# Patient Record
Sex: MALE | Race: WHITE | NOT HISPANIC OR LATINO | Employment: OTHER | URBAN - METROPOLITAN AREA
[De-identification: names, ages, dates, MRNs, and addresses within clinical notes are randomized per-mention and may not be internally consistent; named-entity substitution may affect disease eponyms.]

---

## 2018-03-20 LAB
CK SERPL-CCNC: 609 IU/L (ref 30–223)
CK-MB (HISTORICAL): 9.2 NG/ML (ref 0.6–6.3)

## 2018-03-21 LAB
CK SERPL-CCNC: 447 IU/L (ref 30–223)
CK-MB (HISTORICAL): 6 NG/ML (ref 0.6–6.3)
CLOSTRIDIUM DIFFICILE TOXIN (HISTORICAL): NEGATIVE

## 2018-03-22 LAB
CK SERPL-CCNC: 267 IU/L (ref 30–223)
CK-MB (HISTORICAL): 4.7 NG/ML (ref 0.6–6.3)

## 2018-03-23 LAB
ANION GAP SERPL CALCULATED.3IONS-SCNC: 8.9 MM/L
BASOPHILS # BLD AUTO: 0 X3/UL (ref 0–0.3)
BASOPHILS # BLD AUTO: 0.6 % (ref 0–2)
BUN SERPL-MCNC: 9 MG/DL (ref 7–25)
CALCIUM SERPL-MCNC: 9.3 MG/DL (ref 8.6–10.5)
CHLORIDE SERPL-SCNC: 105 MM/L (ref 98–107)
CK SERPL-CCNC: 130 IU/L (ref 30–223)
CO2 SERPL-SCNC: 28 MM/L (ref 21–31)
CREAT SERPL-MCNC: 0.85 MG/DL (ref 0.7–1.3)
DEPRECATED RDW RBC AUTO: 13.8 % (ref 11.5–14.5)
EGFR (HISTORICAL): > 60 GFR
EGFR AFRICAN AMERICAN (HISTORICAL): > 60 GFR
EOSINOPHIL # BLD AUTO: 0.1 X3/UL (ref 0–0.5)
EOSINOPHIL NFR BLD AUTO: 2.4 % (ref 0–5)
GLUCOSE (HISTORICAL): 98 MG/DL (ref 65–99)
HCT VFR BLD AUTO: 40.3 % (ref 42–52)
HGB BLD-MCNC: 13.9 G/DL (ref 14–18)
LITHIUM LEVEL (HISTORICAL): 0.4 MM/L (ref 1–1.2)
LYMPHOCYTES # BLD AUTO: 1.3 X3/UL (ref 1.2–4.2)
LYMPHOCYTES NFR BLD AUTO: 27.1 % (ref 20.5–51.1)
MCH RBC QN AUTO: 30.5 PG (ref 26–34)
MCHC RBC AUTO-ENTMCNC: 34.5 G/DL (ref 31–36)
MCV RBC AUTO: 88.2 FL (ref 81–99)
MONOCYTES # BLD AUTO: 0.4 X3/UL (ref 0–1)
MONOCYTES NFR BLD AUTO: 8.3 % (ref 1.7–12)
NEUTROPHILS # BLD AUTO: 3 X3/UL (ref 1.4–6.5)
NEUTS SEG NFR BLD AUTO: 61.6 % (ref 42.2–75.2)
OSMOLALITY, SERUM (HISTORICAL): 274 MOSM (ref 262–291)
PLATELET # BLD AUTO: 245 X3/UL (ref 130–400)
PMV BLD AUTO: 8.6 FL (ref 8.6–11.7)
POTASSIUM SERPL-SCNC: 3.9 MM/L (ref 3.5–5.5)
RBC # BLD AUTO: 4.57 X6/UL (ref 4.3–5.9)
SODIUM SERPL-SCNC: 138 MM/L (ref 134–143)
WBC # BLD AUTO: 4.8 X3/UL (ref 4.8–10.8)

## 2018-03-27 LAB
ANION GAP SERPL CALCULATED.3IONS-SCNC: 9.6 MM/L
BASOPHILS # BLD AUTO: 0 X3/UL (ref 0–0.3)
BASOPHILS # BLD AUTO: 0.6 % (ref 0–2)
BUN SERPL-MCNC: 9 MG/DL (ref 7–25)
CALCIUM SERPL-MCNC: 9.2 MG/DL (ref 8.6–10.5)
CHLORIDE SERPL-SCNC: 107 MM/L (ref 98–107)
CO2 SERPL-SCNC: 27 MM/L (ref 21–31)
CREAT SERPL-MCNC: 0.95 MG/DL (ref 0.7–1.3)
DEPRECATED RDW RBC AUTO: 13.9 % (ref 11.5–14.5)
EGFR (HISTORICAL): > 60 GFR
EGFR AFRICAN AMERICAN (HISTORICAL): > 60 GFR
EOSINOPHIL # BLD AUTO: 0.1 X3/UL (ref 0–0.5)
EOSINOPHIL NFR BLD AUTO: 2.3 % (ref 0–5)
GLUCOSE (HISTORICAL): 96 MG/DL (ref 65–99)
HCT VFR BLD AUTO: 39.4 % (ref 42–52)
HGB BLD-MCNC: 13.3 G/DL (ref 14–18)
LITHIUM LEVEL (HISTORICAL): 0.5 MM/L (ref 1–1.2)
LYMPHOCYTES # BLD AUTO: 1.2 X3/UL (ref 1.2–4.2)
LYMPHOCYTES NFR BLD AUTO: 22.8 % (ref 20.5–51.1)
MCH RBC QN AUTO: 29.5 PG (ref 26–34)
MCHC RBC AUTO-ENTMCNC: 33.8 G/DL (ref 31–36)
MCV RBC AUTO: 87.4 FL (ref 81–99)
MONOCYTES # BLD AUTO: 0.3 X3/UL (ref 0–1)
MONOCYTES NFR BLD AUTO: 6.6 % (ref 1.7–12)
NEUTROPHILS # BLD AUTO: 3.5 X3/UL (ref 1.4–6.5)
NEUTS SEG NFR BLD AUTO: 67.7 % (ref 42.2–75.2)
OSMOLALITY, SERUM (HISTORICAL): 276 MOSM (ref 262–291)
PLATELET # BLD AUTO: 233 X3/UL (ref 130–400)
PMV BLD AUTO: 8.7 FL (ref 8.6–11.7)
POTASSIUM SERPL-SCNC: 4.6 MM/L (ref 3.5–5.5)
RBC # BLD AUTO: 4.51 X6/UL (ref 4.3–5.9)
SODIUM SERPL-SCNC: 139 MM/L (ref 134–143)
WBC # BLD AUTO: 5.2 X3/UL (ref 4.8–10.8)

## 2018-04-01 LAB
ANION GAP SERPL CALCULATED.3IONS-SCNC: 7.8 MM/L
BASOPHILS # BLD AUTO: 0 X3/UL (ref 0–0.3)
BASOPHILS # BLD AUTO: 0.5 % (ref 0–2)
BUN SERPL-MCNC: 9 MG/DL (ref 7–25)
CALCIUM SERPL-MCNC: 9.2 MG/DL (ref 8.6–10.5)
CHLORIDE SERPL-SCNC: 105 MM/L (ref 98–107)
CO2 SERPL-SCNC: 30 MM/L (ref 21–31)
CREAT SERPL-MCNC: 0.88 MG/DL (ref 0.7–1.3)
DEPRECATED RDW RBC AUTO: 14.1 % (ref 11.5–14.5)
EGFR (HISTORICAL): > 60 GFR
EGFR AFRICAN AMERICAN (HISTORICAL): > 60 GFR
EOSINOPHIL # BLD AUTO: 0.1 X3/UL (ref 0–0.5)
EOSINOPHIL NFR BLD AUTO: 2.3 % (ref 0–5)
GLUCOSE (HISTORICAL): 89 MG/DL (ref 65–99)
HCT VFR BLD AUTO: 40.6 % (ref 42–52)
HGB BLD-MCNC: 13.8 G/DL (ref 14–18)
LITHIUM LEVEL (HISTORICAL): 0.7 MM/L (ref 1–1.2)
LYMPHOCYTES # BLD AUTO: 1.2 X3/UL (ref 1.2–4.2)
LYMPHOCYTES NFR BLD AUTO: 21.7 % (ref 20.5–51.1)
MCH RBC QN AUTO: 30.1 PG (ref 26–34)
MCHC RBC AUTO-ENTMCNC: 33.9 G/DL (ref 31–36)
MCV RBC AUTO: 88.6 FL (ref 81–99)
MONOCYTES # BLD AUTO: 0.4 X3/UL (ref 0–1)
MONOCYTES NFR BLD AUTO: 7.1 % (ref 1.7–12)
NEUTROPHILS # BLD AUTO: 3.7 X3/UL (ref 1.4–6.5)
NEUTS SEG NFR BLD AUTO: 68.4 % (ref 42.2–75.2)
OSMOLALITY, SERUM (HISTORICAL): 276 MOSM (ref 262–291)
PLATELET # BLD AUTO: 200 X3/UL (ref 130–400)
PMV BLD AUTO: 8.2 FL (ref 8.6–11.7)
POTASSIUM SERPL-SCNC: 3.8 MM/L (ref 3.5–5.5)
RBC # BLD AUTO: 4.58 X6/UL (ref 4.3–5.9)
SODIUM SERPL-SCNC: 139 MM/L (ref 134–143)
WBC # BLD AUTO: 5.4 X3/UL (ref 4.8–10.8)

## 2018-04-02 LAB
BILIRUB UR QL STRIP: NEGATIVE
CLARITY UR: NORMAL
COLOR UR: YELLOW
GLUCOSE UR STRIP-MCNC: NEGATIVE MG/DL
HGB UR QL STRIP.AUTO: NEGATIVE
KETONES UR STRIP-MCNC: NEGATIVE MG/DL
LEUKOCYTE ESTERASE UR QL STRIP: NEGATIVE
NITRITE UR QL STRIP: NEGATIVE
PH UR STRIP.AUTO: 7 [PH] (ref 4.5–8)
PROT UR STRIP-MCNC: NEGATIVE MG/DL
SP GR UR STRIP.AUTO: <= 1.005 (ref 1–1.03)
UROBILINOGEN UR QL STRIP.AUTO: 0.2 EU/DL (ref 0.2–8)

## 2018-04-06 LAB
ANION GAP SERPL CALCULATED.3IONS-SCNC: 6.9 MM/L
BASOPHILS # BLD AUTO: 0 X3/UL (ref 0–0.3)
BASOPHILS # BLD AUTO: 0.4 % (ref 0–2)
BUN SERPL-MCNC: 11 MG/DL (ref 7–25)
CALCIUM SERPL-MCNC: 9.7 MG/DL (ref 8.6–10.5)
CHLORIDE SERPL-SCNC: 104 MM/L (ref 98–107)
CO2 SERPL-SCNC: 32 MM/L (ref 21–31)
CREAT SERPL-MCNC: 0.97 MG/DL (ref 0.7–1.3)
DEPRECATED RDW RBC AUTO: 14.1 % (ref 11.5–14.5)
EGFR (HISTORICAL): > 60 GFR
EGFR AFRICAN AMERICAN (HISTORICAL): > 60 GFR
EOSINOPHIL # BLD AUTO: 0.1 X3/UL (ref 0–0.5)
EOSINOPHIL NFR BLD AUTO: 2.6 % (ref 0–5)
GLUCOSE (HISTORICAL): 94 MG/DL (ref 65–99)
HCT VFR BLD AUTO: 40.7 % (ref 42–52)
HGB BLD-MCNC: 14 G/DL (ref 14–18)
LITHIUM LEVEL (HISTORICAL): 0.8 MM/L (ref 1–1.2)
LYMPHOCYTES # BLD AUTO: 1.2 X3/UL (ref 1.2–4.2)
LYMPHOCYTES NFR BLD AUTO: 22.4 % (ref 20.5–51.1)
MCH RBC QN AUTO: 30.1 PG (ref 26–34)
MCHC RBC AUTO-ENTMCNC: 34.3 G/DL (ref 31–36)
MCV RBC AUTO: 87.8 FL (ref 81–99)
MONOCYTES # BLD AUTO: 0.4 X3/UL (ref 0–1)
MONOCYTES NFR BLD AUTO: 7.3 % (ref 1.7–12)
NEUTROPHILS # BLD AUTO: 3.7 X3/UL (ref 1.4–6.5)
NEUTS SEG NFR BLD AUTO: 67.3 % (ref 42.2–75.2)
OSMOLALITY, SERUM (HISTORICAL): 277 MOSM (ref 262–291)
PLATELET # BLD AUTO: 180 X3/UL (ref 130–400)
PMV BLD AUTO: 8 FL (ref 8.6–11.7)
POTASSIUM SERPL-SCNC: 3.9 MM/L (ref 3.5–5.5)
RBC # BLD AUTO: 4.64 X6/UL (ref 4.3–5.9)
SODIUM SERPL-SCNC: 139 MM/L (ref 134–143)
WBC # BLD AUTO: 5.6 X3/UL (ref 4.8–10.8)

## 2018-04-18 ENCOUNTER — HOSPITAL ENCOUNTER (EMERGENCY)
Facility: HOSPITAL | Age: 58
Discharge: HOME | End: 2018-04-18
Attending: EMERGENCY MEDICINE | Admitting: EMERGENCY MEDICINE
Payer: MEDICARE

## 2018-04-18 VITALS
RESPIRATION RATE: 20 BRPM | OXYGEN SATURATION: 99 % | HEIGHT: 69 IN | SYSTOLIC BLOOD PRESSURE: 128 MMHG | BODY MASS INDEX: 29.62 KG/M2 | TEMPERATURE: 97.6 F | WEIGHT: 200 LBS | DIASTOLIC BLOOD PRESSURE: 77 MMHG | HEART RATE: 100 BPM

## 2018-04-18 DIAGNOSIS — Z04.6 ENCOUNTER FOR GENERAL PSYCHIATRIC EXAMINATION, REQUESTED BY AUTHORITY: Primary | ICD-10-CM

## 2018-04-18 LAB
AMPHETAMINES SERPL QL SCN: NEGATIVE
ANION GAP SERPL CALCULATED.3IONS-SCNC: 7 MMOL/L (ref 4–13)
ATRIAL RATE: 76 BPM
BARBITURATES UR QL: NEGATIVE
BASOPHILS # BLD AUTO: 0.02 THOUSANDS/ΜL (ref 0–0.1)
BASOPHILS NFR BLD AUTO: 0 % (ref 0–1)
BENZODIAZ UR QL: NEGATIVE
BILIRUB UR QL STRIP: NEGATIVE
BUN SERPL-MCNC: 14 MG/DL (ref 5–25)
CALCIUM SERPL-MCNC: 8.6 MG/DL (ref 8.3–10.1)
CHLORIDE SERPL-SCNC: 107 MMOL/L (ref 100–108)
CLARITY UR: CLEAR
CO2 SERPL-SCNC: 29 MMOL/L (ref 21–32)
COCAINE UR QL: NEGATIVE
COLOR UR: YELLOW
COLOR, POC: YELLOW
CREAT SERPL-MCNC: 0.82 MG/DL (ref 0.6–1.3)
EOSINOPHIL # BLD AUTO: 0.14 THOUSAND/ΜL (ref 0–0.61)
EOSINOPHIL NFR BLD AUTO: 3 % (ref 0–6)
ERYTHROCYTE [DISTWIDTH] IN BLOOD BY AUTOMATED COUNT: 13.6 % (ref 11.6–15.1)
ETHANOL EXG-MCNC: 0 MG/DL
GFR SERPL CREATININE-BSD FRML MDRD: 96 ML/MIN/1.73SQ M
GLUCOSE SERPL-MCNC: 116 MG/DL (ref 65–140)
GLUCOSE UR STRIP-MCNC: NEGATIVE MG/DL
HCT VFR BLD AUTO: 37.2 % (ref 36.5–49.3)
HGB BLD-MCNC: 12.5 G/DL (ref 12–17)
HGB UR QL STRIP.AUTO: NEGATIVE
KETONES UR STRIP-MCNC: NEGATIVE MG/DL
LEUKOCYTE ESTERASE UR QL STRIP: NEGATIVE
LYMPHOCYTES # BLD AUTO: 1.02 THOUSANDS/ΜL (ref 0.6–4.47)
LYMPHOCYTES NFR BLD AUTO: 18 % (ref 14–44)
MCH RBC QN AUTO: 29.8 PG (ref 26.8–34.3)
MCHC RBC AUTO-ENTMCNC: 33.6 G/DL (ref 31.4–37.4)
MCV RBC AUTO: 89 FL (ref 82–98)
METHADONE UR QL: NEGATIVE
MONOCYTES # BLD AUTO: 0.48 THOUSAND/ΜL (ref 0.17–1.22)
MONOCYTES NFR BLD AUTO: 9 % (ref 4–12)
NEUTROPHILS # BLD AUTO: 4.01 THOUSANDS/ΜL (ref 1.85–7.62)
NEUTS SEG NFR BLD AUTO: 70 % (ref 43–75)
NITRITE UR QL STRIP: NEGATIVE
OPIATES UR QL SCN: NEGATIVE
P AXIS: 79 DEGREES
PCP UR QL: NEGATIVE
PH UR STRIP.AUTO: 7 [PH] (ref 4.5–8)
PLATELET # BLD AUTO: 206 THOUSANDS/UL (ref 149–390)
PMV BLD AUTO: 10 FL (ref 8.9–12.7)
POTASSIUM SERPL-SCNC: 3.6 MMOL/L (ref 3.5–5.3)
PR INTERVAL: 158 MS
PROT UR STRIP-MCNC: NEGATIVE MG/DL
QRS AXIS: 114 DEGREES
QRSD INTERVAL: 98 MS
QT INTERVAL: 410 MS
QTC INTERVAL: 461 MS
RBC # BLD AUTO: 4.19 MILLION/UL (ref 3.88–5.62)
SODIUM SERPL-SCNC: 143 MMOL/L (ref 136–145)
SP GR UR STRIP.AUTO: 1.02 (ref 1–1.03)
T WAVE AXIS: 51 DEGREES
THC UR QL: POSITIVE
TROPONIN I SERPL-MCNC: <0.02 NG/ML
TSH SERPL DL<=0.05 MIU/L-ACNC: 1.72 UIU/ML (ref 0.36–3.74)
UROBILINOGEN UR QL STRIP.AUTO: 0.2 E.U./DL
VENTRICULAR RATE: 76 BPM
WBC # BLD AUTO: 5.67 THOUSAND/UL (ref 4.31–10.16)

## 2018-04-18 PROCEDURE — 81003 URINALYSIS AUTO W/O SCOPE: CPT

## 2018-04-18 PROCEDURE — 84443 ASSAY THYROID STIM HORMONE: CPT | Performed by: EMERGENCY MEDICINE

## 2018-04-18 PROCEDURE — 36415 COLL VENOUS BLD VENIPUNCTURE: CPT | Performed by: EMERGENCY MEDICINE

## 2018-04-18 PROCEDURE — 85025 COMPLETE CBC W/AUTO DIFF WBC: CPT | Performed by: EMERGENCY MEDICINE

## 2018-04-18 PROCEDURE — 82075 ASSAY OF BREATH ETHANOL: CPT | Performed by: EMERGENCY MEDICINE

## 2018-04-18 PROCEDURE — 80307 DRUG TEST PRSMV CHEM ANLYZR: CPT | Performed by: EMERGENCY MEDICINE

## 2018-04-18 PROCEDURE — 99284 EMERGENCY DEPT VISIT MOD MDM: CPT

## 2018-04-18 PROCEDURE — 80048 BASIC METABOLIC PNL TOTAL CA: CPT | Performed by: EMERGENCY MEDICINE

## 2018-04-18 PROCEDURE — 84484 ASSAY OF TROPONIN QUANT: CPT | Performed by: EMERGENCY MEDICINE

## 2018-04-18 PROCEDURE — 93005 ELECTROCARDIOGRAM TRACING: CPT | Performed by: EMERGENCY MEDICINE

## 2018-04-18 PROCEDURE — 93010 ELECTROCARDIOGRAM REPORT: CPT | Performed by: INTERNAL MEDICINE

## 2018-04-18 PROCEDURE — 81002 URINALYSIS NONAUTO W/O SCOPE: CPT | Performed by: EMERGENCY MEDICINE

## 2018-04-18 RX ORDER — LITHIUM CARBONATE 300 MG/1
300 CAPSULE ORAL EVERY 12 HOURS
COMMUNITY
End: 2019-11-12 | Stop reason: HOSPADM

## 2018-04-18 NOTE — ED NOTES
Patient states was at the [employer] and got in to a dispute with the  over the price of his purchases  States she did not give him a receipt  He denies making any threats  She called the police and he left and said he would wait at the fire hydrant  Denies any SI/HI  No hallucinations  States has had previous psych admissions at [facility], [facility], and [facility]  Unsure how compliant patient is with his Lithium  States Lithium and Ativan are OK, but anything else is not on his directive    Patient did not want to come with the police so came with a man he calls his uncle     Liana Davis, RN  04/18/18 8536 [facility] Pablo Yancey RN  04/18/18 0436

## 2018-04-18 NOTE — ED NOTES
Patient moved into behavioral holding area  Visitor's cell phone placed in nursing station       Tod Fulton  04/18/18 1721

## 2018-04-18 NOTE — DISCHARGE INSTRUCTIONS
Mood Disorders   WHAT YOU NEED TO KNOW:   A mood disorder, or affective disorder, is a condition that causes your mood or emotions to be out of control  Your mood can affect your personality and how you act  It can also affect how you feel about yourself and life in general   DISCHARGE INSTRUCTIONS:   Medicines:   · Medicines  can help control your moods  · Take your medicine as directed  Contact your healthcare provider if you think your medicine is not helping or if you have side effects  Tell him or her if you are allergic to any medicine  Keep a list of the medicines, vitamins, and herbs you take  Include the amounts, and when and why you take them  Bring the list or the pill bottles to follow-up visits  Carry your medicine list with you in case of an emergency  Follow up with your healthcare provider as directed: You may need to return for regular visits or blood tests  Write down your questions so you remember to ask them during your visits  Self-care:   · Try to get 6 to 8 hours of sleep each night  Contact your healthcare provider if you have trouble sleeping  · Manage your stress  Learn new ways to relax, such as deep breathing or meditation  · Talk to someone about how you feel  Join a support group  Talk to your healthcare provider, family, or friends about your feelings  Tell them about things that upset you  · Exercise regularly  Ask about the best exercise plan for you  Most healthcare providers recommend 30 minutes each day, 5 days a week  Exercise helps to lower stress and manage your moods  Contact your healthcare provider if:   · You are depressed  · You feel anxious or worried  · You begin to drink alcohol, or you drink more than usual     · You take illegal drugs  · You take medicines that are not prescribed to you  · Your medicine causes you to feel drowsy, keeps you awake, or affects how much you eat      · You have questions or concerns about your condition or care  Seek care immediately or call 911 if:   · You have severe depression  · You want to hurt yourself or others  © 2017 [address] [employer] Information is for End User's use only and may not be sold, redistributed or otherwise used for commercial purposes  All illustrations and images included in CareNotes® are the copyrighted property of A D A M , Inc  or Shailesh Jeffery  The above information is an  only  It is not intended as medical advice for individual conditions or treatments  Talk to your doctor, nurse or pharmacist before following any medical regimen to see if it is safe and effective for you

## 2018-04-18 NOTE — ED NOTES
Patient's visitor at bedside  Patient eating sandwich brought in by visitor       Tod Fulton  04/18/18 7703

## 2018-04-18 NOTE — ED NOTES
Pt is a 59 y o  male who was brought to the ED with   Chief Complaint   Patient presents with    Psychiatric Evaluation     pt was sent by sonya from [facility] for evaluation  came with uncle  recent admission for "medication adjustment" uncle reports pt is "not stable"   Pt brought to the ED by [facility], with complaints of sofía agitated, Pt reports that he got into argument at the gas station with stationclerk about the price of gas, the station  called the PD, due to pt being agrumentative with her  Pt reports that he takes his meds as prescribed, Pt reports that he sees a Psych Dr through the [facility]  Pt reports that the argument today was a big misunderstanding, and that he apoligized after it was over, but the PD was there and they wanted him to be seen by crisis  Pt denies S/I,H/I,A/H,V/H  Intake Assessment completed, Safety risk Assessment completed, CW met with pt and discussed what happened, pt report that he will talk to his Psych Dr about this  CW discussed this case and pt plan with ED Physician who is in agreement with this plan, Pt will be discharge per ED Physician , Ruthie Mcfadden gave pt a referral for follow up care        [address] [place] [address]

## 2018-04-18 NOTE — ED PROVIDER NOTES
History  Chief Complaint   Patient presents with    Psychiatric Evaluation     pt was sent by sonya from [facility] for evaluation  came with uncle  recent admission for "medication adjustment" uncle reports pt is "not stable"     70-year-old male presents for evaluation of bizarre behavior  Patient was reportedly agitated and had an altercation with his   He denies any suicidal or homicidal threats  Patient was sent down by the console for the [place] [employer] police for evaluation  Patient has an extensive psychiatric history with a recent admission  Patient reports changing his medications at home on his own  Patient denies being suicidal or homicidal, chest pain, shortness of breath, hallucinations  History provided by:  Patient and relative  Psychiatric Evaluation   Presenting symptoms: no agitation, no hallucinations and no suicidal thoughts    Associated symptoms: no abdominal pain, no appetite change, no chest pain and no fatigue        Prior to Admission Medications   Prescriptions Last Dose Informant Patient Reported? Taking?   lithium carbonate 300 mg capsule   Yes Yes   Sig: Take 300 mg by mouth every 12 (twelve) hours With meals      Facility-Administered Medications: None       Past Medical History:   Diagnosis Date    Hypertension     Psychiatric disorder        Past Surgical History:   Procedure Laterality Date    FRACTURE SURGERY         History reviewed  No pertinent family history  I have reviewed and agree with the history as documented  Social History   Substance Use Topics    Smoking status: Current Every Day Smoker     Packs/day: 1 00    Smokeless tobacco: Never Used    Alcohol use Yes      Comment: whenever i want        Review of Systems   Constitutional: Negative for activity change, appetite change, fatigue and fever  HENT: Negative for congestion, dental problem, ear pain, rhinorrhea and sore throat  Eyes: Negative for pain and redness  Respiratory: Negative for chest tightness, shortness of breath and wheezing  Cardiovascular: Negative for chest pain and palpitations  Gastrointestinal: Negative for abdominal pain, blood in stool, constipation, diarrhea, nausea and vomiting  Endocrine: Negative for cold intolerance and heat intolerance  Genitourinary: Negative for dysuria, frequency and hematuria  Musculoskeletal: Negative for arthralgias and myalgias  Skin: Negative for color change, pallor and rash  Neurological: Negative for weakness and numbness  Hematological: Does not bruise/bleed easily  Psychiatric/Behavioral: Positive for behavioral problems  Negative for agitation, hallucinations and suicidal ideas  Physical Exam  ED Triage Vitals   Temperature Pulse Respirations Blood Pressure SpO2   04/18/18 1318 04/18/18 1314 04/18/18 1314 04/18/18 1318 04/18/18 1314   97 6 °F (36 4 °C) 100 20 128/77 99 %      Temp Source Heart Rate Source Patient Position - Orthostatic VS BP Location FiO2 (%)   04/18/18 1318 04/18/18 1314 04/18/18 1318 04/18/18 1318 --   Temporal Monitor Sitting Right arm       Pain Score       04/18/18 1318       1           Orthostatic Vital Signs  Vitals:    04/18/18 1314 04/18/18 1318   BP:  128/77   Pulse: 100    Patient Position - Orthostatic VS:  Sitting       Physical Exam   Constitutional: He is oriented to person, place, and time  He appears well-developed and well-nourished  HENT:   Mouth/Throat: No oropharyngeal exudate  TMs normal bilaterally no pharyngeal erythema no rhinorrhea nontender palpation of sinuses, normal looking turbinates   Eyes: Conjunctivae and EOM are normal    Neck: Normal range of motion  Neck supple  No meningeal signs   Cardiovascular: Normal rate, regular rhythm, normal heart sounds and intact distal pulses  Pulmonary/Chest: Effort normal and breath sounds normal  No respiratory distress  He has no wheezes  He has no rales  He exhibits no tenderness     Abdominal: Soft  Bowel sounds are normal  He exhibits no distension and no mass  There is no tenderness  No hernia  No cvat   Musculoskeletal: Normal range of motion  He exhibits no edema  Lymphadenopathy:     He has no cervical adenopathy  Neurological: He is alert and oriented to person, place, and time  No cranial nerve deficit  Skin: No rash noted  No erythema  No edema   Psychiatric: He has a normal mood and affect  His speech is normal and behavior is normal  Thought content normal  He is not actively hallucinating  Cognition and memory are normal  He is attentive  Nursing note and vitals reviewed  ED Medications  Medications - No data to display    Diagnostic Studies  Results Reviewed     Procedure Component Value Units Date/Time    Rapid drug screen, urine [[id number]]  (Abnormal) Collected:  04/18/18 1603    Lab Status:  Final result Specimen:  Urine from Urine, Clean Catch Updated:  04/18/18 1658     Amph/Meth UR Negative     Barbiturate Ur Negative     Benzodiazepine Urine Negative     Cocaine Urine Negative     Methadone Urine Negative     Opiate Urine Negative     PCP Ur Negative     THC Urine Positive (A)    Narrative:         Presumptive report  If requested, specimen will be sent to reference lab for confirmation  FOR MEDICAL PURPOSES ONLY  IF CONFIRMATION NEEDED PLEASE CONTACT THE LAB WITHIN 5 DAYS      Drug Screen Cutoff Levels:  AMPHETAMINE/METHAMPHETAMINES  1000 ng/mL  BARBITURATES     200 ng/mL  BENZODIAZEPINES     200 ng/mL  COCAINE      300 ng/mL  METHADONE      300 ng/mL  OPIATES      300 ng/mL  PHENCYCLIDINE     25 ng/mL  THC       50 ng/mL    POCT urinalysis dipstick [[id number]]  (Normal) Resulted:  04/18/18 1603    Lab Status:  Final result Specimen:  Urine Updated:  04/18/18 1603     Color, UA yellow    ED Urine Macroscopic [[id number]]  (Normal) Collected:  04/18/18 1603    Lab Status:  Final result Specimen:  Urine Updated:  04/18/18 1602     Color, UA Yellow     Clarity, UA Clear pH, UA 7 0     Leukocytes, UA Negative     Nitrite, UA Negative     Protein, UA Negative mg/dl      Glucose, UA Negative mg/dl      Ketones, UA Negative mg/dl      Urobilinogen, UA 0 2 E U /dl      Bilirubin, UA Negative     Blood, UA Negative     Specific Gravity, UA 1 020    Narrative:       CLINITEK RESULT    Basic metabolic panel [[id number]] Collected:  04/18/18 1417    Lab Status:  Final result Specimen:  Blood from Arm, Right Updated:  04/18/18 1451     Sodium 143 mmol/L      Potassium 3 6 mmol/L      Chloride 107 mmol/L      CO2 29 mmol/L      Anion Gap 7 mmol/L      BUN 14 mg/dL      Creatinine 0 82 mg/dL      Glucose 116 mg/dL      Calcium 8 6 mg/dL      eGFR 96 ml/min/1 73sq m     Narrative:         National Kidney Disease Education Program recommendations are as follows:  GFR calculation is accurate only with a steady state creatinine  Chronic Kidney disease less than 60 ml/min/1 73 sq  meters  Kidney failure less than 15 ml/min/1 73 sq  meters  TSH [[id number]]  (Normal) Collected:  04/18/18 1417    Lab Status:  Final result Specimen:  Blood from Arm, Right Updated:  04/18/18 1451     TSH 3RD GENERATON 1 722 uIU/mL     Narrative:         Patients undergoing fluorescein dye angiography may retain small amounts of fluorescein in the body for 48-72 hours post procedure  Samples containing fluorescein can produce falsely depressed TSH values  If the patient had this procedure,a specimen should be resubmitted post fluorescein clearance      POCT alcohol breath test [[id number]]  (Normal) Resulted:  04/18/18 1449    Lab Status:  Final result Updated:  04/18/18 1449     EXTBreath Alcohol 0 00    Troponin I [[id number]]  (Normal) Collected:  04/18/18 1417    Lab Status:  Final result Specimen:  Blood from Arm, Right Updated:  04/18/18 1445     Troponin I <0 02 ng/mL     Narrative:         [employer] Chemistry analyzer 99% cutoff is > 0 04 ng/mL in network labs    o cTnI 99% cutoff is useful only when applied to patients in the clinical setting of myocardial ischemia  o cTnI 99% cutoff should be interpreted in the context of clinical history, ECG findings and possibly cardiac imaging to establish correct diagnosis  o cTnI 99% cutoff may be suggestive but clearly not indicative of a coronary event without the clinical setting of myocardial ischemia  CBC and differential [[id number]]  (Normal) Collected:  04/18/18 1417    Lab Status:  Final result Specimen:  Blood from Arm, Right Updated:  04/18/18 1427     WBC 5 67 Thousand/uL      RBC 4 19 Million/uL      Hemoglobin 12 5 g/dL      Hematocrit 37 2 %      MCV 89 fL      MCH 29 8 pg      MCHC 33 6 g/dL      RDW 13 6 %      MPV 10 0 fL      Platelets 749 Thousands/uL      Neutrophils Relative 70 %      Lymphocytes Relative 18 %      Monocytes Relative 9 %      Eosinophils Relative 3 %      Basophils Relative 0 %      Neutrophils Absolute 4 01 Thousands/µL      Lymphocytes Absolute 1 02 Thousands/µL      Monocytes Absolute 0 48 Thousand/µL      Eosinophils Absolute 0 14 Thousand/µL      Basophils Absolute 0 02 Thousands/µL                  No orders to display              Procedures  ECG 12 Lead Documentation  Date/Time: 4/18/2018 2:30 PM  Performed by: Gem Dia by: Maximilian MORIN     ECG reviewed by me, the ED Provider: yes    Patient location:  ED  Rate:     ECG rate:  76    ECG rate assessment: normal    Rhythm:     Rhythm: sinus rhythm    Ectopy:     Ectopy: none    QRS:     QRS axis:  Normal    QRS intervals:  Normal  Conduction:     Conduction: normal    ST segments:     ST segments:  Normal  T waves:     T waves: normal             Phone Contacts  ED Phone Contact    ED Course  ED Course                                MDM  Number of Diagnoses or Management Options  Diagnosis management comments:  Will do Mila psych labs, consult crisis    CritCare Time    Disposition  Final diagnoses:   Encounter for general psychiatric examination, requested by authority     Time reflects when diagnosis was documented in both MDM as applicable and the Disposition within this note     Time User Action Codes Description Comment    4/18/2018  5:36 PM Damarisgraciela Murphy Add [Z04 6] Encounter for general psychiatric examination, requested by authority       ED Disposition     ED Disposition Condition Comment    Discharge  Freddie K Day discharge to home/self care  Condition at discharge: Good        MD Documentation    Franklyn Conner Most Recent Value   Sending MD DR Bentley Screen      Follow-up Information     Follow up With Specialties Details Why Contact Info    Infolink  Schedule an appointment as soon as possible for a visit in 2 days  [phone]          Patient's Medications   Discharge Prescriptions    No medications on file     No discharge procedures on file      ED Provider  Electronically Signed by           Messi Wilson MD  04/18/18 5983

## 2018-04-18 NOTE — ED NOTES
Pt sitting in chair in room, states "this place is a shit hole and in need of some janitorial expertise" pt requesting floor to be mopped at this time, housekeeping paged         Taina Cortez RN  04/18/18 6358

## 2018-04-18 NOTE — ED NOTES
Pt is a poor historian, cannot list all of his allergies or medical problems, will not answer questions directly  Both he and his uncle seem unclear as to why pt is here        Champ Lau RN  04/18/18 2979

## 2018-04-18 NOTE — ED NOTES
Ate 100% of his dinner tray  Belongings returned to pt at this time along with discharge paperwork    States he will find a ride home "i've got numbers memorized in my head to call"     Champ Lau RN  04/18/18 5578

## 2018-04-27 ENCOUNTER — HOSPITAL ENCOUNTER (EMERGENCY)
Facility: HOSPITAL | Age: 58
Discharge: HOME | End: 2018-04-27
Attending: EMERGENCY MEDICINE | Admitting: EMERGENCY MEDICINE
Payer: MEDICARE

## 2018-04-27 ENCOUNTER — APPOINTMENT (EMERGENCY)
Dept: RADIOLOGY | Facility: HOSPITAL | Age: 58
End: 2018-04-27
Payer: MEDICARE

## 2018-04-27 VITALS
SYSTOLIC BLOOD PRESSURE: 159 MMHG | DIASTOLIC BLOOD PRESSURE: 93 MMHG | HEART RATE: 70 BPM | WEIGHT: 209.8 LBS | RESPIRATION RATE: 15 BRPM | BODY MASS INDEX: 30.98 KG/M2 | OXYGEN SATURATION: 98 % | TEMPERATURE: 98 F

## 2018-04-27 DIAGNOSIS — R07.81 RIB PAIN ON LEFT SIDE: Primary | ICD-10-CM

## 2018-04-27 PROCEDURE — 71046 X-RAY EXAM CHEST 2 VIEWS: CPT

## 2018-04-27 PROCEDURE — 93005 ELECTROCARDIOGRAM TRACING: CPT

## 2018-04-27 PROCEDURE — 99285 EMERGENCY DEPT VISIT HI MDM: CPT

## 2018-04-27 NOTE — ED ATTENDING ATTESTATION
Jeannette Moore DO, saw and evaluated the patient  I have discussed the patient with the resident/non-physician practitioner and agree with the resident's/non-physician practitioner's findings, Plan of Care, and MDM as documented in the resident's/non-physician practitioner's note, except where noted  All available labs and Radiology studies were reviewed  At this point I agree with the current assessment done in the Emergency Department  I have conducted an independent evaluation of this patient a history and physical is as follows:      Critical Care Time  CritCare Time    Procedures   59-year-old male presents to the emergency department from the [facility] with complaints of sharp left-sided rib pain  This apparently has been going on for quite some time  Patient is a very poor historian and is rambling  He is also quite hostile and does not want any lab work done  He [facility]  [facility] he [facility] there today for routine blood work and complained of this left rib pain was sent here  Patient states that he needs to walk to [place] because it is almost [address]  On exam patient is awake and alert and in no distress  He appears disheveled  Heart is regular without murmur  Lungs are clear  He does not have any left rib tenderness but is agreeable to have an x-ray done  He is asking for something to eat  Abdomen is soft and nontender  Neurologically has no focal deficits  Skin is warm and dry, normal color no rash

## 2018-04-27 NOTE — ED NOTES
C/O of increased uncontrolled urination and excessive drooling states this is happening because he was taken of his elin Degroot, ADRIEN  04/27/18 6194

## 2018-04-27 NOTE — ED NOTES
Went into pt  Room to get blood work and start an IV  Pt  Was verbally abusive stating "get the f out and tell the Dr  I am not getting any blood work  I already had it done at the [address] [facility] and they can look there if they want to know anything " Pt  Explained about the importance of blood work and testing at this time  Pt  Rishi Kirby me he is tired of hearing me talk and to get out  Provider notified at this time       Logan Hernandez, RN  04/27/18 2952

## 2018-04-27 NOTE — ED NOTES
Patient reports he continues to take his lithium but refuses to take all other medications that have been prescribed to him  His med record from the [place] shows he was given prescriptions on April 7th for benztropine, benadryl and paliperidone       Karole Schirmer, RN  04/27/18 4260

## 2018-04-27 NOTE — DISCHARGE INSTRUCTIONS
Follow up with primary care doctor in 3-5 days  If your symptoms worsen, return to the ED immediately  Chest Pain   WHAT YOU NEED TO KNOW:   Chest pain can be caused by a range of conditions, from not serious to life-threatening  Chest pain can be a symptom of a digestive problem, such as acid reflux or a stomach ulcer  An anxiety attack or a strong emotion, such as anger, can also cause chest pain  Infection, inflammation, or a fracture in the bones or cartilage in your chest can cause pain or discomfort  Sometimes chest pain or pressure is caused by poor blood flow to your heart (angina)  Chest pain may also be caused by life-threatening conditions such as a heart attack or blood clot in your lungs  DISCHARGE INSTRUCTIONS:   Call 911 if:   · You have any of the following signs of a heart attack:      ¨ Squeezing, pressure, or pain in your chest that lasts longer than 5 minutes or returns    ¨ Discomfort or pain in your back, neck, jaw, stomach, or arm     ¨ Trouble breathing    ¨ Nausea or vomiting    ¨ Lightheadedness or a sudden cold sweat, especially with chest pain or trouble breathing    Return to the emergency department if:   · You have chest discomfort that gets worse, even with medicine  · You cough or vomit blood  · Your bowel movements are black or bloody  · You cannot stop vomiting, or it hurts to swallow  Contact your healthcare provider if:   · You have questions or concerns about your condition or care  Medicines:   · Medicines  may be given to treat the cause of your chest pain  Examples include pain medicine, anxiety medicine, or medicines to increase blood flow to your heart  · Do not take certain medicines without asking your healthcare provider first   These include NSAIDs, herbal or vitamin supplements, or hormones (estrogen or progestin)  · Take your medicine as directed    Contact your healthcare provider if you think your medicine is not helping or if you have side effects  Tell him or her if you are allergic to any medicine  Keep a list of the medicines, vitamins, and herbs you take  Include the amounts, and when and why you take them  Bring the list or the pill bottles to follow-up visits  Carry your medicine list with you in case of an emergency  Follow up with your healthcare provider within 72 hours, or as directed: You may need to return for more tests to find the cause of your chest pain  You may be referred to a specialist, such as a cardiologist or gastroenterologist  Write down your questions so you remember to ask them during your visits  Healthy living tips: The following are general healthy guidelines  If your chest pain is caused by a heart problem, your healthcare provider will give you specific guidelines to follow  · Do not smoke  Nicotine and other chemicals in cigarettes and cigars can cause lung and heart damage  Ask your healthcare provider for information if you currently smoke and need help to quit  E-cigarettes or smokeless tobacco still contain nicotine  Talk to your healthcare provider before you use these products  · Eat a variety of healthy, low-fat foods  Healthy foods include fruits, vegetables, whole-grain breads, low-fat dairy products, beans, lean meats, and fish  Ask for more information about a heart healthy diet  · Ask about activity  Your healthcare provider will tell you which activities to limit or avoid  Ask when you can drive, return to work, and have sex  Ask about the best exercise plan for you  · Maintain a healthy weight  Ask your healthcare provider how much you should weigh  Ask him or her to help you create a weight loss plan if you are overweight  · Get the flu and pneumonia vaccines  All adults should get the influenza (flu) vaccine  Get it every year as soon as it becomes available  The pneumococcal vaccine is given to adults aged 72 years or older   The vaccine is given every 5 years to prevent pneumococcal disease, such as pneumonia  © 2017 [employer]  Information is for End User's use only and may not be sold, redistributed or otherwise used for commercial purposes  All illustrations and images included in CareNotes® are the copyrighted property of A D A M , Inc  or Shailesh Jeffery  The above information is an  only  It is not intended as medical advice for individual conditions or treatments  Talk to your doctor, nurse or pharmacist before following any medical regimen to see if it is safe and effective for you

## 2018-04-27 NOTE — ED PROVIDER NOTES
History  Chief Complaint   Patient presents with    Chest Pain     Patient was at [place] for routine mental health care when he started complaining of left sided chest pain  He states it starts in his left ribs radiates across his chest  Complains of no pain at time of triage and paramedics report that rib pain was nonreproducible  Patient also admits to drinking alcohol today  In addition to chest pain patient c/o excessive drooling and urination  78-year-old male history of schizophrenia, bipolar disorder presents to the emergency department for evaluation of left-sided rib pain  Patient is a poor historian and does not answer any questions correctly  Patient goes on tangents and cannot relate to me what his goals of care are in the emergency department  Patient states that he was sent from the [place] directly  Patient states that he is currently only taking lithium  Otherwise, patient denies any fever denies any chest pain or shortness of breath for nausea vomiting abdominal pain dysuria constipation or diarrhea  Prior to Admission Medications   Prescriptions Last Dose Informant Patient Reported? Taking?   lithium carbonate 300 mg capsule   Yes No   Sig: Take 300 mg by mouth every 12 (twelve) hours With meals      Facility-Administered Medications: None       Past Medical History:   Diagnosis Date    Astigmatism     Bipolar 1 disorder (HCC)     History of colonic polyps     Hydrocele     Hyperlipidemia     Hypertension     Macular drusen     Presbyopia     Psychiatric disorder     Psychosis     [facility]oaffect[facility] disorder ([facility] [address] )     Umbilical hernia     Vitreous syneresis        Past Surgical History:   Procedure Laterality Date    FRACTURE SURGERY         History reviewed  No pertinent family history  I have reviewed and agree with the history as documented      Social History   Substance Use Topics    Smoking status: Current Every Day Smoker     Packs/day: 1 00    Smokeless tobacco: Never Used    Alcohol use Yes      Comment: whenever i want        Review of Systems   Constitutional: Negative for appetite change, chills, diaphoresis, fatigue and fever  HENT: Negative for congestion, ear discharge, ear pain, hearing loss, postnasal drip, rhinorrhea, sneezing and sore throat  Eyes: Negative for pain, discharge and redness  Respiratory: Negative for cough, choking, chest tightness, shortness of breath, wheezing and stridor  Cardiovascular: Negative for chest pain and palpitations  Gastrointestinal: Negative for abdominal distention, abdominal pain, blood in stool, constipation, diarrhea, nausea and vomiting  Genitourinary: Negative for decreased urine volume, difficulty urinating, dysuria, flank pain, frequency and hematuria  Musculoskeletal: Positive for arthralgias  Negative for gait problem, joint swelling and neck pain  Left sided rib pain   Skin: Negative for color change, pallor and rash  Allergic/Immunologic: Negative for environmental allergies, food allergies and immunocompromised state  Neurological: Negative for dizziness, seizures, weakness, light-headedness, numbness and headaches  Hematological: Negative for adenopathy  Does not bruise/bleed easily  Psychiatric/Behavioral: Negative for agitation and behavioral problems  Physical Exam  ED Triage Vitals [04/27/18 1315]   Temperature Pulse Respirations Blood Pressure SpO2   98 °F (36 7 °C) 81 20 159/93 100 %      Temp Source Heart Rate Source Patient Position - Orthostatic VS BP Location FiO2 (%)   Oral Monitor Lying Right arm --      Pain Score       7           Orthostatic Vital Signs  Vitals:    04/27/18 1315 04/27/18 1345   BP: 159/93    Pulse: 81 70   Patient Position - Orthostatic VS: Lying        Physical Exam   Constitutional: He is oriented to person, place, and time  He appears well-developed and well-nourished  HENT:   Head: Normocephalic and atraumatic     Nose: Nose normal  Mouth/Throat: Oropharynx is clear and moist    Eyes: Conjunctivae and EOM are normal  Pupils are equal, round, and reactive to light  Neck: Normal range of motion  Neck supple  Cardiovascular: Normal rate, regular rhythm and normal heart sounds  Exam reveals no gallop and no friction rub  No murmur heard  Pulmonary/Chest: Effort normal and breath sounds normal  No respiratory distress  He has no wheezes  He has no rales  Abdominal: Soft  Bowel sounds are normal  He exhibits no distension  There is no tenderness  There is no rebound and no guarding  Musculoskeletal: Normal range of motion  He exhibits tenderness  Left sided rib pain   Neurological: He is alert and oriented to person, place, and time  Skin: Skin is warm and dry  Psychiatric: He has a normal mood and affect  His behavior is normal    Nursing note and vitals reviewed  ED Medications  Medications - No data to display    Diagnostic Studies  Results Reviewed     Procedure Component Value Units Date/Time    Ethanol [[id number]]     Lab Status:  No result Specimen:  Blood     CBC and differential [[id number]]     Lab Status:  No result Specimen:  Blood     Basic metabolic panel [[id number]]     Lab Status:  No result Specimen:  Blood     Troponin I [[id number]]     Lab Status:  No result Specimen:  Blood                  XR chest 2 views   Final Result by Cm Edwards DO (04/27 1553)      No acute cardiopulmonary disease              Workstation performed: [id number]               Procedures  ECG 12 Lead Documentation  Date/Time: 4/27/2018 3:57 PM  Performed by: Bertha Meadows  Authorized by: Roselia Valencia     Indications / Diagnosis:  61  ECG reviewed by me, the ED Provider: yes    Patient location:  ED  Previous ECG:     Previous ECG:  Compared to current    Similarity:  No change    Comparison to cardiac monitor: Yes    Interpretation:     Interpretation: normal    Rate:     ECG rate:  59    ECG rate assessment: bradycardic Rhythm:     Rhythm: sinus rhythm    Ectopy:     Ectopy: none    QRS:     QRS axis:  Normal    QRS intervals:  Normal  Conduction:     Conduction: normal    ST segments:     ST segments:  Normal  T waves:     T waves: normal            Phone Consults  ED Phone Contact    ED Course  ED Course as of Apr 27 1821 Fri Apr 27, 2018   1434 [facility], no [facility] from [facility]'s medical provider    [phone] Patient refusing blood work, will only consent to chest xray                                MDM  Number of Diagnoses or Management Options  Rib pain on left side:   Diagnosis management comments: 55-year-old male presents to the emergency department for evaluation of left-sided rib pain  Patient refusing lab work to evaluate for cardiac disease he is only willing to have an x-ray of his chest   I told the pt the risks of not performing lab work including death and patient verbalizes understanding  CritCare Time    Disposition  Final diagnoses:   Rib pain on left side     Time reflects when diagnosis was documented in both MDM as applicable and the Disposition within this note     Time User Action Codes Description Comment    4/27/2018  3:56 PM Drenda Beverage Add [R07 81] Rib pain on left side       ED Disposition     ED Disposition Condition Comment    Discharge  Mariza Graham discharge to home/self care  Condition at discharge: Stable        Follow-up Information     Follow up With Specialties Details Why [facility]  In 3 days  [address] [place] [address]        Discharge Medication List as of 4/27/2018  3:57 PM      CONTINUE these medications which have NOT CHANGED    Details   lithium carbonate 300 mg capsule Take 300 mg by mouth every 12 (twelve) hours With meals, Historical Med           No discharge procedures on file  ED Provider  Attending physically available and evaluated Freddie Graham   I managed the patient along with the ED Attending      Electronically Signed by         Carolyne Kelly MD  04/27/18 4915

## 2018-04-29 LAB
ATRIAL RATE: 59 BPM
P AXIS: 69 DEGREES
PR INTERVAL: 158 MS
QRS AXIS: -34 DEGREES
QRSD INTERVAL: 92 MS
QT INTERVAL: 418 MS
QTC INTERVAL: 413 MS
T WAVE AXIS: 49 DEGREES
VENTRICULAR RATE: 59 BPM

## 2018-04-29 PROCEDURE — 93010 ELECTROCARDIOGRAM REPORT: CPT | Performed by: INTERNAL MEDICINE

## 2018-04-30 LAB
ALBUMIN SERPL BCP-MCNC: 4.5 G/DL (ref 3.5–5.7)
ALP SERPL-CCNC: 45 IU/L (ref 40–150)
ALT SERPL W P-5'-P-CCNC: 28 IU/L (ref 0–50)
AMPHETAMINES (HISTORICAL): NEGATIVE
ANION GAP SERPL CALCULATED.3IONS-SCNC: 11.5 MM/L
AST SERPL W P-5'-P-CCNC: 26 U/L (ref 8–27)
BARBITURATES (HISTORICAL): NEGATIVE
BASOPHILS # BLD AUTO: 0 X3/UL (ref 0–0.3)
BASOPHILS # BLD AUTO: 0.5 % (ref 0–2)
BENZODIAZEPINES (HISTORICAL): NEGATIVE
BILIRUB SERPL-MCNC: 1 MG/DL (ref 0.3–1)
BILIRUB UR QL STRIP: NEGATIVE
BUN SERPL-MCNC: 13 MG/DL (ref 7–25)
CALCIUM SERPL-MCNC: 10 MG/DL (ref 8.6–10.5)
CANNABINOIDS (HISTORICAL): NEGATIVE
CHLORIDE SERPL-SCNC: 104 MM/L (ref 98–107)
CLARITY UR: CLEAR
CO2 SERPL-SCNC: 29 MM/L (ref 21–31)
COCAINE (HISTORICAL): NEGATIVE
COLOR UR: YELLOW
CREAT SERPL-MCNC: 1.38 MG/DL (ref 0.7–1.3)
DEPRECATED RDW RBC AUTO: 13.8 %
EGFR (HISTORICAL): 53 GFR
EGFR AFRICAN AMERICAN (HISTORICAL): > 60 GFR
EOSINOPHIL # BLD AUTO: 0.1 X3/UL (ref 0–0.5)
EOSINOPHIL NFR BLD AUTO: 1.4 % (ref 0–5)
ETHANOL (HISTORICAL): < 10 MG/DL
GLUCOSE (HISTORICAL): 74 MG/DL (ref 65–99)
GLUCOSE UR STRIP-MCNC: NEGATIVE MG/DL
HCT VFR BLD AUTO: 42.2 % (ref 42–52)
HGB BLD-MCNC: 14.5 G/DL (ref 14–18)
HGB UR QL STRIP.AUTO: NEGATIVE
KETONES UR STRIP-MCNC: NEGATIVE MG/DL
LEUKOCYTE ESTERASE UR QL STRIP: NEGATIVE
LITHIUM LEVEL (HISTORICAL): 0.5 MM/L (ref 1–1.2)
LYMPHOCYTES # BLD AUTO: 1.1 X3/UL (ref 1.2–4.2)
LYMPHOCYTES NFR BLD AUTO: 18.2 % (ref 20.5–51.1)
MCH RBC QN AUTO: 30.4 PG (ref 26–34)
MCHC RBC AUTO-ENTMCNC: 34.4 G/DL (ref 31–37)
MCV RBC AUTO: 88.4 FL (ref 81–99)
MEDICAL ALCOHOL (HISTORICAL): 0 %
METHADONE (HISTORICAL): NEGATIVE
MONOCYTES # BLD AUTO: 0.4 X3/UL (ref 0–1)
MONOCYTES NFR BLD AUTO: 6.8 % (ref 1.7–12)
NEUTROPHILS # BLD AUTO: 4.3 X3/UL (ref 1.4–6.5)
NEUTS SEG NFR BLD AUTO: 73.1 % (ref 42.2–75.2)
NITRITE UR QL STRIP: NEGATIVE
OPIATES (HISTORICAL): NEGATIVE
OSMOLALITY, SERUM (HISTORICAL): 280 MOSM (ref 262–291)
OXYCODONE (HISTORICAL): NEGATIVE
PH UR STRIP.AUTO: 7 [PH] (ref 4.5–8)
PHENCYCLIDINE URINE (HISTORICAL): NEGATIVE
PLATELET # BLD AUTO: 201 X3/UL (ref 130–400)
PLEASE NOTE (HISTORICAL): YES
PMV BLD AUTO: 8.2 FL
POTASSIUM SERPL-SCNC: 3.5 MM/L (ref 3.5–5.5)
PROPOXYPHENE (HISTORICAL): NEGATIVE
PROT UR STRIP-MCNC: NEGATIVE MG/DL
RBC # BLD AUTO: 4.77 X6/UL (ref 4.3–5.9)
SODIUM SERPL-SCNC: 141 MM/L (ref 134–143)
SP GR UR STRIP.AUTO: 1.01 (ref 1–1.03)
TOTAL PROTEIN (HISTORICAL): 7.1 G/DL (ref 6.4–8.9)
TSH SERPL DL<=0.05 MIU/L-ACNC: 1.86 UIU/M (ref 0.45–5.33)
UROBILINOGEN UR QL STRIP.AUTO: 0.2 EU/DL (ref 0.2–8)
WBC # BLD AUTO: 5.9 X3/UL (ref 4.8–10.8)

## 2019-11-02 ENCOUNTER — HOSPITAL ENCOUNTER (INPATIENT)
Facility: HOSPITAL | Age: 59
LOS: 10 days | Discharge: HOME | DRG: 885 | End: 2019-11-12
Attending: PSYCHIATRY & NEUROLOGY | Admitting: PSYCHIATRY & NEUROLOGY
Payer: MEDICARE

## 2019-11-02 DIAGNOSIS — E55.9 VITAMIN D DEFICIENCY: ICD-10-CM

## 2019-11-02 DIAGNOSIS — F25.0 SCHIZOAFFECTIVE DISORDER, BIPOLAR TYPE (HCC): Primary | Chronic | ICD-10-CM

## 2019-11-02 DIAGNOSIS — F10.10 ALCOHOL ABUSE, CONTINUOUS: Chronic | ICD-10-CM

## 2019-11-02 DIAGNOSIS — F31.9 BIPOLAR DISORDER (HCC): ICD-10-CM

## 2019-11-02 PROBLEM — F12.10 CANNABIS ABUSE, CONTINUOUS: Chronic | Status: ACTIVE | Noted: 2019-11-02

## 2019-11-02 LAB
ALBUMIN SERPL BCP-MCNC: 4.6 G/DL (ref 3.5–5.7)
ALP SERPL-CCNC: 34 U/L (ref 55–165)
ALT SERPL W P-5'-P-CCNC: 51 U/L (ref 7–52)
AMPHETAMINES SERPL QL SCN: NEGATIVE
ANION GAP SERPL CALCULATED.3IONS-SCNC: 7 MMOL/L (ref 4–13)
AST SERPL W P-5'-P-CCNC: 61 U/L (ref 13–39)
BACTERIA UR QL AUTO: ABNORMAL /HPF
BARBITURATES UR QL: NEGATIVE
BASOPHILS # BLD AUTO: 0 THOUSANDS/ΜL (ref 0–0.1)
BASOPHILS NFR BLD AUTO: 0 % (ref 0–2)
BENZODIAZ UR QL: NEGATIVE
BILIRUB SERPL-MCNC: 0.8 MG/DL (ref 0.2–1)
BILIRUB UR QL STRIP: ABNORMAL
BUN SERPL-MCNC: 13 MG/DL (ref 7–25)
CALCIUM SERPL-MCNC: 9.9 MG/DL (ref 8.6–10.5)
CHLORIDE SERPL-SCNC: 106 MMOL/L (ref 98–107)
CLARITY UR: CLEAR
CO2 SERPL-SCNC: 25 MMOL/L (ref 21–31)
COCAINE UR QL: NEGATIVE
COLOR UR: YELLOW
CREAT SERPL-MCNC: 1.18 MG/DL (ref 0.7–1.3)
EOSINOPHIL # BLD AUTO: 0.1 THOUSAND/ΜL (ref 0–0.61)
EOSINOPHIL NFR BLD AUTO: 1 % (ref 0–5)
ERYTHROCYTE [DISTWIDTH] IN BLOOD BY AUTOMATED COUNT: 14.2 % (ref 11.5–14.5)
ETHANOL SERPL-MCNC: <10 MG/DL
GFR SERPL CREATININE-BSD FRML MDRD: 66 ML/MIN/1.73SQ M
GLUCOSE SERPL-MCNC: 121 MG/DL (ref 65–99)
GLUCOSE UR STRIP-MCNC: NEGATIVE MG/DL
HCT VFR BLD AUTO: 42.4 % (ref 42–47)
HGB BLD-MCNC: 14.4 G/DL (ref 14–18)
HGB UR QL STRIP.AUTO: NEGATIVE
HYALINE CASTS #/AREA URNS LPF: ABNORMAL /LPF
KETONES UR STRIP-MCNC: ABNORMAL MG/DL
LEUKOCYTE ESTERASE UR QL STRIP: ABNORMAL
LYMPHOCYTES # BLD AUTO: 1.1 THOUSANDS/ΜL (ref 0.6–4.47)
LYMPHOCYTES NFR BLD AUTO: 10 % (ref 21–51)
MCH RBC QN AUTO: 31 PG (ref 26–34)
MCHC RBC AUTO-ENTMCNC: 34 G/DL (ref 31–37)
MCV RBC AUTO: 91 FL (ref 81–99)
METHADONE UR QL: NEGATIVE
MONOCYTES # BLD AUTO: 0.7 THOUSAND/ΜL (ref 0.17–1.22)
MONOCYTES NFR BLD AUTO: 6 % (ref 2–12)
MUCOUS THREADS UR QL AUTO: ABNORMAL
NEUTROPHILS # BLD AUTO: 9.3 THOUSANDS/ΜL (ref 1.4–6.5)
NEUTS SEG NFR BLD AUTO: 83 % (ref 42–75)
NITRITE UR QL STRIP: NEGATIVE
NON-SQ EPI CELLS URNS QL MICRO: ABNORMAL /HPF
OPIATES UR QL SCN: NEGATIVE
PCP UR QL: NEGATIVE
PH UR STRIP.AUTO: 6 [PH]
PLATELET # BLD AUTO: 220 THOUSANDS/UL (ref 149–390)
PMV BLD AUTO: 8.5 FL (ref 8.6–11.7)
POTASSIUM SERPL-SCNC: 3.7 MMOL/L (ref 3.5–5.5)
PROT SERPL-MCNC: 6.8 G/DL (ref 6.4–8.9)
PROT UR STRIP-MCNC: ABNORMAL MG/DL
RBC # BLD AUTO: 4.64 MILLION/UL (ref 4.3–5.9)
RBC #/AREA URNS AUTO: ABNORMAL /HPF
SODIUM SERPL-SCNC: 138 MMOL/L (ref 134–143)
SP GR UR STRIP.AUTO: 1.02 (ref 1–1.03)
THC UR QL: POSITIVE
TSH SERPL DL<=0.05 MIU/L-ACNC: 1.71 UIU/ML (ref 0.45–5.33)
UROBILINOGEN UR QL STRIP.AUTO: 0.2 E.U./DL
WBC # BLD AUTO: 11.2 THOUSAND/UL (ref 4.8–10.8)
WBC #/AREA URNS AUTO: ABNORMAL /HPF

## 2019-11-02 PROCEDURE — 81001 URINALYSIS AUTO W/SCOPE: CPT

## 2019-11-02 PROCEDURE — 80307 DRUG TEST PRSMV CHEM ANLYZR: CPT

## 2019-11-02 PROCEDURE — 99285 EMERGENCY DEPT VISIT HI MDM: CPT

## 2019-11-02 PROCEDURE — 36415 COLL VENOUS BLD VENIPUNCTURE: CPT

## 2019-11-02 PROCEDURE — 85025 COMPLETE CBC W/AUTO DIFF WBC: CPT

## 2019-11-02 PROCEDURE — 80053 COMPREHEN METABOLIC PANEL: CPT

## 2019-11-02 PROCEDURE — 84443 ASSAY THYROID STIM HORMONE: CPT

## 2019-11-02 PROCEDURE — 99222 1ST HOSP IP/OBS MODERATE 55: CPT | Performed by: PSYCHIATRY & NEUROLOGY

## 2019-11-02 PROCEDURE — 87086 URINE CULTURE/COLONY COUNT: CPT

## 2019-11-02 PROCEDURE — 80320 DRUG SCREEN QUANTALCOHOLS: CPT

## 2019-11-02 RX ORDER — FOLIC ACID 1 MG/1
1 TABLET ORAL DAILY
Status: DISCONTINUED | OUTPATIENT
Start: 2019-11-02 | End: 2019-11-12 | Stop reason: HOSPADM

## 2019-11-02 RX ORDER — LORAZEPAM 2 MG/ML
1 INJECTION INTRAMUSCULAR EVERY 6 HOURS PRN
Status: DISCONTINUED | OUTPATIENT
Start: 2019-11-02 | End: 2019-11-11

## 2019-11-02 RX ORDER — HALOPERIDOL 5 MG
5 TABLET ORAL EVERY 8 HOURS PRN
Status: DISCONTINUED | OUTPATIENT
Start: 2019-11-02 | End: 2019-11-02

## 2019-11-02 RX ORDER — LORAZEPAM 1 MG/1
1 TABLET ORAL 3 TIMES DAILY
Status: DISCONTINUED | OUTPATIENT
Start: 2019-11-03 | End: 2019-11-07

## 2019-11-02 RX ORDER — BENZTROPINE MESYLATE 1 MG/ML
1 INJECTION INTRAMUSCULAR; INTRAVENOUS EVERY 6 HOURS PRN
Status: DISCONTINUED | OUTPATIENT
Start: 2019-11-02 | End: 2019-11-12 | Stop reason: HOSPADM

## 2019-11-02 RX ORDER — ACETAMINOPHEN 325 MG/1
650 TABLET ORAL EVERY 4 HOURS PRN
Status: DISCONTINUED | OUTPATIENT
Start: 2019-11-02 | End: 2019-11-12 | Stop reason: HOSPADM

## 2019-11-02 RX ORDER — CLONAZEPAM 1 MG/1
1 TABLET ORAL 2 TIMES DAILY
Status: DISCONTINUED | OUTPATIENT
Start: 2019-11-02 | End: 2019-11-02

## 2019-11-02 RX ORDER — HYDROXYZINE HYDROCHLORIDE 25 MG/1
25 TABLET, FILM COATED ORAL EVERY 4 HOURS PRN
Status: DISCONTINUED | OUTPATIENT
Start: 2019-11-02 | End: 2019-11-12 | Stop reason: HOSPADM

## 2019-11-02 RX ORDER — HYDROXYZINE HYDROCHLORIDE 25 MG/1
75 TABLET, FILM COATED ORAL EVERY 6 HOURS PRN
Status: DISCONTINUED | OUTPATIENT
Start: 2019-11-02 | End: 2019-11-12 | Stop reason: HOSPADM

## 2019-11-02 RX ORDER — OLANZAPINE 10 MG/1
10 TABLET ORAL
Status: DISCONTINUED | OUTPATIENT
Start: 2019-11-02 | End: 2019-11-04

## 2019-11-02 RX ORDER — LORAZEPAM 2 MG/ML
2 INJECTION INTRAMUSCULAR EVERY 4 HOURS PRN
Status: DISCONTINUED | OUTPATIENT
Start: 2019-11-02 | End: 2019-11-02

## 2019-11-02 RX ORDER — OLANZAPINE 5 MG/1
5 TABLET ORAL EVERY 6 HOURS PRN
Status: DISCONTINUED | OUTPATIENT
Start: 2019-11-02 | End: 2019-11-12 | Stop reason: HOSPADM

## 2019-11-02 RX ORDER — RISPERIDONE 1 MG/1
1 TABLET, ORALLY DISINTEGRATING ORAL EVERY 8 HOURS PRN
Status: DISCONTINUED | OUTPATIENT
Start: 2019-11-02 | End: 2019-11-12 | Stop reason: HOSPADM

## 2019-11-02 RX ORDER — LORAZEPAM 1 MG/1
1 TABLET ORAL EVERY 4 HOURS PRN
Status: DISCONTINUED | OUTPATIENT
Start: 2019-11-02 | End: 2019-11-02

## 2019-11-02 RX ORDER — OLANZAPINE 10 MG/1
10 TABLET ORAL
COMMUNITY
End: 2019-11-12 | Stop reason: HOSPADM

## 2019-11-02 RX ORDER — MAGNESIUM HYDROXIDE/ALUMINUM HYDROXICE/SIMETHICONE 120; 1200; 1200 MG/30ML; MG/30ML; MG/30ML
30 SUSPENSION ORAL EVERY 4 HOURS PRN
Status: DISCONTINUED | OUTPATIENT
Start: 2019-11-02 | End: 2019-11-12 | Stop reason: HOSPADM

## 2019-11-02 RX ORDER — OLANZAPINE 10 MG/1
10 TABLET ORAL EVERY 8 HOURS PRN
Status: DISCONTINUED | OUTPATIENT
Start: 2019-11-02 | End: 2019-11-12 | Stop reason: HOSPADM

## 2019-11-02 RX ORDER — BENZTROPINE MESYLATE 1 MG/1
1 TABLET ORAL EVERY 6 HOURS PRN
Status: DISCONTINUED | OUTPATIENT
Start: 2019-11-02 | End: 2019-11-12 | Stop reason: HOSPADM

## 2019-11-02 RX ORDER — ACETAMINOPHEN 325 MG/1
650 TABLET ORAL EVERY 6 HOURS PRN
Status: DISCONTINUED | OUTPATIENT
Start: 2019-11-02 | End: 2019-11-12 | Stop reason: HOSPADM

## 2019-11-02 RX ORDER — LITHIUM CARBONATE 300 MG/1
300 CAPSULE ORAL EVERY 12 HOURS
Status: DISCONTINUED | OUTPATIENT
Start: 2019-11-02 | End: 2019-11-04

## 2019-11-02 RX ORDER — THIAMINE MONONITRATE (VIT B1) 100 MG
100 TABLET ORAL DAILY
Status: DISCONTINUED | OUTPATIENT
Start: 2019-11-02 | End: 2019-11-12 | Stop reason: HOSPADM

## 2019-11-02 RX ORDER — HYDROXYZINE HYDROCHLORIDE 25 MG/1
50 TABLET, FILM COATED ORAL EVERY 6 HOURS PRN
Status: DISCONTINUED | OUTPATIENT
Start: 2019-11-02 | End: 2019-11-12 | Stop reason: HOSPADM

## 2019-11-02 RX ORDER — NICOTINE 21 MG/24HR
1 PATCH, TRANSDERMAL 24 HOURS TRANSDERMAL DAILY
Status: DISCONTINUED | OUTPATIENT
Start: 2019-11-03 | End: 2019-11-04

## 2019-11-02 RX ORDER — ACETAMINOPHEN 325 MG/1
975 TABLET ORAL EVERY 6 HOURS PRN
Status: DISCONTINUED | OUTPATIENT
Start: 2019-11-02 | End: 2019-11-12 | Stop reason: HOSPADM

## 2019-11-02 RX ORDER — OLANZAPINE 10 MG/1
10 INJECTION, POWDER, LYOPHILIZED, FOR SOLUTION INTRAMUSCULAR
Status: DISCONTINUED | OUTPATIENT
Start: 2019-11-02 | End: 2019-11-12 | Stop reason: HOSPADM

## 2019-11-02 RX ORDER — ZIPRASIDONE MESYLATE 20 MG/ML
20 INJECTION, POWDER, LYOPHILIZED, FOR SOLUTION INTRAMUSCULAR ONCE
Status: DISCONTINUED | OUTPATIENT
Start: 2019-11-02 | End: 2019-11-02

## 2019-11-02 RX ORDER — OLANZAPINE 10 MG/1
10 INJECTION, POWDER, LYOPHILIZED, FOR SOLUTION INTRAMUSCULAR EVERY 8 HOURS PRN
Status: DISCONTINUED | OUTPATIENT
Start: 2019-11-02 | End: 2019-11-02

## 2019-11-02 RX ORDER — LORAZEPAM 1 MG/1
2 TABLET ORAL ONCE
Status: COMPLETED | OUTPATIENT
Start: 2019-11-02 | End: 2019-11-02

## 2019-11-02 RX ADMIN — CLONAZEPAM 1 MG: 1 TABLET ORAL at 14:18

## 2019-11-02 RX ADMIN — LORAZEPAM 2 MG: 1 TABLET ORAL at 13:46

## 2019-11-02 RX ADMIN — OLANZAPINE 10 MG: 10 TABLET, FILM COATED ORAL at 14:19

## 2019-11-02 NOTE — PROGRESS NOTES
Patient admitted from [facility] on a 302 status  Patient came to the unit and he was loud and uncooperative with admission process  Patient refused to sign the admission papers and left the nurses station to go to his room  Patient was yelling out in his room and he was hitting the furniture in his room  Patient continued to be agitated and PRN zyprexa and klonopin given which patient was agreeable to take  Patient labile mood goes from laughing to crying to being agitated  Medications were effective then patient did answered questions for this writer  Patient alert and oriented x 3, unaware of situation or why he is here  Patient denies any SI/HI  Patient denies any voices  It was reported patient was brought in by the police  Patient was loud, manic and had bizarre behaviors  Patient denies any medical problems  Patient currently resting in his room  Q 7 mins checks in place for safety  Will continue to monitor for behaviors and changes

## 2019-11-02 NOTE — PLAN OF CARE
Problem: Alteration in Orientation  Goal: Allow medical examinations, as recommended  Description  Interventions:  - Provide physical/neurological exams and/or referrals, per provider   Outcome: [place]

## 2019-11-02 NOTE — ED NOTES
Crisis spoke to [facility], who had this pt transported via EMS, to notify him that the pt is not willing to sign himself in voluntarily and to inquire as to whether he would like to petition a 302  Officer Nyla Le has requested the pt not be D/C'd and stated he will petition a 302

## 2019-11-02 NOTE — ED NOTES
Pt presents via EMS/police due to a change in mental status w/ bizarre behaviors  Pt is A & O to person, place and time but unaware of his situation  Pt was able to answer some assessment questions but is very tangential in speech and thought  Pt is calm and happy one minute then angry or tearful the next  Pt is loud, mood is labile and unpredictable and pt appears to be responding to internal stimuli  Pt denies any SI/HI or thoughts of harm but is clearly not in touch w/ reality and has been reportedly making threats toward neighbors and family members  Pt does admit to some increased depression and increased anxiety and talks of his family putting a restraining order against him  Pt unwilling to discuss whether he is having AH or VH  Pt also unwilling to discuss his sleep pattern but he does appear to be in a manic state and is most likely not sleeping, as this has been the case w/ this pt in the past when he has presented in a similar state  Pt is not willing to sign voluntary admission paperwork at this time  302 petition in progress

## 2019-11-02 NOTE — ED PROVIDER NOTES
History  Chief Complaint   Patient presents with    Psychiatric Evaluation     Manic     24-year-old well known to the system for his bipolar manic depression the throes of opal right now  Tangential speaking full at, laughing and then and agitated with unpredictable transitions  Answers questions inappropriately  Initially no 302 in place however slight into police are agreeable to placing the 302  He was disheveled on arrival, remains for the most part pleasant shouting out  He denies pain, shortness of breath or etiology of agitation  Prior to Admission Medications   Prescriptions Last Dose Informant Patient Reported? Taking? OLANZapine (ZyPREXA) 10 mg tablet   Yes Yes   Sig: Take 10 mg by mouth daily at bedtime   lithium carbonate 300 mg capsule   Yes Yes   Sig: Take 300 mg by mouth every 12 (twelve) hours With meals      Facility-Administered Medications: None       Past Medical History:   Diagnosis Date    Astigmatism     [facility]     History of colonic polyps     Hydrocele     Hyperlipidemia     Hypertension     Macular drusen     Presbyopia     Psychiatric disorder     Psychosis [facility]     [facility]oaffective disorder ([facility]     Umbilical hernia     Vitreous syneresis        Past Surgical History:   Procedure Laterality Date    FRACTURE SURGERY         History reviewed  No pertinent family history  I have reviewed and agree with the history as documented  Social History     Tobacco Use    Smoking status: Current Every Day Smoker     Packs/day: 1 00    Smokeless tobacco: Never Used   Substance Use Topics    Alcohol use: Yes     Comment: whenever i want    Drug use: No        Review of Systems   Constitutional: Negative for chills and fever  HENT: Negative for rhinorrhea and sore throat  Eyes: Negative for visual disturbance  Respiratory: Negative for cough and shortness of breath  Cardiovascular: Negative for chest pain and leg swelling  Gastrointestinal: Negative for abdominal pain, diarrhea, nausea and vomiting  Genitourinary: Negative for dysuria  Musculoskeletal: Negative for back pain and myalgias  Skin: Negative for rash  Neurological: Negative for dizziness and headaches  Psychiatric/Behavioral: Positive for confusion  The patient is hyperactive  Marya   All other systems reviewed and are negative  Physical Exam  Physical Exam   Constitutional: He is oriented to person, place, and time  He appears well-developed and well-nourished  HENT:   Nose: Nose normal    Mouth/Throat: Oropharynx is clear and moist  No oropharyngeal exudate  Eyes: Pupils are equal, round, and reactive to light  Conjunctivae and EOM are normal  No scleral icterus  Neck: Normal range of motion  Neck supple  No JVD present  No tracheal deviation present  Cardiovascular: Normal rate, regular rhythm and normal heart sounds  No murmur heard  Pulmonary/Chest: Effort normal and breath sounds normal  No respiratory distress  He has no wheezes  He has no rales  Abdominal: Soft  Bowel sounds are normal  There is no tenderness  There is no guarding  Musculoskeletal: Normal range of motion  He exhibits no edema or tenderness  Neurological: He is alert and oriented to person, place, and time  No cranial nerve deficit or sensory deficit  He exhibits normal muscle tone  5/5 motor, nl sens   Skin: Skin is warm and dry  Psychiatric: His affect is inappropriate  His speech is tangential  Thought content is paranoid  He expresses inappropriate judgment  Talking incessantly, laughing predominantly but then moments of agitation, again followed by laughing  Slight paranoia  Poor judgment and insight   Nursing note and vitals reviewed        Vital Signs  ED Triage Vitals [11/02/19 0747]   Temperature Pulse Respirations Blood Pressure SpO2   98 9 °F (37 2 °C) (!) 106 20 (!) 176/110 96 %      Temp Source Heart Rate Source Patient Position - Orthostatic VS BP Location FiO2 (%)   Temporal Monitor Sitting Left arm --      Pain Score       No Pain           Vitals:    11/02/19 0747   BP: (!) 176/110   Pulse: (!) 106   Patient Position - Orthostatic VS: Sitting         Visual Acuity      ED Medications  Medications - No data to display    Diagnostic Studies  Results Reviewed     None                 No orders to display              Procedures  ECG 12 Lead Documentation Only  Date/Time: 11/2/2019 9:03 AM  Performed by: Pati Murcia DO  Authorized by: Pati Murcia DO     Indications / Diagnosis:  Psych eval  ECG reviewed by me, the ED Provider: yes    Patient location:  ED  Previous ECG:     Previous ECG:  Unavailable    Comparison to cardiac monitor: No    Interpretation:     Interpretation: abnormal    Quality:     Tracing quality:  Limited by artifact  Rate:     ECG rate:  107    ECG rate assessment: tachycardic    Rhythm:     Rhythm: sinus rhythm and sinus tachycardia    Ectopy:     Ectopy: none    QRS:     QRS axis:  Indeterminate  ST segments:     ST segments:  Non-specific  T waves:     T waves: non-specific    Other findings:     Other findings: poor R wave progression    Comments:      Probable old anterior infarct age indeterminate           ED Course  ED Course as of Nov 02 1730   Sat Nov 02, 2019   1119 Patient is stable, on a 302 but is medically cleared for transfer to inpatient behavioral health given his degree of dysfunction      1223 Patient admitted to behavior Health  Somewhat increasing agitation at this point  Will treat with p o  Ativan    Nonviolent, just shouting      [phone] Patient escalating and will be given get out      1321 Patient escalating and will be giveng Geodon                                  [facility]    Disposition  Final diagnoses:   None     ED Disposition     None      Follow-up Information    None         Patient's Medications   Discharge Prescriptions    No medications on file     No discharge procedures on file     ED Provider  Electronically Signed by           Elana Lowe, DO  11/02/19 5141 [place], DO  11/02/19 8627

## 2019-11-02 NOTE — ED NOTES
[id number] petition completed and upheld  Pt was read his [address] Rights and offered a copy along w/ a copy of his Edwin[place] Crimes of Rights, both of which he declined so they were placed on his chart

## 2019-11-02 NOTE — ED NOTES
No pre-cert necessary Medicare A & B is primary  COB: Completed w/ Jajose mariaa at [facility], [phone]  Jacey Gutierrez requested call upon arrival and fax upon D/C  [facility] called, Patient is eligible for M/A - Behavioral health services, [facility]   Rec# [id number]

## 2019-11-02 NOTE — H&P
Psychiatric Evaluation - Behavioral Health     Identification Data:Freddie Graham 62 y o  male MRN: [id number]  Unit/Bed#: [facility] 200-02 Encounter: [id number]    Chief Complaint: homicidal ideation, unstable mood, psychotic symptoms and agitation    History of Present Illness     Courtney Graham is a 62 y o  male with a history of Schizoaffective Disorder who was admitted to the inpatient psychiatric unit on a involuntary 302 commitment basis due to unstable mood, psychotic symptoms, severe agitation and homicidal threats towards neighbors and family members  Symptoms prior to admission included depression, homicidal ideation, difficulty sleeping, mood swings, increased irritability, agitation, disorganized behavior, disorganized thinking process, anxiety symptoms, difficulty attending to activities of daily living, poor self-care and noncompliance with medications  Onset of symptoms was gradual starting several weeks ago with progressively worsening course since that time  Stressors preceding admission included chronic mental illness  Madhav Allen was brought in to ED by police and EMS due to psychotic symptoms and unstable mood  On admission he was unable to provide history, was unaware why he was in the hospital  He had very labile mood with periods of agitation, anger and tearfulness at times  He was yelling, his speech was disorganized, he also appeared responding to internal stimuli as per ED assessment  He was unwilling to sign a voluntary commitment and 302 was petitioned by police  On initial evaluation after admission to the inpatient psychiatric unit Madhav Allen was agitated, loud and uncooperative with assessment after he received PRN medications  He was in bed - would wake up for few moments, but then went back to sleep and did not want to answer questions  He knew that he was in the hospital, but could not explain the reasons for his admission   History was obtained mainly from chart review and old records from previous admissions      Psychiatric Review Of Systems:    Sleep changes: yes, decreased  Appetite changes: yes, decreased  Weight changes: unknown  Energy/anergy: no  Interest/pleasure/anhedonia: yes, decreased  Somatic symptoms: no  Anxiety/panic: yes  Marya: yes, currently manic symptoms  Guilty/hopeless: no  Self injurious behavior/risky behavior: no  Suicidal ideation: no  Homicidal ideation: yes, towards family and neighbors  Auditory hallucinations: denies when asked, but appears responding to internal stimuli  Visual hallucinations: no  Other hallucinations: no  Delusional thinking: yes, persecutory delusions  Eating disorder history: no  Obsessive/compulsive symptoms: no    Historical Information     Past Psychiatric History:     Past Inpatient Psychiatric Treatment:   Multiple past inpatient psychiatric admissions at [facility], [facility], [address] [facility] [address], [address] [facility], [address] [place] [address]  Past Outpatient Psychiatric Treatment:    Was in outpatient psychiatric treatment in the past with a psychiatrist at [facility]  Noncompliant with outpatient psychiatric treatment prior to admission  Past Suicide Attempts: no  Past Violent Behavior: yes, history of property destruction  Past Psychiatric Medication Trials: Depakote, Lithium, Zyprexa, Geodon, Haldol, Thorazine, Navane and Klonopin     Substance Abuse History:    Social History     Tobacco History     Smoking Status  Current Every Day Smoker Smoking Frequency  1 pack/day    Smokeless Tobacco Use  Never Used          Alcohol History     Alcohol Use Status  Yes Comment  whenever I want          Drug Use     Drug Use Status  No          Sexual Activity     Sexually Active  Not Asked          Activities of Daily Living    Not Asked                 I am unable to assess the patient for substance use within the past 12 months as they are unable or unwilling to answer    Alcohol use: drinks daily, unknown amount  Recreational drug use:   Cocaine:  none  Heroin:  none  Marijuana:  current use, unknown amount, urine drug screen positive for THC  Other drugs: none   Longest clean time: unknown, unable to obtain  History of Inpatient/Outpatient rehabilitation program: unable to obtain  Smoking history: 1 pack per day  Use of caffeine: unknown amount and unable to obtain    Family Psychiatric History:     Psychiatric Illness:  several family members - mental illness  Substance Abuse:  nephew - drug use  Suicide Attempts:  unknown, unable to obtain    Social History:    Education: 11th grade  Learning Disabilities: none  Marital History: single  Children: none  Living Arrangement: lives in home with mother  Occupational History: on permanent disability  Functioning Relationships: mother is supportive  Legal History: unknown, unable to obtain   History: Yes - per old records    Traumatic History:     Abuse: none  Other Traumatic Events: none     Past Medical History:    History of Seizures: no  History of Head injury with loss of consciousness: yes, history of head injury    Past Medical History:   Diagnosis Date    Anxiety     Astigmatism     Head injury     History of colonic polyps     Hydrocele     Hyperlipidemia     Hypertension     Macular drusen     Presbyopia     [facility]     Sepsis ([facility] )     Umbilical hernia     Vitreous syneresis      Past Surgical History:   Procedure Laterality Date    FRACTURE SURGERY      INGUINAL HERNIA REPAIR         Medical Review Of Systems:    Review of systems not obtained due to patient factors  Allergies: Allergies   Allergen Reactions    Haldol [Haloperidol]     Navane [Thiothixene]     Other      Adhesive tape         Medications: All current active medications have been reviewed      Medications prior to admission:    Prior to Admission Medications   Prescriptions Last Dose Informant Patient Reported? Taking?    OLANZapine (ZyPREXA) 10 mg tablet Not Taking at Unknown time  Yes No   Sig: Take 10 mg by mouth daily at bedtime   lithium carbonate 300 mg capsule Not Taking at Unknown time  Yes No   Sig: Take 300 mg by mouth every 12 (twelve) hours With meals      Facility-Administered Medications: None       OBJECTIVE:    Vital signs in last 24 hours:    Temp:  [97 5 °F (36 4 °C)-98 9 °F (37 2 °C)] 97 5 °F (36 4 °C)  HR:  [] 97  Resp:  [20] 20  BP: (117-176)/() 131/78    No intake or output data in the 24 hours ending 11/02/19 1529     Mental Status Evaluation:    Appearance:  disheveled, marginal hygiene, looks older than stated age, bearded   Behavior:  uncooperative, poor eye contact, does not want to talk   Speech:  pressured, loud, disorganized   Mood:  labile, irritable   Affect:  labile   Language: naming objects and repeating phrases   Thought Process:  disorganized, illogical   Associations: loose associations   Thought Content:  persecutory delusions   Perceptual Disturbances: denies auditory or visual hallucinations when asked, but appears responding to internal stimuli   Risk Potential: Suicidal ideation - None  Homicidal ideation - Yes, towards family and neighbors  Potential for aggression - Yes, due to acute psychosis   Sensorium:  oriented to person, place and time/date, disoriented to situation   Memory:  recent and remote memory: unable to assess due to lack of cooperation   Consciousness:  alert and awake   Attention: attention span and concentration: unable to assess due to lack of cooperation   Intellect: unable to assess due to lack of cooperation   Fund of Knowledge: awareness of current events: no  past history: unable to assess  vocabulary: normal   Insight:  impaired due to psychosis   Judgment: impaired due to psychosis   Muscle Strength Muscle Tone: normal  normal   Gait/Station: normal gait/station, normal balance Motor Activity: no abnormal movements       Laboratory Results: I have personally reviewed all pertinent laboratory/tests results      Admission Labs:   Admission on 11/02/2019   Component Date Value    WBC 11/02/2019 11 20*    RBC 11/02/2019 4 64     Hemoglobin 11/02/2019 14 4     Hematocrit 11/02/2019 42 4     MCV 11/02/2019 91     MCH 11/02/2019 31 0     MCHC 11/02/2019 34 0     RDW 11/02/2019 14 2     MPV 11/02/2019 8 5*    Platelets 07/55/4434 220     Neutrophils Relative 11/02/2019 83*    Lymphocytes Relative 11/02/2019 10*    Monocytes Relative 11/02/2019 6     Eosinophils Relative 11/02/2019 1     Basophils Relative 11/02/2019 0     Neutrophils Absolute 11/02/2019 9 30*    Lymphocytes Absolute 11/02/2019 1 10     Monocytes Absolute 11/02/2019 0 70     Eosinophils Absolute 11/02/2019 0 10     Basophils Absolute 11/02/2019 0 00     Sodium 11/02/2019 138     Potassium 11/02/2019 3 7     Chloride 11/02/2019 106     CO2 11/02/2019 25     ANION GAP 11/02/2019 7     BUN 11/02/2019 13     Creatinine 11/02/2019 1 18     Glucose 11/02/2019 121*    Calcium 11/02/2019 9 9     AST 11/02/2019 61*    ALT 11/02/2019 51     Alkaline Phosphatase 11/02/2019 34*    Total Protein 11/02/2019 6 8     Albumin 11/02/2019 4 6     Total Bilirubin 11/02/2019 0 80     eGFR 11/02/2019 66     TSH 3RD GENERATON 11/02/2019 1 710     Amph/Meth UR 11/02/2019 Negative     Barbiturate Ur 11/02/2019 Negative     Benzodiazepine Urine 11/02/2019 Negative     Cocaine Urine 11/02/2019 Negative     Methadone Urine 11/02/2019 Negative     Opiate Urine 11/02/2019 Negative     PCP Ur 11/02/2019 Negative     THC Urine 11/02/2019 Positive*    Ethanol Lvl 11/02/2019 <10     Color, UA 11/02/2019 Yellow     Clarity, UA 11/02/2019 Clear     Specific Gravity, UA 11/02/2019 1 020     pH, UA 11/02/2019 6 0     Leukocytes, UA 11/02/2019 Trace*    Nitrite, UA 11/02/2019 Negative     Protein, UA 11/02/2019 1+*  Glucose, UA 11/02/2019 Negative     Ketones, UA 11/02/2019 15 (1+)*    Urobilinogen, UA 11/02/2019 0 2     Bilirubin, UA 11/02/2019 1+*    Blood, UA 11/02/2019 Negative     RBC, UA 11/02/2019 2-4*    WBC, UA 11/02/2019 10-20*    Epithelial Cells 11/02/2019 Occasional     Bacteria, UA 11/02/2019 Occasional     Hyaline Casts, UA 11/02/2019 1-2*    MUCUS THREADS 11/02/2019 Moderate*       Imaging Studies: No results found  Code Status: No Order  Advance Directive and Living Will: <no information>    Suicide/Homicide Risk Assessment:    Risk of Harm to Self:   Demographic risk factors include: , never , male  Historical Risk Factors include: chronic psychotic symptoms, alcohol use  Current Specific Risk Factors include: current unstable mood, current psychotic symptoms  Protective Factors: no current suicidal ideation  Based on today's assessment, Kylah Funk presents the following risk of harm to self: low    Risk of Harm to Others:  Demographic Risk Factors include: male  Historical Risk Factors include: history of violence  Current Specific Risk Factors include: current agitation, poor impulse control, current homicidal ideation, poor insight, noncompliance with treatment  Protective Factors: supportive mother  Based on today's assessment, Kylah Funk presents the following risk of harm to others: medium    The following interventions are recommended: behavioral checks every 7 minutes, continued hospitalization on locked unit     Assessment/Plan   Principal Problem:    Schizoaffective disorder, bipolar type ([facility] )  Active Problems:    Cannabis abuse, continuous    Alcohol abuse, continuous    Patient Strengths: negotiates basic needs, supportive mother     Patient Barriers: chronic mental illness, patient is on an involuntary commitment, poor past treatment response, poor reasoning ability, poor self-care    Treatment Plan:     Planned Treatment and Medication Changes:     All current active medications have been reviewed  Encourage group therapy, milieu therapy and occupational therapy  Behavioral Health checks every 7 minutes  303 hearing next week  Start Zyprexa 10 mg at bedtime for control of psychotic symptoms  Start Ativan 1 mg tid, Folic Acid, Thiamine and Multivitamin due to alcohol use (quantity unknown)  Start Lithium 300 mg bid for mood stabilization  Check Lithium level and BMP in 3 days  Recheck CBC/diff and CMP in the morning   Check lipid profile, Hemoglobin A1C and RPR   in the morning  Current medications:    Current Facility-Administered Medications:  acetaminophen 650 mg Oral Q6H PRN Babatunde Frames, CRNP   acetaminophen 650 mg Oral Q4H PRN Babatunde Frames, CRNP   acetaminophen 975 mg Oral Q6H PRN Babatuned Frames, CRNP   aluminum-magnesium hydroxide-simethicone 30 mL Oral Q4H PRN Babatunde Frames, CRNP   benztropine 1 mg Intramuscular Q6H PRN Gudelia Crockett MD   benztropine 1 mg Oral Q6H PRN Gudelia Crockett MD   folic acid 1 mg Oral Daily Gudelia Crockett MD   hydrOXYzine HCL 25 mg Oral Q4H PRN Babatunde Frames, CRNP   hydrOXYzine HCL 50 mg Oral Q6H PRN Gudelia Crockett MD   hydrOXYzine HCL 75 mg Oral Q6H PRN Gudelia Crockett MD   influenza vaccine 0 5 mL Intramuscular Once Babatunde Frames, CRNP   lithium carbonate 300 mg Oral Q12H Babatunde Frames, CRNP   LORazepam 1 mg Intramuscular Q6H PRN Gudelia Crockett MD   [START ON 11/3/2019] LORazepam 1 mg Oral TID Gudelia Crockett MD   magnesium hydroxide 30 mL Oral Daily PRN Babatunde Frames, CRNP   multivitamin-minerals 1 tablet Oral Daily Gudelia Crockett MD   OLANZapine 10 mg Intramuscular Q3H PRN Gudelia Crockett MD   OLANZapine 10 mg Oral HS Babatunde Frames, CRNP   OLANZapine 10 mg Oral Q8H PRN Babatunde Frames, CRNP   OLANZapine 5 mg Oral Q6H PRN Babatunde Frames, CRNP   risperiDONE 1 mg Oral Q8H PRN Babatunde Frames, CRNP   thiamine 100 mg Oral Daily Gudelia Crockett MD       Risks / Benefits of Treatment:    Risks, benefits, and possible side effects of medications explained to patient  Patient does not verbalize understanding of risks and benefits of treatment at this time and will require further explanation  Counseling / Coordination of Care:    Patient's presentation on admission and proposed treatment plan discussed with staff  At this time patient has limited understanding of diagnosis, medication changes and treatment plan and will require further explanation  Inpatient Psychiatric Certification:    Estimated length of stay: 20 midnights    Estimated length of stay: More than 2 midnights  I certify that inpatient services are medically necessary for this patient for a duration of greater that 2 midnights for the treatment of Schizoaffective disorder, bipolar type ([facility] )   See H&P and MD Progress Notes for additional information about the patient's course of treatment    The patient has been released from the Emergency Department and medically cleared as per Emergency Department documentation for psychiatric admission for Schizoaffective disorder, bipolar type ([facility] [address] )      Osmel Bee MD 11/02/19

## 2019-11-03 LAB
25(OH)D3 SERPL-MCNC: 38.7 NG/ML (ref 30–100)
ALBUMIN SERPL BCP-MCNC: 4.1 G/DL (ref 3.5–5.7)
ALP SERPL-CCNC: 30 U/L (ref 55–165)
ALT SERPL W P-5'-P-CCNC: 43 U/L (ref 7–52)
ANION GAP SERPL CALCULATED.3IONS-SCNC: 4 MMOL/L (ref 4–13)
AST SERPL W P-5'-P-CCNC: 39 U/L (ref 13–39)
BACTERIA UR CULT: NORMAL
BASOPHILS # BLD AUTO: 0 THOUSANDS/ΜL (ref 0–0.1)
BASOPHILS NFR BLD AUTO: 0 % (ref 0–2)
BILIRUB SERPL-MCNC: 0.6 MG/DL (ref 0.2–1)
BUN SERPL-MCNC: 13 MG/DL (ref 7–25)
CALCIUM SERPL-MCNC: 9.3 MG/DL (ref 8.6–10.5)
CHLORIDE SERPL-SCNC: 105 MMOL/L (ref 98–107)
CHOLEST SERPL-MCNC: 120 MG/DL (ref 0–200)
CO2 SERPL-SCNC: 29 MMOL/L (ref 21–31)
CREAT SERPL-MCNC: 1.16 MG/DL (ref 0.7–1.3)
EOSINOPHIL # BLD AUTO: 0.2 THOUSAND/ΜL (ref 0–0.61)
EOSINOPHIL NFR BLD AUTO: 3 % (ref 0–5)
ERYTHROCYTE [DISTWIDTH] IN BLOOD BY AUTOMATED COUNT: 14.5 % (ref 11.5–14.5)
EST. AVERAGE GLUCOSE BLD GHB EST-MCNC: 100 MG/DL
FOLATE SERPL-MCNC: >20 NG/ML (ref 3.1–17.5)
GFR SERPL CREATININE-BSD FRML MDRD: 68 ML/MIN/1.73SQ M
GLUCOSE P FAST SERPL-MCNC: 99 MG/DL (ref 65–99)
GLUCOSE SERPL-MCNC: 99 MG/DL (ref 65–99)
HBA1C MFR BLD: 5.1 % (ref 4.2–6.3)
HCT VFR BLD AUTO: 41.9 % (ref 42–47)
HDLC SERPL-MCNC: 44 MG/DL
HGB BLD-MCNC: 13.7 G/DL (ref 14–18)
LDLC SERPL CALC-MCNC: 58 MG/DL (ref 0–100)
LYMPHOCYTES # BLD AUTO: 1.7 THOUSANDS/ΜL (ref 0.6–4.47)
LYMPHOCYTES NFR BLD AUTO: 21 % (ref 21–51)
MCH RBC QN AUTO: 30.7 PG (ref 26–34)
MCHC RBC AUTO-ENTMCNC: 32.8 G/DL (ref 31–37)
MCV RBC AUTO: 94 FL (ref 81–99)
MONOCYTES # BLD AUTO: 0.5 THOUSAND/ΜL (ref 0.17–1.22)
MONOCYTES NFR BLD AUTO: 6 % (ref 2–12)
NEUTROPHILS # BLD AUTO: 5.5 THOUSANDS/ΜL (ref 1.4–6.5)
NEUTS SEG NFR BLD AUTO: 70 % (ref 42–75)
NONHDLC SERPL-MCNC: 76 MG/DL
PLATELET # BLD AUTO: 196 THOUSANDS/UL (ref 149–390)
PMV BLD AUTO: 8.5 FL (ref 8.6–11.7)
POTASSIUM SERPL-SCNC: 3.9 MMOL/L (ref 3.5–5.5)
PROT SERPL-MCNC: 6.1 G/DL (ref 6.4–8.9)
RBC # BLD AUTO: 4.47 MILLION/UL (ref 4.3–5.9)
RPR SER QL: NORMAL
SODIUM SERPL-SCNC: 138 MMOL/L (ref 134–143)
TRIGL SERPL-MCNC: 90 MG/DL (ref 44–166)
VIT B12 SERPL-MCNC: 818 PG/ML (ref 100–900)
WBC # BLD AUTO: 8 THOUSAND/UL (ref 4.8–10.8)

## 2019-11-03 PROCEDURE — 90682 RIV4 VACC RECOMBINANT DNA IM: CPT | Performed by: NURSE PRACTITIONER

## 2019-11-03 PROCEDURE — 83036 HEMOGLOBIN GLYCOSYLATED A1C: CPT | Performed by: PSYCHIATRY & NEUROLOGY

## 2019-11-03 PROCEDURE — 80053 COMPREHEN METABOLIC PANEL: CPT | Performed by: PSYCHIATRY & NEUROLOGY

## 2019-11-03 PROCEDURE — 86592 SYPHILIS TEST NON-TREP QUAL: CPT | Performed by: PSYCHIATRY & NEUROLOGY

## 2019-11-03 PROCEDURE — 82607 VITAMIN B-12: CPT | Performed by: FAMILY MEDICINE

## 2019-11-03 PROCEDURE — 82746 ASSAY OF FOLIC ACID SERUM: CPT | Performed by: FAMILY MEDICINE

## 2019-11-03 PROCEDURE — 82306 VITAMIN D 25 HYDROXY: CPT | Performed by: FAMILY MEDICINE

## 2019-11-03 PROCEDURE — G0008 ADMIN INFLUENZA VIRUS VAC: HCPCS | Performed by: NURSE PRACTITIONER

## 2019-11-03 PROCEDURE — 80061 LIPID PANEL: CPT | Performed by: PSYCHIATRY & NEUROLOGY

## 2019-11-03 PROCEDURE — 85025 COMPLETE CBC W/AUTO DIFF WBC: CPT | Performed by: PSYCHIATRY & NEUROLOGY

## 2019-11-03 RX ORDER — LISINOPRIL 10 MG/1
10 TABLET ORAL DAILY
Status: DISCONTINUED | OUTPATIENT
Start: 2019-11-03 | End: 2019-11-04

## 2019-11-03 RX ORDER — MELATONIN
1000 DAILY
Status: DISCONTINUED | OUTPATIENT
Start: 2019-11-03 | End: 2019-11-12 | Stop reason: HOSPADM

## 2019-11-03 RX ADMIN — Medication 100 MG: at 09:05

## 2019-11-03 RX ADMIN — LORAZEPAM 1 MG: 1 TABLET ORAL at 21:01

## 2019-11-03 RX ADMIN — HYDROXYZINE HYDROCHLORIDE 50 MG: 25 TABLET ORAL at 02:14

## 2019-11-03 RX ADMIN — LISINOPRIL 10 MG: 10 TABLET ORAL at 15:52

## 2019-11-03 RX ADMIN — LORAZEPAM 1 MG: 1 TABLET ORAL at 09:05

## 2019-11-03 RX ADMIN — INFLUENZA A VIRUS A/BRISBANE/02/2018 (H1N1) RECOMBINANT HEMAGGLUTININ ANTIGEN, INFLUENZA A VIRUS A/KANSAS/14/2017 (H3N2) RECOMBINANT HEMAGGLUTININ ANTIGEN, INFLUENZA B VIRUS B/PHUKET/3073/2013 RECOMBINANT HEMAGGLUTININ ANTIGEN, AND INFLUENZA B VIRUS B/MARYLAND/15/2016 RECOMBINANT HEMAGGLUTININ ANTIGEN 0.5 ML: 45; 45; 45; 45 INJECTION INTRAMUSCULAR at 18:13

## 2019-11-03 RX ADMIN — OLANZAPINE 10 MG: 10 TABLET, FILM COATED ORAL at 21:01

## 2019-11-03 RX ADMIN — VITAMIN D, TAB 1000IU (100/BT) 1000 UNITS: 25 TAB at 15:52

## 2019-11-03 RX ADMIN — LORAZEPAM 1 MG: 1 TABLET ORAL at 15:52

## 2019-11-03 RX ADMIN — OLANZAPINE 10 MG: 10 TABLET, FILM COATED ORAL at 01:04

## 2019-11-03 RX ADMIN — FOLIC ACID 1 MG: 1 TABLET ORAL at 09:05

## 2019-11-03 RX ADMIN — LITHIUM CARBONATE 300 MG: 300 CAPSULE, GELATIN COATED ORAL at 09:05

## 2019-11-03 RX ADMIN — LITHIUM CARBONATE 300 MG: 300 CAPSULE, GELATIN COATED ORAL at 21:01

## 2019-11-03 RX ADMIN — Medication 1 TABLET: at 09:05

## 2019-11-03 NOTE — PROGRESS NOTES
Patients mother brought in the followin pr black slippers    1 pr bule/green boxers    1 pr navy blue underwear    2 small black becerra  6 black hair ties    2 pairs of dark blue socks    1 grey shirt- refugee    1 navy blue shirt    1 grey sweater- Crazy horse    1 dark green sweat shirt    1 pr blue [employer] Bottoms (patient stated ok to remove string    1 pairs of blue jeans    All above items were signed by the patient and accounted for

## 2019-11-03 NOTE — H&P
Lucio Bhatt  SXZ:[id number] Marielle Martinezr  KFK:[id number]    INB:[id number]  Adm Date: 11/2/2019 0741  7:41 AM   ATT PHY: Mihaela Gardner, [address]         Chief Complaint:  Worsening agitation, unstable mood and homicidal threats    History of Presenting Illness: Ruperto Graham is a(n) 62y o  year old male who was admitted due to worsening agitation, aggression, unstable mood and homicidal threats  Patient has longstanding history of schizoaffective disorder  Patient examined at bedside  Patient stayed calm at the time of interview  Patient denied any active suicidal homicidal ideations  Allergies   Allergen Reactions    Haldol [Haloperidol]     Navane [Thiothixene]     Other      Adhesive tape         No current facility-administered medications on file prior to encounter        Current Outpatient Medications on File Prior to Encounter   Medication Sig Dispense Refill    lithium carbonate 300 mg capsule Take 300 mg by mouth every 12 (twelve) hours With meals      OLANZapine (ZyPREXA) 10 mg tablet Take 10 mg by mouth daily at bedtime         Active Ambulatory Problems     Diagnosis Date Noted    No Active Ambulatory Problems     Resolved Ambulatory Problems     Diagnosis Date Noted    No Resolved Ambulatory Problems     Past Medical History:   Diagnosis Date    Alcohol abuse     Anxiety     Astigmatism     DJD (degenerative joint disease)     Gait abnormality     Head injury     History of colonic polyps     Hydrocele     Hyperlipidemia     Hypertension     Macular drusen     Presbyopia     [facility]     Sepsis ([facility]     Tobacco abuse     Umbilical hernia     Vitreous syneresis        Past Surgical History:   Procedure Laterality Date    FRACTURE SURGERY      INGUINAL HERNIA REPAIR         Social History:   Social History     Socioeconomic History    Marital status: Single     Spouse name: None    Number of children: None    Years of education: None    Highest education level: None   Occupational History    None   Social Needs    Financial resource strain: None    Food insecurity:     Worry: None     Inability: None    Transportation needs:     Medical: None     Non-medical: None   Tobacco Use    Smoking status: Current Every Day Smoker     Packs/day: 1 00    Smokeless tobacco: Never Used   Substance and Sexual Activity    Alcohol use: Yes     Comment: whenever i want    Drug use: No    Sexual activity: None   Lifestyle    Physical activity:     Days per week: None     Minutes per session: None    Stress: None   Relationships    Social connections:     Talks on phone: None     Gets together: None     Attends Nondenominational service: None     Active member of club or organization: None     Attends meetings of clubs or organizations: None     Relationship status: None    Intimate partner violence:     Fear of current or ex partner: None     Emotionally abused: None     Physically abused: None     Forced sexual activity: None   Other Topics Concern    None   Social History Narrative    None       Family History: History reviewed  No pertinent family history  Review of Systems   Musculoskeletal: Positive for arthralgias and back pain  All other systems reviewed and are negative  Physical Exam   Constitutional: Awake and Alert  Well-developed and well-nourished  No distress  HENT: PERR,EOMI, conjunctiva normal  Head: Normocephalic and atraumatic  Mouth/Throat: Oropharynx is clear and moist     Eyes: Conjunctivae and EOM are normal  Pupils are equal, round, and reactive to light  Right and left eye exhibits no discharge  Neck: Neck supple  No tracheal deviation present  No thyromegaly present  Cardiovascular: Normal rate, regular rhythm and normal heart sounds  Exam reveals no friction rub  No murmur heard  Pulmonary/Chest: Effort normal and breath sounds normal  No respiratory distress   She has no wheezes  Abdominal: Soft  Bowel sounds are normal  She exhibits no distension  There is no tenderness  There is no rebound and no guarding  Neurological: Cranial Nerves grossly intact  No sensory deficit  Coordination normal    Musculoskeletal:   Nontender spine  Skin: Skin is warm and dry  No rash noted  No diaphoresis  No erythema  No edema  No cyanosis  Assessment     Adrienne Graham is a(n) 62y o  year old male with schizoaffective disorder    1  Cardiac with history of hypertension and dyslipidemia  Patient was not on any medications prior to this admission  Patient's fasting lipid panel was found to be within normal limits  Patient's blood pressure is constantly high since admission  I will put the patient on lisinopril 10 mg daily  We will closely monitor patient's blood pressures  2  Tobacco abuse  Patient has been put on nicotine transdermal patch  3  Alcohol abuse  Patient has been put on thiamine folic acid and multivitamin  4  DJD/osteoarthritis  Patient may get Tylenol on as needed basis  5  Gait abnormality  Seems to be stable at this time  6  New vitamin-D deficiency  I will put the patient on vitamin-D supplements  7  Schizoaffective disorder  Patient is being managed by psych  Prognosis: fair  Discharge Plan: in progress  Advanced Directives: I have discussed in detail the patient the advanced directives  Patient has not appointed anybody as his POA and has no living will with advanced directives  When discussing cardiac and pulmonary resuscitation efforts with the patient, the patient wishes to be a FULL CODE  I have spent more than 50 minutes gathering data, doing physical examination, and discussing the advanced directives, which was witnessed by caring staff
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
## 2019-11-03 NOTE — PROGRESS NOTES
Patient compliant with medications and meals  Patient out for meals this shift  Denies any SI/HI  Patient denies any depression or anxiety  Patient does not believe he has problem and does not want to be here  Patient has poor insight  mood is labile  No alcohol withdrawal symptoms noted  Patient pleasant at approach  Patient refused BP medication and medical doctor made aware  Patient stated he was not taking the medication "because he is not an experiment" no other concerns or problems reported this shift  Q 7 mins checks in place for safety  Will continue to monitor for behaviors and changes

## 2019-11-03 NOTE — PROGRESS NOTES
Patient remains awake, labile, crying, c/o anxiety asking for Ativan, explained only IM ativan ordered for severe anxiety  States he doesn't want a shot, prn atarax 50mg given  Will monitor effects

## 2019-11-03 NOTE — PROGRESS NOTES
Pt admitted to unit from [facility] ER with list of belongings:    Remains with patient:  White pants  Black comb  Black and white checkered pants  Dentures- upper    Locked in cabinet:  Blue and black shoes with string    Black watch    Locked in unit safe:  Safe bag number:[id number]  2 medicare cards  2 VA cards  AAA card  Social security card  2- $1 bills  Green and black wallet    **Pt refused to sign belongings list

## 2019-11-03 NOTE — TREATMENT PLAN
TREATMENT PLAN REVIEW - 24 Chana Vigil K Day 62 y o  1960 male MRN: [id number]    [address]  Room / Bed: [facility] 200/-98 Encounter: [id number]        Admit Date/Time:  11/2/2019  7:41 AM    Treatment Team: Attending Provider: John Cavazos MD; Consulting Physician: Shadia Kitchen MD; Patient Care Technician: Rigo Cary; Patient Care Assistant: Monty Jose; Patient Care Technician: Seema Perez RN; Certified Nursing Assistant: Blanca Ramírez; Registered Nurse: Babak Espitia RN    Diagnosis: Principal Problem:    Schizoaffective disorder, bipolar type ([facility] )  Active Problems:    Cannabis abuse, continuous    Alcohol abuse, continuous    Patient Strengths/Assets: negotiates basic needs, supportive mother      Patient Barriers/Limitations: chronic mental illness, patient is on an involuntary commitment, poor past treatment response, poor reasoning ability, poor self-care    Short Term Goals: decrease in psychotic symptoms, improvement in reality testing, tolerate medications, mood stabilization    Long Term Goals: stabilization of mood, free of homicidal thoughts, resolution of psychotic symptoms, improved reality testing, improved reasoning ability, improved insight    Progress Towards Goals: restarting psychiatric medications as prescribed    Recommended Treatment: medication management, patient medication education, group therapy, milieu therapy, continued Behavioral Health psychiatric evaluation/assessment process     Treatment Frequency: daily medication monitoring, group and milieu therapy daily, monitoring through interdisciplinary rounds, monitoring through weekly patient care conferences    Expected Discharge Date: 20 days - 11/22/2019    Discharge Plan: referral for outpatient medication management with a psychiatrist, referral for outpatient psychotherapy, return to previous living arrangement    Treatment Plan Created/Updated By: Ava Foley Bambi Peñaloza MD

## 2019-11-03 NOTE — PLAN OF CARE
Problem: Risk for Violence/Aggression Toward Others  Goal: Control angry outbursts  Description  Interventions:  - Monitor patient closely, per order  - Ensure early verbal de-escalation  - Monitor prn medication needs  - Set reasonable/therapeutic limits, outline behavioral expectations, and consequences   - Provide a non-threatening milieu, utilizing the least restrictive interventions   Outcome: Progressing

## 2019-11-03 NOTE — PROGRESS NOTES
Patient was in bed resting at the start of shift  Very groggy, able to ask for more blankets but fell right back to sleep when trying to assess  No agitation  Calm and controlled  Has been sleeping soundly since  Attempted to wake for scheduled hs meds but pt would not wake  Snoring, no resp distress  Will attempt to administer meds when awake

## 2019-11-03 NOTE — PROGRESS NOTES
Patient has been awake since 0115, comes out asking about the schedule, states he doesn't belong here and when can he leave  Currently in the shower  Gets agitated but walks away and goes back to his room  Will continue to monitor behavior

## 2019-11-03 NOTE — PROGRESS NOTES
Patient awake, loud, disorganized, labile  Goes from laughing to crying  Discussed hs meds that he did not take earlier due to being sedated  Agreed to take prn zyprexa 10 mg po for agitation  Also notified of bloodwork due in the am, stated "get it now" so labs drawn  Will continue to monitor behavior

## 2019-11-04 LAB — GLUCOSE SERPL-MCNC: 110 MG/DL (ref 65–140)

## 2019-11-04 PROCEDURE — 99231 SBSQ HOSP IP/OBS SF/LOW 25: CPT | Performed by: NURSE PRACTITIONER

## 2019-11-04 PROCEDURE — 82948 REAGENT STRIP/BLOOD GLUCOSE: CPT

## 2019-11-04 RX ORDER — LORAZEPAM 2 MG/ML
2 INJECTION INTRAMUSCULAR ONCE
Status: COMPLETED | OUTPATIENT
Start: 2019-11-04 | End: 2019-11-04

## 2019-11-04 RX ORDER — LITHIUM CARBONATE 300 MG/1
600 CAPSULE ORAL EVERY 12 HOURS
Status: DISCONTINUED | OUTPATIENT
Start: 2019-11-04 | End: 2019-11-06

## 2019-11-04 RX ORDER — BENZONATATE 100 MG/1
100 CAPSULE ORAL 3 TIMES DAILY PRN
Status: DISCONTINUED | OUTPATIENT
Start: 2019-11-04 | End: 2019-11-05

## 2019-11-04 RX ADMIN — LORAZEPAM 2 MG: 2 INJECTION INTRAMUSCULAR; INTRAVENOUS at 22:58

## 2019-11-04 RX ADMIN — FOLIC ACID 1 MG: 1 TABLET ORAL at 08:25

## 2019-11-04 RX ADMIN — LORAZEPAM 1 MG: 1 TABLET ORAL at 15:19

## 2019-11-04 RX ADMIN — VITAMIN D, TAB 1000IU (100/BT) 1000 UNITS: 25 TAB at 08:26

## 2019-11-04 RX ADMIN — LORAZEPAM 1 MG: 1 TABLET ORAL at 08:25

## 2019-11-04 RX ADMIN — Medication 100 MG: at 08:25

## 2019-11-04 RX ADMIN — BENZONATATE 100 MG: 100 CAPSULE ORAL at 15:19

## 2019-11-04 RX ADMIN — LITHIUM CARBONATE 300 MG: 300 CAPSULE, GELATIN COATED ORAL at 09:49

## 2019-11-04 NOTE — PROGRESS NOTES
11/04/19 1350   Team Meeting   Meeting Type Daily Rounds   Initial Conference Date 11/04/19   Next Conference Date 12/03/19   Team Members Present   Team Members Present Physician;Nurse;; Occupational Therapist   Physician Team Member Dr Rashida Means MD    Nursing Team Member Theresa Johnson, RN    Social Work Team Member Erin Vicente, [facility]    OT Team Member Becky Walsh, ELIGIO    Patient/Family Present   Patient Present Yes   Patient's Family Present No     Initial Treatment Plan Meeting:     Pt and treatment team discussed tx plan/goals;   Pt goal "to get out of here"; pt had no questions/concerns re: goals as set - pt signed plan; copy on chart

## 2019-11-04 NOTE — PROGRESS NOTES
11/04/19 7711   Team Meeting   Meeting Type Daily Rounds   Team Members Present   Team Members Present Physician;Nurse;;   Physician Team Member Dr Blake Mueller MD; Leesa [address]    Nursing Team Member Sherrie Bonner RN    Care Management Team Member Chencho Car RN    Social Work Team Member Melissa Santos, [place]    Patient/Family Present   Patient Present No   Patient's Family Present No     High acuity; hx restraints on BHU stays; [address] by police; no sleep; paranoid, delusional; better with female staff; selective with meds; irritable at times; maintains controls at this time; booby traps the door

## 2019-11-04 NOTE — PLAN OF CARE
Problem: Ineffective Coping  Goal: Participates in unit activities  Description  Interventions:  - Provide therapeutic environment   - Provide required programming   - Redirect inappropriate behaviors   11/4/2019 1245 by Jillian Castelan  Outcome: Progressing  11/4/2019 1228 by Jillian Castelan  Outcome: Progressing  Patient attended and participated in ice breaker group  Patient was able to self disclose and engage in conversation about his childhood  Attentive and cooperative  Patient did leave group about 10 minutes early

## 2019-11-04 NOTE — PROGRESS NOTES
Patient has been restless all night, behavior has been bizarre at times but controlled  No yelling or outbursts  Frequent dry hacking cough  Q 7 minute safety checks maintained

## 2019-11-04 NOTE — PROGRESS NOTES
The patient noted to be visible on unit upon initial assessment, visible in milieu for meals, but noted to be visible sitting on bed in room upon successive rounds throughout shift  The patient noted to be labile, guarded, and paranoid, intermittent irritable edge noted  The patient speech noted to be rambling and disorganized  The patient noted to be talking to self while sitting in room  Thought content disorganized with flight of ideas  The patient noted to endorse paranoid ideations regarding medications and mistrust of staff and providers  The patient refused to take am dose of Lisinopril, Multivitamin, and Nicotine patch  RN notified Tatiana Nobles pa-c of patient's refusal of medications, new orders noted  The patient denies complaints of pain or discomfort  Will continue to monitor

## 2019-11-04 NOTE — PLAN OF CARE
Problem: Anxiety  Goal: Anxiety is at manageable level  Description  Interventions:  - Assess and monitor patient's anxiety level  - Monitor for signs and symptoms (heart palpitations, chest pain, shortness of breath, headaches, nausea, feeling jumpy, restlessness, irritable, apprehensive)  - Collaborate with interdisciplinary team and initiate plan and interventions as ordered    - [place] patient to unit/surroundings  - Explain treatment plan  - Encourage participation in care  - Encourage verbalization of concerns/fears  - Identify coping mechanisms  - Assist in developing anxiety-reducing skills  - Administer/offer alternative therapies  - Limit or eliminate stimulants  Outcome: Progressing     Problem: Risk for Violence/Aggression Toward Others  Goal: Refrain from harming others  Outcome: Progressing  Goal: Refrain from destructive acts on the environment or property  Outcome: Progressing  Goal: Control angry outbursts  Description  Interventions:  - Monitor patient closely, per order  - Ensure early verbal de-escalation  - Monitor prn medication needs  - Set reasonable/therapeutic limits, outline behavioral expectations, and consequences   - Provide a non-threatening milieu, utilizing the least restrictive interventions   Outcome: Progressing  Goal: Identify appropriate positive anger management techniques  Description  Interventions:  - Offer anger management and coping skills groups   - Staff will provide positive feedback for appropriate anger control  Outcome: Progressing     Problem: Alteration in Orientation  Goal: Allow medical examinations, as recommended  Description  Interventions:  - Provide physical/neurological exams and/or referrals, per provider   Outcome: Progressing     Problem: INVOLUNTARY ADMIT  Goal: Will cooperate with staff recommendations and doctor's orders and will demonstrate appropriate behavior  Description  INTERVENTIONS:  - Treat underlying conditions and offer medication as ordered  - Educate regarding involuntary admission procedures and rules  - Utilize positive consistent limit setting strategies to support patient and staff safety  Outcome: Progressing     Problem: Ineffective Coping  Goal: Cooperates with admission process  Description  Interventions:   - Complete admission process  Outcome: Progressing  Goal: Patient/Family participate in treatment and DC plans  Description  Interventions:  - Provide therapeutic environment  Outcome: Progressing  Goal: Free from restraint events  Description  - Utilize least restrictive measures   - Provide behavioral interventions   - Redirect inappropriate behaviors   Outcome: Progressing
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
## 2019-11-04 NOTE — PROGRESS NOTES
Patient c/o "Seeing double" at the start of the shift  States this happens sometimes  Remains bizarre and disorganized  Paranoid, keeps turning all the lights on his his room and continually goes through his belongings  Observed talking to himself  Less lability compared to previous night  Behavior has been more controlled  Did have verbal altercation with his roommate over keep turning the lights on and keeping him awake  Able to de-escalate the situation quickly  Dry hacking cough persists  Med compliant

## 2019-11-05 PROCEDURE — 99232 SBSQ HOSP IP/OBS MODERATE 35: CPT | Performed by: NURSE PRACTITIONER

## 2019-11-05 RX ORDER — GUAIFENESIN/DEXTROMETHORPHAN 100-10MG/5
10 SYRUP ORAL EVERY 4 HOURS PRN
Status: DISCONTINUED | OUTPATIENT
Start: 2019-11-05 | End: 2019-11-12 | Stop reason: HOSPADM

## 2019-11-05 RX ORDER — LANOLIN ALCOHOL/MO/W.PET/CERES
6 CREAM (GRAM) TOPICAL
Status: DISCONTINUED | OUTPATIENT
Start: 2019-11-05 | End: 2019-11-08

## 2019-11-05 RX ADMIN — FOLIC ACID 1 MG: 1 TABLET ORAL at 08:49

## 2019-11-05 RX ADMIN — VITAMIN D, TAB 1000IU (100/BT) 1000 UNITS: 25 TAB at 08:49

## 2019-11-05 RX ADMIN — LORAZEPAM 1 MG: 1 TABLET ORAL at 20:55

## 2019-11-05 RX ADMIN — LITHIUM CARBONATE 600 MG: 300 CAPSULE, GELATIN COATED ORAL at 01:02

## 2019-11-05 RX ADMIN — MELATONIN 6 MG: at 20:55

## 2019-11-05 RX ADMIN — LITHIUM CARBONATE 600 MG: 300 CAPSULE, GELATIN COATED ORAL at 09:43

## 2019-11-05 RX ADMIN — LORAZEPAM 1 MG: 1 TABLET ORAL at 08:49

## 2019-11-05 RX ADMIN — BENZONATATE 100 MG: 100 CAPSULE ORAL at 09:02

## 2019-11-05 RX ADMIN — LORAZEPAM 1 MG: 1 TABLET ORAL at 17:37

## 2019-11-05 RX ADMIN — OLANZAPINE 15 MG: 5 TABLET, FILM COATED ORAL at 20:55

## 2019-11-05 RX ADMIN — Medication 100 MG: at 08:49

## 2019-11-05 RX ADMIN — BENZONATATE 100 MG: 100 CAPSULE ORAL at 01:03

## 2019-11-05 RX ADMIN — GUAIFENESIN AND DEXTROMETHORPHAN 10 ML: 100; 10 SYRUP ORAL at 14:08

## 2019-11-05 RX ADMIN — OLANZAPINE 15 MG: 5 TABLET, FILM COATED ORAL at 01:03

## 2019-11-05 RX ADMIN — LITHIUM CARBONATE 600 MG: 300 CAPSULE, GELATIN COATED ORAL at 20:55

## 2019-11-05 NOTE — PROGRESS NOTES
[id number]  Patient visible out and about at beginning of shift  He has an irritable edge and keeps to himself but his behaviors were appropriate  He reported some anxiety and was given his scheduled Ativan and PRN Tessalon Perles for his cough  Patient refused HS snack  When staff attempted to get patient's VS, he was found standing by the sink, staring blankly  Patient's VS were WNL  His pupils were equal, round, and reactive  BG was found to be 110  He appeared to be catatonic  Insight was consulted and Dr Courtney Webber ordered IM Ativan 2mg once, which was given in left deltoid at 2300  Patient is in bed with head raised due to his cough  He appears comfortable  Will continue monitoring

## 2019-11-05 NOTE — PLAN OF CARE
Pt progressing; no discharge date at this time; pt will dc home with mother and appts with established providers

## 2019-11-05 NOTE — PROGRESS NOTES
Upon reassessment of previous medications given  Patient is sleeping without difficulty  Will continue to monitor safety and behaviors every 7 minutes

## 2019-11-05 NOTE — PLAN OF CARE
Problem: Anxiety  Goal: Anxiety is at manageable level  Description  Interventions:  - Assess and monitor patient's anxiety level  - Monitor for signs and symptoms (heart palpitations, chest pain, shortness of breath, headaches, nausea, feeling jumpy, restlessness, irritable, apprehensive)  - Collaborate with interdisciplinary team and initiate plan and interventions as ordered    - [place] patient to unit/surroundings  - Explain treatment plan  - Encourage participation in care  - Encourage verbalization of concerns/fears  - Identify coping mechanisms  - Assist in developing anxiety-reducing skills  - Administer/offer alternative therapies  - Limit or eliminate stimulants  Outcome: Progressing     Problem: Risk for Violence/Aggression Toward Others  Goal: Treatment Goal: Refrain from acts of violence/aggression during length of stay, and demonstrate improved impulse control at the time of discharge  Outcome: Progressing  Goal: Verbalize thoughts and feelings  Description  Interventions:  - Assess and re-assess patient's level of risk, every waking shift  - Engage patient in 1:1 interactions, daily, for a minimum of 15 minutes   - Allow patient to express feelings and frustrations in a safe and non-threatening manner   - Establish rapport/trust with patient   Outcome: Progressing  Goal: Refrain from harming others  Outcome: Progressing  Goal: Refrain from destructive acts on the environment or property  Outcome: Progressing  Goal: Control angry outbursts  Description  Interventions:  - Monitor patient closely, per order  - Ensure early verbal de-escalation  - Monitor prn medication needs  - Set reasonable/therapeutic limits, outline behavioral expectations, and consequences   - Provide a non-threatening milieu, utilizing the least restrictive interventions   Outcome: Progressing  Goal: Attend and participate in unit activities, including therapeutic, recreational, and educational groups  Description  Interventions:  - Provide therapeutic and educational activities daily, encourage attendance and participation, and document same in the medical record   Outcome: Progressing  Goal: Identify appropriate positive anger management techniques  Description  Interventions:  - Offer anger management and coping skills groups   - Staff will provide positive feedback for appropriate anger control  Outcome: Progressing

## 2019-11-05 NOTE — PROGRESS NOTES
Violeta Squires  FUV:[id number] Lora Berg  BVK:[id number]    GTW:[id number]  Adm Date: 11/2/2019 0741  7:41 AM   ATT PHY: [address]         Subjective     The patient was seen after reviewing the chart and discussing the case with caring staff  Today during our encounter, the patient reported no acute concerns other than a mild headache, which he states usually resolves on its own  Objective     Vitals:    11/05/19 0700   BP: 142/92   Pulse: 76   Resp: 18   Temp: 97 5 °F (36 4 °C)   SpO2: 96%       General Appearance: Awake and Alert  No acute distress  HEENT: Normocephalic, atraumatic  PERRLA, EOMI, MMM  Heart: RRR, no murmurs  Normal S1 and S2   Lungs: CTA bilaterally with fair air entry  Assessment     Zoltan Graham is a(n) 62y o  year old male with schizoaffective disorder     1  Cardiac with history of hypertension and dyslipidemia  Patient is refusing lisinopril 10mg  We will closely monitor patient's blood pressures  2  Tobacco abuse  Refusing the patch  3  Alcohol abuse  Patient has been put on thiamine folic acid and multivitamin  4  DJD/osteoarthritis  Patient may get Tylenol on as needed basis  5  Gait abnormality  Seems to be stable at this time  6  Vitamin-D deficiency  Vitamin D3 1000U daily  7  Cough  Robitussin DM PRN  The patient was discussed with Dr Demi Johnson and he is in agreement with the above note

## 2019-11-05 NOTE — PROGRESS NOTES
Patient awaken out of catatonic state and was incontinent  Patient cleaned up directed to go back to bed  Patient refused to stay in his room  Allowed patient to sit in chair and read his Bible in the hallway near the desk  Patient was going into other patient's room and trying to hide redirected patient to come out  Patient came out to the hallway walked down to the doors and attempted to push the handles setting off the alarm  Redirected patient back to his room  Gave patient his scheduled Lithium, Zyprexa, and included PRN Tessalon Perles for a cough  Continue to hold scheduled Ativan  Patient took with out difficulty  Will continue to monitor safety and behaviors every 7 minutes

## 2019-11-05 NOTE — PLAN OF CARE
Problem: Ineffective Coping  Goal: Cooperates with admission process  Description  Interventions:   - Complete admission process  Outcome: Progressing  Goal: Patient/Family participate in treatment and DC plans  Description  Interventions:  - Provide therapeutic environment  Outcome: Progressing   Patient agreeable to meet with this writer and complete [facility] Admission Assessment  Patient lacks insight to his illness however, was pleasant and cooperative  Pt believes his biggest stressor that lead to admission was "someone called crisis because I was very manic cause I have lots of projects to get done " Patient stated that he was manic but not able to identify any symptoms or triggers  What he likes most about his life is his names particularly his Adriane Vila name and what he likes least is "that I can't do it all over again without the bad " Pt is unemployed and has been receiving disability since 1994  Some activities he enjoys are reading, cards, gardening, arts/crafts, music and sports  Patient feels he needs to work on his anger, relaxation, helping his family to understand and healthier lifestyle  Patient stated, "I feel like I am ready now" to be discharged  States he should not be here and is because someone called crisis and a  showed up at his door  Patient signed document and copies given
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
## 2019-11-05 NOTE — PLAN OF CARE
Problem: Ineffective Coping  Goal: Patient/Family participate in treatment and DC plans  Description  Interventions:  - Provide therapeutic environment  Outcome: Progressing   Patient agreeable to meet however, claims he was feeling groggy this morning from night medications  Patient and Pricilla Padilla will attempt to meet after breakfast  Patient pleasant and cooperative

## 2019-11-05 NOTE — PROGRESS NOTES
Patient had disruptive sleep throughout the night  Mood and affect is labile  Thoughts are disorganized and tangential  Patient needed to be reidirected throughout the shift  Patient is unsteady on his feet  Skid socks placed on feet while ambulating  Bed placed in the lowest position  Will continue to monitor safety and behaviors every 7 minutes

## 2019-11-05 NOTE — PROGRESS NOTES
As needed Robitussin administered at 1408 as per physician's orders for patient's complaints of harsh, moist cough  The patient states cough "better" at time of reassessment following medication administration  Will continue to monitor

## 2019-11-05 NOTE — PROGRESS NOTES
11/05/19 0939   Team Meeting   Meeting Type Daily Rounds   Team Members Present   Team Members Present Physician;Nurse;;   Physician Team Member Dr Neil Menchaca MD; Cora Lakhani, [facility]    Nursing Team Member Zahida Al RN    Care Management Team Member Heidi Miller RN    Social Work Team Member SAM Lin    Patient/Family Present   Patient Present No   Patient's Family Present No     High acuity - unpredictable; prn tessalon pearls, ativan; irritable edge; catatonic episode; incontinent of bladder after PRN; exit seeking; found hiding in another patient's room; med compliant; tearful at times

## 2019-11-05 NOTE — PLAN OF CARE
Problem: Ineffective Coping  Goal: Cooperates with admission process  Description  Interventions:   - Complete admission process  Outcome: Progressing  Goal: Participates in unit activities  Description  Interventions:  - Provide therapeutic environment   - Provide required programming   - Redirect inappropriate behaviors   Outcome: Progressing  Goal: Patient/Family participate in treatment and DC plans  Description  Interventions:  - Provide therapeutic environment  Outcome: Progressing   Pleasant, cooperative and does attend groups at times

## 2019-11-05 NOTE — PROGRESS NOTES
The patient noted to be sleeping quietly and without difficulty at time of initial assessment and successive rounds throughout the morning  No verbal or non-verbal complaints of pain noted  Patient awakens easily to verbal stimuli, agreeable to medication administration  The patient states he remembers "some" of the events of last night  The patient noted to be calm, quiet, intermittently tearful  Thought process and speech tangential and flight of ideas  RN noted tearfulness, patient declined to elaborate on trigger, declined to rate depression and anxiety  Patient denies si, hi and hallucinations  The patient denies complaints of pain  Harsh, moist cough productive for clear sputum noted, patient requesting as needed medication for cough, tessalon capsule administered as per physician's orders  Will continue to monitor

## 2019-11-05 NOTE — SOCIAL WORK
CAROL received completed 303 paperwork from staff psychiatrist;  CAROL faxed to Kristopher at [address] [facility] - awaiting hearing information

## 2019-11-06 LAB
ANION GAP SERPL CALCULATED.3IONS-SCNC: 2 MMOL/L (ref 4–13)
BUN SERPL-MCNC: 11 MG/DL (ref 7–25)
CALCIUM SERPL-MCNC: 9.4 MG/DL (ref 8.6–10.5)
CHLORIDE SERPL-SCNC: 109 MMOL/L (ref 98–107)
CO2 SERPL-SCNC: 30 MMOL/L (ref 21–31)
CREAT SERPL-MCNC: 0.89 MG/DL (ref 0.7–1.3)
GFR SERPL CREATININE-BSD FRML MDRD: 92 ML/MIN/1.73SQ M
GLUCOSE P FAST SERPL-MCNC: 91 MG/DL (ref 65–99)
GLUCOSE SERPL-MCNC: 91 MG/DL (ref 65–99)
LITHIUM SERPL-SCNC: 0.7 MMOL/L (ref 1–1.2)
POTASSIUM SERPL-SCNC: 4.2 MMOL/L (ref 3.5–5.5)
SODIUM SERPL-SCNC: 141 MMOL/L (ref 134–143)

## 2019-11-06 PROCEDURE — 80048 BASIC METABOLIC PNL TOTAL CA: CPT | Performed by: PSYCHIATRY & NEUROLOGY

## 2019-11-06 PROCEDURE — 80178 ASSAY OF LITHIUM: CPT | Performed by: NURSE PRACTITIONER

## 2019-11-06 PROCEDURE — 99231 SBSQ HOSP IP/OBS SF/LOW 25: CPT | Performed by: PSYCHIATRY & NEUROLOGY

## 2019-11-06 RX ORDER — LITHIUM CARBONATE 450 MG
900 TABLET, EXTENDED RELEASE ORAL
Status: DISCONTINUED | OUTPATIENT
Start: 2019-11-06 | End: 2019-11-08

## 2019-11-06 RX ORDER — LITHIUM CARBONATE 300 MG/1
600 TABLET, FILM COATED, EXTENDED RELEASE ORAL EVERY MORNING
Status: DISCONTINUED | OUTPATIENT
Start: 2019-11-07 | End: 2019-11-08

## 2019-11-06 RX ORDER — LITHIUM CARBONATE 300 MG/1
300 TABLET, FILM COATED, EXTENDED RELEASE ORAL EVERY MORNING
Status: DISCONTINUED | OUTPATIENT
Start: 2019-11-07 | End: 2019-11-06

## 2019-11-06 RX ADMIN — LITHIUM CARBONATE 600 MG: 300 CAPSULE, GELATIN COATED ORAL at 09:33

## 2019-11-06 RX ADMIN — VITAMIN D, TAB 1000IU (100/BT) 1000 UNITS: 25 TAB at 08:33

## 2019-11-06 RX ADMIN — LORAZEPAM 1 MG: 1 TABLET ORAL at 21:09

## 2019-11-06 RX ADMIN — FOLIC ACID 1 MG: 1 TABLET ORAL at 08:33

## 2019-11-06 RX ADMIN — LORAZEPAM 1 MG: 1 TABLET ORAL at 17:24

## 2019-11-06 RX ADMIN — MELATONIN 6 MG: at 21:09

## 2019-11-06 RX ADMIN — LITHIUM CARBONATE 900 MG: 450 TABLET, EXTENDED RELEASE ORAL at 21:09

## 2019-11-06 RX ADMIN — OLANZAPINE 15 MG: 5 TABLET, FILM COATED ORAL at 21:09

## 2019-11-06 RX ADMIN — GUAIFENESIN AND DEXTROMETHORPHAN 10 ML: 100; 10 SYRUP ORAL at 21:08

## 2019-11-06 RX ADMIN — Medication 100 MG: at 08:33

## 2019-11-06 RX ADMIN — LORAZEPAM 1 MG: 1 TABLET ORAL at 08:33

## 2019-11-06 NOTE — PLAN OF CARE
Problem: Ineffective Coping  Goal: Participates in unit activities  Description  Interventions:  - Provide therapeutic environment   - Provide required programming   - Redirect inappropriate behaviors   Outcome: Progressing  Patient attending and engaging in groups  Beginning to be helpful to other peers

## 2019-11-06 NOTE — PROGRESS NOTES
Progress Note - Mikael Dhillon 69 Day 62 y o  male MRN: [id number]   Unit/Bed#: [facility] 200-02 Encounter: [id number]    Behavior over the last 24 hours: slowly improving  Carlos Manuel Bateman was seen today  Pt states he is doing better but sleeping too much during the day  He reports taking his medications regularly in the unit and questioned why his Lithium level is still low  Pt remains very suspicius but he was able to read and sign the 201 form  He also agreed to work with the team until he get better for discharge  Staff report no bizarre behavior last night but poor participation in groups and on the unit  Sleep: improved  Appetite: normal  Medication side effects: denies  ROS: no complaints    Mental Status Evaluation:    Appearance:  disheveled, bearded   Behavior:  guarded   Speech:  slurred at times   Mood:  improved   Affect:  reactive   Thought Process:  circumstantial   Associations: concrete associations   Thought Content:  paranoid ideation   Perceptual Disturbances: none   Risk Potential: Suicidal ideation - None  Homicidal ideation - None   for agitation bu has been redirectable    Sensorium:  oriented to person and place   Memory:  recent and remote memory: unable to assess due to lack of cooperation   Consciousness:  alert and awake   Attention: attention span and concentration appear shorter than expected for age   Insight:  limited   Judgment: limited   Gait/Station: normal gait/station   Motor Activity: no abnormal movements     Vital signs in last 24 hours:    Temp:  [97 7 °F (36 5 °C)-98 2 °F (36 8 °C)] 97 7 °F (36 5 °C)  HR:  [77-90] 90  Resp:  [18-20] 18  BP: (110-146)/(80-90) 110/80    Laboratory results: I have personally reviewed all pertinent laboratory/tests results    Lithium level 0 7    Assessment/Plan   Principal Problem:    Schizoaffective disorder, bipolar type (HCC)  Active Problems:    Cannabis abuse, continuous    Alcohol abuse, continuous    Recommended Treatment: Planned medication and treatment changes: All current active medications have been reviewed  Encourage group therapy, milieu therapy and occupational therapy  Behavioral Health checks every 7 minutes    Current Facility-Administered Medications:  acetaminophen 650 mg Oral Q6H PRN Kylie Kiss, CRNP   acetaminophen 650 mg Oral Q4H PRN Kylie Kiss, CRNP   acetaminophen 975 mg Oral Q6H PRN Kylie Kiss, CRNP   aluminum-magnesium hydroxide-simethicone 30 mL Oral Q4H PRN Kylie Kiss, CRNP   benztropine 1 mg Intramuscular Q6H PRN Marliss OaMD hector   benztropine 1 mg Oral Q6H PRN Marliss Oar, MD   cholecalciferol 1,000 Units Oral Daily Carlos Murphy MD   dextromethorphan-guaiFENesin 10 mL Oral Q4H PRN Jere Hernandez PA-C   folic acid 1 mg Oral Daily Marliss OaMD hector   hydrOXYzine HCL 25 mg Oral Q4H PRN Kylie Kiss, CRNP   hydrOXYzine HCL 50 mg Oral Q6H PRN Marliss Oar, MD   hydrOXYzine HCL 75 mg Oral Q6H PRN Marliss Oar, MD   lithium carbonate 600 mg Oral Q12H Kylie Kiss, CRNP   LORazepam 1 mg Intramuscular Q6H PRN Marliss Oar, MD   LORazepam 1 mg Oral TID Pearl Ward MD   magnesium hydroxide 30 mL Oral Daily PRN Kylie Kiss, CRNP   melatonin 6 mg Oral HS Kylie Kiss, CRNP   OLANZapine 10 mg Intramuscular Q3H PRN Pealr Ward, MD   OLANZapine 10 mg Oral Q8H PRN Kylie Kiss, CRNP   OLANZapine 15 mg Oral HS Kylie Kiss, CRNP   OLANZapine 5 mg Oral Q6H PRN Kylie Kiss, CRNP   risperiDONE 1 mg Oral Q8H PRN Kylie Kiss, CRNP   thiamine 100 mg Oral Daily Pearl Ward MD       Risks / Benefits of Treatment:    Risks, benefits, and possible side effects of medications explained to patient and patient verbalizes understanding and agreement for treatment  Counseling / Coordination of Care:    Patient's progress discussed with staff in treatment team meeting  Medications, treatment progress and treatment plan reviewed with patient      Carol Bowen MD 11/06/19

## 2019-11-06 NOTE — PROGRESS NOTES
11/06/19 5709   Team Meeting   Meeting Type Daily Rounds   Team Members Present   Team Members Present Physician;Nurse;;   Physician Team Member [facility] Sabrina Collazo MD; Rachel , [address] [place] [address]    Nursing Team Member Gene Blanchard Rn    Care Management Team Member Bakari Arreguin [address], [facility] [place] [facility] [place]   Social Work Team Member Maria Del Carmen Galindo [place]    Patient/Family Present   Patient Present No   Patient's Family Present No     303 hearing today - will offer 201; lithium 0 7; slept; no behaviors

## 2019-11-06 NOTE — SOCIAL WORK
CAROL and staff psych met with patient to review 201 rights;   Pt agreeable and signed 201 - original on chart     CAROL placed phone call to [place] [phone] at [address] [place] [address] to cancel hearing - left message on voicemail;  CAROL also sent email to [place] [phone] requesting cancellation of hearing

## 2019-11-06 NOTE — PLAN OF CARE
Problem: Anxiety  Goal: Anxiety is at manageable level  Description  Interventions:  - Assess and monitor patient's anxiety level  - Monitor for signs and symptoms (heart palpitations, chest pain, shortness of breath, headaches, nausea, feeling jumpy, restlessness, irritable, apprehensive)  - Collaborate with interdisciplinary team and initiate plan and interventions as ordered    - [place] patient to unit/surroundings  - Explain treatment plan  - Encourage participation in care  - Encourage verbalization of concerns/fears  - Identify coping mechanisms  - Assist in developing anxiety-reducing skills  - Administer/offer alternative therapies  - Limit or eliminate stimulants  Outcome: Progressing     Problem: Risk for Violence/Aggression Toward Others  Goal: Treatment Goal: Refrain from acts of violence/aggression during length of stay, and demonstrate improved impulse control at the time of discharge  Outcome: Progressing  Goal: Verbalize thoughts and feelings  Description  Interventions:  - Assess and re-assess patient's level of risk, every waking shift  - Engage patient in 1:1 interactions, daily, for a minimum of 15 minutes   - Allow patient to express feelings and frustrations in a safe and non-threatening manner   - Establish rapport/trust with patient   Outcome: Progressing  Goal: Refrain from harming others  Outcome: Progressing  Goal: Refrain from destructive acts on the environment or property  Outcome: Progressing  Goal: Control angry outbursts  Description  Interventions:  - Monitor patient closely, per order  - Ensure early verbal de-escalation  - Monitor prn medication needs  - Set reasonable/therapeutic limits, outline behavioral expectations, and consequences   - Provide a non-threatening milieu, utilizing the least restrictive interventions   Outcome: Progressing  Goal: Attend and participate in unit activities, including therapeutic, recreational, and educational groups  Description  Interventions:  - Provide therapeutic and educational activities daily, encourage attendance and participation, and document same in the medical record   Outcome: Progressing  Goal: Identify appropriate positive anger management techniques  Description  Interventions:  - Offer anger management and coping skills groups   - Staff will provide positive feedback for appropriate anger control  Outcome: Progressing     Problem: CHONG  Goal: Will exhibit normal sleep and speech and no impulsivity  Description  INTERVENTIONS:  - Administer medication as ordered  - Set limits on impulsive behavior  - Make attempts to decrease external stimuli as possible  Outcome: Progressing

## 2019-11-06 NOTE — PROGRESS NOTES
Timothy Becker  ICW:4/49/1932 Siena Cuellar  POC:[id number]    J:[id number]  Adm Date: 11/2/2019 0741  7:41 AM   ATT PHY: [address]         Subjective     The patient was seen after reviewing the chart and discussing the case with caring staff  Today during our encounter, the patient reported no acute concerns  Objective     Vitals:    11/06/19 0752   BP: 110/80   Pulse: 90   Resp: 18   Temp: 97 7 °F (36 5 °C)   SpO2: 96%       General Appearance: Awake and Alert  No acute distress  HEENT: Normocephalic, atraumatic  PERRLA, EOMI, MMM  Heart: RRR, no murmurs  Normal S1 and S2   Lungs: CTA bilaterally with fair air entry  Assessment     Casey Graham is a(n) 62y o  year old male with schizoaffective disorder     1  Cardiac with history of hypertension and dyslipidemia  Patient is refusing lisinopril 10mg  We will closely monitor patient's blood pressures  2  Tobacco abuse  Refusing the patch  3  Alcohol abuse  Patient has been put on thiamine folic acid and multivitamin  4  DJD/osteoarthritis  Patient may get Tylenol on as needed basis  5  Gait abnormality  Seems to be stable at this time  6  Vitamin-D deficiency  Vitamin D3 1000U daily  7  Cough  Robitussin DM PRN

## 2019-11-06 NOTE — PROGRESS NOTES
The patient noted to be visible on the unit, milieu and resting quietly in patient's room upon rounds throughout shift  The patient noted to be pleasant upon approach, calm and quiet  The patient compliant with meals and medications  The patient denies complaints of pain  The patient noted to have intermittent, moist cough, but denies the need for as needed medication for cough  Lungs noted to be clear, but diminished to bilateral lower lung fields upon auscultation  Will continue to monitor

## 2019-11-06 NOTE — PROGRESS NOTES
[id number]  Patient visible out for meals and groups but keeps to himself  He has been calm and cooperative  His affect is blunted but he does smile at times in conversation  Intermittent cough noted, for which patient receives Robitussin  He endorses anxiety but does not rate it  States that his scheduled Ativan helps him  Was compliant with all medications  Will continue monitoring

## 2019-11-07 PROCEDURE — 99231 SBSQ HOSP IP/OBS SF/LOW 25: CPT | Performed by: NURSE PRACTITIONER

## 2019-11-07 RX ORDER — LORAZEPAM 1 MG/1
1 TABLET ORAL EVERY 12 HOURS SCHEDULED
Status: DISCONTINUED | OUTPATIENT
Start: 2019-11-07 | End: 2019-11-11

## 2019-11-07 RX ADMIN — LITHIUM CARBONATE 600 MG: 300 TABLET, EXTENDED RELEASE ORAL at 08:57

## 2019-11-07 RX ADMIN — LITHIUM CARBONATE 900 MG: 450 TABLET, EXTENDED RELEASE ORAL at 21:18

## 2019-11-07 RX ADMIN — LORAZEPAM 1 MG: 1 TABLET ORAL at 08:57

## 2019-11-07 RX ADMIN — MELATONIN 6 MG: at 21:19

## 2019-11-07 RX ADMIN — LORAZEPAM 1 MG: 1 TABLET ORAL at 21:19

## 2019-11-07 RX ADMIN — OLANZAPINE 15 MG: 5 TABLET, FILM COATED ORAL at 21:18

## 2019-11-07 RX ADMIN — VITAMIN D, TAB 1000IU (100/BT) 1000 UNITS: 25 TAB at 08:57

## 2019-11-07 RX ADMIN — FOLIC ACID 1 MG: 1 TABLET ORAL at 08:57

## 2019-11-07 RX ADMIN — Medication 100 MG: at 08:57

## 2019-11-07 NOTE — PROGRESS NOTES
Documentation for [id number]  The patient noted to be visible in the unit, in the milieu for morning group and breakfast meal and resting quietly in room  The patient noted to be pleasant upon approach, patient noted to be guarded and paranoid regarding medications, but cooperative and agreeable with morning medication administration after review with RN  The patient states that he had poor sleep previous night  The patient denies anxiety, depression, si, hi, and hallucinations  The patient denies complaints of pain  Strong, moist, cough noted, RN offered as needed cough medication, the patient declined at this time  No complaints of shortness of breath or respiratory distress noted  Will continue to monitor

## 2019-11-07 NOTE — PROGRESS NOTES
Barbara Wilde  HRJ:[birth year] Seamus Galvan  GBN:[id number]    FPJ:[id number]  Adm Date: 11/2/2019 0741  7:41 AM   ATT PHY: [address]         Subjective     The patient was seen after reviewing the chart and discussing the case with caring staff  Today during our encounter, the patient reported no acute concerns  Objective     Vitals:    11/07/19 0729   BP: 131/87   Pulse: 82   Resp: 20   Temp: 98 2 °F (36 8 °C)   SpO2: 96%       General Appearance: Awake and Alert  No acute distress  HEENT: Normocephalic, atraumatic  PERRLA, EOMI, MMM  Heart: RRR, no murmurs  Normal S1 and S2   Lungs: CTA bilaterally with fair air entry  Assessment     [facility]note 3[facility] Day is a(n) 62y o  year old male with schizoaffective disorder     1  Cardiac with history of hypertension and dyslipidemia  Patient is refusing lisinopril 10mg  We will closely monitor patient's blood pressures  2  Tobacco abuse  Refusing the patch  3  Alcohol abuse  Patient has been put on thiamine folic acid and multivitamin  4  DJD/osteoarthritis  Patient may get Tylenol on as needed basis  5  Gait abnormality  Seems to be stable at this time  6  Vitamin-D deficiency  Vitamin D3 1000U daily  7  Cough  Robitussin DM PRN

## 2019-11-07 NOTE — SOCIAL WORK
[place] phone call to pt mother, Jessy Solares;  Pt mom states that patient "stopped taking his meds right just before his incident because he wasn't taking as much as he should have"; pt's mother states that his new order hadn't arrived yet and pt was skimming the amounts to make the pills last until his scripts came in the mail; pt mother agreeable to pt returning home upon stabilization but warned HI "don't send him too soon because then he just ends up right back in there";  HI advised that there is no discharge date at this time and agreed to remain in contact with pt mother re: tx and dc planning;   No additional questions or concerns at this time; Call mutually ended

## 2019-11-07 NOTE — PLAN OF CARE
Problem: Anxiety  Goal: Anxiety is at manageable level  Description  Interventions:  - Assess and monitor patient's anxiety level  - Monitor for signs and symptoms (heart palpitations, chest pain, shortness of breath, headaches, nausea, feeling jumpy, restlessness, irritable, apprehensive)  - Collaborate with interdisciplinary team and initiate plan and interventions as ordered    - [place] patient to unit/surroundings  - Explain treatment plan  - Encourage participation in care  - Encourage verbalization of concerns/fears  - Identify coping mechanisms  - Assist in developing anxiety-reducing skills  - Administer/offer alternative therapies  - Limit or eliminate stimulants  Outcome: Progressing     Problem: Risk for Violence/Aggression Toward Others  Goal: Treatment Goal: Refrain from acts of violence/aggression during length of stay, and demonstrate improved impulse control at the time of discharge  Outcome: Progressing  Goal: Verbalize thoughts and feelings  Description  Interventions:  - Assess and re-assess patient's level of risk, every waking shift  - Engage patient in 1:1 interactions, daily, for a minimum of 15 minutes   - Allow patient to express feelings and frustrations in a safe and non-threatening manner   - Establish rapport/trust with patient   Outcome: Progressing  Goal: Refrain from harming others  Outcome: Progressing  Goal: Refrain from destructive acts on the environment or property  Outcome: Progressing  Goal: Control angry outbursts  Description  Interventions:  - Monitor patient closely, per order  - Ensure early verbal de-escalation  - Monitor prn medication needs  - Set reasonable/therapeutic limits, outline behavioral expectations, and consequences   - Provide a non-threatening milieu, utilizing the least restrictive interventions   Outcome: Progressing  Goal: Attend and participate in unit activities, including therapeutic, recreational, and educational groups  Description  Interventions:  - Provide therapeutic and educational activities daily, encourage attendance and participation, and document same in the medical record   Outcome: Progressing  Goal: Identify appropriate positive anger management techniques  Description  Interventions:  - Offer anger management and coping skills groups   - Staff will provide positive feedback for appropriate anger control  Outcome: Progressing     Problem: Alteration in Orientation  Goal: Allow medical examinations, as recommended  Description  Interventions:  - Provide physical/neurological exams and/or referrals, per provider   Outcome: Progressing     Problem: CHONG  Goal: Will exhibit normal sleep and speech and no impulsivity  Description  INTERVENTIONS:  - Administer medication as ordered  - Set limits on impulsive behavior  - Make attempts to decrease external stimuli as possible  Outcome: Progressing     Problem: PSYCHOSIS  Goal: Will report no hallucinations or delusions  Description  Interventions:  - Administer medication as  ordered  - Every waking shifts and PRN assess for the presence of hallucinations and or delusions  - Assist with reality testing to support increasing orientation  - Assess if patient's hallucinations or delusions are encouraging self-harm or harm to others and intervene as appropriate  Outcome: Progressing     Problem: INVOLUNTARY ADMIT  Goal: Will cooperate with staff recommendations and doctor's orders and will demonstrate appropriate behavior  Description  INTERVENTIONS:  - Treat underlying conditions and offer medication as ordered  - Educate regarding involuntary admission procedures and rules  - Utilize positive consistent limit setting strategies to support patient and staff safety  Outcome: Progressing

## 2019-11-07 NOTE — PROGRESS NOTES
Patient slept throughout the night with awakening x2  No behavioral issues  No complaint s or concerns  Will continue to monitor safety and behaviors every 7 minutes

## 2019-11-07 NOTE — PROGRESS NOTES
11/07/19 0938   Team Meeting   Meeting Type Daily Rounds   Team Members Present   Team Members Present Physician;Nurse;;; Occupational Therapist   Physician Team Member Dr Murali Bullard MD; Prashant Valdez, [address] [place] [address]   Nursing Team Member Nahun Borden RN   Care Management Team Member Madhu Cox MS, [place], [facility]   [facility]   OT Team Member Avril Sharma [place]   Patient/Family Present   Patient Present No   Patient's Family Present No     PT was awake a few times throughout the night, PT paranoid with medications, no behaviors  Will explore removing no roommate  No D/C date at this time  Medication adjustment

## 2019-11-07 NOTE — PROGRESS NOTES
Progress Note - Mikael Dhillon 67 Day 62 y o  male MRN: [id number]   Unit/Bed#: [facility] 200-02 Encounter: [id number]    Behavior over the last 24 hours: improved  Noelle Ridley continues to show some improvement  He is still noted to be talking to himself in his room as if responding to internal stimuli  He is overall calmer, less manic, less paranoid  Limited participation in milieu  Limited group attendance  Sleep: improved  Appetite: normal  Medication side effects: No   ROS: no complaints    Mental Status Evaluation:    Appearance:  bizarre   Behavior:  cooperative   Speech:  normal rate, normal volume   Mood:  labile   Affect:  labile   Thought Process:  circumstantial, tangential   Associations: concrete associations   Thought Content:  some paranoia   Perceptual Disturbances: appears responding to internal stimuli   Risk Potential: Suicidal ideation - None at present  Homicidal ideation - None  Potential for aggression - No   Sensorium:  oriented to person, place, time/date and situation   Memory:  recent and remote memory grossly intact   Consciousness:  alert and awake   Attention: attention span and concentration appear shorter than expected for age   Insight:  improved   Judgment: fair   Gait/Station: normal gait/station, normal balance   Motor Activity: no abnormal movements     Vital signs in last 24 hours:    Temp:  [98 2 °F (36 8 °C)-98 6 °F (37 °C)] 98 2 °F (36 8 °C)  HR:  [69-82] 82  Resp:  [18-20] 20  BP: (125-141)/(67-87) 131/87    Laboratory results: I have personally reviewed all pertinent laboratory/tests results      Suicide/Homicide Risk Assessment:    Risk of Harm to Self:   Current Specific Risk Factors include: current psychotic symptoms  Protective Factors: no current suicidal ideation  Based on today's assessment, Noelle Ridley presents the following risk of harm to self: none    Risk of Harm to Others:  Current Specific Risk Factors include: current psychotic symptoms  Protective Factors: no current homicidal ideation, able to communicate with staff on the unit, compliant with medications on the unit as ordered, compliant with unit milieu  Based on today's assessment, Saulo Daley presents the following risk of harm to others: low    The following interventions are recommended: behavioral checks every 7 minutes, continued hospitalization on locked unit     Progress Toward Goals: minimal improvement, still psychotic    Assessment/Plan   Principal Problem:    Schizoaffective disorder, bipolar type ([address] )  Active Problems:    Cannabis abuse, continuous    Alcohol abuse, continuous    Recommended Treatment:     Planned medication and treatment changes:     All current active medications have been reviewed  Encourage group therapy, milieu therapy and occupational therapy  Behavioral Health checks every 7 minutes  Continue current medications:    Current Facility-Administered Medications:  acetaminophen 650 mg Oral Q6H PRN Duard Apo, CRNP   acetaminophen 650 mg Oral Q4H PRN Duard Apo, CRNP   acetaminophen 975 mg Oral Q6H PRN Duard Apo, CRNP   aluminum-magnesium hydroxide-simethicone 30 mL Oral Q4H PRN Duard Apo, CRNP   benztropine 1 mg Intramuscular Q6H PRN Veleria Kussmaul, MD   benztropine 1 mg Oral Q6H PRN Veleria Kussmaul, MD   cholecalciferol 1,000 Units Oral Daily Tina Mortimer, MD   dextromethorphan-guaiFENesin 10 mL Oral Q4H PRN Dylan Hernandez PA-C   folic acid 1 mg Oral Daily Veleria Kussmaul, MD   hydrOXYzine HCL 25 mg Oral Q4H PRN Duard Apo, CRNP   hydrOXYzine HCL 50 mg Oral Q6H PRN Veleria Kussmaul, MD   hydrOXYzine HCL 75 mg Oral Q6H PRN Veleria Kussmaul, MD   lithium carbonate 600 mg Oral QAM Claudia Torres MD   lithium carbonate 900 mg Oral HS Claudia Torres MD   LORazepam 1 mg Intramuscular Q6H PRN Veleria Kussmaul, MD   LORazepam 1 mg Oral Q12H [address], CRNP   magnesium hydroxide 30 mL Oral Daily PRN Duard Apo, CRNP   melatonin 6 mg Oral HS Duard Apo, CRNP OLANZapine 10 mg Intramuscular Q3H PRN Epi Zamarripa MD   OLANZapine 10 mg Oral Q8H PRN CARLOS Tejada   OLANZapine 15 mg Oral HS CARLOS Tejada   OLANZapine 5 mg Oral Q6H PRN CARLOS Tejada   risperiDONE 1 mg Oral Q8H PRN CARLOS Tejada   thiamine 100 mg Oral Daily Epi Zamarripa MD       Risks / Benefits of Treatment:    Risks, benefits, and possible side effects of medications explained to patient and patient verbalizes understanding and agreement for treatment  Counseling / Coordination of Care:    Patient's progress discussed with staff in treatment team meeting  Medications, treatment progress and treatment plan reviewed with patient      CARLOS Tejada 11/07/19

## 2019-11-07 NOTE — PROGRESS NOTES
His mother brought in the followin Pair silver colored eyeglasses    1 black hard eyeglass case    1 blue denture brush    1 book of sheet music    1 yellow card with phone numbers    12 Polident tablets    All items listed above have been signed for by the patient

## 2019-11-07 NOTE — PROGRESS NOTES
[id number]  Patient visible out and about in the community  He is pleasant and his behaviors are appropriate  He has been making an effort to be more visible in the community and is attempting to socialize more with peers  States his depression and anxiety are minimal while on the unit  Denies suicidal thoughts  Denies any complaints of pain  Intermittent cough noted  Was given Robitussin with HS meds  Will continue monitoring

## 2019-11-08 PROCEDURE — 99231 SBSQ HOSP IP/OBS SF/LOW 25: CPT | Performed by: NURSE PRACTITIONER

## 2019-11-08 RX ORDER — LANOLIN ALCOHOL/MO/W.PET/CERES
9 CREAM (GRAM) TOPICAL
Status: DISCONTINUED | OUTPATIENT
Start: 2019-11-08 | End: 2019-11-12 | Stop reason: HOSPADM

## 2019-11-08 RX ORDER — LITHIUM CARBONATE 300 MG/1
600 CAPSULE ORAL EVERY MORNING
Status: DISCONTINUED | OUTPATIENT
Start: 2019-11-09 | End: 2019-11-12 | Stop reason: HOSPADM

## 2019-11-08 RX ORDER — LITHIUM CARBONATE 300 MG/1
900 CAPSULE ORAL
Status: DISCONTINUED | OUTPATIENT
Start: 2019-11-08 | End: 2019-11-12 | Stop reason: HOSPADM

## 2019-11-08 RX ADMIN — LORAZEPAM 1 MG: 1 TABLET ORAL at 08:46

## 2019-11-08 RX ADMIN — LORAZEPAM 1 MG: 1 TABLET ORAL at 20:49

## 2019-11-08 RX ADMIN — FOLIC ACID 1 MG: 1 TABLET ORAL at 08:46

## 2019-11-08 RX ADMIN — Medication 100 MG: at 08:46

## 2019-11-08 RX ADMIN — OLANZAPINE 15 MG: 5 TABLET, FILM COATED ORAL at 20:48

## 2019-11-08 RX ADMIN — VITAMIN D, TAB 1000IU (100/BT) 1000 UNITS: 25 TAB at 08:46

## 2019-11-08 RX ADMIN — LITHIUM CARBONATE 600 MG: 300 TABLET, EXTENDED RELEASE ORAL at 08:46

## 2019-11-08 RX ADMIN — MELATONIN 9 MG: at 20:48

## 2019-11-08 RX ADMIN — LITHIUM CARBONATE 900 MG: 300 CAPSULE, GELATIN COATED ORAL at 20:48

## 2019-11-08 NOTE — PROGRESS NOTES
Patient slept fairly well, out X2 but went right back to bed  Behavior controlled  No c/o  Q 7 minute safety checks maintained

## 2019-11-08 NOTE — PROGRESS NOTES
Progress Note - Mikael Dhillon 67 Day 62 y o  male MRN: [id number]   Unit/Bed#: [facility] 200-02 Encounter: [id number]    Behavior over the last 24 hours: unchanged  Evy Maldonado slept intermittently, still noted at times to be talking to himself  But overall he is calmer, less manic, less delusional  He states he cannot tolerate Lithobid -causes diarrhea, will change back to lithium carbonate  Limited participation in milieu  Sleep: slept off and on  Appetite: normal  Medication side effects: No   ROS: no complaints    Mental Status Evaluation:    Appearance:  marginal hygiene   Behavior:  cooperative   Speech:  normal rate, normal volume   Mood:  dysphoric   Affect:  blunted   Thought Process:  circumstantial   Associations: concrete associations   Thought Content:  no overt delusions   Perceptual Disturbances: none   Risk Potential: Suicidal ideation - None  Homicidal ideation - None  Potential for aggression - No   Sensorium:  oriented to person, place and time/date   Memory:  recent and remote memory grossly intact   Consciousness:  alert and awake   Attention: attention span and concentration appear shorter than expected for age   Insight:  poor   Judgment: poor   Gait/Station: normal gait/station, normal balance   Motor Activity: no abnormal movements     Vital signs in last 24 hours:    Temp:  [97 °F (36 1 °C)-99 2 °F (37 3 °C)] 97 °F (36 1 °C)  HR:  [70-77] 77  Resp:  [16-19] 18  BP: (125-148)/(70-92) 148/86    Laboratory results: I have personally reviewed all pertinent laboratory/tests results      Suicide/Homicide Risk Assessment:    Risk of Harm to Self:   Current Specific Risk Factors include: impaired cognition  Protective Factors: no current suicidal plan or intent, ability to communicate with staff on the unit, able to contract for safety on the unit, improved mood  Based on today's assessment, Evy Maldonado presents the following risk of harm to self: none    Risk of Harm to Others:  Current Specific Risk Factors include: none  Protective Factors: no current homicidal ideation  Based on today's assessment, Jenness Height presents the following risk of harm to others: none    The following interventions are recommended: behavioral checks every 7 minutes, continued hospitalization on locked unit     Progress Toward Goals: improving, less agitated, less disorganized, less irritable, still psychotic    Assessment/Plan   Principal Problem:    Schizoaffective disorder, bipolar type ([address] )  Active Problems:    Cannabis abuse, continuous    Alcohol abuse, continuous    Recommended Treatment:     Planned medication and treatment changes:     All current active medications have been reviewed  Encourage group therapy, milieu therapy and occupational therapy  Behavioral Health checks every 7 minutes  Change lithobid to lithium carbonate     Current Facility-Administered Medications:  acetaminophen 650 mg Oral Q6H PRN Marliss Angers, CRNP   acetaminophen 650 mg Oral Q4H PRN Marliss Angers, CRNP   acetaminophen 975 mg Oral Q6H PRN Marliss Angers, CRNP   aluminum-magnesium hydroxide-simethicone 30 mL Oral Q4H PRN Marliss Angers, CRNP   benztropine 1 mg Intramuscular Q6H PRN Derek Covington MD   benztropine 1 mg Oral Q6H PRN Derek Covington MD   cholecalciferol 1,000 Units Oral Daily Juan Antonio Connolly MD   dextromethorphan-guaiFENesin 10 mL Oral Q4H PRN Tomas Hernandez PA-C   folic acid 1 mg Oral Daily Derek Covington MD   hydrOXYzine HCL 25 mg Oral Q4H PRN Marliss Angers, CRMILI   hydrOXYzine HCL 50 mg Oral Q6H PRN Derek Covington MD   hydrOXYzine HCL 75 mg Oral Q6H PRN Derek Covington MD   [START ON 11/9/2019] lithium carbonate 600 mg Oral QAM Marliss Angers, CRNP   lithium carbonate 900 mg Oral HS Marliss Angers, CRNP   LORazepam 1 mg Intramuscular Q6H PRN Derek Covington MD   LORazepam 1 mg Oral Q12H [address], CRNP   magnesium hydroxide 30 mL Oral Daily PRN Marliss Angers, CRNP   melatonin 6 mg Oral HS Marliss Angers, CRNP OLANZapine 10 mg Intramuscular Q3H PRN Derek Covington MD   OLANZapine 10 mg Oral Q8H PRN Marliss Angers, CRNP   OLANZapine 15 mg Oral HS Marliss Angers, CRMILI   OLANZapine 5 mg Oral Q6H PRN Marliss Angers, CRNP   risperiDONE 1 mg Oral Q8H PRN Marliss Angers, CRNP   thiamine 100 mg Oral Daily Derek Covington MD       Risks / Benefits of Treatment:    Risks, benefits, and possible side effects of medications explained to patient and patient verbalizes understanding and agreement for treatment  Counseling / Coordination of Care:    Patient's progress discussed with staff in treatment team meeting  Medications, treatment progress and treatment plan reviewed with patient      CARLOS Main 11/08/19 good, to achieve stated therapy goals

## 2019-11-08 NOTE — PROGRESS NOTES
11/08/19 0939   Team Meeting   Meeting Type Daily Rounds   Team Members Present   Team Members Present Physician;Nurse;;; Occupational Therapist   Physician Team Member Dr Marvin Duggan MD; Shantel Stockton[address]    Nursing Team Member Michael Lin, [facility]    Care Management Team Member Mary Huff MS, [facility]   Social Work Team Member Marisa Boss [facility]    OT Team Member Kayleen Kent [place]    Patient/Family Present   Patient Present No   Patient's Family Present No     Irritable this morning; up x 2 overnight - otherwise slept; med compliant; no behaviors

## 2019-11-08 NOTE — PROGRESS NOTES
[id number]  Patient visible out and about in the community and social with peers  His mood and behavior has been appropriate  He denies depression, anxiety, suicidal thoughts  He states "Lithobid gives me diarrhea," and would prefer to be changed to lithium carbonate  Patient encouraged to speak to psychiatrist tomorrow  He also states he would like his lithium level drawn tomorrow instead of Saturday morning  No complaints of pain  Will continue monitoring

## 2019-11-08 NOTE — PROGRESS NOTES
Patient irritable this morning, was refusing medications at first  Stated he needed blood work and he could go home  Patient was informed he had blood work 2 days ago for his lithium and is due again tomorrow  Patient asked his level, it was given, then patient stated " I could go home then, my level is good " patient was encouraged to take his medications which patient then agreed  Patient then was smiling and pleasant to this writer  Denies any depression  Anxiety was "high" about discharge  Stated he is here because he missed 3 days of his medications but he is good now  Denies any SI/HI  No other problems reported at this time  Q 7 mins checks in place for safety  Will continue to monitor for behaviors and changes

## 2019-11-08 NOTE — PROGRESS NOTES
Ngoc Iqbal  A:[address]  RY    PGJ:[id number]  Adm Date: 2019  7:41 AM   ATT PHY: [address]         Subjective     The patient was seen after reviewing the chart and discussing the case with caring staff  Today during our encounter, the patient reported no acute concerns  Objective     Vitals:    19 0757   BP: 148/86   Pulse: 77   Resp: 18   Temp: (!) 97 °F (36 1 °C)   SpO2: 95%       General Appearance: Awake and Alert  No acute distress  HEENT: Normocephalic, atraumatic  PERRLA, EOMI, MMM  Heart: RRR, no murmurs  Normal S1 and S2   Lungs: CTA bilaterally with fair air entry  Assessment     Wilfredo Graham is a(n) 62y o  year old male with schizoaffective disorder     1  Cardiac with history of hypertension and dyslipidemia  Patient is refusing lisinopril 10mg  We will closely monitor patient's blood pressures  2  Tobacco abuse  Refusing the patch  3  Alcohol abuse  Patient has been put on thiamine folic acid and multivitamin  4  DJD/osteoarthritis  Patient may get Tylenol on as needed basis  5  Gait abnormality  Seems to be stable at this time  6  Vitamin-D deficiency  Vitamin D3 1000U daily  7  Cough  Robitussin DM PRN  The patient was discussed with Dr Desiree Tamayo and he is in agreement with the above note

## 2019-11-08 NOTE — PLAN OF CARE
Pt progressing; no discharge plan at this time; pt will return home with follow up with his providers

## 2019-11-09 LAB — LITHIUM SERPL-SCNC: 0.9 MMOL/L (ref 1–1.2)

## 2019-11-09 PROCEDURE — 99231 SBSQ HOSP IP/OBS SF/LOW 25: CPT | Performed by: PSYCHIATRY & NEUROLOGY

## 2019-11-09 PROCEDURE — 80178 ASSAY OF LITHIUM: CPT | Performed by: NURSE PRACTITIONER

## 2019-11-09 RX ORDER — TRAZODONE HYDROCHLORIDE 50 MG/1
50 TABLET ORAL
Status: DISCONTINUED | OUTPATIENT
Start: 2019-11-09 | End: 2019-11-12 | Stop reason: HOSPADM

## 2019-11-09 RX ADMIN — OLANZAPINE 15 MG: 5 TABLET, FILM COATED ORAL at 21:03

## 2019-11-09 RX ADMIN — LORAZEPAM 1 MG: 1 TABLET ORAL at 09:15

## 2019-11-09 RX ADMIN — MELATONIN 9 MG: at 21:03

## 2019-11-09 RX ADMIN — LORAZEPAM 1 MG: 1 TABLET ORAL at 21:03

## 2019-11-09 RX ADMIN — LITHIUM CARBONATE 600 MG: 300 CAPSULE, GELATIN COATED ORAL at 09:15

## 2019-11-09 RX ADMIN — Medication 100 MG: at 09:15

## 2019-11-09 RX ADMIN — VITAMIN D, TAB 1000IU (100/BT) 1000 UNITS: 25 TAB at 09:15

## 2019-11-09 RX ADMIN — FOLIC ACID 1 MG: 1 TABLET ORAL at 09:15

## 2019-11-09 RX ADMIN — LITHIUM CARBONATE 900 MG: 300 CAPSULE, GELATIN COATED ORAL at 21:03

## 2019-11-09 NOTE — PROGRESS NOTES
Patient has been out in the milieu, bright, pleasant and social with peers  Denies any depression, anxiety controlled, states he tries other things first before meds  Goes back to his room, states he tries a shower first  Denies SI/HI  Behavior controlled  Will continue to observe

## 2019-11-09 NOTE — NURSING NOTE
Pt reports poor sleep; up twice to check time throughout night  No acute behavioral outburst noted  Q 7 min checks ongoing

## 2019-11-09 NOTE — PROGRESS NOTES
Patient compliant with medications and meals  Pleasant and cooperative  Patient denies any SI/HI  Patient denies any depression  Stated he is anxious about discharge "was to get out of here" Denies any pain or problems  Social with peers  Q 7 mins checks in place for safety  Will continue to monitor for behaviors and changes

## 2019-11-09 NOTE — NURSING NOTE
Pt present in the milieu; appropriate with peers  Affect bright on approach  Mood is jovial this shift  Denies depression or SI; reports some anxiety related to upcoming discharge  No acute behavioral issues noted  Q 7 min checks ongoing

## 2019-11-09 NOTE — PLAN OF CARE
Problem: Anxiety  Goal: Anxiety is at manageable level  Description  Interventions:  - Assess and monitor patient's anxiety level  - Monitor for signs and symptoms (heart palpitations, chest pain, shortness of breath, headaches, nausea, feeling jumpy, restlessness, irritable, apprehensive)  - Collaborate with interdisciplinary team and initiate plan and interventions as ordered    - [place] patient to unit/surroundings  - Explain treatment plan  - Encourage participation in care  - Encourage verbalization of concerns/fears  - Identify coping mechanisms  - Assist in developing anxiety-reducing skills  - Administer/offer alternative therapies  - Limit or eliminate stimulants  Outcome: Progressing

## 2019-11-09 NOTE — PROGRESS NOTES
Progress Note - Mikael Dhillon 67 Day 62 y o  male MRN: [id number]   Unit/Bed#: [facility] 200-02 Encounter: [id number]    Behavior over the last 24 hours: minimal improvement  Theresa Rodney reports poor sleep overnight but has been compliant with meds given in the unit  He has been calmer but still noted to be smiling and talking to himself inappropriately at times  Staff report limited participation in groups and on the unit  Sleep: decreased  Appetite: normal  Medication side effects: denies   ROS: no complaints    Mental Status Evaluation:    Appearance:  disheveled   Behavior:  cooperative   Speech:  normal rate and volume   Mood:  dysphoric   Affect:  blunted   Thought Process:  circumstantial   Associations: concrete associations   Thought Content:  some paranoia   Perceptual Disturbances: appears responding to internal stimuli at times   Risk Potential: Suicidal ideation - None  Homicidal ideation - None   Sensorium:  oriented to person, place and time/date   Memory:  recent and remote memory grossly intact   Consciousness:  alert and awake   Attention: attention span and concentration appear shorter than expected for age   Insight:  poor   Judgment: poor   Gait/Station: normal gait/station   Motor Activity: no abnormal movements     Vital signs in last 24 hours:    Temp:  [98 1 °F (36 7 °C)-98 2 °F (36 8 °C)] 98 2 °F (36 8 °C)  HR:  [66-71] 71  Resp:  [18-19] 18  BP: (114-127)/(64-78) 127/78    Laboratory results: I have personally reviewed all pertinent laboratory/tests results  Lithium level 0 9      Assessment/Plan   Principal Problem:    Schizoaffective disorder, bipolar type (HCC)  Active Problems:    Cannabis abuse, continuous    Alcohol abuse, continuous    Recommended Treatment:     Planned medication and treatment changes:     All current active medications have been reviewed  Encourage group therapy, milieu therapy and occupational therapy  Behavioral Health checks every 7 minutes   Will begin Trazodone 50 mg HS prn for sleep     Current Facility-Administered Medications:  acetaminophen 650 mg Oral Q6H PRN Genevive [place], CRNP   acetaminophen 650 mg Oral Q4H PRN Genevive [place], CRNP   acetaminophen 975 mg Oral Q6H PRN Genevive [place], CRNP   aluminum-magnesium hydroxide-simethicone 30 mL Oral Q4H PRN Genevive [place], CRNP   benztropine 1 mg Intramuscular Q6H PRN Stephanie Pate MD   benztropine 1 mg Oral Q6H PRN Stephanie Pate MD   cholecalciferol 1,000 Units Oral Daily Aide Palacios MD   dextromethorphan-guaiFENesin 10 mL Oral Q4H PRN Lucia Hernandez PA-C   folic acid 1 mg Oral Daily Stephanie Pate MD   hydrOXYzine HCL 25 mg Oral Q4H PRN Genevive [place], CRNP   hydrOXYzine HCL 50 mg Oral Q6H PRN Stephanie Pate MD   hydrOXYzine HCL 75 mg Oral Q6H PRN Stephanie Pate MD   lithium carbonate 600 mg Oral QAM Genevive [place], CRNP   lithium carbonate 900 mg Oral HS Genevive [place], CRNP   LORazepam 1 mg Intramuscular Q6H PRN Stephanie Pate MD   LORazepam 1 mg Oral Q12H [facility] Genevive [place], CRNP   magnesium hydroxide 30 mL Oral Daily PRN Genevive [place], CRNP   melatonin 9 mg Oral HS Genevive [place], CRNP   OLANZapine 10 mg Intramuscular Q3H PRN Stephanie Pate MD   OLANZapine 10 mg Oral Q8H PRN Genevive [place], CRNP   OLANZapine 15 mg Oral HS Genevive [place], CRNP   OLANZapine 5 mg Oral Q6H PRN Genevive [place], CRNP   risperiDONE 1 mg Oral Q8H PRN Genevive [place], CARLOS   thiamine 100 mg Oral Daily Stephanie Pate MD       Risks / Benefits of Treatment:    Risks, benefits, and possible side effects of medications explained to patient and patient verbalizes understanding and agreement for treatment  Counseling / Coordination of Care:    Patient's progress discussed with staff in treatment team meeting  Medications, treatment progress and treatment plan reviewed with patient      Christofer Shay MD 11/09/19

## 2019-11-10 PROCEDURE — 99231 SBSQ HOSP IP/OBS SF/LOW 25: CPT | Performed by: PSYCHIATRY & NEUROLOGY

## 2019-11-10 RX ADMIN — Medication 100 MG: at 09:18

## 2019-11-10 RX ADMIN — LITHIUM CARBONATE 600 MG: 300 CAPSULE, GELATIN COATED ORAL at 09:18

## 2019-11-10 RX ADMIN — OLANZAPINE 15 MG: 5 TABLET, FILM COATED ORAL at 21:05

## 2019-11-10 RX ADMIN — FOLIC ACID 1 MG: 1 TABLET ORAL at 09:18

## 2019-11-10 RX ADMIN — LORAZEPAM 1 MG: 1 TABLET ORAL at 09:18

## 2019-11-10 RX ADMIN — VITAMIN D, TAB 1000IU (100/BT) 1000 UNITS: 25 TAB at 09:18

## 2019-11-10 RX ADMIN — LORAZEPAM 1 MG: 1 TABLET ORAL at 21:04

## 2019-11-10 RX ADMIN — LITHIUM CARBONATE 900 MG: 300 CAPSULE, GELATIN COATED ORAL at 21:06

## 2019-11-10 RX ADMIN — MELATONIN 9 MG: at 21:04

## 2019-11-10 NOTE — PROGRESS NOTES
Patient in his room this morning, pleasant and bright at approach  When asked about his morning patient stated he was upset because he is still here and he been here for 5 days now  Patient denies any SI/HI  Patient denies any depression  Stated he is anxious because he is still here  Patient mood is elated and laughing with staff  Patient tried to hug this writer and needed to be reminded about personal boundaries which patient understood  No other concerns or problems reports this am  Q 7 mins checks in place for safety  Will continue to monitor for behaviors and changes

## 2019-11-10 NOTE — PROGRESS NOTES
Patient remains pleasant and cooperative  Reads bible out loud in his room  Also observed talking to self  Behavior controlled  Med compliant  Will continue to monitor

## 2019-11-10 NOTE — PROGRESS NOTES
Patient has been sleeping well through the night  Keeps all there lights on  No changes or problems  Q 7 minute safety checks maintained

## 2019-11-10 NOTE — CMS CERTIFICATION NOTE
Recertification: Based upon physical, mental and social evaluations, I certify that inpatient psychiatric services continue to be medically necessary for this patient for a duration of 15 midnights for the treatment of Schizoaffective disorder, bipolar type [facility])   Available alternative community resources still do not meet the patient's mental health care needs  I further attest that an established written individualized plan of care has been updated and is outlined in the patient's medical records

## 2019-11-10 NOTE — PROGRESS NOTES
Progress Note - Timmy Padilla Day 62 y o  male MRN: [id number]    Unit/Bed#: [facility] 200-02 Encounter: [id number]      Assessment:  1  Cardiac with history of hypertension and dyslipidemia  Patient is refusing lisinopril 10mg  We will closely monitor patient's blood pressures  2  Tobacco abuse   Refusing the patch  3  Alcohol abuse   Patient has been put on thiamine folic acid and multivitamin  4  DJD/osteoarthritis   Patient may get Tylenol on as needed basis  5  Gait abnormality   Seems to be stable at this time  6  Vitamin-D deficiency   Vitamin D3 1000U daily  Plan:  As above    Subjective:   No    Objective:     Vitals: Blood pressure 141/85, pulse 77, temperature 97 9 °F (36 6 °C), temperature source Temporal, resp  rate 18, height 5' 9" (1 753 m), weight 93 1 kg (205 lb 4 oz), SpO2 98 %  ,Body mass index is 30 31 kg/m²        Intake/Output Summary (Last 24 hours) at 11/9/2019 1900  Last data filed at 11/9/2019 1716  Gross per 24 hour   Intake 600 ml   Output    Net 600 ml       Physical Exam: /85 (BP Location: Right arm)   Pulse 77   Temp 97 9 °F (36 6 °C) (Temporal)   Resp 18   Ht 5' 9" (1 753 m)   Wt 93 1 kg (205 lb 4 oz)   SpO2 98%   BMI 30 31 kg/m²     General Appearance:    Alert, cooperative, no distress, appears stated age   Head:    Normocephalic, without obvious abnormality, atraumatic   Eyes:    PERRL, conjunctiva/corneas clear, EOM's intact, fundi     benign, both eyes                    Neck:   Supple, symmetrical, trachea midline, no adenopathy;        thyroid:  No enlargement/tenderness/nodules; no carotid    bruit or JVD   Back:     Symmetric, no curvature, ROM normal, no CVA tenderness   Lungs:     Clear to auscultation bilaterally, respirations unlabored   Chest wall:    No tenderness or deformity   Heart:    Regular rate and rhythm, S1 and S2 normal, no murmur, rub   or gallop   Abdomen:     Soft, non-tender, bowel sounds active all four quadrants,     no masses, no organomegaly Extremities:   Extremities normal, atraumatic, no cyanosis or edema   Pulses:   2+ and symmetric all extremities   Skin:   Skin color, texture, turgor normal, no rashes or lesions   Lymph nodes:   Cervical, supraclavicular, and axillary nodes normal   Neurologic:   CNII-XII intact  Normal strength, sensation and reflexes       throughout        Invasive Devices     None                 Lab, Imaging and other studies: I have personally reviewed pertinent reports

## 2019-11-10 NOTE — PROGRESS NOTES
Patient pleasant and cooperative  No behaviors noted this shift  Patient had a good visit with his mom and later on with his   Q 7 mins checks in place for safety  Will continue to monitor for behaviors and changes

## 2019-11-10 NOTE — PROGRESS NOTES
Progress Note - 6 Saint Gonzales Frandy Day 62 y o  male MRN: [id number]  Unit/Bed#: [facility] 200-02 Encounter: [id number]    Assessment/Plan   Principal Problem:    Schizoaffective disorder, bipolar type ([address] )  Active Problems:    Cannabis abuse, continuous    Alcohol abuse, continuous      Behavior over the last 24 hours:  improved  Sleep: normal  Appetite: increased  Medication side effects: No  ROS: no complaints    Mental Status Evaluation:  Appearance:  age appropriate and bearded   Behavior:  cooperative   Speech:  normal volume   Mood:  euthymic   Affect:  constricted   Thought Process:  circumstantial   Thought Content:  mild  paranoia persist   Perceptual Disturbances: still noted to talking to himself periodically    Risk Potential: Suicidal Ideations none, HI none   Sensorium:  person and place   Cognition:  grossly intact   Consciousness:  alert and awake    Attention: attention span appeared shorter than expected for age   Insight:  limited   Judgment: fair   Gait/Station: normal gait/station   Motor Activity: no abnormal movements     Progress Toward Goals: mild improvement  Pt remains paranoid, talking to himself periodically  He has been compliant with meds and no agitated behavior noted  Recommended Treatment: Continue with group therapy, milieu therapy and occupational therapy  Risks, benefits and possible side effects of Medications:   Risks, benefits, and possible side effects of medications explained to patient and patient verbalizes understanding  Medications: all current active meds have been reviewed  Labs: were reviewed    Counseling / Coordination of Care  Total floor / unit time spent today 20 minutes  Greater than 50% of total time was spent with the patient and / or family counseling and / or coordination of care

## 2019-11-10 NOTE — PLAN OF CARE
Problem: Anxiety  Goal: Anxiety is at manageable level  Description  Interventions:  - Assess and monitor patient's anxiety level  - Monitor for signs and symptoms (heart palpitations, chest pain, shortness of breath, headaches, nausea, feeling jumpy, restlessness, irritable, apprehensive)  - Collaborate with interdisciplinary team and initiate plan and interventions as ordered    - [place] patient to unit/surroundings  - Explain treatment plan  - Encourage participation in care  - Encourage verbalization of concerns/fears  - Identify coping mechanisms  - Assist in developing anxiety-reducing skills  - Administer/offer alternative therapies  - Limit or eliminate stimulants  Outcome: Progressing     Problem: Risk for Violence/Aggression Toward Others  Goal: Treatment Goal: Refrain from acts of violence/aggression during length of stay, and demonstrate improved impulse control at the time of discharge  Outcome: Progressing  Goal: Verbalize thoughts and feelings  Description  Interventions:  - Assess and re-assess patient's level of risk, every waking shift  - Engage patient in 1:1 interactions, daily, for a minimum of 15 minutes   - Allow patient to express feelings and frustrations in a safe and non-threatening manner   - Establish rapport/trust with patient   Outcome: Progressing  Goal: Refrain from harming others  Outcome: Progressing  Goal: Refrain from destructive acts on the environment or property  Outcome: Progressing  Goal: Control angry outbursts  Description  Interventions:  - Monitor patient closely, per order  - Ensure early verbal de-escalation  - Monitor prn medication needs  - Set reasonable/therapeutic limits, outline behavioral expectations, and consequences   - Provide a non-threatening milieu, utilizing the least restrictive interventions   Outcome: Progressing  Goal: Attend and participate in unit activities, including therapeutic, recreational, and educational groups  Description  Interventions:  - Provide therapeutic and educational activities daily, encourage attendance and participation, and document same in the medical record   Outcome: Progressing  Goal: Identify appropriate positive anger management techniques  Description  Interventions:  - Offer anger management and coping skills groups   - Staff will provide positive feedback for appropriate anger control  Outcome: Progressing     Problem: Alteration in Orientation  Goal: Allow medical examinations, as recommended  Description  Interventions:  - Provide physical/neurological exams and/or referrals, per provider   Outcome: Progressing     Problem: CHONG  Goal: Will exhibit normal sleep and speech and no impulsivity  Description  INTERVENTIONS:  - Administer medication as ordered  - Set limits on impulsive behavior  - Make attempts to decrease external stimuli as possible  Outcome: Progressing     Problem: PSYCHOSIS  Goal: Will report no hallucinations or delusions  Description  Interventions:  - Administer medication as  ordered  - Every waking shifts and PRN assess for the presence of hallucinations and or delusions  - Assist with reality testing to support increasing orientation  - Assess if patient's hallucinations or delusions are encouraging self-harm or harm to others and intervene as appropriate  Outcome: Progressing     Problem: INVOLUNTARY ADMIT  Goal: Will cooperate with staff recommendations and doctor's orders and will demonstrate appropriate behavior  Description  INTERVENTIONS:  - Treat underlying conditions and offer medication as ordered  - Educate regarding involuntary admission procedures and rules  - Utilize positive consistent limit setting strategies to support patient and staff safety  Outcome: Progressing     Problem: Ineffective Coping  Goal: Identifies ineffective coping skills  Outcome: Progressing  Goal: Identifies healthy coping skills  Outcome: Progressing  Goal: Demonstrates healthy coping skills  Outcome: Progressing  Goal: Participates in unit activities  Description  Interventions:  - Provide therapeutic environment   - Provide required programming   - Redirect inappropriate behaviors   Outcome: Progressing  Goal: Patient/Family participate in treatment and DC plans  Description  Interventions:  - Provide therapeutic environment  Outcome: Progressing  Goal: Patient/Family verbalizes awareness of resources  Outcome: Progressing  Goal: Understands least restrictive measures  Description  Interventions:  - Utilize least restrictive behavior  Outcome: Progressing  Goal: Free from restraint events  Description  - Utilize least restrictive measures   - Provide behavioral interventions   - Redirect inappropriate behaviors   Outcome: Progressing

## 2019-11-11 PROCEDURE — 99232 SBSQ HOSP IP/OBS MODERATE 35: CPT | Performed by: NURSE PRACTITIONER

## 2019-11-11 RX ORDER — LORAZEPAM 0.5 MG/1
0.5 TABLET ORAL EVERY 12 HOURS SCHEDULED
Status: DISCONTINUED | OUTPATIENT
Start: 2019-11-11 | End: 2019-11-12 | Stop reason: HOSPADM

## 2019-11-11 RX ADMIN — FOLIC ACID 1 MG: 1 TABLET ORAL at 08:32

## 2019-11-11 RX ADMIN — Medication 100 MG: at 08:32

## 2019-11-11 RX ADMIN — LITHIUM CARBONATE 600 MG: 300 CAPSULE, GELATIN COATED ORAL at 08:32

## 2019-11-11 RX ADMIN — LORAZEPAM 1 MG: 1 TABLET ORAL at 08:32

## 2019-11-11 RX ADMIN — LITHIUM CARBONATE 900 MG: 300 CAPSULE, GELATIN COATED ORAL at 21:23

## 2019-11-11 RX ADMIN — VITAMIN D, TAB 1000IU (100/BT) 1000 UNITS: 25 TAB at 08:32

## 2019-11-11 RX ADMIN — OLANZAPINE 15 MG: 5 TABLET, FILM COATED ORAL at 21:24

## 2019-11-11 RX ADMIN — LORAZEPAM 0.5 MG: 0.5 TABLET ORAL at 21:23

## 2019-11-11 RX ADMIN — MELATONIN 9 MG: at 21:23

## 2019-11-11 NOTE — SOCIAL WORK
[facility]  Pt dozing on chair but awakened easily and smiled with contact; Pt denies SI/HI/AH/VH, dep and anxiety;  Pt and SW discussed discharge plan for Tuesday, 11/12 to home - pt agreeable; No questions or concerns at this time for SW     Hi placed phone call to [facility] - [phone] to schedule follow up appointments - closed today for the holiday     SW placed phone call to pt mother, Rommel Press, [phone]; Agreeable to discharge; will provide transport at 330p tomorrow afternoon;   No questions or concerns for SW at this time; call mutually ended

## 2019-11-11 NOTE — PROGRESS NOTES
Demetrius Aguero  AKK:2/46/1830 Nguyen Hopper  VIQ:[id number]    NSA:[id number]  Adm Date: 11/2/2019 0741  7:41 AM   ATT PHY: [address]         [facility]     The patient was seen after reviewing the chart and discussing the case with caring staff  Today during our encounter, the patient reported having a few loose stools (not entirely diarrhea) this morning along with some lethargy (but he attributes this to Ativan)  He otherwise denies fevers, chills, abdominal pains, nausea or vomiting  Objective     Vitals:    11/11/19 0710   BP: 126/89   Pulse: 81   Resp: 20   Temp: (!) 96 7 °F (35 9 °C)   SpO2: 97%       General Appearance: Awake and Alert  No acute distress  HEENT: Normocephalic, atraumatic  PERRLA, EOMI, MMM  Heart: RRR, no murmurs  Normal S1 and S2   Lungs: CTA bilaterally with fair air entry  Assessment     Luly Moreno Gladys is a(n) 62y o  year old male with schizoaffective disorder     1  Cardiac with history of hypertension and dyslipidemia  Patient is refusing lisinopril 10mg  We will closely monitor patient's blood pressures  2  Tobacco abuse  Refusing the patch  3  Alcohol abuse  Patient has been put on thiamine folic acid and multivitamin  4  DJD/osteoarthritis  Patient may get Tylenol on as needed basis  5  Gait abnormality  Seems to be stable at this time  6  Vitamin-D deficiency  Vitamin D3 1000U daily  7  Cough  Robitussin DM PRN  8  Loose Stools  Will continue to monitor for worsening S/S  Encouraged fluids  The patient was discussed with Dr Amanda Haider and he is in agreement with the above note

## 2019-11-11 NOTE — PROGRESS NOTES
Progress Note - Elizabeth Best Day 62 y o  male MRN: [id number]    Unit/Bed#: [facility] 200-02 Encounter: [id number]      Assessment:  1  Cardiac with history of hypertension and dyslipidemia  Patient is refusing lisinopril 10mg  We will closely monitor patient's blood pressures  2  Tobacco abuse   Refusing the patch  3  Alcohol abuse   Patient has been put on thiamine folic acid and multivitamin  4  DJD/osteoarthritis   Patient may get Tylenol on as needed basis  5  Gait abnormality   Seems to be stable at this time  6  Vitamin-D deficiency   Vitamin D3 1000U daily  Plan:  See above    Subjective:   No subjective complaint    Objective:     Vitals: Blood pressure 126/83, pulse 78, temperature 98 6 °F (37 °C), temperature source Temporal, resp  rate 18, height 5' 9" (1 753 m), weight 93 1 kg (205 lb 4 oz), SpO2 98 %  ,Body mass index is 30 31 kg/m²        Intake/Output Summary (Last 24 hours) at 11/10/2019 1910  Last data filed at 11/9/2019 2002  Gross per 24 hour   Intake 240 ml   Output    Net 240 ml       Physical Exam: /83 (BP Location: Right arm)   Pulse 78   Temp 98 6 °F (37 °C) (Temporal)   Resp 18   Ht 5' 9" (1 753 m)   Wt 93 1 kg (205 lb 4 oz)   SpO2 98%   BMI 30 31 kg/m²     General Appearance:    Alert, cooperative, no distress, appears stated age   Head:    Normocephalic, without obvious abnormality, atraumatic   Eyes:    PERRL, conjunctiva/corneas clear, EOM's intact, fundi     benign, both eyes                    Neck:   Supple, symmetrical, trachea midline, no adenopathy;        thyroid:  No enlargement/tenderness/nodules; no carotid    bruit or JVD   Back:     Symmetric, no curvature, ROM normal, no CVA tenderness   Lungs:     Clear to auscultation bilaterally, respirations unlabored   Chest wall:    No tenderness or deformity   Heart:    Regular rate and rhythm, S1 and S2 normal, no murmur, rub   or gallop   Abdomen:     Soft, non-tender, bowel sounds active all four quadrants,     no masses, no organomegaly           Extremities:   Extremities normal, atraumatic, no cyanosis or edema   Pulses:   2+ and symmetric all extremities   Skin:   Skin color, texture, turgor normal, no rashes or lesions   Lymph nodes:   Cervical, supraclavicular, and axillary nodes normal   Neurologic:   CNII-XII intact  Normal strength, sensation and reflexes       throughout        Invasive Devices     None                 Lab, Imaging and other studies: I have personally reviewed pertinent reports

## 2019-11-11 NOTE — DISCHARGE INSTR - OTHER ORDERS
You are being discharged to your home at [facility] [place], Phone: [phone]  Triggers you have identified during your hospitalization that led to your distressed mood due to medication non-compliance  Coping skills you have identified during your hospitalization include spending time alone, walking and watching tv  If you are unable to deal with your distressed mood alone please talk to your mother, Nithin Si  If that is not effective and you continue to have distressed mood, please contact [address] [place] [address] at [phone], dial [phone] or go to the nearest emergency center  *National Suicide Prevention Lifeline:  6-408.296.8872  *[facility] [employer]: 276.208.5516/Website: www caroline org  *Substance Abuse and Mental Health Services Administration(SAMHSA) [employer], which is a confidential, free, 24-hour-a-day, 365-day-a-year, information service for individuals and family members facing mental health and/or substance use disorders  This service provides referrals to local treatment facilities, support groups, and community-based organizations  Callers can also order free publications and other information  Call 1-914.570.7425/Website: www [facility] go[facility]  *[employer] 2-1-1: This is a toll free, confidential, 24-hour-a-day service which connects you to a community  in your area who can help you find services and resources that are available to you locally and provide critical services that can improve and save lives    Call: 211  /Website: https://eileen alng/

## 2019-11-11 NOTE — PROGRESS NOTES
Patient is visible on then unit, bright and elevated, laughing and talking with other peers and staff  Mood is bright but pt did verbalize being anxious to leave  Patient expressed that he forget to order his medications online so they did not make it to his house before he ran out, pt states " I only missed three days I don't feel like this long of a stay is necessary " Good eye contact  Loud but pt is [place] and does not have hearing aids on unit  Cooperative with medications  Patient denies SI and HI currently, no behavioral problems  Falling star precautions I place  Alert and oriented  No behavioral problems  Able to make needs known  No signs or symptoms of distress  SP 1 and Q 7 minute checks will be maintained for patient safety  Will continue to monitor

## 2019-11-11 NOTE — PROGRESS NOTES
Patient is visible on the unit, loud and bright  Smiling and laughing with peers and staff  Appears disheveled but showered  Cooperative with medications  Pleasant  Positive for all meals  Looking forward to discharge  Will continue to monitor

## 2019-11-11 NOTE — PLAN OF CARE
Problem: Anxiety  Goal: Anxiety is at manageable level  Description  Interventions:  - Assess and monitor patient's anxiety level  - Monitor for signs and symptoms (heart palpitations, chest pain, shortness of breath, headaches, nausea, feeling jumpy, restlessness, irritable, apprehensive)  - Collaborate with interdisciplinary team and initiate plan and interventions as ordered    - [place] patient to unit/surroundings  - Explain treatment plan  - Encourage participation in care  - Encourage verbalization of concerns/fears  - Identify coping mechanisms  - Assist in developing anxiety-reducing skills  - Administer/offer alternative therapies  - Limit or eliminate stimulants  Outcome: Progressing     Problem: Risk for Violence/Aggression Toward Others  Goal: Treatment Goal: Refrain from acts of violence/aggression during length of stay, and demonstrate improved impulse control at the time of discharge  Outcome: Progressing  Goal: Verbalize thoughts and feelings  Description  Interventions:  - Assess and re-assess patient's level of risk, every waking shift  - Engage patient in 1:1 interactions, daily, for a minimum of 15 minutes   - Allow patient to express feelings and frustrations in a safe and non-threatening manner   - Establish rapport/trust with patient   Outcome: Progressing  Goal: Refrain from harming others  Outcome: Progressing  Goal: Refrain from destructive acts on the environment or property  Outcome: Progressing  Goal: Control angry outbursts  Description  Interventions:  - Monitor patient closely, per order  - Ensure early verbal de-escalation  - Monitor prn medication needs  - Set reasonable/therapeutic limits, outline behavioral expectations, and consequences   - Provide a non-threatening milieu, utilizing the least restrictive interventions   Outcome: Progressing  Goal: Attend and participate in unit activities, including therapeutic, recreational, and educational groups  Description  Interventions:  - Provide therapeutic and educational activities daily, encourage attendance and participation, and document same in the medical record   Outcome: Progressing  Goal: Identify appropriate positive anger management techniques  Description  Interventions:  - Offer anger management and coping skills groups   - Staff will provide positive feedback for appropriate anger control  Outcome: Progressing     Problem: Alteration in Orientation  Goal: Allow medical examinations, as recommended  Description  Interventions:  - Provide physical/neurological exams and/or referrals, per provider   Outcome: Progressing     Problem: CHONG  Goal: Will exhibit normal sleep and speech and no impulsivity  Description  INTERVENTIONS:  - Administer medication as ordered  - Set limits on impulsive behavior  - Make attempts to decrease external stimuli as possible  Outcome: Progressing     Problem: PSYCHOSIS  Goal: Will report no hallucinations or delusions  Description  Interventions:  - Administer medication as  ordered  - Every waking shifts and PRN assess for the presence of hallucinations and or delusions  - Assist with reality testing to support increasing orientation  - Assess if patient's hallucinations or delusions are encouraging self-harm or harm to others and intervene as appropriate  Outcome: Progressing     Problem: INVOLUNTARY ADMIT  Goal: Will cooperate with staff recommendations and doctor's orders and will demonstrate appropriate behavior  Description  INTERVENTIONS:  - Treat underlying conditions and offer medication as ordered  - Educate regarding involuntary admission procedures and rules  - Utilize positive consistent limit setting strategies to support patient and staff safety  Outcome: Progressing     Problem: DISCHARGE PLANNING - CARE MANAGEMENT  Goal: Discharge to post-acute care or home with appropriate resources  Description  INTERVENTIONS:  - Conduct assessment to determine patient/family and health care team treatment goals, and need for post-acute services based on payer coverage, community resources, and patient preferences, and barriers to discharge  - Address psychosocial, clinical, and financial barriers to discharge as identified in assessment in conjunction with the patient/family and health care team  - Arrange appropriate level of post-acute services according to patients   needs and preference and payer coverage in collaboration with the physician and health care team  - Communicate with and update the patient/family, physician, and health care team regarding progress on the discharge plan  - Arrange appropriate transportation to post-acute venues  Outcome: Progressing     Problem: Ineffective Coping  Goal: Identifies ineffective coping skills  Outcome: Progressing  Goal: Identifies healthy coping skills  Outcome: Progressing  Goal: Demonstrates healthy coping skills  Outcome: Progressing  Goal: Participates in unit activities  Description  Interventions:  - Provide therapeutic environment   - Provide required programming   - Redirect inappropriate behaviors   Outcome: Progressing  Goal: Patient/Family participate in treatment and DC plans  Description  Interventions:  - Provide therapeutic environment  Outcome: Progressing  Goal: Patient/Family verbalizes awareness of resources  Outcome: Progressing  Goal: Understands least restrictive measures  Description  Interventions:  - Utilize least restrictive behavior  Outcome: Progressing  Goal: Free from restraint events  Description  - Utilize least restrictive measures   - Provide behavioral interventions   - Redirect inappropriate behaviors   Outcome: Progressing

## 2019-11-11 NOTE — PROGRESS NOTES
Patient appears to be sleeping without difficulty throughout the night  Awake once for time check and water  No behavioral issues  No complaints or concerns  Will continue to monitor safety and behaviors every 7 minutes

## 2019-11-11 NOTE — PROGRESS NOTES
11/11/19 0942   Team Meeting   Meeting Type Daily Rounds   Team Members Present   Team Members Present Physician;Nurse;;; Occupational Therapist   Physician Team Member Dr Alcides Willis MD; Elan Zepeda[address]    Nursing Team Member Trudi Carranza, ADRIEN    Care Management Team Member Bakari Keys [facility], [facility]   Social Work Team Member Shanae Shepard [place]    OT Team Member Elizabeth Bruner [place]   Patient/Family Present   Patient Present No   Patient's Family Present No     Lithium  09; pleasant, cooperative; appropriate on milieu

## 2019-11-11 NOTE — PROGRESS NOTES
Progress Note - Mikael Dhillon 67 Day 62 y o  male MRN: [id number]   Unit/Bed#: [facility] 200-02 Encounter: [id number]    Behavior over the last 24 hours: improved  Saurav Blocker continues to show improvements  He is more appropriate and controlled  He has been cooperative with medications and feels they are working  Will continue to taper off ativan prior to discharge  Presents as less overtly psychotic  Sleeping is improved  Participates more appropriately in milieu  Attends groups  Sleep: improved  Appetite: normal  Medication side effects: No   ROS: no complaints    Mental Status Evaluation:    Appearance:  marginal hygiene   Behavior:  cooperative   Speech:  normal rate, normal volume   Mood:  improved   Affect:  blunted   Thought Process:  circumstantial   Associations: concrete associations   Thought Content:  mild paranoia   Perceptual Disturbances: none   Risk Potential: Suicidal ideation - None  Homicidal ideation - None  Potential for aggression - No   Sensorium:  oriented to person, place, time/date and situation   Memory:  recent and remote memory grossly intact   Consciousness:  alert and awake   Attention: attention span and concentration are age appropriate   Insight:  limited   Judgment: limited   Gait/Station: normal gait/station, normal balance   Motor Activity: no abnormal movements     Vital signs in last 24 hours:    Temp:  [96 7 °F (35 9 °C)-98 6 °F (37 °C)] 96 7 °F (35 9 °C)  HR:  [78-84] 81  Resp:  [16-20] 20  BP: (126-131)/(83-89) 126/89    Laboratory results: I have personally reviewed all pertinent laboratory/tests results      Suicide/Homicide Risk Assessment:    Risk of Harm to Self:   Current Specific Risk Factors include: diagnosis of mood disorder  Protective Factors: no current suicidal plan or intent, ability to communicate with staff on the unit, able to contract for safety on the unit, taking medications as ordered on the unit  Based on today's assessment, Saurav Blocker presents the following risk of harm to self: none    Risk of Harm to Others:  Current Specific Risk Factors include: recent history of violent behavior  Protective Factors: no current homicidal ideation, able to communicate with staff on the unit, compliant with medications on the unit as ordered, compliant with unit milieu  Based on today's assessment, Paige Aguilar presents the following risk of harm to others: low    The following interventions are recommended: behavioral checks every 7 minutes, continued hospitalization on locked unit     Progress Toward Goals: improved    Assessment/Plan   Principal Problem:    Schizoaffective disorder, bipolar type ([address] )  Active Problems:    Cannabis abuse, continuous    Alcohol abuse, continuous    Recommended Treatment:     Planned medication and treatment changes:     All current active medications have been reviewed  Encourage group therapy, milieu therapy and occupational therapy  Behavioral Health checks every 7 minutes  Taper off ativan    Current Facility-Administered Medications:  acetaminophen 650 mg Oral Q6H PRN Bhatti Congo, CRNP   acetaminophen 650 mg Oral Q4H PRN Bhatti [place], CRNP   acetaminophen 975 mg Oral Q6H PRN Bhatti [place], CRMILI   aluminum-magnesium hydroxide-simethicone 30 mL Oral Q4H PRN Reeves [place], CARLOS   benztropine 1 mg Intramuscular Q6H PRN Ankita Mckeon MD   benztropine 1 mg Oral Q6H PRN Ankita Mckeon MD   cholecalciferol 1,000 Units Oral Daily [place], MD   dextromethorphan-guaiFENesin 10 mL Oral Q4H PRN Loraine Hernandez PA-C   folic acid 1 mg Oral Daily Ankita Mckeon MD   hydrOXYzine HCL 25 mg Oral Q4H PRN Bhatti [place], CRMILI   hydrOXYzine HCL 50 mg Oral Q6H PRN Larslonevin Mckeon, MD   hydrOXYzine HCL 75 mg Oral Q6H PRN Larslonevin Mckeon MD   lithium carbonate 600 mg Oral QAM Reeves [place], CRNP   lithium carbonate 900 mg Oral HS Reeves [place], CARLOS   LORazepam 1 mg Intramuscular Q6H PRN Ankita Mckeon MD   LORazepam 1 mg Oral Q12H [address] CARLOS Cabrera   magnesium hydroxide 30 mL Oral Daily PRN Isabel Salgado, CARLOS   melatonin 9 mg Oral HS Isabel Salgado, CARLOS   OLANZapine 10 mg Intramuscular Q3H PRN Juan Antonio Márquez MD   OLANZapine 10 mg Oral Q8H PRN Isabel Salgado, KATENP   OLANZapine 15 mg Oral HS Isabel Salgado, CARLOS   OLANZapine 5 mg Oral Q6H PRN Isabel Salgado, CARLOS   risperiDONE 1 mg Oral Q8H PRN Isabel Salgado, CARLOS   thiamine 100 mg Oral Daily Juan Antonio Márquez MD   traZODone 50 mg Oral HS PRN Pura Romero MD       Risks / Benefits of Treatment:    Risks, benefits, and possible side effects of medications explained to patient and patient verbalizes understanding and agreement for treatment  Counseling / Coordination of Care:    Patient's progress discussed with staff in treatment team meeting  Medications, treatment progress and treatment plan reviewed with patient      CARLOS Cabrera 11/11/19

## 2019-11-11 NOTE — DISCHARGE INSTR - APPOINTMENTS
The treatment team recommends ongoing medication management upon discharge with your current psychiatric provider  A follow up appointment has been made on your behalf with [facility], [address], [facility] [place], [address]; Phone: [phone]  Your appointment is scheduled for Weds, Nov 20th at [address] [place] [address] with Dr Roberta Green  Please plan to arrive 15 minutes prior to your scheduled appointment and bring your insurance card(s), photo ID  Your discharge will be faxed to this provider for continuity of care  You are seen at the [place] for your primary care needs as well    You are scheduled to follow up with Dr Silva Wing on Friday, Nov 15th at 4pm

## 2019-11-12 VITALS
BODY MASS INDEX: 30.4 KG/M2 | RESPIRATION RATE: 20 BRPM | WEIGHT: 205.25 LBS | DIASTOLIC BLOOD PRESSURE: 80 MMHG | TEMPERATURE: 98.5 F | HEART RATE: 75 BPM | SYSTOLIC BLOOD PRESSURE: 147 MMHG | OXYGEN SATURATION: 99 % | HEIGHT: 69 IN

## 2019-11-12 PROCEDURE — 99239 HOSP IP/OBS DSCHRG MGMT >30: CPT | Performed by: NURSE PRACTITIONER

## 2019-11-12 RX ORDER — FOLIC ACID 1 MG/1
1 TABLET ORAL DAILY
Qty: 30 TABLET | Refills: 0 | Status: ON HOLD | OUTPATIENT
Start: 2019-11-13 | End: 2019-11-27 | Stop reason: SDUPTHER

## 2019-11-12 RX ORDER — LITHIUM CARBONATE 300 MG/1
900 CAPSULE ORAL
Qty: 90 CAPSULE | Refills: 0 | Status: ON HOLD | OUTPATIENT
Start: 2019-11-12 | End: 2019-11-29 | Stop reason: SDUPTHER

## 2019-11-12 RX ORDER — MELATONIN
1000 DAILY
Qty: 30 TABLET | Refills: 1 | Status: ON HOLD | OUTPATIENT
Start: 2019-11-13 | End: 2019-11-27 | Stop reason: SDUPTHER

## 2019-11-12 RX ORDER — OLANZAPINE 15 MG/1
15 TABLET ORAL
Qty: 30 TABLET | Refills: 0 | Status: ON HOLD | OUTPATIENT
Start: 2019-11-12 | End: 2019-11-29 | Stop reason: SDUPTHER

## 2019-11-12 RX ORDER — LANOLIN ALCOHOL/MO/W.PET/CERES
100 CREAM (GRAM) TOPICAL DAILY
Qty: 30 TABLET | Refills: 0 | Status: ON HOLD | OUTPATIENT
Start: 2019-11-13 | End: 2019-11-27 | Stop reason: SDUPTHER

## 2019-11-12 RX ORDER — PHENOL 1.4 %
10 AEROSOL, SPRAY (ML) MUCOUS MEMBRANE
Qty: 30 TABLET | Refills: 0 | Status: ON HOLD | OUTPATIENT
Start: 2019-11-12 | End: 2019-11-27 | Stop reason: SDUPTHER

## 2019-11-12 RX ORDER — LITHIUM CARBONATE 600 MG/1
600 CAPSULE ORAL EVERY MORNING
Qty: 30 CAPSULE | Refills: 0 | Status: ON HOLD | OUTPATIENT
Start: 2019-11-13 | End: 2019-11-29 | Stop reason: SDUPTHER

## 2019-11-12 RX ADMIN — FOLIC ACID 1 MG: 1 TABLET ORAL at 08:36

## 2019-11-12 RX ADMIN — Medication 100 MG: at 08:36

## 2019-11-12 RX ADMIN — VITAMIN D, TAB 1000IU (100/BT) 1000 UNITS: 25 TAB at 08:36

## 2019-11-12 RX ADMIN — LORAZEPAM 0.5 MG: 0.5 TABLET ORAL at 08:36

## 2019-11-12 RX ADMIN — LITHIUM CARBONATE 600 MG: 300 CAPSULE, GELATIN COATED ORAL at 08:36

## 2019-11-12 NOTE — QUICK NOTE
Patient was discharged with the following belongings     White pants   Black comb   Grey stealers pj pants   Grey underwear   Off white pj pants   Long sleeve brown shirt   A pr of blue jeans   Blue and black shoes      Upper dentures  2 medicare cards   VA cards x2   AAA card   [place]  license   Social Security card   2 one dollar bills   Green and black wallet   Silver frame glasses   Black eyeglasses case   Blue hair brush   Book of sheet music   Yellow card with phone numbers   12 polident tablets   Pr of black slippers   Blue and green boxers   [employer] blue underwear   2 small black becerra  6 hair ties   2 pr of dark blue socks   Grey shirt   Navy blue shirt   Grey sweater   Dark green sweatshirt   Dark blue pj pants   2 pr of blue jeans     Patient signed belonging sheet

## 2019-11-12 NOTE — PROGRESS NOTES
11/12/19 0943   Team Meeting   Meeting Type Daily Rounds   Team Members Present   Team Members Present Physician;Nurse;;; Occupational Therapist   Physician Team Member Dr Reji Barillas MD; Marisa Bliss, [address] [place] [address]   Nursing Team Member Porter Henderson RN   Care Management Team Member Bakari Witt [address], [facility]   Social Work Team Member Terri John [place]   OT Team Member Lora Escobar [place]   Patient/Family Present   Patient Present No   Patient's Family Present No     D/C today at 1100 to home [facility]

## 2019-11-12 NOTE — NURSING NOTE
Pt present in the milieu; Affect bright mood is elevated  Denies SI or HI  No acute behavioral issues noted Q 7 min checks ongoing

## 2019-11-12 NOTE — NURSING NOTE
[id number]  Pt found to be sleeping on most of authors rounds  No acute behavioral issues noted  Q 7 min checks maintained  Fall protocol in place

## 2019-11-12 NOTE — PROGRESS NOTES
Patient present in milieu  Denies depression  States he is anxious for discharge today  Affect bright  Denies SI/HI or hallucinations  No acute behaviors  Calm and cooperative  Q 7 minute safety checks maintained

## 2019-11-12 NOTE — DISCHARGE INSTRUCTIONS
Schizoaffective Disorder   WHAT YOU NEED TO KNOW:   What is schizoaffective disorder? Schizoaffective disorder is a long-term mental illness that may change how you think, feel, and act around others  Schizoaffective disorder involves both psychosis (loss of reality), along with depression or opal  You may not know what is real and what is not real    What causes schizoaffective disorder? The cause of schizoaffective disorder in not known  It is thought there may be an imbalance in chemicals that help control movement, thought, and mood  What increases my risk of schizoaffective disorder? · You are female  · You were born during the winter months  · You had psychological stress from abuse, disaster, or major life change  · Your brain did not develop normally before you were born  · You have a family member with schizophrenia, a mood disorder, or schizoaffective disorder  What are the signs and symptoms of schizoaffective disorder? · Delusions: These are false ideas  You may believe that someone is spying on you, or that you are someone famous  · Hallucinations: You see, feel, taste, hear, or smell something that is not real     · Disordered thinking and speech:  When you talk, you move from one subject to another in a way that does not make sense  You may make up your own words or sounds  · Negative symptoms: These include lack of expression, eye contact, and words  You may not be able to start and continue a planned activity  You may have little interest in work or social activities  · Depression:  You are depressed most of the day or nearly every day  You may lose or gain weight, or have trouble sleeping or concentrating  There may be feeling of hopelessness and suicidal thoughts  · Manic behavior: These are periods of increased self-esteem and energy with little to no sleep   You may talk more than usual  You may have racing thoughts or be easily distracted  You may have more interest in social activities  How is schizoaffective disorder diagnosed? Your healthcare provider will perform a psychiatric assessment  He will ask if you have a history of psychological trauma, such as physical, sexual, or mental abuse  He will ask if you were given the care that you needed when you needed it  He will ask if you have a history of alcohol or drug abuse  Your healthcare provider will ask you if you want to hurt or kill yourself or others  He will also ask about your hobbies and goals, the people in your life who support you, and how you feel about treatment  The answers to these questions help healthcare providers plan your treatment  Which medicines are used to treat schizoaffective disorder? · Antipsychotics: These help decrease psychotic symptoms or severe agitation  You may need antiparkinson medicine to control muscle stiffness, twitches, and restlessness caused by antipsychotic medicines  · Antianxiety medicine: This medicine may be given to decrease anxiety and help you feel calm and relaxed  · Antidepressants: These help decrease or stop the symptoms of depression, anxiety, and behavior problems  · Mood stabilizers: These control mood swings  · Anticonvulsants: These control seizures, decrease violent behavior, and control mood swings  · Blood pressure medicines: These may be used to help decrease motor tics (uncontrolled movements)  They may also help you feel calmer, more focused, and less irritable  · Anticholinergics: This decreases the side effects of other medicines  Which therapies are used to treat schizoaffective disorder? · Assertive community treatment:  A team of healthcare providers and support groups in your community help you with your therapy  · Cognitive behavior therapy: This therapy helps you to change certain behaviors  It will help you handle symptoms such as hallucinations and delusions   It can help you learn how to get along with others, and help you cope with and handle your disease  · Compliance therapy: This is a therapy to help find ways to make it easier for you to take your medicines and get treatment  · Counseling: This helps you learn self-care, how to decrease your symptoms, and prevent them from coming back  · Family intervention: This program lets your family be part of your therapy  · Skills training: This training helps you learn how to get along with other people  You will also learn how to do everyday activities and skills you need to live on your own  · Supported employment: This is a form of therapy where you are placed into a job that fits your skills  It will help give you independence and self-confidence  · Electroconvulsive therapy: This is a type of shock therapy, also called ECT  This therapy passes a small amount of electricity through the brain  How can I help manage schizoaffective disorder? The following may help you feel better or prevent symptoms of schizoaffective disorder from coming back:  · Find support for yourself and your family:  Talk with others to help you cope with your illness better  This may also help to improve how you relate to others  · Keep all medical appointments: This will help manage your disease and the side-effects from medicines you may be taking  · Take your medicines as directed:  Put your medicines in a pillbox placed in an area you can easily see  Use a watch with an alarm to help you remember when it is time to take your medicine  Tell your healthcare provider if you know or think you might be pregnant because your medication could affect the baby  Do not stop taking your medicines without your healthcare provider's okay  A sudden stop can cause serious medical problems  · Watch for early signs of a relapse and seek help immediately:      ¨ How you think, feel, and see things has changed      ¨ You start to behave differently, or others tell you they notice a change  ¨ You become more nervous and upset, but do not know why  ¨ You eat less or have trouble sleeping  ¨ You have little or no interest in friends or activities  What are the risks of schizoaffective disorder? Even with treatment, your symptoms may come back or not go away  If schizoaffective disorder is left untreated, your condition may get worse  It may affect the way you think of yourself and how you get along with others  Your condition may make it hard for you to do your normal activities  You may be at increased risk for diabetes and heart and lung disease  Your risk for alcohol or drug abuse increases  You may have thoughts of hurting or killing yourself or others  Where can I find support and more information? · [employer]  [facility], [facility] [address] , Mayra Myers [address]  Phone: [phone]  Phone: [phone]  Web Address: [employer]  · [address] [place] [address], Public Information & Communication Branch  [address] [place] [address], [address] [facility], [address]  [place] [address]   Phone: [phone]  Phone: [phone]  Web Address: [facility]  When should I contact my healthcare provider? · You think you are having a relapse  · You are having side effects from your medicines, or they are not helping  · You are not sleeping well or are sleeping more than usual     · You cannot eat or are eating more than usual     · You have muscle spasms, stiffness, or trouble walking  · Your sad feelings or thoughts change the way you function during the day  · You have questions or concerns about your condition or care  When should I seek immediate care or call [facility]? · You feel like hurting or killing yourself or others  · You feel that your condition is getting worse  · You feel very upset, threaten someone, or feel violent  · You suddenly have changes in your vision  · You suddenly have chest pain, trouble breathing, or a fever  CARE AGREEMENT:   You have the right to help plan your care  Learn about your health condition and how it may be treated  Discuss treatment options with your caregivers to decide what care you want to receive  You always have the right to refuse treatment  The above information is an  only  It is not intended as medical advice for individual conditions or treatments  Talk to your doctor, nurse or pharmacist before following any medical regimen to see if it is safe and effective for you  © 2017 [address] [employer]  Information is for End User's use only and may not be sold, redistributed or otherwise used for commercial purposes  All illustrations and images included in CareNotes® are the copyrighted property of A D A M , Inc  or Shailesh Jose D  Olanzapine (By mouth)   Olanzapine ([id number])  Treats psychotic mental disorders, such as schizophrenia or bipolar disorder (manic-depressive illness)  Brand Name(s): ZyPREXA, ZyPREXA Zydis   There may be other brand names for this medicine  When This Medicine Should Not Be Used: This medicine is not right for everyone  Do not use it if you had an allergic reaction to olanzapine  How to Use This Medicine:   Tablet, Dissolving Tablet  · Take your medicine as directed  Your dose may need to be changed several times to find what works best for you  · Make sure your hands are dry before you handle the disintegrating tablet  Peel back the foil from the blister pack, then remove the tablet  Do not push the tablet through the foil  Place the tablet in your mouth  After it has melted, swallow or take a drink of water  · This medicine should come with a Medication Guide  Ask your pharmacist for a copy if you do not have one  · Missed dose: Take a dose as soon as you remember   If it is almost time for your next dose, wait until then and take a regular dose  Do not take extra medicine to make up for a missed dose  · Store the medicine in a closed container at room temperature, away from heat, moisture, and direct light  Keep the disintegrating tablet in the original package until you are ready to use it  Drugs and Foods to Avoid:   Ask your doctor or pharmacist before using any other medicine, including over-the-counter medicines, vitamins, and herbal products  · You must be careful if you are also using other medicine that might cause similar side effects as olanzapine  Make sure your doctor knows about all other medicines you are using  · Some medicines can affect how olanzapine works  Tell your doctor if you are using any of the following:  ¨ Carbamazepine, diazepam, fluoxetine, fluvoxamine, levodopa, omeprazole, or rifampin  ¨ Blood pressure medicine  c  · Tell your doctor if you use anything else that makes you sleepy  Some examples are allergy medicine, narcotic pain medicine, and alcohol  · Do not drink alcohol while you are using this medicine  · Tell your doctor if you smoke tobacco  You might need a different amount of this medicine if you smoke  Warnings While Using This Medicine:   · Tell your doctor if you are pregnant, or if you have kidney disease, liver disease, diabetes, glaucoma, prostate problems, or a history of breast cancer, neuroleptic malignant syndrome (NMS), seizures, or severe constipation  Tell your doctor if you have any kind of heart or circulation problems, including low blood pressure, heart failure, heart rhythm problems, or a history of a heart attack or stroke  Tell your doctor if you have a condition called phenylketonuria  · Do not breastfeed while you are using this medicine  · For some children, teenagers, and young adults, this medicine may increase mental or emotional problems  This may lead to thoughts of suicide and violence   Talk with your doctor right away if you have any thoughts or behavior changes that concern you  Tell your doctor if you or anyone in your family has a history of bipolar disorder or suicide attempts  · This medicine may cause the following problems:  ¨ Neuroleptic malignant syndrome (a nerve and muscle problem)  ¨ Drug reaction with eosinophilia and systemic symptoms (DRESS)  ¨ High blood sugar, cholesterol, or triglyceride levels  ¨ Tardive dyskinesia (a muscle problem that may become permanent)  · This medicine may make you dizzy or drowsy, or may cause trouble with thinking or controlling body movements, which may lead to falls, fractures or other injuries  Do not drive or do anything that could be dangerous until you know how this medicine affects you  You may also feel lightheaded when getting up suddenly from a lying or sitting position, so stand up slowly  · This medicine may make you bleed, bruise, or get infections more easily  Take precautions to prevent illness and injury  Wash your hands often  · This medicine may make it more difficult for your body to cool down  Be careful to not become overheated during exercise or hot weather, because you could have heat stroke  · Your doctor will do lab tests at regular visits to check on the effects of this medicine  Keep all appointments  · Keep all medicine out of the reach of children  Never share your medicine with anyone    Possible Side Effects While Using This Medicine:   Call your doctor right away if you notice any of these side effects:  · Allergic reaction: Itching or hives, swelling in your face or hands, swelling or tingling in your mouth or throat, chest tightness, trouble breathing  · Eye pain, trouble seeing  · Feeling very thirsty or hungry, change in how much or how often you urinate  · Fever, chills, cough, sore throat, body aches  · Jerky muscle movement you cannot control (often in your face, tongue, or jaw)  · Lightheadedness, dizziness, fainting  · Seizures or tremors  · Sweating, confusion, uneven heartbeat, muscle stiffness  · Swollen breasts, or liquid discharge from your nipples (men or women)  · Swollen, painful, or tender lymph glands in neck, armpit, or groin  · Trouble breathing or swallowing  · Unusual behavior, thoughts of hurting yourself or others  · Unusual bleeding, bruising, or weakness  If you notice these less serious side effects, talk with your doctor:   · Constipation, upset stomach  · Dry mouth  · Headache, tiredness  · Sleepiness or unusual drowsiness  · Weight gain  If you notice other side effects that you think are caused by this medicine, tell your doctor  Call your doctor for medical advice about side effects  You may report side effects to FDA at 8-528-FDA-8979  © 2017 [address] [employer] Information is for End User's use only and may not be sold, redistributed or otherwise used for commercial purposes  The above information is an  only  It is not intended as medical advice for individual conditions or treatments  Talk to your doctor, nurse or pharmacist before following any medical regimen to see if it is safe and effective for you  Lithium (By mouth)   Lithium (LITH-ee-um)  Treats and helps prevent manic episodes of bipolar disorder  Brand Name(s): Lith-Eleonora, Lithate, Lithobid   There may be other brand names for this medicine  When This Medicine Should Not Be Used: This medicine is not right for everyone  Do not use it if you had an allergic reaction to lithium, or if you are pregnant or breastfeeding  Do not give the extended-release tablets to anyone younger than 15years of age  How to Use This Medicine:   Capsule, Liquid, Tablet, Long Acting Tablet  · Take your medicine as directed  Your dose may need to be changed several times to find what works best for you  · There are several different forms of lithium  The dose for each is different and they are used at different times of the day   Do not change the type of medicine you take without talking to your doctor first   · Oral liquid: Measure the oral liquid medicine with a marked measuring spoon, oral syringe, or medicine cup  · Capsule, tablet, or extended-release tablet: Swallow the medicine whole  Do not crush, break, or chew it  · Use only the brand of medicine your doctor prescribed  Other brands may not work the same way  · Missed dose: Take a dose as soon as you remember  If it is almost time for your next dose, wait until then and take a regular dose  Do not take extra medicine to make up for a missed dose  · Store the medicine in a closed container at room temperature, away from heat, moisture, and direct light  Drugs and Foods to Avoid:   Ask your doctor or pharmacist before using any other medicine, including over-the-counter medicines, vitamins, and herbal products  · Some foods and medicines can affect how this medicine works  Tell your doctor if you are using any of the following:  ¨ Acetazolamide, carbamazepine, fluoxetine, methyldopa, metronidazole, phenytoin, potassium iodide, sodium bicarbonate, theophylline  ¨ Antacid  ¨ Blood pressure medicine  ¨ Diuretic (water pill)  ¨ Medicine for depression (including paroxetine)  ¨ Medicine to treat mental illness (including haloperidol)  ¨ NSAID pain or arthritis medicine (including celecoxib, ibuprofen, indomethacin, naproxen, piroxicam)  Warnings While Using This Medicine:   · It is not safe to take this medicine during pregnancy  It could harm an unborn baby  Tell your doctor right away if you become pregnant  · Tell your doctor if you have kidney problems, heart or blood vessel disease (including Brugada syndrome), brain or nerve problems, or thyroid problems  · This medicine may cause the following problems:  ¨ Lithium toxicity  ¨ Heart problems  ¨ Kidney problems  ¨ Encephalopathic syndrome (brain problem)  · Make sure any doctor or dentist who treats you knows that you are using this medicine    · This medicine may make you dizzy or drowsy  Do not drive or do anything else that could be dangerous until you know how this medicine affects you  · Your doctor will do lab tests at regular visits to check on the effects of this medicine  Keep all appointments  · Keep all medicine out of the reach of children  Never share your medicine with anyone  Possible Side Effects While Using This Medicine:   Call your doctor right away if you notice any of these side effects:  · Allergic reaction: Itching or hives, swelling in your face or hands, swelling or tingling in your mouth or throat, chest tightness, trouble breathing  · Change in how much or how often you urinate  · Confusion, problems with walking or balance, muscle movements you cannot control  · Diarrhea, vomiting, tremors, or drowsiness  · Fast, pounding, or uneven heartbeat  · Fever  · Lightheadedness or fainting  · Unusual tiredness or weakness  If you notice these less serious side effects, talk with your doctor:   · Mild nausea  · Mild thirst  If you notice other side effects that you think are caused by this medicine, tell your doctor  Call your doctor for medical advice about side effects  You may report side effects to FDA at 8-956-FDA-2572  © 2017 [address] [employer]  Information is for End User's use only and may not be sold, redistributed or otherwise used for commercial purposes  The above information is an  only  It is not intended as medical advice for individual conditions or treatments  Talk to your doctor, nurse or pharmacist before following any medical regimen to see if it is safe and effective for you  Lorazepam (By mouth)   Lorazepam (zdi-HT-i-ata)  Treats anxiety  Brand Name(s): Ativan, LORazepam Intensol   There may be other brand names for this medicine  When This Medicine Should Not Be Used: This medicine is not right for everyone   Do not use it if you had an allergic reaction to lorazepam or similar medicines, or you are pregnant or breastfeeding, or you have acute narrow-angle glaucoma  How to Use This Medicine:   Liquid, Tablet  · Take your medicine as directed  Your dose may need to be changed several times to find what works best for you  · Oral liquid:   ¨ Measure the oral liquid medicine with a marked measuring spoon, oral syringe, or medicine cup  ¨ Mix the medicine with water, juice, soda, applesauce, or pudding  Drink or eat the mixture right away  Do not store it for later use  · This medicine should come with a Medication Guide  Ask your pharmacist for a copy if you do not have one  · Missed dose: Take a dose as soon as you remember  If you are more than 1 hour late, skip the missed dose and wait until it is time for your next dose  Do not use extra medicine to make up for a missed dose  ·   ¨ Oral liquid: Refrigerate the oral liquid  Throw away an opened bottle after 90 days  ¨ Tablets: Store the medicine in a closed container at room temperature, away from heat, moisture, and direct light  Drugs and Foods to Avoid:   Ask your doctor or pharmacist before using any other medicine, including over-the-counter medicines, vitamins, and herbal products  · Some medicines can affect how lorazepam works  Tell your doctor if you are using any of the following:   ¨ Aminophylline, clozapine, probenecid, theophylline, valproate  ¨ Medicine to treat depression or mental health problems  ¨ Medicine to treat seizures  · Do not drink alcohol while you are using this medicine  · Tell your doctor if you use anything else that makes you sleepy  Some examples are allergy medicine, narcotic pain medicine, and alcohol  Warnings While Using This Medicine:   · It is not safe to take this medicine during pregnancy  It could harm an unborn baby  Tell your doctor right away if you become pregnant    · Tell your doctor if you have kidney disease, liver disease, lung or breathing problems (such as COPD, sleep apnea), or a history of drug or alcohol abuse, depression, or seizures  · This medicine can be habit-forming  Do not use more than your prescribed dose  Call your doctor if you think your medicine is not working  · Do not stop using this medicine suddenly  Your doctor will need to slowly decrease your dose before you stop it completely  · This medicine may make you drowsy  Do not drive or do anything else that could be dangerous until you know how this medicine affects you  · Your doctor will do lab tests at regular visits to check on the effects of this medicine  Keep all appointments  · Keep all medicine out of the reach of children  Never share your medicine with anyone  Possible Side Effects While Using This Medicine:   Call your doctor right away if you notice any of these side effects:  · Allergic reaction: Itching or hives, swelling in your face or hands, swelling or tingling in your mouth or throat, chest tightness, trouble breathing  · Confusion, unusual mood or behavior, thoughts of hurting yourself  · Seizures  · Severe drowsiness or weakness, slow heartbeat, trouble breathing  · Worsening of depression  If you notice these less serious side effects, talk with your doctor:   · Dizziness, clumsiness  If you notice other side effects that you think are caused by this medicine, tell your doctor  Call your doctor for medical advice about side effects  You may report side effects to FDA at 2-697-FDA-9014  © 2017 [address] [employer] [address] Information is for End User's use only and may not be sold, redistributed or otherwise used for commercial purposes  The above information is an  only  It is not intended as medical advice for individual conditions or treatments  Talk to your doctor, nurse or pharmacist before following any medical regimen to see if it is safe and effective for you        Cigarette Smoking and Your Health   WHAT YOU NEED TO KNOW:   What are the risks to my health if I smoke tobacco?  Nicotine and other chemicals found in tobacco damage every cell in your body  Even if you are a light smoker, you have an increased risk for cancer, heart disease, and lung disease  If you are pregnant or have diabetes, smoking increases your risk for complications  What are the benefits to my health if I stop smoking? · You decrease respiratory symptoms such as coughing, wheezing, and shortness of breath  · You reduce your risk for cancers of the lung, mouth, throat, kidney, bladder, pancreas, stomach, and cervix  If you already have cancer, you increase the benefits of chemotherapy  You also reduce your risk for cancer returning or a second cancer from developing  · You reduce your risk for heart disease, blood clots, heart attack, and stroke  · You reduce your risk for lung infections, and diseases such as pneumonia, asthma, chronic bronchitis, and emphysema  · Your circulation improves  More oxygen can be delivered to your body  If you have diabetes, you lower your risk for complications, such as kidney, artery, and eye diseases  You also lower your risk for nerve damage  Nerve damage can lead to amputations, poor vision, and blindness  · You improve your body's ability to heal and to fight infections  What are the health benefits to others if I stop smoking? Tobacco is harmful to nonsmokers who breathe in your secondhand smoke  The following are ways the health of others around you may improve when you stop smoking:  · You lower the risks for lung cancer and heart disease in nonsmoking adults  · If you are pregnant, you lower the risk for miscarriage, early delivery, low birth weight, and stillbirth  You also lower your baby's risk for SIDS, obesity, developmental delay, and neurobehavioral problems, such as ADHD  · If you have children, you lower their risk for ear infections, colds, pneumonia, bronchitis, and asthma  Where can I find more information and support to stop smoking? · Smoke[employer]  gov  Phone: 7- 227 - 205-3090  Web Address: www smokefree  [employer]  CARE AGREEMENT:   You have the right to help plan your care  Learn about your health condition and how it may be treated  Discuss treatment options with your caregivers to decide what care you want to receive  You always have the right to refuse treatment  The above information is an  only  It is not intended as medical advice for individual conditions or treatments  Talk to your doctor, nurse or pharmacist before following any medical regimen to see if it is safe and effective for you  © 2017 [facility] Information is for End User's use only and may not be sold, redistributed or otherwise used for commercial purposes  All illustrations and images included in CareNotes® are the copyrighted property of A D A M [employer] Inc  or Shailesh Jeffery  How to Stop Smoking   WHAT YOU NEED TO KNOW:   Why should I stop smoking? You will improve your health and the health of others around you if you stop smoking  Your risk for heart and lung disease, cancer, stroke, heart attack, and vision problems will also decrease  You can benefit from quitting no matter how long you have smoked  How can I prepare to stop smoking? Nicotine is a highly addictive drug found in cigarettes  Withdrawal symptoms can happen when you stop smoking and make it hard to quit  These include anxiety, depression, irritability, trouble sleeping, and increased appetite  You increase your chances of success if you prepare to quit  · Set a quit date  Jordi Paezen a date that is within the next 2 weeks  Do not pick a day that you think may be stressful or busy  Write down the day or [place] it on your calender  · Tell friends and family that you plan to quit  Explain that you may have withdrawal symptoms when you try to quit  Ask them to support you   They may be able to encourage you and help reduce your stress to make it easier for you to quit     · Make a list of your reasons for quitting  Put the list somewhere you will see it every day, such as your refrigerator  You can look at the list when you have a craving  · Remove all tobacco and nicotine products from your home, car, and workplace  Also, remove anything else that will tempt you to smoke, such as lighters, matches, or ashtrays  Clean your car, home, and places at work that smell like smoke  The smell of smoke can trigger a craving  · Identify triggers that make you want to smoke  This may include activities, feelings, or people  Also write down 1 way you can deal with each of your triggers  For example, if you want to smoke as soon as you wake up, plan another activity during this time, such as exercise  · Make a plan for how you will quit  Learn about the tools that can help you quit, such as medicine, counseling, or nicotine replacement therapy  Choose at least 2 options to help you quit  What are some tools to help me stop smoking? · Counseling  from a trained healthcare provider can provide you with support and skills to quit smoking  The provider will also teach you to manage your withdrawal symptoms and cravings  You may receive counseling from one counselor, in group therapy, or through phone therapy called a quit line  · Nicotine replacement therapy (NRT)  such as nicotine patches, gum, or lozenges may help reduce your nicotine cravings  You may get these without a doctor's order  Do not use e-cigarettes or smokeless tobacco in place of cigarettes or to help you quit  They still contain nicotine  · Prescription medicines  such as nasal sprays or nicotine inhalers may help reduce your withdrawal symptoms  Other medicines may also be used to reduce your urge to smoke  Ask your healthcare provider about these medicines  You may need to start certain medicines 2 weeks before your quit date for them to work well       · Hypnosis  is a practice that helps guide you through thoughts and feelings  Hypnosis may help decrease your cravings and make you more willing to quit  · Acupuncture therapy  uses very thin needles to balance energy channels in the body  This is thought to help decrease cravings and symptoms of nicotine withdrawal      · Support groups  let you talk to others who are trying to quit or have already quit  It may be helpful to speak with others about how they quit  How can I manage my cravings? · Avoid situations, people, and places that tempt you to smoke  Go to nonsmoking places, such as libraries or restaurants  Understand what tempts you and try to avoid these things  · Keep your hands busy  Hold things such as a stress ball or pen  · Put candy or toothpicks in your mouth  Keep lollipops, sugarless gum, or toothpicks with you at all times  · Do not have alcohol or caffeine  These drinks may tempt you to smoke  Drink healthy liquids such as water or juice instead  · Reward yourself when you resist your cravings  Rewards will motivate you and help you stay positive  · Do an activity that distracts you from your craving  Examples include going for a walk, exercising, or cleaning  What should I know about weight gain after I quit? You may gain a few pounds after you quit smoking  It is healthier for you to gain a few pounds than to continue to smoke  The following can help you prevent weight gain:  · Eat healthy foods  These include fruits, vegetables, whole-grain breads, low-fat dairy products, beans, lean meats, and fish  Eat healthy snacks, such as low-fat yogurt, if you get hungry between meals  · Drink water before, during, and between meals  This will make your stomach feel full and help prevent you from overeating  Ask your healthcare provider how much liquid to drink each day and which liquids are best for you  · Exercise  Take a walk or do some kind of exercise every day   Ask your healthcare provider what exercise is right for you  This may help reduce your cravings and reduce stress  Where can I find support and more information? · [employer]  Phone: 5- 184 - 142-5137  Web Address: www [employer]  CARE AGREEMENT:   You have the right to help plan your care  Learn about your health condition and how it may be treated  Discuss treatment options with your caregivers to decide what care you want to receive  You always have the right to refuse treatment  The above information is an  only  It is not intended as medical advice for individual conditions or treatments  Talk to your doctor, nurse or pharmacist before following any medical regimen to see if it is safe and effective for you  © 2017 [address] Information is for End User's use only and may not be sold, redistributed or otherwise used for commercial purposes  All illustrations and images included in CareNotes® are the copyrighted property of [employer]  or Shailesh Jose D  Metabolic Syndrome X   WHAT YOU NEED TO KNOW:   Metabolic syndrome is a group of medical conditions that can lead to heart disease, stroke, or type 2 diabetes  The medical conditions include high triglycerides, low HDL cholesterol, high blood pressure, high blood sugar levels, and extra abdominal fat  DISCHARGE INSTRUCTIONS:   Medicines:   · Cholesterol medicine: This type of medicine is given to help decrease (lower) the amount of cholesterol (fat) in your blood  Cholesterol medicine works best if you also exercise and eat a healthy diet that is low in certain kinds of fats  Some cholesterol medicines may cause liver problems  You may need to have blood taken for tests while using this medicine  · Blood pressure medicine: This is given to lower your blood pressure  A controlled blood pressure helps protect your organs, such as your heart, lungs, brain, and kidneys  Take your blood pressure medicine exactly as directed  · Hypoglycemic medicine: This medicine may be given to decrease the amount of sugar in your blood  Hypoglycemic medicine helps your body move the sugar to your cells, where it is needed for energy  · Take your medicine as directed  Contact your healthcare provider if you think your medicine is not helping or if you have side effects  Tell him of her if you are allergic to any medicine  Keep a list of the medicines, vitamins, and herbs you take  Include the amounts, and when and why you take them  Bring the list or the pill bottles to follow-up visits  Carry your medicine list with you in case of an emergency  Follow up with your healthcare provider as directed:  Write down your questions so you remember to ask them during your visits  Manage metabolic syndrome:   · Maintain a healthy weight:  Weight loss helps lower cholesterol, triglycerides, blood pressure, and blood glucose levels  It can also raise HDL (good cholesterol)  Ask your healthcare provider how much you should weigh  Ask him to help you create a weight loss plan if you are overweight  · Eat a variety of healthy foods:  Healthy foods include fruits, vegetables, whole-grain breads, low-fat dairy products, beans, lean meats, and fish  Eat foods that are low in fat and sodium (salt)  A dietitian can help you plan healthy meals  · Exercise:  Ask your healthcare provider to help you create an exercise plan  Exercise can help lower your blood pressure, cholesterol, and blood sugar levels  Exercise can also help raise your HDL level and help you to lose weight  Get at least 30 minutes of exercise, 5 days each week  · Check your blood pressure as directed: You may be asked to keep a record of your blood pressure and bring it with you to follow-up visits  Ask your healthcare provider what your blood pressure should be and how to check it  · Limit alcohol:  Women should limit alcohol to 1 drink a day   Men should limit alcohol to 2 drinks a day  A drink of alcohol is 12 ounces of beer, 5 ounces of wine, or 1½ ounces of liquor  · Do not smoke: If you smoke, it is never too late to quit  Smoking further increases your risk for heart disease and stroke  Ask your healthcare provider for information if you need help quitting  Contact your healthcare provider if:   · You have more thirst or hunger than usual      · You urinate more frequently  · You have blurred vision  · You have questions or concerns about your condition or care  Return to the emergency department if:   · Your blood pressure is higher than healthcare providers told you it should be  · You have chest pain or discomfort that spreads to your arms, jaw, or back  · You have a severe headache or dizziness  · You have trouble thinking, speaking, or understanding others  © 2017 [address]  Information is for End User's use only and may not be sold, redistributed or otherwise used for commercial purposes  All illustrations and images included in CareNotes® are the copyrighted property of A D A M , Inc  or Shailesh Jeffery  The above information is an  only  It is not intended as medical advice for individual conditions or treatments  Talk to your doctor, nurse or pharmacist before following any medical regimen to see if it is safe and effective for you  Melatonin (By mouth)   Melatonin (linnette-a-TOE-crystal)  Treats insomnia  Brand Name(s): [employer]'s Melatonin, [facility] Melatonin, Nature's Blend Melatonin, Optimum Melatonin, PharmAssure Melatonin, [employer] Melatonin, [employer] Melatonin   There may be other brand names for this medicine  When This Medicine Should Not Be Used: You should not use this medicine if you have had an allergic reaction to melatonin  How to Use This Medicine:   Capsule, Long Acting Capsule, Liquid, Tablet, Long Acting Tablet  · Your doctor will tell you how much medicine to use   Do not use more than directed  · Follow the instructions on the medicine label if you are using this medicine without a prescription  · Take your dose 20 minutes before your bedtime  You may take this medicine with or without food  · The liquid may be taken directly or combined with water or juice  If a dose is missed:   · If you miss a dose or forget to use your medicine, call your doctor or pharmacist for instructions  How to Store and Dispose of This Medicine:   · Store the medicine in a closed container at room temperature, away from heat, moisture, and direct light  · Keep all medicine out of the reach of children  Never share your medicine with anyone  · Ask your pharmacist, doctor, or health caregiver about the best way to dispose of any outdated medicine or medicine no longer needed  Drugs and Foods to Avoid:   Ask your doctor or pharmacist before using any other medicine, including over-the-counter medicines, vitamins, and herbal products  · Make sure your doctor knows if you are also using any tranquilizer medicines, or if you are also using any sedative medicines  Warnings While Using This Medicine:   · Make sure your doctor knows if you are pregnant or breast feeding, or if you have an autoimmune condition  Make sure your doctor knows if you are feeling sad or depressed  · This medicine may make you drowsy  Avoid driving, using machines, or doing anything else that might be dangerous if you are not alert  Possible Side Effects While Using This Medicine: If you notice these less serious side effects, talk with your doctor:  · Feeling sluggish or tired in the morning  · Headache  If you notice other side effects that you think are caused by this medicine, tell your doctor  Call your doctor for medical advice about side effects   You may report side effects to FDA at 3-467-FHJ-9784  © 2017 [facility] Information is for End User's use only and may not be sold, redistributed or otherwise used for commercial purposes  The above information is an  only  It is not intended as medical advice for individual conditions or treatments  Talk to your doctor, nurse or pharmacist before following any medical regimen to see if it is safe and effective for you

## 2019-11-12 NOTE — NURSING NOTE
Patient discharged off the unit at 11:16 am  Sister picked pt up  All belongings ,discharge instructions,and prescriptions sent with patient

## 2019-11-12 NOTE — PROGRESS NOTES
Natalie Saige  MATHEW:[birth year] Pet[address] Fay  EHV:[id number]    [facility] [place][id number]  Adm Date: 11/2/2019 0741  7:41 AM   ATT PHY: [address]         Subjective     The patient was seen after reviewing the chart and discussing the case with caring staff  Today during our encounter, the patient reported no acute concerns  Of note, patient is scheduled for discharge today  Patient is medically cleared for discharge  Medication list has been reconciled  Scripts have been filled out  Objective     Vitals:    11/12/19 0735   BP: 147/80   Pulse: 75   Resp: 20   Temp: 98 5 °F (36 9 °C)   SpO2: 99%       General Appearance: Awake and Alert  No acute distress  HEENT: Normocephalic, atraumatic  PERRLA, EOMI, MMM  Heart: RRR, no murmurs  Normal S1 and S2   Lungs: CTA bilaterally with fair air entry  Assessment     Alec Graham is a(n) 62y o  year old male with schizoaffective disorder    Medical Clearance: Patient is medically cleared for discharge  Medication list has been reconciled  Scripts have been filled out      1  Cardiac with history of hypertension and dyslipidemia  Patient is refusing lisinopril 10mg  We will closely monitor patient's blood pressures  2  Tobacco abuse  Refusing the patch  3  Alcohol abuse  Patient has been put on thiamine folic acid and multivitamin  4  DJD/osteoarthritis  Patient may get Tylenol on as needed basis  5  Gait abnormality  Seems to be stable at this time  6  Vitamin-D deficiency  Vitamin D3 1000U daily  7  Cough  Robitussin DM PRN  The patient was discussed with Dr Neil Mahoney and he is in agreement with the above note

## 2019-11-12 NOTE — DISCHARGE SUMMARY
Discharge Summary - [address] 62 y o  male MRN: [id number]  Unit/Bed#: Mikayla Montoya Encounter: [id number]     Admission Date: 11/2/2019         Discharge Date: 11/12/2019 11:16 AM    Attending Psychiatrist: Dr Yodit Mcmahan    Reason for Admission/HPI: Bipolar disorder [facility]) [F31 9]  Psychiatric complaint [F69]  Bipolar I disorder, most recent episode mixed, severe with psychotic features ([facility] ) [F31 64]    According to admission report from   [facility]:    Francisco Graham is a 62 y o  male with a history of Schizoaffective Disorder who was admitted to the inpatient psychiatric unit on a involuntary 302 commitment basis due to unstable mood, psychotic symptoms, severe agitation and homicidal threats towards neighbors and family members      Symptoms prior to admission included depression, homicidal ideation, difficulty sleeping, mood swings, increased irritability, agitation, disorganized behavior, disorganized thinking process, anxiety symptoms, difficulty attending to activities of daily living, poor self-care and noncompliance with medications  Onset of symptoms was gradual starting several weeks ago with progressively worsening course since that time  Stressors preceding admission included chronic mental illness  Daniela Hammond was brought in to ED by police and EMS due to psychotic symptoms and unstable mood  On admission he was unable to provide history, was unaware why he was in the hospital  He had very labile mood with periods of agitation, anger and tearfulness at times  He was yelling, his speech was disorganized, he also appeared responding to internal stimuli as per ED assessment  He was unwilling to sign a voluntary commitment and 302 was petitioned by police      On initial evaluation after admission to the inpatient psychiatric unit Daniela Hammond was agitated, loud and uncooperative with assessment after he received PRN medications   He was in bed - would wake up for few moments, but then went back to sleep and did not want to answer questions  He knew that he was in the hospital, but could not explain the reasons for his admission  History was obtained mainly from chart review and old records from previous admissions    Hospital Course: The patient was admitted to the inpatient psychiatric unit and started on every 7 minutes precautions  During the hospitalization the patient was attending individual therapy, group therapy, milieu therapy and occupational therapy  Psychiatric medications were titrated over the hospital stay  To address manic symptoms, impulsivity, aggresive behavior, agitation and psychotic symptoms the patient was started on mood stabilizer Lithium, antipsychotic medication Zyprexa and hypnotic medication Melatonin  Medication doses were titrated during the hospital course  Prior to beginning of treatment medications risks and benefits and possible side effects including risk of kidney impairment related to treatment with Lithium and risk of parkinsonian symptoms, Tardive Dyskinesia and metabolic syndrome related to treatment with antipsychotic medications were reviewed with the patient  The patient verbalized understanding and agreement for treatment  Patient's symptoms improved gradually over the hospital course  At the end of treatment the patient was doing well  Mood was stable at the time of discharge  The patient denied suicidal ideation, intent or plan at the time of discharge and denied homicidal ideation, intent or plan at the time of discharge  There was no overt psychosis at the time of discharge  Sleep and appetite were improved  The patient was tolerating medications and was not reporting any significant side effects at the time of discharge  Since the patient was doing well at the end of the hospitalization, treatment team felt that the patient could be safely discharged to outpatient care         The outpatient follow up with a psychiatrist at [facility] was arranged by the unit  upon discharge  Mental Status at time of Discharge:     Appearance:  bearded   Behavior:  normal   Speech:  normal pitch and normal volume   Mood:  euthymic   Affect:  labile   Thought Process:  concrete   Thought Content:  normal   Perceptual Disturbances: None   Risk Potential: Suicidal Ideations none, Homicidal Ideations none and Potential for Aggression No   Sensorium:  person, place, time/date and situation   Cognition:  grossly intact   Consciousness:  alert and awake    Attention: attention span and concentration were age appropriate   Insight:  fair   Judgment: fair   Gait/Station: normal gait/station and normal balance   Motor Activity: no abnormal movements     Admission Diagnosis:Bipolar disorder ([facility] ) [F31 9]  Psychiatric complaint [F69]  Bipolar I disorder, most recent episode mixed, severe with psychotic features ([facility] ) [F31 64]    Discharge Diagnosis:   Principal Problem:    Schizoaffective disorder, bipolar type ([facility] )  Active Problems:    Cannabis abuse, continuous    Alcohol abuse, continuous  Resolved Problems:    * No resolved hospital problems   *        Lab results:  Admission on 11/02/2019, Discharged on 11/12/2019   Component Date Value    WBC 11/02/2019 11 20*    RBC 11/02/2019 4 64     Hemoglobin 11/02/2019 14 4     Hematocrit 11/02/2019 42 4     MCV 11/02/2019 91     MCH 11/02/2019 31 0     MCHC 11/02/2019 34 0     RDW 11/02/2019 14 2     MPV 11/02/2019 8 5*    Platelets 47/76/1744 220     Neutrophils Relative 11/02/2019 83*    Lymphocytes Relative 11/02/2019 10*    Monocytes Relative 11/02/2019 6     Eosinophils Relative 11/02/2019 1     Basophils Relative 11/02/2019 0     Neutrophils Absolute 11/02/2019 9 30*    Lymphocytes Absolute 11/02/2019 1 10     Monocytes Absolute 11/02/2019 0 70     Eosinophils Absolute 11/02/2019 0 10     Basophils Absolute 11/02/2019 0 00     Sodium 11/02/2019 138     Potassium 11/02/2019 3 7     Chloride 11/02/2019 106     CO2 11/02/2019 25     ANION GAP 11/02/2019 7     BUN 11/02/2019 13     Creatinine 11/02/2019 1 18     Glucose 11/02/2019 121*    Calcium 11/02/2019 9 9     AST 11/02/2019 61*    ALT 11/02/2019 51     Alkaline Phosphatase 11/02/2019 34*    Total Protein 11/02/2019 6 8     Albumin 11/02/2019 4 6     Total Bilirubin 11/02/2019 0 80     eGFR 11/02/2019 66     TSH 3RD GENERATON 11/02/2019 1 710     Amph/Meth UR 11/02/2019 Negative     Barbiturate Ur 11/02/2019 Negative     Benzodiazepine Urine 11/02/2019 Negative     Cocaine Urine 11/02/2019 Negative     Methadone Urine 11/02/2019 Negative     Opiate Urine 11/02/2019 Negative     PCP Ur 11/02/2019 Negative     THC Urine 11/02/2019 Positive*    Ethanol Lvl 11/02/2019 <10     Color, UA 11/02/2019 Yellow     Clarity, UA 11/02/2019 Clear     Specific Gravity, UA 11/02/2019 1 020     pH, UA 11/02/2019 6 0     Leukocytes, UA 11/02/2019 Trace*    Nitrite, UA 11/02/2019 Negative     Protein, UA 11/02/2019 1+*    Glucose, UA 11/02/2019 Negative     Ketones, UA 11/02/2019 15 (1+)*    Urobilinogen, UA 11/02/2019 0 2     Bilirubin, UA 11/02/2019 1+*    Blood, UA 11/02/2019 Negative     RBC, UA 11/02/2019 2-4*    WBC, UA 11/02/2019 10-20*    Epithelial Cells 11/02/2019 Occasional     Bacteria, UA 11/02/2019 Occasional     Hyaline Casts, UA 11/02/2019 1-2*    MUCUS THREADS 11/02/2019 Moderate*    Urine Culture 11/02/2019 <10,000 cfu/ml      Vitamin B-12 11/03/2019 818     Vit D, 25-Hydroxy 11/03/2019 38 7     Folate 11/03/2019 >20 0*    Sodium 11/03/2019 138     Potassium 11/03/2019 3 9     Chloride 11/03/2019 105     CO2 11/03/2019 29     ANION GAP 11/03/2019 4     BUN 11/03/2019 13     Creatinine 11/03/2019 1 16     Glucose 11/03/2019 99     Glucose, Fasting 11/03/2019 99     Calcium 11/03/2019 9 3     AST 11/03/2019 39     ALT 11/03/2019 43     Alkaline Phosphatase 11/03/2019 30*    Total Protein 11/03/2019 6 1*    Albumin 11/03/2019 4 1     Total Bilirubin 11/03/2019 0 60     eGFR 11/03/2019 68     Hemoglobin A1C 11/03/2019 5 1     EAG 11/03/2019 100     Cholesterol 11/03/2019 120     Triglycerides 11/03/2019 90     HDL, Direct 11/03/2019 44     LDL Calculated 11/03/2019 58     Non-HDL-Chol (CHOL-HDL) 11/03/2019 76     RPR 11/03/2019 Non-Reactive     WBC 11/03/2019 8 00     RBC 11/03/2019 4 47     Hemoglobin 11/03/2019 13 7*    Hematocrit 11/03/2019 41 9*    MCV 11/03/2019 94     MCH 11/03/2019 30 7     MCHC 11/03/2019 32 8     RDW 11/03/2019 14 5     MPV 11/03/2019 8 5*    Platelets 26/24/1551 196     Neutrophils Relative 11/03/2019 70     Lymphocytes Relative 11/03/2019 21     Monocytes Relative 11/03/2019 6     Eosinophils Relative 11/03/2019 3     Basophils Relative 11/03/2019 0     Neutrophils Absolute 11/03/2019 5 50     Lymphocytes Absolute 11/03/2019 1 70     Monocytes Absolute 11/03/2019 0 50     Eosinophils Absolute 11/03/2019 0 20     Basophils Absolute 11/03/2019 0 00     POC Glucose 11/04/2019 110     Lithium Lvl 11/06/2019 0 7*    Sodium 11/06/2019 141     Potassium 11/06/2019 4 2     Chloride 11/06/2019 109*    CO2 11/06/2019 30     ANION GAP 11/06/2019 2*    BUN 11/06/2019 11     Creatinine 11/06/2019 0 89     Glucose 11/06/2019 91     Glucose, Fasting 11/06/2019 91     Calcium 11/06/2019 9 4     eGFR 11/06/2019 92     Lithium Lvl 11/09/2019 0 9*       Discharge Medications:  Discharge Medication List as of 11/12/2019 10:51 AM      START taking these medications    Details   cholecalciferol (VITAMIN D3) 1,000 units tablet Take 1 tablet (1,000 Units total) by mouth daily, Starting Wed 77/92/1974, Print      folic acid (FOLVITE) 1 mg tablet Take 1 tablet (1 mg total) by mouth daily, Starting Wed 11/13/2019, Print      melatonin 10 MG TABS Take 1 tablet (10 mg total) by mouth daily at bedtime, Starting Tue 11/12/2019, Print thiamine 100 MG tablet Take 1 tablet (100 mg total) by mouth daily, Starting Wed 11/13/2019, Print            Discharge Medication List as of 11/12/2019 10:51 AM         Discharge Medication List as of 11/12/2019 10:51 AM      CONTINUE these medications which have CHANGED    Details   !! lithium carbonate 300 mg capsule Take 3 capsules (900 mg total) by mouth daily at bedtime In addition to 600 mg in AM, Starting Tue 11/12/2019, Print      !! lithium carbonate 600 MG capsule Take 1 capsule (600 mg total) by mouth every morning In Addition to 900 mg HS, Starting Wed 11/13/2019, Print      OLANZapine (ZyPREXA) 15 mg tablet Take 1 tablet (15 mg total) by mouth daily at bedtime, Starting Tue 11/12/2019, Print       !! - Potential duplicate medications found  Please discuss with provider  Discharge Medication List as of 11/12/2019 10:51 AM           Discharge instructions/Information to patient and family:   See after visit summary for information provided to patient and family  Provisions for Follow-Up Care:  See after visit summary for information related to follow-up care and any pertinent home health orders  Discharge Statement     I spent 35 minutes discharging the patient  This time was spent on the day of discharge  I had direct contact with the patient on the day of discharge  Additional documentation is required if more than 30 minutes were spent on discharge:    I reviewed with Saurav Lincoln importance of compliance with medications and outpatient treatment after discharge  I discussed the medication regimen and possible side effects of the medications with Saurav Lincoln prior to discharge  At the time of discharge he was tolerating psychiatric medications  I discussed outpatient follow up with Saurav Lincoln  I reviewed with Saurav Lincoln crisis plan and safety plan upon discharge  I discussed with Saurav Lincoln recommendation to follow up with outpatient drug and alcohol counseling and AA meetings    Saurav Lincoln agreed to abstain from drug and alcohol use after discharge

## 2019-11-12 NOTE — BH TRANSITION RECORD
Contact Information: If you have any questions, concerns, pended studies, tests and/or procedures, or emergencies regarding your inpatient behavioral health visit  Please contact [facility] [phone] and ask to speak to a , nurse or physician  A contact is available 24 hours/ 7 days a week at this number  Summary of Procedures Performed During your Stay:  Below is a list of major procedures performed during your hospital stay and a summary of results:  - No major procedures performed  Pending Studies (From admission, onward)    None        If studies are pending at discharge, follow up with your PCP and/or referring provider

## 2019-11-12 NOTE — PLAN OF CARE
Problem: Anxiety  Goal: Anxiety is at manageable level  Description  Interventions:  - Assess and monitor patient's anxiety level  - Monitor for signs and symptoms (heart palpitations, chest pain, shortness of breath, headaches, nausea, feeling jumpy, restlessness, irritable, apprehensive)  - Collaborate with interdisciplinary team and initiate plan and interventions as ordered    - Zahl patient to unit/surroundings  - Explain treatment plan  - Encourage participation in care  - Encourage verbalization of concerns/fears  - Identify coping mechanisms  - Assist in developing anxiety-reducing skills  - Administer/offer alternative therapies  - Limit or eliminate stimulants  Outcome: Adequate for Discharge     Problem: Risk for Violence/Aggression Toward Others  Goal: Treatment Goal: Refrain from acts of violence/aggression during length of stay, and demonstrate improved impulse control at the time of discharge  Outcome: Adequate for Discharge  Goal: Verbalize thoughts and feelings  Description  Interventions:  - Assess and re-assess patient's level of risk, every waking shift  - Engage patient in 1:1 interactions, daily, for a minimum of 15 minutes   - Allow patient to express feelings and frustrations in a safe and non-threatening manner   - Establish rapport/trust with patient   Outcome: Adequate for Discharge  Goal: Refrain from harming others  Outcome: Adequate for Discharge  Goal: Refrain from destructive acts on the environment or property  Outcome: Adequate for Discharge  Goal: Control angry outbursts  Description  Interventions:  - Monitor patient closely, per order  - Ensure early verbal de-escalation  - Monitor prn medication needs  - Set reasonable/therapeutic limits, outline behavioral expectations, and consequences   - Provide a non-threatening milieu, utilizing the least restrictive interventions   Outcome: Adequate for Discharge  Goal: Attend and participate in unit activities, including therapeutic, recreational, and educational groups  Description  Interventions:  - Provide therapeutic and educational activities daily, encourage attendance and participation, and document same in the medical record   Outcome: Adequate for Discharge  Goal: Identify appropriate positive anger management techniques  Description  Interventions:  - Offer anger management and coping skills groups   - Staff will provide positive feedback for appropriate anger control  Outcome: Adequate for Discharge     Problem: Alteration in Orientation  Goal: Allow medical examinations, as recommended  Description  Interventions:  - Provide physical/neurological exams and/or referrals, per provider   Outcome: Adequate for Discharge     Problem: CHONG  Goal: Will exhibit normal sleep and speech and no impulsivity  Description  INTERVENTIONS:  - Administer medication as ordered  - Set limits on impulsive behavior  - Make attempts to decrease external stimuli as possible  Outcome: Adequate for Discharge     Problem: PSYCHOSIS  Goal: Will report no hallucinations or delusions  Description  Interventions:  - Administer medication as  ordered  - Every waking shifts and PRN assess for the presence of hallucinations and or delusions  - Assist with reality testing to support increasing orientation  - Assess if patient's hallucinations or delusions are encouraging self-harm or harm to others and intervene as appropriate  Outcome: Adequate for Discharge     Problem: INVOLUNTARY ADMIT  Goal: Will cooperate with staff recommendations and doctor's orders and will demonstrate appropriate behavior  Description  INTERVENTIONS:  - Treat underlying conditions and offer medication as ordered  - Educate regarding involuntary admission procedures and rules  - Utilize positive consistent limit setting strategies to support patient and staff safety  Outcome: Adequate for Discharge

## 2019-11-12 NOTE — SOCIAL WORK
CAROL placed phone call to [address] [place] [address] to schedule outpatient appointments - [phone]; pt prev scheduled for psych follow up on 11/20 at 10a with Dr Davin Quiñones; pcp scheduled for 11/15 @ 4pm with Dr Meagan Ledbetter

## 2019-11-18 ENCOUNTER — HOSPITAL ENCOUNTER (INPATIENT)
Facility: HOSPITAL | Age: 59
LOS: 11 days | Discharge: HOME | DRG: 885 | End: 2019-11-29
Attending: EMERGENCY MEDICINE | Admitting: PSYCHIATRY & NEUROLOGY
Payer: MEDICARE

## 2019-11-18 DIAGNOSIS — E55.9 VITAMIN D DEFICIENCY: ICD-10-CM

## 2019-11-18 DIAGNOSIS — Z72.0 TOBACCO ABUSE: ICD-10-CM

## 2019-11-18 DIAGNOSIS — F29 PSYCHOSES (HCC): ICD-10-CM

## 2019-11-18 DIAGNOSIS — F25.0 SCHIZOAFFECTIVE DISORDER, BIPOLAR TYPE (HCC): Primary | Chronic | ICD-10-CM

## 2019-11-18 DIAGNOSIS — R05.9 COUGH: ICD-10-CM

## 2019-11-18 DIAGNOSIS — N40.0 BPH (BENIGN PROSTATIC HYPERPLASIA): ICD-10-CM

## 2019-11-18 DIAGNOSIS — F10.10 ALCOHOL ABUSE, CONTINUOUS: Chronic | ICD-10-CM

## 2019-11-18 LAB
ALBUMIN SERPL BCP-MCNC: 4.2 G/DL (ref 3.5–5.7)
ALP SERPL-CCNC: 38 U/L (ref 55–165)
ALT SERPL W P-5'-P-CCNC: 32 U/L (ref 7–52)
AMPHETAMINES SERPL QL SCN: NEGATIVE
ANION GAP SERPL CALCULATED.3IONS-SCNC: 6 MMOL/L (ref 4–13)
AST SERPL W P-5'-P-CCNC: 32 U/L (ref 13–39)
ATRIAL RATE: 91 BPM
BARBITURATES UR QL: NEGATIVE
BASOPHILS # BLD AUTO: 0.1 THOUSANDS/ΜL (ref 0–0.1)
BASOPHILS NFR BLD AUTO: 1 % (ref 0–2)
BENZODIAZ UR QL: NEGATIVE
BILIRUB SERPL-MCNC: 0.5 MG/DL (ref 0.2–1)
BILIRUB UR QL STRIP: NEGATIVE
BUN SERPL-MCNC: 10 MG/DL (ref 7–25)
CALCIUM SERPL-MCNC: 9.4 MG/DL (ref 8.6–10.5)
CHLORIDE SERPL-SCNC: 107 MMOL/L (ref 98–107)
CLARITY UR: CLEAR
CO2 SERPL-SCNC: 26 MMOL/L (ref 21–31)
COCAINE UR QL: NEGATIVE
COLOR UR: YELLOW
CREAT SERPL-MCNC: 0.94 MG/DL (ref 0.7–1.3)
EOSINOPHIL # BLD AUTO: 0.1 THOUSAND/ΜL (ref 0–0.61)
EOSINOPHIL NFR BLD AUTO: 1 % (ref 0–5)
ERYTHROCYTE [DISTWIDTH] IN BLOOD BY AUTOMATED COUNT: 14.2 % (ref 11.5–14.5)
ETHANOL SERPL-MCNC: <10 MG/DL
GFR SERPL CREATININE-BSD FRML MDRD: 87 ML/MIN/1.73SQ M
GLUCOSE SERPL-MCNC: 128 MG/DL (ref 65–99)
GLUCOSE UR STRIP-MCNC: NEGATIVE MG/DL
HCT VFR BLD AUTO: 41.5 % (ref 42–47)
HGB BLD-MCNC: 14.1 G/DL (ref 14–18)
HGB UR QL STRIP.AUTO: NEGATIVE
KETONES UR STRIP-MCNC: NEGATIVE MG/DL
LEUKOCYTE ESTERASE UR QL STRIP: NEGATIVE
LITHIUM SERPL-SCNC: 0.5 MMOL/L (ref 1–1.2)
LYMPHOCYTES # BLD AUTO: 1.1 THOUSANDS/ΜL (ref 0.6–4.47)
LYMPHOCYTES NFR BLD AUTO: 12 % (ref 21–51)
MCH RBC QN AUTO: 31.3 PG (ref 26–34)
MCHC RBC AUTO-ENTMCNC: 34.1 G/DL (ref 31–37)
MCV RBC AUTO: 92 FL (ref 81–99)
METHADONE UR QL: NEGATIVE
MONOCYTES # BLD AUTO: 0.5 THOUSAND/ΜL (ref 0.17–1.22)
MONOCYTES NFR BLD AUTO: 6 % (ref 2–12)
NEUTROPHILS # BLD AUTO: 7.2 THOUSANDS/ΜL (ref 1.4–6.5)
NEUTS SEG NFR BLD AUTO: 81 % (ref 42–75)
NITRITE UR QL STRIP: NEGATIVE
OPIATES UR QL SCN: NEGATIVE
P AXIS: 80 DEGREES
PCP UR QL: NEGATIVE
PH UR STRIP.AUTO: 6.5 [PH]
PLATELET # BLD AUTO: 219 THOUSANDS/UL (ref 149–390)
PMV BLD AUTO: 8.3 FL (ref 8.6–11.7)
POTASSIUM SERPL-SCNC: 3.4 MMOL/L (ref 3.5–5.5)
PR INTERVAL: 162 MS
PROT SERPL-MCNC: 6.4 G/DL (ref 6.4–8.9)
PROT UR STRIP-MCNC: NEGATIVE MG/DL
QRS AXIS: 207 DEGREES
QRSD INTERVAL: 92 MS
QT INTERVAL: 370 MS
QTC INTERVAL: 455 MS
RBC # BLD AUTO: 4.52 MILLION/UL (ref 4.3–5.9)
SODIUM SERPL-SCNC: 139 MMOL/L (ref 134–143)
SP GR UR STRIP.AUTO: <=1.005 (ref 1–1.03)
T WAVE AXIS: 72 DEGREES
THC UR QL: NEGATIVE
TSH SERPL DL<=0.05 MIU/L-ACNC: 2.04 UIU/ML (ref 0.45–5.33)
UROBILINOGEN UR QL STRIP.AUTO: 0.2 E.U./DL
VENTRICULAR RATE: 91 BPM
WBC # BLD AUTO: 9 THOUSAND/UL (ref 4.8–10.8)

## 2019-11-18 PROCEDURE — 85025 COMPLETE CBC W/AUTO DIFF WBC: CPT | Performed by: EMERGENCY MEDICINE

## 2019-11-18 PROCEDURE — 81003 URINALYSIS AUTO W/O SCOPE: CPT | Performed by: EMERGENCY MEDICINE

## 2019-11-18 PROCEDURE — NC001 PR NO CHARGE: Performed by: EMERGENCY MEDICINE

## 2019-11-18 PROCEDURE — 99285 EMERGENCY DEPT VISIT HI MDM: CPT | Performed by: EMERGENCY MEDICINE

## 2019-11-18 PROCEDURE — 96372 THER/PROPH/DIAG INJ SC/IM: CPT

## 2019-11-18 PROCEDURE — 84443 ASSAY THYROID STIM HORMONE: CPT | Performed by: EMERGENCY MEDICINE

## 2019-11-18 PROCEDURE — 93005 ELECTROCARDIOGRAM TRACING: CPT

## 2019-11-18 PROCEDURE — 80320 DRUG SCREEN QUANTALCOHOLS: CPT | Performed by: EMERGENCY MEDICINE

## 2019-11-18 PROCEDURE — 80053 COMPREHEN METABOLIC PANEL: CPT | Performed by: EMERGENCY MEDICINE

## 2019-11-18 PROCEDURE — 80178 ASSAY OF LITHIUM: CPT | Performed by: EMERGENCY MEDICINE

## 2019-11-18 PROCEDURE — 99285 EMERGENCY DEPT VISIT HI MDM: CPT

## 2019-11-18 PROCEDURE — 36415 COLL VENOUS BLD VENIPUNCTURE: CPT | Performed by: EMERGENCY MEDICINE

## 2019-11-18 PROCEDURE — 80307 DRUG TEST PRSMV CHEM ANLYZR: CPT | Performed by: EMERGENCY MEDICINE

## 2019-11-18 PROCEDURE — 99233 SBSQ HOSP IP/OBS HIGH 50: CPT | Performed by: PSYCHIATRY & NEUROLOGY

## 2019-11-18 PROCEDURE — 93010 ELECTROCARDIOGRAM REPORT: CPT | Performed by: INTERNAL MEDICINE

## 2019-11-18 RX ORDER — ZIPRASIDONE MESYLATE 20 MG/ML
20 INJECTION, POWDER, LYOPHILIZED, FOR SOLUTION INTRAMUSCULAR ONCE
Status: COMPLETED | OUTPATIENT
Start: 2019-11-18 | End: 2019-11-18

## 2019-11-18 RX ORDER — MELATONIN
1000 DAILY
Status: DISCONTINUED | OUTPATIENT
Start: 2019-11-18 | End: 2019-11-29 | Stop reason: HOSPADM

## 2019-11-18 RX ORDER — PHENOL 1.4 %
10 AEROSOL, SPRAY (ML) MUCOUS MEMBRANE
Status: DISCONTINUED | OUTPATIENT
Start: 2019-11-18 | End: 2019-11-18

## 2019-11-18 RX ORDER — LITHIUM CARBONATE 300 MG/1
600 CAPSULE ORAL EVERY MORNING
Status: DISCONTINUED | OUTPATIENT
Start: 2019-11-18 | End: 2019-11-29 | Stop reason: HOSPADM

## 2019-11-18 RX ORDER — LORAZEPAM 1 MG/1
1 TABLET ORAL ONCE
Status: COMPLETED | OUTPATIENT
Start: 2019-11-18 | End: 2019-11-18

## 2019-11-18 RX ORDER — RISPERIDONE 1 MG/1
1 TABLET, ORALLY DISINTEGRATING ORAL EVERY 8 HOURS PRN
Status: DISCONTINUED | OUTPATIENT
Start: 2019-11-18 | End: 2019-11-29 | Stop reason: HOSPADM

## 2019-11-18 RX ORDER — THIAMINE MONONITRATE (VIT B1) 100 MG
100 TABLET ORAL DAILY
Status: DISCONTINUED | OUTPATIENT
Start: 2019-11-18 | End: 2019-11-29 | Stop reason: HOSPADM

## 2019-11-18 RX ORDER — MAGNESIUM HYDROXIDE/ALUMINUM HYDROXICE/SIMETHICONE 120; 1200; 1200 MG/30ML; MG/30ML; MG/30ML
30 SUSPENSION ORAL EVERY 4 HOURS PRN
Status: DISCONTINUED | OUTPATIENT
Start: 2019-11-18 | End: 2019-11-29 | Stop reason: HOSPADM

## 2019-11-18 RX ORDER — FOLIC ACID 1 MG/1
1 TABLET ORAL DAILY
Status: DISCONTINUED | OUTPATIENT
Start: 2019-11-18 | End: 2019-11-29 | Stop reason: HOSPADM

## 2019-11-18 RX ORDER — HALOPERIDOL 5 MG
5 TABLET ORAL EVERY 8 HOURS PRN
Status: DISCONTINUED | OUTPATIENT
Start: 2019-11-18 | End: 2019-11-18

## 2019-11-18 RX ORDER — LITHIUM CARBONATE 300 MG/1
900 CAPSULE ORAL
Status: DISCONTINUED | OUTPATIENT
Start: 2019-11-18 | End: 2019-11-29 | Stop reason: HOSPADM

## 2019-11-18 RX ORDER — BENZTROPINE MESYLATE 1 MG/ML
1 INJECTION INTRAMUSCULAR; INTRAVENOUS EVERY 8 HOURS PRN
Status: DISCONTINUED | OUTPATIENT
Start: 2019-11-18 | End: 2019-11-29 | Stop reason: HOSPADM

## 2019-11-18 RX ORDER — OLANZAPINE 10 MG/1
10 INJECTION, POWDER, LYOPHILIZED, FOR SOLUTION INTRAMUSCULAR DAILY PRN
Status: DISCONTINUED | OUTPATIENT
Start: 2019-11-18 | End: 2019-11-29 | Stop reason: HOSPADM

## 2019-11-18 RX ORDER — DIPHENHYDRAMINE HYDROCHLORIDE 50 MG/ML
50 INJECTION INTRAMUSCULAR; INTRAVENOUS ONCE
Status: COMPLETED | OUTPATIENT
Start: 2019-11-18 | End: 2019-11-18

## 2019-11-18 RX ORDER — ACETAMINOPHEN 325 MG/1
975 TABLET ORAL EVERY 6 HOURS PRN
Status: DISCONTINUED | OUTPATIENT
Start: 2019-11-18 | End: 2019-11-29 | Stop reason: HOSPADM

## 2019-11-18 RX ORDER — ACETAMINOPHEN 325 MG/1
650 TABLET ORAL EVERY 4 HOURS PRN
Status: DISCONTINUED | OUTPATIENT
Start: 2019-11-18 | End: 2019-11-29 | Stop reason: HOSPADM

## 2019-11-18 RX ORDER — HYDROXYZINE HYDROCHLORIDE 25 MG/1
50 TABLET, FILM COATED ORAL EVERY 6 HOURS PRN
Status: DISCONTINUED | OUTPATIENT
Start: 2019-11-18 | End: 2019-11-29 | Stop reason: HOSPADM

## 2019-11-18 RX ORDER — ACETAMINOPHEN 325 MG/1
650 TABLET ORAL EVERY 6 HOURS PRN
Status: DISCONTINUED | OUTPATIENT
Start: 2019-11-18 | End: 2019-11-29 | Stop reason: HOSPADM

## 2019-11-18 RX ORDER — LORAZEPAM 2 MG/ML
2 INJECTION INTRAMUSCULAR ONCE
Status: COMPLETED | OUTPATIENT
Start: 2019-11-18 | End: 2019-11-18

## 2019-11-18 RX ORDER — LORAZEPAM 2 MG/ML
2 INJECTION INTRAMUSCULAR EVERY 6 HOURS PRN
Status: DISCONTINUED | OUTPATIENT
Start: 2019-11-18 | End: 2019-11-29 | Stop reason: HOSPADM

## 2019-11-18 RX ORDER — HALOPERIDOL 5 MG/ML
10 INJECTION INTRAMUSCULAR EVERY 8 HOURS PRN
Status: DISCONTINUED | OUTPATIENT
Start: 2019-11-18 | End: 2019-11-18

## 2019-11-18 RX ORDER — LANOLIN ALCOHOL/MO/W.PET/CERES
9 CREAM (GRAM) TOPICAL
Status: DISCONTINUED | OUTPATIENT
Start: 2019-11-18 | End: 2019-11-29 | Stop reason: HOSPADM

## 2019-11-18 RX ORDER — TRAZODONE HYDROCHLORIDE 50 MG/1
50 TABLET ORAL
Status: DISCONTINUED | OUTPATIENT
Start: 2019-11-18 | End: 2019-11-29 | Stop reason: HOSPADM

## 2019-11-18 RX ORDER — LORAZEPAM 1 MG/1
1 TABLET ORAL EVERY 4 HOURS PRN
Status: DISCONTINUED | OUTPATIENT
Start: 2019-11-18 | End: 2019-11-29 | Stop reason: HOSPADM

## 2019-11-18 RX ORDER — OLANZAPINE 5 MG/1
5 TABLET ORAL EVERY 8 HOURS PRN
Status: DISCONTINUED | OUTPATIENT
Start: 2019-11-18 | End: 2019-11-29 | Stop reason: HOSPADM

## 2019-11-18 RX ADMIN — MELATONIN 9 MG: at 21:53

## 2019-11-18 RX ADMIN — ZIPRASIDONE MESYLATE 20 MG: 20 INJECTION, POWDER, LYOPHILIZED, FOR SOLUTION INTRAMUSCULAR at 09:46

## 2019-11-18 RX ADMIN — DIPHENHYDRAMINE HYDROCHLORIDE 50 MG: 50 INJECTION INTRAMUSCULAR; INTRAVENOUS at 09:45

## 2019-11-18 RX ADMIN — LITHIUM CARBONATE 900 MG: 300 CAPSULE, GELATIN COATED ORAL at 21:53

## 2019-11-18 RX ADMIN — LORAZEPAM 1 MG: 1 TABLET ORAL at 06:18

## 2019-11-18 RX ADMIN — LORAZEPAM 2 MG: 2 INJECTION INTRAMUSCULAR; INTRAVENOUS at 09:46

## 2019-11-18 RX ADMIN — OLANZAPINE 15 MG: 5 TABLET, FILM COATED ORAL at 21:53

## 2019-11-18 RX ADMIN — LITHIUM CARBONATE 600 MG: 300 CAPSULE, GELATIN COATED ORAL at 14:39

## 2019-11-18 NOTE — ED NOTES
EVS (Eligibility Verification System) called - [phone]  Automated system indicates: patient is eligible for medical assistance  Behavioral health services are managed by managed care  COB to be completed with [facility], [phone]  COB was initiated with Karen Boss at [facility] for inpatient mental health admission via a 201 to [address]  If that changes, Crisis will need to amend the COB details  A care manager will be calling back to collect information  No precert required for primary payor, which is Medicare

## 2019-11-18 NOTE — PLAN OF CARE
Problem: PAIN - ADULT  Goal: Verbalizes/displays adequate comfort level or baseline comfort level  Description  Interventions:  - Encourage patient to monitor pain and request assistance  - Assess pain using appropriate pain scale  - Administer analgesics based on type and severity of pain and evaluate response  - Implement non-pharmacological measures as appropriate and evaluate response  - Consider cultural and social influences on pain and pain management  - Notify physician/advanced practitioner if interventions unsuccessful or patient reports new pain  Outcome: Progressing     Problem: PSYCHOSIS  Goal: Will report no hallucinations or delusions  Description  Interventions:  - Administer medication as  ordered  - Every waking shifts and PRN assess for the presence of hallucinations and or delusions  - Assist with reality testing to support increasing orientation  - Assess if patient's hallucinations or delusions are encouraging self-harm or harm to others and intervene as appropriate  Outcome: Progressing     Problem: BEHAVIOR  Goal: Pt/Family maintain appropriate behavior and adhere to behavioral management agreement, if implemented  Description  INTERVENTIONS:  - Assess the family dynamic   - Encourage verbalization of thoughts and concerns in a socially appropriate manner  - Assess patient/family's coping skills and non-compliant behavior (including use of illegal substances)  - Utilize positive, consistent limit setting strategies supporting safety of patient, staff and others  - Initiate consult with Case Management, Spiritual Care or other ancillary services as appropriate  - If a patient's/visitor's behavior jeopardizes the safety of the patient, staff, or others, refer to organization procedure     - Notify Security of behavior or suspected illegal substances which indicate the need for search of the patient and/or belongings  - Encourage participation in the decision making process about a behavioral management agreement; implement if patient meets criteria  Outcome: Progressing     Problem: ANXIETY  Goal: Will report anxiety at manageable levels  Description  INTERVENTIONS:  - Administer medication as ordered  - Teach and encourage coping skills  - Provide emotional support  - Assess patient/family for anxiety and ability to cope  Outcome: Progressing  Goal: By discharge: Patient will verbalize 2 strategies to deal with anxiety  Description  Interventions:  - Identify any obvious source/trigger to anxiety  - Staff will assist patient in applying identified coping technique/skills  - Encourage attendance of scheduled groups and activities  Outcome: Progressing     Problem: Alteration in Thoughts and Perception  Goal: Treatment Goal: Gain control of psychotic behaviors/thinking, reduce/eliminate presenting symptoms and demonstrate improved reality functioning upon discharge  Outcome: Progressing  Goal: Verbalize thoughts and feelings  Description  Interventions:  - Promote a nonjudgmental and trusting relationship with the patient through active listening and therapeutic communication  - Assess patient's level of functioning, behavior and potential for risk  - Engage patient in 1 on 1 interactions  - Encourage patient to express fears, feelings, frustrations, and discuss symptoms    - [place] patient to reality, help patient recognize reality-based thinking   - Administer medications as ordered and assess for potential side effects  - Provide the patient education related to the signs and symptoms of the illness and desired effects of prescribed medications  Outcome: Progressing  Goal: Refrain from acting on delusional thinking/internal stimuli  Description  Interventions:  - Monitor patient closely, per order   - Utilize least restrictive measures   - Set reasonable limits, give positive feedback for acceptable   - Administer medications as ordered and monitor of potential side effects  Outcome: Progressing  Goal: Agree to be compliant with medication regime, as prescribed and report medication side effects  Description  Interventions:  - Offer appropriate PRN medication and supervise ingestion; conduct AIMS, as needed   Outcome: Progressing  Goal: Attend and participate in unit activities, including therapeutic, recreational, and educational groups  Description  Interventions:  -Encourage Visitation and family involvement in care  Outcome: Progressing  Goal: Recognize dysfunctional thoughts, communicate reality-based thoughts at the time of discharge  Description  Interventions:  - Provide medication and psycho-education to assist patient in compliance and developing insight into his/her illness   Outcome: Progressing  Goal: Complete daily ADLs, including personal hygiene independently, as able  Description  Interventions:  - Observe, teach, and assist patient with ADLS  - Monitor and promote a balance of rest/activity, with adequate nutrition and elimination   Outcome: Progressing     Problem: Ineffective Coping  Goal: Cooperates with admission process  Description  Interventions:   - Complete admission process  Outcome: Progressing  Goal: Identifies ineffective coping skills  Outcome: Progressing  Goal: Identifies healthy coping skills  Outcome: Progressing  Goal: Demonstrates healthy coping skills  Outcome: Progressing  Goal: Patient/Family participate in treatment and DC plans  Description  Interventions:  - Provide therapeutic environment  Outcome: Progressing  Goal: Patient/Family verbalizes awareness of resources  Outcome: Progressing  Goal: Understands least restrictive measures  Description  Interventions:  - Utilize least restrictive behavior  Outcome: Progressing  Goal: Free from restraint events  Description  - Utilize least restrictive measures   - Provide behavioral interventions   - Redirect inappropriate behaviors   Outcome: Progressing     Problem: Risk for Violence/Aggression Toward Others  Goal: Treatment Goal: Refrain from acts of violence/aggression during length of stay, and demonstrate improved impulse control at the time of discharge  Outcome: Progressing  Goal: Verbalize thoughts and feelings  Description  Interventions:  - Assess and re-assess patient's level of risk, every waking shift  - Engage patient in 1:1 interactions, daily, for a minimum of 15 minutes   - Allow patient to express feelings and frustrations in a safe and non-threatening manner   - Establish rapport/trust with patient   Outcome: Progressing  Goal: Refrain from harming others  Outcome: Progressing  Goal: Refrain from destructive acts on the environment or property  Outcome: Progressing  Goal: Control angry outbursts  Description  Interventions:  - Monitor patient closely, per order  - Ensure early verbal de-escalation  - Monitor prn medication needs  - Set reasonable/therapeutic limits, outline behavioral expectations, and consequences   - Provide a non-threatening milieu, utilizing the least restrictive interventions   Outcome: Progressing  Goal: Attend and participate in unit activities, including therapeutic, recreational, and educational groups  Description  Interventions:  - Provide therapeutic and educational activities daily, encourage attendance and participation, and document same in the medical record   Outcome: Progressing  Goal: Identify appropriate positive anger management techniques  Description  Interventions:  - Offer anger management and coping skills groups   - Staff will provide positive feedback for appropriate anger control  Outcome: Progressing

## 2019-11-18 NOTE — PROGRESS NOTES
At [id number] patient was  Irritable  Offered PRN ativan and risperdal which patient refused  Patient also refused vitamins  Spoke with patient and he rested in bed  Continue to monitor  At 16:40 patient awake and more alert asking for food  Provided sandwich and drink  Patient cooperative  Speech is difficult to understand at times  Patient requested and allowed to shower with staff presence  Patient cooperative  Q 7 minute safety checks maintained  Continue to monitor for behaviors

## 2019-11-18 NOTE — ED PROVIDER NOTES
History  Chief Complaint   Patient presents with    Psychiatric Evaluation     61 YO MALE   BROUGHT HERE BY EMS FOR PSYCHIATRIC EVALUATION  IT IS UNCLEAR WHO CALLED  PATIENT IS NOT FORTHCOMING ABOUT THIS INFORMATION     HE STATES THAT HE IS COMPLIANT WITH HIS HOME MEDICATIONS  HE DENIES SUICIDAL IDEATION OR HOMICIDAL IDEATION    HE ADMITS TO ETOH USE TONIGHT, A QT OF BEER  OTHER DRUGS: NONE    IT IS UNCLEAR WHAT PRECIPITATED PATIENT'S PSYCHIATRIC DESTABILIZATION  IT APPEARS THAT HE IS HAVING HALLUCINATIONS AND DELUSIONS    PRIOR SUICIDE ATTEMPTS:  NONE   PRIOR PSYCHIATRIC HOSPITALIZATIONS:  RECENT    TRAUMA: NONE  RECENT ILLNESS: NONE    PT HAS NO SOMATIC COMPLAINTS :  NO CHEST PAIN, SHORTNESS OF BREATH  NO HEADACHE  NO SORE THROAT  NO ABDOMINAL PAIN  NO BACK PAIN        History provided by:  Patient and EMS personnel  Psychiatric Evaluation   Presenting symptoms: agitation, bizarre behavior and hallucinations    Presenting symptoms: no homicidal ideas, no suicidal thoughts, no suicidal threats and no suicide attempt    Degree of incapacity (severity): Moderate  Onset quality:  Unable to specify  Timing:  Constant  Chronicity:  Chronic  Context: alcohol use    Relieved by:  Nothing  Worsened by:  Nothing  Ineffective treatments:  None tried  Associated symptoms: no abdominal pain, no chest pain, no euphoric mood, no fatigue and no headaches        Prior to Admission Medications   Prescriptions Last Dose Informant Patient Reported? Taking?    OLANZapine (ZyPREXA) 15 mg tablet Not Taking at Unknown time  No No   Sig: Take 1 tablet (15 mg total) by mouth daily at bedtime   Patient not taking: Reported on 11/18/2019   cholecalciferol (VITAMIN D3) 1,000 units tablet Not Taking at Unknown time  No No   Sig: Take 1 tablet (1,000 Units total) by mouth daily   Patient not taking: Reported on 35/84/9708   folic acid (FOLVITE) 1 mg tablet Not Taking at Unknown time  No No   Sig: Take 1 tablet (1 mg total) by mouth daily Patient not taking: Reported on 11/18/2019   lithium carbonate 300 mg capsule Not Taking at Unknown time  No No   Sig: Take 3 capsules (900 mg total) by mouth daily at bedtime In addition to 600 mg in AM   Patient not taking: Reported on 11/18/2019   lithium carbonate 600 MG capsule Not Taking at Unknown time  No No   Sig: Take 1 capsule (600 mg total) by mouth every morning In Addition to 900 mg HS   Patient not taking: Reported on 11/18/2019   melatonin 10 MG TABS Not Taking at Unknown time  No No   Sig: Take 1 tablet (10 mg total) by mouth daily at bedtime   Patient not taking: Reported on 11/18/2019   thiamine 100 MG tablet Not Taking at Unknown time  No No   Sig: Take 1 tablet (100 mg total) by mouth daily   Patient not taking: Reported on 11/18/2019      Facility-Administered Medications: None       Past Medical History:   Diagnosis Date    Alcohol abuse     Anxiety     Astigmatism     DJD (degenerative joint disease)     Gait abnormality     Head injury     History of colonic polyps     Hydrocele     Hyperlipidemia     Hypertension     Macular drusen     Presbyopia     [facility]oaffect[facility] disorder [facility] [address] [facility]     [facility]     Tobacco abuse     Umbilical hernia     Vitreous syneresis        Past Surgical History:   Procedure Laterality Date    FRACTURE SURGERY      INGUINAL HERNIA REPAIR         History reviewed  No pertinent family history  I have reviewed and agree with the history as documented  Social History     Tobacco Use    Smoking status: Current Every Day Smoker     Packs/day: 1 00    Smokeless tobacco: Never Used   Substance Use Topics    Alcohol use: Yes     Comment: whenever i want    Drug use: No        Review of Systems   Unable to perform ROS: Psychiatric disorder   Constitutional: Negative for fatigue  Cardiovascular: Negative for chest pain  Gastrointestinal: Negative for abdominal pain  Neurological: Negative for headaches     Psychiatric/Behavioral: Positive for agitation and hallucinations  Negative for homicidal ideas and suicidal ideas  Physical Exam  Physical Exam   Constitutional: He is oriented to person, place, and time  He appears well-developed and well-nourished  No distress  HENT:   Head: Normocephalic and atraumatic  Nose: Nose normal    Mouth/Throat: Oropharynx is clear and moist  No oropharyngeal exudate  Eyes: Pupils are equal, round, and reactive to light  Conjunctivae and EOM are normal  Right eye exhibits no discharge  Left eye exhibits no discharge  No scleral icterus  Neck: Normal range of motion  Neck supple  No JVD present  No tracheal deviation present  Cardiovascular: Normal rate, regular rhythm, normal heart sounds and intact distal pulses  Exam reveals no gallop and no friction rub  No murmur heard  Pulmonary/Chest: Effort normal and breath sounds normal  No stridor  No respiratory distress  He has no wheezes  He has no rales  He exhibits no tenderness  Abdominal: Soft  Bowel sounds are normal  He exhibits no distension and no mass  There is no tenderness  There is no rebound and no guarding  No hernia  Musculoskeletal: Normal range of motion  He exhibits no edema, tenderness or deformity  Lymphadenopathy:     He has no cervical adenopathy  Neurological: He is alert and oriented to person, place, and time  No cranial nerve deficit or sensory deficit  He exhibits normal muscle tone  Coordination normal    Skin: Skin is warm  Capillary refill takes less than 2 seconds  No rash noted  He is not diaphoretic  No erythema  No pallor  Psychiatric: Judgment normal    DELUSIONS, HALLUCINATIONS    IT IS UNCLEAR IF HIS THOUGHT PROCESS OR CONTENT HAS ANY VALIDITY OR STRUCTURE       Vital Signs  ED Triage Vitals [11/18/19 0531]   Temperature Pulse Respirations Blood Pressure SpO2   98 5 °F (36 9 °C) 93 20 (!) 172/101 97 %      Temp Source Heart Rate Source Patient Position - Orthostatic VS BP Location FiO2 (%)   Tympanic Monitor Sitting Left arm --      Pain Score       No Pain           Vitals:    11/18/19 0531   BP: (!) 172/101   Pulse: 93   Patient Position - Orthostatic VS: Sitting         Visual Acuity      ED Medications  Medications   LORazepam (ATIVAN) tablet 1 mg (1 mg Oral Given 11/18/19 0618)       Diagnostic Studies  Results Reviewed     Procedure Component Value Units Date/Time    Rapid drug screen, urine [[id number]]  (Normal) Collected:  11/18/19 0618    Lab Status:  Final result Specimen:  Urine, Clean Catch Updated:  11/18/19 0641     Amph/Meth UR Negative     Barbiturate Ur Negative     Benzodiazepine Urine Negative     Cocaine Urine Negative     Methadone Urine Negative     Opiate Urine Negative     PCP Ur Negative     THC Urine Negative    Narrative:       FOR MEDICAL PURPOSES ONLY  IF CONFIRMATION NEEDED PLEASE CONTACT THE LAB WITHIN 5 DAYS  Drug Screen Cutoff Levels:  AMPHETAMINE/METHAMPHETAMINES  1000 ng/mL  BARBITURATES     200 ng/mL  BENZODIAZEPINES     200 ng/mL  COCAINE      300 ng/mL  METHADONE      300 ng/mL  OPIATES      300 ng/mL  PHENCYCLIDINE     25 ng/mL  THC       50 ng/mL      TSH [[id number]]  (Normal) Collected:  11/18/19 0550    Lab Status:  Final result Specimen:  Blood from Arm, Right Updated:  11/18/19 0640     TSH 3RD GENERATON 2 040 uIU/mL     Narrative:       Patients undergoing fluorescein dye angiography may retain small amounts of fluorescein in the body for 48-72 hours post procedure  Samples containing fluorescein can produce falsely depressed TSH values  If the patient had this procedure,a specimen should be resubmitted post fluorescein clearance        UA w Reflex to Microscopic w Reflex to Culture [[id number]]  (Abnormal) Collected:  11/18/19 0618    Lab Status:  Final result Specimen:  Urine, Clean Catch Updated:  11/18/19 0631     Color, UA Yellow     Clarity, UA Clear     Specific Gravity, UA <=1 005     pH, UA 6 5     Leukocytes, UA Negative     Nitrite, UA Negative     Protein, UA Negative mg/dl      Glucose, UA Negative mg/dl      Ketones, UA Negative mg/dl      Urobilinogen, UA 0 2 E U /dl      Bilirubin, UA Negative     Blood, UA Negative    Lithium level [[id number]]  (Abnormal) Collected:  11/18/19 0550    Lab Status:  Final result Specimen:  Blood from Arm, Right Updated:  11/18/19 0625     Lithium Lvl 0 5 mmol/L     Ethanol [[id number]]  (Normal) Collected:  11/18/19 0550    Lab Status:  Final result Specimen:  Blood from Arm, Right Updated:  11/18/19 0625     Ethanol Lvl <10 mg/dL     Comprehensive metabolic panel [[id number]]  (Abnormal) Collected:  11/18/19 0550    Lab Status:  Final result Specimen:  Blood from Arm, Right Updated:  11/18/19 1158     Sodium 139 mmol/L      Potassium 3 4 mmol/L      Chloride 107 mmol/L      CO2 26 mmol/L      ANION GAP 6 mmol/L      BUN 10 mg/dL      Creatinine 0 94 mg/dL      Glucose 128 mg/dL      Calcium 9 4 mg/dL      AST 32 U/L      ALT 32 U/L      Alkaline Phosphatase 38 U/L      Total Protein 6 4 g/dL      Albumin 4 2 g/dL      Total Bilirubin 0 50 mg/dL      eGFR 87 ml/min/1 73sq m     Narrative:       [place] guidelines for Chronic Kidney Disease (CKD):     Stage 1 with normal or high GFR (GFR > 90 mL/min/1 73 square meters)    Stage 2 Mild CKD (GFR = 60-89 mL/min/1 73 square meters)    Stage 3A Moderate CKD (GFR = 45-59 mL/min/1 73 square meters)    Stage 3B Moderate CKD (GFR = 30-44 mL/min/1 73 square meters)    Stage 4 Severe CKD (GFR = 15-29 mL/min/1 73 square meters)    Stage 5 End Stage CKD (GFR <15 mL/min/1 73 square meters)  Note: GFR calculation is accurate only with a steady state creatinine    CBC and differential [[id number]]  (Abnormal) Collected:  11/18/19 0550    Lab Status:  Final result Specimen:  Blood from Arm, Right Updated:  11/18/19 0607     WBC 9 00 Thousand/uL      RBC 4 52 Million/uL      Hemoglobin 14 1 g/dL      Hematocrit 41 5 %      MCV 92 fL      MCH 31 3 pg      MCHC 34 1 g/dL RDW 14 2 %      MPV 8 3 fL      Platelets 870 Thousands/uL      Neutrophils Relative 81 %      Lymphocytes Relative 12 %      Monocytes Relative 6 %      Eosinophils Relative 1 %      Basophils Relative 1 %      Neutrophils Absolute 7 20 Thousands/µL      Lymphocytes Absolute 1 10 Thousands/µL      Monocytes Absolute 0 50 Thousand/µL      Eosinophils Absolute 0 10 Thousand/µL      Basophils Absolute 0 10 Thousands/µL                  No orders to display              Procedures  Procedures       ED Course  ED Course as of Nov 18 0707   Bonnie Orellana Nov 18, 2019   0612 WBC: 9 00   0612 Hemoglobin: 14 1   0612 Platelet Count: 982   0612 Neutrophils %(!): 81   0612 Lymphocytes Relative(!): 12   0615        0634 Ketones, UA: Negative   0634 Glucose, UA: Negative   0634 POCT URINE PROTEIN: Negative   0634 Nitrite, UA: Negative   0634 Leukocytes, UA: Negative   0634 MEDICAL ALCOHOL: <10   0634 LITHIUM LEVEL(!): 0 5   0634 WBC: 9 00   0634 Hemoglobin: 14 1   0634 Platelet Count: 020   0634 Neutrophils %(!): 81   0634 Lymphocytes Relative(!): 12   0634 Sodium: 139   0634 Potassium(!): 3 4   0634 Chloride: 107   0634 CO2: 26   0634 Anion Gap: 6   0634 BUN: 10   0634 Creatinine: 0 94   0634 Glucose, Random(!): 128   0634 Calcium: 9 4   0634 AST: 32   0634 ALT: 32   0634 Alkaline Phosphatase(!): 38   0707 SIGN OUT:   CASED DISCUSSED W/ DR ROGERS  WILL F/U ON PENDING STUDIES AND CRISIS CONSULT AND ASSIST WITH DISPO OF THE PT                                    MDM    Disposition  Final diagnoses:   None     ED Disposition     None      Follow-up Information    None         Patient's Medications   Discharge Prescriptions    No medications on file     No discharge procedures on file      ED Provider  Electronically Signed by           Isabel Pierce MD  11/18/19 MD Sabi  11/18/19 [address] [place] [address] Charlie Whitehead MD  11/18/19 2014

## 2019-11-18 NOTE — PROGRESS NOTES
Patient came in with list of belongings  At bed side  [employer] glasses  Green/blue boxer shorts  Black long sleeve shirt  Pair of blue jeans    [place]  Tan/blue coat  Key chainlight  Black watch  Blue hat  Blue brace  Multi coloered lighter  Silver flashlight  Black necklace with red bird  Black/grey shirt with vallejo  Brown belt 1 blue sock  Black pasquale pack  Hearing aids with white case        [employer] gift card  [employer]  $104 00 cash    75 cents change  Green wallet  2 keys green/blue keychain with zipper
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
## 2019-11-18 NOTE — PROGRESS NOTES
The patient noted to awaken at [phone] and ambulatory in the coto looking for bathroom  The patient noted to be disoriented, lethargic, irritable, and agitated with staff direction  The patient's speech noted to be nonsensical and mumbling  The patient's eyes remained partially closed while patient out of bed  The patient's gait noted to be unsteady with ambulation  The patient resistive to assistance offered by clinical staff  The patient would not put on shirt to replaced previous ripped paper shirt  The patient returned to bed after going to the bathroom  Unable to administered medication due to continued lethargy  Will continue to monitor

## 2019-11-18 NOTE — H&P
Psychiatric Evaluation - Behavioral Health     Identification Data:Freddie Graham 62 y o  male MRN: [id number]  Unit/Bed#: [facility] 204-02 Encounter: [id number]    Chief Complaint: manic symptoms, unstable mood and agitation    History of Present Illness     Adrienne Graham is a 62 y o  male with a history of Schizoaffective Disorder who was admitted to the inpatient older adult psychiatric unit on a voluntary 201 commitment basis due to unstable mood, disorganized behavior, severe agitation and alcohol abuse  Pt was discharged on 11/12/19 frorn the unit but he has been non adherence medications and  after care follow up  He apparently has been staying in his car and has been causing some disruption in the neighborhood  Police reported that patient was labile, intermittently loud, verbally aggressive and profane and hostile with gesture as if he had a gun and cutting someone's throat  Patient also was noted mumbling incoherently and verbalizing paranoid delusions about neighbors and his nephew about building a Meth labs in his mother house  On evaluation in the inpatient psychiatric unit Wendy Chang very vague in providing any information about what happened after he was discharged form the unit  He was irritable, mildly agitated, labile, paranoid and incoherent in his speech  Will resume his meds Lithium as the level was low on admission        Psychiatric Review Of Systems:    Sleep changes: decreased  Appetite changes:unknown  Weight changes: unknown  Energy: no  Interest/pleasure/: no  Anhedonia: no  Anxiety: yes  Marya: yes  Guilt:  no  Hopeless:  no  Self injurious behavior/risky behavior: no  Suicidal ideation: no  Homicidal ideation: no  Auditory hallucinations: appears responding to internal stimuli  Visual hallucinations: no  Delusional thinking: paranoid thoughts  Eating disorder history: no  Obsessive/compulsive symptoms: no    Historical Information     Past Psychiatric History:     Past Inpatient Psychiatric Treatment:   Multiple past inpatient psychiatric admissions at [facility], [facility] and [facility]  Past Outpatient Psychiatric Treatment:    Noncompliant with outpatient psychiatric treatment prior to admission  Past Suicide Attempts: no  Past Violent Behavior: yes  Past Psychiatric Medication Trials: multiple psychiatric medication trials     Substance Abuse History:    Social History     Tobacco History     Smoking Status  Current Every Day Smoker Smoking Frequency  1 pack/day    Smokeless Tobacco Use  Never Used          Alcohol History     Alcohol Use Status  Yes Comment  whenever i want          Drug Use     Drug Use Status  No          Sexual Activity     Sexually Active  Not Asked          Activities of Daily Living    Not Asked                 I am unable to assess the patient for substance use within the past 12 months as they are unable or unwilling to answer    Alcohol use: history of past use  Recreational drug use: denies recent use    Family Psychiatric History:     Several family members with mental illness    Social History:    Education: 11th grade  Marital History: single  Children: none  Living Arrangement: lives in home with mother  Occupational History: on permanent disability  Functioning Relationships: limited support system  Legal History: yes   History: yes    Traumatic History:    None as per record    Past Medical History:      Past Medical History:   Diagnosis Date    Alcohol abuse     Anxiety     Astigmatism     DJD (degenerative joint disease)     Gait abnormality     Head injury     History of colonic polyps     Hydrocele     Hyperlipidemia     Hypertension     Macular drusen     Presbyopia     Schizoaffective disorder ([address] )     Sepsis ([address] )     Tobacco abuse     Umbilical hernia     Vitreous syneresis      Past Surgical History:   Procedure Laterality Date    FRACTURE SURGERY      INGUINAL HERNIA REPAIR         Medical Review Of Systems:    Pertinent items are noted in HPI  Allergies: Allergies   Allergen Reactions    Haldol [Haloperidol]     Navane [Thiothixene]     Other      Adhesive tape         Medications: All current active medications have been reviewed  OBJECTIVE:    Vital signs in last 24 hours:    Temp:  [98 3 °F (36 8 °C)-98 5 °F (36 9 °C)] 98 3 °F (36 8 °C)  HR:  [75-93] 88  Resp:  [14-20] 20  BP: (115-172)/() 115/61    No intake or output data in the 24 hours ending 11/18/19 1538     Mental Status Evaluation:    Appearance:  disheveled, marginal hygiene   Behavior:  uncooperative, evasive   Speech:  disorganized   Mood:  irritable   Affect:  labile   Language: incoherent   Thought Process:  disorganized   Associations: concrete associations   Thought Content:  paranoid ideation   Perceptual Disturbances: appears responding to internal stimuli   Risk Potential: Suicidal ideation - None  Homicidal ideation - None  Potential for aggression - Yes, due to agitation   Sensorium:  oriented to person and place   Memory:  recent and remote memory: unable to assess due to lack of cooperation   Consciousness:  alert and awake   Attention: poor concentration and poor attention span   Intellect: unable to assess due to lack of cooperation   Fund of Knowledge: awareness of current events: limited   Insight:  impaired   Judgment: limited   Muscle Strength Muscle Tone: normal  normal   Gait/Station: slow gait   Motor Activity: no abnormal movements       Laboratory Results:   I have personally reviewed all pertinent laboratory/tests results  Imaging Studies:   No results found      Code Status: Level 1 - Full Code  Advance Directive and Living Will: <no information>    Assessment/Plan   Principal Problem:    Schizoaffective disorder, bipolar type ([facility] )  Active Problems:    Cannabis abuse, continuous    Alcohol abuse, continuous  Will resume his Lithium and Olanzapine, will monitor agitated behavior in the unit  Patient Strengths: family ties, negotiates basic needs, patient is on a voluntary commitment     Patient Barriers: chronic mental illness, limited insight, noncompliant with medication, noncompliant with treatment, poor self-care, substance abuse    Treatment Plan:     Planned Treatment and Medication Changes: All current active medications have been reviewed  Encourage group therapy, milieu therapy and occupational therapy  Behavioral Health checks every 7 minutes      Risks / Benefits of Treatment:    Risks, benefits, and possible side effects of medications explained to patient  Patient has limited understanding of risks and benefits of treatment at this time, but agrees to take medications as prescribed  Counseling / Coordination of Care:    Patient's presentation on admission and proposed treatment plan discussed with treatment team   Diagnosis, medication changes and treatment plan reviewed with patient      Inpatient Psychiatric Certification:    Estimated length of stay: 13 midnights      Elizabeth Fernandez MD 11/18/19

## 2019-11-18 NOTE — PROGRESS NOTES
Unable to administered by mouth medications as per physician's orders due to patient's sedation  Will continue to assess mental status  Patient continues to sleep quietly and without difficulty at this time  Will continue to monitor

## 2019-11-18 NOTE — TREATMENT PLAN
TREATMENT PLAN REVIEW - [address] CASTANEDA Day 62 y o  1960 male MRN: [id number]    [address]  Room / Bed: [place] [address] Encounter: [id number]          Admit Date/Time:  11/18/2019  5:27 AM    Treatment Team: Attending Provider: Nisreen Gr MD; Patient Care Assistant: Geo Martin; Physician Assistant: Marianela Coulter PA-C; Consulting Physician: Carlos Murphy MD; Recreational Therapist: Inés Nelson; Certified Nursing Assistant: Verlene Peabody; Certified Nursing Assistant: Minta Lennox; Patient Care Technician: Nadya Chapman; Patient Care Assistant: William Cabello; Patient Care Assistant: Luisito Rey; Registered Nurse: Troy Isabel RN    Diagnosis: Principal Problem:    Schizoaffective disorder, bipolar type ([facility] )  Active Problems:    Cannabis abuse, continuous    Alcohol abuse, continuous      Patient Strengths/Assets: family ties, negotiates basic needs, patient is on a voluntary commitment    Patient Barriers/Limitations: noncompliant with medication, noncompliant with treatment, poor insight, poor self-care, substance abuse    Short Term Goals: decrease in paranoid thoughts, decrease in psychotic symptoms, mood stabilization, acceptance of need for psychiatric treatment, acceptance of psychiatric medications    Long Term Goals: stabilization of mood, resolution of psychotic symptoms, improvement in reality testing, improved insight, acceptance of need for psychiatric medications, acceptance of need for psychiatric treatment, acceptance of need for psychiatric follow up after discharge    Progress Towards Goals: initial treatment plan    Recommended Treatment: medication management, patient medication education, group therapy, milieu therapy, continued Behavioral Health psychiatric evaluation/assessment process    Treatment Frequency: daily medication monitoring, group and milieu therapy daily, monitoring through interdisciplinary rounds, monitoring through weekly patient care conferences    Expected Discharge Date: 13 midnights    Discharge Plan: will be determined according to pt's progress    Treatment Plan Created/Updated By: Elizabeth Fernandez MD

## 2019-11-18 NOTE — H&P
Puja Chow  XNW:6/35/2070 Souleymane Pedersen  LFQ:[id number]    NWX:[id number]  Adm Date: 11/18/2019 0527  5:27 AM   ATT PHY: Dilcia Houstonia, [facility]       Chief Complaint: Disruptive Behavior    History of Presenting Illness: Miranda Graham is a(n) 62y o  year old male  presenting to [facility] for disruptive behavior  After being discharged from the psychiatric unit just a few days prior, he was found to be living in his car rather than his mother's house (the original discharge disposition)  Patient was noted to also be intermittently loud, aggressive, profane and hostile in the ED  We are asked to follow the patient along with reference to his medical co-morbidities  The patient was seen after reviewing the chart and discussing the case with caring staff  Today during our encounter, the patient was resting comfortable in the quiet room  No history could be gained from the patient as a result of this  Prior to his transfer to the unit, he received Ativan and Geodon in the ED  Labs reviewed and are significant for mild hypokalemia at 3 4  Allergies   Allergen Reactions    Haldol [Haloperidol]     Navane [Thiothixene]     Other      Adhesive tape         No current facility-administered medications on file prior to encounter        Current Outpatient Medications on File Prior to Encounter   Medication Sig Dispense Refill    cholecalciferol (VITAMIN D3) 1,000 units tablet Take 1 tablet (1,000 Units total) by mouth daily (Patient not taking: Reported on 11/18/2019) 30 tablet 1    folic acid (FOLVITE) 1 mg tablet Take 1 tablet (1 mg total) by mouth daily (Patient not taking: Reported on 11/18/2019) 30 tablet 0    lithium carbonate 300 mg capsule Take 3 capsules (900 mg total) by mouth daily at bedtime In addition to 600 mg in AM (Patient not taking: Reported on 11/18/2019) 90 capsule 0    lithium carbonate 600 MG capsule Take 1 capsule (600 mg total) by mouth every morning In Addition to 900 mg HS (Patient not taking: Reported on 11/18/2019) 30 capsule 0    melatonin 10 MG TABS Take 1 tablet (10 mg total) by mouth daily at bedtime (Patient not taking: Reported on 11/18/2019) 30 tablet 0    OLANZapine (ZyPREXA) 15 mg tablet Take 1 tablet (15 mg total) by mouth daily at bedtime (Patient not taking: Reported on 11/18/2019) 30 tablet 0    thiamine 100 MG tablet Take 1 tablet (100 mg total) by mouth daily (Patient not taking: Reported on 11/18/2019) 30 tablet 0       Active Ambulatory Problems     Diagnosis Date Noted    Schizoaffective disorder, bipolar type ([facility] ) 11/02/2019    Cannabis abuse, continuous 11/02/2019    Alcohol abuse, continuous 11/02/2019     Resolved Ambulatory Problems     Diagnosis Date Noted    No Resolved Ambulatory Problems     Past Medical History:   Diagnosis Date    Alcohol abuse     Anxiety     Astigmatism     DJD (degenerative joint disease)     Gait abnormality     Head injury     History of colonic polyps     Hydrocele     Hyperlipidemia     Hypertension     Macular drusen     Presbyopia     Schizoaffective disorder ([facility] )     Sepsis ([facility] )     Tobacco abuse     Umbilical hernia     Vitreous syneresis        Past Surgical History:   Procedure Laterality Date    FRACTURE SURGERY      INGUINAL HERNIA REPAIR         Social History     Socioeconomic History    Marital status: Single     Spouse name: None    Number of children: None    Years of education: None    Highest education level: None   Occupational History    None   Social Needs    Financial resource strain: None    Food insecurity:     Worry: None     Inability: None    Transportation needs:     Medical: None     Non-medical: None   Tobacco Use    Smoking status: Current Every Day Smoker     Packs/day: 1 00    Smokeless tobacco: Never Used   Substance and Sexual Activity    Alcohol use: Yes     Comment: whenever i want    Drug use: No    Sexual activity: None   Lifestyle    Physical activity:     Days per week: None     Minutes per session: None    Stress: None   Relationships    Social connections:     Talks on phone: None     Gets together: None     Attends Worship service: None     Active member of club or organization: None     Attends meetings of clubs or organizations: None     Relationship status: None    Intimate partner violence:     Fear of current or ex partner: None     Emotionally abused: None     Physically abused: None     Forced sexual activity: None   Other Topics Concern    None   Social History Narrative    None       History reviewed  No pertinent family history  Review of Systems    Vitals:    11/18/19 1125   BP: (P) 115/61   Pulse: (P) 88   Resp: (P) 20   Temp: (P) 98 3 °F (36 8 °C)   SpO2:        Physical Exam   Constitutional: Awake and Alert  Well-developed and well-nourished  No distress  HENT:   Head: Normocephalic and atraumatic  Mouth/Throat: Oropharynx is clear and moist     Eyes: Conjunctivae and EOM are normal  Pupils are equal, round, and reactive to light  Right and left eye exhibits no discharge  Ears: No discharge, no lesions, slight wax, normal tympanic membranes  Neck: Neck supple  No tracheal deviation present  No thyromegaly present  No lymphadenopathy  Cardiovascular: RRR with normal heart sounds  Exam reveals no friction rub  No murmur heard  Pulmonary/Chest: Effort normal and breath sounds normal  No respiratory distress  Patient has no wheezes, rales, or rhonchi  Abdominal: Soft  Bowel sounds are normal  No distension  There is no tenderness  There is no rebound and no guarding  Neurological: Cranial Nerves grossly intact  No sensory deficit  Coordination normal    Skin: Skin is warm and dry  No rash noted  No diaphoresis  No erythema  No edema  No cyanosis  Assessment     Marthameaghan Graham is a(n) 62y o  year old male with psychosis    1   Cardiac with history of hypertension and dyslipidemia  Patient previously refused lisinopril 10mg  We will closely monitor patient's blood pressures  2  Tobacco abuse   Patient previously refused the nicotine patch  Will add nicotine gum PRN  3  Alcohol abuse  Thiamine, folic acid and multivitamin  4  DJD/osteoarthritis   Tylenol on as needed basis  5  Gait abnormality   Stable  6  Vitamin-D deficiency   Vitamin D3 1000U daily  Prognosis: Fair  Discharge Plan: In progress  Advanced Directives: I have discussed in detail the patient the advanced directives  The patient does not have a POA or a living will  First contact is [place] Day, who can be reached at [phone]  Patient was unable to relay an updated Code status, but as per last admission, will keep as a FULL CODE       I have spent more than 30 minutes gathering data, doing physical examination, and discussing the advanced directives, which was witnessed by caring staff  The patient was discussed with Dr Diane Jones and he is in agreement with the above note

## 2019-11-18 NOTE — PROGRESS NOTES
The patient admitted to [facility] under 201 status, originally brought to ED by EMS after police called EMS because patient had been staying in his car and had been causing disruption in the patient's neighborhood  The patient lethargic and sedated upon arrival to [facility]  Noted in the ED MAR that IM Benadryl, IM Ativan, and IM Geodon administered at 0946 by ED staff  Information for admission obtained from medical record  Per crisis intake, the patient noted to be labile, verbally aggressive, and paranoid while in the ED  The patient reportedly has been living in his care and has not slept "in days " Lithium level 0 5, documentation noted possible non-complianc with medications  Patient unable to sign admission paperwork and unable to complete admission assessment due to patient's sedation and inability to follow commands or answer questions  Patient moaned with staff assistance to move patient in bed, but did not open eyes or awaken  Flacc score 0, no verbal or non-verbal complaints of pain noted  Vital signs obtained  The patient sleeping quietly and without difficulty and placed in area of decreased stimuli in quiet room for close observation by clinical staff  RN reviewed patient current assessment, noted behavior in ED, and suicide risk assessment score of low risk with Dr Murali Bullard; from discussion and current interventions in place, patient does not warrant continual observation at this time  Will continue to monitor

## 2019-11-18 NOTE — ED NOTES
Patient is accepted at [facility] [address]  Patient is accepted by Dr Rjei Barillas per Chiki Cooper,       Patient may go to the floor once RN report is received  Nurse report is to be called to ext [phone] prior to patient transfer

## 2019-11-18 NOTE — ED PROVIDER NOTES
History  Chief Complaint   Patient presents with    Psychiatric Evaluation     HPI    Prior to Admission Medications   Prescriptions Last Dose Informant Patient Reported? Taking?    OLANZapine (ZyPREXA) 15 mg tablet Not Taking at Unknown time  No No   Sig: Take 1 tablet (15 mg total) by mouth daily at bedtime   Patient not taking: Reported on 11/18/2019   cholecalciferol (VITAMIN D3) 1,000 units tablet Not Taking at Unknown time  No No   Sig: Take 1 tablet (1,000 Units total) by mouth daily   Patient not taking: Reported on 84/56/8102   folic acid (FOLVITE) 1 mg tablet Not Taking at Unknown time  No No   Sig: Take 1 tablet (1 mg total) by mouth daily   Patient not taking: Reported on 11/18/2019   lithium carbonate 300 mg capsule Not Taking at Unknown time  No No   Sig: Take 3 capsules (900 mg total) by mouth daily at bedtime In addition to 600 mg in AM   Patient not taking: Reported on 11/18/2019   lithium carbonate 600 MG capsule Not Taking at Unknown time  No No   Sig: Take 1 capsule (600 mg total) by mouth every morning In Addition to 900 mg HS   Patient not taking: Reported on 11/18/2019   melatonin 10 MG TABS Not Taking at Unknown time  No No   Sig: Take 1 tablet (10 mg total) by mouth daily at bedtime   Patient not taking: Reported on 11/18/2019   thiamine 100 MG tablet Not Taking at Unknown time  No No   Sig: Take 1 tablet (100 mg total) by mouth daily   Patient not taking: Reported on 11/18/2019      Facility-Administered Medications: None       Past Medical History:   Diagnosis Date    Alcohol abuse     Anxiety     Astigmatism     DJD (degenerative joint disease)     Gait abnormality     Head injury     History of colonic polyps     Hydrocele     Hyperlipidemia     Hypertension     Macular drusen     Presbyopia     Schizoaffective disorder ([facility] )     Sepsis ([facility]     Tobacco abuse     Umbilical hernia     Vitreous syneresis        Past Surgical History:   Procedure Laterality Date    FRACTURE SURGERY      INGUINAL HERNIA REPAIR         History reviewed  No pertinent family history  I have reviewed and agree with the history as documented  Social History     Tobacco Use    Smoking status: Current Every Day Smoker     Packs/day: 1 00    Smokeless tobacco: Never Used   Substance Use Topics    Alcohol use: Yes     Comment: whenever i want    Drug use: No        Review of Systems    Physical Exam  Physical Exam    Vital Signs  ED Triage Vitals [11/18/19 0531]   Temperature Pulse Respirations Blood Pressure SpO2   98 5 °F (36 9 °C) 93 20 (!) 172/101 97 %      Temp Source Heart Rate Source Patient Position - Orthostatic VS BP Location FiO2 (%)   Tympanic Monitor Sitting Left arm --      Pain Score       No Pain           Vitals:    11/18/19 0531 11/18/19 0816   BP: (!) 172/101 122/61   Pulse: 93 75   Patient Position - Orthostatic VS: Sitting Lying         Visual Acuity      ED Medications  Medications   LORazepam (ATIVAN) tablet 1 mg (1 mg Oral Given 11/18/19 0618)   ziprasidone (GEODON) IM injection 20 mg (20 mg Intramuscular Given 11/18/19 0946)   diphenhydrAMINE (BENADRYL) injection 50 mg (50 mg Intramuscular Given 11/18/19 0945)   LORazepam (ATIVAN) 2 mg/mL injection 2 mg (2 mg Intramuscular Given 11/18/19 0946)       Diagnostic Studies  Results Reviewed     Procedure Component Value Units Date/Time    Rapid drug screen, urine [[id number]]  (Normal) Collected:  11/18/19 0618    Lab Status:  Final result Specimen:  Urine, Clean Catch Updated:  11/18/19 0641     Amph/Meth UR Negative     Barbiturate Ur Negative     Benzodiazepine Urine Negative     Cocaine Urine Negative     Methadone Urine Negative     Opiate Urine Negative     PCP Ur Negative     THC Urine Negative    Narrative:       FOR MEDICAL PURPOSES ONLY  IF CONFIRMATION NEEDED PLEASE CONTACT THE LAB WITHIN 5 DAYS      Drug Screen Cutoff Levels:  AMPHETAMINE/METHAMPHETAMINES  1000 ng/mL  BARBITURATES     200 ng/mL  BENZODIAZEPINES     200 ng/mL  COCAINE      300 ng/mL  METHADONE      300 ng/mL  OPIATES      300 ng/mL  PHENCYCLIDINE     25 ng/mL  THC       50 ng/mL      TSH [[id number]]  (Normal) Collected:  11/18/19 0550    Lab Status:  Final result Specimen:  Blood from Arm, Right Updated:  11/18/19 0640     TSH 3RD GENERATON 2 040 uIU/mL     Narrative:       Patients undergoing fluorescein dye angiography may retain small amounts of fluorescein in the body for 48-72 hours post procedure  Samples containing fluorescein can produce falsely depressed TSH values  If the patient had this procedure,a specimen should be resubmitted post fluorescein clearance        UA w Reflex to Microscopic w Reflex to Culture [[id number]]  (Abnormal) Collected:  11/18/19 0618    Lab Status:  Final result Specimen:  Urine, Clean Catch Updated:  11/18/19 0631     Color, UA Yellow     Clarity, UA Clear     Specific Gravity, UA <=1 005     pH, UA 6 5     Leukocytes, UA Negative     Nitrite, UA Negative     Protein, UA Negative mg/dl      Glucose, UA Negative mg/dl      Ketones, UA Negative mg/dl      Urobilinogen, UA 0 2 E U /dl      Bilirubin, UA Negative     Blood, UA Negative    Lithium level [[id number]]  (Abnormal) Collected:  11/18/19 0550    Lab Status:  Final result Specimen:  Blood from Arm, Right Updated:  11/18/19 0625     Lithium Lvl 0 5 mmol/L     Ethanol [[id number]]  (Normal) Collected:  11/18/19 0550    Lab Status:  Final result Specimen:  Blood from Arm, Right Updated:  11/18/19 0625     Ethanol Lvl <10 mg/dL     Comprehensive metabolic panel [[id number]]  (Abnormal) Collected:  11/18/19 0550    Lab Status:  Final result Specimen:  Blood from Arm, Right Updated:  11/18/19 3644     Sodium 139 mmol/L      Potassium 3 4 mmol/L      Chloride 107 mmol/L      CO2 26 mmol/L      ANION GAP 6 mmol/L      BUN 10 mg/dL      Creatinine 0 94 mg/dL      Glucose 128 mg/dL      Calcium 9 4 mg/dL      AST 32 U/L      ALT 32 U/L      Alkaline Phosphatase 38 U/L      Total Protein 6 4 g/dL      Albumin 4 2 g/dL      Total Bilirubin 0 50 mg/dL      eGFR 87 ml/min/1 73sq m     Narrative:       [place] guidelines for Chronic Kidney Disease (CKD):     Stage 1 with normal or high GFR (GFR > 90 mL/min/1 73 square meters)    Stage 2 Mild CKD (GFR = 60-89 mL/min/1 73 square meters)    Stage 3A Moderate CKD (GFR = 45-59 mL/min/1 73 square meters)    Stage 3B Moderate CKD (GFR = 30-44 mL/min/1 73 square meters)    Stage 4 Severe CKD (GFR = 15-29 mL/min/1 73 square meters)    Stage 5 End Stage CKD (GFR <15 mL/min/1 73 square meters)  Note: GFR calculation is accurate only with a steady state creatinine    CBC and differential [[id number]]  (Abnormal) Collected:  11/18/19 0550    Lab Status:  Final result Specimen:  Blood from Arm, Right Updated:  11/18/19 0607     WBC 9 00 Thousand/uL      RBC 4 52 Million/uL      Hemoglobin 14 1 g/dL      Hematocrit 41 5 %      MCV 92 fL      MCH 31 3 pg      MCHC 34 1 g/dL      RDW 14 2 %      MPV 8 3 fL      Platelets 438 Thousands/uL      Neutrophils Relative 81 %      Lymphocytes Relative 12 %      Monocytes Relative 6 %      Eosinophils Relative 1 %      Basophils Relative 1 %      Neutrophils Absolute 7 20 Thousands/µL      Lymphocytes Absolute 1 10 Thousands/µL      Monocytes Absolute 0 50 Thousand/µL      Eosinophils Absolute 0 10 Thousand/µL      Basophils Absolute 0 10 Thousands/µL                  No orders to display              Procedures  Procedures       ED Course  ED Course as of Nov 18 1031   Mon Nov 18, 2019   0804 Patient is medically cleared for psychiatric admission  Patient refused EKG  MDM  Number of Diagnoses or Management Options  Psychoses [facility]):   Diagnosis management comments: EKG shows normal sinus rhythm  There is poor R-wave progression  This was present on old EKG  There are T-wave inversions V4 through V 6   There is no ST elevation  These inversions are nonspecific  No acute workup is necessary as patient is not experiencing any chest pain or shortness of breath  Patient is asymptomatic  EKG was only done because it was necessary to be admitted to Psychiatry  This can be followed up as an outpatient by his primary MD         Disposition  Final diagnoses:   Psychoses ([address] )     Time reflects when diagnosis was documented in both MDM as applicable and the Disposition within this note     Time User Action Codes Description Comment    11/18/2019 10:09 AM Jesusita Chaudhry Add [F29] Psychoses [facility])       ED Disposition     ED Disposition Condition Date/Time Comment    Transfer to [facility] Nov 18, 2019 10:09 AM Nicole Graham should be transferred out to [facility] and has been medically cleared  MD Documentation      Most Recent Value   Sending MD Dr Deejay Hammond    None         Patient's Medications   Discharge Prescriptions    No medications on file     No discharge procedures on file      ED Provider  Electronically Signed by           Sparkle Brewster MD  11/18/19 [address] [facility] Angela Blum MD  11/18/19 1451 44 Miranda PETE MD  11/18/19 1031

## 2019-11-18 NOTE — ED NOTES
62year old male presents to ED via EMS  He reports that police called EMS because he has been staying in his car and has been causing some disruption in the neighborhood  Patient reports he was drinking alcohol, but blood alcohol is negative  He is refusing an EKG, but is otherwise medically cleared  He is alert and oriented with cueing (was asleep on approach)  Did become rather excitable at intervals throughout the assessment, which was brief due to his refusal to elaborate and in an effort to pacify him to assure his cooperation  Patient was labile, intermittently loud, aggressive (with speech), profane, and hostile with gesture as if he had a gun and cutting someone's throat  He was intermittently difficult to understand  He mumbled incoherently about neighbors and his nephew, mentioning paranoid ideas about them building a meth lab in his mothers house and doing other things he appears to be annoyed with, like offering him food and money (which he refused)  He apears very angry with a propensity for verbal aggression and hostility  He was redirected several times successfully with humor, but remained agitated and irritable for the most part  He did deny suicidal ideas - current or past   When asked about homicidal ideas he denied, but admitted to making recent threats, for which he feels justified, blaming the nephew and neighbors for a variety of things  He is also preoccupied with the mayor and rattled off a list of corrupt things he believes the [place] is engaged in  He denies depression, but acknowledges anxiety and feeling "edgy"  He reports good appetite, although he refused food offered by his nephew recently, expressing paranoia that it may be poisoned  It is not clear if he has been eating well  He reports he is not sleeping and hasn't slept in days  He has been staying / living in his car although he can live in his mother's home    He appears to be paranoid and delusional about that, but is vague with rationale for same and/or difficult to follow in conversation  He has not been tending to his hygiene and grooming, and Lithium level implies noncompliance with medications  Patient is with poor insight, judgment, and impulse control  With encouragement, he did agree to sign a 201 (after scrutinizing it for some time and needing much reassurance and redirection)  Advised of and served with rights and explanation of 72 hour notice

## 2019-11-18 NOTE — PROGRESS NOTES
The patient awake and more alert at this time  Speech noted to be unintelligible, but able to note that the patient requesting milk  Milk and snack brought to patient as per patient's request  No signs or symptoms of aspiration noted  Lithium 600 mg administered at 1443 when patient awake and alert and able to safely swallow medications  Dr Marco A Champion notified of administration time of Lithium  Will continue to monitor

## 2019-11-18 NOTE — ED NOTES
Luis Givens from [place], [place] [address], called to complete COB, she is aware pt is on the unit and asked that we fax upon D/C

## 2019-11-19 PROCEDURE — 99232 SBSQ HOSP IP/OBS MODERATE 35: CPT | Performed by: PSYCHIATRY & NEUROLOGY

## 2019-11-19 RX ORDER — GUAIFENESIN/DEXTROMETHORPHAN 100-10MG/5
10 SYRUP ORAL EVERY 4 HOURS PRN
Status: DISCONTINUED | OUTPATIENT
Start: 2019-11-19 | End: 2019-11-29 | Stop reason: HOSPADM

## 2019-11-19 RX ADMIN — LITHIUM CARBONATE 600 MG: 300 CAPSULE, GELATIN COATED ORAL at 08:29

## 2019-11-19 RX ADMIN — GUAIFENESIN AND DEXTROMETHORPHAN 10 ML: 100; 10 SYRUP ORAL at 10:44

## 2019-11-19 RX ADMIN — VITAMIN D, TAB 1000IU (100/BT) 1000 UNITS: 25 TAB at 08:29

## 2019-11-19 RX ADMIN — OLANZAPINE 15 MG: 5 TABLET, FILM COATED ORAL at 22:02

## 2019-11-19 RX ADMIN — GUAIFENESIN AND DEXTROMETHORPHAN 10 ML: 100; 10 SYRUP ORAL at 15:01

## 2019-11-19 RX ADMIN — GUAIFENESIN AND DEXTROMETHORPHAN 10 ML: 100; 10 SYRUP ORAL at 20:28

## 2019-11-19 RX ADMIN — LITHIUM CARBONATE 900 MG: 300 CAPSULE, GELATIN COATED ORAL at 22:02

## 2019-11-19 RX ADMIN — Medication 100 MG: at 08:29

## 2019-11-19 RX ADMIN — FOLIC ACID 1 MG: 1 TABLET ORAL at 08:29

## 2019-11-19 RX ADMIN — MELATONIN 9 MG: at 22:03

## 2019-11-19 NOTE — PROGRESS NOTES
The patient noted to be visible on the unit, in the milieu, and in patient's room throughout the morning  The patient noted to be labile, paranoid, irritable, and disorganized  The patient came to the nurses station to speak with RN at 0800 voicing concern regarding ordered Zyprexa and requesting to speak with physician  The patient then noted to be in milieu for intermittent periods of time, but noted to become irritable and agitated, left group room and returned to room  At time of medication administration, the patient noted to be paranoid, whispering to RN, responses disorganized  Patient would not answer questions regarding anxiety, depression, si, hi, and hallucinations  The patient compliant with medication regimen, with the exception of refusing Multivitamin  The patient denies complaints of pain  The patient noted to have harsh, productive cough for white sputum  Lungs noted to be clear, but diminished to bilateral lung barclay  RN notified Carl Morrow pa-c of patient's noted cough and K of 3 4  Will continue to monitor

## 2019-11-19 NOTE — PROGRESS NOTES
The patient requesting as needed medication for cough  Robitussin administered at [phone] as per physician's orders  The patient states medication effective at time of reassessment  The patient visible in milieu with lunch meal  Will continue to monitor

## 2019-11-19 NOTE — PROGRESS NOTES
11/19/19 0954   Team Meeting   Meeting Type Daily Rounds   Team Members Present   Team Members Present Physician;Nurse;;; Occupational Therapist   Physician Team Member Dr Glendy Pacheco MD; Kathleen Gorman [facility]    Nursing Team Member Tova Valle, [facility]RI[facility]    Care Management Team Member Gely Rothman MS, [facility]    Social Work Team Member Camryn Villegas [facility]    OT Team Member Joie Coats [place]    Patient/Family Present   Patient Present No   Patient's Family Present No     New admit; garbled speech, labile mood; irritable, paranoid; unable to sit for long period of time; disorganized in thoughts

## 2019-11-19 NOTE — SOCIAL WORK
SW attempted to meet with patient in small consult room;   Pt presents as labile, irritable stating "you don't need to make my appointments for me - I can do that" - SW explained that was a part of SW job is to schedule follow up appointments - pt pointed in SW face and stated "then you need to make them with me present"; pt then stood up and said "you know what, forget this, I don't want to talk to you, you're one of them" and walked out of room    SW unable to obtain psychosocial information at this time;  SW also unable to complete treatment plan with team and patient at this time due to mood lability

## 2019-11-19 NOTE — PROGRESS NOTES
Progress Note - Mikael Dhillon 67 Day 62 y o  male MRN: [id number]   Unit/Bed#: [facility] 204-02 Encounter: [id number]    Behavior over the last 24 hours: unchanged  Wendy Chang reports he is angry because he is back in the hospital  He also ruminates about his house condition, not returning there and unwillingness to take medications because cause glaucoma  He is noted to be irritable, labile, paranoid and mumbling to himself intermittently in the unit  Staff report minimal participation in groups and on the unit  Sleep: slept off and on  Appetite: fair  Medication side effects: denies    ROS: no complaints, all other systems are negative    Mental Status Evaluation:    Appearance:  disheveled, marginal hygiene   Behavior:  uncooperative   Speech:  pressured   Mood:  labile   Affect:  labile   Thought Process:  increased rate of thoughts   Associations: concrete associations   Thought Content:  some paranoia, ruminations   Perceptual Disturbances: appears responding to internal stimuli   Risk Potential: Suicidal ideation - None  Homicidal ideation - None   Sensorium:  oriented to person and place   Memory:  recent and remote memory: unable to assess due to lack of cooperation   Consciousness:  alert and awake   Attention: attention span and concentration appear shorter than expected for age   Insight:  impaired   Judgment: impaired   Gait/Station: normal gait/station   Motor Activity: no abnormal movements     Vital signs in last 24 hours:    Temp:  [98 1 °F (36 7 °C)-98 7 °F (37 1 °C)] 98 1 °F (36 7 °C)  HR:  [76-84] 76  Resp:  [18-20] 18  BP: ()/(62-83) 130/83    Laboratory results: I have personally reviewed all pertinent laboratory/tests results  Assessment/Plan   Principal Problem:    Schizoaffective disorder, bipolar type (HCC)  Active Problems:    Cannabis abuse, continuous    Alcohol abuse, continuous    Recommended Treatment:     Planned medication and treatment changes:     All current active medications have been reviewed  Encourage group therapy, milieu therapy and occupational therapy  Behavioral Health checks every 7 minutes   Ct with Lithium and Zyprexa at current doses  Encourage meds compliance  Current Facility-Administered Medications:  acetaminophen 650 mg Oral Q6H PRN Dallas Tayo, CRNP   acetaminophen 650 mg Oral Q4H PRN Dallas Tayo, CRNP   acetaminophen 975 mg Oral Q6H PRN Yordy Tayo, CRNP   aluminum-magnesium hydroxide-simethicone 30 mL Oral Q4H PRN Yordy Tayo, CRNP   benztropine 1 mg Intramuscular Q8H PRN Dallas Tayo, CRNP   cholecalciferol 1,000 Units Oral Daily Alia Uday Rockaminta, PA-TY   dextromethorphan-guaiFENesin 10 mL Oral Q4H PRN [facility]   folic acid 1 mg Oral Daily Jose Raul RockGEORGINA benavides   hydrOXYzine HCL 50 mg Oral Q6H PRN Yordy Tayo, CRNP   lithium 600 mg Oral QAM Dallas Tayo, CRNP   lithium carbonate 900 mg Oral HS Dallas Tayo, CRNP   LORazepam 2 mg Intramuscular Q6H PRN Yordy Tayo, CRNP   LORazepam 1 mg Oral Q4H PRN Yordy Tayo, CRNP   magnesium hydroxide 30 mL Oral Daily PRN Dallas Tayo, CRNP   melatonin 9 mg Oral HS Dallas Tayo, CRNP   multivitamin-minerals 1 tablet Oral Daily Alia Uday RockGEORGINA benavides   nicotine polacrilex 2 mg Oral Q2H PRN [facility]ia [facility]   OLANZapine 10 mg Intramuscular Daily PRN Yordy Tayo, CRNP   OLANZapine 15 mg Oral HS Dallas Tayo, CRNP   OLANZapine 5 mg Oral Q8H PRN Dallas Tayo, CRNP   risperiDONE 1 mg Oral Q8H PRN Yordy Tayo, CRNP   thiamine 100 mg Oral Daily Jenna Dempsey PA-C   traZODone 50 mg Oral HS PRN Dallas Tayo, CRNP       Risks / Benefits of Treatment:    Risks, benefits, and possible side effects of medications explained to patient  Patient has limited understanding of risks and benefits of treatment at this time, but agrees to take medications as prescribed  Counseling / Coordination of Care:    Patient's progress discussed with staff in treatment team meeting    Medications, treatment progress and treatment plan reviewed with patient      Rashida Means MD 11/19/19

## 2019-11-19 NOTE — PROGRESS NOTES
Juan José Horn  CBR:[birth year] Leatha Stanley  SJP:[id number]    GQF:[id number]  Adm Date: 11/18/2019 0527  5:27 AM   ATT PHY: [facility], [address]         Subjective     The patient was seen after reviewing the chart and discussing the case with caring staff  Today during our encounter, the patient reported no acute concerns other than a cough, which was present last admission as well  Objective     Vitals:    11/19/19 0700   BP: 130/83   Pulse: 76   Resp: 18   Temp: 98 1 °F (36 7 °C)   SpO2: 98%       General Appearance: Awake and Alert  No acute distress  HEENT: Normocephalic, atraumatic  PERRLA, EOMI, MMM  Heart: RRR, no murmurs  Normal S1 and S2   Lungs: CTA bilaterally with fair air entry  Assessment     Adolph Graham is a(n) 62y o  year old male with psychosis     1  Cardiac with history of hypertension and dyslipidemia  Patient previously refused lisinopril 10mg  We will closely monitor patient's blood pressures  2  Tobacco abuse   Nicotine gum PRN  3  Alcohol abuse  Thiamine, folic acid and multivitamin  4  DJD/osteoarthritis   Tylenol on as needed basis  5  Gait abnormality   Stable  6  Vitamin-D deficiency   Vitamin D3 1000U daily  The patient was discussed with Dr Sweetie Oates and he is in agreement with the above note

## 2019-11-19 NOTE — PLAN OF CARE
Problem: PAIN - ADULT  Goal: Verbalizes/displays adequate comfort level or baseline comfort level  Description  Interventions:  - Encourage patient to monitor pain and request assistance  - Assess pain using appropriate pain scale  - Administer analgesics based on type and severity of pain and evaluate response  - Implement non-pharmacological measures as appropriate and evaluate response  - Consider cultural and social influences on pain and pain management  - Notify physician/advanced practitioner if interventions unsuccessful or patient reports new pain  Outcome: Progressing     Problem: PSYCHOSIS  Goal: Will report no hallucinations or delusions  Description  Interventions:  - Administer medication as  ordered  - Every waking shifts and PRN assess for the presence of hallucinations and or delusions  - Assist with reality testing to support increasing orientation  - Assess if patient's hallucinations or delusions are encouraging self-harm or harm to others and intervene as appropriate  Outcome: Progressing     Problem: ANXIETY  Goal: Will report anxiety at manageable levels  Description  INTERVENTIONS:  - Administer medication as ordered  - Teach and encourage coping skills  - Provide emotional support  - Assess patient/family for anxiety and ability to cope  Outcome: Progressing  Goal: By discharge: Patient will verbalize 2 strategies to deal with anxiety  Description  Interventions:  - Identify any obvious source/trigger to anxiety  - Staff will assist patient in applying identified coping technique/skills  - Encourage attendance of scheduled groups and activities  Outcome: Progressing     Problem: Alteration in Thoughts and Perception  Goal: Agree to be compliant with medication regime, as prescribed and report medication side effects  Description  Interventions:  - Offer appropriate PRN medication and supervise ingestion; conduct AIMS, as needed   Outcome: Progressing     Problem: Ineffective Coping  Goal: Cooperates with admission process  Description  Interventions:   - Complete admission process  Outcome: Progressing  Goal: Understands least restrictive measures  Description  Interventions:  - Utilize least restrictive behavior  Outcome: Progressing  Goal: Free from restraint events  Description  - Utilize least restrictive measures   - Provide behavioral interventions   - Redirect inappropriate behaviors   Outcome: Progressing     Problem: Risk for Violence/Aggression Toward Others  Goal: Treatment Goal: Refrain from acts of violence/aggression during length of stay, and demonstrate improved impulse control at the time of discharge  Outcome: Progressing  Goal: Verbalize thoughts and feelings  Description  Interventions:  - Assess and re-assess patient's level of risk, every waking shift  - Engage patient in 1:1 interactions, daily, for a minimum of 15 minutes   - Allow patient to express feelings and frustrations in a safe and non-threatening manner   - Establish rapport/trust with patient   Outcome: Progressing

## 2019-11-19 NOTE — PROGRESS NOTES
Patient angry about readmit to hospital; upset about medications given in ER  Refused to work with RT on admission assessment; will attempt again at later time

## 2019-11-19 NOTE — NURSING NOTE
Pt found to be resting in room  Pt reports he has been staying in his car due to choice  Admits to not following medication regime well  States, "I try to follow it"  Denies SI or HI  demonstrates poor concentration; loose associations  Q 7 min checks ongoing

## 2019-11-19 NOTE — QUICK NOTE
The following belongings have been moved from the patients bed side locked cabinet to the [address] [place] [address] cabinet behind the nurses station     A black pasquale pack   White earring aid case   multi-color lighter

## 2019-11-19 NOTE — NURSING NOTE
Pt found to be awake all of authors rounds  No acute behavioral outburst noted  Provided light snack overnight  Q 7 min checks ongoing

## 2019-11-19 NOTE — PLAN OF CARE
Problem: Ineffective Coping  Goal: Participates in unit activities  Description  Interventions:  - Provide therapeutic environment   - Provide required programming   - Redirect inappropriate behaviors   Outcome: Not Progressing   Patient angry and frustrated about readmit to hospital   He is sitting in on groups but not participating

## 2019-11-19 NOTE — SOCIAL WORK
CAROL placed phone call to [facility] [phone] to cancel psych appt scheduled for Weds, 11/20 @ 10a with Dr Atilio Olvera - patient cancelled the appointment on Weds, 11/13;  follow up appointment scheduled on 12/11 at [phone] with Nicolás for med management and 12/11 at [address] [place] [address] with Dr Dayan Fraser

## 2019-11-20 PROCEDURE — 99232 SBSQ HOSP IP/OBS MODERATE 35: CPT | Performed by: NURSE PRACTITIONER

## 2019-11-20 RX ADMIN — VITAMIN D, TAB 1000IU (100/BT) 1000 UNITS: 25 TAB at 08:43

## 2019-11-20 RX ADMIN — FOLIC ACID 1 MG: 1 TABLET ORAL at 08:43

## 2019-11-20 RX ADMIN — GUAIFENESIN AND DEXTROMETHORPHAN 10 ML: 100; 10 SYRUP ORAL at 19:56

## 2019-11-20 RX ADMIN — MELATONIN 9 MG: at 20:40

## 2019-11-20 RX ADMIN — GUAIFENESIN AND DEXTROMETHORPHAN 10 ML: 100; 10 SYRUP ORAL at 10:14

## 2019-11-20 RX ADMIN — GUAIFENESIN AND DEXTROMETHORPHAN 10 ML: 100; 10 SYRUP ORAL at 00:54

## 2019-11-20 RX ADMIN — LITHIUM CARBONATE 900 MG: 300 CAPSULE, GELATIN COATED ORAL at 20:39

## 2019-11-20 RX ADMIN — LITHIUM CARBONATE 600 MG: 300 CAPSULE, GELATIN COATED ORAL at 08:43

## 2019-11-20 RX ADMIN — Medication 100 MG: at 08:43

## 2019-11-20 RX ADMIN — LORAZEPAM 1 MG: 1 TABLET ORAL at 22:54

## 2019-11-20 RX ADMIN — OLANZAPINE 15 MG: 5 TABLET, FILM COATED ORAL at 20:39

## 2019-11-20 NOTE — PROGRESS NOTES
Pt present in the milieu through much of the evening  Pt complains during medication administration but was compliant  Pt given PRN Robitussin for moist heavy cough before bedtime
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
## 2019-11-20 NOTE — PROGRESS NOTES
Patient continued to have intermittent paranoid behaviors throughout the night  Patient did have longer rest periods in between behaviors  Paranoid outbursts were decreased as the night went on  Will continue to monitor safety and behaviors every 7 minutes

## 2019-11-20 NOTE — PROGRESS NOTES
Marivel Garcia  [facility]:[id number] Reji Kumar  [facility]:[id number]    KP:[id number]  Adm Date: 11/18/2019 0527  5:27 AM   ATT PHY: Nisreen Gr, [address]         Subjective     The patient was seen after reviewing the chart and discussing the case with caring staff  Today during our encounter, the patient reported no acute concerns  Objective     Vitals:    11/20/19 0715   BP: 140/90   Pulse: 70   Resp: 19   Temp: 97 8 °F (36 6 °C)   SpO2: 97%       General Appearance: Awake and Alert  No acute distress  HEENT: Normocephalic, atraumatic  PERRLA, EOMI, MMM  Heart: RRR, no murmurs  Normal S1 and S2   Lungs: CTA bilaterally with fair air entry  Assessment     Martha Graham is a(n) 62y o  year old male with psychosis     1  Cardiac with history of hypertension and dyslipidemia  Patient previously refused lisinopril 10mg  We will closely monitor patient's blood pressures  2  Tobacco abuse   Nicotine gum PRN  3  Alcohol abuse  Thiamine, folic acid and multivitamin  4  DJD/osteoarthritis   Tylenol on as needed basis  5  Gait abnormality   Stable  6  Vitamin-D deficiency   Vitamin D3 1000U daily

## 2019-11-20 NOTE — PROGRESS NOTES
The patient noted to be visible in the milieu and on the unit throughout the shift  The patient continues to be labile, paranoid, intermittent irritable edge  The patient denies anxiety, depression, si, hi, and hallucinations  The patient compliant with meals and medications  The patient in attendance of offered groups throughout the day  The patient complaints of strong, productive cough, requested as needed Robitussin for complaints of cough, administered at 1014  Patient states medication effective at time of reassessment  Will continue to monitor

## 2019-11-20 NOTE — PROGRESS NOTES
Patient awake having irritable paranoid behaviors, coming out of his room intermittently complaining about doctors  Patient's speech is mumbled and disorganized Offered patient medication for sleep however patient declined  Patient does go back to his room without redirection  Patient complained of a cough which is moist and productive  Patient requested Robitussin at [phone]  Patient stated medication is affective

## 2019-11-20 NOTE — PLAN OF CARE
Problem: PAIN - ADULT  Goal: Verbalizes/displays adequate comfort level or baseline comfort level  Description  Interventions:  - Encourage patient to monitor pain and request assistance  - Assess pain using appropriate pain scale  - Administer analgesics based on type and severity of pain and evaluate response  - Implement non-pharmacological measures as appropriate and evaluate response  - Consider cultural and social influences on pain and pain management  - Notify physician/advanced practitioner if interventions unsuccessful or patient reports new pain  Outcome: Not Progressing     Problem: DISCHARGE PLANNING  Goal: Discharge to home or other facility with appropriate resources  Description  INTERVENTIONS:  - Identify barriers to discharge w/patient and caregiver  - Arrange for needed discharge resources and transportation as appropriate  - Identify discharge learning needs (meds, wound care, etc )  - Arrange for interpretive services to assist at discharge as needed  - Refer to Case Management Department for coordinating discharge planning if the patient needs post-hospital services based on physician/advanced practitioner order or complex needs related to functional status, cognitive ability, or social support system  Outcome: Not Progressing     Problem: PSYCHOSIS  Goal: Will report no hallucinations or delusions  Description  Interventions:  - Administer medication as  ordered  - Every waking shifts and PRN assess for the presence of hallucinations and or delusions  - Assist with reality testing to support increasing orientation  - Assess if patient's hallucinations or delusions are encouraging self-harm or harm to others and intervene as appropriate  Outcome: Not Progressing     Problem: ANXIETY  Goal: Will report anxiety at manageable levels  Description  INTERVENTIONS:  - Administer medication as ordered  - Teach and encourage coping skills  - Provide emotional support  - Assess patient/family for anxiety and ability to cope  Outcome: Not Progressing  Goal: By discharge: Patient will verbalize 2 strategies to deal with anxiety  Description  Interventions:  - Identify any obvious source/trigger to anxiety  - Staff will assist patient in applying identified coping technique/skills  - Encourage attendance of scheduled groups and activities  Outcome: Not Progressing     Problem: Alteration in Thoughts and Perception  Goal: Treatment Goal: Gain control of psychotic behaviors/thinking, reduce/eliminate presenting symptoms and demonstrate improved reality functioning upon discharge  Outcome: Not Progressing  Goal: Verbalize thoughts and feelings  Description  Interventions:  - Promote a nonjudgmental and trusting relationship with the patient through active listening and therapeutic communication  - Assess patient's level of functioning, behavior and potential for risk  - Engage patient in 1 on 1 interactions  - Encourage patient to express fears, feelings, frustrations, and discuss symptoms    - [place] patient to reality, help patient recognize reality-based thinking   - Administer medications as ordered and assess for potential side effects  - Provide the patient education related to the signs and symptoms of the illness and desired effects of prescribed medications  Outcome: Not Progressing  Goal: Refrain from acting on delusional thinking/internal stimuli  Description  Interventions:  - Monitor patient closely, per order   - Utilize least restrictive measures   - Set reasonable limits, give positive feedback for acceptable   - Administer medications as ordered and monitor of potential side effects  Outcome: Not Progressing  Goal: Agree to be compliant with medication regime, as prescribed and report medication side effects  Description  Interventions:  - Offer appropriate PRN medication and supervise ingestion; conduct AIMS, as needed   Outcome: Not Progressing  Goal: Attend and participate in unit activities, including therapeutic, recreational, and educational groups  Description  Interventions:  -Encourage Visitation and family involvement in care  Outcome: Not Progressing  Goal: Recognize dysfunctional thoughts, communicate reality-based thoughts at the time of discharge  Description  Interventions:  - Provide medication and psycho-education to assist patient in compliance and developing insight into his/her illness   Outcome: Not Progressing  Goal: Complete daily ADLs, including personal hygiene independently, as able  Description  Interventions:  - Observe, teach, and assist patient with ADLS  - Monitor and promote a balance of rest/activity, with adequate nutrition and elimination   Outcome: Not Progressing     Problem: Ineffective Coping  Goal: Cooperates with admission process  Description  Interventions:   - Complete admission process  Outcome: Not Progressing  Goal: Identifies ineffective coping skills  Outcome: Not Progressing  Goal: Identifies healthy coping skills  Outcome: Not Progressing  Goal: Demonstrates healthy coping skills  Outcome: Not Progressing  Goal: Participates in unit activities  Description  Interventions:  - Provide therapeutic environment   - Provide required programming   - Redirect inappropriate behaviors   Outcome: Not Progressing  Goal: Patient/Family participate in treatment and DC plans  Description  Interventions:  - Provide therapeutic environment  Outcome: Not Progressing  Goal: Patient/Family verbalizes awareness of resources  Outcome: Not Progressing  Goal: Understands least restrictive measures  Description  Interventions:  - Utilize least restrictive behavior  Outcome: Not Progressing  Goal: Free from restraint events  Description  - Utilize least restrictive measures   - Provide behavioral interventions   - Redirect inappropriate behaviors   Outcome: Not Progressing     Problem: Risk for Violence/Aggression Toward Others  Goal: Treatment Goal: Refrain from acts of violence/aggression during length of stay, and demonstrate improved impulse control at the time of discharge  Outcome: Not Progressing  Goal: Verbalize thoughts and feelings  Description  Interventions:  - Assess and re-assess patient's level of risk, every waking shift  - Engage patient in 1:1 interactions, daily, for a minimum of 15 minutes   - Allow patient to express feelings and frustrations in a safe and non-threatening manner   - Establish rapport/trust with patient   Outcome: Not Progressing  Goal: Refrain from harming others  Outcome: Not Progressing  Goal: Refrain from destructive acts on the environment or property  Outcome: Not Progressing  Goal: Control angry outbursts  Description  Interventions:  - Monitor patient closely, per order  - Ensure early verbal de-escalation  - Monitor prn medication needs  - Set reasonable/therapeutic limits, outline behavioral expectations, and consequences   - Provide a non-threatening milieu, utilizing the least restrictive interventions   Outcome: Not Progressing  Goal: Attend and participate in unit activities, including therapeutic, recreational, and educational groups  Description  Interventions:  - Provide therapeutic and educational activities daily, encourage attendance and participation, and document same in the medical record   Outcome: Not Progressing  Goal: Identify appropriate positive anger management techniques  Description  Interventions:  - Offer anger management and coping skills groups   - Staff will provide positive feedback for appropriate anger control  Outcome: Not Progressing

## 2019-11-20 NOTE — PLAN OF CARE
Problem: PAIN - ADULT  Goal: Verbalizes/displays adequate comfort level or baseline comfort level  Description  Interventions:  - Encourage patient to monitor pain and request assistance  - Assess pain using appropriate pain scale  - Administer analgesics based on type and severity of pain and evaluate response  - Implement non-pharmacological measures as appropriate and evaluate response  - Consider cultural and social influences on pain and pain management  - Notify physician/advanced practitioner if interventions unsuccessful or patient reports new pain  Outcome: Progressing     Problem: PSYCHOSIS  Goal: Will report no hallucinations or delusions  Description  Interventions:  - Administer medication as  ordered  - Every waking shifts and PRN assess for the presence of hallucinations and or delusions  - Assist with reality testing to support increasing orientation  - Assess if patient's hallucinations or delusions are encouraging self-harm or harm to others and intervene as appropriate  Outcome: Progressing     Problem: ANXIETY  Goal: Will report anxiety at manageable levels  Description  INTERVENTIONS:  - Administer medication as ordered  - Teach and encourage coping skills  - Provide emotional support  - Assess patient/family for anxiety and ability to cope  Outcome: Progressing     Problem: Ineffective Coping  Goal: Cooperates with admission process  Description  Interventions:   - Complete admission process  Outcome: Progressing  Goal: Understands least restrictive measures  Description  Interventions:  - Utilize least restrictive behavior  Outcome: Progressing  Goal: Free from restraint events  Description  - Utilize least restrictive measures   - Provide behavioral interventions   - Redirect inappropriate behaviors   Outcome: Progressing     Problem: Risk for Violence/Aggression Toward Others  Goal: Refrain from harming others  Outcome: Progressing  Goal: Refrain from destructive acts on the environment or property  Outcome: Progressing  Goal: Control angry outbursts  Description  Interventions:  - Monitor patient closely, per order  - Ensure early verbal de-escalation  - Monitor prn medication needs  - Set reasonable/therapeutic limits, outline behavioral expectations, and consequences   - Provide a non-threatening milieu, utilizing the least restrictive interventions   Outcome: Progressing

## 2019-11-20 NOTE — PROGRESS NOTES
Progress Note - Mikael Dhillon 67 Day 62 y o  male MRN: [id number]   Unit/Bed#: [facility] 204-02 Encounter: [id number]    Behavior over the last 24 hours: limited improvement  Theresa Rodney states he is feeling "better" and states it is due to less stress  He does not think medications are what has helped them and angry about medications being restarted  He has been compliant with medications  He continues labile and easily agitated and then becomes loud and difficult to redirect  Limited participation in milieu  Follows directions more appropriately  Sleep: slept off and on  Appetite: increased  Medication side effects: No   ROS: all other systems are negative, cough    Mental Status Evaluation:    Appearance:  marginal hygiene, long hair, bearded   Behavior:  angry   Speech:  loud   Mood:  irritable   Affect:  expansive   Thought Process:  circumstantial, tangential   Associations: concrete associations   Thought Content:  paranoid ideation   Perceptual Disturbances: appears responding to internal stimuli, talks to self at times   Risk Potential: Suicidal ideation - None  Homicidal ideation - None  Potential for aggression - Yes, due to poor impulse control   Sensorium:  oriented to person, place and time/date   Memory:  recent and remote memory grossly intact   Consciousness:  alert and awake   Attention: attention span and concentration are age appropriate   Insight:  limited   Judgment: limited   Gait/Station: normal gait/station, normal balance   Motor Activity: no abnormal movements     Vital signs in last 24 hours:    Temp:  [97 6 °F (36 4 °C)-98 4 °F (36 9 °C)] 97 8 °F (36 6 °C)  HR:  [70-78] 70  Resp:  [18-19] 19  BP: (110-140)/(67-90) 140/90    Laboratory results: I have personally reviewed all pertinent laboratory/tests results      Suicide/Homicide Risk Assessment:    Risk of Harm to Self:   Current Specific Risk Factors include: current unstable mood, current psychotic symptoms  Protective Factors: no current suicidal ideation, ability to communicate with staff on the unit, able to contract for safety on the unit, taking medications as ordered on the unit  Based on today's assessment, Evy Maldonado presents the following risk of harm to self: none    Risk of Harm to Others:  Current Specific Risk Factors include: current psychotic symptoms  Protective Factors: no current homicidal ideation, able to communicate with staff on the unit, compliant with medications on the unit as ordered  Based on today's assessment, Evy Maldonado presents the following risk of harm to others: low    The following interventions are recommended: behavioral checks every 7 minutes, continued hospitalization on locked unit     Progress Toward Goals: limited improvement, still at times agitated, still psychotic    Assessment/Plan   Principal Problem:    Schizoaffective disorder, bipolar type ([facility] [address] )  Active Problems:    Cannabis abuse, continuous    Alcohol abuse, continuous    Recommended Treatment:     Planned medication and treatment changes:     All current active medications have been reviewed  Encourage group therapy, milieu therapy and occupational therapy  Behavioral Health checks every 7 minutes  Continue current medications:    Current Facility-Administered Medications:  acetaminophen 650 mg Oral Q6H PRN KATE AmbroseNP   acetaminophen 650 mg Oral Q4H PRN Ermelinda Frost, KATENP   acetaminophen 975 mg Oral Q6H PRN Ermelinda Frost, CARLOS   aluminum-magnesium hydroxide-simethicone 30 mL Oral Q4H PRN CARLOS Ambrose   benztropine 1 mg Intramuscular Q8H PRN Ermelinda Frost, CARLOS   cholecalciferol 1,000 Units Oral Daily Tre De Souza RockGEORGINA benavides   dextromethorphan-guaiFENesin 10 mL Oral Q4H PRN Tre De Souza RockGEORGINA benavides   folic acid 1 mg Oral Daily Jose Raul GEORGINA Hernandez   hydrOXYzine HCL 50 mg Oral Q6H PRN CARLOS Ambrose   lithium 600 mg Oral QAM Ermelinda Frost, CARLOS   lithium carbonate 900 mg Oral HS CARLOS Ambrose   LORazepam 2 mg Intramuscular Q6H PRN Marzeusn Kar, CRMILI   LORazepam 1 mg Oral Q4H PRN Maralyn Stall, CRNP   magnesium hydroxide 30 mL Oral Daily PRN Maralyn Stall, CRNP   melatonin 9 mg Oral HS Maralyn Stall, CRNP   multivitamin-minerals 1 tablet Oral Daily Severa Prose [place], GEORGINA   nicotine polacrilex 2 mg Oral Q2H PRN Severa Aung Hernandez, GEORGINA   OLANZapine 10 mg Intramuscular Daily PRN Maralyn Stall, CRNP   OLANZapine 15 mg Oral HS Maralyn Stall, CRNP   OLANZapine 5 mg Oral Q8H PRN Maralyn Stall, CRNP   risperiDONE 1 mg Oral Q8H PRN Maralyn Stall, CRNP   thiamine 100 mg Oral Daily Sesar Gillette, GEORGINA   traZODone 50 mg Oral HS PRN Maralyn Stall, CARLOS       Risks / Benefits of Treatment:    Risks, benefits, and possible side effects of medications explained to patient and patient verbalizes understanding and agreement for treatment  Counseling / Coordination of Care:    Patient's progress discussed with staff in treatment team meeting  Medications, treatment progress and treatment plan reviewed with patient      CARLOS Torres 11/20/19

## 2019-11-20 NOTE — PLAN OF CARE
Problem: Ineffective Coping  Goal: Participates in unit activities  Description  Interventions:  - Provide therapeutic environment   - Provide required programming   - Redirect inappropriate behaviors   Outcome: Progressing   Patient more organized today; he was able to participate in groups and admission assessment

## 2019-11-20 NOTE — PROGRESS NOTES
11/20/19 0956   Team Meeting   Meeting Type Daily Rounds   Team Members Present   Team Members Present Physician;Nurse;;; Occupational Therapist   Physician Team Member Dr Kofi Carpenter MD; Kylie Werner, [address] [place] [address]   Nursing Team Member King Aguirre, ADRIEN   Care Management Team Member Clare Matamoros MS, [facility]   Social Work Team Member Roxanna Mojica [facility] Team Member Shaun Dewitt [place]   Patient/Family Present   Patient Present No   Patient's Family Present No     Intermittently paranoid, disorganized at times, growling at staff, interrupted sleep throughout the night declined PRN for sleep, medication compliant with exception of multivitamin  No D/C date at this time  Paranoid about home environment

## 2019-11-21 PROCEDURE — 99232 SBSQ HOSP IP/OBS MODERATE 35: CPT | Performed by: NURSE PRACTITIONER

## 2019-11-21 RX ADMIN — GUAIFENESIN AND DEXTROMETHORPHAN 10 ML: 100; 10 SYRUP ORAL at 05:50

## 2019-11-21 RX ADMIN — Medication 100 MG: at 08:38

## 2019-11-21 RX ADMIN — FOLIC ACID 1 MG: 1 TABLET ORAL at 08:38

## 2019-11-21 RX ADMIN — LITHIUM CARBONATE 900 MG: 300 CAPSULE, GELATIN COATED ORAL at 20:47

## 2019-11-21 RX ADMIN — LITHIUM CARBONATE 600 MG: 300 CAPSULE, GELATIN COATED ORAL at 08:38

## 2019-11-21 RX ADMIN — MELATONIN 9 MG: at 20:47

## 2019-11-21 RX ADMIN — LORAZEPAM 1 MG: 1 TABLET ORAL at 18:27

## 2019-11-21 RX ADMIN — VITAMIN D, TAB 1000IU (100/BT) 1000 UNITS: 25 TAB at 08:38

## 2019-11-21 RX ADMIN — GUAIFENESIN AND DEXTROMETHORPHAN 10 ML: 100; 10 SYRUP ORAL at 20:46

## 2019-11-21 RX ADMIN — GUAIFENESIN AND DEXTROMETHORPHAN 10 ML: 100; 10 SYRUP ORAL at 13:18

## 2019-11-21 RX ADMIN — OLANZAPINE 15 MG: 5 TABLET, FILM COATED ORAL at 20:47

## 2019-11-21 NOTE — PROGRESS NOTES
Patient complained of a continuous cough  Gave PRN Robitussin for symptoms  Will continue to monitor

## 2019-11-21 NOTE — PROGRESS NOTES
[id number]  Patient's mood is labile  At beginning of shift, he was somewhat irritable  After meeting with the psychiatrist, he was pleasant and joking throughout the rest of the evening  Scratched his right middle knuckle and requested a band-aid  At bed time, patient reported tremors that he claims in from the melatonin  No tremors visible upon assessment  He reported feeling anxious about his medications and was encouraged to lie down and deep breathe  Patient verbalized understanding  Was compliant with medications  Will continue monitoring

## 2019-11-21 NOTE — PROGRESS NOTES
11/21/19 0947   Team Meeting   Meeting Type Daily Rounds   Patient/Family Present   Patient Present No   Patient's Family Present No        11/21/19 0947   Team Meeting   Meeting Type Daily Rounds   Patient/Family Present   Patient Present No   Patient's Family Present No     Daily Psychiatric Rounds    Team Members Present:    Liliana Bliss, CARLOS Murphy, MS,NCC,LPC  Terri John, MSW  Patrick negron, CTRS  Porter Henderson, RN  Michael Mccullough RN    Discussion:     Irritable at times  Moist productive cough-robitussin PRN effective  Slept with PRN ativan  Is cooperative and med compliant

## 2019-11-21 NOTE — PROGRESS NOTES
The patient visible on the unit and in the milieu upon initial assessment and successive rounds throughout the morning  The patient noted to be labile, intermittent periods of irritability and paranoia noted  The patient states that he feels as though he is having tremors of the upper extremities, no visible tremors noted upon assessment with activity or at rest  The patient denies anxiety, depression, si, hi, and hallucinations  The patient compliant with medication regimen with the exception of refusing morning dose of multivitamin  The patient denies complaints of pain  The patient noted to have strong, moist productive cough  Lungs noted to be clear, but diminished with auscultation  No shortness of breath or respiratory distress noted  Will continue to monitor

## 2019-11-21 NOTE — PROGRESS NOTES
Progress Note - Mikael Dhillon 67 Day 62 y o  male MRN: [id number]   Unit/Bed#: [facility] 204-02 Encounter: [id number]    Behavior over the last 24 hours: unchanged  Carlos Manuel Bateman is slightly less irritable  He still becomes upset when talking about psychiatric medications  He has still been compliant with medications and will check lithium level in AM   Staff report patient continues with intermittent periods of irritability/lability    Participates more appropriately in milieu  Sleep: improved  Appetite: normal  Medication side effects: No   ROS: all other systems are negative    Mental Status Evaluation:    Appearance:  marginal hygiene   Behavior:  agitated   Speech:  loud   Mood:  labile   Affect:  normal range and intensity   Thought Process:  circumstantial   Associations: tangential associations   Thought Content:  persecutory delusions, paranoid ideation   Perceptual Disturbances: denies when asked, but talks to self at times   Risk Potential: Suicidal ideation - None  Homicidal ideation - None  Potential for aggression - No   Sensorium:  oriented to person, place, time/date and situation   Memory:  recent and remote memory grossly intact   Consciousness:  alert and awake   Attention: attention span and concentration are age appropriate   Insight:  limited   Judgment: limited   Gait/Station: normal gait/station, normal balance   Motor Activity: no abnormal movements     Vital signs in last 24 hours:    Temp:  [98 2 °F (36 8 °C)-99 6 °F (37 6 °C)] 98 2 °F (36 8 °C)  HR:  [56-70] 56  Resp:  [16-20] 20  BP: ()/(52-79) 124/79    Laboratory results: I have personally reviewed all pertinent laboratory/tests results      Suicide/Homicide Risk Assessment:    Risk of Harm to Self:   Current Specific Risk Factors include: current psychotic symptoms    Based on today's assessment, Carlos Manuel Bateman presents the following risk of harm to self: minimal    Risk of Harm to Others:  Current Specific Risk Factors include: current psychotic symptoms  Protective Factors: no current homicidal ideation, able to communicate with staff on the unit, compliant with medications on the unit as ordered, follows staff redirection  Based on today's Vanessa Llanes presents the following risk of harm to others: low    The following interventions are recommended: behavioral checks every 7 minutes, continued hospitalization on locked unit     Progress Toward Goals: still irritable, still labile, still paranoid, insight remains poor    Assessment/Plan   Principal Problem:    Schizoaffective disorder, bipolar type ([facility] [address] )  Active Problems:    Cannabis abuse, continuous    Alcohol abuse, continuous    Recommended Treatment:     Planned medication and treatment changes:     All current active medications have been reviewed  Encourage group therapy, milieu therapy and occupational therapy  Behavioral Health checks every 7 minutes  Continue current medications:    Current Facility-Administered Medications:  acetaminophen 650 mg Oral Q6H PRN CARLOS Sarabia   acetaminophen 650 mg Oral Q4H PRN CARLOS Saarbia   acetaminophen 975 mg Oral Q6H PRN CARLOS Sarabia   aluminum-magnesium hydroxide-simethicone 30 mL Oral Q4H PRN CARLOS Sarabia   benztropine 1 mg Intramuscular Q8H PRN CARLOS Sarabia   cholecalciferol 1,000 Units Oral Daily [facility], PA-C   dextromethorphan-guaiFENesin 10 mL Oral Q4H PRN [facility], PA-C   folic acid 1 mg Oral Daily [facility], PA-C   hydrOXYzine HCL 50 mg Oral Q6H PRN CARLOS Sarabia   lithium 600 mg Oral QAM CARLOS Sarabia   lithium carbonate 900 mg Oral HS CARLOS Sarabia   LORazepam 2 mg Intramuscular Q6H PRN CARLOS Sarabia   LORazepam 1 mg Oral Q4H PRN CARLOS Sarabia   magnesium hydroxide 30 mL Oral Daily PRN CARLOS Sarabia   melatonin 9 mg Oral HS CARLOS Sarabia   multivitamin-minerals 1 tablet Oral Daily [facility], PA-C   nicotine polacrilex 2 mg Oral Q2H PRN [facility] GEORGINA Hernandez   OLANZapine 10 mg Intramuscular Daily PRN Veronica Julito, CARLOS   OLANZapine 15 mg Oral HS Veronica Julito, CARLOS   OLANZapine 5 mg Oral Q8H PRN Veronica Julito, CARLOS   risperiDONE 1 mg Oral Q8H PRN Veronica Julito, CARLOS   thiamine 100 mg Oral Daily Marcellus Gutierrez PA-C   traZODone 50 mg Oral HS PRN CARLOS Balderrama       Risks / Benefits of Treatment:    Risks, benefits, and possible side effects of medications explained to patient and patient verbalizes understanding and agreement for treatment  Counseling / Coordination of Care:    Patient's progress discussed with staff in treatment team meeting  Medications, treatment progress and treatment plan reviewed with patient      CARLOS Balderrama 11/21/19

## 2019-11-21 NOTE — PROGRESS NOTES
Marivel Garcia  EPU: Reji Kumar  VQ    VDD:[id number]  Adm Date: 2019  5:27 AM   ATT PHY: Nisreen Gr, [address]         Subjective     The patient was seen after reviewing the chart and discussing the case with caring staff  Today during our encounter, the patient reported having a headache behind the left eye that "just hurts" (would not specify a pain scale number) and "I just want to let it go away " He also reports one loose stool today, but again does not want anything for it  He denies abdominal pains, fevers, or chills  Objective     Vitals:    19 0702   BP: 124/79   Pulse: 56   Resp: 20   Temp: 98 2 °F (36 8 °C)   SpO2: 97%       General Appearance: Awake and Alert  No acute distress  HEENT: Normocephalic, atraumatic  PERRLA, EOMI, MMM  Heart: RRR, no murmurs  Normal S1 and S2   Lungs: CTA bilaterally with fair air entry  Assessment     Martha Graahm is a(n) 62y o  year old male with psychosis     1  Cardiac with history of hypertension and dyslipidemia  BP's are stable  2  Tobacco abuse   Nicotine gum PRN  3  Alcohol abuse  Thiamine, folic acid and multivitamin  4  DJD/osteoarthritis   Tylenol on as needed basis  5  Gait abnormality   Stable  6  Vitamin-D deficiency   Vitamin D3 1000U daily  The patient was discussed with Dr Vera Mccarthy and he is in agreement with the above note

## 2019-11-21 NOTE — PLAN OF CARE
Problem: PAIN - ADULT  Goal: Verbalizes/displays adequate comfort level or baseline comfort level  Description  Interventions:  - Encourage patient to monitor pain and request assistance  - Assess pain using appropriate pain scale  - Administer analgesics based on type and severity of pain and evaluate response  - Implement non-pharmacological measures as appropriate and evaluate response  - Consider cultural and social influences on pain and pain management  - Notify physician/advanced practitioner if interventions unsuccessful or patient reports new pain  Outcome: Progressing     Problem: PSYCHOSIS  Goal: Will report no hallucinations or delusions  Description  Interventions:  - Administer medication as  ordered  - Every waking shifts and PRN assess for the presence of hallucinations and or delusions  - Assist with reality testing to support increasing orientation  - Assess if patient's hallucinations or delusions are encouraging self-harm or harm to others and intervene as appropriate  Outcome: Progressing     Problem: ANXIETY  Goal: Will report anxiety at manageable levels  Description  INTERVENTIONS:  - Administer medication as ordered  - Teach and encourage coping skills  - Provide emotional support  - Assess patient/family for anxiety and ability to cope  Outcome: Progressing  Goal: By discharge: Patient will verbalize 2 strategies to deal with anxiety  Description  Interventions:  - Identify any obvious source/trigger to anxiety  - Staff will assist patient in applying identified coping technique/skills  - Encourage attendance of scheduled groups and activities  Outcome: Progressing     Problem: Alteration in Thoughts and Perception  Goal: Treatment Goal: Gain control of psychotic behaviors/thinking, reduce/eliminate presenting symptoms and demonstrate improved reality functioning upon discharge  Outcome: Progressing  Goal: Verbalize thoughts and feelings  Description  Interventions:  - Promote a nonjudgmental and trusting relationship with the patient through active listening and therapeutic communication  - Assess patient's level of functioning, behavior and potential for risk  - Engage patient in 1 on 1 interactions  - Encourage patient to express fears, feelings, frustrations, and discuss symptoms    - Lincoln patient to reality, help patient recognize reality-based thinking   - Administer medications as ordered and assess for potential side effects  - Provide the patient education related to the signs and symptoms of the illness and desired effects of prescribed medications  Outcome: Progressing  Goal: Agree to be compliant with medication regime, as prescribed and report medication side effects  Description  Interventions:  - Offer appropriate PRN medication and supervise ingestion; conduct AIMS, as needed   Outcome: Progressing  Goal: Complete daily ADLs, including personal hygiene independently, as able  Description  Interventions:  - Observe, teach, and assist patient with ADLS  - Monitor and promote a balance of rest/activity, with adequate nutrition and elimination   Outcome: Progressing     Problem: Ineffective Coping  Goal: Cooperates with admission process  Description  Interventions:   - Complete admission process  Outcome: Progressing  Goal: Identifies ineffective coping skills  Outcome: Progressing  Goal: Identifies healthy coping skills  Outcome: Progressing  Goal: Demonstrates healthy coping skills  Outcome: Progressing  Goal: Understands least restrictive measures  Description  Interventions:  - Utilize least restrictive behavior  Outcome: Progressing  Goal: Free from restraint events  Description  - Utilize least restrictive measures   - Provide behavioral interventions   - Redirect inappropriate behaviors   Outcome: Progressing     Problem: Risk for Violence/Aggression Toward Others  Goal: Treatment Goal: Refrain from acts of violence/aggression during length of stay, and demonstrate improved impulse control at the time of discharge  Outcome: Progressing  Goal: Verbalize thoughts and feelings  Description  Interventions:  - Assess and re-assess patient's level of risk, every waking shift  - Engage patient in 1:1 interactions, daily, for a minimum of 15 minutes   - Allow patient to express feelings and frustrations in a safe and non-threatening manner   - Establish rapport/trust with patient   Outcome: Progressing  Goal: Refrain from harming others  Outcome: Progressing  Goal: Refrain from destructive acts on the environment or property  Outcome: Progressing  Goal: Control angry outbursts  Description  Interventions:  - Monitor patient closely, per order  - Ensure early verbal de-escalation  - Monitor prn medication needs  - Set reasonable/therapeutic limits, outline behavioral expectations, and consequences   - Provide a non-threatening milieu, utilizing the least restrictive interventions   Outcome: Progressing  Goal: Identify appropriate positive anger management techniques  Description  Interventions:  - Offer anger management and coping skills groups   - Staff will provide positive feedback for appropriate anger control  Outcome: Progressing

## 2019-11-21 NOTE — PROGRESS NOTES
Patient slept most of the night without difficulty  Awake twice for fluids  No behavioral issues  No complaints or concerns  Will continue to monitor safety and behaviors every 7 minutes

## 2019-11-21 NOTE — PROGRESS NOTES
The patient requesting as needed Robitussin at this time for complaints of a cough  Medication administered as per physician's orders and patient's request  Will continue to monitor

## 2019-11-22 LAB — LITHIUM SERPL-SCNC: 1 MMOL/L (ref 1–1.2)

## 2019-11-22 PROCEDURE — 99232 SBSQ HOSP IP/OBS MODERATE 35: CPT | Performed by: NURSE PRACTITIONER

## 2019-11-22 PROCEDURE — 80178 ASSAY OF LITHIUM: CPT | Performed by: NURSE PRACTITIONER

## 2019-11-22 RX ORDER — TAMSULOSIN HYDROCHLORIDE 0.4 MG/1
0.4 CAPSULE ORAL
Status: DISCONTINUED | OUTPATIENT
Start: 2019-11-22 | End: 2019-11-29 | Stop reason: HOSPADM

## 2019-11-22 RX ADMIN — LORAZEPAM 1 MG: 1 TABLET ORAL at 05:38

## 2019-11-22 RX ADMIN — VITAMIN D, TAB 1000IU (100/BT) 1000 UNITS: 25 TAB at 08:43

## 2019-11-22 RX ADMIN — FOLIC ACID 1 MG: 1 TABLET ORAL at 08:43

## 2019-11-22 RX ADMIN — MELATONIN 9 MG: at 21:46

## 2019-11-22 RX ADMIN — GUAIFENESIN AND DEXTROMETHORPHAN 10 ML: 100; 10 SYRUP ORAL at 10:38

## 2019-11-22 RX ADMIN — OLANZAPINE 15 MG: 5 TABLET, FILM COATED ORAL at 21:45

## 2019-11-22 RX ADMIN — LITHIUM CARBONATE 900 MG: 300 CAPSULE, GELATIN COATED ORAL at 21:46

## 2019-11-22 RX ADMIN — Medication 100 MG: at 08:43

## 2019-11-22 RX ADMIN — LITHIUM CARBONATE 600 MG: 300 CAPSULE, GELATIN COATED ORAL at 08:43

## 2019-11-22 RX ADMIN — TAMSULOSIN HYDROCHLORIDE 0.4 MG: 0.4 CAPSULE ORAL at 17:16

## 2019-11-22 NOTE — PROGRESS NOTES
Demetrius Aguero  SKB:[birth year] Nguyen Hopper  ZRN:[id number]    WNZ:[id number]  Adm Date: 11/18/2019 0527  5:27 AM   ATT PHY: Ross Alaniz, [address]         [facility]     The patient was seen after reviewing the chart and discussing the case with caring staff  Today during our encounter, the patient reports urinary frequency/urgency  UA on 11/18/19 was unremarkable  He seems to indicate that he was on Flomax before for BPH  Objective     Vitals:    11/22/19 0713   BP: 118/76   Pulse: 63   Resp: 19   Temp: 97 9 °F (36 6 °C)   SpO2: 96%       General Appearance: Awake and Alert  No acute distress  HEENT: Normocephalic, atraumatic  PERRLA, EOMI, MMM  Heart: RRR, no murmurs  Normal S1 and S2   Lungs: CTA bilaterally with fair air entry  Assessment     Luly Graham is a(n) 62y o  year old male with psychosis     1  Cardiac with history of hypertension and dyslipidemia  BP's are stable  2  Tobacco abuse   Nicotine gum PRN  3  Alcohol abuse  Thiamine, folic acid and multivitamin  4  DJD/osteoarthritis   Tylenol on as needed basis  5  Gait abnormality   Stable  6  Vitamin-D deficiency   Vitamin D3 1000U daily  7  Urinary Frequency/Urgency  Likely representing history of BPH  Will add Flomax 0 4mg once daily  Monitor BP's for evidence of orthostatic hypotension  The patient was discussed with Dr Amanda Haider and he is in agreement with the above note

## 2019-11-22 NOTE — PROGRESS NOTES
Progress Note - Mikael Dhillon 69 Day 62 y o  male MRN: [id number]   Unit/Bed#: [facility] 204-02 Encounter: [id number]    Behavior over the last 24 hours: minimal improvement  Hillary Xavier continues with mood lability/irritability/paranoia  Lithium level today is 1 0  Staff report he is overall more controlled  He did receive PRN ativan last evening  Still having difficult sleeping, but becomes agitated when I mention medications, limited insight into need for psychiatric medications  But he has been compliant with medications  Participates more appropriately in milieu  Sleep: insomnia  Appetite: normal  Medication side effects: No   ROS: all other systems are negative    Mental Status Evaluation:    Appearance:  marginal hygiene   Behavior:  guarded   Speech:  pressured, loud   Mood:  irritable   Affect:  labile   Thought Process:  tangential   Associations: concrete associations   Thought Content:  paranoid delusions   Perceptual Disturbances: denies when asked, but talks to self at times   Risk Potential: Suicidal ideation - None  Homicidal ideation - None  Potential for aggression - No   Sensorium:  oriented to person, place, time/date and situation   Memory:  recent and remote memory grossly intact   Consciousness:  alert and awake   Attention: attention span and concentration are age appropriate   Insight:  limited   Judgment: limited   Gait/Station: normal gait/station, normal balance   Motor Activity: no abnormal movements     Vital signs in last 24 hours:    Temp:  [97 7 °F (36 5 °C)-98 3 °F (36 8 °C)] 97 9 °F (36 6 °C)  HR:  [61-66] 63  Resp:  [18-19] 19  BP: (114-121)/(57-76) 118/76    Laboratory results: I have personally reviewed all pertinent laboratory/tests results      Suicide/Homicide Risk Assessment:    Risk of Harm to Self:   Current Specific Risk Factors include: current psychotic symptoms  Protective Factors: no current suicidal ideation, ability to communicate with staff on the unit, able to contract for safety on the unit  Based on today's assessment, Zainab Nieto presents the following risk of harm to self: none    Risk of Harm to Others:  Current Specific Risk Factors include: unstable mood disorder, current psychotic symptoms  Protective Factors: no current homicidal ideation  Based on today's assessment, Zainab Nieto presents the following risk of harm to others: low    The following interventions are recommended: behavioral checks every 7 minutes, continued hospitalization on locked unit     Progress Toward Goals: some improvement today, still at times irritable, still psychotic, insight remains poor    Assessment/Plan   Principal Problem:    Schizoaffective disorder, bipolar type ([facility] [address] )  Active Problems:    Cannabis abuse, continuous    Alcohol abuse, continuous    Recommended Treatment:     Planned medication and treatment changes:     All current active medications have been reviewed  Encourage group therapy, milieu therapy and occupational therapy  Behavioral Health checks every 7 minutes  Continue current medications:    Current Facility-Administered Medications:  acetaminophen 650 mg Oral Q6H PRN Marisa Izaiah, CRNP   acetaminophen 650 mg Oral Q4H PRN Marisa Izaiah, CRNP   acetaminophen 975 mg Oral Q6H PRN Marisa Izaiah, CRNP   aluminum-magnesium hydroxide-simethicone 30 mL Oral Q4H PRN Marisa Izaiah, CRNP   benztropine 1 mg Intramuscular Q8H PRN Marisa Izaiah, CRNP   cholecalciferol 1,000 Units Oral Daily Ramya Wilian Hernandez, PA-TY   dextromethorphan-guaiFENesin 10 mL Oral Q4H PRN [facility] Wilian Hernandez, PA-TY   folic acid 1 mg Oral Daily Jose Raul Hernandez PA-C   hydrOXYzine HCL 50 mg Oral Q6H PRN Marisa Izaiah, CRNP   lithium 600 mg Oral QAM Marisa Izaiah, CRNP   lithium carbonate 900 mg Oral HS Marisa Izaiah, CRNP   LORazepam 2 mg Intramuscular Q6H PRN Marisa Izaiah, CRNP   LORazepam 1 mg Oral Q4H PRN Marisa Izaiah, CRNP   magnesium hydroxide 30 mL Oral Daily PRN Marisa Izaiah, CRNP   melatonin 9 mg Oral HS Marisa Izaiah, CRNP multivitamin-minerals 1 tablet Oral Daily Alia Hernandez PA-C   nicotine polacrilex 2 mg Oral Q2H PRN Alia Hernandez PA-C   OLANZapine 10 mg Intramuscular Daily PRN CARLOS Nuñez   OLANZapine 15 mg Oral HS CARLOS Nuñez   OLANZapine 5 mg Oral Q8H PRN CARLOS Nuñez   risperiDONE 1 mg Oral Q8H PRN CARLOS Nuñez   tamsulosin 0 4 mg Oral Daily With Marianne Layne PA-C   thiamine 100 mg Oral Daily Jenna Dempsey PA-C   traZODone 50 mg Oral HS PRN CARLOS Nuñez       Risks / Benefits of Treatment:    Risks, benefits, and possible side effects of medications explained to patient and patient verbalizes understanding and agreement for treatment  Counseling / Coordination of Care:    Patient's progress discussed with staff in treatment team meeting  Medications, treatment progress and treatment plan reviewed with patient      CARLOS Nuñez 11/22/19

## 2019-11-22 NOTE — PROGRESS NOTES
Patient has an irritable edge this shift  Compliant with 1700 medications and meals  Paranoid about his medications at times  No other concerns or problems reported  Q 7 mins checks in place for safety  Will continue to monitor for behaviors and changes

## 2019-11-22 NOTE — PLAN OF CARE
Problem: PAIN - ADULT  Goal: Verbalizes/displays adequate comfort level or baseline comfort level  Description  Interventions:  - Encourage patient to monitor pain and request assistance  - Assess pain using appropriate pain scale  - Administer analgesics based on type and severity of pain and evaluate response  - Implement non-pharmacological measures as appropriate and evaluate response  - Consider cultural and social influences on pain and pain management  - Notify physician/advanced practitioner if interventions unsuccessful or patient reports new pain  Outcome: Progressing     Problem: PSYCHOSIS  Goal: Will report no hallucinations or delusions  Description  Interventions:  - Administer medication as  ordered  - Every waking shifts and PRN assess for the presence of hallucinations and or delusions  - Assist with reality testing to support increasing orientation  - Assess if patient's hallucinations or delusions are encouraging self-harm or harm to others and intervene as appropriate  Outcome: Progressing     Problem: ANXIETY  Goal: Will report anxiety at manageable levels  Description  INTERVENTIONS:  - Administer medication as ordered  - Teach and encourage coping skills  - Provide emotional support  - Assess patient/family for anxiety and ability to cope  Outcome: Progressing  Goal: By discharge: Patient will verbalize 2 strategies to deal with anxiety  Description  Interventions:  - Identify any obvious source/trigger to anxiety  - Staff will assist patient in applying identified coping technique/skills  - Encourage attendance of scheduled groups and activities  Outcome: Progressing

## 2019-11-22 NOTE — PLAN OF CARE
Problem: Ineffective Coping  Goal: Participates in unit activities  Description  Interventions:  - Provide therapeutic environment   - Provide required programming   - Redirect inappropriate behaviors   Outcome: Progressing   Patient joining groups daily with appropriate participation

## 2019-11-22 NOTE — PROGRESS NOTES
11/22/19 0989   Team Meeting   Meeting Type Daily Rounds   Team Members Present   Team Members Present Nurse;Physician;;; Occupational Therapist   Physician Team Member Dr Sissy Velez MD; Koreen Cockayne, [address] [facility]   Nursing Team Member Germania Méndez, RN   Care Management Team Member Luis Alberto Oneill MS, [facility]   Social Work Team Member Dolly Bella, RN   Patient/Family Present   Patient Present No   Patient's Family Present No     PT did not sleep last night, labile, angry outbursts, ativan this am, complained of tremors at night time but not visible, complaining of urinary issues being in continent-medical following  No D/C date at this time

## 2019-11-22 NOTE — CASE MANAGEMENT
CM met with patient for status update  The patient was initially receptive to approach; though behaviorally controlled, the patient remains labile/expansive in mood, guarded and easily irritated  Thinking tangential with underlying paranoia that surfaces easily  Refused to sign an URBANO for contact with his mother, stating, "she lies about things I do " He did, however, agree to sign if she came in and he was present to 'represent himself', but "for that moment and that moment only " Circumstantial and hyperverbal  Mildly argumentative re: aftercare arrangements, stating that the f/u appt  With the [address] [place] [address] made following his last inpatient treatment was at a totally unacceptable time; he stated he would make his own appointment on his 'terms ' After some discussion, he reluctantly consented to sign an URBANO for contact with the [address] [place] [address] until informed that SW would initiate the appt scheduling  Although assured he would be included in the arrangement, he became angry, stated it "pissed" him off, and abruptly left the dining room, continuing to vent negative emotions  Will continue to support and encourage appropriate expression/ decision-making

## 2019-11-22 NOTE — PROGRESS NOTES
[id number]  Patient out and about for the most part  His mood remains labile but overall he has been controlled  He is appropriate in conversation with minimal irritability noted  He did report having racing thoughts and feeling anxious and requested an Ativan around 1830  He later reported effectiveness  Denied depression and suicidal thoughts  Was given Robitussin at bed time  Was compliant with all medications  Will continue monitoring

## 2019-11-22 NOTE — NURSING NOTE
N 2300 - 0700     Pt found to be awake most the night  No acute behavioral outburst noted  Requested/ receive information on medication regimen  Printed copy of mar provided to patient patient able to demonstrate ability to read [facility] correctly  Encourage patient to ask questions on indication dose and s/e  Reassured Vear Holiday; reassurance provided  Q 7 min checks ongoing

## 2019-11-22 NOTE — PROGRESS NOTES
Patient pacing in his room this am, pleasant at approach  Stated he was "shaky inside" last night and had tremors  Denies any depression, anxiety is present because he is here  Patient stated his problems is that he does not have a private room at his mom's house and because is her house he can not make it a "secure, locked room" compliant with medications and meals  Patient requested ribitussin for cough  No other problems reported this shift  Q 7 mins checks in place for safety  Will continue to monitor for behaviors and changes

## 2019-11-23 PROCEDURE — 99232 SBSQ HOSP IP/OBS MODERATE 35: CPT | Performed by: PSYCHIATRY & NEUROLOGY

## 2019-11-23 RX ADMIN — Medication 100 MG: at 09:01

## 2019-11-23 RX ADMIN — TAMSULOSIN HYDROCHLORIDE 0.4 MG: 0.4 CAPSULE ORAL at 17:40

## 2019-11-23 RX ADMIN — MELATONIN 9 MG: at 21:15

## 2019-11-23 RX ADMIN — FOLIC ACID 1 MG: 1 TABLET ORAL at 09:01

## 2019-11-23 RX ADMIN — LITHIUM CARBONATE 600 MG: 300 CAPSULE, GELATIN COATED ORAL at 09:01

## 2019-11-23 RX ADMIN — OLANZAPINE 15 MG: 5 TABLET, FILM COATED ORAL at 21:15

## 2019-11-23 RX ADMIN — LITHIUM CARBONATE 900 MG: 300 CAPSULE, GELATIN COATED ORAL at 21:15

## 2019-11-23 RX ADMIN — VITAMIN D, TAB 1000IU (100/BT) 1000 UNITS: 25 TAB at 09:01

## 2019-11-23 NOTE — NURSING NOTE
[phone]  Pt found to be sleeping on most of authors rounds  No acute behavioral issues noted  Q 7 min checks maintained  Fall protocol in place

## 2019-11-23 NOTE — PROGRESS NOTES
Patient compliant with medications and meals  Pleasant and cooperative this am  Denies any depression  Patient stated he is anxious because he is still here and wants to know when he is going  Patient denies any SI/HI  Irritable edge at times but control  No other concerns or problems reported this shift  Q 7 mins checks in place for safety  Will continue to monitor for behaviors and changes

## 2019-11-23 NOTE — NURSING NOTE
N [id number]     Patient present in the milieu; Affect bright on approach  Mood hypomanic  No acute behavioral outburst noted  Q 7 min checks ongoing

## 2019-11-23 NOTE — PROGRESS NOTES
Avel Wesley  ZAX:[birth year] Elizabeth King  ZQO:[id number]    DBJ:[id number]  Adm Date: 11/18/2019 0527  5:27 AM   ATT PHY: Gina Barnard, [address]         Subjective     The patient was seen after reviewing the chart and discussing the case with caring staff  Patient examined at bedside  Patient has no acute issues  Objective     Vitals:    11/23/19 0746   BP: 113/71   Pulse: 70   Resp: 18   Temp: 98 5 °F (36 9 °C)   SpO2: 98%       General Appearance: Awake and Alert  No acute distress  HEENT: Normocephalic, atraumatic  PERRLA, EOMI, MMM  Heart: RRR, no murmurs  Normal S1 and S2   Lungs: CTA bilaterally with fair air entry  Assessment     Sigrid Graham is a(n) 62y o  year old male with psychosis     1  Cardiac with history of hypertension and dyslipidemia  BP's are stable  2  Tobacco abuse   Nicotine gum PRN  3  Alcohol abuse  Thiamine, folic acid and multivitamin  4  DJD/osteoarthritis   Tylenol on as needed basis  5  Gait abnormality   Stable  6  Vitamin-D deficiency   Vitamin D3 1000U daily  7  Urinary Frequency/Urgency  Likely representing history of BPH  Will add Flomax 0 4mg once daily  Monitor BP's for evidence of orthostatic hypotension

## 2019-11-23 NOTE — PROGRESS NOTES
Progress Note - 6 [address] Franyd Day 62 y o  male MRN: [id number]  Unit/Bed#: Anay[facility] S[facility] 204-02 Encounter: [id number]    Assessment/Plan   Principal Problem:    Schizoaffective disorder, bipolar type ([address] )  Active Problems:    Cannabis abuse, continuous    Alcohol abuse, continuous      Behavior over the last 24 hours:  unchanged  Sleep: normal  Appetite: normal  Medication side effects: No  ROS: no complaints    Mental Status Evaluation:  Appearance:  disheveled   Behavior:  psychomotor retardation   Speech:  soft   Mood:  dysthymic   Affect:  mood-congruent   Thought Process:  disorganized and loose associations   Thought Content:  delusions  grandiose and persecutory   Perceptual Disturbances: pt deneis   Risk Potential: Suicidal Ideations without plan   Sensorium:  person and place   Cognition:  grossly intact   Consciousness:  awake    Attention: attention span and concentration were age appropriate   Insight:  limited   Judgment: limited   Gait/Station: normal gait/station   Motor Activity: no abnormal movements     Progress Toward Goals: Pt remains rather disorganized with persecutory delusions and limited insight  Ptr not keen on Zyprexa for fear of "glaucoma"Mood is labile with an irritable edge  Pt is however social with select peers and medication compliant  Recommended Treatment:   1-Cont current treatment  2-Continue with group therapy, milieu therapy and occupational therapy  Risks, benefits and possible side effects of Medications:   Risks, benefits, and possible side effects of medications explained to patient and patient verbalizes understanding        Medications:   current meds:   Current Facility-Administered Medications   Medication Dose Route Frequency    acetaminophen (TYLENOL) tablet 650 mg  650 mg Oral Q6H PRN    acetaminophen (TYLENOL) tablet 650 mg  650 mg Oral Q4H PRN    acetaminophen (TYLENOL) tablet 975 mg  975 mg Oral Q6H PRN    aluminum-magnesium hydroxide-simethicone (MYLANTA) 200-200-20 mg/5 mL oral suspension 30 mL  30 mL Oral Q4H PRN    benztropine (COGENTIN) injection 1 mg  1 mg Intramuscular Q8H PRN    cholecalciferol (VITAMIN D3) tablet 1,000 Units  1,000 Units Oral Daily    dextromethorphan-guaiFENesin (ROBITUSSIN DM)  mg/5 mL oral syrup 10 mL  10 mL Oral Z1K PRN    folic acid (FOLVITE) tablet 1 mg  1 mg Oral Daily    hydrOXYzine HCL (ATARAX) tablet 50 mg  50 mg Oral Q6H PRN    lithium carbonate capsule 600 mg  600 mg Oral QAM    lithium carbonate capsule 900 mg  900 mg Oral HS    LORazepam (ATIVAN) 2 mg/mL injection 2 mg  2 mg Intramuscular Q6H PRN    LORazepam (ATIVAN) tablet 1 mg  1 mg Oral Q4H PRN    magnesium hydroxide (MILK OF MAGNESIA) 400 mg/5 mL oral suspension 30 mL  30 mL Oral Daily PRN    melatonin tablet 9 mg  9 mg Oral HS    nicotine polacrilex (NICORETTE) gum 2 mg  2 mg Oral Q2H PRN    OLANZapine (ZyPREXA) IM injection 10 mg  10 mg Intramuscular Daily PRN    OLANZapine (ZyPREXA) tablet 15 mg  15 mg Oral HS    OLANZapine (ZyPREXA) tablet 5 mg  5 mg Oral Q8H PRN    risperiDONE (RisperDAL M-TABS) dispersible tablet 1 mg  1 mg Oral Q8H PRN    tamsulosin (FLOMAX) capsule 0 4 mg  0 4 mg Oral Daily With Dinner    thiamine (VITAMIN B1) tablet 100 mg  100 mg Oral Daily    traZODone (DESYREL) tablet 50 mg  50 mg Oral HS PRN     Labs: I have personally reviewed pt's labs    Counseling / Coordination of Care  Total floor / unit time spent today 25 minutes  Greater than 50% of total time was spent with the patient and / or family counseling and / or coordination of care   A description of the counseling / coordination of care:

## 2019-11-23 NOTE — PLAN OF CARE
Problem: PAIN - ADULT  Goal: Verbalizes/displays adequate comfort level or baseline comfort level  Description  Interventions:  - Encourage patient to monitor pain and request assistance  - Assess pain using appropriate pain scale  - Administer analgesics based on type and severity of pain and evaluate response  - Implement non-pharmacological measures as appropriate and evaluate response  - Consider cultural and social influences on pain and pain management  - Notify physician/advanced practitioner if interventions unsuccessful or patient reports new pain  Outcome: Progressing     Problem: PSYCHOSIS  Goal: Will report no hallucinations or delusions  Description  Interventions:  - Administer medication as  ordered  - Every waking shifts and PRN assess for the presence of hallucinations and or delusions  - Assist with reality testing to support increasing orientation  - Assess if patient's hallucinations or delusions are encouraging self-harm or harm to others and intervene as appropriate  Outcome: Progressing     Problem: ANXIETY  Goal: Will report anxiety at manageable levels  Description  INTERVENTIONS:  - Administer medication as ordered  - Teach and encourage coping skills  - Provide emotional support  - Assess patient/family for anxiety and ability to cope  Outcome: Progressing  Goal: By discharge: Patient will verbalize 2 strategies to deal with anxiety  Description  Interventions:  - Identify any obvious source/trigger to anxiety  - Staff will assist patient in applying identified coping technique/skills  - Encourage attendance of scheduled groups and activities  Outcome: Progressing     Problem: Alteration in Thoughts and Perception  Goal: Treatment Goal: Gain control of psychotic behaviors/thinking, reduce/eliminate presenting symptoms and demonstrate improved reality functioning upon discharge  Outcome: Progressing  Goal: Verbalize thoughts and feelings  Description  Interventions:  - Promote a nonjudgmental and trusting relationship with the patient through active listening and therapeutic communication  - Assess patient's level of functioning, behavior and potential for risk  - Engage patient in 1 on 1 interactions  - Encourage patient to express fears, feelings, frustrations, and discuss symptoms    - [place] patient to reality, help patient recognize reality-based thinking   - Administer medications as ordered and assess for potential side effects  - Provide the patient education related to the signs and symptoms of the illness and desired effects of prescribed medications  Outcome: Progressing  Goal: Refrain from acting on delusional thinking/internal stimuli  Description  Interventions:  - Monitor patient closely, per order   - Utilize least restrictive measures   - Set reasonable limits, give positive feedback for acceptable   - Administer medications as ordered and monitor of potential side effects  Outcome: Progressing  Goal: Agree to be compliant with medication regime, as prescribed and report medication side effects  Description  Interventions:  - Offer appropriate PRN medication and supervise ingestion; conduct AIMS, as needed   Outcome: Progressing  Goal: Attend and participate in unit activities, including therapeutic, recreational, and educational groups  Description  Interventions:  -Encourage Visitation and family involvement in care  Outcome: Progressing  Goal: Recognize dysfunctional thoughts, communicate reality-based thoughts at the time of discharge  Description  Interventions:  - Provide medication and psycho-education to assist patient in compliance and developing insight into his/her illness   Outcome: Progressing  Goal: Complete daily ADLs, including personal hygiene independently, as able  Description  Interventions:  - Observe, teach, and assist patient with ADLS  - Monitor and promote a balance of rest/activity, with adequate nutrition and elimination   Outcome: Progressing Problem: Ineffective Coping  Goal: Cooperates with admission process  Description  Interventions:   - Complete admission process  Outcome: Progressing  Goal: Identifies ineffective coping skills  Outcome: Progressing  Goal: Identifies healthy coping skills  Outcome: Progressing  Goal: Demonstrates healthy coping skills  Outcome: Progressing  Goal: Participates in unit activities  Description  Interventions:  - Provide therapeutic environment   - Provide required programming   - Redirect inappropriate behaviors   Outcome: Progressing  Goal: Patient/Family participate in treatment and DC plans  Description  Interventions:  - Provide therapeutic environment  Outcome: Progressing  Goal: Patient/Family verbalizes awareness of resources  Outcome: Progressing  Goal: Understands least restrictive measures  Description  Interventions:  - Utilize least restrictive behavior  Outcome: Progressing  Goal: Free from restraint events  Description  - Utilize least restrictive measures   - Provide behavioral interventions   - Redirect inappropriate behaviors   Outcome: Progressing     Problem: Risk for Violence/Aggression Toward Others  Goal: Treatment Goal: Refrain from acts of violence/aggression during length of stay, and demonstrate improved impulse control at the time of discharge  Outcome: Progressing  Goal: Verbalize thoughts and feelings  Description  Interventions:  - Assess and re-assess patient's level of risk, every waking shift  - Engage patient in 1:1 interactions, daily, for a minimum of 15 minutes   - Allow patient to express feelings and frustrations in a safe and non-threatening manner   - Establish rapport/trust with patient   Outcome: Progressing  Goal: Refrain from harming others  Outcome: Progressing  Goal: Refrain from destructive acts on the environment or property  Outcome: Progressing  Goal: Control angry outbursts  Description  Interventions:  - Monitor patient closely, per order  - Ensure early verbal de-escalation  - Monitor prn medication needs  - Set reasonable/therapeutic limits, outline behavioral expectations, and consequences   - Provide a non-threatening milieu, utilizing the least restrictive interventions   Outcome: Progressing  Goal: Attend and participate in unit activities, including therapeutic, recreational, and educational groups  Description  Interventions:  - Provide therapeutic and educational activities daily, encourage attendance and participation, and document same in the medical record   Outcome: Progressing  Goal: Identify appropriate positive anger management techniques  Description  Interventions:  - Offer anger management and coping skills groups   - Staff will provide positive feedback for appropriate anger control  Outcome: Progressing

## 2019-11-23 NOTE — PROGRESS NOTES
Family brought in the following belongings:    Chubbies Shorts long sleeve top  Black t shirt  2 green t shirts  3 pr  Of underware(black-white-tan)  3 pr   Of brown socks  steelers pants  Tan jeans  Book- study bible  Dentures(upper) and case  Hearing aide batteries     in bin:  vaseline approved  To use

## 2019-11-24 PROCEDURE — 99232 SBSQ HOSP IP/OBS MODERATE 35: CPT | Performed by: NURSE PRACTITIONER

## 2019-11-24 RX ADMIN — VITAMIN D, TAB 1000IU (100/BT) 1000 UNITS: 25 TAB at 09:10

## 2019-11-24 RX ADMIN — TAMSULOSIN HYDROCHLORIDE 0.4 MG: 0.4 CAPSULE ORAL at 16:39

## 2019-11-24 RX ADMIN — MELATONIN 9 MG: at 20:46

## 2019-11-24 RX ADMIN — Medication 100 MG: at 09:11

## 2019-11-24 RX ADMIN — LITHIUM CARBONATE 600 MG: 300 CAPSULE, GELATIN COATED ORAL at 09:10

## 2019-11-24 RX ADMIN — FOLIC ACID 1 MG: 1 TABLET ORAL at 09:10

## 2019-11-24 RX ADMIN — OLANZAPINE 15 MG: 5 TABLET, FILM COATED ORAL at 20:46

## 2019-11-24 RX ADMIN — LITHIUM CARBONATE 900 MG: 300 CAPSULE, GELATIN COATED ORAL at 20:46

## 2019-11-24 NOTE — PROGRESS NOTES
Progress Note - Teddy [address] K Day 62 y o  male MRN: [id number]  Unit/Bed#Daisy Badillo 204-02 Encounter: [id number]    The patient was seen for continuing care and reviewed with treatment team   Patient irritable today, circumstantial in discussing his wishes post-discharge  He frequently talks over this provider, stating he has felt like a prisoner and has been shuffled through the system for 40 years; he wants to be in control of his life and not have people 'shelter' him  In discussing sheltering versus love, he states he knows his family loves him but he does not want help  He is focused on freedom and his past  service  Upon discussing medications, agitation increases and he verbalizes this, stating "now I"m getting agitated"  He reports taking medications as prescribed and denies side effects      Mental Status Evaluation:  Appearance:  disheveled   Behavior:  hostile   Fund of knowledge  vocabulary Average   Speech:   Language: Loud and Normal rate  No overt abnormality   Mood:  irritable   Affect:   Associations: inappropriate and broad  Tightly connected   Thought Process:  Goal directed and coherent   Thought Content:  Delusions of persecution   Perceptual Disturbances: Denies hallucinations and does not appear to be responding to internal stimuli   Risk Potential: No suicidal or homicidal ideation   Orientation  Oriented to place and person   Memory No deficits   Attention/Concentration attention span and concentration were age appropriate   Insight:  limited   Judgment: Limited   Gait/Station: slow   Motor Activity: No abnormal movement noted     Vitals:    11/24/19 0700   BP: 131/77   Pulse: 81   Resp: 18   Temp: 98 4 °F (36 9 °C)   SpO2: 97%     Progress Toward Goals: minimal insight    Assessment/Plan    Principal Problem:    Schizoaffective disorder, bipolar type (HCC)  Active Problems:    Cannabis abuse, continuous    Alcohol abuse, continuous    Plan:   Continue q 7 minute safety checks   Encourage group and milieu therapy  Continue current medications  Discharge planning ongoing    Recommended Treatment: Continue with pharmacotherapy, group therapy, milieu therapy and occupational therapy  The patient will be maintained on the following medications:    Current Facility-Administered Medications:  acetaminophen 650 mg Oral Q6H PRN Veronica Julito, CRNP   acetaminophen 650 mg Oral Q4H PRN Veronica Julito, CRNP   acetaminophen 975 mg Oral Q6H PRN Veronica Julito, CRNP   aluminum-magnesium hydroxide-simethicone 30 mL Oral Q4H PRN Veronica Julito, CRNP   benztropine 1 mg Intramuscular Q8H PRN Veronica Julito, CRNP   cholecalciferol 1,000 Units Oral Daily [facility], [place]   dextromethorphan-guaiFENesin 10 mL Oral Q4H PRN [facility], [place]   [facility]   hydrOXYzine HCL 50 mg Oral Q6H PRN Veronica Julito, CRNP   lithium 600 mg Oral QAM Veronica [facility], CRNP   lithium carbonate 900 mg Oral HS Veronica Julito, CRNP   LORazepam 2 mg Intramuscular Q6H PRN Veronica [facility], CRNP   LORazepam 1 mg Oral Q4H PRN Veronica [facility], CRNP   magnesium hydroxide 30 mL Oral Daily PRN Veronica [facility], CRNP   melatonin 9 mg Oral HS Veronica [facility], CRNP   nicotine polacrilex 2 mg Oral Q2H PRN [facility], [place]   OLANZapine 10 mg Intramuscular Daily PRN Veronica Julito, CRNP   OLANZapine 15 mg Oral HS Veronica Julito, CRNP   OLANZapine 5 mg Oral Q8H PRN Veronica [place]o, CRNP   risperiDONE 1 mg Oral Q8H PRN Veronica [facility], CRNP   tamsulosin 0 4 mg Oral Daily With Juana Romano PA-C   thiamine 100 mg Oral Daily Marcellus Gutierrez PA-C   traZODone 50 mg Oral HS PRN Veronica [facility], CRNP       Risks, benefits and possible side effects of Medications:   Risks, benefits, and possible side effects of medications explained to patient and patient verbalizes understanding        CARLOS Cornelius  11/24/2019

## 2019-11-24 NOTE — NURSING NOTE
[id number]  Pt found to be sleeping on most of authors rounds  No acute behavioral issues noted  Q 7 min checks maintained  Fall protocol in place

## 2019-11-24 NOTE — NURSING NOTE
Pt present in the milieu; Affect bright on approach  Mood is elevated  Speech boisterous  No acute behavorial issues noted   Q 7 min checks ongoing/

## 2019-11-24 NOTE — PROGRESS NOTES
Patient compliant with 1600 medications and meals  Patient has an irritable edge this part of the shift  Walking down the coto talking to himself  Stated he does not need to be here " this is a waste of my time" no other concerns reported Q 7 mins checks in place  Will continue to monitor for behaviors and changes

## 2019-11-24 NOTE — PROGRESS NOTES
Puja Chow  DTK:[birth year] Souleymane Pedersen  SGT:[id number]    QAK:[id number]  Adm Date: 11/18/2019 0527  5:27 AM   ATT PHY: Dilcia Houstonia, [address]         Subjective     The patient was seen after reviewing the chart and discussing the case with caring staff  Patient examined at bedside  Patient has no acute issues  Objective     Vitals:    11/24/19 1500   BP: 141/95   Pulse: 82   Resp: 18   Temp: (!) 97 2 °F (36 2 °C)   SpO2: 95%       General Appearance: Awake and Alert  No acute distress  HEENT: Normocephalic, atraumatic  PERRLA, EOMI, MMM  Heart: RRR, no murmurs  Normal S1 and S2   Lungs: CTA bilaterally with fair air entry  Assessment     Miranda Graham is a(n) 62y o  year old male with psychosis     1  Cardiac with history of hypertension and dyslipidemia  BP's are stable  2  Tobacco abuse   Nicotine gum PRN  3  Alcohol abuse  Thiamine, folic acid and multivitamin  4  DJD/osteoarthritis   Tylenol on as needed basis  5  Gait abnormality   Stable  6  Vitamin-D deficiency   Vitamin D3 1000U daily  7  Urinary Frequency/Urgency  Likely representing history of BPH  Will add Flomax 0 4mg once daily  Monitor BP's for evidence of orthostatic hypotension

## 2019-11-24 NOTE — PROGRESS NOTES
Patient compliant with morning medications and meals  Pleasant and cooperative this am  Denies any depression or anxiety  No behaviors noted  No concerns or problems reported this am  Q 7 mins checks in place for safety  Will continue to monitor for behaviors and changes  Late entry: patient reported he had a fall yesterday and did not report it to anyone  No injuries noted and no complains   Patient stated he "stumble and felt on his side"

## 2019-11-25 PROCEDURE — 99232 SBSQ HOSP IP/OBS MODERATE 35: CPT | Performed by: NURSE PRACTITIONER

## 2019-11-25 RX ORDER — OLANZAPINE 5 MG/1
5 TABLET ORAL DAILY
Status: DISCONTINUED | OUTPATIENT
Start: 2019-11-25 | End: 2019-11-29 | Stop reason: HOSPADM

## 2019-11-25 RX ADMIN — LITHIUM CARBONATE 900 MG: 300 CAPSULE, GELATIN COATED ORAL at 21:23

## 2019-11-25 RX ADMIN — LITHIUM CARBONATE 600 MG: 300 CAPSULE, GELATIN COATED ORAL at 08:14

## 2019-11-25 RX ADMIN — OLANZAPINE 5 MG: 5 TABLET, FILM COATED ORAL at 12:00

## 2019-11-25 RX ADMIN — TAMSULOSIN HYDROCHLORIDE 0.4 MG: 0.4 CAPSULE ORAL at 16:58

## 2019-11-25 RX ADMIN — OLANZAPINE 15 MG: 5 TABLET, FILM COATED ORAL at 21:23

## 2019-11-25 RX ADMIN — VITAMIN D, TAB 1000IU (100/BT) 1000 UNITS: 25 TAB at 08:14

## 2019-11-25 RX ADMIN — MELATONIN 9 MG: at 21:23

## 2019-11-25 RX ADMIN — FOLIC ACID 1 MG: 1 TABLET ORAL at 08:14

## 2019-11-25 RX ADMIN — Medication 100 MG: at 08:14

## 2019-11-25 NOTE — PLAN OF CARE
Problem: PAIN - ADULT  Goal: Verbalizes/displays adequate comfort level or baseline comfort level  Description  Interventions:  - Encourage patient to monitor pain and request assistance  - Assess pain using appropriate pain scale  - Administer analgesics based on type and severity of pain and evaluate response  - Implement non-pharmacological measures as appropriate and evaluate response  - Consider cultural and social influences on pain and pain management  - Notify physician/advanced practitioner if interventions unsuccessful or patient reports new pain  Outcome: Progressing     Problem: PSYCHOSIS  Goal: Will report no hallucinations or delusions  Description  Interventions:  - Administer medication as  ordered  - Every waking shifts and PRN assess for the presence of hallucinations and or delusions  - Assist with reality testing to support increasing orientation  - Assess if patient's hallucinations or delusions are encouraging self-harm or harm to others and intervene as appropriate  Outcome: Progressing     Problem: ANXIETY  Goal: Will report anxiety at manageable levels  Description  INTERVENTIONS:  - Administer medication as ordered  - Teach and encourage coping skills  - Provide emotional support  - Assess patient/family for anxiety and ability to cope  Outcome: Progressing  Goal: By discharge: Patient will verbalize 2 strategies to deal with anxiety  Description  Interventions:  - Identify any obvious source/trigger to anxiety  - Staff will assist patient in applying identified coping technique/skills  - Encourage attendance of scheduled groups and activities  Outcome: Progressing     Problem: Alteration in Thoughts and Perception  Goal: Treatment Goal: Gain control of psychotic behaviors/thinking, reduce/eliminate presenting symptoms and demonstrate improved reality functioning upon discharge  Outcome: Progressing  Goal: Verbalize thoughts and feelings  Description  Interventions:  - Promote a nonjudgmental and trusting relationship with the patient through active listening and therapeutic communication  - Assess patient's level of functioning, behavior and potential for risk  - Engage patient in 1 on 1 interactions  - Encourage patient to express fears, feelings, frustrations, and discuss symptoms    - [place] patient to reality, help patient recognize reality-based thinking   - Administer medications as ordered and assess for potential side effects  - Provide the patient education related to the signs and symptoms of the illness and desired effects of prescribed medications  Outcome: Progressing  Goal: Refrain from acting on delusional thinking/internal stimuli  Description  Interventions:  - Monitor patient closely, per order   - Utilize least restrictive measures   - Set reasonable limits, give positive feedback for acceptable   - Administer medications as ordered and monitor of potential side effects  Outcome: Progressing  Goal: Agree to be compliant with medication regime, as prescribed and report medication side effects  Description  Interventions:  - Offer appropriate PRN medication and supervise ingestion; conduct AIMS, as needed   Outcome: Progressing  Goal: Attend and participate in unit activities, including therapeutic, recreational, and educational groups  Description  Interventions:  -Encourage Visitation and family involvement in care  Outcome: Progressing  Goal: Recognize dysfunctional thoughts, communicate reality-based thoughts at the time of discharge  Description  Interventions:  - Provide medication and psycho-education to assist patient in compliance and developing insight into his/her illness   Outcome: Progressing  Goal: Complete daily ADLs, including personal hygiene independently, as able  Description  Interventions:  - Observe, teach, and assist patient with ADLS  - Monitor and promote a balance of rest/activity, with adequate nutrition and elimination   Outcome: Progressing Problem: Ineffective Coping  Goal: Cooperates with admission process  Description  Interventions:   - Complete admission process  Outcome: Progressing  Goal: Identifies ineffective coping skills  Outcome: Progressing  Goal: Identifies healthy coping skills  Outcome: Progressing  Goal: Demonstrates healthy coping skills  Outcome: Progressing  Goal: Participates in unit activities  Description  Interventions:  - Provide therapeutic environment   - Provide required programming   - Redirect inappropriate behaviors   Outcome: Progressing  Goal: Patient/Family participate in treatment and DC plans  Description  Interventions:  - Provide therapeutic environment  Outcome: Progressing  Goal: Patient/Family verbalizes awareness of resources  Outcome: Progressing  Goal: Understands least restrictive measures  Description  Interventions:  - Utilize least restrictive behavior  Outcome: Progressing  Goal: Free from restraint events  Description  - Utilize least restrictive measures   - Provide behavioral interventions   - Redirect inappropriate behaviors   Outcome: Progressing     Problem: Risk for Violence/Aggression Toward Others  Goal: Treatment Goal: Refrain from acts of violence/aggression during length of stay, and demonstrate improved impulse control at the time of discharge  Outcome: Progressing  Goal: Verbalize thoughts and feelings  Description  Interventions:  - Assess and re-assess patient's level of risk, every waking shift  - Engage patient in 1:1 interactions, daily, for a minimum of 15 minutes   - Allow patient to express feelings and frustrations in a safe and non-threatening manner   - Establish rapport/trust with patient   Outcome: Progressing  Goal: Refrain from harming others  Outcome: Progressing  Goal: Refrain from destructive acts on the environment or property  Outcome: Progressing  Goal: Control angry outbursts  Description  Interventions:  - Monitor patient closely, per order  - Ensure early verbal de-escalation  - Monitor prn medication needs  - Set reasonable/therapeutic limits, outline behavioral expectations, and consequences   - Provide a non-threatening milieu, utilizing the least restrictive interventions   Outcome: Progressing  Goal: Attend and participate in unit activities, including therapeutic, recreational, and educational groups  Description  Interventions:  - Provide therapeutic and educational activities daily, encourage attendance and participation, and document same in the medical record   Outcome: Progressing  Goal: Identify appropriate positive anger management techniques  Description  Interventions:  - Offer anger management and coping skills groups   - Staff will provide positive feedback for appropriate anger control  Outcome: Progressing

## 2019-11-25 NOTE — PROGRESS NOTES
[id number]  Patient calm and cooperative this shift  Is somewhat loud in conversation but maintains control  Compliant with medications  Will continue monitoring

## 2019-11-25 NOTE — PROGRESS NOTES
11/25/19 0951   Team Meeting   Meeting Type Daily Rounds   Team Members Present   Team Members Present Physician;Nurse;;; Occupational Therapist   Physician Team Member Dr Derrick Mckinney MD, [facility]ame[facility] Adams, [address] [place] [address]    Nursing Team Member Mat Pat RN    Care Management Team Member Bakari Salazar , [facility]    Social Work Team Member Lynda Edwards [facility]    OT Team Member Siobhan Hernandez [place]   Patient/Family Present   Patient Present No   Patient's Family Present No     Good weekend - can be irritable at times; slept; no prns

## 2019-11-25 NOTE — NURSING NOTE
[phone]  Pt found to be sleeping on most of authors rounds  No acute behavioral issues noted  Q 7 min checks maintained  Fall protocol in place

## 2019-11-25 NOTE — PLAN OF CARE
Problem: Ineffective Coping  Goal: Participates in unit activities  Description  Interventions:  - Provide therapeutic environment   - Provide required programming   - Redirect inappropriate behaviors   Outcome: Progressing   Patient joins groups and openly shares; at times he speaks of things that get him angry or upset  When he feels this he will walk away and rejoin group when controlled again

## 2019-11-25 NOTE — PROGRESS NOTES
Progress Note - Mikael Dhillon 67 Day 62 y o  male MRN: [id number]   Unit/Bed#: [facility] 204-02 Encounter: [id number]    Behavior over the last 24 hours: unchanged  Donald Thurman is still loud at times, irritable, paranoid - insists he is fine and wants to know why he is still in hospital  When explained he continues with significant mood lability, irritability and parnoia - he becomes angry and walks away  Will increase Zyprexa 5 mg in AM and 15 mg HS     Participates more appropriately in milieu  Attends groups  Sleep: improved  Appetite: normal  Medication side effects: No   ROS: all other systems are negative    Mental Status Evaluation:    Appearance:  marginal hygiene   Behavior:  angry   Speech:  loud   Mood:  irritable   Affect:  normal range and intensity   Thought Process:  circumstantial, tangential   Associations: concrete associations   Thought Content:  persecutory delusions   Perceptual Disturbances: denies when asked, but talks to self at times   Risk Potential: Suicidal ideation - None  Homicidal ideation - None  Potential for aggression - No   Sensorium:  oriented to person, place, time/date and situation   Memory:  recent and remote memory grossly intact   Consciousness:  alert and awake   Attention: attention span and concentration appear shorter than expected for age   Insight:  limited   Judgment: limited   Gait/Station: normal gait/station, normal balance   Motor Activity: no abnormal movements     Vital signs in last 24 hours:    Temp:  [97 2 °F (36 2 °C)-98 7 °F (37 1 °C)] 98 6 °F (37 °C)  HR:  [77-83] 77  Resp:  [18-20] 18  BP: (114-141)/(79-95) 114/79    Laboratory results: I have personally reviewed all pertinent laboratory/tests results      Suicide/Homicide Risk Assessment:    Risk of Harm to Self:   Current Specific Risk Factors include: current psychotic symptoms  Protective Factors: no current suicidal ideation, ability to communicate with staff on the unit, able to contract for safety on the unit, taking medications as ordered on the unit  Based on today's assessment, [place] [facility] presents the following risk of harm to self: low    Risk of Harm to Others:  Current Specific Risk Factors include: current psychotic symptoms  Protective Factors: no current homicidal ideation, able to communicate with staff on the unit, compliant with medications on the unit as ordered, compliant with unit milieu  Based on today's assessment, [place] [facility] presents the following risk of harm to others: low    The following interventions are recommended: behavioral checks every 7 minutes, continued hospitalization on locked unit     Progress Toward Goals: still has psychotic symptoms    Assessment/Plan   Principal Problem:    Schizoaffective disorder, bipolar type ([facility] [address] )  Active Problems:    Cannabis abuse, continuous    Alcohol abuse, continuous    Recommended Treatment:     Planned medication and treatment changes:     All current active medications have been reviewed  Encourage group therapy, milieu therapy and occupational therapy  Behavioral Health checks every 7 minutes  Increase Zyprexa 5 in AM    Current Facility-Administered Medications:  acetaminophen 650 mg Oral Q6H PRN Koreen Cockayne, CRNP   acetaminophen 650 mg Oral Q4H PRN Koreen Cockayne, CRNP   acetaminophen 975 mg Oral Q6H PRN Koreen Cockayne, CRNP   aluminum-magnesium hydroxide-simethicone 30 mL Oral Q4H PRN Koreen Cockayne, CRNP   benztropine 1 mg Intramuscular Q8H PRN Koreen Cockayne, CRNP   cholecalciferol 1,000 Units Oral Daily Elyce Prader Rockovits, PA-C   dextromethorphan-guaiFENesin 10 mL Oral Q4H PRN Elyce Prader Rockovits, PA-C   folic acid 1 mg Oral Daily Jose Raul Hernandez, PA-C   hydrOXYzine HCL 50 mg Oral Q6H PRN Koreen Cockayne, CRNP   lithium 600 mg Oral QAM Koreen Cockayne, CRNP   lithium carbonate 900 mg Oral HS Koreen Cockayne, CRNP   LORazepam 2 mg Intramuscular Q6H PRN Koreen Cockayne, CRNP   LORazepam 1 mg Oral Q4H PRN Koreen Cockayne, CRNP   magnesium hydroxide 30 mL Oral Daily PRN Von Carl CARLOS Gomez   melatonin 9 mg Oral HS Kathleen Gorman, CARLOS   nicotine polacrilex 2 mg Oral Q2H PRN Nelson Hernandez PA-C   OLANZapine 10 mg Intramuscular Daily PRN CARLOS Olguin   OLANZapine 15 mg Oral HS Kathleen Gorman, CARLOS   OLANZapine 5 mg Oral Q8H PRN Kathleen Gorman, CARLOS   OLANZapine 5 mg Oral Daily CARLOS Olguin   risperiDONE 1 mg Oral Q8H PRN CARLOS Olguin   tamsulosin 0 4 mg Oral Daily With Ye Conner PA-C   thiamine 100 mg Oral Daily Pati Graham PA-C   traZODone 50 mg Oral HS PRN CARLOS Olguin       Risks / Benefits of Treatment:    Risks, benefits, and possible side effects of medications explained to patient and patient verbalizes understanding and agreement for treatment  Counseling / Coordination of Care:    Patient's progress discussed with staff in treatment team meeting  Medications, treatment progress and treatment plan reviewed with patient      CARLOS Olguin 11/25/19

## 2019-11-25 NOTE — NURSING NOTE
Pt present in the milieu; Affect bright on approach  Mood is jovial  Thought process somewhat disorganized  No acute behavioral issues noted  Q 7 min checks ongoing

## 2019-11-25 NOTE — PROGRESS NOTES
The patient noted to be visible on the unit and in the milieu upon initial assessment and successive rounds throughout shift  The patient noted to be pleasant and cooperative upon approach  The patient noted to exhibit intermittent paranoia regarding discharge plan and medications administered  The patient's speech noted to intermittently be loud  The patient denies anxiety, depression, si, hi, and hallucinations  The patient compliant with meals and medications  The patient in attendance at offered groups throughout the morning  The patient denies complaints of pain  Will continue to monitor

## 2019-11-25 NOTE — SOCIAL WORK
Sw met with patient in small consult room; Pt continues to be labile in mood with irritability present in conversation;   Pt states that he is not happy with SW for insisting to make appointments on his behalf;  SW agreed to work with patient to schedule appts while pt present;  Pt got up from table and walked out without further conversation

## 2019-11-25 NOTE — NURSING NOTE
Pt upset this morning stating that his sink will not make the water hot enough to clean his dentures  Pt provided dissolving tap and warm water  Pt stating that he needs the water between 150 and 180 degrees  Staff explained that for safety reasons that water that hot cannot be provided  Offered to clean dentures for patient declined  Pt behaviors controlled at this time  States he is "blowing off steam" Ongoing monitoring will continue

## 2019-11-26 PROCEDURE — 99232 SBSQ HOSP IP/OBS MODERATE 35: CPT | Performed by: NURSE PRACTITIONER

## 2019-11-26 RX ORDER — LORAZEPAM 1 MG/1
1 TABLET ORAL 3 TIMES DAILY
Status: DISCONTINUED | OUTPATIENT
Start: 2019-11-26 | End: 2019-11-27

## 2019-11-26 RX ADMIN — OLANZAPINE 15 MG: 5 TABLET, FILM COATED ORAL at 21:03

## 2019-11-26 RX ADMIN — LITHIUM CARBONATE 600 MG: 300 CAPSULE, GELATIN COATED ORAL at 08:28

## 2019-11-26 RX ADMIN — VITAMIN D, TAB 1000IU (100/BT) 1000 UNITS: 25 TAB at 08:28

## 2019-11-26 RX ADMIN — LORAZEPAM 1 MG: 1 TABLET ORAL at 16:12

## 2019-11-26 RX ADMIN — Medication 100 MG: at 08:28

## 2019-11-26 RX ADMIN — FOLIC ACID 1 MG: 1 TABLET ORAL at 08:28

## 2019-11-26 RX ADMIN — LORAZEPAM 1 MG: 1 TABLET ORAL at 21:02

## 2019-11-26 RX ADMIN — MELATONIN 9 MG: at 21:02

## 2019-11-26 RX ADMIN — LITHIUM CARBONATE 900 MG: 300 CAPSULE, GELATIN COATED ORAL at 21:02

## 2019-11-26 RX ADMIN — TAMSULOSIN HYDROCHLORIDE 0.4 MG: 0.4 CAPSULE ORAL at 16:12

## 2019-11-26 RX ADMIN — OLANZAPINE 5 MG: 5 TABLET, FILM COATED ORAL at 08:28

## 2019-11-26 NOTE — PROGRESS NOTES
The patient noted to be visible on the unit and in the milieu upon rounds throughout shift  The patient noted to exhibit mood lability throughout the morning  The patient intermittently noted to become irritable and agitated when discussing physician's and medications  The patient denies anxiety, depression, si, hi, and hallucinations  The patient noted be compliant with meals and medications  The patient participates in morning group  The patient denies complaints of pain  Will continue to monitor

## 2019-11-26 NOTE — PROGRESS NOTES
The patient noted to have increased periods of lability, irritability, agitation, and paranoia throughout shift  RN offered support, therapeutic communication, encouraged patient to express feelings, and offered patient as needed medication for anxiety and agitation  The the patient declined as needed medications stating "the only thing that can help me now is my freedom " Rn notified Dr Butch Pathak of the patient's increased episodes of irritability and agitation  Will continue to monitor

## 2019-11-26 NOTE — PROGRESS NOTES
The patient noted to be visible on the unit and in the milieu  The patient noted to be pleasant with intermittent periods of irritability  The patient denies anxiety, depression, si, hi, and hallucinations  The patient compliant with medication regimen  Rn provided education on ordered ativan regimen, the patient verbalized positive understanding of education provided  Will continue to monitor

## 2019-11-26 NOTE — PROGRESS NOTES
Progress Note - Mikael Dhillon 69 Day 62 y o  male MRN: [id number]   Unit/Bed#: [facility] 204-02 Encounter: [id number]    Behavior over the last 24 hours: unchanged  Devonne Led continues with mood lability/irritability  Discussed with Dr Julio Ken and Payton Brar medication options, and will add ativan 1 mg TID which helped with agitation on previous admission  He is more agreeable with medication regimen this afternoon  Participates more appropriately in milieu  Attends groups  Sleep: improved  Appetite: normal  Medication side effects: No   ROS: all other systems are negative    Mental Status Evaluation:    Appearance:  marginal hygiene   Behavior:  bizarre   Speech:  loud   Mood:  irritable   Affect:  labile   Thought Process:  circumstantial   Associations: concrete associations   Thought Content:  some paranoia   Perceptual Disturbances: denies when asked, but talks to self at times   Risk Potential: Suicidal ideation - None  Homicidal ideation - None  Potential for aggression - No   Sensorium:  oriented to person, place, time/date and situation   Memory:  recent and remote memory grossly intact   Consciousness:  alert and awake   Attention: attention span and concentration appear shorter than expected for age   Insight:  limited   Judgment: limited   Gait/Station: normal gait/station, normal balance   Motor Activity: no abnormal movements     Vital signs in last 24 hours:    Temp:  [97 9 °F (36 6 °C)-99 3 °F (37 4 °C)] 97 9 °F (36 6 °C)  HR:  [64-94] 94  Resp:  [18-20] 20  BP: (117-140)/(68-85) 133/85    Laboratory results: I have personally reviewed all pertinent laboratory/tests results      Suicide/Homicide Risk Assessment:    Risk of Harm to Self:   Current Specific Risk Factors include: current unstable mood  Protective Factors: no current suicidal ideation, ability to communicate with staff on the unit, able to contract for safety on the unit, taking medications as ordered on the unit  Based on today's assessment, Brissa Leal presents the following risk of harm to self: none    Risk of Harm to Others:  Current Specific Risk Factors include: unstable mood disorder  Protective Factors: no current homicidal ideation, able to communicate with staff on the unit, compliant with medications on the unit as ordered, compliant with unit milieu, follows staff redirection  Based on today's assessment, Brissa Leal presents the following risk of harm to others: minimal    The following interventions are recommended: behavioral checks every 7 minutes, continued hospitalization on locked unit     Progress Toward Goals: some improvement, still with irritability and mood lability    Assessment/Plan   Principal Problem:    Schizoaffective disorder, bipolar type ([facility] [address] )  Active Problems:    Cannabis abuse, continuous    Alcohol abuse, continuous    Recommended Treatment:     Planned medication and treatment changes:     All current active medications have been reviewed  Encourage group therapy, milieu therapy and occupational therapy  Behavioral Health checks every 7 minutes  Add Ativan 1 mg TID    Current Facility-Administered Medications:  acetaminophen 650 mg Oral Q6H PRN Jong Klarissa, CRNP   acetaminophen 650 mg Oral Q4H PRN Jong Klarissa, CRNP   acetaminophen 975 mg Oral Q6H PRN Jong Klarissa, CRNP   aluminum-magnesium hydroxide-simethicone 30 mL Oral Q4H PRN Jong Klarissa, CRNP   benztropine 1 mg Intramuscular Q8H PRN Jong Klarissa, CRNP   cholecalciferol 1,000 Units Oral Daily Solomon Hernandez PA-C   dextromethorphan-guaiFENesin 10 mL Oral Q4H PRN Solomon Hernandez PA-C   folic acid 1 mg Oral Daily Jose Raul Hernandez PA-C   hydrOXYzine HCL 50 mg Oral Q6H PRN Jong Klarissa, CRNP   lithium 600 mg Oral QAM Jong Klarissa, CRNP   lithium carbonate 900 mg Oral HS Jong Klarissa, CRNP   LORazepam 2 mg Intramuscular Q6H PRN Jong Klarissa, CRNP   LORazepam 1 mg Oral Q4H PRN Jong Klarissa, CRNP   LORazepam 1 mg Oral TID Jong Klarissa, CRNP   magnesium hydroxide 30 mL Oral Daily PRN Colleen Lofton, CARLOS   melatonin 9 mg Oral HS Colleen Lofton, CARLOS   nicotine polacrilex 2 mg Oral Q2H PRN Cee Hernandez PA-C   OLANZapine 10 mg Intramuscular Daily PRN Cloleen Lofton, CARLOS   OLANZapine 15 mg Oral HS Colleen Lofton, CRMILI   OLANZapine 5 mg Oral Q8H PRN Colleen Lofton, CRMILI   OLANZapine 5 mg Oral Daily Colleen Lofton, CARLOS   risperiDONE 1 mg Oral Q8H PRN Colleen Lofton, CARLOS   tamsulosin 0 4 mg Oral Daily With Dinner Sonido Benavides PA-C   thiamine 100 mg Oral Daily Sonido Benavides PA-C   traZODone 50 mg Oral HS PRN CARLOS Garcia       Risks / Benefits of Treatment:    Risks, benefits, and possible side effects of medications explained to patient and patient verbalizes understanding and agreement for treatment  Counseling / Coordination of Care:    Patient's progress discussed with staff in treatment team meeting  Medications, treatment progress and treatment plan reviewed with patient      CARLOS Garcia 11/26/19

## 2019-11-26 NOTE — SOCIAL WORK
SW approached by patient in hallway; Pt continues to be confrontational with SW and labile in mood/affect; Pt states "you can make this phone call without me present    Call [employer] and let them know of my current address which is the same address it's always been and get them to send my money to me electronically"; pt then walked away from SW with no further commentary or discussion

## 2019-11-26 NOTE — PROGRESS NOTES
Patient has been sleeping well after taking a shower earlier tonight  No behavior problems  Q 7 minute safety checks maintained

## 2019-11-26 NOTE — SOCIAL WORK
Sw met with patient in main dining room; Pt smiling with contact and states "I got good news today - I'm going home on Friday"; Pt agreeable to meeting with SW to schedule outpatient appointments as well as calling pt mother to schedule discharge;  SW will  meds on Friday to assist with medication issues / lag time with [facility] distributed meds - pt agreeable;   No additional questions or concerns at this time

## 2019-11-26 NOTE — PROGRESS NOTES
Patient awake, showered, very loud talking to himself in his room  Offered prn for anxiety/sleep but patient refused  Will continue to monitor behavior

## 2019-11-26 NOTE — PROGRESS NOTES
Patient has been out in the milieu this evening  Bright, pleasant and social  Attends unit activities and enjoys reading in his room  Did become loud on the phone but calmed right down after the call ended  Med compliant  Pleasant and cooperative with staff  Will continue to observe

## 2019-11-26 NOTE — PROGRESS NOTES
Shan Chavez  AYM:[birth year] Rex Thomas  QZW:[id number]    KZD:[id number]  Adm Date: 11/18/2019 0527  5:27 AM   ATT PHY: Bucky Rinne, [address]         Subjective     The patient was seen after reviewing the chart and discussing the case with caring staff  Patient examined at bedside  Patient has no acute issues  Objective     Vitals:    11/26/19 0733   BP: 133/85   Pulse: 94   Resp: 20   Temp: 97 9 °F (36 6 °C)   SpO2: 97%       General Appearance: Awake and Alert  No acute distress  HEENT: Normocephalic, atraumatic  PERRLA, EOMI, MMM  Heart: RRR, no murmurs  Normal S1 and S2   Lungs: CTA bilaterally with fair air entry  Assessment     Sarah Graham is a(n) 62y o  year old male with psychosis     1  Cardiac with history of hypertension and dyslipidemia  BP's are stable  2  Tobacco abuse   Nicotine gum PRN  3  Alcohol abuse  Thiamine, folic acid and multivitamin  4  DJD/osteoarthritis   Tylenol on as needed basis  5  Gait abnormality   Stable  6  Vitamin-D deficiency   Vitamin D3 1000U daily  7  Urinary Frequency/Urgency  Likely representing history of BPH  Flomax 0 4mg once daily  The patient was discussed with Dr Tamar Ordonez and he is in agreement with the above note

## 2019-11-26 NOTE — CMS CERTIFICATION NOTE
Recertification: Based upon physical, mental and social evaluations, I certify that inpatient psychiatric services continue to be medically necessary for this patient for a duration of 15 midnights for the treatment of Schizoaffective disorder, bipolar type [facility])   Available alternative community resources still do not meet the patient's mental health care needs  I further attest that an established written individualized plan of care has been updated and is outlined in the patient's medical records

## 2019-11-26 NOTE — PROGRESS NOTES
11/26/19 0951   Team Meeting   Meeting Type Daily Rounds   Patient/Family Present   Patient Present No   Patient's Family Present No     Daily Psychiatric Rounds    Team Members Present:    CARLOS Muñoz MD, MS,NCC,LPC  Shanae Shepard, MSW  Patrick negron, [facility]  Trudi Carranza, RN  Sommer Moyer, ADRIEN    Discussion:      Labile, redirectable  Loud  Daily Zyprexa 5 added to regimen  Medication compliant  Irritable at times  Discharge planning ongoing  Providers to discuss possibility of adding a mood stabilizer with patient   May consider [facility] as a possible placement option

## 2019-11-27 PROCEDURE — 99232 SBSQ HOSP IP/OBS MODERATE 35: CPT | Performed by: NURSE PRACTITIONER

## 2019-11-27 RX ORDER — PHENOL 1.4 %
10 AEROSOL, SPRAY (ML) MUCOUS MEMBRANE
Qty: 30 TABLET | Refills: 0 | Status: SHIPPED | OUTPATIENT
Start: 2019-11-27 | End: 2020-09-21 | Stop reason: HOSPADM

## 2019-11-27 RX ORDER — FOLIC ACID 1 MG/1
1 TABLET ORAL DAILY
Qty: 30 TABLET | Refills: 0 | Status: SHIPPED | OUTPATIENT
Start: 2019-11-27 | End: 2020-09-21 | Stop reason: HOSPADM

## 2019-11-27 RX ORDER — GUAIFENESIN/DEXTROMETHORPHAN 100-10MG/5
10 SYRUP ORAL EVERY 4 HOURS PRN
Qty: 118 ML | Refills: 0 | Status: SHIPPED | OUTPATIENT
Start: 2019-11-27 | End: 2020-09-21 | Stop reason: HOSPADM

## 2019-11-27 RX ORDER — LANOLIN ALCOHOL/MO/W.PET/CERES
100 CREAM (GRAM) TOPICAL DAILY
Qty: 30 TABLET | Refills: 0 | Status: SHIPPED | OUTPATIENT
Start: 2019-11-27 | End: 2020-09-21 | Stop reason: HOSPADM

## 2019-11-27 RX ORDER — MELATONIN
1000 DAILY
Qty: 30 TABLET | Refills: 0 | Status: SHIPPED | OUTPATIENT
Start: 2019-11-27 | End: 2020-09-21 | Stop reason: HOSPADM

## 2019-11-27 RX ORDER — LORAZEPAM 0.5 MG/1
0.5 TABLET ORAL 3 TIMES DAILY
Status: DISCONTINUED | OUTPATIENT
Start: 2019-11-27 | End: 2019-11-29 | Stop reason: HOSPADM

## 2019-11-27 RX ORDER — TAMSULOSIN HYDROCHLORIDE 0.4 MG/1
0.4 CAPSULE ORAL
Qty: 30 CAPSULE | Refills: 0 | Status: SHIPPED | OUTPATIENT
Start: 2019-11-27 | End: 2020-09-21 | Stop reason: HOSPADM

## 2019-11-27 RX ADMIN — MELATONIN 9 MG: at 21:04

## 2019-11-27 RX ADMIN — LORAZEPAM 0.5 MG: 0.5 TABLET ORAL at 16:19

## 2019-11-27 RX ADMIN — VITAMIN D, TAB 1000IU (100/BT) 1000 UNITS: 25 TAB at 08:30

## 2019-11-27 RX ADMIN — TAMSULOSIN HYDROCHLORIDE 0.4 MG: 0.4 CAPSULE ORAL at 16:19

## 2019-11-27 RX ADMIN — OLANZAPINE 15 MG: 5 TABLET, FILM COATED ORAL at 21:05

## 2019-11-27 RX ADMIN — FOLIC ACID 1 MG: 1 TABLET ORAL at 08:31

## 2019-11-27 RX ADMIN — LORAZEPAM 1 MG: 1 TABLET ORAL at 08:30

## 2019-11-27 RX ADMIN — LORAZEPAM 0.5 MG: 0.5 TABLET ORAL at 21:04

## 2019-11-27 RX ADMIN — LITHIUM CARBONATE 600 MG: 300 CAPSULE, GELATIN COATED ORAL at 08:30

## 2019-11-27 RX ADMIN — LITHIUM CARBONATE 900 MG: 300 CAPSULE, GELATIN COATED ORAL at 21:04

## 2019-11-27 RX ADMIN — OLANZAPINE 5 MG: 5 TABLET, FILM COATED ORAL at 08:31

## 2019-11-27 RX ADMIN — Medication 100 MG: at 08:31

## 2019-11-27 NOTE — PROGRESS NOTES
Lucio Bhatt  ZBU:[birth year] Marielle Sizer  FCQ:[id number]    [id number]:[id number]  Adm Date: 11/18/2019 0527  5:27 AM   ATT PHY: Mihaela Gardner, [address]         Subjective     The patient was seen after reviewing the chart and discussing the case with caring staff  Patient examined at bedside  Patient has no acute issues  Objective     Vitals:    11/27/19 0713   BP: 132/83   Pulse: 73   Resp: 20   Temp: 98 1 °F (36 7 °C)   SpO2: 96%       General Appearance: Awake and Alert  No acute distress  HEENT: Normocephalic, atraumatic  PERRLA, EOMI, MMM  Heart: RRR, no murmurs  Normal S1 and S2   Lungs: CTA bilaterally with fair air entry  Assessment     Willam Marking Day is a(n) 62y o  year old male with psychosis     1  Cardiac with history of hypertension and dyslipidemia  BP's are stable  2  Tobacco abuse   Nicotine gum PRN  3  Alcohol abuse  Thiamine, folic acid and multivitamin  4  DJD/osteoarthritis   Tylenol on as needed basis  5  Gait abnormality   Stable  6  Vitamin-D deficiency   Vitamin D3 1000U daily  7  Urinary Frequency/Urgency  Likely representing history of BPH  Flomax 0 4mg once daily

## 2019-11-27 NOTE — PROGRESS NOTES
Progress Note - Mikael Dhillon 67 Day 62 y o  male MRN: [id number]   Unit/Bed#: [facility] 204-02 Encounter: [id number]    Behavior over the last 24 hours: improved  Jenness Height has been calmer since addition of ativan  He does appear tired this morning and will reduce ativan to 0 5 mg  Has otherwise been compliant with medications  Still with poor insight and underlying paranoia and irritable edge  Was agitated yesterday  Sleep: normal  Appetite: normal  Medication side effects: No   ROS: all other systems are negative    Mental Status Evaluation:    Appearance:  marginal hygiene   Behavior:  bizarre   Speech:  loud   Mood:  irritable   Affect:  labile   Thought Process:  circumstantial, tangential   Associations: concrete associations   Thought Content:  some paranoia   Perceptual Disturbances: denies when asked, but talks to self at times   Risk Potential: Suicidal ideation - None  Homicidal ideation - None  Potential for aggression - No   Sensorium:  oriented to person, place, time/date and situation   Memory:  recent and remote memory grossly intact   Consciousness:  alert and awake   Attention: attention span and concentration appear shorter than expected for age   Insight:  limited   Judgment: limited   Gait/Station: normal gait/station, normal balance   Motor Activity: no abnormal movements     Vital signs in last 24 hours:    Temp:  [98 1 °F (36 7 °C)-98 4 °F (36 9 °C)] 98 1 °F (36 7 °C)  HR:  [61-75] 73  Resp:  [16-20] 20  BP: (114-135)/(63-83) 132/83    Laboratory results: I have personally reviewed all pertinent laboratory/tests results      Suicide/Homicide Risk Assessment:    Risk of Harm to Self:   Current Specific Risk Factors include: current unstable mood  Protective Factors: no current suicidal ideation, ability to communicate with staff on the unit, able to contract for safety on the unit, taking medications as ordered on the unit  Based on today's assessment, Jenness Height presents the following risk of harm to self: low    Risk of Harm to Others:  Current Specific Risk Factors include: poor impulse control  Protective Factors: no current homicidal ideation, able to communicate with staff on the unit, compliant with medications on the unit as ordered, follows staff redirection  Based on today's Sonia Liu presents the following risk of harm to others: low    The following interventions are recommended: behavioral checks every 7 minutes, continued hospitalization on locked unit     Progress Toward Goals: gradual improvement, less agitated, less paranoid    Assessment/Plan   Principal Problem:    Schizoaffective disorder, bipolar type ([facility] [address] )  Active Problems:    Cannabis abuse, continuous    Alcohol abuse, continuous    Recommended Treatment:     Planned medication and treatment changes:     All current active medications have been reviewed  Encourage group therapy, milieu therapy and occupational therapy  Behavioral Health checks every 7 minutes  Decrease Ativan 0 5 mg TID    Current Facility-Administered Medications:  acetaminophen 650 mg Oral Q6H PRN Kyler Alonzo, CRNP   acetaminophen 650 mg Oral Q4H PRN Kyler Alonzo, CRNP   acetaminophen 975 mg Oral Q6H PRN Kyler Alonzo, CRNP   aluminum-magnesium hydroxide-simethicone 30 mL Oral Q4H PRN Kyler Alonzo, CRNP   benztropine 1 mg Intramuscular Q8H PRN Kyler Alonzo, CRNP   cholecalciferol 1,000 Units Oral Daily KENDRA Guerrero-TY   dextromethorphan-guaiFENesin 10 mL Oral Q4H PRN KENDRA Guerrero-TY   folic acid 1 mg Oral Daily Jose Raul Hernandez PA-C   hydrOXYzine HCL 50 mg Oral Q6H PRN Kyler Alonzo, CRNP   lithium 600 mg Oral QAM Kyler Alonzo, CRNP   lithium carbonate 900 mg Oral HS Kyler Alonzo, CRNP   LORazepam 2 mg Intramuscular Q6H PRN Kyler Alonzo, CRNP   LORazepam 0 5 mg Oral TID Kyler Alonzo, CRNP   LORazepam 1 mg Oral Q4H PRN Kyler Alonzo, CRNP   magnesium hydroxide 30 mL Oral Daily PRN Kyler Alonzo, CRNP   melatonin 9 mg Oral HS Kyler Alonzo, CRNP nicotine polacrilex 2 mg Oral Q2H PRN Hiral Hernandez PA-C   OLANZapine 10 mg Intramuscular Daily PRN CARLOS Sarabia   OLANZapine 15 mg Oral HS CARLOS Sarabia   OLANZapine 5 mg Oral Q8H PRN CARLOS Sarabia   OLANZapine 5 mg Oral Daily CARLOS Sarabia   risperiDONE 1 mg Oral Q8H PRN CARLOS Sarabia   tamsulosin 0 4 mg Oral Daily With Dinner Hassan Burkitt, PA-C   thiamine 100 mg Oral Daily Hassan Burkitt, PA-C   traZODone 50 mg Oral HS PRN CARLOS Sarabia       Risks / Benefits of Treatment:    Risks, benefits, and possible side effects of medications explained to patient and patient verbalizes understanding and agreement for treatment  Counseling / Coordination of Care:    Patient's progress discussed with staff in treatment team meeting  Medications, treatment progress and treatment plan reviewed with patient      CARLOS Sarabia 11/27/19

## 2019-11-27 NOTE — PROGRESS NOTES
11/27/19 0942   Team Meeting   Meeting Type Daily Rounds   Team Members Present   Team Members Present Physician;Nurse;; Occupational Therapist   Physician Team Member Dr Puja Root MD; Koreen Cockayne, [facility]    Nursing Team Member Elena Enciso RN    Social Work Team Member Jaz Silva [place]    OT Team Member [place], [place]    Patient/Family Present   Patient Present No   Patient's Family Present No     Discharge Friday to home; lithium 1 0 (11/22); increased lability during daytime hours; med compliant

## 2019-11-27 NOTE — DISCHARGE INSTR - OTHER ORDERS
You are being discharged to your home at [facility] [place], Phone: [phone]  Triggers you have identified during your hospitalization that led to your distressed mood due to medication non-compliance  Coping skills you have identified during your hospitalization include spending time alone, walking and watching tv  If you are unable to deal with your distressed mood alone please talk to your mother, Sebastian  If that is not effective and you continue to have distressed mood, please contact [address] [place] [address] at [phone], dial [phone] or go to the nearest emergency center  *National Suicide Prevention Lifeline: 2-291.646.3923  *[facility]ness ([place] HELPLINE: 846.114.5759/Website: www caroline org  *Substance Abuse and Mental Health Services Administration(SAMHSA) [employer], which is a confidential, free, 24-hour-a-day, 365-day-a-year, information service for individuals and family members facing mental health and/or substance use disorders  This service provides referrals to local treatment facilities, support groups, and communitybased organizations  Callers can also order free publications and other information  Call 0-897.790.3130/Website:  www [facility] gov  *United [employer] 2-1-1: This is a toll free, confidential, 24-hour-a day service which connects you to a community  in your area who can help you find services and resources that are available to you locally and provide critical services that can improve and save lives   Call: 211 /Website: https://eileen lang/

## 2019-11-27 NOTE — DISCHARGE INSTR - APPOINTMENTS
The treatment team recommends ongoing medication management upon discharge with your current psychiatric provider  A follow up appointment has been made on your behalf with [facility], [address], [place], [address]; Phone: [phone]  Your appointment is scheduled for Monday, January 6th at 36a with Dr Jonny Medina  Please plan to arrive 15 minutes prior to your scheduled appointment and bring your insurance card(s), photo ID  Your discharge will be faxed to this provider for continuity of care  You are seen at the [facility] for your primary care needs as well  You declined a follow up appointment at this time  Please schedule as needed

## 2019-11-27 NOTE — PROGRESS NOTES
The patient noted to be visible on the unit and in the milieu upon initial assessment and successive rounds throughout shift  The patient complaining of feeling tired with am assessment  RN notified Dr Samuel Aguilar and oseas Israel, of the patient's am drowsiness, new orders noted  The patient noted to be calm and quiet throughout the shift with intermittent irritability  The patient noted to be compliant with meals and medications  No complaints noted  Will continue to monitor

## 2019-11-27 NOTE — SOCIAL WORK
SW met with patient in main dining room alone to complete pt requested phone calls    Pt & SW contacted [facility] office to confirm patient mailing address for new EBT card as patient lost his    Pt and CAROL placed phone call to Mary Chou to schedule follow up appointment with Dr Mara Peterson - scheduled for 1/6 at [phone] with psychiatrist (soonest available with holiday schedule)    Pt then asked to call his mom - call made; pt became easily agitated and yelling at his mother on the phone when she was not able to answer his questions to his satisfaction; pt hung up on mother after yelling at her and walked out of dining room

## 2019-11-27 NOTE — PLAN OF CARE
Problem: Ineffective Coping  Goal: Participates in unit activities  Description  Interventions:  - Provide therapeutic environment   - Provide required programming   - Redirect inappropriate behaviors   Outcome: Progressing   Patient more controlled with new medications, continues to participate and join groups

## 2019-11-27 NOTE — PROGRESS NOTES
Patient resting in bed the beginning of the shift  Patient out in the milieu for HS snack and group  Upon approach patient's mood and affect is labile and irritable  Patient is negative and agitated about the Zyprexa but took the medication to satisfy the doctor and promote discharge for Friday  Denies pain/SI/HI/hallucinations/anxiety/depression  Took HS medications without difficulty  No behavioral issues  Will continue to monitor safety and behaviors every 7 minutes

## 2019-11-28 PROCEDURE — 99232 SBSQ HOSP IP/OBS MODERATE 35: CPT | Performed by: NURSE PRACTITIONER

## 2019-11-28 RX ADMIN — OLANZAPINE 5 MG: 5 TABLET, FILM COATED ORAL at 08:32

## 2019-11-28 RX ADMIN — FOLIC ACID 1 MG: 1 TABLET ORAL at 08:32

## 2019-11-28 RX ADMIN — TAMSULOSIN HYDROCHLORIDE 0.4 MG: 0.4 CAPSULE ORAL at 15:41

## 2019-11-28 RX ADMIN — OLANZAPINE 15 MG: 5 TABLET, FILM COATED ORAL at 21:09

## 2019-11-28 RX ADMIN — MELATONIN 9 MG: at 21:09

## 2019-11-28 RX ADMIN — LORAZEPAM 0.5 MG: 0.5 TABLET ORAL at 21:08

## 2019-11-28 RX ADMIN — LITHIUM CARBONATE 600 MG: 300 CAPSULE, GELATIN COATED ORAL at 08:32

## 2019-11-28 RX ADMIN — LORAZEPAM 0.5 MG: 0.5 TABLET ORAL at 08:32

## 2019-11-28 RX ADMIN — LORAZEPAM 0.5 MG: 0.5 TABLET ORAL at 15:41

## 2019-11-28 RX ADMIN — LITHIUM CARBONATE 900 MG: 300 CAPSULE, GELATIN COATED ORAL at 21:09

## 2019-11-28 RX ADMIN — VITAMIN D, TAB 1000IU (100/BT) 1000 UNITS: 25 TAB at 08:32

## 2019-11-28 RX ADMIN — Medication 100 MG: at 08:32

## 2019-11-28 NOTE — PROGRESS NOTES
Progress Note - Mikael Dhillon 69 Day 62 y o  male MRN: [id number]   Unit/Bed#: [facility] 204-02 Encounter: [id number]    Behavior over the last 24 hours: unchanged  Franklin Francis is compliant with medications and appears to be tolerating better with decreased dose of side effects  He still denies need for Zyprexa  He has not been as loud or disruptive on the unit  Limited participation in milieu  Sleep: improved  Appetite: normal  Medication side effects: Yes - some tiredness   ROS: all other systems are negative    Mental Status Evaluation:    Appearance:  marginal hygiene   Behavior:  somewhat redirectable   Speech:  normal rate, normal volume   Mood:  irritable   Affect:  labile   Thought Process:  circumstantial, tangential   Associations: concrete associations   Thought Content:  persecutory delusions   Perceptual Disturbances: denies when asked, but talks to self at times   Risk Potential: Suicidal ideation - None  Homicidal ideation - None  Potential for aggression - No   Sensorium:  oriented to person, place, time/date and situation   Memory:  recent and remote memory grossly intact   Consciousness:  alert and awake   Attention: attention span and concentration are age appropriate   Insight:  limited   Judgment: limited   Gait/Station: normal gait/station, normal balance   Motor Activity: no abnormal movements     Vital signs in last 24 hours:    Temp:  [97 3 °F (36 3 °C)-98 °F (36 7 °C)] 97 5 °F (36 4 °C)  HR:  [60-75] 60  Resp:  [16-20] 20  BP: (110-117)/(68-71) 112/69    Laboratory results: I have personally reviewed all pertinent laboratory/tests results      Suicide/Homicide Risk Assessment:    Risk of Harm to Self:   Current Specific Risk Factors include: current unstable mood, current psychotic symptoms  Protective Factors: no current suicidal ideation, ability to communicate with staff on the unit, able to contract for safety on the unit, taking medications as ordered on the unit  Based on today's assessment, Carlos Manuel Bateman presents the following risk of harm to self: low    Risk of Harm to Others:  Current Specific Risk Factors include: current psychotic symptoms  Protective Factors: no current homicidal ideation, able to communicate with staff on the unit, compliant with medications on the unit as ordered, compliant with unit milieu, follows staff redirection  Based on today's assessment, Carlos Manuel Bateman presents the following risk of harm to others: none    The following interventions are recommended: behavioral checks every 7 minutes, continued hospitalization on locked unit     Progress Toward Goals: some improvement, slightly less irritable    Assessment/Plan   Principal Problem:    Schizoaffective disorder, bipolar type ([facility] [address] )  Active Problems:    Cannabis abuse, continuous    Alcohol abuse, continuous    Recommended Treatment:     Planned medication and treatment changes:     All current active medications have been reviewed  Encourage group therapy, milieu therapy and occupational therapy  Behavioral Health checks every 7 minutes  Continue current medications:    Current Facility-Administered Medications:  acetaminophen 650 mg Oral Q6H PRN Maralyn Stall, CRNP   acetaminophen 650 mg Oral Q4H PRN Maralyn Stall, CRNP   acetaminophen 975 mg Oral Q6H PRN Maralyn Stall, CRNP   aluminum-magnesium hydroxide-simethicone 30 mL Oral Q4H PRN Maralyn Stall, CRNP   benztropine 1 mg Intramuscular Q8H PRN Maralyn Stall, CRNP   cholecalciferol 1,000 Units Oral Daily Severa Prose Rockovits, PA-C   dextromethorphan-guaiFENesin 10 mL Oral Q4H PRN Severa Prose Rockovits, PA-C   folic acid 1 mg Oral Daily [facility], PA-C   hydrOXYzine HCL 50 mg Oral Q6H PRN Maralyn Stall, CRNP   lithium 600 mg Oral QAM Maralyn Stall, CRNP   lithium carbonate 900 mg Oral HS Maralyn Stall, CRNP   LORazepam 2 mg Intramuscular Q6H PRN Maralyn Stall, CRNP   LORazepam 0 5 mg Oral TID Maralyn Stall, CRNP   LORazepam 1 mg Oral Q4H PRN Maralyn Stall, CRNP   magnesium hydroxide 30 mL Oral Daily PRN CARLOS Ambrose   melatonin 9 mg Oral HS CARLOS Ambrose   nicotine polacrilex 2 mg Oral Q2H PRN Tre Hernandez PA-C   OLANZapine 10 mg Intramuscular Daily PRN CARLOS Ambrose   OLANZapine 15 mg Oral HS Ermelinda Frost, CARLOS   OLANZapine 5 mg Oral Q8H PRN Ermelinda Frost, CARLOS   OLANZapine 5 mg Oral Daily CARLOS Ambrose   risperiDONE 1 mg Oral Q8H PRN CARLOS Ambrose   tamsulosin 0 4 mg Oral Daily With Dinner Fitz Portillo PA-C   thiamine 100 mg Oral Daily Fitz Portillo PA-C   traZODone 50 mg Oral HS PRN CARLOS Ambrose       Risks / Benefits of Treatment:    Risks, benefits, and possible side effects of medications explained to patient and patient verbalizes understanding and agreement for treatment  Counseling / Coordination of Care:    Patient's progress discussed with staff in treatment team meeting  Medications, treatment progress and treatment plan reviewed with patient      CARLOS Ambrose 11/28/19

## 2019-11-28 NOTE — PROGRESS NOTES
Patient compliant with morning medications and meals  Pleasant and cooperative this shift  Patient denies any depression or anxiety  Stated he is for discharge tomorrow  Patient stated he is not taking Zyprexa when he is discharge  Patient was educated on his medications  No other changes or problems reported  Q 7 mins checks in place for safety  Will continue to monitor for behaviors and changes

## 2019-11-28 NOTE — PROGRESS NOTES
Patient out in the milieu social with peers and staff  Ate HS snack and attended group  Upon approach patient's mood and affect is labile  Can be irritable at times but redirectable  Patient is loud and has poor insight to his mental illness  Patient stated, "The reason I am here is because other people were touching my stuff and bothering me!" Patient has been controled with behaviors  Denies SI/HI/hallucinations/pain  Took medications without difficulty  Will continue to monitor safety and behaviors every 7 minutes

## 2019-11-28 NOTE — PROGRESS NOTES
Sergio Woodruff  FRN:1/89/1864 Samanta Villegas  JPE:[id number]    Z:[id number]  Adm Date: 11/18/2019 0527  5:27 AM   ATT PHY: Leela Aquino, [address]         Subjective     The patient was seen after reviewing the chart and discussing the case with caring staff  Patient examined at bedside  Patient has no acute issues  Patient is scheduled for discharge in the morning  Patient is requesting all his prescriptions  I reviewed and reconciled patient's problem list and medications  Objective     Vitals:    11/28/19 0637   BP: 112/69   Pulse: 60   Resp: 20   Temp: 97 5 °F (36 4 °C)   SpO2: 97%       General Appearance: Awake and Alert  No acute distress  HEENT: Normocephalic, atraumatic  PERRLA, EOMI, MMM  Heart: RRR, no murmurs  Normal S1 and S2   Lungs: CTA bilaterally with fair air entry  Assessment     Adrienne Graham is a(n) 62y o  year old male with psychosis    MEDICAL CLEARANCE  Patient is medically cleared for discharge  All scripts have been written for the patient      1  Cardiac with history of hypertension and dyslipidemia  BP's are stable  2  Tobacco abuse   Nicotine gum PRN  3  Alcohol abuse  Thiamine, folic acid and multivitamin  4  DJD/osteoarthritis   Tylenol on as needed basis  5  Gait abnormality   Stable  6  Vitamin-D deficiency   Vitamin D3 1000U daily  7  Urinary Frequency/Urgency  Likely representing history of BPH  Flomax 0 4mg once daily

## 2019-11-29 VITALS
DIASTOLIC BLOOD PRESSURE: 89 MMHG | HEIGHT: 69 IN | RESPIRATION RATE: 20 BRPM | HEART RATE: 90 BPM | BODY MASS INDEX: 29.36 KG/M2 | SYSTOLIC BLOOD PRESSURE: 132 MMHG | OXYGEN SATURATION: 97 % | WEIGHT: 198.19 LBS | TEMPERATURE: 98.2 F

## 2019-11-29 PROCEDURE — 99239 HOSP IP/OBS DSCHRG MGMT >30: CPT | Performed by: NURSE PRACTITIONER

## 2019-11-29 RX ORDER — OLANZAPINE 5 MG/1
5 TABLET ORAL DAILY
Qty: 30 TABLET | Refills: 0 | Status: SHIPPED | OUTPATIENT
Start: 2019-11-30 | End: 2020-01-02 | Stop reason: HOSPADM

## 2019-11-29 RX ORDER — OLANZAPINE 15 MG/1
15 TABLET ORAL
Status: ON HOLD
Start: 2019-11-29 | End: 2019-12-27 | Stop reason: SDUPTHER

## 2019-11-29 RX ORDER — LITHIUM CARBONATE 300 MG/1
900 CAPSULE ORAL
Qty: 90 CAPSULE | Refills: 0 | Status: SHIPPED | OUTPATIENT
Start: 2019-11-29 | End: 2020-01-02 | Stop reason: HOSPADM

## 2019-11-29 RX ORDER — LORAZEPAM 0.5 MG/1
0.5 TABLET ORAL 3 TIMES DAILY
Qty: 45 TABLET | Refills: 1 | Status: ON HOLD | OUTPATIENT
Start: 2019-11-29 | End: 2019-12-27 | Stop reason: SDUPTHER

## 2019-11-29 RX ORDER — LITHIUM CARBONATE 600 MG/1
600 CAPSULE ORAL EVERY MORNING
Qty: 30 CAPSULE | Refills: 0 | Status: SHIPPED | OUTPATIENT
Start: 2019-11-29 | End: 2020-01-02 | Stop reason: HOSPADM

## 2019-11-29 RX ADMIN — OLANZAPINE 5 MG: 5 TABLET, FILM COATED ORAL at 08:18

## 2019-11-29 RX ADMIN — LITHIUM CARBONATE 600 MG: 300 CAPSULE, GELATIN COATED ORAL at 08:18

## 2019-11-29 RX ADMIN — VITAMIN D, TAB 1000IU (100/BT) 1000 UNITS: 25 TAB at 08:18

## 2019-11-29 RX ADMIN — Medication 100 MG: at 08:18

## 2019-11-29 RX ADMIN — LORAZEPAM 0.5 MG: 0.5 TABLET ORAL at 08:18

## 2019-11-29 RX ADMIN — FOLIC ACID 1 MG: 1 TABLET ORAL at 08:18

## 2019-11-29 NOTE — DISCHARGE SUMMARY
Discharge Summary - [address] 62 y o  male MRN: [id number]  Unit/Bed#: [facility] 918-59 Encounter: [id number]     Admission Date: 11/18/2019         Discharge Date: 11/29/2019  1:36 PM    Attending Psychiatrist: Dr Samuel Aguilar    Reason for Admission/HPI: Schizoaffective disorder, bipolar type ([address] ) [F25 0]  Psychoses ([address] ) [F29]  Encounter for psychological evaluation [Z00 8]  Paranoid schizophrenia ([address] ) [F20 0]    According to admission report from Dr Samuel Aguilar:    Eron Encarnacion is a 62 y o  male with a history of Schizoaffective Disorder who was admitted to the inpatient older adult psychiatric unit on a voluntary 201 commitment basis due to unstable mood, disorganized behavior, severe agitation and alcohol abuse  Pt was discharged on 11/12/19 frorn the unit but he has been non adherence medications and  after care follow up  He apparently has been staying in his car and has been causing some disruption in the neighborhood  Police reported that patient was labile, intermittently loud, verbally aggressive and profane and hostile with gesture as if he had a gun and cutting someone's throat  Patient also was noted mumbling incoherently and verbalizing paranoid delusions about neighbors and his nephew about building a Meth labs in his mother house  On evaluation in the inpatient psychiatric unit Vianey Carter very vague in providing any information about what happened after he was discharged form the unit  He was irritable, mildly agitated, labile, paranoid and incoherent in his speech  Will resume his meds Lithium as the level was low on admission      Hospital Course: The patient was admitted to the inpatient psychiatric unit and started on every 7 minutes precautions  During the hospitalization the patient was attending individual therapy, group therapy, milieu therapy and occupational therapy  Psychiatric medications were titrated over the hospital stay   To address mood instability, irritability, manic symptoms and psychotic symptoms the patient was started on mood stabilizer Lithium, antipsychotic medication Zyprexa and anxiolytic medication Ativan  Medication doses were titrated during the hospital course  Prior to beginning of treatment medications risks and benefits and possible side effects including risk of kidney impairment related to treatment with Lithium and risk of parkinsonian symptoms, Tardive Dyskinesia and metabolic syndrome related to treatment with antipsychotic medications were reviewed with the patient  The patient verbalized understanding and agreement for treatment  Patient's symptoms improved gradually over the hospital course  At the end of treatment the patient was doing well  Mood was stable at the time of discharge  The patient denied suicidal ideation, intent or plan at the time of discharge and denied homicidal ideation, intent or plan at the time of discharge  There was no overt psychosis at the time of discharge  Sleep and appetite were improved  The patient was tolerating medications and was not reporting any significant side effects at the time of discharge  Since the patient was doing well at the end of the hospitalization, treatment team felt that the patient could be safely discharged to outpatient care  The outpatient follow up with a psychiatrist was arranged by the unit  upon discharge      Mental Status at time of Discharge:     Appearance:  age appropriate   Behavior:  normal   Speech:  normal pitch and normal volume   Mood:  irritable   Affect:  labile   Thought Process:  concrete   Thought Content:  normal   Perceptual Disturbances: None   Risk Potential: Suicidal Ideations none, Homicidal Ideations none and Potential for Aggression No   Sensorium:  person, place, time/date and situation   Cognition:  grossly intact   Consciousness:  alert and awake    Attention: attention span and concentration were age appropriate   Insight:  age appropriate Judgment: age appropriate   Gait/Station: normal gait/station and normal balance   Motor Activity: no abnormal movements     Admission Diagnosis:Schizoaffective disorder, bipolar type ([facility] [address] ) [F25 0]  Psychoses ([facility] [address] [facility] ) [F29]  Encounter for psychological evaluation [Z00 8]  Paranoid schizophrenia ([facility] [address] [facility] ) [F20 0]    Discharge Diagnosis:   Principal Problem:    Schizoaffective disorder, bipolar type ([facility] [address] )  Active Problems:    Cannabis abuse, continuous    Alcohol abuse, continuous  Resolved Problems:    * No resolved hospital problems   *        Lab results:  Admission on 11/18/2019, Discharged on 11/29/2019   Component Date Value    Amph/Meth UR 11/18/2019 Negative     Barbiturate Ur 11/18/2019 Negative     Benzodiazepine Urine 11/18/2019 Negative     Cocaine Urine 11/18/2019 Negative     Methadone Urine 11/18/2019 Negative     Opiate Urine 11/18/2019 Negative     PCP Ur 11/18/2019 Negative     THC Urine 11/18/2019 Negative     Ethanol Lvl 11/18/2019 <10     TSH 3RD GENERATON 11/18/2019 2 040     Sodium 11/18/2019 139     Potassium 11/18/2019 3 4*    Chloride 11/18/2019 107     CO2 11/18/2019 26     ANION GAP 11/18/2019 6     BUN 11/18/2019 10     Creatinine 11/18/2019 0 94     Glucose 11/18/2019 128*    Calcium 11/18/2019 9 4     AST 11/18/2019 32     ALT 11/18/2019 32     Alkaline Phosphatase 11/18/2019 38*    Total Protein 11/18/2019 6 4     Albumin 11/18/2019 4 2     Total Bilirubin 11/18/2019 0 50     eGFR 11/18/2019 87     WBC 11/18/2019 9 00     RBC 11/18/2019 4 52     Hemoglobin 11/18/2019 14 1     Hematocrit 11/18/2019 41 5*    MCV 11/18/2019 92     MCH 11/18/2019 31 3     MCHC 11/18/2019 34 1     RDW 11/18/2019 14 2     MPV 11/18/2019 8 3*    Platelets 83/97/3752 219     Neutrophils Relative 11/18/2019 81*    Lymphocytes Relative 11/18/2019 12*    Monocytes Relative 11/18/2019 6     Eosinophils Relative 11/18/2019 1     Basophils Relative 11/18/2019 1     Neutrophils Absolute 11/18/2019 7 20*    Lymphocytes Absolute 11/18/2019 1 10     Monocytes Absolute 11/18/2019 0 50     Eosinophils Absolute 11/18/2019 0 10     Basophils Absolute 11/18/2019 0 10     Lithium Lvl 11/18/2019 0 5*    Color, UA 11/18/2019 Yellow     Clarity, UA 11/18/2019 Clear     Specific Gravity, UA 11/18/2019 <=1 005*    pH, UA 11/18/2019 6 5     Leukocytes, UA 11/18/2019 Negative     Nitrite, UA 11/18/2019 Negative     Protein, UA 11/18/2019 Negative     Glucose, UA 11/18/2019 Negative     Ketones, UA 11/18/2019 Negative     Urobilinogen, UA 11/18/2019 0 2     Bilirubin, UA 11/18/2019 Negative     Blood, UA 11/18/2019 Negative     Ventricular Rate 11/18/2019 91     Atrial Rate 11/18/2019 91     OH Interval 11/18/2019 162     QRSD Interval 11/18/2019 92     QT Interval 11/18/2019 370     QTC Interval 11/18/2019 455     P Axis 11/18/2019 80     QRS [place] 11/18/2019 207     T Wave Axis 11/18/2019 72     Lithium Lvl 11/22/2019 1 0        Discharge Medications:  Discharge Medication List as of 11/29/2019 12:53 PM      START taking these medications    Details   dextromethorphan-guaiFENesin (ROBITUSSIN DM)  mg/5 mL syrup Take 10 mL by mouth every 4 (four) hours as needed for cough, Starting Wed 11/27/2019, Print      LORazepam (ATIVAN) 0 5 mg tablet Take 1 tablet (0 5 mg total) by mouth 3 (three) times a day, Starting Fri 11/29/2019, Normal      nicotine polacrilex (NICORETTE) 2 mg gum Chew 1 each (2 mg total) every 2 (two) hours as needed for smoking cessation, Starting Wed 11/27/2019, Until Fri 12/27/2019, Print      tamsulosin (FLOMAX) 0 4 mg Take 1 capsule (0 4 mg total) by mouth daily with dinner, Starting Wed 11/27/2019, Until Fri 12/27/2019, Print            Discharge Medication List as of 11/29/2019 12:53 PM         Discharge Medication List as of 11/29/2019 12:53 PM      CONTINUE these medications which have CHANGED    Details   cholecalciferol (VITAMIN D3) 1,000 units tablet Take 1 tablet (1,000 Units total) by mouth daily, Starting Wed 71/9571/95/3772, Print      folic acid (FOLVITE) 1 mg tablet Take 1 tablet (1 mg total) by mouth daily, Starting Wed 11/27/2019, Print      !! lithium 600 MG capsule Take 1 capsule (600 mg total) by mouth every morning In Addition to 900 mg HS, Starting Fri 11/29/2019, Normal      !! lithium carbonate 300 mg capsule Take 3 capsules (900 mg total) by mouth daily at bedtime In addition to 600 mg in AM, Starting Fri 11/29/2019, Normal      Melatonin 10 MG TABS Take 1 tablet (10 mg total) by mouth daily at bedtime, Starting Wed 11/27/2019, Until Fri 12/27/2019, Print      !! OLANZapine (ZyPREXA) 15 mg tablet Take 1 tablet (15 mg total) by mouth daily at bedtime, Starting Fri 11/29/2019, No Print      !! OLANZapine (ZyPREXA) 5 mg tablet Take 1 tablet (5 mg total) by mouth daily, Starting Sat 11/30/2019, Normal      thiamine 100 MG tablet Take 1 tablet (100 mg total) by mouth daily, Starting Wed 11/27/2019, Print       !! - Potential duplicate medications found  Please discuss with provider  Discharge Medication List as of 11/29/2019 12:53 PM           Discharge instructions/Information to patient and family:   See after visit summary for information provided to patient and family  Provisions for Follow-Up Care:  See after visit summary for information related to follow-up care and any pertinent home health orders  Discharge Statement     I spent 35 minutes discharging the patient  This time was spent on the day of discharge  I had direct contact with the patient on the day of discharge  Additional documentation is required if more than 30 minutes were spent on discharge:    I reviewed with Jensen Adams importance of compliance with medications and outpatient treatment after discharge  I discussed the medication regimen and possible side effects of the medications with Jensen Adams prior to discharge   At the time of discharge he was tolerating psychiatric medications  I discussed outpatient follow up with [facility]  I reviewed with [facility] crisis plan and safety plan upon discharge  I discussed with [facility] recommendation to follow up with outpatient drug and alcohol counseling and AA meetings  [facility] agreed to abstain from drug and alcohol use after discharge

## 2019-11-29 NOTE — BH TRANSITION RECORD
Contact Information: If you have any questions, concerns, pended studies, tests and/or procedures, or emergencies regarding your inpatient behavioral health visit  Please contact [facility], [facility] older adult behavioral health unit [phone] and ask to speak to a , nurse or physician  A contact is available 24 hours/ 7 days a week at this number  Summary of Procedures Performed During your Stay:  Below is a list of major procedures performed during your hospital stay and a summary of results:  - No major procedures performed  Pending Studies (From admission, onward)    None        If studies are pending at discharge, follow up with your PCP and/or referring provider

## 2019-11-29 NOTE — PROGRESS NOTES
Patient completed [address] Relapse Prevention plan with writer  Able to identify warning signs, coping skills, support persons and feelings/thoughts that would prompt him to call the Dr       11/29/19 9304   Activity/Group Checklist   Group Admission/Discharge  ([address] Relapse Prevention plan )   Attendance Attended   Attendance Duration (min) 16-30   Interactions Interacted appropriately   Affect/Mood Appropriate;Bright;Normal range   Goals Achieved Identified feelings; Discussed coping strategies; Identified resources and support systems; Able to listen to others; Able to engage in interactions; Able to reflect/comment on own behavior;Able to manage/cope with feelings; Discussed discharge plans

## 2019-11-29 NOTE — PROGRESS NOTES
No noted suicidal ideations or homicidal behaviors  Patient observed sleeping during most q 7 minute safety checks  No observable distress or change in medical condition  Will continue to monitor

## 2019-11-29 NOTE — DISCHARGE INSTRUCTIONS
Cigarette Smoking and Your Health   AMBULATORY CARE:   Risks to your health if you smoke:  Nicotine and other chemicals found in tobacco damage every cell in your body  Even if you are a light smoker, you have an increased risk for cancer, heart disease, and lung disease  If you are pregnant or have diabetes, smoking increases your risk for complications  Benefits to your health if you stop smoking:   · You decrease respiratory symptoms such as coughing, wheezing, and shortness of breath  · You reduce your risk for cancers of the lung, mouth, throat, kidney, bladder, pancreas, stomach, and cervix  If you already have cancer, you increase the benefits of chemotherapy  You also reduce your risk for cancer returning or a second cancer from developing  · You reduce your risk for heart disease, blood clots, heart attack, and stroke  · You reduce your risk for lung infections, and diseases such as pneumonia, asthma, chronic bronchitis, and emphysema  · Your circulation improves  More oxygen can be delivered to your body  If you have diabetes, you lower your risk for complications, such as kidney, artery, and eye diseases  You also lower your risk for nerve damage  Nerve damage can lead to amputations, poor vision, and blindness  · You improve your body's ability to heal and to fight infections  Benefits to the health of others if you stop smoking:  Tobacco is harmful to nonsmokers who breathe in your secondhand smoke  The following are ways the health of others around you may improve when you stop smoking:  · You lower the risks for lung cancer and heart disease in nonsmoking adults  · If you are pregnant, you lower the risk for miscarriage, early delivery, low birth weight, and stillbirth  You also lower your baby's risk for SIDS, obesity, developmental delay, and neurobehavioral problems, such as ADHD       · If you have children, you lower their risk for ear infections, colds, pneumonia, bronchitis, and asthma  For more information and support to stop smoking:   · Smokefree  gov  Phone: 3- 724 - 628-1497  Web Address: www "[employer]  Follow up with your healthcare provider as directed:  Write down your questions so you remember to ask them during your visits  © 2017 [address] Information is for End User's use only and may not be sold, redistributed or otherwise used for commercial purposes  All illustrations and images included in CareNotes® are the copyrighted property of [employer] D A M [employer]  or Shailesh Jeffery  The above information is an  only  It is not intended as medical advice for individual conditions or treatments  Talk to your doctor, nurse or pharmacist before following any medical regimen to see if it is safe and effective for you  How to Stop Smoking   WHAT YOU NEED TO KNOW:   You will improve your health and the health of others around you if you stop smoking  Your risk for heart and lung disease, cancer, stroke, heart attack, and vision problems will also decrease  You can benefit from quitting no matter how long you have smoked  DISCHARGE INSTRUCTIONS:   Prepare to stop smoking:  Nicotine is a highly addictive drug found in cigarettes  Withdrawal symptoms can happen when you stop smoking and make it hard to quit  These include anxiety, depression, irritability, trouble sleeping, and increased appetite  You increase your chances of success if you prepare to quit  · Set a quit date  [place] a date that is within the next 2 weeks  Do not pick a day that you think may be stressful or busy  Write down the day or [place] it on your calender  · Tell friends and family that you plan to quit  Explain that you may have withdrawal symptoms when you try to quit  Ask them to support you  They may be able to encourage you and help reduce your stress to make it easier for you to quit  · Make a list of your reasons for quitting  Put the list somewhere you will see it every day, such as your refrigerator  You can look at the list when you have a craving  · Remove all tobacco and nicotine products from your home, car, and workplace  Also, remove anything else that will tempt you to smoke, such as lighters, matches, or ashtrays  Clean your car, home, and places at work that smell like smoke  The smell of smoke can trigger a craving  · Identify triggers that make you want to smoke  This may include activities, feelings, or people  Also write down 1 way you can deal with each of your triggers  For example, if you want to smoke as soon as you wake up, plan another activity during this time, such as exercise  · Make a plan for how you will quit  Learn about the tools that can help you quit, such as medicine, counseling, or nicotine replacement therapy  Choose at least 2 options to help you quit  Tools to help you stop smoking:   · Counseling  from a trained healthcare provider can provide you with support and skills to quit smoking  The provider will also teach you to manage your withdrawal symptoms and cravings  You may receive counseling from one counselor, in group therapy, or through phone therapy called a quit line  · Nicotine replacement therapy (NRT)  such as nicotine patches, gum, or lozenges may help reduce your nicotine cravings  You may get these without a doctor's order  Do not use e-cigarettes or smokeless tobacco in place of cigarettes or to help you quit  They still contain nicotine  · Prescription medicines  such as nasal sprays or nicotine inhalers may help reduce your withdrawal symptoms  Other medicines may also be used to reduce your urge to smoke  Ask your healthcare provider about these medicines  You may need to start certain medicines 2 weeks before your quit date for them to work well  · Hypnosis  is a practice that helps guide you through thoughts and feelings   Hypnosis may help decrease your cravings and make you more willing to quit  · Acupuncture therapy  uses very thin needles to balance energy channels in the body  This is thought to help decrease cravings and symptoms of nicotine withdrawal      · Support groups  let you talk to others who are trying to quit or have already quit  It may be helpful to speak with others about how they quit  Manage your cravings:   · Avoid situations, people, and places that tempt you to smoke  Go to nonsmoking places, such as libraries or restaurants  Understand what tempts you and try to avoid these things  · Keep your hands busy  Hold things such as a stress ball or pen  · Put candy or toothpicks in your mouth  Keep lollipops, sugarless gum, or toothpicks with you at all times  · Do not have alcohol or caffeine  These drinks may tempt you to smoke  Drink healthy liquids such as water or juice instead  · Reward yourself when you resist your cravings  Rewards will motivate you and help you stay positive  · Do an activity that distracts you from your craving  Examples include going for a walk, exercising, or cleaning  Prevent weight gain after you quit:  You may gain a few pounds after you quit smoking  It is healthier for you to gain a few pounds than to continue to smoke  The following can help you prevent weight gain:  · Eat healthy foods  These include fruits, vegetables, whole-grain breads, low-fat dairy products, beans, lean meats, and fish  Eat healthy snacks, such as low-fat yogurt, if you get hungry between meals  · Drink water before, during, and between meals  This will make your stomach feel full and help prevent you from overeating  Ask your healthcare provider how much liquid to drink each day and which liquids are best for you  · Exercise  Take a walk or do some kind of exercise every day  Ask your healthcare provider what exercise is right for you  This may help reduce your cravings and reduce stress    For support and more information:   · Smokefree  gov  Phone: 0- 599 - 514-1022  Web Address: www smokefree  [place]  © 2017 [address] [employer] Information is for End User's use only and may not be sold, redistributed or otherwise used for commercial purposes  All illustrations and images included in CareNotes® are the copyrighted property of [employer]  or Shailesh Jeffery  The above information is an  only  It is not intended as medical advice for individual conditions or treatments  Talk to your doctor, nurse or pharmacist before following any medical regimen to see if it is safe and effective for you  Metabolic Syndrome X   WHAT YOU NEED TO KNOW:   Metabolic syndrome is a group of medical conditions that can lead to heart disease, stroke, or type 2 diabetes  The medical conditions include high triglycerides, low HDL cholesterol, high blood pressure, high blood sugar levels, and extra abdominal fat  DISCHARGE INSTRUCTIONS:   Medicines:   · Blood pressure medicine: This is given to lower your blood pressure  A controlled blood pressure helps protect your organs, such as your heart, lungs, brain, and kidneys  Take your blood pressure medicine exactly as directed  · Cholesterol medicine: This type of medicine is given to help decrease (lower) the amount of cholesterol (fat) in your blood  Cholesterol medicine works best if you also exercise and eat a healthy diet that is low in certain kinds of fats  Some cholesterol medicines may cause liver problems  You may need to have blood taken for tests while using this medicine  · Take your medicine as directed  Contact your healthcare provider if you think your medicine is not helping or if you have side effects  Tell him or her if you are allergic to any medicine  Keep a list of the medicines, vitamins, and herbs you take  Include the amounts, and when and why you take them  Bring the list or the pill bottles to follow-up visits  Carry your medicine list with you in case of an emergency  · Hypoglycemic medicine: This medicine may be given to decrease the amount of sugar in your blood  Hypoglycemic medicine helps your body move the sugar to your cells, where it is needed for energy  Follow up with your healthcare provider as directed:  Write down your questions so you remember to ask them during your visits  Manage metabolic syndrome:   · Maintain a healthy weight:  Weight loss helps lower cholesterol, triglycerides, blood pressure, and blood glucose levels  It can also raise HDL (good cholesterol)  Ask your healthcare provider how much you should weigh  Ask him to help you create a weight loss plan if you are overweight  · Eat a variety of healthy foods:  Healthy foods include fruits, vegetables, whole-grain breads, low-fat dairy products, beans, lean meats, and fish  Eat foods that are low in fat and sodium (salt)  A dietitian can help you plan healthy meals  · Exercise:  Ask your healthcare provider to help you create an exercise plan  Exercise can help lower your blood pressure, cholesterol, and blood sugar levels  Exercise can also help raise your HDL level and help you to lose weight  Get at least 30 minutes of exercise, 5 days each week  · Check your blood pressure as directed: You may be asked to keep a record of your blood pressure and bring it with you to follow-up visits  Ask your healthcare provider what your blood pressure should be and how to check it  · Limit alcohol:  Women should limit alcohol to 1 drink a day  Men should limit alcohol to 2 drinks a day  A drink of alcohol is 12 ounces of beer, 5 ounces of wine, or 1½ ounces of liquor  · Do not smoke: If you smoke, it is never too late to quit  Smoking further increases your risk for heart disease and stroke  Ask your healthcare provider for information if you need help quitting    Contact your healthcare provider if:   · You have more thirst or hunger than usual      · You urinate more frequently  · You have blurred vision  · You have questions or concerns about your condition or care  Seek care immediately or call 911 if:   · Your blood pressure is higher than healthcare providers told it should be  · You have chest pain or discomfort that spreads to your arms, jaw, or back  · You have a severe headache or dizziness  · You have trouble thinking, speaking, or understanding others  © 2017 [address] [employer]  Information is for End User's use only and may not be sold, redistributed or otherwise used for commercial purposes  All illustrations and images included in CareNotes® are the copyrighted property of [employer]  or Shailesh Jose D  The above information is an  only  It is not intended as medical advice for individual conditions or treatments  Talk to your doctor, nurse or pharmacist before following any medical regimen to see if it is safe and effective for you  Lithium (By mouth)   Lithium (LITH-ee-um)  Treats and helps prevent manic episodes of bipolar disorder  Brand Name(s): Lith-Eleonora, Lithate, Lithobid   There may be other brand names for this medicine  When This Medicine Should Not Be Used: This medicine is not right for everyone  Do not use it if you had an allergic reaction to lithium, or if you are pregnant or breastfeeding  Do not give the extended-release tablets to anyone younger than 15years of age  How to Use This Medicine:   Capsule, Liquid, Tablet, Long Acting Tablet  · Take your medicine as directed  Your dose may need to be changed several times to find what works best for you  · There are several different forms of lithium  The dose for each is different and they are used at different times of the day   Do not change the type of medicine you take without talking to your doctor first   · Oral liquid: Measure the oral liquid medicine with a marked measuring spoon, oral syringe, or medicine cup  · Capsule, tablet, or extended-release tablet: Swallow the medicine whole  Do not crush, break, or chew it  · Use only the brand of medicine your doctor prescribed  Other brands may not work the same way  · Missed dose: Take a dose as soon as you remember  If it is almost time for your next dose, wait until then and take a regular dose  Do not take extra medicine to make up for a missed dose  · Store the medicine in a closed container at room temperature, away from heat, moisture, and direct light  Drugs and Foods to Avoid:   Ask your doctor or pharmacist before using any other medicine, including over-the-counter medicines, vitamins, and herbal products  · Some foods and medicines can affect how this medicine works  Tell your doctor if you are using any of the following:  ¨ Acetazolamide, carbamazepine, fluoxetine, methyldopa, metronidazole, phenytoin, potassium iodide, sodium bicarbonate, theophylline  ¨ Antacid  ¨ Blood pressure medicine  ¨ Diuretic (water pill)  ¨ Medicine for depression (including paroxetine)  ¨ Medicine to treat mental illness (including haloperidol)  ¨ NSAID pain or arthritis medicine (including celecoxib, ibuprofen, indomethacin, naproxen, piroxicam)  Warnings While Using This Medicine:   · It is not safe to take this medicine during pregnancy  It could harm an unborn baby  Tell your doctor right away if you become pregnant  · Tell your doctor if you have kidney problems, heart or blood vessel disease (including Brugada syndrome), brain or nerve problems, or thyroid problems  · This medicine may cause the following problems:  ¨ Lithium toxicity  ¨ Heart problems  ¨ Kidney problems  ¨ Encephalopathic syndrome (brain problem)  · Make sure any doctor or dentist who treats you knows that you are using this medicine  · This medicine may make you dizzy or drowsy   Do not drive or do anything else that could be dangerous until you know how this medicine affects you   · Your doctor will do lab tests at regular visits to check on the effects of this medicine  Keep all appointments  · Keep all medicine out of the reach of children  Never share your medicine with anyone  Possible Side Effects While Using This Medicine:   Call your doctor right away if you notice any of these side effects:  · Allergic reaction: Itching or hives, swelling in your face or hands, swelling or tingling in your mouth or throat, chest tightness, trouble breathing  · Change in how much or how often you urinate  · Confusion, problems with walking or balance, muscle movements you cannot control  · Diarrhea, vomiting, tremors, or drowsiness  · Fast, pounding, or uneven heartbeat  · Fever  · Lightheadedness or fainting  · Unusual tiredness or weakness  If you notice these less serious side effects, talk with your doctor:   · Mild nausea  · Mild thirst  If you notice other side effects that you think are caused by this medicine, tell your doctor  Call your doctor for medical advice about side effects  You may report side effects to FDA at 7-811-FDA-0661  © 2017 [address] [employer] Information is for End User's use only and may not be sold, redistributed or otherwise used for commercial purposes  The above information is an  only  It is not intended as medical advice for individual conditions or treatments  Talk to your doctor, nurse or pharmacist before following any medical regimen to see if it is safe and effective for you

## 2019-11-29 NOTE — NURSING NOTE
Patient DC from [facility] at   He was picked up by his cousin and taken home  AVS reviewed with patient and all questions answered

## 2019-11-29 NOTE — PLAN OF CARE
Problem: Alteration in Thoughts and Perception  Goal: Verbalize thoughts and feelings  Description  Interventions:  - Promote a nonjudgmental and trusting relationship with the patient through active listening and therapeutic communication  - Assess patient's level of functioning, behavior and potential for risk  - Engage patient in 1 on 1 interactions  - Encourage patient to express fears, feelings, frustrations, and discuss symptoms    - [place] patient to reality, help patient recognize reality-based thinking   - Administer medications as ordered and assess for potential side effects  - Provide the patient education related to the signs and symptoms of the illness and desired effects of prescribed medications  Outcome: Adequate for Discharge  Goal: Attend and participate in unit activities, including therapeutic, recreational, and educational groups  Description  Interventions:  -Encourage Visitation and family involvement in care  Outcome: Adequate for Discharge     Problem: Ineffective Coping  Goal: Cooperates with admission process  Description  Interventions:   - Complete admission process  Outcome: Adequate for Discharge  Goal: Identifies ineffective coping skills  Outcome: Adequate for Discharge  Goal: Identifies healthy coping skills  Outcome: Adequate for Discharge  Goal: Demonstrates healthy coping skills  Outcome: Adequate for Discharge  Goal: Participates in unit activities  Description  Interventions:  - Provide therapeutic environment   - Provide required programming   - Redirect inappropriate behaviors   Outcome: Adequate for Discharge  Goal: Patient/Family participate in treatment and DC plans  Description  Interventions:  - Provide therapeutic environment  Outcome: Adequate for Discharge  Goal: Patient/Family verbalizes awareness of resources  Outcome: Adequate for Discharge  Goal: Understands least restrictive measures  Description  Interventions:  - Utilize least restrictive behavior  Outcome: Adequate for Discharge  Goal: Free from restraint events  Description  - Utilize least restrictive measures   - Provide behavioral interventions   - Redirect inappropriate behaviors   Outcome: Adequate for Discharge  Patient completed [address] Proactive Comfort Relapse Prevention plan with this writer  Patient was able to identify warning signs as increase in depression/anxiety and becoming more and more manic and aggressive  Patient listed reading, working indoors/outdoors, keeping busy and playing with 9year old niece as his coping skills  LD was also able to identify his supports as his Mother Fayrene Skiff and Sister Marlee Chan  Patient is aware he will need to call his Doctor if " medications aren't working and begins to become more manic "  Patient signed and was given copy  See D/C plan for follow-up appointments and provider phone numbers

## 2019-11-29 NOTE — PROGRESS NOTES
The patient noted to be visible on the unit and in the milieu upon initial assessment and successive rounds throughout the shift  The patient noted to be calm, cooperative and controlled  The patient denies anxiety, depression, si, hi, and hallucinations  The patient compliant with meals and medications  The patient denies complaints of pain  Will continue to monitor

## 2019-11-29 NOTE — SOCIAL WORK
SW met with patient in main dining room; Pt denies SI/HI/AH/VH, dep and anx; Pt states "I'm ready to get the heck out of here"; pt uncle providing transport at 130pm and will take patient to [address] to  meds;   No questions or concerns for SW at this time

## 2019-11-29 NOTE — PROGRESS NOTES
Wander Zapien  ONU:5/15/6287 [address]  QPD:[id number]    CRN:[id number]  Adm Date: 11/18/2019 0527  5:27 AM   ATT PHY: Rudi Leon, [address]         Subjective     The patient was seen after reviewing the chart and discussing the case with caring staff  Today during our encounter, the patient reported no acute concerns  Of note, patient is scheduled for discharge today  Patient is medically cleared for discharge  Medication list has been reconciled  Scripts have been addressed  Objective     Vitals:    11/29/19 0707   BP: 132/89   Pulse: 90   Resp: 20   Temp: 98 2 °F (36 8 °C)   SpO2: 97%       General Appearance: Awake and Alert  No acute distress  HEENT: Normocephalic, atraumatic  PERRLA, EOMI, MMM  Heart: RRR, no murmurs  Normal S1 and S2   Lungs: CTA bilaterally with fair air entry  Assessment     Mariela Graham is a(n) 62y o  year old male with psychosis    MEDICAL CLEARANCE  Patient is medically cleared for discharge  All scripts have been written for the patient      1  Cardiac with history of hypertension and dyslipidemia  BP's are stable  2  Tobacco abuse   Nicotine gum PRN  3  Alcohol abuse  Thiamine, folic acid and multivitamin  4  DJD/osteoarthritis   Tylenol on as needed basis  5  Gait abnormality   Stable  6  Vitamin-D deficiency   Vitamin D3 1000U daily  7  Urinary Frequency/Urgency  Likely representing history of BPH  Flomax 0 4mg once daily  The patient was discussed with Dr Dewayne Sunshine and he is in agreement with the above note

## 2019-11-29 NOTE — PROGRESS NOTES
Pt observed to be out in the community, and was interactive with staff and peers  Pt reported feeling well and that he couldn't wait for discharge  Pt denied any SI/HI  Pt checked Q7 minutes for safety

## 2019-11-29 NOTE — PROGRESS NOTES
PT  Leaving with the following belongings:    Hardik Braulio long sleeve shirt (PT wearing)  Green/Blue boxer shorts  1 pair blue jeans (PT wearing)  Tan/blue coat  Blue key chain with light  Black watch (PT wearing)  Black bracelet   Blue Hat   Blue brace  Multi-color lighter  Silver flashlight  Black necklace with [place]  Black/Gray shirt with vallejo  1 blue sock (which was one single glove)  Black pasquale pack  Hearing Aids/ with white case and batteries (On PT)  [place] (PT wearing)  Grey long sleeve top  [place]  2 green t-shirts (PT wearing 1 of them)  3 yemi undies (black, white and tan)  3 pair brown socks (PT wearing 2 of them)  Steelers pants  Tan Jeans  Green denture case  Bible Book/Study Book  Kenny Gibson gift card  [employer] card  $[phone] in cash (5 twenty dollar bills, and 4 one dollar bills  $0 75 in change (1 quarter 5 dimes) (*placed in Green/Blue Key chain with zipper per PT request)  Green wallet  2 keys green/blue key chain with zipper

## 2019-11-29 NOTE — PROGRESS NOTES
11/29/19 1001   Team Meeting   Meeting Type Daily Rounds   Team Members Present   Team Members Present Physician;Nurse;;; Occupational Therapist   Physician Team Member Dr Cheyanne Nolan MD; [address]   Nursing Team Member Lidia Prather, RN   Care Management Team Member Isauro Delgado [place], [facility]   Social Work Team Member Tk Raines, [facility]   OT Team Member Vince Vallejo, ADRIEN   Patient/Family Present   Patient Present No   Patient's Family Present No     PT to D/C home today, pleasant and cooperative, controlled, slept well, will provide medications for PT at D/C

## 2019-11-29 NOTE — PLAN OF CARE
Problem: Ineffective Coping  Goal: Participates in unit activities  Description  Interventions:  - Provide therapeutic environment   - Provide required programming   - Redirect inappropriate behaviors    11/29/2019 1125 by Mary Reeys  Outcome: Adequate for Discharge  11/29/2019 1125 by Mary Reyes  Reactivated

## 2019-11-29 NOTE — PLAN OF CARE
Problem: Ineffective Coping  Goal: Participates in unit activities  Description  Interventions:  - Provide therapeutic environment   - Provide required programming   - Redirect inappropriate behaviors    Outcome: Progressing   Patient has been less labile, irritable, oppositional  Behaviorally controlled  Able to attend group and participate appropriately  Attended AM RT group; looking forward to preeti Wei

## 2019-12-11 ENCOUNTER — HOSPITAL ENCOUNTER (INPATIENT)
Facility: HOSPITAL | Age: 59
LOS: 7 days | DRG: 885 | End: 2019-12-18
Attending: PSYCHIATRY & NEUROLOGY | Admitting: PSYCHIATRY & NEUROLOGY
Payer: MEDICARE

## 2019-12-11 DIAGNOSIS — F25.0 SCHIZOAFFECTIVE DISORDER, BIPOLAR TYPE (HCC): Chronic | ICD-10-CM

## 2019-12-11 DIAGNOSIS — F20.9 SCHIZOPHRENIA, UNSPECIFIED TYPE (HCC): Primary | ICD-10-CM

## 2019-12-11 LAB
ALBUMIN SERPL BCP-MCNC: 4.2 G/DL (ref 3.5–5.7)
ALP SERPL-CCNC: 32 U/L (ref 55–165)
ALT SERPL W P-5'-P-CCNC: 20 U/L (ref 7–52)
AMPHETAMINES SERPL QL SCN: NEGATIVE
ANION GAP SERPL CALCULATED.3IONS-SCNC: 7 MMOL/L (ref 4–13)
AST SERPL W P-5'-P-CCNC: 19 U/L (ref 13–39)
BARBITURATES UR QL: NEGATIVE
BASOPHILS # BLD AUTO: 0 THOUSANDS/ΜL (ref 0–0.1)
BASOPHILS NFR BLD AUTO: 0 % (ref 0–2)
BENZODIAZ UR QL: NEGATIVE
BILIRUB SERPL-MCNC: 0.4 MG/DL (ref 0.2–1)
BILIRUB UR QL STRIP: NEGATIVE
BUN SERPL-MCNC: 12 MG/DL (ref 7–25)
CALCIUM SERPL-MCNC: 9.1 MG/DL (ref 8.6–10.5)
CHLORIDE SERPL-SCNC: 108 MMOL/L (ref 98–107)
CLARITY UR: CLEAR
CO2 SERPL-SCNC: 28 MMOL/L (ref 21–31)
COCAINE UR QL: NEGATIVE
COLOR UR: YELLOW
CREAT SERPL-MCNC: 0.98 MG/DL (ref 0.7–1.3)
EOSINOPHIL # BLD AUTO: 0 THOUSAND/ΜL (ref 0–0.61)
EOSINOPHIL NFR BLD AUTO: 1 % (ref 0–5)
ERYTHROCYTE [DISTWIDTH] IN BLOOD BY AUTOMATED COUNT: 13.9 % (ref 11.5–14.5)
ETHANOL EXG-MCNC: 0 MG/DL
ETHANOL SERPL-MCNC: <10 MG/DL
GFR SERPL CREATININE-BSD FRML MDRD: 83 ML/MIN/1.73SQ M
GLUCOSE SERPL-MCNC: 94 MG/DL (ref 65–99)
GLUCOSE UR STRIP-MCNC: NEGATIVE MG/DL
HCT VFR BLD AUTO: 40.4 % (ref 42–47)
HGB BLD-MCNC: 13.5 G/DL (ref 14–18)
HGB UR QL STRIP.AUTO: NEGATIVE
KETONES UR STRIP-MCNC: NEGATIVE MG/DL
LEUKOCYTE ESTERASE UR QL STRIP: NEGATIVE
LITHIUM SERPL-SCNC: <0.2 MMOL/L (ref 1–1.2)
LYMPHOCYTES # BLD AUTO: 1.1 THOUSANDS/ΜL (ref 0.6–4.47)
LYMPHOCYTES NFR BLD AUTO: 18 % (ref 21–51)
MCH RBC QN AUTO: 30.4 PG (ref 26–34)
MCHC RBC AUTO-ENTMCNC: 33.5 G/DL (ref 31–37)
MCV RBC AUTO: 91 FL (ref 81–99)
METHADONE UR QL: NEGATIVE
MONOCYTES # BLD AUTO: 0.3 THOUSAND/ΜL (ref 0.17–1.22)
MONOCYTES NFR BLD AUTO: 5 % (ref 2–12)
NEUTROPHILS # BLD AUTO: 4.6 THOUSANDS/ΜL (ref 1.4–6.5)
NEUTS SEG NFR BLD AUTO: 76 % (ref 42–75)
NITRITE UR QL STRIP: NEGATIVE
OPIATES UR QL SCN: NEGATIVE
PCP UR QL: NEGATIVE
PH UR STRIP.AUTO: 6 [PH]
PLATELET # BLD AUTO: 202 THOUSANDS/UL (ref 149–390)
PMV BLD AUTO: 7.8 FL (ref 8.6–11.7)
POTASSIUM SERPL-SCNC: 3.9 MMOL/L (ref 3.5–5.5)
PROT SERPL-MCNC: 6 G/DL (ref 6.4–8.9)
PROT UR STRIP-MCNC: NEGATIVE MG/DL
RBC # BLD AUTO: 4.46 MILLION/UL (ref 4.3–5.9)
SODIUM SERPL-SCNC: 143 MMOL/L (ref 134–143)
SP GR UR STRIP.AUTO: 1.01 (ref 1–1.03)
THC UR QL: NEGATIVE
TSH SERPL DL<=0.05 MIU/L-ACNC: 1.08 UIU/ML (ref 0.45–5.33)
UROBILINOGEN UR QL STRIP.AUTO: 0.2 E.U./DL
WBC # BLD AUTO: 6.1 THOUSAND/UL (ref 4.8–10.8)

## 2019-12-11 PROCEDURE — 93005 ELECTROCARDIOGRAM TRACING: CPT

## 2019-12-11 PROCEDURE — 80320 DRUG SCREEN QUANTALCOHOLS: CPT | Performed by: EMERGENCY MEDICINE

## 2019-12-11 PROCEDURE — 85025 COMPLETE CBC W/AUTO DIFF WBC: CPT | Performed by: EMERGENCY MEDICINE

## 2019-12-11 PROCEDURE — 80178 ASSAY OF LITHIUM: CPT | Performed by: EMERGENCY MEDICINE

## 2019-12-11 PROCEDURE — 99285 EMERGENCY DEPT VISIT HI MDM: CPT

## 2019-12-11 PROCEDURE — 84443 ASSAY THYROID STIM HORMONE: CPT | Performed by: EMERGENCY MEDICINE

## 2019-12-11 PROCEDURE — 36415 COLL VENOUS BLD VENIPUNCTURE: CPT | Performed by: EMERGENCY MEDICINE

## 2019-12-11 PROCEDURE — 80307 DRUG TEST PRSMV CHEM ANLYZR: CPT | Performed by: EMERGENCY MEDICINE

## 2019-12-11 PROCEDURE — 82075 ASSAY OF BREATH ETHANOL: CPT | Performed by: EMERGENCY MEDICINE

## 2019-12-11 PROCEDURE — 81003 URINALYSIS AUTO W/O SCOPE: CPT | Performed by: EMERGENCY MEDICINE

## 2019-12-11 PROCEDURE — 99285 EMERGENCY DEPT VISIT HI MDM: CPT | Performed by: EMERGENCY MEDICINE

## 2019-12-11 PROCEDURE — 80053 COMPREHEN METABOLIC PANEL: CPT | Performed by: EMERGENCY MEDICINE

## 2019-12-11 RX ORDER — ACETAMINOPHEN 325 MG/1
650 TABLET ORAL EVERY 6 HOURS PRN
Status: DISCONTINUED | OUTPATIENT
Start: 2019-12-11 | End: 2019-12-18 | Stop reason: HOSPADM

## 2019-12-11 RX ORDER — TAMSULOSIN HYDROCHLORIDE 0.4 MG/1
0.4 CAPSULE ORAL
Status: DISCONTINUED | OUTPATIENT
Start: 2019-12-11 | End: 2019-12-18 | Stop reason: HOSPADM

## 2019-12-11 RX ORDER — LITHIUM CARBONATE 300 MG/1
600 CAPSULE ORAL EVERY MORNING
Status: DISCONTINUED | OUTPATIENT
Start: 2019-12-12 | End: 2019-12-13

## 2019-12-11 RX ORDER — HYDROXYZINE HYDROCHLORIDE 25 MG/1
25 TABLET, FILM COATED ORAL EVERY 4 HOURS PRN
Status: DISCONTINUED | OUTPATIENT
Start: 2019-12-11 | End: 2019-12-18 | Stop reason: HOSPADM

## 2019-12-11 RX ORDER — OLANZAPINE 5 MG/1
5 TABLET ORAL EVERY 12 HOURS PRN
Status: DISCONTINUED | OUTPATIENT
Start: 2019-12-11 | End: 2019-12-18 | Stop reason: HOSPADM

## 2019-12-11 RX ORDER — GUAIFENESIN/DEXTROMETHORPHAN 100-10MG/5
10 SYRUP ORAL EVERY 4 HOURS PRN
Status: DISCONTINUED | OUTPATIENT
Start: 2019-12-11 | End: 2019-12-18 | Stop reason: HOSPADM

## 2019-12-11 RX ORDER — LORAZEPAM 2 MG/ML
2 INJECTION INTRAMUSCULAR EVERY 4 HOURS PRN
Status: DISCONTINUED | OUTPATIENT
Start: 2019-12-11 | End: 2019-12-18 | Stop reason: HOSPADM

## 2019-12-11 RX ORDER — RISPERIDONE 1 MG/1
1 TABLET, ORALLY DISINTEGRATING ORAL EVERY 8 HOURS PRN
Status: DISCONTINUED | OUTPATIENT
Start: 2019-12-11 | End: 2019-12-18 | Stop reason: HOSPADM

## 2019-12-11 RX ORDER — LITHIUM CARBONATE 300 MG/1
900 CAPSULE ORAL
Status: DISCONTINUED | OUTPATIENT
Start: 2019-12-11 | End: 2019-12-13

## 2019-12-11 RX ORDER — OLANZAPINE 5 MG/1
5 TABLET ORAL DAILY
Status: DISCONTINUED | OUTPATIENT
Start: 2019-12-12 | End: 2019-12-18 | Stop reason: HOSPADM

## 2019-12-11 RX ORDER — FOLIC ACID 1 MG/1
1 TABLET ORAL DAILY
Status: DISCONTINUED | OUTPATIENT
Start: 2019-12-12 | End: 2019-12-18 | Stop reason: HOSPADM

## 2019-12-11 RX ORDER — MELATONIN
1000 DAILY
Status: DISCONTINUED | OUTPATIENT
Start: 2019-12-12 | End: 2019-12-18 | Stop reason: HOSPADM

## 2019-12-11 RX ORDER — LANOLIN ALCOHOL/MO/W.PET/CERES
9 CREAM (GRAM) TOPICAL
Status: DISCONTINUED | OUTPATIENT
Start: 2019-12-11 | End: 2019-12-18 | Stop reason: HOSPADM

## 2019-12-11 RX ORDER — LORAZEPAM 1 MG/1
1 TABLET ORAL EVERY 4 HOURS PRN
Status: DISCONTINUED | OUTPATIENT
Start: 2019-12-11 | End: 2019-12-18 | Stop reason: HOSPADM

## 2019-12-11 RX ORDER — THIAMINE MONONITRATE (VIT B1) 100 MG
100 TABLET ORAL DAILY
Status: DISCONTINUED | OUTPATIENT
Start: 2019-12-12 | End: 2019-12-18 | Stop reason: HOSPADM

## 2019-12-11 RX ORDER — ZIPRASIDONE MESYLATE 20 MG/ML
20 INJECTION, POWDER, LYOPHILIZED, FOR SOLUTION INTRAMUSCULAR EVERY 4 HOURS PRN
Status: DISCONTINUED | OUTPATIENT
Start: 2019-12-11 | End: 2019-12-18 | Stop reason: HOSPADM

## 2019-12-11 RX ORDER — ACETAMINOPHEN 325 MG/1
650 TABLET ORAL EVERY 4 HOURS PRN
Status: DISCONTINUED | OUTPATIENT
Start: 2019-12-11 | End: 2019-12-18 | Stop reason: HOSPADM

## 2019-12-11 RX ORDER — LORAZEPAM 0.5 MG/1
0.5 TABLET ORAL 3 TIMES DAILY
Status: DISCONTINUED | OUTPATIENT
Start: 2019-12-11 | End: 2019-12-18 | Stop reason: HOSPADM

## 2019-12-11 RX ORDER — ACETAMINOPHEN 325 MG/1
975 TABLET ORAL EVERY 6 HOURS PRN
Status: DISCONTINUED | OUTPATIENT
Start: 2019-12-11 | End: 2019-12-18 | Stop reason: HOSPADM

## 2019-12-11 RX ORDER — MAGNESIUM HYDROXIDE/ALUMINUM HYDROXICE/SIMETHICONE 120; 1200; 1200 MG/30ML; MG/30ML; MG/30ML
30 SUSPENSION ORAL EVERY 4 HOURS PRN
Status: DISCONTINUED | OUTPATIENT
Start: 2019-12-11 | End: 2019-12-18 | Stop reason: HOSPADM

## 2019-12-11 RX ADMIN — LORAZEPAM 0.5 MG: 0.5 TABLET ORAL at 20:29

## 2019-12-11 RX ADMIN — LORAZEPAM 0.5 MG: 0.5 TABLET ORAL at 17:17

## 2019-12-11 RX ADMIN — MELATONIN 9 MG: at 20:28

## 2019-12-11 RX ADMIN — LITHIUM CARBONATE 900 MG: 300 CAPSULE, GELATIN COATED ORAL at 20:29

## 2019-12-11 NOTE — PROGRESS NOTES
With PT:  1 blue jeans  1 long sleeve shirt (brown)  1 upper denture    In PT contraband:   1 pair of brown shoes (no shoelace on it) [employer]  1 brown jacket  1 blue sunglasses     In Rn's contraband:  1 black watch  1 ear plug/re wire  1 cigar    In safe # [id number]  2 [place]  card  1 [employer] card  1 [employer] card  1 [employer] card  1 [place] passport   1 socia security card   2 medicare card  2 business card     $13 00 dollar in cash singed by me and Osito Almendarez

## 2019-12-11 NOTE — ED NOTES
Insurance Authorization for admission:   No pre-cert necessary, Medicare A&B is primary  COB:  Phone call placed to [facility])  Phone number: [phone]  Spoke to [facility]      7 days approved  Level of care: I/P psych  Review on 12/17/2019  Authorization # [id number]  EVS (Eligibility Verification System) called - [phone]  Automated system indicates: EVS called, Patient is eligible for M/A - Behavioral health services, [facility]   Rec# [id number]      Insurance Authorization for Transportation:    No transport needed

## 2019-12-11 NOTE — ED NOTES
Patient is accepted at [facility]  Patient is accepted by Dr Akbar Anand per Alicia Molina,       Patient may go to the floor once rN report is received  Nurse report is to be called to ext [phone] prior to patient transfer

## 2019-12-11 NOTE — ED NOTES
Pt presents due to a change in mental status  Pt is medically clear, A&O X 4, and was calm and cooperative throughout the assessment  Pt is tangential in thought and not able to answer some assessment questions but does deny SI/HI or thoughts of harm  Pt is responding to internal stimuli, both auditory and visual, and states that he stopped taking his Lithium and now he doesn't feel stable  Pt's mood is erratic and he states he cannot sleep again  Pt is seeking I/P psych admission  Pt's read his voluntary paperwork, e g  201, and was explained his rights  Pt signed his 12 and asked that hius copy of his Rights and Voluntary Pt Handout be placed on his chart

## 2019-12-11 NOTE — H&P
Tish Sender  UKY:[id number] Floyd Browne  PSU:[id number]    IQN:[id number]  Adm Date: 12/11/2019 1132  11:32 AM   ATT PHY: Ila Corona, [address]       Chief Complaint: mood swings    History of Presenting Illness: Elizabeth Graham is a(n) 62y o  year old male presenting to [address] [place] [address] for mood swings  He was noted to have tangential thoughts and speech with erratic behavior  We are asked to follow the patient along with reference to his medical co-morbidities    The patient was seen after reviewing the chart and discussing the case with caring staff  Today during our encounter, the patient reported no acute concerns  He is a poor historian given psychotic behaviors  Labs reviewed and are stable  Allergies   Allergen Reactions    Haldol [Haloperidol]     Navane [Thiothixene]     Other      Adhesive tape         No current facility-administered medications on file prior to encounter        Current Outpatient Medications on File Prior to Encounter   Medication Sig Dispense Refill    cholecalciferol (VITAMIN D3) 1,000 units tablet Take 1 tablet (1,000 Units total) by mouth daily 30 tablet 0    dextromethorphan-guaiFENesin (ROBITUSSIN DM)  mg/5 mL syrup Take 10 mL by mouth every 4 (four) hours as needed for cough 178 mL 0    folic acid (FOLVITE) 1 mg tablet Take 1 tablet (1 mg total) by mouth daily 30 tablet 0    lithium 600 MG capsule Take 1 capsule (600 mg total) by mouth every morning In Addition to 900 mg HS 30 capsule 0    lithium carbonate 300 mg capsule Take 3 capsules (900 mg total) by mouth daily at bedtime In addition to 600 mg in AM 90 capsule 0    LORazepam (ATIVAN) 0 5 mg tablet Take 1 tablet (0 5 mg total) by mouth 3 (three) times a day 45 tablet 1    Melatonin 10 MG TABS Take 1 tablet (10 mg total) by mouth daily at bedtime 30 tablet 0    nicotine polacrilex (NICORETTE) 2 mg gum Chew 1 each (2 mg total) every 2 (two) hours as needed for smoking cessation 100 each 0    OLANZapine (ZyPREXA) 15 mg tablet Take 1 tablet (15 mg total) by mouth daily at bedtime      OLANZapine (ZyPREXA) 5 mg tablet Take 1 tablet (5 mg total) by mouth daily 30 tablet 0    tamsulosin (FLOMAX) 0 4 mg Take 1 capsule (0 4 mg total) by mouth daily with dinner 30 capsule 0    thiamine 100 MG tablet Take 1 tablet (100 mg total) by mouth daily 30 tablet 0       Active Ambulatory Problems     Diagnosis Date Noted    Schizoaffective disorder, bipolar type ([facility] ) 11/02/2019    Cannabis abuse, continuous 11/02/2019    Alcohol abuse, continuous 11/02/2019     Resolved Ambulatory Problems     Diagnosis Date Noted    No Resolved Ambulatory Problems     Past Medical History:   Diagnosis Date    Anxiety     Astigmatism     DJD (degenerative joint disease)     Gait abnormality     Head injury     History of colonic polyps     Hydrocele     Hyperlipidemia     Hypertension     Macular drusen     Presbyopia     [facility] disorder [facility] )     Sepsis ([facility] )     Tobacco abuse     Umbilical hernia     Vitreous syneresis        Past Surgical History:   Procedure Laterality Date    FRACTURE SURGERY      INGUINAL HERNIA REPAIR         Social History: unable to accurately update      Social History     Socioeconomic History    Marital status: Single     Spouse name: None    Number of children: None    Years of education: None    Highest education level: None   Occupational History    None   Social Needs    Financial resource strain: None    Food insecurity:     Worry: None     Inability: None    Transportation needs:     Medical: None     Non-medical: None   Tobacco Use    Smoking status: Current Every Day Smoker     Packs/day: 1 00     Types: Cigars    Smokeless tobacco: Never Used   Substance and Sexual Activity    Alcohol use: Yes     Comment: whenever i want    Drug use: No    Sexual activity: None   Lifestyle    Physical activity:     Days per week: None     Minutes per session: None    Stress: None   Relationships    Social connections:     Talks on phone: None     Gets together: None     Attends Sabianism service: None     Active member of club or organization: None     Attends meetings of clubs or organizations: None     Relationship status: None    Intimate partner violence:     Fear of current or ex partner: None     Emotionally abused: None     Physically abused: None     Forced sexual activity: None   Other Topics Concern    None   Social History Narrative    None       Family History: unable to accurately update  Review of Systems - unable to accurately obtain given tangential speech/thoughts    Vitals:    12/11/19 1600   BP: 167/86   Pulse: 63   Resp: 20   Temp: 98 °F (36 7 °C)   SpO2:        Physical Exam   Constitutional: Awake and Alert  Well-developed and well-nourished  No distress  HENT:   Head: Normocephalic and atraumatic  Mouth/Throat: Oropharynx is clear and moist     Cardiovascular: RRR with normal heart sounds  Exam reveals no friction rub  No murmur heard  Pulmonary/Chest: Effort normal and breath sounds normal  No respiratory distress  Patient has no wheezes, rales, or rhonchi  Abdominal: Soft  Bowel sounds are normal  No distension  There is no tenderness  There is no rebound and no guarding  Neurological: Cranial Nerves grossly intact  No sensory deficit  Coordination normal    Skin: Skin is warm and dry  No rash noted  No diaphoresis  No erythema  No edema  No cyanosis  Assessment     Formerly named [facility] Day is a(n) 62y o  year old male with Schizoaffective Disorder, Bipolar Type    1  Cardiac with history of hypertension and dyslipidemia  BP was high in the ED but was previously WNL without antihypertensives  Will continue to closely monitor  Patient is not on anything for Hyperlipidemia  2  Tobacco abuse   Nicotine gum PRN  3  Alcohol abuse  Thiamine, folic acid and multivitamin  4   DJD/osteoarthritis   Tylenol on as needed basis  5  Gait abnormality   Stable  6  Vitamin-D deficiency   Vitamin D3 1000U daily  7  Urinary Frequency/Urgency  Flomax 0 4mg once daily  Prognosis: Fair  Discharge Plan: In progress  Advanced Directives: I have discussed in detail the patient the advanced directives  The patient does not have a POA or a living will  First contact is [place] Gladys, his mother, who can be reached at [phone]  With regards to cardiac and pulmonary resuscitation efforts, the patient wishes to be a FULL CODE  "All matters necessary  "     I have spent more than 30 minutes gathering data, doing physical examination, and discussing the advanced directives, which was witnessed by caring staff      The patient was discussed with Dr Carine Velasco and he is in agreement with the above note

## 2019-12-11 NOTE — PLAN OF CARE
Problem: PSYCHOSIS  Goal: Will report no hallucinations or delusions  Description  Interventions:  - Administer medication as  ordered  - Every waking shifts and PRN assess for the presence of hallucinations and or delusions  - Assist with reality testing to support increasing orientation  - Assess if patient's hallucinations or delusions are encouraging self-harm or harm to others and intervene as appropriate   12/11/2019 1621 by Muna Nicholson RN  Outcome: Not Progressing  12/11/2019 1617 by Muna Nicholson RN  Reactivated     Problem: Ineffective Coping  Goal: Cooperates with admission process  Description  Interventions:   - Complete admission process   Reactivated  Goal: Identifies ineffective coping skills  Reactivated  Goal: Identifies healthy coping skills  Reactivated  Goal: Demonstrates healthy coping skills  Reactivated  Goal: Patient/Family participate in treatment and DC plans  Description  Interventions:  - Provide therapeutic environment   Reactivated  Goal: Patient/Family verbalizes awareness of resources  Reactivated  Goal: Understands least restrictive measures  Description  Interventions:  - Utilize least restrictive behavior   Reactivated  Goal: Free from restraint events  Description  - Utilize least restrictive measures   - Provide behavioral interventions   - Redirect inappropriate behaviors    Reactivated     Problem: Alteration in Thoughts and Perception  Goal: Treatment Goal: Gain control of psychotic behaviors/thinking, reduce/eliminate presenting symptoms and demonstrate improved reality functioning upon discharge  Reactivated  Goal: Verbalize thoughts and feelings  Description  Interventions:  - Promote a nonjudgmental and trusting relationship with the patient through active listening and therapeutic communication  - Assess patient's level of functioning, behavior and potential for risk  - Engage patient in 1 on 1 interactions  - Encourage patient to express fears, feelings, frustrations, and discuss symptoms    - [place] patient to reality, help patient recognize reality-based thinking   - Administer medications as ordered and assess for potential side effects  - Provide the patient education related to the signs and symptoms of the illness and desired effects of prescribed medications   Reactivated  Goal: Refrain from acting on delusional thinking/internal stimuli  Description  Interventions:  - Monitor patient closely, per order   - Utilize least restrictive measures   - Set reasonable limits, give positive feedback for acceptable   - Administer medications as ordered and monitor of potential side effects   Reactivated  Goal: Agree to be compliant with medication regime, as prescribed and report medication side effects  Description  Interventions:  - Offer appropriate PRN medication and supervise ingestion; conduct AIMS, as needed    Reactivated  Goal: Attend and participate in unit activities, including therapeutic, recreational, and educational groups  Description  Interventions:  -Encourage Visitation and family involvement in care   Reactivated  Goal: Recognize dysfunctional thoughts, communicate reality-based thoughts at the time of discharge  Description  Interventions:  - Provide medication and psycho-education to assist patient in compliance and developing insight into his/her illness    Reactivated  Goal: Complete daily ADLs, including personal hygiene independently, as able  Description  Interventions:  - Observe, teach, and assist patient with ADLS  - Monitor and promote a balance of rest/activity, with adequate nutrition and elimination    Reactivated     Problem: Risk for Violence/Aggression Toward Others  Goal: Treatment Goal: Refrain from acts of violence/aggression during length of stay, and demonstrate improved impulse control at the time of discharge  Reactivated  Goal: Verbalize thoughts and feelings  Description  Interventions:  - Assess and re-assess patient's level of risk, every waking shift  - Engage patient in 1:1 interactions, daily, for a minimum of 15 minutes   - Allow patient to express feelings and frustrations in a safe and non-threatening manner   - Establish rapport/trust with patient    Reactivated  Goal: Refrain from harming others  Reactivated  Goal: Refrain from destructive acts on the environment or property  Reactivated  Goal: Control angry outbursts  Description  Interventions:  - Monitor patient closely, per order  - Ensure early verbal de-escalation  - Monitor prn medication needs  - Set reasonable/therapeutic limits, outline behavioral expectations, and consequences   - Provide a non-threatening milieu, utilizing the least restrictive interventions    Reactivated  Goal: Attend and participate in unit activities, including therapeutic, recreational, and educational groups  Description  Interventions:  - Provide therapeutic and educational activities daily, encourage attendance and participation, and document same in the medical record    Reactivated  Goal: Identify appropriate positive anger management techniques  Description  Interventions:  - Offer anger management and coping skills groups   - Staff will provide positive feedback for appropriate anger control   Reactivated     Problem: DISCHARGE PLANNING  Goal: Discharge to home or other facility with appropriate resources  Description  INTERVENTIONS:  - Identify barriers to discharge w/patient and caregiver  - Arrange for needed discharge resources and transportation as appropriate  - Identify discharge learning needs (meds, wound care, etc )  - Arrange for interpretive services to assist at discharge as needed  - Refer to Case Management Department for coordinating discharge planning if the patient needs post-hospital services based on physician/advanced practitioner order or complex needs related to functional status, cognitive ability, or social support system   Reactivated     Problem: PAIN - ADULT  Goal: Verbalizes/displays adequate comfort level or baseline comfort level  Description  Interventions:  - Encourage patient to monitor pain and request assistance  - Assess pain using appropriate pain scale  - Administer analgesics based on type and severity of pain and evaluate response  - Implement non-pharmacological measures as appropriate and evaluate response  - Consider cultural and social influences on pain and pain management  - Notify physician/advanced practitioner if interventions unsuccessful or patient reports new pain   Reactivated     Problem: ANXIETY  Goal: Will report anxiety at manageable levels  Description  INTERVENTIONS:  - Administer medication as ordered  - Teach and encourage coping skills  - Provide emotional support  - Assess patient/family for anxiety and ability to cope   Reactivated  Goal: By discharge: Patient will verbalize 2 strategies to deal with anxiety  Description  Interventions:  - Identify any obvious source/trigger to anxiety  - Staff will assist patient in applying identified coping technique/skills  - Encourage attendance of scheduled groups and activities   Reactivated     Problem: Nutrition/Hydration-ADULT  Goal: Nutrient/Hydration intake appropriate for improving, restoring or maintaining nutritional needs  Description  Monitor and assess patient's nutrition/hydration status for malnutrition  Collaborate with interdisciplinary team and initiate plan and interventions as ordered  Monitor patient's weight and dietary intake as ordered or per policy  Utilize nutrition screening tool and intervene as necessary  Determine patient's food preferences and provide high-protein, high-caloric foods as appropriate       INTERVENTIONS:  - Monitor oral intake, urinary output, labs, and treatment plans  - Assess nutrition and hydration status and recommend course of action  - Evaluate amount of meals eaten  - Assist patient with eating if necessary   - Allow adequate time for meals  - Recommend/ encourage appropriate diets, oral nutritional supplements, and vitamin/mineral supplements  - Order, calculate, and assess calorie counts as needed  - Recommend, monitor, and adjust tube feedings and TPN/PPN based on assessed needs  - Assess need for intravenous fluids  - Provide specific nutrition/hydration education as appropriate  - Include patient/family/caregiver in decisions related to nutrition   Reactivated

## 2019-12-11 NOTE — ED PROVIDER NOTES
Emergency Department Note    Encounter Date 12/11/2019    Diagnosis  1  Schizophrenia, unspecified type [facility])        MDM  Number of Diagnoses or Management Options  Diagnosis management comments: Presents with request for behavior health evaluation having mood swings off his lithium  Afebrile  VSS  Exam reveals nontoxic appearing patient in no acute distress with flight of ideas otherwise alert calm and cooperative with no other concerning findings  Labs reveal no clinically significant abnormality  UA unremarkable  UDS negative  ETOH negative  Lithium level is extremely low which coincides with the patient reporting he has stopped taking it  ECG reveals normal sinus rhythm, LAD, no acute ischemic changes  Likely mood destabilization  Consider schizophrenia versus psychosis  Discussed with Behavioral Health in the emergency department who evaluated patient and coordinated placement  Patient is medically clear for admission to inpatient psychiatric facility and is doing so on a voluntary basis  CC  Chief Complaint   Patient presents with    Psychiatric Evaluation     Unable to elaborate  PMH significant for schizoaffective disorder complains of gradual onset intermittent episodic mood swings of fluctuating intensity since being off his lithium for at least the past week or so, stressing that he is here voluntarily and would like to be admitted to an inpatient psychiatric facility  Denies fever, rash, joint pain/swelling, headache, vision changes, hearing changes, chest pain, shortness of breath, abdominal pain and changes in bladder habits  History  No current facility-administered medications for this encounter       Current Outpatient Medications:     cholecalciferol (VITAMIN D3) 1,000 units tablet, Take 1 tablet (1,000 Units total) by mouth daily, Disp: 30 tablet, Rfl: 0    dextromethorphan-guaiFENesin (ROBITUSSIN DM)  mg/5 mL syrup, Take 10 mL by mouth every 4 (four) hours as needed for cough, Disp: 118 mL, Rfl: 0    folic acid (FOLVITE) 1 mg tablet, Take 1 tablet (1 mg total) by mouth daily, Disp: 30 tablet, Rfl: 0    lithium 600 MG capsule, Take 1 capsule (600 mg total) by mouth every morning In Addition to 900 mg HS, Disp: 30 capsule, Rfl: 0    lithium carbonate 300 mg capsule, Take 3 capsules (900 mg total) by mouth daily at bedtime In addition to 600 mg in AM, Disp: 90 capsule, Rfl: 0    LORazepam (ATIVAN) 0 5 mg tablet, Take 1 tablet (0 5 mg total) by mouth 3 (three) times a day, Disp: 45 tablet, Rfl: 1    Melatonin 10 MG TABS, Take 1 tablet (10 mg total) by mouth daily at bedtime, Disp: 30 tablet, Rfl: 0    nicotine polacrilex (NICORETTE) 2 mg gum, Chew 1 each (2 mg total) every 2 (two) hours as needed for smoking cessation, Disp: 100 each, Rfl: 0    OLANZapine (ZyPREXA) 15 mg tablet, Take 1 tablet (15 mg total) by mouth daily at bedtime, Disp: , Rfl:     OLANZapine (ZyPREXA) 5 mg tablet, Take 1 tablet (5 mg total) by mouth daily, Disp: 30 tablet, Rfl: 0    tamsulosin (FLOMAX) 0 4 mg, Take 1 capsule (0 4 mg total) by mouth daily with dinner, Disp: 30 capsule, Rfl: 0    thiamine 100 MG tablet, Take 1 tablet (100 mg total) by mouth daily, Disp: 30 tablet, Rfl: 0     Past Medical History:   Diagnosis Date    Anxiety     Astigmatism     DJD (degenerative joint disease)     Gait abnormality     Head injury     History of colonic polyps     Hydrocele     Hyperlipidemia     Hypertension     Macular drusen     Presbyopia     Schizoaffective disorder (HCC)     Sepsis ([facility] [place] [address]     Tobacco abuse     Umbilical hernia     Vitreous syneresis        Past Surgical History:   Procedure Laterality Date    FRACTURE SURGERY      INGUINAL HERNIA REPAIR         History reviewed  No pertinent family history       Social History     Socioeconomic History    Marital status: Single     Spouse name: Not on file    Number of children: Not on file    Years of education: Not on file    Highest education level: Not on file   Occupational History    Not on file   Social Needs    Financial resource strain: Not on file    Food insecurity:     Worry: Not on file     Inability: Not on file    Transportation needs:     Medical: Not on file     Non-medical: Not on file   Tobacco Use    Smoking status: Current Every Day Smoker     Packs/day: 1 00     Types: Cigars    Smokeless tobacco: Never Used   Substance and Sexual Activity    Alcohol use: Yes     Comment: whenever i want    Drug use: No    Sexual activity: Not on file   Lifestyle    Physical activity:     Days per week: Not on file     Minutes per session: Not on file    Stress: Not on file   Relationships    Social connections:     Talks on phone: Not on file     Gets together: Not on file     Attends [employer] service: Not on file     Active member of club or organization: Not on file     Attends meetings of clubs or organizations: Not on file     Relationship status: Not on file    Intimate partner violence:     Fear of current or ex partner: Not on file     Emotionally abused: Not on file     Physically abused: Not on file     Forced sexual activity: Not on file   Other Topics Concern    Not on file   Social History Narrative    Not on file       Review of Systems   All other systems reviewed and are negative  Vital Signs  Vitals:    12/11/19 1138   BP: 159/100   TempSrc: Temporal   Pulse: 90   Resp: 18   Patient Position - Orthostatic VS: Sitting   Temp: 98 9 °F (37 2 °C)       Physical Exam   Constitutional: He appears well-developed and well-nourished  HENT:   Head: Normocephalic and atraumatic  Eyes: Conjunctivae and EOM are normal    Neck: Normal range of motion  Neck supple  Cardiovascular: Normal rate and regular rhythm  Pulmonary/Chest: Effort normal and breath sounds normal    Abdominal: Soft  There is no tenderness  Musculoskeletal: Normal range of motion  He exhibits no deformity  Neurological: He is alert  No focal deficit   Skin: Skin is warm and dry  Psychiatric:   Flight of ideas otherwise calm and cooperative   Nursing note and vitals reviewed  ED Medications  Medications - No data to display    Labs  Labs Reviewed   CBC AND DIFFERENTIAL - Abnormal       Result Value Ref Range Status    WBC 6 10  4 80 - 10 80 Thousand/uL Final    RBC 4 46  4 30 - 5 90 Million/uL Final    Hemoglobin 13 5 (*) 14 0 - 18 0 g/dL Final    Hematocrit 40 4 (*) 42 0 - 47 0 % Final    MCV 91  81 - 99 fL Final    MCH 30 4  26 0 - 34 0 pg Final    MCHC 33 5  31 0 - 37 0 g/dL Final    RDW 13 9  11 5 - 14 5 % Final    MPV 7 8 (*) 8 6 - 11 7 fL Final    Platelets 525  701 - 390 Thousands/uL Final    Neutrophils Relative 76 (*) 42 - 75 % Final    Lymphocytes Relative 18 (*) 21 - 51 % Final    Monocytes Relative 5  2 - 12 % Final    Eosinophils Relative 1  0 - 5 % Final    Basophils Relative 0  0 - 2 % Final    Neutrophils Absolute 4 60  1 40 - 6 50 Thousands/µL Final    Lymphocytes Absolute 1 10  0 60 - 4 47 Thousands/µL Final    Monocytes Absolute 0 30  0 17 - 1 22 Thousand/µL Final    Eosinophils Absolute 0 00  0 00 - 0 61 Thousand/µL Final    Basophils Absolute 0 00  0 00 - 0 10 Thousands/µL Final   COMPREHENSIVE METABOLIC PANEL - Abnormal    Sodium 143  134 - 143 mmol/L Final    Potassium 3 9  3 5 - 5 5 mmol/L Final    Chloride 108 (*) 98 - 107 mmol/L Final    CO2 28  21 - 31 mmol/L Final    ANION GAP 7  4 - 13 mmol/L Final    BUN 12  7 - 25 mg/dL Final    Creatinine 0 98  0 70 - 1 30 mg/dL Final    Comment: Standardized to IDMS reference method    Glucose 94  65 - 99 mg/dL Final    Comment:   If the patient is fasting, the ADA then defines impaired fasting glucose as > 100 mg/dL and diabetes as > or equal to 123 mg/dL  Specimen collection should occur prior to Sulfasalazine administration due to the potential for falsely depressed results   Specimen collection should occur prior to Sulfapyridine administration due to the potential for falsely elevated results  Calcium 9 1  8 6 - 10 5 mg/dL Final    AST 19  13 - 39 U/L Final    Comment:   Specimen collection should occur prior to Sulfasalazine administration due to the potential for falsely depressed results  ALT 20  7 - 52 U/L Final    Comment:   Specimen collection should occur prior to Sulfasalazine administration due to the potential for falsely depressed results  Alkaline Phosphatase 32 (*) 55 - 165 U/L Final    Total Protein 6 0 (*) 6 4 - 8 9 g/dL Final    Albumin 4 2  3 5 - 5 7 g/dL Final    Total Bilirubin 0 40  0 20 - 1 00 mg/dL Final    eGFR 83  ml/min/1 73sq m Final    Narrative:     [place] guidelines for Chronic Kidney Disease (CKD):     Stage 1 with normal or high GFR (GFR > 90 mL/min/1 73 square meters)    Stage 2 Mild CKD (GFR = 60-89 mL/min/1 73 square meters)    Stage 3A Moderate CKD (GFR = 45-59 mL/min/1 73 square meters)    Stage 3B Moderate CKD (GFR = 30-44 mL/min/1 73 square meters)    Stage 4 Severe CKD (GFR = 15-29 mL/min/1 73 square meters)    Stage 5 End Stage CKD (GFR <15 mL/min/1 73 square meters)  Note: GFR calculation is accurate only with a steady state creatinine   LITHIUM LEVEL - Abnormal    Lithium Lvl <0 2 (*) 1 0 - 1 2 mmol/L Final   RAPID DRUG SCREEN, URINE - Normal    Amph/Meth UR Negative  Negative Final    Barbiturate Ur Negative  Negative Final    Benzodiazepine Urine Negative  Negative Final    Cocaine Urine Negative  Negative Final    Methadone Urine Negative  Negative Final    Opiate Urine Negative  Negative Final    PCP Ur Negative  Negative Final    THC Urine Negative  Negative Final    Narrative:     FOR MEDICAL PURPOSES ONLY  IF CONFIRMATION NEEDED PLEASE CONTACT THE LAB WITHIN 5 DAYS      Drug Screen Cutoff Levels:  AMPHETAMINE/METHAMPHETAMINES  1000 ng/mL  BARBITURATES     200 ng/mL  BENZODIAZEPINES     200 ng/mL  COCAINE      300 ng/mL  METHADONE      300 ng/mL  OPIATES      300 ng/mL  PHENCYCLIDINE     25 ng/mL  THC       50 ng/mL     UA W REFLEX TO MICROSCOPIC WITH REFLEX TO CULTURE - Normal    Color, UA Yellow  Yellow, Straw Final    Clarity, UA Clear  Hazy, Clear Final    Specific Gravity, UA 1 010  >1 005-<1 030 Final    pH, UA 6 0  5 0, 5 5, 6 0, 6 5, 7 0, 7 5 Final    Leukocytes, UA Negative  Negative Final    Nitrite, UA Negative  Negative Final    Protein, UA Negative  Negative, Interference- unable to analyze mg/dl Final    Glucose, UA Negative  Negative mg/dl Final    Ketones, UA Negative  Negative mg/dl Final    Urobilinogen, UA 0 2  0 2, 1 0 E U /dl E U /dl Final    Bilirubin, UA Negative  Negative Final    Blood, UA Negative  Negative Final   TSH, 3RD GENERATION - Normal    TSH 3RD GENERATON 1 080  0 450 - 5 330 uIU/mL Final    Comment:   Using supplements with high doses of biotin 20 to more than 300 times greater than the adequate daily intake for adults of 30 mcg/day as established by the [place] [employer], can cause falsely depress results  Narrative:     Patients undergoing fluorescein dye angiography may retain small amounts of fluorescein in the body for 48-72 hours post procedure  Samples containing fluorescein can produce falsely depressed TSH values  If the patient had this procedure,a specimen should be resubmitted post fluorescein clearance  MEDICAL ALCOHOL - Normal    Ethanol Lvl <10  <10 mg/dL Final   POCT ALCOHOL BREATH TEST - Normal    EXTBreath Alcohol 0 00   Final        Procedures   None        ED Course  ED Course as of Dec 11 1440   Wed Dec 11, 2019   1437 ECG reveals normal sinus rhythm, LAD, no acute ischemic changes           Disposition  Time reflects when diagnosis was documented in both MDM as applicable and the Disposition within this note     Time User Action Codes Description Comment    12/11/2019  2:38 PM Malaika Patricia Add [F20 9] Schizophrenia, unspecified type [facility])       ED Disposition     ED Disposition Condition Date/Time Comment    Transfer to [id number] Dec 11, 2019  2:38 PM Luly Graham should be transferred out to inpatient psychiatric facility and has been medically cleared          MD Documentation      Most Recent Value   Sending MD  Dr Pieter Dunne DO      Follow-up Information    None          ED Provider  Electronically signed by:     Bessy Carrion DO  12/11/19 1788

## 2019-12-12 PROCEDURE — 99222 1ST HOSP IP/OBS MODERATE 55: CPT | Performed by: PSYCHIATRY & NEUROLOGY

## 2019-12-12 RX ORDER — AMMONIUM LACTATE 12 G/100G
LOTION TOPICAL 2 TIMES DAILY PRN
Status: DISCONTINUED | OUTPATIENT
Start: 2019-12-12 | End: 2019-12-12

## 2019-12-12 RX ORDER — AMMONIUM LACTATE 12 G/100G
LOTION TOPICAL 2 TIMES DAILY
Status: DISCONTINUED | OUTPATIENT
Start: 2019-12-12 | End: 2019-12-18 | Stop reason: HOSPADM

## 2019-12-12 RX ADMIN — VITAMIN D, TAB 1000IU (100/BT) 1000 UNITS: 25 TAB at 08:24

## 2019-12-12 RX ADMIN — LORAZEPAM 1 MG: 1 TABLET ORAL at 05:12

## 2019-12-12 RX ADMIN — Medication 1 TABLET: at 08:24

## 2019-12-12 RX ADMIN — Medication 100 MG: at 08:24

## 2019-12-12 RX ADMIN — LITHIUM CARBONATE 900 MG: 300 CAPSULE, GELATIN COATED ORAL at 21:29

## 2019-12-12 RX ADMIN — LITHIUM CARBONATE 600 MG: 300 CAPSULE, GELATIN COATED ORAL at 12:01

## 2019-12-12 RX ADMIN — LORAZEPAM 0.5 MG: 0.5 TABLET ORAL at 08:23

## 2019-12-12 RX ADMIN — FOLIC ACID 1 MG: 1 TABLET ORAL at 08:24

## 2019-12-12 NOTE — NURSING NOTE
Pt sleep broken this shift; awoke agitated looking for his sun glasses; reassurance provided they were here in contraband; copy of belongings sheet provided; Q 7 min checks ongoing

## 2019-12-12 NOTE — PROGRESS NOTES
12/12/19 0949   Team Meeting   Meeting Type Daily Rounds   Team Members Present   Team Members Present Physician;Nurse;;   Physician Team Member Dr Reji Barillas MD; [facility] [place] [place]   Nursing Team Member Porter Henderson, [facility]   Care Management Team Member Gifty Murphy, MS, [facility]   Patient/Family Present   Patient Present No   Patient's Family Present No     Readmission  201 not taking medication at home, inability to sleep, mood swings, disorganized and angry  No roomate, throwing hygiene products around room  Refused Zyprexa last night  PT talked about hurting others no action

## 2019-12-12 NOTE — PROGRESS NOTES
Progress Note - Pushpa Graham 62 y o  male MRN: [id number]    Unit/Bed#[facility] 203-02 Encounter: [id number]        Subjective:   Patient seen and examined at bedside after reviewing the chart and discussing the case with the caring staff  Patient examined at bedside  Patient complains of dry skin on his hands  Physical Exam   Vitals: Blood pressure 147/87, pulse 75, temperature 98 8 °F (37 1 °C), temperature source Temporal, resp  rate 19, height 5' 9" (1 753 m), weight 88 9 kg (195 lb 15 8 oz), SpO2 99 %  ,Body mass index is 28 94 kg/m²  Constitutional: He appears well-developed  HEENT: PERR, EOMI, MMM  Cardiovascular: Normal rate and regular rhythm  Pulmonary/Chest: Effort normal and breath sounds normal    Abdomen: Soft, + BS, NT  Skin:  Dry cracked skin in bilateral hands    Assessment/Plan:  Pushpa Graham is a(n) 62y o  year old male with Schizoaffective Disorder, Bipolar Type     1  Cardiac with history of hypertension and dyslipidemia  BP was high in the ED but was previously WNL without antihypertensives  Will continue to closely monitor  Patient is not on anything for Hyperlipidemia  2  Tobacco abuse   Nicotine gum PRN  3  Alcohol abuse  Thiamine, folic acid and multivitamin  4  DJD/osteoarthritis   Tylenol on as needed basis  5  Gait abnormality   Stable  6  Vitamin-D deficiency   Vitamin D3 1000U daily  7  Urinary Frequency/Urgency  Flomax 0 4mg once daily  8  Dry cracked skin bilateral hands  I will put the patient on ammonium lactate lotion 2 times daily and on as-needed basis

## 2019-12-12 NOTE — NURSING NOTE
Pt became highly agitated no overt trigger noted  Pt yelling at ancillary staff in coto; difficult to understand what patient was upset about; Pt presented denture cup and a tube of lotion stating that they were unacceptable  Pt disruptive to milieu yelling/slamming objects  Able to redirect patient back to room  Pt continued to escalate and express paranoia  Content of speech violent; thoughts racing offered and accepted 1 mg of ativan po  Prn moderately effective  Room block placed due to patients unpredictable; disruptive behaviors and to provide a safe place for patient to vent and deescalate   Q 7 min ongoing

## 2019-12-12 NOTE — PROGRESS NOTES
[id number]  Patient has been isolative to his room today  At the beginning of shift, patient was tearful and reported feeling depressed  He cited his stressors as issues with his mother but would not elaborate  He was agreeable to take his lithium after it arrived late from pharmacy  Otherwise, patient refused most medications throughout this shift  He was not willing to take Ativan or Zyprexa  He refused the LacHydrin cream that was ordered for his hands  He had an irritable edge throughout most of this shift but was able to maintain control and had no outbursts  Remains disorganized and tangential in conversation  Will continue monitoring

## 2019-12-12 NOTE — PROGRESS NOTES
Admission Note  Patient admitted from [facility] ED and is a 201  Patient brought himself to ED after experiencing mood swings due to stopping his lithium  Upon arrival, patient is disorganized with loud, pressured, and rambling speech  He is difficult to assess due to his disorganization  He refused to sign paperwork  He denied SI/HI  He was willing to take scheduled Ativan with his dinner  When he is alone in his room, he can be heard talking to himself but he denies hallucinations  He showered and his skin appeared intact without bruises or open areas  No complaints of SOB but intermittent cough noted  Will continue monitoring

## 2019-12-12 NOTE — PLAN OF CARE
Problem: Ineffective Coping  Goal: Participates in unit activities  Description  Interventions:  - Provide therapeutic environment   - Provide required programming   - Redirect inappropriate behaviors    Outcome: Not Progressing

## 2019-12-12 NOTE — H&P
Psychiatric Evaluation - Behavioral Health     Identification Data:Freddie Graham 62 y o  male MRN: [id number]  Unit/Bed#: [facility] 203-02 Encounter: [id number]    Chief Complaint: manic symptoms, unstable mood, paranoid ideation and agitation    History of Present Illness     Donato Graham is a 62 y o  male with a history of Schizoaffective Disorder who was admitted to the inpatient adult psychiatric unit on a voluntary 201 commitment basis due to worsening of mixed symptoms of bipolar disorder, unstable mood, paranoid ideation and disorganized behavior  On evaluation in the inpatient psychiatric unit Floyd Vega reports he was noncompliant with his medication following recent discharge from the unit  He endorses worsening of his manic depressive symptoms with increased mood lability, poor sleep, feeling paranoid and experiencing AH/VH periodically  Pt denies any SI/HI presently  He is noted to be irritable, labile, tangential with racing thoughts but agrees to take his medications at this time       Psychiatric Review Of Systems:    Sleep changes: decreased  Appetite changes:no  Weight changes: no  Energy: decreased  Interest/pleasure/: no  Anhedonia: no  Anxiety: yes  Mraya: yes  Guilt:  no  Hopeless:  no  Self injurious behavior/risky behavior: no  Suicidal ideation: no  Homicidal ideation: no  Auditory hallucinations: appears responding to internal stimuli  Visual hallucinations: appears responding to internal stimuli  Delusional thinking: paranoid thoughts  Eating disorder history: no  Obsessive/compulsive symptoms: no    Historical Information     Past Psychiatric History:     Past Inpatient Psychiatric Treatment:   Multiple past inpatient psychiatric admissions  Past Outpatient Psychiatric Treatment:    Currently in outpatient psychiatric treatment with a psychiatrist  Past Suicide Attempts: no  Past Violent Behavior: yes  Past Psychiatric Medication Trials: multiple psychiatric medication trials     Substance Abuse History:    Social History     Tobacco History     Smoking Status  Current Every Day Smoker Smoking Frequency  1 pack/day Smoking Tobacco Type  Cigars    Smokeless Tobacco Use  Never Used          Alcohol History     Alcohol Use Status  Yes Comment  whenever i want          Drug Use     Drug Use Status  No          Sexual Activity     Sexually Active  Not Asked          Activities of Daily Living    Not Asked                 I have assessed this patient for substance use within the past 12 months    Alcohol use: none currently  Recreational drug use: denies    Family Psychiatric History: several family members with mental illness    Social History:    Education: 11th grade  Marital History: single  Children: none  Living Arrangement: lives in home with mother  Occupational History: on permanent disability  Functioning Relationships: limited support system  Legal History: yes   History: yes    Traumatic History:    None    Past Medical History:      Past Medical History:   Diagnosis Date    Anxiety     Astigmatism     DJD (degenerative joint disease)     Gait abnormality     Head injury     History of colonic polyps     Hydrocele     Hyperlipidemia     Hypertension     Macular drusen     Presbyopia     [facility]izoaffective disorder ([facility] [address]     Sepsis ([facility] [address]     Tobacco abuse     Umbilical hernia     Vitreous syneresis      Past Surgical History:   Procedure Laterality Date    FRACTURE SURGERY      INGUINAL HERNIA REPAIR         Medical Review Of Systems:    Pertinent items are noted in HPI  Allergies: Allergies   Allergen Reactions    Haldol [Haloperidol]     Navane [Thiothixene]     Other      Adhesive tape         Medications: All current active medications have been reviewed      OBJECTIVE:    Vital signs in last 24 hours:    Temp:  [98 °F (36 7 °C)-98 8 °F (37 1 °C)] 98 8 °F (37 1 °C)  HR:  [63-75] 75  Resp:  [19-20] 19  BP: (147-167)/(86-87) 147/87      Intake/Output Summary (Last 24 hours) at 12/12/2019 1224  Last data filed at 12/11/2019 2048  Gross per 24 hour   Intake 640 ml   Output    Net 640 ml        Mental Status Evaluation:    Appearance:  disheveled, marginal hygiene   Behavior:  restless, some agitation   Speech:  pressured   Mood:  irritable   Affect:  labile   Language: naming objects   Thought Process:  racing of thoughts   Associations: tangential associations   Thought Content:  paranoid ideation   Perceptual Disturbances: appears preoccupied   Risk Potential: Suicidal ideation - None  Homicidal ideation - None  Potential for aggression - Yes, due to agitation   Sensorium:  oriented to person and place   Memory:  recent and remote memory grossly intact   Consciousness:  alert and awake   Attention: attention span and concentration appear shorter than expected for age   Intellect: unable to assess due to lack of cooperation   Fund of Knowledge: awareness of current events: yes   Insight:  impaired   Judgment: limited   Muscle Strength Muscle Tone: normal  normal   Gait/Station: slow gait   Motor Activity: no abnormal movements       Laboratory Results:   I have personally reviewed all pertinent laboratory/tests results  Imaging Studies:   No results found  Code Status: Level 1 - Full Code  Advance Directive and Living Will: <no information>    Assessment/Plan   Active Problems:    Schizoaffective disorder, bipolar type ([facility] [address] )      Patient Strengths: communication skills, good physical health, patient is on a voluntary commitment     Patient Barriers: chronic mental illness, limited insight, limited support system, noncompliant with medication    Treatment Plan:     Planned Treatment and Medication Changes:     All current active medications have been reviewed  Encourage group therapy, milieu therapy and occupational therapy  Behavioral Health checks every 7 minutes  Will resume his psych meds Lithium, Zyprexa and Ativan    Risks / Benefits of Treatment:    Risks, benefits, and possible side effects of medications explained to patient and patient verbalizes understanding and agreement for treatment  Counseling / Coordination of Care:    Patient's presentation on admission and proposed treatment plan discussed with treatment team   Diagnosis, medication changes and treatment plan reviewed with patient  Events leading to admission reviewed with patient      Inpatient Psychiatric Certification:    Estimated length of stay: 13 midnights      Rita Shaw MD 12/12/19

## 2019-12-12 NOTE — TREATMENT PLAN
TREATMENT PLAN REVIEW - 26 Chana Vigil K Day 62 y o  1960 male MRN: [id number]    [address]  Room / Bed: sarah Rivera [facility]11 Encounter: [id number]          Admit Date/Time:  12/11/2019 11:32 AM    Treatment Team: Attending Provider: Kaylan Miles MD; Consulting Physician: Anabell Millard MD; Physician Assistant: Tatiana Nobles PA-C; Care Manager: Roberta Solomon RN; Patient Care Assistant: Amaya Rubio; Patient Care Assistant: Dana Gayle; Recreational Therapist: Joselito Rao;  Registered Nurse: Armond Almazan RN    Diagnosis: Active Problems:    Schizoaffective disorder, bipolar type [facility])      Patient Strengths/Assets: communication skills, good physical health, negotiates basic needs, patient is on a voluntary commitment    Patient Barriers/Limitations: limited support system, noncompliant with medication, poor insight, self-care deficit    Short Term Goals: decrease in depressive symptoms, decrease in paranoid thoughts, decrease in level of agitation, improvement in insight, sleep improvement, mood stabilization, acceptance of need for psychiatric treatment, acceptance of psychiatric medications    Long Term Goals: stabilization of mood, improvement in reality testing, improved insight, acceptance of need for psychiatric medications, acceptance of need for psychiatric treatment, acceptance of psychiatric diagnosis    Progress Towards Goals: initial treatment plan    Recommended Treatment: medication management, patient medication education, group therapy, milieu therapy, continued Behavioral Health psychiatric evaluation/assessment process    Treatment Frequency: daily medication monitoring, group and milieu therapy daily, monitoring through interdisciplinary rounds, monitoring through weekly patient care conferences    Expected Discharge Date: 13 midnights    Discharge Plan: will be determined    Treatment Plan Created/Updated By: Jaime Tidwell MD

## 2019-12-12 NOTE — PROGRESS NOTES
Pt was irritable during assessment about his medications and the doctor  Pt stated he didn't want to be back here and he wasn't trying to be mean to us, however he wasn't going to be nice to the doctor if he didn't let him go home  Pt denied anxiety, pain, depression  Pt denies SI, HI, AH, VH  Will continue to monitor

## 2019-12-12 NOTE — PLAN OF CARE
Problem: Alteration in Thoughts and Perception  Goal: Treatment Goal: Gain control of psychotic behaviors/thinking, reduce/eliminate presenting symptoms and demonstrate improved reality functioning upon discharge  Outcome: Progressing  Goal: Verbalize thoughts and feelings  Description  Interventions:  - Promote a nonjudgmental and trusting relationship with the patient through active listening and therapeutic communication  - Assess patient's level of functioning, behavior and potential for risk  - Engage patient in 1 on 1 interactions  - Encourage patient to express fears, feelings, frustrations, and discuss symptoms    - [place] patient to reality, help patient recognize reality-based thinking   - Administer medications as ordered and assess for potential side effects  - Provide the patient education related to the signs and symptoms of the illness and desired effects of prescribed medications   Outcome: Progressing  Goal: Refrain from acting on delusional thinking/internal stimuli  Description  Interventions:  - Monitor patient closely, per order   - Utilize least restrictive measures   - Set reasonable limits, give positive feedback for acceptable   - Administer medications as ordered and monitor of potential side effects   Outcome: Progressing  Goal: Agree to be compliant with medication regime, as prescribed and report medication side effects  Description  Interventions:  - Offer appropriate PRN medication and supervise ingestion; conduct AIMS, as needed    Outcome: Progressing  Goal: Complete daily ADLs, including personal hygiene independently, as able  Description  Interventions:  - Observe, teach, and assist patient with ADLS  - Monitor and promote a balance of rest/activity, with adequate nutrition and elimination    Outcome: Progressing     Problem: Risk for Violence/Aggression Toward Others  Goal: Treatment Goal: Refrain from acts of violence/aggression during length of stay, and demonstrate improved impulse control at the time of discharge  Outcome: Progressing  Goal: Verbalize thoughts and feelings  Description  Interventions:  - Assess and re-assess patient's level of risk, every waking shift  - Engage patient in 1:1 interactions, daily, for a minimum of 15 minutes   - Allow patient to express feelings and frustrations in a safe and non-threatening manner   - Establish rapport/trust with patient    Outcome: Progressing  Goal: Refrain from harming others  Outcome: Progressing  Goal: Refrain from destructive acts on the environment or property  Outcome: Progressing  Goal: Control angry outbursts  Description  Interventions:  - Monitor patient closely, per order  - Ensure early verbal de-escalation  - Monitor prn medication needs  - Set reasonable/therapeutic limits, outline behavioral expectations, and consequences   - Provide a non-threatening milieu, utilizing the least restrictive interventions    Outcome: Progressing

## 2019-12-12 NOTE — PROGRESS NOTES
[id number] Shift Note  Patient has been spending time in his room, reading the bible and talking to himself this shift  Continues to be disorganized and loud  He refused to take his dinner time Flomax and refused his HS Zyprexa but was otherwise compliant with all medications  Denied pain  Will continue monitoring

## 2019-12-13 LAB
ATRIAL RATE: 61 BPM
P AXIS: 76 DEGREES
PR INTERVAL: 166 MS
QRS AXIS: -41 DEGREES
QRSD INTERVAL: 94 MS
QT INTERVAL: 380 MS
QTC INTERVAL: 382 MS
T WAVE AXIS: 93 DEGREES
VENTRICULAR RATE: 61 BPM

## 2019-12-13 PROCEDURE — 99232 SBSQ HOSP IP/OBS MODERATE 35: CPT | Performed by: NURSE PRACTITIONER

## 2019-12-13 PROCEDURE — 93010 ELECTROCARDIOGRAM REPORT: CPT | Performed by: INTERNAL MEDICINE

## 2019-12-13 RX ORDER — LITHIUM CARBONATE 300 MG/1
900 CAPSULE ORAL EVERY 12 HOURS SCHEDULED
Status: DISCONTINUED | OUTPATIENT
Start: 2019-12-13 | End: 2019-12-18 | Stop reason: HOSPADM

## 2019-12-13 RX ADMIN — LITHIUM CARBONATE 900 MG: 300 CAPSULE, GELATIN COATED ORAL at 20:38

## 2019-12-13 RX ADMIN — Medication 100 MG: at 08:09

## 2019-12-13 RX ADMIN — VITAMIN D, TAB 1000IU (100/BT) 1000 UNITS: 25 TAB at 08:08

## 2019-12-13 RX ADMIN — FOLIC ACID 1 MG: 1 TABLET ORAL at 08:09

## 2019-12-13 RX ADMIN — LITHIUM CARBONATE 600 MG: 300 CAPSULE, GELATIN COATED ORAL at 08:08

## 2019-12-13 NOTE — PROGRESS NOTES
12/13/19 0943   Team Meeting   Meeting Type Daily Rounds   Team Members Present   Team Members Present Physician;Nurse;;   Physician Team Member Dr Kayleigh Cordero MD; Ramez Adams[address]   Nursing Team Member Rosangela Lockhart, RN   Care Management Team Member Asiya Myers MS, [facility], [facility]   Social Work Team Member Britta Berger, RN   Patient/Family Present   Patient Present No   Patient's Family Present No     Irritable, refused medications  PT denies depression and anxiety is a 1/10  No D/C date at this time
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
## 2019-12-13 NOTE — PLAN OF CARE
Problem: Ineffective Coping  Goal: Cooperates with admission process  Description  Interventions:   - Complete admission process   Outcome: Progressing  Goal: Participates in unit activities  Description  Interventions:  - Provide therapeutic environment   - Provide required programming   - Redirect inappropriate behaviors    Outcome: Progressing  Patient agreed to meet with this writer and complete [facility] Admission Self Assessment  Patient pleasant, calm and cooperative however, displayed poor insight into stressors that led to his admission  In reference to those stressors Pt replied, " they are very personal" and would not elaborate except to go on to say he was not getting his medications form the [facility]  LD believes the mentioned above interfered in his life most of the time  Patient also stated what he likes the least about his life is his "family playing with his mind " Patient at times was religiously preoccupied and continued to go off in different directions during conversation however, he was easily redirected  Patient collects SSI/SSD benefits and currently not working  Patient was able to list reading, gardening, music, sports, arts/crafts, exercise and working/staying productive as things he enjoys the most  LD stated he believes working on anger management, helping his family understand his illness, relaxation techniques and obtaining community supports/resources would help him while here  When Pt was ask when will he know he is ready for discharge he replied, "when the Team says I'm ready "   Patient signed document and was given copy

## 2019-12-13 NOTE — NURSING NOTE
Pt sleep broken awoke in the middle of the night; irritable and angry; ancillary staff able to verbally de-esculate pt  Provided refreshments and returned to sleep shortly after ongoing monitoring will continue

## 2019-12-13 NOTE — PROGRESS NOTES
Pt has irritable edge  Pt refused Ativan, Flomax, & Ammonium Lactate lotion this afternoon  Q 7 min checks maintained to monitor pt's behavior & safety

## 2019-12-13 NOTE — PROGRESS NOTES
Puja Chow  KRI:[birth year] Souleymane Pedersen  IZJ:[id number]    JUO:[id number]  Adm Date: 12/11/2019 1132  11:32 AM   ATT PHY: Dilcia Mosqueda, [facility] [facility] [address]         [facility]     The patient was seen after reviewing the chart and discussing the case with caring staff  Today during our encounter, the patient reported no acute concerns  Objective     Vitals:    12/13/19 0915   BP: 140/79   Pulse: 65   Resp:    Temp:    SpO2:        General Appearance: Awake and Alert  No acute distress  HEENT: Normocephalic, atraumatic  PERRLA, EOMI, MMM  Heart: RRR, no murmurs  Normal S1 and S2   Lungs: CTA bilaterally with fair air entry  Assessment     Miranda Graham is a(n) 62y o  year old male with Schizoaffective Disorder, Bipolar Type     1  Cardiac with history of hypertension and dyslipidemia  BP was high in the ED but was previously WNL without antihypertensives  Will continue to closely monitor  Patient is not on anything for Hyperlipidemia  2  Tobacco abuse   Nicotine gum PRN  3  Alcohol abuse  Thiamine, folic acid and multivitamin  4  DJD/osteoarthritis   Tylenol on as needed basis  5  Gait abnormality   Stable  6  Vitamin-D deficiency   Vitamin D3 1000U daily  7  Urinary Frequency/Urgency  Flomax 0 4mg once daily    8  Dry cracked skin bilateral hands  Ammonium lactate lotion 2 times daily and on as-needed basis  The patient was discussed with Dr Terry Hamlin and he is in agreement with the above note

## 2019-12-13 NOTE — PLAN OF CARE
Problem: Ineffective Coping  Goal: Cooperates with admission process  Description  Interventions:   - Complete admission process   Outcome: Progressing  Goal: Participates in unit activities  Description  Interventions:  - Provide therapeutic environment   - Provide required programming   - Redirect inappropriate behaviors    12/13/2019 1538 by Jillian Castelan  Outcome: Progressing  12/13/2019 1210 by Jillian Castelan  Outcome: Progressing   Patient pleasant, cooperative and participating in group settings  Social with staff members and select peers

## 2019-12-13 NOTE — PROGRESS NOTES
Progress Note - Mikael Dhillon 67 Day 62 y o  male MRN: [id number]   Unit/Bed#: [facility] 203-02 Encounter: [id number]    Behavior over the last 24 hours: unchanged  Hillary Xavier continues irritable, paranoid, suspicious  Noted talking loudly to himself  Has been keeping to himself in his room and less disruptive on the unit  Has been refusing ativan and Zyprexa, only complies with Lithium and requests dose increase  Limited participation in milieu  Sleep: frequent awakenings  Appetite: normal  Medication side effects: No   ROS: all other systems are negative    Mental Status Evaluation:    Appearance:  marginal hygiene   Behavior:  agitated   Speech:  loud   Mood:  irritable   Affect:  increased in intensity   Thought Process:  circumstantial, tangential   Associations: circumstantial associations   Thought Content:  paranoid ideation   Perceptual Disturbances: appears responding to internal stimuli, talks to self at times   Risk Potential: Suicidal ideation - None  Homicidal ideation - None  Potential for aggression - Not at present   Sensorium:  oriented to person, place and time/date   Memory:  recent and remote memory grossly intact   Consciousness:  alert and awake   Attention: attention span and concentration appear shorter than expected for age   Insight:  limited   Judgment: limited   Gait/Station: normal gait/station, normal balance   Motor Activity: no abnormal movements     Vital signs in last 24 hours:    Temp:  [97 5 °F (36 4 °C)-97 9 °F (36 6 °C)] 97 5 °F (36 4 °C)  HR:  [64-65] 65  Resp:  [18-22] 22  BP: (121-140)/(70-79) 140/79    Laboratory results: I have personally reviewed all pertinent laboratory/tests results      Suicide/Homicide Risk Assessment:    Risk of Harm to Self:   Current Specific Risk Factors include: current unstable mood, current psychotic symptoms  Protective Factors: no current suicidal ideation, ability to communicate with staff on the unit, able to contract for safety on the unit  Based on today's assessment, DAVID presents the following risk of harm to self: low    Risk of Harm to Others:  Current Specific Risk Factors include: current psychotic symptoms  Protective Factors: no current homicidal ideation, compliant with unit milieu, follows staff redirection  Based on today's assessment, DAVID presents the following risk of harm to others: low    The following interventions are recommended: behavioral checks every 7 minutes, continued hospitalization on locked unit     Progress Toward Goals: minimal improvement, still at times irritable, slightly more psychotic    Assessment/Plan   Principal Problem:    Schizoaffective disorder, bipolar type ([address] )    Recommended Treatment:     Planned medication and treatment changes:     All current active medications have been reviewed  Encourage group therapy, milieu therapy and occupational therapy  Behavioral Health checks every 7 minutes  Increase Lithium 900 mg BID  Check Lithium level in 3 days    Current Facility-Administered Medications:  acetaminophen 650 mg Oral Q6H PRN Jong Klarissa, CRNP   acetaminophen 650 mg Oral Q4H PRN Jong Klarissa, CRNP   acetaminophen 975 mg Oral Q6H PRN Jong Klarissa, CRNP   aluminum-magnesium hydroxide-simethicone 30 mL Oral Q4H PRN Jong Klarissa, CRNP   ammonium lactate  Topical BID Niko Sung MD   cholecalciferol 1,000 Units Oral Daily Solomon Hernandez PA-C   dextromethorphan-guaiFENesin 10 mL Oral Q4H PRN Solomon Hernandez PA-C   folic acid 1 mg Oral Daily Jose Raul Hernandez PA-C   hydrOXYzine HCL 25 mg Oral Q4H PRN Jong Klarissa, CRNP   lithium carbonate 900 mg Oral Q12H CARLOS Musa   LORazepam 2 mg Intramuscular Q4H PRN Jong Klarissa, CRNP   LORazepam 0 5 mg Oral TID Jong Klarissa, CRNP   LORazepam 1 mg Oral Q4H PRN Jong Klarissa, CRNP   magnesium hydroxide 30 mL Oral Daily PRN Jong Klarissa, CRNP   melatonin 9 mg Oral HS Jong Klarissa, CRNP   multivitamin-minerals 1 tablet Oral Daily [employer], GEORGINA   nicotine polacrilex 2 mg Oral Q2H PRN CARLOS Christine   OLANZapine 15 mg Oral HS CARLOS Christine   OLANZapine 5 mg Oral Daily CARLOS Christine   OLANZapine 5 mg Oral Q12H PRN CARLOS Christine   risperiDONE 1 mg Oral Q8H PRN CARLOS Christine   tamsulosin 0 4 mg Oral Daily With Nisha Hernandez PA-C   thiamine 100 mg Oral Daily Juanita Davis PA-C   ziprasidone 20 mg Intramuscular Q4H PRN CARLOS Christine       Risks / Benefits of Treatment:    Risks, benefits, and possible side effects of medications explained to patient and patient verbalizes understanding and agreement for treatment  Counseling / Coordination of Care:    Patient's progress discussed with staff in treatment team meeting  Medications, treatment progress and treatment plan reviewed with patient      CARLOS Christine 12/13/19

## 2019-12-13 NOTE — PROGRESS NOTES
Pt up ad davion & gait is steady  Pt refused Ativan, Zyprexa, & Multivitamin po this AM  Pt denies any depression & c/o anxiety 1/10  Q 7 min checks maintained to monitor pt's behavior & safety  Pt denies any suicidal or homicidal ideations  Pt did attend Group this AM & interacted appropriately

## 2019-12-14 PROCEDURE — 99232 SBSQ HOSP IP/OBS MODERATE 35: CPT | Performed by: PSYCHIATRY & NEUROLOGY

## 2019-12-14 RX ADMIN — LITHIUM CARBONATE 900 MG: 300 CAPSULE, GELATIN COATED ORAL at 08:36

## 2019-12-14 RX ADMIN — LITHIUM CARBONATE 900 MG: 300 CAPSULE, GELATIN COATED ORAL at 21:36

## 2019-12-14 NOTE — PROGRESS NOTES
Patient has been awake several times tonight  Slams his room door, will come out and walk the coto but easily redirectable  Pleasant and cooperative  Q 7 minute safety checks maintained

## 2019-12-14 NOTE — PROGRESS NOTES
Progress Note - 6 [place] Frandy Day 62 y o  male MRN: [id number]  Unit/Bed#: [facility] 203-02 Encounter: [id number]    Assessment/Plan   Principal Problem:    Schizoaffective disorder, bipolar type ([address] )      Behavior over the last 24 hours:  unchanged  Sleep: normal  Appetite: normal  Medication side effects: No  ROS: no complaints    Mental Status Evaluation:  Appearance:  disheveled   Behavior:  evasive and uncooperative   Speech:  tangential   Mood:  irritable   Affect:  labile   Thought Process:  disorganized   Thought Content:  delusions  paranoid   Perceptual Disturbances: appears internally preoccupied   Risk Potential: Potential for Aggression Yes due to agitation   Sensorium:  person and place   Cognition:  grossly intact   Consciousness:  alert and awake    Attention: attention span appeared shorter than expected for age   Insight:  impaired   Judgment: impaired   Gait/Station: slow   Motor Activity: no abnormal movements     Progress Toward Goals: Pt refuses any other meds but Lithium  Insight is impaired  Recommended Treatment: Continue with group therapy, milieu therapy and occupational therapy  Continue reinforcing meds compliance  Risks, benefits and possible side effects of Medications:   Risks, benefits, and possible side effects of medications explained to patient and patient verbalizes understanding  Medications: all current active meds have been reviewed  Counseling / Coordination of Care  Total floor / unit time spent today 15 minutes  Greater than 50% of total time was spent with the patient and / or family counseling and / or coordination of care

## 2019-12-14 NOTE — PROGRESS NOTES
Pt's mother visited & pt yelled @ her during visit  Pt retreated to his room & did come out to say goodbye to his mother  Dr Chan here & aware of pt's outburst  Dr Pina Guess aware that pt refuses all meds except for Lithobid  Q 7 min checks maintained to monitor pt's behavior & safety

## 2019-12-14 NOTE — PROGRESS NOTES
[id number]  Patient appears to be more visible in the community this evening  His behavior was appropriate and less irritability was noted  He denied depression and anxiety, denied the need to be on the unit  He took his lithium and refused Ativan, Zyprexa, and melatonin  No complaints of pain  Will continue monitoring

## 2019-12-14 NOTE — PROGRESS NOTES
Meagan Alexis  OIR:[birth year] Zeynep Cabello  [facility]:[id number]    HIE:[id number]  Adm Date: 12/11/2019 1132  11:32 AM   ATT PHY: Judie Moore, [address] [facility] [address]         Subjective     The patient was seen after reviewing the chart and discussing the case with caring staff  Today during our encounter, the patient reported no acute concerns  Objective     Vitals:    12/14/19 1556   BP: 122/82   Pulse: 70   Resp: 20   Temp: 99 °F (37 2 °C)   SpO2: 100%       General Appearance: Awake and Alert  No acute distress  HEENT: Normocephalic, atraumatic  PERRLA, EOMI, MMM  Heart: RRR, no murmurs  Normal S1 and S2   Lungs: CTA bilaterally with fair air entry  Assessment     Tamera Graham is a(n) 62y o  year old male with Schizoaffective Disorder, Bipolar Type     1  Cardiac with history of hypertension and dyslipidemia  BP was high in the ED but was previously WNL without antihypertensives  Will continue to closely monitor  Patient is not on anything for Hyperlipidemia  2  Tobacco abuse   Nicotine gum PRN  3  Alcohol abuse  Thiamine, folic acid and multivitamin  4  DJD/osteoarthritis   Tylenol on as needed basis  5  Gait abnormality   Stable  6  Vitamin-D deficiency   Vitamin D3 1000U daily  7  Urinary Frequency/Urgency  Flomax 0 4mg once daily    8  Dry cracked skin bilateral hands  Ammonium lactate lotion 2 times daily and on as-needed basis

## 2019-12-14 NOTE — PROGRESS NOTES
Pt alert & oriented  Pt denies any depression or anxiety  Pt refused all medications this AM except for Lithobid  Q 7 min checks maintained to monitor pt's behavior & safety  Pt denies any suicidal or homicidal ideations  Pt up ad davion & gait is steady

## 2019-12-14 NOTE — PROGRESS NOTES
12/13/19 1330   Team Meeting   Meeting Type Tx Team Meeting   Initial Conference Date 12/13/19   Next Conference Date 01/12/20   Team Members Present   Team Members Present Physician;Nurse;   Physician Team Member Dr Garo Vang Team Member Sae Zavala RN   Care Management Team Member Jagdish Monae RN   Patient/Family Present   Patient Present No   Patient's Family Present No     Team Meeting was held to review patient's Multidisciplinary Problems, Goals and Interventions to include Alteration in Thoughts/Perceptions, Psychosis, Anxiety, Risk for Aggression/Violence, Nutrition/Hysdration, Pain Ineffective Coping, Pain and Discharge Planning  The patient was unable to attend the meeting or sign the Treatment Plan due to severe psychosis

## 2019-12-15 PROCEDURE — 99232 SBSQ HOSP IP/OBS MODERATE 35: CPT | Performed by: PSYCHIATRY & NEUROLOGY

## 2019-12-15 RX ADMIN — LITHIUM CARBONATE 900 MG: 300 CAPSULE, GELATIN COATED ORAL at 21:13

## 2019-12-15 RX ADMIN — LITHIUM CARBONATE 900 MG: 300 CAPSULE, GELATIN COATED ORAL at 08:28

## 2019-12-15 NOTE — PROGRESS NOTES
Progress Note - Mikael Dhillon 67 Day 62 y o  male MRN: [id number]   Unit/Bed#: [facility] 203-02 Encounter: [id number]    Behavior over the last 24 hours: ermias Prakash continues loud and irritable periodically  He became angry with her mother during her visit in the unit  He is refusing to take any psych meds but Lithium  He remains very guarded, paranoid with impaired insight into his illness  Staff report minimal participation in groups and on the unit  Sleep: slept off and on  Appetite: normal  Medication side effects: denies  ROS: no complaints, all other systems are negative    Mental Status Evaluation:    Appearance:  disheveled, marginal hygiene   Behavior:  guarded   Speech:  pressured   Mood:  irritable   Affect:  inappropriate   Thought Process:  illogical   Associations: tangential associations   Thought Content:  paranoid ideation, ruminations   Perceptual Disturbances: appears preoccupied   Risk Potential: Suicidal ideation - None at present  Homicidal ideation - None at present   Sensorium:  oriented to person and place   Memory:  recent memory mildly impaired   Consciousness:  alert and awake   Attention: attention span and concentration appear shorter than expected for age   Insight:  impaired   Judgment: limited   Gait/Station: normal gait/station   Motor Activity: no abnormal movements     Vital signs in last 24 hours:    Temp:  [97 8 °F (36 6 °C)-98 4 °F (36 9 °C)] 98 4 °F (36 9 °C)  HR:  [66-74] 69  Resp:  [18] 18  BP: (109-135)/(65-87) 130/70    Laboratory results: I have personally reviewed all pertinent laboratory/tests results  Assessment/Plan   Principal Problem:    Schizoaffective disorder, bipolar type ([facility]    Recommended Treatment:     Planned medication and treatment changes:     All current active medications have been reviewed  Encourage group therapy, milieu therapy and occupational therapy  Behavioral Health checks every 7 minutes    Current Facility-Administered Medications:  acetaminophen 650 mg Oral Q6H PRN Girma Puff, CRNP   acetaminophen 650 mg Oral Q4H PRN Girma Puff, CRNP   acetaminophen 975 mg Oral Q6H PRN Girma Puff, CRNP   aluminum-magnesium hydroxide-simethicone 30 mL Oral Q4H PRN Girma Puff, CRNP   ammonium lactate  Topical BID Prabha Jaimes MD   cholecalciferol 1,000 Units Oral Daily Jeremyuterio Mars RockoviGEORGINA mitchell   dextromethorphan-guaiFENesin 10 mL Oral Q4H PRN Eluterio Mars RockoviGEORGINA mitchell   folic acid 1 mg Oral Daily Jose Raul RockGEORGINA benavides   hydrOXYzine HCL 25 mg Oral Q4H PRN Girma Puff, CRNP   lithium carbonate 900 mg Oral Q12H [address] Girma Puff, CRNP   LORazepam 2 mg Intramuscular Q4H PRN Girma Puff, CRNP   LORazepam 0 5 mg Oral TID Girma Puff, CRNP   LORazepam 1 mg Oral Q4H PRN Girma Puff, CRNP   magnesium hydroxide 30 mL Oral Daily PRN Girma Puff, CRNP   melatonin 9 mg Oral HS Girma Puff, CRNP   multivitamin-minerals 1 tablet Oral Daily Mary Barrios PA-C   nicotine polacrilex 2 mg Oral Q2H PRN Girma Puff, CRNP   OLANZapine 15 mg Oral HS Girma Puff, CRNP   OLANZapine 5 mg Oral Daily Girma Puff, CRNP   OLANZapine 5 mg Oral Q12H PRN Girma Puff, CRNP   risperiDONE 1 mg Oral Q8H PRN Girma Puff, CRNP   tamsulosin 0 4 mg Oral Daily With Boby Magana PA-C   thiamine 100 mg Oral Daily Mary Barrios PA-C   ziprasidone 20 mg Intramuscular Q4H PRN Girma Puff, CRNP       Risks / Benefits of Treatment:    Risks, benefits, and possible side effects of medications explained to patient and patient verbalizes understanding  At this time patient does not want to start medications  He is only adherence to Lithium    Counseling / Coordination of Care:    Patient's progress reviewed with nursing staff  Medications, treatment progress and treatment plan reviewed with patient      Judi Oliver MD 12/15/19

## 2019-12-15 NOTE — PROGRESS NOTES
Patient had an outburst during visiting time and was loud and disruptive asking his mother to leave the unit  This writer had to walk visitor out for patient to calm down  Patient refused medications at 1700  No other changes noted at this time  Q 7 mins checks in place for safety  Will continue to monitor for behaviors and changes

## 2019-12-15 NOTE — PROGRESS NOTES
OOB time one for H20  Patient observed sleeping during most Q 7 minute safety checks (second portion of shift)  No suicidal ideations, acting out or homicidal behaviors  No change in medical condition or complaints voiced  Fluids maintained at bedside to promote hydration

## 2019-12-15 NOTE — PROGRESS NOTES
Patient compliant with meals this shift  Selective with medications, only complaint with lithium  Patient out for groups  Loud at times  No other concerns or problems reported this shift  Patient currently visiting with his mother  Q 7 mins checks in place for safety  Will continue to monitor for behaviors and changes

## 2019-12-15 NOTE — PROGRESS NOTES
Patient controlled  Refused all medications besides Lithium Carbonate 900 mg  Maintained on Q 7 minute safety checks  No acting out, suicidal ideations, and/or homicidal behaviors  No changes in medical condition or complaints voiced  Fluids maintained at bedside to promote hydration

## 2019-12-15 NOTE — PROGRESS NOTES
Joie Campos  CZR:[birth year] Christine Zurita  OTB:[id number]    VCI:[id number]  Adm Date: 12/11/2019 1132  11:32 AM   ATT PHY: Kaylan Miles, [address]         Subjective     The patient was seen after reviewing the chart and discussing the case with caring staff  Today during our encounter, the patient reported no acute concerns  Patient requests that if I can discontinue ammonium lactate lotion he does not like the feel of it  Patient wants something else  Objective     Vitals:    12/15/19 0714   BP: 135/87   Pulse: 74   Resp: 18   Temp: 98 2 °F (36 8 °C)   SpO2: 97%       General Appearance: Awake and Alert  No acute distress  HEENT: Normocephalic, atraumatic  PERRLA, EOMI, MMM  Heart: RRR, no murmurs  Normal S1 and S2   Lungs: CTA bilaterally with fair air entry  Assessment     Timmy Graham is a(n) 62y o  year old male with Schizoaffective Disorder, Bipolar Type     1  Cardiac with history of hypertension and dyslipidemia  BP was high in the ED but was previously WNL without antihypertensives  Will continue to closely monitor  Patient is not on anything for Hyperlipidemia  2  Tobacco abuse   Nicotine gum PRN  3  Alcohol abuse  Thiamine, folic acid and multivitamin  4  DJD/osteoarthritis   Tylenol on as needed basis  5  Gait abnormality   Stable  6  Vitamin-D deficiency   Vitamin D3 1000U daily  7  Urinary Frequency/Urgency  Flomax 0 4mg once daily    8  Dry cracked skin bilateral hands  As per patient's request I will discontinue Ammonium lactate lotion  Patient may get some other moisturizing lotion and Band-Aid to cover some of the fingers

## 2019-12-15 NOTE — PLAN OF CARE
Problem: Ineffective Coping  Goal: Identifies healthy coping skills  Outcome: Progressing  Goal: Demonstrates healthy coping skills  Outcome: Progressing  Goal: Participates in unit activities  Description  Interventions:  - Provide therapeutic environment   - Provide required programming   - Redirect inappropriate behaviors    Outcome: Progressing  Goal: Understands least restrictive measures  Description  Interventions:  - Utilize least restrictive behavior   Outcome: Progressing  Goal: Free from restraint events  Description  - Utilize least restrictive measures   - Provide behavioral interventions   - Redirect inappropriate behaviors    Outcome: Progressing     Problem: Ineffective Coping  Goal: Cooperates with admission process  Description  Interventions:   - Complete admission process   Outcome: Completed

## 2019-12-16 LAB — LITHIUM SERPL-SCNC: 1.1 MMOL/L (ref 1–1.2)

## 2019-12-16 PROCEDURE — 80178 ASSAY OF LITHIUM: CPT | Performed by: NURSE PRACTITIONER

## 2019-12-16 PROCEDURE — 99232 SBSQ HOSP IP/OBS MODERATE 35: CPT | Performed by: NURSE PRACTITIONER

## 2019-12-16 RX ADMIN — LITHIUM CARBONATE 900 MG: 300 CAPSULE, GELATIN COATED ORAL at 08:07

## 2019-12-16 RX ADMIN — LITHIUM CARBONATE 900 MG: 300 CAPSULE, GELATIN COATED ORAL at 20:59

## 2019-12-16 NOTE — PROGRESS NOTES
Pt visible on unit, attending groups and social with select peers  Hyperverbal at times  Easily redirectable  Refused all meds besides lithium  Shows no insight into illness  States, "I don't need any of those meds  I don't need the lithium either, but it seems to make everyone else more comfortable " Behavior is controlled  Monitoring continues

## 2019-12-16 NOTE — PROGRESS NOTES
Pieter Vidal  NQQ:4/80/2064 Mabeline Cabot  WGL:[id number]    YBT:[id number]  Adm Date: 12/11/2019 1132  11:32 AM   ATT PHY: Bryn Rao, [address]         Subjective     The patient was seen after reviewing the chart and discussing the case with caring staff  Today during our encounter, the patient reported no acute concerns  Objective     Vitals:    12/16/19 0713   BP: 128/83   Pulse: 69   Resp: 18   Temp: 98 2 °F (36 8 °C)   SpO2: 97%       General Appearance: Awake and Alert  No acute distress  HEENT: Normocephalic, atraumatic  PERRLA, EOMI, MMM  Heart: RRR, no murmurs  Normal S1 and S2   Lungs: CTA bilaterally with fair air entry  Assessment     Mark Graham is a(n) 62y o  year old male with Schizoaffective Disorder, Bipolar Type     1  Cardiac with history of hypertension and dyslipidemia  Stable  Will continue to closely monitor  Patient is not on anything for Hyperlipidemia  2  Tobacco abuse   Nicotine gum PRN  3  Alcohol abuse  Thiamine, folic acid and multivitamin  4  DJD/osteoarthritis   Tylenol on as needed basis  5  Gait abnormality   Stable  6  Vitamin-D deficiency   Vitamin D3 1000U daily  7  Urinary Frequency/Urgency  Flomax 0 4mg once daily    8  Dry cracked skin bilateral hands  Patient may get some other moisturizing lotion and Band-Aid to cover some of the fingers  The patient was discussed with Dr German Tolbert and he is in agreement with the above note

## 2019-12-16 NOTE — PROGRESS NOTES
12/16/19 0956   Team Meeting   Meeting Type Daily Rounds   Team Members Present   Team Members Present Physician;Nurse;;; Occupational Therapist   Physician Team Member Dr Danial Gil MD; Eugenie Romero[address]   Nursing Team Member Michelle Taylor, [facility]   Care Management Team Member Reji Murillo MS, [facility]   Social Work Team Member Jayson Reeves, [facility]   OT Team Member Sharla Jaramillo, [place]   Patient/Family Present   Patient Present No   Patient's Family Present No     Lithium level 1 1, PT will only take lithoid, outburst on Sunday, mom and brother came to visit on Sunday, manic, slept well, outburst over weekend  Will explore medication adjustment

## 2019-12-16 NOTE — PLAN OF CARE
Problem: Ineffective Coping  Goal: Identifies ineffective coping skills  Outcome: Progressing  Goal: Identifies healthy coping skills  Outcome: Progressing  Goal: Demonstrates healthy coping skills  Outcome: Progressing  Goal: Participates in unit activities  Description  Interventions:  - Provide therapeutic environment   - Provide required programming   - Redirect inappropriate behaviors    Outcome: Progressing  Goal: Patient/Family participate in treatment and DC plans  Description  Interventions:  - Provide therapeutic environment   Outcome: Progressing  Goal: Patient/Family verbalizes awareness of resources  Outcome: Progressing  Goal: Understands least restrictive measures  Description  Interventions:  - Utilize least restrictive behavior   Outcome: Progressing  Goal: Free from restraint events  Description  - Utilize least restrictive measures   - Provide behavioral interventions   - Redirect inappropriate behaviors    Outcome: Progressing     Problem: Alteration in Thoughts and Perception  Goal: Treatment Goal: Gain control of psychotic behaviors/thinking, reduce/eliminate presenting symptoms and demonstrate improved reality functioning upon discharge  Outcome: Progressing  Goal: Verbalize thoughts and feelings  Description  Interventions:  - Promote a nonjudgmental and trusting relationship with the patient through active listening and therapeutic communication  - Assess patient's level of functioning, behavior and potential for risk  - Engage patient in 1 on 1 interactions  - Encourage patient to express fears, feelings, frustrations, and discuss symptoms    - [place] patient to reality, help patient recognize reality-based thinking   - Administer medications as ordered and assess for potential side effects  - Provide the patient education related to the signs and symptoms of the illness and desired effects of prescribed medications   Outcome: Progressing  Goal: Refrain from acting on delusional thinking/internal stimuli  Description  Interventions:  - Monitor patient closely, per order   - Utilize least restrictive measures   - Set reasonable limits, give positive feedback for acceptable   - Administer medications as ordered and monitor of potential side effects   Outcome: Progressing  Goal: Complete daily ADLs, including personal hygiene independently, as able  Description  Interventions:  - Observe, teach, and assist patient with ADLS  - Monitor and promote a balance of rest/activity, with adequate nutrition and elimination    Outcome: Progressing     Problem: Risk for Violence/Aggression Toward Others  Goal: Treatment Goal: Refrain from acts of violence/aggression during length of stay, and demonstrate improved impulse control at the time of discharge  Outcome: Progressing  Goal: Verbalize thoughts and feelings  Description  Interventions:  - Assess and re-assess patient's level of risk, every waking shift  - Engage patient in 1:1 interactions, daily, for a minimum of 15 minutes   - Allow patient to express feelings and frustrations in a safe and non-threatening manner   - Establish rapport/trust with patient    Outcome: Progressing  Goal: Refrain from harming others  Outcome: Progressing  Goal: Refrain from destructive acts on the environment or property  Outcome: Progressing  Goal: Control angry outbursts  Description  Interventions:  - Monitor patient closely, per order  - Ensure early verbal de-escalation  - Monitor prn medication needs  - Set reasonable/therapeutic limits, outline behavioral expectations, and consequences   - Provide a non-threatening milieu, utilizing the least restrictive interventions    Outcome: Progressing     Problem: DISCHARGE PLANNING  Goal: Discharge to home or other facility with appropriate resources  Description  INTERVENTIONS:  - Identify barriers to discharge w/patient and caregiver  - Arrange for needed discharge resources and transportation as appropriate  - Identify discharge learning needs (meds, wound care, etc )  - Arrange for interpretive services to assist at discharge as needed  - Refer to Case Management Department for coordinating discharge planning if the patient needs post-hospital services based on physician/advanced practitioner order or complex needs related to functional status, cognitive ability, or social support system   Outcome: Progressing     Problem: PAIN - ADULT  Goal: Verbalizes/displays adequate comfort level or baseline comfort level  Description  Interventions:  - Encourage patient to monitor pain and request assistance  - Assess pain using appropriate pain scale  - Administer analgesics based on type and severity of pain and evaluate response  - Implement non-pharmacological measures as appropriate and evaluate response  - Consider cultural and social influences on pain and pain management  - Notify physician/advanced practitioner if interventions unsuccessful or patient reports new pain   Outcome: Progressing     Problem: ANXIETY  Goal: Will report anxiety at manageable levels  Description  INTERVENTIONS:  - Administer medication as ordered  - Teach and encourage coping skills  - Provide emotional support  - Assess patient/family for anxiety and ability to cope   Outcome: Progressing  Goal: By discharge: Patient will verbalize 2 strategies to deal with anxiety  Description  Interventions:  - Identify any obvious source/trigger to anxiety  - Staff will assist patient in applying identified coping technique/skills  - Encourage attendance of scheduled groups and activities   Outcome: Progressing     Problem: PSYCHOSIS  Goal: Will report no hallucinations or delusions  Description  Interventions:  - Administer medication as  ordered  - Every waking shifts and PRN assess for the presence of hallucinations and or delusions  - Assist with reality testing to support increasing orientation  - Assess if patient's hallucinations or delusions are encouraging self-harm or harm to others and intervene as appropriate   Outcome: Progressing     Problem: Nutrition/Hydration-ADULT  Goal: Nutrient/Hydration intake appropriate for improving, restoring or maintaining nutritional needs  Description  Monitor and assess patient's nutrition/hydration status for malnutrition  Collaborate with interdisciplinary team and initiate plan and interventions as ordered  Monitor patient's weight and dietary intake as ordered or per policy  Utilize nutrition screening tool and intervene as necessary  Determine patient's food preferences and provide high-protein, high-caloric foods as appropriate       INTERVENTIONS:  - Monitor oral intake, urinary output, labs, and treatment plans  - Assess nutrition and hydration status and recommend course of action  - Evaluate amount of meals eaten  - Assist patient with eating if necessary   - Allow adequate time for meals  - Recommend/ encourage appropriate diets, oral nutritional supplements, and vitamin/mineral supplements  - Order, calculate, and assess calorie counts as needed  - Recommend, monitor, and adjust tube feedings and TPN/PPN based on assessed needs  - Assess need for intravenous fluids  - Provide specific nutrition/hydration education as appropriate  - Include patient/family/caregiver in decisions related to nutrition   Outcome: Progressing     Problem: Alteration in Thoughts and Perception  Goal: Agree to be compliant with medication regime, as prescribed and report medication side effects  Description  Interventions:  - Offer appropriate PRN medication and supervise ingestion; conduct AIMS, as needed    Outcome: Not Progressing

## 2019-12-16 NOTE — PROGRESS NOTES
Out in the community and very social tonight  Watched movie and coloring with peers  Bright and friendly with staff and peers  Refused all medications besides Lithium Carbonate 900 mg  Maintained on Q 7 minute safety checks  No acting out, suicidal ideations, and/or homicidal behaviors  No changes in medical condition or complaints voiced    Fluids maintained at bedside to promote hydration

## 2019-12-16 NOTE — PROGRESS NOTES
[id number]  Patient visible in the community this shift  He interacts somewhat with peers  Upon assessment, patient states he feels "hyper " He continues to be restless and disorganized with rambling, tangential speech  He has been controlled this shift with no irritability or agitation noted  He was only agreeable to take his lithium and continues to refuse all other medications  He believes he does not need them  No complaints of pain  Will continue monitoring

## 2019-12-16 NOTE — PROGRESS NOTES
Progress Note - Mikael Dhillon 69 Day 62 y o  male MRN: [id number]   Unit/Bed#: [facility] 203-02 Encounter: [id number]    Behavior over the last 24 hours: unchanged  Carmela Pod has been compliant with lithium, but refuses Zyprexa  Lithium level 1 1 and he appears to be tolerating without noted adverse effects  He continues at times loud and irritable and ongoing paranoia and poor insight  Noted talking to himself while in the shower  More appropriate in milieu and more accepting of redirection  Participates more appropriately in milieu  Sleep: improved  Appetite: normal  Medication side effects: No   ROS: all other systems are negative    Mental Status Evaluation:    Appearance:  marginal hygiene   Behavior:  demanding, guarded   Speech:  loud   Mood:  irritable   Affect:  labile   Thought Process:  circumstantial, tangential   Associations: concrete associations   Thought Content:  persecutory delusions   Perceptual Disturbances: appears preoccupied, talks to self at times   Risk Potential: Suicidal ideation - None  Homicidal ideation - None  Potential for aggression - No   Sensorium:  oriented to person, place, time/date and situation   Memory:  recent and remote memory grossly intact   Consciousness:  alert and awake   Attention: decreased concentration and decreased attention span   Insight:  impaired   Judgment: impaired   Gait/Station: normal gait/station, normal balance   Motor Activity: no abnormal movements     Vital signs in last 24 hours:    Temp:  [97 8 °F (36 6 °C)-98 6 °F (37 °C)] 98 2 °F (36 8 °C)  HR:  [69-79] 69  Resp:  [18] 18  BP: (115-142)/(57-83) 128/83    Laboratory results: I have personally reviewed all pertinent laboratory/tests results      Suicide/Homicide Risk Assessment:    Risk of Harm to Self:   Current Specific Risk Factors include: current psychotic symptoms  Protective Factors: no current suicidal ideation, ability to communicate with staff on the unit, able to contract for safety on the unit, responds to redirection  Based on today's assessment, [facility] presents the following risk of harm to self: low    Risk of Harm to Others:  Current Specific Risk Factors include: poor impulse control, unstable mood disorder  Protective Factors: no current homicidal ideation, compliant with unit milieu, follows staff redirection  Based on today's assessment, [facility] presents the following risk of harm to others: low    The following interventions are recommended: behavioral checks every 7 minutes, continued hospitalization on locked unit     Progress Toward Goals: minimal improvement, still agitated, still psychotic    Assessment/Plan   Principal Problem:    Schizoaffective disorder, bipolar type ([facility] [address] )    Recommended Treatment:     Planned medication and treatment changes:     All current active medications have been reviewed  Encourage group therapy, milieu therapy and occupational therapy  Behavioral Health checks every 7 minutes  Continue current medications:    Current Facility-Administered Medications:  acetaminophen 650 mg Oral Q6H PRN Koreen Cockayne, CRNP   acetaminophen 650 mg Oral Q4H PRN Koreen Cockayne, CRNP   acetaminophen 975 mg Oral Q6H PRN Koreen Cockayne, KATENP   aluminum-magnesium hydroxide-simethicone 30 mL Oral Q4H PRN Koreen Cockayne, KATENP   ammonium lactate  Topical BID Ulysses Casiano MD   cholecalciferol 1,000 Units Oral Daily Elyce Prader Rockovits, GEORGINA   dextromethorphan-guaiFENesin 10 mL Oral Q4H PRN Elyce Prader Rockovits, GEORGINA   folic acid 1 mg Oral Daily Jose Raul Hernandez PA-C   hydrOXYzine HCL 25 mg Oral Q4H PRN Koreen Cockayne, KATENP   lithium carbonate 900 mg Oral Q12H [address], CRNP   LORazepam 2 mg Intramuscular Q4H PRN Koreen Cockayne, KATENP   LORazepam 0 5 mg Oral TID Koreen Cockayne, CRNP   LORazepam 1 mg Oral Q4H PRN Koreen Cockayne, KATENP   magnesium hydroxide 30 mL Oral Daily PRN Koreen Cockayne, KATENP   melatonin 9 mg Oral HS Koreen Cockayne, CRNP   multivitamin-minerals 1 tablet Oral Daily Elyce Prader GEORGINA Hernandez   nicotine polacrilex 2 mg Oral Q2H PRN CARLOS Richardson   OLANZapine 15 mg Oral HS CARLOS Richardson   OLANZapine 5 mg Oral Daily CARLOS Richardson   OLANZapine 5 mg Oral Q12H PRN CARLOS Richardson   risperiDONE 1 mg Oral Q8H PRN CARLOS Richardson   tamsulosin 0 4 mg Oral Daily With Marylou Alejo PA-C   thiamine 100 mg Oral Daily Jude Garcia PA-C   ziprasidone 20 mg Intramuscular Q4H PRN CARLOS Richardson       Risks / Benefits of Treatment:    Risks, benefits, and possible side effects of medications explained to patient and patient verbalizes understanding and agreement for treatment  Counseling / Coordination of Care:    Patient's progress discussed with staff in treatment team meeting  Medications, treatment progress and treatment plan reviewed with patient      CARLOS Richardson 12/16/19

## 2019-12-16 NOTE — PLAN OF CARE
Problem: Ineffective Coping  Goal: Participates in unit activities  Description  Interventions:  - Provide therapeutic environment   - Provide required programming   - Redirect inappropriate behaviors    Outcome: Progressing   Patient joining groups with participation; at times is hyper verbal but redirectable

## 2019-12-17 PROCEDURE — 99232 SBSQ HOSP IP/OBS MODERATE 35: CPT | Performed by: NURSE PRACTITIONER

## 2019-12-17 RX ADMIN — LITHIUM CARBONATE 900 MG: 300 CAPSULE, GELATIN COATED ORAL at 20:56

## 2019-12-17 RX ADMIN — LITHIUM CARBONATE 900 MG: 300 CAPSULE, GELATIN COATED ORAL at 08:16

## 2019-12-17 NOTE — PLAN OF CARE
Problem: Alteration in Thoughts and Perception  Goal: Verbalize thoughts and feelings  Description  Interventions:  - Promote a nonjudgmental and trusting relationship with the patient through active listening and therapeutic communication  - Assess patient's level of functioning, behavior and potential for risk  - Engage patient in 1 on 1 interactions  - Encourage patient to express fears, feelings, frustrations, and discuss symptoms    - Clark patient to reality, help patient recognize reality-based thinking   - Administer medications as ordered and assess for potential side effects  - Provide the patient education related to the signs and symptoms of the illness and desired effects of prescribed medications   Outcome: Progressing  Goal: Complete daily ADLs, including personal hygiene independently, as able  Description  Interventions:  - Observe, teach, and assist patient with ADLS  - Monitor and promote a balance of rest/activity, with adequate nutrition and elimination    Outcome: Progressing     Problem: Alteration in Thoughts and Perception  Goal: Treatment Goal: Gain control of psychotic behaviors/thinking, reduce/eliminate presenting symptoms and demonstrate improved reality functioning upon discharge  Outcome: Not Progressing  Goal: Refrain from acting on delusional thinking/internal stimuli  Description  Interventions:  - Monitor patient closely, per order   - Utilize least restrictive measures   - Set reasonable limits, give positive feedback for acceptable   - Administer medications as ordered and monitor of potential side effects   Outcome: Not Progressing  Goal: Agree to be compliant with medication regime, as prescribed and report medication side effects  Description  Interventions:  - Offer appropriate PRN medication and supervise ingestion; conduct AIMS, as needed    Outcome: Not Progressing

## 2019-12-17 NOTE — PROGRESS NOTES
12/17/19 1002   Team Meeting   Meeting Type Daily Rounds   Team Members Present   Team Members Present ;Physician;Nurse;;Occupational Therapist   Physician Team Member Dr Sabrina Collazo MD; Rachel [address]    Nursing Team Member Gene Blanchard, ADRIEN    Care Management Team Member Lashonda Guadarrama MS, [facility]    Social Work Team Member Maria Del Carmen Galindo [facility]    OT Team Member Waylon Ann [place]    Patient/Family Present   Patient Present No   Patient's Family Present No     No sleep; loud, refusing medications; disorganized, set off easily; argumentative; team meeting with patient to discuss tx options on Weds

## 2019-12-17 NOTE — PLAN OF CARE
Problem: Ineffective Coping  Goal: Participates in unit activities  Description  Interventions:  - Provide therapeutic environment   - Provide required programming   - Redirect inappropriate behaviors    Outcome: Progressing   Patient joining groups has a hard time remaining focused and engaged in groups and continues to need redirection because he is hyper-verbal

## 2019-12-17 NOTE — PROGRESS NOTES
Progress Note - Mikael Dhillon 67 Day 62 y o  male MRN: [id number]   Unit/Bed#: [facility] 203-02 Encounter: [id number]    Behavior over the last 24 hours: ermias Vega continues irritable and has been refusing all psychiatric medications except lithium  He walks out of the room, when I begin to discuss the need for antipsychotic  Staff report he continues restless, rambling, tangential, but slightly less agitated  He is still noted talking to self and with ongoing paranoia  He will attend group and participate, needs redirection  Refuses medications  Attends groups  Needs redirection    Sleep: restless sleep  Appetite: normal  Medication side effects: No   ROS: all other systems are negative    Mental Status Evaluation:    Appearance:  marginal hygiene   Behavior:  agitated   Speech:  loud   Mood:  irritable   Affect:  labile   Thought Process:  tangential   Associations: concrete associations   Thought Content:  paranoid ideation   Perceptual Disturbances: appears preoccupied, talks to self at times   Risk Potential: Suicidal ideation - None  Homicidal ideation - None  Potential for aggression - No   Sensorium:  oriented to person, place and time/date   Memory:  recent and remote memory grossly intact   Consciousness:  alert and awake   Attention: attention span and concentration are age appropriate   Insight:  impaired   Judgment: impaired   Gait/Station: normal gait/station, normal balance   Motor Activity: no abnormal movements     Vital signs in last 24 hours:    Temp:  [97 5 °F (36 4 °C)-99 3 °F (37 4 °C)] 99 3 °F (37 4 °C)  HR:  [82-84] 82  Resp:  [19-20] 19  BP: (124-144)/(76-84) 144/76    Laboratory results: I have personally reviewed all pertinent laboratory/tests results      Suicide/Homicide Risk Assessment:    Risk of Harm to Self:   Current Specific Risk Factors include: current psychotic symptoms  Protective Factors: no current suicidal ideation, ability to communicate with staff on the unit, able to contract for safety on the unit  Based on today's assessment, Kylah Funk presents the following risk of harm to self: low    Risk of Harm to Others:  Current Specific Risk Factors include: current agitation  Protective Factors: no current homicidal ideation, able to communicate with staff on the unit, compliant with unit milieu, follows staff redirection  Based on today's assessment, Kylah Funk presents the following risk of harm to others: low    The following interventions are recommended: behavioral checks every 7 minutes, continued hospitalization on locked unit     Progress Toward Goals: minimal improvement, still psychotic, insight remains poor    Assessment/Plan   Principal Problem:    Schizoaffective disorder, bipolar type ([facility] )    Recommended Treatment:     Planned medication and treatment changes:     All current active medications have been reviewed  Encourage group therapy, milieu therapy and occupational therapy  Behavioral Health checks every 7 minutes  Continue current medications:    Current Facility-Administered Medications:  acetaminophen 650 mg Oral Q6H PRN Leesa Rast, CRNP   acetaminophen 650 mg Oral Q4H PRN Leesa Rast, CRNP   acetaminophen 975 mg Oral Q6H PRN Leesa Rast, CRNP   aluminum-magnesium hydroxide-simethicone 30 mL Oral Q4H PRN Leesa Rast, CRNP   ammonium lactate  Topical BID Karen Rivera MD   cholecalciferol 1,000 Units Oral Daily Micheline Hernandez, GEORGINA   dextromethorphan-guaiFENesin 10 mL Oral Q4H PRN Micheline Hernandez, PA-TY   folic acid 1 mg Oral Daily Jose Raul Hernandez, GEORGINA   hydrOXYzine HCL 25 mg Oral Q4H PRN Leesa Rast, CRNP   lithium carbonate 900 mg Oral Q12H [address], CRNP   LORazepam 2 mg Intramuscular Q4H PRN Leesa Rast, CRNP   LORazepam 0 5 mg Oral TID Leesa Rast, CRNP   LORazepam 1 mg Oral Q4H PRN Leesa Rast, CRNP   magnesium hydroxide 30 mL Oral Daily PRN Leesa Rast, CRNP   melatonin 9 mg Oral HS Leesa Rast, CRNP   multivitamin-minerals 1 tablet Oral Daily Dylan Hernandez PA-C   nicotine polacrilex 2 mg Oral Q2H PRN CARLOS Middleton   OLANZapine 15 mg Oral HS CARLOS Middleton   OLANZapine 5 mg Oral Daily Nikolay Apo, CRMILI   OLANZapine 5 mg Oral Q12H PRN Nikolay Apo, CARLOS   risperiDONE 1 mg Oral Q8H PRN CARLOS Middleton   tamsulosin 0 4 mg Oral Daily With Pricilla Taylor PA-C   thiamine 100 mg Oral Daily Chet Casey PA-C   ziprasidone 20 mg Intramuscular Q4H PRN CARLOS Middleton       Risks / Benefits of Treatment:    Risks, benefits, and possible side effects of medications explained to patient and patient verbalizes understanding and agreement for treatment  Counseling / Coordination of Care:    Patient's progress discussed with staff in treatment team meeting  Medications, treatment progress and treatment plan reviewed with patient      CARLOS Middleton 12/17/19

## 2019-12-17 NOTE — PROGRESS NOTES
Pt easily agitates and argumentative with another pt this morning  Pt is loud but verbally redirectable  Disorganized thoughts and responds to internal stimuli  Pt is having conversations with himself  Pt controls maintained at this time  Q 7 min checks maintained

## 2019-12-17 NOTE — NURSING NOTE
Pt sleep broken this shift  Up displaying internal restlessness speech pressured; thoughts disorganized  Having conversations with self  Stimuli maintained at a minium  Q 7 min checks ongoing

## 2019-12-17 NOTE — PROGRESS NOTES
Pt refused evening medications  Pt continues to be loud at times, disorganized, and pressured  Q 7 min checks maintained

## 2019-12-18 ENCOUNTER — HOSPITAL ENCOUNTER (INPATIENT)
Facility: HOSPITAL | Age: 59
LOS: 15 days | Discharge: HOME | DRG: 885 | End: 2020-01-02
Attending: PSYCHIATRY & NEUROLOGY | Admitting: PSYCHIATRY & NEUROLOGY
Payer: MEDICARE

## 2019-12-18 VITALS
RESPIRATION RATE: 16 BRPM | HEART RATE: 88 BPM | WEIGHT: 195.33 LBS | HEIGHT: 69 IN | TEMPERATURE: 97.6 F | BODY MASS INDEX: 28.93 KG/M2 | OXYGEN SATURATION: 98 % | SYSTOLIC BLOOD PRESSURE: 143 MMHG | DIASTOLIC BLOOD PRESSURE: 93 MMHG

## 2019-12-18 DIAGNOSIS — F25.0 SCHIZOAFFECTIVE DISORDER, BIPOLAR TYPE (HCC): Primary | Chronic | ICD-10-CM

## 2019-12-18 PROCEDURE — 99238 HOSP IP/OBS DSCHRG MGMT 30/<: CPT | Performed by: NURSE PRACTITIONER

## 2019-12-18 RX ORDER — LORAZEPAM 1 MG/1
1 TABLET ORAL EVERY 4 HOURS PRN
Status: CANCELLED | OUTPATIENT
Start: 2019-12-18

## 2019-12-18 RX ORDER — TAMSULOSIN HYDROCHLORIDE 0.4 MG/1
0.4 CAPSULE ORAL
Status: CANCELLED | OUTPATIENT
Start: 2019-12-18

## 2019-12-18 RX ORDER — THIAMINE MONONITRATE (VIT B1) 100 MG
100 TABLET ORAL DAILY
Status: CANCELLED | OUTPATIENT
Start: 2019-12-19

## 2019-12-18 RX ORDER — OLANZAPINE 5 MG/1
5 TABLET ORAL DAILY
Status: CANCELLED | OUTPATIENT
Start: 2019-12-19

## 2019-12-18 RX ORDER — LITHIUM CARBONATE 300 MG/1
900 CAPSULE ORAL EVERY 12 HOURS SCHEDULED
Status: DISCONTINUED | OUTPATIENT
Start: 2019-12-18 | End: 2019-12-24

## 2019-12-18 RX ORDER — MAGNESIUM HYDROXIDE/ALUMINUM HYDROXICE/SIMETHICONE 120; 1200; 1200 MG/30ML; MG/30ML; MG/30ML
30 SUSPENSION ORAL EVERY 4 HOURS PRN
Status: CANCELLED | OUTPATIENT
Start: 2019-12-18

## 2019-12-18 RX ORDER — LORAZEPAM 2 MG/ML
2 INJECTION INTRAMUSCULAR EVERY 4 HOURS PRN
Status: DISCONTINUED | OUTPATIENT
Start: 2019-12-18 | End: 2020-01-02 | Stop reason: HOSPADM

## 2019-12-18 RX ORDER — LANOLIN ALCOHOL/MO/W.PET/CERES
9 CREAM (GRAM) TOPICAL
Status: DISCONTINUED | OUTPATIENT
Start: 2019-12-18 | End: 2020-01-02 | Stop reason: HOSPADM

## 2019-12-18 RX ORDER — OLANZAPINE 5 MG/1
5 TABLET ORAL EVERY 12 HOURS PRN
Status: CANCELLED | OUTPATIENT
Start: 2019-12-18

## 2019-12-18 RX ORDER — LORAZEPAM 0.5 MG/1
0.5 TABLET ORAL 3 TIMES DAILY
Status: DISCONTINUED | OUTPATIENT
Start: 2019-12-18 | End: 2020-01-02 | Stop reason: HOSPADM

## 2019-12-18 RX ORDER — ACETAMINOPHEN 325 MG/1
975 TABLET ORAL EVERY 6 HOURS PRN
Status: DISCONTINUED | OUTPATIENT
Start: 2019-12-18 | End: 2020-01-02 | Stop reason: HOSPADM

## 2019-12-18 RX ORDER — AMMONIUM LACTATE 12 G/100G
LOTION TOPICAL 2 TIMES DAILY
Status: DISCONTINUED | OUTPATIENT
Start: 2019-12-18 | End: 2020-01-02 | Stop reason: HOSPADM

## 2019-12-18 RX ORDER — GUAIFENESIN/DEXTROMETHORPHAN 100-10MG/5
10 SYRUP ORAL EVERY 4 HOURS PRN
Status: CANCELLED | OUTPATIENT
Start: 2019-12-18

## 2019-12-18 RX ORDER — TAMSULOSIN HYDROCHLORIDE 0.4 MG/1
0.4 CAPSULE ORAL
Status: DISCONTINUED | OUTPATIENT
Start: 2019-12-18 | End: 2020-01-02 | Stop reason: HOSPADM

## 2019-12-18 RX ORDER — ZIPRASIDONE MESYLATE 20 MG/ML
20 INJECTION, POWDER, LYOPHILIZED, FOR SOLUTION INTRAMUSCULAR EVERY 4 HOURS PRN
Status: DISCONTINUED | OUTPATIENT
Start: 2019-12-18 | End: 2020-01-02 | Stop reason: HOSPADM

## 2019-12-18 RX ORDER — ACETAMINOPHEN 325 MG/1
650 TABLET ORAL EVERY 6 HOURS PRN
Status: CANCELLED | OUTPATIENT
Start: 2019-12-18

## 2019-12-18 RX ORDER — HYDROXYZINE HYDROCHLORIDE 25 MG/1
25 TABLET, FILM COATED ORAL EVERY 4 HOURS PRN
Status: DISCONTINUED | OUTPATIENT
Start: 2019-12-18 | End: 2020-01-02 | Stop reason: HOSPADM

## 2019-12-18 RX ORDER — RISPERIDONE 1 MG/1
1 TABLET, ORALLY DISINTEGRATING ORAL EVERY 8 HOURS PRN
Status: DISCONTINUED | OUTPATIENT
Start: 2019-12-18 | End: 2020-01-02 | Stop reason: HOSPADM

## 2019-12-18 RX ORDER — LANOLIN ALCOHOL/MO/W.PET/CERES
9 CREAM (GRAM) TOPICAL
Status: CANCELLED | OUTPATIENT
Start: 2019-12-18

## 2019-12-18 RX ORDER — OLANZAPINE 5 MG/1
5 TABLET ORAL EVERY 12 HOURS PRN
Status: DISCONTINUED | OUTPATIENT
Start: 2019-12-18 | End: 2020-01-02 | Stop reason: HOSPADM

## 2019-12-18 RX ORDER — MAGNESIUM HYDROXIDE/ALUMINUM HYDROXICE/SIMETHICONE 120; 1200; 1200 MG/30ML; MG/30ML; MG/30ML
30 SUSPENSION ORAL EVERY 4 HOURS PRN
Status: DISCONTINUED | OUTPATIENT
Start: 2019-12-18 | End: 2020-01-02 | Stop reason: HOSPADM

## 2019-12-18 RX ORDER — HYDROXYZINE HYDROCHLORIDE 25 MG/1
25 TABLET, FILM COATED ORAL EVERY 4 HOURS PRN
Status: CANCELLED | OUTPATIENT
Start: 2019-12-18

## 2019-12-18 RX ORDER — LITHIUM CARBONATE 300 MG/1
900 CAPSULE ORAL EVERY 12 HOURS SCHEDULED
Status: CANCELLED | OUTPATIENT
Start: 2019-12-18

## 2019-12-18 RX ORDER — ACETAMINOPHEN 325 MG/1
975 TABLET ORAL EVERY 6 HOURS PRN
Status: CANCELLED | OUTPATIENT
Start: 2019-12-18

## 2019-12-18 RX ORDER — FOLIC ACID 1 MG/1
1 TABLET ORAL DAILY
Status: DISCONTINUED | OUTPATIENT
Start: 2019-12-19 | End: 2020-01-02 | Stop reason: HOSPADM

## 2019-12-18 RX ORDER — RISPERIDONE 1 MG/1
1 TABLET, ORALLY DISINTEGRATING ORAL EVERY 8 HOURS PRN
Status: CANCELLED | OUTPATIENT
Start: 2019-12-18

## 2019-12-18 RX ORDER — AMMONIUM LACTATE 12 G/100G
LOTION TOPICAL 2 TIMES DAILY
Status: CANCELLED | OUTPATIENT
Start: 2019-12-18

## 2019-12-18 RX ORDER — ACETAMINOPHEN 325 MG/1
650 TABLET ORAL EVERY 6 HOURS PRN
Status: DISCONTINUED | OUTPATIENT
Start: 2019-12-18 | End: 2020-01-02 | Stop reason: HOSPADM

## 2019-12-18 RX ORDER — MELATONIN
1000 DAILY
Status: DISCONTINUED | OUTPATIENT
Start: 2019-12-19 | End: 2020-01-02 | Stop reason: HOSPADM

## 2019-12-18 RX ORDER — DIPHENHYDRAMINE HYDROCHLORIDE 50 MG/ML
50 INJECTION INTRAMUSCULAR; INTRAVENOUS ONCE
Status: DISCONTINUED | OUTPATIENT
Start: 2019-12-18 | End: 2019-12-18

## 2019-12-18 RX ORDER — GUAIFENESIN/DEXTROMETHORPHAN 100-10MG/5
10 SYRUP ORAL EVERY 4 HOURS PRN
Status: DISCONTINUED | OUTPATIENT
Start: 2019-12-18 | End: 2020-01-02 | Stop reason: HOSPADM

## 2019-12-18 RX ORDER — FOLIC ACID 1 MG/1
1 TABLET ORAL DAILY
Status: CANCELLED | OUTPATIENT
Start: 2019-12-19

## 2019-12-18 RX ORDER — ZIPRASIDONE MESYLATE 20 MG/ML
20 INJECTION, POWDER, LYOPHILIZED, FOR SOLUTION INTRAMUSCULAR EVERY 4 HOURS PRN
Status: CANCELLED | OUTPATIENT
Start: 2019-12-18

## 2019-12-18 RX ORDER — ACETAMINOPHEN 325 MG/1
650 TABLET ORAL EVERY 4 HOURS PRN
Status: CANCELLED | OUTPATIENT
Start: 2019-12-18

## 2019-12-18 RX ORDER — LORAZEPAM 2 MG/ML
2 INJECTION INTRAMUSCULAR EVERY 4 HOURS PRN
Status: CANCELLED | OUTPATIENT
Start: 2019-12-18

## 2019-12-18 RX ORDER — THIAMINE MONONITRATE (VIT B1) 100 MG
100 TABLET ORAL DAILY
Status: DISCONTINUED | OUTPATIENT
Start: 2019-12-19 | End: 2020-01-02 | Stop reason: HOSPADM

## 2019-12-18 RX ORDER — LORAZEPAM 1 MG/1
1 TABLET ORAL EVERY 4 HOURS PRN
Status: DISCONTINUED | OUTPATIENT
Start: 2019-12-18 | End: 2020-01-02 | Stop reason: HOSPADM

## 2019-12-18 RX ORDER — OLANZAPINE 5 MG/1
5 TABLET ORAL DAILY
Status: DISCONTINUED | OUTPATIENT
Start: 2019-12-19 | End: 2019-12-20

## 2019-12-18 RX ORDER — MELATONIN
1000 DAILY
Status: CANCELLED | OUTPATIENT
Start: 2019-12-19

## 2019-12-18 RX ORDER — LORAZEPAM 0.5 MG/1
0.5 TABLET ORAL 3 TIMES DAILY
Status: CANCELLED | OUTPATIENT
Start: 2019-12-18

## 2019-12-18 RX ORDER — ACETAMINOPHEN 325 MG/1
650 TABLET ORAL EVERY 4 HOURS PRN
Status: DISCONTINUED | OUTPATIENT
Start: 2019-12-18 | End: 2020-01-02 | Stop reason: HOSPADM

## 2019-12-18 RX ADMIN — LITHIUM CARBONATE 900 MG: 300 CAPSULE, GELATIN COATED ORAL at 08:17

## 2019-12-18 RX ADMIN — LITHIUM CARBONATE 900 MG: 300 CAPSULE, GELATIN COATED ORAL at 21:12

## 2019-12-18 RX ADMIN — MELATONIN 9 MG: at 01:25

## 2019-12-18 NOTE — PROGRESS NOTES
Rambling speech, refused all medications except Lithium  Energy level high  Needed mild redirection at times  Out in the community most shift  Social   Maintained on Q 7 minute safety checks  No overt acting out, suicidal ideations, and/or homicidal behaviors  No changes in medical condition or complaints voiced  Fluids maintained at bedside to promote hydration

## 2019-12-18 NOTE — PROGRESS NOTES
Patient has been visible in milieu all shift  He is loud,disorganized,requires redirection frequently  He refused all morning medications except the lithium  He is social with select peers  Denies pain,anxiety,depression,SI,HI or hallucinations  He states he is more disgusted than anything  He states he came in voluntarily for a medication adjustment  Nurse asked him if he would at least take the zyprexa this morning  He replied "The Dr can stick the zyprexa up his ass "Q 7 minute safety checks maintained  Patient to be discharged from this unit and transferred to the younger adult psychiatric unit at this [place]

## 2019-12-18 NOTE — PROGRESS NOTES
12/18/19 0952   Team Meeting   Meeting Type Daily Rounds   Team Members Present   Team Members Present Physician;Nurse;;   Physician Team Member Dr Armando Sharp MD; Nikolay Bowman, [address] [place] [address]   Nursing Team Member Carol Nova, ADRIEN   Care Management Team Member Elzbieta Barajas, MS, [facility]   Social Work Team Member Christina Olivia, [facility]   Patient/Family Present   Patient Present No   Patient's Family Present No     PT is regressing, more psychotic, more tangential, refusing some medications, poor sleep, meeting today with PT to discuss treatment options and direction, irritable  No D/C date at that time

## 2019-12-18 NOTE — DISCHARGE SUMMARY
Discharge Summary - [address] Fr[address] Day 62 y o  male MRN: [id number]  Unit/Bed#: [facility] 203-02 Encounter: [id number]     Admission Date: 12/11/2019         Discharge Date: 12/18/2019    Attending Psychiatrist: Angelita Cruz MD    Reason for Admission/HPI: Schizoaffective disorder, bipolar type ([facility] ) [F25 0]  Schizophrenia, unspecified type [facility]) [F20 9]  Psychiatric complaint [F69]    According to admission report from Dr Reji Barillas:    Georgie Cardona is a 62 y o  male with a history of Schizoaffective Disorder who was admitted to the inpatient adult psychiatric unit on a voluntary 201 commitment basis due to worsening of mixed symptoms of bipolar disorder, unstable mood, paranoid ideation and disorganized behavior  On evaluation in the inpatient psychiatric unit Zainab Nieto reports he was noncompliant with his medication following recent discharge from the unit  He endorses worsening of his manic depressive symptoms with increased mood lability, poor sleep, feeling paranoid and experiencing AH/VH periodically  Pt denies any SI/HI presently  He is noted to be irritable, labile, tangential with racing thoughts but agrees to take his medications at this time  Hospital Course: The patient was admitted to the inpatient psychiatric unit and started on every 7 minutes precautions  During the hospitalization the patient was attending individual therapy, group therapy, milieu therapy and occupational therapy  Psychiatric medications were titrated over the hospital stay  To address mood instability, mood swings, irritability and manic symptoms the patient was started on mood stabilizer Lithium, antipsychotic medication Zyprexa and anxiolytic medication Ativan  Medication doses were titrated during the hospital course   Prior to beginning of treatment medications risks and benefits and possible side effects including risk of kidney impairment related to treatment with Lithium and risk of cardiovascular events in elderly related to treatment with antipsychotic medications were reviewed with the patient  The patient verbalized understanding and agreement for treatment  Patient refused to take Zyprexa or ativan, only lithium  He continued psychotic, delusional and became increasingly agitated and deemed more appropriate to [facility] due to agitation and threatening behavior  He was 302'd and transferred to [facility] for ongoing treatment  Mental Status at time of Discharge:     Appearance:  disheveled   Behavior:  psychomotor agitation   Speech:  loud   Mood:  angry   Affect:  blunted   Thought Process:  concrete   Thought Content:  delusions  persecutory   Perceptual Disturbances: appears to be responding   Risk Potential: Suicidal Ideations none, Homicidal Ideations none and Potential for Aggression Yes due to agitation/noncompliance   Sensorium:  person, place and time/date   Cognition:  grossly intact   Consciousness:  alert and awake    Attention: attention span and concentration were age appropriate   Insight:  limited   Judgment: limited   Gait/Station: normal gait/station and normal balance   Motor Activity: no abnormal movements     Admission Diagnosis:Schizoaffective disorder, bipolar type ([facility] ) [F25 0]  Schizophrenia, unspecified type ([facility] ) [F20 9]  Psychiatric complaint [F69]    Discharge Diagnosis:   Principal Problem:    Schizoaffective disorder, bipolar type ([facility] )  Resolved Problems:    * No resolved hospital problems   *        Lab results:  Admission on 12/11/2019   Component Date Value    Amph/Meth UR 12/11/2019 Negative     Barbiturate Ur 12/11/2019 Negative     Benzodiazepine Urine 12/11/2019 Negative     Cocaine Urine 12/11/2019 Negative     Methadone Urine 12/11/2019 Negative     Opiate Urine 12/11/2019 Negative     PCP Ur 12/11/2019 Negative     THC Urine 12/11/2019 Negative     EXTBreath Alcohol 12/11/2019 0 00     Color, UA 12/11/2019 Yellow     Clarity, UA 12/11/2019 Clear     Specific [place], UA 12/11/2019 1 010     pH, UA 12/11/2019 6 0     Leukocytes, UA 12/11/2019 Negative     Nitrite, UA 12/11/2019 Negative     Protein, UA 12/11/2019 Negative     Glucose, UA 12/11/2019 Negative     Ketones, UA 12/11/2019 Negative     Urobilinogen, UA 12/11/2019 0 2     Bilirubin, UA 12/11/2019 Negative     Blood, UA 12/11/2019 Negative     WBC 12/11/2019 6 10     RBC 12/11/2019 4 46     Hemoglobin 12/11/2019 13 5*    Hematocrit 12/11/2019 40 4*    MCV 12/11/2019 91     MCH 12/11/2019 30 4     MCHC 12/11/2019 33 5     RDW 12/11/2019 13 9     MPV 12/11/2019 7 8*    Platelets 90/60/8227 202     Neutrophils Relative 12/11/2019 76*    Lymphocytes Relative 12/11/2019 18*    Monocytes Relative 12/11/2019 5     Eosinophils Relative 12/11/2019 1     Basophils Relative 12/11/2019 0     Neutrophils Absolute 12/11/2019 4 60     Lymphocytes Absolute 12/11/2019 1 10     Monocytes Absolute 12/11/2019 0 30     Eosinophils Absolute 12/11/2019 0 00     Basophils Absolute 12/11/2019 0 00     Sodium 12/11/2019 143     Potassium 12/11/2019 3 9     Chloride 12/11/2019 108*    CO2 12/11/2019 28     ANION GAP 12/11/2019 7     BUN 12/11/2019 12     Creatinine 12/11/2019 0 98     Glucose 12/11/2019 94     Calcium 12/11/2019 9 1     AST 12/11/2019 19     ALT 12/11/2019 20     Alkaline Phosphatase 12/11/2019 32*    Total Protein 12/11/2019 6 0*    Albumin 12/11/2019 4 2     Total Bilirubin 12/11/2019 0 40     eGFR 12/11/2019 83     TSH 3RD GENERATON 12/11/2019 1 080     Ethanol Lvl 12/11/2019 <10     Lithium Lvl 12/11/2019 <0 2*    Ventricular Rate 12/11/2019 61     Atrial Rate 12/11/2019 61     RI Interval 12/11/2019 166     QRSD Interval 12/11/2019 94     QT Interval 12/11/2019 380     QTC Interval 12/11/2019 382     P Axis 12/11/2019 76     QRS Axis 12/11/2019 -41     T Wave Axis 12/11/2019 93     Lithium Lvl 12/16/2019 1 1        Discharge Medications:  Current Discharge Medication List Current Discharge Medication List         Current Discharge Medication List         Current Discharge Medication List      CONTINUE these medications which have NOT CHANGED    Details   cholecalciferol (VITAMIN D3) 1,000 units tablet Take 1 tablet (1,000 Units total) by mouth daily  Qty: 30 tablet, Refills: 0    Associated Diagnoses: Vitamin D deficiency      dextromethorphan-guaiFENesin (ROBITUSSIN DM)  mg/5 mL syrup Take 10 mL by mouth every 4 (four) hours as needed for cough  Qty: 118 mL, Refills: 0    Associated Diagnoses: Cough      folic acid (FOLVITE) 1 mg tablet Take 1 tablet (1 mg total) by mouth daily  Qty: 30 tablet, Refills: 0    Associated Diagnoses: Alcohol abuse, continuous      !! lithium 600 MG capsule Take 1 capsule (600 mg total) by mouth every morning In Addition to 900 mg HS  Qty: 30 capsule, Refills: 0    Associated Diagnoses: Schizoaffective disorder, bipolar type ([facility] )      ! ! lithium carbonate 300 mg capsule Take 3 capsules (900 mg total) by mouth daily at bedtime In addition to 600 mg in AM  Qty: 90 capsule, Refills: 0    Associated Diagnoses: Schizoaffective disorder, bipolar type (HCC)      LORazepam (ATIVAN) 0 5 mg tablet Take 1 tablet (0 5 mg total) by mouth 3 (three) times a day  Qty: 45 tablet, Refills: 1    Associated Diagnoses: Schizoaffective disorder, bipolar type (HCC)      Melatonin 10 MG TABS Take 1 tablet (10 mg total) by mouth daily at bedtime  Qty: 30 tablet, Refills: 0    Associated Diagnoses: Schizoaffective disorder, bipolar type (HCC)      nicotine polacrilex (NICORETTE) 2 mg gum Chew 1 each (2 mg total) every 2 (two) hours as needed for smoking cessation  Qty: 100 each, Refills: 0    Associated Diagnoses: Tobacco abuse      !! OLANZapine (ZyPREXA) 15 mg tablet Take 1 tablet (15 mg total) by mouth daily at bedtime    Associated Diagnoses: Schizoaffective disorder, bipolar type ([facility] )      ! ! OLANZapine (ZyPREXA) 5 mg tablet Take 1 tablet (5 mg total) by mouth daily  Qty: 30 tablet, Refills: 0    Associated Diagnoses: Schizoaffective disorder, bipolar type (HCC)      tamsulosin (FLOMAX) 0 4 mg Take 1 capsule (0 4 mg total) by mouth daily with dinner  Qty: 30 capsule, Refills: 0    Associated Diagnoses: BPH (benign prostatic hyperplasia)      thiamine 100 MG tablet Take 1 tablet (100 mg total) by mouth daily  Qty: 30 tablet, Refills: 0    Associated Diagnoses: Alcohol abuse, continuous       ! ! - Potential duplicate medications found  Please discuss with provider  Discharge instructions/Information to patient and family:   See after visit summary for information provided to patient and family  Provisions for Follow-Up Care:  See after visit summary for information related to follow-up care and any pertinent home health orders  Discharge Statement     I spent 35 minutes discharging the patient  This time was spent on the day of discharge  I had direct contact with the patient on the day of discharge  Additional documentation is required if more than 30 minutes were spent on discharge:    I reviewed with Helen Kidney importance of compliance with medications and outpatient treatment after discharge  I discussed the medication regimen and possible side effects of the medications with Helen Kidney prior to discharge  At the time of discharge he was tolerating psychiatric medications

## 2019-12-18 NOTE — PLAN OF CARE
Problem: Ineffective Coping  Goal: Participates in unit activities  Description  Interventions:  - Provide therapeutic environment   - Provide required programming   - Redirect inappropriate behaviors    Outcome: Not Progressing   Patient not sleeping; is unable to control his paranoia, delusional beliefs and remains hyper verbal

## 2019-12-18 NOTE — PROGRESS NOTES
Pt transferred to [address] [place] [address] 12/18/19 with lis of belongings    Blue glasses  Upper partials  Blue jeans  Long sleeve shirt- brown  Long white underwear  1 pair of brown shoes  Brown jacket  Black watch  Ear plug  Red wire  1 cigar  1 card/money bag-beige   2 U S   cards  1 AAA card  1 Key bank card  1 skeet/sporting card  1 [place]   Passport  1 Social security card  2 medicare cards  2 business cards  13 00 cash    Pt refused to sign belongings sheet

## 2019-12-18 NOTE — PROGRESS NOTES
Senia George  PQF:[id number] Chino Riding  SIO:[id number]    UWV:[id number]  Adm Date: 12/11/2019 1132  11:32 AM   ATT PHY: Ottoniel Owen, [address]         Subjective     The patient was seen after reviewing the chart and discussing the case with caring staff  Today during our encounter, the patient reported no acute concerns  Objective     Vitals:    12/18/19 0733   BP: 143/93   Pulse: 88   Resp: 16   Temp: 97 6 °F (36 4 °C)   SpO2: 98%       General Appearance: Awake and Alert  No acute distress  HEENT: Normocephalic, atraumatic  PERRLA, EOMI, MMM  Heart: RRR, no murmurs  Normal S1 and S2   Lungs: CTA bilaterally with fair air entry  Assessment     Jian Graham is a(n) 62y o  year old male with Schizoaffective Disorder, Bipolar Type     1  Cardiac with history of hypertension and dyslipidemia  Stable  Will continue to closely monitor  Patient is not on anything for Hyperlipidemia  2  Tobacco abuse   Nicotine gum PRN  3  Alcohol abuse  Thiamine, folic acid and multivitamin  4  DJD/osteoarthritis   Tylenol on as needed basis  5  Gait abnormality   Stable  6  Vitamin-D deficiency   Vitamin D3 1000U daily  7  Urinary Frequency/Urgency  Flomax 0 4mg once daily    8  Dry cracked skin bilateral hands  Patient may get some other moisturizing lotion and Band-Aid to cover some of the fingers

## 2019-12-18 NOTE — SOCIAL WORK
SW attempted to meet with patient for check-in purposes; Pt angry, irritable, yelling at ; Pt yelled "You're not my , you are a liar   If you are my , you aren't doing anything for me" and then walked away from SW while still yelling "this is all a joke, I don't need to be here"

## 2019-12-18 NOTE — SOCIAL WORK
SW received completed 2-doc 302 signed [id number]; SW attempted to meet with patient to review rights - pt irritable, yelling at [address];  Pt would understand rights if SW allowed time to provide rights - CAROL marked 302 as such and faxed to [address]

## 2019-12-18 NOTE — NURSING NOTE
Discharged to younger adult behavioral health unit via wheelchair and accompanied by multiple staff  All belongings sent with patient  Report given to [facility]

## 2019-12-18 NOTE — PLAN OF CARE
Problem: CHONG  Goal: Will exhibit normal sleep and speech and no impulsivity  Description  INTERVENTIONS:  - Administer medication as ordered  - Set limits on impulsive behavior  - Make attempts to decrease external stimuli as possible  Outcome: Progressing     Problem: INVOLUNTARY ADMIT  Goal: Will cooperate with staff recommendations and doctor's orders and will demonstrate appropriate behavior  Description  INTERVENTIONS:  - Treat underlying conditions and offer medication as ordered  - Educate regarding involuntary admission procedures and rules  - Utilize positive consistent limit setting strategies to support patient and staff safety  Outcome: Progressing     Care plan initiated, monitoring is ongoing

## 2019-12-19 PROBLEM — F25.0 SCHIZOAFFECTIVE DISORDER, BIPOLAR TYPE (HCC): Status: ACTIVE | Noted: 2019-11-02

## 2019-12-19 PROBLEM — F25.9 SCHIZOAFFECTIVE DISORDER (HCC): Chronic | Status: ACTIVE | Noted: 2019-11-02

## 2019-12-19 PROBLEM — I10 HYPERTENSION: Status: ACTIVE | Noted: 2019-12-19

## 2019-12-19 PROCEDURE — NC001 PR NO CHARGE: Performed by: PSYCHIATRY & NEUROLOGY

## 2019-12-19 PROCEDURE — 99222 1ST HOSP IP/OBS MODERATE 55: CPT | Performed by: PSYCHIATRY & NEUROLOGY

## 2019-12-19 RX ADMIN — LITHIUM CARBONATE 900 MG: 300 CAPSULE, GELATIN COATED ORAL at 08:12

## 2019-12-19 RX ADMIN — LITHIUM CARBONATE 900 MG: 300 CAPSULE, GELATIN COATED ORAL at 20:56
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
## 2019-12-19 NOTE — PROGRESS NOTES
Patient visible in milieu socializing with staff and peers, disorganized in thought process, hyperverbal  Mood and affect labile, refused all medication except Lithium, unable to redirect  Remains on 7" checks for safety and behaviors
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
## 2019-12-19 NOTE — PROGRESS NOTES
Pt arrived on unit from [facility] pleasant and cooperative  Pt was loud and tangential  Pt stated he would not take any other medications except his lithium  Pt was unable to complete his admission paperwork and was eager to get out of the room  Pt behaviors were loud but controlled  Pt denies AH, VH, SI, HI  Pt denies Anxiety, Depression, Pain

## 2019-12-19 NOTE — PROGRESS NOTES
12/19/19 0941   Team Meeting   Meeting Type Daily Rounds   Initial Conference Date 12/19/19   Patient/Family Present   Patient Present No   Patient's Family Present No     Daily Rounds Documentation    Team Members Present:   MD Everardo Hensley, ADRIEN  [facility], [address] [place] [address], [facility]    302  Transferred from [facility] because he was scaring other patients  Manic  Hyperverbal   Only taking Lithium  Needs forced meds

## 2019-12-19 NOTE — PROGRESS NOTES
Patient slept for the majority of the night  Woke early  Makes needs known  Behaviors remain controlled while present on the unit  Will continue to monitor

## 2019-12-19 NOTE — PROGRESS NOTES
The following belongings are remaining at patient's bedside: 1  5 shirts      2  4 underwear                 3  3 socks                 4   2 pants                 5   1 pair of sandals

## 2019-12-19 NOTE — TREATMENT PLAN
Treatment Plan iw - [address] Day 62 y o  male MRN: [id number]    [facility] 963-26 Encounter: [id number]          Admit Date/Time:  12/18/2019  3:57 PM    Treatment Team: Attending Provider: Carlos Manuel Corey MD; Registered Nurse: Benigno Seymour RN; Patient Care Assistant: Jessy Rodrigez; Patient Care Technician: Wale Sandoval; Care Manager: Johanna Bowens RN; Recreational Therapist: Lc Jimenez; : Lorri Almaraz; Patient Care Assistant: Bertha Adhikari;  Patient Care Assistant: Janiya Fairfield    Diagnosis: Principal Problem:    Schizoaffective disorder, bipolar type ([address] )  Active Problems:    Hypertension        Patient Strengths: active sense of humor, capable of independent living, Orthodoxy affiliation     Patient Barriers: patient is involuntary, unwilling to work on problems    Progress Toward Goals: ongoing    Recommended Treatment: discharge planning     Treatment Frequency: 1-2 times per week     Expected Discharge Date:     Discharge Plan: referrals as indicated     Treatment Plan Created/Updated By: Carlos Manuel Corey MD

## 2019-12-19 NOTE — PLAN OF CARE
Problem: CHONG  Goal: Will exhibit normal sleep and speech and no impulsivity  Description  INTERVENTIONS:  - Administer medication as ordered  - Set limits on impulsive behavior  - Make attempts to decrease external stimuli as possible  Outcome: Progressing     Problem: INVOLUNTARY ADMIT  Goal: Will cooperate with staff recommendations and doctor's orders and will demonstrate appropriate behavior  Description  INTERVENTIONS:  - Treat underlying conditions and offer medication as ordered  - Educate regarding involuntary admission procedures and rules  - Utilize positive consistent limit setting strategies to support patient and staff safety  Outcome: Progressing     Problem: Ineffective Coping  Goal: Participates in unit activities  Description  Interventions:  - Provide therapeutic environment   - Provide required programming   - Redirect inappropriate behaviors   Outcome: Progressing
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
## 2019-12-19 NOTE — QUICK NOTE
Consult for IM clearance cancelled  Patient was followed by Dr Desiree Tamayo in older adult  He will continue to follow the patient during stay in [facility] unit  Dr Desiree Tamayo notified of patient coverage

## 2019-12-19 NOTE — PROGRESS NOTES
Tish Sender  QVV:[birth year] Floyd Browne  MPT:[id number]    SKZ:[id number]  Adm Date: 12/18/2019 1557  3:57 PM   ATT PHY: Sue Mansoor, [address]         Subjective     The patient was seen after reviewing the chart and discussing the case with caring staff  Today during our encounter, the patient reported no acute concerns  Objective     Vitals:    12/19/19 0743   BP: 142/94   Pulse: 67   Resp: 18   Temp: 97 5 °F (36 4 °C)   SpO2: 97%       General Appearance: Awake and Alert  No acute distress  HEENT: Normocephalic, atraumatic  PERRLA, EOMI, MMM  Heart: RRR, no murmurs  Normal S1 and S2   Lungs: CTA bilaterally with fair air entry  Assessment     Elizabeth Graham is a(n) 62y o  year old male with Schizoaffective Disorder, Bipolar Type     1  Cardiac with history of hypertension and dyslipidemia  Stable  Will continue to closely monitor  Patient is not on anything for Hyperlipidemia  2  Tobacco abuse   Nicotine gum PRN  3  Alcohol abuse  Thiamine, folic acid and multivitamin  4  DJD/osteoarthritis   Tylenol on as needed basis  5  Gait abnormality   Stable  6  Vitamin-D deficiency   Vitamin D3 1000U daily  7  Urinary Frequency/Urgency  Flomax 0 4mg once daily    8  Dry cracked skin bilateral hands  Patient may get some other moisturizing lotion and Band-Aid to cover some of the fingers

## 2019-12-20 PROCEDURE — 99232 SBSQ HOSP IP/OBS MODERATE 35: CPT | Performed by: PSYCHIATRY & NEUROLOGY

## 2019-12-20 RX ADMIN — LITHIUM CARBONATE 900 MG: 300 CAPSULE, GELATIN COATED ORAL at 21:37

## 2019-12-20 RX ADMIN — LITHIUM CARBONATE 900 MG: 300 CAPSULE, GELATIN COATED ORAL at 08:09

## 2019-12-20 NOTE — PROGRESS NOTES
12/20/19 0940   Team Meeting   Meeting Type Daily Rounds   Initial Conference Date 12/20/19   Patient/Family Present   Patient Present No   Patient's Family Present No   Team Members Present:   MD Mumtaz Bowles CRNP Aloma Mans, ADRIEN  Pravin Done, [address] [place] [address]  Lew Desouza, PharmD    065  Refuses all meds except Lithium  Pressured  Easy to anger / explode  Improves on meds  Needs forced meds  Paranoid

## 2019-12-20 NOTE — PLAN OF CARE
Problem: CHONG  Goal: Will exhibit normal sleep and speech and no impulsivity  Description  INTERVENTIONS:  - Administer medication as ordered  - Set limits on impulsive behavior  - Make attempts to decrease external stimuli as possible  Outcome: Not Progressing

## 2019-12-20 NOTE — PROGRESS NOTES
SECOND OPINION FOR INJECTABLE PSYCHIATRIC MEDICATIONS    Kanika Graham 62 y o  male MRN: [id number]  Unit/Bed#: [facility] 247-02 Encounter: [id number]      Reason for Admission/Current Symptoms:    Kanika Graham is a 62 y o  male with a history of Schizoaffective Disorder who was admitted to the inpatient psychiatric unit due to significant worsening of manic symptoms, unstable mood, signs of acute psychosis, delusional thoughts and severe agitation  Second opinion for injectable psychotropic medications was requested by Dr Lennox Little due to Freddie's refusal to take oral medications  On evaluation today Noelle Ridley is loud , manic, yelling and unable to follow any verbal redirection  He is clearly manic, paranoid and refuses any medications presently  Mental Status Evaluation:    Appearance:  disheveled, marginal hygiene   Behavior:  agitated   Speech:  pressured   Mood:  irritable   Affect:  labile   Thought Process:  tangential   Associations: tangential associations   Thought Content:  paranoid ideation, grandiose, ruminations, preoccupied   Perceptual Disturbances: appears responding to internal stimuli   Risk Potential: Suicidal ideation - unable to assess  Homicidal ideation - does not answer  Potential for aggression - Yes, due to agitation   Sensorium:  oriented to person and place   Memory:  recent and remote memory: unable to assess due to lack of cooperation, grossly intact   Consciousness:  alert and awake    Attention: attention span and concentration appear shorter than expected for age   Insight:  impaired   Judgment: impaired   Gait/Station: normal gait/station   Motor Activity: no abnormal movements       Vital signs in last 24 hours:    Temp:  [98 4 °F (36 9 °C)-98 8 °F (37 1 °C)] 98 8 °F (37 1 °C)  HR:  [67-90] 90  Resp:  [16-18] 18  BP: (132-151)/(81-98) 151/98    Labs: I have personally reviewed all pertinent laboratory/tests results  Medications:   All current active medications have been reviewed  Current medications:    Current Facility-Administered Medications:  acetaminophen 650 mg Oral Q6H PRN Leesa Rast, CRNP   acetaminophen 650 mg Oral Q4H PRN Leesa Rast, CRNP   acetaminophen 975 mg Oral Q6H PRN Leesa Rast, CRNP   aluminum-magnesium hydroxide-simethicone 30 mL Oral Q4H PRN Leesa Rast, CRNP   ammonium lactate  Topical BID Leesa Rast, CRNP   cholecalciferol 1,000 Units Oral Daily Leesa Rast, CRNP   dextromethorphan-guaiFENesin 10 mL Oral Q4H PRN Leesa Rast, CRNP   folic acid 1 mg Oral Daily Leesa Rast, CRNP   hydrOXYzine HCL 25 mg Oral Q4H PRN Leesa Rast, CRNP   lithium carbonate 900 mg Oral Q12H [address] Leesa Rast, CRNP   LORazepam 2 mg Intramuscular Q4H PRN Leesa Rast, CRNP   LORazepam 0 5 mg Oral TID Leesa Rast, CRNP   LORazepam 1 mg Oral Q4H PRN Leesa Rast, CRNP   magnesium hydroxide 30 mL Oral Daily PRN Leesa Rast, CRNP   melatonin 9 mg Oral HS Leesa Rast, CRNP   multivitamin-minerals 1 tablet Oral Daily Leesa Rast, CRNP   nicotine polacrilex 2 mg Oral Q2H PRN Leesa Rast, CRNP   OLANZapine 15 mg Oral HS Leesa Rast, CRNP   OLANZapine 5 mg Oral Q12H PRN Leesa Rast, CRNP   risperiDONE 1 mg Oral Q8H PRN Leesa Rast, CRNP   tamsulosin 0 4 mg Oral Daily With Vish Bell, CRNP   thiamine 100 mg Oral Daily Leesa Rast, CRNP   ziprasidone 20 mg Intramuscular Q4H PRN Leesa Rast, CRNP       Assessment/Plan     Principal Problem:    Schizoaffective disorder, bipolar type ([facility] [address] )  Active Problems:    Hypertension      Recommended Treatment:     I agree with the need for injectable antipsychotic medications if he refuses oral medications  Kylah Funk has long history of psychiatric illness and is not likely to improve without medications      Blake Mueller MD 12/20/19

## 2019-12-20 NOTE — PROGRESS NOTES
Patient observed sleeping comfortably through out the night  No signs of distress  Will continue to monitor  Q 7 minute checks continue for patients safety

## 2019-12-20 NOTE — PROGRESS NOTES
Pt participated in [address], having expressed acceptance of Dr Tami Bright psychiatric services  He noted that he feels like a prisoner now (- due to 302 involuntary commitment -) but agreed with goals  Pt signed [address] regarding which he request his immediate copy as he desires to review the plan details

## 2019-12-20 NOTE — PROGRESS NOTES
Jefferson Garnett  SBM:[birth year]69 Keira Party  MRO:[id number]    YTK:[id number]  Adm Date: 12/18/2019 1557  3:57 PM   ATT PHY: Betsy Nguyen, [facility], [facility] [address]         [facility]     The patient was seen after reviewing the chart and discussing the case with caring staff  Patient has been transitioned to Orlene Marten given manic behaviors    Today during our encounter, the patient reported no acute concerns  Objective     Vitals:    12/20/19 0721   BP: 151/98   Pulse: 90   Resp: 18   Temp: 98 8 °F (37 1 °C)   SpO2: 99%       General Appearance: Awake and Alert  No acute distress  HEENT: Normocephalic, atraumatic  PERRLA, EOMI, MMM  Heart: RRR, no murmurs  Normal S1 and S2   Lungs: CTA bilaterally with fair air entry  Assessment     Faustine Masker Day is a(n) 62y o  year old male with Schizoaffective Disorder, Bipolar Type     1  Cardiac with history of hypertension and dyslipidemia  Stable  Will continue to closely monitor  Patient is not on anything for Hyperlipidemia  2  Tobacco abuse   Nicotine gum PRN  3  Alcohol abuse  Thiamine, folic acid and multivitamin  4  DJD/osteoarthritis   Tylenol on as needed basis  5  Gait abnormality   Stable  6  Vitamin-D deficiency   Vitamin D3 1000U daily  7  Urinary Frequency/Urgency  Flomax 0 4mg once daily    8  Dry cracked skin bilateral hands    Patient may get some other moisturizing lotion and Band-Aid to cover some of the fingers  The patient was discussed with Dr Jaciel Ferris and he is in agreement with the above note

## 2019-12-20 NOTE — PROGRESS NOTES
Progress Note - [address] Day 62 y o  male MRN: [id number]  Unit/Bed#: [facility] 247-02 Encounter: [id number]    Assessment/Plan   Principal Problem:    Schizoaffective disorder, bipolar type ([facility] [address] )  Active Problems:    Hypertension    P:  The come level ordered for tomorrow  Second opinion for forced medication protocol if needed given the patient refusal to take any other medication except for the lithium    Behavior over the last 24 hours:  Unchanged  Patient continues to be manic, hyperverbal with flight of ideas and is very tangential   He is adamant not to take any other medication besides the lithium  He has been compliant with the relatively high-dose lithium and denies having any side effects  Another level will be obtained given that he had the previous level of 1 1 just 2 days after starting the high dose  Patient had few verbal outbursts but no physical aggression  Sleep: insomnia  Appetite: poor  Medication side effects: No  ROS: no complaints    Mental Status Evaluation:  Appearance:  bearded and disheveled   Behavior:  psychomotor agitation   Speech:  loud and pressured   Mood:  irritable   Affect:  increased in intensity   Thought Process:  flight of ideas and tangential   Thought Content:  normal   Perceptual Disturbances: None   Risk Potential: Potential for Aggression No   Sensorium:  person and place   Cognition:  grossly intact   Consciousness:  alert and awake    Attention: attention span appeared shorter than expected for age   Insight:  limited   Judgment: limited   Gait/Station: normal gait/station   Motor Activity: no abnormal movements     Progress Toward Goals: ongoing    Recommended Treatment: Continue with group therapy, milieu therapy and occupational therapy  Risks, benefits and possible side effects of Medications:   Risks, benefits, and possible side effects of medications explained to patient and patient verbalizes understanding        Medications: all current active meds have been reviewed  Labs: reviewed    Counseling / Coordination of Care  Total floor / unit time spent today 20 minutes  Greater than 50% of total time was spent with the patient and / or family counseling and / or coordination of care   A description of the counseling / coordination of care:

## 2019-12-20 NOTE — PROGRESS NOTES
Visible on unit  Speech loud and pressured at times  Mood labile  Impulsive  Exaggerated movements and behaviors observed  Refused all prescribed medications except lithium  Compliant with 900 mg as prescribed  Pt states, " your doctors need to stop wasting expensive money on medications for pills I don't need" Disorganized  Disheveled  Able to communicate basic needs  Safety precautions maintained  Will continue to monitor and assess

## 2019-12-20 NOTE — PROGRESS NOTES
Patient out and visible on the floor social with peers  Disorganized thoughts  Q 7 minute checks for patients safety continue  Denies anxiety, depression, A/H, V/H

## 2019-12-21 LAB
LITHIUM SERPL-SCNC: 1.2 MMOL/L (ref 1–1.2)
TSH SERPL DL<=0.05 MIU/L-ACNC: 4.11 UIU/ML (ref 0.45–5.33)

## 2019-12-21 PROCEDURE — 84443 ASSAY THYROID STIM HORMONE: CPT | Performed by: PSYCHIATRY & NEUROLOGY

## 2019-12-21 PROCEDURE — 99232 SBSQ HOSP IP/OBS MODERATE 35: CPT | Performed by: PHYSICIAN ASSISTANT

## 2019-12-21 PROCEDURE — 80178 ASSAY OF LITHIUM: CPT | Performed by: PSYCHIATRY & NEUROLOGY

## 2019-12-21 RX ADMIN — LORAZEPAM 0.5 MG: 0.5 TABLET ORAL at 09:14

## 2019-12-21 RX ADMIN — LITHIUM CARBONATE 900 MG: 300 CAPSULE, GELATIN COATED ORAL at 09:10

## 2019-12-21 RX ADMIN — LITHIUM CARBONATE 900 MG: 300 CAPSULE, GELATIN COATED ORAL at 21:14

## 2019-12-21 NOTE — PROGRESS NOTES
Patient visible on the unit, pleasant and cooperative  Social with peers  Disheveled and loud at times  Will continue to monitor, q 7 minute checks in place for patient safety

## 2019-12-21 NOTE — PROGRESS NOTES
Deyvi Winchester  [facility]:[birth year] Ernst Justin  JNV:[id number]    DEL:[id number]  Adm Date: 12/18/2019 1557  3:57 PM   ATT PHY: Lizeth Stacy, [address]         Subjective     The patient was seen after reviewing the chart and discussing the case with caring staff  Today during our encounter, the patient reported no acute concerns  Objective     Vitals:    12/21/19 0719   BP: 123/82   Pulse: 93   Resp: 18   Temp: 97 9 °F (36 6 °C)   SpO2: 97%       General Appearance: Awake and Alert  No acute distress  HEENT: Normocephalic, atraumatic  PERRLA, EOMI, MMM  Heart: RRR, no murmurs  Normal S1 and S2   Lungs: CTA bilaterally with fair air entry  Assessment     Aristeo Graham is a(n) 62y o  year old male with Schizoaffective Disorder, Bipolar Type     1  Cardiac with history of hypertension and dyslipidemia  Stable  Will continue to closely monitor  Patient is not on anything for Hyperlipidemia  2  Tobacco abuse   Nicotine gum PRN  3  Alcohol abuse  Thiamine, folic acid and multivitamin  4  DJD/osteoarthritis   Tylenol on as needed basis  5  Gait abnormality   Stable  6  Vitamin-D deficiency   Vitamin D3 1000U daily  7  Urinary Frequency/Urgency  Flomax 0 4mg once daily    8  Dry cracked skin bilateral hands    Patient may get some other moisturizing lotion and Band-Aid to cover some of the fingers  The patient was discussed with Dr Regino Villatoro and he is in agreement with the above note

## 2019-12-21 NOTE — PROGRESS NOTES
Patient observed sleeping comfortably most of the evening  No signs of distress  Will continue to monitor

## 2019-12-21 NOTE — PROGRESS NOTES
Pt visible on unit  Mood elevated at times  Erratic behaviors  Easily redirectable  Cooperative with staff redirection  Noncompliant with prescribed medications  Pt refusing all orders except lithium  Administered 900 mg lithium po  Poor insight  Pleasantly refusing all medications  Dismissive at attempts to provide education on medication

## 2019-12-21 NOTE — PROGRESS NOTES
Progress Note - Mikael Dhillon 67 Day 62 y o  male MRN: [id number]   Unit/Bed#: Tameka Osullivan 556-98 Encounter: [id number]    Behavior over the last 24 hours: unchanged  Rayshay Blocker seen today for continuation of care and case was reviewed with nursing staff  Today patient is tangential in speech and disorganized in thought upon approach  He did remaining calm  Does express that he is slightly depressed and irritable she reports I do not need to be on any medications    He reports his sleep is okay and denies SI/HI  Patient continues to be in agreement taking lithium and discussed that level is on the high end of the normal limit  Denies side effects from medication  During conversation does express bizarre delusional thinking including you have to take care of the children  that is society is duty      Sleep: normal  Appetite: normal  Medication side effects: No   ROS: no complaints, all other systems are negative    Mental Status Evaluation:    Appearance:  disheveled   Behavior:  calm, good eye contact, restless   Speech:  pressured, tangential   Mood:  depressed, irritable   Affect:  labile   Thought Process:  disorganized   Associations: loose associations   Thought Content:  bizarre delusions   Perceptual Disturbances: no auditory hallucinations, no visual hallucinations, does not appear responding to internal stimuli   Risk Potential: Suicidal ideation - None  Homicidal ideation - None  Potential for aggression - Not at present   Sensorium:  oriented to person and place   Memory:  Not formally assessed today   Consciousness:  alert and awake   Attention: attention span and concentration appear shorter than expected for age   Insight:  impaired   Judgment: impaired   Gait/Station: normal gait/station   Motor Activity: no abnormal movements     Vital signs in last 24 hours:    Temp:  [97 5 °F (36 4 °C)-97 9 °F (36 6 °C)] 97 9 °F (36 6 °C)  HR:  [69-93] 93  Resp:  [16-18] 18  BP: (123-126)/(82-85) 123/82    Laboratory results:    I have personally reviewed all pertinent laboratory/tests results  Most Recent Labs:   Lab Results   Component Value Date    WBC 6 10 12/11/2019    RBC 4 46 12/11/2019    HGB 13 5 (L) 12/11/2019    HCT 40 4 (L) 12/11/2019     12/11/2019    RDW 13 9 12/11/2019    NEUTROABS 4 60 12/11/2019    SODIUM 143 12/11/2019    K 3 9 12/11/2019     (H) 12/11/2019    CO2 28 12/11/2019    BUN 12 12/11/2019    CREATININE 0 98 12/11/2019    GLUC 94 12/11/2019    GLUF 91 11/06/2019    CALCIUM 9 1 12/11/2019    AST 19 12/11/2019    ALT 20 12/11/2019    ALKPHOS 32 (L) 12/11/2019    TP 6 0 (L) 12/11/2019    ALB 4 2 12/11/2019    TBILI 0 40 12/11/2019    CHOLESTEROL 120 11/03/2019    HDL 44 11/03/2019    TRIG 90 11/03/2019    LDLCALC 58 11/03/2019    NONHDLC 76 11/03/2019    LITHIUM 1 2 12/21/2019    [id number] 4 110 12/21/2019    RPR Non-Reactive 11/03/2019    HGBA1C 5 1 11/03/2019     11/03/2019       Progress Toward Goals: no significant progress    Assessment/Plan   Principal Problem:    Schizoaffective disorder, bipolar type ([facility] [address] )  Active Problems:    Hypertension    Recommended Treatment:     Planned medication and treatment changes:     All current active medications have been reviewed  Encourage group therapy, milieu therapy and occupational therapy  Behavioral Health checks every 7 minutes  Continue medications as prescribed, force of medication order noted  Lithium level reviewed today as 1 2, patient denies any side effects from medication  Continue with no roommate order due to safety concerns as patient still appears manic       Current Facility-Administered Medications:  acetaminophen 650 mg Oral Q6H PRN CARLOS Ambrose   acetaminophen 650 mg Oral Q4H PRN CARLOS Ambrose   acetaminophen 975 mg Oral Q6H PRN CARLOS Ambrose   aluminum-magnesium hydroxide-simethicone 30 mL Oral Q4H PRN CARLOS Ambrose   ammonium lactate  Topical BID CARLOS Ambrose cholecalciferol 1,000 Units Oral Daily Marilou Czech, CRNP   dextromethorphan-guaiFENesin 10 mL Oral Q4H PRN Marilou Czech, CRNP   folic acid 1 mg Oral Daily Marilou Czech, CRNP   hydrOXYzine HCL 25 mg Oral Q4H PRN Marilou Czech, CRNP   lithium carbonate 900 mg Oral Q12H [address] Marilou Czech, CRNP   LORazepam 2 mg Intramuscular Q4H PRN Marilou Czech, CRNP   LORazepam 0 5 mg Oral TID Marilou Czech, CRNP   LORazepam 1 mg Oral Q4H PRN Marilou Czech, CRNP   magnesium hydroxide 30 mL Oral Daily PRN Marilou Czech, CRNP   melatonin 9 mg Oral HS Marilou Czech, CRNP   multivitamin-minerals 1 tablet Oral Daily Marilou Czech, CRNP   nicotine polacrilex 2 mg Oral Q2H PRN Marilou Czech, CRNP   OLANZapine 15 mg Oral HS Marilou Czech, CRNP   OLANZapine 5 mg Oral Q12H PRN Marilou Czech, CRNP   risperiDONE 1 mg Oral Q8H PRN Marilou Czech, CRNP   tamsulosin 0 4 mg Oral Daily With Tatum Yi, CRNP   thiamine 100 mg Oral Daily Marilou Czech, CRNP   ziprasidone 20 mg Intramuscular Q4H PRN Marilou Czech, CRNP       Risks / Benefits of Treatment:    Risks, benefits, and possible side effects of medications explained to patient  Patient refuses treatment with any other medication aside from lithium and does not have understanding of risks of treatment refusal or benefits of treatment at this time  Counseling / Coordination of Care:    Patient's progress reviewed with nursing staff  Medications, treatment progress and treatment plan reviewed with patient  Importance of medication and treatment compliance reviewed with patient

## 2019-12-22 PROCEDURE — 99232 SBSQ HOSP IP/OBS MODERATE 35: CPT | Performed by: PHYSICIAN ASSISTANT

## 2019-12-22 RX ADMIN — LITHIUM CARBONATE 900 MG: 300 CAPSULE, GELATIN COATED ORAL at 21:40

## 2019-12-22 RX ADMIN — LITHIUM CARBONATE 900 MG: 300 CAPSULE, GELATIN COATED ORAL at 08:16

## 2019-12-22 NOTE — PROGRESS NOTES
Patient woke briefly once during the night  Very angry, irritable, and loud  Cursing down the hallway  Able to quickly calm down    Fell back to sleep quickly

## 2019-12-22 NOTE — PROGRESS NOTES
Spoke at length with patient about why he continues to refuse all medications except for lithium  Pt actively engaged in conversation with RN  Pt admits that he does not want ativan to bring him down  When asked why he was refusing Zyprexa after previously doing well  Pt states, " It causes narrow angle glaucoma and I am predisposed to glaucoma  Not taking something that's going to hurt" Pt reported that he dis not not need the medical medications anymore and he hasn't been on Flomax for years  Pt states, "I don't take vitamins cause I eat right Its unnecessary"    Pt was calm controlled and cooperative during assessment  RN attempted to provide education verbally but patient requested printed information  Information provided as requested lorazepam, lithium, Zyprexa, and Geodon

## 2019-12-22 NOTE — PROGRESS NOTES
Progress Note - Mikael Dhillon 69 Day 62 y o  male MRN: [id number]   Unit/Bed#: Tameka Osullivan 594-42 Encounter: [id number]    Behavior over the last 24 hours: unchanged  Raynell Blocker seen today for continuation of care and case was reviewed with nursing staff  Today patient is pleasant, calm, cooperative upon approach  Is still tangential thought process  Also appears to have disorganized thoughts  However, he reports I am feeling okay    Denies SI/HI or any psychosis  Appears to be preoccupied with his medication reports I feel like my problems could be solved with Tegretol    Encouraged patient to continue taking his lithium as he is denying side effects with a level of 1 2  Nursing also mentioned to provider that patient does not want to take Zyprexa due to having history of narrow angle glaucoma the though, patient does not express this directly to provider      Sleep: normal  Appetite: normal  Medication side effects: No   ROS: no complaints, all other systems are negative    Mental Status Evaluation:    Appearance:  disheveled, bearded, Here is disheveled and partly tied up on 1 side of his head   Behavior:  pleasant, cooperative, bizarre, good eye contact   Speech:  pressured   Mood:  normal   Affect:  overbright   Thought Process:  disorganized, tangential   Associations: tangential associations   Thought Content:  preoccupied with Medication   Perceptual Disturbances: no auditory hallucinations, no visual hallucinations, does not appear responding to internal stimuli   Risk Potential: Suicidal ideation - None  Homicidal ideation - None  Potential for aggression - Not at present   Sensorium:  oriented to person, place and time/date   Memory:  Not formally assessed today   Consciousness:  alert and awake   Attention: attention span and concentration appear shorter than expected for age   Insight:  impaired   Judgment: impaired   Gait/Station: normal gait/station   Motor Activity: no abnormal movements Vital signs in last 24 hours:    Temp:  [98 7 °F (37 1 °C)-99 3 °F (37 4 °C)] 98 7 °F (37 1 °C)  HR:  [69-72] 69  Resp:  [16] 16  BP: (116-128)/(64-79) 128/79    Laboratory results:    I have personally reviewed all pertinent laboratory/tests results  Most Recent Labs:   Lab Results   Component Value Date    WBC 6 10 12/11/2019    RBC 4 46 12/11/2019    HGB 13 5 (L) 12/11/2019    HCT 40 4 (L) 12/11/2019     12/11/2019    RDW 13 9 12/11/2019    NEUTROABS 4 60 12/11/2019    SODIUM 143 12/11/2019    K 3 9 12/11/2019     (H) 12/11/2019    CO2 28 12/11/2019    BUN 12 12/11/2019    CREATININE 0 98 12/11/2019    GLUC 94 12/11/2019    GLUF 91 11/06/2019    CALCIUM 9 1 12/11/2019    AST 19 12/11/2019    ALT 20 12/11/2019    ALKPHOS 32 (L) 12/11/2019    TP 6 0 (L) 12/11/2019    ALB 4 2 12/11/2019    TBILI 0 40 12/11/2019    CHOLESTEROL 120 11/03/2019    HDL 44 11/03/2019    TRIG 90 11/03/2019    LDLCALC 58 11/03/2019    NONHDLC 76 11/03/2019    LITHIUM 1 2 12/21/2019    [id number] 4 110 12/21/2019    RPR Non-Reactive 11/03/2019    HGBA1C 5 1 11/03/2019     11/03/2019       Progress Toward Goals: no significant progress    Assessment/Plan   Principal Problem:    Schizoaffective disorder, bipolar type ([facility] [address] )  Active Problems:    Hypertension    Recommended Treatment:     Planned medication and treatment changes:     All current active medications have been reviewed  Encourage group therapy, milieu therapy and occupational therapy  Behavioral Health checks every 7 minutes  Continue medication as prescribed  Based on noncompliance can consider discontinuing Zyprexa in future    Current Facility-Administered Medications:  acetaminophen 650 mg Oral Q6H PRN Ermelinda Santosh, KATENP   acetaminophen 650 mg Oral Q4H PRN Ermelinda KATE FrostNP   acetaminophen 975 mg Oral Q6H PRN Ermelinda Ruder, CRNP   aluminum-magnesium hydroxide-simethicone 30 mL Oral Q4H PRN CARLOS Ambrose   ammonium lactate  Topical BID CARLOS Ambrose cholecalciferol 1,000 Units Oral Daily Horacio Peppers, CRNP   dextromethorphan-guaiFENesin 10 mL Oral Q4H PRN Horacio Peppers, CRNP   folic acid 1 mg Oral Daily Horacio Peppers, CRNP   hydrOXYzine HCL 25 mg Oral Q4H PRN Horacio Peppers, CRNP   lithium carbonate 900 mg Oral Q12H [address] Horacio Peppers, CRNP   LORazepam 2 mg Intramuscular Q4H PRN Horacio Peppers, CRNP   LORazepam 0 5 mg Oral TID Horacio Peppers, CRNP   LORazepam 1 mg Oral Q4H PRN Horacio Peppers, CRNP   magnesium hydroxide 30 mL Oral Daily PRN Horacio Peppers, CRNP   melatonin 9 mg Oral HS Horacio Peppers, CRNP   multivitamin-minerals 1 tablet Oral Daily Horacio Peppers, CRNP   nicotine polacrilex 2 mg Oral Q2H PRN Horacio Peppers, CRNP   OLANZapine 15 mg Oral HS Horacio Peppers, CRNP   OLANZapine 5 mg Oral Q12H PRN Horacio Peppers, CRNP   risperiDONE 1 mg Oral Q8H PRN Horacio Peppers, CRNP   tamsulosin 0 4 mg Oral Daily With Madeline Castellano, CRNP   thiamine 100 mg Oral Daily Horacio Peppers, CRNP   ziprasidone 20 mg Intramuscular Q4H PRN Horacio Peppers, CRNP       Risks / Benefits of Treatment:    Risks, benefits, and possible side effects of medications explained to patient  Patient has limited understanding of risks and benefits of treatment at this time, but agrees to take medications as prescribed  Except for Zyprexa which he refuses    Counseling / Coordination of Care:    Patient's progress reviewed with nursing staff  Medications, treatment progress and treatment plan reviewed with patient  Importance of medication and treatment compliance reviewed with patient

## 2019-12-22 NOTE — PROGRESS NOTES
Avel Lupillo  [facility]:[birth year] Elizabeth King  DAYRON:[id number]    FHD:[id number]  Adm Date: 12/18/2019 1557  3:57 PM   ATT PHY: Nicki Little, [address]         Subjective     The patient was seen after reviewing the chart and discussing the case with caring staff  Today during our encounter, the patient reported no acute concerns  Objective     Vitals:    12/22/19 0711   BP: 128/79   Pulse: 69   Resp: 16   Temp: 98 7 °F (37 1 °C)   SpO2:        General Appearance: Awake and Alert  No acute distress  HEENT: Normocephalic, atraumatic  PERRLA, EOMI, MMM  Heart: RRR, no murmurs  Normal S1 and S2   Lungs: CTA bilaterally with fair air entry  Assessment     Sigrid Graham is a(n) 62y o  year old male with Schizoaffective Disorder, Bipolar Type     1  Cardiac with history of hypertension and dyslipidemia  Stable  Will continue to closely monitor  Patient is not on anything for Hyperlipidemia  2  Tobacco abuse   Nicotine gum PRN  3  Alcohol abuse  Thiamine, folic acid and multivitamin  4  DJD/osteoarthritis   Tylenol on as needed basis  5  Gait abnormality   Stable  6  Vitamin-D deficiency   Vitamin D3 1000U daily  7  Urinary Frequency/Urgency  Flomax 0 4mg once daily    8  Dry cracked skin bilateral hands    Patient may get some other moisturizing lotion and Band-Aid to cover some of the fingers  The patient was discussed with Dr Jefry Carballo and he is in agreement with the above note

## 2019-12-22 NOTE — PROGRESS NOTES
Patient has been visible on the unit  Attending and participating in evening unit activities  Social with select peers  Pressured and loud  Irritable at times  Became upset with this writer when attempting to assess him  Went off on a delusional tangent about shutting a radio circuit down  Later came back and apologized  Labile  Refused all meds except Lithium

## 2019-12-23 PROCEDURE — 99232 SBSQ HOSP IP/OBS MODERATE 35: CPT | Performed by: PSYCHIATRY & NEUROLOGY

## 2019-12-23 RX ADMIN — LITHIUM CARBONATE 900 MG: 300 CAPSULE, GELATIN COATED ORAL at 21:37

## 2019-12-23 RX ADMIN — VITAMIN D, TAB 1000IU (100/BT) 1000 UNITS: 25 TAB at 09:37

## 2019-12-23 RX ADMIN — Medication 100 MG: at 09:37

## 2019-12-23 RX ADMIN — LORAZEPAM 0.5 MG: 0.5 TABLET ORAL at 09:37

## 2019-12-23 RX ADMIN — LITHIUM CARBONATE 900 MG: 300 CAPSULE, GELATIN COATED ORAL at 09:37

## 2019-12-23 RX ADMIN — Medication 1 TABLET: at 09:37

## 2019-12-23 RX ADMIN — FOLIC ACID 1 MG: 1 TABLET ORAL at 09:37

## 2019-12-23 NOTE — PROGRESS NOTES
12/23/19 0939   Team Meeting   Meeting Type Daily Rounds   Initial Conference Date 12/23/19   Patient/Family Present   Patient Present No   Patient's Family Present No     Daily Rounds Documentation    Team Members Present:   MD Peter Desai CRNP Quentin Pennant, RN Lilton Barter, SAM   Ana Almonte, [place]    [place] 12/21 1 2 & TSH 4 11  Labile  Unwilling to take Zyprexa due to risk of Glaucoma  Open to taking Tegretol

## 2019-12-23 NOTE — PROGRESS NOTES
Patient started the evening loud, irritable, and labile  Agitated on the phone  Short bursts of cursing in the hallways  More controlled during the later part of the shift  Tolerant of a peer with poor boundaries  Inquired about the logo on patient's shirt that referenced a Gnosticist choir  Patient stated he is a member of his Gnosticist's choir  He then sang a song that he was supposed to sing as a solo in Gnosticist  He became tearful while singing the song which dealt with emotional pain and asking God for help  He states he has been continuing to pray while in the hospital and his gem is a major strength for him  Overall more controlled this evening than last evening

## 2019-12-23 NOTE — PROGRESS NOTES
Patient bright, alert, ate breakfast with peers  Attended group with participation  Took PO lithium but declines all other scheduled am medications  States he dosen't need them and they are harmful to his body  Denies si hi and hallucinations  Will continue to monitor

## 2019-12-23 NOTE — SOCIAL WORK
As pt stated that he want so file an appeal with reference to 303 Hearing determination of his need for up to 20 more days of involuntary commitment as determined this morning by , Alice Kaur, CAROL called [place] PD Office and left VM for for pt's PD, Atty [facility], informing him of pt's request to appeal 303 determination and of contact information for pt and SW   SW also informed Janice at [facility] of pt's request for appeal

## 2019-12-23 NOTE — PROGRESS NOTES
Pt was pleasant and cooperative upon assessment  Pt refused medications and stated the only medication he would take is his lithium  Pt was present in the milieu for meals, watching television and talking with peers  Pt denies AH, VH, SI, HI, Pain, Anxiety and Depression  Will continue to monitor

## 2019-12-23 NOTE — SOCIAL WORK
303 Hearing held with the following in attendance:  Pt; pt's PD, Laurel Rivera; , Macho Jean-Baptiste; Dr Desire Huertas; Tiffani Sham  Dr Desire Huertas testified and pt testified in his own behalf  Attey Lawrence Womack determined that pt receive up to 20 more days of involuntary commitment to which pt was agreeable

## 2019-12-23 NOTE — PLAN OF CARE
Problem: CHONG  Goal: Will exhibit normal sleep and speech and no impulsivity  Description  INTERVENTIONS:  - Administer medication as ordered  - Set limits on impulsive behavior  - Make attempts to decrease external stimuli as possible  12/23/2019 1430 by Deloris Rogel RN  Outcome: Progressing  12/23/2019 0840 by Deloris Rogel RN  Outcome: Progressing     Problem: INVOLUNTARY ADMIT  Goal: Will cooperate with staff recommendations and doctor's orders and will demonstrate appropriate behavior  Description  INTERVENTIONS:  - Treat underlying conditions and offer medication as ordered  - Educate regarding involuntary admission procedures and rules  - Utilize positive consistent limit setting strategies to support patient and staff safety  12/23/2019 1430 by Deloris Rogel RN  Outcome: Progressing  12/23/2019 0840 by Deloris Rogel RN  Outcome: Progressing     Problem: Ineffective Coping  Goal: Participates in unit activities  Description  Interventions:  - Provide therapeutic environment   - Provide required programming   - Redirect inappropriate behaviors   12/23/2019 1430 by Deloris Rogel RN  Outcome: Progressing  12/23/2019 0840 by Deloris Rogel RN  Outcome: Progressing     Problem: DISCHARGE PLANNING - CARE MANAGEMENT  Goal: Discharge to post-acute care or home with appropriate resources  Description  INTERVENTIONS:  - Conduct assessment to determine patient/family and health care team treatment goals, and need for post-acute services based on payer coverage, community resources, and patient preferences, and barriers to discharge  - Address psychosocial, clinical, and financial barriers to discharge as identified in assessment in conjunction with the patient/family and health care team  - Arrange appropriate level of post-acute services according to patient's   needs and preference and payer coverage in collaboration with the physician and health care team  - Communicate with and update the patient/family, physician, and health care team regarding progress on the discharge plan  - Arrange appropriate transportation to post-acute venues   12/23/2019 1430 by So Pitts RN  Outcome: Progressing  12/23/2019 0840 by So Pitts RN  Outcome: Progressing

## 2019-12-23 NOTE — PROGRESS NOTES
Progress Note - [address] 62 y o  male MRN: [id number]  Unit/Bed#: -02 Encounter: [id number]    Assessment/Plan   Principal Problem:    Schizoaffective disorder, bipolar type ([address] )  Active Problems:    Hypertension    P: Discussed starting Invega with the patient  Medication information material given to him  Behavior over the last 24 hours:  Unchanged  Patient reports that he is scared of trying any other medication other than Lithium  Patient reports he has Glaucoma and is afraid that medications would make it worse  Patient is agreeable to consider Jamia Back but he wants to read on it first   Patient was committed to up to 20 more days in the 303 hearing today  Sleep: normal  Appetite: normal  Medication side effects: No  ROS: no complaints    Mental Status Evaluation:  Appearance:  disheveled   Behavior:  guarded   Speech:  loud   Mood:  angry   Affect:  labile   Thought Process:  illogical   Thought Content:  delusions  persecutory   Perceptual Disturbances: None   Risk Potential: Potential for Aggression No   Sensorium:  person and place   Cognition:  grossly intact   Consciousness:  alert and awake    Attention: attention span and concentration were age appropriate   Insight:  limited   Judgment: limited   Gait/Station: normal gait/station   Motor Activity: no abnormal movements     Progress Toward Goals: ongoing    Recommended Treatment: Continue with group therapy, milieu therapy and occupational therapy  Risks, benefits and possible side effects of Medications:   Risks, benefits, and possible side effects of medications explained to patient and patient verbalizes understanding  Medications: all current active meds have been reviewed  Labs: reviewed    Counseling / Coordination of Care  Total floor / unit time spent today 20 minutes  Greater than 50% of total time was spent with the patient and / or family counseling and / or coordination of care   A description of the counseling / coordination of care:

## 2019-12-23 NOTE — PROGRESS NOTES
Meagan Alexis  EFM:[birth year] Zeynep Cabello  MMO:[id number]    YZS:[id number]  Adm Date: 12/18/2019 1557  3:57 PM   ATT PHY: Geovanni [place], [address]         Subjective     The patient was seen after reviewing the chart and discussing the case with caring staff  Today during our encounter, the patient reported no new concerns  He continues to complain of his arms itching but refuses treatments for it  Objective     Vitals:    12/23/19 0729   BP: 101/61   Pulse: 60   Resp: 16   Temp: 99 6 °F (37 6 °C)   SpO2: 96%       General Appearance: Awake and Alert  No acute distress  HEENT: Normocephalic, atraumatic  PERRLA, EOMI, MMM  Heart: RRR, no murmurs  Normal S1 and S2   Lungs: CTA bilaterally with fair air entry  Assessment     Alline Asp Day is a(n) 62y o  year old male with Schizoaffective Disorder, Bipolar Type     1  Cardiac with history of hypertension and dyslipidemia  Stable  Will continue to closely monitor  Patient is not on anything for Hyperlipidemia  2  Tobacco abuse   Nicotine gum PRN  3  Alcohol abuse  Thiamine, folic acid and multivitamin  4  DJD/osteoarthritis   Tylenol on as needed basis  5  Gait abnormality   Stable  6  Vitamin-D deficiency   Vitamin D3 1000U daily  7  Urinary Frequency/Urgency  Flomax 0 4mg once daily    8  Dry cracked skin bilateral hands    Patient may get some other moisturizing lotion and Band-Aid to cover some of the fingers  The patient was discussed with Dr Shoshana Haq and he is in agreement with the above note

## 2019-12-23 NOTE — PROGRESS NOTES
Patient bright, al;ert talking on phone in am  Remains labile  Cursing on phone, then smiling and talking to self in hallway  Ate breakfast with peers  Will maintain on safety precautions and 7 minute checks, no needs identified

## 2019-12-24 PROCEDURE — 99232 SBSQ HOSP IP/OBS MODERATE 35: CPT | Performed by: PSYCHIATRY & NEUROLOGY

## 2019-12-24 RX ORDER — DIPHENHYDRAMINE HYDROCHLORIDE, ZINC ACETATE 2; .1 G/100G; G/100G
CREAM TOPICAL 3 TIMES DAILY PRN
Status: DISCONTINUED | OUTPATIENT
Start: 2019-12-24 | End: 2020-01-02 | Stop reason: HOSPADM

## 2019-12-24 RX ADMIN — TAMSULOSIN HYDROCHLORIDE 0.4 MG: 0.4 CAPSULE ORAL at 16:59

## 2019-12-24 RX ADMIN — LITHIUM CARBONATE 900 MG: 300 CAPSULE, GELATIN COATED ORAL at 08:01

## 2019-12-24 RX ADMIN — LORAZEPAM 0.5 MG: 0.5 TABLET ORAL at 15:08

## 2019-12-24 RX ADMIN — LITHIUM CARBONATE 750 MG: 300 CAPSULE, GELATIN COATED ORAL at 21:41

## 2019-12-24 RX ADMIN — LORAZEPAM 0.5 MG: 0.5 TABLET ORAL at 21:41

## 2019-12-24 NOTE — PROGRESS NOTES
Patient tells this nurse he is now agreeable to take his Ativan as scheduled but wants it IM  Educated patient on preference of PO vs IM  Pt agreeable to take the already ordered ativan 0 5mg TID  First dose given

## 2019-12-24 NOTE — PROGRESS NOTES
Patient bright, alert, visible in hallways, attending groups with participation, social with peers  Remains slightly labile  Moments of anger and yelling at a specific peer, most other times calm and smiling  Took PO  scheduled lithium, declined all other medications  Pleasant to staff, good hygiene  Will maintain on safety precautions and 7 minute checks no needs identified

## 2019-12-24 NOTE — PROGRESS NOTES
Patient has had a few periods of wakefulness during the night    While awake he was irritable and cursing however he was able to quickly fall back to sleep

## 2019-12-24 NOTE — PLAN OF CARE
Problem: CHONG  Goal: Will exhibit normal sleep and speech and no impulsivity  Description  INTERVENTIONS:  - Administer medication as ordered  - Set limits on impulsive behavior  - Make attempts to decrease external stimuli as possible  Outcome: Progressing     Problem: INVOLUNTARY ADMIT  Goal: Will cooperate with staff recommendations and doctor's orders and will demonstrate appropriate behavior  Description  INTERVENTIONS:  - Treat underlying conditions and offer medication as ordered  - Educate regarding involuntary admission procedures and rules  - Utilize positive consistent limit setting strategies to support patient and staff safety  Outcome: Progressing     Problem: Ineffective Coping  Goal: Participates in unit activities  Description  Interventions:  - Provide therapeutic environment   - Provide required programming   - Redirect inappropriate behaviors   Outcome: Progressing     Problem: DISCHARGE PLANNING - CARE MANAGEMENT  Goal: Discharge to post-acute care or home with appropriate resources  Description  INTERVENTIONS:  - Conduct assessment to determine patient/family and health care team treatment goals, and need for post-acute services based on payer coverage, community resources, and patient preferences, and barriers to discharge  - Address psychosocial, clinical, and financial barriers to discharge as identified in assessment in conjunction with the patient/family and health care team  - Arrange appropriate level of post-acute services according to patient's   needs and preference and payer coverage in collaboration with the physician and health care team  - Communicate with and update the patient/family, physician, and health care team regarding progress on the discharge plan  - Arrange appropriate transportation to post-acute venues   Outcome: Progressing

## 2019-12-24 NOTE — PROGRESS NOTES
Progress Note - 6 Saint Gonzales Frandy Day 62 y o  male MRN: [id number]  Unit/Bed#: [facility] 247-02 Encounter: [id number]    Assessment/Plan   Principal Problem:    Schizoaffective disorder, bipolar type ([address] )  Active Problems:    Hypertension    P:  Lithium dose is lowered to 750 mg twice a day as patient is showing visible tremors in both of his hands    Behavior over the last 24 hours:  Unchanged  Patient at times becomes very irritable, loud and very angry  Patient does acknowledge that he gets very angry at times but he tries to control his anger and only express his anger verbally and not physically  Patient continues to refuse any other medication as he did not like the side effects of the [place]  He also complained of having some tremors after the increase in the lithium dose so we agreed on decreasing his lithium to 750 mg twice a day  Sleep: insomnia  Appetite: poor  Medication side effects: No  ROS: no complaints    Mental Status Evaluation:  Appearance:  bearded   Behavior:  psychomotor agitation   Speech:  loud and pressured   Mood:  constricted   Affect:  increased in intensity   Thought Process:  concrete   Thought Content:  delusions  persecutory   Perceptual Disturbances: None   Risk Potential: Potential for Aggression No   Sensorium:  person and place   Cognition:  grossly intact   Consciousness:  awake    Attention: attention span appeared shorter than expected for age   Insight:  limited   Judgment: limited   Gait/Station: normal gait/station   Motor Activity: abnormal movement noted  tremor rhythmic     Progress Toward Goals: ongoing    Recommended Treatment: Continue with group therapy, milieu therapy and occupational therapy  Risks, benefits and possible side effects of Medications:   Risks, benefits, and possible side effects of medications explained to patient and patient verbalizes understanding  Medications: all current active meds have been reviewed      Labs: reviewed    Counseling / Coordination of Care  Total floor / unit time spent today 20 minutes  Greater than 50% of total time was spent with the patient and / or family counseling and / or coordination of care   A description of the counseling / coordination of care:

## 2019-12-24 NOTE — PROGRESS NOTES
12/24/19 0936   Team Meeting   Meeting Type Daily Rounds   Initial Conference Date 12/24/19   Patient/Family Present   Patient Present No   Patient's Family Present No   Daily Rounds Documentation    Team Members Present:   MD Jeffy Ha, RN  [facility]    No roommate  Declines [place]  May want [place] admission  [address] is useless

## 2019-12-24 NOTE — PROGRESS NOTES
Patient has been visible on the unit  Attending and participating in evening unit activities  Pleasant and controlled at the start of the shift but then became irritable as the night went on  Patient states he has had "no positive changes" in the last 24 hours  Became negative and critical of all disciplines of the treatment team  Says he wants to go to the [place]   Continues to refuse all meds except Lithium

## 2019-12-24 NOTE — PROGRESS NOTES
Shan Hunger  JLQ:[birth year] Rex Thomas  WEU:[id number]    NRY:[id number]  Adm Date: 12/18/2019 1557  3:57 PM   ATT PHY: Felicia Ruiz, [address]         Subjective     The patient was seen after reviewing the chart and discussing the case with caring staff  Today during our encounter, the patient reported no new concerns  He continues to complain of his arms itching/dryness  Objective     Vitals:    12/24/19 0721   BP: 140/82   Pulse: 67   Resp: 16   Temp: 98 2 °F (36 8 °C)   SpO2: 98%       General Appearance: Awake and Alert  No acute distress  HEENT: Normocephalic, atraumatic  PERRLA, EOMI, MMM  Heart: RRR, no murmurs  Normal S1 and S2   Lungs: CTA bilaterally with fair air entry  Assessment     Sarah Graham is a(n) 62y o  year old male with Schizoaffective Disorder, Bipolar Type     1  Cardiac with history of hypertension and dyslipidemia  Stable  Will continue to closely monitor  Patient is not on anything for Hyperlipidemia  2  Tobacco abuse   Nicotine gum PRN  3  Alcohol abuse  Thiamine, folic acid and multivitamin  4  DJD/osteoarthritis   Tylenol on as needed basis  5  Gait abnormality   Stable  6  Vitamin-D deficiency   Vitamin D3 1000U daily  7  Urinary Frequency/Urgency  Flomax 0 4mg once daily    8  Dry cracked skin bilateral hands/arms   Mild at this stage  May get Benadryl cream TID PRN  The patient was discussed with Dr Tamar Ordonez and he is in agreement with the above note

## 2019-12-25 PROCEDURE — 99232 SBSQ HOSP IP/OBS MODERATE 35: CPT | Performed by: PSYCHIATRY & NEUROLOGY

## 2019-12-25 RX ADMIN — LORAZEPAM 0.5 MG: 0.5 TABLET ORAL at 21:46

## 2019-12-25 RX ADMIN — LITHIUM CARBONATE 750 MG: 300 CAPSULE, GELATIN COATED ORAL at 21:46

## 2019-12-25 RX ADMIN — LORAZEPAM 0.5 MG: 0.5 TABLET ORAL at 15:57

## 2019-12-25 RX ADMIN — LORAZEPAM 0.5 MG: 0.5 TABLET ORAL at 08:44

## 2019-12-25 RX ADMIN — LITHIUM CARBONATE 750 MG: 300 CAPSULE, GELATIN COATED ORAL at 08:44

## 2019-12-25 NOTE — PROGRESS NOTES
Deyvi Winchester  QWW:[birth year] Ernst Justin  AFP:[id number]    DBB:[id number]  Adm Date: 12/18/2019 1557  3:57 PM   ATT PHY: Lizeth Stacy, [address]         Subjective     The patient was seen after reviewing the chart and discussing the case with caring staff  Today during our encounter, the patient reported no new concerns  Objective     Vitals:    12/25/19 0725   BP: 111/75   Pulse: 67   Resp: 16   Temp: 98 8 °F (37 1 °C)   SpO2: 96%       General Appearance: Awake and Alert  No acute distress  HEENT: Normocephalic, atraumatic  PERRLA, EOMI, MMM  Heart: RRR, no murmurs  Normal S1 and S2   Lungs: CTA bilaterally with fair air entry  Assessment     Rosajaspal Antonio Gladys is a(n) 62y o  year old male with Schizoaffective Disorder, Bipolar Type     1  Cardiac with history of hypertension and dyslipidemia  Stable  Will continue to closely monitor  Patient is not on anything for Hyperlipidemia  2  Tobacco abuse   Nicotine gum PRN  3  Alcohol abuse  Thiamine, folic acid and multivitamin  4  DJD/osteoarthritis   Tylenol on as needed basis  5  Gait abnormality   Stable  6  Vitamin-D deficiency   Vitamin D3 1000U daily  7  Urinary Frequency/Urgency  Flomax 0 4mg once daily    8  Dry cracked skin bilateral hands/arms   Mild at this stage  May get Benadryl cream TID PRN

## 2019-12-25 NOTE — PLAN OF CARE
Problem: CHONG  Goal: Will exhibit normal sleep and speech and no impulsivity  Description  INTERVENTIONS:  - Administer medication as ordered  - Set limits on impulsive behavior  - Make attempts to decrease external stimuli as possible  Outcome: Progressing     Problem: INVOLUNTARY ADMIT  Goal: Will cooperate with staff recommendations and doctor's orders and will demonstrate appropriate behavior  Description  INTERVENTIONS:  - Treat underlying conditions and offer medication as ordered  - Educate regarding involuntary admission procedures and rules  - Utilize positive consistent limit setting strategies to support patient and staff safety  Outcome: Progressing     Problem: Ineffective Coping  Goal: Participates in unit activities  Description  Interventions:  - Provide therapeutic environment   - Provide required programming   - Redirect inappropriate behaviors   Outcome: Progressing     Problem: DISCHARGE PLANNING - CARE MANAGEMENT  Goal: Discharge to post-acute care or home with appropriate resources  Description  INTERVENTIONS:  - Conduct assessment to determine patient/family and health care team treatment goals, and need for post-acute services based on payer coverage, community resources, and patient preferences, and barriers to discharge  - Address psychosocial, clinical, and financial barriers to discharge as identified in assessment in conjunction with the patient/family and health care team  - Arrange appropriate level of post-acute services according to patient's   needs and preference and payer coverage in collaboration with the physician and health care team  - Communicate with and update the patient/family, physician, and health care team regarding progress on the discharge plan  - Arrange appropriate transportation to post-acute venues   Outcome: Progressing     Problem: Nutrition/Hydration-ADULT  Goal: Nutrient/Hydration intake appropriate for improving, restoring or maintaining nutritional needs  Description  Monitor and assess patient's nutrition/hydration status for malnutrition  Collaborate with interdisciplinary team and initiate plan and interventions as ordered  Monitor patient's weight and dietary intake as ordered or per policy  Utilize nutrition screening tool and intervene as necessary  Determine patient's food preferences and provide high-protein, high-caloric foods as appropriate       INTERVENTIONS:  - Monitor oral intake, urinary output, labs, and treatment plans  - Assess nutrition and hydration status and recommend course of action  - Evaluate amount of meals eaten  - Assist patient with eating if necessary   - Allow adequate time for meals  - Recommend/ encourage appropriate diets, oral nutritional supplements, and vitamin/mineral supplements  - Order, calculate, and assess calorie counts as needed  - Recommend, monitor, and adjust tube feedings and TPN/PPN based on assessed needs  - Assess need for intravenous fluids  - Provide specific nutrition/hydration education as appropriate  - Include patient/family/caregiver in decisions related to nutrition  Outcome: Progressing

## 2019-12-25 NOTE — PROGRESS NOTES
Patient was agreeable to taking scheduled Ativan 0 5mg with Lithium  Patient says "this is the other medication I decided to take " Will continue to monitor

## 2019-12-25 NOTE — PROGRESS NOTES
Patient is visible on the unit, social with peers but labile and becomes irritable easily  Good eye contact  Patient refused all vitamins but took lithium and ativan  Provided patient with educational literature on all medications and vitamins  Pt is labile , becomes loud and irritable but takes redirection well  Positive for morning meal  Attempted to discuss medication and vitamins with pt and pt stated " I have been lied to by every doctor here so no I don't want to here about any other medications or vitamins, just give me my lithium and ativan " Alert and oriented  Able to make needs known  No signs or symptoms of distress  Q 7 minute checks will be maintained for patient safety  Will continue to monitor

## 2019-12-26 PROCEDURE — 99232 SBSQ HOSP IP/OBS MODERATE 35: CPT | Performed by: PSYCHIATRY & NEUROLOGY

## 2019-12-26 RX ADMIN — LORAZEPAM 0.5 MG: 0.5 TABLET ORAL at 21:22

## 2019-12-26 RX ADMIN — LORAZEPAM 0.5 MG: 0.5 TABLET ORAL at 16:57

## 2019-12-26 RX ADMIN — LITHIUM CARBONATE 750 MG: 300 CAPSULE, GELATIN COATED ORAL at 21:20

## 2019-12-26 RX ADMIN — LORAZEPAM 0.5 MG: 0.5 TABLET ORAL at 08:38

## 2019-12-26 RX ADMIN — LITHIUM CARBONATE 750 MG: 300 CAPSULE, GELATIN COATED ORAL at 08:38

## 2019-12-26 NOTE — PROGRESS NOTES
Patient visible on the unit, labile, calm and controlled  Denies anxiety, depression, S/I, H/I, hallucinations  Feels today passed quickly, able to make needs know  Will continue to monitor

## 2019-12-26 NOTE — PROGRESS NOTES
12/26/19 0949   Team Meeting   Meeting Type Daily Rounds   Initial Conference Date 12/26/19   Patient/Family Present   Patient Present No   Patient's Family Present No     Daily Rounds Documentation    Team Members Present:   MD Prasad Kang, ADRIEN Coleman, [facility]    No roommate  No outbursts  Slept  Taking Ativan and Lithium PO  Declining all other medications  A little less manic  Irritated easily but is redirectable

## 2019-12-26 NOTE — PROGRESS NOTES
Progress Note - 6 Saint Gonzales Frandy Day 62 y o  male MRN: [id number]  Unit/Bed#: [facility] 247-02 Encounter: [id number]    Assessment/Plan   Principal Problem:    Schizoaffective disorder, bipolar type ([address] )  Active Problems:    Hypertension      Behavior over the last 24 hours:  Unchanged  Patient continues to be loud and irritable/ He can be verbally aggressive but over all redirectable  He is fixated on getting discharged back home  Sleep: normal  Appetite: normal  Medication side effects: No  ROS: no complaints    Mental Status Evaluation:  Appearance:  bearded and casually dressed   Behavior:  restless and fidgety   Speech:  loud   Mood:  irritable   Affect:  labile   Thought Process:  circumstantial   Thought Content:  delusions  persecutory   Perceptual Disturbances: None   Risk Potential: Potential for Aggression No   Sensorium:  person and place   Cognition:  grossly intact   Consciousness:  alert and awake    Attention: attention span and concentration were age appropriate   Insight:  limited   Judgment: limited   Gait/Station: normal gait/station   Motor Activity: no abnormal movements     Progress Toward Goals: ongoing    Recommended Treatment: Continue with group therapy, milieu therapy and occupational therapy  Risks, benefits and possible side effects of Medications:   Risks, benefits, and possible side effects of medications explained to patient and patient verbalizes understanding  Medications: all current active meds have been reviewed and continue current psychiatric medications  Labs: reviewed    Counseling / Coordination of Care  Total floor / unit time spent today 20 minutes  Greater than 50% of total time was spent with the patient and / or family counseling and / or coordination of care   A description of the counseling / coordination of care:

## 2019-12-26 NOTE — PROGRESS NOTES
Patient bright, alert, attending to hygiene at beginning of shift  Remains calm, pleasant, smiling  Denies si hi and hallucinations  Tells this nurse  He has "adjusted his attitude" to help with his discharge planning  Remains compliant with lithium and ativan PO  Will maintain on safety precautions and 7 minute checks, no needs identified

## 2019-12-26 NOTE — SOCIAL WORK
SW met with pt for discussion of discharge planning as he related that Dr Asha Lee has discussed (potential) discharge home for him on 12/30/19  Upon presentation to pt of [facility] for pt's mother Margaret Westbrook so as to permit SW to discuss pt's treatment / discharge planning, pt declined in anger, having stated that he is going to go "higher up" about such a process; pt complained that signing paperwork got him a 303 commitment, abruptly having exited the room  SW informed Dr Asha Lee who noted that discharge date of 12/30/19 will remain tentatively scheduled, about which he will call pt's mother

## 2019-12-26 NOTE — PROGRESS NOTES
Progress Note - 6 Saint Gonzales Frandy Day 62 y o  male MRN: [id number]  Unit/Bed#: [facility] 247-02 Encounter: [id number]    Assessment/Plan   Principal Problem:    Schizoaffective disorder, bipolar type ([address] )  Active Problems:    Hypertension      Behavior over the last 24 hours:  Unchanged  Patient continues to have explosive outbursts from time to time and today he initially asked to talk to his mother to talk about discharge but when the  asked him to sign a release he became very paranoid and angry and refused to sign any papers  Patient can be irrational and hostile at times  Sleep: normal  Appetite: poor  Medication side effects: No  ROS: no complaints    Mental Status Evaluation:  Appearance:  bearded and disheveled   Behavior:  psychomotor agitation   Speech:  loud   Mood:  constricted   Affect:  constricted   Thought Process:  circumstantial   Thought Content:  delusions  persecutory   Perceptual Disturbances: None   Risk Potential: Potential for Aggression No   Sensorium:  person and place   Cognition:  grossly intact   Consciousness:  alert and awake    Attention: attention span and concentration were age appropriate   Insight:  limited   Judgment: limited   Gait/Station: slow   Motor Activity: no abnormal movements     Progress Toward Goals: ongoing    Recommended Treatment: Continue with group therapy, milieu therapy and occupational therapy  Risks, benefits and possible side effects of Medications:   Risks, benefits, and possible side effects of medications explained to patient and patient verbalizes understanding  Medications: continue current psychiatric medications  Labs: reviewed    Counseling / Coordination of Care  Total floor / unit time spent today 20 minutes  Greater than 50% of total time was spent with the patient and / or family counseling and / or coordination of care   A description of the counseling / coordination of care:

## 2019-12-26 NOTE — CMS CERTIFICATION NOTE
Certification Statement -  Shaunna Graham  :1960  MRN: [id number]    [facility] [address] [facility] Room / Bed: [address] 037-23 Encounter: [id number]    I certify that Jensen Graham requires further inpatient hospitalization beyond 20 days due to intractable manic symptoms, irritability and explosive outbursts  Patient continues to be very paranoid at times and refuses to sign any papers and refuses to take any medications except for the lithium  Patient just started to take Ativan after many days of back and forth to try to convince him to take medications      Shelby Torres MD     Date: 2019  Time: 2:58 PM

## 2019-12-27 PROCEDURE — 99232 SBSQ HOSP IP/OBS MODERATE 35: CPT | Performed by: NURSE PRACTITIONER

## 2019-12-27 RX ORDER — OLANZAPINE 15 MG/1
15 TABLET ORAL
Qty: 10 TABLET | Refills: 0 | Status: SHIPPED | OUTPATIENT
Start: 2019-12-27 | End: 2019-12-27 | Stop reason: SDUPTHER

## 2019-12-27 RX ORDER — LITHIUM CARBONATE 150 MG/1
750 CAPSULE ORAL EVERY 12 HOURS SCHEDULED
Qty: 300 CAPSULE | Refills: 0 | Status: SHIPPED | OUTPATIENT
Start: 2019-12-27 | End: 2019-12-31 | Stop reason: HOSPADM

## 2019-12-27 RX ORDER — LITHIUM CARBONATE 150 MG/1
750 CAPSULE ORAL EVERY 12 HOURS SCHEDULED
Qty: 100 CAPSULE | Refills: 0 | Status: SHIPPED | OUTPATIENT
Start: 2019-12-27 | End: 2019-12-27

## 2019-12-27 RX ORDER — OLANZAPINE 15 MG/1
15 TABLET ORAL
Qty: 30 TABLET | Refills: 0 | Status: SHIPPED | OUTPATIENT
Start: 2019-12-27 | End: 2019-12-31 | Stop reason: SDUPTHER

## 2019-12-27 RX ORDER — LORAZEPAM 0.5 MG/1
0.5 TABLET ORAL 3 TIMES DAILY
Qty: 30 TABLET | Refills: 0 | Status: SHIPPED | OUTPATIENT
Start: 2019-12-27 | End: 2020-09-21 | Stop reason: HOSPADM

## 2019-12-27 RX ADMIN — LORAZEPAM 0.5 MG: 0.5 TABLET ORAL at 08:26

## 2019-12-27 RX ADMIN — LORAZEPAM 0.5 MG: 0.5 TABLET ORAL at 21:49

## 2019-12-27 RX ADMIN — LORAZEPAM 0.5 MG: 0.5 TABLET ORAL at 17:00

## 2019-12-27 RX ADMIN — LITHIUM CARBONATE 750 MG: 300 CAPSULE, GELATIN COATED ORAL at 21:49

## 2019-12-27 RX ADMIN — LITHIUM CARBONATE 750 MG: 300 CAPSULE, GELATIN COATED ORAL at 08:25

## 2019-12-27 NOTE — PROGRESS NOTES
Mood consistently controlled  No out bursts or agitation observed  No roommate order will be discontinued  Pt receptive to discussion with RN and verbalized understanding  Safety precautions maintained  Will continue to monitor and assess

## 2019-12-27 NOTE — SOCIAL WORK
SW met with pt for discharge discussion regarding which pt provided SW with verbal permission to talk with his mother Anitha Mclaughlin during call that SW facilitated for pt's communication with his mother who agreed to pt's return home on 12/30/19 regarding which she will pick him up for transport home  SW explained that scripts will be faxed to his [place] psych provider, Dr David Lin, who will order [facility] meds that will be sent to pt; however, he will be provided with 10-day supply of his three psych meds that are being filled at [address] [place] [address] and [employer]  Anitha Mclaughlin noted that pt has a pattern of noncompliance with med management, having noted that it is beneficial for him to discharge with appointments made with Dr Suresh Valle and his PCP, Dr Logan Nix, regarding which SW will follow-up and include appointment dates / times on his discharge summary that he will receive on 12/30/19 to take home  Anitha Mclaughlin expressed appreciation for [employer]  Pt provided verbal permission for SW to fax his summary of care to [place] regarding which his psych and PCP will have access

## 2019-12-27 NOTE — PROGRESS NOTES
12/27/19 0949   Team Meeting   Meeting Type Daily Rounds   Initial Conference Date 12/27/19   Patient/Family Present   Patient Present No   Patient's Family Present No   Daily Rounds Documentation    Team Members Present:   MD Deysi Ha CRNP Georgana Odea, ADRIEN Zuniga, [facility]   Jane Banks    Mom agrees to his return  Script needs to be faxed to [facility] on Monday

## 2019-12-27 NOTE — DISCHARGE INSTR - OTHER ORDERS
You will discharge home to [address] [facility], [place], [address]; Phone:  [phone]  Crisis Plan:    Triggers you identified during your hospital stay that led to bizarre behavior with delusions included:  Family stressors and noncompliance with medication  Coping skills you identified during your hospital stay include:  Outdoor activities that include hiking, fishing, camping, hunting, and going for drives  After discharge, if you find your coping skills are not effective and you continue to feel distressed, please talk with trusted family members and / or contact your providers at the [facility]  If that is not effective and you feel you are a danger to yourself or others, please contact Robina Soriano [address]: [phone], National Suicide Prevention Lifeline:  6-820.618.5068, Peer Support Talk Line (Seven days a week, 1:00 PM  9:00 PM Call: [phone] or Text: [phone]),  Alcohol Anonymous: 701.520.5708, [employer] [place] on [address] [place] [address] ([facility]) HELPLINE: 193.680.8382/Email: www caroline  org, or Substance Abuse and [employer] [address] [place] [address], which is a confidential, free, 24-hour-a-day, 365-day-a-year, information service for individuals and family members facing mental health and/or substance use disorders  This service provides referrals to local treatment facilities, support groups, and community-based organizations  Callers can also order free publications and other information  Call 3-971.349.2934/Email: www samhsa gov    [facility]:  [address] [facility], [address]  Phone: [phone]  Fax: [phone]    The Peer Hotline is also available M-F between the hours of 6-10pm, at 8-445.224.1051  Also, the  Alcoholics Anonymous can be reached at [phone]

## 2019-12-27 NOTE — PROGRESS NOTES
Pt slept throughout the night, no s/s distress or labored breathing  Pt woke up at 0500 requesting ear plugs because he felt that the patients that were awake would become loud and he wanted to sleep  Pt returned to his room and fell asleep  No complaints or concerns  Q7 minute visual checks continued

## 2019-12-27 NOTE — PROGRESS NOTES
Progress Note - [address] 62 y o  male MRN: [id number]   Unit/Bed#: [facility] 378-13 Encounter: [id number]    Behavior over the last 24 hours:      Saulo Daley was seen for an inpatient follow-up psychiatric visit this date  At today's visit, his mood was controlled  Per psychiatric rounds, his behavior has also been controlled on the unit with no outbursts  He is taking his medications as prescribed and denies side effects  He is looking for to being discharged relates he plans to continue taking his medications post discharge  He offers no complaints at this time  ROS: no complaints, all other systems are negative    Mental Status Evaluation:    Appearance:  dressed appropriately, adequate grooming   Behavior:  pleasant, cooperative, calm   Speech:  normal rate and volume   Mood:  improved   Affect:  normal range and intensity   Thought Process:  coherent, linear   Associations: intact associations   Thought Content:  no overt delusions   Perceptual Disturbances: no auditory hallucinations, no visual hallucinations   Risk Potential: Suicidal ideation - None  Homicidal ideation - None  Potential for aggression - No   Sensorium:  oriented to person, place and time/date   Memory:  recent and remote memory grossly intact   Consciousness:  alert and awake   Attention: poor concentration and poor attention span   Insight:  improved   Judgment: improved   Gait/Station: normal gait/station, normal balance   Motor Activity: no abnormal movements     Vital signs in last 24 hours:    Temp:  [98 8 °F (37 1 °C)-99 5 °F (37 5 °C)] 98 8 °F (37 1 °C)  HR:  [64-66] 64  Resp:  [16] 16  BP: (112-118)/(72-76) 112/76    Laboratory results:  I have personally reviewed all pertinent laboratory/tests results      Progress Toward Goals: progressing    Assessment/Plan   Principal Problem:    [facility]iz[facility]ffective [facility] ([facility] )  Active Problems:    Hypertension    Recommended Treatment:     Continue current medications as prescribed  Continue monitor  Discharge disposition and planning are ongoing  All current active medications have been reviewed  Encourage group therapy, milieu therapy and occupational therapy  Behavioral Health checks every 7 minutes      Current Facility-Administered Medications:  acetaminophen 650 mg Oral Q6H PRN Jong Klarissa, CRNP   acetaminophen 650 mg Oral Q4H PRN Jong Klarissa, CRNP   acetaminophen 975 mg Oral Q6H PRN Jong Klarissa, CRNP   aluminum-magnesium hydroxide-simethicone 30 mL Oral Q4H PRN Jong Klarissa, CRNP   ammonium lactate  Topical BID Jong Klarissa, CRNP   cholecalciferol 1,000 Units Oral Daily Jong Klarissa, CRNP   dextromethorphan-guaiFENesin 10 mL Oral Q4H PRN Jong Klarissa, CRNP   diphenhydrAMINE-zinc acetate  Topical TID PRN Solomon Hernandez PA-C   folic acid 1 mg Oral Daily Jong Klarissa, CRNP   hydrOXYzine HCL 25 mg Oral Q4H PRN Jong Klarissa, CRNP   lithium carbonate 750 mg Oral Q12H Mandy Ang MD   LORazepam 2 mg Intramuscular Q4H PRN Jong Klarissa, CRNP   LORazepam 0 5 mg Oral TID Jong Klarissa, CRNP   LORazepam 1 mg Oral Q4H PRN Jong Klarissa, CRNP   magnesium hydroxide 30 mL Oral Daily PRN Jong Klarissa, CRNP   melatonin 9 mg Oral HS Jong Klarissa, CRNP   multivitamin-minerals 1 tablet Oral Daily Jong Klarissa, CRNP   nicotine polacrilex 2 mg Oral Q2H PRN Jong Klarissa, CRNP   OLANZapine 15 mg Oral HS Jong Klarissa, CRNP   OLANZapine 5 mg Oral Q12H PRN Jong Klarissa, CRNP   risperiDONE 1 mg Oral Q8H PRN Jong Klarissa, CRNP   tamsulosin 0 4 mg Oral Daily With Manish Villagran, CRNP   thiamine 100 mg Oral Daily Jong Klarissa, CRNP   ziprasidone 20 mg Intramuscular Q4H PRN Jong Klarissa, CRNP       Risks / Benefits of Treatment:    Risks, benefits, and possible side effects of medications explained to patient and patient verbalizes understanding and agreement for treatment      Counseling / Coordination of Care:      Patient's progress discussed with staff in treatment team meeting  Medications, treatment progress and treatment plan reviewed with patient

## 2019-12-27 NOTE — PROGRESS NOTES
Pt was pleasant and cooperative and present in the milieu for all unit activities and meals  Compliant with lithium and ativan, refused all other medications  Pt stated he wanted to go home and he would comply with whatever the doctor said he had to do to get back home  Pt stated he feels like it's easier to control his behaviors because his mood is slowly improving and he is optimistic about being able to get discharged and have success without readmission  Pt called mother and had a good phone call  Pt denies pain, anxiety and depression  Pt denied SI, HI, AH, VH  Will continue to monitor

## 2019-12-28 PROCEDURE — 99232 SBSQ HOSP IP/OBS MODERATE 35: CPT | Performed by: PSYCHIATRY & NEUROLOGY

## 2019-12-28 RX ADMIN — LITHIUM CARBONATE 750 MG: 300 CAPSULE, GELATIN COATED ORAL at 08:52

## 2019-12-28 RX ADMIN — LORAZEPAM 0.5 MG: 0.5 TABLET ORAL at 16:35

## 2019-12-28 RX ADMIN — LORAZEPAM 0.5 MG: 0.5 TABLET ORAL at 08:53

## 2019-12-28 RX ADMIN — LITHIUM CARBONATE 750 MG: 300 CAPSULE, GELATIN COATED ORAL at 21:00

## 2019-12-28 RX ADMIN — LORAZEPAM 0.5 MG: 0.5 TABLET ORAL at 21:01

## 2019-12-28 NOTE — PROGRESS NOTES
Pt slept well throughout the night only awakening once at 0500 for ear plugs  No sigh of distress or labored breathing  No complaints or concerns  Q7 minute visual checks continued

## 2019-12-28 NOTE — PROGRESS NOTES
Pt was pleasant, calm, cooperative, humorous and appropriate  Behaviors controlled, made good phone call  No c/o pain, anxiety, depression  Pt denies SI, HI, AH, VH  Upbeat, brightens upon approach, was laughing hysterically watching a movie with peers in the dayroom before and after group  Pt compliant with ativan and lithium, refused other medications  Pt was polite and courteous of staff and peers  Will continue to monitor

## 2019-12-29 PROCEDURE — 99232 SBSQ HOSP IP/OBS MODERATE 35: CPT | Performed by: PSYCHIATRY & NEUROLOGY

## 2019-12-29 RX ADMIN — LITHIUM CARBONATE 750 MG: 300 CAPSULE, GELATIN COATED ORAL at 09:12

## 2019-12-29 RX ADMIN — LORAZEPAM 0.5 MG: 0.5 TABLET ORAL at 21:07

## 2019-12-29 RX ADMIN — LORAZEPAM 0.5 MG: 0.5 TABLET ORAL at 15:59

## 2019-12-29 RX ADMIN — LITHIUM CARBONATE 750 MG: 300 CAPSULE, GELATIN COATED ORAL at 21:07

## 2019-12-29 RX ADMIN — LORAZEPAM 0.5 MG: 0.5 TABLET ORAL at 09:12

## 2019-12-29 NOTE — PROGRESS NOTES
Progress Note - [address] 62 y o  male MRN: [id number]  Unit/Bed#: -01 Encounter: [id number]    Assessment/Plan   Principal Problem:    Schizoaffective disorder, bipolar type (HCC)  Active Problems:    Hypertension      Behavior over the last 24 hours:  Unchanged  Patient has been compliant with his medications and seems a bit better  He still gets loud and intense at times but overall redirectable  Sleep: normal  Appetite: poor  Medication side effects: No  ROS: no complaints    Mental Status Evaluation:  Appearance:  casually dressed   Behavior:  normal   Speech:  loud   Mood:  constricted   Affect:  constricted   Thought Process:  circumstantial   Thought Content:  obsessions   Perceptual Disturbances: None   Risk Potential: Potential for Aggression No   Sensorium:  person and place   Cognition:  grossly intact   Consciousness:  awake    Attention: attention span appeared shorter than expected for age   Insight:  limited   Judgment: limited   Gait/Station: normal gait/station   Motor Activity: no abnormal movements     Progress Toward Goals: ongoing    Recommended Treatment: Continue with group therapy, milieu therapy and occupational therapy  Risks, benefits and possible side effects of Medications:   Risks, benefits, and possible side effects of medications explained to patient and patient verbalizes understanding  Medications: all current active meds have been reviewed  Labs: reviewed    Counseling / Coordination of Care  Total floor / unit time spent today 20 minutes  Greater than 50% of total time was spent with the patient and / or family counseling and / or coordination of care   A description of the counseling / coordination of care:

## 2019-12-29 NOTE — PROGRESS NOTES
Patient has been visible on the unit  Attending and participating in evening unit activities  Social with peers  Slightly elevated  Continues with louder than average speech but not as boisterous as he had been  No irritability on this shift  Pleasant, laughing, and making positive statements  Agreed to take evening dose of ativan along with lithium "because that's what I need to do to get out of here " Refused scheduled Zyprexa

## 2019-12-29 NOTE — PROGRESS NOTES
Pt refusing ativan po  Pt  Blaming ativan for "itching skin" Irritable edge  Pt ignoring staff attempts to discuss reasoning for refusal  Observed interacting with peers appropriately  Safety precautions maintained  Will continue to monitor and assess

## 2019-12-30 LAB — LITHIUM SERPL-SCNC: 1.1 MMOL/L (ref 1–1.2)

## 2019-12-30 PROCEDURE — 80178 ASSAY OF LITHIUM: CPT | Performed by: PSYCHIATRY & NEUROLOGY

## 2019-12-30 PROCEDURE — 99232 SBSQ HOSP IP/OBS MODERATE 35: CPT | Performed by: PSYCHIATRY & NEUROLOGY

## 2019-12-30 RX ORDER — LITHIUM CARBONATE 300 MG/1
600 CAPSULE ORAL EVERY 12 HOURS SCHEDULED
Status: DISCONTINUED | OUTPATIENT
Start: 2019-12-30 | End: 2020-01-02 | Stop reason: HOSPADM

## 2019-12-30 RX ADMIN — LORAZEPAM 0.5 MG: 0.5 TABLET ORAL at 16:30

## 2019-12-30 RX ADMIN — LORAZEPAM 0.5 MG: 0.5 TABLET ORAL at 21:39

## 2019-12-30 RX ADMIN — LITHIUM CARBONATE 750 MG: 300 CAPSULE, GELATIN COATED ORAL at 08:27

## 2019-12-30 RX ADMIN — LORAZEPAM 0.5 MG: 0.5 TABLET ORAL at 08:27

## 2019-12-30 RX ADMIN — LITHIUM CARBONATE 600 MG: 300 CAPSULE, GELATIN COATED ORAL at 21:39

## 2019-12-30 NOTE — PROGRESS NOTES
Patient has been visible on the unit  Attending and participating in evening unit activities  Social with peers  Louder and more boisterous this evening  Periods of irritability but has been overall controlled  Became angry when discussing his dc  States he had planned on dc Monday but is now resigned to staying until Thursday  He blames this on the hospital stating we did not send his Rx to the [place] and this is holding up his dc  Ranting about lithium level  Not very receptive to looking at things from a different point of view

## 2019-12-30 NOTE — PROGRESS NOTES
12/30/19 0935   Team Meeting   Meeting Type Daily Rounds   Initial Conference Date 12/30/19   Patient/Family Present   Patient Present No   Patient's Family Present No     Daily Rounds Documentation    Team Members Present:   MD Bowen Talbot CRNP Anniece Ree, ADRIEN Doyle, LSW   Awilda Suero, [facility]    [place] [address] 1  Paranoid, suspicious, angry, and labile over the weekend  Discharge Thursday

## 2019-12-30 NOTE — PROGRESS NOTES
Progress Note - [address] 62 y o  male MRN: [id number]  Unit/Bed#: [facility] 247-01 Encounter: [id number]    Assessment/Plan   Principal Problem:    Schizoaffective disorder, bipolar type (HCC)  Active Problems:    Hypertension    P:  Decrease lithium to 600 mg b i d  Behavior over the last 24 hours:  Regressed  Patient became very upset that his lithium level is 1 1 and he said it should be lower than that and start reporting having side effects that include diarrhea and itching  Patient is adamant to lower the dose and is agreeable to continue on 600 twice a day instead of 750 mg twice a day  The discharge for today's canceled given the patient irritability and anxiety  Sleep: normal  Appetite: poor  Medication side effects: No  ROS: chest pain    Mental Status Evaluation:  Appearance:  casually dressed   Behavior:  psychomotor agitation   Speech:  loud and pressured   Mood:  irritable   Affect:  constricted   Thought Process:  illogical and perserverative   Thought Content:  obsessions   Perceptual Disturbances: None   Risk Potential: Potential for Aggression No   Sensorium:  person and place   Cognition:  grossly intact   Consciousness:  alert and awake    Attention: attention span appeared shorter than expected for age   Insight:  limited   Judgment: limited   Gait/Station: normal gait/station   Motor Activity: no abnormal movements     Progress Toward Goals: ongoing    Recommended Treatment: Continue with group therapy, milieu therapy and occupational therapy  Risks, benefits and possible side effects of Medications:   Risks, benefits, and possible side effects of medications explained to patient and patient verbalizes understanding  Medications: all current active meds have been reviewed and continue current psychiatric medications  Labs: reviewed    Counseling / Coordination of Care  Total floor / unit time spent today 20 minutes   Greater than 50% of total time was spent with the patient and / or family counseling and / or coordination of care   A description of the counseling / coordination of care:

## 2019-12-30 NOTE — PROGRESS NOTES
Progress Note - [address] 62 y o  male MRN: [id number]  Unit/Bed#: [facility] 247-01 Encounter: [id number]    Assessment/Plan   Principal Problem:    Schizoaffective disorder, bipolar type (HCC)  Active Problems:    Hypertension      Behavior over the last 24 hours:  Unchanged  Patient continues to be irritable at times and refused to get lithium level this morning  Lithium level will be drawn again tomorrow  Patient can be defiant and hard to redirect  Sleep: normal  Appetite: poor  Medication side effects: No  ROS: no complaints    Mental Status Evaluation:  Appearance:  bearded   Behavior:  uncooperative   Speech:  loud   Mood:  irritable   Affect:  constricted   Thought Process:  circumstantial   Thought Content:  normal   Perceptual Disturbances: None   Risk Potential: Potential for Aggression No   Sensorium:  person and place   Cognition:  grossly intact   Consciousness:  awake    Attention: attention span appeared shorter than expected for age   Insight:  limited   Judgment: limited   Gait/Station: normal gait/station   Motor Activity: no abnormal movements     Progress Toward Goals: ongoing    Recommended Treatment: Continue with group therapy, milieu therapy and occupational therapy  Risks, benefits and possible side effects of Medications:   Risks, benefits, and possible side effects of medications explained to patient and patient verbalizes understanding  Medications: all current active meds have been reviewed  Labs: reviewed    Counseling / Coordination of Care  Total floor / unit time spent today 20 minutes  Greater than 50% of total time was spent with the patient and / or family counseling and / or coordination of care   A description of the counseling / coordination of care:

## 2019-12-30 NOTE — PROGRESS NOTES
Pt visible on unit, social with select peers  Pt has irritable edge but remains controlled with no outbursts  Pt continues to refuse all meds besides lithium and ativan  C/O itching but refused lotion  Monitoring continues

## 2019-12-31 PROCEDURE — 99232 SBSQ HOSP IP/OBS MODERATE 35: CPT | Performed by: NURSE PRACTITIONER

## 2019-12-31 RX ORDER — LITHIUM CARBONATE 600 MG/1
600 CAPSULE ORAL EVERY 12 HOURS SCHEDULED
Qty: 20 CAPSULE | Refills: 0
Start: 2019-12-31 | End: 2019-12-31

## 2019-12-31 RX ORDER — OLANZAPINE 15 MG/1
15 TABLET ORAL
Qty: 30 TABLET | Refills: 0 | Status: SHIPPED | OUTPATIENT
Start: 2019-12-31 | End: 2020-09-21 | Stop reason: HOSPADM

## 2019-12-31 RX ORDER — LITHIUM CARBONATE 600 MG/1
600 CAPSULE ORAL EVERY 12 HOURS SCHEDULED
Qty: 30 CAPSULE | Refills: 0 | Status: SHIPPED | OUTPATIENT
Start: 2019-12-31 | End: 2020-09-21 | Stop reason: HOSPADM

## 2019-12-31 RX ADMIN — LORAZEPAM 0.5 MG: 0.5 TABLET ORAL at 08:23

## 2019-12-31 RX ADMIN — LORAZEPAM 0.5 MG: 0.5 TABLET ORAL at 16:48

## 2019-12-31 RX ADMIN — LITHIUM CARBONATE 600 MG: 300 CAPSULE, GELATIN COATED ORAL at 21:11

## 2019-12-31 RX ADMIN — LORAZEPAM 0.5 MG: 0.5 TABLET ORAL at 21:11

## 2019-12-31 RX ADMIN — LITHIUM CARBONATE 600 MG: 300 CAPSULE, GELATIN COATED ORAL at 08:23

## 2019-12-31 NOTE — PROGRESS NOTES
Pt visible on unit, social with peers and attending groups  Mood is controlled with no outbursts  Remains only agreeable to taking lithium and ativan  Denies SI, HI, A/T/V  No further complaints of itchiness  Compliant with meals  Monitoring continues

## 2019-12-31 NOTE — PROGRESS NOTES
Patient has been visible on unit  Pleasant and cooperative  Social and supportive with select peers  Patient reports hit Lithium was decreased to mg BID  Patient c/o itching and "some" diarrhea  Refuses PRN medications  Reports improvement since Lithium has been decreased  Makes needs known appropriately  Compliant with scheduled Lithium and Ativan  Denies SI/HI/hallucinations  Will continue to monitor

## 2019-12-31 NOTE — PROGRESS NOTES
[facility]  UNR:9/80/53[address] [facility]  GOV:[id number]    [id number]  Adm Date: 12/18/2019 1557  3:57 PM   ATT PHY: Keshia Grimaldo, [address]         Subjective     The patient was seen after reviewing the chart and discussing the case with caring staff  Today during our encounter, the patient reported no new concerns  He continues to complain of his arms itching/dryness  Patient is a tentative discharge for Thursday  He is medically clear for the same  Objective     Vitals:    12/31/19 0717   BP: 109/69   Pulse: 86   Resp: 16   Temp: 97 6 °F (36 4 °C)   SpO2: 97%       General Appearance: Awake and Alert  No acute distress  HEENT: Normocephalic, atraumatic  PERRLA, EOMI, MMM  Heart: RRR, no murmurs  Normal S1 and S2   Lungs: CTA bilaterally with fair air entry  Assessment     Cherry Graham is a(n) 62y o  year old male with Schizoaffective Disorder, Bipolar Type     1  Cardiac with history of hypertension and dyslipidemia  Stable  Will continue to closely monitor  Patient is not on anything for Hyperlipidemia  2  Tobacco abuse   Nicotine gum PRN  3  Alcohol abuse  Thiamine, folic acid and multivitamin  4  DJD/osteoarthritis   Tylenol on as needed basis  5  Gait abnormality   Stable  6  Vitamin-D deficiency   Vitamin D3 1000U daily  7  Urinary Frequency/Urgency  Flomax 0 4mg once daily    8  Dry cracked skin bilateral hands/arms   Mild at this stage  May get Benadryl cream TID PRN  The patient was discussed with Dr Ban Damian and he is in agreement with the above note

## 2019-12-31 NOTE — PROGRESS NOTES
12/31/19 1932   Team Meeting   Meeting Type Daily Rounds   Initial Conference Date 12/31/19   Patient/Family Present   Patient Present No   Patient's Family Present No   Daily Rounds Documentation    Team Members Present:   MD Paulina Talbot, ADRIEN Doyle, MERRILLW   Awilda Suero, [place]    Better yesterday  Agrees only to Lithium and Ativan

## 2019-12-31 NOTE — PROGRESS NOTES
Progress Note - [address] Dhillon [address] 62 y o  male MRN: [id number]   Unit/Bed#: [facility] 247-01 Encounter: [id number]    Behavior over the last 24 hours:      Zainab Nieto was seen for an inpatient follow-up psychiatric visit this date  He remains somewhat labile but pleasant and cooperative  His behavior is controlled on the unit  He is taking his lithium as prescribed  He is looking forward to being discharged  ROS: no complaints, all other systems are negative    Mental Status Evaluation:    Appearance:  disheveled   Behavior:  pleasant, cooperative   Speech:  normal rate and volume   Mood:  improved   Affect:  normal range and intensity   Thought Process:  organized, coherent   Associations: intact associations   Thought Content:  no overt delusions   Perceptual Disturbances: none   Risk Potential: Suicidal ideation - None  Homicidal ideation - None  Potential for aggression - No   Sensorium:  oriented to person, place and time/date   Memory:  recent and remote memory grossly intact   Consciousness:  alert and awake   Attention: poor concentration and poor attention span   Insight:  poor   Judgment: poor   Gait/Station: normal gait/station, normal balance   Motor Activity: no abnormal movements     Vital signs in last 24 hours:    Temp:  [97 6 °F (36 4 °C)-99 1 °F (37 3 °C)] 97 6 °F (36 4 °C)  HR:  [78-86] 86  Resp:  [16] 16  BP: (109-134)/(69-83) 109/69    Laboratory results:  I have personally reviewed all pertinent laboratory/tests results  Progress Toward Goals: improved    Assessment/Plan   Principal Problem:    [facility]oaffective disorder, [facility] ([facility]  Active Problems:    Hypertension    Recommended Treatment:     Continue current medications as prescribed  Continue to monitor  Discharge disposition and planning are ongoing        All current active medications have been reviewed  Encourage group therapy, milieu therapy and occupational therapy  Behavioral Health checks every 7 minutes      Current Facility-Administered Medications:  acetaminophen 650 mg Oral Q6H PRN Mary Lou Rumps, CRNP   acetaminophen 650 mg Oral Q4H PRN Mary Lou Rumps, CRNP   acetaminophen 975 mg Oral Q6H PRN Mary Lou Rumps, CRNP   aluminum-magnesium hydroxide-simethicone 30 mL Oral Q4H PRN Mary Lou Rumps, CRNP   ammonium lactate  Topical BID Mary Lou Rumps, CRNP   cholecalciferol 1,000 Units Oral Daily Mary Lou Rumps, CRNP   dextromethorphan-guaiFENesin 10 mL Oral Q4H PRN Mary Lou Rumps, CRNP   diphenhydrAMINE-zinc acetate  Topical TID PRN Constance Doris Hernandez PA-C   folic acid 1 mg Oral Daily Mary Lou Rumps, CRNP   hydrOXYzine HCL 25 mg Oral Q4H PRN Mary Lou Rumps, CRNP   lithium carbonate 600 mg Oral Q12H Nona Thomas MD   LORazepam 2 mg Intramuscular Q4H PRN Mary Lou Rumps, CRNP   LORazepam 0 5 mg Oral TID Mary Lou Rumps, CRNP   LORazepam 1 mg Oral Q4H PRN Mary Lou Rumps, CRNP   magnesium hydroxide 30 mL Oral Daily PRN Mary Lou Rumps, CRNP   melatonin 9 mg Oral HS Mary Lou Rumps, CRNP   multivitamin-minerals 1 tablet Oral Daily Mary Lou Rumps, CRNP   nicotine polacrilex 2 mg Oral Q2H PRN Mary Lou Rumps, CRNP   OLANZapine 15 mg Oral HS Mary Lou Rumps, CRNP   OLANZapine 5 mg Oral Q12H PRN Mary Lou Rumps, CRNP   risperiDONE 1 mg Oral Q8H PRN Mary Lou Rumps, CRNP   tamsulosin 0 4 mg Oral Daily With Manish Rizvi, CRNP   thiamine 100 mg Oral Daily Mary Lou Rumps, CRNP   ziprasidone 20 mg Intramuscular Q4H PRN Mary Lou Rumps, CRNP       Risks / Benefits of Treatment:    Risks, benefits, and possible side effects of medications explained to patient and patient verbalizes understanding and agreement for treatment  Counseling / Coordination of Care:      Patient's progress discussed with staff in treatment team meeting  Medications, treatment progress and treatment plan reviewed with patient

## 2020-01-01 PROCEDURE — 99232 SBSQ HOSP IP/OBS MODERATE 35: CPT | Performed by: PSYCHIATRY & NEUROLOGY

## 2020-01-01 RX ADMIN — LITHIUM CARBONATE 600 MG: 300 CAPSULE, GELATIN COATED ORAL at 09:13

## 2020-01-01 RX ADMIN — LORAZEPAM 0.5 MG: 0.5 TABLET ORAL at 09:13

## 2020-01-01 RX ADMIN — LITHIUM CARBONATE 600 MG: 300 CAPSULE, GELATIN COATED ORAL at 21:19

## 2020-01-01 NOTE — PROGRESS NOTES
C/O" I think I am doing Ok, they reduced my lithium now as it was high and I itch now probably my lithium is low  And that is making me itch "    Report from staff regarding this patient received and record reviewed  prior to seeing this patient   Behavior over the last 24 hours: States he feels his lithium is low and will like get tested as his lithium was reduced He keep talking  About itch"Feels that his mood is good, feel fine      Sleep:My mattress is not good, ok  Appetite:ok  Medication side effects none  ROS:slighly hypomanic  Mental Status Evaluation:  Appearance:  Dressed appropriately, average height white man with long beard    Behavior:  cooperative   Speech:  normal   Mood:  euthymic   Affect:  appropriate     Thought Process:  Goal directed   Thought Content:  normal   Perceptual Disturbances: Denied AV hallucination   Risk Potential: NO MIKAYLA    Sensorium:  normal   Cognition:  intact   Consciousness:  Alert, OX3   Attention: Fair   Insight:  limited   Judgment: limited   Gait/Station: With in normal range   Motor Activity: With in normal range     Progress Toward Goals: working on current treatment goals, no changes  Made in treatment plan   Recommended Treatment: Continue with group therapy, milieu therapy and occupational therapy  Risks, benefits and possible side effects of Medications:   Risks, benefits, and possible side effects of medications explained to patient and patient verbalizes understanding        Medications:   current meds:   Current Facility-Administered Medications   Medication Dose Route Frequency    acetaminophen (TYLENOL) tablet 650 mg  650 mg Oral Q6H PRN    acetaminophen (TYLENOL) tablet 650 mg  650 mg Oral Q4H PRN    acetaminophen (TYLENOL) tablet 975 mg  975 mg Oral Q6H PRN    aluminum-magnesium hydroxide-simethicone (MYLANTA) 200-200-20 mg/5 mL oral suspension 30 mL  30 mL Oral Q4H PRN    ammonium lactate (LAC-HYDRIN) 12 % lotion   Topical BID    cholecalciferol (VITAMIN D3) tablet 1,000 Units  1,000 Units Oral Daily    dextromethorphan-guaiFENesin (ROBITUSSIN DM)  mg/5 mL oral syrup 10 mL  10 mL Oral Q4H PRN    diphenhydrAMINE-zinc acetate (BENADRYL) 2-0 1 % cream   Topical TID PRN    folic acid (FOLVITE) tablet 1 mg  1 mg Oral Daily    hydrOXYzine HCL (ATARAX) tablet 25 mg  25 mg Oral Q4H PRN    lithium carbonate capsule 600 mg  600 mg Oral Q12H [address]    LORazepam (ATIVAN) 2 mg/mL injection 2 mg  2 mg Intramuscular Q4H PRN    LORazepam (ATIVAN) tablet 0 5 mg  0 5 mg Oral TID    LORazepam (ATIVAN) tablet 1 mg  1 mg Oral Q4H PRN    magnesium hydroxide (MILK OF MAGNESIA) 400 mg/5 mL oral suspension 30 mL  30 mL Oral Daily PRN    melatonin tablet 9 mg  9 mg Oral HS    multivitamin-minerals (CENTRUM) tablet 1 tablet  1 tablet Oral Daily    nicotine polacrilex (NICORETTE) gum 2 mg  2 mg Oral Q2H PRN    OLANZapine (ZyPREXA) tablet 15 mg  15 mg Oral HS    OLANZapine (ZyPREXA) tablet 5 mg  5 mg Oral Q12H PRN    risperiDONE (RisperDAL M-TABS) dispersible tablet 1 mg  1 mg Oral Q8H PRN    tamsulosin (FLOMAX) capsule 0 4 mg  0 4 mg Oral Daily With Dinner    thiamine (VITAMIN B1) tablet 100 mg  100 mg Oral Daily    ziprasidone (GEODON) IM injection 20 mg  20 mg Intramuscular Q4H PRN     Labs: NA    Assessment, Diagnosis  and Plan: continue with current meds and goals, F/U tomorrow lithium level     Counseling / Coordination of Care  Total floor / unit time spent today30 minutes  minutes  Greater than 50% of total time was spent with the patient and / or family counseling and / or coordination of care   A description of the counseling / coordination of care:, order lithium level Bessie Navarrete MD

## 2020-01-01 NOTE — PROGRESS NOTES
to visit before dinner  Pt reports visit went well  Discussed upcoming discharge for Monday  Provided reassurance that all prescriptions were electronically faxed to [place] so medications would be ready on day of discharge  Mood remains calm and controlled  No behavioral concerns  Pt offers no complaints at this time  Safety precautions maintained  Will continue to monitor and assess  Seasonal allergic reaction. There was not a maternal fever at time of delivery. Prenatal care: good. Pregnancy complications: gestational HTN   complications: none. Amniotic Fluid: Clear    Maternal antibiotics: PCN x 2    DELIVERY    Delivery Information  Information for the patient's mother:  Yareli Levin [[id number]]                    Information:                 Feeding Method Used: Bottle      OBJECTIVE    BP 62/31   Pulse 144   Temp 98.3 °F (36.8 °C) (Axillary)   Resp 46   Ht 20.5\" (52.1 cm) Comment: Filed from Delivery Summary  Wt 7 lb 13 oz (3.545 kg) Comment: Filed from Delivery Summary  HC 35.6 cm (14\") Comment: Filed from Delivery Summary  BMI 13.07 kg/m²  I Head Circumference: 35.6 cm (14\")(Filed from Delivery Summary)    WT:  Birth Weight: 7 lb 13 oz (3.545 kg)  HT: Birth Length: 20.5\" (52.1 cm)(Filed from Delivery Summary)  HC:  Birth Head Circumference: 35.6 cm (14\")    PHYSICAL EXAM    GENERAL:  active and reactive for age, non-dysmorphic  HEAD:  normocephalic, anterior fontanel is open, soft and flat  EYES:  lids open, eyes clear without drainage and red reflex is present bilaterally  EARS:  normally set, normal pinnae  NOSE:  nares patent  OROPHARYNX:  clear without cleft and moist mucus membranes  NECK:  no deformities, clavicles intact  CHEST:  clear and equal breath sounds bilaterally, no retractions  CARDIAC: regular rate and rhythm, normal S1 and S2, no murmur, femoral pulses equal, brisk capillary refill  ABDOMEN:  soft, non-tender, non-distended, no hepatosplenomegaly, no masses  UMBILICUS: cord without redness or discharge, 3 vessel cord reported by nursing prior to clamp  GENITALIA:  normal female for gestation  ANUS:  present - normally placed, patent  MUSCULOSKELETAL:  moves all extremities, no deformities, no swelling or edema, five digits per extremity  BACK:  spine intact, no shaina, lesions, or dimples  HIP:  Negative ortolani and valdez, gluteal creases equal  NEUROLOGIC:  active and responsive, normal tone, symmetric Adrienne, normal suck, reflexes are intact and symmetrical bilaterally, Babinski upgoing  SKIN:  Condition:  dry and warm, Color:  Pink    DATA  Recent Labs:   Admission on 2020   Component Date Value Ref Range Status    ABO Rh 2020 O POS   Final    Cord Blood MAURIZIO 2020 NEG   Final    POC Glucose 2020 53* 70 - 108 mg/dl Final    POC Glucose 2020 75  70 - 108 mg/dl Final    POC Glucose 2020 60* 70 - 108 mg/dl Final    POC Glucose 2020 66* 70 - 108 mg/dl Final    POC Glucose 2020 65* 70 - 108 mg/dl Final        ASSESSMENT   Patient Active Problem List   Diagnosis    Liveborn infant by vaginal delivery    LGA (large for gestational age) infant   Cristiana Butt Asymptomatic  with confirmed group B Streptococcus carriage in mother             2 days old female infant born via Delivery Method: Vaginal, Spontaneous     Gestational age:   Information for the patient's mother:  Alex Ernst [[id number]]   37w0d    PLAN    Admit to  nursery  Routine [address] [place] [address]  2020  1:09 PM

## 2020-01-01 NOTE — PROGRESS NOTES
Pt visible on the unit  Had a good visit with his mom this evening  Denies SI/HI/AH/VH  Behaviors controlled  Pt is taking Lithium and Ativan at HS, states he may stop them because he believes they are causing him to itch all over  Refused Melatonin, zyprexa  Able to make needs known  Will continue to monitor

## 2020-01-02 VITALS
SYSTOLIC BLOOD PRESSURE: 112 MMHG | HEART RATE: 62 BPM | BODY MASS INDEX: 28.63 KG/M2 | RESPIRATION RATE: 16 BRPM | DIASTOLIC BLOOD PRESSURE: 65 MMHG | TEMPERATURE: 98 F | OXYGEN SATURATION: 96 % | WEIGHT: 200 LBS | HEIGHT: 70 IN

## 2020-01-02 PROCEDURE — 99239 HOSP IP/OBS DSCHRG MGMT >30: CPT | Performed by: NURSE PRACTITIONER

## 2020-01-02 RX ADMIN — LITHIUM CARBONATE 600 MG: 300 CAPSULE, GELATIN COATED ORAL at 08:28

## 2020-01-02 NOTE — DISCHARGE INSTRUCTIONS
Chronic Hypertension   WHAT YOU NEED TO KNOW:   Hypertension is high blood pressure (BP)  Your BP is the force of your blood moving against the walls of your arteries  Normal BP is less than 120/80  Prehypertension is between 120/80 and 139/89  Hypertension is 140/90 or higher  Hypertension causes your BP to get so high that your heart has to work much harder than normal  This can damage your heart  Chronic hypertension is a long-term condition that you can control with a healthy lifestyle or medicines  A controlled blood pressure helps protect your organs, such as your heart, lungs, brain, and kidneys  DISCHARGE INSTRUCTIONS:   Call 911 for any of the following:   · You have discomfort in your chest that feels like squeezing, pressure, fullness, or pain  · You become confused or have difficulty speaking  · You suddenly feel lightheaded or have trouble breathing  · You have pain or discomfort in your back, neck, jaw, stomach, or arm  Seek care immediately if:   · You have a severe headache or vision loss  · You have weakness in an arm or leg  Contact your healthcare provider if:   · You feel faint, dizzy, confused, or drowsy  · You have been taking your BP medicine and your BP is still higher than your healthcare provider says it should be  · You have questions or concerns about your condition or care  Medicines: You may need any of the following:  · Medicine  may be used to help lower your BP  You may need more than one type of medicine  Take the medicine exactly as directed  · Diuretics  help decrease extra fluid that collects in your body  This will help lower your BP  You may urinate more often while you take this medicine  · Cholesterol medicine  helps lower your cholesterol level  A low cholesterol level helps prevent heart disease and makes it easier to control your blood pressure  · Take your medicine as directed    Contact your healthcare provider if you think your medicine is not helping or if you have side effects  Tell him or her if you are allergic to any medicine  Keep a list of the medicines, vitamins, and herbs you take  Include the amounts, and when and why you take them  Bring the list or the pill bottles to follow-up visits  Carry your medicine list with you in case of an emergency  Follow up with your healthcare provider as directed: You will need to return to have your blood pressure checked and to have other lab tests done  Write down your questions so you remember to ask them during your visits  Manage chronic hypertension:  Talk with your healthcare provider about these and other ways to manage hypertension:  · Take your BP at home  Sit and rest for 5 minutes before you take your BP  Extend your arm and support it on a flat surface  Your arm should be at the same level as your heart  Follow the directions that came with your BP monitor  If possible, take at least 2 BP readings each time  Take your BP at least twice a day at the same times each day, such as morning and evening  Keep a record of your BP readings and bring it to your follow-up visits  Ask your healthcare provider what your blood pressure should be  · Limit sodium (salt) as directed  Too much sodium can affect your fluid balance  Check labels to find low-sodium or no-salt-added foods  Some low-sodium foods use potassium salts for flavor  Too much potassium can also cause health problems  Your healthcare provider will tell you how much sodium and potassium are safe for you to have in a day  He or she may recommend that you limit sodium to 2,300 mg a day  · Follow the meal plan recommended by your healthcare provider  A dietitian or your provider can give you more information on low-sodium plans or the DASH (Dietary Approaches to Stop Hypertension) eating plan  The DASH plan is low in sodium, unhealthy fats, and total fat  It is high in potassium, calcium, and fiber  · Exercise to maintain a healthy weight  Exercise at least 30 minutes per day, on most days of the week  This will help decrease your blood pressure  Ask about the best exercise plan for you  · Decrease stress  This may help lower your BP  Learn ways to relax, such as deep breathing or listening to music  · Limit alcohol  Women should limit alcohol to 1 drink a day  Men should limit alcohol to 2 drinks a day  A drink of alcohol is 12 ounces of beer, 5 ounces of wine, or 1½ ounces of liquor  · Do not smoke  Nicotine and other chemicals in cigarettes and cigars can increase your BP and also cause lung damage  Ask your healthcare provider for information if you currently smoke and need help to quit  E-cigarettes or smokeless tobacco still contain nicotine  Talk to your healthcare provider before you use these products  © 2017 [address] [employer] Information is for End User's use only and may not be sold, redistributed or otherwise used for commercial purposes  All illustrations and images included in CareNotes® are the copyrighted property of A D A M , Inc  or Shailesh Jeffery  The above information is an  only  It is not intended as medical advice for individual conditions or treatments  Talk to your doctor, nurse or pharmacist before following any medical regimen to see if it is safe and effective for you  Psychotic Disorder   WHAT YOU NEED TO KNOW:   A psychotic disorder is a medical condition that causes hallucinations and delusions  Hallucinations are seeing, hearing, tasting, or feeling things that are not real  Delusions are beliefs that something is real, true, or right when it is not  These false beliefs do not go away even if there is proof that they are not true  You may believe someone is spying on you, after you, or controlling your mind  You may also believe there is something wrong with how your body works   Schizophrenia and schizoaffective disorder are examples of psychotic disorders  DISCHARGE INSTRUCTIONS:   Call 911 if:   · You feel like you could harm yourself or someone else  Contact your healthcare provider if:   · Your symptoms do not improve  · You cannot make it to your next appointment  · You have new symptoms  · You have questions or concerns about your condition or care  Medicines  may be given to decrease your symptoms  You may need 1 or more medicines  You may need to take your medicine for several weeks before you begin to feel better  Tell your healthcare provider about any side effects or problems you have with your medicines  The type or amount of medicine may need to be changed  Get support: It may be difficult to cope with your illness  You may feel lonely, anxious, or depressed  It may help to join a support group  A support group lets you talk with others who have a mental illness  For information and more support visit:  · [facility]  on Mental Illness  [address]  [facility]  , [address] [facility] , [address] [place] [address]  Phone: [phone]  Phone: [phone]  Web Address: http://[employer]/  [employer]  Self-care:   · Do not drink alcohol or use illegal drugs  Alcohol and illegal drugs can make your symptoms worse  Ask your healthcare provider for information if you currently drink alcohol or use illegal drugs and need help to quit  · Do not smoke  Nicotine and other chemicals in cigarettes and cigars can cause lung damage  They can also decrease how well your medicine works  Ask your healthcare provider for information if you currently smoke and need help to quit  E-cigarettes or smokeless tobacco still contain nicotine  Talk to your healthcare provider before you use these products  · Exercise regularly  Exercise can help improve your mood and decrease symptoms  Ask about the best exercise plan for you  · Manage your stress  Stress can make your condition worse   Ask your healthcare provider for more information about practicing mindfulness and deep breathing exercises to help decrease your stress  You may learn other ways to manage stress during therapy  Follow up with your healthcare provider as directed:  Write down your questions so you remember to ask them during your visits  © 2017 [address] [employer]  Information is for End User's use only and may not be sold, redistributed or otherwise used for commercial purposes  All illustrations and images included in CareNotes® are the copyrighted property of A D A M , Inc  or Shailesh Jeffery  The above information is an  only  It is not intended as medical advice for individual conditions or treatments  Talk to your doctor, nurse or pharmacist before following any medical regimen to see if it is safe and effective for you  Schizoaffective Disorder   WHAT YOU NEED TO KNOW:   Schizoaffective disorder is a long-term mental illness that may change how you think, feel, and act around others  You may not know what is real and what is not real    DISCHARGE INSTRUCTIONS:   Medicines:   · Antipsychotics: These medicines help decrease psychotic symptoms or severe agitation  You may need antiparkinson medicine to control muscle stiffness, twitches, and restlessness caused by antipsychotic medicines  · Antianxiety medicine: This medicine may be given to decrease anxiety and help you feel calm and relaxed  · Antidepressants: These medicines are given to decrease or stop the symptoms of depression, anxiety, and behavior problems  · Mood stabilizers: These medicines help control mood swings  · Anticonvulsants: This medicine is given to control seizures  It may also be used to decrease violent behavior and control your mood swings  · Blood pressure medicines: These may be used to help decrease motor tics (uncontrolled movements)  They may also help you feel calmer, more focused, and less irritable      · Anticholinergics: This medicine decreases the side effects of other medicines  · Take your medicine as directed  Contact your healthcare provider if you think your medicine is not helping or if you have side effects  Tell him or her if you are allergic to any medicine  Keep a list of the medicines, vitamins, and herbs you take  Include the amounts, and when and why you take them  Bring the list or the pill bottles to follow-up visits  Carry your medicine list with you in case of an emergency  Follow up with your healthcare provider or psychiatrist as directed: You may need to return to have your blood pressure and other symptoms checked  You may need blood tests to check the level of medicine in your blood  Write down your questions so you remember to ask them during your visits  Manage your symptoms: The following may help you feel better or prevent symptoms of schizoaffective disorder from coming back:  · Find support for yourself and your family:  Talk with others to help you cope with your illness better  This may also help to improve how you relate to others  · Keep all medical appointments: This will help manage your disease and the side-effects from medicines you may be taking  · Use your medicines as directed:  Put your medicines in a pillbox placed in an area you can easily see  Use a watch with an alarm to help you remember when it is time to take your medicine  Tell your healthcare provider if you know or think you might be pregnant  Do not stop taking your medicines without your healthcare provider's okay  A sudden stop can cause serious medical problems  · Watch for early signs of a relapse and seek help immediately:      ¨ How you think, feel, and see things has changed  ¨ You behave differently than usual     ¨ You become more nervous and upset, but do not know why  ¨ You eat less and have trouble sleeping  ¨ You have little or no interest in friends or activities    For support and more information:   · [employer]  [address], [address] , [address]  Phone: [phone]  Phone: [phone]  Web Address: [employer]   org  · [address] [facility], Public Information & Communication Branch  [facility], [address], [address]  [place] [address]   Phone: [phone]  Phone: [phone]  Web Address: Joshua batres  Contact your healthcare provider or psychiatrist if:   · You think you are having a relapse  · You are having side effects from your medicine, or they are not helping  · You are not sleeping well or are sleeping more than usual     · You cannot eat or are eating more than usual     · You have muscle spasms, stiffness, or trouble walking  · Your sad feelings or thoughts change the way you function during the day  · You have questions or concerns about your condition or care  Seek care immediately or call 911 if:   · You feel like hurting or killing yourself or others  · You feel that your condition is getting worse  · You feel very upset, threaten someone, or you feel violent  · You suddenly have changes in your vision  · You suddenly have chest pain, trouble breathing, or a fever  © 2017 [address] [employer]  Information is for End User's use only and may not be sold, redistributed or otherwise used for commercial purposes  All illustrations and images included in CareNotes® are the copyrighted property of [employer]  or Shailesh Jeffery  The above information is an  only  It is not intended as medical advice for individual conditions or treatments  Talk to your doctor, nurse or pharmacist before following any medical regimen to see if it is safe and effective for you  Lorazepam (By mouth)   Lorazepam (gks-UM-b-ata)  Treats anxiety     Brand Name(s): Ativan, LORazepam Intensol   There may be other brand names for this medicine  When This Medicine Should Not Be Used: This medicine is not right for everyone  Do not use it if you had an allergic reaction to lorazepam or similar medicines, or you are pregnant or breastfeeding, or you have acute narrow-angle glaucoma  How to Use This Medicine:   Liquid, Tablet  · Take your medicine as directed  Your dose may need to be changed several times to find what works best for you  · Oral liquid:   ¨ Measure the oral liquid medicine with a marked measuring spoon, oral syringe, or medicine cup  ¨ Mix the medicine with water, juice, soda, applesauce, or pudding  Drink or eat the mixture right away  Do not store it for later use  · This medicine should come with a Medication Guide  Ask your pharmacist for a copy if you do not have one  · Missed dose: Take a dose as soon as you remember  If you are more than 1 hour late, skip the missed dose and wait until it is time for your next dose  Do not use extra medicine to make up for a missed dose  ·   ¨ Oral liquid: Refrigerate the oral liquid  Throw away an opened bottle after 90 days  ¨ Tablets: Store the medicine in a closed container at room temperature, away from heat, moisture, and direct light  Drugs and Foods to Avoid:   Ask your doctor or pharmacist before using any other medicine, including over-the-counter medicines, vitamins, and herbal products  · Some medicines can affect how lorazepam works  Tell your doctor if you are using any of the following:   ¨ Aminophylline, clozapine, probenecid, theophylline, valproate  ¨ Medicine to treat depression or mental health problems  ¨ Medicine to treat seizures  · Do not drink alcohol while you are using this medicine  · Tell your doctor if you use anything else that makes you sleepy  Some examples are allergy medicine, narcotic pain medicine, and alcohol  Warnings While Using This Medicine:   · It is not safe to take this medicine during pregnancy   It could harm an unborn baby  Yohan Pages your doctor right away if you become pregnant  · Tell your doctor if you have kidney disease, liver disease, lung or breathing problems (such as COPD, sleep apnea), or a history of drug or alcohol abuse, depression, or seizures  · This medicine can be habit-forming  Do not use more than your prescribed dose  Call your doctor if you think your medicine is not working  · Do not stop using this medicine suddenly  Your doctor will need to slowly decrease your dose before you stop it completely  · This medicine may make you drowsy  Do not drive or do anything else that could be dangerous until you know how this medicine affects you  · Your doctor will do lab tests at regular visits to check on the effects of this medicine  Keep all appointments  · Keep all medicine out of the reach of children  Never share your medicine with anyone  Possible Side Effects While Using This Medicine:   Call your doctor right away if you notice any of these side effects:  · Allergic reaction: Itching or hives, swelling in your face or hands, swelling or tingling in your mouth or throat, chest tightness, trouble breathing  · Confusion, unusual mood or behavior, thoughts of hurting yourself  · Seizures  · Severe drowsiness or weakness, slow heartbeat, trouble breathing  · Worsening of depression  If you notice these less serious side effects, talk with your doctor:   · Dizziness, clumsiness  If you notice other side effects that you think are caused by this medicine, tell your doctor  Call your doctor for medical advice about side effects  You may report side effects to FDA at 8-985-FDA-5149  © 2017 [address] [employer] Information is for End User's use only and may not be sold, redistributed or otherwise used for commercial purposes  The above information is an  only  It is not intended as medical advice for individual conditions or treatments   Talk to your doctor, nurse or pharmacist before following any medical regimen to see if it is safe and effective for you  Valproic Acid (By mouth)   Valproic Acid (sharon-PROE-ik AS-id)  Treats seizures  Also treats bipolar disorder and helps prevent migraine headaches  Brand Name(s): Depakene, Depakote Sprinkles, Stavzor   There may be other brand names for this medicine  When This Medicine Should Not Be Used: This medicine is not right for everyone  Do not use it if you had an allergic reaction to divalproex, sodium valproate, or valproic acid  Do not use it if you are pregnant, or if you have liver disease or certain genetic disorders (such as urea cycle or mitochondrial disorder)  How to Use This Medicine:   Delayed Release Capsule, Liquid Filled Capsule, Liquid  · Take your medicine as directed  Your dose may need to be changed several times to find what works best for you  · You may take this medicine with food to decrease stomach upset  · Capsule: Swallow it whole  Do not crush, break, or chew it  · Oral liquid: Measure the oral liquid medicine with a marked measuring spoon, oral syringe, or medicine cup  · This medicine should come with a Medication Guide  Ask your pharmacist for a copy if you do not have one  · Missed dose: Take a dose as soon as you remember  If it is almost time for your next dose, wait until then and take a regular dose  Do not take extra medicine to make up for a missed dose  · Store the medicine in a closed container at room temperature, away from heat, moisture, and direct light  Drugs and Foods to Avoid:   Ask your doctor or pharmacist before using any other medicine, including over-the-counter medicines, vitamins, and herbal products  · Some medicines can affect how valproic acid works   Tell your doctor if you are using any of the following:   ¨ Aspirin, rifampin, rufinamide, tolbutamide, zidovudine  ¨ Carbapenem antibiotic (including ertapenem, imipenem, meropenem)  ¨ Other seizure medicine (including carbamazepine, felbamate, phenobarbital, phenytoin, primidone)  ¨ Blood thinner (including warfarin)  · Alcohol, narcotic pain relievers, or sleeping pills may cause you to feel more lightheaded, dizzy, or faint when used together with this medicine  Warnings While Using This Medicine:   · It is not safe to take this medicine during pregnancy  It could harm an unborn baby  Tell your doctor right away if you become pregnant  · Tell your doctor if you are breastfeeding, or if you have kidney disease, blood disease, or pancreas problems  · This medicine can increase depression and thoughts of suicide  Tell your doctor if you have a history of depression or mental health problems  · This medicine may cause the following problems:  ¨ Liver problems  ¨ Pancreatitis  ¨ Hyperammonemic encephalopathy (too much ammonia in your blood)  ¨ Thrombocytopenia (decrease in blood cells that affect clotting)  ¨ Hypothermia (low body temperature)  ¨ Drug reaction with eosinophilia and systemic symptoms (DRESS), which may damage organs such as the liver, kidney, or heart  · This medicine may make you dizzy or drowsy  Do not drive or do anything else that could be dangerous until you know how this medicine affects you  · Do not stop using this medicine suddenly  Your doctor will need to slowly decrease your dose before you stop it completely  · Tell any doctor or dentist who treats you that you are using this medicine  This medicine may affect certain medical test results  · Your doctor will check your progress and the effects of this medicine at regular visits  Keep all appointments  · Keep all medicine out of the reach of children  Never share your medicine with anyone    Possible Side Effects While Using This Medicine:   Call your doctor right away if you notice any of these side effects:  · Allergic reaction: Itching or hives, swelling in your face or hands, swelling or tingling in your mouth or throat, chest tightness, trouble breathing  · Blistering, peeling, or red skin rash  · Confusion, problems with memory, unusual drowsiness, clumsiness  · Dark urine or pale stools, loss of appetite, stomach pain, yellow skin or eyes  · Fever, rash, swollen glands in the neck, armpits, or groin  · Sudden and severe stomach pain, nausea, vomiting  · Thoughts of hurting yourself, depression, unusual changes in behavior or moods  · Unusual bleeding, bruising, or weakness  If you notice these less serious side effects, talk with your doctor:   · Diarrhea, mild stomach pain or upset  · Hair loss  · Tiredness, sleepiness  · Trouble sleeping, tremor  · Vision changes, dizziness, headache  If you notice other side effects that you think are caused by this medicine, tell your doctor  Call your doctor for medical advice about side effects  You may report side effects to FDA at 1-804-FDA-8277  © 2017 [address] [employer] [address] Information is for End User's use only and may not be sold, redistributed or otherwise used for commercial purposes  The above information is an  only  It is not intended as medical advice for individual conditions or treatments  Talk to your doctor, nurse or pharmacist before following any medical regimen to see if it is safe and effective for you  Olanzapine (By mouth)   Olanzapine (te-STA-ag-peen)  Treats psychotic mental disorders, such as schizophrenia or bipolar disorder (manic-depressive illness)  Brand Name(s): ZyPREXA, ZyPREXA Zydis   There may be other brand names for this medicine  When This Medicine Should Not Be Used: This medicine is not right for everyone  Do not use it if you had an allergic reaction to olanzapine  How to Use This Medicine:   Tablet, Dissolving Tablet  · Take your medicine as directed  Your dose may need to be changed several times to find what works best for you  · Make sure your hands are dry before you handle the disintegrating tablet   Peel back the foil from the blister pack, then remove the tablet  Do not push the tablet through the foil  Place the tablet in your mouth  After it has melted, swallow or take a drink of water  · This medicine should come with a Medication Guide  Ask your pharmacist for a copy if you do not have one  · Missed dose: Take a dose as soon as you remember  If it is almost time for your next dose, wait until then and take a regular dose  Do not take extra medicine to make up for a missed dose  · Store the medicine in a closed container at room temperature, away from heat, moisture, and direct light  Keep the disintegrating tablet in the original package until you are ready to use it  Drugs and Foods to Avoid:   Ask your doctor or pharmacist before using any other medicine, including over-the-counter medicines, vitamins, and herbal products  · You must be careful if you are also using other medicine that might cause similar side effects as olanzapine  Make sure your doctor knows about all other medicines you are using  · Some medicines can affect how olanzapine works  Tell your doctor if you are using any of the following:  ¨ Carbamazepine, diazepam, fluoxetine, fluvoxamine, levodopa, omeprazole, or rifampin  ¨ Blood pressure medicine  c  · Tell your doctor if you use anything else that makes you sleepy  Some examples are allergy medicine, narcotic pain medicine, and alcohol  · Do not drink alcohol while you are using this medicine  · Tell your doctor if you smoke tobacco  You might need a different amount of this medicine if you smoke  Warnings While Using This Medicine:   · Tell your doctor if you are pregnant, or if you have kidney disease, liver disease, diabetes, glaucoma, prostate problems, or a history of breast cancer, neuroleptic malignant syndrome (NMS), seizures, or severe constipation   Tell your doctor if you have any kind of heart or circulation problems, including low blood pressure, heart failure, heart rhythm problems, or a history of a heart attack or stroke  Tell your doctor if you have a condition called phenylketonuria  · Do not breastfeed while you are using this medicine  · For some children, teenagers, and young adults, this medicine may increase mental or emotional problems  This may lead to thoughts of suicide and violence  Talk with your doctor right away if you have any thoughts or behavior changes that concern you  Tell your doctor if you or anyone in your family has a history of bipolar disorder or suicide attempts  · This medicine may cause the following problems:  ¨ Neuroleptic malignant syndrome (a nerve and muscle problem)  ¨ Drug reaction with eosinophilia and systemic symptoms (DRESS)  ¨ High blood sugar, cholesterol, or triglyceride levels  ¨ Tardive dyskinesia (a muscle problem that may become permanent)  · This medicine may make you dizzy or drowsy, or may cause trouble with thinking or controlling body movements, which may lead to falls, fractures or other injuries  Do not drive or do anything that could be dangerous until you know how this medicine affects you  You may also feel lightheaded when getting up suddenly from a lying or sitting position, so stand up slowly  · This medicine may make you bleed, bruise, or get infections more easily  Take precautions to prevent illness and injury  Wash your hands often  · This medicine may make it more difficult for your body to cool down  Be careful to not become overheated during exercise or hot weather, because you could have heat stroke  · Your doctor will do lab tests at regular visits to check on the effects of this medicine  Keep all appointments  · Keep all medicine out of the reach of children  Never share your medicine with anyone    Possible Side Effects While Using This Medicine:   Call your doctor right away if you notice any of these side effects:  · Allergic reaction: Itching or hives, swelling in your face or hands, swelling or tingling in your

## 2020-01-02 NOTE — SOCIAL WORK
SW met with pt who expressed readiness for discharge, having related that his belief that Ativan is causing his itching regarding which he is declining to take this med  He stated that his mother will pick him up at 2 pm today  SW called pt's mother Yeny Yanez who confirmed that she will pickup pt at 2 pm, having stated that pt has progressed and that she is willing to ensure that he keeps [place] appointments  She expressed appreciation for [facility] services, having noted that she agrees with pt about his decision to not take Ativan that she also believes is the cause for his itching

## 2020-01-02 NOTE — PROGRESS NOTES
Progress Note - Courtney Graham 62 y o  male MRN: [id number]    Unit/Bed#: [facility] 247-01 Encounter: [id number]      Assessment:  The patient was examined today and is dormitory room  He is awake alert oriented x3  He is pleasant and cooperative  He has no subjective complaints and fact patient stating that his dry skin has improved no longer having intense pruritus  Nursing at [facility] staff have no acute issues to report over the last 24 hours the patient was last seen by medicine services  Medications reviewed laboratory studies were reviewed  All medications appear to be appropriate for the patient's comorbidities and a appear to be well tolerated by the patient  Vital signs are stable he remains afebrile  Comorbidities:  1  Cardiac with history of hypertension and dyslipidemia  Stable  Will continue to closely monitor  Patient is not on anything for Hyperlipidemia  2  Tobacco abuse   Nicotine gum PRN  3  Alcohol abuse  Thiamine, folic acid and multivitamin  4  DJD/osteoarthritis   Tylenol on as needed basis  5  Gait abnormality   Stable  6  Vitamin-D deficiency   Vitamin D3 1000U daily  7  Urinary Frequency/Urgency  Flomax 0 4mg once daily    8  Dry cracked skin bilateral hands/arms   Mild at this stage  May get Benadryl cream TID PRN  Plan:  See above    Subjective:   See above    Objective:     Vitals: Blood pressure 110/64, pulse 88, temperature 98 6 °F (37 °C), temperature source Temporal, resp  rate 16, height 5' 9 5" (1 765 m), weight 90 7 kg (200 lb), SpO2 95 %  ,Body mass index is 29 11 kg/m²      No intake or output data in the 24 hours ending 01/01/20 1953    Physical Exam: /64 (BP Location: Right arm)   Pulse 88   Temp 98 6 °F (37 °C) (Temporal)   Resp 16   Ht 5' 9 5" (1 765 m)   Wt 90 7 kg (200 lb)   SpO2 95%   BMI 29 11 kg/m²     General Appearance:    Alert, cooperative, no distress, appears stated age   Head:    Normocephalic, without obvious abnormality, atraumatic   Eyes: PERRL, conjunctiva/corneas clear, EOM's intact, fundi     benign, both eyes                    Neck:   Supple, symmetrical, trachea midline, no adenopathy;        thyroid:  No enlargement/tenderness/nodules; no carotid    bruit or JVD   Back:     Symmetric, no curvature, ROM normal, no CVA tenderness   Lungs:     Clear to auscultation bilaterally, respirations unlabored   Chest wall:    No tenderness or deformity   Heart:    Regular rate and rhythm, S1 and S2 normal, no murmur, rub   or gallop   Abdomen:     Soft, non-tender, bowel sounds active all four quadrants,     no masses, no organomegaly           Extremities:   Extremities normal, atraumatic, no cyanosis or edema   Pulses:   2+ and symmetric all extremities   Skin:   Skin color, texture, turgor normal, no rashes or lesions   Lymph nodes:   Cervical, supraclavicular, and axillary nodes normal   Neurologic:   CNII-XII intact  Normal strength, sensation and reflexes       throughout        Invasive Devices     None                 Lab, Imaging and other studies: I have personally reviewed pertinent reports

## 2020-01-02 NOTE — PROGRESS NOTES
01/02/20 0941   Team Meeting   Meeting Type Daily Rounds   Initial Conference Date 01/02/20   Patient/Family Present   Patient Present No   Patient's Family Present No     Daily Rounds Documentation    Team Members Present:   CARLOS Massey RN Martin Ice, LSW [facility] [place], [place]    Refusing Ativan; states it is making him easy  Loud and animated overnight  Regressed  Mom was here overnight and does not feel he is at baseline  Discharge for today may be cancelled

## 2020-01-02 NOTE — PROGRESS NOTES
MHT went through patient belongings with patient present in preparation for discharge from [facility] unit today  The following items have been accounted for and inventoried with patient        Glasses x 2 pairs    Dentures - uppers    Black watch x 1   Cigar x 1   Wallet(ID/Money Tan Bag) x 1   Coat x 1   Misc paperwork x 1   Toiletries   Dept of veterans affairs ID x 2   [place] Passport x 1   Medicare card x 2   [employer] Debit Mastercard x 1   Social Security Card x 1   AAA card x 1   Cash: $13 00   Shirt x 5   Underwear x 4   Socks x 3 pairs   Pants x 4   Sandals x 1 pair    Shoes x 1 pair    Red Ear plug x 1

## 2020-01-02 NOTE — DISCHARGE INSTR - APPOINTMENTS
The treatment recommends that you continue to receive psychiatric medication management and that you obtain individual therapy provided by your current providers at [facility]  You have been scheduled for an appointment on Monday, January 6, 2020 at 8:30 am, with your VA psychiatrist, Dr Velma Hamman, at the [facility], [address] [place] [address], [facility], [address] [place] [address]; Phone:  [phone]; Fax:  [phone]  Please arrive early and bring your photo ID and insurance cards  Your summary of care will be faxed to this provider for continuity of care  You have been scheduled for an appointment on Tuesday, January 14, 2020 at 10:30 am, with your primary care physician, Dr Benny Kaiser, at the [facility], [address] [place] [address], [facility], [address] [place] [address]; Phone:  [phone]; Fax:  [phone]  Please arrive early and bring your photo ID and insurance cards  Your summary of care will be faxed to this provider for continuity of care  You have declined the scheduling of an appointment with a therapist at the [facility]  Should you change your mind at any time, please notify your psychiatrist, Dr Velma Hamman, and / or primary care physician, Dr Benny Kaiser,  who would assist you in the timely scheduling of a therapy appointment

## 2020-01-02 NOTE — BH TRANSITION RECORD
Contact Information: If you have any questions, concerns, pended studies, tests and/or procedures, or emergencies regarding your inpatient behavioral health visit  Please contact Robina Cisneros" [facility] behavioral health unit [phone] and ask to speak to a , nurse or physician  A contact is available 24 hours/ 7 days a week at this number  Summary of Procedures Performed During your Stay:  Below is a list of major procedures performed during your hospital stay and a summary of results:  - No major procedures performed  Pending Studies (From admission, onward)     Start     Ordered    Pending  Lithium level  Morning draw      Pending              If studies are pending at discharge, follow up with your PCP and/or referring provider

## 2020-01-02 NOTE — SOCIAL WORK
CAROL faxed the following scripts to pt's psychiatrist, Dr Elaina Kapadia, at [facility]:  Olanzapine, 15 mg, and Lithium, 600 mg, provided by CARLOS Zarate confirmed the receipt of these two scripts by [address]

## 2020-01-02 NOTE — PROGRESS NOTES
Tish Sender  DMM:[birth year] Floyd Browne  EGU:[id number]    IZP:[id number]  Adm Date: 12/18/2019 1557  3:57 PM   ATT PHY: Sue Max, [address]         Subjective     The patient was seen after reviewing the chart and discussing the case with caring staff  Today during our encounter, the patient reported no new concerns  Of note, patient is scheduled for discharge today  Patient is medically cleared for discharge  Objective     Vitals:    01/02/20 0723   BP: 112/65   Pulse: 62   Resp: 16   Temp: 98 °F (36 7 °C)   SpO2: 96%       General Appearance: Awake and Alert  No acute distress  HEENT: Normocephalic, atraumatic  PERRLA, EOMI, MMM  Heart: RRR, no murmurs  Normal S1 and S2   Lungs: CTA bilaterally with fair air entry  Assessment     Roel owens(n) 62y o  year old male with Schizoaffective Disorder, Bipolar Type    Medical Clearance: Patient is medically cleared for discharge  Medication list has been reconciled  1  Cardiac with history of hypertension and dyslipidemia  Stable  Will continue to closely monitor  Patient is not on anything for Hyperlipidemia  2  Tobacco abuse   Nicotine gum PRN  3  Alcohol abuse  Thiamine, folic acid and multivitamin  4  DJD/osteoarthritis   Tylenol on as needed basis  5  Gait abnormality   Stable  6  Vitamin-D deficiency   Vitamin D3 1000U daily  7  Urinary Frequency/Urgency  Flomax 0 4mg once daily    8  Dry cracked skin bilateral hands/arms   Mild at this stage  May get Benadryl cream TID PRN  The patient was discussed with Dr Tomy Caldera and he is in agreement with the above note

## 2020-01-02 NOTE — PROGRESS NOTES
Pt is visible on the unit  Social with peers  Refused scheduled Ativan 0 5mg at HS due to "itching",  Pt states "these doctors don't listen to me"  No scratching noted by pt at all during the evening  Pt loud, animated this evening  Cooperative  Denies SI/HI/AH/VH  Will continue to monitor

## 2020-01-02 NOTE — DISCHARGE SUMMARY
Discharge Summary - 6 [address] Frandy Day 62 y o  male MRN: [id number]  Unit/Bed#: -01 Encounter: [id number]     Admission Date: 12/18/2019         Discharge Date: No discharge date for patient encounter  Attending Psychiatrist: Carlos Manuel Corey MD    Reason for Admission/HPI:       Principal Problem:    Schizoaffective disorder, bipolar type ([address] )  Active Problems:    Hypertension      Theresa Rodney is a 68-year-old male patient transferred from the [facility] unit to the younger adult behavioral health unit  He was originally admitted due to mood swings and opal after stopping his lithium  Per initial crisis evaluation completed by Crisis Worker Jillian Chavez:    "Pt presents due to a change in mental status  Pt is medically clear, A&O X 4, and was calm and cooperative throughout the assessment  Pt is tangential in thought and not able to answer some assessment questions but does deny SI/HI or thoughts of harm  Pt is responding to internal stimuli, both auditory and visual, and states that he stopped taking his Lithium and now he doesn't feel stable  Pt's mood is erratic and he states he cannot sleep again  Pt is seeking I/P psych admission  Pt's read his voluntary paperwork, e g  201, and was explained his rights  Pt signed his 12 and asked that hius copy of his Rights and Voluntary Pt Handout be placed on his chart "    Per initial psychiatric evaluation completed by Dr Sabrina Collazo:    "Carmenza June is a 62 y o  male with a history of Schizoaffective Disorder who was admitted to the inpatient adult psychiatric unit on a voluntary 201 commitment basis due to worsening of mixed symptoms of bipolar disorder, unstable mood, paranoid ideation and disorganized behavior  On evaluation in the inpatient psychiatric unit Theresa Rodney reports he was noncompliant with his medication following recent discharge from the unit   He endorses worsening of his manic depressive symptoms with increased mood lability, poor sleep, feeling paranoid and experiencing AH/VH periodically  Pt denies any SI/HI presently  He is noted to be irritable, labile, tangential with racing thoughts but agrees to take his medications at this time "    He was admitted on a voluntary 201 commitment basis which eventually changed to a 302 involuntary commitment status  He was placed on the younger adult behavioral health unit due to agitation  Per initial younger adult behavioral health unit psychiatric evaluation completed by Dr Andry Ahn:    "Patient is a 62 y o  male with long history of schizoaffective disorder, bipolar type and multiple inpatient psychiatric hospitalizations mostly on involuntary commitments  Patient was transferred from the older adult behavioral health unit to this unit due to causing other disturbance and provoking other patients who are more fragile on the older adult unit  Patient also has been defiant and refusing to taking any other medication except for the lithium  Patient is extremely tangential and is hard to redirect  Patient does have VA benefits and does follow up in the [place] on outpatient basis  Patient resides with his mother and she did commit him to inpatient hospitalization multiple times in the past   Patient had multiple problems with family members, neighbors and the police due to his erratic and aggressive behaviors "    Helen Saldana has a history of bipolar disorder and has been hospitalized on an inpatient basis several times  He is well known to this facility  He receives psychiatric care through [facility] in [facility]  Hospital Course:     Helen Saldana was transferred to the younger adult behavioral health unit after being medically cleared  Once on the unit, he continued to be hyperverbal and loud but had no physically or verbally aggressive behaviors    He wished to be released from the hospital immediately and because he was not ready at that time, and his status was changed from a voluntary to an involuntary commitment basis  At this point, he began to cooperate with the team   He agreed to take his lithium and was taking Ativan in conjunction to stabilize his mood  During his time on the unit, his behavior improved and his opal decreased  Although he eventually refused to take anything except lithium, his mood remained controlled  He was sleeping and eating appropriately  He began to participate in groups  He interacted appropriately with staff and peers on the unit  Because he was doing so much better and his behavior and mood were now stable, the Psychiatric Care Team felt it would be both safe and appropriate to discharge him to care on an outpatient basis  He intends to follow up at the [facility] in [facility]  An appointment was scheduled on his behalf by the unit case management team   Because he receives medications through the [facility] which takes up to a week to receive, a 10 day supply of all of his psychiatric medications was obtained by the psychiatric team and given to him upon discharge in order to remain compliant with medications  On the day of discharge, Floyd Vega denies any suicidal or homicidal ideation as well as any symptoms of psychosis  His mood and behavior were stable, and he was looking for to going home  Both he and his mother agreed to his discharge plan  He will be staying at his mother's house where he has resided the past several years  Mental Status at time of Discharge:     Appearance:  bearded and casually dressed   Behavior:  normal   Speech:  normal pitch and normal volume   Mood:  normal   Affect:  normal   Thought Process:  normal   Thought Content:  normal   Perceptual Disturbances: None   Risk Potential: Patient denies any suicidal or homicidal ideations     Sensorium:  person, place, time/date and situation   Cognition:  grossly intact   Consciousness:  alert and awake    Attention: attention span and concentration were age appropriate   Insight:  fair   Judgment: fair   Gait/Station: normal gait/station and normal balance   Motor Activity: no abnormal movements     Admission Diagnosis:Schizoaffective disorder, bipolar type [F25 0]    Discharge Diagnosis:   Principal Problem:    Schizoaffective disorder, bipolar type ([place] [address] )  Active Problems:    Hypertension  Resolved Problems:    * No resolved hospital problems   *        Lab results:  Admission on 12/18/2019   Component Date Value    Lithium Lvl 12/21/2019 1 2     TSH 3RD GENERATON 12/21/2019 4 110     Lithium Lvl 12/30/2019 1 1        Discharge Medications:  Current Discharge Medication List         Current Discharge Medication List         Current Discharge Medication List      CONTINUE these medications which have CHANGED    Details   lithium 600 MG capsule Take 1 capsule (600 mg total) by mouth every 12 (twelve) hours  Qty: 30 capsule, Refills: 0    Associated Diagnoses: Schizoaffective disorder, bipolar type ([facility])      LORazepam (ATIVAN) 0 5 mg tablet Take 1 tablet (0 5 mg total) by mouth 3 (three) times a day for 10 days  Qty: 30 tablet, Refills: 0    Associated Diagnoses: Schizoaffective disorder, bipolar type ([facility])      OLANZapine (ZyPREXA) 15 mg tablet Take 1 tablet (15 mg total) by mouth daily at bedtime  Qty: 30 tablet, Refills: 0    Associated Diagnoses: Schizoaffective disorder, bipolar type ([place] [address] )            Current Discharge Medication List      CONTINUE these medications which have NOT CHANGED    Details   cholecalciferol (VITAMIN D3) 1,000 units tablet Take 1 tablet (1,000 Units total) by mouth daily  Qty: 30 tablet, Refills: 0    Associated Diagnoses: Vitamin D deficiency      dextromethorphan-guaiFENesin (ROBITUSSIN DM)  mg/5 mL syrup Take 10 mL by mouth every 4 (four) hours as needed for cough  Qty: 118 mL, Refills: 0    Associated Diagnoses: Cough      folic acid (FOLVITE) 1 mg tablet Take 1 tablet (1 mg total) by mouth daily  Qty: 30 tablet, Refills: 0    Associated Diagnoses: Alcohol abuse, continuous      Melatonin 10 MG TABS Take 1 tablet (10 mg total) by mouth daily at bedtime  Qty: 30 tablet, Refills: 0    Associated Diagnoses: Schizoaffective disorder, bipolar type ([facility])      nicotine polacrilex (NICORETTE) 2 mg gum Chew 1 each (2 mg total) every 2 (two) hours as needed for smoking cessation  Qty: 100 each, Refills: 0    Associated Diagnoses: Tobacco abuse      tamsulosin (FLOMAX) 0 4 mg Take 1 capsule (0 4 mg total) by mouth daily with dinner  Qty: 30 capsule, Refills: 0    Associated Diagnoses: BPH (benign prostatic hyperplasia)      thiamine 100 MG tablet Take 1 tablet (100 mg total) by mouth daily  Qty: 30 tablet, Refills: 0    Associated Diagnoses: Alcohol abuse, continuous              Discharge instructions/Information to patient and family:   See after visit summary for information provided to patient and family  Provisions for Follow-Up Care:  See after visit summary for information related to follow-up care and any pertinent home health orders  Discharge Statement   I spent 45 minutes discharging the patient  This time was spent on the day of discharge  I had direct contact with the patient on the day of discharge  Additional documentation is required if more than 30 minutes were spent on discharge  Importance of remaining on medication in order to prevent readmit inside the hospital was discussed  Importance of following up with psychiatric outpatient appointment was also discussed  Brissa Leal verbalized understanding was in agreement

## 2020-01-02 NOTE — PROGRESS NOTES
Pt was loud and boisterous and complaining about his lost identification renewal card  Pt denies AH, VH, SI, HI, pain, anxiety, depression  Pt was controlled and calm talking about discharge  Pt states he is ready to go  Will continue to monitor

## 2020-02-28 NOTE — PLAN OF CARE
Problem: Ineffective Coping  Goal: Cooperates with admission process  Description  Interventions:   - Complete admission process  Outcome: Completed     Problem: CHONG  Goal: Will exhibit normal sleep and speech and no impulsivity  Description  INTERVENTIONS:  - Administer medication as ordered  - Set limits on impulsive behavior  - Make attempts to decrease external stimuli as possible  Outcome: Progressing     Problem: PSYCHOSIS  Goal: Will report no hallucinations or delusions  Description  Interventions:  - Administer medication as  ordered  - Every waking shifts and PRN assess for the presence of hallucinations and or delusions  - Assist with reality testing to support increasing orientation  - Assess if patient's hallucinations or delusions are encouraging self-harm or harm to others and intervene as appropriate  Outcome: Progressing Recommended observation yearly.

## 2020-04-07 NOTE — ED NOTES
Dinner tray delivered to pt at this time       Jennifer Tomlinson, ADRIEN  04/18/18 4581 Medication/Dose: phentermine 37.5 MG capsule    Patient last seen by PCP: 3-27-20  Next office visit with PCP: none  Last Lab:12-9-19    Above information requires contacting patient: No    Prescription does not require PDMP check    Sent to provider to approve or deny

## 2020-09-04 ENCOUNTER — HOSPITAL ENCOUNTER (INPATIENT)
Facility: HOSPITAL | Age: 60
LOS: 14 days | Discharge: HOME | DRG: 885 | End: 2020-09-21
Attending: EMERGENCY MEDICINE | Admitting: PSYCHIATRY & NEUROLOGY
Payer: MEDICARE

## 2020-09-04 DIAGNOSIS — F29 PSYCHOSIS, UNSPECIFIED PSYCHOSIS TYPE (HCC): Primary | ICD-10-CM

## 2020-09-04 DIAGNOSIS — F25.0 SCHIZOAFFECTIVE DISORDER, BIPOLAR TYPE (HCC): ICD-10-CM

## 2020-09-04 LAB
ALBUMIN SERPL BCP-MCNC: 4.5 G/DL (ref 3.5–5.7)
ALP SERPL-CCNC: 36 U/L (ref 55–165)
ALT SERPL W P-5'-P-CCNC: 47 U/L (ref 7–52)
ANION GAP SERPL CALCULATED.3IONS-SCNC: 10 MMOL/L (ref 4–13)
AST SERPL W P-5'-P-CCNC: 50 U/L (ref 13–39)
BASOPHILS # BLD AUTO: 0 THOUSANDS/ΜL (ref 0–0.1)
BASOPHILS NFR BLD AUTO: 0 % (ref 0–2)
BILIRUB SERPL-MCNC: 1.3 MG/DL (ref 0.2–1)
BUN SERPL-MCNC: 14 MG/DL (ref 7–25)
CALCIUM SERPL-MCNC: 9.8 MG/DL (ref 8.6–10.5)
CHLORIDE SERPL-SCNC: 108 MMOL/L (ref 98–107)
CO2 SERPL-SCNC: 21 MMOL/L (ref 21–31)
CREAT SERPL-MCNC: 0.97 MG/DL (ref 0.7–1.3)
EOSINOPHIL # BLD AUTO: 0.1 THOUSAND/ΜL (ref 0–0.61)
EOSINOPHIL NFR BLD AUTO: 1 % (ref 0–5)
ERYTHROCYTE [DISTWIDTH] IN BLOOD BY AUTOMATED COUNT: 14.2 % (ref 11.5–14.5)
ETHANOL SERPL-MCNC: <10 MG/DL
GFR SERPL CREATININE-BSD FRML MDRD: 83 ML/MIN/1.73SQ M
GLUCOSE SERPL-MCNC: 115 MG/DL (ref 65–99)
HCT VFR BLD AUTO: 43.5 % (ref 42–47)
HGB BLD-MCNC: 15 G/DL (ref 14–18)
LITHIUM SERPL-SCNC: 0.6 MMOL/L (ref 1–1.2)
LYMPHOCYTES # BLD AUTO: 1.3 THOUSANDS/ΜL (ref 0.6–4.47)
LYMPHOCYTES NFR BLD AUTO: 14 % (ref 21–51)
MCH RBC QN AUTO: 31.5 PG (ref 26–34)
MCHC RBC AUTO-ENTMCNC: 34.5 G/DL (ref 31–37)
MCV RBC AUTO: 91 FL (ref 81–99)
MONOCYTES # BLD AUTO: 0.6 THOUSAND/ΜL (ref 0.17–1.22)
MONOCYTES NFR BLD AUTO: 6 % (ref 2–12)
NEUTROPHILS # BLD AUTO: 7.4 THOUSANDS/ΜL (ref 1.4–6.5)
NEUTS SEG NFR BLD AUTO: 79 % (ref 42–75)
PLATELET # BLD AUTO: 192 THOUSANDS/UL (ref 149–390)
PMV BLD AUTO: 8.6 FL (ref 8.6–11.7)
POTASSIUM SERPL-SCNC: 3.1 MMOL/L (ref 3.5–5.5)
PROT SERPL-MCNC: 6.6 G/DL (ref 6.4–8.9)
RBC # BLD AUTO: 4.76 MILLION/UL (ref 4.3–5.9)
SARS-COV-2 RNA RESP QL NAA+PROBE: NEGATIVE
SODIUM SERPL-SCNC: 139 MMOL/L (ref 134–143)
TSH SERPL DL<=0.05 MIU/L-ACNC: 1.62 UIU/ML (ref 0.45–5.33)
WBC # BLD AUTO: 9.3 THOUSAND/UL (ref 4.8–10.8)

## 2020-09-04 PROCEDURE — 80320 DRUG SCREEN QUANTALCOHOLS: CPT | Performed by: EMERGENCY MEDICINE

## 2020-09-04 PROCEDURE — 80178 ASSAY OF LITHIUM: CPT | Performed by: EMERGENCY MEDICINE

## 2020-09-04 PROCEDURE — 87635 SARS-COV-2 COVID-19 AMP PRB: CPT | Performed by: EMERGENCY MEDICINE

## 2020-09-04 PROCEDURE — 80053 COMPREHEN METABOLIC PANEL: CPT | Performed by: EMERGENCY MEDICINE

## 2020-09-04 PROCEDURE — 99291 CRITICAL CARE FIRST HOUR: CPT

## 2020-09-04 PROCEDURE — 99291 CRITICAL CARE FIRST HOUR: CPT | Performed by: EMERGENCY MEDICINE

## 2020-09-04 PROCEDURE — 84443 ASSAY THYROID STIM HORMONE: CPT | Performed by: EMERGENCY MEDICINE

## 2020-09-04 PROCEDURE — 96374 THER/PROPH/DIAG INJ IV PUSH: CPT

## 2020-09-04 PROCEDURE — 93005 ELECTROCARDIOGRAM TRACING: CPT

## 2020-09-04 PROCEDURE — 85025 COMPLETE CBC W/AUTO DIFF WBC: CPT | Performed by: EMERGENCY MEDICINE

## 2020-09-04 PROCEDURE — 36415 COLL VENOUS BLD VENIPUNCTURE: CPT | Performed by: EMERGENCY MEDICINE

## 2020-09-04 RX ORDER — POTASSIUM CHLORIDE 750 MG/1
40 TABLET, EXTENDED RELEASE ORAL ONCE
Status: DISCONTINUED | OUTPATIENT
Start: 2020-09-04 | End: 2020-09-21 | Stop reason: HOSPADM

## 2020-09-04 RX ORDER — KETAMINE HCL IN NACL, ISO-OSM 100MG/10ML
5 SYRINGE (ML) INJECTION ONCE
Status: DISCONTINUED | OUTPATIENT
Start: 2020-09-04 | End: 2020-09-04

## 2020-09-04 RX ORDER — LORAZEPAM 2 MG/ML
2 INJECTION INTRAMUSCULAR ONCE
Status: DISCONTINUED | OUTPATIENT
Start: 2020-09-04 | End: 2020-09-21 | Stop reason: HOSPADM

## 2020-09-04 RX ORDER — KETAMINE HCL IN NACL, ISO-OSM 100MG/10ML
25 SYRINGE (ML) INJECTION ONCE
Status: DISCONTINUED | OUTPATIENT
Start: 2020-09-04 | End: 2020-09-04

## 2020-09-04 RX ORDER — KETAMINE HCL IN NACL, ISO-OSM 100MG/10ML
0.5 SYRINGE (ML) INJECTION ONCE
Status: COMPLETED | OUTPATIENT
Start: 2020-09-04 | End: 2020-09-04

## 2020-09-04 RX ORDER — LORAZEPAM 2 MG/ML
2 INJECTION INTRAMUSCULAR ONCE
Status: COMPLETED | OUTPATIENT
Start: 2020-09-04 | End: 2020-09-04

## 2020-09-04 RX ORDER — KETAMINE HYDROCHLORIDE 50 MG/ML
25 INJECTION, SOLUTION, CONCENTRATE INTRAMUSCULAR; INTRAVENOUS ONCE
Status: COMPLETED | OUTPATIENT
Start: 2020-09-04 | End: 2020-09-04

## 2020-09-04 RX ADMIN — Medication 36 MG: at 19:40

## 2020-09-04 RX ADMIN — KETAMINE HYDROCHLORIDE 25 MG: 50 INJECTION, SOLUTION INTRAMUSCULAR; INTRAVENOUS at 20:16

## 2020-09-04 RX ADMIN — LORAZEPAM 2 MG: 2 INJECTION INTRAMUSCULAR; INTRAVENOUS at 19:45

## 2020-09-05 LAB
AMPHETAMINES SERPL QL SCN: NEGATIVE
BACTERIA UR QL AUTO: ABNORMAL /HPF
BARBITURATES UR QL: NEGATIVE
BENZODIAZ UR QL: NEGATIVE
BILIRUB UR QL STRIP: NEGATIVE
CLARITY UR: CLEAR
COCAINE UR QL: NEGATIVE
COLOR UR: YELLOW
GLUCOSE UR STRIP-MCNC: NEGATIVE MG/DL
HGB UR QL STRIP.AUTO: NEGATIVE
HYALINE CASTS #/AREA URNS LPF: ABNORMAL /LPF
KETONES UR STRIP-MCNC: ABNORMAL MG/DL
LEUKOCYTE ESTERASE UR QL STRIP: NEGATIVE
METHADONE UR QL: NEGATIVE
MUCOUS THREADS UR QL AUTO: ABNORMAL
NITRITE UR QL STRIP: NEGATIVE
NON-SQ EPI CELLS URNS QL MICRO: ABNORMAL /HPF
OPIATES UR QL SCN: NEGATIVE
OXYCODONE+OXYMORPHONE UR QL SCN: NEGATIVE
PCP UR QL: NEGATIVE
PH UR STRIP.AUTO: 6 [PH]
PROT UR STRIP-MCNC: ABNORMAL MG/DL
RBC #/AREA URNS AUTO: ABNORMAL /HPF
SP GR UR STRIP.AUTO: 1.02 (ref 1–1.03)
THC UR QL: POSITIVE
UROBILINOGEN UR QL STRIP.AUTO: 0.2 E.U./DL
WBC #/AREA URNS AUTO: ABNORMAL /HPF

## 2020-09-05 PROCEDURE — 80307 DRUG TEST PRSMV CHEM ANLYZR: CPT | Performed by: EMERGENCY MEDICINE

## 2020-09-05 PROCEDURE — 81001 URINALYSIS AUTO W/SCOPE: CPT | Performed by: EMERGENCY MEDICINE

## 2020-09-05 PROCEDURE — 81003 URINALYSIS AUTO W/O SCOPE: CPT | Performed by: EMERGENCY MEDICINE

## 2020-09-05 PROCEDURE — 99283 EMERGENCY DEPT VISIT LOW MDM: CPT | Performed by: PSYCHIATRY & NEUROLOGY

## 2020-09-05 PROCEDURE — 96372 THER/PROPH/DIAG INJ SC/IM: CPT

## 2020-09-05 RX ORDER — OLANZAPINE 15 MG/1
15 TABLET ORAL ONCE
Status: DISCONTINUED | OUTPATIENT
Start: 2020-09-05 | End: 2020-09-05 | Stop reason: SDUPTHER

## 2020-09-05 RX ORDER — OLANZAPINE 15 MG/1
15 TABLET ORAL ONCE
Status: DISCONTINUED | OUTPATIENT
Start: 2020-09-05 | End: 2020-09-05

## 2020-09-05 RX ORDER — OLANZAPINE 5 MG/1
15 TABLET ORAL
Status: DISCONTINUED | OUTPATIENT
Start: 2020-09-05 | End: 2020-09-08

## 2020-09-05 RX ORDER — OLANZAPINE 10 MG/1
10 INJECTION, POWDER, LYOPHILIZED, FOR SOLUTION INTRAMUSCULAR ONCE
Status: COMPLETED | OUTPATIENT
Start: 2020-09-05 | End: 2020-09-05

## 2020-09-05 RX ORDER — LITHIUM CARBONATE 300 MG/1
600 TABLET, FILM COATED, EXTENDED RELEASE ORAL EVERY 12 HOURS SCHEDULED
Status: DISCONTINUED | OUTPATIENT
Start: 2020-09-05 | End: 2020-09-21 | Stop reason: HOSPADM

## 2020-09-05 RX ORDER — LORAZEPAM 0.5 MG/1
0.5 TABLET ORAL 3 TIMES DAILY
Status: DISCONTINUED | OUTPATIENT
Start: 2020-09-05 | End: 2020-09-08

## 2020-09-05 RX ORDER — DIPHENHYDRAMINE HYDROCHLORIDE 50 MG/ML
50 INJECTION INTRAMUSCULAR; INTRAVENOUS ONCE
Status: COMPLETED | OUTPATIENT
Start: 2020-09-05 | End: 2020-09-05

## 2020-09-05 RX ORDER — OLANZAPINE 10 MG/1
10 INJECTION, POWDER, LYOPHILIZED, FOR SOLUTION INTRAMUSCULAR 2 TIMES DAILY PRN
Status: DISCONTINUED | OUTPATIENT
Start: 2020-09-05 | End: 2020-09-07

## 2020-09-05 RX ORDER — LORAZEPAM 2 MG/ML
1 INJECTION INTRAMUSCULAR ONCE
Status: COMPLETED | OUTPATIENT
Start: 2020-09-05 | End: 2020-09-05

## 2020-09-05 RX ADMIN — DIPHENHYDRAMINE HYDROCHLORIDE 50 MG: 50 INJECTION INTRAMUSCULAR; INTRAVENOUS at 02:05

## 2020-09-05 RX ADMIN — LITHIUM CARBONATE 600 MG: 300 TABLET, EXTENDED RELEASE ORAL at 12:49

## 2020-09-05 RX ADMIN — LORAZEPAM 0.5 MG: 0.5 TABLET ORAL at 15:59

## 2020-09-05 RX ADMIN — OLANZAPINE 10 MG: 10 INJECTION, POWDER, LYOPHILIZED, FOR SOLUTION INTRAMUSCULAR at 03:53

## 2020-09-05 RX ADMIN — OLANZAPINE 10 MG: 10 INJECTION, POWDER, FOR SOLUTION INTRAMUSCULAR at 02:05

## 2020-09-05 RX ADMIN — LORAZEPAM 1 MG: 2 INJECTION INTRAMUSCULAR; INTRAVENOUS at 02:05

## 2020-09-05 NOTE — ED NOTES
Crisis explained to the pt that the  that brought him in today did petition a [id number] and because of his aggressive bxs and threats to the staff here in the ED the doctor is upholding it  Pt's response was to yell, "FUCK THAT 302 NO ONE CAN MAKE ME DO WHAT I DO NOT WANT TO DO!"  This writer responded very quietly in a monotone voice and asked the pt if he would please try to relax and let the  medications that were given to him work so he can feel better and we can help him  At that time the pt did stop yelling and laid down on the bed  Pt was read his [address] Rights, after which he was tearful and requesting that this writer call his family  A copy of the [address] Rights and the Bill of Rights were placed on pt's chart for future reference

## 2020-09-05 NOTE — ED NOTES
Received call from Roberto Bates, no appropriate bed for patient       Bed search exhausted at this time and to resume in AM

## 2020-09-05 NOTE — ED NOTES
Jack received a call back from Conrado in admissions at [facility] stating pt has been denied, they have no appropriate bed for this pt

## 2020-09-05 NOTE — ED NOTES
Insurance Authorization for admission:   Pre-cert for I/P MH will not be required Medicare A&B is primary    COB: (COB will need to be completed once a bed is obtained)  [facility] , [phone]  EVS (Eligibility Verification System) called - [phone]  Automated system indicates: EVS called, Patient is eligible for M/A - Behavioral health services, [facility]   Rec# [id number]    Insurance Authorization for Transportation: Separate auth if transport is needed is not required , Medicare A&B is primary

## 2020-09-05 NOTE — ED NOTES
Pt is extremely restless  He is sleeping one minuet, than standing at bedside the next followed by laying back down  He refuses to leave the o2 sensor on and threw it on the ground  He is yelling in his sleep off and on        Alcides Deng  09/04/20 2129

## 2020-09-05 NOTE — ED NOTES
Pt presented via police due to bizarre and aggressive bxs,  did petition a 36 for same  Pt was sedated shortly after arrival due to aggressive bxs but is now medically clear and much calmer  Pt is well known to this writer and is not at his baseline  Pt is oriented to name, place, year but is still disoriented to reality and is experiencing paranoid delusions and is difficult to speak to because he escalates quickly to a defensive angry state or a silly non-sensical state  Pt is tangential, w/ flight of ideas  Pt states he has not been taking any of his meds except his lithium which he says he has been snorting  Pt goes from angry and yelling to being tearful from minute to minute and is floridly psychotic  Unable to further assess due to same  Pt's 302 will remain in place, bed search in progress  Crisis requested a psych consult for med mgt while in the ED

## 2020-09-05 NOTE — CONSULTS
Consultation - Behavioral Health     Identification Data: Wm Graham 56 y o  male MRN: [id number]  Unit/Bed#: [facility] 01 Encounter: [id number]    09/05/20  12:35 PM    Consults  Physician Requesting Consult: Karis Le , *  Principal Problem:  Acute psychosis, schizoaffective disorder bipolar type    Reason for Consult:  Increased agitation and bizarre behavior    History of Present Illness     Wm Graham is a 56 y o  male with a history of Schizoaffective Disorder who was brought to ED on 302 basis petitioned by  due to severe manic symptoms, unstable mood, psychotic symptoms, bizarre behavior, severe agitation and aggressive behavior  Patient is known to me due to his similar presentations and inpatient admissions  On psychiatric consultation Wm Graham presents clearly psychotic, extremely labile, alternating frequent episodes of being angry, cursing and yelling to become tearful and regretful  He continues extremely paranoid with pressured and incoherent speech, flight of idea and unable to provide any meaningful information  Patient has history of noncompliant with medication, acute decompensation that usually end in psychiatric admission  Urine is positive for THC, Lithium level 0 6 (on 9/4/20)       Past Medical History:   Diagnosis Date    Anxiety     Astigmatism     DJD (degenerative joint disease)     Gait abnormality     Head injury     History of colonic polyps     Hydrocele     Hyperlipidemia     Hypertension     Macular drusen     Presbyopia     [facility]     Sepsis ([facility] )     Tobacco abuse     Umbilical hernia     Vitreous syneresis      Past Surgical History:   Procedure Laterality Date    FRACTURE SURGERY      INGUINAL HERNIA REPAIR           Medical Review Of Systems:    Pertinent items are noted in HPI  Allergies:     Allergies   Allergen Reactions    Haldol [Haloperidol]     Navane [Thiothixene]     Other      Adhesive tape         Medications:  Current Facility-Administered Medications   Medication Dose Route Frequency Provider Last Rate    lithium carbonate  600 mg Oral Q12H [address] Stephany Payan MD      LORazepam  2 mg Intravenous Once Glenys Clayton MD      LORazepam  0 5 mg Oral TID Stephany Payan MD      OLANZapine  15 mg Oral HS Stephany Payan MD      potassium chloride  40 mEq Oral Once Glenys Clayton MD       All current active medications have been reviewed  Objective     Vital signs in last 24 hours:    Temp:  [97 8 °F (36 6 °C)] 97 8 °F (36 6 °C)  HR:  [] 82  Resp:  [18-22] 18  BP: (165-204)/() 165/88  No intake or output data in the 24 hours ending 09/05/20 1235    Mental Status Evaluation:    Appearance:  disheveled   Behavior:  agitated, inappropriate   Speech:  pressured   Mood:  labile, irritable   Affect:  increased in intensity   Language: naming objects   Thought Process:  disorganized, flight of ideas   Associations: tangential associations   Thought Content:  paranoid delusions   Perceptual Disturbances: appears responding to internal stimuli   Risk Potential: Suicidal ideation - No active suicidal ideation  Homicidal ideation - angry, hostile feelings  Potential for aggression - Yes, due to agitation   Sensorium:  oriented to person and place   Memory:  patient does not answer   Consciousness:  alert and awake    Attention: poor concentration and poor attention span   Intellect: average   Fund of Knowledge: awareness of current events: limited   Insight:  impaired   Judgment: impaired   Muscle Strength Muscle Tone: normal  normal   Gait/Station: in bed   Motor Activity: no abnormal movements     Laboratory Results: I have personally reviewed all pertinent laboratory/tests results  Imaging Studies: No results found  Assessment/Plan     Schizoaffective disorder bipolar type    Treatment Plan:     Planned Medication Changes:     All current active medications have been reviewed  Behavioral Health checks every 7 minutes  Begin Lithium  mg twice a day, Zyprexa 15 mg HS  Ativan 0 5 mg tid  Risks / Benefits of Treatment:    Patient is unable to understand the risk and the benefit of the treatment due to his acute psychotic presentation  Counseling / Coordination of Care:    Patient is clearly in need of inpatient admission due to acute psychotic and manic symptoms  302 will remain in place for inpatient admission       Pieter Vila MD 09/05/20
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
## 2020-09-05 NOTE — ED NOTES
This technician assuming 1:1 with this patient       [facility]  09/05/20 1604

## 2020-09-05 NOTE — ED NOTES
Pt cont to occasionally get loud but is not currently acting violent        Meghan Griffith RN  90/88/42 0974

## 2020-09-05 NOTE — ED NOTES
Crisis spoke to charge RN on [employer] to request a male staff accompany security w/ this pt for a shower  RN will send staff as soon as they are available  ED RN informed of same

## 2020-09-05 NOTE — ED NOTES
Pt refuses vital signs  Pt is quiet at present but is intermittantly loud but has not become violent         Emperatriz Trevizo RN  81/14/87 5441

## 2020-09-05 NOTE — ED NOTES
Bed search within 2 1/2 hr radius for all facilities able to accept a Re[address] 3 as follows:    [facility] - No beds available per Roro Simmons in admissions  [facility] - No beds available per Parish Santos in admissions/transfer center  [facility] - No 302 beds available per Luda Parkinson in admissions  [facility] -  Fax clinicals for review per Wicho Guaman in admissions - return call - pt denied  [facility] - No beds available per Jc in admissions  [facility] - No beds available per [facility] in admissions  [facility] - No appropriate beds per Freida in admissions (not accepting high acuity/aggression at this time)  [facility] - Fax clinicals for review per [facility] in admissions   [facility] - No beds available per Minerva Perez in admissions  [facility] - No answer, msg left requesting return call for bed availability      Crisis to f/u with facilities that accepted faxed clinicals for review

## 2020-09-05 NOTE — ED NOTES
Crisis spoke to Marsha Peterson in admissions at [facility] to confirm receipt of clinicals  Also spoke to Shasta Adams in admissions at [facility] who stated per her fax machine the fax is transmitting now and should be received momentarily

## 2020-09-05 NOTE — ED NOTES
Pt put on phone w/ his niece, as he had been attempting to call her but was having difficulty dialing the number himself

## 2020-09-05 NOTE — ED NOTES
Patient belongings reviewed with [address] [place] [address] and placed in safe  Pair of boots, 2 bottles Lithium, watch, flashlight, key chain, nayely shirt, hat w/bandana, pair of sandals, wallet with 's licence and money- 8-$45, 5-$20, 2-$10, 2-$5  Tank top, shorts, and socks added with patient belongings       Cora Mention  09/05/20 [address]  09/05/20 1588

## 2020-09-05 NOTE — ED CARE HANDOFF
Emergency Department Sign Out Note        Sign out and transfer of care from Dr Giulia Arriaga  See Separate Emergency Department note  The patient, Surinder Shell Day, was evaluated by the previous provider for psychosis  Patient is currently, at this time after multiple doses of Zyprexa, Ativan, and Benadryl  The patient is likely awaiting placement to Geriatric psych  Workup Completed:     Labs are reviewed and show THC in the urine, minimally low potassium, subtherapeutic lithium level and a glucose of 115  Ethanol was negative  ED Course / Workup Pending (followup): Awaiting evaluation by crisis  Patient currently sleeping comfortably and in no acute distress  Heart regular rate and rhythm, and lungs clear  Blood pressure rechecked and found to be within reasonable limits  Crisis worker recommends a psychiatry consult while in the ED  Crisis consult ordered  ED Course as of Sep 06 0829   Sat Sep 05, 2020   0900 Blood Pressure: 165/88   1913 Care signed out to Dr Giulia Arriaga at 7:00 p m  Pending bed search  Procedures  MDM  Number of Diagnoses or Management Options  Psychosis, unspecified psychosis type [facility]):   Diagnosis management comments: Throughout the day, the patient has had no concerning issues  He has showered while here, and has been seen by behavioral health  The patient is currently in a bed search        Disposition  Final diagnoses:   Psychosis, unspecified psychosis type ([facility] [address] )     Time reflects when diagnosis was documented in both MDM as applicable and the Disposition within this note     Time User Action Codes Description Comment    9/4/2020 10:52 PM Jac Ridley Add [F29] Psychosis, unspecified psychosis type [facility])       ED Disposition     None      MD Documentation      Most Recent Value   Sending MD Dr Leona Restrepo, DO      Follow-up Information    None       Patient's Medications   Discharge Prescriptions    No medications on file     No discharge procedures on file         ED Provider  Electronically Signed by     Kenny Patel DO  09/06/20 5727

## 2020-09-05 NOTE — ED NOTES
Patient left unit with staff  at 1640 to get a shower  Patient returned [phone]  Patient was cooperative with staff while off unit  I will now resume 15 minute safety checks       Alem Chino\[facility]\""  09/05/20 135 [facility]  09/05/20 135 [facility]  09/05/20 1335

## 2020-09-06 PROCEDURE — 99213 OFFICE O/P EST LOW 20 MIN: CPT | Performed by: PSYCHIATRY & NEUROLOGY

## 2020-09-06 RX ADMIN — LORAZEPAM 0.5 MG: 0.5 TABLET ORAL at 22:48

## 2020-09-06 RX ADMIN — LITHIUM CARBONATE 600 MG: 300 TABLET, EXTENDED RELEASE ORAL at 19:55

## 2020-09-06 RX ADMIN — LITHIUM CARBONATE 600 MG: 300 TABLET, EXTENDED RELEASE ORAL at 07:21

## 2020-09-06 RX ADMIN — LORAZEPAM 0.5 MG: 0.5 TABLET ORAL at 07:20

## 2020-09-06 RX ADMIN — LITHIUM CARBONATE 600 MG: 300 TABLET, EXTENDED RELEASE ORAL at 22:49

## 2020-09-06 RX ADMIN — LORAZEPAM 0.5 MG: 0.5 TABLET ORAL at 16:54

## 2020-09-06 RX ADMIN — OLANZAPINE 15 MG: 5 TABLET, FILM COATED ORAL at 00:21

## 2020-09-06 RX ADMIN — OLANZAPINE 15 MG: 5 TABLET, FILM COATED ORAL at 22:50

## 2020-09-06 RX ADMIN — LITHIUM CARBONATE 600 MG: 300 TABLET, EXTENDED RELEASE ORAL at 00:21

## 2020-09-06 RX ADMIN — OLANZAPINE 15 MG: 5 TABLET, FILM COATED ORAL at 19:54

## 2020-09-06 NOTE — ED NOTES
Pt sleeping at present will attempt to medicate when he wakes       Kaveh Gonzalez RN  22/45/67 8674

## 2020-09-06 NOTE — ED NOTES
Pt was sleeping when he awoke tried to medicate, refused zyprexa but took his lithium  Pt was initially loud but then laid down in bed    refuses to have VS taken    Janiya , RN  45/15/39 130 [facility], RN  92/71/07 9039

## 2020-09-06 NOTE — ED NOTES
Patient given hot dinner tray       Lutricia Lab  09/06/20 1732     Patient at 100% of meal      Lutricia Lab  09/06/20 6350

## 2020-09-06 NOTE — ED NOTES
Sarah Whitten and Ronn Martinez taking pt to bathe on medsurg at Yao request  Pt has been calm and cooperative  Trash taken from room  Bed linens changed, bed wiped       Michael Tillman, RN  09/06/20 151 Sherine Shah, RN  09/06/20 7791

## 2020-09-06 NOTE — ED NOTES
Spoke with patient, patient was provided an update regarding bed search  Patient pleasantly stated, "cant you just [facility] me [place] the zoo for my 5 day stay?  I'll be a good boy "

## 2020-09-06 NOTE — ED NOTES
Patient become aggravated when reviewing a list of his belongings, shouting "I hate this place, I hate this place"       Josef Payton  09/06/20 3723

## 2020-09-06 NOTE — ED NOTES
Pt requesting crayon and paper to write, same was given and it was explained that pencil and pen could be used to cause harm       Earle Lynch RN  14/56/65 8036

## 2020-09-07 RX ORDER — TRAZODONE HYDROCHLORIDE 50 MG/1
50 TABLET ORAL
Status: DISCONTINUED | OUTPATIENT
Start: 2020-09-07 | End: 2020-09-21 | Stop reason: HOSPADM

## 2020-09-07 RX ORDER — LORAZEPAM 1 MG/1
2 TABLET ORAL EVERY 4 HOURS PRN
Status: DISCONTINUED | OUTPATIENT
Start: 2020-09-07 | End: 2020-09-08

## 2020-09-07 RX ORDER — RISPERIDONE 1 MG/1
2 TABLET, ORALLY DISINTEGRATING ORAL
Status: DISCONTINUED | OUTPATIENT
Start: 2020-09-07 | End: 2020-09-21 | Stop reason: HOSPADM

## 2020-09-07 RX ORDER — OLANZAPINE 10 MG/1
10 INJECTION, POWDER, LYOPHILIZED, FOR SOLUTION INTRAMUSCULAR
Status: DISCONTINUED | OUTPATIENT
Start: 2020-09-07 | End: 2020-09-21 | Stop reason: HOSPADM

## 2020-09-07 RX ORDER — ACETAMINOPHEN 325 MG/1
975 TABLET ORAL EVERY 6 HOURS PRN
Status: DISCONTINUED | OUTPATIENT
Start: 2020-09-07 | End: 2020-09-21 | Stop reason: HOSPADM

## 2020-09-07 RX ORDER — ACETAMINOPHEN 325 MG/1
650 TABLET ORAL EVERY 4 HOURS PRN
Status: DISCONTINUED | OUTPATIENT
Start: 2020-09-07 | End: 2020-09-21 | Stop reason: HOSPADM

## 2020-09-07 RX ORDER — HYDROXYZINE HYDROCHLORIDE 25 MG/1
50 TABLET, FILM COATED ORAL EVERY 4 HOURS PRN
Status: DISCONTINUED | OUTPATIENT
Start: 2020-09-07 | End: 2020-09-21 | Stop reason: HOSPADM

## 2020-09-07 RX ORDER — NICOTINE 21 MG/24HR
1 PATCH, TRANSDERMAL 24 HOURS TRANSDERMAL DAILY
Status: DISCONTINUED | OUTPATIENT
Start: 2020-09-07 | End: 2020-09-09

## 2020-09-07 RX ORDER — ACETAMINOPHEN 325 MG/1
650 TABLET ORAL EVERY 6 HOURS PRN
Status: DISCONTINUED | OUTPATIENT
Start: 2020-09-07 | End: 2020-09-21 | Stop reason: HOSPADM

## 2020-09-07 RX ORDER — LORAZEPAM 2 MG/ML
2 INJECTION INTRAMUSCULAR EVERY 4 HOURS PRN
Status: DISCONTINUED | OUTPATIENT
Start: 2020-09-07 | End: 2020-09-21 | Stop reason: HOSPADM

## 2020-09-07 RX ADMIN — LORAZEPAM 0.5 MG: 0.5 TABLET ORAL at 21:13

## 2020-09-07 RX ADMIN — LITHIUM CARBONATE 600 MG: 300 TABLET, EXTENDED RELEASE ORAL at 08:47

## 2020-09-07 RX ADMIN — OLANZAPINE 15 MG: 5 TABLET, FILM COATED ORAL at 21:13

## 2020-09-07 RX ADMIN — LITHIUM CARBONATE 600 MG: 300 TABLET, EXTENDED RELEASE ORAL at 21:13

## 2020-09-07 NOTE — PROGRESS NOTES
Patient came with the following items 1 pr of long johns,1 pr of green shorts,1 pr of yellow shorts,2 pr of white underwear,4 hairties,1 blue bible  and 1 watch in contraband drawer

## 2020-09-07 NOTE — PROGRESS NOTES
Patient refused scheduled Ativan due to not liking side effects to medication  Also refused Nicotine patch due to allergy to adhesive

## 2020-09-07 NOTE — PROGRESS NOTES
Patient admitted to ED under a 302  Prior to 302 patient was found at home by police throwing things around, screaming at himself, neighbors, and passerby  Patient was found to be with incoherent thoughts and sentences  Made threats to harm people along with hitting at chest, arms, legs  Upon admission to unit patient was alert, euphoric, laughing, animated, pleasant and cooperative  Speech is pressured and rambling, thought process disorganized at times  Short attention span noted, flight of ideas present  When asked, patient did not know why he was admitted to hospital as he stated, "I don't know ask crisis"  Patient admitted to using marijuana on a daily basis however would not elaborate how much  Patient did mention he did not have marijuana in a few days since it is harder to get due to the election being near  Appears anxious however describes self as "being ramped up", denies depression, SI/HI, hallucinations  Appetite and sleep have not been affected per patient  Independent with ADL's  Patient oriented to room and unit  Per Dr Laura Israel, to remain on 7" checks for safety and behaviors

## 2020-09-07 NOTE — ED NOTES
Confirmed with Marilee Shetty at intake information was received and will be reviewed when a bed becomes available

## 2020-09-07 NOTE — CASE MANAGEMENT
Medicare Primary   Insurance Authorization for admission:   [facility]   Phone number: [phone]  Spoke to Raul Reddy  Level of care: 302 inpatient mental janet treatment   COB completed, fax upon discharge  EVS (Eligibility Verification System) called - [phone]    Automated system indicates: active with Shona FOY

## 2020-09-07 NOTE — ED NOTES
Patient is accepted at [facility]  Patient is accepted by Dr Kiersten Jha  per Barbara Salgado at intake         Nurse report is to be called to [phone] prior to patient transfer

## 2020-09-08 LAB
ATRIAL RATE: 83 BPM
P AXIS: 81 DEGREES
PR INTERVAL: 166 MS
QRS AXIS: -75 DEGREES
QRSD INTERVAL: 98 MS
QT INTERVAL: 412 MS
QTC INTERVAL: 484 MS
T WAVE AXIS: 56 DEGREES
VENTRICULAR RATE: 83 BPM

## 2020-09-08 PROCEDURE — 93010 ELECTROCARDIOGRAM REPORT: CPT | Performed by: INTERNAL MEDICINE

## 2020-09-08 PROCEDURE — 99221 1ST HOSP IP/OBS SF/LOW 40: CPT | Performed by: PSYCHIATRY & NEUROLOGY

## 2020-09-08 RX ORDER — MELATONIN
1000 DAILY
Status: DISCONTINUED | OUTPATIENT
Start: 2020-09-09 | End: 2020-09-21 | Stop reason: HOSPADM

## 2020-09-08 RX ORDER — TAMSULOSIN HYDROCHLORIDE 0.4 MG/1
0.4 CAPSULE ORAL
Status: DISCONTINUED | OUTPATIENT
Start: 2020-09-08 | End: 2020-09-11

## 2020-09-08 RX ORDER — FOLIC ACID 1 MG/1
1 TABLET ORAL DAILY
Status: DISCONTINUED | OUTPATIENT
Start: 2020-09-09 | End: 2020-09-21 | Stop reason: HOSPADM

## 2020-09-08 RX ORDER — DIAZEPAM 5 MG/1
5 TABLET ORAL 2 TIMES DAILY PRN
Status: DISCONTINUED | OUTPATIENT
Start: 2020-09-08 | End: 2020-09-12

## 2020-09-08 RX ORDER — THIAMINE MONONITRATE (VIT B1) 100 MG
100 TABLET ORAL DAILY
Status: DISCONTINUED | OUTPATIENT
Start: 2020-09-09 | End: 2020-09-21 | Stop reason: HOSPADM

## 2020-09-08 RX ADMIN — LITHIUM CARBONATE 600 MG: 300 TABLET, EXTENDED RELEASE ORAL at 20:58

## 2020-09-08 RX ADMIN — LORAZEPAM 0.5 MG: 0.5 TABLET ORAL at 08:27

## 2020-09-08 RX ADMIN — LITHIUM CARBONATE 600 MG: 300 TABLET, EXTENDED RELEASE ORAL at 08:28

## 2020-09-08 NOTE — QUICK NOTE
Patient has been seen multiple times by Dr Thomas Ruelas in the past   I did speak to Dr Thomas Ruelas who stated that he will see the patient in consult for medical management

## 2020-09-08 NOTE — PROGRESS NOTES
Pt presents as loud, labile, and manic  Brief  period of agitation in AM, for which pt apologized for his behavior  Pt can be explosive but easily redirectable  Printed education given on meds as per pt request  Compliant with meals and meds  Monitoring continues

## 2020-09-08 NOTE — PROGRESS NOTES
09/08/20 0900   Team Meeting   Meeting Type Daily Rounds   Initial Conference Date 09/08/20   Team Members Present   Team Members Present Physician;Nurse;   Physician Team Member Dr Jorgito Medrano; CARLOS Anna   Nursing Team Member Minerva De La Torre RN   Social Work Team Member JAMIE Mitchell; Nery Palmer [place]   Patient/Family Present   Patient Present No   Patient's Family Present No     New admission  302  Readmit score 21 (yellow)  Refused labs this morning  UDS + for THC  States he was snorting his [place] in the community  Threatening to harm his neighbors  Got Ketamine in the ED  Restless and irritable  Explosive at times  Med compliant

## 2020-09-08 NOTE — PROGRESS NOTES
AT met with PT 1:1 to conduct self-assessment and relapse prevention plan form  PT was pleasant and cooperative, maintained good eye contact and had appropriate interactions  PT reported that his biggest stressor leading to his current admission is the belief that crisis intervention responded to complaints and that this affects his ability to do the things that are important in his life some of the time  What he likes least about life right now is feeling that he has to defend his actions and explain his thoughts and motives and what he likes most about life right now is his 58 plus years of good family, friends with the memories that comfort him through the difficult and sad times  PT completed high school and is currently unemployed  PT identified the things that he enjoys as lawn and property mainentance  PT reported that what he would like to work on while he is here as being anger management and healthy lifestyles and stated that he will know he is ready for discharge when the treatment team calls him before the staff members for discharge processing  PT also completed the RP from where he identified his warning signs to reach out for help as being, when it seems like th gossip goes to [facility] crisis intervention  PT identified his mother, a friend Marah Quiroz and his  Sander Jim as his support team in the community and walking away to calm the storm as his healthy coping skills of choice  PT signed both forms and had no further questions or comments for AT at this time

## 2020-09-08 NOTE — PLAN OF CARE
Problem: Alteration in Thoughts and Perception  Goal: Treatment Goal: Gain control of psychotic behaviors/thinking, reduce/eliminate presenting symptoms and demonstrate improved reality functioning upon discharge  Outcome: Progressing  Goal: Verbalize thoughts and feelings  Description: Interventions:  - Promote a nonjudgmental and trusting relationship with the patient through active listening and therapeutic communication  - Assess patient's level of functioning, behavior and potential for risk  - Engage patient in 1 on 1 interactions  - Encourage patient to express fears, feelings, frustrations, and discuss symptoms    - [place] patient to reality, help patient recognize reality-based thinking   - Administer medications as ordered and assess for potential side effects  - Provide the patient education related to the signs and symptoms of the illness and desired effects of prescribed medications  Outcome: Progressing  Goal: Refrain from acting on delusional thinking/internal stimuli  Description: Interventions:  - Monitor patient closely, per order   - Utilize least restrictive measures   - Set reasonable limits, give positive feedback for acceptable   - Administer medications as ordered and monitor of potential side effects  Outcome: Progressing  Goal: Agree to be compliant with medication regime, as prescribed and report medication side effects  Description: Interventions:  - Offer appropriate PRN medication and supervise ingestion; conduct AIMS, as needed   Outcome: Progressing  Goal: Attend and participate in unit activities, including therapeutic, recreational, and educational groups  Description: Interventions:  -Encourage Visitation and family involvement in care  Outcome: Progressing  Goal: Recognize dysfunctional thoughts, communicate reality-based thoughts at the time of discharge  Description: Interventions:  - Provide medication and psycho-education to assist patient in compliance and developing insight into his/her illness   Outcome: Progressing  Goal: Complete daily ADLs, including personal hygiene independently, as able  Description: Interventions:  - Observe, teach, and assist patient with ADLS  - Monitor and promote a balance of rest/activity, with adequate nutrition and elimination   Outcome: Progressing     Problem: Risk for Violence/Aggression Toward Others  Goal: Treatment Goal: Refrain from acts of violence/aggression during length of stay, and demonstrate improved impulse control at the time of discharge  Outcome: Progressing  Goal: Verbalize thoughts and feelings  Description: Interventions:  - Assess and re-assess patient's level of risk, every waking shift  - Engage patient in 1:1 interactions, daily, for a minimum of 15 minutes   - Allow patient to express feelings and frustrations in a safe and non-threatening manner   - Establish rapport/trust with patient   Outcome: Progressing  Goal: Refrain from harming others  Outcome: Progressing  Goal: Refrain from destructive acts on the environment or property  Outcome: Progressing  Goal: Control angry outbursts  Description: Interventions:  - Monitor patient closely, per order  - Ensure early verbal de-escalation  - Monitor prn medication needs  - Set reasonable/therapeutic limits, outline behavioral expectations, and consequences   - Provide a non-threatening milieu, utilizing the least restrictive interventions   Outcome: Progressing  Goal: Attend and participate in unit activities, including therapeutic, recreational, and educational groups  Description: Interventions:  - Provide therapeutic and educational activities daily, encourage attendance and participation, and document same in the medical record   Outcome: Progressing  Goal: Identify appropriate positive anger management techniques  Description: Interventions:  - Offer anger management and coping skills groups   - Staff will provide positive feedback for appropriate anger control  Outcome: Progressing     Problem: Alteration in Orientation  Goal: Treatment Goal: Demonstrate a reduction of confusion and improved orientation to person, place, time and/or situation upon discharge, according to optimum baseline/ability  Outcome: Progressing  Goal: Interact with staff daily  Description: Interventions:  - Assess and re-assess patient's level of orientation  - Engage patient in 1 on 1 interactions, daily, for a minimum of 15 minutes   - Establish rapport/trust with patient   Outcome: Progressing  Goal: Express concerns related to confused thinking related to:  Description: Interventions:  - Encourage patient to express feelings, fears, frustrations, hopes  - Assign consistent caregivers   - [place]/re-orient patient as needed  - Allow comfort items, as appropriate  - Provide visual cues, signs, etc    Outcome: Progressing  Goal: Allow medical examinations, as recommended  Description: Interventions:  - Provide physical/neurological exams and/or referrals, per provider   Outcome: Progressing  Goal: Cooperate with recommended testing/procedures  Description: Interventions:  - Determine need for ancillary testing  - Observe for mental status changes  - Implement falls/precaution protocol   Outcome: Progressing  Goal: Attend and participate in unit activities, including therapeutic, recreational, and educational groups  Description: Interventions:  - Provide therapeutic and educational activities daily, encourage attendance and participation, and document same in the medical record   - Provide appropriate opportunities for reminiscence   - Provide a consistent daily routine   - Encourage family contact/visitation   Outcome: Progressing  Goal: Complete daily ADLs, including personal hygiene independently, as able  Description: Interventions:  - Observe, teach, and assist patient with ADLS  Outcome: Progressing     Problem: Ineffective Coping  Goal: Participates in unit activities  Description: Interventions:  - Provide therapeutic environment   - Provide required programming   - Redirect inappropriate behaviors   Outcome: Progressing     Problem: DISCHARGE PLANNING  Goal: Discharge to home or other facility with appropriate resources  Description: INTERVENTIONS:  - Identify barriers to discharge w/patient and caregiver  - Arrange for needed discharge resources and transportation as appropriate  - Identify discharge learning needs (meds, wound care, etc )  - Arrange for interpretive services to assist at discharge as needed  - Refer to Case Management Department for coordinating discharge planning if the patient needs post-hospital services based on physician/advanced practitioner order or complex needs related to functional status, cognitive ability, or social support system  Outcome: Progressing

## 2020-09-08 NOTE — PROGRESS NOTES
09/08/20 1430   Activity/Group Checklist   Group   ([employer])   Attendance Attended   Attendance Duration (min) Greater than 60   Interactions Interacted appropriately   Affect/Mood Appropriate;Calm   Goals Achieved Identified feelings; Identified triggers; Discussed coping strategies; Able to listen to others; Able to engage in interactions

## 2020-09-08 NOTE — TREATMENT PLAN
Treatment Plan Reviw - 26 Chana CASTANEDA Day 56 y o  male MRN: [id number]     [facility] 220-01 Encounter: [id number]          Admit Date/Time:  9/4/2020  7:25 PM    Treatment Team: Attending Provider: Aristeo Meek MD; Patient Care Assistant: Dagmar Graham; Patient Care Assistant: Rosa M Manriquez; Registered Nurse: Jennifer Rolle RN; Care Manager: Nellie Conde RN; Recreational Therapist: Danae Broussard;  : Eusebio Reynolds    Diagnosis: Principal Problem:    Schizoaffective disorder, bipolar type ([facility] [address] )  Active Problems:    Cannabis abuse, continuous    Motor Activity: no abnormal movements     Patient Strengths: good physical health, special hobby/interest     Patient Barriers: resistance to treatment    Progress Toward Goals: ongoing    Recommended Treatment: discharge planning     Treatment Frequency: 1-2 times per week     Expected Discharge Date:     Discharge Plan: referrals as indicated     Treatment Plan Created/Updated By: Aristeo Meek MD

## 2020-09-08 NOTE — PROGRESS NOTES
Pt is visible on the unit, loud, pressured and restless  Good eye contact  Patient pacing up and down the halls, in and out of room and requested this Alejandra Postal come walk with him while we talk  Patient is labile and becomes irritable when discussing reason for admission  Pt denies SI and HI currently and states " the neighbor has a problems with me and once your labeled as crazy crisis makes it real easy to get here " Pt is elevated and unable to remain focused for long  Pt verbalized that he has been compliant with his medications but that he is " pissed off" because he has been canceled several times from his out pt appointments  Spoke with pt about current medications and pt refused stating " no way I don't need these " Provided pt with paper education and spoke at length with pt about medications  Pt did take medications willingly after conversation  Pt appears disheveled but showered  Poor boundaries and intrusive  Changing topics rapidly and sometimes word salad is evident as patient becomes upset  Pt retreated back to room to read the bible before falling asleep, pt has been sleeping since  About the make needs known  No sign or symptoms of distress  Continual monitoring maintained for safety

## 2020-09-08 NOTE — PLAN OF CARE
Problem: Alteration in Thoughts and Perception  Goal: Treatment Goal: Gain control of psychotic behaviors/thinking, reduce/eliminate presenting symptoms and demonstrate improved reality functioning upon discharge  Outcome: Progressing  Goal: Verbalize thoughts and feelings  Description: Interventions:  - Promote a nonjudgmental and trusting relationship with the patient through active listening and therapeutic communication  - Assess patient's level of functioning, behavior and potential for risk  - Engage patient in 1 on 1 interactions  - Encourage patient to express fears, feelings, frustrations, and discuss symptoms    - [place] patient to reality, help patient recognize reality-based thinking   - Administer medications as ordered and assess for potential side effects  - Provide the patient education related to the signs and symptoms of the illness and desired effects of prescribed medications  Outcome: Progressing  Goal: Refrain from acting on delusional thinking/internal stimuli  Description: Interventions:  - Monitor patient closely, per order   - Utilize least restrictive measures   - Set reasonable limits, give positive feedback for acceptable   - Administer medications as ordered and monitor of potential side effects  Outcome: Progressing  Goal: Agree to be compliant with medication regime, as prescribed and report medication side effects  Description: Interventions:  - Offer appropriate PRN medication and supervise ingestion; conduct AIMS, as needed   Outcome: Progressing  Goal: Attend and participate in unit activities, including therapeutic, recreational, and educational groups  Description: Interventions:  -Encourage Visitation and family involvement in care  Outcome: Progressing  Goal: Recognize dysfunctional thoughts, communicate reality-based thoughts at the time of discharge  Description: Interventions:  - Provide medication and psycho-education to assist patient in compliance and developing insight into his/her illness   Outcome: Progressing  Goal: Complete daily ADLs, including personal hygiene independently, as able  Description: Interventions:  - Observe, teach, and assist patient with ADLS  - Monitor and promote a balance of rest/activity, with adequate nutrition and elimination   Outcome: Progressing     Problem: Risk for Violence/Aggression Toward Others  Goal: Treatment Goal: Refrain from acts of violence/aggression during length of stay, and demonstrate improved impulse control at the time of discharge  Outcome: Progressing  Goal: Verbalize thoughts and feelings  Description: Interventions:  - Assess and re-assess patient's level of risk, every waking shift  - Engage patient in 1:1 interactions, daily, for a minimum of 15 minutes   - Allow patient to express feelings and frustrations in a safe and non-threatening manner   - Establish rapport/trust with patient   Outcome: Progressing  Goal: Refrain from harming others  Outcome: Progressing  Goal: Refrain from destructive acts on the environment or property  Outcome: Progressing  Goal: Control angry outbursts  Description: Interventions:  - Monitor patient closely, per order  - Ensure early verbal de-escalation  - Monitor prn medication needs  - Set reasonable/therapeutic limits, outline behavioral expectations, and consequences   - Provide a non-threatening milieu, utilizing the least restrictive interventions   Outcome: Progressing  Goal: Attend and participate in unit activities, including therapeutic, recreational, and educational groups  Description: Interventions:  - Provide therapeutic and educational activities daily, encourage attendance and participation, and document same in the medical record   Outcome: Progressing  Goal: Identify appropriate positive anger management techniques  Description: Interventions:  - Offer anger management and coping skills groups   - Staff will provide positive feedback for appropriate anger control  Outcome: Progressing     Problem: Alteration in Orientation  Goal: Treatment Goal: Demonstrate a reduction of confusion and improved orientation to person, place, time and/or situation upon discharge, according to optimum baseline/ability  Outcome: Progressing  Goal: Interact with staff daily  Description: Interventions:  - Assess and re-assess patient's level of orientation  - Engage patient in 1 on 1 interactions, daily, for a minimum of 15 minutes   - Establish rapport/trust with patient   Outcome: Progressing  Goal: Express concerns related to confused thinking related to:  Description: Interventions:  - Encourage patient to express feelings, fears, frustrations, hopes  - Assign consistent caregivers   - [place]/re-orient patient as needed  - Allow comfort items, as appropriate  - Provide visual cues, signs, etc    Outcome: Progressing  Goal: Allow medical examinations, as recommended  Description: Interventions:  - Provide physical/neurological exams and/or referrals, per provider   Outcome: Progressing  Goal: Cooperate with recommended testing/procedures  Description: Interventions:  - Determine need for ancillary testing  - Observe for mental status changes  - Implement falls/precaution protocol   Outcome: Progressing  Goal: Attend and participate in unit activities, including therapeutic, recreational, and educational groups  Description: Interventions:  - Provide therapeutic and educational activities daily, encourage attendance and participation, and document same in the medical record   - Provide appropriate opportunities for reminiscence   - Provide a consistent daily routine   - Encourage family contact/visitation   Outcome: Progressing  Goal: Complete daily ADLs, including personal hygiene independently, as able  Description: Interventions:  - Observe, teach, and assist patient with ADLS  Outcome: Progressing

## 2020-09-08 NOTE — PROGRESS NOTES
Patient was admitted to [facility] with belongings present  Patient's belongings inventoried by staff on a personal belongings checklist with patient   Belongings listed below:      Belongings to remain with in patient room:    1x pair of slides  1x shirt  1x long johns pair  2x shorts  2x underwear  4x hairties  1x bible      Belongings in contraband storage:    1x muscle top   1x shorts  1x pair of boots  1x hat      Belongings in contraband drawer:    1x leather bracelet  1x flashlight  1x lighter  1x watch  1x watch (riped band)       Belongings in hospital safe: (safe bag #: [id number])    $[id number] 00 in cash  1x key  1x [place]  1x [employer] gift card  1x [employer] gift card  1x [place] 's License  1x [facility] ID  1x Medicare card  1x [place] E[place] card   1x [employer] debit card  1x [employer] Plan  1x [facility] [employer] choices [place] [facility]

## 2020-09-08 NOTE — PROGRESS NOTES
PT identified his short term goals for the day as being; self-care, attending programming and using good coping skills

## 2020-09-08 NOTE — PROGRESS NOTES
Pt sleeping through out the night uninterrupted  No signs or symptoms of distress  Continual monitoring maintained

## 2020-09-08 NOTE — PROGRESS NOTES
09/08/20 0730   Activity/Group Checklist   Group   (Goal Planning and Communication)   Attendance Attended   Attendance Duration (min) 46-60   Interactions Interacted appropriately   Affect/Mood Appropriate;Bright;Calm   Goals Achieved Identified feelings; Discussed coping strategies; Able to listen to others; Able to engage in interactions

## 2020-09-08 NOTE — PROGRESS NOTES
09/08/20 1000   Activity/Group Checklist   Group   (Self-Reflection and Art Therapy Processing)   Attendance Attended   Attendance Duration (min) Greater than 60   Interactions Interacted appropriately   Affect/Mood Appropriate   Goals Achieved Identified feelings; Identified triggers; Discussed coping strategies; Identified resources and support systems; Able to listen to others; Able to engage in interactions

## 2020-09-08 NOTE — PROGRESS NOTES
Pt woke up irritable and demanding to speak with the provider, restless, slamming dresser drawers  Pt refused to have labs draw and stated " I'm not doing any of that until I speak with my doctor  Will continue to monitor

## 2020-09-08 NOTE — H&P
Psychiatric Evaluation - [address] Day 56 y o  male MRN: [id number]  Unit/Bed#: [facility] 220-01 Encounter: [id number]    Assessment/Plan   Principal Problem:    Schizoaffective disorder, bipolar type ([address] )  Active Problems:    Cannabis abuse, continuous    Plan:   Risks, benefits and possible side effects of Medications:   Risks, benefits, and possible side effects of medications explained to patient and patient verbalizes understanding  1  Admit to acute psychiatric level of care for safety and stability  2  Medication reconciliation  3  Individual, group, and milieu therapy  4  Safety and discharge planning a    Chief Complaint: "I don't want to go on living like this"    History of Present Illness     Patient is a 56 y o  male with extensive psychiatric history who presents on a 025 by the police after the neighbors called 911 due to disturbance and making threats  Patient became more agitated in the emergency room and more threatening  Patient reports he has been taking his lithium but refuses to take the Zyprexa  Patient also has been smoking marijuana but denies use of any other drugs  Patient has long history of bipolar illness and he gets outpatient services through the [facility]  Patient does reside with his mother and had multiple hospitalizations due to similar circumstances  Patient denies having any type of hallucination or delusion  He denies having any suicidal homicidal thoughts  Patient is extremely hyperverbal with flight of ideas and pressured speech  Psychiatric Review Of Systems:  sleep: yes  appetite changes: no  weight changes: no  energy/anergy: yes  interest/pleasure/anhedonia: no  somatic symptoms: no  anxiety/panic: no  opal: yes  guilty/hopeless: no  self injurious behavior/risky behavior: no    Historical Information     Past Psychiatric History: In Patient  Several admissions  [facility]    Past Suicide attempts: None  Past Violent behavior: YES  Past Psychiatric medication trial: Lithium    Substance Abuse History:  E-Cigarette/Vaping    E-Cigarette Use Never User       E-Cigarette/Vaping Substances    Nicotine No     THC No     CBD No     Flavoring No     Other No     Unknown No        Social History     Tobacco History     Smoking Status  Current Every Day Smoker Smoking Frequency  1 pack/day Smoking Tobacco Type  Cigars    Smokeless Tobacco Use  Never Used          Alcohol History     Alcohol Use Status  Yes Comment  whenever i want          Drug Use     Drug Use Status  Yes Types  Marijuana          Sexual Activity     Sexually Active  Not Currently Partners  Female          Activities of Daily Living    Not Asked               Additional Substance Use Detail     Questions Responses    Problems Due to Past Use of Alcohol?  No    Substance Use Assessment Denies substance use within the past 12 months    Alcohol Use Frequency 1 or 2 times/week    Cannabis frequency Daily    Comment: Daily on 9/7/2020     Cannabis method Other    Comment: Puts in food     Cocaine frequency Never used    Comment: Never used on 9/7/2020     Crack Cocaine Frequency Denies use in past 12 months    Methamphetamine Frequency Denies use in past 12 months    Narcotic Frequency Denies use in past 12 months    Benzodiazepine Frequency Denies use in past 12 months    Amphetamine frequency Denies use in past 12 months    Barbituate Frequency Denies use use in past 12 months    Inhalant frequency Never used    Comment: Never used on 9/7/2020     Hallucinogen frequency Never used    Comment: Never used on 9/7/2020     Ecstasy frequency Never used    Comment: Never used on 9/7/2020     Other drug frequency Never used    Comment: Never used on 9/7/2020     Opiate frequency Denies use in past 12 months    Last reviewed by Bradley Rob on 9/5/2020        I have assessed this patient for substance use within the past 12 months    Family Psychiatric History: Psychiatric Illness Father Illness: bipolar    Social History:  Education: high school diploma/GED  Learning Disabilities: special education    Past Medical History:   Diagnosis Date    Anxiety     Astigmatism     DJD (degenerative joint disease)     Gait abnormality     Head injury     History of colonic polyps     Hydrocele     Hyperlipidemia     Hypertension     Macular drusen     Presbyopia     [facility]oa[facility]ive disorder [facility] [address] [address]     Sepsis ([facility] [address] [facility]     Tobacco abuse     Umbilical hernia     Vitreous syneresis        Medical Review Of Systems:  Pertinent items are noted in HPI      Meds/Allergies   all current active meds have been reviewed  Allergies   Allergen Reactions    Haldol [Haloperidol]     Navane [Thiothixene]     Other      Adhesive tape         Objective   Vital signs in last 24 hours:  Temp:  [98 1 °F (36 7 °C)-98 6 °F (37 °C)] 98 1 °F (36 7 °C)  HR:  [] 97  Resp:  [18] 18  BP: (136-151)/(91-92) 151/91      Intake/Output Summary (Last 24 hours) at 9/8/2020 1225  Last data filed at 9/7/2020 1721  Gross per 24 hour   Intake 340 ml   Output    Net 340 ml       Mental Status Evaluation:  Appearance:  casually dressed   Behavior:  guarded   Speech:  pressured   Mood:  anxious   Affect:  constricted   Language: repeating phrases   Thought Process:  circumstantial   Thought Content:  obsessions   Perceptual Disturbances: None   Risk Potential: Suicidal Ideations none  Homicidal Ideations none  Potential for Aggression No   Sensorium:  person and place   Memory:  recent and remote memory grossly intact   Consciousness:  awake    Attention: attention span appeared shorter than expected for age   Intellect: not examined   Fund of Knowledge: vocabulary: limited   Insight:  limited   Judgment: limited   Muscle Strength and Tone: face and neck   Gait/Station: normal gait/station   Motor Activity: no abnormal movements     Lab Results: I have personally reviewed all pertinent laboratory/tests results  Patient Strengths/Assets: special hobby/interest, supportive family/friends    Patient Barriers/Limitations: resistance to treatment    Counseling / Coordination of Care  Total floor / unit time spent today 60 minutes  Greater than 50% of total time was spent with the patient and / or family counseling and / or coordination of care   A description of the counseling / coordination of care:

## 2020-09-09 PROCEDURE — 99232 SBSQ HOSP IP/OBS MODERATE 35: CPT | Performed by: PSYCHIATRY & NEUROLOGY

## 2020-09-09 RX ADMIN — LITHIUM CARBONATE 600 MG: 300 TABLET, EXTENDED RELEASE ORAL at 08:14

## 2020-09-09 RX ADMIN — LITHIUM CARBONATE 600 MG: 300 TABLET, EXTENDED RELEASE ORAL at 20:52

## 2020-09-09 NOTE — PLAN OF CARE
Problem: DISCHARGE PLANNING  Goal: Discharge to home or other facility with appropriate resources  Description: INTERVENTIONS:  - Identify barriers to discharge w/patient and caregiver  - Arrange for needed discharge resources and transportation as appropriate  - Identify discharge learning needs (meds, wound care, etc )  - Arrange for interpretive services to assist at discharge as needed  - Refer to Case Management Department for coordinating discharge planning if the patient needs post-hospital services based on physician/advanced practitioner order or complex needs related to functional status, cognitive ability, or social support system  Outcome: Not Progressing     Patient did agree to meet with CM to complete psychosocial assessment, however, refused to sign releases for his mother, psychiatrist and PCP  CM will continue to work with patient in arranging discharge planning needs

## 2020-09-09 NOTE — PROGRESS NOTES
Pt visible on unit, attending groups  Social with select peers  Loud and labile at times but very easily redirectable  Wearing excessive accessories  Reading Bible loudly while in room  Behavior is appropriate while on unit  Refused vitamins but was compliant with lithium  Monitoring continues

## 2020-09-09 NOTE — DISCHARGE INSTR - APPOINTMENTS
You identified Dr Laury Almazan with the [facility] as your primary care physician but declined a follow up appointment at this time  Your discharge will be faxed to this provider for continuity of care  You identified Dr Peter Cardenas with the [facility] as your psychiatrist but declined a follow up appointment at this time  Your discharge will be faxed to this provider for continuity of care  Colten Dinero RN, our [employer], will be calling you after your discharge, on the phone number that you provided  She will be available as an additional support, if needed  If you wish to speak with her, you may contact [facility] at [phone]

## 2020-09-09 NOTE — PROGRESS NOTES
09/09/20 1000   Activity/Group Checklist   Group   (Coping Skills and Art Therapy Processing)   Attendance Attended   Attendance Duration (min) Greater than 60   Interactions Interacted appropriately   Affect/Mood Appropriate;Bright;Calm   Goals Achieved Identified feelings; Identified triggers; Discussed coping strategies; Identified resources and support systems; Able to listen to others; Able to engage in interactions

## 2020-09-09 NOTE — PROGRESS NOTES
Patient has been awake since approx 0430 in his room cleaning  Calm and controlled  No irritability or outbursts  Continuous safety checks maintained

## 2020-09-09 NOTE — PROGRESS NOTES
09/09/20 0730   Activity/Group Checklist   Group   (Goal Planning and Communication)   Attendance Attended   Attendance Duration (min) 46-60   Interactions Interacted appropriately   Affect/Mood Appropriate;Bright;Calm   Goals Achieved Identified feelings; Discussed coping strategies; Able to listen to others; Able to engage in interactions

## 2020-09-09 NOTE — PROGRESS NOTES
09/09/20 1300   Team Meeting   Meeting Type Tx Team Meeting   Initial Conference Date 09/09/20   Next Conference Date 10/09/20   Team Members Present   Team Members Present Physician;Nurse;   Physician Team Member Dr Donaldo Brewster Team Member Lukasz Altman RN   Social Work Team Member JAMIE Bowen   Patient/Family Present   Patient Present Yes   Patient's Family Present No     Treatment plan reviewed with patient  Patient expressed understanding and agreed to sign the document  Copy was provided to patient  No other questions or concerns noted  Treatment team will continue to monitor patient's progress and adjust treatment plan as needed

## 2020-09-09 NOTE — CONSULTS
Consult- Zelalem Quintero Day 1960, 56 y o  male MRN: [id number]    Unit/Bed#: [facility] 220-01 Encounter: [id number]    Primary Care Provider: No primary care provider on file  Date and time admitted to hospital: 9/4/2020  7:25 PM      Inpatient consult for Medical Clearance for [facility] patient  Consult performed by: Haider Fair PA-C  Consult ordered by: Dario Jones MD          * Schizoaffective disorder, bipolar type [facility])  Assessment & Plan  · Continue management per psychiatry team      Patient was seen by Dr Alice Ramirez, he will continue to management the patient  Please contact him for any questions/concerns

## 2020-09-09 NOTE — DISCHARGE INSTR - OTHER ORDERS
You are being discharged to your mother's home located at [address], [address] (Ph# [phone])  Triggers you have identified during your hospitalization that led to your admission was increased aggression and bizarre behaviors  Coping skills you have identified during your hospitalization include listening to music and reading  If you are unable to deal with your distressed mood alone please contact your mother, Sheila Graham at [phone]  If that is not effective and you continue to have suicidal ideations and/or are in a distressed mood please contact [facility] ([facility]) AT [place] at [phone], dial [phone] or go to the nearest emergency center  Crisis Information:  [facility] Crisis Hotline: 831.787.3882  *Peer Hotline (M-F 6-10pm): 7-853.515.7123  * Alcohol Anonymous: 946.450.8954  * D&A Commission: ((53) 988-556 Suicide Prevention Lifeline:  6-912.258.2859  *[facility]ori on Mental Illness ([place]) HELPLINE: 387.337.1753/Website: www caroline org  *Substance Abuse and [address] [facility]) [employer], which is a confidential, free, 24-hour-a-day, 365-day-a-year, information service for individuals and family members facing mental health and/or substance use disorders  This service provides referrals to local treatment facilities, support groups, and community-based organizations  Callers can also order free publications and other information  Call 8-977.211.4808/Website: www [facility] gov  *[facility] 2-1-1: This is a toll free, confidential, 24-hour-a-day service which connects you to a community  in your area who can help you find services and resources that are available to you locally and provide critical services that can improve and save lives  Call: 80  /Website: https://[employer]/        What you need to know aboutcoronavirus disease 2019 (COVID-19)      What is coronavirus disease 2019 (COVID-19)?    Coronavirus disease 2019 (COVID-19) is a respiratory illness that can spread from person to person  The virus that causes COVID-19 is a novel coronavirus that was first identified during an investigation into an outbreak in [place], [place]  Can people in the [place]  get COVID-19? Yes  COVID-19 is spreading from person to person in parts of the [place]  Risk of infection with COVID-19 is higher for people who are close contacts of someone known to have COVID-19, for example healthcare workers, or household members  Other people at higher risk for infection are those who live in or have recently been in an area with ongoing spread of COVID-19  Learn more about places with ongoing spread at   [facility]  html#geographic  Have there been cases of COVID-19 in the [place] ?   Yes  The first case of COVID-19 in the [place] was reported on January 21, 2020  The current count of cases of COVID-19 in the [place] is available on [employer] at [facility]  How does COVID-19 spread? The virus that causes COVID-19 probably emerged from an animal source, but is now spreading from person to person  The virus is thought to spread mainly between people who are in close contact with one another (within about 6 feet) through respiratory droplets produced when an infected person coughs or sneezes  It also may be possible that a person can get COVID-19 by touching a surface or object that has the virus on it and then touching their own mouth, nose, or possibly their eyes, but this is not thought to be the main way the virus spreads  Learn what is known about the spread of newly emerged coronaviruses at [facility]  What are the symptoms of COVID-19?   Patients with COVID-19 have had mild to severe respiratory illness with symptoms of  1  fever  2  cough  3  shortness of breath  What are severe complications from this virus? Some patients have pneumonia in both lungs, multi-organ failure and in some cases death  How can I help protect myself? People can help protect themselves from respiratory illness with everyday preventive actions  1  Avoid close contact with people who are sick  2  Avoid touching your eyes, nose, and mouth withunwashed hands  3  Wash your hands often with soap and water for at least 20 seconds  Use an alcohol-based hand  that contains at least 60% alcohol if soap and water are not available  If you are sick, to keep from spreading respiratory illness to others, you should  1  Stay home when you are sick  2  Cover your cough or sneeze with a tissue, then throw the tissue in the trash  3  Clean and disinfect frequently touched objectsand surfaces  What should I do if I recently traveled from an area with ongoing spread of COVID-19? If you have traveled from an affected area, there may be restrictions on your movements for up to 2 weeks  If you develop symptoms during that period (fever, cough, trouble breathing), seek medical advice  Call the office of your health care provider before you go, and tell them about your travel and your symptoms  They will give you instructions on how to get care without exposing other people to your illness  While sick, avoid contact with people, don't go out and delay any travel to reduce the possibility of spreading illness to others  Is there a vaccine? There is currently no vaccine to protect against COVID-19  The best way to prevent infection is to take everyday preventive actions, like avoiding close contact with people who are sick and washing your hands often  Is there a treatment? There is no specific antiviral treatment for COVID-19  People with COVID-19 can seek medical care to helprelieve symptoms    For more information: www cdc gov/XOBRC36VI 908689-E 03/03/2020         What to do if you are sick withcoronavirus disease 2019 (COVID-19)      If you are sick with COVID-19 or suspect you are infected with the virus that causes COVID-19, follow the steps below to help prevent the disease from spreading to people in your home and community  Stay home except to get medical care   You should restrict activities outside your home, except for getting medical care  Do not go to work, school, or public areas  Avoid using public transportation, ride-sharing, or taxis  Separate yourself from other people and animals inyour home  People: As much as possible, you should stay in a specific room and away from other people in your home  Also, you should use a separate bathroom, if available  Animals: Do not handle pets or other animals while sick  See COVID-19 and Animals for more information  Call ahead before visiting your doctor   If you have a medical appointment, call the healthcare provider and tell them that you have or may have COVID-19  This will help the healthcare provider's office take steps to keep other people from getting infected or exposed  Wear a facemask  You should wear a facemask when you are around other people (e g , sharing a room or vehicle) or pets and before you enter a healthcare provider's office  If you are not able to wear a facemask (for example, because it causes trouble breathing), then people who live with you should not stay in the same room with you, or they should wear a facemask if they enteryour room  Cover your coughs and sneezes   Cover your mouth and nose with a tissue when you cough or sneeze  Throw used tissues in a lined trash can; immediately wash your hands with soap and water for at least 20 seconds or clean your hands with an alcohol-based hand  that contains at least 60 to 95% alcohol, covering all surfaces of your hands and rubbing them together until they feel dry  Soap and water should be used preferentially if hands are visibly dirty    Avoid sharing personal household items   You should not share dishes, drinking glasses, cups, eating utensils, towels, or bedding with other people or pets in your home  After using these items, they should be washed thoroughly with soap and water  Clean your hands often  Wash your hands often with soap and water for at least 20 seconds  If soap and water are not available, clean your hands with an alcohol-based hand  that contains at least 60% alcohol, covering all surfaces of your hands and rubbing them together until they feel dry  Soap and water should be used preferentially if hands are visibly dirty  Avoid touching your eyes, nose, and mouth with unwashed hands  Clean all "high-touch" surfaces every day  High touch surfaces include counters, tabletops, doorknobs, bathroom fixtures, toilets, phones, keyboards, tablets, and bedside tables  Also, clean any surfaces that may have blood, stool, or body fluids on them  Use a household cleaning spray or wipe, according to the label instructions  Labels contain instructions for safe and effective use of the cleaning product including precautions you should take when applying the product, such as wearing gloves and making sure you have good ventilation during use of the product  Monitor your symptoms  Seek prompt medical attention if your illness is worsening (e g , difficulty breathing)  Before seeking care, call your healthcare provider and tell them that you have, or are being evaluated for, COVID-19  Put on a facemask before you enter the facility  These steps will help the healthcare provider's office to keep other people in the office or waiting room from getting infectedor exposed  Ask your healthcare provider to call the local or [facility] health department  Persons who are placed under active monitoring or facilitated self-monitoring should follow instructions provided by their local health department or occupational health professionals, as appropriate     If you have a medical emergency and need to call 911, notify the dispatch personnel that you have, or are being evaluated for COVID-19  If possible, put on a facemask before emergency medical services arrive  Discontinuing home isolation  Patients with confirmed COVID-19 should remain under home isolation precautions until the risk of secondary transmission to others is thought to be low  The decision to discontinue home isolation precautions should be made on a case-by-case basis, in consultation with healthcare providers and state and local health departments  For more information: www cdc gov/RNSEP97HK 221445-V 02/24/2020         Stay home when you are sick,except to get medical care  Wash your hands often with soap and water for at least 20 seconds  Cover your cough or sneeze with a tissue, then throw the tissue in the trash  Clean and disinfect frequently touched objects and surfaces  Avoid touching your eyes, nose, and mouth  STOP THE SPREAD OF GERMS  For more information: www cdc gov/COVID19 Avoid close contact with people who are sick  Help prevent the spread of respiratory diseases like COVID-19

## 2020-09-09 NOTE — PROGRESS NOTES
Progress Note - Nithya Deluca Day 56 y o  male MRN: [id number]    Unit/Bed#: [email or web address] 220-01 Encounter: [id number]        Subjective:   Patient seen and examined at bedside after reviewing the chart and discussing the case with the caring staff  Patient examined at bedside  Patient has no acute issues but has been refusing lab work despite explanation for the labs  Physical Exam   Vitals: Blood pressure 150/96, pulse (!) 109, temperature 98 8 °F (37 1 °C), temperature source Temporal, resp  rate 18, height 5' 9" (1 753 m), weight 88 9 kg (196 lb), SpO2 97 %  ,Body mass index is 28 94 kg/m²  Constitutional: Patient appears well-developed  HEENT: PERR, EOMI, MMM  Cardiovascular: Normal rate and regular rhythm  Pulmonary/Chest: Effort normal and breath sounds normal    Abdomen: Soft, + BS, NT    Assessment/Plan:  Nithya Graham is a(n) 56 y o  male with schizoaffective disorder bipolar type     1  Cardiac with history of hypertension and dyslipidemia   Patient's diastolic blood pressure was found to be elevated although patient is not on any antihypertensive medications  Will continue to closely monitor  Patient is not on anything for Hyperlipidemia  2  Tobacco abuse   Patient has been put on nicotine transdermal patch  3  Alcohol abuse  Thiamine, folic acid and multivitamin  4  DJD/osteoarthritis   Tylenol on as needed basis  5  Gait abnormality   Stable  6  Vitamin-D deficiency   Vitamin D3 1000U daily  I will check vitamin-D levels for the patient  7  BPH  Flomax 0 4mg once daily      The patient was discussed with Dr Richi Moulton and he is in agreement with the above note

## 2020-09-09 NOTE — PROGRESS NOTES
Progress Note - Teddy [address] K Day 56 y o  male MRN: [id number]  Unit/Bed#: [facility] 220-01 Encounter: [id number]    Assessment/Plan   Principal Problem:    Schizoaffective disorder, bipolar type ([address] )  Active Problems:    Cannabis abuse, continuous      Behavior over the last 24 hours:  Unchanged  Patient continues to exhibit signs of hypomanic state  His hyperverbal with flight of ideas and labile mood but he is overall redirectable  Discussed getting a lithium level by Saturday and if it is good will plan for discharge on Monday and patient is agreeable with the plan  Sleep: normal  Appetite: normal  Medication side effects: No  ROS: no complaints    Mental Status Evaluation:  Appearance:  bearded   Behavior:  normal   Speech:  loud and tangential   Mood:  labile   Affect:  increased in intensity   Thought Process:  circumstantial and tangential   Thought Content:  delusions  grandiose   Perceptual Disturbances: None   Risk Potential: Suicidal Ideations none  Homicidal Ideations none  Potential for Aggression No   Sensorium:  person and place   Memory:  recent and remote memory grossly intact   Consciousness:  awake    Attention: attention span appeared shorter than expected for age   Insight:  limited   Judgment: limited   Gait/Station: normal balance   Motor Activity: no abnormal movements     Progress Toward Goals: ongoing    Recommended Treatment: Continue with group therapy, milieu therapy and occupational therapy  Risks, benefits and possible side effects of Medications:   Risks, benefits, and possible side effects of medications explained to patient and patient verbalizes understanding  Medications: continue current psychiatric medications  Labs: I have personally reviewed all pertinent laboratory/tests results  Counseling / Coordination of Care  Total floor / unit time spent today 25 minutes   Greater than 50% of total time was spent with the patient and / or family counseling and / or coordination of care   A description of the counseling / coordination of care:

## 2020-09-09 NOTE — PROGRESS NOTES
Patient very loud, labile,  pacing the halls singing at the start of the shift  Pleasant toward staff  Then went to his room and was reading very loudy to self  Med compliant  No SI/HI  No irritability  Will continue to observe

## 2020-09-09 NOTE — PLAN OF CARE
Problem: Alteration in Thoughts and Perception  Goal: Treatment Goal: Gain control of psychotic behaviors/thinking, reduce/eliminate presenting symptoms and demonstrate improved reality functioning upon discharge  Outcome: Progressing  Goal: Verbalize thoughts and feelings  Description: Interventions:  - Promote a nonjudgmental and trusting relationship with the patient through active listening and therapeutic communication  - Assess patient's level of functioning, behavior and potential for risk  - Engage patient in 1 on 1 interactions  - Encourage patient to express fears, feelings, frustrations, and discuss symptoms    - [place] patient to reality, help patient recognize reality-based thinking   - Administer medications as ordered and assess for potential side effects  - Provide the patient education related to the signs and symptoms of the illness and desired effects of prescribed medications  Outcome: Progressing  Goal: Refrain from acting on delusional thinking/internal stimuli  Description: Interventions:  - Monitor patient closely, per order   - Utilize least restrictive measures   - Set reasonable limits, give positive feedback for acceptable   - Administer medications as ordered and monitor of potential side effects  Outcome: Progressing  Goal: Agree to be compliant with medication regime, as prescribed and report medication side effects  Description: Interventions:  - Offer appropriate PRN medication and supervise ingestion; conduct AIMS, as needed   Outcome: Progressing  Goal: Attend and participate in unit activities, including therapeutic, recreational, and educational groups  Description: Interventions:  -Encourage Visitation and family involvement in care  Outcome: Progressing  Goal: Recognize dysfunctional thoughts, communicate reality-based thoughts at the time of discharge  Description: Interventions:  - Provide medication and psycho-education to assist patient in compliance and developing insight into his/her illness   Outcome: Progressing  Goal: Complete daily ADLs, including personal hygiene independently, as able  Description: Interventions:  - Observe, teach, and assist patient with ADLS  - Monitor and promote a balance of rest/activity, with adequate nutrition and elimination   Outcome: Progressing     Problem: Risk for Violence/Aggression Toward Others  Goal: Treatment Goal: Refrain from acts of violence/aggression during length of stay, and demonstrate improved impulse control at the time of discharge  Outcome: Progressing  Goal: Verbalize thoughts and feelings  Description: Interventions:  - Assess and re-assess patient's level of risk, every waking shift  - Engage patient in 1:1 interactions, daily, for a minimum of 15 minutes   - Allow patient to express feelings and frustrations in a safe and non-threatening manner   - Establish rapport/trust with patient   Outcome: Progressing  Goal: Refrain from harming others  Outcome: Progressing  Goal: Refrain from destructive acts on the environment or property  Outcome: Progressing  Goal: Control angry outbursts  Description: Interventions:  - Monitor patient closely, per order  - Ensure early verbal de-escalation  - Monitor prn medication needs  - Set reasonable/therapeutic limits, outline behavioral expectations, and consequences   - Provide a non-threatening milieu, utilizing the least restrictive interventions   Outcome: Progressing  Goal: Attend and participate in unit activities, including therapeutic, recreational, and educational groups  Description: Interventions:  - Provide therapeutic and educational activities daily, encourage attendance and participation, and document same in the medical record   Outcome: Progressing  Goal: Identify appropriate positive anger management techniques  Description: Interventions:  - Offer anger management and coping skills groups   - Staff will provide positive feedback for appropriate anger control  Outcome: Progressing     Problem: Alteration in Orientation  Goal: Treatment Goal: Demonstrate a reduction of confusion and improved orientation to person, place, time and/or situation upon discharge, according to optimum baseline/ability  Outcome: Progressing  Goal: Interact with staff daily  Description: Interventions:  - Assess and re-assess patient's level of orientation  - Engage patient in 1 on 1 interactions, daily, for a minimum of 15 minutes   - Establish rapport/trust with patient   Outcome: Progressing  Goal: Express concerns related to confused thinking related to:  Description: Interventions:  - Encourage patient to express feelings, fears, frustrations, hopes  - Assign consistent caregivers   - [place]/re-orient patient as needed  - Allow comfort items, as appropriate  - Provide visual cues, signs, etc    Outcome: Progressing  Goal: Allow medical examinations, as recommended  Description: Interventions:  - Provide physical/neurological exams and/or referrals, per provider   Outcome: Progressing  Goal: Cooperate with recommended testing/procedures  Description: Interventions:  - Determine need for ancillary testing  - Observe for mental status changes  - Implement falls/precaution protocol   Outcome: Progressing  Goal: Attend and participate in unit activities, including therapeutic, recreational, and educational groups  Description: Interventions:  - Provide therapeutic and educational activities daily, encourage attendance and participation, and document same in the medical record   - Provide appropriate opportunities for reminiscence   - Provide a consistent daily routine   - Encourage family contact/visitation   Outcome: Progressing  Goal: Complete daily ADLs, including personal hygiene independently, as able  Description: Interventions:  - Observe, teach, and assist patient with ADLS  Outcome: Progressing     Problem: Ineffective Coping  Goal: Participates in unit activities  Description: Interventions:  - Provide therapeutic environment   - Provide required programming   - Redirect inappropriate behaviors   Outcome: Progressing     Problem: DISCHARGE PLANNING  Goal: Discharge to home or other facility with appropriate resources  Description: INTERVENTIONS:  - Identify barriers to discharge w/patient and caregiver  - Arrange for needed discharge resources and transportation as appropriate  - Identify discharge learning needs (meds, wound care, etc )  - Arrange for interpretive services to assist at discharge as needed  - Refer to Case Management Department for coordinating discharge planning if the patient needs post-hospital services based on physician/advanced practitioner order or complex needs related to functional status, cognitive ability, or social support system  Outcome: Progressing

## 2020-09-09 NOTE — PROGRESS NOTES
09/09/20 1215   Patient Intake   Living Arrangement House;Lives with someone  (Patient reports he lives with his mother, Hossein Flores, in her home located in [place], [place]  Patient reports he is able to return home with her  Patient did not sign a release at this time for CM to communicate with her )   Can patient return home? Yes   Address to be Discharge to: [address] [facility], [address] [facility] [address]   Patient's Telephone Number [phone]  (Mother's home phone)   Access to Firearms No   Type of Work Disabled   Work History Disabled   School Grade/Year Other (Comment)  (Patient reports he graduated high school  Patient also reports he served for 2 years in the [employer] during peactime and is connected with the [place] in [address] [facility])   Admission Status   Status of Admission 201  [facility])   Debra 36 N/A   Patient History   Presenting Problems Per crisis: "Pt presented via police due to bizarre and aggressive bxs,  did petition a [id number] for same  Pt was sedated shortly after arrival due to aggressive bxs but is now medically clear and much calmer  Pt is well known to this writer and is not at his baseline  Pt is oriented to name, place, year but is still disoriented to reality and is experiencing paranoid delusions and is difficult to speak to because he escalates quickly to a defensive angry state or a silly non-sensical state  Pt is tangential, w/ flight of ideas  Pt states he has not been taking any of his meds except his lithium which he says he has been snorting  Pt goes from angry and yelling to being tearful from minute to minute and is floridly psychotic  Unable to further assess due to same  Pt's 302 will remain in place, bed search in progress  Crisis requested a psych consult for med mgt while in the ED "   Treatment History Patient has had multiple psychiatric admissions in the past, most recently 12/18/19-01/02/20 at [facility] on the Adult [facility]     Currently in Treatment Yes Current Psychiatrist/Therapist Dr Edgar Wilcox at the [facility] in [facility] ([phone])   Medical Problems See medical consult   Legal Issues Denies   Substance Abuse Yes (See [address] History section for detail)  (UDS+ THC)   Crisis Info   Release of Information Signed No  (Patient declined to sign any ROIs at this time)     CM met and spoke with patient to complete psychosocial assessment this morning  Patient presented hyperverbal, religiously preoccupied and labile in his mood  Patient reports he was brought to the hospital by police because his neighbors were complaining about him  He is unsure what he did at home to make the neighbors complain  Per notes, patient was aggressive in the community and brought it because of this  Patient reports his strengths are that he has good community supports and he is a caring person  Patient reports he is resistive to treatment as his limitation  Patient reports his coping skills are listening to music and reading  Patient reports he was compliant with his medications in the community, noting Lithium is the only medication that works for him  Patient denies SI, HI, AH, VH and delusions  Patient does admit to having mild paranoia at baseline  Patient denies depression and anxiety at this time, but notes that his anxiety fluctuates throughout the day  Patient denies access to guns in the community  Patient also denies current or past trauma/abuse, noting that his family has always been good to him  Patient denies that he has any drug or alcohol abuse issues, however, patient did have a +UDS for THC  Patient denies any other addictions  Patient denies current legal issues  Patient reports he is single, never  and has no children  Patient notes his mother, Kenny Lynch, is very supportive of him and that his father passed away in 2010  Patient notes he also has a sister and 2 brothers who are also supportive in the community   Patient reports he is living with his mom and can return at time of discharge  Patient also notes that he drives but will need assistance with transportation as he is unsure he can get a ride home from the hospital  Patient reports he collects SSD about $800/month and also gets food stamps  Patient noted that if any medications need to be sent out he would like them to be sent to [place], however, did note that he wants to approve of the medications before they are sent  Patient reports his PCP and Psychiatrist is through the [facility] in [facility] and would like to set up his own appointments as he is particular in what day(s)/time(s) he will take an appointment  Patient's PCP is Dr Eugene Johnson and psychiatrist is Dr Jordana Obando  Patient was not agreeable to signing any ROIs at this time  CM will continue to monitor patient's progress and assist with discharge planning needs

## 2020-09-09 NOTE — PROGRESS NOTES
09/09/20 0775   Team Meeting   Meeting Type Daily Rounds   Initial Conference Date 09/09/20   Patient/Family Present   Patient Present No   Patient's Family Present No     Team Members Present:   MD Gerson Mirza, RN  Ina Powell, BSW  [facility], [facility]    Manic  Cooperative  Threatened neighbors  Loud  Reads Bible aloud  Good personal hygiene  Feels he does not need labs  Irritable, at times  Stubborn  Wants only Lithium  DC on Monday to live with mom  VA benefits  Involved with his Denominational

## 2020-09-09 NOTE — PROGRESS NOTES
09/09/20 1300   Activity/Group Checklist   Group   (Creative Expressions)   Attendance Attended   Attendance Duration (min) 46-60   Interactions Interacted appropriately   Affect/Mood Appropriate;Bright   Goals Achieved Identified feelings; Able to listen to others; Able to engage in interactions

## 2020-09-10 PROCEDURE — 99232 SBSQ HOSP IP/OBS MODERATE 35: CPT | Performed by: NURSE PRACTITIONER

## 2020-09-10 RX ORDER — GINSENG 100 MG
1 CAPSULE ORAL 2 TIMES DAILY PRN
Status: DISCONTINUED | OUTPATIENT
Start: 2020-09-10 | End: 2020-09-21 | Stop reason: HOSPADM

## 2020-09-10 RX ADMIN — DIAZEPAM 5 MG: 5 TABLET ORAL at 14:55

## 2020-09-10 RX ADMIN — LITHIUM CARBONATE 600 MG: 300 TABLET, EXTENDED RELEASE ORAL at 20:44

## 2020-09-10 RX ADMIN — LITHIUM CARBONATE 600 MG: 300 TABLET, EXTENDED RELEASE ORAL at 08:58

## 2020-09-10 RX ADMIN — DIAZEPAM 5 MG: 5 TABLET ORAL at 21:18

## 2020-09-10 NOTE — PROGRESS NOTES
Progress Note - Ana María Graham 56 y o  male MRN: [id number]    Unit/Bed#: [facility] 220-01 Encounter: [id number]        Subjective:   Patient seen and examined at bedside after reviewing the chart and discussing the case with the caring staff  Patient examined at bedside  Patient reports skin tags on his trunk the bother him occasionally bleed from friction  Patient states that he would like them removed and will inquire about this next time he is at the [facility]  Physical Exam   Vitals: Blood pressure 158/95, pulse 87, temperature 97 9 °F (36 6 °C), temperature source Temporal, resp  rate 18, height 5' 9" (1 753 m), weight 88 9 kg (196 lb), SpO2 98 %  ,Body mass index is 28 94 kg/m²  Constitutional: Patient appears well-developed  HEENT: PERR, EOMI, MMM  Cardiovascular: Normal rate and regular rhythm  Pulmonary/Chest: Effort normal and breath sounds normal    Abdomen: Soft, + BS, NT  Skin:  Multiple skin tags 1-2 cm in size on patient's trunk  One skin tag on left lower back has dried blood  Assessment/Plan:  Ana María Graham is a(n) 56 y o  male with schizoaffective disorder bipolar type     1  Cardiac with history of hypertension and dyslipidemia   Patient's diastolic blood pressure was found to be elevated although patient is not on any antihypertensive medications  Will continue to closely monitor  Patient is not on anything for Hyperlipidemia  2  Tobacco abuse   Patient has been put on nicotine transdermal patch  3  Alcohol abuse  Thiamine, folic acid and multivitamin  4  DJD/osteoarthritis   Tylenol on as needed basis  5  Gait abnormality   Stable  6  Vitamin-D deficiency   Vitamin D3 1000U daily  I will check vitamin-D levels for the patient  7  BPH  Flomax 0 4mg once daily    8  Skin tags  Patient wants to inquire about removing his skin tags at the [address] [place] [address]  Bacitracin ointment as needed for open/bleeding skin tags      The patient was discussed with Dr Mortimer Pluck and he is in agreement with the above note

## 2020-09-10 NOTE — PROGRESS NOTES
09/10/20 0900   Team Meeting   Meeting Type Daily Rounds   Initial Conference Date 09/10/20   Team Members Present   Team Members Present Physician;Nurse;   Patient/Family Present   Patient Present No   Patient's Family Present No     Awake since 3 AM  Labile, pacing and irritable at times  Very hyperverbal and pressured  Resistant to other medications other than Lithium  Refused PRN Valium

## 2020-09-10 NOTE — PLAN OF CARE
Problem: Alteration in Thoughts and Perception  Goal: Treatment Goal: Gain control of psychotic behaviors/thinking, reduce/eliminate presenting symptoms and demonstrate improved reality functioning upon discharge  Outcome: Progressing  Goal: Verbalize thoughts and feelings  Description: Interventions:  - Promote a nonjudgmental and trusting relationship with the patient through active listening and therapeutic communication  - Assess patient's level of functioning, behavior and potential for risk  - Engage patient in 1 on 1 interactions  - Encourage patient to express fears, feelings, frustrations, and discuss symptoms    - [place] patient to reality, help patient recognize reality-based thinking   - Administer medications as ordered and assess for potential side effects  - Provide the patient education related to the signs and symptoms of the illness and desired effects of prescribed medications  Outcome: Progressing  Goal: Refrain from acting on delusional thinking/internal stimuli  Description: Interventions:  - Monitor patient closely, per order   - Utilize least restrictive measures   - Set reasonable limits, give positive feedback for acceptable   - Administer medications as ordered and monitor of potential side effects  Outcome: Progressing  Goal: Agree to be compliant with medication regime, as prescribed and report medication side effects  Description: Interventions:  - Offer appropriate PRN medication and supervise ingestion; conduct AIMS, as needed   Outcome: Progressing  Goal: Attend and participate in unit activities, including therapeutic, recreational, and educational groups  Description: Interventions:  -Encourage Visitation and family involvement in care  Outcome: Progressing  Goal: Recognize dysfunctional thoughts, communicate reality-based thoughts at the time of discharge  Description: Interventions:  - Provide medication and psycho-education to assist patient in compliance and developing insight into his/her illness   Outcome: Progressing  Goal: Complete daily ADLs, including personal hygiene independently, as able  Description: Interventions:  - Observe, teach, and assist patient with ADLS  - Monitor and promote a balance of rest/activity, with adequate nutrition and elimination   Outcome: Progressing     Problem: Risk for Violence/Aggression Toward Others  Goal: Treatment Goal: Refrain from acts of violence/aggression during length of stay, and demonstrate improved impulse control at the time of discharge  Outcome: Progressing  Goal: Verbalize thoughts and feelings  Description: Interventions:  - Assess and re-assess patient's level of risk, every waking shift  - Engage patient in 1:1 interactions, daily, for a minimum of 15 minutes   - Allow patient to express feelings and frustrations in a safe and non-threatening manner   - Establish rapport/trust with patient   Outcome: Progressing  Goal: Refrain from harming others  Outcome: Progressing  Goal: Refrain from destructive acts on the environment or property  Outcome: Progressing  Goal: Control angry outbursts  Description: Interventions:  - Monitor patient closely, per order  - Ensure early verbal de-escalation  - Monitor prn medication needs  - Set reasonable/therapeutic limits, outline behavioral expectations, and consequences   - Provide a non-threatening milieu, utilizing the least restrictive interventions   Outcome: Progressing  Goal: Attend and participate in unit activities, including therapeutic, recreational, and educational groups  Description: Interventions:  - Provide therapeutic and educational activities daily, encourage attendance and participation, and document same in the medical record   Outcome: Progressing  Goal: Identify appropriate positive anger management techniques  Description: Interventions:  - Offer anger management and coping skills groups   - Staff will provide positive feedback for appropriate anger control  Outcome: Progressing     Problem: Alteration in Orientation  Goal: Treatment Goal: Demonstrate a reduction of confusion and improved orientation to person, place, time and/or situation upon discharge, according to optimum baseline/ability  Outcome: Progressing  Goal: Interact with staff daily  Description: Interventions:  - Assess and re-assess patient's level of orientation  - Engage patient in 1 on 1 interactions, daily, for a minimum of 15 minutes   - Establish rapport/trust with patient   Outcome: Progressing  Goal: Express concerns related to confused thinking related to:  Description: Interventions:  - Encourage patient to express feelings, fears, frustrations, hopes  - Assign consistent caregivers   - [place]/re-orient patient as needed  - Allow comfort items, as appropriate  - Provide visual cues, signs, etc    Outcome: Progressing  Goal: Allow medical examinations, as recommended  Description: Interventions:  - Provide physical/neurological exams and/or referrals, per provider   Outcome: Progressing  Goal: Cooperate with recommended testing/procedures  Description: Interventions:  - Determine need for ancillary testing  - Observe for mental status changes  - Implement falls/precaution protocol   Outcome: Progressing  Goal: Attend and participate in unit activities, including therapeutic, recreational, and educational groups  Description: Interventions:  - Provide therapeutic and educational activities daily, encourage attendance and participation, and document same in the medical record   - Provide appropriate opportunities for reminiscence   - Provide a consistent daily routine   - Encourage family contact/visitation   Outcome: Progressing  Goal: Complete daily ADLs, including personal hygiene independently, as able  Description: Interventions:  - Observe, teach, and assist patient with ADLS  Outcome: Progressing     Problem: Ineffective Coping  Goal: Participates in unit activities  Description: Interventions:  - Provide therapeutic environment   - Provide required programming   - Redirect inappropriate behaviors   Outcome: Progressing     Problem: DISCHARGE PLANNING  Goal: Discharge to home or other facility with appropriate resources  Description: INTERVENTIONS:  - Identify barriers to discharge w/patient and caregiver  - Arrange for needed discharge resources and transportation as appropriate  - Identify discharge learning needs (meds, wound care, etc )  - Arrange for interpretive services to assist at discharge as needed  - Refer to Case Management Department for coordinating discharge planning if the patient needs post-hospital services based on physician/advanced practitioner order or complex needs related to functional status, cognitive ability, or social support system  Outcome: Progressing

## 2020-09-10 NOTE — PROGRESS NOTES
Progress Note - [address] Day 56 y o  male MRN: [id number]   Unit/Bed#: [facility] 220-01 Encounter: [id number]    Behavior over the last 24 hours:      Anton Alejo was seen for an inpatient follow-up psychiatric visit this date  He remains pressured, loud, tangential, and hyper talkative  His mood is elevated  He is not agreeable to taking any other medication besides lithium  He believes it is not necessary or helpful  He did shower yesterday  His sleep is poor  His appetite is within normal limits  He has not had any physically or verbally aggressive behavior on the unit  He is irritable at times  He lacks insight regarding his mental health and current situation  ROS: no complaints, all other systems are negative    Mental Status Evaluation:    Appearance:  disheveled   Behavior:  pleasant   Speech:  pressured, hypertalkative, loud, tangential, disorganized   Mood:  labile, still at times irritable, elevated/euphoric   Affect:  expansive, increased in intensity   Thought Process:  disorganized, tangential, flight of ideas   Associations: tangential associations   Thought Content:  no overt delusions   Perceptual Disturbances: denies auditory hallucinations when asked   Risk Potential: Suicidal ideation - None  Homicidal ideation - None  Potential for aggression - No   Sensorium:  oriented to person, place and time/date   Memory:  recent and remote memory grossly intact   Consciousness:  alert and awake   Attention: poor concentration and poor attention span   Insight:  poor   Judgment: poor   Gait/Station: normal gait/station, normal balance   Motor Activity: no abnormal movements     Vital signs in last 24 hours:    Temp:  [97 9 °F (36 6 °C)-98 2 °F (36 8 °C)] 97 9 °F (36 6 °C)  HR:  [] 87  Resp:  [16-18] 18  BP: (154-158)/(82-95) 158/95    Laboratory results:  I have personally reviewed all pertinent laboratory/tests results      Progress Toward Goals: poor insight, poor motivation    Assessment/Plan   Principal Problem:    Schizoaffective disorder, bipolar type ([facility] [address] )  Active Problems:    Cannabis abuse, continuous    Recommended Treatment:     Continue current medications as prescribed  Continue to monitor  Discharge disposition and planning are ongoing  All current active medications have been reviewed  Encourage group therapy, milieu therapy and occupational therapy  Behavioral Health checks every 7 minutes    Current Facility-Administered Medications   Medication Dose Route Frequency Provider Last Rate    acetaminophen  650 mg Oral Q6H PRN Pat Arndt MD      acetaminophen  650 mg Oral Q4H PRN Pat Arndt MD      acetaminophen  975 mg Oral Q6H PRN Pat Arndt MD      cholecalciferol  1,000 Units Oral Daily Ricardo Forrester PA-C      diazepam  5 mg Oral BID PRN Pat Arndt MD      folic acid  1 mg Oral Daily Ricardo Forrester PA-C      hydrOXYzine HCL  50 mg Oral Q4H PRN Pat Arndt MD      lithium carbonate  600 mg Oral Q12H Leyla Toledo MD      LORazepam  2 mg Intravenous Once Mat Howe MD      LORazepam  2 mg Intramuscular Q4H PRN Pat Arndt MD      multivitamin stress formula  1 tablet Oral Daily Po Pink MD      OLANZapine  10 mg Intramuscular Q3H PRN Pat Arndt MD      potassium chloride  40 mEq Oral Once Mat Howe MD      risperiDONE  2 mg Oral Q3H PRN Pat Arndt MD      tamsulosin  0 4 mg Oral Daily With XobniGEORGINA      thiamine  100 mg Oral Daily Ricardo Forrester PA-C      traZODone  50 mg Oral HS PRN Pat Arndt MD         Risks / Benefits of Treatment:    Risks, benefits, and possible side effects of medications explained to patient and patient verbalizes understanding and agreement for treatment  Counseling / Coordination of Care:      Patient's progress discussed with staff in treatment team meeting    Medications, treatment progress and treatment plan reviewed with patient

## 2020-09-10 NOTE — PROGRESS NOTES
Patient has had poor sleep tonight, only sleeping for short periods  Behavior began to escalate, getting louder, pacing the halls stating he wants out of here doesn't like being locked up  Refused prn meds  Offered shower which usually calms him down  Patient did accept option to shower which was effective  Much calmer after  Will continue to monitor

## 2020-09-10 NOTE — PROGRESS NOTES
Pt loud and labile for most AM and afternoon  Speech is pressured and rambling  Speech is so rapid that it is often difficult to understand  Refused all meds besides lithium  Pt ltaer rquested Valium  "I think I need a PRN " Pt then burst into tears  Pt medicated and advised to rest  Monitoring continues

## 2020-09-11 PROCEDURE — 99232 SBSQ HOSP IP/OBS MODERATE 35: CPT | Performed by: PSYCHIATRY & NEUROLOGY

## 2020-09-11 PROCEDURE — NC001 PR NO CHARGE: Performed by: PSYCHIATRY & NEUROLOGY

## 2020-09-11 RX ORDER — DIAZEPAM 5 MG/1
5 TABLET ORAL EVERY 12 HOURS
Status: DISCONTINUED | OUTPATIENT
Start: 2020-09-11 | End: 2020-09-11

## 2020-09-11 RX ORDER — DIAZEPAM 5 MG/1
10 TABLET ORAL
Status: DISCONTINUED | OUTPATIENT
Start: 2020-09-11 | End: 2020-09-12

## 2020-09-11 RX ORDER — DIAZEPAM 5 MG/1
5 TABLET ORAL DAILY
Status: DISCONTINUED | OUTPATIENT
Start: 2020-09-12 | End: 2020-09-12

## 2020-09-11 RX ADMIN — LITHIUM CARBONATE 600 MG: 300 TABLET, EXTENDED RELEASE ORAL at 09:11

## 2020-09-11 RX ADMIN — DIAZEPAM 5 MG: 5 TABLET ORAL at 11:34

## 2020-09-11 RX ADMIN — OLANZAPINE 10 MG: 10 INJECTION, POWDER, FOR SOLUTION INTRAMUSCULAR at 03:40

## 2020-09-11 RX ADMIN — LITHIUM CARBONATE 600 MG: 300 TABLET, EXTENDED RELEASE ORAL at 20:50

## 2020-09-11 RX ADMIN — DIAZEPAM 10 MG: 5 TABLET ORAL at 20:50

## 2020-09-11 RX ADMIN — DIAZEPAM 5 MG: 5 TABLET ORAL at 18:32

## 2020-09-11 NOTE — CONSULTS
TELEPSYCHIATRY TELEPHONE NOTE    67-year-old male admitted with opal and psychosis  Patient has been agitated, has not responded to PRN medications, remains unable to be safely mobilized from restraints  Will continue restraints x 4 hours, efforts to mobilize    Inpatient team to evaluate this AM

## 2020-09-11 NOTE — PROGRESS NOTES
Spoke with patient regarding his presentation, impulsivity, loud speech, constant hyperverbal talking in the halls  Made patient aware that these behaviors can have a negative effect on his peers  He was slightly accepting of this and replied, "I know I am trying  You should say what my dad used to tell me, just say "shut up""  Pt remained in room tending to ADLs and talking to self but in a more controlled, quiet manner   Nursing team informed

## 2020-09-11 NOTE — PROGRESS NOTES
Patient has been visible on the unit he is calm and controlled  He did refuse his vitamins  He took all other medications  He denied pain  He was talking to himself  Denies SI,HI, or hallucinations  He was tearful regarding the episode last night  Ambulating in the halls  Social with staff and peers  Q 7 minute safety checks maintained

## 2020-09-11 NOTE — PROGRESS NOTES
09/11/20 0784   Activity/Group Checklist   Group   (Goal Planning and Communication)   Attendance Attended   Attendance Duration (min) 46-60   Interactions Disorganized interaction   Affect/Mood Wide   Goals Achieved Able to listen to others; Able to engage in interactions

## 2020-09-11 NOTE — PROGRESS NOTES
Patient has been awake all night, in room yelling and banging door  Unable to de-escalate  Show of force called to the unit to which patient came out of his room and was pacing the halls yelling  Refusing prns  Supervisor on unit called [address] Pisano to the unit as patient refused to go back in his room  Yelling that he was [place] break out of here, kick the door down like he did at [facility]  Once  entered the unit patient began to cry and walked back to his room  Still refusing meds  Patient placed in 4 point locked restraints at 0340 and Zyprexa 10mg IM given in RD   1-1 observation initiated

## 2020-09-11 NOTE — PROGRESS NOTES
Patient still awake, pacing the halls ranting to himself  Getting louder and more irritable  When trying to talk with patient he went into his room telling this nurse not to come in because he did not want to hurt anyone  Ranting about being locked up for so long and he doesn't do well being locked up  Offered prn but refused  Slammed door to room and continued to yell in in room for approx 30 minutes  Will continue to monitor

## 2020-09-11 NOTE — PROGRESS NOTES
09/11/20 0923   Team Meeting   Meeting Type Daily Rounds   Initial Conference Date 09/11/20   Patient/Family Present   Patient Present No   Patient's Family Present No     eam Members Present:   Dr Elmira Chavez, MD Kelsey Stone, RN  Peggy Maldonado, BSW  Angus Le, NORRISW    Taking Lithium only  Elevated yesterday, became aggressive, slamming, kicking  Police called  4-points given and out this am   Zyprexa helped  No DC on Monday  Valium and Zyprexa will be added as options  Stubborn

## 2020-09-11 NOTE — PROGRESS NOTES
Administered PRN valium at [address] [place] [address]   Patient pacing the halls and becoming agitated  Rambling and talking to self  Offered PRN atarax or risperdal and patient refused  Continue to monitor

## 2020-09-11 NOTE — RESTRAINT FACE TO FACE
Restraint Face to Face   Noah Adjutant Day 56 y o  male MRN: [id number]  Unit/Bed#: -01 Encounter: [id number]      Physical Evaluation: awake, alert, NAD but agitated  Purpose for Restraints/ Seclusion High risk for causing significant disruption of treatment environment   Patient's reaction to the intervention: somewhat calmed resting on bed  Patient's medical condition: stable  Patient's Behavioral condition: remains agitated  Restraints to be Continued

## 2020-09-11 NOTE — PLAN OF CARE
Problem: Alteration in Thoughts and Perception  Goal: Treatment Goal: Gain control of psychotic behaviors/thinking, reduce/eliminate presenting symptoms and demonstrate improved reality functioning upon discharge  Outcome: Progressing  Goal: Verbalize thoughts and feelings  Description: Interventions:  - Promote a nonjudgmental and trusting relationship with the patient through active listening and therapeutic communication  - Assess patient's level of functioning, behavior and potential for risk  - Engage patient in 1 on 1 interactions  - Encourage patient to express fears, feelings, frustrations, and discuss symptoms    - [place] patient to reality, help patient recognize reality-based thinking   - Administer medications as ordered and assess for potential side effects  - Provide the patient education related to the signs and symptoms of the illness and desired effects of prescribed medications  Outcome: Progressing  Goal: Refrain from acting on delusional thinking/internal stimuli  Description: Interventions:  - Monitor patient closely, per order   - Utilize least restrictive measures   - Set reasonable limits, give positive feedback for acceptable   - Administer medications as ordered and monitor of potential side effects  Outcome: Progressing  Goal: Agree to be compliant with medication regime, as prescribed and report medication side effects  Description: Interventions:  - Offer appropriate PRN medication and supervise ingestion; conduct AIMS, as needed   Outcome: Progressing  Goal: Attend and participate in unit activities, including therapeutic, recreational, and educational groups  Description: Interventions:  -Encourage Visitation and family involvement in care  Outcome: Progressing  Goal: Recognize dysfunctional thoughts, communicate reality-based thoughts at the time of discharge  Description: Interventions:  - Provide medication and psycho-education to assist patient in compliance and developing insight into his/her illness   Outcome: Progressing  Goal: Complete daily ADLs, including personal hygiene independently, as able  Description: Interventions:  - Observe, teach, and assist patient with ADLS  - Monitor and promote a balance of rest/activity, with adequate nutrition and elimination   Outcome: Progressing

## 2020-09-11 NOTE — PROGRESS NOTES
09/11/20 1000   Activity/Group Checklist   Group   (Hope and Motivation/Art Therapy Processing)   Attendance Attended   Attendance Duration (min) Greater than 60   Interactions Disorganized interaction   Affect/Mood Wide   Goals Achieved Identified feelings; Able to listen to others; Able to engage in interactions

## 2020-09-11 NOTE — PLAN OF CARE
Problem: SAFETY, RESTRAINT - VIOLENT/SELF-DESTRUCTIVE  Goal: Remains free of harm/injury from restraints (Restraint for Violent/Self-Destructive Behavior)  Description: INTERVENTIONS:  - Instruct patient/family regarding restraint use   - Assess and monitor physiologic and psychological status   - Provide interventions and comfort measures to meet assessed patient needs   - Ensure continuous in person monitoring is provided   - Identify and implement measures to help patient regain control  - Assess readiness for release of restraint  Outcome: Not Progressing  Goal: Returns to optimal restraint-free functioning  Description: INTERVENTIONS:  - Assess the patient's behavior and symptoms that indicate continued need for restraint  - Identify and implement measures to help patient regain control  - Assess readiness for release of restraint   Outcome: Not Progressing

## 2020-09-11 NOTE — PROGRESS NOTES
Patient in dining room becoming loud and disturbing the unit  Nurse spoke with patient and offered him a PRN risperdal  Patient refused and stated I don't want any risperdal I didn't even want the valium I just took  Patient did calm himself down  Continue to monitor

## 2020-09-11 NOTE — PROGRESS NOTES
Patient dozing off but appears to be fighting sleep  No longer yelling but very restless in bed, pulling at restraints  1-1 observation maintained

## 2020-09-11 NOTE — PROGRESS NOTES
Patient has been out pacing the halls  Very labile, loud, pressured, disorganized and rambling to self  Making phone calls and very pressured on the phone also  Pleasant toward staff  Took hs lithium but refused any prns when offered  Started to become irritable, yelling about stuff falling out of the vents stating he'll rip the vents out of the ceiling  Pacing and ramping up in halls  Did walk with patient to try to calm  Did eventually agree to take prn valium after reviewing all available options  Will continue to monitor behavior

## 2020-09-11 NOTE — PROGRESS NOTES
Progress Note - Allison Cameron Day 56 y o  male MRN: [id number]    Unit/Bed#: [facility] 220-01 Encounter: [id number]        Subjective:   Patient seen and examined at bedside after reviewing the chart and discussing the case with the caring staff  In the early morning of 09/11/2020, patient was pacing the halls and was loud and irritable  Patient was yelling, kicking walls, and slamming his door  The police were called and the patient was placed in 4 point locked restraints with one-to-one observation  Patient calmed down and restraints and one-to-one were discontinued  Patient examined at bedside  Patient refuses to take Flomax and states I don't know who first prescribed it but I don't need it anymore    Patient denies difficulty with urination, increased urinary frequency, dysuria, dribbling, incomplete bladder emptying  Physical Exam   Vitals: Blood pressure 129/91, pulse 84, temperature 98 4 °F (36 9 °C), temperature source Temporal, resp  rate 16, height 5' 9" (1 753 m), weight 88 9 kg (196 lb), SpO2 98 %  ,Body mass index is 28 94 kg/m²  Constitutional: Patient appears well-developed  HEENT: PERR, EOMI, MMM  Cardiovascular: Normal rate and regular rhythm  Pulmonary/Chest: Effort normal and breath sounds normal    Abdomen: Soft, + BS, NT  Skin:  Multiple skin tags 1-2 cm in size on patient's trunk  One skin tag on left lower back has dried blood  Assessment/Plan:  Allison Graham is a(n) 56 y o  male with schizoaffective disorder bipolar type     1  Cardiac with history of hypertension and dyslipidemia   Patient's diastolic blood pressure was found to be elevated although patient is not on any antihypertensive medications  Will continue to closely monitor  Patient is not on anything for Hyperlipidemia  2  Tobacco abuse   Patient has been put on nicotine transdermal patch  3  Alcohol abuse  Thiamine, folic acid and multivitamin  4  DJD/osteoarthritis   Tylenol on as needed basis    5  Gait abnormality   Stable  6  Vitamin-D deficiency   Vitamin D3 1000U daily  I will check vitamin-D levels for the patient  7  BPH    I will discontinue patient's Flomax at his request as his symptoms seem stable at this time  8  Skin tags  Patient wants to inquire about removing his skin tags at the [place]  Bacitracin ointment as needed for open/bleeding skin tags  The patient was discussed with Dr Mark Marte and he is in agreement with the above note

## 2020-09-11 NOTE — PROGRESS NOTES
Patient now receptive to talking with this nurse about episode last night  Very remorseful and tearful  Understands all events that occurred and reason restraints required  Apologetic and displaying appropriate behaviors to remove restraints  All restraints removed at this time  1-1 observation discontinued  Up ambulating in the halls  Calm and controlled  Will continue to observe

## 2020-09-12 PROCEDURE — 99232 SBSQ HOSP IP/OBS MODERATE 35: CPT | Performed by: NURSE PRACTITIONER

## 2020-09-12 RX ORDER — AMMONIUM LACTATE 12 G/100G
LOTION TOPICAL 2 TIMES DAILY
Status: DISCONTINUED | OUTPATIENT
Start: 2020-09-12 | End: 2020-09-21 | Stop reason: HOSPADM

## 2020-09-12 RX ORDER — DIAZEPAM 5 MG/1
5 TABLET ORAL 4 TIMES DAILY
Status: DISCONTINUED | OUTPATIENT
Start: 2020-09-12 | End: 2020-09-14

## 2020-09-12 RX ADMIN — VITAMIN D 1000 UNITS: 25 TAB ORAL at 08:17

## 2020-09-12 RX ADMIN — DIAZEPAM 5 MG: 5 TABLET ORAL at 18:39

## 2020-09-12 RX ADMIN — Medication 1 APPLICATION: at 18:46

## 2020-09-12 RX ADMIN — LITHIUM CARBONATE 600 MG: 300 TABLET, EXTENDED RELEASE ORAL at 08:17

## 2020-09-12 RX ADMIN — DIAZEPAM 5 MG: 5 TABLET ORAL at 12:05

## 2020-09-12 RX ADMIN — DIAZEPAM 5 MG: 5 TABLET ORAL at 21:08

## 2020-09-12 RX ADMIN — DIAZEPAM 5 MG: 5 TABLET ORAL at 07:09

## 2020-09-12 RX ADMIN — HYDROXYZINE HYDROCHLORIDE 50 MG: 25 TABLET, FILM COATED ORAL at 14:41

## 2020-09-12 RX ADMIN — LITHIUM CARBONATE 600 MG: 300 TABLET, EXTENDED RELEASE ORAL at 21:08

## 2020-09-12 NOTE — PROGRESS NOTES
Patient fixated on the bathroom in his room smelling like fire  Staff does not smell this when checking room  Offered to switch rooms and patient refused  Continue to monitor

## 2020-09-12 NOTE — PROGRESS NOTES
Patient not as loud  Calmer  Pacing in hallway  Getting ready to soak his feet  Continue to monitor

## 2020-09-12 NOTE — PROGRESS NOTES
Patient started to get agitated, picking up phones and yelling for the doctor  Will attempt to get Lithium level when patient is at a more calmer state  RN made aware  Will pass onto the next shift

## 2020-09-12 NOTE — PROGRESS NOTES
Progress Note - Teddy 2 K Day 56 y o  male MRN: [id number]  Unit/Bed#: [facility] 220-01 Encounter: [id number]    Assessment/Plan   Principal Problem:    Schizoaffective disorder, bipolar type ([address] )  Active Problems:    Cannabis abuse, continuous  Patient was seen for continuing care and treatment  Case was discussed with nursing staff  Patient is loud, agitated pacing and yelling  He was given a p r n  Of Valium at noon  We are going to changes Valium dose to 5 mg q i d     For the record, this is the only medications patient is willing to take  As long as he is willing to take it, we can dose it throughout the day to help keep his agitation and aggression under control  Of note, he has a history of aggressive behavior with violence  According to staff in the record, the patient remains extremely reluctant to take any other medications  He probably could benefit from antipsychotic/mood stabilizers      Behavior over the last 24 hours:  unchanged  Sleep: insomnia  Appetite: normal  Medication side effects: No  ROS: no complaints    Mental Status Evaluation:  Appearance:  disheveled   Behavior:  psychomotor agitation   Speech:  pressured   Mood:  irritable and labile and unpredictable   Affect:  increased in intensity   Thought Process:  disorganized, flight of ideas, illogical, loose associations, perserverative and tangential   Thought Content:  delusions  grandiose and obsessive/rumination and obsessions   Perceptual Disturbances: Probable   Risk Potential: Suicidal Ideations none  Homicidal Ideations none  Potential for Aggression Yes has been in restraints this admission   Sensorium:  person   Memory:  Difficult to assess   Consciousness:  alert and awake    Attention: Poor     Insight:  Poor   Judgment: Poor   Gait/Station: normal gait/station   Motor Activity: no abnormal movements     Progress Toward Goals:  No change    Recommended Treatment: Continue with group therapy, milieu therapy and occupational therapy  Risks, benefits and possible side effects of Medications:   Risks, benefits, and possible side effects of medications explained to patient and patient verbalizes understanding  Medications: continue current psychiatric medications  Labs: I have personally reviewed all pertinent laboratory/tests results  Most Recent Labs:   Lab Results   Component Value Date    WBC 9 30 09/04/2020    RBC 4 76 09/04/2020    HGB 15 0 09/04/2020    HCT 43 5 09/04/2020     09/04/2020    RDW 14 2 09/04/2020    NEUTROABS 7 40 (H) 09/04/2020    SODIUM 139 09/04/2020    K 3 1 (L) 09/04/2020     (H) 09/04/2020    CO2 21 09/04/2020    BUN 14 09/04/2020    CREATININE 0 97 09/04/2020    GLUC 115 (H) 09/04/2020    GLUF 91 11/06/2019    CALCIUM 9 8 09/04/2020    AST 50 (H) 09/04/2020    ALT 47 09/04/2020    ALKPHOS 36 (L) 09/04/2020    TP 6 6 09/04/2020    ALB 4 5 09/04/2020    TBILI 1 30 (H) 09/04/2020    CHOLESTEROL 120 11/03/2019    HDL 44 11/03/2019    TRIG 90 11/03/2019    LDLCALC 58 11/03/2019    NONHDLC 76 11/03/2019    LITHIUM 0 6 (L) 09/04/2020    [id number] 1 620 09/04/2020    RPR Non-Reactive 11/03/2019    HGBA1C 5 1 11/03/2019     11/03/2019       Counseling / Coordination of Care  Total floor / unit time spent today 25 minutes  Greater than 50% of total time was spent with the patient and / or family counseling and / or coordination of care   A description of the counseling / coordination of care: treatment

## 2020-09-12 NOTE — PROGRESS NOTES
Pt was awake twice through the night but quickly returned to bed without issue  Pt was calm, cooperative and much more controlled  Pt less internally preoccupied and less manic  Slept well through the night  Continuous visual checks performed throughout the shift q7 minutes  Safety precautions maintained  Will continue to monitor

## 2020-09-12 NOTE — PROGRESS NOTES
Patient walking the halls, angry and yelling and cursing  Most is unintelligible and doesn't make sense  [facility] gave patient a pb&j sandwich which seemed to calm him for a time

## 2020-09-12 NOTE — PROGRESS NOTES
Patient asking this nurse for a chill pill  Patient anxious pacing and very loud  Administered PRN atarax  Will monitor for effectiveness  Continue to monitor

## 2020-09-12 NOTE — PROGRESS NOTES
Pt was excessively loud, irritable at times, admits to talking to himself and replying but doesn't believe it's not a hallucination, since everyone talks to themselves  Pt displayed a sense of humor and was joking with staff  Pt at times became irritable in the hallway as he paced but took redirection well  Pt is generally pleasant and cooperative  Continuous visual checks performed q7 minutes throughout the shift  Safety precautions maintained  Pt laid down around 2100 to rest  Will continue to monitor

## 2020-09-12 NOTE — PLAN OF CARE
Problem: Alteration in Orientation  Goal: Treatment Goal: Demonstrate a reduction of confusion and improved orientation to person, place, time and/or situation upon discharge, according to optimum baseline/ability  Outcome: Progressing  Goal: Interact with staff daily  Description: Interventions:  - Assess and re-assess patient's level of orientation  - Engage patient in 1 on 1 interactions, daily, for a minimum of 15 minutes   - Establish rapport/trust with patient   Outcome: Progressing  Goal: Express concerns related to confused thinking related to:  Description: Interventions:  - Encourage patient to express feelings, fears, frustrations, hopes  - Assign consistent caregivers   - [place]/re-orient patient as needed  - Allow comfort items, as appropriate  - Provide visual cues, signs, etc    Outcome: Progressing  Goal: Allow medical examinations, as recommended  Description: Interventions:  - Provide physical/neurological exams and/or referrals, per provider   Outcome: Progressing  Goal: Cooperate with recommended testing/procedures  Description: Interventions:  - Determine need for ancillary testing  - Observe for mental status changes  - Implement falls/precaution protocol   Outcome: Progressing  Goal: Attend and participate in unit activities, including therapeutic, recreational, and educational groups  Description: Interventions:  - Provide therapeutic and educational activities daily, encourage attendance and participation, and document same in the medical record   - Provide appropriate opportunities for reminiscence   - Provide a consistent daily routine   - Encourage family contact/visitation   Outcome: Progressing  Goal: Complete daily ADLs, including personal hygiene independently, as able  Description: Interventions:  - Observe, teach, and assist patient with ADLS  Outcome: Progressing

## 2020-09-12 NOTE — PROGRESS NOTES
Patient was becoming loud,agitated,pacing,yelling  Screaming at family members on the phone  Patient asked for his valium  Administered PRN valium at 12:06  Continue to monitor

## 2020-09-12 NOTE — PROGRESS NOTES
Patient had been pacing the halls with excessively loud speech  He was on the phone cursing  Valium that was administered during report is now effective  Patient currently is in dining room sitting quietly with papers he is writing on  He states the "chill pill is working"  He did take his morning medications but refused the vitamins  Q 7 minute safety checks maintained

## 2020-09-12 NOTE — PROGRESS NOTES
Progress Note - Regina Graham 56 y o  male MRN: [id number]    Unit/Bed#: [facility] 220-01 Encounter: [id number]        Subjective:   Patient seen and examined at bedside after reviewing the chart and discussing the case with the caring staff  Patient examined at bedside  Patient is concerned that his both feet skin is dry with cracked skin  Patient is requesting a moisturizing cream     Physical Exam   Vitals: Blood pressure 138/80, pulse 93, temperature 98 °F (36 7 °C), temperature source Temporal, resp  rate 16, height 5' 9" (1 753 m), weight 88 9 kg (196 lb), SpO2 96 %  ,Body mass index is 28 94 kg/m²  Constitutional: Patient appears well-developed  HEENT: PERR, EOMI, MMM  Cardiovascular: Normal rate and regular rhythm  Pulmonary/Chest: Effort normal and breath sounds normal    Abdomen: Soft, + BS, NT  Skin:  Multiple skin tags 1-2 cm in size on patient's trunk  One skin tag on left lower back has dried blood  Assessment/Plan:  Regina Graham is a(n) 56 y o  male with schizoaffective disorder bipolar type     1  Cardiac with history of hypertension and dyslipidemia   Patient's diastolic blood pressure was found to be elevated although patient is not on any antihypertensive medications  Will continue to closely monitor  Patient is not on anything for Hyperlipidemia  2  Tobacco abuse   Patient has been put on nicotine transdermal patch  3  Alcohol abuse  Thiamine, folic acid and multivitamin  4  DJD/osteoarthritis   Tylenol on as needed basis  5  Gait abnormality   Stable  6  Vitamin-D deficiency   Vitamin D3 1000U daily  I will check vitamin-D levels for the patient  7  BPH    I will discontinue patient's Flomax at his request as his symptoms seem stable at this time  8  Skin tags  Patient wants to inquire about removing his skin tags at the [place]  Bacitracin ointment as needed for open/bleeding skin tags  9  Dry skin over bilateral feet    I will put the patient on ammonium lactate lotion 2 times daily

## 2020-09-13 LAB — LITHIUM SERPL-SCNC: 0.7 MMOL/L (ref 1–1.2)

## 2020-09-13 PROCEDURE — 99233 SBSQ HOSP IP/OBS HIGH 50: CPT | Performed by: NURSE PRACTITIONER

## 2020-09-13 PROCEDURE — 80178 ASSAY OF LITHIUM: CPT | Performed by: PSYCHIATRY & NEUROLOGY

## 2020-09-13 RX ADMIN — DIAZEPAM 5 MG: 5 TABLET ORAL at 13:00

## 2020-09-13 RX ADMIN — Medication 1 APPLICATION: at 17:41

## 2020-09-13 RX ADMIN — LITHIUM CARBONATE 600 MG: 300 TABLET, EXTENDED RELEASE ORAL at 21:08

## 2020-09-13 RX ADMIN — DIAZEPAM 5 MG: 5 TABLET ORAL at 21:08

## 2020-09-13 RX ADMIN — DIAZEPAM 5 MG: 5 TABLET ORAL at 08:14

## 2020-09-13 RX ADMIN — BACITRACIN 1 SMALL APPLICATION: 500 OINTMENT TOPICAL at 22:07

## 2020-09-13 RX ADMIN — DIAZEPAM 5 MG: 5 TABLET ORAL at 17:39

## 2020-09-13 RX ADMIN — LITHIUM CARBONATE 600 MG: 300 TABLET, EXTENDED RELEASE ORAL at 08:14

## 2020-09-13 NOTE — PROGRESS NOTES
Progress Note - Teddy 2 K Day 56 y o  male MRN: [id number]  Unit/Bed#: [facility] 220-01 Encounter: [id number]    Assessment/Plan   Principal Problem:    Schizoaffective disorder, bipolar type ([facility] [address] )  Active Problems:    Cannabis abuse, continuous  Patient was seen for continuing care and treatment  Case was discussed with the nursing staff  On examination today, there is not much change in this patient's behavior and presentation  Is loud on the unit, bizarre at times  He did take his medications disorder today  He believes that tomorrow, since his lithium level is therapeutic at 0 7, that he is leaving  Apparently this was not a note written by Estephania Orr on Thursday of this past week  He has not been aggressive or agitated  He is loud and he can be quite animated but overall he has not been acting out aggressively  Still, he is responding to internal stimuli, last night he was up and awake and ripped sheets off his bed and was yelling and cursing  The discharge plan, I will leave in the hands of the treatment team     Behavior over the last 24 hours:  More cooperative with meds and treatment  Sleep: insomnia  Appetite: normal  Medication side effects: No  ROS: no complaints    Mental Status Evaluation:  Appearance:  bearded and disheveled   Behavior:  Cooperative but he does remain loud and yells out inappropriately at times   Speech:  loud and profane   Mood:  labile   Affect:  increased in intensity   Thought Process:  disorganized, flight of ideas and illogical   Thought Content:  delusions  obsessive/rumination and obsessions   Perceptual Disturbances:  Auditory hallucinations without commands   Risk Potential: Suicidal Ideations none  Homicidal Ideations none  Potential for Aggression No   Sensorium:  person and place   Memory:  recent and remote memory grossly intact   Consciousness:  alert and awake    Attention: Poor     Insight:  limited   Judgment: limited   Gait/Station: normal gait/station   Motor Activity: no abnormal movements     Progress Toward Goals: There has been no acting out or aggressive behavior  He still is very loud on the unit yelling out at times and still experiencing olfactory and auditory hallucinations  Recommended Treatment: Continue with group therapy, milieu therapy and occupational therapy  Risks, benefits and possible side effects of Medications:   Risks, benefits, and possible side effects of medications explained to patient and patient verbalizes understanding  Medications: continue current psychiatric medications  Labs: I have personally reviewed all pertinent laboratory/tests results  Last Laboratory Results with Depakote and/or Tegretol levels:   Lab Results   Component Value Date    WBC 9 30 09/04/2020    RBC 4 76 09/04/2020    HGB 15 0 09/04/2020    HCT 43 5 09/04/2020     09/04/2020    RDW 14 2 09/04/2020    NEUTROABS 7 40 (H) 09/04/2020    SODIUM 139 09/04/2020    K 3 1 (L) 09/04/2020     (H) 09/04/2020    CO2 21 09/04/2020    BUN 14 09/04/2020    CREATININE 0 97 09/04/2020    GLUC 115 (H) 09/04/2020    GLUF 91 11/06/2019    CALCIUM 9 8 09/04/2020    AST 50 (H) 09/04/2020    ALT 47 09/04/2020    ALKPHOS 36 (L) 09/04/2020    TP 6 6 09/04/2020    ALB 4 5 09/04/2020    TBILI 1 30 (H) 09/04/2020       Counseling / Coordination of Care  Total floor / unit time spent today 25 minutes  Greater than 50% of total time was spent with the patient and / or family counseling and / or coordination of care   A description of the counseling / coordination of care: treatment

## 2020-09-13 NOTE — PROGRESS NOTES
Pt's mood remains labile  Pt was smiling and joking during initial assessment, but then could be heard yelling and banging in his room  Pt did take scheduled Valium and Lithium this AM  Pt states that he's going to be "a free man tomorrow "  Pt thinks that he is going to be D/C'd tomorrow  Asked about current lithium level which is at 0 7  Delusional/paranoid thinking  Denies hallucinations, but appears internally preoccupied  No acting out behaviors at this time, but came become excessively loud and theatrical   Denies SI/HI  Will socialize with select peers  Will continue to monitor and assess

## 2020-09-13 NOTE — PLAN OF CARE
Problem: Alteration in Thoughts and Perception  Goal: Refrain from acting on delusional thinking/internal stimuli  Description: Interventions:  - Monitor patient closely, per order   - Utilize least restrictive measures   - Set reasonable limits, give positive feedback for acceptable   - Administer medications as ordered and monitor of potential side effects  Outcome: Not Progressing  Goal: Agree to be compliant with medication regime, as prescribed and report medication side effects  Description: Interventions:  - Offer appropriate PRN medication and supervise ingestion; conduct AIMS, as needed   Outcome: Not Progressing  Goal: Recognize dysfunctional thoughts, communicate reality-based thoughts at the time of discharge  Description: Interventions:  - Provide medication and psycho-education to assist patient in compliance and developing insight into his/her illness   Outcome: Not Progressing  Goal: Complete daily ADLs, including personal hygiene independently, as able  Description: Interventions:  - Observe, teach, and assist patient with ADLS  - Monitor and promote a balance of rest/activity, with adequate nutrition and elimination   Outcome: Progressing     Problem: Risk for Violence/Aggression Toward Others  Goal: Verbalize thoughts and feelings  Description: Interventions:  - Assess and re-assess patient's level of risk, every waking shift  - Engage patient in 1:1 interactions, daily, for a minimum of 15 minutes   - Allow patient to express feelings and frustrations in a safe and non-threatening manner   - Establish rapport/trust with patient   Outcome: Progressing  Goal: Control angry outbursts  Description: Interventions:  - Monitor patient closely, per order  - Ensure early verbal de-escalation  - Monitor prn medication needs  - Set reasonable/therapeutic limits, outline behavioral expectations, and consequences   - Provide a non-threatening milieu, utilizing the least restrictive interventions   Outcome: Progressing  Goal: Attend and participate in unit activities, including therapeutic, recreational, and educational groups  Description: Interventions:  - Provide therapeutic and educational activities daily, encourage attendance and participation, and document same in the medical record   Outcome: Progressing  Goal: Identify appropriate positive anger management techniques  Description: Interventions:  - Offer anger management and coping skills groups   - Staff will provide positive feedback for appropriate anger control  Outcome: Progressing     Problem: Alteration in Orientation  Goal: Interact with staff daily  Description: Interventions:  - Assess and re-assess patient's level of orientation  - Engage patient in 1 on 1 interactions, daily, for a minimum of 15 minutes   - Establish rapport/trust with patient   Outcome: Progressing  Goal: Express concerns related to confused thinking related to:  Description: Interventions:  - Encourage patient to express feelings, fears, frustrations, hopes  - Assign consistent caregivers   - [place]/re-orient patient as needed  - Allow comfort items, as appropriate  - Provide visual cues, signs, etc    Outcome: Not Progressing  Goal: Allow medical examinations, as recommended  Description: Interventions:  - Provide physical/neurological exams and/or referrals, per provider   Outcome: Progressing  Goal: Cooperate with recommended testing/procedures  Description: Interventions:  - Determine need for ancillary testing  - Observe for mental status changes  - Implement falls/precaution protocol   Outcome: Progressing     Problem: DISCHARGE PLANNING  Goal: Discharge to home or other facility with appropriate resources  Description: INTERVENTIONS:  - Identify barriers to discharge w/patient and caregiver  - Arrange for needed discharge resources and transportation as appropriate  - Identify discharge learning needs (meds, wound care, etc )  - Arrange for interpretive services to assist at discharge as needed  - Refer to Case Management Department for coordinating discharge planning if the patient needs post-hospital services based on physician/advanced practitioner order or complex needs related to functional status, cognitive ability, or social support system  Outcome: Progressing     Problem: SAFETY, RESTRAINT - VIOLENT/SELF-DESTRUCTIVE  Goal: Remains free of harm/injury from restraints (Restraint for Violent/Self-Destructive Behavior)  Description: INTERVENTIONS:  - Instruct patient/family regarding restraint use   - Assess and monitor physiologic and psychological status   - Provide interventions and comfort measures to meet assessed patient needs   - Ensure continuous in person monitoring is provided   - Identify and implement measures to help patient regain control  - Assess readiness for release of restraint  Outcome: Progressing  Goal: Returns to optimal restraint-free functioning  Description: INTERVENTIONS:  - Assess the patient's behavior and symptoms that indicate continued need for restraint  - Identify and implement measures to help patient regain control  - Assess readiness for release of restraint   Outcome: Progressing

## 2020-09-13 NOTE — PROGRESS NOTES
After multiple redirection patient retreated to room, continued to talk rapidly to self but is more controled, pt did not fall asleep until 0145am  Pt has since been sleep but broken  Pt now up and moving furniture all over room with no clothing on  Offered pt PRN medications and patient became irritable and refused  Will continue to monitor

## 2020-09-13 NOTE — PROGRESS NOTES
Pt's behaviors were controlled throughout the remainder of the shift  Pt was pleasant, smiling, and bright  Mood greatly improved from yesterday  Pt continues to say he is leaving tomorrow

## 2020-09-13 NOTE — PROGRESS NOTES
Progress Note - Regina Graham 56 y o  male MRN: [id number]    Unit/Bed#: [facility] 220-01 Encounter: [id number]        Subjective:   Patient seen and examined at bedside after reviewing the chart and discussing the case with the caring staff  Patient examined at bedside  Patient has no acute concerns  Physical Exam   Vitals: Blood pressure 166/84, pulse 87, temperature 98 6 °F (37 °C), temperature source Temporal, resp  rate 18, height 5' 9" (1 753 m), weight 88 9 kg (196 lb), SpO2 98 %  ,Body mass index is 28 94 kg/m²  Constitutional: Patient appears well-developed  HEENT: PERR, EOMI, MMM  Cardiovascular: Normal rate and regular rhythm  Pulmonary/Chest: Effort normal and breath sounds normal    Abdomen: Soft, + BS, NT  Skin:  Multiple skin tags 1-2 cm in size on patient's trunk  One skin tag on left lower back has dried blood  Assessment/Plan:  Regina Graham is a(n) 56 y o  male with schizoaffective disorder bipolar type     1  Cardiac with history of hypertension and dyslipidemia   Patient's diastolic blood pressure was found to be elevated although patient is not on any antihypertensive medications  Will continue to closely monitor  Patient is not on anything for Hyperlipidemia  2  Tobacco abuse   Patient has been put on nicotine transdermal patch  3  Alcohol abuse  Thiamine, folic acid and multivitamin  4  DJD/osteoarthritis   Tylenol on as needed basis  5  Gait abnormality   Stable  6  Vitamin-D deficiency   Vitamin D3 1000U daily  I will check vitamin-D levels for the patient  7  BPH    I will discontinue patient's Flomax at his request as his symptoms seem stable at this time  8  Skin tags  Patient wants to inquire about removing his skin tags at the [place]  Bacitracin ointment as needed for open/bleeding skin tags  9  Dry skin over bilateral feet  I will put the patient on ammonium lactate lotion 2 times daily

## 2020-09-13 NOTE — PROGRESS NOTES
Pt slept poorly, minimal amount of time, broken and up early in the morning  Angry and not taking redirection well upon early morning rising  Will continue to monitor

## 2020-09-13 NOTE — PROGRESS NOTES
Patient is loud and restless, talking to self using derogatory language  Does not take redirection well  Offered pt PRN medication to decrease agitation, pt refused and stated " you deal with it! Three fucking days I have been smelling that fire and I'm tired of it! Pt throwing papers around room and ripping sheets off of bed  Pt redirected numerous times by staff  Will continue to monitor

## 2020-09-13 NOTE — PROGRESS NOTES
Pt is visible on the unit pacing the halls, loud and restless  Boisterous and disruptive  Tangential and disorganized, poor focus, unable to stay on topic of conversation for more than a few minutes  Pt is labile going from angry and irritable to laughing inappropriately and joking with staff  Pt needing redirection several times  Pt refused medications in the beginning of the evening, educated pt on medications, reviewed meds and provided paper education for pt to refer to  Pt took medication willingly  Pt needing to be reminded of how loud he is when people are attempting to sleep, pt does apologize for his behaviors  Pt denies SI and HI currently and states " I don't care what I have to do I just want to get out of here " Pt is alert and oriented  Able to make needs known  No signs or symptoms of distress  Continual monitoring maintained for safety

## 2020-09-13 NOTE — PLAN OF CARE
Problem: Alteration in Thoughts and Perception  Goal: Treatment Goal: Gain control of psychotic behaviors/thinking, reduce/eliminate presenting symptoms and demonstrate improved reality functioning upon discharge  Outcome: Progressing  Goal: Verbalize thoughts and feelings  Description: Interventions:  - Promote a nonjudgmental and trusting relationship with the patient through active listening and therapeutic communication  - Assess patient's level of functioning, behavior and potential for risk  - Engage patient in 1 on 1 interactions  - Encourage patient to express fears, feelings, frustrations, and discuss symptoms    - [place] patient to reality, help patient recognize reality-based thinking   - Administer medications as ordered and assess for potential side effects  - Provide the patient education related to the signs and symptoms of the illness and desired effects of prescribed medications  Outcome: Progressing  Goal: Refrain from acting on delusional thinking/internal stimuli  Description: Interventions:  - Monitor patient closely, per order   - Utilize least restrictive measures   - Set reasonable limits, give positive feedback for acceptable   - Administer medications as ordered and monitor of potential side effects  Outcome: Progressing  Goal: Agree to be compliant with medication regime, as prescribed and report medication side effects  Description: Interventions:  - Offer appropriate PRN medication and supervise ingestion; conduct AIMS, as needed   Outcome: Progressing  Goal: Attend and participate in unit activities, including therapeutic, recreational, and educational groups  Description: Interventions:  -Encourage Visitation and family involvement in care  Outcome: Progressing  Goal: Recognize dysfunctional thoughts, communicate reality-based thoughts at the time of discharge  Description: Interventions:  - Provide medication and psycho-education to assist patient in compliance and developing insight into his/her illness   Outcome: Progressing  Goal: Complete daily ADLs, including personal hygiene independently, as able  Description: Interventions:  - Observe, teach, and assist patient with ADLS  - Monitor and promote a balance of rest/activity, with adequate nutrition and elimination   Outcome: Progressing     Problem: Risk for Violence/Aggression Toward Others  Goal: Treatment Goal: Refrain from acts of violence/aggression during length of stay, and demonstrate improved impulse control at the time of discharge  Outcome: Progressing  Goal: Verbalize thoughts and feelings  Description: Interventions:  - Assess and re-assess patient's level of risk, every waking shift  - Engage patient in 1:1 interactions, daily, for a minimum of 15 minutes   - Allow patient to express feelings and frustrations in a safe and non-threatening manner   - Establish rapport/trust with patient   Outcome: Progressing  Goal: Refrain from harming others  Outcome: Progressing  Goal: Refrain from destructive acts on the environment or property  Outcome: Progressing  Goal: Control angry outbursts  Description: Interventions:  - Monitor patient closely, per order  - Ensure early verbal de-escalation  - Monitor prn medication needs  - Set reasonable/therapeutic limits, outline behavioral expectations, and consequences   - Provide a non-threatening milieu, utilizing the least restrictive interventions   Outcome: Progressing  Goal: Attend and participate in unit activities, including therapeutic, recreational, and educational groups  Description: Interventions:  - Provide therapeutic and educational activities daily, encourage attendance and participation, and document same in the medical record   Outcome: Progressing  Goal: Identify appropriate positive anger management techniques  Description: Interventions:  - Offer anger management and coping skills groups   - Staff will provide positive feedback for appropriate anger control  Outcome: Progressing     Problem: Alteration in Orientation  Goal: Treatment Goal: Demonstrate a reduction of confusion and improved orientation to person, place, time and/or situation upon discharge, according to optimum baseline/ability  Outcome: Progressing  Goal: Interact with staff daily  Description: Interventions:  - Assess and re-assess patient's level of orientation  - Engage patient in 1 on 1 interactions, daily, for a minimum of 15 minutes   - Establish rapport/trust with patient   Outcome: Progressing  Goal: Express concerns related to confused thinking related to:  Description: Interventions:  - Encourage patient to express feelings, fears, frustrations, hopes  - Assign consistent caregivers   - [place]/re-orient patient as needed  - Allow comfort items, as appropriate  - Provide visual cues, signs, etc    Outcome: Progressing  Goal: Allow medical examinations, as recommended  Description: Interventions:  - Provide physical/neurological exams and/or referrals, per provider   Outcome: Progressing  Goal: Cooperate with recommended testing/procedures  Description: Interventions:  - Determine need for ancillary testing  - Observe for mental status changes  - Implement falls/precaution protocol   Outcome: Progressing  Goal: Attend and participate in unit activities, including therapeutic, recreational, and educational groups  Description: Interventions:  - Provide therapeutic and educational activities daily, encourage attendance and participation, and document same in the medical record   - Provide appropriate opportunities for reminiscence   - Provide a consistent daily routine   - Encourage family contact/visitation   Outcome: Progressing  Goal: Complete daily ADLs, including personal hygiene independently, as able  Description: Interventions:  - Observe, teach, and assist patient with ADLS  Outcome: Progressing     Problem: DISCHARGE PLANNING  Goal: Discharge to home or other facility with appropriate resources  Description: INTERVENTIONS:  - Identify barriers to discharge w/patient and caregiver  - Arrange for needed discharge resources and transportation as appropriate  - Identify discharge learning needs (meds, wound care, etc )  - Arrange for interpretive services to assist at discharge as needed  - Refer to Case Management Department for coordinating discharge planning if the patient needs post-hospital services based on physician/advanced practitioner order or complex needs related to functional status, cognitive ability, or social support system  Outcome: Progressing     Problem: Ineffective Coping  Goal: Participates in unit activities  Description: Interventions:  - Provide therapeutic environment   - Provide required programming   - Redirect inappropriate behaviors   Outcome: Progressing     Problem: SAFETY, RESTRAINT - VIOLENT/SELF-DESTRUCTIVE  Goal: Remains free of harm/injury from restraints (Restraint for Violent/Self-Destructive Behavior)  Description: INTERVENTIONS:  - Instruct patient/family regarding restraint use   - Assess and monitor physiologic and psychological status   - Provide interventions and comfort measures to meet assessed patient needs   - Ensure continuous in person monitoring is provided   - Identify and implement measures to help patient regain control  - Assess readiness for release of restraint  Outcome: Progressing  Goal: Returns to optimal restraint-free functioning  Description: INTERVENTIONS:  - Assess the patient's behavior and symptoms that indicate continued need for restraint  - Identify and implement measures to help patient regain control  - Assess readiness for release of restraint   Outcome: Progressing

## 2020-09-14 PROCEDURE — 99233 SBSQ HOSP IP/OBS HIGH 50: CPT | Performed by: NURSE PRACTITIONER

## 2020-09-14 RX ORDER — DIAZEPAM 5 MG/1
5 TABLET ORAL 3 TIMES DAILY
Status: DISCONTINUED | OUTPATIENT
Start: 2020-09-14 | End: 2020-09-21 | Stop reason: HOSPADM

## 2020-09-14 RX ORDER — OLANZAPINE 10 MG/1
10 TABLET ORAL 2 TIMES DAILY
Status: DISCONTINUED | OUTPATIENT
Start: 2020-09-14 | End: 2020-09-21 | Stop reason: HOSPADM

## 2020-09-14 RX ADMIN — Medication: at 08:35

## 2020-09-14 RX ADMIN — LITHIUM CARBONATE 600 MG: 300 TABLET, EXTENDED RELEASE ORAL at 21:07

## 2020-09-14 RX ADMIN — OLANZAPINE 10 MG: 10 TABLET, FILM COATED ORAL at 17:33

## 2020-09-14 RX ADMIN — DIAZEPAM 5 MG: 5 TABLET ORAL at 11:23

## 2020-09-14 RX ADMIN — RISPERIDONE 2 MG: 1 TABLET, ORALLY DISINTEGRATING ORAL at 05:01

## 2020-09-14 RX ADMIN — Medication: at 17:33

## 2020-09-14 RX ADMIN — RISPERIDONE 2 MG: 1 TABLET, ORALLY DISINTEGRATING ORAL at 13:31

## 2020-09-14 RX ADMIN — OLANZAPINE 10 MG: 10 TABLET, FILM COATED ORAL at 11:49

## 2020-09-14 RX ADMIN — LITHIUM CARBONATE 600 MG: 300 TABLET, EXTENDED RELEASE ORAL at 08:31

## 2020-09-14 RX ADMIN — DIAZEPAM 5 MG: 5 TABLET ORAL at 21:07

## 2020-09-14 RX ADMIN — DIAZEPAM 5 MG: 5 TABLET ORAL at 16:19

## 2020-09-14 RX ADMIN — DIAZEPAM 5 MG: 5 TABLET ORAL at 08:00

## 2020-09-14 RX ADMIN — BACITRACIN 1 SMALL APPLICATION: 500 OINTMENT TOPICAL at 20:55

## 2020-09-14 NOTE — PROGRESS NOTES
Progress Note - [address] K Day 56 y o  male MRN: [id number]   Unit/Bed#: [facility] 220-01 Encounter: [id number]    Behavior over the last 24 hours:      Bertha Dasilva was seen for an inpatient follow-up psychiatric visit this date  He remains loud, intrusive, labile, and disruptive  He is hyper talkative and is pacing the halls  Today his mood is euphoric because he believes he is being discharged  It was decided by the Psychiatric Care Team that he is still too manic to leave and requires additional medication  He agreed to take p o  Zyprexa in order to maintain 201 voluntary commitment status  He was angry and agitated but did take medication  He is still angry but much calmer and his behavior is more controlled  ROS: no complaints, all other systems are negative    Mental Status Evaluation:    Appearance:  disheveled, marginal hygiene   Behavior:  agitated, angry, demanding, psychomotor agitation, sarcastic   Speech:  pressured, hypertalkative, loud   Mood:  manic   Affect:  increased in intensity   Thought Process:  disorganized, illogical, tangential   Associations: tangential associations   Thought Content:  no overt delusions   Perceptual Disturbances: denies auditory hallucinations when asked, appears preoccupied, talks to self at times   Risk Potential: Suicidal ideation - None  Homicidal ideation - None  Potential for aggression - No   Sensorium:  oriented to person and place   Memory:  recent and remote memory grossly intact   Consciousness:  alert and awake   Attention: poor concentration and poor attention span   Insight:  poor   Judgment: poor   Gait/Station: normal gait/station, normal balance   Motor Activity: no abnormal movements     Vital signs in last 24 hours:    Temp:  [97 9 °F (36 6 °C)-98 6 °F (37 °C)] 97 9 °F (36 6 °C)  HR:  [88-90] 90  Resp:  [18] 18  BP: (138)/(78-84) 138/84    Laboratory results:  I have personally reviewed all pertinent laboratory/tests results      Progress Toward Goals: progressing    Assessment/Plan   Principal Problem:    Schizoaffective disorder, bipolar type (HCC)  Active Problems:    Cannabis abuse, continuous    Recommended Treatment:     Zyprexa added 10mg BID  Valium decreased to 5mg TID  Continue other medications as prescribed  Discharge disposition and planning are ongoing      All current active medications have been reviewed  Encourage group therapy, milieu therapy and occupational therapy  Behavioral Health checks every 7 minutes    Current Facility-Administered Medications   Medication Dose Route Frequency Provider Last Rate    acetaminophen  650 mg Oral Q6H PRN Bogdan Haider MD      acetaminophen  650 mg Oral Q4H PRN Bogdan Haider MD      acetaminophen  975 mg Oral Q6H PRN Bogdan Haider MD      ammonium lactate   Topical BID Lang Diaz MD      bacitracin  1 small application Topical BID PRN Yoel Vila PA-C      cholecalciferol  1,000 Units Oral Daily Yoel Vila PA-C      diazepam  5 mg Oral 4x Daily CARLOS Devries      folic acid  1 mg Oral Daily Yoel Vila PA-C      hydrOXYzine HCL  50 mg Oral Q4H PRN Bogdan Haider MD      lithium carbonate  600 mg Oral Q12H [address] Estella Liu MD      LORazepam  2 mg Intravenous Once Aime Payment, MD      LORazepam  2 mg Intramuscular Q4H PRN Bogdan Haider MD      multivitamin stress formula  1 tablet Oral Daily Lang Diaz MD      OLANZapine  10 mg Intramuscular Q3H PRN Bogdan Haider MD      OLANZapine  10 mg Oral BID CARLOS Oneill      potassium chloride  40 mEq Oral Once Aime Payment, MD      risperiDONE  2 mg Oral Q3H PRN Bogdan Haider MD      thiamine  100 mg Oral Daily Yoel Vila PA-C      traZODone  50 mg Oral HS PRN Bogdan Haider MD         Risks / Benefits of Treatment:    Risks, benefits, and possible side effects of medications explained to patient and patient verbalizes understanding and agreement for treatment  Counseling / Coordination of Care:      Patient's progress discussed with staff in treatment team meeting  Medications, treatment progress and treatment plan reviewed with patient

## 2020-09-14 NOTE — PROGRESS NOTES
Patient is restless and rambling to himself in the halls  Mumbled and pressured  Asked for warm water and lotion for his feet  Offered PRNs but declines them  Will continue to monitor

## 2020-09-14 NOTE — PLAN OF CARE
Problem: Alteration in Thoughts and Perception  Goal: Refrain from acting on delusional thinking/internal stimuli  Description: Interventions:  - Monitor patient closely, per order   - Utilize least restrictive measures   - Set reasonable limits, give positive feedback for acceptable   - Administer medications as ordered and monitor of potential side effects  Outcome: Not Progressing  Goal: Agree to be compliant with medication regime, as prescribed and report medication side effects  Description: Interventions:  - Offer appropriate PRN medication and supervise ingestion; conduct AIMS, as needed   Outcome: Progressing  Goal: Recognize dysfunctional thoughts, communicate reality-based thoughts at the time of discharge  Description: Interventions:  - Provide medication and psycho-education to assist patient in compliance and developing insight into his/her illness   Outcome: Not Progressing  Goal: Complete daily ADLs, including personal hygiene independently, as able  Description: Interventions:  - Observe, teach, and assist patient with ADLS  - Monitor and promote a balance of rest/activity, with adequate nutrition and elimination   Outcome: Progressing     Problem: Risk for Violence/Aggression Toward Others  Goal: Verbalize thoughts and feelings  Description: Interventions:  - Assess and re-assess patient's level of risk, every waking shift  - Engage patient in 1:1 interactions, daily, for a minimum of 15 minutes   - Allow patient to express feelings and frustrations in a safe and non-threatening manner   - Establish rapport/trust with patient   Outcome: Progressing  Goal: Control angry outbursts  Description: Interventions:  - Monitor patient closely, per order  - Ensure early verbal de-escalation  - Monitor prn medication needs  - Set reasonable/therapeutic limits, outline behavioral expectations, and consequences   - Provide a non-threatening milieu, utilizing the least restrictive interventions   Outcome: Not Progressing  Goal: Attend and participate in unit activities, including therapeutic, recreational, and educational groups  Description: Interventions:  - Provide therapeutic and educational activities daily, encourage attendance and participation, and document same in the medical record   Outcome: Progressing  Goal: Identify appropriate positive anger management techniques  Description: Interventions:  - Offer anger management and coping skills groups   - Staff will provide positive feedback for appropriate anger control  Outcome: Progressing     Problem: Alteration in Orientation  Goal: Interact with staff daily  Description: Interventions:  - Assess and re-assess patient's level of orientation  - Engage patient in 1 on 1 interactions, daily, for a minimum of 15 minutes   - Establish rapport/trust with patient   Outcome: Progressing  Goal: Express concerns related to confused thinking related to:  Description: Interventions:  - Encourage patient to express feelings, fears, frustrations, hopes  - Assign consistent caregivers   - [place]/re-orient patient as needed  - Allow comfort items, as appropriate  - Provide visual cues, signs, etc    Outcome: Not Progressing  Goal: Allow medical examinations, as recommended  Description: Interventions:  - Provide physical/neurological exams and/or referrals, per provider   Outcome: Progressing  Goal: Cooperate with recommended testing/procedures  Description: Interventions:  - Determine need for ancillary testing  - Observe for mental status changes  - Implement falls/precaution protocol   Outcome: Progressing     Problem: SAFETY, RESTRAINT - VIOLENT/SELF-DESTRUCTIVE  Goal: Remains free of harm/injury from restraints (Restraint for Violent/Self-Destructive Behavior)  Description: INTERVENTIONS:  - Instruct patient/family regarding restraint use   - Assess and monitor physiologic and psychological status   - Provide interventions and comfort measures to meet assessed patient needs   - Ensure continuous in person monitoring is provided   - Identify and implement measures to help patient regain control  - Assess readiness for release of restraint  Outcome: Progressing  Goal: Returns to optimal restraint-free functioning  Description: INTERVENTIONS:  - Assess the patient's behavior and symptoms that indicate continued need for restraint  - Identify and implement measures to help patient regain control  - Assess readiness for release of restraint   Outcome: Progressing

## 2020-09-14 NOTE — PROGRESS NOTES
09/14/20 0920   Team Meeting   Meeting Type Daily Rounds   Initial Conference Date 09/14/20   Patient/Family Present   Patient Present No   Patient's Family Present No   Daily Rounds Documentation    Team Members Present:   CARLOS Baca, RN  [facility], [facility]    0 7 [place] level  DC today  Valium increase helped  Improved yesterday  Needs follow-up scheduled for psych

## 2020-09-14 NOTE — PROGRESS NOTES
09/14/20 1000   Activity/Group Checklist   Group   (Coping Skills and Art threapy Processing)   Attendance Attended   Attendance Duration (min) Greater than 60   Interactions Interacted appropriately   Affect/Mood Appropriate   Goals Achieved Identified feelings; Identified triggers; Discussed coping strategies; Identified resources and support systems; Able to listen to others; Able to engage in interactions; Able to reflect/comment on own behavior

## 2020-09-14 NOTE — PROGRESS NOTES
Pt's mood remains labile throughout the shift  Cursing and yelling at times that he has to stay here  Pt has been compliant with taking his scheduled medications and requested PRN Risperdal 1 mg which was given @ 1331  Pt  appears very tired, but remains restless  No acting out or aggressive behaviors at this time  For the most part remains cooperative with staff

## 2020-09-14 NOTE — PROGRESS NOTES
Progress Note - Jenise Graham 56 y o  male MRN: [id number]    Unit/Bed#: [facility] 220-01 Encounter: [id number]        Subjective:   Patient seen and examined at bedside after reviewing the chart and discussing the case with the caring staff  Patient examined at bedside  Patient has no acute concerns  Physical Exam   Vitals: Blood pressure 138/84, pulse 90, temperature 97 9 °F (36 6 °C), temperature source Temporal, resp  rate 18, height 5' 9" (1 753 m), weight 88 9 kg (196 lb), SpO2 98 %  ,Body mass index is 28 94 kg/m²  Constitutional: Patient appears well-developed  HEENT: PERR, EOMI, MMM  Cardiovascular: Normal rate and regular rhythm  Pulmonary/Chest: Effort normal and breath sounds normal    Abdomen: Soft, + BS, NT  Skin:  Multiple skin tags 1-2 cm in size on patient's trunk  One skin tag on left lower back has dried blood  Assessment/Plan:  Jenise Graham is a(n) 56 y o  male with schizoaffective disorder bipolar type     1  Cardiac with history of hypertension and dyslipidemia   Patient's diastolic blood pressure was found to be elevated although patient is not on any antihypertensive medications  Will continue to closely monitor  Patient is not on anything for Hyperlipidemia  2  Tobacco abuse   Patient has been put on nicotine transdermal patch  3  Alcohol abuse  Thiamine, folic acid and multivitamin  4  DJD/osteoarthritis   Tylenol on as needed basis  5  Gait abnormality   Stable  6  Vitamin-D deficiency   Vitamin D3 1000U daily  I will check vitamin-D levels for the patient  7  BPH    I will discontinue patient's Flomax at his request as his symptoms seem stable at this time  8  Skin tags  Patient wants to inquire about removing his skin tags at the [address] [facility]  Bacitracin ointment as needed for open/bleeding skin tags  9  Dry skin over bilateral feet  I will put the patient on ammonium lactate lotion 2 times daily

## 2020-09-14 NOTE — PROGRESS NOTES
Patient slept for a couple hours but is now up rearranging furniture in his room and pacing the halls  When asked if he needed anything he says "I'm okay, seeing ain't believing around here"  Behavior is controlled  Will continue to monitor

## 2020-09-14 NOTE — PROGRESS NOTES
09/14/20 1699   Activity/Group Checklist   Group   (Goal Planning and Communication)   Attendance Attended   Attendance Duration (min) 46-60   Interactions Interacted appropriately   Affect/Mood Appropriate;Bright  (hypererbal)   Goals Achieved Identified feelings; Discussed coping strategies; Able to listen to others; Able to engage in interactions

## 2020-09-14 NOTE — PROGRESS NOTES
09/14/20 1430   Activity/Group Checklist   Group   (Creative Expressions)   Attendance Attended   Attendance Duration (min) 46-60   Interactions Disorganized interaction   Affect/Mood Normal range   Goals Achieved Identified feelings; Able to listen to others; Able to engage in interactions

## 2020-09-14 NOTE — PROGRESS NOTES
Patient remains very labile this evening  He has had episodes of irritability and euphoria, but his behaviors have remained controlled  He talks to himself often, often incoherently  He paces often in the hallways muttering angrily, but he is redirectable  He is medication compliant and able to make his needs known  He was polite with his nurse, and was laughing at his own jokes  His speech is disorganized and he cannot focus on a singular topic, but he offers no complaints at this time  He has not expressed any suicidal or homicidal thoughts or behaviors  He denied hallucinations but appears internally preoccupied  Will remain on safety precautions and continual monitoring

## 2020-09-14 NOTE — PROGRESS NOTES
Patient yelling "do you think I care if I stay here another month"  He is loud, disorganized, and agitated  Slamming things in his room  He is not taking redirection well  He states he cannot relax  Speech is loud and often unintelligible  He finally agreed to take PO Risperdal at 0501 for his agitation after being given printed education on it although he states "I'll make a paper hat out of it and burn it when I leave" and then laughed  Patient has gotten little and poor sleep  He continues to pace and mutter to himself  Will maintain on safety precautions and continual monitoring

## 2020-09-14 NOTE — PROGRESS NOTES
Risperdal ineffective  Patient remains labile, easily agitated, and restless  Will continue to monitor

## 2020-09-14 NOTE — PROGRESS NOTES
Pt's mood is labile  Euphoric at times  Pt became a little agitated when he was told that he would not be leaving today, but he did not become aggressive  Took the news better than expected and was agreeable to taking Zyprexa  Pt sobbing on the phone when he told his mom he would have to stay here longer  Pt did take scheduled Zyprexa without any issues  Pt has been visible on the unit and has been attending groups  Denies SI/HI  Will continue to monitor and assess

## 2020-09-15 PROCEDURE — 99232 SBSQ HOSP IP/OBS MODERATE 35: CPT | Performed by: NURSE PRACTITIONER

## 2020-09-15 RX ADMIN — LITHIUM CARBONATE 600 MG: 300 TABLET, EXTENDED RELEASE ORAL at 20:20

## 2020-09-15 RX ADMIN — DIAZEPAM 5 MG: 5 TABLET ORAL at 20:20

## 2020-09-15 RX ADMIN — DIAZEPAM 5 MG: 5 TABLET ORAL at 16:30

## 2020-09-15 RX ADMIN — LITHIUM CARBONATE 600 MG: 300 TABLET, EXTENDED RELEASE ORAL at 08:19

## 2020-09-15 RX ADMIN — OLANZAPINE 10 MG: 10 TABLET, FILM COATED ORAL at 08:19

## 2020-09-15 RX ADMIN — DIAZEPAM 5 MG: 5 TABLET ORAL at 08:19

## 2020-09-15 RX ADMIN — OLANZAPINE 10 MG: 10 TABLET, FILM COATED ORAL at 18:15

## 2020-09-15 NOTE — PROGRESS NOTES
09/15/20 1000   Activity/Group Checklist   Group   (Self-Reflection and Art Therapy Processing)   Attendance Attended   Attendance Duration (min) Greater than 60   Interactions Interacted appropriately   Affect/Mood Appropriate   Goals Achieved Identified feelings; Discussed coping strategies; Able to listen to others; Able to engage in interactions

## 2020-09-15 NOTE — PROGRESS NOTES
Progress Note - [address] CASTANEDA Day 56 y o  male MRN: [id number]   Unit/Bed#: [facility] 220-01 Encounter: [id number]    Behavior over the last 24 hours:      Flora Escobedo was seen for an inpatient follow-up psychiatric visit this date  At today's visit, he remains tangential, disorganized, and delusional   He is significantly less irritable and was able to sleep through the night without interruptions  He is taking his medications as prescribed  He is not as loud on the unit and his behavior and mood are stabilizing  ROS: no complaints, all other systems are negative    Mental Status Evaluation:    Appearance:  disheveled   Behavior:  cooperative, demanding   Speech:  normal rate and volume   Mood:  less labile, less irritable, less manic   Affect:  reactive   Thought Process:  tangential, perseverative   Associations: intact associations   Thought Content:  persecutory and fixed delusions, paranoid ideation   Perceptual Disturbances: none   Risk Potential: Suicidal ideation - None  Homicidal ideation - None  Potential for aggression - No   Sensorium:  oriented to person, place and time/date   Memory:  recent and remote memory grossly intact   Consciousness:  alert and awake   Attention: poor concentration and poor attention span   Insight:  poor   Judgment: poor   Gait/Station: normal gait/station, normal balance   Motor Activity: no abnormal movements     Vital signs in last 24 hours:    Temp:  [97 5 °F (36 4 °C)-98 7 °F (37 1 °C)] 97 5 °F (36 4 °C)  HR:  [102-110] 102  Resp:  [18] 18  BP: (112-139)/(76-91) 139/91    Laboratory results:  I have personally reviewed all pertinent laboratory/tests results  Progress Toward Goals: progressing    Assessment/Plan   Principal Problem:    Schizoaffective disorder, bipolar type (HCC)  Active Problems:    Cannabis abuse, continuous    Recommended Treatment:     Continue current medications as prescribed  Continue to monitor    Discharge disposition and planning are ongoing  All current active medications have been reviewed  Encourage group therapy, milieu therapy and occupational therapy  Behavioral Health checks every 7 minutes    Current Facility-Administered Medications   Medication Dose Route Frequency Provider Last Rate    acetaminophen  650 mg Oral Q6H PRN Anel Ramos MD      acetaminophen  650 mg Oral Q4H PRN Anel Ramos MD      acetaminophen  975 mg Oral Q6H PRN Anel Ramos MD      ammonium lactate   Topical BID Sola Martinez MD      bacitracin  1 small application Topical BID PRN Sindi Campbell PA-C      cholecalciferol  1,000 Units Oral Daily Sindi Campbell PA-C      diazepam  5 mg Oral TID CARLOS Leon      folic acid  1 mg Oral Daily Sindi Campbell PA-C      hydrOXYzine HCL  50 mg Oral Q4H PRN Anel Ramos MD      lithium carbonate  600 mg Oral Q12H [address] Ruthann Paredes MD      LORazepam  2 mg Intravenous Once Lauren Bacon MD      LORazepam  2 mg Intramuscular Q4H PRN Anel Ramos MD      multivitamin stress formula  1 tablet Oral Daily Sola Martinez MD      OLANZapine  10 mg Intramuscular Q3H PRN Anel Ramos MD      OLANZapine  10 mg Oral BID CARLOS Oneill      potassium chloride  40 mEq Oral Once Lauren Bacon MD      risperiDONE  2 mg Oral Q3H PRN Anel Ramos MD      thiamine  100 mg Oral Daily Sindi Campbell PA-C      traZODone  50 mg Oral HS PRN Anel Ramos MD         Risks / Benefits of Treatment:    Risks, benefits, and possible side effects of medications explained to patient and patient verbalizes understanding and agreement for treatment  Counseling / Coordination of Care:      Patient's progress discussed with staff in treatment team meeting  Medications, treatment progress and treatment plan reviewed with patient

## 2020-09-15 NOTE — PROGRESS NOTES
Pt visible on unit for select groups  Tangential and loud at times  Mood is labile but pt has remained controlled for most of the day  Compliant with meals and meds  Monitoring continues

## 2020-09-15 NOTE — PLAN OF CARE
Problem: Alteration in Thoughts and Perception  Goal: Treatment Goal: Gain control of psychotic behaviors/thinking, reduce/eliminate presenting symptoms and demonstrate improved reality functioning upon discharge  Outcome: Progressing  Goal: Refrain from acting on delusional thinking/internal stimuli  Description: Interventions:  - Monitor patient closely, per order   - Utilize least restrictive measures   - Set reasonable limits, give positive feedback for acceptable   - Administer medications as ordered and monitor of potential side effects  Outcome: Progressing  Goal: Agree to be compliant with medication regime, as prescribed and report medication side effects  Description: Interventions:  - Offer appropriate PRN medication and supervise ingestion; conduct AIMS, as needed   Outcome: Progressing  Goal: Recognize dysfunctional thoughts, communicate reality-based thoughts at the time of discharge  Description: Interventions:  - Provide medication and psycho-education to assist patient in compliance and developing insight into his/her illness   Outcome: Progressing  Goal: Complete daily ADLs, including personal hygiene independently, as able  Description: Interventions:  - Observe, teach, and assist patient with ADLS  - Monitor and promote a balance of rest/activity, with adequate nutrition and elimination   Outcome: Progressing     Problem: Risk for Violence/Aggression Toward Others  Goal: Treatment Goal: Refrain from acts of violence/aggression during length of stay, and demonstrate improved impulse control at the time of discharge  Outcome: Progressing  Goal: Verbalize thoughts and feelings  Description: Interventions:  - Assess and re-assess patient's level of risk, every waking shift  - Engage patient in 1:1 interactions, daily, for a minimum of 15 minutes   - Allow patient to express feelings and frustrations in a safe and non-threatening manner   - Establish rapport/trust with patient   Outcome: Progressing  Goal: Refrain from harming others  Outcome: Progressing  Goal: Refrain from destructive acts on the environment or property  Outcome: Progressing  Goal: Control angry outbursts  Description: Interventions:  - Monitor patient closely, per order  - Ensure early verbal de-escalation  - Monitor prn medication needs  - Set reasonable/therapeutic limits, outline behavioral expectations, and consequences   - Provide a non-threatening milieu, utilizing the least restrictive interventions   Outcome: Progressing  Goal: Attend and participate in unit activities, including therapeutic, recreational, and educational groups  Description: Interventions:  - Provide therapeutic and educational activities daily, encourage attendance and participation, and document same in the medical record   Outcome: Progressing  Goal: Identify appropriate positive anger management techniques  Description: Interventions:  - Offer anger management and coping skills groups   - Staff will provide positive feedback for appropriate anger control  Outcome: Progressing     Problem: Alteration in Orientation  Goal: Treatment Goal: Demonstrate a reduction of confusion and improved orientation to person, place, time and/or situation upon discharge, according to optimum baseline/ability  Outcome: Progressing  Goal: Interact with staff daily  Description: Interventions:  - Assess and re-assess patient's level of orientation  - Engage patient in 1 on 1 interactions, daily, for a minimum of 15 minutes   - Establish rapport/trust with patient   Outcome: Progressing  Goal: Express concerns related to confused thinking related to:  Description: Interventions:  - Encourage patient to express feelings, fears, frustrations, hopes  - Assign consistent caregivers   - [place]/re-orient patient as needed  - Allow comfort items, as appropriate  - Provide visual cues, signs, etc    Outcome: Progressing  Goal: Allow medical examinations, as recommended  Description: Interventions:  - Provide physical/neurological exams and/or referrals, per provider   Outcome: Progressing  Goal: Cooperate with recommended testing/procedures  Description: Interventions:  - Determine need for ancillary testing  - Observe for mental status changes  - Implement falls/precaution protocol   Outcome: Progressing     Problem: Ineffective Coping  Goal: Participates in unit activities  Description: Interventions:  - Provide therapeutic environment   - Provide required programming   - Redirect inappropriate behaviors   Outcome: Progressing     Problem: DISCHARGE PLANNING  Goal: Discharge to home or other facility with appropriate resources  Description: INTERVENTIONS:  - Identify barriers to discharge w/patient and caregiver  - Arrange for needed discharge resources and transportation as appropriate  - Identify discharge learning needs (meds, wound care, etc )  - Arrange for interpretive services to assist at discharge as needed  - Refer to Case Management Department for coordinating discharge planning if the patient needs post-hospital services based on physician/advanced practitioner order or complex needs related to functional status, cognitive ability, or social support system  Outcome: Progressing     Problem: SAFETY, RESTRAINT - VIOLENT/SELF-DESTRUCTIVE  Goal: Remains free of harm/injury from restraints (Restraint for Violent/Self-Destructive Behavior)  Description: INTERVENTIONS:  - Instruct patient/family regarding restraint use   - Assess and monitor physiologic and psychological status   - Provide interventions and comfort measures to meet assessed patient needs   - Ensure continuous in person monitoring is provided   - Identify and implement measures to help patient regain control  - Assess readiness for release of restraint  Outcome: Progressing  Goal: Returns to optimal restraint-free functioning  Description: INTERVENTIONS:  - Assess the patient's behavior and symptoms that indicate continued need for restraint  - Identify and implement measures to help patient regain control  - Assess readiness for release of restraint   Outcome: Progressing

## 2020-09-15 NOTE — PLAN OF CARE
PT continues to attend most art therapy groups where he is observed in and out of the group room throughout and does require some prompting and redirection to maintain focus  PT identified one of the goals that he would like to work on today as maintaining his personal hygiene         Problem: Ineffective Coping  Goal: Participates in unit activities  Description: Interventions:  - Provide therapeutic environment   - Provide required programming   - Redirect inappropriate behaviors   Outcome: Progressing

## 2020-09-15 NOTE — PROGRESS NOTES
Progress Note - Regina Graham 56 y o  male MRN: [id number]    Unit/Bed#: [facility] 220-01 Encounter: [id number]        Subjective:   Patient seen and examined at bedside after reviewing the chart and discussing the case with the caring staff  Patient examined at bedside  Patient has no acute concerns  Physical Exam   Vitals: Blood pressure 139/91, pulse 102, temperature 97 5 °F (36 4 °C), temperature source Temporal, resp  rate 18, height 5' 9" (1 753 m), weight 88 9 kg (196 lb), SpO2 98 %  ,Body mass index is 28 94 kg/m²  Constitutional: Patient appears well-developed  HEENT: PERR, EOMI, MMM  Cardiovascular: Normal rate and regular rhythm  Pulmonary/Chest: Effort normal and breath sounds normal    Abdomen: Soft, + BS, NT  Skin:  Multiple skin tags 1-2 cm in size on patient's trunk  One skin tag on left lower back has dried blood  Assessment/Plan:  Regina Graham is a(n) 56 y o  male with schizoaffective disorder bipolar type     1  Cardiac with history of hypertension and dyslipidemia   Patient's diastolic blood pressure was found to be elevated although patient is not on any antihypertensive medications  Will continue to closely monitor  Patient is not on anything for Hyperlipidemia  2  Tobacco abuse   Patient has been put on nicotine transdermal patch  3  Alcohol abuse  Thiamine, folic acid and multivitamin  4  DJD/osteoarthritis   Tylenol on as needed basis  5  Gait abnormality   Stable  6  Vitamin-D deficiency   Vitamin D3 1000U daily  I will check vitamin-D levels for the patient  7  BPH    I will discontinue patient's Flomax at his request as his symptoms seem stable at this time  8  Skin tags  Patient wants to inquire about removing his skin tags at the [place]  Bacitracin ointment as needed for open/bleeding skin tags  9  Dry skin over bilateral feet  I will put the patient on ammonium lactate lotion 2 times daily

## 2020-09-15 NOTE — PROGRESS NOTES
Rosetta has been visible on the unit  Appears groggy but has been very pleasant and controlled  Reports he had a good visit with the  that came to see him  Although he can be rambling and difficult to understand, his thought process seemed very clear to this writer  Patient expressed his disappointment at not leaving today  This writer pointed out that patient's mood has been quite labile lately  Patient stated that he does not wish to be just happy or just sad  He says he has empathy for his fellow human beings and is therefore sad and/or angry sometimes  Feels he is not being allowed to participate enough in his treatment  Will pass this along to next shift  Patient soaked his feet and his cracked toes were attended to  Patient appreciative of care    Cooperative with medications

## 2020-09-15 NOTE — PROGRESS NOTES
09/15/20 0900   Team Meeting   Meeting Type Daily Rounds   Initial Conference Date 09/15/20   Team Members Present   Team Members Present Physician;Nurse;   Physician Team Member Dr India Garcia; CARLOS Recinos   Nursing Team Member Marcellus Minor RN   Social Work Team Member JAMIE Gonzalez; Jane Martin   Patient/Family Present   Patient Present No   Patient's Family Present No     Did take medications this morning including the Zyprexa  Slept all night  Loud at times  Thoughts are more clear  Pleasant overall

## 2020-09-15 NOTE — PROGRESS NOTES
09/15/20 0779   Activity/Group Checklist   Group   (Goal Planning and Communication)   Attendance Attended   Attendance Duration (min) 46-60   Interactions Interacted appropriately   Affect/Mood Appropriate   Goals Achieved Identified feelings; Discussed coping strategies; Able to listen to others; Able to engage in interactions

## 2020-09-16 PROCEDURE — 99232 SBSQ HOSP IP/OBS MODERATE 35: CPT | Performed by: NURSE PRACTITIONER

## 2020-09-16 RX ADMIN — OLANZAPINE 10 MG: 10 TABLET, FILM COATED ORAL at 09:04

## 2020-09-16 RX ADMIN — HYDROXYZINE HYDROCHLORIDE 50 MG: 25 TABLET, FILM COATED ORAL at 10:01

## 2020-09-16 RX ADMIN — DIAZEPAM 5 MG: 5 TABLET ORAL at 21:28

## 2020-09-16 RX ADMIN — DIAZEPAM 5 MG: 5 TABLET ORAL at 16:15

## 2020-09-16 RX ADMIN — LITHIUM CARBONATE 600 MG: 300 TABLET, EXTENDED RELEASE ORAL at 09:04

## 2020-09-16 RX ADMIN — LITHIUM CARBONATE 600 MG: 300 TABLET, EXTENDED RELEASE ORAL at 21:28

## 2020-09-16 RX ADMIN — OLANZAPINE 10 MG: 10 TABLET, FILM COATED ORAL at 17:52

## 2020-09-16 RX ADMIN — DIAZEPAM 5 MG: 5 TABLET ORAL at 09:04

## 2020-09-16 NOTE — PROGRESS NOTES
Patient remains labile but relatively less than yesterday  1 episode of irritability and yelling at peer,  but no aggressive behaviors  Compliant with medications, pt was well aware of medications scheduled and doses and frequency  Attending groups with participation  Hygiene adequate but hair appears disheveled  Will maintain on safety precautions and continual monitoring  No needs identified

## 2020-09-16 NOTE — PROGRESS NOTES
Progress Note - Regina Graham 56 y o  male MRN: [id number]    Unit/Bed#: [facility] 220-01 Encounter: [id number]        Subjective:   Patient seen and examined at bedside after reviewing the chart and discussing the case with the caring staff  Patient examined at bedside  Patient has no acute concerns  Physical Exam   Vitals: Blood pressure 160/99, pulse 84, temperature 98 8 °F (37 1 °C), temperature source Temporal, resp  rate 16, height 5' 9" (1 753 m), weight 88 9 kg (196 lb), SpO2 95 %  ,Body mass index is 28 94 kg/m²  Constitutional: Patient appears well-developed  HEENT: PERR, EOMI, MMM  Cardiovascular: Normal rate and regular rhythm  Pulmonary/Chest: Effort normal and breath sounds normal    Abdomen: Soft, + BS, NT  Skin:  Multiple skin tags 1-2 cm in size on patient's trunk  One skin tag on left lower back has dried blood  Assessment/Plan:  Regina Graham is a(n) 56 y o  male with schizoaffective disorder bipolar type     1  Cardiac with history of hypertension and dyslipidemia   Patient's diastolic blood pressure was found to be elevated although patient is not on any antihypertensive medications  Will continue to closely monitor  Patient is not on anything for Hyperlipidemia  2  Tobacco abuse   Patient has been put on nicotine transdermal patch  3  Alcohol abuse  Thiamine, folic acid and multivitamin  4  DJD/osteoarthritis   Tylenol on as needed basis  5  Gait abnormality   Stable  6  Vitamin-D deficiency   Vitamin D3 1000U daily  I will check vitamin-D levels for the patient  7  BPH    I will discontinue patient's Flomax at his request as his symptoms seem stable at this time  8  Skin tags  Patient wants to inquire about removing his skin tags at the [place]  Bacitracin ointment as needed for open/bleeding skin tags  9  Dry skin over bilateral feet  I will put the patient on ammonium lactate lotion 2 times daily

## 2020-09-16 NOTE — PROGRESS NOTES
Progress Note - [address] CASTAN[address] Day 56 y o  male MRN: [id number]   Unit/Bed#: [facility] 220-01 Encounter: [id number]    Behavior over the last 24 hours:      Nicholas James was seen for an inpatient, follow-up psychiatric visit this date  He remains labile, intrusive, and hyperverbal but is much less loud  His mood is more controlled  He is taking his medications as prescribed without incident  He is still having difficulty sleeping through the night  He is progressing slowly  ROS: no complaints, all other systems are negative    Mental Status Evaluation:    Appearance:  disheveled   Behavior:  pleasant, some agitation   Speech:  pressured, hypertalkative   Mood:  slightly less manic   Affect:  overbright, expansive   Thought Process:  disorganized, tangential   Associations: tangential associations   Thought Content:  persecutory delusions, paranoid ideation   Perceptual Disturbances: none   Risk Potential: Suicidal ideation - None  Homicidal ideation - None  Potential for aggression - No   Sensorium:  oriented to person, place and time/date   Memory:  recent and remote memory grossly intact   Consciousness:  alert and awake   Attention: poor concentration and poor attention span   Insight:  limited   Judgment: limited   Gait/Station: normal gait/station, normal balance   Motor Activity: no abnormal movements     Vital signs in last 24 hours:    Temp:  [98 8 °F (37 1 °C)-99 °F (37 2 °C)] 98 8 °F (37 1 °C)  HR:  [84-89] 84  Resp:  [16] 16  BP: (148-160)/(91-99) 160/99    Laboratory results:  I have personally reviewed all pertinent laboratory/tests results  Progress Toward Goals: progressing    Assessment/Plan   Principal Problem:    Schizoaffective disorder, bipolar type (HCC)  Active Problems:    Cannabis abuse, continuous    Recommended Treatment:     Continue current medications as prescribed  Continue to monitor  Discharge disposition and planning are ongoing      All current active medications have been reviewed  Encourage group therapy, milieu therapy and occupational therapy  Behavioral Health checks every 7 minutes    Current Facility-Administered Medications   Medication Dose Route Frequency Provider Last Rate    acetaminophen  650 mg Oral Q6H PRN Nova Hensley MD      acetaminophen  650 mg Oral Q4H PRN Nova Hensley MD      acetaminophen  975 mg Oral Q6H PRN Nova Hensley MD      ammonium lactate   Topical BID Merlyn Aviles MD      bacitracin  1 small application Topical BID PRN Amira Salguero PA-C      cholecalciferol  1,000 Units Oral Daily Amira Salguero PA-C      diazepam  5 mg Oral TID DafneCARLOS Briggs      folic acid  1 mg Oral Daily Amira Salguero PA-C      hydrOXYzine HCL  50 mg Oral Q4H PRN Nova Hensley MD      lithium carbonate  600 mg Oral Q12H [address] Erasmo Huitron MD      LORazepam  2 mg Intravenous Once Ted Ahn MD      LORazepam  2 mg Intramuscular Q4H PRN Nova Hensley MD      multivitamin stress formula  1 tablet Oral Daily Merlyn Aviles MD      OLANZapine  10 mg Intramuscular Q3H PRN Nova Hensley MD      OLANZapine  10 mg Oral BID CARLOS Oneill      potassium chloride  40 mEq Oral Once Ted Ahn MD      risperiDONE  2 mg Oral Q3H PRN Nova Hensley MD      thiamine  100 mg Oral Daily Amira Salguero PA-C      traZODone  50 mg Oral HS PRN Nova Hensley MD         Risks / Benefits of Treatment:    Risks, benefits, and possible side effects of medications explained to patient and patient verbalizes understanding and agreement for treatment  Counseling / Coordination of Care:      Patient's progress discussed with staff in treatment team meeting  Medications, treatment progress and treatment plan reviewed with patient

## 2020-09-16 NOTE — PROGRESS NOTES
Remains in the community  No aggressive behaviors noted  Medication compliant  Labile  Q 7 minute safety checks maintained

## 2020-09-16 NOTE — PLAN OF CARE
Problem: Alteration in Thoughts and Perception  Goal: Treatment Goal: Gain control of psychotic behaviors/thinking, reduce/eliminate presenting symptoms and demonstrate improved reality functioning upon discharge  Outcome: Progressing  Goal: Refrain from acting on delusional thinking/internal stimuli  Description: Interventions:  - Monitor patient closely, per order   - Utilize least restrictive measures   - Set reasonable limits, give positive feedback for acceptable   - Administer medications as ordered and monitor of potential side effects  Outcome: Progressing  Goal: Agree to be compliant with medication regime, as prescribed and report medication side effects  Description: Interventions:  - Offer appropriate PRN medication and supervise ingestion; conduct AIMS, as needed   Outcome: Progressing  Goal: Recognize dysfunctional thoughts, communicate reality-based thoughts at the time of discharge  Description: Interventions:  - Provide medication and psycho-education to assist patient in compliance and developing insight into his/her illness   Outcome: Progressing  Goal: Complete daily ADLs, including personal hygiene independently, as able  Description: Interventions:  - Observe, teach, and assist patient with ADLS  - Monitor and promote a balance of rest/activity, with adequate nutrition and elimination   Outcome: Progressing     Problem: Risk for Violence/Aggression Toward Others  Goal: Treatment Goal: Refrain from acts of violence/aggression during length of stay, and demonstrate improved impulse control at the time of discharge  Outcome: Progressing  Goal: Verbalize thoughts and feelings  Description: Interventions:  - Assess and re-assess patient's level of risk, every waking shift  - Engage patient in 1:1 interactions, daily, for a minimum of 15 minutes   - Allow patient to express feelings and frustrations in a safe and non-threatening manner   - Establish rapport/trust with patient   Outcome: Progressing  Goal: Refrain from harming others  Outcome: Progressing  Goal: Refrain from destructive acts on the environment or property  Outcome: Progressing  Goal: Control angry outbursts  Description: Interventions:  - Monitor patient closely, per order  - Ensure early verbal de-escalation  - Monitor prn medication needs  - Set reasonable/therapeutic limits, outline behavioral expectations, and consequences   - Provide a non-threatening milieu, utilizing the least restrictive interventions   Outcome: Progressing  Goal: Attend and participate in unit activities, including therapeutic, recreational, and educational groups  Description: Interventions:  - Provide therapeutic and educational activities daily, encourage attendance and participation, and document same in the medical record   Outcome: Progressing  Goal: Identify appropriate positive anger management techniques  Description: Interventions:  - Offer anger management and coping skills groups   - Staff will provide positive feedback for appropriate anger control  Outcome: Progressing     Problem: Alteration in Orientation  Goal: Treatment Goal: Demonstrate a reduction of confusion and improved orientation to person, place, time and/or situation upon discharge, according to optimum baseline/ability  Outcome: Progressing  Goal: Interact with staff daily  Description: Interventions:  - Assess and re-assess patient's level of orientation  - Engage patient in 1 on 1 interactions, daily, for a minimum of 15 minutes   - Establish rapport/trust with patient   Outcome: Progressing  Goal: Express concerns related to confused thinking related to:  Description: Interventions:  - Encourage patient to express feelings, fears, frustrations, hopes  - Assign consistent caregivers   - [employer]/re-orient patient as needed  - Allow comfort items, as appropriate  - Provide visual cues, signs, etc    Outcome: Progressing  Goal: Allow medical examinations, as recommended  Description: Interventions:  - Provide physical/neurological exams and/or referrals, per provider   Outcome: Progressing  Goal: Cooperate with recommended testing/procedures  Description: Interventions:  - Determine need for ancillary testing  - Observe for mental status changes  - Implement falls/precaution protocol   Outcome: Progressing     Problem: DISCHARGE PLANNING  Goal: Discharge to home or other facility with appropriate resources  Description: INTERVENTIONS:  - Identify barriers to discharge w/patient and caregiver  - Arrange for needed discharge resources and transportation as appropriate  - Identify discharge learning needs (meds, wound care, etc )  - Arrange for interpretive services to assist at discharge as needed  - Refer to Case Management Department for coordinating discharge planning if the patient needs post-hospital services based on physician/advanced practitioner order or complex needs related to functional status, cognitive ability, or social support system  Outcome: Progressing     Problem: Ineffective Coping  Goal: Participates in unit activities  Description: Interventions:  - Provide therapeutic environment   - Provide required programming   - Redirect inappropriate behaviors   Outcome: Progressing     Problem: SAFETY, RESTRAINT - VIOLENT/SELF-DESTRUCTIVE  Goal: Remains free of harm/injury from restraints (Restraint for Violent/Self-Destructive Behavior)  Description: INTERVENTIONS:  - Instruct patient/family regarding restraint use   - Assess and monitor physiologic and psychological status   - Provide interventions and comfort measures to meet assessed patient needs   - Ensure continuous in person monitoring is provided   - Identify and implement measures to help patient regain control  - Assess readiness for release of restraint  Outcome: Progressing  Goal: Returns to optimal restraint-free functioning  Description: INTERVENTIONS:  - Assess the patient's behavior and symptoms that indicate continued need for restraint  - Identify and implement measures to help patient regain control  - Assess readiness for release of restraint   Outcome: Progressing

## 2020-09-16 NOTE — NURSING NOTE
Tyron Duran had been med-compliant at [facility] and then he had remained in bed until about 0400 and is awake doing his ADL's  Behavior is calm and cooperative with occasional loud talking  Continue to monitor for safety/mood/behavior

## 2020-09-16 NOTE — PROGRESS NOTES
09/16/20 0996   Activity/Group Checklist   Group   (Goal Planning and Communication)   Attendance Attended   Attendance Duration (min) 46-60   Interactions Disorganized interaction   Affect/Mood Appropriate   Goals Achieved Identified feelings; Discussed coping strategies; Able to listen to others; Able to engage in interactions

## 2020-09-16 NOTE — PROGRESS NOTES
09/16/20 1030   Activity/Group Checklist   Group   (Gratefulness Group)   Attendance Attended   Attendance Duration (min) Greater than 60   Interactions Interacted appropriately   Affect/Mood Appropriate;Bright   Goals Achieved Identified feelings; Identified triggers; Increased hopefulness; Identified resources and support systems; Able to listen to others; Able to engage in interactions; Able to manage/cope with feelings; Able to give feedback to another

## 2020-09-16 NOTE — PROGRESS NOTES
09/16/20 5875   Team Meeting   Meeting Type Daily Rounds   Initial Conference Date 09/16/20   Patient/Family Present   Patient Present No   Patient's Family Present No     Team Members Present:   CARLOS Forrester, ADRIEN Velázquez, Graduate Nurse  Eusebio Reynolds, BSW  Cecile Bethea LCSW    Improved mood, less labile, less loud  Not at baseline, yet  Took meds  Less manic

## 2020-09-16 NOTE — PROGRESS NOTES
09/16/20 1400   Activity/Group Checklist   Group   (Teamwork and Communication)   Attendance Attended   Attendance Duration (min) Greater than 60   Interactions Interacted appropriately   Affect/Mood Appropriate;Bright   Goals Achieved Identified feelings; Discussed coping strategies; Able to listen to others; Able to engage in interactions

## 2020-09-17 PROCEDURE — 99232 SBSQ HOSP IP/OBS MODERATE 35: CPT | Performed by: NURSE PRACTITIONER

## 2020-09-17 RX ORDER — TAMSULOSIN HYDROCHLORIDE 0.4 MG/1
0.4 CAPSULE ORAL
Status: DISCONTINUED | OUTPATIENT
Start: 2020-09-17 | End: 2020-09-21 | Stop reason: HOSPADM

## 2020-09-17 RX ADMIN — Medication: at 17:55

## 2020-09-17 RX ADMIN — LITHIUM CARBONATE 600 MG: 300 TABLET, EXTENDED RELEASE ORAL at 08:16

## 2020-09-17 RX ADMIN — OLANZAPINE 10 MG: 10 TABLET, FILM COATED ORAL at 17:54

## 2020-09-17 RX ADMIN — OLANZAPINE 10 MG: 10 TABLET, FILM COATED ORAL at 08:16

## 2020-09-17 RX ADMIN — DIAZEPAM 5 MG: 5 TABLET ORAL at 16:33

## 2020-09-17 RX ADMIN — TAMSULOSIN HYDROCHLORIDE 0.4 MG: 0.4 CAPSULE ORAL at 16:33

## 2020-09-17 RX ADMIN — DIAZEPAM 5 MG: 5 TABLET ORAL at 08:16

## 2020-09-17 RX ADMIN — LITHIUM CARBONATE 600 MG: 300 TABLET, EXTENDED RELEASE ORAL at 21:24

## 2020-09-17 RX ADMIN — DIAZEPAM 5 MG: 5 TABLET ORAL at 21:24

## 2020-09-17 NOTE — PROGRESS NOTES
Progress Note - Rubi Graham 56 y o  male MRN: [id number]    Unit/Bed#: [facility] 220-01 Encounter: [id number]        Subjective:   Patient seen and examined at bedside after reviewing the chart and discussing the case with the caring staff  Patient examined at bedside  Patient is requesting to go back on Flomax as he has had frequent urination with dribbling and feeling of on emptying his bladder  Patient reports a sore throat x2 days but states that he is feeling better and does not want any sore throat spray or other medications at this time  Physical Exam   Vitals: Blood pressure 160/93, pulse 80, temperature 98 8 °F (37 1 °C), temperature source Temporal, resp  rate 16, height 5' 9" (1 753 m), weight 88 9 kg (196 lb), SpO2 98 %  ,Body mass index is 28 94 kg/m²  Constitutional: Patient appears well-developed  HEENT: PERR, EOMI, MMM  Cardiovascular: Normal rate and regular rhythm  Pulmonary/Chest: Effort normal and breath sounds normal    Abdomen: Soft, + BS, NT  Skin:  Multiple skin tags 1-2 cm in size on patient's trunk  One skin tag on left lower back has dried blood  Assessment/Plan:  Rubi Graham is a(n) 56 y o  male with schizoaffective disorder bipolar type     1  Cardiac with history of hypertension and dyslipidemia   Patient's diastolic blood pressure was found to be elevated although patient is not on any antihypertensive medications  Will continue to closely monitor  Patient is not on anything for Hyperlipidemia  2  Tobacco abuse   Patient has been put on nicotine transdermal patch  3  Alcohol abuse  Thiamine, folic acid and multivitamin  4  DJD/osteoarthritis   Tylenol on as needed basis  5  Gait abnormality   Stable  6  Vitamin-D deficiency   Vitamin D3 1000U daily  I will check vitamin-D levels for the patient  7  BPH    I will discontinue patient's Flomax at his request as his symptoms seem stable at this time  8  Skin tags    Patient wants to inquire about removing his skin tags at the [place]  Bacitracin ointment as needed for open/bleeding skin tags  9  Dry skin over bilateral feet  I will put the patient on ammonium lactate lotion 2 times daily  10  BPH  Will restart the patient on Flomax 0 4 mg daily  11  Sore throat  Seems to be resolving  I will continue to monitor  The patient was discussed with Dr Richi Moulton and he is in agreement with the above note

## 2020-09-17 NOTE — PROGRESS NOTES
CM spoke with patient this morning and patient reports he is feeling better and hoping to be discharged home soon  CM informed him he has been doing much better and encouraged him to continue to be med compliant and hopefully a discharge will occur soon  CM will continue to follow patient's progress and assist with discharge planning needs

## 2020-09-17 NOTE — PROGRESS NOTES
Patient has been out in the milieu, social with staff and select peers  Mood labile  Loud and hyperverbal  Easily redirectable  Pleasant with staff  No irritability  Med compliant  Will continue to observe

## 2020-09-17 NOTE — PROGRESS NOTES
Patient has been sleeping well through the night  No changes or behavior problems  Continuous safety checks maintained

## 2020-09-17 NOTE — PROGRESS NOTES
Progress Note - [address] Day 56 y o  male MRN: [id number]   Unit/Bed#: [email or web address] 220-01 Encounter: [id number]    Behavior over the last 24 hours:      Jaylene Hurt was seen for an inpatient follow-up psychiatric visit this date  He is taking his medications as prescribed and denies any side effects  His mood has improved and is now somewhat elevated  He is no longer irritable  He is still slightly pressured  His behavior is controlled  He is improving overall  ROS: no complaints, all other systems are negative    Mental Status Evaluation:    Appearance:  disheveled   Behavior:  cooperative, less agitated   Speech:  pressured, hypertalkative   Mood:  euphoric   Affect:  overbright, expansive   Thought Process:  coherent   Associations: intact associations   Thought Content:  normal, no overt delusions   Perceptual Disturbances: none   Risk Potential: Suicidal ideation - None  Homicidal ideation - None  Potential for aggression - No   Sensorium:  oriented to person, place and time/date   Memory:  recent and remote memory grossly intact   Consciousness:  alert and awake   Attention: poor concentration and poor attention span   Insight:  poor   Judgment: poor   Gait/Station: normal gait/station, normal balance   Motor Activity: no abnormal movements     Vital signs in last 24 hours:    Temp:  [98 6 °F (37 °C)-98 8 °F (37 1 °C)] 98 8 °F (37 1 °C)  HR:  [80-88] 80  Resp:  [16] 16  BP: (135-160)/(88-93) 160/93    Laboratory results:  I have personally reviewed all pertinent laboratory/tests results  Progress Toward Goals: improved    Assessment/Plan   Principal Problem:    Schizoaffective disorder, bipolar type (HCC)  Active Problems:    Cannabis abuse, continuous    Recommended Treatment:     Continue current medications as prescribed  Continue to monitor  Discharge disposition and planning are ongoing        All current active medications have been reviewed  Encourage group therapy, milieu therapy and occupational therapy  Behavioral Health checks every 7 minutes    Current Facility-Administered Medications   Medication Dose Route Frequency Provider Last Rate    acetaminophen  650 mg Oral Q6H PRN Amada Reynaga MD      acetaminophen  650 mg Oral Q4H PRN Amada Reynaga MD      acetaminophen  975 mg Oral Q6H PRN Amada Reynaga MD      ammonium lactate   Topical BID Anthony Duvall MD      bacitracin  1 small application Topical BID PRN Vick Lance PA-C      cholecalciferol  1,000 Units Oral Daily Vick Lance PA-C      diazepam  5 mg Oral TID CARLOS Zavala      folic acid  1 mg Oral Daily Vick Lance PA-C      hydrOXYzine HCL  50 mg Oral Q4H PRN Amada Reynaga MD      lithium carbonate  600 mg Oral Q12H Tyron Nicholas MD      LORazepam  2 mg Intravenous Once Oliverio Villa MD      LORazepam  2 mg Intramuscular Q4H PRN Amada Reynaga MD      multivitamin stress formula  1 tablet Oral Daily Anthony Duvall MD      OLANZapine  10 mg Intramuscular Q3H PRN Amada Reynaga MD      OLANZapine  10 mg Oral BID CARLOS Oneill      potassium chloride  40 mEq Oral Once Oliverio Villa MD      risperiDONE  2 mg Oral Q3H PRN Amada Reynaga MD      tamsulosin  0 4 mg Oral Daily With [email or web address]      thiamine  100 mg Oral Daily Vick Lance PA-C      traZODone  50 mg Oral HS PRN Amada Reynaga MD         Risks / Benefits of Treatment:    Risks, benefits, and possible side effects of medications explained to patient and patient verbalizes understanding and agreement for treatment  Counseling / Coordination of Care:      Patient's progress discussed with staff in treatment team meeting  Medications, treatment progress and treatment plan reviewed with patient

## 2020-09-17 NOTE — PROGRESS NOTES
Patient bright, alert, awake this shift  He remains pressured, hyperverbal but relatively less labile  He has been pleasant to staff peers and even providers today  Compliant with all scheduled medications with the exception of vitamins  He continue to show improvement with mood  Will maintain on safety precautions and continual monitoring  No needs identified

## 2020-09-17 NOTE — PROGRESS NOTES
09/17/20 0900   Team Meeting   Meeting Type Daily Rounds   Initial Conference Date 09/17/20   Team Members Present   Team Members Present Physician;Nurse;   Physician Team Member Dr Tatum Mullen; CARLOS Paul   Nursing Team Member Sherry Wood, RN   Social Work Team Member Abigail Aponte   Patient/Family Present   Patient Present No   Patient's Family Present No     Remains labile but more controlled  Less loud  Reports having a sore throat  Medical will be made aware  Taking his medications without resistance  Less angry  Tentative discharge next week pending progress

## 2020-09-18 PROCEDURE — 99232 SBSQ HOSP IP/OBS MODERATE 35: CPT | Performed by: NURSE PRACTITIONER

## 2020-09-18 RX ADMIN — DIAZEPAM 5 MG: 5 TABLET ORAL at 20:05

## 2020-09-18 RX ADMIN — DIAZEPAM 5 MG: 5 TABLET ORAL at 15:56

## 2020-09-18 RX ADMIN — LITHIUM CARBONATE 600 MG: 300 TABLET, EXTENDED RELEASE ORAL at 20:05

## 2020-09-18 RX ADMIN — DIAZEPAM 5 MG: 5 TABLET ORAL at 09:14

## 2020-09-18 RX ADMIN — TAMSULOSIN HYDROCHLORIDE 0.4 MG: 0.4 CAPSULE ORAL at 17:50

## 2020-09-18 RX ADMIN — OLANZAPINE 10 MG: 10 TABLET, FILM COATED ORAL at 17:49

## 2020-09-18 RX ADMIN — OLANZAPINE 10 MG: 10 TABLET, FILM COATED ORAL at 09:14

## 2020-09-18 RX ADMIN — LITHIUM CARBONATE 600 MG: 300 TABLET, EXTENDED RELEASE ORAL at 09:14

## 2020-09-18 NOTE — NURSING NOTE
Andrea Standing had been up ad davion during the evening without any behavior issues  At times he was calm and pleasant  He has been resting quietly in bed with no complaints or apparent distress  Continue to monitor for safety/mood/behavior

## 2020-09-18 NOTE — PROGRESS NOTES
09/18/20 0926   Team Meeting   Meeting Type Daily Rounds   Initial Conference Date 09/18/20   Patient/Family Present   Patient Present No   Patient's Family Present No     Team Members Present:     CARLOS Freeman, ADRIEN Sandhu, BSW  Jane Cummings    Taking meds and doing well  Pleasant and complimenting staff  DC on Monday

## 2020-09-18 NOTE — PROGRESS NOTES
CM asked by patient to be given advance directive information so that he can work on these documents in the community  Patient thanked CM for this information  CM will continue to follow patient's progress and assist with discharge planning needs

## 2020-09-18 NOTE — PROGRESS NOTES
The following items will remain at pt's bedside:    1  3 shirts  2  6 socks  3  2 underwear   4  2 long underwear  5  1 pair of shorts  6  1 bag of candy  7  1 pair of jeans    The following items were sent to storage:    1  1 pair of shorts with drawstring  2  4 bags of candy

## 2020-09-18 NOTE — PROGRESS NOTES
Progress Note - Karson Graham 56 y o  male MRN: [id number]    Unit/Bed#: [facility] 220-01 Encounter: [id number]        Subjective:   Patient seen and examined at bedside after reviewing the chart and discussing the case with the caring staff  Patient examined at bedside  Patient reports his urinary problems are improving with starting Flomax again  Patient has concerns over skin tags on his back which bleed  Patient removed bandage from skin tag and began to bleed again  Patient is possible discharge for Monday, 09/21/2020  Patient is requesting his scripts  I have reviewed and reconciled the patient's medication list     Physical Exam   Vitals: Blood pressure 122/82, pulse 105, temperature 98 °F (36 7 °C), temperature source Temporal, resp  rate 18, height 5' 9" (1 753 m), weight 88 9 kg (196 lb), SpO2 98 %  ,Body mass index is 28 94 kg/m²  Constitutional: Patient appears well-developed  HEENT: PERR, EOMI, MMM  Cardiovascular: Normal rate and regular rhythm  Pulmonary/Chest: Effort normal and breath sounds normal    Abdomen: Soft, + BS, NT  Skin:  Multiple skin tags 1-2 cm in size on patient's trunk  One skin tag on left lower back has dried blood  Assessment/Plan:  Karson Graham is a(n) 56 y o  male with schizoaffective disorder bipolar type    Medical clearance  Patient is medically cleared for discharge  All scripts have been written      1  Cardiac with history of hypertension and dyslipidemia   Patient's diastolic blood pressure was found to be elevated although patient is not on any antihypertensive medications  Will continue to closely monitor  Patient is not on anything for Hyperlipidemia  2  Tobacco abuse   Patient has been put on nicotine transdermal patch  3  Alcohol abuse  Thiamine, folic acid and multivitamin  4  DJD/osteoarthritis   Tylenol on as needed basis  5  Gait abnormality   Stable  6  Vitamin-D deficiency   Vitamin D3 1000U daily    I will check vitamin-D levels for the patient  7  BPH    I will discontinue patient's Flomax at his request as his symptoms seem stable at this time  8  Skin tags  Patient wants to inquire about removing his skin tags at the [place]  Instructed patient to keep his skin tag covered to prevent bleeding  9  Dry skin over bilateral feet  I will put the patient on ammonium lactate lotion 2 times daily  10  BPH  Continue Flomax 0 4 mg daily  11  Sore throat  Seems to be resolving  I will continue to monitor  The patient was discussed with Dr Michael Saldana and he is in agreement with the above note

## 2020-09-18 NOTE — PROGRESS NOTES
Patient bright, alert, awake, ate meals with peers  Slightly labile but relatively less so  Visible on unit, making phone calls, able to maintain control even when getting irritated  Asked this nurse for a "chill pill" around lunchtime because he felt like he was getting more irritated  The  Declined to take it  Hygiene is good,  Selectively social with peers  Will maintain on safety precautions and continual monitoring  No needs identified

## 2020-09-18 NOTE — PROGRESS NOTES
09/18/20 0956   Activity/Group Checklist   Group   (Leisure Awareness and Teamwork)   Attendance Attended   Attendance Duration (min) Greater than 60  (in and out of group)   Interactions Interacted appropriately   Affect/Mood Appropriate   Goals Achieved Identified feelings; Discussed coping strategies; Able to listen to others; Able to engage in interactions

## 2020-09-18 NOTE — PROGRESS NOTES
Progress Note - [address] CASTANEDA Day 56 y o  male MRN: [id number]   Unit/Bed#: [facility] 220-01 Encounter: [id number]    Behavior over the last 24 hours:      Jamal Claudio was seen for an inpatient follow-up psychiatric visit this date  He remains discharge focused  His mood is still somewhat labile but controlled  His behavior has been appropriate on the unit  He is taking his medications as prescribed without side effects  He is participating in unit activities  His appetite is within normal limits  His sleep has improved  He is looking forward to being discharged early next week  ROS: no complaints, all other systems are negative    Mental Status Evaluation:    Appearance:  casually dressed   Behavior:  pleasant, cooperative   Speech:  less tangential, less pressured   Mood:  mildly labile   Affect:  normal range and intensity   Thought Process:  organized, logical, coherent   Associations: intact associations   Thought Content:  no overt delusions   Perceptual Disturbances: none   Risk Potential: Suicidal ideation - None  Homicidal ideation - None  Potential for aggression - No   Sensorium:  oriented to person, place and time/date   Memory:  recent and remote memory grossly intact   Consciousness:  alert and awake   Attention: decreased concentration and decreased attention span   Insight:  poor   Judgment: poor   Gait/Station: normal gait/station, normal balance   Motor Activity: no abnormal movements     Vital signs in last 24 hours:    Temp:  [98 °F (36 7 °C)-99 2 °F (37 3 °C)] 98 °F (36 7 °C)  HR:  [] 105  Resp:  [16-18] 18  BP: (122-128)/(78-82) 122/82    Laboratory results:  I have personally reviewed all pertinent laboratory/tests results      Progress Toward Goals: improved    Assessment/Plan   Principal Problem:    Schizoaffective disorder, bipolar type (HCC)  Active Problems:    Cannabis abuse, continuous    Recommended Treatment:     Continue current medications as prescribed  Continue to monitor  Discharge disposition and planning are ongoing  All current active medications have been reviewed  Encourage group therapy, milieu therapy and occupational therapy  Behavioral Health checks every 7 minutes    Current Facility-Administered Medications   Medication Dose Route Frequency Provider Last Rate    acetaminophen  650 mg Oral Q6H PRBABITA Pratt MD      acetaminophen  650 mg Oral Q4H PRN Zahida Pratt MD      acetaminophen  975 mg Oral Q6H PRN Zahida Pratt MD      ammonium lactate   Topical BID Xavier Baltazar MD      bacitracin  1 small application Topical BID PRN Regina Night, GEORGINA      cholecalciferol  1,000 Units Oral Daily Regina Night, GEORGINA      diazepam  5 mg Oral TID CARLOS Kilgore      folic acid  1 mg Oral Daily Callao Night, GEORGINA      hydrOXYzine HCL  50 mg Oral Q4H PRN Zahida Pratt MD      lithium carbonate  600 mg Oral Q12H [address] Maurice Ravi MD      LORazepam  2 mg Intravenous Once León Chung MD      LORazepam  2 mg Intramuscular Q4H PRN Zahida Pratt MD      multivitamin stress formula  1 tablet Oral Daily Xavier Baltazar MD      OLANZapine  10 mg Intramuscular Q3H PRN Zahida Pratt MD      OLANZapine  10 mg Oral BID CARLOS Oneill      potassium chloride  40 mEq Oral Once León Chung MD      risperiDONE  2 mg Oral Q3H PRN Zahida Pratt MD      tamsulosin  0 4 mg Oral Daily With [employer], GEORGINA      thiamine  100 mg Oral Daily Regina Night, GEORGINA      traZODone  50 mg Oral HS PRN Zahida Pratt MD         Risks / Benefits of Treatment:    Risks, benefits, and possible side effects of medications explained to patient and patient verbalizes understanding and agreement for treatment  Counseling / Coordination of Care:      Patient's progress discussed with staff in treatment team meeting    Medications, treatment progress and treatment plan reviewed with patient

## 2020-09-18 NOTE — PLAN OF CARE
Problem: DISCHARGE PLANNING  Goal: Discharge to home or other facility with appropriate resources  Description: INTERVENTIONS:  - Identify barriers to discharge w/patient and caregiver  - Arrange for needed discharge resources and transportation as appropriate  - Identify discharge learning needs (meds, wound care, etc )  - Arrange for interpretive services to assist at discharge as needed  - Refer to Case Management Department for coordinating discharge planning if the patient needs post-hospital services based on physician/advanced practitioner order or complex needs related to functional status, cognitive ability, or social support system  9/18/2020 1205 by Humberto Gotti  Outcome: Progressing

## 2020-09-18 NOTE — PROGRESS NOTES
09/18/20 0730   Activity/Group Checklist   Group   (Goal Planning and Communication)   Attendance Attended   Attendance Duration (min) Greater than 60   Interactions Interacted appropriately   Affect/Mood Appropriate;Bright;Calm   Goals Achieved Identified feelings; Discussed coping strategies; Able to listen to others; Able to engage in interactions

## 2020-09-19 PROCEDURE — 99232 SBSQ HOSP IP/OBS MODERATE 35: CPT | Performed by: PSYCHIATRY & NEUROLOGY

## 2020-09-19 RX ORDER — OLANZAPINE 5 MG/1
5 TABLET ORAL ONCE
Status: DISCONTINUED | OUTPATIENT
Start: 2020-09-19 | End: 2020-09-21 | Stop reason: HOSPADM

## 2020-09-19 RX ORDER — LORAZEPAM 1 MG/1
2 TABLET ORAL ONCE
Status: DISCONTINUED | OUTPATIENT
Start: 2020-09-19 | End: 2020-09-21 | Stop reason: HOSPADM

## 2020-09-19 RX ADMIN — DIAZEPAM 5 MG: 5 TABLET ORAL at 21:46

## 2020-09-19 RX ADMIN — OLANZAPINE 10 MG: 10 TABLET, FILM COATED ORAL at 09:38

## 2020-09-19 RX ADMIN — DIAZEPAM 5 MG: 5 TABLET ORAL at 14:23

## 2020-09-19 RX ADMIN — OLANZAPINE 10 MG: 10 TABLET, FILM COATED ORAL at 17:59

## 2020-09-19 RX ADMIN — LITHIUM CARBONATE 600 MG: 300 TABLET, EXTENDED RELEASE ORAL at 21:46

## 2020-09-19 RX ADMIN — DIAZEPAM 5 MG: 5 TABLET ORAL at 09:39

## 2020-09-19 RX ADMIN — RISPERIDONE 2 MG: 1 TABLET, ORALLY DISINTEGRATING ORAL at 10:59

## 2020-09-19 RX ADMIN — LITHIUM CARBONATE 600 MG: 300 TABLET, EXTENDED RELEASE ORAL at 09:39

## 2020-09-19 RX ADMIN — TAMSULOSIN HYDROCHLORIDE 0.4 MG: 0.4 CAPSULE ORAL at 18:09

## 2020-09-19 NOTE — PROGRESS NOTES
Patient did calm down last night without need for any prn meds  Slept well through the night  Woke at [place] [id number] which is his usual  Irritable edge but redirectable  Continuous safety checks maintained

## 2020-09-19 NOTE — PROGRESS NOTES
Pt has been visible on the unit  Pt has been able to let his needs be known to staff  Pt has been relatively calm during the day, but does display irritability at times  Pt  However is able to converse with staff about what is bothering him  Pt has been social with other patients on the unit  Pt denies any additional issues at this time

## 2020-09-19 NOTE — PROGRESS NOTES
Progress Note - Lashawn Graham 56 y o  male MRN: [id number]    Unit/Bed#: [facility] 220-01 Encounter: [id number]        Subjective:   Patient seen and examined at bedside after reviewing the chart and discussing the case with the caring staff  Patient examined at bedside  Patient has no acute concerns  Patient is possible discharge for Monday, 09/21/2020  Patient is requesting his scripts  I have reviewed and reconciled the patient's medication list     Physical Exam   Vitals: Blood pressure 149/91, pulse 91, temperature 97 7 °F (36 5 °C), temperature source Temporal, resp  rate 18, height 5' 9" (1 753 m), weight 93 kg (205 lb), SpO2 97 %  ,Body mass index is 30 27 kg/m²  Constitutional: Patient appears well-developed  HEENT: PERR, EOMI, MMM  Cardiovascular: Normal rate and regular rhythm  Pulmonary/Chest: Effort normal and breath sounds normal    Abdomen: Soft, + BS, NT  Skin:  Multiple skin tags 1-2 cm in size on patient's trunk  One skin tag on left lower back has dried blood  Assessment/Plan:  Lashawn Graham is a(n) 56 y o  male with schizoaffective disorder bipolar type    Medical clearance  Patient is medically cleared for discharge  All scripts have been written      1  Cardiac with history of hypertension and dyslipidemia   Patient's diastolic blood pressure was found to be elevated although patient is not on any antihypertensive medications  Will continue to closely monitor  Patient is not on anything for Hyperlipidemia  2  Tobacco abuse   Patient has been put on nicotine transdermal patch  3  Alcohol abuse  Thiamine, folic acid and multivitamin  4  DJD/osteoarthritis   Tylenol on as needed basis  5  Gait abnormality   Stable  6  Vitamin-D deficiency   Vitamin D3 1000U daily  I will check vitamin-D levels for the patient  7  BPH    I will discontinue patient's Flomax at his request as his symptoms seem stable at this time  8  Skin tags    Patient wants to inquire about removing his skin tags at the [place]  Instructed patient to keep his skin tag covered to prevent bleeding  9  Dry skin over bilateral feet  I will put the patient on ammonium lactate lotion 2 times daily  10  BPH  Continue Flomax 0 4 mg daily  11  Sore throat  Seems to be resolving  I will continue to monitor  The patient was discussed with Dr Bernadine Hernandez and he is in agreement with the above note

## 2020-09-19 NOTE — PROGRESS NOTES
Patient has been very labile and irritable all evening  Yelling and talking to self  Slamming doors and drawers in his room  Making phone calls and getting agitated on phone and hanging up  Upset about being "locked up here"  Then rants about the neighbors that called 911 and lied about him  Ranting about the Doctors here shoving meds on him but did request hs meds early to help calm him down  Then will come out to tell this nurse he is gonna be ok  Will continue to monitor behavior

## 2020-09-19 NOTE — PROGRESS NOTES
C/O"I am feeling better , doing better "    Report from staff regarding this patient received and record reviewed  prior to seeing this patient   Behavior over the last 24 hours:  Reports doing ok, sleep ok, taking meds, still labile,agitated  Sleep:fair  Appetite:ok  Medication side effects:none  ROS:still labile and vela  Mental Status Evaluation:  Appearance:  Dressed appropraitely   Behavior:  cooperative   Speech:  normal   Mood:  elevated   Affect:  appropraite   Thought Process:  disoragnized   Thought Content:  paarnoid   Perceptual Disturbances: Denied AV hallucination   Risk Potential: NO MIKAYLA    Sensorium:  normal   Cognition:  intact   Consciousness:  Alert, OX3   Attention: Fair   Insight:  poor   Judgment: poor   Gait/Station: With in normal range   Motor Activity: With in normal range     Progress Toward Goals: working on current treatment goals, no changes  Made in treatment plan   Recommended Treatment: Continue with group therapy, milieu therapy and occupational therapy  Risks, benefits and possible side effects of Medications:   Risks, benefits, and possible side effects of medications explained to patient and patient verbalizes understanding        Medications:   current meds:   Current Facility-Administered Medications   Medication Dose Route Frequency    acetaminophen (TYLENOL) tablet 650 mg  650 mg Oral Q6H PRN    acetaminophen (TYLENOL) tablet 650 mg  650 mg Oral Q4H PRN    acetaminophen (TYLENOL) tablet 975 mg  975 mg Oral Q6H PRN    ammonium lactate (LAC-HYDRIN) 12 % lotion   Topical BID    bacitracin topical ointment 1 small application  1 small application Topical BID PRN    cholecalciferol (VITAMIN D3) tablet 1,000 Units  1,000 Units Oral Daily    diazepam (VALIUM) tablet 5 mg  5 mg Oral TID    folic acid (FOLVITE) tablet 1 mg  1 mg Oral Daily    hydrOXYzine HCL (ATARAX) tablet 50 mg  50 mg Oral Q4H PRN    lithium carbonate (LITHOBID) CR tablet 600 mg  600 mg Oral Q12H [address]    LORazepam (ATIVAN) injection 2 mg  2 mg Intravenous Once    LORazepam (ATIVAN) injection 2 mg  2 mg Intramuscular Q4H PRN    LORazepam (ATIVAN) tablet 2 mg  2 mg Oral Once    multivitamin stress formula tablet 1 tablet  1 tablet Oral Daily    OLANZapine (ZyPREXA) IM injection 10 mg  10 mg Intramuscular Q3H PRN    OLANZapine (ZyPREXA) tablet 10 mg  10 mg Oral BID    OLANZapine (ZyPREXA) tablet 5 mg  5 mg Oral Once    potassium chloride (K-DUR,KLOR-CON) CR tablet 40 mEq  40 mEq Oral Once    risperiDONE (RisperDAL M-TABS) dispersible tablet 2 mg  2 mg Oral Q3H PRN    tamsulosin (FLOMAX) capsule 0 4 mg  0 4 mg Oral Daily With Dinner    thiamine (VITAMIN B1) tablet 100 mg  100 mg Oral Daily    traZODone (DESYREL) tablet 50 mg  50 mg Oral HS PRN     Labs: NA    Assessment, Diagnosis  and Plan: continue with current meds and goals, F/U tomorrow    Counseling / Coordination of Care  Total floor / unit time spent today20 minutes  minutes  Greater than 50% of total time was spent with the patient and / or family counseling and / or coordination of care  A description of the counseling / coordination of care:      Alejandrina Pimentel MD

## 2020-09-20 PROCEDURE — 99232 SBSQ HOSP IP/OBS MODERATE 35: CPT | Performed by: NURSE PRACTITIONER

## 2020-09-20 RX ORDER — OLANZAPINE 10 MG/1
10 TABLET ORAL 2 TIMES DAILY
Qty: 60 TABLET | Refills: 0 | OUTPATIENT
Start: 2020-09-20 | End: 2020-10-20

## 2020-09-20 RX ORDER — LITHIUM CARBONATE 300 MG/1
600 TABLET, FILM COATED, EXTENDED RELEASE ORAL EVERY 12 HOURS SCHEDULED
Qty: 120 TABLET | Refills: 0 | OUTPATIENT
Start: 2020-09-20 | End: 2020-10-20

## 2020-09-20 RX ADMIN — OLANZAPINE 10 MG: 10 TABLET, FILM COATED ORAL at 09:31

## 2020-09-20 RX ADMIN — OLANZAPINE 10 MG: 10 TABLET, FILM COATED ORAL at 16:28

## 2020-09-20 RX ADMIN — LITHIUM CARBONATE 600 MG: 300 TABLET, EXTENDED RELEASE ORAL at 21:29

## 2020-09-20 RX ADMIN — LITHIUM CARBONATE 600 MG: 300 TABLET, EXTENDED RELEASE ORAL at 09:31

## 2020-09-20 RX ADMIN — DIAZEPAM 5 MG: 5 TABLET ORAL at 09:31

## 2020-09-20 RX ADMIN — TAMSULOSIN HYDROCHLORIDE 0.4 MG: 0.4 CAPSULE ORAL at 16:28

## 2020-09-20 RX ADMIN — DIAZEPAM 5 MG: 5 TABLET ORAL at 16:28

## 2020-09-20 RX ADMIN — DIAZEPAM 5 MG: 5 TABLET ORAL at 21:29

## 2020-09-20 NOTE — PROGRESS NOTES
Progress Note - Yady Graham 56 y o  male MRN: [id number]    Unit/Bed#: [facility] 220-01 Encounter: [id number]        Subjective:   Patient seen and examined at bedside after reviewing the chart and discussing the case with the caring staff  Patient examined at bedside  Patient has no acute concerns  Patient is possible discharge for Monday, 09/21/2020  Patient is requesting his scripts  I have reviewed and reconciled the patient's medication list     Physical Exam   Vitals: Blood pressure 117/72, pulse 94, temperature 98 7 °F (37 1 °C), temperature source Temporal, resp  rate 18, height 5' 9" (1 753 m), weight 93 kg (205 lb), SpO2 97 %  ,Body mass index is 30 27 kg/m²  Constitutional: Patient appears well-developed  HEENT: PERR, EOMI, MMM  Cardiovascular: Normal rate and regular rhythm  Pulmonary/Chest: Effort normal and breath sounds normal    Abdomen: Soft, + BS, NT  Skin:  Multiple skin tags 1-2 cm in size on patient's trunk  One skin tag on left lower back has dried blood  Assessment/Plan:  Yady Graham is a(n) 56 y o  male with schizoaffective disorder bipolar type    Medical clearance  Patient is medically cleared for discharge  All scripts have been written      1  Cardiac with history of hypertension and dyslipidemia   Patient's diastolic blood pressure was found to be elevated although patient is not on any antihypertensive medications  Will continue to closely monitor  Patient is not on anything for Hyperlipidemia  2  Tobacco abuse   Patient has been put on nicotine transdermal patch  3  Alcohol abuse  Thiamine, folic acid and multivitamin  4  DJD/osteoarthritis   Tylenol on as needed basis  5  Gait abnormality   Stable  6  Vitamin-D deficiency   Vitamin D3 1000U daily  I will check vitamin-D levels for the patient  7  BPH    I will discontinue patient's Flomax at his request as his symptoms seem stable at this time  8  Skin tags    Patient wants to inquire about removing his skin tags at the [place]  Instructed patient to keep his skin tag covered to prevent bleeding  9  Dry skin over bilateral feet  I will put the patient on ammonium lactate lotion 2 times daily  10  BPH  Continue Flomax 0 4 mg daily  11  Sore throat  Seems to be resolving  I will continue to monitor  The patient was discussed with Dr Tato Gaston and he is in agreement with the above note

## 2020-09-20 NOTE — PROGRESS NOTES
Patient has been visible on the unit  Attending and participating in evening unit activities  Social with peers  Pleasant but very loud and boisterous  Mumbled and garbled speech  Patient was asked to keep his voice down several times to which he responded favorably  Says he's looking forward to dc  Cooperative with medications  Denies s/i and states hes ready for dc

## 2020-09-20 NOTE — PROGRESS NOTES
Patient slept well all night  He did wake early around 18 but his behaviors have been completely controlled

## 2020-09-20 NOTE — PROGRESS NOTES
Pt remains visible on the unit, pt appears to be more controlled today  Pt remains medication and meal compliant  Pt denies any current thoughts of si/hi at this time  Pt denies any additional issues at this time

## 2020-09-20 NOTE — PROGRESS NOTES
Progress Note - [address] CA[address] Day 56 y o  male MRN: [id number]   Unit/Bed#: [facility] 220-01 Encounter: [id number]    Behavior over the last 24 hours:      Enoch Calderon was seen for an inpatient follow-up psychiatric visit this date  At today's visit, he remains tangential but his mood is pleasant, controlled, and bright  He is taking his medications without side effects  His appetite and sleep are within normal limits  His mood and behavior are controlled on the unit  He is overall improved and is looking forward to being discharged tomorrow  He offers no complaints at this time and denies any suicidal or homicidal ideation as well as any symptoms of psychosis  ROS: no complaints, all other systems are negative    Mental Status Evaluation:    Appearance:  casually dressed   Behavior:  pleasant, cooperative   Speech:  normal rate and volume   Mood:  improved   Affect:  normal range and intensity   Thought Process:  still somewhat disorganized   Associations: intact associations   Thought Content:  no overt delusions   Perceptual Disturbances: none   Risk Potential: Suicidal ideation - None  Homicidal ideation - None  Potential for aggression - No   Sensorium:  oriented to person, place and time/date   Memory:  recent and remote memory grossly intact   Consciousness:  alert and awake   Attention: decreased concentration and decreased attention span   Insight:  limited   Judgment: limited   Gait/Station: normal gait/station, normal balance   Motor Activity: no abnormal movements     Vital signs in last 24 hours:    Temp:  [98 7 °F (37 1 °C)] 98 7 °F (37 1 °C)  HR:  [94] 94  Resp:  [18] 18  BP: (117)/(72) 117/72    Laboratory results:  I have personally reviewed all pertinent laboratory/tests results      Progress Toward Goals: improved    Assessment/Plan   Principal Problem:    Schizoaffective disorder, bipolar type (HCC)  Active Problems:    Cannabis abuse, continuous    Recommended Treatment:     Continue current medications as prescribed  Continue to monitor  Plan is for discharge tomorrow pending any change in patient's status  All current active medications have been reviewed  Encourage group therapy, milieu therapy and occupational therapy  Behavioral Health checks every 7 minutes    Current Facility-Administered Medications   Medication Dose Route Frequency Provider Last Rate    acetaminophen  650 mg Oral Q6H PRN Evelyn Maloney MD      acetaminophen  650 mg Oral Q4H PRN Evelyn Maloney MD      acetaminophen  975 mg Oral Q6H PRN Evelyn Maloney MD      ammonium lactate   Topical BID Linda Nath MD      bacitracin  1 small application Topical BID PRN Dioni Romeo PA-C      cholecalciferol  1,000 Units Oral Daily Dioni Romeo PA-C      diazepam  5 mg Oral TID CARLOS Miner      folic acid  1 mg Oral Daily Dioni Romeo PA-C      hydrOXYzine HCL  50 mg Oral Q4H PRN Evelyn Maloney MD      lithium carbonate  600 mg Oral Q12H [facility] John Reese MD      LORazepam  2 mg Intravenous Once Irene Jordan MD      LORazepam  2 mg Intramuscular Q4H PRN Evelyn Maloney MD      LORazepam  2 mg Oral Once Kavya Almendarez MD      multivitamin stress formula  1 tablet Oral Daily Linda Nath MD      OLANZapine  10 mg Intramuscular Q3H PRN Evelyn Maloney MD      OLANZapine  10 mg Oral BID CARLOS Oneill      OLANZapine  5 mg Oral Once Kavya Almendarez MD      potassium chloride  40 mEq Oral Once Irene Jordan MD      risperiDONE  2 mg Oral Q3H PRN Evelyn Maloney MD      tamsulosin  0 4 mg Oral Daily With [employer]GEORGINA      thiamine  100 mg Oral Daily Dioni Romeo PA-C      traZODone  50 mg Oral HS PRN Evelyn Maloney MD         Risks / Benefits of Treatment:    Risks, benefits, and possible side effects of medications explained to patient and patient verbalizes understanding and agreement for treatment      Counseling / Coordination of Care:      Patient's progress discussed with staff in treatment team meeting  Medications, treatment progress and treatment plan reviewed with patient

## 2020-09-21 VITALS
DIASTOLIC BLOOD PRESSURE: 95 MMHG | SYSTOLIC BLOOD PRESSURE: 157 MMHG | TEMPERATURE: 97.5 F | OXYGEN SATURATION: 97 % | BODY MASS INDEX: 30.36 KG/M2 | WEIGHT: 205 LBS | HEIGHT: 69 IN | RESPIRATION RATE: 18 BRPM | HEART RATE: 101 BPM

## 2020-09-21 PROCEDURE — 99238 HOSP IP/OBS DSCHRG MGMT 30/<: CPT | Performed by: PSYCHIATRY & NEUROLOGY

## 2020-09-21 RX ORDER — OLANZAPINE 10 MG/1
10 TABLET ORAL 2 TIMES DAILY
Qty: 60 TABLET | Refills: 0 | Status: SHIPPED | OUTPATIENT
Start: 2020-09-21 | End: 2020-09-21 | Stop reason: HOSPADM

## 2020-09-21 RX ORDER — OLANZAPINE 20 MG/1
20 TABLET ORAL
Qty: 30 TABLET | Refills: 0 | Status: ON HOLD | OUTPATIENT
Start: 2020-09-21 | End: 2020-10-09 | Stop reason: SDUPTHER

## 2020-09-21 RX ORDER — LITHIUM CARBONATE 300 MG/1
600 TABLET, FILM COATED, EXTENDED RELEASE ORAL EVERY 12 HOURS SCHEDULED
Qty: 120 TABLET | Refills: 0 | Status: SHIPPED | OUTPATIENT
Start: 2020-09-21 | End: 2020-10-09 | Stop reason: HOSPADM

## 2020-09-21 RX ORDER — DIAZEPAM 5 MG/1
5 TABLET ORAL 3 TIMES DAILY
Qty: 45 TABLET | Refills: 1 | Status: ON HOLD | OUTPATIENT
Start: 2020-09-21 | End: 2020-10-09

## 2020-09-21 RX ADMIN — RISPERIDONE 2 MG: 1 TABLET, ORALLY DISINTEGRATING ORAL at 11:47

## 2020-09-21 RX ADMIN — OLANZAPINE 10 MG: 10 TABLET, FILM COATED ORAL at 08:01

## 2020-09-21 RX ADMIN — RISPERIDONE 2 MG: 1 TABLET, ORALLY DISINTEGRATING ORAL at 02:51

## 2020-09-21 RX ADMIN — DIAZEPAM 5 MG: 5 TABLET ORAL at 08:01

## 2020-09-21 RX ADMIN — LITHIUM CARBONATE 600 MG: 300 TABLET, EXTENDED RELEASE ORAL at 08:01

## 2020-09-21 NOTE — CMS CERTIFICATION NOTE
Certification Statement -  Zelalem Quintero Day  JCX:[birth year]  MRN: [id number]    [facility] [address] [facility] Room / Bed: [facility] 220/[facility] 220-01 Encounter: [id number]    I certify that Eugenio Rueda Day requires further inpatient hospitalization beyond 20 days due to intractable opal, agitation, multiple 224 by the police   Patient has been refusing to take more than Lithium and eventually and reluctantly he started the Zyprexa and valium to control the manic symptoms  Dario Jones MD     Date: 9/21/2020  Time: 10:53 AM

## 2020-09-21 NOTE — PROGRESS NOTES
Progress Note - Author Alex Graham 56 y o  male MRN: [id number]    Unit/Bed#: [facility] 220-01 Encounter: [id number]        Subjective:   Patient seen and examined at bedside after reviewing the chart and discussing the case with the caring staff  Patient examined at bedside  Patient has no acute concerns  Patient is scheduled for discharge today  Patient is requesting his scripts  I have reviewed and reconciled the patient's medication list     Physical Exam   Vitals: Blood pressure 157/95, pulse 101, temperature 97 5 °F (36 4 °C), temperature source Temporal, resp  rate 18, height 5' 9" (1 753 m), weight 93 kg (205 lb), SpO2 97 %  ,Body mass index is 30 27 kg/m²  Constitutional: Patient appears well-developed  HEENT: PERR, EOMI, MMM  Cardiovascular: Normal rate and regular rhythm  Pulmonary/Chest: Effort normal and breath sounds normal    Abdomen: Soft, + BS, NT  Skin:  Multiple skin tags 1-2 cm in size on patient's trunk  One skin tag on left lower back has dried blood  Assessment/Plan:  Author Alex Graham is a(n) 56 y o  male with schizoaffective disorder bipolar type    Medical clearance  Patient is medically cleared for discharge  All scripts have been written      1  Cardiac with history of hypertension and dyslipidemia   Patient's diastolic blood pressure was found to be elevated although patient is not on any antihypertensive medications  Will continue to closely monitor  Patient is not on anything for Hyperlipidemia  2  Tobacco abuse   Patient has been put on nicotine transdermal patch  3  Alcohol abuse  Thiamine, folic acid and multivitamin  4  DJD/osteoarthritis   Tylenol on as needed basis  5  Gait abnormality   Stable  6  Vitamin-D deficiency   Vitamin D3 1000U daily  I will check vitamin-D levels for the patient  7  BPH    I will discontinue patient's Flomax at his request as his symptoms seem stable at this time  8  Skin tags  Patient wants to inquire about removing his skin tags at the [place]  Instructed patient to keep his skin tag covered to prevent bleeding  9  Dry skin over bilateral feet  I will put the patient on ammonium lactate lotion 2 times daily  10  BPH  Continue Flomax 0 4 mg daily  11  Sore throat  Seems to be resolving  I will continue to monitor

## 2020-09-21 NOTE — PROGRESS NOTES
D/C Belongings:    $380 cash, key, [employer] gift card, [employer] gift card, [place] [employer] x1, [employer] card [facility], [employer] x1, slides x1, shirt x4, socks x6, candy, jeans x1, nayely shirt x1, muscle top x1, shorts x3, boots x1, hat x1, underwear x1, hair ties, keys, flashlight x1, lighter x1, watch x2, leather bracelet x1, long johns x1

## 2020-09-21 NOTE — PROGRESS NOTES
09/21/20 0900   Team Meeting   Meeting Type Daily Rounds   Initial Conference Date 09/21/20   Team Members Present   Team Members Present Physician;Nurse;   Physician Team Member Dr Jen Hernandez Team Member Melinda Carpenter RN   Social Work Team Member JAMIE Sandhu; Jane Starks   Patient/Family Present   Patient Present No   Patient's Family Present No     Good weekend  Medication compliant  Discharge home today

## 2020-09-21 NOTE — PROGRESS NOTES
Patient has been visible on the unit  Attending and participating in evening unit activities  Social with peers  Still boisterous and loud but easily redirectable  Looking forward to dc tomorrow although he states he has "a lot of work to catch up on " Very pleasant  Smiling and joking

## 2020-09-21 NOTE — PROGRESS NOTES
09/21/20 0730   Activity/Group Checklist   Group   (Goal Planning and Communication)   Attendance Attended   Attendance Duration (min) 46-60   Interactions Interacted appropriately   Affect/Mood Appropriate;Bright;Calm   Goals Achieved Identified feelings; Discussed coping strategies; Able to listen to others; Able to engage in interactions

## 2020-09-21 NOTE — PROGRESS NOTES
09/21/20 1000   Activity/Group Checklist   Group   (Life Quote Positivity Thinking)   Attendance Attended   Attendance Duration (min) 46-60   Interactions Interacted appropriately   Affect/Mood Appropriate;Bright   Goals Achieved Identified feelings; Discussed coping strategies; Able to listen to others; Able to engage in interactions

## 2020-09-21 NOTE — DISCHARGE INSTRUCTIONS
Schizoaffective Disorder   WHAT YOU NEED TO KNOW:   What is schizoaffective disorder? Schizoaffective disorder is a long-term mental illness that may change how you think, feel, and act around others  Schizoaffective disorder involves both psychosis (loss of reality), along with depression or opal  You may not know what is real and what is not real    What causes schizoaffective disorder? The cause of schizoaffective disorder in not known  It is thought there may be an imbalance in chemicals that help control movement, thought, and mood  What increases my risk of schizoaffective disorder? · You are female  · You were born during the winter months  · You had psychological stress from abuse, disaster, or major life change  · Your brain did not develop normally before you were born  · You have a family member with schizophrenia, a mood disorder, or schizoaffective disorder  What are the signs and symptoms of schizoaffective disorder? · Delusions: These are false ideas  You may believe that someone is spying on you, or that you are someone famous  · Hallucinations: You see, feel, taste, hear, or smell something that is not real     · Disordered thinking and speech:  When you talk, you move from one subject to another in a way that does not make sense  You may make up your own words or sounds  · Negative symptoms: These include lack of expression, eye contact, and words  You may not be able to start and continue a planned activity  You may have little interest in work or social activities  · Depression:  You are depressed most of the day or nearly every day  You may lose or gain weight, or have trouble sleeping or concentrating  There may be feeling of hopelessness and suicidal thoughts  · Manic behavior: These are periods of increased self-esteem and energy with little to no sleep  You may talk more than usual  You may have racing thoughts or be easily distracted   You may have more interest in social activities  How is schizoaffective disorder diagnosed? Your healthcare provider will perform a psychiatric assessment  He will ask if you have a history of psychological trauma, such as physical, sexual, or mental abuse  He will ask if you were given the care that you needed when you needed it  He will ask if you have a history of alcohol or drug abuse  Your healthcare provider will ask you if you want to hurt or kill yourself or others  He will also ask about your hobbies and goals, the people in your life who support you, and how you feel about treatment  The answers to these questions help healthcare providers plan your treatment  Which medicines are used to treat schizoaffective disorder? · Antipsychotics: These help decrease psychotic symptoms or severe agitation  You may need antiparkinson medicine to control muscle stiffness, twitches, and restlessness caused by antipsychotic medicines  · Antianxiety medicine: This medicine may be given to decrease anxiety and help you feel calm and relaxed  · Antidepressants: These help decrease or stop the symptoms of depression, anxiety, and behavior problems  · Mood stabilizers: These control mood swings  · Anticonvulsants: These control seizures, decrease violent behavior, and control mood swings  · Blood pressure medicines: These may be used to help decrease motor tics (uncontrolled movements)  They may also help you feel calmer, more focused, and less irritable  · Anticholinergics: This decreases the side effects of other medicines  Which therapies are used to treat schizoaffective disorder? · Assertive community treatment:  A team of healthcare providers and support groups in your community help you with your therapy  · Cognitive behavior therapy: This therapy helps you to change certain behaviors  It will help you handle symptoms such as hallucinations and delusions   It can help you learn how to get along with others, and help you cope with and handle your disease  · Compliance therapy: This is a therapy to help find ways to make it easier for you to take your medicines and get treatment  · Counseling: This helps you learn self-care, how to decrease your symptoms, and prevent them from coming back  · Family intervention: This program lets your family be part of your therapy  · Skills training: This training helps you learn how to get along with other people  You will also learn how to do everyday activities and skills you need to live on your own  · Supported employment: This is a form of therapy where you are placed into a job that fits your skills  It will help give you independence and self-confidence  · Electroconvulsive therapy: This is a type of shock therapy, also called ECT  This therapy passes a small amount of electricity through the brain  How can I help manage schizoaffective disorder? The following may help you feel better or prevent symptoms of schizoaffective disorder from coming back:  · Find support for yourself and your family:  Talk with others to help you cope with your illness better  This may also help to improve how you relate to others  · Keep all medical appointments: This will help manage your disease and the side-effects from medicines you may be taking  · Take your medicines as directed:  Put your medicines in a pillbox placed in an area you can easily see  Use a watch with an alarm to help you remember when it is time to take your medicine  Tell your healthcare provider if you know or think you might be pregnant because your medication could affect the baby  Do not stop taking your medicines without your healthcare provider's okay  A sudden stop can cause serious medical problems  · Watch for early signs of a relapse and seek help immediately:      ¨ How you think, feel, and see things has changed      ¨ You start to behave differently, or others tell you they notice a change  ¨ You become more nervous and upset, but do not know why  ¨ You eat less or have trouble sleeping  ¨ You have little or no interest in friends or activities  What are the risks of schizoaffective disorder? Even with treatment, your symptoms may come back or not go away  If schizoaffective disorder is left untreated, your condition may get worse  It may affect the way you think of yourself and how you get along with others  Your condition may make it hard for you to do your normal activities  You may be at increased risk for diabetes and heart and lung disease  Your risk for alcohol or drug abuse increases  You may have thoughts of hurting or killing yourself or others  Where can I find support and more information? · [employer]  [address] [facility] [address]thia Ceball[address] , Mayra Myers 30  Phone: [phone]  Phone: [phone]  Web Address: [employer]  · [address] [place] [address], Public Information & Communication Branch  [address] [place] [address], [address] [facility], [address]  [place]ee [place] [address]   Phone: [phone]  Phone: [phone]  Web Address: [facility]  When should I contact my healthcare provider? · You think you are having a relapse  · You are having side effects from your medicines, or they are not helping  · You are not sleeping well or are sleeping more than usual     · You cannot eat or are eating more than usual     · You have muscle spasms, stiffness, or trouble walking  · Your sad feelings or thoughts change the way you function during the day  · You have questions or concerns about your condition or care  When should I seek immediate care or call [facility]? · You feel like hurting or killing yourself or others  · You feel that your condition is getting worse  · You feel very upset, threaten someone, or feel violent       · You suddenly have changes in your vision  · You suddenly have chest pain, trouble breathing, or a fever  CARE AGREEMENT:   You have the right to help plan your care  Learn about your health condition and how it may be treated  Discuss treatment options with your caregivers to decide what care you want to receive  You always have the right to refuse treatment  The above information is an  only  It is not intended as medical advice for individual conditions or treatments  Talk to your doctor, nurse or pharmacist before following any medical regimen to see if it is safe and effective for you  © 2017 [address] [employer]  Information is for End User's use only and may not be sold, redistributed or otherwise used for commercial purposes  All illustrations and images included in CareNotes® are the copyrighted property of A D A M , Inc  or [employer]  Cannabis Abuse   WHAT YOU NEED TO KNOW:   What is cannabis? Cannabis, also called marijuana, is a drug that comes from the cannabis sativa (hemp plant)  It may also be called pot, weed, or hash  Cannabis can be smoked, baked into food and eaten, or made into a tea you can drink  It can make you feel high, happy, or excited  The effects may start right away and last for 3 to 4 hours depending on whether you smoke or eat cannabis  What is cannabis abuse? Cannabis abuse is a pattern of use that causes physical or mental problems:  · The use of cannabis makes you unable to function at work, school, or in your home  You may be absent often, or your work may be done poorly  You may not be able to take care of your children or your home  · You use cannabis when it is dangerous to be under the effects of the drug  This includes when you drive a vehicle or use machinery  · You have problems with the police when you are under the effects of cannabis  · You keep using cannabis even when you argue with your family and friends about your use       · You need to use more cannabis to give you the high feeling or other effects that you want  You have withdrawal symptoms after you stop using cannabis  What is cannabis withdrawal?  Cannabis withdrawal happens when you have used cannabis for a long period of time, and you suddenly take less or stop taking it  Withdrawal symptoms may start on the first day and may last up to 2 weeks  You may have more than one of the following:  · Decreased appetite and weight loss     · Night sweats and trouble sleeping    · Craving for cannabis    · Irritability     · Feeling agitated, anxious, or restless    · Depressed or negative mood  How is cannabis abuse diagnosed and treated? Healthcare providers will ask about your cannabis use  Your urine may be tested for cannabis  The following are common treatments for cannabis abuse:  · Brief intervention therapy  is done by meeting with a healthcare provider who will talk to and encourage you  During therapy, you will discuss your cannabis use with the healthcare provider  The healthcare provider will help you understand that you are responsible for making changes in your life  He will explain how you can be helped by decreasing or stopping cannabis use, and give you treatment options  · Cognitive behavioral therapy (CBT)  helps you change your thinking and behavior  It can help you manage depression and anxiety caused by cannabis use  CBT can help you learn good coping skills and ways to manage stress  CBT can be done with you and a talk therapist or in a group with others  · Motivational enhancement therapy  is used to help you set goals to change your behavior and stop cannabis abuse  · Group, marriage, and family therapy  can help you find support and motivation to stop cannabis abuse  Self-help group therapy includes meeting with others who also want to stop using cannabis  Marriage and family therapy can help you by including others to support you in your treatment       · A voucher program  provides vouchers (rewards) for attending therapy or not using cannabis  You may need to provide urine samples for testing  If your urine shows no signs of cannabis use, you may get a voucher  This program may report you to the court, or tell your family or friends if you decide to use cannabis  What are the risks of cannabis abuse? · Cannabis abuse increases your risk of heart disease and blood vessel disorders  It decreases your immune system, and increases your risk of infections and illnesses  If you have asthma, cannabis may make it worse  Your risk of throat and lung cancer may increase with long-term use of cannabis  · Cannabis may decrease your judgment, and increase your risk for injury  Cannabis abuse may increase your risk of schizophrenia, bipolar disorder, psychosis, depression, or anxiety  Your risk is greater if you or someone in your family has a mental health disorder  If you have schizophrenia, cannabis use may make your symptoms worse  How may cannabis affect my baby? Cannabis use may keep your unborn baby from growing as fast as he should  It may harm your unborn baby's eyes and nervous system  When he gets older, he may have difficulty in school and with solving problems  He may also have a poor memory, or problems focusing or paying attention  He may be impulsive (reacting without thinking first)  He may be at an increased risk for depression  He may have a higher risk of smoking cigarettes and using cannabis when he is an adult  Where can I find more information? · [facility]  [address] [place] [phone]  Phone: [phone]  Web Address: www [employer] go[employer]  When should I seek immediate care? · The effects of cannabis have worn off, and you have shortness of breath, a fast heart rate, or chest pain  · You want to hurt or kill yourself or others  When should I contact my healthcare provider?    · You cannot fight the urge to use cannabis  · You have stopped using cannabis, and feel that you cannot cope with your withdrawal symptoms  · You want help or more information on how to decrease or stop using cannabis  · You have questions or concerns about your condition or care  CARE AGREEMENT:   You have the right to help plan your care  Learn about your health condition and how it may be treated  Discuss treatment options with your caregivers to decide what care you want to receive  You always have the right to refuse treatment  The above information is an  only  It is not intended as medical advice for individual conditions or treatments  Talk to your doctor, nurse or pharmacist before following any medical regimen to see if it is safe and effective for you  © 2017 [address] [employer] Information is for End User's use only and may not be sold, redistributed or otherwise used for commercial purposes  All illustrations and images included in CareNotes® are the copyrighted property of A D A M , Inc  or [employer]  Lithium (By mouth)   Lithium (LITH-ee-um)  Treats and helps prevent manic episodes of bipolar disorder  Brand Name(s): Lith-Eleonora, Lithate, Lithobid   There may be other brand names for this medicine  When This Medicine Should Not Be Used: This medicine is not right for everyone  Do not use it if you had an allergic reaction to lithium, or if you are pregnant or breastfeeding  Do not give the extended-release tablets to anyone younger than 15years of age  How to Use This Medicine:   Capsule, Liquid, Tablet, Long Acting Tablet  · Take your medicine as directed  Your dose may need to be changed several times to find what works best for you  · There are several different forms of lithium  The dose for each is different and they are used at different times of the day   Do not change the type of medicine you take without talking to your doctor first   · Oral liquid: Measure the oral liquid medicine with a marked measuring spoon, oral syringe, or medicine cup  · Capsule, tablet, or extended-release tablet: Swallow the medicine whole  Do not crush, break, or chew it  · Use only the brand of medicine your doctor prescribed  Other brands may not work the same way  · Missed dose: Take a dose as soon as you remember  If it is almost time for your next dose, wait until then and take a regular dose  Do not take extra medicine to make up for a missed dose  · Store the medicine in a closed container at room temperature, away from heat, moisture, and direct light  Drugs and Foods to Avoid:   Ask your doctor or pharmacist before using any other medicine, including over-the-counter medicines, vitamins, and herbal products  · Some foods and medicines can affect how this medicine works  Tell your doctor if you are using any of the following:  ¨ Acetazolamide, carbamazepine, fluoxetine, methyldopa, metronidazole, phenytoin, potassium iodide, sodium bicarbonate, theophylline  ¨ Antacid  ¨ Blood pressure medicine  ¨ Diuretic (water pill)  ¨ Medicine for depression (including paroxetine)  ¨ Medicine to treat mental illness (including haloperidol)  ¨ NSAID pain or arthritis medicine (including celecoxib, ibuprofen, indomethacin, naproxen, piroxicam)  Warnings While Using This Medicine:   · It is not safe to take this medicine during pregnancy  It could harm an unborn baby  Tell your doctor right away if you become pregnant  · Tell your doctor if you have kidney problems, heart or blood vessel disease (including Brugada syndrome), brain or nerve problems, or thyroid problems  · This medicine may cause the following problems:  ¨ Lithium toxicity  ¨ Heart problems  ¨ Kidney problems  ¨ Encephalopathic syndrome (brain problem)  · Make sure any doctor or dentist who treats you knows that you are using this medicine  · This medicine may make you dizzy or drowsy   Do not drive or do anything else that could be dangerous until you know how this medicine affects you  · Your doctor will do lab tests at regular visits to check on the effects of this medicine  Keep all appointments  · Keep all medicine out of the reach of children  Never share your medicine with anyone  Possible Side Effects While Using This Medicine:   Call your doctor right away if you notice any of these side effects:  · Allergic reaction: Itching or hives, swelling in your face or hands, swelling or tingling in your mouth or throat, chest tightness, trouble breathing  · Change in how much or how often you urinate  · Confusion, problems with walking or balance, muscle movements you cannot control  · Diarrhea, vomiting, tremors, or drowsiness  · Fast, pounding, or uneven heartbeat  · Fever  · Lightheadedness or fainting  · Unusual tiredness or weakness  If you notice these less serious side effects, talk with your doctor:   · Mild nausea  · Mild thirst  If you notice other side effects that you think are caused by this medicine, tell your doctor  Call your doctor for medical advice about side effects  You may report side effects to FDA at 9-318-FDA-2979  © 2017 [address] [employer]  Information is for End User's use only and may not be sold, redistributed or otherwise used for commercial purposes  The above information is an  only  It is not intended as medical advice for individual conditions or treatments  Talk to your doctor, nurse or pharmacist before following any medical regimen to see if it is safe and effective for you  Olanzapine (By mouth)   Olanzapine ([id number])  Treats psychotic mental disorders, such as schizophrenia or bipolar disorder (manic-depressive illness)  Brand Name(s): ZyPREXA, ZyPREXA Zydis   There may be other brand names for this medicine  When This Medicine Should Not Be Used: This medicine is not right for everyone   Do not use it if you had an allergic reaction to olanzapine  How to Use This Medicine:   Tablet, Dissolving Tablet  · Take your medicine as directed  Your dose may need to be changed several times to find what works best for you  · Make sure your hands are dry before you handle the disintegrating tablet  Peel back the foil from the blister pack, then remove the tablet  Do not push the tablet through the foil  Place the tablet in your mouth  After it has melted, swallow or take a drink of water  · This medicine should come with a Medication Guide  Ask your pharmacist for a copy if you do not have one  · Missed dose: Take a dose as soon as you remember  If it is almost time for your next dose, wait until then and take a regular dose  Do not take extra medicine to make up for a missed dose  · Store the medicine in a closed container at room temperature, away from heat, moisture, and direct light  Keep the disintegrating tablet in the original package until you are ready to use it  Drugs and Foods to Avoid:   Ask your doctor or pharmacist before using any other medicine, including over-the-counter medicines, vitamins, and herbal products  · You must be careful if you are also using other medicine that might cause similar side effects as olanzapine  Make sure your doctor knows about all other medicines you are using  · Some medicines can affect how olanzapine works  Tell your doctor if you are using any of the following:  ¨ Carbamazepine, diazepam, fluoxetine, fluvoxamine, levodopa, omeprazole, or rifampin  ¨ Blood pressure medicine  c  · Tell your doctor if you use anything else that makes you sleepy  Some examples are allergy medicine, narcotic pain medicine, and alcohol  · Do not drink alcohol while you are using this medicine  · Tell your doctor if you smoke tobacco  You might need a different amount of this medicine if you smoke    Warnings While Using This Medicine:   · Tell your doctor if you are pregnant, or if you have kidney disease, liver disease, diabetes, glaucoma, prostate problems, or a history of breast cancer, neuroleptic malignant syndrome (NMS), seizures, or severe constipation  Tell your doctor if you have any kind of heart or circulation problems, including low blood pressure, heart failure, heart rhythm problems, or a history of a heart attack or stroke  Tell your doctor if you have a condition called phenylketonuria  · Do not breastfeed while you are using this medicine  · For some children, teenagers, and young adults, this medicine may increase mental or emotional problems  This may lead to thoughts of suicide and violence  Talk with your doctor right away if you have any thoughts or behavior changes that concern you  Tell your doctor if you or anyone in your family has a history of bipolar disorder or suicide attempts  · This medicine may cause the following problems:  ¨ Neuroleptic malignant syndrome (a nerve and muscle problem)  ¨ Drug reaction with eosinophilia and systemic symptoms (DRESS)  ¨ High blood sugar, cholesterol, or triglyceride levels  ¨ Tardive dyskinesia (a muscle problem that may become permanent)  · This medicine may make you dizzy or drowsy, or may cause trouble with thinking or controlling body movements, which may lead to falls, fractures or other injuries  Do not drive or do anything that could be dangerous until you know how this medicine affects you  You may also feel lightheaded when getting up suddenly from a lying or sitting position, so stand up slowly  · This medicine may make you bleed, bruise, or get infections more easily  Take precautions to prevent illness and injury  Wash your hands often  · This medicine may make it more difficult for your body to cool down  Be careful to not become overheated during exercise or hot weather, because you could have heat stroke  · Your doctor will do lab tests at regular visits to check on the effects of this medicine   Keep all appointments  · Keep all medicine out of the reach of children  Never share your medicine with anyone  Possible Side Effects While Using This Medicine:   Call your doctor right away if you notice any of these side effects:  · Allergic reaction: Itching or hives, swelling in your face or hands, swelling or tingling in your mouth or throat, chest tightness, trouble breathing  · Eye pain, trouble seeing  · Feeling very thirsty or hungry, change in how much or how often you urinate  · Fever, chills, cough, sore throat, body aches  · Jerky muscle movement you cannot control (often in your face, tongue, or jaw)  · Lightheadedness, dizziness, fainting  · Seizures or tremors  · Sweating, confusion, uneven heartbeat, muscle stiffness  · Swollen breasts, or liquid discharge from your nipples (men or women)  · Swollen, painful, or tender lymph glands in neck, armpit, or groin  · Trouble breathing or swallowing  · Unusual behavior, thoughts of hurting yourself or others  · Unusual bleeding, bruising, or weakness  If you notice these less serious side effects, talk with your doctor:   · Constipation, upset stomach  · Dry mouth  · Headache, tiredness  · Sleepiness or unusual drowsiness  · Weight gain  If you notice other side effects that you think are caused by this medicine, tell your doctor  Call your doctor for medical advice about side effects  You may report side effects to FDA at 3-953-FDA-0587  © 2017 [address] [employer] Information is for End User's use only and may not be sold, redistributed or otherwise used for commercial purposes  The above information is an  only  It is not intended as medical advice for individual conditions or treatments  Talk to your doctor, nurse or pharmacist before following any medical regimen to see if it is safe and effective for you  How to Stop Smoking   WHAT YOU NEED TO KNOW:   Why should I stop smoking?   You will improve your health and the health of others around you if you stop smoking  Your risk for heart and lung disease, cancer, stroke, heart attack, and vision problems will also decrease  You can benefit from quitting no matter how long you have smoked  How can I prepare to stop smoking? Nicotine is a highly addictive drug found in cigarettes  Withdrawal symptoms can happen when you stop smoking and make it hard to quit  These include anxiety, depression, irritability, trouble sleeping, and increased appetite  You increase your chances of success if you prepare to quit  · Set a quit date  Oskar[place] Lopez a date that is within the next 2 weeks  Do not pick a day that you think may be stressful or busy  Write down the day or [place] it on your calender  · Tell friends and family that you plan to quit  Explain that you may have withdrawal symptoms when you try to quit  Ask them to support you  They may be able to encourage you and help reduce your stress to make it easier for you to quit  · Make a list of your reasons for quitting  Put the list somewhere you will see it every day, such as your refrigerator  You can look at the list when you have a craving  · Remove all tobacco and nicotine products from your home, car, and workplace  Also, remove anything else that will tempt you to smoke, such as lighters, matches, or ashtrays  Clean your car, home, and places at work that smell like smoke  The smell of smoke can trigger a craving  · Identify triggers that make you want to smoke  This may include activities, feelings, or people  Also write down 1 way you can deal with each of your triggers  For example, if you want to smoke as soon as you wake up, plan another activity during this time, such as exercise  · Make a plan for how you will quit  Learn about the tools that can help you quit, such as medicine, counseling, or nicotine replacement therapy  Choose at least 2 options to help you quit    What are some tools to help me stop smoking? · Counseling  from a trained healthcare provider can provide you with support and skills to quit smoking  The provider will also teach you to manage your withdrawal symptoms and cravings  You may receive counseling from one counselor, in group therapy, or through phone therapy called a quit line  · Nicotine replacement therapy (NRT)  such as nicotine patches, gum, or lozenges may help reduce your nicotine cravings  You may get these without a doctor's order  Do not use e-cigarettes or smokeless tobacco in place of cigarettes or to help you quit  They still contain nicotine  · Prescription medicines  such as nasal sprays or nicotine inhalers may help reduce your withdrawal symptoms  Other medicines may also be used to reduce your urge to smoke  Ask your healthcare provider about these medicines  You may need to start certain medicines 2 weeks before your quit date for them to work well  · Hypnosis  is a practice that helps guide you through thoughts and feelings  Hypnosis may help decrease your cravings and make you more willing to quit  · Acupuncture therapy  uses very thin needles to balance energy channels in the body  This is thought to help decrease cravings and symptoms of nicotine withdrawal      · Support groups  let you talk to others who are trying to quit or have already quit  It may be helpful to speak with others about how they quit  How can I manage my cravings? · Avoid situations, people, and places that tempt you to smoke  Go to nonsmoking places, such as libraries or restaurants  Understand what tempts you and try to avoid these things  · Keep your hands busy  Hold things such as a stress ball or pen  · Put candy or toothpicks in your mouth  Keep lollipops, sugarless gum, or toothpicks with you at all times  · Do not have alcohol or caffeine  These drinks may tempt you to smoke  Drink healthy liquids such as water or juice instead      · Reward yourself when you resist your cravings  Rewards will motivate you and help you stay positive  · Do an activity that distracts you from your craving  Examples include going for a walk, exercising, or cleaning  What should I know about weight gain after I quit? You may gain a few pounds after you quit smoking  It is healthier for you to gain a few pounds than to continue to smoke  The following can help you prevent weight gain:  · Eat healthy foods  These include fruits, vegetables, whole-grain breads, low-fat dairy products, beans, lean meats, and fish  Eat healthy snacks, such as low-fat yogurt, if you get hungry between meals  · Drink water before, during, and between meals  This will make your stomach feel full and help prevent you from overeating  Ask your healthcare provider how much liquid to drink each day and which liquids are best for you  · Exercise  Take a walk or do some kind of exercise every day  Ask your healthcare provider what exercise is right for you  This may help reduce your cravings and reduce stress  Where can I find support and more information? · [employer]  Phone: 2- 946 - 254-0029  Web Address: www [employer]  CARE AGREEMENT:   You have the right to help plan your care  Learn about your health condition and how it may be treated  Discuss treatment options with your caregivers to decide what care you want to receive  You always have the right to refuse treatment  The above information is an  only  It is not intended as medical advice for individual conditions or treatments  Talk to your doctor, nurse or pharmacist before following any medical regimen to see if it is safe and effective for you  © 2017 [address] [employer] Information is for End User's use only and may not be sold, redistributed or otherwise used for commercial purposes   All illustrations and images included in CareNotes® are the copyrighted property of [employer]  or [employer]  Cigarette Smoking and Your Health   WHAT YOU NEED TO KNOW:   What are the risks to my health if I smoke tobacco?  Nicotine and other chemicals found in tobacco damage every cell in your body  Even if you are a light smoker, you have an increased risk for cancer, heart disease, and lung disease  If you are pregnant or have diabetes, smoking increases your risk for complications  What are the benefits to my health if I stop smoking? · You decrease respiratory symptoms such as coughing, wheezing, and shortness of breath  · You reduce your risk for cancers of the lung, mouth, throat, kidney, bladder, pancreas, stomach, and cervix  If you already have cancer, you increase the benefits of chemotherapy  You also reduce your risk for cancer returning or a second cancer from developing  · You reduce your risk for heart disease, blood clots, heart attack, and stroke  · You reduce your risk for lung infections, and diseases such as pneumonia, asthma, chronic bronchitis, and emphysema  · Your circulation improves  More oxygen can be delivered to your body  If you have diabetes, you lower your risk for complications, such as kidney, artery, and eye diseases  You also lower your risk for nerve damage  Nerve damage can lead to amputations, poor vision, and blindness  · You improve your body's ability to heal and to fight infections  What are the health benefits to others if I stop smoking? Tobacco is harmful to nonsmokers who breathe in your secondhand smoke  The following are ways the health of others around you may improve when you stop smoking:  · You lower the risks for lung cancer and heart disease in nonsmoking adults  · If you are pregnant, you lower the risk for miscarriage, early delivery, low birth weight, and stillbirth  You also lower your baby's risk for SIDS, obesity, developmental delay, and neurobehavioral problems, such as ADHD       · If you have children, you lower their risk for ear infections, colds, pneumonia, bronchitis, and asthma  Where can I find more information and support to stop smoking? · [employer]  Phone: 7- 853 - 399-9263  Web Address: www [employer]  CARE AGREEMENT:   You have the right to help plan your care  Learn about your health condition and how it may be treated  Discuss treatment options with your caregivers to decide what care you want to receive  You always have the right to refuse treatment  The above information is an  only  It is not intended as medical advice for individual conditions or treatments  Talk to your doctor, nurse or pharmacist before following any medical regimen to see if it is safe and effective for you  © 2017 [employer] [employer]  Information is for End User's use only and may not be sold, redistributed or otherwise used for commercial purposes  All illustrations and images included in CareNotes® are the copyrighted property of A [employer] A [employer]  or [employer]  Diazepam (By mouth)   Diazepam (dye-AZ-e-ata)  Treats anxiety, alcohol withdrawal, muscle spasms, and seizures  Brand Name(s): Valium, diazePAM Intensol   There may be other brand names for this medicine  When This Medicine Should Not Be Used: This medicine is not right for everyone  Do not use it if you had an allergic reaction to diazepam, or if you are pregnant or breastfeeding, or you have narrow-angle glaucoma, myasthenia gravis, sleep apnea, or severe breathing problems  Do not give this medicine to any child 10months of age or younger  How to Use This Medicine:   Liquid, Tablet  · Your doctor will tell you how much medicine to use  Do not use more than directed  · Oral liquid: Measure the oral liquid medicine with a marked measuring spoon, oral syringe, or medicine cup  · This medicine should come with a Medication Guide  Ask your pharmacist for a copy if you do not have one  · Missed dose:  Take a dose as soon as you remember  If it is almost time for your next dose, wait until then and take a regular dose  Do not take extra medicine to make up for a missed dose  · Store the medicine in a closed container at room temperature, away from heat, moisture, and direct light  Do not freeze the oral liquid  Drugs and Foods to Avoid:   Ask your doctor or pharmacist before using any other medicine, including over-the-counter medicines, vitamins, and herbal products  · Some medicines can affect how diazepam works  Tell your doctor if you are using any of the following:   ¨ Cimetidine, fluoxetine, fluvoxamine, ketoconazole, omeprazole, phenytoin, or theophylline  ¨ MAO inhibitor  ¨ Medicine to treat depression or mental health problem  ¨ Medicine to treat seizures  ¨ Phenothiazine medicine  · Do not drink alcohol while you are using this medicine  · Tell your doctor if you use anything else that makes you sleepy  Some examples are allergy medicine, narcotic pain medicine, and alcohol  Warnings While Using This Medicine:   · It is not safe to take this medicine during pregnancy  It could harm an unborn baby  Tell your doctor right away if you become pregnant  · Tell your doctor if you have kidney disease, liver disease, glaucoma, lung or breathing problems, or a history of drug or alcohol abuse, depression, mental health problems, or seizures  · This medicine may cause the following problems:  ¨ Respiratory depression (serious breathing problem that can be life-threatening), when used with narcotic pain medicines  · This medicine can increase thoughts of suicide  Tell your doctor right away if you start to feel depressed and have thoughts about hurting yourself  · This medicine may make you dizzy or drowsy  Do not drive or do anything else that could be dangerous until you know how this medicine affects you  · This medicine can be habit-forming  Do not use more than your prescribed dose   Call your doctor if you think your medicine is not working  · Do not stop using this medicine suddenly  Your doctor will need to slowly decrease your dose before you stop it completely  · Your doctor will do lab tests at regular visits to check on the effects of this medicine  Keep all appointments  · Keep all medicine out of the reach of children  Never share your medicine with anyone  Possible Side Effects While Using This Medicine:   Call your doctor right away if you notice any of these side effects:  · Allergic reaction: Itching or hives, swelling in your face or hands, swelling or tingling in your mouth or throat, chest tightness, trouble breathing  · Blue lips, fingernails, or skin  · Confusion, lightheadedness, dizziness, problems with muscle control or coordination  · Extreme drowsiness or weakness, slow heartbeat, trouble breathing  · Seizures or tremors  · Unusual mood or behavior, worsening depression, thoughts about hurting yourself, trouble sleeping  If you notice these less serious side effects, talk with your doctor:   · Blurred vision, changes in vision  · Headache  · Nausea, vomiting, constipation, diarrhea, stomach pain  · Tiredness  If you notice other side effects that you think are caused by this medicine, tell your doctor  Call your doctor for medical advice about side effects  You may report side effects to FDA at 8-056-FDA-0022  © 2017 [address] [employer] Information is for End User's use only and may not be sold, redistributed or otherwise used for commercial purposes  The above information is an  only  It is not intended as medical advice for individual conditions or treatments  Talk to your doctor, nurse or pharmacist before following any medical regimen to see if it is safe and effective for you

## 2020-09-21 NOTE — PROGRESS NOTES
Patient asking for a chill pill  Feeling anxious and agitated with discharge  He wants to leave now but is not leaving until 1 pm  PRN risperdal administered as requested  Will monitor for effectiveness

## 2020-09-21 NOTE — NURSING NOTE
Discharge instructions reviewed with patient  All belongings and home medications sent with patient   Patient discharged off the unit

## 2020-09-21 NOTE — PROGRESS NOTES
Patient had a difficult night  Slept well until approximately 0230 at which time he awoke agitated  Restless and pacing  Loud voice  Irritable  Mumbling to himself  Admitted he was feeling paranoid  Attempted to discuss with patient what was bothering him but he initially did not want to talk  Refused prn  medication as well  After pacing the halls and muttering angrily to himself, he did eventually agree to talk  He talked about his Uncle being placed in a [facility] and his fears that the same thing would happen to him  He said his family all thinks he's "crazy" and they are afraid of him  Patient acknowledged he was feeling agitated and did agree to take prn Risperdal   He then read from his Bible and paced some more  After about an hour he did begin to calm down  Risperdal effective  He has been walking the halls and sitting on his bed and praying  Apologized for his behavior thanked staff for helping him calm down

## 2020-09-21 NOTE — PROGRESS NOTES
CM met and spoke with patient this morning to discuss his discharge plans  CM inquired if this CM could call with patient to arrange follow up appointments for patient at the [place] in [facility]  Patient declined this, stating he did not need the CM to help him with this and that "if it makes you feel better, I can call you when I make the appointments myself " CM informed him that this CM would like to be able to document that he has appointments before he leaves the hospital, however, patient was still adamant about arranging the appointments himself  During this conversation, patient was stating that he likely will not take his medications other than his Lithium in the community  He also kept telling this CM that he was getting angry with the questions CM was asking and presented very irritable with CM  CM did relay these concerns to the attending  Patient did allow this CM to assist in calling his [place] Sheriff who reported he could pick patient up today at 1300  [facility] staff made aware  CM will continue to assist with discharge planning needs

## 2020-09-23 NOTE — DISCHARGE SUMMARY
Discharge Summary - [address] Fr[address] Day 56 y o  male MRN: [id number]  Unit/Bed#: -01 Encounter: [id number]     Admission Date: 9/4/2020         Discharge Date: 9/21/2020  1:07 PM    Reason for Admission/HPI: Schizoaffective disorder, bipolar type ([facility] [address] ) [F25 0]  Psychiatric problem [F99]  Psychosis, unspecified psychosis type [facility]) [F29]    Patient is a 56 y o  male with extensive psychiatric history who presents on a 707 by the police after the neighbors called 911 due to disturbance and making threats  Patient became more agitated in the emergency room and more threatening  Patient reports he has been taking his lithium but refuses to take the Zyprexa  Patient also has been smoking marijuana but denies use of any other drugs  Patient has long history of bipolar illness and he gets outpatient services through the [facility] in [facility]  Patient does reside with his mother and had multiple hospitalizations due to similar circumstances  Patient denies having any type of hallucination or delusion  He denies having any suicidal homicidal thoughts  Patient is extremely hyperverbal with flight of ideas and pressured speech  Hospital Course: The patient was admitted to the inpatient psychiatric unit and started on every 7 minutes precautions  During the hospitalization the patient was attending individual therapy, group therapy, milieu therapy and occupational therapy  Patient initially refused to take any other medication besides lithium and despite the proper dose on levels of lithium patient continued to exhibit manic behaviors including being very hyperverbal, irritable, impulsive and at times agitated  His discharge had to be postponed due to his aggressive behaviors at resulted in him being restrained  Zyprexa and Valium were added and both did help stabilize his mood to some extent  Once patient became stable he was discharged back home    Patient refused for us to set up his aftercare appointments at the [address] [place] [address] and said that he will set him up himself  Patient is known to us from previous encounters and he tends to be very stubborn and has long history of medication noncompliance  The Mental Status at time of Discharge:     Appearance:  disheveled   Behavior:  uncooperative   Speech:  loud   Mood:  normal   Affect:  increased in intensity   Thought Process:  tangential   Thought Content:  normal   Perceptual Disturbances: None   Risk Potential: Potential for Aggression No   Sensorium:  person and place   Cognition:  recent and remote memory grossly intact   Consciousness:  awake    Attention: attention span appeared shorter than expected for age   Insight:  limited   Judgment: limited   Gait/Station: normal gait/station   Motor Activity: no abnormal movements     Admission Diagnosis:Schizoaffective disorder, bipolar type ([facility] ) [F25 0]  Psychiatric problem [F99]  Psychosis, unspecified psychosis type ([facility] ) [F29]    Discharge Diagnosis:   Principal Problem:    Schizoaffective disorder, bipolar type ([facility] )  Active Problems:    Cannabis abuse, continuous  Resolved Problems:    * No resolved hospital problems   *        Lab results:  Admission on 09/04/2020, Discharged on 09/21/2020   Component Date Value    Amph/Meth UR 09/05/2020 Negative     Barbiturate Ur 09/05/2020 Negative     Benzodiazepine Urine 09/05/2020 Negative     Cocaine Urine 09/05/2020 Negative     Methadone Urine 09/05/2020 Negative     Opiate Urine 09/05/2020 Negative     PCP Ur 09/05/2020 Negative     THC Urine 09/05/2020 Positive*    Oxycodone Urine 09/05/2020 Negative     WBC 09/04/2020 9 30     RBC 09/04/2020 4 76     Hemoglobin 09/04/2020 15 0     Hematocrit 09/04/2020 43 5     MCV 09/04/2020 91     MCH 09/04/2020 31 5     MCHC 09/04/2020 34 5     RDW 09/04/2020 14 2     MPV 09/04/2020 8 6     Platelets 77/80/2299 192     Neutrophils Relative 09/04/2020 79*    Lymphocytes Relative 09/04/2020 14*    Monocytes Relative 09/04/2020 6     Eosinophils Relative 09/04/2020 1     Basophils Relative 09/04/2020 0     Neutrophils Absolute 09/04/2020 7 40*    Lymphocytes Absolute 09/04/2020 1 30     Monocytes Absolute 09/04/2020 0 60     Eosinophils Absolute 09/04/2020 0 10     Basophils Absolute 09/04/2020 0 00     Sodium 09/04/2020 139     Potassium 09/04/2020 3 1*    Chloride 09/04/2020 108*    CO2 09/04/2020 21     ANION GAP 09/04/2020 10     BUN 09/04/2020 14     Creatinine 09/04/2020 0 97     Glucose 09/04/2020 115*    Calcium 09/04/2020 9 8     AST 09/04/2020 50*    ALT 09/04/2020 47     Alkaline Phosphatase 09/04/2020 36*    Total Protein 09/04/2020 6 6     Albumin 09/04/2020 4 5     Total Bilirubin 09/04/2020 1 30*    eGFR 09/04/2020 83     TSH 3RD GENERATON 09/04/2020 1 620     Ethanol Lvl 09/04/2020 <10     Color, UA 09/05/2020 Yellow     Clarity, UA 09/05/2020 Clear     Specific Gravity, UA 09/05/2020 1 020     pH, UA 09/05/2020 6 0     Leukocytes, UA 09/05/2020 Negative     Nitrite, UA 09/05/2020 Negative     Protein, UA 09/05/2020 1+*    Glucose, UA 09/05/2020 Negative     Ketones, UA 09/05/2020 15 (1+)*    Urobilinogen, UA 09/05/2020 0 2     Bilirubin, UA 09/05/2020 Negative     Blood, UA 09/05/2020 Negative     Lithium Lvl 09/04/2020 0 6*    SARS-CoV-2 09/04/2020 Negative     RBC, UA 09/05/2020 None Seen     WBC, UA 09/05/2020 0-1*    Epithelial Cells 09/05/2020 Occasional     Bacteria, UA 09/05/2020 Occasional     Hyaline Casts, UA 09/05/2020 20-30*    MUCUS THREADS 09/05/2020 Occasional*    Ventricular Rate 09/04/2020 83     Atrial Rate 09/04/2020 83     DC Interval 09/04/2020 166     QRSD Interval 09/04/2020 98     QT Interval 09/04/2020 412     QTC Interval 09/04/2020 484     P Axis 09/04/2020 81     QRS Axis 09/04/2020 -75     T Wave Axis 09/04/2020 56     Lithium Lvl 09/13/2020 0 7*       Discharge Medications:  Discharge Medication List as of 9/21/2020 12:15 PM      START taking these medications    Details   diazepam (VALIUM) 5 mg tablet Take 1 tablet (5 mg total) by mouth 3 (three) times a day for 15 days, Starting Mon 9/21/2020, Until Tue 10/6/2020, Print      lithium carbonate (LITHOBID) 300 mg CR tablet Take 2 tablets (600 mg total) by mouth every 12 (twelve) hours, Starting Mon 9/21/2020, Until Wed 10/21/2020, Normal            Discharge Medication List as of 9/21/2020 12:15 PM      STOP taking these medications       cholecalciferol (VITAMIN D3) 1,000 units tablet Comments:   Reason for Stopping:         dextromethorphan-guaiFENesin (ROBITUSSIN DM)  mg/5 mL syrup Comments:   Reason for Stopping:         folic acid (FOLVITE) 1 mg tablet Comments:   Reason for Stopping:         lithium 600 MG capsule Comments:   Reason for Stopping:         LORazepam (ATIVAN) 0 5 mg tablet Comments:   Reason for Stopping:         Melatonin 10 MG TABS Comments:   Reason for Stopping:         nicotine polacrilex (NICORETTE) 2 mg gum Comments:   Reason for Stopping:         tamsulosin (FLOMAX) 0 4 mg Comments:   Reason for Stopping:         thiamine 100 MG tablet Comments:   Reason for Stopping:              Discharge Medication List as of 9/21/2020 12:15 PM      CONTINUE these medications which have CHANGED    Details   OLANZapine (ZyPREXA) 20 MG tablet Take 1 tablet (20 mg total) by mouth daily at bedtime, Starting Mon 9/21/2020, Until Wed 10/21/2020, Print            Discharge Medication List as of 9/21/2020 12:15 PM           Discharge instructions/Information to patient and family:   See after visit summary for information provided to patient and family  Provisions for Follow-Up Care:  See after visit summary for information related to follow-up care and any pertinent home health orders  Discharge Statement   I spent 30 minutes discharging the patient  This time was spent on the day of discharge   I had direct contact with the patient on the day of discharge  Additional documentation is required if more than 30 minutes were spent on discharge

## 2020-09-24 ENCOUNTER — HOSPITAL ENCOUNTER (INPATIENT)
Facility: HOSPITAL | Age: 60
LOS: 14 days | Discharge: HOME | DRG: 885 | End: 2020-10-09
Attending: EMERGENCY MEDICINE | Admitting: PSYCHIATRY & NEUROLOGY
Payer: MEDICARE

## 2020-09-24 DIAGNOSIS — R06.02 SHORTNESS OF BREATH: ICD-10-CM

## 2020-09-24 DIAGNOSIS — N40.0 BPH (BENIGN PROSTATIC HYPERPLASIA): ICD-10-CM

## 2020-09-24 DIAGNOSIS — F25.0 SCHIZOAFFECTIVE DISORDER, BIPOLAR TYPE (HCC): Primary | ICD-10-CM

## 2020-09-24 LAB
ALBUMIN SERPL BCP-MCNC: 4.5 G/DL (ref 3.5–5.7)
ALP SERPL-CCNC: 39 U/L (ref 55–165)
ALT SERPL W P-5'-P-CCNC: 41 U/L (ref 7–52)
AMPHETAMINES SERPL QL SCN: NEGATIVE
ANION GAP SERPL CALCULATED.3IONS-SCNC: 8 MMOL/L (ref 4–13)
AST SERPL W P-5'-P-CCNC: 63 U/L (ref 13–39)
BACTERIA UR QL AUTO: ABNORMAL /HPF
BARBITURATES UR QL: NEGATIVE
BASOPHILS # BLD AUTO: 0 THOUSANDS/ΜL (ref 0–0.1)
BASOPHILS NFR BLD AUTO: 0 % (ref 0–2)
BENZODIAZ UR QL: POSITIVE
BILIRUB SERPL-MCNC: 1.5 MG/DL (ref 0.2–1)
BILIRUB UR QL STRIP: ABNORMAL
BUN SERPL-MCNC: 15 MG/DL (ref 7–25)
CALCIUM SERPL-MCNC: 9.8 MG/DL (ref 8.6–10.5)
CHLORIDE SERPL-SCNC: 106 MMOL/L (ref 98–107)
CLARITY UR: CLEAR
CO2 SERPL-SCNC: 25 MMOL/L (ref 21–31)
COCAINE UR QL: NEGATIVE
COLOR UR: YELLOW
CREAT SERPL-MCNC: 1.03 MG/DL (ref 0.7–1.3)
EOSINOPHIL # BLD AUTO: 0.1 THOUSAND/ΜL (ref 0–0.61)
EOSINOPHIL NFR BLD AUTO: 1 % (ref 0–5)
ERYTHROCYTE [DISTWIDTH] IN BLOOD BY AUTOMATED COUNT: 13.6 % (ref 11.5–14.5)
ETHANOL SERPL-MCNC: 10 MG/DL
GFR SERPL CREATININE-BSD FRML MDRD: 77 ML/MIN/1.73SQ M
GLUCOSE SERPL-MCNC: 91 MG/DL (ref 65–99)
GLUCOSE UR STRIP-MCNC: NEGATIVE MG/DL
HCT VFR BLD AUTO: 42.5 % (ref 42–47)
HGB BLD-MCNC: 14.1 G/DL (ref 14–18)
HGB UR QL STRIP.AUTO: ABNORMAL
KETONES UR STRIP-MCNC: ABNORMAL MG/DL
LEUKOCYTE ESTERASE UR QL STRIP: ABNORMAL
LITHIUM SERPL-SCNC: 1 MMOL/L (ref 1–1.2)
LYMPHOCYTES # BLD AUTO: 1.6 THOUSANDS/ΜL (ref 0.6–4.47)
LYMPHOCYTES NFR BLD AUTO: 14 % (ref 21–51)
MCH RBC QN AUTO: 30.2 PG (ref 26–34)
MCHC RBC AUTO-ENTMCNC: 33.3 G/DL (ref 31–37)
MCV RBC AUTO: 91 FL (ref 81–99)
METHADONE UR QL: NEGATIVE
MONOCYTES # BLD AUTO: 0.8 THOUSAND/ΜL (ref 0.17–1.22)
MONOCYTES NFR BLD AUTO: 7 % (ref 2–12)
MUCOUS THREADS UR QL AUTO: ABNORMAL
NEUTROPHILS # BLD AUTO: 8.9 THOUSANDS/ΜL (ref 1.4–6.5)
NEUTS SEG NFR BLD AUTO: 78 % (ref 42–75)
NITRITE UR QL STRIP: NEGATIVE
NON-SQ EPI CELLS URNS QL MICRO: ABNORMAL /HPF
OPIATES UR QL SCN: NEGATIVE
OXYCODONE+OXYMORPHONE UR QL SCN: NEGATIVE
PCP UR QL: NEGATIVE
PH UR STRIP.AUTO: 6 [PH]
PLATELET # BLD AUTO: 221 THOUSANDS/UL (ref 149–390)
PMV BLD AUTO: 8.1 FL (ref 8.6–11.7)
POTASSIUM SERPL-SCNC: 3.4 MMOL/L (ref 3.5–5.5)
PROT SERPL-MCNC: 6.8 G/DL (ref 6.4–8.9)
PROT UR STRIP-MCNC: ABNORMAL MG/DL
RBC # BLD AUTO: 4.68 MILLION/UL (ref 4.3–5.9)
RBC #/AREA URNS AUTO: ABNORMAL /HPF
SARS-COV-2 RNA RESP QL NAA+PROBE: NEGATIVE
SODIUM SERPL-SCNC: 139 MMOL/L (ref 134–143)
SP GR UR STRIP.AUTO: 1.02 (ref 1–1.03)
THC UR QL: POSITIVE
TSH SERPL DL<=0.05 MIU/L-ACNC: 2.71 UIU/ML (ref 0.45–5.33)
UROBILINOGEN UR QL STRIP.AUTO: 0.2 E.U./DL
WBC # BLD AUTO: 11.5 THOUSAND/UL (ref 4.8–10.8)
WBC #/AREA URNS AUTO: ABNORMAL /HPF

## 2020-09-24 PROCEDURE — 80178 ASSAY OF LITHIUM: CPT | Performed by: EMERGENCY MEDICINE

## 2020-09-24 PROCEDURE — 96374 THER/PROPH/DIAG INJ IV PUSH: CPT

## 2020-09-24 PROCEDURE — 80053 COMPREHEN METABOLIC PANEL: CPT | Performed by: EMERGENCY MEDICINE

## 2020-09-24 PROCEDURE — 99285 EMERGENCY DEPT VISIT HI MDM: CPT

## 2020-09-24 PROCEDURE — 99285 EMERGENCY DEPT VISIT HI MDM: CPT | Performed by: EMERGENCY MEDICINE

## 2020-09-24 PROCEDURE — 80320 DRUG SCREEN QUANTALCOHOLS: CPT | Performed by: EMERGENCY MEDICINE

## 2020-09-24 PROCEDURE — 87635 SARS-COV-2 COVID-19 AMP PRB: CPT | Performed by: EMERGENCY MEDICINE

## 2020-09-24 PROCEDURE — 36415 COLL VENOUS BLD VENIPUNCTURE: CPT | Performed by: EMERGENCY MEDICINE

## 2020-09-24 PROCEDURE — 81001 URINALYSIS AUTO W/SCOPE: CPT | Performed by: EMERGENCY MEDICINE

## 2020-09-24 PROCEDURE — 96372 THER/PROPH/DIAG INJ SC/IM: CPT

## 2020-09-24 PROCEDURE — 93005 ELECTROCARDIOGRAM TRACING: CPT

## 2020-09-24 PROCEDURE — 85025 COMPLETE CBC W/AUTO DIFF WBC: CPT | Performed by: EMERGENCY MEDICINE

## 2020-09-24 PROCEDURE — 80307 DRUG TEST PRSMV CHEM ANLYZR: CPT | Performed by: EMERGENCY MEDICINE

## 2020-09-24 PROCEDURE — 84443 ASSAY THYROID STIM HORMONE: CPT | Performed by: EMERGENCY MEDICINE

## 2020-09-24 RX ORDER — OLANZAPINE 10 MG/1
10 INJECTION, POWDER, LYOPHILIZED, FOR SOLUTION INTRAMUSCULAR ONCE
Status: DISCONTINUED | OUTPATIENT
Start: 2020-09-24 | End: 2020-09-24

## 2020-09-24 RX ORDER — OLANZAPINE 5 MG/1
10 TABLET, ORALLY DISINTEGRATING ORAL ONCE
Status: DISCONTINUED | OUTPATIENT
Start: 2020-09-24 | End: 2020-09-24

## 2020-09-24 RX ORDER — MIDAZOLAM HYDROCHLORIDE 2 MG/2ML
5 INJECTION, SOLUTION INTRAMUSCULAR; INTRAVENOUS ONCE
Status: DISCONTINUED | OUTPATIENT
Start: 2020-09-24 | End: 2020-09-24

## 2020-09-24 RX ORDER — KETAMINE HYDROCHLORIDE 50 MG/ML
4 INJECTION, SOLUTION, CONCENTRATE INTRAMUSCULAR; INTRAVENOUS ONCE
Status: COMPLETED | OUTPATIENT
Start: 2020-09-24 | End: 2020-09-24

## 2020-09-24 RX ORDER — LORAZEPAM 1 MG/1
2 TABLET ORAL ONCE
Status: DISCONTINUED | OUTPATIENT
Start: 2020-09-24 | End: 2020-09-24

## 2020-09-24 RX ORDER — DIAZEPAM 5 MG/1
5 TABLET ORAL ONCE
Status: COMPLETED | OUTPATIENT
Start: 2020-09-24 | End: 2020-09-24

## 2020-09-24 RX ORDER — OLANZAPINE 10 MG/1
10 TABLET, ORALLY DISINTEGRATING ORAL ONCE
Status: DISCONTINUED | OUTPATIENT
Start: 2020-09-24 | End: 2020-10-09

## 2020-09-24 RX ORDER — OLANZAPINE 10 MG/1
10 INJECTION, POWDER, LYOPHILIZED, FOR SOLUTION INTRAMUSCULAR ONCE
Status: COMPLETED | OUTPATIENT
Start: 2020-09-24 | End: 2020-09-24

## 2020-09-24 RX ORDER — LITHIUM CARBONATE 300 MG/1
300 TABLET, FILM COATED, EXTENDED RELEASE ORAL EVERY 12 HOURS SCHEDULED
Status: DISCONTINUED | OUTPATIENT
Start: 2020-09-24 | End: 2020-09-25

## 2020-09-24 RX ORDER — MIDAZOLAM HYDROCHLORIDE 2 MG/2ML
4 INJECTION, SOLUTION INTRAMUSCULAR; INTRAVENOUS ONCE
Status: COMPLETED | OUTPATIENT
Start: 2020-09-24 | End: 2020-09-24

## 2020-09-24 RX ADMIN — OLANZAPINE 10 MG: 10 INJECTION, POWDER, FOR SOLUTION INTRAMUSCULAR at 23:27

## 2020-09-24 RX ADMIN — MIDAZOLAM HYDROCHLORIDE 4 MG: 1 INJECTION, SOLUTION INTRAMUSCULAR; INTRAVENOUS at 14:28

## 2020-09-24 RX ADMIN — DIAZEPAM 5 MG: 5 TABLET ORAL at 21:11

## 2020-09-24 RX ADMIN — OLANZAPINE 10 MG: 10 INJECTION, POWDER, LYOPHILIZED, FOR SOLUTION INTRAMUSCULAR at 14:29

## 2020-09-24 RX ADMIN — KETAMINE HYDROCHLORIDE 370 MG: 50 INJECTION, SOLUTION INTRAMUSCULAR; INTRAVENOUS at 23:27

## 2020-09-24 RX ADMIN — LITHIUM CARBONATE 300 MG: 300 TABLET, EXTENDED RELEASE ORAL at 21:12

## 2020-09-25 LAB
ATRIAL RATE: 80 BPM
P AXIS: 84 DEGREES
PR INTERVAL: 162 MS
QRS AXIS: 224 DEGREES
QRSD INTERVAL: 98 MS
QT INTERVAL: 402 MS
QTC INTERVAL: 463 MS
T WAVE AXIS: 34 DEGREES
VENTRICULAR RATE: 80 BPM

## 2020-09-25 PROCEDURE — 93010 ELECTROCARDIOGRAM REPORT: CPT | Performed by: INTERNAL MEDICINE

## 2020-09-25 PROCEDURE — 96372 THER/PROPH/DIAG INJ SC/IM: CPT

## 2020-09-25 RX ORDER — RISPERIDONE 1 MG/1
1 TABLET, ORALLY DISINTEGRATING ORAL
Status: DISCONTINUED | OUTPATIENT
Start: 2020-09-25 | End: 2020-10-09 | Stop reason: HOSPADM

## 2020-09-25 RX ORDER — ACETAMINOPHEN 325 MG/1
650 TABLET ORAL EVERY 4 HOURS PRN
Status: DISCONTINUED | OUTPATIENT
Start: 2020-09-25 | End: 2020-10-09 | Stop reason: HOSPADM

## 2020-09-25 RX ORDER — DIPHENHYDRAMINE HYDROCHLORIDE 50 MG/ML
50 INJECTION INTRAMUSCULAR; INTRAVENOUS EVERY 6 HOURS PRN
Status: CANCELLED | OUTPATIENT
Start: 2020-09-25

## 2020-09-25 RX ORDER — BENZTROPINE MESYLATE 1 MG/ML
1 INJECTION INTRAMUSCULAR; INTRAVENOUS EVERY 6 HOURS PRN
Status: DISCONTINUED | OUTPATIENT
Start: 2020-09-25 | End: 2020-10-09 | Stop reason: HOSPADM

## 2020-09-25 RX ORDER — KETAMINE HYDROCHLORIDE 50 MG/ML
5 INJECTION, SOLUTION, CONCENTRATE INTRAMUSCULAR; INTRAVENOUS ONCE
Status: COMPLETED | OUTPATIENT
Start: 2020-09-25 | End: 2020-09-25

## 2020-09-25 RX ORDER — LITHIUM CARBONATE 300 MG/1
600 TABLET, FILM COATED, EXTENDED RELEASE ORAL EVERY 12 HOURS SCHEDULED
Status: CANCELLED | OUTPATIENT
Start: 2020-09-25

## 2020-09-25 RX ORDER — LITHIUM CARBONATE 300 MG/1
600 TABLET, FILM COATED, EXTENDED RELEASE ORAL EVERY 12 HOURS SCHEDULED
Status: DISCONTINUED | OUTPATIENT
Start: 2020-09-25 | End: 2020-10-04

## 2020-09-25 RX ORDER — OLANZAPINE 10 MG/1
10 INJECTION, POWDER, LYOPHILIZED, FOR SOLUTION INTRAMUSCULAR ONCE
Status: COMPLETED | OUTPATIENT
Start: 2020-09-25 | End: 2020-09-25

## 2020-09-25 RX ORDER — LORAZEPAM 2 MG/ML
2 INJECTION INTRAMUSCULAR EVERY 4 HOURS PRN
Status: DISCONTINUED | OUTPATIENT
Start: 2020-09-25 | End: 2020-10-09 | Stop reason: HOSPADM

## 2020-09-25 RX ORDER — MAGNESIUM HYDROXIDE/ALUMINUM HYDROXICE/SIMETHICONE 120; 1200; 1200 MG/30ML; MG/30ML; MG/30ML
30 SUSPENSION ORAL EVERY 4 HOURS PRN
Status: DISCONTINUED | OUTPATIENT
Start: 2020-09-25 | End: 2020-10-09 | Stop reason: HOSPADM

## 2020-09-25 RX ORDER — OLANZAPINE 10 MG/1
20 TABLET ORAL
Status: CANCELLED | OUTPATIENT
Start: 2020-09-25

## 2020-09-25 RX ORDER — DIPHENHYDRAMINE HYDROCHLORIDE 50 MG/ML
50 INJECTION INTRAMUSCULAR; INTRAVENOUS ONCE
Status: COMPLETED | OUTPATIENT
Start: 2020-09-25 | End: 2020-09-25

## 2020-09-25 RX ORDER — ACETAMINOPHEN 325 MG/1
650 TABLET ORAL EVERY 6 HOURS PRN
Status: DISCONTINUED | OUTPATIENT
Start: 2020-09-25 | End: 2020-10-09 | Stop reason: HOSPADM

## 2020-09-25 RX ORDER — HYDROXYZINE HYDROCHLORIDE 25 MG/1
50 TABLET, FILM COATED ORAL EVERY 4 HOURS PRN
Status: CANCELLED | OUTPATIENT
Start: 2020-09-25

## 2020-09-25 RX ORDER — DIPHENHYDRAMINE HCL 25 MG
50 TABLET ORAL EVERY 6 HOURS PRN
Status: CANCELLED | OUTPATIENT
Start: 2020-09-25

## 2020-09-25 RX ORDER — HYDROXYZINE HYDROCHLORIDE 25 MG/1
25 TABLET, FILM COATED ORAL EVERY 4 HOURS PRN
Status: DISCONTINUED | OUTPATIENT
Start: 2020-09-25 | End: 2020-10-09 | Stop reason: HOSPADM

## 2020-09-25 RX ORDER — BENZTROPINE MESYLATE 1 MG/1
1 TABLET ORAL EVERY 6 HOURS PRN
Status: DISCONTINUED | OUTPATIENT
Start: 2020-09-25 | End: 2020-10-09 | Stop reason: HOSPADM

## 2020-09-25 RX ORDER — DIAZEPAM 5 MG/1
5 TABLET ORAL 3 TIMES DAILY
Status: CANCELLED | OUTPATIENT
Start: 2020-09-25

## 2020-09-25 RX ORDER — DIAZEPAM 5 MG/1
5 TABLET ORAL 3 TIMES DAILY
Status: DISCONTINUED | OUTPATIENT
Start: 2020-09-25 | End: 2020-09-26

## 2020-09-25 RX ORDER — ACETAMINOPHEN 325 MG/1
975 TABLET ORAL EVERY 6 HOURS PRN
Status: DISCONTINUED | OUTPATIENT
Start: 2020-09-25 | End: 2020-10-09 | Stop reason: HOSPADM

## 2020-09-25 RX ORDER — OLANZAPINE 10 MG/1
20 TABLET ORAL
Status: DISCONTINUED | OUTPATIENT
Start: 2020-09-25 | End: 2020-10-09 | Stop reason: HOSPADM

## 2020-09-25 RX ORDER — NICOTINE 21 MG/24HR
1 PATCH, TRANSDERMAL 24 HOURS TRANSDERMAL DAILY
Status: DISCONTINUED | OUTPATIENT
Start: 2020-09-25 | End: 2020-09-26

## 2020-09-25 RX ADMIN — DIAZEPAM 5 MG: 5 TABLET ORAL at 21:01

## 2020-09-25 RX ADMIN — DIPHENHYDRAMINE HYDROCHLORIDE 50 MG: 50 INJECTION INTRAMUSCULAR; INTRAVENOUS at 06:09

## 2020-09-25 RX ADMIN — KETAMINE HYDROCHLORIDE 465 MG: 50 INJECTION, SOLUTION INTRAMUSCULAR; INTRAVENOUS at 06:08

## 2020-09-25 RX ADMIN — LITHIUM CARBONATE 600 MG: 300 TABLET, EXTENDED RELEASE ORAL at 21:01

## 2020-09-25 RX ADMIN — DIAZEPAM 5 MG: 5 TABLET ORAL at 15:43

## 2020-09-25 RX ADMIN — OLANZAPINE 10 MG: 10 INJECTION, POWDER, FOR SOLUTION INTRAMUSCULAR at 06:08

## 2020-09-26 ENCOUNTER — APPOINTMENT (INPATIENT)
Dept: RADIOLOGY | Facility: HOSPITAL | Age: 60
DRG: 885 | End: 2020-09-26
Payer: MEDICARE

## 2020-09-26 PROBLEM — M79.672 LEFT FOOT PAIN: Status: ACTIVE | Noted: 2020-09-26

## 2020-09-26 PROCEDURE — 73630 X-RAY EXAM OF FOOT: CPT

## 2020-09-26 PROCEDURE — 99221 1ST HOSP IP/OBS SF/LOW 40: CPT | Performed by: PSYCHIATRY & NEUROLOGY

## 2020-09-26 PROCEDURE — 99222 1ST HOSP IP/OBS MODERATE 55: CPT | Performed by: FAMILY MEDICINE

## 2020-09-26 RX ORDER — ALBUTEROL SULFATE 90 UG/1
2 AEROSOL, METERED RESPIRATORY (INHALATION) EVERY 4 HOURS PRN
Status: DISCONTINUED | OUTPATIENT
Start: 2020-09-26 | End: 2020-10-09 | Stop reason: HOSPADM

## 2020-09-26 RX ORDER — DIAZEPAM 5 MG/1
10 TABLET ORAL
Status: DISCONTINUED | OUTPATIENT
Start: 2020-09-26 | End: 2020-10-09 | Stop reason: HOSPADM

## 2020-09-26 RX ORDER — DIAZEPAM 5 MG/1
5 TABLET ORAL 2 TIMES DAILY
Status: DISCONTINUED | OUTPATIENT
Start: 2020-09-26 | End: 2020-10-09 | Stop reason: HOSPADM

## 2020-09-26 RX ADMIN — DIAZEPAM 10 MG: 5 TABLET ORAL at 21:43

## 2020-09-26 RX ADMIN — DIAZEPAM 5 MG: 5 TABLET ORAL at 17:46

## 2020-09-26 RX ADMIN — DIAZEPAM 5 MG: 5 TABLET ORAL at 08:06

## 2020-09-26 RX ADMIN — ALBUTEROL SULFATE 2 PUFF: 90 AEROSOL, METERED RESPIRATORY (INHALATION) at 18:11

## 2020-09-26 RX ADMIN — LITHIUM CARBONATE 600 MG: 300 TABLET, EXTENDED RELEASE ORAL at 08:06

## 2020-09-26 RX ADMIN — LITHIUM CARBONATE 600 MG: 300 TABLET, EXTENDED RELEASE ORAL at 21:43

## 2020-09-27 PROCEDURE — 99232 SBSQ HOSP IP/OBS MODERATE 35: CPT | Performed by: PSYCHIATRY & NEUROLOGY

## 2020-09-27 RX ORDER — GUAIFENESIN/DEXTROMETHORPHAN 100-10MG/5
10 SYRUP ORAL EVERY 4 HOURS PRN
Status: DISCONTINUED | OUTPATIENT
Start: 2020-09-27 | End: 2020-10-09 | Stop reason: HOSPADM

## 2020-09-27 RX ADMIN — DIAZEPAM 10 MG: 5 TABLET ORAL at 21:29

## 2020-09-27 RX ADMIN — LITHIUM CARBONATE 600 MG: 300 TABLET, EXTENDED RELEASE ORAL at 08:03

## 2020-09-27 RX ADMIN — DIAZEPAM 5 MG: 5 TABLET ORAL at 08:03

## 2020-09-27 RX ADMIN — GUAIFENESIN AND DEXTROMETHORPHAN 10 ML: 100; 10 SYRUP ORAL at 21:29

## 2020-09-27 RX ADMIN — DIAZEPAM 5 MG: 5 TABLET ORAL at 16:01

## 2020-09-27 RX ADMIN — ALBUTEROL SULFATE 2 PUFF: 90 AEROSOL, METERED RESPIRATORY (INHALATION) at 04:30

## 2020-09-27 RX ADMIN — ALBUTEROL SULFATE 2 PUFF: 90 AEROSOL, METERED RESPIRATORY (INHALATION) at 20:16

## 2020-09-27 RX ADMIN — LITHIUM CARBONATE 600 MG: 300 TABLET, EXTENDED RELEASE ORAL at 21:29

## 2020-09-28 PROCEDURE — 99232 SBSQ HOSP IP/OBS MODERATE 35: CPT | Performed by: NURSE PRACTITIONER

## 2020-09-28 RX ADMIN — ALBUTEROL SULFATE 2 PUFF: 90 AEROSOL, METERED RESPIRATORY (INHALATION) at 20:08

## 2020-09-28 RX ADMIN — GUAIFENESIN AND DEXTROMETHORPHAN 10 ML: 100; 10 SYRUP ORAL at 11:56

## 2020-09-28 RX ADMIN — GUAIFENESIN AND DEXTROMETHORPHAN 10 ML: 100; 10 SYRUP ORAL at 21:41

## 2020-09-28 RX ADMIN — LITHIUM CARBONATE 600 MG: 300 TABLET, EXTENDED RELEASE ORAL at 21:40

## 2020-09-28 RX ADMIN — GUAIFENESIN AND DEXTROMETHORPHAN 10 ML: 100; 10 SYRUP ORAL at 04:18

## 2020-09-28 RX ADMIN — OLANZAPINE 20 MG: 10 TABLET, FILM COATED ORAL at 21:40

## 2020-09-28 RX ADMIN — ALBUTEROL SULFATE 2 PUFF: 90 AEROSOL, METERED RESPIRATORY (INHALATION) at 11:54

## 2020-09-28 RX ADMIN — DIAZEPAM 5 MG: 5 TABLET ORAL at 16:41

## 2020-09-28 RX ADMIN — ALBUTEROL SULFATE 2 PUFF: 90 AEROSOL, METERED RESPIRATORY (INHALATION) at 04:01

## 2020-09-28 RX ADMIN — LITHIUM CARBONATE 600 MG: 300 TABLET, EXTENDED RELEASE ORAL at 08:34

## 2020-09-28 RX ADMIN — DIAZEPAM 5 MG: 5 TABLET ORAL at 08:33

## 2020-09-28 RX ADMIN — DIAZEPAM 10 MG: 5 TABLET ORAL at 21:41

## 2020-09-28 RX ADMIN — HYDROXYZINE HYDROCHLORIDE 25 MG: 25 TABLET, FILM COATED ORAL at 22:49

## 2020-09-29 PROCEDURE — 99232 SBSQ HOSP IP/OBS MODERATE 35: CPT | Performed by: PSYCHIATRY & NEUROLOGY

## 2020-09-29 RX ORDER — MUSCLE RUB CREAM 100; 150 MG/G; MG/G
CREAM TOPICAL 4 TIMES DAILY PRN
Status: DISCONTINUED | OUTPATIENT
Start: 2020-09-29 | End: 2020-10-09 | Stop reason: HOSPADM

## 2020-09-29 RX ORDER — AMMONIUM LACTATE 12 G/100G
LOTION TOPICAL 2 TIMES DAILY PRN
Status: DISCONTINUED | OUTPATIENT
Start: 2020-09-29 | End: 2020-10-09 | Stop reason: HOSPADM

## 2020-09-29 RX ADMIN — LITHIUM CARBONATE 600 MG: 300 TABLET, EXTENDED RELEASE ORAL at 21:26

## 2020-09-29 RX ADMIN — GUAIFENESIN AND DEXTROMETHORPHAN 10 ML: 100; 10 SYRUP ORAL at 22:39

## 2020-09-29 RX ADMIN — OLANZAPINE 20 MG: 10 TABLET, FILM COATED ORAL at 21:25

## 2020-09-29 RX ADMIN — DIAZEPAM 10 MG: 5 TABLET ORAL at 21:26

## 2020-09-29 RX ADMIN — LITHIUM CARBONATE 600 MG: 300 TABLET, EXTENDED RELEASE ORAL at 08:21

## 2020-09-29 RX ADMIN — DIAZEPAM 5 MG: 5 TABLET ORAL at 08:21

## 2020-09-29 RX ADMIN — DIAZEPAM 5 MG: 5 TABLET ORAL at 16:38

## 2020-09-29 RX ADMIN — GUAIFENESIN AND DEXTROMETHORPHAN 10 ML: 100; 10 SYRUP ORAL at 04:02

## 2020-09-30 PROCEDURE — 99232 SBSQ HOSP IP/OBS MODERATE 35: CPT | Performed by: NURSE PRACTITIONER

## 2020-09-30 RX ORDER — TAMSULOSIN HYDROCHLORIDE 0.4 MG/1
0.4 CAPSULE ORAL
Status: DISCONTINUED | OUTPATIENT
Start: 2020-09-30 | End: 2020-10-09 | Stop reason: HOSPADM

## 2020-09-30 RX ADMIN — LITHIUM CARBONATE 600 MG: 300 TABLET, EXTENDED RELEASE ORAL at 08:35

## 2020-09-30 RX ADMIN — LITHIUM CARBONATE 600 MG: 300 TABLET, EXTENDED RELEASE ORAL at 20:38

## 2020-09-30 RX ADMIN — DIAZEPAM 5 MG: 5 TABLET ORAL at 08:35

## 2020-09-30 RX ADMIN — RISPERIDONE 1 MG: 1 TABLET, ORALLY DISINTEGRATING ORAL at 11:28

## 2020-09-30 RX ADMIN — TAMSULOSIN HYDROCHLORIDE 0.4 MG: 0.4 CAPSULE ORAL at 20:38

## 2020-09-30 RX ADMIN — DIAZEPAM 10 MG: 5 TABLET ORAL at 20:37

## 2020-09-30 RX ADMIN — DIAZEPAM 5 MG: 5 TABLET ORAL at 17:25

## 2020-09-30 RX ADMIN — OLANZAPINE 20 MG: 10 TABLET, FILM COATED ORAL at 20:37

## 2020-10-01 PROCEDURE — 99232 SBSQ HOSP IP/OBS MODERATE 35: CPT | Performed by: NURSE PRACTITIONER

## 2020-10-01 RX ADMIN — ALBUTEROL SULFATE 2 PUFF: 90 AEROSOL, METERED RESPIRATORY (INHALATION) at 11:18

## 2020-10-01 RX ADMIN — TAMSULOSIN HYDROCHLORIDE 0.4 MG: 0.4 CAPSULE ORAL at 16:32

## 2020-10-01 RX ADMIN — LITHIUM CARBONATE 600 MG: 300 TABLET, EXTENDED RELEASE ORAL at 21:05

## 2020-10-01 RX ADMIN — LITHIUM CARBONATE 600 MG: 300 TABLET, EXTENDED RELEASE ORAL at 08:10

## 2020-10-01 RX ADMIN — DIAZEPAM 10 MG: 5 TABLET ORAL at 21:06

## 2020-10-01 RX ADMIN — OLANZAPINE 20 MG: 10 TABLET, FILM COATED ORAL at 21:05

## 2020-10-01 RX ADMIN — DIAZEPAM 5 MG: 5 TABLET ORAL at 08:10

## 2020-10-01 RX ADMIN — DIAZEPAM 5 MG: 5 TABLET ORAL at 16:32

## 2020-10-02 PROCEDURE — 99233 SBSQ HOSP IP/OBS HIGH 50: CPT | Performed by: NURSE PRACTITIONER

## 2020-10-02 RX ORDER — ATROPINE SULFATE 10 MG/ML
1 SOLUTION/ DROPS OPHTHALMIC 3 TIMES DAILY
Status: DISCONTINUED | OUTPATIENT
Start: 2020-10-02 | End: 2020-10-03

## 2020-10-02 RX ADMIN — LITHIUM CARBONATE 600 MG: 300 TABLET, EXTENDED RELEASE ORAL at 21:31

## 2020-10-02 RX ADMIN — LITHIUM CARBONATE 600 MG: 300 TABLET, EXTENDED RELEASE ORAL at 08:16

## 2020-10-02 RX ADMIN — OLANZAPINE 20 MG: 10 TABLET, FILM COATED ORAL at 21:31

## 2020-10-02 RX ADMIN — DIAZEPAM 5 MG: 5 TABLET ORAL at 08:17

## 2020-10-02 RX ADMIN — DIAZEPAM 10 MG: 5 TABLET ORAL at 21:31

## 2020-10-02 RX ADMIN — TAMSULOSIN HYDROCHLORIDE 0.4 MG: 0.4 CAPSULE ORAL at 16:09

## 2020-10-02 RX ADMIN — DIAZEPAM 5 MG: 5 TABLET ORAL at 16:07

## 2020-10-03 PROCEDURE — 99232 SBSQ HOSP IP/OBS MODERATE 35: CPT | Performed by: PSYCHIATRY & NEUROLOGY

## 2020-10-03 RX ORDER — ATROPINE SULFATE 10 MG/ML
1 SOLUTION/ DROPS OPHTHALMIC 3 TIMES DAILY PRN
Status: DISCONTINUED | OUTPATIENT
Start: 2020-10-03 | End: 2020-10-09 | Stop reason: HOSPADM

## 2020-10-03 RX ADMIN — TAMSULOSIN HYDROCHLORIDE 0.4 MG: 0.4 CAPSULE ORAL at 16:35

## 2020-10-03 RX ADMIN — LITHIUM CARBONATE 600 MG: 300 TABLET, EXTENDED RELEASE ORAL at 08:06

## 2020-10-03 RX ADMIN — DIAZEPAM 5 MG: 5 TABLET ORAL at 16:35

## 2020-10-03 RX ADMIN — DIAZEPAM 10 MG: 5 TABLET ORAL at 21:45

## 2020-10-03 RX ADMIN — LITHIUM CARBONATE 600 MG: 300 TABLET, EXTENDED RELEASE ORAL at 21:45

## 2020-10-03 RX ADMIN — OLANZAPINE 20 MG: 10 TABLET, FILM COATED ORAL at 21:45

## 2020-10-03 RX ADMIN — DIAZEPAM 5 MG: 5 TABLET ORAL at 08:07

## 2020-10-04 PROCEDURE — 99232 SBSQ HOSP IP/OBS MODERATE 35: CPT | Performed by: PSYCHIATRY & NEUROLOGY

## 2020-10-04 RX ADMIN — DIAZEPAM 5 MG: 5 TABLET ORAL at 09:21

## 2020-10-04 RX ADMIN — DIAZEPAM 10 MG: 5 TABLET ORAL at 21:44

## 2020-10-04 RX ADMIN — LITHIUM CARBONATE 600 MG: 300 TABLET, EXTENDED RELEASE ORAL at 09:21

## 2020-10-04 RX ADMIN — TAMSULOSIN HYDROCHLORIDE 0.4 MG: 0.4 CAPSULE ORAL at 16:20

## 2020-10-04 RX ADMIN — BENZTROPINE MESYLATE 1 MG: 1 TABLET ORAL at 16:23

## 2020-10-04 RX ADMIN — DIAZEPAM 5 MG: 5 TABLET ORAL at 16:24

## 2020-10-04 RX ADMIN — LITHIUM CARBONATE 450 MG: 150 CAPSULE ORAL at 16:20

## 2020-10-04 RX ADMIN — OLANZAPINE 20 MG: 10 TABLET, FILM COATED ORAL at 21:44

## 2020-10-05 PROCEDURE — 99232 SBSQ HOSP IP/OBS MODERATE 35: CPT | Performed by: NURSE PRACTITIONER

## 2020-10-05 RX ADMIN — DIAZEPAM 5 MG: 5 TABLET ORAL at 16:44

## 2020-10-05 RX ADMIN — DIAZEPAM 5 MG: 5 TABLET ORAL at 08:30

## 2020-10-05 RX ADMIN — LITHIUM CARBONATE 450 MG: 150 CAPSULE ORAL at 12:19

## 2020-10-05 RX ADMIN — DIAZEPAM 10 MG: 5 TABLET ORAL at 20:56

## 2020-10-05 RX ADMIN — LITHIUM CARBONATE 450 MG: 150 CAPSULE ORAL at 16:44

## 2020-10-05 RX ADMIN — LITHIUM CARBONATE 450 MG: 150 CAPSULE ORAL at 08:31

## 2020-10-05 RX ADMIN — ACETAMINOPHEN 650 MG: 325 TABLET ORAL at 21:01

## 2020-10-05 RX ADMIN — BENZTROPINE MESYLATE 1 MG: 1 TABLET ORAL at 08:36

## 2020-10-05 RX ADMIN — OLANZAPINE 20 MG: 10 TABLET, FILM COATED ORAL at 21:05

## 2020-10-05 RX ADMIN — TAMSULOSIN HYDROCHLORIDE 0.4 MG: 0.4 CAPSULE ORAL at 16:44

## 2020-10-06 PROCEDURE — 99232 SBSQ HOSP IP/OBS MODERATE 35: CPT | Performed by: NURSE PRACTITIONER

## 2020-10-06 RX ADMIN — LITHIUM CARBONATE 450 MG: 150 CAPSULE ORAL at 08:13

## 2020-10-06 RX ADMIN — OLANZAPINE 20 MG: 10 TABLET, FILM COATED ORAL at 21:15

## 2020-10-06 RX ADMIN — LITHIUM CARBONATE 450 MG: 150 CAPSULE ORAL at 17:39

## 2020-10-06 RX ADMIN — DIAZEPAM 5 MG: 5 TABLET ORAL at 08:13

## 2020-10-06 RX ADMIN — TAMSULOSIN HYDROCHLORIDE 0.4 MG: 0.4 CAPSULE ORAL at 17:39

## 2020-10-06 RX ADMIN — DIAZEPAM 5 MG: 5 TABLET ORAL at 17:40

## 2020-10-06 RX ADMIN — DIAZEPAM 10 MG: 5 TABLET ORAL at 21:15

## 2020-10-06 RX ADMIN — LITHIUM CARBONATE 450 MG: 150 CAPSULE ORAL at 12:39

## 2020-10-07 PROCEDURE — 99232 SBSQ HOSP IP/OBS MODERATE 35: CPT | Performed by: NURSE PRACTITIONER

## 2020-10-07 RX ADMIN — DIAZEPAM 10 MG: 5 TABLET ORAL at 21:35

## 2020-10-07 RX ADMIN — TAMSULOSIN HYDROCHLORIDE 0.4 MG: 0.4 CAPSULE ORAL at 17:00

## 2020-10-07 RX ADMIN — DIAZEPAM 5 MG: 5 TABLET ORAL at 08:03

## 2020-10-07 RX ADMIN — LITHIUM CARBONATE 450 MG: 150 CAPSULE ORAL at 12:33

## 2020-10-07 RX ADMIN — OLANZAPINE 20 MG: 10 TABLET, FILM COATED ORAL at 21:36

## 2020-10-07 RX ADMIN — DIAZEPAM 5 MG: 5 TABLET ORAL at 17:00

## 2020-10-07 RX ADMIN — LITHIUM CARBONATE 450 MG: 150 CAPSULE ORAL at 17:00

## 2020-10-07 RX ADMIN — LITHIUM CARBONATE 450 MG: 150 CAPSULE ORAL at 08:03

## 2020-10-08 PROCEDURE — 99232 SBSQ HOSP IP/OBS MODERATE 35: CPT | Performed by: NURSE PRACTITIONER

## 2020-10-08 RX ADMIN — DIAZEPAM 5 MG: 5 TABLET ORAL at 15:34

## 2020-10-08 RX ADMIN — LITHIUM CARBONATE 450 MG: 150 CAPSULE ORAL at 16:58

## 2020-10-08 RX ADMIN — OLANZAPINE 20 MG: 10 TABLET, FILM COATED ORAL at 21:46

## 2020-10-08 RX ADMIN — LITHIUM CARBONATE 450 MG: 150 CAPSULE ORAL at 08:20

## 2020-10-08 RX ADMIN — DIAZEPAM 5 MG: 5 TABLET ORAL at 08:20

## 2020-10-08 RX ADMIN — DIAZEPAM 10 MG: 5 TABLET ORAL at 21:46

## 2020-10-08 RX ADMIN — LITHIUM CARBONATE 450 MG: 150 CAPSULE ORAL at 13:02

## 2020-10-08 RX ADMIN — TAMSULOSIN HYDROCHLORIDE 0.4 MG: 0.4 CAPSULE ORAL at 16:58

## 2020-10-09 VITALS
WEIGHT: 204 LBS | TEMPERATURE: 97.8 F | HEART RATE: 87 BPM | DIASTOLIC BLOOD PRESSURE: 87 MMHG | RESPIRATION RATE: 18 BRPM | HEIGHT: 69 IN | SYSTOLIC BLOOD PRESSURE: 140 MMHG | OXYGEN SATURATION: 97 % | BODY MASS INDEX: 30.21 KG/M2

## 2020-10-09 PROCEDURE — 99238 HOSP IP/OBS DSCHRG MGMT 30/<: CPT | Performed by: NURSE PRACTITIONER

## 2020-10-09 RX ORDER — LITHIUM CARBONATE 150 MG/1
450 CAPSULE ORAL
Qty: 270 CAPSULE | Refills: 0 | Status: SHIPPED | OUTPATIENT
Start: 2020-10-09 | End: 2020-10-09

## 2020-10-09 RX ORDER — OLANZAPINE 20 MG/1
20 TABLET ORAL
Qty: 30 TABLET | Refills: 0 | Status: ON HOLD | OUTPATIENT
Start: 2020-10-09 | End: 2020-10-29 | Stop reason: SDUPTHER

## 2020-10-09 RX ORDER — OLANZAPINE 20 MG/1
20 TABLET ORAL
Qty: 30 TABLET | Refills: 0 | Status: SHIPPED | OUTPATIENT
Start: 2020-10-09 | End: 2020-10-09 | Stop reason: SDUPTHER

## 2020-10-09 RX ORDER — TAMSULOSIN HYDROCHLORIDE 0.4 MG/1
0.4 CAPSULE ORAL
Qty: 30 CAPSULE | Refills: 0 | Status: ON HOLD | OUTPATIENT
Start: 2020-10-09 | End: 2021-03-05

## 2020-10-09 RX ORDER — TAMSULOSIN HYDROCHLORIDE 0.4 MG/1
0.4 CAPSULE ORAL
Qty: 30 CAPSULE | Refills: 0 | Status: SHIPPED | OUTPATIENT
Start: 2020-10-09 | End: 2020-10-09

## 2020-10-09 RX ORDER — LITHIUM CARBONATE 150 MG/1
450 CAPSULE ORAL
Qty: 270 CAPSULE | Refills: 0 | Status: SHIPPED | OUTPATIENT
Start: 2020-10-09 | End: 2020-10-29 | Stop reason: HOSPADM

## 2020-10-09 RX ORDER — ALBUTEROL SULFATE 90 UG/1
2 AEROSOL, METERED RESPIRATORY (INHALATION) EVERY 4 HOURS PRN
Qty: 18 G | Refills: 0 | Status: SHIPPED | OUTPATIENT
Start: 2020-10-09 | End: 2020-10-09

## 2020-10-09 RX ORDER — DIAZEPAM 5 MG/1
5 TABLET ORAL 3 TIMES DAILY
Qty: 30 TABLET | Refills: 0 | Status: SHIPPED | OUTPATIENT
Start: 2020-10-09 | End: 2020-10-09

## 2020-10-09 RX ORDER — ALBUTEROL SULFATE 90 UG/1
2 AEROSOL, METERED RESPIRATORY (INHALATION) EVERY 4 HOURS PRN
Qty: 18 G | Refills: 0 | Status: SHIPPED | OUTPATIENT
Start: 2020-10-09 | End: 2020-11-08

## 2020-10-09 RX ORDER — DIAZEPAM 5 MG/1
5 TABLET ORAL 3 TIMES DAILY
Qty: 90 TABLET | Refills: 0 | Status: ON HOLD | OUTPATIENT
Start: 2020-10-09 | End: 2020-10-29 | Stop reason: SDUPTHER

## 2020-10-09 RX ADMIN — DIAZEPAM 5 MG: 5 TABLET ORAL at 08:34

## 2020-10-09 RX ADMIN — LITHIUM CARBONATE 450 MG: 150 CAPSULE ORAL at 12:51

## 2020-10-09 RX ADMIN — LITHIUM CARBONATE 450 MG: 150 CAPSULE ORAL at 08:32

## 2020-10-13 ENCOUNTER — HOSPITAL ENCOUNTER (INPATIENT)
Facility: HOSPITAL | Age: 60
LOS: 15 days | Discharge: HOME | DRG: 885 | End: 2020-10-29
Attending: EMERGENCY MEDICINE | Admitting: PSYCHIATRY & NEUROLOGY
Payer: MEDICARE

## 2020-10-13 DIAGNOSIS — F29 PSYCHOSIS (HCC): ICD-10-CM

## 2020-10-13 DIAGNOSIS — F40.9: ICD-10-CM

## 2020-10-13 DIAGNOSIS — F25.0 SCHIZOAFFECTIVE DISORDER, BIPOLAR TYPE (HCC): Primary | ICD-10-CM

## 2020-10-13 LAB
ALBUMIN SERPL BCP-MCNC: 4.5 G/DL (ref 3.5–5.7)
ALP SERPL-CCNC: 43 U/L (ref 55–165)
ALT SERPL W P-5'-P-CCNC: 17 U/L (ref 7–52)
AMPHETAMINES SERPL QL SCN: NEGATIVE
ANION GAP SERPL CALCULATED.3IONS-SCNC: 8 MMOL/L (ref 4–13)
AST SERPL W P-5'-P-CCNC: 18 U/L (ref 13–39)
BARBITURATES UR QL: NEGATIVE
BASOPHILS # BLD AUTO: 0 THOUSANDS/ΜL (ref 0–0.1)
BASOPHILS NFR BLD AUTO: 1 % (ref 0–2)
BENZODIAZ UR QL: POSITIVE
BILIRUB SERPL-MCNC: 0.9 MG/DL (ref 0.2–1)
BUN SERPL-MCNC: 12 MG/DL (ref 7–25)
CALCIUM SERPL-MCNC: 9.8 MG/DL (ref 8.6–10.5)
CHLORIDE SERPL-SCNC: 106 MMOL/L (ref 98–107)
CO2 SERPL-SCNC: 28 MMOL/L (ref 21–31)
COCAINE UR QL: NEGATIVE
CREAT SERPL-MCNC: 0.89 MG/DL (ref 0.7–1.3)
EOSINOPHIL # BLD AUTO: 0.1 THOUSAND/ΜL (ref 0–0.61)
EOSINOPHIL NFR BLD AUTO: 1 % (ref 0–5)
ERYTHROCYTE [DISTWIDTH] IN BLOOD BY AUTOMATED COUNT: 13.9 % (ref 11.5–14.5)
ETHANOL EXG-MCNC: 0 MG/DL
GFR SERPL CREATININE-BSD FRML MDRD: 92 ML/MIN/1.73SQ M
GLUCOSE SERPL-MCNC: 96 MG/DL (ref 65–99)
HCT VFR BLD AUTO: 40.8 % (ref 42–47)
HGB BLD-MCNC: 13.8 G/DL (ref 14–18)
LYMPHOCYTES # BLD AUTO: 1.1 THOUSANDS/ΜL (ref 0.6–4.47)
LYMPHOCYTES NFR BLD AUTO: 16 % (ref 21–51)
MCH RBC QN AUTO: 30.6 PG (ref 26–34)
MCHC RBC AUTO-ENTMCNC: 33.8 G/DL (ref 31–37)
MCV RBC AUTO: 91 FL (ref 81–99)
METHADONE UR QL: NEGATIVE
MONOCYTES # BLD AUTO: 0.5 THOUSAND/ΜL (ref 0.17–1.22)
MONOCYTES NFR BLD AUTO: 7 % (ref 2–12)
NEUTROPHILS # BLD AUTO: 5.5 THOUSANDS/ΜL (ref 1.4–6.5)
NEUTS SEG NFR BLD AUTO: 76 % (ref 42–75)
OPIATES UR QL SCN: NEGATIVE
OXYCODONE+OXYMORPHONE UR QL SCN: NEGATIVE
PCP UR QL: NEGATIVE
PLATELET # BLD AUTO: 192 THOUSANDS/UL (ref 149–390)
PMV BLD AUTO: 8.2 FL (ref 8.6–11.7)
POTASSIUM SERPL-SCNC: 3.8 MMOL/L (ref 3.5–5.5)
PROT SERPL-MCNC: 6.6 G/DL (ref 6.4–8.9)
RBC # BLD AUTO: 4.5 MILLION/UL (ref 4.3–5.9)
SODIUM SERPL-SCNC: 142 MMOL/L (ref 134–143)
THC UR QL: NEGATIVE
TSH SERPL DL<=0.05 MIU/L-ACNC: 1.74 UIU/ML (ref 0.45–5.33)
WBC # BLD AUTO: 7.2 THOUSAND/UL (ref 4.8–10.8)

## 2020-10-13 PROCEDURE — 99285 EMERGENCY DEPT VISIT HI MDM: CPT

## 2020-10-13 PROCEDURE — 85025 COMPLETE CBC W/AUTO DIFF WBC: CPT | Performed by: EMERGENCY MEDICINE

## 2020-10-13 PROCEDURE — 93005 ELECTROCARDIOGRAM TRACING: CPT

## 2020-10-13 PROCEDURE — 36415 COLL VENOUS BLD VENIPUNCTURE: CPT | Performed by: EMERGENCY MEDICINE

## 2020-10-13 PROCEDURE — 84443 ASSAY THYROID STIM HORMONE: CPT | Performed by: EMERGENCY MEDICINE

## 2020-10-13 PROCEDURE — 82075 ASSAY OF BREATH ETHANOL: CPT | Performed by: EMERGENCY MEDICINE

## 2020-10-13 PROCEDURE — 99285 EMERGENCY DEPT VISIT HI MDM: CPT | Performed by: EMERGENCY MEDICINE

## 2020-10-13 PROCEDURE — 80307 DRUG TEST PRSMV CHEM ANLYZR: CPT | Performed by: EMERGENCY MEDICINE

## 2020-10-13 PROCEDURE — 80053 COMPREHEN METABOLIC PANEL: CPT | Performed by: EMERGENCY MEDICINE

## 2020-10-13 RX ORDER — LORAZEPAM 1 MG/1
2 TABLET ORAL ONCE
Status: COMPLETED | OUTPATIENT
Start: 2020-10-13 | End: 2020-10-13

## 2020-10-13 RX ORDER — OLANZAPINE 5 MG/1
5 TABLET ORAL ONCE
Status: DISCONTINUED | OUTPATIENT
Start: 2020-10-13 | End: 2020-10-29

## 2020-10-13 RX ADMIN — LORAZEPAM 2 MG: 1 TABLET ORAL at 17:30

## 2020-10-14 PROBLEM — M79.672 LEFT FOOT PAIN: Status: RESOLVED | Noted: 2020-09-26 | Resolved: 2020-10-14

## 2020-10-14 PROBLEM — Z72.0 TOBACCO ABUSE: Chronic | Status: ACTIVE | Noted: 2020-10-14

## 2020-10-14 PROBLEM — Z00.8 MEDICAL CLEARANCE FOR PSYCHIATRIC ADMISSION: Status: ACTIVE | Noted: 2020-10-14

## 2020-10-14 LAB
ATRIAL RATE: 75 BPM
P AXIS: 80 DEGREES
PR INTERVAL: 158 MS
QRS AXIS: -62 DEGREES
QRSD INTERVAL: 98 MS
QT INTERVAL: 396 MS
QTC INTERVAL: 442 MS
T WAVE AXIS: 66 DEGREES
VENTRICULAR RATE: 75 BPM

## 2020-10-14 PROCEDURE — 93010 ELECTROCARDIOGRAM REPORT: CPT | Performed by: INTERNAL MEDICINE

## 2020-10-14 PROCEDURE — 99222 1ST HOSP IP/OBS MODERATE 55: CPT | Performed by: PHYSICIAN ASSISTANT

## 2020-10-14 RX ORDER — RISPERIDONE 1 MG/1
1 TABLET, ORALLY DISINTEGRATING ORAL
Status: DISCONTINUED | OUTPATIENT
Start: 2020-10-14 | End: 2020-10-29 | Stop reason: HOSPADM

## 2020-10-14 RX ORDER — BENZTROPINE MESYLATE 1 MG/1
1 TABLET ORAL EVERY 6 HOURS PRN
Status: DISCONTINUED | OUTPATIENT
Start: 2020-10-14 | End: 2020-10-29 | Stop reason: HOSPADM

## 2020-10-14 RX ORDER — GUAIFENESIN/DEXTROMETHORPHAN 100-10MG/5
10 SYRUP ORAL ONCE
Status: COMPLETED | OUTPATIENT
Start: 2020-10-14 | End: 2020-10-14

## 2020-10-14 RX ORDER — ACETAMINOPHEN 325 MG/1
650 TABLET ORAL EVERY 4 HOURS PRN
Status: DISCONTINUED | OUTPATIENT
Start: 2020-10-14 | End: 2020-10-29 | Stop reason: HOSPADM

## 2020-10-14 RX ORDER — ACETAMINOPHEN 325 MG/1
650 TABLET ORAL EVERY 6 HOURS PRN
Status: DISCONTINUED | OUTPATIENT
Start: 2020-10-14 | End: 2020-10-29 | Stop reason: HOSPADM

## 2020-10-14 RX ORDER — NICOTINE 21 MG/24HR
1 PATCH, TRANSDERMAL 24 HOURS TRANSDERMAL DAILY
Status: DISCONTINUED | OUTPATIENT
Start: 2020-10-15 | End: 2020-10-15

## 2020-10-14 RX ORDER — LORAZEPAM 2 MG/ML
2 INJECTION INTRAMUSCULAR EVERY 4 HOURS PRN
Status: DISCONTINUED | OUTPATIENT
Start: 2020-10-14 | End: 2020-10-29 | Stop reason: HOSPADM

## 2020-10-14 RX ORDER — MAGNESIUM HYDROXIDE/ALUMINUM HYDROXICE/SIMETHICONE 120; 1200; 1200 MG/30ML; MG/30ML; MG/30ML
30 SUSPENSION ORAL EVERY 4 HOURS PRN
Status: DISCONTINUED | OUTPATIENT
Start: 2020-10-14 | End: 2020-10-29 | Stop reason: HOSPADM

## 2020-10-14 RX ORDER — ACETAMINOPHEN 325 MG/1
975 TABLET ORAL EVERY 6 HOURS PRN
Status: DISCONTINUED | OUTPATIENT
Start: 2020-10-14 | End: 2020-10-29 | Stop reason: HOSPADM

## 2020-10-14 RX ORDER — TRAZODONE HYDROCHLORIDE 50 MG/1
50 TABLET ORAL
Status: DISCONTINUED | OUTPATIENT
Start: 2020-10-14 | End: 2020-10-29 | Stop reason: HOSPADM

## 2020-10-14 RX ORDER — BENZTROPINE MESYLATE 1 MG/ML
1 INJECTION INTRAMUSCULAR; INTRAVENOUS EVERY 6 HOURS PRN
Status: DISCONTINUED | OUTPATIENT
Start: 2020-10-14 | End: 2020-10-29 | Stop reason: HOSPADM

## 2020-10-14 RX ORDER — HYDROXYZINE HYDROCHLORIDE 25 MG/1
25 TABLET, FILM COATED ORAL EVERY 4 HOURS PRN
Status: DISCONTINUED | OUTPATIENT
Start: 2020-10-14 | End: 2020-10-29 | Stop reason: HOSPADM

## 2020-10-14 RX ADMIN — GUAIFENESIN AND DEXTROMETHORPHAN 10 ML: 100; 10 SYRUP ORAL at 15:57

## 2020-10-14 RX ADMIN — GUAIFENESIN AND DEXTROMETHORPHAN 10 ML: 100; 10 SYRUP ORAL at 05:47

## 2020-10-15 PROBLEM — N40.0 BPH (BENIGN PROSTATIC HYPERPLASIA): Chronic | Status: ACTIVE | Noted: 2020-10-15

## 2020-10-15 PROCEDURE — 99221 1ST HOSP IP/OBS SF/LOW 40: CPT | Performed by: PSYCHIATRY & NEUROLOGY

## 2020-10-15 RX ORDER — OLANZAPINE 10 MG/1
20 TABLET ORAL
Status: DISCONTINUED | OUTPATIENT
Start: 2020-10-15 | End: 2020-10-29 | Stop reason: HOSPADM

## 2020-10-15 RX ORDER — ALBUTEROL SULFATE 90 UG/1
2 AEROSOL, METERED RESPIRATORY (INHALATION) EVERY 4 HOURS PRN
Status: DISCONTINUED | OUTPATIENT
Start: 2020-10-15 | End: 2020-10-29 | Stop reason: HOSPADM

## 2020-10-15 RX ORDER — GUAIFENESIN 100 MG/5ML
200 SOLUTION ORAL EVERY 4 HOURS PRN
Status: DISCONTINUED | OUTPATIENT
Start: 2020-10-15 | End: 2020-10-22

## 2020-10-15 RX ORDER — DIPHENHYDRAMINE HCL 25 MG
50 TABLET ORAL EVERY 6 HOURS PRN
Status: DISCONTINUED | OUTPATIENT
Start: 2020-10-15 | End: 2020-10-29 | Stop reason: HOSPADM

## 2020-10-15 RX ORDER — DIAZEPAM 5 MG/1
5 TABLET ORAL 3 TIMES DAILY
Status: DISCONTINUED | OUTPATIENT
Start: 2020-10-15 | End: 2020-10-29 | Stop reason: HOSPADM

## 2020-10-15 RX ORDER — DIPHENHYDRAMINE HYDROCHLORIDE 50 MG/ML
25 INJECTION INTRAMUSCULAR; INTRAVENOUS EVERY 6 HOURS PRN
Status: DISCONTINUED | OUTPATIENT
Start: 2020-10-15 | End: 2020-10-29 | Stop reason: HOSPADM

## 2020-10-15 RX ORDER — TAMSULOSIN HYDROCHLORIDE 0.4 MG/1
0.4 CAPSULE ORAL
Status: DISCONTINUED | OUTPATIENT
Start: 2020-10-15 | End: 2020-10-29 | Stop reason: HOSPADM

## 2020-10-15 RX ORDER — HYDROXYZINE HYDROCHLORIDE 25 MG/1
50 TABLET, FILM COATED ORAL EVERY 4 HOURS PRN
Status: DISCONTINUED | OUTPATIENT
Start: 2020-10-15 | End: 2020-10-29 | Stop reason: HOSPADM

## 2020-10-15 RX ORDER — GUAIFENESIN 600 MG
1200 TABLET, EXTENDED RELEASE 12 HR ORAL 2 TIMES DAILY
Status: DISCONTINUED | OUTPATIENT
Start: 2020-10-15 | End: 2020-10-24

## 2020-10-15 RX ADMIN — DIAZEPAM 5 MG: 5 TABLET ORAL at 00:17

## 2020-10-15 RX ADMIN — GUAIFENESIN 1200 MG: 600 TABLET, EXTENDED RELEASE ORAL at 18:08

## 2020-10-15 RX ADMIN — OLANZAPINE 20 MG: 10 TABLET, FILM COATED ORAL at 21:22

## 2020-10-15 RX ADMIN — TAMSULOSIN HYDROCHLORIDE 0.4 MG: 0.4 CAPSULE ORAL at 16:14

## 2020-10-15 RX ADMIN — GUAIFENESIN 1200 MG: 600 TABLET, EXTENDED RELEASE ORAL at 09:45

## 2020-10-16 PROCEDURE — 99232 SBSQ HOSP IP/OBS MODERATE 35: CPT | Performed by: NURSE PRACTITIONER

## 2020-10-16 RX ADMIN — TAMSULOSIN HYDROCHLORIDE 0.4 MG: 0.4 CAPSULE ORAL at 18:12

## 2020-10-16 RX ADMIN — GUAIFENESIN 1200 MG: 600 TABLET, EXTENDED RELEASE ORAL at 09:07

## 2020-10-16 RX ADMIN — GUAIFENESIN 1200 MG: 600 TABLET, EXTENDED RELEASE ORAL at 18:13

## 2020-10-16 RX ADMIN — DIAZEPAM 5 MG: 5 TABLET ORAL at 10:07

## 2020-10-16 RX ADMIN — OLANZAPINE 20 MG: 10 TABLET, FILM COATED ORAL at 21:18

## 2020-10-16 RX ADMIN — DIAZEPAM 5 MG: 5 TABLET ORAL at 21:18

## 2020-10-16 RX ADMIN — DIAZEPAM 5 MG: 5 TABLET ORAL at 18:47

## 2020-10-17 PROCEDURE — 99232 SBSQ HOSP IP/OBS MODERATE 35: CPT | Performed by: PHYSICIAN ASSISTANT

## 2020-10-17 RX ADMIN — DIAZEPAM 5 MG: 5 TABLET ORAL at 09:31

## 2020-10-17 RX ADMIN — TAMSULOSIN HYDROCHLORIDE 0.4 MG: 0.4 CAPSULE ORAL at 17:00

## 2020-10-17 RX ADMIN — OLANZAPINE 20 MG: 10 TABLET, FILM COATED ORAL at 22:00

## 2020-10-17 RX ADMIN — DIAZEPAM 5 MG: 5 TABLET ORAL at 17:00

## 2020-10-17 RX ADMIN — DIAZEPAM 5 MG: 5 TABLET ORAL at 22:00

## 2020-10-17 RX ADMIN — GUAIFENESIN 1200 MG: 600 TABLET, EXTENDED RELEASE ORAL at 17:46

## 2020-10-17 RX ADMIN — GUAIFENESIN 1200 MG: 600 TABLET, EXTENDED RELEASE ORAL at 09:31

## 2020-10-18 PROCEDURE — 99231 SBSQ HOSP IP/OBS SF/LOW 25: CPT | Performed by: PSYCHIATRY & NEUROLOGY

## 2020-10-18 RX ADMIN — LITHIUM CARBONATE 450 MG: 300 CAPSULE, GELATIN COATED ORAL at 13:00

## 2020-10-18 RX ADMIN — OLANZAPINE 20 MG: 10 TABLET, FILM COATED ORAL at 22:06

## 2020-10-18 RX ADMIN — LITHIUM CARBONATE 450 MG: 300 CAPSULE, GELATIN COATED ORAL at 17:30

## 2020-10-18 RX ADMIN — DIAZEPAM 5 MG: 5 TABLET ORAL at 22:00

## 2020-10-18 RX ADMIN — GUAIFENESIN 1200 MG: 600 TABLET, EXTENDED RELEASE ORAL at 08:11

## 2020-10-18 RX ADMIN — GUAIFENESIN 1200 MG: 600 TABLET, EXTENDED RELEASE ORAL at 18:34

## 2020-10-18 RX ADMIN — DIPHENHYDRAMINE HCL 50 MG: 25 TABLET ORAL at 04:18

## 2020-10-18 RX ADMIN — TAMSULOSIN HYDROCHLORIDE 0.4 MG: 0.4 CAPSULE ORAL at 17:30

## 2020-10-18 RX ADMIN — LITHIUM CARBONATE 450 MG: 300 CAPSULE, GELATIN COATED ORAL at 08:13

## 2020-10-18 RX ADMIN — DIAZEPAM 5 MG: 5 TABLET ORAL at 17:00

## 2020-10-19 PROCEDURE — 99232 SBSQ HOSP IP/OBS MODERATE 35: CPT | Performed by: NURSE PRACTITIONER

## 2020-10-19 RX ADMIN — GUAIFENESIN 1200 MG: 600 TABLET, EXTENDED RELEASE ORAL at 08:07

## 2020-10-19 RX ADMIN — DIAZEPAM 5 MG: 5 TABLET ORAL at 08:07

## 2020-10-19 RX ADMIN — GUAIFENESIN 1200 MG: 600 TABLET, EXTENDED RELEASE ORAL at 17:52

## 2020-10-19 RX ADMIN — LITHIUM CARBONATE 450 MG: 300 CAPSULE, GELATIN COATED ORAL at 11:57

## 2020-10-19 RX ADMIN — LITHIUM CARBONATE 450 MG: 300 CAPSULE, GELATIN COATED ORAL at 08:07

## 2020-10-19 RX ADMIN — TAMSULOSIN HYDROCHLORIDE 0.4 MG: 0.4 CAPSULE ORAL at 16:33

## 2020-10-19 RX ADMIN — OLANZAPINE 20 MG: 10 TABLET, FILM COATED ORAL at 21:19

## 2020-10-19 RX ADMIN — DIAZEPAM 5 MG: 5 TABLET ORAL at 21:19

## 2020-10-19 RX ADMIN — DIAZEPAM 5 MG: 5 TABLET ORAL at 16:33

## 2020-10-19 RX ADMIN — LITHIUM CARBONATE 450 MG: 300 CAPSULE, GELATIN COATED ORAL at 16:33

## 2020-10-20 PROCEDURE — 99232 SBSQ HOSP IP/OBS MODERATE 35: CPT | Performed by: NURSE PRACTITIONER

## 2020-10-20 RX ADMIN — GUAIFENESIN 1200 MG: 600 TABLET, EXTENDED RELEASE ORAL at 08:32

## 2020-10-20 RX ADMIN — LITHIUM CARBONATE 450 MG: 300 CAPSULE, GELATIN COATED ORAL at 08:32

## 2020-10-20 RX ADMIN — GUAIFENESIN 1200 MG: 600 TABLET, EXTENDED RELEASE ORAL at 18:00

## 2020-10-20 RX ADMIN — TAMSULOSIN HYDROCHLORIDE 0.4 MG: 0.4 CAPSULE ORAL at 18:00

## 2020-10-20 RX ADMIN — LITHIUM CARBONATE 450 MG: 300 CAPSULE, GELATIN COATED ORAL at 11:33

## 2020-10-20 RX ADMIN — DIAZEPAM 5 MG: 5 TABLET ORAL at 15:44

## 2020-10-20 RX ADMIN — LITHIUM CARBONATE 450 MG: 300 CAPSULE, GELATIN COATED ORAL at 15:44

## 2020-10-20 RX ADMIN — DIAZEPAM 5 MG: 5 TABLET ORAL at 08:32

## 2020-10-21 PROCEDURE — 99232 SBSQ HOSP IP/OBS MODERATE 35: CPT | Performed by: NURSE PRACTITIONER

## 2020-10-21 RX ADMIN — LITHIUM CARBONATE 450 MG: 300 CAPSULE, GELATIN COATED ORAL at 11:56

## 2020-10-21 RX ADMIN — LITHIUM CARBONATE 450 MG: 300 CAPSULE, GELATIN COATED ORAL at 16:55

## 2020-10-21 RX ADMIN — DIAZEPAM 5 MG: 5 TABLET ORAL at 16:55

## 2020-10-21 RX ADMIN — OLANZAPINE 20 MG: 10 TABLET, FILM COATED ORAL at 21:13

## 2020-10-21 RX ADMIN — GUAIFENESIN 1200 MG: 600 TABLET, EXTENDED RELEASE ORAL at 09:01

## 2020-10-21 RX ADMIN — DIAZEPAM 5 MG: 5 TABLET ORAL at 09:02

## 2020-10-21 RX ADMIN — DIAZEPAM 5 MG: 5 TABLET ORAL at 21:13

## 2020-10-21 RX ADMIN — TAMSULOSIN HYDROCHLORIDE 0.4 MG: 0.4 CAPSULE ORAL at 16:55

## 2020-10-21 RX ADMIN — LITHIUM CARBONATE 450 MG: 300 CAPSULE, GELATIN COATED ORAL at 09:02

## 2020-10-22 PROCEDURE — 99232 SBSQ HOSP IP/OBS MODERATE 35: CPT | Performed by: NURSE PRACTITIONER

## 2020-10-22 RX ADMIN — LITHIUM CARBONATE 450 MG: 300 CAPSULE, GELATIN COATED ORAL at 13:15

## 2020-10-22 RX ADMIN — TAMSULOSIN HYDROCHLORIDE 0.4 MG: 0.4 CAPSULE ORAL at 16:34

## 2020-10-22 RX ADMIN — LITHIUM CARBONATE 450 MG: 300 CAPSULE, GELATIN COATED ORAL at 16:34

## 2020-10-22 RX ADMIN — DIAZEPAM 5 MG: 5 TABLET ORAL at 16:34

## 2020-10-22 RX ADMIN — LITHIUM CARBONATE 450 MG: 300 CAPSULE, GELATIN COATED ORAL at 08:40

## 2020-10-22 RX ADMIN — GUAIFENESIN 1200 MG: 600 TABLET, EXTENDED RELEASE ORAL at 08:41

## 2020-10-22 RX ADMIN — ACETAMINOPHEN 650 MG: 325 TABLET ORAL at 16:34

## 2020-10-22 RX ADMIN — DIAZEPAM 5 MG: 5 TABLET ORAL at 21:40

## 2020-10-22 RX ADMIN — OLANZAPINE 20 MG: 10 TABLET, FILM COATED ORAL at 21:40

## 2020-10-22 RX ADMIN — DIAZEPAM 5 MG: 5 TABLET ORAL at 08:41

## 2020-10-23 PROCEDURE — 99232 SBSQ HOSP IP/OBS MODERATE 35: CPT | Performed by: NURSE PRACTITIONER

## 2020-10-23 RX ADMIN — LITHIUM CARBONATE 450 MG: 300 CAPSULE, GELATIN COATED ORAL at 06:30

## 2020-10-23 RX ADMIN — LITHIUM CARBONATE 450 MG: 300 CAPSULE, GELATIN COATED ORAL at 12:59

## 2020-10-23 RX ADMIN — TAMSULOSIN HYDROCHLORIDE 0.4 MG: 0.4 CAPSULE ORAL at 17:46

## 2020-10-23 RX ADMIN — DIAZEPAM 5 MG: 5 TABLET ORAL at 08:32

## 2020-10-23 RX ADMIN — GUAIFENESIN 1200 MG: 600 TABLET, EXTENDED RELEASE ORAL at 08:32

## 2020-10-23 RX ADMIN — LITHIUM CARBONATE 450 MG: 300 CAPSULE, GELATIN COATED ORAL at 17:46

## 2020-10-23 RX ADMIN — DIAZEPAM 5 MG: 5 TABLET ORAL at 17:46

## 2020-10-23 RX ADMIN — DIAZEPAM 5 MG: 5 TABLET ORAL at 21:05

## 2020-10-24 PROCEDURE — 99232 SBSQ HOSP IP/OBS MODERATE 35: CPT | Performed by: PSYCHIATRY & NEUROLOGY

## 2020-10-24 RX ADMIN — LITHIUM CARBONATE 450 MG: 300 CAPSULE, GELATIN COATED ORAL at 08:27

## 2020-10-24 RX ADMIN — LITHIUM CARBONATE 450 MG: 300 CAPSULE, GELATIN COATED ORAL at 16:23

## 2020-10-24 RX ADMIN — DIAZEPAM 5 MG: 5 TABLET ORAL at 16:23

## 2020-10-24 RX ADMIN — TAMSULOSIN HYDROCHLORIDE 0.4 MG: 0.4 CAPSULE ORAL at 16:23

## 2020-10-24 RX ADMIN — DIAZEPAM 5 MG: 5 TABLET ORAL at 08:27

## 2020-10-24 RX ADMIN — DIAZEPAM 5 MG: 5 TABLET ORAL at 21:12

## 2020-10-24 RX ADMIN — LITHIUM CARBONATE 450 MG: 300 CAPSULE, GELATIN COATED ORAL at 12:09

## 2020-10-25 PROCEDURE — 99232 SBSQ HOSP IP/OBS MODERATE 35: CPT | Performed by: PSYCHIATRY & NEUROLOGY

## 2020-10-25 RX ADMIN — DIAZEPAM 5 MG: 5 TABLET ORAL at 21:10

## 2020-10-25 RX ADMIN — LITHIUM CARBONATE 450 MG: 300 CAPSULE, GELATIN COATED ORAL at 11:57

## 2020-10-25 RX ADMIN — DIAZEPAM 5 MG: 5 TABLET ORAL at 16:29

## 2020-10-25 RX ADMIN — DIAZEPAM 5 MG: 5 TABLET ORAL at 08:04

## 2020-10-25 RX ADMIN — LITHIUM CARBONATE 450 MG: 300 CAPSULE, GELATIN COATED ORAL at 16:29

## 2020-10-25 RX ADMIN — TAMSULOSIN HYDROCHLORIDE 0.4 MG: 0.4 CAPSULE ORAL at 16:29

## 2020-10-25 RX ADMIN — LITHIUM CARBONATE 450 MG: 300 CAPSULE, GELATIN COATED ORAL at 08:04

## 2020-10-26 PROCEDURE — 99232 SBSQ HOSP IP/OBS MODERATE 35: CPT | Performed by: NURSE PRACTITIONER

## 2020-10-26 RX ADMIN — DIAZEPAM 5 MG: 5 TABLET ORAL at 21:10

## 2020-10-26 RX ADMIN — LITHIUM CARBONATE 450 MG: 300 CAPSULE, GELATIN COATED ORAL at 16:19

## 2020-10-26 RX ADMIN — DIAZEPAM 5 MG: 5 TABLET ORAL at 08:58

## 2020-10-26 RX ADMIN — DIAZEPAM 5 MG: 5 TABLET ORAL at 16:19

## 2020-10-26 RX ADMIN — LITHIUM CARBONATE 450 MG: 300 CAPSULE, GELATIN COATED ORAL at 12:50

## 2020-10-26 RX ADMIN — TAMSULOSIN HYDROCHLORIDE 0.4 MG: 0.4 CAPSULE ORAL at 16:19

## 2020-10-26 RX ADMIN — LITHIUM CARBONATE 450 MG: 300 CAPSULE, GELATIN COATED ORAL at 08:58

## 2020-10-26 RX ADMIN — OLANZAPINE 20 MG: 10 TABLET, FILM COATED ORAL at 21:10

## 2020-10-27 LAB — LITHIUM SERPL-SCNC: 0.7 MMOL/L (ref 1–1.2)

## 2020-10-27 PROCEDURE — 90682 RIV4 VACC RECOMBINANT DNA IM: CPT | Performed by: NURSE PRACTITIONER

## 2020-10-27 PROCEDURE — 80178 ASSAY OF LITHIUM: CPT | Performed by: NURSE PRACTITIONER

## 2020-10-27 PROCEDURE — G0008 ADMIN INFLUENZA VIRUS VAC: HCPCS | Performed by: NURSE PRACTITIONER

## 2020-10-27 PROCEDURE — 99232 SBSQ HOSP IP/OBS MODERATE 35: CPT | Performed by: NURSE PRACTITIONER

## 2020-10-27 RX ADMIN — DIAZEPAM 5 MG: 5 TABLET ORAL at 15:39

## 2020-10-27 RX ADMIN — DIAZEPAM 5 MG: 5 TABLET ORAL at 08:50

## 2020-10-27 RX ADMIN — INFLUENZA A VIRUS A/HAWAII/70/2019 (H1N1) RECOMBINANT HEMAGGLUTININ ANTIGEN, INFLUENZA A VIRUS A/MINNESOTA/41/2019 (H3N2) RECOMBINANT HEMAGGLUTININ ANTIGEN, INFLUENZA B VIRUS B/WASHINGTON/02/2019 RECOMBINANT HEMAGGLUTININ ANTIGEN, AND INFLUENZA B VIRUS B/PHUKET/3073/2013 RECOMBINANT HEMAGGLUTININ ANTIGEN 0.5 ML: 45; 45; 45; 45 INJECTION INTRAMUSCULAR at 13:08

## 2020-10-27 RX ADMIN — DIAZEPAM 5 MG: 5 TABLET ORAL at 21:10

## 2020-10-27 RX ADMIN — LITHIUM CARBONATE 450 MG: 300 CAPSULE, GELATIN COATED ORAL at 13:09

## 2020-10-27 RX ADMIN — LITHIUM CARBONATE 450 MG: 300 CAPSULE, GELATIN COATED ORAL at 08:50

## 2020-10-27 RX ADMIN — TAMSULOSIN HYDROCHLORIDE 0.4 MG: 0.4 CAPSULE ORAL at 15:39

## 2020-10-27 RX ADMIN — LITHIUM CARBONATE 450 MG: 300 CAPSULE, GELATIN COATED ORAL at 15:39

## 2020-10-28 PROCEDURE — 99232 SBSQ HOSP IP/OBS MODERATE 35: CPT | Performed by: NURSE PRACTITIONER

## 2020-10-28 RX ADMIN — LITHIUM CARBONATE 450 MG: 300 CAPSULE, GELATIN COATED ORAL at 16:24

## 2020-10-28 RX ADMIN — LITHIUM CARBONATE 450 MG: 300 CAPSULE, GELATIN COATED ORAL at 11:49

## 2020-10-28 RX ADMIN — DIAZEPAM 5 MG: 5 TABLET ORAL at 16:25

## 2020-10-28 RX ADMIN — TAMSULOSIN HYDROCHLORIDE 0.4 MG: 0.4 CAPSULE ORAL at 16:25

## 2020-10-28 RX ADMIN — DIAZEPAM 5 MG: 5 TABLET ORAL at 08:10

## 2020-10-28 RX ADMIN — LITHIUM CARBONATE 450 MG: 300 CAPSULE, GELATIN COATED ORAL at 08:10

## 2020-10-28 RX ADMIN — DIAZEPAM 5 MG: 5 TABLET ORAL at 22:00

## 2020-10-29 VITALS
TEMPERATURE: 99.2 F | OXYGEN SATURATION: 97 % | HEART RATE: 89 BPM | SYSTOLIC BLOOD PRESSURE: 157 MMHG | HEIGHT: 69 IN | WEIGHT: 200 LBS | DIASTOLIC BLOOD PRESSURE: 67 MMHG | RESPIRATION RATE: 16 BRPM | BODY MASS INDEX: 29.62 KG/M2

## 2020-10-29 PROCEDURE — 99238 HOSP IP/OBS DSCHRG MGMT 30/<: CPT | Performed by: PSYCHIATRY & NEUROLOGY

## 2020-10-29 PROCEDURE — NC001 PR NO CHARGE: Performed by: NURSE PRACTITIONER

## 2020-10-29 RX ORDER — LITHIUM CARBONATE 150 MG/1
450 CAPSULE ORAL
Qty: 270 CAPSULE | Refills: 0 | Status: SHIPPED | OUTPATIENT
Start: 2020-10-29 | End: 2021-03-05 | Stop reason: HOSPADM

## 2020-10-29 RX ORDER — DIAZEPAM 5 MG/1
5 TABLET ORAL 3 TIMES DAILY
Qty: 30 TABLET | Refills: 2 | Status: SHIPPED | OUTPATIENT
Start: 2020-10-29 | End: 2021-03-05 | Stop reason: HOSPADM

## 2020-10-29 RX ORDER — OLANZAPINE 20 MG/1
20 TABLET ORAL
Qty: 30 TABLET | Refills: 0 | Status: SHIPPED | OUTPATIENT
Start: 2020-10-29 | End: 2021-03-05 | Stop reason: HOSPADM

## 2020-10-29 RX ADMIN — TAMSULOSIN HYDROCHLORIDE 0.4 MG: 0.4 CAPSULE ORAL at 16:23

## 2020-10-29 RX ADMIN — DIAZEPAM 5 MG: 5 TABLET ORAL at 08:19

## 2020-10-29 RX ADMIN — LITHIUM CARBONATE 450 MG: 300 CAPSULE, GELATIN COATED ORAL at 16:23

## 2020-10-29 RX ADMIN — DIAZEPAM 5 MG: 5 TABLET ORAL at 16:23

## 2020-10-29 RX ADMIN — LITHIUM CARBONATE 450 MG: 300 CAPSULE, GELATIN COATED ORAL at 11:50

## 2020-10-29 RX ADMIN — LITHIUM CARBONATE 450 MG: 300 CAPSULE, GELATIN COATED ORAL at 08:19

## 2020-11-02 NOTE — PROGRESS NOTES
"SUBJECTIVE  Chief Complaint   Patient presents with   â¢ Office Visit     RFA follow up     History of present illness: Adama Rose is a 35year old male who is known to us from his recent visits with thoracic and low back pain. He was previously treated at [facility] and had good relief with interventional techniques. He had short relief with T9-10 epidural steroid injection on 9/15/2020 and bilateral facet injections L2-3 L3-4 L4-5 on 9/23/2020. We opted to proceed with bilateral radiofrequency ablation L2-3 L3-4 L4-5 on 10/21/2020. He returns for follow up evaluation. Since the injection, he states low back feels ""perfect\"" and there is no pain at area of procedure. He does have rare shooting pain to the leg but this is manageable. Worst area of pain is lower thoracic region, similar to prior. Pain occurs with bending, twisting, and better with rest. He had thoracic radiofrequency ablation T9-10 T10-11 T11-12 at [facility] in 2019, he states with 100% relief for a year. He is currently managing with Tylenol with partial relief, and I advised him against taking NSAIDs while he is prescribed lithium. Feels Tylenol does help somewhat. He would also like to repeat thoracic radiofrequency ablation that was previously helpful. Review of systems: Negative and complete with exception of what is noted in the HPI above. ALLERGIES:   Allergen Reactions   â¢ Penicillins RASH     High fever & rash. Required overnight hospitalization. Occurred at age 21 months. Current Outpatient Medications   Medication Sig   â¢ polyethylene glycol-electrolytes (Nulytely with Flavor Packs) 420 g solution Follow dr instructions of clear liquid diet and clinic instructions. Dispense with flavor packets.    â¢ clobetasol (TEMOVATE) 0.05 % cream APPLY  CREAM TOPICALLY TO AFFECTED AREA TWICE DAILY -  DO  NOT  USE  MORE  THAN  2  WEEKS  STRAIGHT   â¢ albuterol 108 (90 Base) MCG/ACT inhaler INHALE 2 PUFFS BY MOUTH 15 MINUTES PRIOR TO EXERCISE/ACTIVITY " Yandel Benavidez  WPS:7/06/9898 Goldy Sheikh  IAC:[id number]    KUE:[id number]  Adm Date: 11/18/2019 0527  5:27 AM   ATT PHY: Atif Brock, [address]         Subjective     The patient was seen after reviewing the chart and discussing the case with caring staff  Patient examined at bedside  Patient has no acute issues  Objective     Vitals:    11/25/19 0704   BP: 114/79   Pulse: 77   Resp: 18   Temp: 98 6 °F (37 °C)   SpO2: 96%       General Appearance: Awake and Alert  No acute distress  HEENT: Normocephalic, atraumatic  PERRLA, EOMI, MMM  Heart: RRR, no murmurs  Normal S1 and S2   Lungs: CTA bilaterally with fair air entry  Assessment     Courtney Crockett Gladys is a(n) 62y o  year old male with psychosis     1  Cardiac with history of hypertension and dyslipidemia  BP's are stable  2  Tobacco abuse   Nicotine gum PRN  3  Alcohol abuse  Thiamine, folic acid and multivitamin  4  DJD/osteoarthritis   Tylenol on as needed basis  5  Gait abnormality   Stable  6  Vitamin-D deficiency   Vitamin D3 1000U daily  7  Urinary Frequency/Urgency  Likely representing history of BPH  Will add Flomax 0 4mg once daily  Max 12 puffs per day   â¢ omeprazole (PrilOSEC) 20 MG capsule Take 1 capsule by mouth daily. â¢ levothyroxine (Euthyrox) 88 MCG tablet Take 1 tablet by mouth daily. â¢ fluticasone-salmeterol (Advair Diskus) 250-50 MCG/DOSE inhaler Inhale 1 puff into the lungs 2 times daily. â¢ albuterol (VENTOLIN) (2.5 MG/3ML) 0.083% nebulizer solution Take 3 mLs by nebulization every 6 hours as needed for Wheezing. â¢ busPIRone (BUSPAR) 10 MG tablet Take 10 mg by mouth 2 times daily. â¢ lamoTRIgine (LAMICTAL) 100 MG tablet Take 100 mg by mouth 2 times daily. â¢ lithium (LITHOBID) 300 MG CR tablet 2 times daily. â¢ ADALimumab (HUMIRA PEN) 40 MG/0.4ML citrate free 1 subq every 2 weeks   â¢ FLUoxetine (PROZAC) 20 MG capsule Take 40 mg by mouth daily. â¢ ibuprofen (MOTRIN) 200 MG tablet Take 200 mg by mouth every 6 hours as needed for Pain. â¢ Cholecalciferol (VITAMIN D PO) Take 5,000 Int'l Units by mouth nightly. â¢ OLANZapine (ZyPREXA) 2.5 MG tablet Take 2.5 mg by mouth. No current facility-administered medications for this visit.       Past Medical History:   Diagnosis Date   â¢ Acute bronchitis    â¢ Asthma     sports related, not now   â¢ Bipolar 1 disorder (CMS/[facility])     Dr. Nunez \[facility] [place] [facility]\""   â¢ Depression    â¢ Esophageal reflux    â¢ Gastro-oesophageal reflux disease     diet controlled, vomiting 3 weks, feelin feverish   â¢ Hypothyroidism    â¢ Inflammatory bowel disease    â¢ Meningitis     at 1years of age   â¢ Obesity    â¢ Psoriasis    â¢ Reflux    â¢ Sleep apnea     mouthpiece        COLONOSCOPY REMOVE LESIONS BY SNARE             11/19/2010      Comment: egd    CARPAL TUNNEL RELEASE                           10/10/2006      Comment: x2    NAIL REMOVAL                                    34[birth year]33[birth year]1929    UMBILICAL HERNIA REPAIR                                       TONSILLECTOMY AND ADENOIDECTOMY                               COLONOSCOPY                                     01/30/2014      Comment: Rich. Repeat in five years.    ESOPHAGOGASTRODUODENOSCOPY                      12     Social History     Socioeconomic History   â¢ Marital status: /Civil Union     Spouse name: Not on file   â¢ Number of children: Not on file   â¢ Years of education: Not on file   â¢ Highest education level: Not on file   Occupational History   â¢ Not on file   Social Needs   â¢ Financial resource strain: Not on file   â¢ Food insecurity     Worry: Not on file     Inability: Not on file   â¢ Transportation needs     Medical: Not on file     Non-medical: Not on file   Tobacco Use   â¢ Smoking status: Former Smoker     Packs/day: 0.30     Years: 1.00     Pack years: 0.30     Types: Cigarettes     Quit date: 2004     Years since quittin.6   â¢ Smokeless tobacco: Former User     Types: Chew     Quit date: 2005   â¢ Tobacco comment: smoked for 1 year only   Substance and Sexual Activity   â¢ Alcohol use: Yes     Comment: rare occassion   â¢ Drug use: No   â¢ Sexual activity: Not on file   Lifestyle   â¢ Physical activity     Days per week: Not on file     Minutes per session: Not on file   â¢ Stress: Not on file   Relationships   â¢ Social connections     Talks on phone: Not on file     Gets together: Not on file     Attends [employer] service: Not on file     Active member of club or organization: Not on file     Attends meetings of clubs or organizations: Not on file     Relationship status: Not on file   â¢ Intimate partner violence     Fear of current or ex partner: Not on file     Emotionally abused: Not on file     Physically abused: Not on file     Forced sexual activity: Not on file   Other Topics Concern   â¢ Not on file   Social History Narrative    [place]. . 2 step sons. Step Daughter. 8year old daughter of his own.        Family History   Problem Relation Age of Onset   â¢ Cancer Maternal Uncle         esophageal   â¢ Diabetes Maternal Uncle    â¢ Cancer Maternal Grandmother         breast   â¢ Cancer Paternal Grandfather "skin   â¢ Diabetes Paternal Grandfather    â¢ Heart disease Neg Hx    â¢ High blood pressure Neg Hx    â¢ High cholesterol Neg Hx    â¢ Stroke Neg Hx      OBJECTIVE  Visit Vitals  /80   Ht 6' 3.5"" (1.918 m) Comment: stated   Wt (!) 159.6 kg Comment: stated   BMI 43.40 kg/mÂ²      General: Alert and in no acute distress. Cardiovascular: S1S2, regular rate and rhythm. No murmurs, gallops, or rubs. Intact peripheral pulses and no edema in the lower extremities. Respiratory: Lungs clear to auscultation bilaterally. Normal chest expansion, regular rate of breathing. Musculoskeletal: Back: Stance and gait normal. No spasm or tenderness with palpation along spinous processes, thoracolumbar paraspinal muscles. Thoracolumbar spine diminished range of motion, worst with extension. No focal weakness. 5/5 right quadriceps strength and 5/5 left quadriceps strength. 5/5 plantar/dorsiflexion bilaterally. Straight leg raising negative on the right and negative on the left. Neurologic: Sensation to light touch and DTRs present and equal in lower extremities. Psychiatric: Alert and oriented x3. Normal mood and affect appropriate to conversation. Diagnostic studies: Patientâs imaging studies from prior visits were reviewed. ASSESSMENT  1. Chronic bilateral thoracic back pain    2. Chronic bilateral low back pain with bilateral sciatica    3. Bipolar 1 disorder (CMS/HCC)      Cesario Foster is a 35year old male with chronic thoracic and low back pain, found to have significant facet-mediated pain. He had excellent relief with repeat lumbar radiofrequency ablation. He had short relief with T9-10 epidural steroid injection but excellent relief last year with T9-10 T10-11 T11-12 radiofrequency ablation. PLAN  At this time, I think it would be reasonable to proceed with bilateral radiofrequency ablation T9-10 T10-11 T11-12 for better relief.  Granted, we discussed repeating procedure in light of situation with COVID-19 and " other comorbidities. Patient was advised of increased risk of COVID-19 exposure due to contact with communal spaces and additional persons. Walter Kwan was advised that COVID-19 is a new virus. Patient aware that clinical presentations range from no/mild symptoms to life-threatening complications and possible death. Patient advised that currently there is no conclusive evidence that injected steroids have any positive or negative effect on COVID-19 disease course. There is evidence that high doses of steroids can have a negative effect on immune system which could negatively impact disease course if COVID-19 was contracted. Patient advised that therapeutic steroid dose for injections is generally low and that the lowest effective therapeutic dose will be used. The risks, benefits and alternatives were discussed with patient. After discussion, Walter Kwan accepts risks and wishes to proceed when able. We will seek insurance authorization and call to schedule patient when we are able. In the meantime, Walter Kwan is reminded of the importance of proper lifting techniques and body mechanics. May continue Tylenol as needed. May continue as pain allows. Walter Kwan will follow up with me in 3 weeks after thoracic radiofrequency ablation. Encouraged to contact us as needed with any new or worsening symptoms. 17 minutes were spent with the patient face-to-face and greater than 50% of that time was spent on counseling and teaching about the above-mentioned topics. The patient was seen under the direction and guidance of Dr. Emma Nguyen, who was present in the office at the time of todayâs visit.

## 2020-12-04 ENCOUNTER — TELEPHONE (OUTPATIENT)
Dept: CASE MANAGEMENT | Facility: HOSPITAL | Age: 60
End: 2020-12-04

## 2021-02-06 ENCOUNTER — HOSPITAL ENCOUNTER (INPATIENT)
Facility: HOSPITAL | Age: 61
LOS: 10 days | DRG: 558 | End: 2021-02-16
Attending: EMERGENCY MEDICINE | Admitting: FAMILY MEDICINE
Payer: MEDICARE

## 2021-02-06 DIAGNOSIS — M62.82 RHABDOMYOLYSIS: ICD-10-CM

## 2021-02-06 DIAGNOSIS — I10 HYPERTENSION, UNSPECIFIED TYPE: ICD-10-CM

## 2021-02-06 DIAGNOSIS — F25.0 SCHIZOAFFECTIVE DISORDER, BIPOLAR TYPE (HCC): ICD-10-CM

## 2021-02-06 DIAGNOSIS — R45.1 AGITATION: ICD-10-CM

## 2021-02-06 DIAGNOSIS — Z00.8 MEDICAL CLEARANCE FOR PSYCHIATRIC ADMISSION: ICD-10-CM

## 2021-02-06 DIAGNOSIS — F29 PSYCHOSIS (HCC): Primary | ICD-10-CM

## 2021-02-06 LAB
ALBUMIN SERPL BCP-MCNC: 4.7 G/DL (ref 3.5–5.7)
ALP SERPL-CCNC: 38 U/L (ref 55–165)
ALT SERPL W P-5'-P-CCNC: 29 U/L (ref 7–52)
AMMONIA PLAS-SCNC: 51 UMOL/L (ref 25–90)
ANION GAP SERPL CALCULATED.3IONS-SCNC: 12 MMOL/L (ref 4–13)
APAP SERPL-MCNC: <10 UG/ML (ref 10–20)
APTT PPP: 27 SECONDS (ref 23–37)
AST SERPL W P-5'-P-CCNC: 42 U/L (ref 13–39)
BASOPHILS # BLD AUTO: 0 THOUSANDS/ΜL (ref 0–0.1)
BASOPHILS NFR BLD AUTO: 0 % (ref 0–2)
BILIRUB SERPL-MCNC: 1 MG/DL (ref 0.2–1)
BUN SERPL-MCNC: 16 MG/DL (ref 7–25)
CALCIUM SERPL-MCNC: 9.7 MG/DL (ref 8.6–10.5)
CHLORIDE SERPL-SCNC: 109 MMOL/L (ref 98–107)
CK MB SERPL-MCNC: 2.7 % (ref 0.6–6.3)
CK MB SERPL-MCNC: 34.2 NG/ML (ref 0.6–6.3)
CK SERPL-CCNC: 1254 U/L (ref 30–308)
CO2 SERPL-SCNC: 21 MMOL/L (ref 21–31)
CREAT SERPL-MCNC: 1.13 MG/DL (ref 0.7–1.3)
EOSINOPHIL # BLD AUTO: 0 THOUSAND/ΜL (ref 0–0.61)
EOSINOPHIL NFR BLD AUTO: 0 % (ref 0–5)
ERYTHROCYTE [DISTWIDTH] IN BLOOD BY AUTOMATED COUNT: 15 % (ref 11.5–14.5)
ETHANOL SERPL-MCNC: 20.6 MG/DL
FLUAV RNA RESP QL NAA+PROBE: NEGATIVE
FLUBV RNA RESP QL NAA+PROBE: NEGATIVE
GFR SERPL CREATININE-BSD FRML MDRD: 69 ML/MIN/1.73SQ M
GLUCOSE SERPL-MCNC: 89 MG/DL (ref 65–99)
HCT VFR BLD AUTO: 42 % (ref 42–47)
HGB BLD-MCNC: 14.2 G/DL (ref 14–18)
INR PPP: 1.1 (ref 0.84–1.19)
LACTATE SERPL-SCNC: 1 MMOL/L (ref 0.5–2)
LACTATE SERPL-SCNC: 4.1 MMOL/L (ref 0.5–2)
LITHIUM SERPL-SCNC: 0.2 MMOL/L (ref 1–1.2)
LYMPHOCYTES # BLD AUTO: 1.1 THOUSANDS/ΜL (ref 0.6–4.47)
LYMPHOCYTES NFR BLD AUTO: 12 % (ref 21–51)
MAGNESIUM SERPL-MCNC: 2.3 MG/DL (ref 1.9–2.7)
MCH RBC QN AUTO: 30 PG (ref 26–34)
MCHC RBC AUTO-ENTMCNC: 33.9 G/DL (ref 31–37)
MCV RBC AUTO: 88 FL (ref 81–99)
MONOCYTES # BLD AUTO: 0.7 THOUSAND/ΜL (ref 0.17–1.22)
MONOCYTES NFR BLD AUTO: 8 % (ref 2–12)
NEUTROPHILS # BLD AUTO: 7.6 THOUSANDS/ΜL (ref 1.4–6.5)
NEUTS SEG NFR BLD AUTO: 81 % (ref 42–75)
PLATELET # BLD AUTO: 214 THOUSANDS/UL (ref 149–390)
PMV BLD AUTO: 8.7 FL (ref 8.6–11.7)
POTASSIUM SERPL-SCNC: 3.3 MMOL/L (ref 3.5–5.5)
PROT SERPL-MCNC: 6.8 G/DL (ref 6.4–8.9)
PROTHROMBIN TIME: 14.1 SECONDS (ref 11.6–14.5)
RBC # BLD AUTO: 4.75 MILLION/UL (ref 4.3–5.9)
RSV RNA RESP QL NAA+PROBE: NEGATIVE
SALICYLATES SERPL-MCNC: <5 MG/DL (ref 10–30)
SARS-COV-2 RNA RESP QL NAA+PROBE: NEGATIVE
SODIUM SERPL-SCNC: 142 MMOL/L (ref 134–143)
TSH SERPL DL<=0.05 MIU/L-ACNC: 1.41 UIU/ML (ref 0.45–5.33)
WBC # BLD AUTO: 9.4 THOUSAND/UL (ref 4.8–10.8)

## 2021-02-06 PROCEDURE — 82553 CREATINE MB FRACTION: CPT | Performed by: EMERGENCY MEDICINE

## 2021-02-06 PROCEDURE — 83735 ASSAY OF MAGNESIUM: CPT | Performed by: EMERGENCY MEDICINE

## 2021-02-06 PROCEDURE — 0241U HB NFCT DS VIR RESP RNA 4 TRGT: CPT | Performed by: EMERGENCY MEDICINE

## 2021-02-06 PROCEDURE — 80179 DRUG ASSAY SALICYLATE: CPT | Performed by: EMERGENCY MEDICINE

## 2021-02-06 PROCEDURE — 82077 ASSAY SPEC XCP UR&BREATH IA: CPT | Performed by: EMERGENCY MEDICINE

## 2021-02-06 PROCEDURE — 99291 CRITICAL CARE FIRST HOUR: CPT | Performed by: EMERGENCY MEDICINE

## 2021-02-06 PROCEDURE — 36415 COLL VENOUS BLD VENIPUNCTURE: CPT | Performed by: EMERGENCY MEDICINE

## 2021-02-06 PROCEDURE — 80178 ASSAY OF LITHIUM: CPT | Performed by: EMERGENCY MEDICINE

## 2021-02-06 PROCEDURE — 96361 HYDRATE IV INFUSION ADD-ON: CPT

## 2021-02-06 PROCEDURE — 83605 ASSAY OF LACTIC ACID: CPT | Performed by: EMERGENCY MEDICINE

## 2021-02-06 PROCEDURE — 99285 EMERGENCY DEPT VISIT HI MDM: CPT

## 2021-02-06 PROCEDURE — 84443 ASSAY THYROID STIM HORMONE: CPT | Performed by: EMERGENCY MEDICINE

## 2021-02-06 PROCEDURE — 82140 ASSAY OF AMMONIA: CPT | Performed by: EMERGENCY MEDICINE

## 2021-02-06 PROCEDURE — 85610 PROTHROMBIN TIME: CPT | Performed by: EMERGENCY MEDICINE

## 2021-02-06 PROCEDURE — 80053 COMPREHEN METABOLIC PANEL: CPT | Performed by: EMERGENCY MEDICINE

## 2021-02-06 PROCEDURE — 82550 ASSAY OF CK (CPK): CPT | Performed by: EMERGENCY MEDICINE

## 2021-02-06 PROCEDURE — 93005 ELECTROCARDIOGRAM TRACING: CPT

## 2021-02-06 PROCEDURE — 85730 THROMBOPLASTIN TIME PARTIAL: CPT | Performed by: EMERGENCY MEDICINE

## 2021-02-06 PROCEDURE — 85025 COMPLETE CBC W/AUTO DIFF WBC: CPT | Performed by: EMERGENCY MEDICINE

## 2021-02-06 PROCEDURE — 80143 DRUG ASSAY ACETAMINOPHEN: CPT | Performed by: EMERGENCY MEDICINE

## 2021-02-06 PROCEDURE — 96374 THER/PROPH/DIAG INJ IV PUSH: CPT

## 2021-02-06 RX ORDER — MIDAZOLAM HYDROCHLORIDE 1 MG/ML
3 INJECTION INTRAMUSCULAR; INTRAVENOUS ONCE
Status: COMPLETED | OUTPATIENT
Start: 2021-02-06 | End: 2021-02-06

## 2021-02-06 RX ORDER — LORAZEPAM 2 MG/ML
2 INJECTION INTRAMUSCULAR ONCE
Status: COMPLETED | OUTPATIENT
Start: 2021-02-06 | End: 2021-02-06

## 2021-02-06 RX ADMIN — SODIUM CHLORIDE 1000 ML: 0.9 INJECTION, SOLUTION INTRAVENOUS at 20:40

## 2021-02-06 RX ADMIN — SODIUM CHLORIDE 1000 ML: 0.9 INJECTION, SOLUTION INTRAVENOUS at 19:45

## 2021-02-06 RX ADMIN — LORAZEPAM 2 MG: 2 INJECTION INTRAMUSCULAR; INTRAVENOUS at 21:43

## 2021-02-06 NOTE — ED NOTES
Spoke with Officer, and completed the initial intake for the 302  Police stated that [facility] likes [facility] if at [facility] possible  Mom came to the Hospital to complete the 302  She was informed that the  completed the [phone] paperwork  Mom gave some clothing for patient  Cw gave clothing to nurse to secure and lock

## 2021-02-06 NOTE — ED PROVIDER NOTES
History  Chief Complaint   Patient presents with    Psychiatric Evaluation     pt presents with EMS who reports pt was found pacing his home in a manic paranoid state  EMS reports the pt went through a wooden door to get to them, pt was dumping beer all over himself, was trying to cut himself, and throwing things  Pt was also making threats to harm his mother  HPI    This is a 26-year-old gentleman presents the emergency department with local EMS and local law enforcement after the patient became extremely violent at his residence  Patient was found pacing at his home in extremely agitated state  Patient went through a large wooden door and when after law enforcement and attempted to attack them  Patient was than proceeding to dental beer all over himself and throwing things around the house  Patient also made threats to harm his mother  Patient arrived via a 302  Patient BS was within nrl limits, medical command was contacted for a ketamine order: 450 mg IM was given in the L deltoid area  Prior to Admission Medications   Prescriptions Last Dose Informant Patient Reported? Taking?    OLANZapine (ZyPREXA) 20 MG tablet   No No   Sig: Take 1 tablet (20 mg total) by mouth daily at bedtime   diazepam (VALIUM) 5 mg tablet   No No   Sig: Take 1 tablet (5 mg total) by mouth 3 (three) times a day for 10 days   lithium carbonate 150 mg capsule   No No   Sig: Take 3 capsules (450 mg total) by mouth 3 (three) times a day with meals   tamsulosin (FLOMAX) 0 4 mg   No No   Sig: Take 1 capsule (0 4 mg total) by mouth daily with dinner      Facility-Administered Medications: None       Past Medical History:   Diagnosis Date    Alcohol abuse     Anxiety     Astigmatism     Depression     DJD (degenerative joint disease)     Gait abnormality     Head injury     History of colonic polyps     Hydrocele     Hyperlipidemia     Hypertension     Macular drusen     Presbyopia     PTSD (post-traumatic stress disorder)     [facility]     Sepsis [facility]     Substance abuse ([facility] )     Tobacco abuse     Umbilical hernia     Vitreous syneresis        Past Surgical History:   Procedure Laterality Date    FRACTURE SURGERY      INGUINAL HERNIA REPAIR         History reviewed  No pertinent family history  I have reviewed and agree with the history as documented  E-Cigarette/Vaping    E-Cigarette Use Never User      E-Cigarette/Vaping Substances    Nicotine No     THC No     CBD No     Flavoring No     Other No     Unknown No      Social History     Tobacco Use    Smoking status: Current Every Day Smoker     Packs/day: 1 00     Types: Cigars    Smokeless tobacco: Never Used   Substance Use Topics    Alcohol use: Yes     Frequency: 2-4 times a month     Drinks per session: 3 or 4     Binge frequency: Never     Comment: whenever i want    Drug use: Yes     Types: Marijuana     Comment: Patient denies currently using marijuana  Review of Systems   Unable to perform ROS: Psychiatric disorder       Physical Exam  Physical Exam  Vitals signs and nursing note reviewed  Constitutional:       General: He is in acute distress  Appearance: He is ill-appearing  HENT:      Head: Normocephalic and atraumatic  Comments: Profusely diaphoretic,  No outward signs of trauma  Right Ear: Tympanic membrane, ear canal and external ear normal       Left Ear: Tympanic membrane, ear canal and external ear normal       Nose: Nose normal       Mouth/Throat:      Mouth: Mucous membranes are moist       Pharynx: Oropharynx is clear  Comments: Patient maintaining airway maintaining secretions  No stridor  No brawniness under the tongue  Uvula midline without edema  Eyes:      Extraocular Movements: Extraocular movements intact  Conjunctiva/sclera: Conjunctivae normal       Pupils: Pupils are equal, round, and reactive to light     Neck:      Musculoskeletal: Normal range of motion and neck supple  Cardiovascular:      Rate and Rhythm: Normal rate and regular rhythm  Pulses: Normal pulses  Heart sounds: Normal heart sounds  Pulmonary:      Effort: Pulmonary effort is normal       Breath sounds: Normal breath sounds  Abdominal:      General: Abdomen is flat  Bowel sounds are normal       Palpations: Abdomen is soft  Musculoskeletal: Normal range of motion  Skin:     General: Skin is warm  Capillary Refill: Capillary refill takes 2 to 3 seconds  Neurological:      General: No focal deficit present  Mental Status: He is disoriented and confused  GCS: GCS eye subscore is 3  GCS verbal subscore is 4  GCS motor subscore is 5  Cranial Nerves: Cranial nerves are intact     Psychiatric:      Comments: Patient sedated, unable to compete entire exam          Vital Signs  ED Triage Vitals   Temperature Pulse Respirations Blood Pressure SpO2   02/06/21 1826 02/06/21 1826 02/06/21 1826 02/06/21 1826 02/06/21 1826   97 7 °F (36 5 °C) 90 18 138/90 96 %      Temp Source Heart Rate Source Patient Position - Orthostatic VS BP Location FiO2 (%)   02/06/21 1826 02/06/21 1826 02/06/21 2201 02/06/21 1826 --   Temporal Monitor Lying Left arm       Pain Score       --                  Vitals:    02/06/21 2230 02/06/21 2256 02/06/21 2300 02/06/21 2330   BP: 130/68 130/68 137/67 133/71   Pulse: 91  94 88   Patient Position - Orthostatic VS:             Visual Acuity      ED Medications  Medications   sodium chloride 0 9 % bolus 1,000 mL (0 mL Intravenous Stopped 2/6/21 2143)   midazolam (FOR EMS ONLY) (VERSED) 2 mg/2 mL injection 6 mg (0 mg Does not apply Given to EMS 2/6/21 1836)   sodium chloride 0 9 % bolus 1,000 mL (0 mL Intravenous Stopped 2/6/21 2322)   LORazepam (ATIVAN) injection 2 mg (2 mg Intravenous Given 2/6/21 2143)       Diagnostic Studies  Results Reviewed     Procedure Component Value Units Date/Time    Lithium level [[id number]]  (Abnormal) Collected: 02/06/21 2139    Lab Status: Final result Specimen: Blood from Arm, Right Updated: 02/06/21 2201     Lithium Lvl 0 2 mmol/L     Lactic acid 2 Hours [[id number]]  (Normal) Collected: 02/06/21 2122    Lab Status: Final result Specimen: Blood from Arm, Right Updated: 02/06/21 2144     LACTIC ACID 1 0 mmol/L     Narrative:      Result may be elevated if tourniquet was used during collection  CKMB [[id number]]  (Abnormal) Collected: 02/06/21 1857    Lab Status: Final result Specimen: Blood from Arm, Right Updated: 02/06/21 2004     CK-MB Index 2 7 %      CK-MB 34 2 ng/mL     COVID19, Influenza A/B, RSV PCR, [facility] [[id number]]  (Normal) Collected: 02/06/21 1857    Lab Status: Final result Specimen: Nasopharyngeal Swab Updated: 02/06/21 1956     SARS-CoV-2 Negative     INFLUENZA A PCR Negative     INFLUENZA B PCR Negative     RSV PCR Negative    Narrative: This test has been authorized by FDA under an EUA (Emergency Use Assay) for use by authorized laboratories  Clinical caution and judgement should be used with the interpretation of these results with consideration of the clinical impression and other laboratory testing  Testing reported as "Positive" or "Negative" has been proven to be accurate according to standard laboratory validation requirements  All testing is performed with control materials showing appropriate reactivity at standard intervals  TSH [[id number]]  (Normal) Collected: 02/06/21 1857    Lab Status: Final result Specimen: Blood from Arm, Right Updated: 02/06/21 1950     TSH 3RD GENERATON 1 410 uIU/mL     Narrative:      Patients undergoing fluorescein dye angiography may retain small amounts of fluorescein in the body for 48-72 hours post procedure  Samples containing fluorescein can produce falsely depressed TSH values  If the patient had this procedure,a specimen should be resubmitted post fluorescein clearance        Lactic acid [[id number]]  (Abnormal) Collected: 02/06/21 1857    Lab Status: Final result Specimen: Blood from Arm, Right Updated: 02/06/21 1948     LACTIC ACID 4 1 mmol/L     Narrative:      Result may be elevated if tourniquet was used during collection  Result may be elevated if tourniquet was used during collection      Acetaminophen level-"If concentration is detectable, please discuss with medical  on call " [[id number]]  (Abnormal) Collected: 02/06/21 1857    Lab Status: Final result Specimen: Blood from Arm, Right Updated: 02/06/21 1944     Acetaminophen Level <66 ug/mL     Salicylate level [[id number]]  (Abnormal) Collected: 02/06/21 1857    Lab Status: Final result Specimen: Blood from Arm, Right Updated: 54/54/67 4464     Salicylate Lvl <5 mg/dL     CK Total with Reflex CKMB [[id number]]  (Abnormal) Collected: 02/06/21 1857    Lab Status: Final result Specimen: Blood from Arm, Right Updated: 02/06/21 1943     Total CK 1,254 U/L     Comprehensive metabolic panel [[id number]]  (Abnormal) Collected: 02/06/21 1857    Lab Status: Final result Specimen: Blood from Arm, Right Updated: 02/06/21 1943     Sodium 142 mmol/L      Potassium 3 3 mmol/L      Chloride 109 mmol/L      CO2 21 mmol/L      ANION GAP 12 mmol/L      BUN 16 mg/dL      Creatinine 1 13 mg/dL      Glucose 89 mg/dL      Calcium 9 7 mg/dL      AST 42 U/L      ALT 29 U/L      Alkaline Phosphatase 38 U/L      Total Protein 6 8 g/dL      Albumin 4 7 g/dL      Total Bilirubin 1 00 mg/dL      eGFR 69 ml/min/1 73sq m     Narrative:      [place] guidelines for Chronic Kidney Disease (CKD):     Stage 1 with normal or high GFR (GFR > 90 mL/min/1 73 square meters)    Stage 2 Mild CKD (GFR = 60-89 mL/min/1 73 square meters)    Stage 3A Moderate CKD (GFR = 45-59 mL/min/1 73 square meters)    Stage 3B Moderate CKD (GFR = 30-44 mL/min/1 73 square meters)    Stage 4 Severe CKD (GFR = 15-29 mL/min/1 73 square meters)    Stage 5 End Stage CKD (GFR <15 mL/min/1 73 square meters)  Note: GFR calculation is accurate only with a steady state creatinine    Magnesium [[id number]]  (Normal) Collected: 02/06/21 1857    Lab Status: Final result Specimen: Blood from Arm, Right Updated: 02/06/21 1943     Magnesium 2 3 mg/dL     Ethanol [[id number]]  (Abnormal) Collected: 02/06/21 1857    Lab Status: Final result Specimen: Blood from Arm, Right Updated: 02/06/21 1936     Ethanol Lvl 20 6 mg/dL     Ammonia [[id number]]  (Normal) Collected: 02/06/21 1857    Lab Status: Final result Specimen: Blood from Arm, Right Updated: 02/06/21 1936     Ammonia 51 umol/L     Protime-INR [[id number]]  (Normal) Collected: 02/06/21 1857    Lab Status: Final result Specimen: Blood from Arm, Right Updated: 02/06/21 1930     Protime 14 1 seconds      INR 1 10    APTT [[id number]]  (Normal) Collected: 02/06/21 1857    Lab Status: Final result Specimen: Blood from Arm, Right Updated: 02/06/21 1930     PTT 27 seconds     CBC and differential [[id number]]  (Abnormal) Collected: 02/06/21 1857    Lab Status: Final result Specimen: Blood from Arm, Right Updated: 02/06/21 1927     WBC 9 40 Thousand/uL      RBC 4 75 Million/uL      Hemoglobin 14 2 g/dL      Hematocrit 42 0 %      MCV 88 fL      MCH 30 0 pg      MCHC 33 9 g/dL      RDW 15 0 %      MPV 8 7 fL      Platelets 558 Thousands/uL      Neutrophils Relative 81 %      Lymphocytes Relative 12 %      Monocytes Relative 8 %      Eosinophils Relative 0 %      Basophils Relative 0 %      Neutrophils Absolute 7 60 Thousands/µL      Lymphocytes Absolute 1 10 Thousands/µL      Monocytes Absolute 0 70 Thousand/µL      Eosinophils Absolute 0 00 Thousand/µL      Basophils Absolute 0 00 Thousands/µL     Rapid drug screen, urine [[id number]]     Lab Status: No result Specimen: Urine                  No orders to display              Procedures  CriticalCare Time  Performed by: Sonia Hu DO  Authorized by: Jose Alfredo Singer III, DO     Critical care provider statement:     Critical care time (minutes):  35 Critical care start time:  2/6/2021 7:00 PM    Critical care end time:  2/6/2021 7:59 PM  Comments:      Patient had to be immediately placed into restraints because his violent behavior with multiple police on scene prior to coming the emergency department  ECG 12 Lead Documentation Only    Date/Time: 2/6/2021 8:00 PM  Performed by: Sonia Hu DO  Authorized by: Jose Alfredo Singer III, DO     Indications / Diagnosis:   agitation  ECG reviewed by me, the ED Provider: yes    Patient location:  ED  Comments:       I personally reviewed this EKG is performed the patient February 6, 2021  EKG was completed at 7:58 p m  and interpreted by me at 8:00 p m   Normal sinus rhythm with no acute ST abnormalities  Please note there is some peaked T-wave possibly consistent atrial enlargement  ED Course  ED Course as of Feb 06 2334   Sat Feb 06, 2021   1840 History and physical completed  Orders placed  Agitated delirium verses underlining severe florid psychosis presentation  Will proceed with baseline labs including older adult clearance labs lactic acid Tylenol level and salicylate levels  1951 Patient's lactic acid of 4 1 with a total CK level of 1254  Did the presentation of getting 450 mg of IM ketamine and breaking through multiple sores and with extremely agitated fighting with police is consistent with aggitation, will hydrate  2222 Case d/w [facility], referred case to Dr Cirilo Connors, will admit to the floor,  patient has mild rhabdomyolysis with an elevated lactic acid secondary to agitation related to his for psychosis  Patient cannot be medically cleared for a holding a 302,  will need to be admitted medically and then after that evaluated from a psychiatric standpoint to determine whether the patient still requires a 302 admission  Patient medically cleared for behavioral health evaluation           MDM  Number of Diagnoses or Management Options  Agitation:   Psychosis [facility]):   Rhabdomyolysis:   Diagnosis management comments:   59-year-old gentleman presents the emergency department with severe agitation, see HPI for specifics, 302 cannot be off on because patient is not medically cleared due to agitation, rhabdomyolysis, increased need for airway monitoring because medications given for sedation,  Patient is reassessed at 11:32 p m , patient's O2 saturation was 94% on room air  Patient remains in restraints, case discussed with his geriatric specialist Tina Mortimer, MD, will admit pt to his service  IVF hydration to bring down CK levels, no acidosis on labs  Elevated lactate cleared after IVF  Portions of the record may have been created with voice recognition software  Occasional wrong word or "sound a like" substitutions may have occurred due to the inherent limitations of voice recognition software  Read the chart carefully and recognize, using context, where substitutions have occurred  Amount and/or Complexity of Data Reviewed  Clinical lab tests: ordered and reviewed  Tests in the medicine section of CPT®: reviewed and ordered        Disposition  Final diagnoses:   Psychosis ([address] )   Agitation   Rhabdomyolysis     Time reflects when diagnosis was documented in both MDM as applicable and the Disposition within this note     Time User Action Codes Description Comment    2/6/2021 10:27 PM Onnie Clubs Add [F29] Psychosis ([address] )     2/6/2021 10:27 PM Coye Friend [R45 1] Agitation     2/6/2021 10:27 PM Onnie Clubs Add [M62 82] Rhabdomyolysis       ED Disposition     ED Disposition Condition Date/Time Comment    Admit Stable Sat Feb 6, 2021 10:27 PM Case was discussed with Dr Irving Carranza and the patient's admission status was agreed to be Admission Status: inpatient status to the service of Dr Irving Carranza           Follow-up Information    None         Patient's Medications   Discharge Prescriptions    No medications on file No discharge procedures on file      PDMP Review       Value Time User    PDMP Reviewed  Yes 9/21/2020 11:01 AM Carlos Manuel Corey MD          ED Provider  Electronically Signed by           Ashwin Rousseau III, DO  02/06/21 3079

## 2021-02-07 LAB
25(OH)D3 SERPL-MCNC: 22.9 NG/ML (ref 30–100)
ALBUMIN SERPL BCP-MCNC: 4.1 G/DL (ref 3.5–5.7)
ALP SERPL-CCNC: 35 U/L (ref 55–165)
ALT SERPL W P-5'-P-CCNC: 29 U/L (ref 7–52)
ANION GAP SERPL CALCULATED.3IONS-SCNC: 8 MMOL/L (ref 4–13)
AST SERPL W P-5'-P-CCNC: 41 U/L (ref 13–39)
ATRIAL RATE: 84 BPM
BASOPHILS # BLD AUTO: 0 THOUSANDS/ΜL (ref 0–0.1)
BASOPHILS NFR BLD AUTO: 0 % (ref 0–2)
BILIRUB SERPL-MCNC: 1.6 MG/DL (ref 0.2–1)
BUN SERPL-MCNC: 14 MG/DL (ref 7–25)
CALCIUM SERPL-MCNC: 9.1 MG/DL (ref 8.6–10.5)
CHLORIDE SERPL-SCNC: 111 MMOL/L (ref 98–107)
CK MB SERPL-MCNC: 1.9 % (ref 0.6–6.3)
CK MB SERPL-MCNC: 24.1 NG/ML (ref 0.6–6.3)
CK SERPL-CCNC: 1244 U/L (ref 30–308)
CO2 SERPL-SCNC: 25 MMOL/L (ref 21–31)
CREAT SERPL-MCNC: 0.89 MG/DL (ref 0.7–1.3)
EOSINOPHIL # BLD AUTO: 0.1 THOUSAND/ΜL (ref 0–0.61)
EOSINOPHIL NFR BLD AUTO: 1 % (ref 0–5)
ERYTHROCYTE [DISTWIDTH] IN BLOOD BY AUTOMATED COUNT: 14.5 % (ref 11.5–14.5)
FOLATE SERPL-MCNC: >20 NG/ML (ref 3.1–17.5)
GFR SERPL CREATININE-BSD FRML MDRD: 92 ML/MIN/1.73SQ M
GLUCOSE SERPL-MCNC: 85 MG/DL (ref 65–99)
HCT VFR BLD AUTO: 39.9 % (ref 42–47)
HGB BLD-MCNC: 13.5 G/DL (ref 14–18)
LYMPHOCYTES # BLD AUTO: 1.5 THOUSANDS/ΜL (ref 0.6–4.47)
LYMPHOCYTES NFR BLD AUTO: 22 % (ref 21–51)
MCH RBC QN AUTO: 29.8 PG (ref 26–34)
MCHC RBC AUTO-ENTMCNC: 33.8 G/DL (ref 31–37)
MCV RBC AUTO: 88 FL (ref 81–99)
MONOCYTES # BLD AUTO: 0.6 THOUSAND/ΜL (ref 0.17–1.22)
MONOCYTES NFR BLD AUTO: 9 % (ref 2–12)
NEUTROPHILS # BLD AUTO: 4.7 THOUSANDS/ΜL (ref 1.4–6.5)
NEUTS SEG NFR BLD AUTO: 68 % (ref 42–75)
P AXIS: 90 DEGREES
PLATELET # BLD AUTO: 173 THOUSANDS/UL (ref 149–390)
PMV BLD AUTO: 8.1 FL (ref 8.6–11.7)
POTASSIUM SERPL-SCNC: 3.5 MMOL/L (ref 3.5–5.5)
PR INTERVAL: 166 MS
PROT SERPL-MCNC: 6.2 G/DL (ref 6.4–8.9)
QRS AXIS: -52 DEGREES
QRSD INTERVAL: 92 MS
QT INTERVAL: 398 MS
QTC INTERVAL: 470 MS
RBC # BLD AUTO: 4.52 MILLION/UL (ref 4.3–5.9)
SODIUM SERPL-SCNC: 144 MMOL/L (ref 134–143)
T WAVE AXIS: 75 DEGREES
VENTRICULAR RATE: 84 BPM
VIT B12 SERPL-MCNC: 819 PG/ML (ref 100–900)
WBC # BLD AUTO: 6.9 THOUSAND/UL (ref 4.8–10.8)

## 2021-02-07 PROCEDURE — 93010 ELECTROCARDIOGRAM REPORT: CPT | Performed by: INTERNAL MEDICINE

## 2021-02-07 PROCEDURE — 82306 VITAMIN D 25 HYDROXY: CPT | Performed by: FAMILY MEDICINE

## 2021-02-07 PROCEDURE — 82553 CREATINE MB FRACTION: CPT | Performed by: FAMILY MEDICINE

## 2021-02-07 PROCEDURE — 82607 VITAMIN B-12: CPT | Performed by: FAMILY MEDICINE

## 2021-02-07 PROCEDURE — 82550 ASSAY OF CK (CPK): CPT | Performed by: FAMILY MEDICINE

## 2021-02-07 PROCEDURE — 99284 EMERGENCY DEPT VISIT MOD MDM: CPT | Performed by: PHYSICIAN ASSISTANT

## 2021-02-07 PROCEDURE — 80053 COMPREHEN METABOLIC PANEL: CPT | Performed by: FAMILY MEDICINE

## 2021-02-07 PROCEDURE — 82746 ASSAY OF FOLIC ACID SERUM: CPT | Performed by: FAMILY MEDICINE

## 2021-02-07 PROCEDURE — 36415 COLL VENOUS BLD VENIPUNCTURE: CPT | Performed by: FAMILY MEDICINE

## 2021-02-07 PROCEDURE — 85025 COMPLETE CBC W/AUTO DIFF WBC: CPT | Performed by: FAMILY MEDICINE

## 2021-02-07 RX ORDER — ZIPRASIDONE MESYLATE 20 MG/ML
20 INJECTION, POWDER, LYOPHILIZED, FOR SOLUTION INTRAMUSCULAR EVERY 6 HOURS PRN
Status: DISCONTINUED | OUTPATIENT
Start: 2021-02-07 | End: 2021-02-12 | Stop reason: SINTOL

## 2021-02-07 RX ORDER — NICOTINE 21 MG/24HR
1 PATCH, TRANSDERMAL 24 HOURS TRANSDERMAL DAILY
Status: DISCONTINUED | OUTPATIENT
Start: 2021-02-07 | End: 2021-02-09

## 2021-02-07 RX ORDER — LORAZEPAM 2 MG/ML
2 INJECTION INTRAMUSCULAR EVERY 2 HOUR PRN
Status: DISCONTINUED | OUTPATIENT
Start: 2021-02-07 | End: 2021-02-13

## 2021-02-07 RX ORDER — LANOLIN ALCOHOL/MO/W.PET/CERES
100 CREAM (GRAM) TOPICAL DAILY
Status: DISCONTINUED | OUTPATIENT
Start: 2021-02-07 | End: 2021-02-15

## 2021-02-07 RX ORDER — FOLIC ACID 1 MG/1
1 TABLET ORAL DAILY
Status: DISCONTINUED | OUTPATIENT
Start: 2021-02-07 | End: 2021-02-15

## 2021-02-07 RX ORDER — METOPROLOL SUCCINATE 25 MG/1
25 TABLET, EXTENDED RELEASE ORAL DAILY
Status: DISCONTINUED | OUTPATIENT
Start: 2021-02-07 | End: 2021-02-16 | Stop reason: HOSPADM

## 2021-02-07 RX ORDER — ACETAMINOPHEN 325 MG/1
650 TABLET ORAL EVERY 4 HOURS PRN
Status: DISCONTINUED | OUTPATIENT
Start: 2021-02-07 | End: 2021-02-16 | Stop reason: HOSPADM

## 2021-02-07 RX ORDER — TAMSULOSIN HYDROCHLORIDE 0.4 MG/1
0.4 CAPSULE ORAL
Status: DISCONTINUED | OUTPATIENT
Start: 2021-02-07 | End: 2021-02-16 | Stop reason: HOSPADM

## 2021-02-07 RX ORDER — LORAZEPAM 2 MG/ML
1 INJECTION INTRAMUSCULAR EVERY 4 HOURS PRN
Status: DISCONTINUED | OUTPATIENT
Start: 2021-02-07 | End: 2021-02-07

## 2021-02-07 RX ORDER — OLANZAPINE 10 MG/1
20 TABLET ORAL
Status: DISCONTINUED | OUTPATIENT
Start: 2021-02-07 | End: 2021-02-12

## 2021-02-07 RX ORDER — HYDROCHLOROTHIAZIDE 25 MG/1
25 TABLET ORAL DAILY
Status: DISCONTINUED | OUTPATIENT
Start: 2021-02-07 | End: 2021-02-07

## 2021-02-07 RX ORDER — LITHIUM CARBONATE 150 MG/1
450 CAPSULE ORAL
Status: DISCONTINUED | OUTPATIENT
Start: 2021-02-07 | End: 2021-02-08

## 2021-02-07 RX ADMIN — LITHIUM CARBONATE 450 MG: 300 CAPSULE, GELATIN COATED ORAL at 09:29

## 2021-02-07 RX ADMIN — ZIPRASIDONE MESYLATE 20 MG: 20 INJECTION, POWDER, LYOPHILIZED, FOR SOLUTION INTRAMUSCULAR at 11:24

## 2021-02-07 RX ADMIN — LORAZEPAM 2 MG: 2 INJECTION INTRAMUSCULAR; INTRAVENOUS at 11:23

## 2021-02-07 RX ADMIN — OLANZAPINE 20 MG: 10 TABLET, FILM COATED ORAL at 04:47

## 2021-02-07 RX ADMIN — LORAZEPAM 2 MG: 2 INJECTION INTRAMUSCULAR; INTRAVENOUS at 23:58

## 2021-02-07 RX ADMIN — LORAZEPAM 1 MG: 2 INJECTION INTRAMUSCULAR; INTRAVENOUS at 04:46

## 2021-02-07 RX ADMIN — LORAZEPAM 2 MG: 2 INJECTION INTRAMUSCULAR; INTRAVENOUS at 21:26

## 2021-02-07 NOTE — ED NOTES
Pt incontinent of urine  Scrubs and linen changed  Right foot restraint removed  Pt calm at this time        Armaan Benton RN  02/07/21 0892

## 2021-02-07 NOTE — ED NOTES
Patient is highly agitated and yelling at staff  Patient pushed tray table next to bed into the wall  Patient was redirected  Patient is refusing to have on tele leads  Will continue to monitor patient intermittently        Shiv Venegas RN  02/07/21 9914

## 2021-02-07 NOTE — RESTRAINT FACE TO FACE
Restraint Face to Face   Shaunna Hallmakenzieottoniel Day 56 y o  male MRN: [id number]  Unit/Bed#: ED 09 Encounter: [id number]      Physical Evaluation: Fair, sedated   Purpose for Restraints/ Seclusion High risk for self harm  Patient's reaction to the intervention: Fair  Patient's medical condition: Fair  Patient's Behavioral condition: Fair    Restraints to be Continued

## 2021-02-07 NOTE — ED NOTES
[facility], as unable to assess while patient sleeping  Behavioral Health Assessment deferred as patient is sleeping and would benefit from additional rest   Vital signs deferred until patient awake, no signs or symptoms of respiratory distress at this time  Once patient is awake and able to participate, will complete assessments         Bri Parks RN  02/07/21 1294

## 2021-02-07 NOTE — ED NOTES
Floor called for report  RN will transport pt in 15 minutes  Pt sleeping comfortably in stretcher        Tano Gotti RN  02/07/21 7085

## 2021-02-07 NOTE — ED NOTES
Informed patient will most likely be a medical admit, and when informed of his rights under a 302, he was unable to understand, and will not be able to be assessed at this time by crisis  Once medically cleared a crisis consult can be requested if needed for patient

## 2021-02-07 NOTE — PLAN OF CARE
Problem: Potential for Falls  Goal: Patient will remain free of falls  Description: INTERVENTIONS:  - Assess patient frequently for physical needs  -  Identify cognitive and physical deficits and behaviors that affect risk of falls    -  [place] fall precautions as indicated by assessment   - Educate patient/family on patient safety including physical limitations  - Instruct patient to call for assistance with activity based on assessment  - Modify environment to reduce risk of injury  - Consider OT/PT consult to assist with strengthening/mobility  2/7/2021 1537 by Jovani Denson RN  Outcome: Progressing  2/7/2021 1536 by Jovani Denson RN  Outcome: Progressing  2/7/2021 1536 by Jovani Denson RN  Outcome: Progressing     Problem: Prexisting or High Potential for Compromised Skin Integrity  Goal: Skin integrity is maintained or improved  Description: INTERVENTIONS:  - Identify patients at risk for skin breakdown  - Assess and monitor skin integrity  - Assess and monitor nutrition and hydration status  - Monitor labs   - Assess for incontinence   - Turn and reposition patient  - Assist with mobility/ambulation  - Relieve pressure over bony prominences  - Avoid friction and shearing  - Provide appropriate hygiene as needed including keeping skin clean and dry  - Evaluate need for skin moisturizer/barrier cream  - Collaborate with interdisciplinary team   - Patient/family teaching  - Consider wound care consult   2/7/2021 1537 by Jovani Denson RN  Outcome: Progressing  2/7/2021 1536 by Jovani Denson RN  Outcome: Progressing  2/7/2021 1536 by Jovani Denson RN  Outcome: Progressing     Problem: PAIN - ADULT  Goal: Verbalizes/displays adequate comfort level or baseline comfort level  Description: Interventions:  - Encourage patient to monitor pain and request assistance  - Assess pain using appropriate pain scale  - Administer analgesics based on type and severity of pain and evaluate response  - Implement non-pharmacological measures as appropriate and evaluate response  - Consider cultural and social influences on pain and pain management  - Notify physician/advanced practitioner if interventions unsuccessful or patient reports new pain  2/7/2021 1537 by Gaudencio Braswell RN  Outcome: Progressing  2/7/2021 1536 by Gaudencio Braswell RN  Outcome: Progressing  2/7/2021 1536 by Gaudencio Braswell RN  Outcome: Progressing     Problem: INFECTION - ADULT  Goal: Absence or prevention of progression during hospitalization  Description: INTERVENTIONS:  - Assess and monitor for signs and symptoms of infection  - Monitor lab/diagnostic results  - Monitor all insertion sites, i e  indwelling lines, tubes, and drains  - Monitor endotracheal if appropriate and nasal secretions for changes in amount and color  - [place] appropriate cooling/warming therapies per order  - Administer medications as ordered  - Instruct and encourage patient and family to use good hand hygiene technique  - Identify and instruct in appropriate isolation precautions for identified infection/condition  2/7/2021 1537 by Gaudencio Braswell RN  Outcome: Progressing  2/7/2021 1536 by Gaudencio Braswell RN  Outcome: Progressing  2/7/2021 1536 by Gaudencio Braswell RN  Outcome: Progressing  Goal: Absence of fever/infection during neutropenic period  Description: INTERVENTIONS:  - Monitor WBC    2/7/2021 1537 by Gaudencio Braswell RN  Outcome: Progressing  2/7/2021 1536 by Gaudencio Braswell RN  Outcome: Progressing  2/7/2021 1536 by Gaudencio Braswell RN  Outcome: Progressing     Problem: SAFETY ADULT  Goal: Patient will remain free of falls  Description: INTERVENTIONS:  - Assess patient frequently for physical needs  -  Identify cognitive and physical deficits and behaviors that affect risk of falls    -  [place] fall precautions as indicated by assessment   - Educate patient/family on patient safety including physical limitations  - Instruct patient to call for assistance with activity based on assessment  - Modify environment to reduce risk of injury  - Consider OT/PT consult to assist with strengthening/mobility  2/7/2021 1537 by Shea Troy RN  Outcome: Progressing  2/7/2021 1536 by Shea Troy RN  Outcome: Progressing  2/7/2021 1536 by Shea Troy RN  Outcome: Progressing  Goal: Maintain or return to baseline ADL function  Description: INTERVENTIONS:  -  Assess patient's ability to carry out ADLs; assess patient's baseline for ADL function and identify physical deficits which impact ability to perform ADLs (bathing, care of mouth/teeth, toileting, grooming, dressing, etc )  - Assess/evaluate cause of self-care deficits   - Assess range of motion  - Assess patient's mobility; develop plan if impaired  - Assess patient's need for assistive devices and provide as appropriate  - Encourage maximum independence but intervene and supervise when necessary  - Involve family in performance of ADLs  - Assess for home care needs following discharge   - Consider OT consult to assist with ADL evaluation and planning for discharge  - Provide patient education as appropriate  2/7/2021 1537 by Shea Troy RN  Outcome: Progressing  2/7/2021 1536 by Shea Troy RN  Outcome: Progressing  2/7/2021 1536 by Shea Troy RN  Outcome: Progressing  Goal: Maintain or return mobility status to optimal level  Description: INTERVENTIONS:  - Assess patient's baseline mobility status (ambulation, transfers, stairs, etc )    - Identify cognitive and physical deficits and behaviors that affect mobility  - Identify mobility aids required to assist with transfers and/or ambulation (gait belt, sit-to-stand, lift, walker, cane, etc )  - [place] fall precautions as indicated by assessment  - Record patient progress and toleration of activity level on Mobility SBAR; progress patient to next Phase/Stage  - Instruct patient to call for assistance with activity based on assessment  - Consider rehabilitation consult to assist with strengthening/weightbearing, etc   2/7/2021 1537 by Jovani Denson RN  Outcome: Progressing  2/7/2021 1536 by Jovani Denson RN  Outcome: Progressing  2/7/2021 1536 by Jovani Denson RN  Outcome: Progressing     Problem: DISCHARGE PLANNING  Goal: Discharge to home or other facility with appropriate resources  Description: INTERVENTIONS:  - Identify barriers to discharge w/patient and caregiver  - Arrange for needed discharge resources and transportation as appropriate  - Identify discharge learning needs (meds, wound care, etc )  - Arrange for interpretive services to assist at discharge as needed  - Refer to Case Management Department for coordinating discharge planning if the patient needs post-hospital services based on physician/advanced practitioner order or complex needs related to functional status, cognitive ability, or social support system  2/7/2021 1537 by Jovani Denson RN  Outcome: Progressing  2/7/2021 1536 by Jovani Denson RN  Outcome: Progressing  2/7/2021 1536 by Jovani Denson RN  Outcome: Progressing     Problem: Knowledge Deficit  Goal: Patient/family/caregiver demonstrates understanding of disease process, treatment plan, medications, and discharge instructions  Description: Complete learning assessment and assess knowledge base    Interventions:  - Provide teaching at level of understanding  - Provide teaching via preferred learning methods  2/7/2021 1537 by Jovani Denson RN  Outcome: Progressing  2/7/2021 1536 by Jovani Denson RN  Outcome: Progressing  2/7/2021 1536 by Jovani Denson RN  Outcome: Progressing     Problem: NEUROSENSORY - ADULT  Goal: Achieves stable or improved neurological status  Description: INTERVENTIONS  - Monitor and report changes in neurological status  - Monitor vital signs such as temperature, blood pressure, glucose, and any other labs ordered   - Initiate measures to prevent increased intracranial pressure  - Monitor for seizure activity and implement precautions if appropriate      2/7/2021 1537 by Jovani Denson RN  Outcome: Progressing  2/7/2021 1536 by Cat Neumann RN  Outcome: Progressing  2/7/2021 1536 by Cat Neumann RN  Outcome: Progressing  Goal: Achieves maximal functionality and self care  Description: INTERVENTIONS  - Monitor swallowing and airway patency with patient fatigue and changes in neurological status  - Encourage and assist patient to increase activity and self care     - Encourage visually impaired, hearing impaired and aphasic patients to use assistive/communication devices  2/7/2021 1537 by Cat Neumann RN  Outcome: Progressing  2/7/2021 1536 by Cat Neumann RN  Outcome: Progressing  2/7/2021 1536 by Cat Neumann RN  Outcome: Progressing     Problem: CARDIOVASCULAR - ADULT  Goal: Maintains optimal cardiac output and hemodynamic stability  Description: INTERVENTIONS:  - Monitor I/O, vital signs and rhythm  - Monitor for S/S and trends of decreased cardiac output  - Administer and titrate ordered vasoactive medications to optimize hemodynamic stability  - Assess quality of pulses, skin color and temperature  - Assess for signs of decreased coronary artery perfusion  - Instruct patient to report change in severity of symptoms  2/7/2021 1537 by Cat Neumann RN  Outcome: Progressing  2/7/2021 1536 by Cat Neumann RN  Outcome: Progressing  2/7/2021 1536 by Cat Neumann RN  Outcome: Progressing  Goal: Absence of cardiac dysrhythmias or at baseline rhythm  Description: INTERVENTIONS:  - Continuous cardiac monitoring, vital signs, obtain 12 lead EKG if ordered  - Administer antiarrhythmic and heart rate control medications as ordered  - Monitor electrolytes and administer replacement therapy as ordered  2/7/2021 1537 by Cat Neumann RN  Outcome: Progressing  2/7/2021 1536 by Cat Neumann RN  Outcome: Progressing  2/7/2021 1536 by Cat Neumann RN  Outcome: Progressing     Problem: METABOLIC, FLUID AND ELECTROLYTES - ADULT  Goal: Electrolytes maintained within normal limits  Description: INTERVENTIONS:  - Monitor labs and assess patient for signs and symptoms of electrolyte imbalances  - Administer electrolyte replacement as ordered  - Monitor response to electrolyte replacements, including repeat lab results as appropriate  - Instruct patient on fluid and nutrition as appropriate  2/7/2021 1537 by Mariza Quinn RN  Outcome: Progressing  2/7/2021 1536 by Mariza Quinn RN  Outcome: Progressing  2/7/2021 1536 by Mariza Quinn RN  Outcome: Progressing  Goal: Fluid balance maintained  Description: INTERVENTIONS:  - Monitor labs   - Monitor I/O and WT  - Instruct patient on fluid and nutrition as appropriate  - Assess for signs & symptoms of volume excess or deficit  2/7/2021 1537 by Mariza Quinn RN  Outcome: Progressing  2/7/2021 1536 by Mariza Quinn RN  Outcome: Progressing  2/7/2021 1536 by Mariza Quinn RN  Outcome: Progressing  Goal: Glucose maintained within target range  Description: INTERVENTIONS:  - Monitor Blood Glucose as ordered  - Assess for signs and symptoms of hyperglycemia and hypoglycemia  - Administer ordered medications to maintain glucose within target range  - Assess nutritional intake and initiate nutrition service referral as needed  2/7/2021 1537 by Mariza Quinn RN  Outcome: Progressing  2/7/2021 1536 by Mariza Quinn RN  Outcome: Progressing  2/7/2021 1536 by Mariza Quinn RN  Outcome: Progressing     Problem: SKIN/TISSUE INTEGRITY - ADULT  Goal: Skin integrity remains intact  Description: INTERVENTIONS  - Identify patients at risk for skin breakdown  - Assess and monitor skin integrity  - Assess and monitor nutrition and hydration status  - Monitor labs (i e  albumin)  - Assess for incontinence   - Turn and reposition patient  - Assist with mobility/ambulation  - Relieve pressure over bony prominences  - Avoid friction and shearing  - Provide appropriate hygiene as needed including keeping skin clean and dry  - Evaluate need for skin moisturizer/barrier cream  - Collaborate with interdisciplinary team (i e  Nutrition, Rehabilitation, etc ) - Patient/family teaching  2/7/2021 1537 by Nery Still RN  Outcome: Progressing  2/7/2021 1536 by Nery Still RN  Outcome: Progressing  2/7/2021 1536 by Nery Still RN  Outcome: Progressing  Goal: Incision(s), wounds(s) or drain site(s) healing without S/S of infection  Description: INTERVENTIONS  - Assess and document risk factors for skin impairment   - Assess and document dressing, incision, wound bed, drain sites and surrounding tissue  - Consider nutrition services referral as needed  - Oral mucous membranes remain intact  - Provide patient/ family education  2/7/2021 1537 by Nery Still RN  Outcome: Progressing  2/7/2021 1536 by Nery Still RN  Outcome: Progressing  2/7/2021 1536 by Nery Still RN  Outcome: Progressing  Goal: Oral mucous membranes remain intact  Description: INTERVENTIONS  - Assess oral mucosa and hygiene practices  - Implement preventative oral hygiene regimen  - Implement oral medicated treatments as ordered  - Initiate Nutrition services referral as needed  2/7/2021 1537 by Nery Still RN  Outcome: Progressing  2/7/2021 1536 by Nery Still RN  Outcome: Progressing  2/7/2021 1536 by Nery Still RN  Outcome: Progressing     Problem: HEMATOLOGIC - ADULT  Goal: Maintains hematologic stability  Description: INTERVENTIONS  - Assess for signs and symptoms of bleeding or hemorrhage  - Monitor labs  - Administer supportive blood products/factors as ordered and appropriate  2/7/2021 1537 by Nery Still RN  Outcome: Progressing  2/7/2021 1536 by Nery Still RN  Outcome: Progressing  2/7/2021 1536 by Nery Still RN  Outcome: Progressing     Problem: MUSCULOSKELETAL - ADULT  Goal: Maintain or return mobility to safest level of function  Description: INTERVENTIONS:  - Assess patient's ability to carry out ADLs; assess patient's baseline for ADL function and identify physical deficits which impact ability to perform ADLs (bathing, care of mouth/teeth, toileting, grooming, dressing, etc )  - Assess/evaluate cause of self-care deficits   - Assess range of motion  - Assess patient's mobility  - Assess patient's need for assistive devices and provide as appropriate  - Encourage maximum independence but intervene and supervise when necessary  - Involve family in performance of ADLs  - Assess for home care needs following discharge   - Consider OT consult to assist with ADL evaluation and planning for discharge  - Provide patient education as appropriate  2/7/2021 1537 by Angie North RN  Outcome: Progressing  2/7/2021 1536 by Angie North RN  Outcome: Progressing  2/7/2021 1536 by Angie North RN  Outcome: Progressing  Goal: Maintain proper alignment of affected body part  Description: INTERVENTIONS:  - Support, maintain and protect limb and body alignment  - Provide patient/ family with appropriate education  2/7/2021 1537 by Angie North RN  Outcome: Progressing  2/7/2021 1536 by Angie North RN  Outcome: Progressing  2/7/2021 1536 by Angie North RN  Outcome: Progressing
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
## 2021-02-07 NOTE — RESTRAINT FACE TO FACE
Restraint Face to Face   Shaunna Hallmakenzieottoniel Day 56 y o  male MRN: [id number]  Unit/Bed#: ED 09 Encounter: [id number]      Physical Evaluation: Fair  Purpose for Restraints/ Seclusion High risk for self harm  Patient's reaction to the intervention: Fair  Patient's medical condition: Fair   Patient's Behavioral condition: Fair  Restraints to be Continued

## 2021-02-07 NOTE — ED NOTES
[place] Suicide Risk Assessment deferred, as unable to assess while patient sleeping  Behavioral Health Assessment deferred as patient is sleeping and would benefit from additional rest   Vital signs deferred until patient awake, no signs or symptoms of respiratory distress at this time  Once patient is awake and able to participate, will complete assessments         Delicia Hampton RN  02/07/21 [id number]
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
## 2021-02-07 NOTE — RESTRAINT FACE TO FACE
Restraint Face to Face   Aristeo Antonio Day 56 y o  male MRN: [id number]  Unit/Bed#: -02 Encounter: [id number]      Physical Evaluation stable  Purpose for Restraints/ Seclusion High risk for harm to others  Patient's reaction to the intervention remains agitated and threatening  Patient's medical condition stable  Patient's Behavioral condition remains agitated and threatening  Restraints to be Continued

## 2021-02-07 NOTE — ED NOTES
Bridget Suicide Risk Assessment deferred, as unable to assess while patient sleeping  Behavioral Health Assessment deferred as patient is sleeping and would benefit from additional rest   Vital signs deferred until patient awake, no signs or symptoms of respiratory distress at this time  Once patient is awake and able to participate, will complete assessments       Kyleigh Chacon RN  02/07/21 2035

## 2021-02-07 NOTE — ED NOTES
Completed 302, read patient his rights, and left the forms in the room with patient  Patient was not able to fully understand his rights or what was happening with a 302  Cw attempted to explain the best that could be for the patient

## 2021-02-07 NOTE — NURSING NOTE
Patient has been sleeping since arrival to the [facility] floor  Evaluated by elliott, and restraints removed, and one to one observation continued  Per Elliott, will hold meds rather than provoke pt

## 2021-02-07 NOTE — ED NOTES
Perry Flores  is removed 1:1, Kita [place] patient observer is watching     Julisa Ramila  02/07/21 0008

## 2021-02-07 NOTE — ED NOTES
[facility] denied 115 due to the police not Ctra  [place] [facility] Crisis to receive consent for 302
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
## 2021-02-07 NOTE — ED NOTES
Patient incontinent of urine, linen and clothing changed  Patient cleaned with soap and water  Removed restraints to assist with clothing change and only reapplied left arm and right leg in attempt to reduce restraints and possible remove       Guy Connor RN  02/07/21 [id number]

## 2021-02-08 LAB
ALBUMIN SERPL BCP-MCNC: 4 G/DL (ref 3.5–5.7)
ALP SERPL-CCNC: 41 U/L (ref 55–165)
ALT SERPL W P-5'-P-CCNC: 24 U/L (ref 7–52)
AMPHETAMINES SERPL QL SCN: NEGATIVE
ANION GAP SERPL CALCULATED.3IONS-SCNC: 8 MMOL/L (ref 4–13)
AST SERPL W P-5'-P-CCNC: 24 U/L (ref 13–39)
BARBITURATES UR QL: NEGATIVE
BENZODIAZ UR QL: NEGATIVE
BILIRUB SERPL-MCNC: 0.5 MG/DL (ref 0.2–1)
BUN SERPL-MCNC: 12 MG/DL (ref 7–25)
CALCIUM SERPL-MCNC: 9.8 MG/DL (ref 8.6–10.5)
CHLORIDE SERPL-SCNC: 104 MMOL/L (ref 98–107)
CK MB SERPL-MCNC: 14.8 NG/ML (ref 0.6–6.3)
CK MB SERPL-MCNC: 2.3 % (ref 0.6–6.3)
CK SERPL-CCNC: 631 U/L (ref 30–308)
CO2 SERPL-SCNC: 29 MMOL/L (ref 21–31)
COCAINE UR QL: NEGATIVE
CREAT SERPL-MCNC: 1.05 MG/DL (ref 0.7–1.3)
GFR SERPL CREATININE-BSD FRML MDRD: 76 ML/MIN/1.73SQ M
GLUCOSE SERPL-MCNC: 117 MG/DL (ref 65–99)
METHADONE UR QL: NEGATIVE
OPIATES UR QL SCN: NEGATIVE
OXYCODONE+OXYMORPHONE UR QL SCN: NEGATIVE
PCP UR QL: NEGATIVE
POTASSIUM SERPL-SCNC: 3.8 MMOL/L (ref 3.5–5.5)
PROT SERPL-MCNC: 6.5 G/DL (ref 6.4–8.9)
SODIUM SERPL-SCNC: 141 MMOL/L (ref 134–143)
THC UR QL: POSITIVE

## 2021-02-08 PROCEDURE — 80053 COMPREHEN METABOLIC PANEL: CPT | Performed by: FAMILY MEDICINE

## 2021-02-08 PROCEDURE — 80307 DRUG TEST PRSMV CHEM ANLYZR: CPT | Performed by: EMERGENCY MEDICINE

## 2021-02-08 RX ADMIN — LITHIUM CARBONATE 450 MG: 300 CAPSULE, GELATIN COATED ORAL at 19:53

## 2021-02-08 RX ADMIN — TAMSULOSIN HYDROCHLORIDE 0.4 MG: 0.4 CAPSULE ORAL at 16:08

## 2021-02-08 RX ADMIN — LORAZEPAM 2 MG: 2 INJECTION INTRAMUSCULAR; INTRAVENOUS at 13:24

## 2021-02-08 RX ADMIN — LITHIUM CARBONATE 450 MG: 300 CAPSULE, GELATIN COATED ORAL at 16:07

## 2021-02-08 RX ADMIN — OLANZAPINE 20 MG: 10 TABLET, FILM COATED ORAL at 22:10

## 2021-02-08 RX ADMIN — LITHIUM CARBONATE 450 MG: 300 CAPSULE, GELATIN COATED ORAL at 08:23

## 2021-02-08 RX ADMIN — LORAZEPAM 2 MG: 2 INJECTION INTRAMUSCULAR; INTRAVENOUS at 19:53

## 2021-02-08 NOTE — PROGRESS NOTES
Progress Note - Zoltan Graham 56 y o  male MRN: [id number]    Unit/Bed#: -02 Encounter: [id number]        Subjective:   Patient seen and examined at bedside after reviewing the chart and discussing the case with the caring staff  Patient examined at bedside  Patient has no acute issues  Patient appears more calm  On review of patient's labs from today it was found that patient's CK has dropped down to [phone] today as compared to [phone] yesterday  Physical Exam   Vitals: Blood pressure 169/95, pulse 85, temperature 97 7 °F (36 5 °C), temperature source Temporal, resp  rate 18, weight 90 7 kg (200 lb), SpO2 96 %  ,Body mass index is 29 53 kg/m²  Constitutional: He appears well-developed  HEENT: PERR, EOMI, MMM  Cardiovascular: Normal rate and regular rhythm  Pulmonary/Chest: Effort normal and breath sounds normal    Abdomen: Soft, + BS, NT    Assessment/Plan:  Zoltan Graham is a(n) 56y o  year old male with      1  Cardiac with history of hypertension and dyslipidemia   Patient's blood pressure continues to be high since admission  I will put the patient on Metoprolol XL 25 mg daily  Will continue to closely monitor  Patient is not on anything for Hyperlipidemia  2  Tobacco abuse   Patient has been put on  nicotine transdermal patch  3  Alcohol abuse   Stable at this time  I will put the patient on thiamine folic acid and multivitamin  4  DJD/osteoarthritis   Tylenol on as needed basis  5  Gait abnormality   Stable  6  History of Vitamin-D deficiency   I will get vitamin-D levels for the patient  7  BPH  Flomax 0 4mg once daily    8  Psych with schizoaffective disorder and aggressive and agitated behavior   Patient is being managed by psych  Patient will be admitted to behavioral health unit once medically becomes stable  Patient is currently on one-to-one observation along with Ativan 2 mg every 2 hours on as-needed basis

## 2021-02-08 NOTE — NURSING NOTE
Patient continues on 1:1, sleeping at beginning of shift but awoke around 2100  Patient became agitated and upset talking above nurse  Unhappy that he is here and unhappy with his doctor  Patient upset that his hearing aids are not here and at one point stated "Call 911 and have them wake my mom up and get me my hearing aids"  Patient was also noted hitting his bedside table and yelling   Patient let nurse administer prn ativan which was effective

## 2021-02-09 LAB
ALBUMIN SERPL BCP-MCNC: 3.6 G/DL (ref 3.5–5.7)
ALP SERPL-CCNC: 33 U/L (ref 55–165)
ALT SERPL W P-5'-P-CCNC: 21 U/L (ref 7–52)
ANION GAP SERPL CALCULATED.3IONS-SCNC: 8 MMOL/L (ref 4–13)
AST SERPL W P-5'-P-CCNC: 18 U/L (ref 13–39)
BILIRUB SERPL-MCNC: 0.5 MG/DL (ref 0.2–1)
BUN SERPL-MCNC: 12 MG/DL (ref 7–25)
CALCIUM SERPL-MCNC: 9.2 MG/DL (ref 8.6–10.5)
CHLORIDE SERPL-SCNC: 108 MMOL/L (ref 98–107)
CK MB SERPL-MCNC: 1.6 % (ref 0.6–6.3)
CK MB SERPL-MCNC: 4.7 NG/ML (ref 0.6–6.3)
CK SERPL-CCNC: 287 U/L (ref 30–308)
CO2 SERPL-SCNC: 28 MMOL/L (ref 21–31)
CREAT SERPL-MCNC: 0.92 MG/DL (ref 0.7–1.3)
GFR SERPL CREATININE-BSD FRML MDRD: 89 ML/MIN/1.73SQ M
GLUCOSE SERPL-MCNC: 96 MG/DL (ref 65–99)
POTASSIUM SERPL-SCNC: 3.6 MMOL/L (ref 3.5–5.5)
PROT SERPL-MCNC: 5.7 G/DL (ref 6.4–8.9)
SODIUM SERPL-SCNC: 144 MMOL/L (ref 134–143)

## 2021-02-09 PROCEDURE — 80053 COMPREHEN METABOLIC PANEL: CPT | Performed by: FAMILY MEDICINE

## 2021-02-09 PROCEDURE — 82550 ASSAY OF CK (CPK): CPT | Performed by: FAMILY MEDICINE

## 2021-02-09 PROCEDURE — 82553 CREATINE MB FRACTION: CPT | Performed by: FAMILY MEDICINE

## 2021-02-09 RX ADMIN — LITHIUM CARBONATE 450 MG: 300 CAPSULE, GELATIN COATED ORAL at 15:36

## 2021-02-09 RX ADMIN — LITHIUM CARBONATE 450 MG: 300 CAPSULE, GELATIN COATED ORAL at 09:36

## 2021-02-09 RX ADMIN — TAMSULOSIN HYDROCHLORIDE 0.4 MG: 0.4 CAPSULE ORAL at 15:36

## 2021-02-09 RX ADMIN — LITHIUM CARBONATE 450 MG: 300 CAPSULE, GELATIN COATED ORAL at 20:06

## 2021-02-09 NOTE — PROGRESS NOTES
Progress Note - Mark Graham 56 y o  male MRN: [id number]    Unit/Bed#: -02 Encounter: [id number]        Subjective:   Patient seen and examined at bedside after reviewing the chart and discussing the case with the caring staff  Patient examined at bedside  Patient has no acute issues  Patient required limb restraints yesterday evening and he was difficult to redirect and was refusing treatments and also was trying to take out his IV  This morning patient appears more calm  On review of patient's labs from today it was found that patient's CK has dropped down to within normal limits to 87 as compared to 631 yesterday  Behavioral Health Unit cannot take him upstairs at [facility] and have recommended that the patient should be transferred to [facility]     Physical Exam   Vitals: Blood pressure 107/62, pulse 68, temperature 97 8 °F (36 6 °C), temperature source Temporal, resp  rate 18, weight 90 7 kg (200 lb), SpO2 94 %  ,Body mass index is 29 53 kg/m²  Constitutional: He appears well-developed  HEENT: PERR, EOMI, MMM  Cardiovascular: Normal rate and regular rhythm  Pulmonary/Chest: Effort normal and breath sounds normal    Abdomen: Soft, + BS, NT    Assessment/Plan:  Mark Graham is a(n) 56y o  year old male with      1  Cardiac with history of hypertension and dyslipidemia   Patient's blood pressure continues to be high since admission  I will put the patient on Metoprolol XL 25 mg daily  Will continue to closely monitor  Patient is not on anything for Hyperlipidemia  2  Tobacco abuse   Patient has been put on  nicotine transdermal patch  3  Alcohol abuse   Stable at this time  I will put the patient on thiamine folic acid and multivitamin  4  DJD/osteoarthritis   Tylenol on as needed basis  5  Gait abnormality   Stable  6  History of Vitamin-D deficiency   I will get vitamin-D levels for the patient    7  BPH  Flomax 0 4mg once daily    8  Psych with schizoaffective disorder and aggressive and agitated behavior   Patient is being managed by psych  Patient cannot be admitted to [facility] and Social workers are trying to make arrangement for patient's transferred to [facility] but bed is not available today 02/09/2021

## 2021-02-09 NOTE — NURSING NOTE
Patient awake and alert  Patient observer at bedside  Patient agitated  Only accepted Lithium for morning meds  Dr Tomy Caldera in to see patient  Patient medically clear Dr Tomy Caldera

## 2021-02-10 RX ADMIN — LITHIUM CARBONATE 450 MG: 300 CAPSULE, GELATIN COATED ORAL at 17:54

## 2021-02-10 RX ADMIN — LITHIUM CARBONATE 450 MG: 300 CAPSULE, GELATIN COATED ORAL at 19:19

## 2021-02-10 RX ADMIN — LITHIUM CARBONATE 450 MG: 300 CAPSULE, GELATIN COATED ORAL at 09:43

## 2021-02-10 NOTE — PROGRESS NOTES
Progress Note - Tamera Graham 56 y o  male MRN: [id number]    Unit/Bed#: -02 Encounter: [id number]        Subjective:   Patient seen and examined at bedside after reviewing the chart and discussing the case with the caring staff  Patient examined at bedside  Patient has no acute issues  Patient is still awaiting to be admitted to a [address] at [facility]     Physical Exam   Vitals: Blood pressure 135/87, pulse 89, temperature 98 3 °F (36 8 °C), temperature source Temporal, resp  rate 18, weight 90 7 kg (200 lb), SpO2 97 %  ,Body mass index is 29 53 kg/m²  Constitutional: He appears well-developed  HEENT: PERR, EOMI, MMM  Cardiovascular: Normal rate and regular rhythm  Pulmonary/Chest: Effort normal and breath sounds normal    Abdomen: Soft, + BS, NT    Assessment/Plan:  Tamera Graham is a(n) 56y o  year old male with      1  Cardiac with history of hypertension and dyslipidemia   Patient's blood pressure continues to be high since admission  I will put the patient on Metoprolol XL 25 mg daily  Will continue to closely monitor  Patient is not on anything for Hyperlipidemia  2  Tobacco abuse   Patient is refusing nicotine transdermal patch  3  Alcohol abuse   Stable at this time  I will put the patient on thiamine folic acid and multivitamin  4  DJD/osteoarthritis   Tylenol on as needed basis  5  Gait abnormality   Stable  6  History of Vitamin-D deficiency   I will get vitamin-D levels for the patient  7  BPH  Flomax 0 4mg once daily    8  Psych with schizoaffective disorder and aggressive and agitated behavior   Patient is being managed by psych  Patient cannot be admitted to [facility] and Social workers are trying to make arrangement for patient's transferred to [facility] but bed is not available today 02/09/2021  PATIENT IS MEDICALLY CLEARED FOR DISCHARGE FROM THE MEDICAL FLOOR AND FOR ADMISSION AT BEHAVIORAL HEALTH UNIT

## 2021-02-10 NOTE — NURSING NOTE
Patient requested to have his hearing aid given to him out of his belongings  Patient now has his left hearing aid, patient observer Manuel Ronquillo also aware

## 2021-02-10 NOTE — ED NOTES
Crisis met w/ pt at the request of the pt's attending to determine if he would be willing to sign a 201 or if a 302 would be needed  The pt was A&O X 4, pleasant and cooperative, although guarded in regards to continued stay on the M/S floor that is not 'counting' toward his BHU time  Upon further discussion, it was discovered the pt was referring to a 72 hr withdraw and the fact that he cannot sign that paper until he is on a unit, which is correct  However, it was further explained that should he insist upon leaving, the 302 that was put in place upon his admission would need to have a 303 hearing tomorrow, which would extend his stay and take the option for a 72 hr withdraw out of his hands  Pt showed a good understanding of same and rather than be a 303 was willing to sign a 201  Pt is okay w/ admission to the younger or older adult unit at either [facility] or   Pt's voluntary paperwork and Rights were reviewed  Pt signed his 12 and a copy of his Rights and the Voluntary Pt Handout were placed on his chart at pt's request  Pt's attending also signed 201 and original was placed on chart

## 2021-02-10 NOTE — PLAN OF CARE
Problem: Potential for Falls  Goal: Patient will remain free of falls  Description: INTERVENTIONS:  - Assess patient frequently for physical needs  -  Identify cognitive and physical deficits and behaviors that affect risk of falls    -  [place] fall precautions as indicated by assessment   - Educate patient/family on patient safety including physical limitations  - Instruct patient to call for assistance with activity based on assessment  - Modify environment to reduce risk of injury  - Consider OT/PT consult to assist with strengthening/mobility  Outcome: Progressing     Problem: Prexisting or High Potential for Compromised Skin Integrity  Goal: Skin integrity is maintained or improved  Description: INTERVENTIONS:  - Identify patients at risk for skin breakdown  - Assess and monitor skin integrity  - Assess and monitor nutrition and hydration status  - Monitor labs   - Assess for incontinence   - Turn and reposition patient  - Assist with mobility/ambulation  - Relieve pressure over bony prominences  - Avoid friction and shearing  - Provide appropriate hygiene as needed including keeping skin clean and dry  - Evaluate need for skin moisturizer/barrier cream  - Collaborate with interdisciplinary team   - Patient/family teaching  - Consider wound care consult   Outcome: Progressing     Problem: PAIN - ADULT  Goal: Verbalizes/displays adequate comfort level or baseline comfort level  Description: Interventions:  - Encourage patient to monitor pain and request assistance  - Assess pain using appropriate pain scale  - Administer analgesics based on type and severity of pain and evaluate response  - Implement non-pharmacological measures as appropriate and evaluate response  - Consider cultural and social influences on pain and pain management  - Notify physician/advanced practitioner if interventions unsuccessful or patient reports new pain  Outcome: Progressing     Problem: INFECTION - ADULT  Goal: Absence or prevention of progression during hospitalization  Description: INTERVENTIONS:  - Assess and monitor for signs and symptoms of infection  - Monitor lab/diagnostic results  - Monitor all insertion sites, i e  indwelling lines, tubes, and drains  - Monitor endotracheal if appropriate and nasal secretions for changes in amount and color  - [place] appropriate cooling/warming therapies per order  - Administer medications as ordered  - Instruct and encourage patient and family to use good hand hygiene techniqu  Outcome: Progressing  Goal: Absence of fever/infection during neutropenic period  Description: INTERVENTIONS:  - Monitor WBC    Outcome: Progressing     Problem: SAFETY ADULT  Goal: Patient will remain free of falls  Description: INTERVENTIONS:  - Assess patient frequently for physical needs  -  Identify cognitive and physical deficits and behaviors that affect risk of falls    -  [place] fall precautions as indicated by assessment   - Educate patient/family on patient safety including physical limitations  - Instruct patient to call for assistance with activity based on assessment  - Modify environment to reduce risk of injury  - Consider OT/PT consult to assist with strengthening/mobility  Outcome: Progressing  Goal: Maintain or return to baseline ADL function  Description: INTERVENTIONS:  -  Assess patient's ability to carry out ADLs; assess patient's baseline for ADL function and identify physical deficits which impact ability to perform ADLs (bathing, care of mouth/teeth, toileting, grooming, dressing, etc )  - Assess/evaluate cause of self-care deficits   - Assess range of motion  - Assess patient's mobility; develop plan if impaired  - Assess patient's need for assistive devices and provide as appropriate  - Encourage maximum independence but intervene and supervise when necessary  - Involve family in performance of ADLs  - Assess for home care needs following discharge   - Consider OT consult to assist with ADL evaluation and planning for discharge  - Provide patient education as appropriate  Outcome: Progressing  Goal: Maintain or return mobility status to optimal level  Description: INTERVENTIONS:  - Assess patient's baseline mobility status (ambulation, transfers, stairs, etc )    - Identify cognitive and physical deficits and behaviors that affect mobility  - Identify mobility aids required to assist with transfers and/or ambulation (gait belt, sit-to-stand, lift, walker, cane, etc )  - [place] fall precautions as indicated by assessment  - Record patient progress and toleration of activity level on Mobility SBAR; progress patient to next Phase/Stage  - Instruct patient to call for assistance with activity based on assessment  - Consider rehabilitation consult to assist with strengthening/weightbearing, etc   Outcome: Progressing     Problem: DISCHARGE PLANNING  Goal: Discharge to home or other facility with appropriate resources  Description: INTERVENTIONS:  - Identify barriers to discharge w/patient and caregiver  - Arrange for needed discharge resources and transportation as appropriate  - Identify discharge learning needs (meds, wound care, etc )  - Refer to Case Management Department for coordinating discharge planning if the patient needs post-hospital services based on physician/advanced practitioner order or complex needs related to functional status, cognitive ability, or social support system  Outcome: Progressing     Problem: Knowledge Deficit  Goal: Patient/family/caregiver demonstrates understanding of disease process, treatment plan, medications, and discharge instructions  Description: Complete learning assessment and assess knowledge base    Interventions:  - Provide teaching at level of understanding  - Provide teaching via preferred learning methods  Outcome: Progressing     Problem: NEUROSENSORY - ADULT  Goal: Achieves stable or improved neurological status  Description: INTERVENTIONS  - Monitor and report changes in neurological status  - Monitor vital signs such as temperature, blood pressure, glucose, and any other labs ordered   - Initiate measures to prevent increased intracranial pressure  - Monitor for seizure activity and implement precautions if appropriate      Outcome: Progressing  Goal: Achieves maximal functionality and self care  Description: INTERVENTIONS  - Monitor swallowing and airway patency with patient fatigue and changes in neurological status  - Encourage and assist patient to increase activity and self care     - Encourage visually impaired, hearing impaired and aphasic patients to use assistive/communication devices  Outcome: Progressing     Problem: CARDIOVASCULAR - ADULT  Goal: Maintains optimal cardiac output and hemodynamic stability  Description: INTERVENTIONS:  - Monitor I/O, vital signs and rhythm  - Monitor for S/S and trends of decreased cardiac output  - Administer and titrate ordered vasoactive medications to optimize hemodynamic stability  - Assess quality of pulses, skin color and temperature  - Assess for signs of decreased coronary artery perfusion  - Instruct patient to report change in severity of symptoms  Outcome: Progressing  Goal: Absence of cardiac dysrhythmias or at baseline rhythm  Description: INTERVENTIONS:  - Continuous cardiac monitoring, vital signs, obtain 12 lead EKG if ordered  - Administer antiarrhythmic and heart rate control medications as ordered  - Monitor electrolytes and administer replacement therapy as ordered  Outcome: Progressing     Problem: METABOLIC, FLUID AND ELECTROLYTES - ADULT  Goal: Electrolytes maintained within normal limits  Description: INTERVENTIONS:  - Monitor labs and assess patient for signs and symptoms of electrolyte imbalances  - Administer electrolyte replacement as ordered  - Monitor response to electrolyte replacements, including repeat lab results as appropriate  - Instruct patient on fluid and nutrition as appropriate  Outcome: Progressing  Goal: Fluid balance maintained  Description: INTERVENTIONS:  - Monitor labs   - Monitor I/O and WT  - Instruct patient on fluid and nutrition as appropriate  - Assess for signs & symptoms of volume excess or deficit  Outcome: Progressing  Goal: Glucose maintained within target range  Description: INTERVENTIONS:  - Monitor Blood Glucose as ordered  - Assess for signs and symptoms of hyperglycemia and hypoglycemia  - Administer ordered medications to maintain glucose within target range  - Assess nutritional intake and initiate nutrition service referral as needed  Outcome: Progressing     Problem: SKIN/TISSUE INTEGRITY - ADULT  Goal: Skin integrity remains intact  Description: INTERVENTIONS  - Identify patients at risk for skin breakdown  - Assess and monitor skin integrity  - Assess and monitor nutrition and hydration status  - Monitor labs (i e  albumin)  - Assess for incontinence   - Turn and reposition patient  - Assist with mobility/ambulation  - Relieve pressure over bony prominences  - Avoid friction and shearing  - Provide appropriate hygiene as needed including keeping skin clean and dry  - Evaluate need for skin moisturizer/barrier cream  - Collaborate with interdisciplinary team (i e  Nutrition, Rehabilitation, etc )   - Patient/family teaching  Outcome: Progressing  Goal: Incision(s), wounds(s) or drain site(s) healing without S/S of infection  Description: INTERVENTIONS  - Assess and document risk factors for skin impairment   - Assess and document dressing, incision, wound bed, drain sites and surrounding tissue  - Consider nutrition services referral as needed  - Oral mucous membranes remain intact  - Provide patient/ family education  Outcome: Progressing  Goal: Oral mucous membranes remain intact  Description: INTERVENTIONS  - Assess oral mucosa and hygiene practices  - Implement preventative oral hygiene regimen  - Implement oral medicated treatments as ordered  - Initiate Nutrition services referral as needed  Outcome: Progressing     Problem: HEMATOLOGIC - ADULT  Goal: Maintains hematologic stability  Description: INTERVENTIONS  - Assess for signs and symptoms of bleeding or hemorrhage  - Monitor labs  - Administer supportive blood products/factors as ordered and appropriate  Outcome: Progressing     Problem: MUSCULOSKELETAL - ADULT  Goal: Maintain or return mobility to safest level of function  Description: INTERVENTIONS:  - Assess patient's ability to carry out ADLs; assess patient's baseline for ADL function and identify physical deficits which impact ability to perform ADLs (bathing, care of mouth/teeth, toileting, grooming, dressing, etc )  - Assess/evaluate cause of self-care deficits   - Assess range of motion  - Assess patient's mobility  - Assess patient's need for assistive devices and provide as appropriate  - Encourage maximum independence but intervene and supervise when necessary  - Involve family in performance of ADLs  - Assess for home care needs following discharge   - Consider OT consult to assist with ADL evaluation and planning for discharge  - Provide patient education as appropriate  Outcome: Progressing  Goal: Maintain proper alignment of affected body part  Description: INTERVENTIONS:  - Support, maintain and protect limb and body alignment  - Provide patient/ family with appropriate education  Outcome: Progressing
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
## 2021-02-11 RX ADMIN — LITHIUM CARBONATE 450 MG: 300 CAPSULE, GELATIN COATED ORAL at 16:29

## 2021-02-11 RX ADMIN — LITHIUM CARBONATE 450 MG: 300 CAPSULE, GELATIN COATED ORAL at 19:02

## 2021-02-11 RX ADMIN — LITHIUM CARBONATE 450 MG: 300 CAPSULE, GELATIN COATED ORAL at 08:34

## 2021-02-11 NOTE — PROGRESS NOTES
Progress Note - Adolph Graham 56 y o  male MRN: [id number]    Unit/Bed#: -02 Encounter: [id number]        Subjective:   Patient seen and examined at bedside after reviewing the chart and discussing the case with the caring staff  Patient examined at bedside  Patient has no acute issues  Patient is still awaiting to be admitted to a [facility] at [facility] as bed is unavailable  Physical Exam   Vitals: Blood pressure (P) 168/84, pulse (P) 88, temperature (P) 98 6 °F (37 °C), temperature source (P) Tympanic, resp  rate (P) 18, weight 90 7 kg (200 lb), SpO2 (P) 95 %  ,Body mass index is 29 53 kg/m²  Constitutional: He appears well-developed  HEENT: PERR, EOMI, MMM  Cardiovascular: Normal rate and regular rhythm  Pulmonary/Chest: Effort normal and breath sounds normal    Abdomen: Soft, + BS, NT    Assessment/Plan:  Adolph Graham is a(n) 56y o  year old male with      1  Cardiac with history of hypertension and dyslipidemia   Patient's blood pressure continues to be high since admission  I will put the patient on Metoprolol XL 25 mg daily  Will continue to closely monitor  Patient is not on anything for Hyperlipidemia  2  Tobacco abuse   Patient is refusing nicotine transdermal patch  3  Alcohol abuse   Stable at this time  I will put the patient on thiamine folic acid and multivitamin  4  DJD/osteoarthritis   Tylenol on as needed basis  5  Gait abnormality   Stable  6  History of Vitamin-D deficiency   I will get vitamin-D levels for the patient  7  BPH  Flomax 0 4mg once daily    8  Psych with schizoaffective disorder and aggressive and agitated behavior   Patient is being managed by psych  Patient cannot be admitted to [facility] M[facility] and Social workers are trying to make arrangement for patient's transferred to [facility] but bed is not available today 02/09/2021    PATIENT IS MEDICALLY CLEARED FOR DISCHARGE FROM THE MEDICAL FLOOR AND FOR ADMISSION AT BEHAVIORAL HEALTH UNIT

## 2021-02-11 NOTE — PLAN OF CARE
Problem: Potential for Falls  Goal: Patient will remain free of falls  Description: INTERVENTIONS:  - Assess patient frequently for physical needs  -  Identify cognitive and physical deficits and behaviors that affect risk of falls    -  [place] fall precautions as indicated by assessment   - Educate patient/family on patient safety including physical limitations  - Instruct patient to call for assistance with activity based on assessment  - Modify environment to reduce risk of injury  - Consider OT/PT consult to assist with strengthening/mobility  Outcome: Progressing     Problem: Prexisting or High Potential for Compromised Skin Integrity  Goal: Skin integrity is maintained or improved  Description: INTERVENTIONS:  - Identify patients at risk for skin breakdown  - Assess and monitor skin integrity  - Assess and monitor nutrition and hydration status  - Monitor labs   - Assess for incontinence   - Turn and reposition patient  - Assist with mobility/ambulation  - Relieve pressure over bony prominences  - Avoid friction and shearing  - Provide appropriate hygiene as needed including keeping skin clean and dry  - Evaluate need for skin moisturizer/barrier cream  - Collaborate with interdisciplinary team   - Patient/family teaching  - Consider wound care consult   Outcome: Progressing     Problem: PAIN - ADULT  Goal: Verbalizes/displays adequate comfort level or baseline comfort level  Description: Interventions:  - Encourage patient to monitor pain and request assistance  - Assess pain using appropriate pain scale  - Administer analgesics based on type and severity of pain and evaluate response  - Implement non-pharmacological measures as appropriate and evaluate response  - Consider cultural and social influences on pain and pain management  - Notify physician/advanced practitioner if interventions unsuccessful or patient reports new pain  Outcome: Progressing     Problem: INFECTION - ADULT  Goal: Absence or prevention of progression during hospitalization  Description: INTERVENTIONS:  - Assess and monitor for signs and symptoms of infection  - Monitor lab/diagnostic results  - Monitor all insertion sites, i e  indwelling lines, tubes, and drains  - Monitor endotracheal if appropriate and nasal secretions for changes in amount and color  - [place] appropriate cooling/warming therapies per order  - Administer medications as ordered  - Instruct and encourage patient and family to use good hand hygiene techniqu  Outcome: Progressing     Problem: SAFETY ADULT  Goal: Patient will remain free of falls  Description: INTERVENTIONS:  - Assess patient frequently for physical needs  -  Identify cognitive and physical deficits and behaviors that affect risk of falls    -  [place] fall precautions as indicated by assessment   - Educate patient/family on patient safety including physical limitations  - Instruct patient to call for assistance with activity based on assessment  - Modify environment to reduce risk of injury  - Consider OT/PT consult to assist with strengthening/mobility  Outcome: Progressing  Goal: Maintain or return to baseline ADL function  Description: INTERVENTIONS:  -  Assess patient's ability to carry out ADLs; assess patient's baseline for ADL function and identify physical deficits which impact ability to perform ADLs (bathing, care of mouth/teeth, toileting, grooming, dressing, etc )  - Assess/evaluate cause of self-care deficits   - Assess range of motion  - Assess patient's mobility; develop plan if impaired  - Assess patient's need for assistive devices and provide as appropriate  - Encourage maximum independence but intervene and supervise when necessary  - Involve family in performance of ADLs  - Assess for home care needs following discharge   - Consider OT consult to assist with ADL evaluation and planning for discharge  - Provide patient education as appropriate  Outcome: Progressing  Goal: Maintain or return mobility status to optimal level  Description: INTERVENTIONS:  - Assess patient's baseline mobility status (ambulation, transfers, stairs, etc )    - Identify cognitive and physical deficits and behaviors that affect mobility  - Identify mobility aids required to assist with transfers and/or ambulation (gait belt, sit-to-stand, lift, walker, cane, etc )  - [place] fall precautions as indicated by assessment  - Record patient progress and toleration of activity level on Mobility SBAR; progress patient to next Phase/Stage  - Instruct patient to call for assistance with activity based on assessment  - Consider rehabilitation consult to assist with strengthening/weightbearing, etc   Outcome: Progressing     Problem: DISCHARGE PLANNING  Goal: Discharge to home or other facility with appropriate resources  Description: INTERVENTIONS:  - Identify barriers to discharge w/patient and caregiver  - Arrange for needed discharge resources and transportation as appropriate  - Identify discharge learning needs (meds, wound care, etc )  - Refer to Case Management Department for coordinating discharge planning if the patient needs post-hospital services based on physician/advanced practitioner order or complex needs related to functional status, cognitive ability, or social support system  Outcome: Progressing     Problem: Knowledge Deficit  Goal: Patient/family/caregiver demonstrates understanding of disease process, treatment plan, medications, and discharge instructions  Description: Complete learning assessment and assess knowledge base    Interventions:  - Provide teaching at level of understanding  - Provide teaching via preferred learning methods  Outcome: Progressing     Problem: NEUROSENSORY - ADULT  Goal: Achieves maximal functionality and self care  Description: INTERVENTIONS  - Monitor swallowing and airway patency with patient fatigue and changes in neurological status  - Encourage and assist patient to increase activity and self care     - Encourage visually impaired, hearing impaired and aphasic patients to use assistive/communication devices  Outcome: Progressing     Problem: CARDIOVASCULAR - ADULT  Goal: Maintains optimal cardiac output and hemodynamic stability  Description: INTERVENTIONS:  - Monitor I/O, vital signs and rhythm  - Monitor for S/S and trends of decreased cardiac output  - Administer and titrate ordered vasoactive medications to optimize hemodynamic stability  - Assess quality of pulses, skin color and temperature  - Assess for signs of decreased coronary artery perfusion  - Instruct patient to report change in severity of symptoms  Outcome: Progressing  Goal: Absence of cardiac dysrhythmias or at baseline rhythm  Description: INTERVENTIONS:  - Continuous cardiac monitoring, vital signs, obtain 12 lead EKG if ordered  - Administer antiarrhythmic and heart rate control medications as ordered  - Monitor electrolytes and administer replacement therapy as ordered  Outcome: Progressing     Problem: METABOLIC, FLUID AND ELECTROLYTES - ADULT  Goal: Electrolytes maintained within normal limits  Description: INTERVENTIONS:  - Monitor labs and assess patient for signs and symptoms of electrolyte imbalances  - Administer electrolyte replacement as ordered  - Monitor response to electrolyte replacements, including repeat lab results as appropriate  - Instruct patient on fluid and nutrition as appropriate  Outcome: Progressing  Goal: Fluid balance maintained  Description: INTERVENTIONS:  - Monitor labs   - Monitor I/O and WT  - Instruct patient on fluid and nutrition as appropriate  - Assess for signs & symptoms of volume excess or deficit  Outcome: Progressing  Goal: Glucose maintained within target range  Description: INTERVENTIONS:  - Monitor Blood Glucose as ordered  - Assess for signs and symptoms of hyperglycemia and hypoglycemia  - Administer ordered medications to maintain glucose within target range  - Assess nutritional intake and initiate nutrition service referral as needed  Outcome: Progressing     Problem: SKIN/TISSUE INTEGRITY - ADULT  Goal: Skin integrity remains intact  Description: INTERVENTIONS  - Identify patients at risk for skin breakdown  - Assess and monitor skin integrity  - Assess and monitor nutrition and hydration status  - Monitor labs (i e  albumin)  - Assess for incontinence   - Turn and reposition patient  - Assist with mobility/ambulation  - Relieve pressure over bony prominences  - Avoid friction and shearing  - Provide appropriate hygiene as needed including keeping skin clean and dry  - Evaluate need for skin moisturizer/barrier cream  - Collaborate with interdisciplinary team (i e  Nutrition, Rehabilitation, etc )   - Patient/family teaching  Outcome: Progressing  Goal: Incision(s), wounds(s) or drain site(s) healing without S/S of infection  Description: INTERVENTIONS  - Assess and document risk factors for skin impairment   - Assess and document dressing, incision, wound bed, drain sites and surrounding tissue  - Consider nutrition services referral as needed  - Oral mucous membranes remain intact  - Provide patient/ family education  Outcome: Progressing  Goal: Oral mucous membranes remain intact  Description: INTERVENTIONS  - Assess oral mucosa and hygiene practices  - Implement preventative oral hygiene regimen  - Implement oral medicated treatments as ordered  - Initiate Nutrition services referral as needed  Outcome: Progressing     Problem: HEMATOLOGIC - ADULT  Goal: Maintains hematologic stability  Description: INTERVENTIONS  - Assess for signs and symptoms of bleeding or hemorrhage  - Monitor labs  - Administer supportive blood products/factors as ordered and appropriate  Outcome: Progressing     Problem: MUSCULOSKELETAL - ADULT  Goal: Maintain or return mobility to safest level of function  Description: INTERVENTIONS:  - Assess patient's ability to carry out ADLs; assess patient's baseline for ADL function and identify physical deficits which impact ability to perform ADLs (bathing, care of mouth/teeth, toileting, grooming, dressing, etc )  - Assess/evaluate cause of self-care deficits   - Assess range of motion  - Assess patient's mobility  - Assess patient's need for assistive devices and provide as appropriate  - Encourage maximum independence but intervene and supervise when necessary  - Involve family in performance of ADLs  - Assess for home care needs following discharge   - Consider OT consult to assist with ADL evaluation and planning for discharge  - Provide patient education as appropriate  Outcome: Progressing  Goal: Maintain proper alignment of affected body part  Description: INTERVENTIONS:  - Support, maintain and protect limb and body alignment  - Provide patient/ family with appropriate education  Outcome: Progressing

## 2021-02-12 PROBLEM — R74.8 ELEVATED CK: Status: ACTIVE | Noted: 2021-02-12

## 2021-02-12 PROCEDURE — 99232 SBSQ HOSP IP/OBS MODERATE 35: CPT | Performed by: NURSE PRACTITIONER

## 2021-02-12 RX ORDER — TAMSULOSIN HYDROCHLORIDE 0.4 MG/1
0.4 CAPSULE ORAL
Status: CANCELLED | OUTPATIENT
Start: 2021-02-12

## 2021-02-12 RX ORDER — METOPROLOL SUCCINATE 25 MG/1
25 TABLET, EXTENDED RELEASE ORAL DAILY
Status: CANCELLED | OUTPATIENT
Start: 2021-02-13

## 2021-02-12 RX ORDER — OLANZAPINE 10 MG/1
10 TABLET ORAL 2 TIMES DAILY
Status: DISCONTINUED | OUTPATIENT
Start: 2021-02-12 | End: 2021-02-16 | Stop reason: HOSPADM

## 2021-02-12 RX ORDER — ZIPRASIDONE MESYLATE 20 MG/ML
20 INJECTION, POWDER, LYOPHILIZED, FOR SOLUTION INTRAMUSCULAR EVERY 6 HOURS PRN
Status: CANCELLED | OUTPATIENT
Start: 2021-02-12 | Stop reason: SINTOL

## 2021-02-12 RX ORDER — FOLIC ACID 1 MG/1
1 TABLET ORAL DAILY
Status: CANCELLED | OUTPATIENT
Start: 2021-02-13

## 2021-02-12 RX ORDER — CLONAZEPAM 1 MG/1
1 TABLET ORAL 2 TIMES DAILY
Status: DISCONTINUED | OUTPATIENT
Start: 2021-02-12 | End: 2021-02-13

## 2021-02-12 RX ORDER — LORAZEPAM 2 MG/ML
2 INJECTION INTRAMUSCULAR EVERY 2 HOUR PRN
Status: CANCELLED | OUTPATIENT
Start: 2021-02-12

## 2021-02-12 RX ORDER — OLANZAPINE 10 MG/1
20 TABLET ORAL
Status: CANCELLED | OUTPATIENT
Start: 2021-02-12

## 2021-02-12 RX ORDER — LANOLIN ALCOHOL/MO/W.PET/CERES
100 CREAM (GRAM) TOPICAL DAILY
Status: CANCELLED | OUTPATIENT
Start: 2021-02-13

## 2021-02-12 RX ORDER — ACETAMINOPHEN 325 MG/1
650 TABLET ORAL EVERY 4 HOURS PRN
Status: CANCELLED | OUTPATIENT
Start: 2021-02-12

## 2021-02-12 RX ADMIN — LITHIUM CARBONATE 450 MG: 300 CAPSULE, GELATIN COATED ORAL at 15:45

## 2021-02-12 RX ADMIN — TAMSULOSIN HYDROCHLORIDE 0.4 MG: 0.4 CAPSULE ORAL at 15:47

## 2021-02-12 RX ADMIN — ACETAMINOPHEN 650 MG: 325 TABLET ORAL at 22:56

## 2021-02-12 RX ADMIN — LITHIUM CARBONATE 450 MG: 300 CAPSULE, GELATIN COATED ORAL at 20:50

## 2021-02-12 RX ADMIN — ZIPRASIDONE MESYLATE 20 MG: 20 INJECTION, POWDER, LYOPHILIZED, FOR SOLUTION INTRAMUSCULAR at 02:43

## 2021-02-12 RX ADMIN — LITHIUM CARBONATE 450 MG: 300 CAPSULE, GELATIN COATED ORAL at 10:45

## 2021-02-12 RX ADMIN — WATER 10 ML: 1 INJECTION INTRAMUSCULAR; INTRAVENOUS; SUBCUTANEOUS at 02:43

## 2021-02-12 NOTE — NURSING NOTE
Patient screaming in room  I was able to defuse the situation, encouraging patient to use inside voices  Patient asked to be left alone, ensured Smiley Fowler felt comfortable in the room  Will continue to monitor

## 2021-02-12 NOTE — NURSING NOTE
Patient pacing back and forth, incomprehensible screaming, throwing objects, and punch walls  Unable to redirect/ defuse the situation  Controlled team called  Placed patient in four-point locked restraints, hospitalist placed order  Given IM 20 mg GEODON at [id number]  Updated Dr Bernadette Rubalcava via [facility] Abdias German is currently on a one-to-one  Will continue to monitor

## 2021-02-12 NOTE — DISCHARGE SUMMARY
Discharge Summary -   Pam Graham 56 y o  male MRN: [id number]  Unit/Bed#: -02 Encounter: [id number]    Admission Date: 2/6/2021     Discharge Date:  02/12/2021    Admitting Diagnosis: Rhabdomyolysis [M62 82]  Agitation [R45 1]  Psychosis ([address] ) [F29]  Encounter for psychological evaluation [Z00 8]    Discharge Diagnosis:   Complete resolution of elevated CK with suspected rhabdomyolysis  Agitated and aggressive behavior    Attending:  Indira Avila MD    Consulting Physician(s):   Yessy Allison PA-C psychiatry    Hospital Course:  Mr Pam Graham is a 77-year-old male with history of schizoaffective disorder who was brought in by law enforcement agencies and admitted through the emergency department when he was aggressive and agitated at home and threatening his mother  In the emergency department workup patient was found to have elevated CK of 1254 with suspected rhabdomyolysis  Patient was admitted to the medical-surgical floor  Patient's CK level came back to within normal limits in 3 days  During his stay on the medical-surgical floor frequently patient was agitated aggressive and physically threatening to the caring staff nurses  Patient was frequently requiring 4 limb restraints  Psych was consulted and they determined that patient does need admission but because of his acutely aggressive behavior and shortage of staff it was recommended that patient should be transferred to [facility]   Social workers and crisis team coordinated patient's transfer  Patient was examined at bedside on the day of discharge  Patient's vitals were reviewed which were found to be within normal limits      Patient is medically cleared for discharge from medical-surgical floor for admission to [address] [place] [address] at [facility]     Condition at Discharge:  FAIR     Discharge instructions/Information to patient and family:   See after visit summary for information provided to patient and family  Provisions for Follow-Up Care:  See after visit summary for information related to follow-up care and any pertinent home health orders  Disposition: Extended care facility [facility]    Discharge Statement   I spent 50 minutes discharging the patient  This time was spent on the day of discharge  I had direct contact with the patient on the day of discharge  Discharge Medications:  See after visit summary for reconciled discharge medications provided to patient and family

## 2021-02-12 NOTE — PROGRESS NOTES
Progress Note - 6 Saint Christian Frandy Day 56 y o  male MRN: [id number]  Unit/Bed#: -02 Encounter: [id number]    Assessment/Plan   Principal Problem:    Elevated CK      Noelle Ridley was seen today as follow-up to consult  Patient has been displaying aggressive behavior requiring 4 point restraints and one-to-one observation  According to nursing notes not last night, patient was throwing objects, punching walls, and screaming incomprehensible words  He was unable to be redirected requiring control team intervention  Upon interaction today, patient was labile and agitated  He was alert and oriented x3  Responded minimally to provider  Continues with loud, profane speech  He did not appear to be responding to internal stimuli and no delusional content was voiced  Bed search is ongoing by crisis  Mental Status Evaluation:  Appearance:  bearded, disheveled, older than stated age and In 4 point restraints   Behavior:  uncooperative and Agitated, uninterested   Speech:  loud and profane   Mood:  angry and labile   Affect:  mood-congruent   Thought Process:  disorganized   Thought Content:  No overt delusions   Perceptual Disturbances: None   Risk Potential: Suicidal Ideations none  Homicidal Ideations none  Potential for Aggression No   Sensorium:  person, place and time/date   Memory:  recent and remote memory: unable to assess due to lack of cooperation   Consciousness:  alert and awake    Attention: attention span appeared shorter than expected for age   Insight:  limited   Judgment: Poor   Gait/Station: In bed   Motor Activity: no abnormal movements     Recommended Treatment:    · Will change frequency of Zyprexa and add scheduled Klonopin for agitation  Lithium level and CK level to be drawn in a m  2/13/21  Will discontinue PRN Geodon due to prolonged QT interval   Nursing staff to encourage fluids  · Bed search ongoing      Medications: all current active meds have been reviewed and Change Zyprexa to 10 mg PO BID,  Add Klonopin 1 mg PO BID, discontinue PRN Geodon due to Prolonged QT  Labs: I have personally reviewed all pertinent laboratory/tests results  Will obtain lithium level and CK in a m       Most Recent Labs:   Lab Results   Component Value Date    WBC 6 90 02/07/2021    RBC 4 52 02/07/2021    HGB 13 5 (L) 02/07/2021    HCT 39 9 (L) 02/07/2021     02/07/2021    RDW 14 5 02/07/2021    NEUTROABS 4 70 02/07/2021    SODIUM 144 (H) 02/09/2021    K 3 6 02/09/2021     (H) 02/09/2021    CO2 28 02/09/2021    BUN 12 02/09/2021    CREATININE 0 92 02/09/2021    GLUC 96 02/09/2021    GLUF 91 11/06/2019    CALCIUM 9 2 02/09/2021    AST 18 02/09/2021    ALT 21 02/09/2021    ALKPHOS 33 (L) 02/09/2021    TP 5 7 (L) 02/09/2021    ALB 3 6 02/09/2021    TBILI 0 50 02/09/2021    CHOLESTEROL 120 11/03/2019    HDL 44 11/03/2019    TRIG 90 11/03/2019    LDLCALC 58 11/03/2019    NONHDLC 76 11/03/2019    LITHIUM 0 2 (L) 02/06/2021    AMMONIA 51 02/06/2021    OCG7MQKCNFHU 1 410 02/06/2021    RPR Non-Reactive 11/03/2019    HGBA1C 5 1 11/03/2019     11/03/2019     Counseling / Coordination of Care  Total floor / unit time spent today 20 minutes  Greater than 50% of total time was spent with the patient and / or family counseling and / or coordination of care

## 2021-02-12 NOTE — PLAN OF CARE
Problem: NEUROSENSORY - ADULT  Goal: Achieves stable or improved neurological status  Description: INTERVENTIONS  - Monitor and report changes in neurological status  - Monitor vital signs such as temperature, blood pressure, glucose, and any other labs ordered   - Initiate measures to prevent increased intracranial pressure  - Monitor for seizure activity and implement precautions if appropriate      Outcome: Not Progressing  Goal: Achieves maximal functionality and self care  Description: INTERVENTIONS  - Monitor swallowing and airway patency with patient fatigue and changes in neurological status  - Encourage and assist patient to increase activity and self care     - Encourage visually impaired, hearing impaired and aphasic patients to use assistive/communication devices  Outcome: Not Progressing

## 2021-02-12 NOTE — RESTRAINT FACE TO FACE
Restraint Face to Face   Sarah Howard Day 56 y o  male MRN: [id number]  Unit/Bed#: -02 Encounter: [id number]      Physical Evaluation: stable  Purpose for Restraints/ Seclusion High risk for causing significant disruption of treatment environment   Patient's reaction to the intervention: remains agitated and screaming  Patient's medical condition: stable  Patient's Behavioral condition: agitated  Restraints to be Continued

## 2021-02-12 NOTE — NURSING NOTE
Psych contacted about coming to see patient due to increasing agitation and being back in locked restraints   responded that she is aware  No new orders at this time   RN will continue to monitor

## 2021-02-12 NOTE — PROGRESS NOTES
Progress Note - Marcy Graham 56 y o  male MRN: [id number]    Unit/Bed#: -02 Encounter: [id number]        Subjective:   Patient seen and examined at bedside after reviewing the chart and discussing the case with the caring staff  Patient examined at bedside  Patient has no acute issues  Patient is still awaiting to be admitted to a [facility] at [facility] as bed is unavailable  Physical Exam   Vitals: Blood pressure (!) 172/110, pulse 85, temperature (!) 97 3 °F (36 3 °C), temperature source Tympanic, resp  rate 22, height 5' 10" (1 778 m), weight 90 7 kg (200 lb), SpO2 98 %  ,Body mass index is 28 7 kg/m²  Constitutional: He appears well-developed  HEENT: PERR, EOMI, MMM  Cardiovascular: Normal rate and regular rhythm  Pulmonary/Chest: Effort normal and breath sounds normal    Abdomen: Soft, + BS, NT    Assessment/Plan:  Marcy Graham is a(n) 56y o  year old male with      1  Cardiac with history of hypertension and dyslipidemia   Patient's blood pressure continues to be high since admission  I will put the patient on Metoprolol XL 25 mg daily  Will continue to closely monitor  Patient is not on anything for Hyperlipidemia  2  Tobacco abuse   Patient is refusing nicotine transdermal patch  3  Alcohol abuse   Stable at this time  I will put the patient on thiamine folic acid and multivitamin  4  DJD/osteoarthritis   Tylenol on as needed basis  5  Gait abnormality   Stable  6  History of Vitamin-D deficiency   I will get vitamin-D levels for the patient  7  BPH  Flomax 0 4mg once daily    8  Psych with schizoaffective disorder and aggressive and agitated behavior   Patient is being managed by psych  Patient cannot be admitted to [facility] MENTAL [facility] and Social workers are trying to make arrangement for patient's transferred to [facility] but bed is not available today 02/09/2021        PATIENT IS MEDICALLY CLEARED FOR DISCHARGE FROM THE MEDICAL FLOOR AND FOR ADMISSION AT BEHAVIORAL HEALTH UNIT

## 2021-02-13 LAB
CK SERPL-CCNC: 116 U/L (ref 30–308)
LITHIUM SERPL-SCNC: 0.9 MMOL/L (ref 1–1.2)

## 2021-02-13 PROCEDURE — 99232 SBSQ HOSP IP/OBS MODERATE 35: CPT | Performed by: PSYCHIATRY & NEUROLOGY

## 2021-02-13 PROCEDURE — 82550 ASSAY OF CK (CPK): CPT | Performed by: NURSE PRACTITIONER

## 2021-02-13 PROCEDURE — 80178 ASSAY OF LITHIUM: CPT | Performed by: NURSE PRACTITIONER

## 2021-02-13 RX ORDER — OLANZAPINE 10 MG/1
10 INJECTION, POWDER, LYOPHILIZED, FOR SOLUTION INTRAMUSCULAR EVERY 6 HOURS PRN
Status: DISCONTINUED | OUTPATIENT
Start: 2021-02-13 | End: 2021-02-16 | Stop reason: HOSPADM

## 2021-02-13 RX ORDER — LORAZEPAM 2 MG/ML
2 INJECTION INTRAMUSCULAR
Status: DISCONTINUED | OUTPATIENT
Start: 2021-02-13 | End: 2021-02-16 | Stop reason: HOSPADM

## 2021-02-13 RX ORDER — LORAZEPAM 2 MG/ML
1 INJECTION INTRAMUSCULAR EVERY 4 HOURS PRN
Status: DISCONTINUED | OUTPATIENT
Start: 2021-02-13 | End: 2021-02-16 | Stop reason: HOSPADM

## 2021-02-13 RX ORDER — LORAZEPAM 2 MG/ML
2 INJECTION INTRAMUSCULAR ONCE
Status: COMPLETED | OUTPATIENT
Start: 2021-02-13 | End: 2021-02-13

## 2021-02-13 RX ORDER — BENZTROPINE MESYLATE 1 MG/ML
1 INJECTION INTRAMUSCULAR; INTRAVENOUS 2 TIMES DAILY PRN
Status: DISCONTINUED | OUTPATIENT
Start: 2021-02-13 | End: 2021-02-16 | Stop reason: HOSPADM

## 2021-02-13 RX ORDER — DIAZEPAM 5 MG/1
5 TABLET ORAL 3 TIMES DAILY
Status: DISCONTINUED | OUTPATIENT
Start: 2021-02-13 | End: 2021-02-16 | Stop reason: HOSPADM

## 2021-02-13 RX ADMIN — DIAZEPAM 5 MG: 5 TABLET ORAL at 20:31

## 2021-02-13 RX ADMIN — LITHIUM CARBONATE 450 MG: 300 CAPSULE, GELATIN COATED ORAL at 17:08

## 2021-02-13 RX ADMIN — LORAZEPAM 2 MG: 2 INJECTION INTRAMUSCULAR; INTRAVENOUS at 13:46

## 2021-02-13 RX ADMIN — LITHIUM CARBONATE 450 MG: 300 CAPSULE, GELATIN COATED ORAL at 20:33

## 2021-02-13 RX ADMIN — LORAZEPAM 2 MG: 2 INJECTION INTRAMUSCULAR; INTRAVENOUS at 03:05

## 2021-02-13 RX ADMIN — DIAZEPAM 5 MG: 5 TABLET ORAL at 17:09

## 2021-02-13 RX ADMIN — LITHIUM CARBONATE 450 MG: 300 CAPSULE, GELATIN COATED ORAL at 09:06

## 2021-02-13 NOTE — NURSING NOTE
Pt acting erratically and shouting out at staff  Pt remains in 4 point locked restraints due to possible violence to self and others  Dr Fabi Fox notified and telephone order for 2 mg IM injection STAT and 1mg IM q 4 ordered  Stat 2 mg dose administered  Will continue to monitor pt's erratic behavior

## 2021-02-13 NOTE — PROGRESS NOTES
Progress Note - Adrienne Graham 56 y o  male MRN: [id number]    Unit/Bed#: -02 Encounter: [id number]        Subjective:   Patient seen and examined at bedside after reviewing the chart and discussing the case with the caring staff  Patient examined at bedside  Patient has no acute issues other than continued aggressive behavior requiring 4 limb restraints for the patient  Patient is still awaiting to be admitted to a [facility] at [facility] as bed is unavailable  Physical Exam   Vitals: Blood pressure 135/62, pulse 86, temperature (!) 97 °F (36 1 °C), temperature source Tympanic, resp  rate 18, height 5' 10" (1 778 m), weight 90 7 kg (200 lb), SpO2 96 %  ,Body mass index is 28 7 kg/m²  Constitutional: He appears well-developed  HEENT: PERR, EOMI, MMM  Cardiovascular: Normal rate and regular rhythm  Pulmonary/Chest: Effort normal and breath sounds normal    Abdomen: Soft, + BS, NT    Assessment/Plan:  Adrienne Graham is a(n) 56y o  year old male with      1  Cardiac with history of hypertension and dyslipidemia   Patient's blood pressure continues to be high since admission  I will put the patient on Metoprolol XL 25 mg daily  Will continue to closely monitor  Patient is not on anything for Hyperlipidemia  2  Tobacco abuse   Patient is refusing nicotine transdermal patch  3  Alcohol abuse   Stable at this time  I will put the patient on thiamine folic acid and multivitamin  4  DJD/osteoarthritis   Tylenol on as needed basis  5  Gait abnormality   Stable  6  History of Vitamin-D deficiency   I will get vitamin-D levels for the patient  7  BPH  Flomax 0 4mg once daily    8  Psych with schizoaffective disorder and aggressive and agitated behavior   Patient is being managed by psych   Patient cannot be admitted to [facility] M[facility] and Social workers are trying to make arrangement for patient's transferred to [facility] but bed is not available today 02/09/2021  PATIENT IS MEDICALLY CLEARED FOR DISCHARGE FROM THE MEDICAL FLOOR AND FOR ADMISSION AT BEHAVIORAL HEALTH UNIT

## 2021-02-13 NOTE — PLAN OF CARE
Problem: Potential for Falls  Goal: Patient will remain free of falls  Description: INTERVENTIONS:  - Assess patient frequently for physical needs  -  Identify cognitive and physical deficits and behaviors that affect risk of falls    -  [place] fall precautions as indicated by assessment   - Educate patient/family on patient safety including physical limitations  - Instruct patient to call for assistance with activity based on assessment  - Modify environment to reduce risk of injury  - Consider OT/PT consult to assist with strengthening/mobility  Outcome: Progressing     Problem: Prexisting or High Potential for Compromised Skin Integrity  Goal: Skin integrity is maintained or improved  Description: INTERVENTIONS:  - Identify patients at risk for skin breakdown  - Assess and monitor skin integrity  - Assess and monitor nutrition and hydration status  - Monitor labs   - Assess for incontinence   - Turn and reposition patient  - Assist with mobility/ambulation  - Relieve pressure over bony prominences  - Avoid friction and shearing  - Provide appropriate hygiene as needed including keeping skin clean and dry  - Evaluate need for skin moisturizer/barrier cream  - Collaborate with interdisciplinary team   - Patient/family teaching  - Consider wound care consult   Outcome: Progressing     Problem: PAIN - ADULT  Goal: Verbalizes/displays adequate comfort level or baseline comfort level  Description: Interventions:  - Encourage patient to monitor pain and request assistance  - Assess pain using appropriate pain scale  - Administer analgesics based on type and severity of pain and evaluate response  - Implement non-pharmacological measures as appropriate and evaluate response  - Consider cultural and social influences on pain and pain management  - Notify physician/advanced practitioner if interventions unsuccessful or patient reports new pain  Outcome: Progressing     Problem: INFECTION - ADULT  Goal: Absence or prevention of progression during hospitalization  Description: INTERVENTIONS:  - Assess and monitor for signs and symptoms of infection  - Monitor lab/diagnostic results  - Monitor all insertion sites, i e  indwelling lines, tubes, and drains  - Monitor endotracheal if appropriate and nasal secretions for changes in amount and color  - [place] appropriate cooling/warming therapies per order  - Administer medications as ordered  - Instruct and encourage patient and family to use good hand hygiene techniqu  Outcome: Progressing  Goal: Absence of fever/infection during neutropenic period  Description: INTERVENTIONS:  - Monitor WBC    Outcome: Progressing     Problem: SAFETY ADULT  Goal: Patient will remain free of falls  Description: INTERVENTIONS:  - Assess patient frequently for physical needs  -  Identify cognitive and physical deficits and behaviors that affect risk of falls    -  [place] fall precautions as indicated by assessment   - Educate patient/family on patient safety including physical limitations  - Instruct patient to call for assistance with activity based on assessment  - Modify environment to reduce risk of injury  - Consider OT/PT consult to assist with strengthening/mobility  Outcome: Progressing  Goal: Maintain or return to baseline ADL function  Description: INTERVENTIONS:  -  Assess patient's ability to carry out ADLs; assess patient's baseline for ADL function and identify physical deficits which impact ability to perform ADLs (bathing, care of mouth/teeth, toileting, grooming, dressing, etc )  - Assess/evaluate cause of self-care deficits   - Assess range of motion  - Assess patient's mobility; develop plan if impaired  - Assess patient's need for assistive devices and provide as appropriate  - Encourage maximum independence but intervene and supervise when necessary  - Involve family in performance of ADLs  - Assess for home care needs following discharge   - Consider OT consult to assist with ADL evaluation and planning for discharge  - Provide patient education as appropriate  Outcome: Progressing  Goal: Maintain or return mobility status to optimal level  Description: INTERVENTIONS:  - Assess patient's baseline mobility status (ambulation, transfers, stairs, etc )    - Identify cognitive and physical deficits and behaviors that affect mobility  - Identify mobility aids required to assist with transfers and/or ambulation (gait belt, sit-to-stand, lift, walker, cane, etc )  - [place] fall precautions as indicated by assessment  - Record patient progress and toleration of activity level on Mobility SBAR; progress patient to next Phase/Stage  - Instruct patient to call for assistance with activity based on assessment  - Consider rehabilitation consult to assist with strengthening/weightbearing, etc   Outcome: Progressing     Problem: DISCHARGE PLANNING  Goal: Discharge to home or other facility with appropriate resources  Description: INTERVENTIONS:  - Identify barriers to discharge w/patient and caregiver  - Arrange for needed discharge resources and transportation as appropriate  - Identify discharge learning needs (meds, wound care, etc )  - Refer to Case Management Department for coordinating discharge planning if the patient needs post-hospital services based on physician/advanced practitioner order or complex needs related to functional status, cognitive ability, or social support system  Outcome: Progressing     Problem: Knowledge Deficit  Goal: Patient/family/caregiver demonstrates understanding of disease process, treatment plan, medications, and discharge instructions  Description: Complete learning assessment and assess knowledge base    Interventions:  - Provide teaching at level of understanding  - Provide teaching via preferred learning methods  Outcome: Progressing     Problem: NEUROSENSORY - ADULT  Goal: Achieves stable or improved neurological status  Description: INTERVENTIONS  - Monitor and report changes in neurological status  - Monitor vital signs such as temperature, blood pressure, glucose, and any other labs ordered   - Initiate measures to prevent increased intracranial pressure  - Monitor for seizure activity and implement precautions if appropriate      Outcome: Progressing  Goal: Achieves maximal functionality and self care  Description: INTERVENTIONS  - Monitor swallowing and airway patency with patient fatigue and changes in neurological status  - Encourage and assist patient to increase activity and self care     - Encourage visually impaired, hearing impaired and aphasic patients to use assistive/communication devices  Outcome: Progressing     Problem: CARDIOVASCULAR - ADULT  Goal: Maintains optimal cardiac output and hemodynamic stability  Description: INTERVENTIONS:  - Monitor I/O, vital signs and rhythm  - Monitor for S/S and trends of decreased cardiac output  - Administer and titrate ordered vasoactive medications to optimize hemodynamic stability  - Assess quality of pulses, skin color and temperature  - Assess for signs of decreased coronary artery perfusion  - Instruct patient to report change in severity of symptoms  Outcome: Progressing  Goal: Absence of cardiac dysrhythmias or at baseline rhythm  Description: INTERVENTIONS:  - Continuous cardiac monitoring, vital signs, obtain 12 lead EKG if ordered  - Administer antiarrhythmic and heart rate control medications as ordered  - Monitor electrolytes and administer replacement therapy as ordered  Outcome: Progressing     Problem: METABOLIC, FLUID AND ELECTROLYTES - ADULT  Goal: Electrolytes maintained within normal limits  Description: INTERVENTIONS:  - Monitor labs and assess patient for signs and symptoms of electrolyte imbalances  - Administer electrolyte replacement as ordered  - Monitor response to electrolyte replacements, including repeat lab results as appropriate  - Instruct patient on fluid and nutrition as appropriate  Outcome: Progressing  Goal: Fluid balance maintained  Description: INTERVENTIONS:  - Monitor labs   - Monitor I/O and WT  - Instruct patient on fluid and nutrition as appropriate  - Assess for signs & symptoms of volume excess or deficit  Outcome: Progressing  Goal: Glucose maintained within target range  Description: INTERVENTIONS:  - Monitor Blood Glucose as ordered  - Assess for signs and symptoms of hyperglycemia and hypoglycemia  - Administer ordered medications to maintain glucose within target range  - Assess nutritional intake and initiate nutrition service referral as needed  Outcome: Progressing     Problem: SKIN/TISSUE INTEGRITY - ADULT  Goal: Skin integrity remains intact  Description: INTERVENTIONS  - Identify patients at risk for skin breakdown  - Assess and monitor skin integrity  - Assess and monitor nutrition and hydration status  - Monitor labs (i e  albumin)  - Assess for incontinence   - Turn and reposition patient  - Assist with mobility/ambulation  - Relieve pressure over bony prominences  - Avoid friction and shearing  - Provide appropriate hygiene as needed including keeping skin clean and dry  - Evaluate need for skin moisturizer/barrier cream  - Collaborate with interdisciplinary team (i e  Nutrition, Rehabilitation, etc )   - Patient/family teaching  Outcome: Progressing  Goal: Incision(s), wounds(s) or drain site(s) healing without S/S of infection  Description: INTERVENTIONS  - Assess and document risk factors for skin impairment   - Assess and document dressing, incision, wound bed, drain sites and surrounding tissue  - Consider nutrition services referral as needed  - Oral mucous membranes remain intact  - Provide patient/ family education  Outcome: Progressing  Goal: Oral mucous membranes remain intact  Description: INTERVENTIONS  - Assess oral mucosa and hygiene practices  - Implement preventative oral hygiene regimen  - Implement oral medicated treatments as ordered  - Initiate Nutrition services referral as needed  Outcome: Progressing     Problem: HEMATOLOGIC - ADULT  Goal: Maintains hematologic stability  Description: INTERVENTIONS  - Assess for signs and symptoms of bleeding or hemorrhage  - Monitor labs  - Administer supportive blood products/factors as ordered and appropriate  Outcome: Progressing     Problem: MUSCULOSKELETAL - ADULT  Goal: Maintain or return mobility to safest level of function  Description: INTERVENTIONS:  - Assess patient's ability to carry out ADLs; assess patient's baseline for ADL function and identify physical deficits which impact ability to perform ADLs (bathing, care of mouth/teeth, toileting, grooming, dressing, etc )  - Assess/evaluate cause of self-care deficits   - Assess range of motion  - Assess patient's mobility  - Assess patient's need for assistive devices and provide as appropriate  - Encourage maximum independence but intervene and supervise when necessary  - Involve family in performance of ADLs  - Assess for home care needs following discharge   - Consider OT consult to assist with ADL evaluation and planning for discharge  - Provide patient education as appropriate  Outcome: Progressing  Goal: Maintain proper alignment of affected body part  Description: INTERVENTIONS:  - Support, maintain and protect limb and body alignment  - Provide patient/ family with appropriate education  Outcome: Progressing

## 2021-02-13 NOTE — PLAN OF CARE
Problem: Potential for Falls  Goal: Patient will remain free of falls  Description: INTERVENTIONS:  - Assess patient frequently for physical needs  -  Identify cognitive and physical deficits and behaviors that affect risk of falls    -  [place] fall precautions as indicated by assessment   - Educate patient/family on patient safety including physical limitations  - Instruct patient to call for assistance with activity based on assessment  - Modify environment to reduce risk of injury  - Consider OT/PT consult to assist with strengthening/mobility  Outcome: Progressing     Problem: Prexisting or High Potential for Compromised Skin Integrity  Goal: Skin integrity is maintained or improved  Description: INTERVENTIONS:  - Identify patients at risk for skin breakdown  - Assess and monitor skin integrity  - Assess and monitor nutrition and hydration status  - Monitor labs   - Assess for incontinence   - Turn and reposition patient  - Assist with mobility/ambulation  - Relieve pressure over bony prominences  - Avoid friction and shearing  - Provide appropriate hygiene as needed including keeping skin clean and dry  - Evaluate need for skin moisturizer/barrier cream  - Collaborate with interdisciplinary team   - Patient/family teaching  - Consider wound care consult   Outcome: Progressing     Problem: PAIN - ADULT  Goal: Verbalizes/displays adequate comfort level or baseline comfort level  Description: Interventions:  - Encourage patient to monitor pain and request assistance  - Assess pain using appropriate pain scale  - Administer analgesics based on type and severity of pain and evaluate response  - Implement non-pharmacological measures as appropriate and evaluate response  - Consider cultural and social influences on pain and pain management  - Notify physician/advanced practitioner if interventions unsuccessful or patient reports new pain  Outcome: Progressing     Problem: INFECTION - ADULT  Goal: Absence or prevention of progression during hospitalization  Description: INTERVENTIONS:  - Assess and monitor for signs and symptoms of infection  - Monitor lab/diagnostic results  - Monitor all insertion sites, i e  indwelling lines, tubes, and drains  - Monitor endotracheal if appropriate and nasal secretions for changes in amount and color  - [place] appropriate cooling/warming therapies per order  - Administer medications as ordered  - Instruct and encourage patient and family to use good hand hygiene techniqu  Outcome: Progressing  Goal: Absence of fever/infection during neutropenic period  Description: INTERVENTIONS:  - Monitor WBC    Outcome: Progressing     Problem: SAFETY ADULT  Goal: Patient will remain free of falls  Description: INTERVENTIONS:  - Assess patient frequently for physical needs  -  Identify cognitive and physical deficits and behaviors that affect risk of falls    -  [place] fall precautions as indicated by assessment   - Educate patient/family on patient safety including physical limitations  - Instruct patient to call for assistance with activity based on assessment  - Modify environment to reduce risk of injury  - Consider OT/PT consult to assist with strengthening/mobility  Outcome: Progressing  Goal: Maintain or return to baseline ADL function  Description: INTERVENTIONS:  -  Assess patient's ability to carry out ADLs; assess patient's baseline for ADL function and identify physical deficits which impact ability to perform ADLs (bathing, care of mouth/teeth, toileting, grooming, dressing, etc )  - Assess/evaluate cause of self-care deficits   - Assess range of motion  - Assess patient's mobility; develop plan if impaired  - Assess patient's need for assistive devices and provide as appropriate  - Encourage maximum independence but intervene and supervise when necessary  - Involve family in performance of ADLs  - Assess for home care needs following discharge   - Consider OT consult to assist with ADL evaluation and planning for discharge  - Provide patient education as appropriate  Outcome: Progressing  Goal: Maintain or return mobility status to optimal level  Description: INTERVENTIONS:  - Assess patient's baseline mobility status (ambulation, transfers, stairs, etc )    - Identify cognitive and physical deficits and behaviors that affect mobility  - Identify mobility aids required to assist with transfers and/or ambulation (gait belt, sit-to-stand, lift, walker, cane, etc )  - [place] fall precautions as indicated by assessment  - Record patient progress and toleration of activity level on Mobility SBAR; progress patient to next Phase/Stage  - Instruct patient to call for assistance with activity based on assessment  - Consider rehabilitation consult to assist with strengthening/weightbearing, etc   Outcome: Progressing     Problem: DISCHARGE PLANNING  Goal: Discharge to home or other facility with appropriate resources  Description: INTERVENTIONS:  - Identify barriers to discharge w/patient and caregiver  - Arrange for needed discharge resources and transportation as appropriate  - Identify discharge learning needs (meds, wound care, etc )  - Refer to Case Management Department for coordinating discharge planning if the patient needs post-hospital services based on physician/advanced practitioner order or complex needs related to functional status, cognitive ability, or social support system  Outcome: Progressing     Problem: Knowledge Deficit  Goal: Patient/family/caregiver demonstrates understanding of disease process, treatment plan, medications, and discharge instructions  Description: Complete learning assessment and assess knowledge base    Interventions:  - Provide teaching at level of understanding  - Provide teaching via preferred learning methods  Outcome: Progressing     Problem: NEUROSENSORY - ADULT  Goal: Achieves stable or improved neurological status  Description: INTERVENTIONS  - Monitor and report changes in neurological status  - Monitor vital signs such as temperature, blood pressure, glucose, and any other labs ordered   - Initiate measures to prevent increased intracranial pressure  - Monitor for seizure activity and implement precautions if appropriate      Outcome: Progressing  Goal: Achieves maximal functionality and self care  Description: INTERVENTIONS  - Monitor swallowing and airway patency with patient fatigue and changes in neurological status  - Encourage and assist patient to increase activity and self care     - Encourage visually impaired, hearing impaired and aphasic patients to use assistive/communication devices  Outcome: Progressing     Problem: CARDIOVASCULAR - ADULT  Goal: Maintains optimal cardiac output and hemodynamic stability  Description: INTERVENTIONS:  - Monitor I/O, vital signs and rhythm  - Monitor for S/S and trends of decreased cardiac output  - Administer and titrate ordered vasoactive medications to optimize hemodynamic stability  - Assess quality of pulses, skin color and temperature  - Assess for signs of decreased coronary artery perfusion  - Instruct patient to report change in severity of symptoms  Outcome: Progressing  Goal: Absence of cardiac dysrhythmias or at baseline rhythm  Description: INTERVENTIONS:  - Continuous cardiac monitoring, vital signs, obtain 12 lead EKG if ordered  - Administer antiarrhythmic and heart rate control medications as ordered  - Monitor electrolytes and administer replacement therapy as ordered  Outcome: Progressing     Problem: METABOLIC, FLUID AND ELECTROLYTES - ADULT  Goal: Electrolytes maintained within normal limits  Description: INTERVENTIONS:  - Monitor labs and assess patient for signs and symptoms of electrolyte imbalances  - Administer electrolyte replacement as ordered  - Monitor response to electrolyte replacements, including repeat lab results as appropriate  - Instruct patient on fluid and nutrition as appropriate  Outcome: Progressing  Goal: Fluid balance maintained  Description: INTERVENTIONS:  - Monitor labs   - Monitor I/O and WT  - Instruct patient on fluid and nutrition as appropriate  - Assess for signs & symptoms of volume excess or deficit  Outcome: Progressing  Goal: Glucose maintained within target range  Description: INTERVENTIONS:  - Monitor Blood Glucose as ordered  - Assess for signs and symptoms of hyperglycemia and hypoglycemia  - Administer ordered medications to maintain glucose within target range  - Assess nutritional intake and initiate nutrition service referral as needed  Outcome: Progressing     Problem: SKIN/TISSUE INTEGRITY - ADULT  Goal: Skin integrity remains intact  Description: INTERVENTIONS  - Identify patients at risk for skin breakdown  - Assess and monitor skin integrity  - Assess and monitor nutrition and hydration status  - Monitor labs (i e  albumin)  - Assess for incontinence   - Turn and reposition patient  - Assist with mobility/ambulation  - Relieve pressure over bony prominences  - Avoid friction and shearing  - Provide appropriate hygiene as needed including keeping skin clean and dry  - Evaluate need for skin moisturizer/barrier cream  - Collaborate with interdisciplinary team (i e  Nutrition, Rehabilitation, etc )   - Patient/family teaching  Outcome: Progressing  Goal: Incision(s), wounds(s) or drain site(s) healing without S/S of infection  Description: INTERVENTIONS  - Assess and document risk factors for skin impairment   - Assess and document dressing, incision, wound bed, drain sites and surrounding tissue  - Consider nutrition services referral as needed  - Oral mucous membranes remain intact  - Provide patient/ family education  Outcome: Progressing  Goal: Oral mucous membranes remain intact  Description: INTERVENTIONS  - Assess oral mucosa and hygiene practices  - Implement preventative oral hygiene regimen  - Implement oral medicated treatments as ordered  - Initiate Nutrition services referral as needed  Outcome: Progressing     Problem: HEMATOLOGIC - ADULT  Goal: Maintains hematologic stability  Description: INTERVENTIONS  - Assess for signs and symptoms of bleeding or hemorrhage  - Monitor labs  - Administer supportive blood products/factors as ordered and appropriate  Outcome: Progressing     Problem: MUSCULOSKELETAL - ADULT  Goal: Maintain or return mobility to safest level of function  Description: INTERVENTIONS:  - Assess patient's ability to carry out ADLs; assess patient's baseline for ADL function and identify physical deficits which impact ability to perform ADLs (bathing, care of mouth/teeth, toileting, grooming, dressing, etc )  - Assess/evaluate cause of self-care deficits   - Assess range of motion  - Assess patient's mobility  - Assess patient's need for assistive devices and provide as appropriate  - Encourage maximum independence but intervene and supervise when necessary  - Involve family in performance of ADLs  - Assess for home care needs following discharge   - Consider OT consult to assist with ADL evaluation and planning for discharge  - Provide patient education as appropriate  Outcome: Progressing  Goal: Maintain proper alignment of affected body part  Description: INTERVENTIONS:  - Support, maintain and protect limb and body alignment  - Provide patient/ family with appropriate education  Outcome: Progressing

## 2021-02-13 NOTE — PROGRESS NOTES
Progress Note - 6 Saint Gonzales Frandy Day 56 y o  male MRN: [id number]  Unit/Bed#: -02 Encounter: [id number]    Patient seen, chart reviewed and discussed with medical team   Patient continues with erratic, threatening and violent behavior  Remains in four point restraints due to this and on one to one observation  Received 2 mg IM lorazepam around 2 a m  Due to aggression  Refusing medications and has not received olanzapine or clonazepam but did take lithium  Patient is agitated on approach and responding to internal stimuli  Largely uncooperative with questions, dismissive and antagonistic  Patient also threatening and tells me I am going to be hunting you in your dreams"  Making references to the devil  Speech is disorganized and largely nonsensical   Screams loudly at me and continued screaming while I was trying to speak with his staff member  Staff tells me he has been throwing things at staff when he is able to  Labs reviewed from today and lithium level was 0 9, CK within normal limits      /62 (BP Location: Right arm)   Pulse 86   Temp (!) 97 °F (36 1 °C) (Tympanic)   Resp 18   Ht 5' 10" (1 778 m)   Wt 90 7 kg (200 lb)   SpO2 96%   BMI 28 70 kg/m²     Medications:   Current Facility-Administered Medications   Medication Dose Route Frequency    acetaminophen (TYLENOL) tablet 650 mg  650 mg Oral Q4H PRN    clonazePAM (KlonoPIN) tablet 1 mg  1 mg Oral BID    folic acid (FOLVITE) tablet 1 mg  1 mg Oral Daily    lithium carbonate capsule 450 mg  450 mg Oral TID With Meals    LORazepam (ATIVAN) injection 1 mg  1 mg Intramuscular Q4H PRN    LORazepam (ATIVAN) injection 2 mg  2 mg Intramuscular Q2H PRN Max 3/day    metoprolol succinate (TOPROL-XL) 24 hr tablet 25 mg  25 mg Oral Daily    multivitamin stress formula tablet 1 tablet  1 tablet Oral Daily    OLANZapine (ZyPREXA) tablet 10 mg  10 mg Oral BID    tamsulosin (FLOMAX) capsule 0 4 mg  0 4 mg Oral Daily With Dinner    thiamine tablet 100 mg  100 mg Oral Daily       Labs:   Admission on 02/06/2021   Component Date Value Ref Range Status    SARS-CoV-2 02/06/2021 Negative  Negative Final    INFLUENZA A PCR 02/06/2021 Negative  Negative Final    INFLUENZA B PCR 02/06/2021 Negative  Negative Final    RSV PCR 02/06/2021 Negative  Negative Final    Sodium 02/06/2021 142  134 - 143 mmol/L Final    Potassium 02/06/2021 3 3* 3 5 - 5 5 mmol/L Final    Chloride 02/06/2021 109* 98 - 107 mmol/L Final    CO2 02/06/2021 21  21 - 31 mmol/L Final    ANION GAP 02/06/2021 12  4 - 13 mmol/L Final    BUN 02/06/2021 16  7 - 25 mg/dL Final    Creatinine 02/06/2021 1 13  0 70 - 1 30 mg/dL Final    Glucose 02/06/2021 89  65 - 99 mg/dL Final    Calcium 02/06/2021 9 7  8 6 - 10 5 mg/dL Final    AST 02/06/2021 42* 13 - 39 U/L Final    ALT 02/06/2021 29  7 - 52 U/L Final    Alkaline Phosphatase 02/06/2021 38* 55 - 165 U/L Final    Total Protein 02/06/2021 6 8  6 4 - 8 9 g/dL Final    Albumin 02/06/2021 4 7  3 5 - 5 7 g/dL Final    Total Bilirubin 02/06/2021 1 00  0 20 - 1 00 mg/dL Final    eGFR 02/06/2021 69  ml/min/1 73sq m Final    WBC 02/06/2021 9 40  4 80 - 10 80 Thousand/uL Final    RBC 02/06/2021 4 75  4 30 - 5 90 Million/uL Final    Hemoglobin 02/06/2021 14 2  14 0 - 18 0 g/dL Final    Hematocrit 02/06/2021 42 0  42 0 - 47 0 % Final    MCV 02/06/2021 88  81 - 99 fL Final    MCH 02/06/2021 30 0  26 0 - 34 0 pg Final    MCHC 02/06/2021 33 9  31 0 - 37 0 g/dL Final    RDW 02/06/2021 15 0* 11 5 - 14 5 % Final    MPV 02/06/2021 8 7  8 6 - 11 7 fL Final    Platelets 21/19/3886 214  149 - 390 Thousands/uL Final    Neutrophils Relative 02/06/2021 81* 42 - 75 % Final    Lymphocytes Relative 02/06/2021 12* 21 - 51 % Final    Monocytes Relative 02/06/2021 8  2 - 12 % Final    Eosinophils Relative 02/06/2021 0  0 - 5 % Final    Basophils Relative 02/06/2021 0  0 - 2 % Final    Neutrophils Absolute 02/06/2021 7 60* 1 40 - 6 50 Thousands/µL Final    Lymphocytes Absolute 02/06/2021 1 10  0 60 - 4 47 Thousands/µL Final    Monocytes Absolute 02/06/2021 0 70  0 17 - 1 22 Thousand/µL Final    Eosinophils Absolute 02/06/2021 0 00  0 00 - 0 61 Thousand/µL Final    Basophils Absolute 02/06/2021 0 00  0 00 - 0 10 Thousands/µL Final    Protime 02/06/2021 14 1  11 6 - 14 5 seconds Final    INR 02/06/2021 1 10  0 84 - 1 19 Final    PTT 02/06/2021 27  23 - 37 seconds Final    TSH 3RD GENERATON 02/06/2021 1 410  0 450 - 5 330 uIU/mL Final    Ammonia 02/06/2021 51  25 - 90 umol/L Final    Magnesium 02/06/2021 2 3  1 9 - 2 7 mg/dL Final    LACTIC ACID 02/06/2021 4 1* 0 5 - 2 0 mmol/L Final    Total CK 02/06/2021 1,254* 30 - 308 U/L Final    Ethanol Lvl 02/06/2021 20 6* <10 mg/dL Final    Salicylate Lvl 79/17/5122 <5* 10 - 30 mg/dL Final    Acetaminophen Level 02/06/2021 <10* 10 - 20 ug/mL Final    Amph/Meth UR 02/08/2021 Negative  Negative Final    Barbiturate Ur 02/08/2021 Negative  Negative Final    Benzodiazepine Urine 02/08/2021 Negative  Negative Final    Cocaine Urine 02/08/2021 Negative  Negative Final    Methadone Urine 02/08/2021 Negative  Negative Final    Opiate Urine 02/08/2021 Negative  Negative Final    PCP Ur 02/08/2021 Negative  Negative Final    THC Urine 02/08/2021 Positive* Negative Final    Oxycodone Urine 02/08/2021 Negative  Negative Final    CK-MB Index 02/06/2021 2 7  0 6 - 6 3 % Final    CK-MB 02/06/2021 34 2* 0 6 - 6 3 ng/mL Final    LACTIC ACID 02/06/2021 1 0  0 5 - 2 0 mmol/L Final    Lithium Lvl 02/06/2021 0 2* 1 0 - 1 2 mmol/L Final    Folate 02/07/2021 >20 0* 3 1 - 17 5 ng/mL Final    Vitamin B-12 02/07/2021 819  100 - 900 pg/mL Final    Vit D, 25-Hydroxy 02/07/2021 22 9* 30 0 - 100 0 ng/mL Final    Total CK 02/07/2021 1,244* 30 - 308 U/L Final    CK-MB Index 02/07/2021 1 9  0 6 - 6 3 % Final    CK-MB 02/07/2021 24 1* 0 6 - 6 3 ng/mL Final    WBC 02/07/2021 6 90  4 80 - 10 80 Thousand/uL Final    RBC 02/07/2021 4 52  4 30 - 5 90 Million/uL Final    Hemoglobin 02/07/2021 13 5* 14 0 - 18 0 g/dL Final    Hematocrit 02/07/2021 39 9* 42 0 - 47 0 % Final    MCV 02/07/2021 88  81 - 99 fL Final    MCH 02/07/2021 29 8  26 0 - 34 0 pg Final    MCHC 02/07/2021 33 8  31 0 - 37 0 g/dL Final    RDW 02/07/2021 14 5  11 5 - 14 5 % Final    MPV 02/07/2021 8 1* 8 6 - 11 7 fL Final    Platelets 71/53/3057 173  149 - 390 Thousands/uL Final    Neutrophils Relative 02/07/2021 68  42 - 75 % Final    Lymphocytes Relative 02/07/2021 22  21 - 51 % Final    Monocytes Relative 02/07/2021 9  2 - 12 % Final    Eosinophils Relative 02/07/2021 1  0 - 5 % Final    Basophils Relative 02/07/2021 0  0 - 2 % Final    Neutrophils Absolute 02/07/2021 4 70  1 40 - 6 50 Thousands/µL Final    Lymphocytes Absolute 02/07/2021 1 50  0 60 - 4 47 Thousands/µL Final    Monocytes Absolute 02/07/2021 0 60  0 17 - 1 22 Thousand/µL Final    Eosinophils Absolute 02/07/2021 0 10  0 00 - 0 61 Thousand/µL Final    Basophils Absolute 02/07/2021 0 00  0 00 - 0 10 Thousands/µL Final    Sodium 02/07/2021 144* 134 - 143 mmol/L Final    Potassium 02/07/2021 3 5  3 5 - 5 5 mmol/L Final    Chloride 02/07/2021 111* 98 - 107 mmol/L Final    CO2 02/07/2021 25  21 - 31 mmol/L Final    ANION GAP 02/07/2021 8  4 - 13 mmol/L Final    BUN 02/07/2021 14  7 - 25 mg/dL Final    Creatinine 02/07/2021 0 89  0 70 - 1 30 mg/dL Final    Glucose 02/07/2021 85  65 - 99 mg/dL Final    Calcium 02/07/2021 9 1  8 6 - 10 5 mg/dL Final    AST 02/07/2021 41* 13 - 39 U/L Final    ALT 02/07/2021 29  7 - 52 U/L Final    Alkaline Phosphatase 02/07/2021 35* 55 - 165 U/L Final    Total Protein 02/07/2021 6 2* 6 4 - 8 9 g/dL Final    Albumin 02/07/2021 4 1  3 5 - 5 7 g/dL Final    Total Bilirubin 02/07/2021 1 60* 0 20 - 1 00 mg/dL Final    eGFR 02/07/2021 92  ml/min/1 73sq m Final    Ventricular Rate 02/06/2021 84  BPM Final    Atrial Rate 02/06/2021 84  BPM Final    NE Interval 02/06/2021 166  ms Final    QRSD Interval 02/06/2021 92  ms Final    QT Interval 02/06/2021 398  ms Final    QTC Interval 02/06/2021 470  ms Final    P Axis 02/06/2021 90  degrees Final    QRS Axis 02/06/2021 -52  degrees Final    [place] 02/06/2021 75  degrees Final    Total CK 02/07/2021 631* 30 - 308 U/L Final    CK-MB Index 02/07/2021 2 3  0 6 - 6 3 % Final    CK-MB 02/07/2021 14 8* 0 6 - 6 3 ng/mL Final    Sodium 02/08/2021 141  134 - 143 mmol/L Final    Potassium 02/08/2021 3 8  3 5 - 5 5 mmol/L Final    Chloride 02/08/2021 104  98 - 107 mmol/L Final    CO2 02/08/2021 29  21 - 31 mmol/L Final    ANION GAP 02/08/2021 8  4 - 13 mmol/L Final    BUN 02/08/2021 12  7 - 25 mg/dL Final    Creatinine 02/08/2021 1 05  0 70 - 1 30 mg/dL Final    Glucose 02/08/2021 117* 65 - 99 mg/dL Final    Calcium 02/08/2021 9 8  8 6 - 10 5 mg/dL Final    AST 02/08/2021 24  13 - 39 U/L Final    ALT 02/08/2021 24  7 - 52 U/L Final    Alkaline Phosphatase 02/08/2021 41* 55 - 165 U/L Final    Total Protein 02/08/2021 6 5  6 4 - 8 9 g/dL Final    Albumin 02/08/2021 4 0  3 5 - 5 7 g/dL Final    Total Bilirubin 02/08/2021 0 50  0 20 - 1 00 mg/dL Final    eGFR 02/08/2021 76  ml/min/1 73sq m Final    Total CK 02/09/2021 287  30 - 308 U/L Final    Sodium 02/09/2021 144* 134 - 143 mmol/L Final    Potassium 02/09/2021 3 6  3 5 - 5 5 mmol/L Final    Chloride 02/09/2021 108* 98 - 107 mmol/L Final    CO2 02/09/2021 28  21 - 31 mmol/L Final    ANION GAP 02/09/2021 8  4 - 13 mmol/L Final    BUN 02/09/2021 12  7 - 25 mg/dL Final    Creatinine 02/09/2021 0 92  0 70 - 1 30 mg/dL Final    Glucose 02/09/2021 96  65 - 99 mg/dL Final    Calcium 02/09/2021 9 2  8 6 - 10 5 mg/dL Final    AST 02/09/2021 18  13 - 39 U/L Final    ALT 02/09/2021 21  7 - 52 U/L Final    Alkaline Phosphatase 02/09/2021 33* 55 - 165 U/L Final    Total Protein 02/09/2021 5 7* 6 4 - 8 9 g/dL Final    Albumin 02/09/2021 3 6  3 5 - 5 7 g/dL Final    Total Bilirubin 02/09/2021 0 50  0 20 - 1 00 mg/dL Final    eGFR 02/09/2021 89  ml/min/1 73sq m Final    CK-MB Index 02/09/2021 1 6  0 6 - 6 3 % Final    CK-MB 02/09/2021 4 7  0 6 - 6 3 ng/mL Final    Lithium Lvl 02/13/2021 0 9* 1 0 - 1 2 mmol/L Final    Total CK 02/13/2021 116  30 - 308 U/L Final       Mental Status Evaluation:  Appearance:  disheveled, older than stated age and Unkempt, currently in four point restraints   Behavior:  uncooperative and Agitated, threatening, aggressive   Speech:  profane, tangential and Screaming   Mood:  angry and labile   Affect:  inappropriate, increased in intensity, increased in range and labile   Thought Process:  disorganized and illogical   Thought Content:  delusions  persecutory   Perceptual Disturbances: Appears to be responding to internal stimuli   Risk Potential: Potential for Aggression Yes Physically aggressive   Sensorium:  person   Cognition:  recent and remote memory: unable to assess due to lack of cooperation   Consciousness:  alert and awake    Attention: Poor   Insight:  Poor   Judgment: Poor   Gait/Station: Not observed, and four-point restraints   Motor Activity: no abnormal movements     Progress Toward Goals:  No improvement, patient remains impulsive, aggressive and agitated    Assessment/Plan   Principal Problem:    Schizoaffective disorder, bipolar type ([facility] [address] )  Active Problems:    Cannabis abuse, continuous    Alcohol abuse, continuous    Tobacco abuse    Elevated CK      Recommended Treatment:   Will obtain 2nd opinion to force meds  Start olanzapine 10 mg IM q 6 hours p r n  Continue to encourage compliance with p o  Meds  Continue to offer olanzapine 10 mg po b i d  Discontinue clonazepam 1 mg b i d   Due to refusal  Start Valium 5 mg p o  t i d , patient had been on this medication the past and done well  Will continue to follow on a daily basis  Plan to transfer to Behavioral Medicine unit when bed available      Continue with group therapy, milieu therapy and occupational therapy  Risks, benefits and possible side effects of Medications:   Patient does not verbalize understanding at this time and will require further explanation  Counseling / Coordination of Care  Total floor / unit time spent today 25 minutes  Greater than 50% of total time was spent with the patient and / or family counseling and / or coordination of care   A description of the counseling / coordination of care:  Medication management, chart review, patient interview

## 2021-02-14 PROCEDURE — 99233 SBSQ HOSP IP/OBS HIGH 50: CPT | Performed by: PSYCHIATRY & NEUROLOGY

## 2021-02-14 RX ADMIN — LITHIUM CARBONATE 450 MG: 300 CAPSULE, GELATIN COATED ORAL at 20:08

## 2021-02-14 RX ADMIN — LITHIUM CARBONATE 450 MG: 300 CAPSULE, GELATIN COATED ORAL at 16:32

## 2021-02-14 RX ADMIN — LITHIUM CARBONATE 450 MG: 300 CAPSULE, GELATIN COATED ORAL at 08:30

## 2021-02-14 NOTE — CONSULTS
Progress Note - 6 Saint Gonzales Frandy Day 56 y o  male MRN: [id number]  Unit/Bed#: -02 Encounter: [id number]      Patient seen and examined in room with 1:1 present  In 4 point locked restraints and is crumbling up newspaper and tossing it to end of bed  Hypomanic, tangential, paranoid  Denies AVH, SI but admits to non-specific HI  Per 1:1 he was angry at his mother on a phone call yesterday and he received PRN ativan yesterday  Also took his lithium this AM and his valium last evening  No PRN zyprexa given       Lab Results   Component Value Date    SODIUM 144 (H) 02/09/2021    K 3 6 02/09/2021     (H) 02/09/2021    CO2 28 02/09/2021    BUN 12 02/09/2021    CREATININE 0 92 02/09/2021    GLUC 96 02/09/2021    CALCIUM 9 2 02/09/2021     Lab Results   Component Value Date    WBC 6 90 02/07/2021    HGB 13 5 (L) 02/07/2021    HCT 39 9 (L) 02/07/2021    MCV 88 02/07/2021     02/07/2021     Lab Results   Component Value Date    LITHIUM 0 9 (L) 02/13/2021     04/30/2018    BUN 12 02/09/2021    CREATININE 0 92 02/09/2021    WBC 6 90 02/07/2021         Behavior over the last 24 hours:  unchanged  Sleep: normal  Appetite: normal  Medication side effects: No  ROS: L hand pain    Mental Status Evaluation:  Appearance:  bearded and disheveled   Behavior:  restless and fidgety   Speech:  pressured   Mood:  labile   Affect:  labile   Language: naming objects   Thought Process:  flight of ideas   Associations: Loose associations   Thought Content:  delusions  persecutory and obsessions   Perceptual Disturbances: denies   Risk Potential: Potential for Aggression Yes impulsive "I have an anger issue"   Sensorium:  person, place and situation   Memory:  recent and remote memory grossly intact   Cognition:  recent and remote memory grossly intact   Consciousness:  alert and awake    Attention: attention span appeared shorter than expected for age   Intellect: not examined   Fund of Knowledge: awareness of current events: fair   Insight:  limited   Judgment: limited   Muscle Strength and Tone: in bed   Gait/Station: i nbed   Motor Activity: no cogwheeling on BL UE         Assessment/Plan  Miranda Graham is a 56 y o  male   Diagnosis:  Schizoaffective bipolar type    Recommended Treatment:   1  Cont 1:1  2  Cont current medications, due to disorganized thoughts and behaviors, his impulsivity and aggression, continued paranoia which renders him a risk to his safety and safety of others, he requires a second opinion for antipsychotic medications over objection  3  Cont restraints as needed with trials on 2 point    Medications:   all current active meds have been reviewed and current meds:   Current Facility-Administered Medications   Medication Dose Route Frequency    acetaminophen (TYLENOL) tablet 650 mg  650 mg Oral Q4H PRN    benztropine (COGENTIN) injection 1 mg  1 mg Intramuscular BID PRN    diazepam (VALIUM) tablet 5 mg  5 mg Oral TID    folic acid (FOLVITE) tablet 1 mg  1 mg Oral Daily    lithium carbonate capsule 450 mg  450 mg Oral TID With Meals    LORazepam (ATIVAN) injection 1 mg  1 mg Intramuscular Q4H PRN    LORazepam (ATIVAN) injection 2 mg  2 mg Intramuscular Q2H PRN Max 3/day    metoprolol succinate (TOPROL-XL) 24 hr tablet 25 mg  25 mg Oral Daily    multivitamin stress formula tablet 1 tablet  1 tablet Oral Daily    OLANZapine (ZyPREXA) IM injection 10 mg  10 mg Intramuscular Q6H PRN    OLANZapine (ZyPREXA) tablet 10 mg  10 mg Oral BID    tamsulosin (FLOMAX) capsule 0 4 mg  0 4 mg Oral Daily With Dinner    thiamine tablet 100 mg  100 mg Oral Daily         Risks, benefits and possible side effects of Medications:     Patient does not verbalize understanding at this time and will require further explanation  Labs: I have personally reviewed all pertinent laboratory results  I have personally reviewed all pertinent laboratory/tests results    Most Recent Labs:   Lab Results   Component Value Date    WBC 6 90 02/07/2021    RBC 4 52 02/07/2021    HGB 13 5 (L) 02/07/2021    HCT 39 9 (L) 02/07/2021     02/07/2021    RDW 14 5 02/07/2021    NEUTROABS 4 70 02/07/2021    SODIUM 144 (H) 02/09/2021    K 3 6 02/09/2021     (H) 02/09/2021    CO2 28 02/09/2021    BUN 12 02/09/2021    CREATININE 0 92 02/09/2021    GLUC 96 02/09/2021    GLUF 91 11/06/2019    CALCIUM 9 2 02/09/2021    AST 18 02/09/2021    ALT 21 02/09/2021    ALKPHOS 33 (L) 02/09/2021    TP 5 7 (L) 02/09/2021    ALB 3 6 02/09/2021    TBILI 0 50 02/09/2021    CHOLESTEROL 120 11/03/2019    HDL 44 11/03/2019    TRIG 90 11/03/2019    LDLCALC 58 11/03/2019    NONHDLC 76 11/03/2019    LITHIUM 0 9 (L) 02/13/2021    AMMONIA 51 02/06/2021    BJA3IQAGZGHK 1 410 02/06/2021    RPR Non-Reactive 11/03/2019    HGBA1C 5 1 11/03/2019     11/03/2019     EKG   Lab Results   Component Value Date    VENTRATE 84 02/06/2021    ATRIALRATE 84 02/06/2021    PRINT 166 02/06/2021    QRSDINT 92 02/06/2021    QTINT 398 02/06/2021    QTCINT 470 02/06/2021    PAXIS 90 02/06/2021    QRSAXIS -52 02/06/2021    TWAVEAXIS 75 02/06/2021     Lab Results   Component Value Date    CKTOTAL 116 02/13/2021    CKMB 4 7 02/09/2021    CKMBINDEX 1 6 02/09/2021    TROPONINI <0 02 04/18/2018         Giselle Matthew MD

## 2021-02-14 NOTE — NURSING NOTE
Patient took his scheduled valium and lithium but refused his scheduled zyprexa  Patient was calm and cooperative during a full bed strip after he accidentally spilt some urine from his urinal  Patient cleaned up  Bed linen changed  Patient asked to call a relative (his uncle)  Phone call allowed and the conversation is calm  Patient is still in 4 point restraints with a 1:1 sitter  Will continue to monitor

## 2021-02-14 NOTE — PROGRESS NOTES
Progress Note - Jian Graham 56 y o  male MRN: [id number]    Unit/Bed#: -02 Encounter: [id number]        Subjective:   Patient seen and examined at bedside after reviewing the chart and discussing the case with the caring staff  Patient examined at bedside  Patient continues to have aggressive behavior requiring 4 limb restraints for the patient  Patient is followed up daily by psych and has been put now on Valium 5 mg 3 times daily, olanzapine 10 mg 2 times daily and olanzapine 10 mg IM every 6 hours on as-needed basis  Patient is still awaiting to be admitted to a [facility] at [facility] as bed is unavailable  Physical Exam   Vitals: Blood pressure 131/74, pulse 73, temperature 99 7 °F (37 6 °C), temperature source Temporal, resp  rate 18, height 5' 10" (1 778 m), weight 90 7 kg (200 lb), SpO2 96 %  ,Body mass index is 28 7 kg/m²  Constitutional: He appears well-developed  HEENT: PERR, EOMI, MMM  Cardiovascular: Normal rate and regular rhythm  Pulmonary/Chest: Effort normal and breath sounds normal    Abdomen: Soft, + BS, NT    Assessment/Plan:  Jian Graham is a(n) 56y o  year old male with      1  Cardiac with history of hypertension and dyslipidemia   Patient's blood pressure continues to be high since admission  I will put the patient on Metoprolol XL 25 mg daily  Will continue to closely monitor  Patient is not on anything for Hyperlipidemia  2  Tobacco abuse   Patient is refusing nicotine transdermal patch  3  Alcohol abuse   Stable at this time  I will put the patient on thiamine folic acid and multivitamin  4  DJD/osteoarthritis   Tylenol on as needed basis  5  Gait abnormality   Stable  6  History of Vitamin-D deficiency   I will get vitamin-D levels for the patient  7  BPH  Flomax 0 4mg once daily    8  Psych with schizoaffective disorder and aggressive and agitated behavior   Patient is being managed by psych    Patient is on Valium 5 mg 3 times daily, olanzapine 10 mg 2 times daily and olanzapine 10 mg IM every 6 hours on as-needed basis  Patient cannot be admitted to [facility] and Social workers are trying to make arrangement for patient's transferred to [facility] but bed is not available today 02/09/2021  Patient continues to require one-to-one observation as well as 4 limb restraints due to his aggressive behavior  PATIENT IS MEDICALLY CLEARED FOR DISCHARGE FROM THE MEDICAL FLOOR AND FOR ADMISSION AT BEHAVIORAL HEALTH UNIT

## 2021-02-14 NOTE — PLAN OF CARE
Problem: Potential for Falls  Goal: Patient will remain free of falls  Description: INTERVENTIONS:  - Assess patient frequently for physical needs  -  Identify cognitive and physical deficits and behaviors that affect risk of falls    -  [place] fall precautions as indicated by assessment   - Educate patient/family on patient safety including physical limitations  - Instruct patient to call for assistance with activity based on assessment  - Modify environment to reduce risk of injury  - Consider OT/PT consult to assist with strengthening/mobility  Outcome: Progressing     Problem: Prexisting or High Potential for Compromised Skin Integrity  Goal: Skin integrity is maintained or improved  Description: INTERVENTIONS:  - Identify patients at risk for skin breakdown  - Assess and monitor skin integrity  - Assess and monitor nutrition and hydration status  - Monitor labs   - Assess for incontinence   - Turn and reposition patient  - Assist with mobility/ambulation  - Relieve pressure over bony prominences  - Avoid friction and shearing  - Provide appropriate hygiene as needed including keeping skin clean and dry  - Evaluate need for skin moisturizer/barrier cream  - Collaborate with interdisciplinary team   - Patient/family teaching  - Consider wound care consult   Outcome: Progressing     Problem: PAIN - ADULT  Goal: Verbalizes/displays adequate comfort level or baseline comfort level  Description: Interventions:  - Encourage patient to monitor pain and request assistance  - Assess pain using appropriate pain scale  - Administer analgesics based on type and severity of pain and evaluate response  - Implement non-pharmacological measures as appropriate and evaluate response  - Consider cultural and social influences on pain and pain management  - Notify physician/advanced practitioner if interventions unsuccessful or patient reports new pain  Outcome: Progressing     Problem: INFECTION - ADULT  Goal: Absence or prevention of progression during hospitalization  Description: INTERVENTIONS:  - Assess and monitor for signs and symptoms of infection  - Monitor lab/diagnostic results  - Monitor all insertion sites, i e  indwelling lines, tubes, and drains  - Monitor endotracheal if appropriate and nasal secretions for changes in amount and color  - [place] appropriate cooling/warming therapies per order  - Administer medications as ordered  - Instruct and encourage patient and family to use good hand hygiene techniqu  Outcome: Progressing  Goal: Absence of fever/infection during neutropenic period  Description: INTERVENTIONS:  - Monitor WBC    Outcome: Progressing     Problem: SAFETY ADULT  Goal: Patient will remain free of falls  Description: INTERVENTIONS:  - Assess patient frequently for physical needs  -  Identify cognitive and physical deficits and behaviors that affect risk of falls    -  [place] fall precautions as indicated by assessment   - Educate patient/family on patient safety including physical limitations  - Instruct patient to call for assistance with activity based on assessment  - Modify environment to reduce risk of injury  - Consider OT/PT consult to assist with strengthening/mobility  Outcome: Progressing  Goal: Maintain or return to baseline ADL function  Description: INTERVENTIONS:  -  Assess patient's ability to carry out ADLs; assess patient's baseline for ADL function and identify physical deficits which impact ability to perform ADLs (bathing, care of mouth/teeth, toileting, grooming, dressing, etc )  - Assess/evaluate cause of self-care deficits   - Assess range of motion  - Assess patient's mobility; develop plan if impaired  - Assess patient's need for assistive devices and provide as appropriate  - Encourage maximum independence but intervene and supervise when necessary  - Involve family in performance of ADLs  - Assess for home care needs following discharge   - Consider OT consult to assist with ADL evaluation and planning for discharge  - Provide patient education as appropriate  Outcome: Progressing  Goal: Maintain or return mobility status to optimal level  Description: INTERVENTIONS:  - Assess patient's baseline mobility status (ambulation, transfers, stairs, etc )    - Identify cognitive and physical deficits and behaviors that affect mobility  - Identify mobility aids required to assist with transfers and/or ambulation (gait belt, sit-to-stand, lift, walker, cane, etc )  - [place] fall precautions as indicated by assessment  - Record patient progress and toleration of activity level on Mobility SBAR; progress patient to next Phase/Stage  - Instruct patient to call for assistance with activity based on assessment  - Consider rehabilitation consult to assist with strengthening/weightbearing, etc   Outcome: Progressing     Problem: DISCHARGE PLANNING  Goal: Discharge to home or other facility with appropriate resources  Description: INTERVENTIONS:  - Identify barriers to discharge w/patient and caregiver  - Arrange for needed discharge resources and transportation as appropriate  - Identify discharge learning needs (meds, wound care, etc )  - Refer to Case Management Department for coordinating discharge planning if the patient needs post-hospital services based on physician/advanced practitioner order or complex needs related to functional status, cognitive ability, or social support system  Outcome: Progressing     Problem: Knowledge Deficit  Goal: Patient/family/caregiver demonstrates understanding of disease process, treatment plan, medications, and discharge instructions  Description: Complete learning assessment and assess knowledge base    Interventions:  - Provide teaching at level of understanding  - Provide teaching via preferred learning methods  Outcome: Progressing     Problem: NEUROSENSORY - ADULT  Goal: Achieves stable or improved neurological status  Description: INTERVENTIONS  - Monitor and report changes in neurological status  - Monitor vital signs such as temperature, blood pressure, glucose, and any other labs ordered   - Initiate measures to prevent increased intracranial pressure  - Monitor for seizure activity and implement precautions if appropriate      Outcome: Progressing  Goal: Achieves maximal functionality and self care  Description: INTERVENTIONS  - Monitor swallowing and airway patency with patient fatigue and changes in neurological status  - Encourage and assist patient to increase activity and self care     - Encourage visually impaired, hearing impaired and aphasic patients to use assistive/communication devices  Outcome: Progressing     Problem: CARDIOVASCULAR - ADULT  Goal: Maintains optimal cardiac output and hemodynamic stability  Description: INTERVENTIONS:  - Monitor I/O, vital signs and rhythm  - Monitor for S/S and trends of decreased cardiac output  - Administer and titrate ordered vasoactive medications to optimize hemodynamic stability  - Assess quality of pulses, skin color and temperature  - Assess for signs of decreased coronary artery perfusion  - Instruct patient to report change in severity of symptoms  Outcome: Progressing  Goal: Absence of cardiac dysrhythmias or at baseline rhythm  Description: INTERVENTIONS:  - Continuous cardiac monitoring, vital signs, obtain 12 lead EKG if ordered  - Administer antiarrhythmic and heart rate control medications as ordered  - Monitor electrolytes and administer replacement therapy as ordered  Outcome: Progressing     Problem: METABOLIC, FLUID AND ELECTROLYTES - ADULT  Goal: Electrolytes maintained within normal limits  Description: INTERVENTIONS:  - Monitor labs and assess patient for signs and symptoms of electrolyte imbalances  - Administer electrolyte replacement as ordered  - Monitor response to electrolyte replacements, including repeat lab results as appropriate  - Instruct patient on fluid and nutrition as appropriate  Outcome: Progressing  Goal: Fluid balance maintained  Description: INTERVENTIONS:  - Monitor labs   - Monitor I/O and WT  - Instruct patient on fluid and nutrition as appropriate  - Assess for signs & symptoms of volume excess or deficit  Outcome: Progressing  Goal: Glucose maintained within target range  Description: INTERVENTIONS:  - Monitor Blood Glucose as ordered  - Assess for signs and symptoms of hyperglycemia and hypoglycemia  - Administer ordered medications to maintain glucose within target range  - Assess nutritional intake and initiate nutrition service referral as needed  Outcome: Progressing     Problem: SKIN/TISSUE INTEGRITY - ADULT  Goal: Skin integrity remains intact  Description: INTERVENTIONS  - Identify patients at risk for skin breakdown  - Assess and monitor skin integrity  - Assess and monitor nutrition and hydration status  - Monitor labs (i e  albumin)  - Assess for incontinence   - Turn and reposition patient  - Assist with mobility/ambulation  - Relieve pressure over bony prominences  - Avoid friction and shearing  - Provide appropriate hygiene as needed including keeping skin clean and dry  - Evaluate need for skin moisturizer/barrier cream  - Collaborate with interdisciplinary team (i e  Nutrition, Rehabilitation, etc )   - Patient/family teaching  Outcome: Progressing  Goal: Incision(s), wounds(s) or drain site(s) healing without S/S of infection  Description: INTERVENTIONS  - Assess and document risk factors for skin impairment   - Assess and document dressing, incision, wound bed, drain sites and surrounding tissue  - Consider nutrition services referral as needed  - Oral mucous membranes remain intact  - Provide patient/ family education  Outcome: Progressing  Goal: Oral mucous membranes remain intact  Description: INTERVENTIONS  - Assess oral mucosa and hygiene practices  - Implement preventative oral hygiene regimen  - Implement oral medicated treatments as ordered  - Initiate Nutrition services referral as needed  Outcome: Progressing     Problem: HEMATOLOGIC - ADULT  Goal: Maintains hematologic stability  Description: INTERVENTIONS  - Assess for signs and symptoms of bleeding or hemorrhage  - Monitor labs  - Administer supportive blood products/factors as ordered and appropriate  Outcome: Progressing     Problem: MUSCULOSKELETAL - ADULT  Goal: Maintain or return mobility to safest level of function  Description: INTERVENTIONS:  - Assess patient's ability to carry out ADLs; assess patient's baseline for ADL function and identify physical deficits which impact ability to perform ADLs (bathing, care of mouth/teeth, toileting, grooming, dressing, etc )  - Assess/evaluate cause of self-care deficits   - Assess range of motion  - Assess patient's mobility  - Assess patient's need for assistive devices and provide as appropriate  - Encourage maximum independence but intervene and supervise when necessary  - Involve family in performance of ADLs  - Assess for home care needs following discharge   - Consider OT consult to assist with ADL evaluation and planning for discharge  - Provide patient education as appropriate  Outcome: Progressing  Goal: Maintain proper alignment of affected body part  Description: INTERVENTIONS:  - Support, maintain and protect limb and body alignment  - Provide patient/ family with appropriate education  Outcome: Progressing

## 2021-02-14 NOTE — PLAN OF CARE
Problem: Potential for Falls  Goal: Patient will remain free of falls  Description: INTERVENTIONS:  - Assess patient frequently for physical needs  -  Identify cognitive and physical deficits and behaviors that affect risk of falls    -  [place] fall precautions as indicated by assessment   - Educate patient/family on patient safety including physical limitations  - Instruct patient to call for assistance with activity based on assessment  - Modify environment to reduce risk of injury  - Consider OT/PT consult to assist with strengthening/mobility  Outcome: Progressing     Problem: Prexisting or High Potential for Compromised Skin Integrity  Goal: Skin integrity is maintained or improved  Description: INTERVENTIONS:  - Identify patients at risk for skin breakdown  - Assess and monitor skin integrity  - Assess and monitor nutrition and hydration status  - Monitor labs   - Assess for incontinence   - Turn and reposition patient  - Assist with mobility/ambulation  - Relieve pressure over bony prominences  - Avoid friction and shearing  - Provide appropriate hygiene as needed including keeping skin clean and dry  - Evaluate need for skin moisturizer/barrier cream  - Collaborate with interdisciplinary team   - Patient/family teaching  - Consider wound care consult   Outcome: Progressing     Problem: PAIN - ADULT  Goal: Verbalizes/displays adequate comfort level or baseline comfort level  Description: Interventions:  - Encourage patient to monitor pain and request assistance  - Assess pain using appropriate pain scale  - Administer analgesics based on type and severity of pain and evaluate response  - Implement non-pharmacological measures as appropriate and evaluate response  - Consider cultural and social influences on pain and pain management  - Notify physician/advanced practitioner if interventions unsuccessful or patient reports new pain  Outcome: Progressing     Problem: INFECTION - ADULT  Goal: Absence or prevention of progression during hospitalization  Description: INTERVENTIONS:  - Assess and monitor for signs and symptoms of infection  - Monitor lab/diagnostic results  - Monitor all insertion sites, i e  indwelling lines, tubes, and drains  - Monitor endotracheal if appropriate and nasal secretions for changes in amount and color  - [place] appropriate cooling/warming therapies per order  - Administer medications as ordered  - Instruct and encourage patient and family to use good hand hygiene techniqu  Outcome: Progressing  Goal: Absence of fever/infection during neutropenic period  Description: INTERVENTIONS:  - Monitor WBC    Outcome: Progressing     Problem: SAFETY ADULT  Goal: Patient will remain free of falls  Description: INTERVENTIONS:  - Assess patient frequently for physical needs  -  Identify cognitive and physical deficits and behaviors that affect risk of falls    -  [place] fall precautions as indicated by assessment   - Educate patient/family on patient safety including physical limitations  - Instruct patient to call for assistance with activity based on assessment  - Modify environment to reduce risk of injury  - Consider OT/PT consult to assist with strengthening/mobility  Outcome: Progressing  Goal: Maintain or return to baseline ADL function  Description: INTERVENTIONS:  -  Assess patient's ability to carry out ADLs; assess patient's baseline for ADL function and identify physical deficits which impact ability to perform ADLs (bathing, care of mouth/teeth, toileting, grooming, dressing, etc )  - Assess/evaluate cause of self-care deficits   - Assess range of motion  - Assess patient's mobility; develop plan if impaired  - Assess patient's need for assistive devices and provide as appropriate  - Encourage maximum independence but intervene and supervise when necessary  - Involve family in performance of ADLs  - Assess for home care needs following discharge   - Consider OT consult to assist with ADL evaluation and planning for discharge  - Provide patient education as appropriate  Outcome: Progressing  Goal: Maintain or return mobility status to optimal level  Description: INTERVENTIONS:  - Assess patient's baseline mobility status (ambulation, transfers, stairs, etc )    - Identify cognitive and physical deficits and behaviors that affect mobility  - Identify mobility aids required to assist with transfers and/or ambulation (gait belt, sit-to-stand, lift, walker, cane, etc )  - [place] fall precautions as indicated by assessment  - Record patient progress and toleration of activity level on Mobility SBAR; progress patient to next Phase/Stage  - Instruct patient to call for assistance with activity based on assessment  - Consider rehabilitation consult to assist with strengthening/weightbearing, etc   Outcome: Progressing     Problem: DISCHARGE PLANNING  Goal: Discharge to home or other facility with appropriate resources  Description: INTERVENTIONS:  - Identify barriers to discharge w/patient and caregiver  - Arrange for needed discharge resources and transportation as appropriate  - Identify discharge learning needs (meds, wound care, etc )  - Refer to Case Management Department for coordinating discharge planning if the patient needs post-hospital services based on physician/advanced practitioner order or complex needs related to functional status, cognitive ability, or social support system  Outcome: Progressing     Problem: Knowledge Deficit  Goal: Patient/family/caregiver demonstrates understanding of disease process, treatment plan, medications, and discharge instructions  Description: Complete learning assessment and assess knowledge base    Interventions:  - Provide teaching at level of understanding  - Provide teaching via preferred learning methods  Outcome: Progressing     Problem: NEUROSENSORY - ADULT  Goal: Achieves stable or improved neurological status  Description: INTERVENTIONS  - Monitor and report changes in neurological status  - Monitor vital signs such as temperature, blood pressure, glucose, and any other labs ordered   - Initiate measures to prevent increased intracranial pressure  - Monitor for seizure activity and implement precautions if appropriate      Outcome: Progressing  Goal: Achieves maximal functionality and self care  Description: INTERVENTIONS  - Monitor swallowing and airway patency with patient fatigue and changes in neurological status  - Encourage and assist patient to increase activity and self care     - Encourage visually impaired, hearing impaired and aphasic patients to use assistive/communication devices  Outcome: Progressing     Problem: CARDIOVASCULAR - ADULT  Goal: Maintains optimal cardiac output and hemodynamic stability  Description: INTERVENTIONS:  - Monitor I/O, vital signs and rhythm  - Monitor for S/S and trends of decreased cardiac output  - Administer and titrate ordered vasoactive medications to optimize hemodynamic stability  - Assess quality of pulses, skin color and temperature  - Assess for signs of decreased coronary artery perfusion  - Instruct patient to report change in severity of symptoms  Outcome: Progressing  Goal: Absence of cardiac dysrhythmias or at baseline rhythm  Description: INTERVENTIONS:  - Continuous cardiac monitoring, vital signs, obtain 12 lead EKG if ordered  - Administer antiarrhythmic and heart rate control medications as ordered  - Monitor electrolytes and administer replacement therapy as ordered  Outcome: Progressing     Problem: METABOLIC, FLUID AND ELECTROLYTES - ADULT  Goal: Electrolytes maintained within normal limits  Description: INTERVENTIONS:  - Monitor labs and assess patient for signs and symptoms of electrolyte imbalances  - Administer electrolyte replacement as ordered  - Monitor response to electrolyte replacements, including repeat lab results as appropriate  - Instruct patient on fluid and nutrition as appropriate  Outcome: Progressing  Goal: Fluid balance maintained  Description: INTERVENTIONS:  - Monitor labs   - Monitor I/O and WT  - Instruct patient on fluid and nutrition as appropriate  - Assess for signs & symptoms of volume excess or deficit  Outcome: Progressing  Goal: Glucose maintained within target range  Description: INTERVENTIONS:  - Monitor Blood Glucose as ordered  - Assess for signs and symptoms of hyperglycemia and hypoglycemia  - Administer ordered medications to maintain glucose within target range  - Assess nutritional intake and initiate nutrition service referral as needed  Outcome: Progressing     Problem: SKIN/TISSUE INTEGRITY - ADULT  Goal: Skin integrity remains intact  Description: INTERVENTIONS  - Identify patients at risk for skin breakdown  - Assess and monitor skin integrity  - Assess and monitor nutrition and hydration status  - Monitor labs (i e  albumin)  - Assess for incontinence   - Turn and reposition patient  - Assist with mobility/ambulation  - Relieve pressure over bony prominences  - Avoid friction and shearing  - Provide appropriate hygiene as needed including keeping skin clean and dry  - Evaluate need for skin moisturizer/barrier cream  - Collaborate with interdisciplinary team (i e  Nutrition, Rehabilitation, etc )   - Patient/family teaching  Outcome: Progressing  Goal: Incision(s), wounds(s) or drain site(s) healing without S/S of infection  Description: INTERVENTIONS  - Assess and document risk factors for skin impairment   - Assess and document dressing, incision, wound bed, drain sites and surrounding tissue  - Consider nutrition services referral as needed  - Oral mucous membranes remain intact  - Provide patient/ family education  Outcome: Progressing  Goal: Oral mucous membranes remain intact  Description: INTERVENTIONS  - Assess oral mucosa and hygiene practices  - Implement preventative oral hygiene regimen  - Implement oral medicated treatments as ordered  - Initiate Nutrition services referral as needed  Outcome: Progressing     Problem: HEMATOLOGIC - ADULT  Goal: Maintains hematologic stability  Description: INTERVENTIONS  - Assess for signs and symptoms of bleeding or hemorrhage  - Monitor labs  - Administer supportive blood products/factors as ordered and appropriate  Outcome: Progressing     Problem: MUSCULOSKELETAL - ADULT  Goal: Maintain or return mobility to safest level of function  Description: INTERVENTIONS:  - Assess patient's ability to carry out ADLs; assess patient's baseline for ADL function and identify physical deficits which impact ability to perform ADLs (bathing, care of mouth/teeth, toileting, grooming, dressing, etc )  - Assess/evaluate cause of self-care deficits   - Assess range of motion  - Assess patient's mobility  - Assess patient's need for assistive devices and provide as appropriate  - Encourage maximum independence but intervene and supervise when necessary  - Involve family in performance of ADLs  - Assess for home care needs following discharge   - Consider OT consult to assist with ADL evaluation and planning for discharge  - Provide patient education as appropriate  Outcome: Progressing  Goal: Maintain proper alignment of affected body part  Description: INTERVENTIONS:  - Support, maintain and protect limb and body alignment  - Provide patient/ family with appropriate education  Outcome: Progressing

## 2021-02-14 NOTE — ED NOTES
[facility] spoke to [address] [place] [address] ([phone],) about status of patient in restraints and was told that patient will probably not be taken off restraints soon due to combative, verbally abusive behavior towards staff

## 2021-02-14 NOTE — NURSING NOTE
Patient continues to behave in an aggressive, threatening manner towards the staff throughout the shift  Patient continues to be on continual observation, and in locked restraints

## 2021-02-15 RX ADMIN — LITHIUM CARBONATE 450 MG: 300 CAPSULE, GELATIN COATED ORAL at 16:12

## 2021-02-15 RX ADMIN — LITHIUM CARBONATE 450 MG: 300 CAPSULE, GELATIN COATED ORAL at 19:28

## 2021-02-15 RX ADMIN — LITHIUM CARBONATE 450 MG: 300 CAPSULE, GELATIN COATED ORAL at 08:50

## 2021-02-15 RX ADMIN — TAMSULOSIN HYDROCHLORIDE 0.4 MG: 0.4 CAPSULE ORAL at 16:12

## 2021-02-15 NOTE — PROGRESS NOTES
Progress Note - Jose Graham 56 y o  male MRN: [id number]    Unit/Bed#: -02 Encounter: [id number]        Subjective:   Patient seen and examined at bedside after reviewing the chart and discussing the case with the caring staff  Patient examined at bedside  Patient continues to have aggressive behavior requiring 4 limb restraints for the patient  Patient is still awaiting to be admitted to a [facility] Unit at [facility] as bed is unavailable  Physical Exam   Vitals: Blood pressure 163/92, pulse 73, temperature 98 1 °F (36 7 °C), temperature source Temporal, resp  rate 18, height 5' 10" (1 778 m), weight 90 7 kg (200 lb), SpO2 96 %  ,Body mass index is 28 7 kg/m²  Constitutional: He appears well-developed  HEENT: PERR, EOMI, MMM  Cardiovascular: Normal rate and regular rhythm  Pulmonary/Chest: Effort normal and breath sounds normal    Abdomen: Soft, + BS, NT    Assessment/Plan:  Jose Graham is a(n) 56y o  year old male with      1  Cardiac with history of hypertension and dyslipidemia   Patient's blood pressure continues to be high since admission  I will put the patient on Metoprolol XL 25 mg daily  Will continue to closely monitor  Patient is not on anything for Hyperlipidemia  2  Tobacco abuse   Patient is refusing nicotine transdermal patch  3  Alcohol abuse   Stable at this time  I will put the patient on thiamine folic acid and multivitamin  4  DJD/osteoarthritis   Tylenol on as needed basis  5  Gait abnormality   Stable  6  History of Vitamin-D deficiency   I will get vitamin-D levels for the patient  7  BPH  Flomax 0 4mg once daily    8  Psych with schizoaffective disorder and aggressive and agitated behavior   Patient is being managed by psych  Patient is on Valium 5 mg 3 times daily, olanzapine 10 mg 2 times daily and olanzapine 10 mg IM every 6 hours on as-needed basis     Patient [facility] be [facility] to [facility] and Social workers are trying to make arrangement for patient's transferred to [facility] but bed is not available today 02/09/2021  Patient continues to require one-to-one observation as well as 4 limb restraints due to his aggressive behavior  PATIENT IS MEDICALLY CLEARED FOR DISCHARGE FROM THE MEDICAL FLOOR AND FOR ADMISSION AT BEHAVIORAL HEALTH UNIT

## 2021-02-15 NOTE — NURSING NOTE
Reviewed scheduled morning medications with patient to which he agreed to  Patient then spit medications out and refused to take medications  Patient is agitated and is requesting his "$6,000 hearing aids"  Documentation of hearing aids is not in admission navigator

## 2021-02-15 NOTE — NURSING NOTE
Patient seen by Lauren Lion clinical coordinator who recommends taking restraints off at this time  All limb restraints removed at this time, patient ambulating to the bathroom and aid is giving him a shower  Will continue to closely monitor

## 2021-02-16 ENCOUNTER — HOSPITAL ENCOUNTER (INPATIENT)
Facility: HOSPITAL | Age: 61
LOS: 17 days | Discharge: HOME | DRG: 885 | End: 2021-03-05
Attending: PSYCHIATRY & NEUROLOGY | Admitting: PSYCHIATRY & NEUROLOGY
Payer: MEDICARE

## 2021-02-16 VITALS
HEIGHT: 70 IN | WEIGHT: 200 LBS | BODY MASS INDEX: 28.63 KG/M2 | DIASTOLIC BLOOD PRESSURE: 88 MMHG | RESPIRATION RATE: 20 BRPM | HEART RATE: 100 BPM | OXYGEN SATURATION: 98 % | TEMPERATURE: 98 F | SYSTOLIC BLOOD PRESSURE: 157 MMHG

## 2021-02-16 DIAGNOSIS — N40.0 BPH (BENIGN PROSTATIC HYPERPLASIA): ICD-10-CM

## 2021-02-16 DIAGNOSIS — F41.9 ANXIETY: ICD-10-CM

## 2021-02-16 DIAGNOSIS — L90.8 SKIN AGING: ICD-10-CM

## 2021-02-16 DIAGNOSIS — I10 HYPERTENSION, UNSPECIFIED TYPE: ICD-10-CM

## 2021-02-16 DIAGNOSIS — Z00.8 MEDICAL CLEARANCE FOR PSYCHIATRIC ADMISSION: ICD-10-CM

## 2021-02-16 DIAGNOSIS — E55.9 VITAMIN D INSUFFICIENCY: Chronic | ICD-10-CM

## 2021-02-16 DIAGNOSIS — G47.00 INSOMNIA, UNSPECIFIED TYPE: ICD-10-CM

## 2021-02-16 DIAGNOSIS — F25.0 SCHIZOAFFECTIVE DISORDER, BIPOLAR TYPE (HCC): Primary | Chronic | ICD-10-CM

## 2021-02-16 DIAGNOSIS — Z72.0 TOBACCO ABUSE: Chronic | ICD-10-CM

## 2021-02-16 LAB
FLUAV RNA RESP QL NAA+PROBE: NEGATIVE
FLUBV RNA RESP QL NAA+PROBE: NEGATIVE
RSV RNA RESP QL NAA+PROBE: NEGATIVE
SARS-COV-2 RNA RESP QL NAA+PROBE: NEGATIVE

## 2021-02-16 PROCEDURE — 0241U HB NFCT DS VIR RESP RNA 4 TRGT: CPT | Performed by: FAMILY MEDICINE

## 2021-02-16 RX ORDER — OLANZAPINE 2.5 MG/1
2.5 TABLET ORAL
Status: DISCONTINUED | OUTPATIENT
Start: 2021-02-16 | End: 2021-02-24

## 2021-02-16 RX ORDER — MINERAL OIL AND PETROLATUM 150; 830 MG/G; MG/G
1 OINTMENT OPHTHALMIC
Status: CANCELLED | OUTPATIENT
Start: 2021-02-16

## 2021-02-16 RX ORDER — LORAZEPAM 1 MG/1
1 TABLET ORAL EVERY 4 HOURS PRN
Status: DISCONTINUED | OUTPATIENT
Start: 2021-02-16 | End: 2021-03-05 | Stop reason: HOSPADM

## 2021-02-16 RX ORDER — ACETAMINOPHEN 325 MG/1
975 TABLET ORAL EVERY 6 HOURS PRN
Status: DISCONTINUED | OUTPATIENT
Start: 2021-02-16 | End: 2021-03-05 | Stop reason: HOSPADM

## 2021-02-16 RX ORDER — BENZTROPINE MESYLATE 0.5 MG/1
1 TABLET ORAL
Status: CANCELLED | OUTPATIENT
Start: 2021-02-16

## 2021-02-16 RX ORDER — LORAZEPAM 2 MG/ML
1 INJECTION INTRAMUSCULAR EVERY 4 HOURS PRN
Status: DISCONTINUED | OUTPATIENT
Start: 2021-02-16 | End: 2021-02-19

## 2021-02-16 RX ORDER — ACETAMINOPHEN 325 MG/1
650 TABLET ORAL EVERY 4 HOURS PRN
Status: CANCELLED | OUTPATIENT
Start: 2021-02-16

## 2021-02-16 RX ORDER — MINERAL OIL, WHITE PETROLATUM .03; .94 G/G; G/G
1 OINTMENT OPHTHALMIC
Status: DISCONTINUED | OUTPATIENT
Start: 2021-02-16 | End: 2021-03-05 | Stop reason: HOSPADM

## 2021-02-16 RX ORDER — OLANZAPINE 5 MG/1
5 TABLET ORAL
Status: CANCELLED | OUTPATIENT
Start: 2021-02-16

## 2021-02-16 RX ORDER — OLANZAPINE 10 MG/1
10 TABLET ORAL
Status: DISCONTINUED | OUTPATIENT
Start: 2021-02-16 | End: 2021-02-24

## 2021-02-16 RX ORDER — LORAZEPAM 2 MG/ML
2 INJECTION INTRAMUSCULAR
Status: CANCELLED | OUTPATIENT
Start: 2021-02-16

## 2021-02-16 RX ORDER — OLANZAPINE 5 MG/1
5 TABLET ORAL
Status: DISCONTINUED | OUTPATIENT
Start: 2021-02-16 | End: 2021-02-24

## 2021-02-16 RX ORDER — ACETAMINOPHEN 325 MG/1
975 TABLET ORAL EVERY 6 HOURS PRN
Status: CANCELLED | OUTPATIENT
Start: 2021-02-16

## 2021-02-16 RX ORDER — LORAZEPAM 2 MG/ML
2 INJECTION INTRAMUSCULAR
Status: DISCONTINUED | OUTPATIENT
Start: 2021-02-16 | End: 2021-03-05 | Stop reason: HOSPADM

## 2021-02-16 RX ORDER — LORAZEPAM 1 MG/1
1 TABLET ORAL EVERY 4 HOURS PRN
Status: DISCONTINUED | OUTPATIENT
Start: 2021-02-16 | End: 2021-02-16 | Stop reason: HOSPADM

## 2021-02-16 RX ORDER — LORAZEPAM 2 MG/ML
1 INJECTION INTRAMUSCULAR EVERY 4 HOURS PRN
Status: DISCONTINUED | OUTPATIENT
Start: 2021-02-16 | End: 2021-02-16

## 2021-02-16 RX ORDER — POLYETHYLENE GLYCOL 3350 17 G/17G
17 POWDER, FOR SOLUTION ORAL DAILY PRN
Status: DISCONTINUED | OUTPATIENT
Start: 2021-02-16 | End: 2021-03-05 | Stop reason: HOSPADM

## 2021-02-16 RX ORDER — OLANZAPINE 10 MG/1
5 INJECTION, POWDER, LYOPHILIZED, FOR SOLUTION INTRAMUSCULAR
Status: DISCONTINUED | OUTPATIENT
Start: 2021-02-16 | End: 2021-02-24

## 2021-02-16 RX ORDER — BENZTROPINE MESYLATE 1 MG/1
1 TABLET ORAL
Status: DISCONTINUED | OUTPATIENT
Start: 2021-02-16 | End: 2021-03-05 | Stop reason: HOSPADM

## 2021-02-16 RX ORDER — LANOLIN ALCOHOL/MO/W.PET/CERES
3 CREAM (GRAM) TOPICAL
Status: CANCELLED | OUTPATIENT
Start: 2021-02-16

## 2021-02-16 RX ORDER — HYDROXYZINE HYDROCHLORIDE 25 MG/1
25 TABLET, FILM COATED ORAL
Status: DISCONTINUED | OUTPATIENT
Start: 2021-02-16 | End: 2021-03-05 | Stop reason: HOSPADM

## 2021-02-16 RX ORDER — OLANZAPINE 2.5 MG/1
2.5 TABLET ORAL
Status: CANCELLED | OUTPATIENT
Start: 2021-02-16

## 2021-02-16 RX ORDER — LORAZEPAM 2 MG/ML
2 INJECTION INTRAMUSCULAR EVERY 6 HOURS PRN
Status: DISCONTINUED | OUTPATIENT
Start: 2021-02-16 | End: 2021-02-19

## 2021-02-16 RX ORDER — BENZTROPINE MESYLATE 1 MG/ML
1 INJECTION INTRAMUSCULAR; INTRAVENOUS 2 TIMES DAILY PRN
Status: DISCONTINUED | OUTPATIENT
Start: 2021-02-16 | End: 2021-02-16 | Stop reason: SDUPTHER

## 2021-02-16 RX ORDER — LORAZEPAM 2 MG/ML
2 INJECTION INTRAMUSCULAR EVERY 6 HOURS PRN
Status: CANCELLED | OUTPATIENT
Start: 2021-02-16

## 2021-02-16 RX ORDER — DIPHENHYDRAMINE HYDROCHLORIDE 50 MG/ML
50 INJECTION INTRAMUSCULAR; INTRAVENOUS EVERY 6 HOURS PRN
Status: DISCONTINUED | OUTPATIENT
Start: 2021-02-16 | End: 2021-03-05 | Stop reason: HOSPADM

## 2021-02-16 RX ORDER — OLANZAPINE 10 MG/1
10 INJECTION, POWDER, LYOPHILIZED, FOR SOLUTION INTRAMUSCULAR EVERY 6 HOURS PRN
Status: CANCELLED | OUTPATIENT
Start: 2021-02-16

## 2021-02-16 RX ORDER — OLANZAPINE 10 MG/1
5 INJECTION, POWDER, LYOPHILIZED, FOR SOLUTION INTRAMUSCULAR
Status: CANCELLED | OUTPATIENT
Start: 2021-02-16

## 2021-02-16 RX ORDER — HYDROXYZINE HYDROCHLORIDE 25 MG/1
50 TABLET, FILM COATED ORAL
Status: CANCELLED | OUTPATIENT
Start: 2021-02-16

## 2021-02-16 RX ORDER — POLYETHYLENE GLYCOL 3350 17 G/17G
17 POWDER, FOR SOLUTION ORAL DAILY PRN
Status: CANCELLED | OUTPATIENT
Start: 2021-02-16

## 2021-02-16 RX ORDER — BENZTROPINE MESYLATE 1 MG/ML
1 INJECTION INTRAMUSCULAR; INTRAVENOUS
Status: DISCONTINUED | OUTPATIENT
Start: 2021-02-16 | End: 2021-03-05 | Stop reason: HOSPADM

## 2021-02-16 RX ORDER — DIAZEPAM 5 MG/1
5 TABLET ORAL 3 TIMES DAILY
Status: DISCONTINUED | OUTPATIENT
Start: 2021-02-16 | End: 2021-02-17

## 2021-02-16 RX ORDER — OLANZAPINE 10 MG/1
10 TABLET ORAL 2 TIMES DAILY
Status: DISCONTINUED | OUTPATIENT
Start: 2021-02-16 | End: 2021-02-22

## 2021-02-16 RX ORDER — HYDROXYZINE 50 MG/1
50 TABLET, FILM COATED ORAL
Status: DISCONTINUED | OUTPATIENT
Start: 2021-02-16 | End: 2021-03-05 | Stop reason: HOSPADM

## 2021-02-16 RX ORDER — AMOXICILLIN 250 MG
1 CAPSULE ORAL DAILY PRN
Status: CANCELLED | OUTPATIENT
Start: 2021-02-16

## 2021-02-16 RX ORDER — AMOXICILLIN 250 MG
1 CAPSULE ORAL DAILY PRN
Status: DISCONTINUED | OUTPATIENT
Start: 2021-02-16 | End: 2021-03-05 | Stop reason: HOSPADM

## 2021-02-16 RX ORDER — OLANZAPINE 10 MG/1
10 INJECTION, POWDER, LYOPHILIZED, FOR SOLUTION INTRAMUSCULAR EVERY 6 HOURS PRN
Status: DISCONTINUED | OUTPATIENT
Start: 2021-02-16 | End: 2021-02-16 | Stop reason: SDUPTHER

## 2021-02-16 RX ORDER — PROPRANOLOL HYDROCHLORIDE 10 MG/1
10 TABLET ORAL 3 TIMES DAILY
Status: CANCELLED | OUTPATIENT
Start: 2021-02-16

## 2021-02-16 RX ORDER — LANOLIN ALCOHOL/MO/W.PET/CERES
3 CREAM (GRAM) TOPICAL
Status: DISCONTINUED | OUTPATIENT
Start: 2021-02-16 | End: 2021-03-05 | Stop reason: HOSPADM

## 2021-02-16 RX ORDER — BENZTROPINE MESYLATE 1 MG/ML
1 INJECTION INTRAMUSCULAR; INTRAVENOUS
Status: CANCELLED | OUTPATIENT
Start: 2021-02-16

## 2021-02-16 RX ORDER — HYDROXYZINE HYDROCHLORIDE 25 MG/1
100 TABLET, FILM COATED ORAL
Status: CANCELLED | OUTPATIENT
Start: 2021-02-16

## 2021-02-16 RX ORDER — DIAZEPAM 5 MG/1
5 TABLET ORAL 3 TIMES DAILY
Status: CANCELLED | OUTPATIENT
Start: 2021-02-16

## 2021-02-16 RX ORDER — TRAZODONE HYDROCHLORIDE 50 MG/1
50 TABLET ORAL
Status: DISCONTINUED | OUTPATIENT
Start: 2021-02-16 | End: 2021-03-05 | Stop reason: HOSPADM

## 2021-02-16 RX ORDER — LORAZEPAM 2 MG/ML
1 INJECTION INTRAMUSCULAR EVERY 4 HOURS PRN
Status: CANCELLED | OUTPATIENT
Start: 2021-02-16

## 2021-02-16 RX ORDER — OLANZAPINE 10 MG/1
10 INJECTION, POWDER, LYOPHILIZED, FOR SOLUTION INTRAMUSCULAR
Status: DISCONTINUED | OUTPATIENT
Start: 2021-02-16 | End: 2021-02-24

## 2021-02-16 RX ORDER — OLANZAPINE 10 MG/1
10 TABLET ORAL
Status: CANCELLED | OUTPATIENT
Start: 2021-02-16

## 2021-02-16 RX ORDER — MAGNESIUM HYDROXIDE/ALUMINUM HYDROXICE/SIMETHICONE 120; 1200; 1200 MG/30ML; MG/30ML; MG/30ML
30 SUSPENSION ORAL EVERY 4 HOURS PRN
Status: CANCELLED | OUTPATIENT
Start: 2021-02-16

## 2021-02-16 RX ORDER — LORAZEPAM 1 MG/1
1 TABLET ORAL EVERY 4 HOURS PRN
Status: CANCELLED | OUTPATIENT
Start: 2021-02-16

## 2021-02-16 RX ORDER — DIPHENHYDRAMINE HYDROCHLORIDE 50 MG/ML
50 INJECTION INTRAMUSCULAR; INTRAVENOUS EVERY 6 HOURS PRN
Status: CANCELLED | OUTPATIENT
Start: 2021-02-16

## 2021-02-16 RX ORDER — TRAZODONE HYDROCHLORIDE 50 MG/1
50 TABLET ORAL
Status: CANCELLED | OUTPATIENT
Start: 2021-02-16

## 2021-02-16 RX ORDER — HYDROXYZINE 50 MG/1
100 TABLET, FILM COATED ORAL
Status: DISCONTINUED | OUTPATIENT
Start: 2021-02-16 | End: 2021-03-05 | Stop reason: HOSPADM

## 2021-02-16 RX ORDER — BENZTROPINE MESYLATE 1 MG/ML
1 INJECTION INTRAMUSCULAR; INTRAVENOUS 2 TIMES DAILY PRN
Status: CANCELLED | OUTPATIENT
Start: 2021-02-16

## 2021-02-16 RX ORDER — ACETAMINOPHEN 325 MG/1
650 TABLET ORAL EVERY 6 HOURS PRN
Status: CANCELLED | OUTPATIENT
Start: 2021-02-16

## 2021-02-16 RX ORDER — ACETAMINOPHEN 325 MG/1
650 TABLET ORAL EVERY 4 HOURS PRN
Status: DISCONTINUED | OUTPATIENT
Start: 2021-02-16 | End: 2021-03-05 | Stop reason: HOSPADM

## 2021-02-16 RX ORDER — OLANZAPINE 10 MG/1
10 TABLET ORAL 2 TIMES DAILY
Status: CANCELLED | OUTPATIENT
Start: 2021-02-16

## 2021-02-16 RX ORDER — ACETAMINOPHEN 325 MG/1
650 TABLET ORAL EVERY 6 HOURS PRN
Status: DISCONTINUED | OUTPATIENT
Start: 2021-02-16 | End: 2021-03-05 | Stop reason: HOSPADM

## 2021-02-16 RX ORDER — OLANZAPINE 10 MG/1
10 INJECTION, POWDER, LYOPHILIZED, FOR SOLUTION INTRAMUSCULAR
Status: CANCELLED | OUTPATIENT
Start: 2021-02-16

## 2021-02-16 RX ORDER — HYDROXYZINE HYDROCHLORIDE 25 MG/1
25 TABLET, FILM COATED ORAL
Status: CANCELLED | OUTPATIENT
Start: 2021-02-16

## 2021-02-16 RX ADMIN — LITHIUM CARBONATE 450 MG: 300 CAPSULE, GELATIN COATED ORAL at 16:30

## 2021-02-16 RX ADMIN — LITHIUM CARBONATE 450 MG: 300 CAPSULE, GELATIN COATED ORAL at 08:38

## 2021-02-16 RX ADMIN — OLANZAPINE 10 MG: 10 INJECTION, POWDER, LYOPHILIZED, FOR SOLUTION INTRAMUSCULAR at 17:10

## 2021-02-16 RX ADMIN — LORAZEPAM 1 MG: 1 TABLET ORAL at 01:31

## 2021-02-16 RX ADMIN — TAMSULOSIN HYDROCHLORIDE 0.4 MG: 0.4 CAPSULE ORAL at 16:30

## 2021-02-16 NOTE — PLAN OF CARE
Problem: Potential for Falls  Goal: Patient will remain free of falls  Description: INTERVENTIONS:  - Assess patient frequently for physical needs  -  Identify cognitive and physical deficits and behaviors that affect risk of falls    -  [place] fall precautions as indicated by assessment   - Educate patient/family on patient safety including physical limitations  - Instruct patient to call for assistance with activity based on assessment  - Modify environment to reduce risk of injury  - Consider OT/PT consult to assist with strengthening/mobility  Outcome: Progressing     Problem: Prexisting or High Potential for Compromised Skin Integrity  Goal: Skin integrity is maintained or improved  Description: INTERVENTIONS:  - Identify patients at risk for skin breakdown  - Assess and monitor skin integrity  - Assess and monitor nutrition and hydration status  - Monitor labs   - Assess for incontinence   - Turn and reposition patient  - Assist with mobility/ambulation  - Relieve pressure over bony prominences  - Avoid friction and shearing  - Provide appropriate hygiene as needed including keeping skin clean and dry  - Evaluate need for skin moisturizer/barrier cream  - Collaborate with interdisciplinary team   - Patient/family teaching  - Consider wound care consult   Outcome: Progressing     Problem: PAIN - ADULT  Goal: Verbalizes/displays adequate comfort level or baseline comfort level  Description: Interventions:  - Encourage patient to monitor pain and request assistance  - Assess pain using appropriate pain scale  - Administer analgesics based on type and severity of pain and evaluate response  - Implement non-pharmacological measures as appropriate and evaluate response  - Consider cultural and social influences on pain and pain management  - Notify physician/advanced practitioner if interventions unsuccessful or patient reports new pain  Outcome: Progressing     Problem: INFECTION - ADULT  Goal: Absence or prevention of progression during hospitalization  Description: INTERVENTIONS:  - Assess and monitor for signs and symptoms of infection  - Monitor lab/diagnostic results  - Monitor all insertion sites, i e  indwelling lines, tubes, and drains  - Monitor endotracheal if appropriate and nasal secretions for changes in amount and color  - [place] appropriate cooling/warming therapies per order  - Administer medications as ordered  - Instruct and encourage patient and family to use good hand hygiene techniqu  Outcome: Progressing  Goal: Absence of fever/infection during neutropenic period  Description: INTERVENTIONS:  - Monitor WBC    Outcome: Progressing     Problem: SAFETY ADULT  Goal: Patient will remain free of falls  Description: INTERVENTIONS:  - Assess patient frequently for physical needs  -  Identify cognitive and physical deficits and behaviors that affect risk of falls    -  [place] fall precautions as indicated by assessment   - Educate patient/family on patient safety including physical limitations  - Instruct patient to call for assistance with activity based on assessment  - Modify environment to reduce risk of injury  - Consider OT/PT consult to assist with strengthening/mobility  Outcome: Progressing  Goal: Maintain or return to baseline ADL function  Description: INTERVENTIONS:  -  Assess patient's ability to carry out ADLs; assess patient's baseline for ADL function and identify physical deficits which impact ability to perform ADLs (bathing, care of mouth/teeth, toileting, grooming, dressing, etc )  - Assess/evaluate cause of self-care deficits   - Assess range of motion  - Assess patient's mobility; develop plan if impaired  - Assess patient's need for assistive devices and provide as appropriate  - Encourage maximum independence but intervene and supervise when necessary  - Involve family in performance of ADLs  - Assess for home care needs following discharge   - Consider OT consult to assist with ADL evaluation and planning for discharge  - Provide patient education as appropriate  Outcome: Progressing  Goal: Maintain or return mobility status to optimal level  Description: INTERVENTIONS:  - Assess patient's baseline mobility status (ambulation, transfers, stairs, etc )    - Identify cognitive and physical deficits and behaviors that affect mobility  - Identify mobility aids required to assist with transfers and/or ambulation (gait belt, sit-to-stand, lift, walker, cane, etc )  - [place] fall precautions as indicated by assessment  - Record patient progress and toleration of activity level on Mobility SBAR; progress patient to next Phase/Stage  - Instruct patient to call for assistance with activity based on assessment  - Consider rehabilitation consult to assist with strengthening/weightbearing, etc   Outcome: Progressing     Problem: DISCHARGE PLANNING  Goal: Discharge to home or other facility with appropriate resources  Description: INTERVENTIONS:  - Identify barriers to discharge w/patient and caregiver  - Arrange for needed discharge resources and transportation as appropriate  - Identify discharge learning needs (meds, wound care, etc )  - Refer to Case Management Department for coordinating discharge planning if the patient needs post-hospital services based on physician/advanced practitioner order or complex needs related to functional status, cognitive ability, or social support system  Outcome: Progressing     Problem: Knowledge Deficit  Goal: Patient/family/caregiver demonstrates understanding of disease process, treatment plan, medications, and discharge instructions  Description: Complete learning assessment and assess knowledge base    Interventions:  - Provide teaching at level of understanding  - Provide teaching via preferred learning methods  Outcome: Progressing     Problem: NEUROSENSORY - ADULT  Goal: Achieves stable or improved neurological status  Description: INTERVENTIONS  - Monitor and report changes in neurological status  - Monitor vital signs such as temperature, blood pressure, glucose, and any other labs ordered   - Initiate measures to prevent increased intracranial pressure  - Monitor for seizure activity and implement precautions if appropriate      Outcome: Progressing  Goal: Achieves maximal functionality and self care  Description: INTERVENTIONS  - Monitor swallowing and airway patency with patient fatigue and changes in neurological status  - Encourage and assist patient to increase activity and self care     - Encourage visually impaired, hearing impaired and aphasic patients to use assistive/communication devices  Outcome: Progressing     Problem: CARDIOVASCULAR - ADULT  Goal: Maintains optimal cardiac output and hemodynamic stability  Description: INTERVENTIONS:  - Monitor I/O, vital signs and rhythm  - Monitor for S/S and trends of decreased cardiac output  - Administer and titrate ordered vasoactive medications to optimize hemodynamic stability  - Assess quality of pulses, skin color and temperature  - Assess for signs of decreased coronary artery perfusion  - Instruct patient to report change in severity of symptoms  Outcome: Progressing  Goal: Absence of cardiac dysrhythmias or at baseline rhythm  Description: INTERVENTIONS:  - Continuous cardiac monitoring, vital signs, obtain 12 lead EKG if ordered  - Administer antiarrhythmic and heart rate control medications as ordered  - Monitor electrolytes and administer replacement therapy as ordered  Outcome: Progressing     Problem: METABOLIC, FLUID AND ELECTROLYTES - ADULT  Goal: Electrolytes maintained within normal limits  Description: INTERVENTIONS:  - Monitor labs and assess patient for signs and symptoms of electrolyte imbalances  - Administer electrolyte replacement as ordered  - Monitor response to electrolyte replacements, including repeat lab results as appropriate  - Instruct patient on fluid and nutrition as appropriate  Outcome: Progressing  Goal: Fluid balance maintained  Description: INTERVENTIONS:  - Monitor labs   - Monitor I/O and WT  - Instruct patient on fluid and nutrition as appropriate  - Assess for signs & symptoms of volume excess or deficit  Outcome: Progressing  Goal: Glucose maintained within target range  Description: INTERVENTIONS:  - Monitor Blood Glucose as ordered  - Assess for signs and symptoms of hyperglycemia and hypoglycemia  - Administer ordered medications to maintain glucose within target range  - Assess nutritional intake and initiate nutrition service referral as needed  Outcome: Progressing     Problem: SKIN/TISSUE INTEGRITY - ADULT  Goal: Skin integrity remains intact  Description: INTERVENTIONS  - Identify patients at risk for skin breakdown  - Assess and monitor skin integrity  - Assess and monitor nutrition and hydration status  - Monitor labs (i e  albumin)  - Assess for incontinence   - Turn and reposition patient  - Assist with mobility/ambulation  - Relieve pressure over bony prominences  - Avoid friction and shearing  - Provide appropriate hygiene as needed including keeping skin clean and dry  - Evaluate need for skin moisturizer/barrier cream  - Collaborate with interdisciplinary team (i e  Nutrition, Rehabilitation, etc )   - Patient/family teaching  Outcome: Progressing  Goal: Incision(s), wounds(s) or drain site(s) healing without S/S of infection  Description: INTERVENTIONS  - Assess and document risk factors for skin impairment   - Assess and document dressing, incision, wound bed, drain sites and surrounding tissue  - Consider nutrition services referral as needed  - Oral mucous membranes remain intact  - Provide patient/ family education  Outcome: Progressing  Goal: Oral mucous membranes remain intact  Description: INTERVENTIONS  - Assess oral mucosa and hygiene practices  - Implement preventative oral hygiene regimen  - Implement oral medicated treatments as ordered  - Initiate Nutrition services referral as needed  Outcome: Progressing     Problem: HEMATOLOGIC - ADULT  Goal: Maintains hematologic stability  Description: INTERVENTIONS  - Assess for signs and symptoms of bleeding or hemorrhage  - Monitor labs  - Administer supportive blood products/factors as ordered and appropriate  Outcome: Progressing     Problem: MUSCULOSKELETAL - ADULT  Goal: Maintain or return mobility to safest level of function  Description: INTERVENTIONS:  - Assess patient's ability to carry out ADLs; assess patient's baseline for ADL function and identify physical deficits which impact ability to perform ADLs (bathing, care of mouth/teeth, toileting, grooming, dressing, etc )  - Assess/evaluate cause of self-care deficits   - Assess range of motion  - Assess patient's mobility  - Assess patient's need for assistive devices and provide as appropriate  - Encourage maximum independence but intervene and supervise when necessary  - Involve family in performance of ADLs  - Assess for home care needs following discharge   - Consider OT consult to assist with ADL evaluation and planning for discharge  - Provide patient education as appropriate  Outcome: Progressing  Goal: Maintain proper alignment of affected body part  Description: INTERVENTIONS:  - Support, maintain and protect limb and body alignment  - Provide patient/ family with appropriate education  Outcome: Progressing

## 2021-02-16 NOTE — DISCHARGE SUMMARY
Discharge Summary -   Sarah Graham 56 y o  male MRN: [id number]  Unit/Bed#: -02 Encounter: [id number]    Admission Date: 2/6/2021     Discharge Date:  02/16/2021    Admitting Diagnosis: Rhabdomyolysis [M62 82]  Agitation [R45 1]  Psychosis ([address] ) [F29]  Encounter for psychological evaluation [Z00 8]    Discharge Diagnosis:   Elevated CK level with suspicious rhabdomyolysis  Psychosis with aggressive behavior  Schizoaffective disorder    Attending:  Roberto Alvarez MD    Consulting Physician(s):  [place] العلي MD psych  [facility] Course: Mr Sarah Graham is a 75-year-old with history of schizoaffective disorder who was admitted initially to medical-surgical flow when he presented with psychotic symptoms with aggressive behavior at home  Patient was brought in by the law enforcement agencies  In the emergency department workup patient was found to have elevated CK levels with suspected rhabdomyolysis  Patient was admitted for medical management with goal to admitting later to the [facility]  Patient's CK levels became normal in 2-3 days  Psych was consulted  They did recommended inpatient psych treatment for the patient at [facility]  Patient was feeding for admission to [facility] on the medical-surgical floor due to unavailability of the beds  Patient was accepted at [facility]  Patient received a COVID test which was found to be negative prior to his admission to [facility]     On the day of discharge patient was examined at bedside  Patient's vitals and physical examination was found to be within normal limits  Patient is medically cleared for discharge to [address] [place], [place] [address]ey Unit at [facility]     Condition at Discharge:  FAIR     Discharge instructions/Information to patient and family:   See after visit summary for information provided to patient and family        Provisions for Follow-Up Care:  See after visit summary for information related to follow-up care and any pertinent home health orders  Disposition: Extended care facility [facility]    Discharge Statement   I spent 50 minutes discharging the patient  This time was spent on the day of discharge  I had direct contact with the patient on the day of discharge  Discharge Medications:  See after visit summary for reconciled discharge medications provided to patient and family

## 2021-02-16 NOTE — ED NOTES
Insurance Authorization for admission:   No pre-cert necessary Medicare A&B is primary    COB:  Mauro Pate, [phone], spoke to Wilner Domínguez who completed the COB and requested that UR call upon pt's arrival to [address] [place] [address]  EVS (Eligibility Verification System) called - [phone]  Automated system indicates: EVS called, Patient is eligible for M/A - Behavioral health services, Managed Care   Rec# [id number]    Insurance Authorization for Transportation: No transport auth necessary Medicare A&B is primary

## 2021-02-16 NOTE — PROGRESS NOTES
Progress Note - Sarah Graham 56 y o  male MRN: [id number]    Unit/Bed#: -02 Encounter: [id number]        Subjective:   Patient seen and examined at bedside after reviewing the chart and discussing the case with the caring staff  Patient examined at bedside  Patient appears more cooperative since yesterday evening  Patient is no more on restrains  Patient is still awaiting to be admitted to a [facility] Unit at [facility] as bed is unavailable  Physical Exam   Vitals: Blood pressure 131/87, pulse 88, temperature (!) 96 4 °F (35 8 °C), temperature source Tympanic, resp  rate 20, height 5' 10" (1 778 m), weight 90 7 kg (200 lb), SpO2 97 %  ,Body mass index is 28 7 kg/m²  Constitutional: He appears well-developed  HEENT: PERR, EOMI, MMM  Cardiovascular: Normal rate and regular rhythm  Pulmonary/Chest: Effort normal and breath sounds normal    Abdomen: Soft, + BS, NT    Assessment/Plan:  Sarah Graham is a(n) 56y o  year old male with      1  Cardiac with history of hypertension and dyslipidemia   Patient is on Metoprolol XL 25 mg daily  Will continue to closely monitor  Patient is not on anything for Hyperlipidemia  2  Tobacco abuse   Patient is refusing nicotine transdermal patch  I will discontinue  3  Alcohol abuse   Stable at this time  Patient is refusing thiamine folic acid and multivitamin and they will be discontinue  4  DJD/osteoarthritis   Tylenol on as needed basis  5  Gait abnormality   Stable  6  History of Vitamin-D deficiency   I will get vitamin-D levels for the patient  7  BPH  Flomax 0 4mg once daily    8  Psych with schizoaffective disorder and aggressive and agitated behavior   Patient is being managed by psych  Patient is on Valium 5 mg 3 times daily, olanzapine 10 mg 2 times daily and olanzapine 10 mg IM every 6 hours on as-needed basis     Patient cannot be admitted to [facility] and Social workers are trying to make arrangement for patient's transferred to [facility] but bed is not available today 02/09/2021  Patient continues to require one-to-one observation as well as 4 limb restraints due to his aggressive behavior  PATIENT IS MEDICALLY CLEARED FOR DISCHARGE FROM THE MEDICAL FLOOR AND FOR ADMISSION AT BEHAVIORAL HEALTH UNIT

## 2021-02-16 NOTE — ED NOTES
Patient is accepted at [address] [place] [address] 3B  Patient is accepted by Dr Calvin Youssef per [facility] Specialist      Transportation is arranged with [facility]  Transportation is scheduled for p/u @ 17:15  Patient may go to the floor after 16:00  Nurse report is to be called to [phone] prior to patient transfer

## 2021-02-17 PROBLEM — E55.9 VITAMIN D INSUFFICIENCY: Chronic | Status: ACTIVE | Noted: 2021-02-17

## 2021-02-17 PROBLEM — Z87.39 HISTORY OF RHABDOMYOLYSIS: Status: ACTIVE | Noted: 2021-02-17

## 2021-02-17 PROBLEM — R79.89 ELEVATED TSH: Status: ACTIVE | Noted: 2021-02-17

## 2021-02-17 LAB
BASOPHILS # BLD AUTO: 0 THOUSANDS/ΜL (ref 0–0.1)
BASOPHILS NFR BLD AUTO: 1 % (ref 0–1)
CHOLEST SERPL-MCNC: 150 MG/DL
EOSINOPHIL # BLD AUTO: 0.2 THOUSAND/ΜL (ref 0–0.4)
EOSINOPHIL NFR BLD AUTO: 3 % (ref 0–6)
ERYTHROCYTE [DISTWIDTH] IN BLOOD BY AUTOMATED COUNT: 14.2 %
HCT VFR BLD AUTO: 43.2 % (ref 41–53)
HDLC SERPL-MCNC: 35 MG/DL
HGB BLD-MCNC: 14 G/DL (ref 13.5–17.5)
LDLC SERPL CALC-MCNC: 97 MG/DL
LYMPHOCYTES # BLD AUTO: 1.9 THOUSANDS/ΜL (ref 0.5–4)
LYMPHOCYTES NFR BLD AUTO: 27 % (ref 25–45)
MAGNESIUM SERPL-MCNC: 2.6 MG/DL (ref 1.6–2.3)
MCH RBC QN AUTO: 29.3 PG (ref 26–34)
MCHC RBC AUTO-ENTMCNC: 32.4 G/DL (ref 31–36)
MCV RBC AUTO: 90 FL (ref 80–100)
MONOCYTES # BLD AUTO: 0.6 THOUSAND/ΜL (ref 0.2–0.9)
MONOCYTES NFR BLD AUTO: 9 % (ref 1–10)
NEUTROPHILS # BLD AUTO: 4.3 THOUSANDS/ΜL (ref 1.8–7.8)
NEUTS SEG NFR BLD AUTO: 61 % (ref 45–65)
NONHDLC SERPL-MCNC: 115 MG/DL
PLATELET # BLD AUTO: 258 THOUSANDS/UL (ref 150–450)
PMV BLD AUTO: 8.9 FL (ref 8.9–12.7)
RBC # BLD AUTO: 4.79 MILLION/UL (ref 4.5–5.9)
T4 FREE SERPL-MCNC: 1.09 NG/DL (ref 0.76–1.46)
TRIGL SERPL-MCNC: 90 MG/DL
TSH SERPL DL<=0.05 MIU/L-ACNC: 4.96 UIU/ML (ref 0.47–4.68)
WBC # BLD AUTO: 7 THOUSAND/UL (ref 4.5–11)

## 2021-02-17 PROCEDURE — 99222 1ST HOSP IP/OBS MODERATE 55: CPT | Performed by: PSYCHIATRY & NEUROLOGY

## 2021-02-17 PROCEDURE — 83735 ASSAY OF MAGNESIUM: CPT | Performed by: PSYCHIATRY & NEUROLOGY

## 2021-02-17 PROCEDURE — 85025 COMPLETE CBC W/AUTO DIFF WBC: CPT | Performed by: PSYCHIATRY & NEUROLOGY

## 2021-02-17 PROCEDURE — 80061 LIPID PANEL: CPT | Performed by: PSYCHIATRY & NEUROLOGY

## 2021-02-17 PROCEDURE — 84443 ASSAY THYROID STIM HORMONE: CPT | Performed by: PSYCHIATRY & NEUROLOGY

## 2021-02-17 PROCEDURE — 99222 1ST HOSP IP/OBS MODERATE 55: CPT | Performed by: PHYSICIAN ASSISTANT

## 2021-02-17 PROCEDURE — 84439 ASSAY OF FREE THYROXINE: CPT | Performed by: PHYSICIAN ASSISTANT

## 2021-02-17 RX ORDER — MELATONIN
2000 DAILY
Status: DISCONTINUED | OUTPATIENT
Start: 2021-02-17 | End: 2021-03-05 | Stop reason: HOSPADM

## 2021-02-17 RX ORDER — LORAZEPAM 1 MG/1
1 TABLET ORAL 3 TIMES DAILY
Status: DISCONTINUED | OUTPATIENT
Start: 2021-02-17 | End: 2021-02-19

## 2021-02-17 RX ORDER — LITHIUM CARBONATE 450 MG
450 TABLET, EXTENDED RELEASE ORAL EVERY 12 HOURS SCHEDULED
Status: DISCONTINUED | OUTPATIENT
Start: 2021-02-17 | End: 2021-02-18

## 2021-02-17 RX ORDER — METOPROLOL SUCCINATE 25 MG/1
25 TABLET, EXTENDED RELEASE ORAL DAILY
Status: DISCONTINUED | OUTPATIENT
Start: 2021-02-17 | End: 2021-02-17

## 2021-02-17 RX ORDER — TAMSULOSIN HYDROCHLORIDE 0.4 MG/1
0.4 CAPSULE ORAL
Status: DISCONTINUED | OUTPATIENT
Start: 2021-02-17 | End: 2021-03-05 | Stop reason: HOSPADM

## 2021-02-17 RX ORDER — METOPROLOL SUCCINATE 25 MG/1
25 TABLET, EXTENDED RELEASE ORAL DAILY
Status: DISCONTINUED | OUTPATIENT
Start: 2021-02-17 | End: 2021-03-05 | Stop reason: HOSPADM

## 2021-02-17 RX ADMIN — OLANZAPINE 10 MG: 10 INJECTION, POWDER, FOR SOLUTION INTRAMUSCULAR at 13:25

## 2021-02-17 RX ADMIN — LORAZEPAM 1 MG: 1 TABLET ORAL at 17:14

## 2021-02-17 RX ADMIN — TAMSULOSIN HYDROCHLORIDE 0.4 MG: 0.4 CAPSULE ORAL at 17:14

## 2021-02-17 NOTE — PROGRESS NOTES
02/17/21 1100   Activity/Group Checklist   Group   (recovery group)   Attendance Attended  (in and out)   Attendance Duration (min) 31-45   Interactions Disorganized interaction   Affect/Mood Blunted/flat  (internal stimuli)   Goals Achieved Able to listen to others; Able to engage in interactions; Able to self-disclose   Patient is tangential having difficulty staying on the subject   He appears religiously preoccupied

## 2021-02-17 NOTE — NURSING NOTE
Patient arrived for admission tied in soft restraints to the stretcher  He was irritable, and not happy to be here  Apparently as per transport he was given a prn before they discharged him from [facility]  He is oriented to self, place and situation  When asked why he is here he said crsis came this house and then a kangaroo court said he had to come here for psychiatric evaluation  He is fearful he will be given injections "I'm not a pincushion " He has refused all po meds this evening  He has a loud, gruff voice and he shouts and gesticulated but he is not coming towards staff when he is behaving like this, he is trying to get away from us  He spoke with his mother on the phone this evening and he was rambling and protesting about being here  He is a poor historian/informant and "I have no time for psychiatrists "  He is psychotic and paranoid and has no insight into his need for admission or medications  Admission paperwork has to be brought to our floor by [facility] as it was not dropped off this evening when the patient was admitted

## 2021-02-17 NOTE — TREATMENT TEAM
02/17/21 0854   Team Meeting   Meeting Type Daily Rounds   Team Members Present   Team Members Present Physician;Nurse;; Other (Discipline and Name)   Physician Team Member Dr BRODERICK   Nursing Team Member [facility] [facility] Management Team Member Mehdi Bentley   Other (Discipline and Name) Ruff and residents   Patient/Family Present   Patient Present No   Patient's Family Present No     12, transfer from due to psychosis, agitation, aggression, threats to harm his mother and slamming chairs  Pt has been restrained for agitation  Pt is [employer]  Continue with assessment  Continue to monitor

## 2021-02-17 NOTE — H&P
Psychiatric Evaluation - [address] Day 59 y o  male MRN: [id number]  Unit/Bed#: U 348-01 Encounter: [id number]    Assessment   Principal Problem:    Schizoaffective disorder, bipolar type ([address] )  Active Problems:    Hypertension    Medical clearance for psychiatric admission    BPH (benign prostatic hyperplasia)    Vitamin D insufficiency    History of rhabdomyolysis    Elevated TSH      Plan    · Admission labs evaluated, see detailed labs below  · Collaborate with collaterals for baseline assessment and disposition  · Lithobid 450 mg q 12 hours for psychotic symptoms  · Discontinue Valium 5 mg t i d   · Ativan 1 mg t i d  for anxiety and agitation  · Melatonin 3 mg q h s  for insomnia  · Zyprexa 10 mg b i d  for psychotic symptoms  · Promote patient participation in therapeutic milieu  · Medical management by primary team     Risks, benefits and possible side effects of Medications:   Risks, benefits, and possible side effects of medications explained to patient and patient verbalizes understanding  Chief Complaint:  Rambling and pressured speech, active psychosis    History of Present Illness     Adolph Graham is a 59 y o  male, single, unemployed, , living with his mother presenting to [address] [place] [address], possessing pertinent psychiatric history of schizoaffective disorder, bipolar type subsequent to recent medical admission for suspected rhabdomyolysis/elevated CK  Patient was initially brought to the hospital by law enforcement after 302 was petitioned by his mother for "extreme violence and agitation" including threats and aggressive and agitated behavior  From medical discharge summary dated 02/12/2021: In the emergency department workup patient was found to have elevated CK of 1254 with suspected rhabdomyolysis  Patient was admitted to the medical-surgical floor  Patient's CK level came back to within normal limits in 3 days    During his stay on the medical-surgical floor frequently patient was agitated aggressive and physically threatening to the caring staff nurses  Patient was frequently requiring 4 limb restraints  Psych was consulted and they determined that patient does need admission but because of his acutely aggressive behavior and shortage of staff it was recommended that patient should be transferred to [facility]   Social workers and crisis team coordinated patient's transfer  Patient was transported to the [address] [place] [address] in soft restraints and was agitated on arrival   On exam he was uncooperative, disorganized, rambling with inappropriate laughter, bizarre speech, and loose associations  He appeared to be responding to internal stimuli  Despite multiple attempts to redirect the patient, the interview was unable to be completed  Shortly thereafter the patient became increasingly agitated and began slamming chairs and yelling  Patient was offered p o  Zyprexa and spitted on the floor  He was subsequently given 10 mg IM Zyprexa  Per chart review patient has had 16 documented behavioral health admissions since 2006  He has a past psychiatric history of schizoaffective disorder bipolar type  He has a past medical history of hypertension, rhabdomyolysis, and BPH  Of note, patient was petitioned for a 303 commitment to extended acute care on his last admission  Per [facility] discharge summary dated 10/13/2020:    Patient was noted to be very manic and irritable initially  Patient is well known to the writer and to this treatment facility  Patient continued to refuse any change in his medications and was refusing to take the Zyprexa  Given the patient's repeated admissions and refusal to cooperate with treatment and having multiple 302s in the past, we filed a 2 physician a 302 and in a 303 hearing it was up held  Patient was recommended to go to [facility]    Patient did file an appeal with the court of, and please and during the hearing the patient denied having any intention to hurt self or others and reported that he will follow up at the [place]  The  felt that boarding up the neighbors door does not meet the standard for extended involuntary treatment and only approved outpatient commitment  The patient opted to be discharged the same day and refused for us to contact the [place] to coordinate his care  Patient was picked up by his family later in the afternoon  He was given a script for 1 month supply him his medications  Patient was discharged from his last psychiatric admission on Valium 5 mg t i d , Zyprexa 20 mg q h s , and lithium 450 mg t i d   On recent medical admission dated 02/06/2021 patient's lithium level was noted to be 0 2  This evidences noncompliance with medication  Stressors:   Unable to obtain due to active psychosis, will revisit with patient when appropriate    Medical Review Of Systems:  Review of systems not obtained due to patient factors  Psychiatric Review Of Systems:  Unable to obtain due to limited cooperation and active psychosis  opal: yes    Historical Information     Past Psychiatric History:   [facility], [facility] since 2006    Substance Abuse History:  I spent time with patient in counseling and education on risk of substance abuse  Assessed him motivation and encouraged patient for treatment  Brief intervention done  Social History     Tobacco History     Smoking Status  Current Every Day Smoker Smoking Frequency  1 pack/day for 40 years (40 pk yrs) Smoking Tobacco Type  Cigars    Smokeless Tobacco Use  Never Used          Alcohol History     Alcohol Use Status  Yes Comment  whenever i want          Drug Use     Drug Use Status  Yes Types  Marijuana Comment  Patient denies currently using marijuana             Sexual Activity     Sexually Active  Not Currently Partners  Female          Activities of Daily Living Not Asked               Additional Substance Use Detail     Questions Responses    Problems Due to Past Use of Alcohol? No    Problems Due to Past Use of Substances? No    Substance Use Assessment Denies substance use within the past 12 months    Alcohol Use Frequency 1 or 2 times/week    Cannabis frequency Daily    Comment: Daily on 9/7/2020     Heroin Frequency Denies use in past 12 months    Cannabis method Other    Comment: Puts in food     Cocaine frequency Never used    Comment: Never used on 9/7/2020     Crack Cocaine Frequency Denies use in past 12 months    Methamphetamine Frequency Denies use in past 12 months    Narcotic Frequency Denies use in past 12 months    Benzodiazepine Frequency Denies use in past 12 months    Amphetamine frequency Denies use in past 12 months    Barbituate Frequency Denies use use in past 12 months    Inhalant frequency Never used    Comment: Never used on 9/7/2020     Hallucinogen frequency Never used    Comment: Never used on 9/7/2020     Ecstasy frequency Never used    Comment: Never used on 9/7/2020     Other drug frequency Never used    Comment: Never used on 9/7/2020     Opiate frequency Denies use in past 12 months    Last reviewed by Rex Queen RN on 2/16/2021        I have assessed this patient for substance use within the past 12 months    Family Psychiatric History:   Psychiatric Illness Mother  Illness: Depression    Social History:  Education: high school diploma/GED  Marital history: single  Living arrangement, social support: The patient lives in home with mother    Occupational History:  Unemployed, ,       Traumatic History:   Unable to obtain history    Past Medical History:   Diagnosis Date    Alcohol abuse     Anxiety     Astigmatism     Depression     DJD (degenerative joint disease)     Gait abnormality     Head injury     History of colonic polyps     Hydrocele     Hyperlipidemia     Hypertension     Macular drusen     Presbyopia  PTSD (post-traumatic stress disorder)     Schizoaffective disorder (HCC)     Sepsis [facility]     Substance abuse ([facility] )     Tobacco abuse     Umbilical hernia     Vitreous syneresis        Meds/Allergies   all current active meds have been reviewed and current meds:   Current Facility-Administered Medications   Medication Dose Route Frequency    acetaminophen (TYLENOL) tablet 650 mg  650 mg Oral Q6H PRN    acetaminophen (TYLENOL) tablet 650 mg  650 mg Oral Q4H PRN    acetaminophen (TYLENOL) tablet 975 mg  975 mg Oral Q6H PRN    benztropine (COGENTIN) injection 1 mg  1 mg Intramuscular Q4H PRN Max 6/day    benztropine (COGENTIN) tablet 1 mg  1 mg Oral Q4H PRN Max 6/day    cholecalciferol (VITAMIN D3) tablet 2,000 Units  2,000 Units Oral Daily    hydrOXYzine HCL (ATARAX) tablet 50 mg  50 mg Oral Q6H PRN Max 4/day    Or    diphenhydrAMINE (BENADRYL) injection 50 mg  50 mg Intramuscular Q6H PRN    GenTeal Tears Night-Time OINT 1 application  1 application Both Eyes P9E PRN    hydrOXYzine HCL (ATARAX) tablet 100 mg  100 mg Oral Q6H PRN Max 4/day    Or    LORazepam (ATIVAN) injection 2 mg  2 mg Intramuscular Q6H PRN    hydrOXYzine HCL (ATARAX) tablet 25 mg  25 mg Oral Q6H PRN Max 4/day    lithium carbonate (LITHOBID) CR tablet 450 mg  450 mg Oral Q12H [address]    LORazepam (ATIVAN) injection 1 mg  1 mg Intramuscular Q4H PRN    LORazepam (ATIVAN) injection 2 mg  2 mg Intramuscular Q2H PRN Max 3/day    LORazepam (ATIVAN) tablet 1 mg  1 mg Oral Q4H PRN    LORazepam (ATIVAN) tablet 1 mg  1 mg Oral TID    melatonin tablet 3 mg  3 mg Oral HS    metoprolol succinate (TOPROL-XL) 24 hr tablet 25 mg  25 mg Oral Daily    OLANZapine (ZyPREXA) tablet 10 mg  10 mg Oral Q3H PRN Max 3/day    Or    OLANZapine (ZyPREXA) IM injection 10 mg  10 mg Intramuscular Q3H PRN Max 3/day    OLANZapine (ZyPREXA) tablet 5 mg  5 mg Oral Q3H PRN Max 6/day    Or    OLANZapine (ZyPREXA) IM injection 5 mg  5 mg Intramuscular Q3H PRN Max 6/day    OLANZapine (ZyPREXA) tablet 10 mg  10 mg Oral BID    OLANZapine (ZyPREXA) tablet 2 5 mg  2 5 mg Oral Q3H PRN Max 8/day    polyethylene glycol (MIRALAX) packet 17 g  17 g Oral Daily PRN    senna-docusate sodium (SENOKOT S) 8 6-50 mg per tablet 1 tablet  1 tablet Oral Daily PRN    sterile water injection **ADS Override Pull**        tamsulosin (FLOMAX) capsule 0 4 mg  0 4 mg Oral Daily With Dinner    traZODone (DESYREL) tablet 50 mg  50 mg Oral HS PRN     Allergies   Allergen Reactions    Haldol [Haloperidol]     Navane [Thiothixene]     Other      Adhesive tape         Objective   Vital signs in last 24 hours:  Temp:  [97 3 °F (36 3 °C)-98 3 °F (36 8 °C)] 97 3 °F (36 3 °C)  HR:  [] 79  Resp:  [16] 16  BP: (144-160)/(78-87) 155/87    No intake or output data in the 24 hours ending 02/17/21 1549    Mental Status Evaluation:  Appearance:  bearded, disheveled, older than stated age and overweight   Behavior:  psychomotor agitation, restless and fidgety and uncooperative   Speech:  articulation error, loud, pressured and profane   Mood:  irritable   Affect:  increased in intensity and increased in range   Language: word salad   Thought Process:  flight of ideas and loose associations   Thought Content:  delusions      Perceptual Disturbances: Patient would not cooperate with interview and appeared to be responding to internal stimuli   Risk Potential: Unable to assess   Sensorium:  Alert and oriented to person only   Cognition:  recent and remote memory: unable to assess due to lack of cooperation   Consciousness:  alert and awake    Attention: attention span appeared shorter than expected for age   Intellect: not examined   Fund of Knowledge: Unable to assess   Insight:  poor   Judgment: poor   Muscle Strength and Tone: Adequate   Gait/Station: normal gait/station and normal balance   Motor Activity: no abnormal movements     Memory: Short and long term memory  unable to assess     Laboratory results:    I have personally reviewed all pertinent laboratory/tests results    Most Recent Labs:   Lab Results   Component Value Date    WBC 7 00 02/17/2021    RBC 4 79 02/17/2021    HGB 14 0 02/17/2021    HCT 43 2 02/17/2021     02/17/2021    RDW 14 2 02/17/2021    NEUTROABS 4 30 02/17/2021    SODIUM 144 (H) 02/09/2021    K 3 6 02/09/2021     (H) 02/09/2021    CO2 28 02/09/2021    BUN 12 02/09/2021    CREATININE 0 92 02/09/2021    GLUC 96 02/09/2021    GLUF 91 11/06/2019    CALCIUM 9 2 02/09/2021    AST 18 02/09/2021    ALT 21 02/09/2021    ALKPHOS 33 (L) 02/09/2021    TP 5 7 (L) 02/09/2021    ALB 3 6 02/09/2021    TBILI 0 50 02/09/2021    CHOLESTEROL 150 02/17/2021    HDL 35 (L) 02/17/2021    TRIG 90 02/17/2021    LDLCALC 97 02/17/2021    NONHDLC 115 02/17/2021    LITHIUM 0 9 (L) 02/13/2021    AMMONIA 51 02/06/2021    SAS6GIKCIYSU 4 960 (H) 02/17/2021    RPR Non-Reactive 11/03/2019    HGBA1C 5 1 11/03/2019     11/03/2019     Admission Labs:   Admission on 02/16/2021   Component Date Value    WBC 02/17/2021 7 00     RBC 02/17/2021 4 79     Hemoglobin 02/17/2021 14 0     Hematocrit 02/17/2021 43 2     MCV 02/17/2021 90     MCH 02/17/2021 29 3     MCHC 02/17/2021 32 4     RDW 02/17/2021 14 2     MPV 02/17/2021 8 9     Platelets 98/37/2241 258     Neutrophils Relative 02/17/2021 61     Lymphocytes Relative 02/17/2021 27     Monocytes Relative 02/17/2021 9     Eosinophils Relative 02/17/2021 3     Basophils Relative 02/17/2021 1     Neutrophils Absolute 02/17/2021 4 30     Lymphocytes Absolute 02/17/2021 1 90     Monocytes Absolute 02/17/2021 0 60     Eosinophils Absolute 02/17/2021 0 20     Basophils Absolute 02/17/2021 0 00     Cholesterol 02/17/2021 150     Triglycerides 02/17/2021 90     HDL, Direct 02/17/2021 35*    LDL Calculated 02/17/2021 97     Non-HDL-Chol (CHOL-HDL) 02/17/2021 115     Magnesium 02/17/2021 2 6*    TSH 3RD GENERATON 02/17/2021 4 960* Imaging Studies:  None completed  EKG, Pathology, and Other Studies:  02/10/21: NSR,     Risks / Benefits of Treatment:     Risks, benefits, and possible side effects of medications explained to patient  The patient verbalizes understanding and agreement for treatment  Counseling / Coordination of Care:     Patient's presentation on admission and proposed treatment plan discussed with treatment team   Diagnosis, medication changes and treatment plan reviewed with patient  Recent stressors discussed with patient     Events leading to admission reviewed with patient  Importance of medication and treatment compliance reviewed with patient   -------Discussed with patient plan for alcohol detoxification protocol and gradual taper of medications to prevent withdrawal symptoms------  Inpatient Psychiatric Certification:     Certification: Based upon physical, mental and social evaluations, I certify that inpatient psychiatric services are medically necessary for this patient for a duration of 20 midnights for the treatment of Schizoaffective disorder, bipolar type [facility])    Available alternative community resources do not meet the patient's mental health care needs  I further attest that an established written individualized plan of care has been implemented and is outlined in the patient's medical records  This note has been constructed using a voice recognition system  There may be translation, syntax, or grammatical errors  If you have any questions, please contact the dictating provider  DONA Barry    Psychiatry PGY-1

## 2021-02-17 NOTE — PROGRESS NOTES
Occupational Therapy Student Group Note    Attended partial duration of leisure group  Flat affect and social with select peers  Continues to respond to internal stimuli  Loud and pressured speech  Tangential and continues to be off topic  Verbally agitated and intermittently  leaving group  Will continue to redirect pt

## 2021-02-17 NOTE — PROGRESS NOTES
Occupational Therapy Student Group Note    Attended partial duration of life skills  Visible in milieu and social with select peers  Angry, loud, and irritated on approach  Tangential, disorganized and and off topic at times  Attempted to engage in activity, but refused and walked out  Able to follow redirection

## 2021-02-17 NOTE — ASSESSMENT & PLAN NOTE
· Patient was evaluated and is medically clear for admission to [facility]  · Will continue to follow for now pending T4 level

## 2021-02-17 NOTE — PLAN OF CARE
Problem: PSYCHOSIS  Goal: Will report no hallucinations or delusions  Description: Interventions:  - Administer medication as  ordered  - Every waking shifts and PRN assess for the presence of hallucinations and or delusions  - Assist with reality testing to support increasing orientation  - Assess if patient's hallucinations or delusions are encouraging self-harm or harm to others and intervene as appropriate  2/17/2021 1153 by Manas Nix RN  Outcome: Progressing  2/17/2021 1153 by Manas Nix RN  Outcome: Progressing     Problem: SELF CARE DEFICIT  Goal: Return ADL status to a safe level of function  Description: INTERVENTIONS:  - Administer medication as ordered  - Assess ADL deficits and provide assistive devices as needed  - Obtain PT/OT consults as needed  - Assist and instruct patient to increase activity and self care as tolerated  2/17/2021 1153 by Manas Nix RN  Outcome: Not Progressing  2/17/2021 1153 by Manas Nix RN  Outcome: Progressing

## 2021-02-17 NOTE — CASE MANAGEMENT
Pt's mother Ivan Luz was called at [phone]; dicussed about brining in patient's hearing aids which is at home  Mother stated that she does have patient's hearing aids at home "I am baby sitting an infant right now  Once get free this after noon I will bring in the hearing aids"  Continue to monitor

## 2021-02-17 NOTE — CASE MANAGEMENT
Patient's mother called-in stated that "I thought Joaquina Jean  hearing aids were at home  But, I have been looking for them for last half-an-hour but don't seem to find them in house"  Mother asked if Joaquina Jean  knows where he kept the hearing aids so she can search again to find them  CM to check with patient and get back to pt's mother

## 2021-02-17 NOTE — PLAN OF CARE
Problem: PSYCHOSIS  Goal: Will report no hallucinations or delusions  Description: Interventions:  - Administer medication as  ordered  - Every waking shifts and PRN assess for the presence of hallucinations and or delusions  - Assist with reality testing to support increasing orientation  - Assess if patient's hallucinations or delusions are encouraging self-harm or harm to others and intervene as appropriate  Outcome: Not Progressing     Problem: SELF CARE DEFICIT  Goal: Return ADL status to a safe level of function  Description: INTERVENTIONS:  - Administer medication as ordered  - Assess ADL deficits and provide assistive devices as needed  - Obtain PT/OT consults as needed  - Assist and instruct patient to increase activity and self care as tolerated  Outcome: Not Progressing

## 2021-02-17 NOTE — CONSULTS
Consult- Bernadine Toribio Day 1960, 59 y o  male MRN: [id number]    Unit/Bed#: [facility] Encounter: [id number]    Primary Care Provider: Xavier Barrientos MD   Date and time admitted to hospital: 2/16/2021  6:21 PM    Inpatient consult for Medical Clearance for [facility] patient  Consult performed by: Carlos Ruiz PA-C  Consult ordered by: Lyndon Thomas MD        Medical clearance for psychiatric admission  Assessment & Plan  · Patient was evaluated and is medically clear for admission to [facility]  · Will continue to follow for now pending T4 level     Hypertension  Assessment & Plan  · BP this /87; 24 hr range 131//84  · Continue metoprolol succinate (Toprol XL) 25 mg PO daily     BPH (benign prostatic hyperplasia)  Assessment & Plan  · Continue Flomax 0 4 mg capsule PO daily with dinner    Vitamin D insufficiency  Assessment & Plan  · Vit D 2/7/2021: 22 9  · Vit D supplementation: 2000 units PO daily     Elevated TSH  Assessment & Plan  · TSH 2/17/2021: 4 960  · 2/6/2021: 1 410  · patient is being treated with lithium  Recent lithium levels:   · 2/13/2021: 0 9  · 2/6/2021: 0 2  · Will check T4 levels    * Schizoaffective disorder, bipolar type [facility])  Assessment & Plan  · Treatment per psychiatry    History of rhabdomyolysis  Assessment & Plan  · Resolved during recent admission at [facility]  · 2/6/2021: CK 1,254 in [facility] ED, suspected rhabdomyolysis 2/2 agitation  · 2/13/2021:       ECT Clearance:   History of recent seizure or stroke:  no   History of pheochromocytoma:  no   History of active bleeding (Intracranial hemorrhage, aneurysm or AVM):  no   History of metallic implants in the head or neck:  no   History of increased intracranial pressure with mass effect:  no   Does the patient have a current arrhythmia?  no      Based on above criteria, Patient is medically cleared for ECT should it be recommended  Counseling / Coordination of Care Time: 30 minutes    Greater than 50% of total time spent on patient counseling and coordination of care  Collaboration of Care: Were Recommendations Directly Discussed with Primary Treatment Team? - Yes     History of Present Illness:    Pam Graham is a 59 y o  male with a PMH of HTN and BPH who was originally admitted to the psychiatry service due to schizoaffective disorder bipolar  We are consulted for medical clearance for admission to [place] [facility] and treatment of underlying psychiatric illness  Patient transferred from [facility] campus on 2/16/2021  Initially presented to [facility] ED 2/6/2021 with EMS/law enforcement under a 302 2/2 "extreme violence and agitation" at home  Admitted to medical service due to mild rhabdomyolysis with elevated CK, CK levels normalized within 2-3 days of admission  Psychiatric evaluation resulted in recommendation for inpatient psychiatric care, however transfer delayed 2/2 unavailability of beds  Patient seen and examined on the unit  Encounter frequently interrupted with patient abruptly walking away  Of note, patient is a poor historian as his responses are tangential  Reports frustration with psychiatric treatment, believes psychiatric team "pumps him with drugs" just to get him to leave  Otherwise, denies medical complaints other than a sore neck from uncomfortable sleep  Denies SIs and A/VHs  Reports aggression due to frustration toward others however expresses that he "would never hurt anyone else"  Review of Systems:    Review of Systems   Reason unable to perform ROS: due to psychiatric condition (poor historian)   Constitutional: Negative for appetite change and fever  HENT: Negative for congestion and rhinorrhea  Respiratory: Negative for cough and shortness of breath  Cardiovascular: Negative for chest pain and palpitations  Gastrointestinal: Negative for abdominal pain, constipation, diarrhea, nausea and vomiting  Genitourinary: Negative for difficulty urinating and dysuria  Musculoskeletal: Positive for neck pain  Neurological: Negative for dizziness and light-headedness  Psychiatric/Behavioral: Positive for agitation  Negative for hallucinations, self-injury and suicidal ideas  Past Medical and Surgical History:     Past Medical History:   Diagnosis Date    Alcohol abuse     Anxiety     Astigmatism     Depression     DJD (degenerative joint disease)     Gait abnormality     Head injury     History of colonic polyps     Hydrocele     Hyperlipidemia     Hypertension     Macular drusen     Presbyopia     PTSD (post-traumatic stress disorder)     Schizoaffective disorder (HCC)     [facility]is [facility] )     Substance abuse ([facility] )     Tobacco abuse     Umbilical hernia     Vitreous syneresis        Past Surgical History:   Procedure Laterality Date    FRACTURE SURGERY      INGUINAL HERNIA REPAIR         Meds/Allergies:    all medications and allergies reviewed    Allergies: Allergies   Allergen Reactions    Haldol [Haloperidol]     Navane [Thiothixene]     Other      Adhesive tape         Social History:     Marital Status: Single    Substance Use History:   Social History     Substance and Sexual Activity   Alcohol Use Yes    Frequency: 2-4 times a month    Drinks per session: 3 or 4    Binge frequency: Never    Comment: whenever i want     Social History     Tobacco Use   Smoking Status Current Every Day Smoker    Packs/day: 1 00    Years: 40 00    Pack years: 40 00    Types: Cigars   Smokeless Tobacco Never Used     Social History     Substance and Sexual Activity   Drug Use Yes    Types: Marijuana    Comment: Patient denies currently using marijuana          Family History:    non-contributory    Physical Exam:     Vitals:   Blood Pressure: 155/87 (02/17/21 0727)  Pulse: 79 (02/17/21 0727)  Temperature: (!) 97 3 °F (36 3 °C) (02/17/21 0727)  Temp Source: Temporal (02/17/21 0727)  Respirations: 16 (02/17/21 0727)  Height: 5' 9" (175 3 cm) (02/16/21 1839)  Weight - Scale: 89 8 kg (198 lb) (02/16/21 1839)  SpO2: 97 % (02/16/21 1839)    Physical Exam  Vitals signs reviewed  Constitutional:       General: He is not in acute distress  Appearance: He is normal weight  He is not ill-appearing, toxic-appearing or diaphoretic  Comments: Patient appears disheveled, anxiously pacing throughout unit  HENT:      Head: Normocephalic and atraumatic  Nose: Nose normal    Eyes:      General: No scleral icterus  Extraocular Movements: Extraocular movements intact  Conjunctiva/sclera: Conjunctivae normal    Neck:      Musculoskeletal: Normal range of motion  Cardiovascular:      Rate and Rhythm: Normal rate and regular rhythm  Pulses: Normal pulses  Heart sounds: Normal heart sounds  No murmur  No friction rub  No gallop  Pulmonary:      Effort: Pulmonary effort is normal  No respiratory distress  Breath sounds: Normal breath sounds  No wheezing, rhonchi or rales  Abdominal:      General: Abdomen is flat  Bowel sounds are normal  There is no distension  Palpations: Abdomen is soft  Tenderness: There is no abdominal tenderness  There is no guarding  Musculoskeletal: Normal range of motion  Right lower leg: No edema  Left lower leg: No edema  Skin:     General: Skin is warm and dry  Neurological:      General: No focal deficit present  Mental Status: He is alert  Sensory: No sensory deficit  Gait: Gait normal    Psychiatric:         Attention and Perception: He is inattentive  He does not perceive auditory or visual hallucinations  Mood and Affect: Mood is anxious  Affect is angry  Speech: Speech is tangential          Behavior: Behavior is agitated  Behavior is not aggressive  Behavior is cooperative  Thought Content: Thought content is delusional (believes psychiatric team tries to "pump him with drugs to get rid of him")   Thought content does not include homicidal or suicidal ideation  Thought content does not include homicidal or suicidal plan  Cognition and Memory: Cognition is impaired  Judgment: Judgment is impulsive and inappropriate  Additional Data:     Lab Results: I have personally reviewed pertinent reports  Results from last 7 days   Lab Units 02/17/21  0614   WBC Thousand/uL 7 00   HEMOGLOBIN g/dL 14 0   HEMATOCRIT % 43 2   PLATELETS Thousands/uL 258   NEUTROS PCT % 61   LYMPHS PCT % 27   MONOS PCT % 9   EOS PCT % 3                 Lab Results   Component Value Date/Time    HGBA1C 5 1 11/03/2019 06:26 AM    HGBA1C 5 2 03/25/2019 08:20 AM           EKG, Pathology, and Other Studies Reviewed on Admission:   · EKG (2/10/2021): normal sinus rhythm  · TSH (2/17/2021): 4 960; to check T4    ** Please Note: This note has been constructed using a voice recognition system   **

## 2021-02-17 NOTE — NURSING NOTE
PRN Zyprexa is effective, pt is visibly less agitated  Pt is observed talking to peers, and in behavioral control  Will monitor

## 2021-02-17 NOTE — NURSING NOTE
Pt in his room becoming increasingly agitated  Pt slamming chairs, and yelling  Pt talking about a voice going through one ear and out through the other, talking about [address] and tango  Pt offered PRN PO zyprexa  Pt spit the medication on the floor and stomped on it yelling "are you trying to kill me just stick it in my eye ball why don't you " Pt was then given Zyprexa 10mg IM in left deltoid  Pt was cooperative with injection and laid on the bed  Pt continued to speak loudly in his room  Pt informed to stay in his room until nurse comes back to check on the pt and reassess his behaviors  Will monitor

## 2021-02-17 NOTE — PROGRESS NOTES
Walked into [facility]  Appeared disoriented and disheveled  Refused seat among peers, and opted to  back of room  When discussion turned to goals, made indecipherable comment referencing "gold teeth" and abruptly left room  Angry edge

## 2021-02-17 NOTE — NURSING NOTE
PT denies S/S of S/I,H/I,A/H,V/H  PT appears to be responding to internal stimuli  PT speaks loudly but is redirectable  PT refused medications stating "I don't take those I want to talk to my doctor  I don't take ativan or zyprexa, they make me more angry " PT also rambling in conversation, hard to understand everything he is saying  Meal compliant  Attending group  Social with peers and visible in milieu  Will monitor

## 2021-02-17 NOTE — ASSESSMENT & PLAN NOTE
· Resolved during recent admission at [facility]  · 2/6/2021: CK 1,254 in [facility] ED, suspected rhabdomyolysis 2/2 agitation  · 2/13/2021:

## 2021-02-17 NOTE — CASE MANAGEMENT
Pt was approached for an assessment  Pt was noticed agitated and and talking to self in his room  Pt loud and incoherent  CM to attempt later

## 2021-02-17 NOTE — PROGRESS NOTES
02/17/21 0930   Activity/Group Checklist   Group   (goals group)   Attendance Attended  (in and out)   Attendance Duration (min) 0-15   Interactions Disorganized interaction   Affect/Mood Angry   Goals Achieved Able to self-disclose   Patient is angry about being here and not knowing where his belongings are

## 2021-02-18 LAB
BILIRUB UR QL STRIP: NEGATIVE
CLARITY UR: CLEAR
COLOR UR: NORMAL
GLUCOSE UR STRIP-MCNC: NEGATIVE MG/DL
HGB UR QL STRIP.AUTO: NEGATIVE
KETONES UR STRIP-MCNC: NEGATIVE MG/DL
LEUKOCYTE ESTERASE UR QL STRIP: NEGATIVE
NITRITE UR QL STRIP: NEGATIVE
PH UR STRIP.AUTO: 7 [PH]
PROT UR STRIP-MCNC: NEGATIVE MG/DL
SP GR UR STRIP.AUTO: 1.01 (ref 1–1.04)
UROBILINOGEN UA: NEGATIVE MG/DL

## 2021-02-18 PROCEDURE — 99233 SBSQ HOSP IP/OBS HIGH 50: CPT | Performed by: PSYCHIATRY & NEUROLOGY

## 2021-02-18 RX ORDER — LITHIUM CARBONATE 300 MG/1
600 CAPSULE ORAL 2 TIMES DAILY WITH MEALS
Status: DISCONTINUED | OUTPATIENT
Start: 2021-02-18 | End: 2021-02-22

## 2021-02-18 RX ADMIN — TAMSULOSIN HYDROCHLORIDE 0.4 MG: 0.4 CAPSULE ORAL at 17:27

## 2021-02-18 RX ADMIN — LORAZEPAM 1 MG: 1 TABLET ORAL at 21:40

## 2021-02-18 RX ADMIN — MELATONIN TAB 3 MG 3 MG: 3 TAB at 21:40

## 2021-02-18 RX ADMIN — LORAZEPAM 1 MG: 1 TABLET ORAL at 16:50

## 2021-02-18 RX ADMIN — OLANZAPINE 10 MG: 10 TABLET, FILM COATED ORAL at 08:37

## 2021-02-18 RX ADMIN — OLANZAPINE 10 MG: 10 TABLET, FILM COATED ORAL at 21:40

## 2021-02-18 RX ADMIN — LITHIUM CARBONATE 600 MG: 300 CAPSULE, GELATIN COATED ORAL at 17:27

## 2021-02-18 RX ADMIN — LORAZEPAM 1 MG: 1 TABLET ORAL at 08:11

## 2021-02-18 RX ADMIN — METOPROLOL SUCCINATE 25 MG: 25 TABLET, EXTENDED RELEASE ORAL at 21:40

## 2021-02-18 RX ADMIN — LITHIUM CARBONATE 450 MG: 450 TABLET, EXTENDED RELEASE ORAL at 08:11

## 2021-02-18 RX ADMIN — OLANZAPINE 10 MG: 10 TABLET, FILM COATED ORAL at 04:05

## 2021-02-18 RX ADMIN — BENZTROPINE MESYLATE 1 MG: 1 TABLET ORAL at 04:05

## 2021-02-18 RX ADMIN — OLANZAPINE 10 MG: 10 INJECTION, POWDER, FOR SOLUTION INTRAMUSCULAR at 13:30

## 2021-02-18 NOTE — CASE MANAGEMENT
CM met patient for assessment  Patient provided some basic information  Pt needed frequent redirections to stay focused  Pt was labile, tangential, paranoid with loud and pressured speech  Pt reviewed each of the URBANO word by word, after spending time reviewing them Refused to sign URBANO for [employer] AUD and the IMM stating "No one has right to get my PHI  Why they need this anyway"  CM attempted to provide information and importance of URBANO for care coordination, pt did not seem to care  Patient continues to ask this writer "What are you doing to do with this documents  No one can have my information  I don't like this" and patient walked away  About 5 minutes, later patient approached writer stating he is in agreement to sign urbano for his mother  Patient was provided with an URBANO for mother and PCP  Pt responds stating "I am not little boy to let my mom give you information on me  May be I can sign for my doctor at [facility]" then changed his mind because "Government is taking away lot of my rights and therefore I will not sign anything"  Pt is 60 y/o, male, single, disabled,  and lives with mother in a house  Pt receives about $800 in SSDI in a month besides food stamps  Pt states he sees PCP and psychiatrist at [facility] in [facility]  UDS positive for THC - did not want to talk about drug use  Pt denied having access to firearms  Pt denied having legal issues  Pt's insurance is Medicare and [facility]  Pt states he will be daniella to go back to home with mother  "Can you let me go home today?" Pt was provided information about his current inpatient treatment  Pt seem to exhibit poor understanding of his admission  Pt refused to sign any URBANO at this time

## 2021-02-18 NOTE — NURSING NOTE
Patient in room and was heard yelling  When staff went to check on pt, pt continued loud, yelling  Thoughts disorganized  Pt the came out of room into hallway  When redirected to go back in his room, pt went to turn around and then threw cup of water all over floor  Security asked to come to unit  Pt went to room and laid down on bed  Pt given Zyprexa 10 mg IM in right deltoid  Pt cooperative with injection  Limits set with pt

## 2021-02-18 NOTE — NURSING NOTE
PT denies S/S of S/I,H/I, A/H,V/H  PT out in milieu and interacting with peers and staff  PT is attending groups  PT initially refused am medications stating " I don't need no vitamins, it makes my stomach upset and that's not my lithium carbonate I want my capsule  And I don't need that zyprexa " PT walked away to go eat his breakfast  Nurse then tried again to give PT medications without the vitamins  PT became loud and agitated stating "I will take my medication when I am done eating I don't want food in my mouth " PT then walked over to the phone and while on phone yelled to nurse " Give me my damn medication already " PT given medications PT argumentative and loud  PT then talking about doctor and making sexual remarks stating " He can go suck a vijaya, that's probably what he is doing, sucking vijaya " PT was then escorted to his room due to behavior  PT still loud and argumentative but stayed in his room  PT then calmed down and has been in behavioral control laughing and joking with peers and staff  Will monitor

## 2021-02-18 NOTE — NURSING NOTE
PRN of IM Zyprexa appears to be effective  Pt is calmer and appears less agitated  Pt is observed laying in bed mumbling to himself  Pt requesting to speak with his doctor to discuss about his medications  Will monitor

## 2021-02-18 NOTE — NURSING NOTE
Pt visible on unit and interacts with peers  Loud and agitated when RN approached with evening meds  Threw meds back in mouth and swallowed with water and opened wide stating he took them and doesn't need to know what they are because he doesn't need any of them anyway  Denies SI, HI and hallucinations  Appears to be responding to internal stimuli, grabbing head and rocking at times  Speech pressured and rambling  Compliant with meds and care   SLIM contacted for elevated BP and one time order placed for lopressor as pt refused this am

## 2021-02-18 NOTE — QUICK NOTE
Patient was not physically seen today, however a thorough chart review was performed including review of vitals and new lab results  Please consult for acute medical needs  Medical clearance for psychiatric admission  Assessment & Plan  Patient seen and examined 2/17/2021  · TSH was elevated on admission to [facility], however T4 was normal, no intervention at this time  Currently there are no acute medical needs; consult if acute medical needs arise    Hypertension  Assessment & Plan  · BP this /100  · 24 hr range 148/100-180/111  · Continue metoprolol succinate (Toprol XL) 25 mg PO daily   · Encourage med compliance  · Consider additional agent if hypertensive despite compliance     Elevated TSH  Assessment & Plan  · TSH 2/17/2021: 4 960  · 2/6/2021: 1 410  · patient is being treated with lithium  Continue lithium monitoring per psych recommendations   Recent lithium levels:   · 2/13/2021: 0 9  · 2/6/2021: 0 2  · T4 levels 2/17/2021: 1 09

## 2021-02-18 NOTE — ASSESSMENT & PLAN NOTE
· TSH 2/17/2021: 4 960  · 2/6/2021: 1 410  · patient is being treated with lithium  Continue lithium monitoring per psych recommendations   Recent lithium levels:   · 2/13/2021: 0 9  · 2/6/2021: 0 2  · T4 levels 2/17/2021: 1 09

## 2021-02-18 NOTE — NURSING NOTE
Patient awoke around 0345 yelling and screaming stating, "I don't know why I do this, I just want to be with my mom  Maybe I should just go to hell "  Patient was redirected for his tones, and stated, "I don't care if anyone is sleeping, I can do what I want "  Nursing then tried to get patient to explain what had happened and why he was so upset  At that time the patient began to cry and stated, "I don't know why I do this "  "I am seeing shadows all the time "  Nursing asked if patient would like to take medication that would help him with seeing shadows and then patient stated yes  Patient was given his PRN of cogentin and Zyprexa around 0405  PRN was mildly effective for patient was able to respond better to redirection, but continued to state that he was seeing shadows and was responding verbally to internal stimuli

## 2021-02-18 NOTE — PROGRESS NOTES
Progress Note - 6 Saint Gonzales Frandy Day 59 y o  male MRN: [id number]  Unit/Bed#: U 348-01 Encounter: [id number]    Assessment/Plan   Principal Problem:    Schizoaffective disorder, bipolar type ([facility] [address] )  Active Problems:    Hypertension    Medical clearance for psychiatric admission    BPH (benign prostatic hyperplasia)    Vitamin D insufficiency    History of rhabdomyolysis    Elevated TSH      · Change Lithobid 450 mg q 12 hours to Lithium 600 mg q 12 hours for psychotic symptoms  · Continue Zyprexa 10 mg b i d  for psychotic symptoms  · Continue Ativan 1 mg t i d  for anxiety and agitation  · Continue Melatonin 3 mg q h s  for insomnia  · Continue to promote patient participation in therapeutic milieu  · Continue medical management by primary team   · Discharge disposition:  201 status, titrating medications, discharge date pending    Subjective: The patient was evaluated this morning for continuity of care and no acute distress noted throughout the evaluation  Over the past 24 hours staff noted the patient was agitated, loud, confused, bizarre  Security had to be called 2 times in the past 24 hours due to the patient's behavior  Patient has been selectively compliant with medication:  He was noted to refuse his vitamins, one dose of Lithobid, Ativan, and Zyprexa  He received 2 doses of p r n  Zyprexa, one of which was intramuscular  SLIM was contacted for elevated blood pressure and a one-time order of Lopressor was placed which the patient refused  Today on exam the patient was pressured but cooperative and reports he does not want medication changes made without his explicit consent and he request to beach switched from Lithobid to regular lithium  He also requests for both Zyprexa and Ativan to be stopped  Explained to patient that we will leave his Zyprexa and Ativan standing for now but will revisit stopping or changing them again tomorrow    He reports "piss poor" mood due to medication changes and endorses good sleep/appetite/energy  He denies suicidal or homicidal ideation  He denies auditory or visual hallucinations, however he did appear to be responding to internal stimuli      Current Medications:  Current Facility-Administered Medications   Medication Dose Route Frequency Provider Last Rate    acetaminophen  650 mg Oral Q6H PRN Giulia Schwartz MD      acetaminophen  650 mg Oral Q4H PRN Giulia Schwartz MD      acetaminophen  975 mg Oral Q6H PRN Giulia Schwartz MD      benztropine  1 mg Intramuscular Q4H PRN Max 6/day Giulia Schwartz MD      benztropine  1 mg Oral Q4H PRN Max 6/day Giulia Schwartz MD      cholecalciferol  2,000 Units Oral Daily Lisa Lloyd PA-C      hydrOXYzine HCL  50 mg Oral Q6H PRN Max 4/day Giulia Schwartz MD      Or    diphenhydrAMINE  50 mg Intramuscular Q6H PRN Giulia Schwartz MD      GenTeal Tears Night-Time  1 application Both Eyes R2T PRN Giulia Schwartz MD      hydrOXYzine HCL  100 mg Oral Q6H PRN Max 4/day Giulia Schwartz MD      Or    LORazepam  2 mg Intramuscular Q6H PRN Giulia Schwartz MD      hydrOXYzine HCL  25 mg Oral Q6H PRN Max 4/day Giulia Schwartz MD      lithium carbonate  450 mg Oral Q12H [facility] Messi Cai,       LORazepam  1 mg Intramuscular Q4H PRN Giulia Schwartz MD      LORazepam  2 mg Intramuscular Q2H PRN Max 3/day Casey Vera MD      LORazepam  1 mg Oral Q4H PRN Casey Vera MD      LORazepam  1 mg Oral TID Messi Cai, DO      melatonin  3 mg Oral HS Giulia Schwartz MD      metoprolol succinate  25 mg Oral Daily Ivis Lopez PA-C      OLANZapine  10 mg Oral Q3H PRN Max 3/day Giulia Schwartz MD      Or    OLANZapine  10 mg Intramuscular Q3H PRN Max 3/day Giulia Schwartz MD      OLANZapine  5 mg Oral Q3H PRN Max 6/day Giulia Schwartz MD      Or    OLANZapine  5 mg Intramuscular Q3H PRN Max 6/day Giulia Schwartz MD      OLANZapine  10 mg Oral BID Hong Antony Milton Frank MD      OLANZapine  2 5 mg Oral Q3H PRN Max 8/day Nicolette Lobo MD      polyethylene glycol  17 g Oral Daily PRN Nicolette Lobo MD      senna-docusate sodium  1 tablet Oral Daily PRN Nicolette Lobo MD      sterile water           tamsulosin  0 4 mg Oral Daily With [employer] GEORGINA Lloyd      traZODone  50 mg Oral HS PRN Nicolette Lobo MD         Behavioral Health Medications:   all current active meds have been reviewed, continue current psychiatric medications and current meds:   Current Facility-Administered Medications   Medication Dose Route Frequency    acetaminophen (TYLENOL) tablet 650 mg  650 mg Oral Q6H PRN    acetaminophen (TYLENOL) tablet 650 mg  650 mg Oral Q4H PRN    acetaminophen (TYLENOL) tablet 975 mg  975 mg Oral Q6H PRN    benztropine (COGENTIN) injection 1 mg  1 mg Intramuscular Q4H PRN Max 6/day    benztropine (COGENTIN) tablet 1 mg  1 mg Oral Q4H PRN Max 6/day    cholecalciferol (VITAMIN D3) tablet 2,000 Units  2,000 Units Oral Daily    hydrOXYzine HCL (ATARAX) tablet 50 mg  50 mg Oral Q6H PRN Max 4/day    Or    diphenhydrAMINE (BENADRYL) injection 50 mg  50 mg Intramuscular Q6H PRN    GenTeal Tears Night-Time OINT 1 application  1 application Both Eyes Y1Q PRN    hydrOXYzine HCL (ATARAX) tablet 100 mg  100 mg Oral Q6H PRN Max 4/day    Or    LORazepam (ATIVAN) injection 2 mg  2 mg Intramuscular Q6H PRN    hydrOXYzine HCL (ATARAX) tablet 25 mg  25 mg Oral Q6H PRN Max 4/day    lithium carbonate capsule 600 mg  600 mg Oral BID With Meals    LORazepam (ATIVAN) injection 1 mg  1 mg Intramuscular Q4H PRN    LORazepam (ATIVAN) injection 2 mg  2 mg Intramuscular Q2H PRN Max 3/day    LORazepam (ATIVAN) tablet 1 mg  1 mg Oral Q4H PRN    LORazepam (ATIVAN) tablet 1 mg  1 mg Oral TID    melatonin tablet 3 mg  3 mg Oral HS    metoprolol succinate (TOPROL-XL) 24 hr tablet 25 mg  25 mg Oral Daily    OLANZapine (ZyPREXA) tablet 10 mg  10 mg Oral Q3H PRN Max 3/day    Or    OLANZapine (ZyPREXA) IM injection 10 mg  10 mg Intramuscular Q3H PRN Max 3/day    OLANZapine (ZyPREXA) tablet 5 mg  5 mg Oral Q3H PRN Max 6/day    Or    OLANZapine (ZyPREXA) IM injection 5 mg  5 mg Intramuscular Q3H PRN Max 6/day    OLANZapine (ZyPREXA) tablet 10 mg  10 mg Oral BID    OLANZapine (ZyPREXA) tablet 2 5 mg  2 5 mg Oral Q3H PRN Max 8/day    polyethylene glycol (MIRALAX) packet 17 g  17 g Oral Daily PRN    senna-docusate sodium (SENOKOT S) 8 6-50 mg per tablet 1 tablet  1 tablet Oral Daily PRN    sterile water injection **ADS Override Pull**        tamsulosin (FLOMAX) capsule 0 4 mg  0 4 mg Oral Daily With Dinner    traZODone (DESYREL) tablet 50 mg  50 mg Oral HS PRN     Vital signs in last 24 hours:  Temp:  [98 1 °F (36 7 °C)-98 7 °F (37 1 °C)] 98 1 °F (36 7 °C)  HR:  [] 100  Resp:  [16] 16  BP: (148-180)/() 148/100    Laboratory results:    I have personally reviewed all pertinent laboratory/tests results    Most Recent Labs:   Lab Results   Component Value Date    WBC 7 00 02/17/2021    RBC 4 79 02/17/2021    HGB 14 0 02/17/2021    HCT 43 2 02/17/2021     02/17/2021    RDW 14 2 02/17/2021    NEUTROABS 4 30 02/17/2021    SODIUM 144 (H) 02/09/2021    K 3 6 02/09/2021     (H) 02/09/2021    CO2 28 02/09/2021    BUN 12 02/09/2021    CREATININE 0 92 02/09/2021    GLUC 96 02/09/2021    GLUF 91 11/06/2019    CALCIUM 9 2 02/09/2021    AST 18 02/09/2021    ALT 21 02/09/2021    ALKPHOS 33 (L) 02/09/2021    TP 5 7 (L) 02/09/2021    ALB 3 6 02/09/2021    TBILI 0 50 02/09/2021    CHOLESTEROL 150 02/17/2021    HDL 35 (L) 02/17/2021    TRIG 90 02/17/2021    LDLCALC 97 02/17/2021    NONHDLC 115 02/17/2021    LITHIUM 0 9 (L) 02/13/2021    AMMONIA 51 02/06/2021    FOE0BXOLSNUC 4 960 (H) 02/17/2021    FREET4 1 09 02/17/2021    RPR Non-Reactive 11/03/2019    HGBA1C 5 1 11/03/2019     11/03/2019     Admission Labs:   Admission on 02/16/2021   Component Date Value    WBC 02/17/2021 7 00     RBC 02/17/2021 4 79     Hemoglobin 02/17/2021 14 0     Hematocrit 02/17/2021 43 2     MCV 02/17/2021 90     MCH 02/17/2021 29 3     MCHC 02/17/2021 32 4     RDW 02/17/2021 14 2     MPV 02/17/2021 8 9     Platelets 59/78/0355 258     Neutrophils Relative 02/17/2021 61     Lymphocytes Relative 02/17/2021 27     Monocytes Relative 02/17/2021 9     Eosinophils Relative 02/17/2021 3     Basophils Relative 02/17/2021 1     Neutrophils Absolute 02/17/2021 4 30     Lymphocytes Absolute 02/17/2021 1 90     Monocytes Absolute 02/17/2021 0 60     Eosinophils Absolute 02/17/2021 0 20     Basophils Absolute 02/17/2021 0 00     Cholesterol 02/17/2021 150     Triglycerides 02/17/2021 90     HDL, Direct 02/17/2021 35*    LDL Calculated 02/17/2021 97     Non-HDL-Chol (CHOL-HDL) 02/17/2021 115     Magnesium 02/17/2021 2 6*    TSH 3RD GENERATON 02/17/2021 4 960*    Free T4 02/17/2021 1 09        Psychiatric Review of Systems:  Behavior over the last 24 hours:  unchanged  Sleep:  Awake throughout the night  Appetite: normal  Medication side effects: No  ROS: no complaints    Mental Status Evaluation:  Appearance:  bearded, disheveled, older than stated age and overweight   Behavior:  psychomotor agitation, restless and fidgety and more cooperative than yesterday   Speech:  articulation error, loud and pressured   Mood:  "Piss poor"   Affect:  inappropriate, increased in intensity and increased in range   Language incoherent and garbled   Thought Process:  disorganized and illogical   Thought Content:  delusions  obsessive/rumination   Perceptual Disturbances: Denies auditory or visual hallucinations, patient appears to be responding to internal stimuli   Risk Potential: Denies suicidal or homicidal ideation   Sensorium:  person and situation   Cognition:  recent and remote memory: unable to assess due to lack of cooperation   Consciousness:  alert and awake    Attention: attention span appeared shorter than expected for age   Insight:  impaired due to acute psychosis   Judgment: impaired due to acute psychosis   Intellect Unable to assess   Gait/Station: normal gait/station and normal balance   Motor Activity: no abnormal movements     Memory: Short and long term memory  unable to assess     Progress Toward Goals:  Titrating medications, patient condition unchanged since yesterday    Recommended Treatment:   See above for assessment and plan       Continue following current medications:   Current Facility-Administered Medications   Medication Dose Route Frequency Provider Last Rate    acetaminophen  650 mg Oral Q6H PRN Michelle Dailey MD      acetaminophen  650 mg Oral Q4H PRN Michelle Dailey MD      acetaminophen  975 mg Oral Q6H PRN Michelle Dailey MD      benztropine  1 mg Intramuscular Q4H PRN Max 6/day Michelle Dailey MD      benztropine  1 mg Oral Q4H PRN Max 6/day Michelle Dailey MD      cholecalciferol  2,000 Units Oral Daily Lisa Lloyd PA-C      hydrOXYzine HCL  50 mg Oral Q6H PRN Max 4/day Michelle Dailey MD      Or    diphenhydrAMINE  50 mg Intramuscular Q6H PRN Michelle Dailey MD      GenTeal Tears Night-Time  1 application Both Eyes Q1C PRN Michelle Dailey MD      hydrOXYzine HCL  100 mg Oral Q6H PRN Max 4/day Michelle Dailey MD      Or    LORazepam  2 mg Intramuscular Q6H PRN Michelle Dailey MD      hydrOXYzine HCL  25 mg Oral Q6H PRN Max 4/day Michelle Dailey MD      lithium carbonate  450 mg Oral Q12H [address] Ambar Locke DO      LORazepam  1 mg Intramuscular Q4H PRN Michelle Dailey MD      LORazepam  2 mg Intramuscular Q2H PRN Max 3/day Niko Sung MD      LORazepam  1 mg Oral Q4H PRN Niko Sung MD      LORazepam  1 mg Oral TID Ambar Locke DO      melatonin  3 mg Oral HS Michelle Dailey MD      metoprolol succinate  25 mg Oral Daily Ivis Lopez PA-C      OLANZapine  10 mg Oral Q3H PRN Max 3/day Ana Thao MD      Or    OLANZapine  10 mg Intramuscular Q3H PRN Max 3/day Ana Thao MD      OLANZapine  5 mg Oral Q3H PRN Max 6/day Ana Thao MD      Or    OLANZapine  5 mg Intramuscular Q3H PRN Max 6/day Ana Thao MD      OLANZapine  10 mg Oral BID Ulysses Casiano MD      OLANZapine  2 5 mg Oral Q3H PRN Max 8/day Ana Thao MD      polyethylene glycol  17 g Oral Daily PRN Ana Thao MD      senna-docusate sodium  1 tablet Oral Daily PRN Ana Thao MD      sterile water           tamsulosin  0 4 mg Oral Daily With [employer] GEORGINA Lloyd      traZODone  50 mg Oral HS PRN Ana Thao MD         Risks, benefits and possible side effects of Medications:   Risks, benefits, and possible side effects of medications explained to patient and patient verbalizes understanding  This note has been constructed using a voice recognition system  There may be translation, syntax, or grammatical errors  If you have any questions, please contact the dictating provider  DONA Lee    Psychiatry PGY-1

## 2021-02-18 NOTE — PROGRESS NOTES
02/18/21 1300   Activity/Group Checklist   Group   (recovery group)   Attendance Attended  (left early)   Attendance Duration (min) 16-30   Interactions Disorganized interaction   Affect/Mood Blunted/flat; Incongruent   Goals Achieved Able to listen to others; Able to engage in interactions; Able to self-disclose   Patient is responding to his own internal stimuli which makes it hard for him to remain present

## 2021-02-18 NOTE — ASSESSMENT & PLAN NOTE
----- Message from Jose Mccall, DO sent at 4/4/2018  5:36 AM CDT -----  Copy please, see if BMP can be run on sample drawn   Patient seen and examined 2/17/2021  · TSH was elevated on admission to [facility], however T4 was normal, no intervention at this time  Currently there are no acute medical needs; consult if acute medical needs arise

## 2021-02-18 NOTE — CASE MANAGEMENT
When approached patient about his hearing aids -if he know knows where he kept at home  As his mother does not seem to find them in house  Pt replied "You guys probably have it"  Pt loud, confused, internally stimulated and tangential      CM to follow up

## 2021-02-18 NOTE — NURSING NOTE
Pt yelling obscenities at RN when approached with meds  He states they are not the right meds and we are trying to make him insane  Refused all meds including lopressor

## 2021-02-18 NOTE — PLAN OF CARE
Problem: PSYCHOSIS  Goal: Will report no hallucinations or delusions  Description: Interventions:  - Administer medication as  ordered  - Every waking shifts and PRN assess for the presence of hallucinations and or delusions  - Assist with reality testing to support increasing orientation  - Assess if patient's hallucinations or delusions are encouraging self-harm or harm to others and intervene as appropriate  Outcome: Progressing     Problem: SELF CARE DEFICIT  Goal: Return ADL status to a safe level of function  Description: INTERVENTIONS:  - Administer medication as ordered  - Assess ADL deficits and provide assistive devices as needed  - Obtain PT/OT consults as needed  - Assist and instruct patient to increase activity and self care as tolerated  Outcome: Progressing

## 2021-02-18 NOTE — TREATMENT PLAN
TREATMENT PLAN REVIEW - 26 Chana CASTANEDA Day 59 y o  1960 male MRN: [id number]    [address] Room / Bed: [facility] 348/[facility] 529-51 Encounter: [id number]          Admit Date/Time:  2/16/2021  6:21 PM    Treatment Team: Attending Provider: Nicolette Antoine MD; Physician Assistant: Erich Good PA-C; Care Manager: Jenn Macias; Care Manager: Rafal Allen, ADRIEN; Registered Nurse: Will Mantilla, ADRIEN; Patient Care Assistant: Thony Malhotra;  : Yesenia Parikh; Patient Care Assistant: Ysabel Rodriguez; Patient Care Assistant: Jovanny Wing; Patient Care Technician: Adilson Seymour; Registered Nurse: Leandro Montenegro RN; Patient Care Technician: Anna Deng    Diagnosis: Principal Problem:    Schizoaffective disorder, bipolar type [facility])  Active Problems:    Hypertension    Medical clearance for psychiatric admission    BPH (benign prostatic hyperplasia)    Vitamin D insufficiency    History of rhabdomyolysis    Elevated TSH      Patient Strengths/Assets: ability for insight, motivated, sense of humor    Patient Barriers/Limitations: lack of social/family support, limited family ties, noncompliant with medication, poor insight, poor past treatment response    Short Term Goals: decrease in anxiety symptoms, decrease in psychotic symptoms, decrease in level of agitation, ability to stay safe on the unit, ability to stay free of restraints    Long Term Goals: resolution of psychotic symptoms, adequate self care, adequate sleep, adequate appetite, appropriate interaction with peers, appropriate interaction with family, stable living arrangements upon discharge    Progress Towards Goals: starting psychiatric medications as prescribed, attends groups, less agitated, still has psychotic symptoms    Recommended Treatment: medication management, patient medication education, group therapy, milieu therapy, continued Behavioral Health psychiatric evaluation/assessment process    Treatment Frequency: daily medication monitoring, group and milieu therapy daily, monitoring through interdisciplinary rounds, monitoring through weekly patient care conferences    Expected Discharge Date:  02/26/21    Discharge Plan: referral to home with ACT team or  referral to Extended Acute Care unit for a long term psychiatric treatment    Treatment Plan Created/Updated By: Vaishali Sam DO

## 2021-02-18 NOTE — ASSESSMENT & PLAN NOTE
· BP this /100  · 24 hr range 148/100-180/111  · Continue metoprolol succinate (Toprol XL) 25 mg PO daily   · Encourage med compliance  · Consider additional agent if hypertensive despite compliance

## 2021-02-18 NOTE — PROGRESS NOTES
Attempted to meet with patient to complete his admission Carlos Enrique's and to have him identify his pattern of hospitalizations  Unfortunately, patient's thoughts were disorganized, tangential and focused on wanting to leave  His concentration on one subject was poor  He did note that he does not like taking medications and he does not need them  He then laid down stating that he needs to sleep  Patient remains psychotic and is unable to engage in a discussion at this time  Therapist will continue to offer support and attempt to build a relationship once he is more stable

## 2021-02-18 NOTE — TREATMENT TEAM
02/18/21 0858   Team Meeting   Meeting Type Daily Rounds   Team Members Present   Team Members Present Physician;Nurse;; Other (Discipline and Name)   Physician Team Member Dr BRODERICK   Nursing Team Member [address] [facility] Management Team Member Mehdi Bentley   Other (Discipline and Name) [facility] and residents   Patient/Family Present   Patient Present No   Patient's Family Present No     Pt loud, yelling obscenities, agitated, internally stimulated with pressured speech  Pt compliant with meds and meals  Refused bed time meds saying they are not right meds and staff is trying to make him insane  Continue med management  Continue to monitor  Discharge to be determined

## 2021-02-19 PROCEDURE — 99232 SBSQ HOSP IP/OBS MODERATE 35: CPT | Performed by: PSYCHIATRY & NEUROLOGY

## 2021-02-19 RX ORDER — LORAZEPAM 0.5 MG/1
0.5 TABLET ORAL 3 TIMES DAILY
Status: DISCONTINUED | OUTPATIENT
Start: 2021-02-19 | End: 2021-02-23

## 2021-02-19 RX ADMIN — OLANZAPINE 10 MG: 10 TABLET, FILM COATED ORAL at 08:50

## 2021-02-19 RX ADMIN — TAMSULOSIN HYDROCHLORIDE 0.4 MG: 0.4 CAPSULE ORAL at 16:18

## 2021-02-19 RX ADMIN — OLANZAPINE 10 MG: 10 TABLET, FILM COATED ORAL at 21:18

## 2021-02-19 RX ADMIN — MELATONIN TAB 3 MG 3 MG: 3 TAB at 21:18

## 2021-02-19 RX ADMIN — LITHIUM CARBONATE 600 MG: 300 CAPSULE, GELATIN COATED ORAL at 16:18

## 2021-02-19 RX ADMIN — LITHIUM CARBONATE 600 MG: 300 CAPSULE, GELATIN COATED ORAL at 08:50

## 2021-02-19 RX ADMIN — LORAZEPAM 1 MG: 1 TABLET ORAL at 08:50

## 2021-02-19 RX ADMIN — METOPROLOL SUCCINATE 25 MG: 25 TABLET, EXTENDED RELEASE ORAL at 21:19

## 2021-02-19 RX ADMIN — LORAZEPAM 0.5 MG: 0.5 TABLET ORAL at 16:18

## 2021-02-19 RX ADMIN — LORAZEPAM 0.5 MG: 0.5 TABLET ORAL at 21:19

## 2021-02-19 NOTE — NURSING NOTE
Pt loud and disorganized  Easily agitated  Becomes angry with physicians frequently, saying they should go "play doctor with someone else " Requested list of medications and list of providers who are treating him  Asks why so many doctors are needed to "fix someone's mind " Told writer to "relax" when answering his questions  Loudly whistled in hallway and began yelling incoherently

## 2021-02-19 NOTE — NURSING NOTE
Pt presents as oriented to person, place, time, disorganized and delusional about situation  Loud and disruptive, can be bright on approach, mood is extremely labile  Improved mood this shift, denies SI, HI and psychosis, but appears to be responding with bizarre content and gestures  Compliant with meds

## 2021-02-19 NOTE — NURSING NOTE
Pt disorganized, loud, rambling, pressured  Reports sleeping well  Denies SI/HI  Irritable regarding hospitalization and medications  Says his medications are changed everyday, talks negatively about doctor  Asking doctor's name and reasons for medications  Pt compliant with scheduled meds but said "take out those vitamins " Pt is redirectable at this time but has poor insight into need for treatment  Disheveled appearance  Wandering the unit throughout morning with hair hanging in his face  Pt later approached writer, stating "I'd like to report a side effect " Pt then c/o sedation after receiving AM medications  Attempted to assess further and pt said he was too tired to continue conversation and needed to lay down  Pt was seen ambulating in hallway shortly after  Did not appear sedated during interaction with writer

## 2021-02-19 NOTE — PROGRESS NOTES
02/18/21 1000   Activity/Group Checklist   Group Other (Comment)  (Art Therapy Group/Introducing Self, Process Discussion)   Attendance Attended   Attendance Duration (min) Greater than 60   Interactions Interacted appropriately   Affect/Mood Appropriate   Goals Achieved Able to listen to others; Able to engage in interactions; Able to recieve feedback; Able to give feedback to another  (Able to engage materials; full participation)        02/18/21 1000   Activity/Group Checklist   Group Other (Comment)  (Art Therapy Group/Introducing Self, Process Discussion)   Attendance Attended   Attendance Duration (min) Greater than 60   Interactions Interacted appropriately   Affect/Mood Appropriate   Goals Achieved Able to listen to others; Able to engage in interactions; Able to recieve feedback; Able to give feedback to another  (Able to engage materials; full participation)

## 2021-02-19 NOTE — TREATMENT TEAM
02/19/21 0904   Team Meeting   Meeting Type Daily Rounds   Team Members Present   Team Members Present Physician;Nurse;; Other (Discipline and Name)   Physician Team Member Dr BRODERICK   Nursing Team Member [address] Management Team Member Jackelyn Perez   Other (Discipline and Name) [facility] and residents   Patient/Family Present   Patient Present No   Patient's Family Present No       Pt remains delusional, labile and internally stimulated  Continue med management  Discharge to be determined  Continue to monitor

## 2021-02-19 NOTE — PROGRESS NOTES
02/19/21 0930   Activity/Group Checklist   Group   (goals group)   Attendance Attended   Attendance Duration (min) 16-30   Interactions Disorganized interaction   Affect/Mood Blunted/flat  (easily agitated pacing)   Goals Achieved Able to listen to others; Able to engage in interactions; Able to self-disclose; Able to recieve feedback

## 2021-02-19 NOTE — PROGRESS NOTES
02/19/21 1100   Activity/Group Checklist   Group   (recovery group)   Attendance Attended   Attendance Duration (min) 31-45   Interactions Disorganized interaction   Affect/Mood Incongruent   Goals Achieved Able to listen to others; Able to engage in interactions; Able to self-disclose

## 2021-02-19 NOTE — TREATMENT TEAM
02/19/21 1311   Team Meeting   Meeting Type Tx Team Meeting   Team Members Present   Team Members Present Physician;Nurse;   Physician Team Member Dr BRODERICK   Nursing Team Member [facility] Management Team Member Reva Sanchez   Other (Discipline and Name) Shan Corrales   Patient/Family Present   Patient Present Yes   Patient's Family Present No     Treatment team met, reviewed treatment plan goals  Pt refused to sign the treatment plan  A copy of the plan placed in patient's discharge folder

## 2021-02-19 NOTE — PROGRESS NOTES
Progress Note - Teddy [address] K Day 59 y o  male MRN: [id number]  Unit/Bed#: [facility] 348-01 Encounter: [id number]    Assessment/Plan   Principal Problem:    Schizoaffective disorder, bipolar type ([facility] [place] [address] )  Active Problems:    Hypertension    Medical clearance for psychiatric admission    BPH (benign prostatic hyperplasia)    Vitamin D insufficiency    History of rhabdomyolysis    Elevated TSH      · Continue Lithium 600 mg q 12 hours for psychotic symptoms  · Li Level 02/13/21: 0 9 sub-therapeutic  · Recheck Li Level on Monday, 02/22/21  · Continue Zyprexa 10 mg b i d  for psychotic symptoms  · Taper Ativan from 1 mg t i d  to 0 5 mg t i d  for anxiety and agitation  · Consider further taper on Monday, 02/22/21  · Continue Melatonin 3 mg q h s  for insomnia  · Continue to promote patient participation in therapeutic milieu  · Continue medical management by primary team   · Discharge disposition:  201 status, titrating medications, discharge date pending    Subjective: The patient was evaluated this morning for continuity of care and no acute distress noted throughout the evaluation  Over the past 24 hours staff noted the patient continued to be loud, and disorganized, pressured, and psychotic  He required one IM p r n  of Zyprexa for agitation yesterday afternoon which was affective  This morning the patient was noted to be more common than yesterday but still delusional   Today on exam the patient was cooperative, less pressured, and less delusional than yesterday  He inquired about his mental health in asked if he is ready for discharge  He then reported that he no longer wishes to take Zyprexa or Ativan  Informed the patient we will begin to taper Ativan, however it is our recommendation to continue Zyprexa at this time  Patient became agitated zahida to max    Explained to the patient that we would like to continue to monitor him on his current medications to ensure that he is in a good state of mental health and that we can revisit medication management on Monday  He reports "good" mood and endorses good sleep/appetite/energy  He denies suicidal or homicidal ideation  He denies auditory or visual hallucinations and does not appear to be responding to internal stimuli      Current Medications:  Current Facility-Administered Medications   Medication Dose Route Frequency Provider Last Rate    acetaminophen  650 mg Oral Q6H PRN Dalila Carpenter, MD      acetaminophen  650 mg Oral Q4H PRN Dalila Cornea, MD      acetaminophen  975 mg Oral Q6H PRN Dalila Cornea, MD      benztropine  1 mg Intramuscular Q4H PRN Max 6/day Dalila Carpenter, MD      benztropine  1 mg Oral Q4H PRN Max 6/day Dalila Cornea, MD      cholecalciferol  2,000 Units Oral Daily Lisa Lloyd PA-C      hydrOXYzine HCL  50 mg Oral Q6H PRN Max 4/day Dalila Carpenter MD      Or    diphenhydrAMINE  50 mg Intramuscular Q6H PRN Dalila Cornea, MD      GenTeal Tears Night-Time  1 application Both Eyes [id number] PRN Dalila Carpenter, MD      hydrOXYzine HCL  100 mg Oral Q6H PRN Max 4/day Dalila Carpenter MD      Or    LORazepam  2 mg Intramuscular Q6H PRN Dalila Carpenter MD      hydrOXYzine HCL  25 mg Oral Q6H PRN Max 4/day Dalila Carpenter MD      lithium carbonate  600 mg Oral BID With Meals Martita De Leon DO      LORazepam  1 mg Intramuscular Q4H PRN Dalila Carpenter MD      LORazepam  2 mg Intramuscular Q2H PRN Max 3/day Xena Ferraro MD      LORazepam  1 mg Oral Q4H PRN Xena Ferraro MD      LORazepam  1 mg Oral TID Martita De Leon DO      melatonin  3 mg Oral HS Dalila Carpenter MD      metoprolol succinate  25 mg Oral Daily Ivis Lopez PA-C      OLANZapine  10 mg Oral Q3H PRN Max 3/day Dalila Carpenter MD      Or    OLANZapine  10 mg Intramuscular Q3H PRN Max 3/day Dalila Carpenter MD      OLANZapine  5 mg Oral Q3H PRN Max 6/day Dalila Carpenter MD      Or    OLANZapine  5 mg Intramuscular Q3H PRN Max 6/day Lois Jauregui MD      OLANZapine  10 mg Oral BID Joseph Davis MD      OLANZapine  2 5 mg Oral Q3H PRN Max 8/day Lois Jauregui MD      polyethylene glycol  17 g Oral Daily PRN Lois Jauregui MD      senna-docusate sodium  1 tablet Oral Daily PRN Lois Jauregui MD      sterile water           sterile water           tamsulosin  0 4 mg Oral Daily With [facility] [employer] GEORGINA Lloyd      traZODone  50 mg Oral HS PRN Lois Jauregui MD         Behavioral Health Medications:   all current active meds have been reviewed, continue current psychiatric medications and current meds:   Current Facility-Administered Medications   Medication Dose Route Frequency    acetaminophen (TYLENOL) tablet 650 mg  650 mg Oral Q6H PRN    acetaminophen (TYLENOL) tablet 650 mg  650 mg Oral Q4H PRN    acetaminophen (TYLENOL) tablet 975 mg  975 mg Oral Q6H PRN    benztropine (COGENTIN) injection 1 mg  1 mg Intramuscular Q4H PRN Max 6/day    benztropine (COGENTIN) tablet 1 mg  1 mg Oral Q4H PRN Max 6/day    cholecalciferol (VITAMIN D3) tablet 2,000 Units  2,000 Units Oral Daily    hydrOXYzine HCL (ATARAX) tablet 50 mg  50 mg Oral Q6H PRN Max 4/day    Or    diphenhydrAMINE (BENADRYL) injection 50 mg  50 mg Intramuscular Q6H PRN    GenTeal Tears Night-Time OINT 1 application  1 application Both Eyes H7G PRN    hydrOXYzine HCL (ATARAX) tablet 100 mg  100 mg Oral Q6H PRN Max 4/day    hydrOXYzine HCL (ATARAX) tablet 25 mg  25 mg Oral Q6H PRN Max 4/day    lithium carbonate capsule 600 mg  600 mg Oral BID With Meals    LORazepam (ATIVAN) injection 2 mg  2 mg Intramuscular Q2H PRN Max 3/day    LORazepam (ATIVAN) tablet 0 5 mg  0 5 mg Oral TID    LORazepam (ATIVAN) tablet 1 mg  1 mg Oral Q4H PRN    melatonin tablet 3 mg  3 mg Oral HS    metoprolol succinate (TOPROL-XL) 24 hr tablet 25 mg  25 mg Oral Daily    OLANZapine (ZyPREXA) tablet 10 mg  10 mg Oral Q3H PRN Max 3/day Or    OLANZapine (ZyPREXA) IM injection 10 mg  10 mg Intramuscular Q3H PRN Max 3/day    OLANZapine (ZyPREXA) tablet 5 mg  5 mg Oral Q3H PRN Max 6/day    Or    OLANZapine (ZyPREXA) IM injection 5 mg  5 mg Intramuscular Q3H PRN Max 6/day    OLANZapine (ZyPREXA) tablet 10 mg  10 mg Oral BID    OLANZapine (ZyPREXA) tablet 2 5 mg  2 5 mg Oral Q3H PRN Max 8/day    polyethylene glycol (MIRALAX) packet 17 g  17 g Oral Daily PRN    senna-docusate sodium (SENOKOT S) 8 6-50 mg per tablet 1 tablet  1 tablet Oral Daily PRN    sterile water injection **ADS Override Pull**        sterile water injection **ADS Override Pull**        tamsulosin (FLOMAX) capsule 0 4 mg  0 4 mg Oral Daily With Dinner    traZODone (DESYREL) tablet 50 mg  50 mg Oral HS PRN     Vital signs in last 24 hours:  Temp:  [98 3 °F (36 8 °C)-98 5 °F (36 9 °C)] 98 3 °F (36 8 °C)  HR:  [79-86] 79  Resp:  [16] 16  BP: (115-120)/(68-72) 115/72    Laboratory results:    I have personally reviewed all pertinent laboratory/tests results    Most Recent Labs:   Lab Results   Component Value Date    WBC 7 00 02/17/2021    RBC 4 79 02/17/2021    HGB 14 0 02/17/2021    HCT 43 2 02/17/2021     02/17/2021    RDW 14 2 02/17/2021    NEUTROABS 4 30 02/17/2021    SODIUM 144 (H) 02/09/2021    K 3 6 02/09/2021     (H) 02/09/2021    CO2 28 02/09/2021    BUN 12 02/09/2021    CREATININE 0 92 02/09/2021    GLUC 96 02/09/2021    GLUF 91 11/06/2019    CALCIUM 9 2 02/09/2021    AST 18 02/09/2021    ALT 21 02/09/2021    ALKPHOS 33 (L) 02/09/2021    TP 5 7 (L) 02/09/2021    ALB 3 6 02/09/2021    TBILI 0 50 02/09/2021    CHOLESTEROL 150 02/17/2021    HDL 35 (L) 02/17/2021    TRIG 90 02/17/2021    LDLCALC 97 02/17/2021    NONHDLC 115 02/17/2021    LITHIUM 0 9 (L) 02/13/2021    AMMONIA 51 02/06/2021    BRS1MDLKIZOC 4 960 (H) 02/17/2021    FREET4 1 09 02/17/2021    RPR Non-Reactive 11/03/2019    HGBA1C 5 1 11/03/2019     11/03/2019     Admission Labs: Admission on 02/16/2021   Component Date Value    Color, UA 02/18/2021 Straw     Clarity, UA 02/18/2021 Clear     Specific Gravity, UA 02/18/2021 1 010     pH, UA 02/18/2021 7 0     Leukocytes, UA 02/18/2021 Negative     Nitrite, UA 02/18/2021 Negative     Protein, UA 02/18/2021 Negative     Glucose, UA 02/18/2021 Negative     Ketones, UA 02/18/2021 Negative     Bilirubin, UA 02/18/2021 Negative     Blood, UA 02/18/2021 Negative     UROBILINOGEN UA 02/18/2021 Negative     WBC 02/17/2021 7 00     RBC 02/17/2021 4 79     Hemoglobin 02/17/2021 14 0     Hematocrit 02/17/2021 43 2     MCV 02/17/2021 90     MCH 02/17/2021 29 3     MCHC 02/17/2021 32 4     RDW 02/17/2021 14 2     MPV 02/17/2021 8 9     Platelets 89/37/0461 258     Neutrophils Relative 02/17/2021 61     Lymphocytes Relative 02/17/2021 27     Monocytes Relative 02/17/2021 9     Eosinophils Relative 02/17/2021 3     Basophils Relative 02/17/2021 1     Neutrophils Absolute 02/17/2021 4 30     Lymphocytes Absolute 02/17/2021 1 90     Monocytes Absolute 02/17/2021 0 60     Eosinophils Absolute 02/17/2021 0 20     Basophils Absolute 02/17/2021 0 00     Cholesterol 02/17/2021 150     Triglycerides 02/17/2021 90     HDL, Direct 02/17/2021 35*    LDL Calculated 02/17/2021 97     Non-HDL-Chol (CHOL-HDL) 02/17/2021 115     Magnesium 02/17/2021 2 6*    TSH 3RD GENERATON 02/17/2021 4 960*    Free T4 02/17/2021 1 09        Psychiatric Review of Systems:  Behavior over the last 24 hours:  improved  Sleep: normal  Appetite: normal  Medication side effects: No  ROS: no complaints    Mental Status Evaluation:  Appearance:  bearded, disheveled, older than stated age and overweight   Behavior:  psychomotor agitation and More cooperative than yesterday   Speech:  articulation error, loud and pressured   Mood:  "Good"   Affect:  increased in intensity, increased in range and labile   Language incoherent and garbled   Thought Process: More organized and logical in yesterday   Thought Content:  Denies delusions or obsessions   Perceptual Disturbances: Denies auditory or visual hallucinations and does not appear to be responding to internal stimuli   Risk Potential: Denies suicidal or homicidal ideation   Sensorium:  person, place and situation   Cognition:  recent and remote memory: unable to assess due to lack of cooperation   Consciousness:  alert and awake    Attention: attention span appeared shorter than expected for age   Insight:  limited   Judgment: limited   Intellect Below average   Gait/Station: normal gait/station and normal balance   Motor Activity: no abnormal movements     Memory: Short and long term memory  not assessed     Progress Toward Goals:  Slight improvement from yesterday, continue to monitor patient    Recommended Treatment:   See above for assessment and plan       Continue following current medications:   Current Facility-Administered Medications   Medication Dose Route Frequency Provider Last Rate    acetaminophen  650 mg Oral Q6H PRN Stacey Rivera MD      acetaminophen  650 mg Oral Q4H PRN Stacey Rivera MD      acetaminophen  975 mg Oral Q6H PRN Stacey Rivera MD      benztropine  1 mg Intramuscular Q4H PRN Max 6/day Stacey Rivera MD      benztropine  1 mg Oral Q4H PRN Max 6/day Stacey Rivera MD      cholecalciferol  2,000 Units Oral Daily Lisa Lloyd PA-C      hydrOXYzine HCL  50 mg Oral Q6H PRN Max 4/day Stacey iRvera MD      Or    diphenhydrAMINE  50 mg Intramuscular Q6H PRN Stacey Rivera MD      GenTeal Tears Night-Time  1 application Both Eyes [id number] PRN Stacey Rivera MD      hydrOXYzine HCL  100 mg Oral Q6H PRN Max 4/day Stacey Rivera MD      Or    LORazepam  2 mg Intramuscular Q6H PRN Stacey Rivera MD      hydrOXYzine HCL  25 mg Oral Q6H PRN Max 4/day Stacey Rivera MD      lithium carbonate  600 mg Oral BID With Meals Eric Aiken DO  LORazepam  1 mg Intramuscular Q4H PRN Carmela Adkins MD      LORazepam  2 mg Intramuscular Q2H PRN Max 3/day Anu Mccullough MD      LORazepam  1 mg Oral Q4H PRN Anu Mccullough MD      LORazepam  1 mg Oral TID Kristy Alarcon DO      melatonin  3 mg Oral HS Carmela Adkins MD      metoprolol succinate  25 mg Oral Daily Ivis Lopez PA-C      OLANZapine  10 mg Oral Q3H PRN Max 3/day Carmela Adkins MD      Or    OLANZapine  10 mg Intramuscular Q3H PRN Max 3/day Carmela Adkins MD      OLANZapine  5 mg Oral Q3H PRN Max 6/day Carmela Adkins MD      Or    OLANZapine  5 mg Intramuscular Q3H PRN Max 6/day Carmela Adkins MD      OLANZapine  10 mg Oral BID Anu Mccullough MD      OLANZapine  2 5 mg Oral Q3H PRN Max 8/day Carmela Adkins MD      polyethylene glycol  17 g Oral Daily PRN Carmela Adkins MD      senna-docusate sodium  1 tablet Oral Daily PRN Carmela Adkins MD      sterile water           sterile water           tamsulosin  0 4 mg Oral Daily With [facility] Jose Lloyd PA-C      traZODone  50 mg Oral HS PRN Carmela Adkins MD         Risks, benefits and possible side effects of Medications:   Risks, benefits, and possible side effects of medications explained to patient and patient verbalizes understanding  This note has been constructed using a voice recognition system  There may be translation, syntax, or grammatical errors  If you have any questions, please contact the dictating provider  DONA Edwards    Psychiatry PGY-1

## 2021-02-19 NOTE — CASE MANAGEMENT
Pt bright on approach, denies SI, HI and AVH  Pt refused to sign ROIs for his mother and PCP  Pt appears internally stimulated  Continue to monitor

## 2021-02-19 NOTE — PROGRESS NOTES
02/19/21 1300   Activity/Group Checklist   Group   (recovery group)   Attendance Attended   Attendance Duration (min) 31-45   Interactions Interacted appropriately   Affect/Mood Blunted/flat  (easily frustrated)   Goals Achieved Able to listen to others; Able to engage in interactions; Able to self-disclose; Able to recieve feedback; Able to give feedback to another

## 2021-02-20 PROCEDURE — 99232 SBSQ HOSP IP/OBS MODERATE 35: CPT | Performed by: PSYCHIATRY & NEUROLOGY

## 2021-02-20 RX ADMIN — LORAZEPAM 0.5 MG: 0.5 TABLET ORAL at 21:25

## 2021-02-20 RX ADMIN — LITHIUM CARBONATE 600 MG: 300 CAPSULE, GELATIN COATED ORAL at 08:21

## 2021-02-20 RX ADMIN — CHOLECALCIFEROL (VITAMIN D3) 2000 UNITS: 25 TAB ORAL at 08:22

## 2021-02-20 RX ADMIN — OLANZAPINE 10 MG: 10 TABLET, FILM COATED ORAL at 21:25

## 2021-02-20 RX ADMIN — OLANZAPINE 10 MG: 10 TABLET, FILM COATED ORAL at 08:22

## 2021-02-20 RX ADMIN — LORAZEPAM 0.5 MG: 0.5 TABLET ORAL at 08:21

## 2021-02-20 RX ADMIN — MELATONIN TAB 3 MG 3 MG: 3 TAB at 21:25

## 2021-02-20 RX ADMIN — TAMSULOSIN HYDROCHLORIDE 0.4 MG: 0.4 CAPSULE ORAL at 16:00

## 2021-02-20 RX ADMIN — METOPROLOL SUCCINATE 25 MG: 25 TABLET, EXTENDED RELEASE ORAL at 21:25

## 2021-02-20 RX ADMIN — LITHIUM CARBONATE 600 MG: 300 CAPSULE, GELATIN COATED ORAL at 16:00

## 2021-02-20 RX ADMIN — LORAZEPAM 0.5 MG: 0.5 TABLET ORAL at 16:00

## 2021-02-20 NOTE — NURSING NOTE
Patient very irritable about taking his scheduled HS medications  Patient needed encouragement to do so then eventually did after yelling at the nurse that the doctors want to turn him into a Zombie  Patient less irritable as the evening went on  Offered no other complaints

## 2021-02-20 NOTE — NURSING NOTE
Pt with continued irritable edge but improved today  Reports sleeping well and feeling well rested today  Pt ate breakfast  Denies SI/HI  Remains disorganized and tangential  Focused on number of physicians treating the patients and asks who will be prescribing his medications today  Appears to be paranoid of providers  Asked if taking his medications will help him to get discharged sooner, then complied with scheduled medications with less agitation today

## 2021-02-20 NOTE — NUTRITION
Awake most of the night  Requesting his hearing aids  That were locked up in his locked dresser in his room  Patient only slept a few hours this evening  Cooperative with staff

## 2021-02-20 NOTE — PROGRESS NOTES
Occupational Therapy Student Group Note    Entered and attended group meeting [facility] through the session  Able to verbally explain goals when prompted  Fully participated in group stretching activity  Patient did not demonstrate disruptive behaviors and remained pleasant and appropriate throughout the group activity and discussion  Patient is less agitated, aggressive, and intrusive

## 2021-02-20 NOTE — PROGRESS NOTES
Occupational Therapy Student Group Note    Attended full duration of life skills  Social with peers  Did not demonstrate disruptive behavior  Appears less aggressive  Cooperative and verbally participated in group  Required redirection when talking over peers  Able to follow redirection

## 2021-02-20 NOTE — PROGRESS NOTES
02/20/21 1030   Activity/Group Checklist   Group Other (Comment)  (Group Art Therapy/Process Discussion: [employer])   Attendance Attended   Attendance Duration (min) 46-60   Interactions Interacted appropriately   Affect/Mood Appropriate;Bright   Goals Achieved Able to listen to others; Able to engage in interactions; Able to recieve feedback; Able to give feedback to another  (Engaged with materials;  Full participation )

## 2021-02-20 NOTE — PROGRESS NOTES
Progress Note - 6 Saint Gonzales Frandy Day 59 y o  male MRN: [id number]  Unit/Bed#: [facility] 348-01 Encounter: [id number]      Subjective:  Patient seen for continuing care  He was cooperative with interview  Patient is disorganized and tangential   Reports that he slept well last night and has good energy today  He is compliant with his meals and medications, denies experiencing adverse effects  Denies auditory or visual hallucinations  Denies suicidal homicidal ideation  Discussed medications with patient and answered questions      Current Medications:  Current Facility-Administered Medications   Medication Dose Route Frequency Provider Last Rate    acetaminophen  650 mg Oral Q6H PRN Katie Signs, MD      acetaminophen  650 mg Oral Q4H PRN Katie Signs, MD      acetaminophen  975 mg Oral Q6H PRN Katie Signs, MD      benztropine  1 mg Intramuscular Q4H PRN Max 6/day Katie Signs, MD      benztropine  1 mg Oral Q4H PRN Max 6/day Katie Signs, MD      cholecalciferol  2,000 Units Oral Daily Lisa Lloyd PA-C      hydrOXYzine HCL  50 mg Oral Q6H PRN Max 4/day Katie Dougals MD      Or    diphenhydrAMINE  50 mg Intramuscular Q6H PRN Katie Signs, MD      GenTeal Tears Night-Time  1 application Both Eyes U9F PRN Katie Douglas MD      hydrOXYzine HCL  100 mg Oral Q6H PRN Max 4/day Katie Signs, MD      hydrOXYzine HCL  25 mg Oral Q6H PRN Max 4/day Katie Douglas MD      lithium carbonate  600 mg Oral BID With Meals Kendrick Laughlin,       LORazepam  2 mg Intramuscular Q2H PRN Max 3/day Kaye Vogel MD      LORazepam  0 5 mg Oral TID Kendrick Laughlin, DO      LORazepam  1 mg Oral Q4H PRN Kaye Vogel MD      melatonin  3 mg Oral HS Katie Douglas MD      metoprolol succinate  25 mg Oral Daily Ivis Lopez PA-C      OLANZapine  10 mg Oral Q3H PRN Max 3/day Katie Douglas MD      Or    OLANZapine  10 mg Intramuscular Q3H PRN Max 3/day Lyndon Thomas MD      OLANZapine  5 mg Oral Q3H PRN Max 6/day Lyndon Thomas MD      Or    OLANZapine  5 mg Intramuscular Q3H PRN Max 6/day Lyndon Thomas MD      OLANZapine  10 mg Oral BID Angelina Chris MD      OLANZapine  2 5 mg Oral Q3H PRN Max 8/day Lyndon Thomas MD      polyethylene glycol  17 g Oral Daily PRN Lyndon Thomas MD      senna-docusate sodium  1 tablet Oral Daily PRN Lyndon Thomas MD      sterile water           sterile water           tamsulosin  0 4 mg Oral Daily With [employer] GEORGINA Lloyd      traZODone  50 mg Oral HS PRN Lyndon Thomas MD           Vital signs in last 24 hours:  Temp:  [97 6 °F (36 4 °C)-98 6 °F (37 °C)] 98 3 °F (36 8 °C)  HR:  [74-86] 83  Resp:  [16] 16  BP: (131-165)/(74-86) 140/77    Laboratory results:  I have personally reviewed all pertinent laboratory/tests results          Mental Status Evaluation:  Appearance:  bearded, overweight  and unkempt    Behavior:  cooperative and psychomotor agitation   Speech:  Loud at times   Mood:  "Okay"   Affect:  Increased in intensity   Thought Process:  disorganized   Thought Content:  no delusions elicited   Perceptual Disturbances: Denies auditory or visual hallucinations and Does not appear to be responding to internal stimuli   Risk Potential: Denies suicidal or homicidal ideation   Sensorium:  person, place and situation   Cognition:  grossly intact   Consciousness:  alert and awake   Attention: attention span appeared shorter than expected for age   Insight:  limited   Judgment: limited   Intellect appears to be below average intelligence   Gait/Station: normal gait/station   Motor Activity: no abnormal movements       Progress Toward Goals:  Progressing      Assessment/Plan   Principal Problem:    [facility]ffect[facility] )  Active Problems:    Hypertension    Medical clearance for psychiatric admission    BPH (benign prostatic hyperplasia)    Vitamin D insufficiency    History of rhabdomyolysis    Elevated TSH        Recommended Treatment:   -Continue current medications  -Patient is for lithium level on 02/22/2021  -Continue with pharmacotherapy, group, milieu, and occupational therapy    Risks, benefits and possible side effects of Medications:   Risks, benefits, and possible side effects of medications explained to patient and patient verbalizes understanding  This note has been constructed using a voice recognition system  There may be translation, syntax,  or grammatical errors  If you have any questions, please contact the dictating provider  DONA Benjamin    Psychiatry, PGY-2

## 2021-02-21 PROCEDURE — 99232 SBSQ HOSP IP/OBS MODERATE 35: CPT | Performed by: PSYCHIATRY & NEUROLOGY

## 2021-02-21 RX ADMIN — MELATONIN TAB 3 MG 3 MG: 3 TAB at 21:07

## 2021-02-21 RX ADMIN — LORAZEPAM 0.5 MG: 0.5 TABLET ORAL at 21:06

## 2021-02-21 RX ADMIN — CHOLECALCIFEROL (VITAMIN D3) 1000 UNITS: 25 TAB ORAL at 08:28

## 2021-02-21 RX ADMIN — OLANZAPINE 10 MG: 10 TABLET, FILM COATED ORAL at 21:06

## 2021-02-21 RX ADMIN — LITHIUM CARBONATE 600 MG: 300 CAPSULE, GELATIN COATED ORAL at 16:16

## 2021-02-21 RX ADMIN — OLANZAPINE 10 MG: 10 TABLET, FILM COATED ORAL at 08:27

## 2021-02-21 RX ADMIN — METOPROLOL SUCCINATE 25 MG: 25 TABLET, EXTENDED RELEASE ORAL at 21:07

## 2021-02-21 RX ADMIN — LORAZEPAM 0.5 MG: 0.5 TABLET ORAL at 08:28

## 2021-02-21 RX ADMIN — TAMSULOSIN HYDROCHLORIDE 0.4 MG: 0.4 CAPSULE ORAL at 16:16

## 2021-02-21 RX ADMIN — LITHIUM CARBONATE 600 MG: 300 CAPSULE, GELATIN COATED ORAL at 08:27

## 2021-02-21 RX ADMIN — LORAZEPAM 0.5 MG: 0.5 TABLET ORAL at 16:16

## 2021-02-21 NOTE — PROGRESS NOTES
Progress Note - 6 [facility] Day 59 y o  male MRN: [id number]  Unit/Bed#: U 348-01 Encounter: [id number]      Subjective:  Patient was irritable on approach today  He reported that he want to speak to a crisis center and was upset because people had messed up my prescriptions  Denies auditory or visual hallucinations  Denies suicidal or homicidal ideation  Patient later terminated the interview and walked away upset and agitated  He shortly after calmed down was redirected  He has been compliant with his meals and medications  He is visible on the unit      Current Medications:  Current Facility-Administered Medications   Medication Dose Route Frequency Provider Last Rate    acetaminophen  650 mg Oral Q6H PRN Ed Nina MD      acetaminophen  650 mg Oral Q4H PRN Ed Nina MD      acetaminophen  975 mg Oral Q6H PRN Ed Nina MD      benztropine  1 mg Intramuscular Q4H PRN Max 6/day Ed Nina MD      benztropine  1 mg Oral Q4H PRN Max 6/day Ed Nina MD      cholecalciferol  2,000 Units Oral Daily Lisa Lloyd PA-C      hydrOXYzine HCL  50 mg Oral Q6H PRN Max 4/day Ed Nina MD      Or    diphenhydrAMINE  50 mg Intramuscular Q6H PRN Ed Nina MD      GenTeal Tears Night-Time  1 application Both Eyes R2M PRN Ed Nina MD      hydrOXYzine HCL  100 mg Oral Q6H PRN Max 4/day Ed Nina MD      hydrOXYzine HCL  25 mg Oral Q6H PRN Max 4/day Ed Nina MD      lithium carbonate  600 mg Oral BID With Meals Andrés Styles DO      LORazepam  2 mg Intramuscular Q2H PRN Max 3/day Gerber Marcelino MD      LORazepam  0 5 mg Oral TID Andrés Styles DO      LORazepam  1 mg Oral Q4H PRN Gerber Marcelino MD      melatonin  3 mg Oral HS Ed Nina MD      metoprolol succinate  25 mg Oral Daily Ivis Lopez PA-C      OLANZapine  10 mg Oral Q3H PRN Max 3/day Ed Nina MD      Or   Dafne Ruby OLANZapine  10 mg Intramuscular Q3H PRN Max 3/day Bruce Paulino MD      OLANZapine  5 mg Oral Q3H PRN Max 6/day Bruce Paulino MD      Or    OLANZapine  5 mg Intramuscular Q3H PRN Max 6/day Bruce Paulino MD      OLANZapine  10 mg Oral BID Tata No MD      OLANZapine  2 5 mg Oral Q3H PRN Max 8/day Bruce Paulino MD      polyethylene glycol  17 g Oral Daily PRN Bruce Paulino MD      senna-docusate sodium  1 tablet Oral Daily PRN Bruce Paulino MD      sterile water           sterile water           tamsulosin  0 4 mg Oral Daily With [employer] GEORGINA Lloyd      traZODone  50 mg Oral HS PRN Bruce Paulino MD           Vital signs in last 24 hours:  Temp:  [98 2 °F (36 8 °C)-98 3 °F (36 8 °C)] 98 2 °F (36 8 °C)  HR:  [71-83] 71  Resp:  [16-18] 18  BP: (134-150)/(77-81) 150/81    Laboratory results:  I have personally reviewed all pertinent laboratory/tests results          Mental Status Evaluation:  Appearance:  bearded, casually dressed and overweight    Behavior:  not cooperative and psychomotor agitation   Speech:  loud   Mood:  Irritable   Affect:  Irritable   Thought Process:  tangential   Thought Content:  no delusions elicited   Perceptual Disturbances: Denies auditory or visual hallucinations and Does not appear to be responding to internal stimuli   Risk Potential: Denies suicidal or homicidal ideation   Sensorium:  person, place and situation   Cognition:  grossly intact   Consciousness:  alert and awake   Attention: attention span appeared shorter than expected for age   Insight:  limited   Judgment: limited   Intellect appears to be of average intelligence   Gait/Station: normal gait/station   Motor Activity: no abnormal movements       Progress Toward Goals:  Unchanged      Assessment/Plan   Principal Problem:    Schizoaffective disorder, bipolar type ([facility] [address] )  Active Problems:    Hypertension    Medical clearance for psychiatric admission    BPH (benign prostatic hyperplasia)    Vitamin D insufficiency    History of rhabdomyolysis    Elevated TSH        Recommended Treatment:   -Continue current medications  -Patient is for lithium level tomorrow morning  -Continue with pharmacotherapy, group, milieu, and occupational therapy    Risks, benefits and possible side effects of Medications:   Risks, benefits, and possible side effects of medications explained to patient and patient verbalizes understanding  This note has been constructed using a voice recognition system  There may be translation, syntax,  or grammatical errors  If you have any questions, please contact the dictating provider  DONA Allen    Psychiatry, PGY-2

## 2021-02-21 NOTE — PROGRESS NOTES
02/20/21 1400   Activity/Group Checklist   Group Other (Comment)  (OPEN STUDIO/Art Therapy, Social Interaction-Free Expression)   Attendance Attended   Attendance Duration (min) Greater than 60   Interactions Interacted appropriately   Affect/Mood Appropriate   Goals Achieved Able to listen to others; Able to engage in interactions

## 2021-02-21 NOTE — NURSING NOTE
Mild period of irritability in afternoon, making bizarre statements and was agitated when writer was not following his statement  Pt redirectable and less irritable shortly after  Concerned about his belongings, unsure where his wallet is but said his family has some of his belongings he was missing  Called [facility] to see if any belongings were left behind at their facility  ED and M/S units do not have any property belonging to [facility]

## 2021-02-21 NOTE — NURSING NOTE
Patient was brighter and pleasant upon approach  Patient cooperative and medication compliant  No negative behaviors noted  Patient did not request or require any PRN medications this shift  Visible all evening out in the milieu  Offered no complaints  No agitation and no aggression noted

## 2021-02-21 NOTE — NURSING NOTE
Pt with brief, milder periods of irritability  Significantly improved mood with less anger/agitation  Remains paranoid of providers  Loud and tangential speaking about continued hospitalization and providers who prescribe his medications  Poor insight into need for treatment but has been compliant/cooperative  Improved sleep last night  Denies SI/HI  Eating meals and remains compliant with scheduled medications with no reported side effects  Offers no concerns other than clearly stating he would like to go home soon

## 2021-02-22 LAB — LITHIUM SERPL-SCNC: 0.6 MMOL/L (ref 0.6–1.2)

## 2021-02-22 PROCEDURE — 99232 SBSQ HOSP IP/OBS MODERATE 35: CPT | Performed by: PSYCHIATRY & NEUROLOGY

## 2021-02-22 PROCEDURE — 80178 ASSAY OF LITHIUM: CPT | Performed by: PSYCHIATRY & NEUROLOGY

## 2021-02-22 RX ADMIN — LORAZEPAM 0.5 MG: 0.5 TABLET ORAL at 21:29

## 2021-02-22 RX ADMIN — LITHIUM CARBONATE 750 MG: 300 CAPSULE, GELATIN COATED ORAL at 17:10

## 2021-02-22 RX ADMIN — TAMSULOSIN HYDROCHLORIDE 0.4 MG: 0.4 CAPSULE ORAL at 17:11

## 2021-02-22 RX ADMIN — LORAZEPAM 0.5 MG: 0.5 TABLET ORAL at 08:13

## 2021-02-22 RX ADMIN — LORAZEPAM 0.5 MG: 0.5 TABLET ORAL at 17:11

## 2021-02-22 RX ADMIN — LITHIUM CARBONATE 600 MG: 300 CAPSULE, GELATIN COATED ORAL at 08:12

## 2021-02-22 NOTE — PLAN OF CARE
Problem: PSYCHOSIS  Goal: Will report no hallucinations or delusions  Description: Interventions:  - Administer medication as  ordered  - Every waking shifts and PRN assess for the presence of hallucinations and or delusions  - Assist with reality testing to support increasing orientation  - Assess if patient's hallucinations or delusions are encouraging self-harm or harm to others and intervene as appropriate  Outcome: Progressing     Problem: SELF CARE DEFICIT  Goal: Return ADL status to a safe level of function  Description: INTERVENTIONS:  - Administer medication as ordered  - Assess ADL deficits and provide assistive devices as needed  - Obtain PT/OT consults as needed  - Assist and instruct patient to increase activity and self care as tolerated  Outcome: Progressing     Problem: Risk for Violence/Aggression Toward Others  Goal: Treatment Goal: Refrain from acts of violence/aggression during length of stay, and demonstrate improved impulse control at the time of discharge  Outcome: Progressing  Goal: Verbalize thoughts and feelings  Description: Interventions:  - Assess and re-assess patient's level of risk, every waking shift  - Engage patient in 1:1 interactions, daily, for a minimum of 15 minutes   - Allow patient to express feelings and frustrations in a safe and non-threatening manner   - Establish rapport/trust with patient   Outcome: Progressing  Goal: Refrain from harming others  Outcome: Progressing  Goal: Refrain from destructive acts on the environment or property  Outcome: Progressing  Goal: Control angry outbursts  Description: Interventions:  - Monitor patient closely, per order  - Ensure early verbal de-escalation  - Monitor prn medication needs  - Set reasonable/therapeutic limits, outline behavioral expectations, and consequences   - Provide a non-threatening milieu, utilizing the least restrictive interventions   Outcome: Progressing  Goal: Attend and participate in unit activities, including therapeutic, recreational, and educational groups  Description: Interventions:  - Provide therapeutic and educational activities daily, encourage attendance and participation, and document same in the medical record   Outcome: Progressing  Goal: Identify appropriate positive anger management techniques  Description: Interventions:  - Offer anger management and coping skills groups   - Staff will provide positive feedback for appropriate anger control  Outcome: Progressing

## 2021-02-22 NOTE — PROGRESS NOTES
Progress Note - 6 [facility] Frandy Day 59 y o  male MRN: [id number]  Unit/Bed#: [facility] 348-01 Encounter: [id number]    Assessment/Plan   Principal Problem:    Schizoaffective disorder, bipolar type ([facility] [address] )  Active Problems:    Hypertension    Medical clearance for psychiatric admission    BPH (benign prostatic hyperplasia)    Vitamin D insufficiency    History of rhabdomyolysis    Elevated TSH      · Increase Lithium from 600 mg q 12 hours to 750 mg q 12 hours for psychotic symptoms  ? Li Level 02/22/21: 0 6 sub-therapeutic  ? Recheck Li Level on Thursday, 02/25/21  · Discontinue Zyprexa 10 mg b i d  for psychotic symptoms as patient has refused medication  · Continue Ativan 0 5 mg t i d  for anxiety and agitation  ? Consider further taper this week  · Continue Melatonin 3 mg q h s  for insomnia  · Continue to promote patient participation in therapeutic milieu  · Continue medical management by primary team   · Discharge disposition:  201 status, titrating medications, discharge date pending      Subjective: The patient was evaluated this morning for continuity of care and no acute distress noted throughout the evaluation  Over the past 24 hours staff noted the patient was disorganized, loud, irritable, easily agitated, tangential, and labile  Today on exam the patient was cooperative and less pressured than previous days and reports he is feeling better with his medication adjustments  Patient inquires about discharge planning , inform patient we will increase his lithium dosage due to the subtherapeutic level and recheck later this week and consider discharge at that time  He reports "better" mood and endorses good sleep/appetite/energy  He denies suicidal or homicidal ideation, intent, or plan  He denies auditory or visual hallucinations and does not appear to be responding to internal stimuli      Current Medications:  Current Facility-Administered Medications   Medication Dose Route Frequency Provider Last Rate    acetaminophen  650 mg Oral Q6H PRN Lois Jauregui MD      acetaminophen  650 mg Oral Q4H PRN Lois Jauregui MD      acetaminophen  975 mg Oral Q6H PRN Lois Jauregui MD      benztropine  1 mg Intramuscular Q4H PRN Max 6/day Lois Jauregui MD      benztropine  1 mg Oral Q4H PRN Max 6/day Lois Jauregui MD      cholecalciferol  2,000 Units Oral Daily Lisa Lloyd PA-C      hydrOXYzine HCL  50 mg Oral Q6H PRN Max 4/day Lois Jauregui MD      Or    diphenhydrAMINE  50 mg Intramuscular Q6H PRN Lois Jauregui MD      GenTeal Tears Night-Time  1 application Both Eyes N5P PRN Lois Jauregui MD      hydrOXYzine HCL  100 mg Oral Q6H PRN Max 4/day Lois Jauregui MD      hydrOXYzine HCL  25 mg Oral Q6H PRN Max 4/day Lois Jauregui MD      lithium carbonate  750 mg Oral BID With Meals Bella Lopez DO      LORazepam  2 mg Intramuscular Q2H PRN Max 3/day Joseph Davis MD      LORazepam  0 5 mg Oral TID Bella Lopez DO      LORazepam  1 mg Oral Q4H PRN Joseph Davis MD      melatonin  3 mg Oral HS Lois Jauregui MD      metoprolol succinate  25 mg Oral Daily Ivis Lopez PA-C      OLANZapine  10 mg Oral Q3H PRN Max 3/day Lois Jauregui MD      Or    OLANZapine  10 mg Intramuscular Q3H PRN Max 3/day Lois Jauregui MD      OLANZapine  5 mg Oral Q3H PRN Max 6/day Lois Jauregui MD      Or    OLANZapine  5 mg Intramuscular Q3H PRN Max 6/day Lois Jauregui MD      OLANZapine  2 5 mg Oral Q3H PRN Max 8/day Lois Jauregui MD      polyethylene glycol  17 g Oral Daily PRN Lois Jauregui MD      senna-docusate sodium  1 tablet Oral Daily PRN Lois Jauregui MD      sterile water           sterile water           tamsulosin  0 4 mg Oral Daily With ARLEEN Lloyd PA-C      traZODone  50 mg Oral HS PRN Lois Jauregui MD         Behavioral Health Medications:   all current active meds have been reviewed, continue current psychiatric medications and current meds:   Current Facility-Administered Medications   Medication Dose Route Frequency    acetaminophen (TYLENOL) tablet 650 mg  650 mg Oral Q6H PRN    acetaminophen (TYLENOL) tablet 650 mg  650 mg Oral Q4H PRN    acetaminophen (TYLENOL) tablet 975 mg  975 mg Oral Q6H PRN    benztropine (COGENTIN) injection 1 mg  1 mg Intramuscular Q4H PRN Max 6/day    benztropine (COGENTIN) tablet 1 mg  1 mg Oral Q4H PRN Max 6/day    cholecalciferol (VITAMIN D3) tablet 2,000 Units  2,000 Units Oral Daily    hydrOXYzine HCL (ATARAX) tablet 50 mg  50 mg Oral Q6H PRN Max 4/day    Or    diphenhydrAMINE (BENADRYL) injection 50 mg  50 mg Intramuscular Q6H PRN    GenTeal Tears Night-Time OINT 1 application  1 application Both Eyes I5K PRN    hydrOXYzine HCL (ATARAX) tablet 100 mg  100 mg Oral Q6H PRN Max 4/day    hydrOXYzine HCL (ATARAX) tablet 25 mg  25 mg Oral Q6H PRN Max 4/day    lithium carbonate capsule 750 mg  750 mg Oral BID With Meals    LORazepam (ATIVAN) injection 2 mg  2 mg Intramuscular Q2H PRN Max 3/day    LORazepam (ATIVAN) tablet 0 5 mg  0 5 mg Oral TID    LORazepam (ATIVAN) tablet 1 mg  1 mg Oral Q4H PRN    melatonin tablet 3 mg  3 mg Oral HS    metoprolol succinate (TOPROL-XL) 24 hr tablet 25 mg  25 mg Oral Daily    OLANZapine (ZyPREXA) tablet 10 mg  10 mg Oral Q3H PRN Max 3/day    Or    OLANZapine (ZyPREXA) IM injection 10 mg  10 mg Intramuscular Q3H PRN Max 3/day    OLANZapine (ZyPREXA) tablet 5 mg  5 mg Oral Q3H PRN Max 6/day    Or    OLANZapine (ZyPREXA) IM injection 5 mg  5 mg Intramuscular Q3H PRN Max 6/day    OLANZapine (ZyPREXA) tablet 2 5 mg  2 5 mg Oral Q3H PRN Max 8/day    polyethylene glycol (MIRALAX) packet 17 g  17 g Oral Daily PRN    senna-docusate sodium (SENOKOT S) 8 6-50 mg per tablet 1 tablet  1 tablet Oral Daily PRN    sterile water injection **ADS Override Pull**        sterile water injection **ADS Override Pull**  tamsulosin (FLOMAX) capsule 0 4 mg  0 4 mg Oral Daily With [employer]    traZODone (DESYREL) tablet 50 mg  50 mg Oral HS PRN     Vital signs in last 24 hours:  Temp:  [97 7 °F (36 5 °C)-98 6 °F (37 °C)] 97 7 °F (36 5 °C)  HR:  [81-89] 89  Resp:  [16] 16  BP: (111-161)/(64-99) 161/99    Laboratory results:    I have personally reviewed all pertinent laboratory/tests results    Most Recent Labs:   Lab Results   Component Value Date    WBC 7 00 02/17/2021    RBC 4 79 02/17/2021    HGB 14 0 02/17/2021    HCT 43 2 02/17/2021     02/17/2021    RDW 14 2 02/17/2021    NEUTROABS 4 30 02/17/2021    SODIUM 144 (H) 02/09/2021    K 3 6 02/09/2021     (H) 02/09/2021    CO2 28 02/09/2021    BUN 12 02/09/2021    CREATININE 0 92 02/09/2021    GLUC 96 02/09/2021    GLUF 91 11/06/2019    CALCIUM 9 2 02/09/2021    AST 18 02/09/2021    ALT 21 02/09/2021    ALKPHOS 33 (L) 02/09/2021    TP 5 7 (L) 02/09/2021    ALB 3 6 02/09/2021    TBILI 0 50 02/09/2021    CHOLESTEROL 150 02/17/2021    HDL 35 (L) 02/17/2021    TRIG 90 02/17/2021    LDLCALC 97 02/17/2021    NONHDLC 115 02/17/2021    LITHIUM 0 6 02/22/2021    AMMONIA 51 02/06/2021    CYG4GQEMEOWM 4 960 (H) 02/17/2021    FREET4 1 09 02/17/2021    RPR Non-Reactive 11/03/2019    HGBA1C 5 1 11/03/2019     11/03/2019     Admission Labs:   Admission on 02/16/2021   Component Date Value    Color, UA 02/18/2021 Straw     Clarity, UA 02/18/2021 Clear     Specific Gravity, UA 02/18/2021 1 010     pH, UA 02/18/2021 7 0     Leukocytes, UA 02/18/2021 Negative     Nitrite, UA 02/18/2021 Negative     Protein, UA 02/18/2021 Negative     Glucose, UA 02/18/2021 Negative     Ketones, UA 02/18/2021 Negative     Bilirubin, UA 02/18/2021 Negative     Blood, UA 02/18/2021 Negative     UROBILINOGEN UA 02/18/2021 Negative     WBC 02/17/2021 7 00     RBC 02/17/2021 4 79     Hemoglobin 02/17/2021 14 0     Hematocrit 02/17/2021 43 2     MCV 02/17/2021 90     [facility] 02/17/2021 29 3     MCHC 02/17/2021 32 4     RDW 02/17/2021 14 2     MPV 02/17/2021 8 9     Platelets 95/94/3390 258     Neutrophils Relative 02/17/2021 61     Lymphocytes Relative 02/17/2021 27     Monocytes Relative 02/17/2021 9     Eosinophils Relative 02/17/2021 3     Basophils Relative 02/17/2021 1     Neutrophils Absolute 02/17/2021 4 30     Lymphocytes Absolute 02/17/2021 1 90     Monocytes Absolute 02/17/2021 0 60     Eosinophils Absolute 02/17/2021 0 20     Basophils Absolute 02/17/2021 0 00     Cholesterol 02/17/2021 150     Triglycerides 02/17/2021 90     HDL, Direct 02/17/2021 35*    LDL Calculated 02/17/2021 97     Non-HDL-Chol (CHOL-HDL) 02/17/2021 115     Magnesium 02/17/2021 2 6*    TSH 3RD GENERATON 02/17/2021 4 960*    Free T4 02/17/2021 1 09     Lithium Lvl 02/22/2021 0 6        Psychiatric Review of Systems:  Behavior over the last 24 hours:  improved  Sleep: normal  Appetite: normal  Medication side effects: No  ROS: no complaints    Mental Status Evaluation:  Appearance:  bearded, disheveled, older than stated age, and overweight   Behavior:  restless and fidgety   Speech:  loud and less pressured   Mood:  "better"   Affect:  increased in intensity and increased in range   Language naming objects and repeating phrases   Thought Process:  more organized   Thought Content:  Denies delusions   Perceptual Disturbances: Denies auditory or visual hallucinations, did not appear to be responding to internal stimuli   Risk Potential: Denies suicidal or homicidal ideation, intent, or plan   Sensorium:  person, place and situation   Cognition:  recent and remote memory grossly intact   Consciousness:  alert and awake    Attention: attention span appeared shorter than expected for age   Insight:  Limited but improving   Judgment: Limited but improving   Intellect Average   Gait/Station: normal gait/station and normal balance   Motor Activity: no abnormal movements     Memory: Short and long term memory  Not tested     Progress Toward Goals: progressing    Recommended Treatment:   See above for assessment and plan       Continue following current medications:   Current Facility-Administered Medications   Medication Dose Route Frequency Provider Last Rate    acetaminophen  650 mg Oral Q6H PRN Michelle Dailey MD      acetaminophen  650 mg Oral Q4H PRN Michelle Dailey MD      acetaminophen  975 mg Oral Q6H PRN Michelle Dailey MD      benztropine  1 mg Intramuscular Q4H PRN Max 6/day Michelle Dailey MD      benztropine  1 mg Oral Q4H PRN Max 6/day Michelle Dailey MD      cholecalciferol  2,000 Units Oral Daily Lisa Lloyd PA-C      hydrOXYzine HCL  50 mg Oral Q6H PRN Max 4/day Michelle Dailey MD      Or    diphenhydrAMINE  50 mg Intramuscular Q6H PRN Michelle Dailey MD      GenTeal Tears Night-Time  1 application Both Eyes C6X PRN Michelle Dailey MD      hydrOXYzine HCL  100 mg Oral Q6H PRN Max 4/day Michelle Dailey MD      hydrOXYzine HCL  25 mg Oral Q6H PRN Max 4/day Michelle Dailey MD      lithium carbonate  750 mg Oral BID With Meals Ambar Locke, DO      LORazepam  2 mg Intramuscular Q2H PRN Max 3/day Niko Sung MD      LORazepam  0 5 mg Oral TID Ambar Locke, DO      LORazepam  1 mg Oral Q4H PRN Niko Sung MD      melatonin  3 mg Oral HS Michelle Dailey MD      metoprolol succinate  25 mg Oral Daily Ivis Lopez PA-C      OLANZapine  10 mg Oral Q3H PRN Max 3/day Michelle Dailey MD      Or    OLANZapine  10 mg Intramuscular Q3H PRN Max 3/day Michelle Dailey MD      OLANZapine  5 mg Oral Q3H PRN Max 6/day Michelle Dailey MD      Or    OLANZapine  5 mg Intramuscular Q3H PRN Max 6/day Michelle Dailey MD      OLANZapine  2 5 mg Oral Q3H PRN Max 8/day Michelle Dailey MD      polyethylene glycol  17 g Oral Daily PRN Michelle Dailey MD      senna-docusate sodium  1 tablet Oral Daily PRN MD Pam Mcnamara sterile water           sterile water           tamsulosin  0 4 mg Oral Daily With [employer] GEORGINA Lloyd      traZODone  50 mg Oral HS PRN Jesús Garzon MD         Risks, benefits and possible side effects of Medications:   Risks, benefits, and possible side effects of medications explained to patient and patient verbalizes understanding  This note has been constructed using a voice recognition system  There may be translation, syntax, or grammatical errors  If you have any questions, please contact the dictating provider  DONA Manzano    Psychiatry PGY-1

## 2021-02-22 NOTE — TREATMENT TEAM
02/22/21 0846   Team Meeting   Meeting Type Daily Rounds   Team Members Present   Team Members Present Physician;Nurse;; Other (Discipline and Name)   Physician Team Member Dr BRODERICK   Nursing Team Member [address] [facility],[address] [place] [address] Management Team Member Linda Flores   Other (Discipline and Name) [facility]uff and residents   Patient/Family Present   Patient Present No   Patient's Family Present No     Pt remains agitated, disorganized, irritable, and loud  Pt took HS meds after initially refusing them  Pt made bizarre comments about his mother  Continue med management -increase lithium, discontinue Zyprexa  Continue to monitor  Discharge to be determined

## 2021-02-22 NOTE — PROGRESS NOTES
02/22/21 1300   Activity/Group Checklist   Group   (recovery group)   Attendance Attended   Attendance Duration (min) 46-60   Interactions Interacted appropriately   Affect/Mood Appropriate   Goals Achieved Able to listen to others; Able to engage in interactions; Able to self-disclose;Discussed self-esteem issues; Able to recieve feedback; Able to give feedback to another

## 2021-02-22 NOTE — NURSING NOTE
Patient refused to take all his scheduled HS medications initially, then with much persuasion, did agree to take them one last time before talking to his doctor  Upset and irritated for the rest of the night saying he does not need so many different kind of psych meds  Patient makes bizarre comments about his mother having a dildo  Patient rambling loudly at times  Paranoid about medications

## 2021-02-22 NOTE — PROGRESS NOTES
Occupational Therapy Student Group Note    Attended partial duration of community meeting  Less tangential and disorganized  Visible in milieu  Engaged in group activity and verbally participated  Able to share his core values and why they were meaningful to him  Pt  did not require redirection  Pt reacted appropriately to a disorganized peer

## 2021-02-22 NOTE — PROGRESS NOTES
This writer witnessed pt in shower, showering with clothing on  Pt was redirected to change out of clothing and pt may continue to shower

## 2021-02-22 NOTE — NURSING NOTE
Pt denies SI, HI, AH and VH currently  Pt talking about having "mild visual hallucination over the weekend of seeing a stink bug on the floor that was not there while I was on the phone  It was moving around I thought it was dirt at first " Pt refused vitamin D3 stating "I don't need these vitamins " Pt also refused is Zyprexa stating "I'll take the ativan and lithium but I want to talk to the doctor about this Zyprexa, I don't take Zyprexa " Pt in behavioral control and has been appropriate in the milieu  Pt attending groups, visible and social  Medication and meal compliant  Will monitor

## 2021-02-22 NOTE — PROGRESS NOTES
02/22/21 1100   Activity/Group Checklist   Group   (recovery group)   Attendance Attended   Attendance Duration (min) 46-60   Interactions Interacted appropriately   Affect/Mood Appropriate   Goals Achieved Able to listen to others; Able to engage in interactions; Able to self-disclose

## 2021-02-23 PROCEDURE — 99232 SBSQ HOSP IP/OBS MODERATE 35: CPT | Performed by: PSYCHIATRY & NEUROLOGY

## 2021-02-23 RX ORDER — LORAZEPAM 0.5 MG/1
0.5 TABLET ORAL 2 TIMES DAILY
Status: DISCONTINUED | OUTPATIENT
Start: 2021-02-23 | End: 2021-02-25

## 2021-02-23 RX ADMIN — LITHIUM CARBONATE 750 MG: 300 CAPSULE, GELATIN COATED ORAL at 17:00

## 2021-02-23 RX ADMIN — OLANZAPINE 2.5 MG: 2.5 TABLET, FILM COATED ORAL at 19:13

## 2021-02-23 RX ADMIN — LITHIUM CARBONATE 750 MG: 300 CAPSULE, GELATIN COATED ORAL at 08:59

## 2021-02-23 RX ADMIN — LORAZEPAM 0.5 MG: 0.5 TABLET ORAL at 08:59

## 2021-02-23 RX ADMIN — TAMSULOSIN HYDROCHLORIDE 0.4 MG: 0.4 CAPSULE ORAL at 17:00

## 2021-02-23 RX ADMIN — LORAZEPAM 0.5 MG: 0.5 TABLET ORAL at 17:04

## 2021-02-23 NOTE — NURSING NOTE
Pt denies all psych symptoms  Pt overheard talking to himself in his room  Pt in behavioral control currently  Medication and meal compliant  Pt out in the milieu, social with peers laughing and joking  Will monitor

## 2021-02-23 NOTE — NURSING NOTE
Pt denies SI, HI, AH and VH  Pt has one outburst of slamming his fist down on the counter after a phone call with his mother, pt retreated to his room and was able to calm down on his own  Pt has been otherwise in behavioral control  Pt can still be labile and easily agitated but has no required any PRN's at this time  Pt is out in the milieu, attending groups and social with peers  Medication and meal compliant  Will monitor

## 2021-02-23 NOTE — PROGRESS NOTES
02/23/21 1100   Activity/Group Checklist   Group   (recovery group)   Attendance Attended  (in and out)   Attendance Duration (min) 31-45   Interactions Disorganized interaction   Affect/Mood Blunted/flat  (irritable)   Goals Achieved Able to listen to others; Able to engage in interactions; Able to self-disclose   Patient was tangential and needed reminders to stay on topic

## 2021-02-23 NOTE — NURSING NOTE
Patient visible on the unit and social with peers  Patient refused his scheduled night meds but took his ativan stating stating "I need to talk to the doctor because he knows I don't take these craps"  Patient denies all, will monitor

## 2021-02-23 NOTE — PROGRESS NOTES
Occupational Therapy Student Group Note    Patient entered group room [facility] through community meeting  When asked to share goals for today, patient referred to the prompted goals in the back of the goal card  Became increasingly agitated and frustrated when discussion medication management, access, and side effects  Patient was able to be redirected after several attempts with poor carry over  Participated in group discussion and initiated side bar conversations with near by peers  Able to self regulate after hearing peers share and thanked group facilitator upon ending the session

## 2021-02-23 NOTE — TREATMENT TEAM
02/23/21 0856   Team Meeting   Meeting Type Daily Rounds   Team Members Present   Team Members Present Physician;Nurse;; Other (Discipline and Name)   Physician Team Member Dr BRODERICK   Nursing Team Member [facility] [facility] Management Team Member Celena Nicholas   Other (Discipline and Name) Ruff and residents   Patient/Family Present   Patient Present No   Patient's Family Present No     Pt remains paranoid, disorganize and labile  Pt seen on the unit; social with peers and attends some groups  Pt compliant with meds however refused HS meds besides Ativan  Continue to monitor  Discharge to be determined

## 2021-02-23 NOTE — CASE MANAGEMENT
Pt approached CM in the coto asking to assist him in finding some of missing personal belongings  Pt bright and appeared engaged as he wanted to find out missing belongings; ponytail holders, brush, shampoo, a tote bag including missing hearing aids and hearing aid box  Pt also states that if you need to talk to my Mom and tell her about the missing things  I don't mind signing a URBANO  I will also sign it for my Dr [facility])  Minute later when patient was offered to sign URBANO that he requested he will sign  Pt refused stating "Why do you need to have the releases  I am a grown man and can take care of myself  If you want me to sign and turn my life away  I won't do it"  Pt denied SI, HI and AVH  Questioning the nature of treatment he has received and nothing seems to help  Pt appears irritable, disorganized, and paranoid  Continue to monitor

## 2021-02-23 NOTE — PROGRESS NOTES
Attempted to meet with patient but he wanted to call his mother first  He called his mother and became extremely agitated, angry, yelling at her slamming the phone down  He pounded the counter and stomped out to his room  He told this writer to leave in a harsh manner; "How would you like it if your kingdom was taken away from you? Get out!!!"  Patient presents as easily agitated, angry and paranoid  Therapist will attempt to meet with him at a later date

## 2021-02-23 NOTE — PROGRESS NOTES
Progress Note - [address] 59 y o  male MRN: [id number]  Unit/Bed#: U 348-01 Encounter: [id number]    Assessment/Plan   Principal Problem:    Schizoaffective disorder, bipolar type ([address] )  Active Problems:    Hypertension    Medical clearance for psychiatric admission    BPH (benign prostatic hyperplasia)    Vitamin D insufficiency    History of rhabdomyolysis    Elevated TSH      · Continue Lithium 750 mg q 12 hours for psychotic symptoms  ? Li Level 02/22/21: 0 6 sub-therapeutic  ? Recheck Li Level on Thursday, 02/25/21  · Taper Ativan 0 5 mg t i d  to 0 5 mg b i d  for anxiety and agitation  · Offered scheduled Seroquel 25 mg t i d , patient declined  · Continue Melatonin 3 mg q h s  for insomnia  · Continue to promote patient participation in therapeutic milieu  · Continue medical management by primary team   · Discharge disposition:  201 status, titrating medications, discharge date pending    Subjective: The patient was evaluated this morning for continuity of care and no acute distress noted throughout the evaluation  Over the past 24 hours staff noted the patient was cooperative, less agitated, and attending groups  Patient was compliant with his psychiatric medication but refused his vitamins, melatonin, and metoprolol  Today on exam the patient was cooperative and reports he is feeling agitated as he needs to tend to administrative tasks at home but at the same time is working to improve his mental health while hospitalized  He discussed social issues that he is facing at home  Advised patient to focus on mental health so that when he is discharged he will be in a good state to deal with these issues upon discharge  Explained to patient that we recently adjusted his medications and plan on rechecking lithium level on Thursday and will revisit discharge planning at that time  Also offered to begin scheduled Seroquel for agitation, patient declined    He reports "pretty aggressive" mood and endorses good sleep/appetite/energy  He denies suicidal or homicidal ideation, intent, or plan  He denies auditory or visual hallucinations and did not appear to be responding to internal stimuli      Current Medications:  Current Facility-Administered Medications   Medication Dose Route Frequency Provider Last Rate    acetaminophen  650 mg Oral Q6H PRN Jesús Garzon MD      acetaminophen  650 mg Oral Q4H PRN Jesús Garzon MD      acetaminophen  975 mg Oral Q6H PRN Jesús Garzon MD      benztropine  1 mg Intramuscular Q4H PRN Max 6/day Jesús Garzon MD      benztropine  1 mg Oral Q4H PRN Max 6/day Jesús Garzon MD      cholecalciferol  2,000 Units Oral Daily Lisa Lloyd PA-C      hydrOXYzine HCL  50 mg Oral Q6H PRN Max 4/day Jesús Garzon MD      Or    diphenhydrAMINE  50 mg Intramuscular Q6H PRN Jesús Garzon MD      GenTeal Tears Night-Time  1 application Both Eyes F4A PRN Jesús Garzon MD      hydrOXYzine HCL  100 mg Oral Q6H PRN Max 4/day Jesús Garzon MD      hydrOXYzine HCL  25 mg Oral Q6H PRN Max 4/day Jesús Garzon MD      lithium carbonate  750 mg Oral BID With Meals Osteen Sinks, DO      LORazepam  2 mg Intramuscular Q2H PRN Max 3/day Byron Sandoval MD      LORazepam  0 5 mg Oral TID Karson Sinks, DO      LORazepam  1 mg Oral Q4H PRN Byron Sandoval MD      melatonin  3 mg Oral HS Jesús Garzon MD      metoprolol succinate  25 mg Oral Daily Ivis Lopez PA-C      OLANZapine  10 mg Oral Q3H PRN Max 3/day Jesús Garzon MD      Or    OLANZapine  10 mg Intramuscular Q3H PRN Max 3/day Jesús Garzon MD      OLANZapine  5 mg Oral Q3H PRN Max 6/day Jesús Garzon MD      Or    OLANZapine  5 mg Intramuscular Q3H PRN Max 6/day Jesús Garzon MD      OLANZapine  2 5 mg Oral Q3H PRN Max 8/day Jesús Garzon MD      polyethylene glycol  17 g Oral Daily PRN Jesús Garzon MD     [facility] senna-docusate sodium  1 tablet Oral Daily PRN Rinku Martinez MD      sterile water           sterile water           tamsulosin  0 4 mg Oral Daily With [employer] Jose Lloyd PA-C      traZODone  50 mg Oral HS PRN Rinku Martinez MD         Behavioral Health Medications:   all current active meds have been reviewed, continue current psychiatric medications and current meds:   Current Facility-Administered Medications   Medication Dose Route Frequency    acetaminophen (TYLENOL) tablet 650 mg  650 mg Oral Q6H PRN    acetaminophen (TYLENOL) tablet 650 mg  650 mg Oral Q4H PRN    acetaminophen (TYLENOL) tablet 975 mg  975 mg Oral Q6H PRN    benztropine (COGENTIN) injection 1 mg  1 mg Intramuscular Q4H PRN Max 6/day    benztropine (COGENTIN) tablet 1 mg  1 mg Oral Q4H PRN Max 6/day    cholecalciferol (VITAMIN D3) tablet 2,000 Units  2,000 Units Oral Daily    hydrOXYzine HCL (ATARAX) tablet 50 mg  50 mg Oral Q6H PRN Max 4/day    Or    diphenhydrAMINE (BENADRYL) injection 50 mg  50 mg Intramuscular Q6H PRN    GenTeal Tears Night-Time OINT 1 application  1 application Both Eyes T9O PRN    hydrOXYzine HCL (ATARAX) tablet 100 mg  100 mg Oral Q6H PRN Max 4/day    hydrOXYzine HCL (ATARAX) tablet 25 mg  25 mg Oral Q6H PRN Max 4/day    lithium carbonate capsule 750 mg  750 mg Oral BID With Meals    LORazepam (ATIVAN) injection 2 mg  2 mg Intramuscular Q2H PRN Max 3/day    LORazepam (ATIVAN) tablet 0 5 mg  0 5 mg Oral TID    LORazepam (ATIVAN) tablet 1 mg  1 mg Oral Q4H PRN    melatonin tablet 3 mg  3 mg Oral HS    metoprolol succinate (TOPROL-XL) 24 hr tablet 25 mg  25 mg Oral Daily    OLANZapine (ZyPREXA) tablet 10 mg  10 mg Oral Q3H PRN Max 3/day    Or    OLANZapine (ZyPREXA) IM injection 10 mg  10 mg Intramuscular Q3H PRN Max 3/day    OLANZapine (ZyPREXA) tablet 5 mg  5 mg Oral Q3H PRN Max 6/day    Or    OLANZapine (ZyPREXA) IM injection 5 mg  5 mg Intramuscular Q3H PRN Max 6/day    OLANZapine (ZyPREXA) tablet 2 5 mg  2 5 mg Oral Q3H PRN Max 8/day    polyethylene glycol (MIRALAX) packet 17 g  17 g Oral Daily PRN    senna-docusate sodium (SENOKOT S) 8 6-50 mg per tablet 1 tablet  1 tablet Oral Daily PRN    sterile water injection **ADS Override Pull**        sterile water injection **ADS Override Pull**        tamsulosin (FLOMAX) capsule 0 4 mg  0 4 mg Oral Daily With Dinner    traZODone (DESYREL) tablet 50 mg  50 mg Oral HS PRN     Vital signs in last 24 hours:  Temp:  [98 1 °F (36 7 °C)-98 3 °F (36 8 °C)] 98 3 °F (36 8 °C)  HR:  [85-94] 94  Resp:  [16] 16  BP: (138-162)/(75-95) 162/92    Laboratory results:    I have personally reviewed all pertinent laboratory/tests results    Most Recent Labs:   Lab Results   Component Value Date    WBC 7 00 02/17/2021    RBC 4 79 02/17/2021    HGB 14 0 02/17/2021    HCT 43 2 02/17/2021     02/17/2021    RDW 14 2 02/17/2021    NEUTROABS 4 30 02/17/2021    SODIUM 144 (H) 02/09/2021    K 3 6 02/09/2021     (H) 02/09/2021    CO2 28 02/09/2021    BUN 12 02/09/2021    CREATININE 0 92 02/09/2021    GLUC 96 02/09/2021    GLUF 91 11/06/2019    CALCIUM 9 2 02/09/2021    AST 18 02/09/2021    ALT 21 02/09/2021    ALKPHOS 33 (L) 02/09/2021    TP 5 7 (L) 02/09/2021    ALB 3 6 02/09/2021    TBILI 0 50 02/09/2021    CHOLESTEROL 150 02/17/2021    HDL 35 (L) 02/17/2021    TRIG 90 02/17/2021    LDLCALC 97 02/17/2021    NONHDLC 115 02/17/2021    LITHIUM 0 6 02/22/2021    AMMONIA 51 02/06/2021    XQF6GLKJVGTN 4 960 (H) 02/17/2021    FREET4 1 09 02/17/2021    RPR Non-Reactive 11/03/2019    HGBA1C 5 1 11/03/2019     11/03/2019     Admission Labs:   Admission on 02/16/2021   Component Date Value    Color, UA 02/18/2021 Straw     Clarity, UA 02/18/2021 Clear     Specific Gravity, UA 02/18/2021 1 010     pH, UA 02/18/2021 7 0     Leukocytes, UA 02/18/2021 Negative     Nitrite, UA 02/18/2021 Negative     Protein, UA 02/18/2021 Negative     Glucose, UA 02/18/2021 Negative     Ketones, UA 02/18/2021 Negative     Bilirubin, UA 02/18/2021 Negative     Blood, UA 02/18/2021 Negative     UROBILINOGEN UA 02/18/2021 Negative     WBC 02/17/2021 7 00     RBC 02/17/2021 4 79     Hemoglobin 02/17/2021 14 0     Hematocrit 02/17/2021 43 2     MCV 02/17/2021 90     MCH 02/17/2021 29 3     MCHC 02/17/2021 32 4     RDW 02/17/2021 14 2     MPV 02/17/2021 8 9     Platelets 46/96/2535 258     Neutrophils Relative 02/17/2021 61     Lymphocytes Relative 02/17/2021 27     Monocytes Relative 02/17/2021 9     Eosinophils Relative 02/17/2021 3     Basophils Relative 02/17/2021 1     Neutrophils Absolute 02/17/2021 4 30     Lymphocytes Absolute 02/17/2021 1 90     Monocytes Absolute 02/17/2021 0 60     Eosinophils Absolute 02/17/2021 0 20     Basophils Absolute 02/17/2021 0 00     Cholesterol 02/17/2021 150     Triglycerides 02/17/2021 90     HDL, Direct 02/17/2021 35*    LDL Calculated 02/17/2021 97     Non-HDL-Chol (CHOL-HDL) 02/17/2021 115     Magnesium 02/17/2021 2 6*    TSH 3RD GENERATON 02/17/2021 4 960*    Free T4 02/17/2021 1 09     Lithium Lvl 02/22/2021 0 6        Psychiatric Review of Systems:  Behavior over the last 24 hours:  improved  Sleep: normal  Appetite: normal  Medication side effects: No  ROS: no complaints    Mental Status Evaluation:  Appearance:  bearded, casually dressed, older than stated age and overweight   Behavior:  restless and fidgety   Speech:  articulation error   Mood:  "Pretty aggressive"   Affect:  increased in intensity, increased in range and labile   Language naming objects and repeating phrases   Thought Process:  more logical and goal directed than previous days   Thought Content:  No delusions elicited   Perceptual Disturbances: Denies auditory or visual hallucinations and did not appear responding to internal stimuli   Risk Potential: Denies suicidal or homicidal ideation, intent, or plan   Sensorium:  person, place and situation   Cognition:  recent and remote memory grossly intact   Consciousness:  alert and awake    Attention: attention span appeared shorter than expected for age   Insight:  Limited but improving   Judgment: Limited but improving   Intellect Average   Gait/Station: normal gait/station and normal balance   Motor Activity: no abnormal movements     Memory: Short and long term memory  intact     Progress Toward Goals: progressing    Recommended Treatment:   See above for assessment and plan       Continue following current medications:   Current Facility-Administered Medications   Medication Dose Route Frequency Provider Last Rate    acetaminophen  650 mg Oral Q6H PRN Alice Vega MD      acetaminophen  650 mg Oral Q4H PRN Alice Vega MD      acetaminophen  975 mg Oral Q6H PRN Alice Vega MD      benztropine  1 mg Intramuscular Q4H PRN Max 6/day Alice Vega MD      benztropine  1 mg Oral Q4H PRN Max 6/day Alice Vega MD      cholecalciferol  2,000 Units Oral Daily Lisa Lloyd PA-C      hydrOXYzine HCL  50 mg Oral Q6H PRN Max 4/day Alice Vega MD      Or    diphenhydrAMINE  50 mg Intramuscular Q6H PRN Alice Vega MD      GenTeal Tears Night-Time  1 application Both Eyes W7Y PRN Alice Vega MD      hydrOXYzine HCL  100 mg Oral Q6H PRN Max 4/day Alice Vega MD      hydrOXYzine HCL  25 mg Oral Q6H PRN Max 4/day Alice Vega MD      lithium carbonate  750 mg Oral BID With Meals Art Reel, DO      LORazepam  2 mg Intramuscular Q2H PRN Max 3/day Ministerio Juarez MD      LORazepam  0 5 mg Oral TID Art Terrence, DO      LORazepam  1 mg Oral Q4H PRN Ministerio Juarez MD      melatonin  3 mg Oral HS Alice Vega MD      metoprolol succinate  25 mg Oral Daily Ivis Lopez PA-C      OLANZapine  10 mg Oral Q3H PRN Max 3/day Alice Vega MD      Or    OLANZapine  10 mg Intramuscular Q3H PRN Max 3/day Carvin Lipoma MD Maribell      OLANZapine  5 mg Oral Q3H PRN Max 6/day Dalila Carpenter MD      Or    OLANZapine  5 mg Intramuscular Q3H PRN Max 6/day Dalila Carpenter MD      OLANZapine  2 5 mg Oral Q3H PRN Max 8/day Dalila Carpenter MD      polyethylene glycol  17 g Oral Daily PRN Dalila Carpenter MD      senna-docusate sodium  1 tablet Oral Daily PRN Dalila Carpenter MD      sterile water           sterile water           tamsulosin  0 4 mg Oral Daily With [facility] [employer] GEORGINA Lloyd      traZODone  50 mg Oral HS PRN Dalila Carpenter MD         Risks, benefits and possible side effects of Medications:   Risks, benefits, and possible side effects of medications explained to patient and patient verbalizes understanding  This note has been constructed using a voice recognition system  There may be translation, syntax, or grammatical errors  If you have any questions, please contact the dictating provider  DONA Thakkar    Psychiatry PGY-1

## 2021-02-24 PROCEDURE — 99232 SBSQ HOSP IP/OBS MODERATE 35: CPT | Performed by: PSYCHIATRY & NEUROLOGY

## 2021-02-24 RX ADMIN — LITHIUM CARBONATE 750 MG: 300 CAPSULE, GELATIN COATED ORAL at 08:25

## 2021-02-24 RX ADMIN — MELATONIN TAB 3 MG 3 MG: 3 TAB at 21:18

## 2021-02-24 RX ADMIN — LORAZEPAM 0.5 MG: 0.5 TABLET ORAL at 08:25

## 2021-02-24 RX ADMIN — LITHIUM CARBONATE 750 MG: 300 CAPSULE, GELATIN COATED ORAL at 17:09

## 2021-02-24 RX ADMIN — LORAZEPAM 1 MG: 1 TABLET ORAL at 12:22

## 2021-02-24 RX ADMIN — LORAZEPAM 0.5 MG: 0.5 TABLET ORAL at 17:08

## 2021-02-24 RX ADMIN — TAMSULOSIN HYDROCHLORIDE 0.4 MG: 0.4 CAPSULE ORAL at 17:08

## 2021-02-24 NOTE — NURSING NOTE
Pt getting verbally agitated speaking with doctors  Doctors recommended given pt PRN to aide in [address]  Pt given PRN ativan 1mg for HAM scale score 26 per doctors request  Pt took PRN and started to joke around with writer  Will monitor

## 2021-02-24 NOTE — NURSING NOTE
Pt agitated & yelling d/t writer taking metal wire out of surgical mask prior to giving to pt, per unit policy  Pt states "if it's going to be half assed I don't want it!   Fuck the policy!"

## 2021-02-24 NOTE — NURSING NOTE
Consumed care at 1900   Pt get agitated  after staff asked him to handle the mask with Wire  Pt through the mask at the staff  ,and start yelling  Pt was not cooperative  Security called  for walk through, after argument with the writer pt agree to take Zyprexa for agitation  Will monitor

## 2021-02-24 NOTE — PROGRESS NOTES
02/24/21 0930   Activity/Group Checklist   Group   (goals group)   Attendance Attended   Attendance Duration (min) 16-30   Interactions Disorganized interaction   Affect/Mood   (irritable, responding to internal stimuli, tangentail)   Goals Achieved Able to engage in interactions; Able to self-disclose; Able to recieve feedback   Patient has difficulty staying on topic and talks over other patient's that are sharing  He is easily agitated when redirected

## 2021-02-24 NOTE — NURSING NOTE
Pt denies SI, HI, AH, VH  Pt observed talking to self but remains in behavioral control  Pt is calm, cooperative, and pleasant  Medication and meal compliant  Pt improved in ADL's  Will continue to monitor

## 2021-02-24 NOTE — PROGRESS NOTES
Progress Note - 6 [address] Gonzales Frandy Day 59 y o  male MRN: [id number]  Unit/Bed#: U 348-01 Encounter: [id number]    Assessment/Plan   Principal Problem:    Schizoaffective disorder, bipolar type ([facility] [address] )  Active Problems:    Hypertension    Medical clearance for psychiatric admission    BPH (benign prostatic hyperplasia)    Vitamin D insufficiency    History of rhabdomyolysis    Elevated TSH      · Continue Lithium 750 mg q 12 hours for psychotic symptoms  ? Li Level 02/22/21: 0 6 sub-therapeutic  ? Recheck Li Level on Thursday, 02/25/21  · Continue Ativan 0 5 mg b i d  for anxiety and agitation  · Continue Melatonin 3 mg q h s  for insomnia  · Discontinued p r n  Zyprexa, will use p r n  Ativan as an alternative  · Continue to promote patient participation in therapeutic milieu  · Continue medical management by primary team   · Discharge disposition:  201 status, titrating medications, discharge date pending    Subjective: The patient was evaluated this morning for continuity of care and no acute distress noted throughout the evaluation  Over the past 24 hours staff noted the patient was labile, cooperative and interactive with peers at times, but later became agitated with staff and security was called  The patient was administered p r n  Zyprexa which was affective  Overnight the patient was noted to be have poor sleep  Today on exam the patient was agitated and uncooperative at first   On a subsequent attempt to meet with the patient he remained uncooperative and was stating "I want a list of my meds    I want to know where my $300 is "  He appeared to be agitated that he received p r n  Zyprexa last night, as he does not like this medication  Offered patient p r n  Ativan which was successful at explained to patient that we will discontinue p r n  Zyprexa  Patient was more cooperative after receiving Ativan and stated that he felt bad about his behavior earlier    He reports "bad" mood and endorses poor sleep last night, good appetite, and good energy  He denies auditory or visual hallucinations, however he was noted to be speaking to himself earlier in the day  He denied suicidal or homicidal ideation, intent, or plan      Current Medications:  Current Facility-Administered Medications   Medication Dose Route Frequency Provider Last Rate    acetaminophen  650 mg Oral Q6H PRN Rafael Michelle MD      acetaminophen  650 mg Oral Q4H PRN Rafael Michelle MD      acetaminophen  975 mg Oral Q6H PRN Rafael Michelle MD      benztropine  1 mg Intramuscular Q4H PRN Max 6/day Rafael Michelle MD      benztropine  1 mg Oral Q4H PRN Max 6/day Rafael Michelle MD      cholecalciferol  2,000 Units Oral Daily Lisa Lloyd PA-C      hydrOXYzine HCL  50 mg Oral Q6H PRN Max 4/day Rafael Michelle MD      Or    diphenhydrAMINE  50 mg Intramuscular Q6H PRN Rafael Michelle MD      GenTeal Tears Night-Time  1 application Both Eyes U8S PRN Rafael Michelle MD      hydrOXYzine HCL  100 mg Oral Q6H PRN Max 4/day Rafael Michelle MD      hydrOXYzine HCL  25 mg Oral Q6H PRN Max 4/day Rafael Michelle MD      lithium carbonate  750 mg Oral BID With Meals Ely Souza, DO      LORazepam  2 mg Intramuscular Q2H PRN Max 3/day Zaira Cason MD      LORazepam  0 5 mg Oral BID Ely Souza, DO      LORazepam  1 mg Oral Q4H PRN Zaira Cason MD      melatonin  3 mg Oral HS Rafael Michelle MD      metoprolol succinate  25 mg Oral Daily Ivis Lopez PA-C      OLANZapine  10 mg Oral Q3H PRN Max 3/day Rafael Michelle MD      Or    OLANZapine  10 mg Intramuscular Q3H PRN Max 3/day Rafael Michelle MD      OLANZapine  5 mg Oral Q3H PRN Max 6/day Rafael Michelle MD      Or    OLANZapine  5 mg Intramuscular Q3H PRN Max 6/day Rafael Michelle MD      OLANZapine  2 5 mg Oral Q3H PRN Max 8/day Rafael Michelle MD      polyethylene glycol  17 g Oral Daily PRN Rafael Michelle MD  senna-docusate sodium  1 tablet Oral Daily PRN Stacey Rivera MD      sterile water           sterile water           tamsulosin  0 4 mg Oral Daily With [employer] GEORGINA Lloyd      traZODone  50 mg Oral HS PRN Stacey Rivera MD         Behavioral Health Medications:   all current active meds have been reviewed, continue current psychiatric medications and current meds:   Current Facility-Administered Medications   Medication Dose Route Frequency    acetaminophen (TYLENOL) tablet 650 mg  650 mg Oral Q6H PRN    acetaminophen (TYLENOL) tablet 650 mg  650 mg Oral Q4H PRN    acetaminophen (TYLENOL) tablet 975 mg  975 mg Oral Q6H PRN    benztropine (COGENTIN) injection 1 mg  1 mg Intramuscular Q4H PRN Max 6/day    benztropine (COGENTIN) tablet 1 mg  1 mg Oral Q4H PRN Max 6/day    cholecalciferol (VITAMIN D3) tablet 2,000 Units  2,000 Units Oral Daily    hydrOXYzine HCL (ATARAX) tablet 50 mg  50 mg Oral Q6H PRN Max 4/day    Or    diphenhydrAMINE (BENADRYL) injection 50 mg  50 mg Intramuscular Q6H PRN    GenTeal Tears Night-Time OINT 1 application  1 application Both Eyes J0B PRN    hydrOXYzine HCL (ATARAX) tablet 100 mg  100 mg Oral Q6H PRN Max 4/day    hydrOXYzine HCL (ATARAX) tablet 25 mg  25 mg Oral Q6H PRN Max 4/day    lithium carbonate capsule 750 mg  750 mg Oral BID With Meals    LORazepam (ATIVAN) injection 2 mg  2 mg Intramuscular Q2H PRN Max 3/day    LORazepam (ATIVAN) tablet 0 5 mg  0 5 mg Oral BID    LORazepam (ATIVAN) tablet 1 mg  1 mg Oral Q4H PRN    melatonin tablet 3 mg  3 mg Oral HS    metoprolol succinate (TOPROL-XL) 24 hr tablet 25 mg  25 mg Oral Daily    polyethylene glycol (MIRALAX) packet 17 g  17 g Oral Daily PRN    senna-docusate sodium (SENOKOT S) 8 6-50 mg per tablet 1 tablet  1 tablet Oral Daily PRN    sterile water injection **ADS Override Pull**        sterile water injection **ADS Override Pull**        tamsulosin (FLOMAX) capsule 0 4 mg  0 4 mg Oral Daily With [employer]    traZODone (DESYREL) tablet 50 mg  50 mg Oral HS PRN     Vital signs in last 24 hours:  Temp:  [98 1 °F (36 7 °C)-98 4 °F (36 9 °C)] 98 4 °F (36 9 °C)  HR:  [85-96] 90  Resp:  [16] 16  BP: (130-174)/(87-88) 130/88    Laboratory results:    I have personally reviewed all pertinent laboratory/tests results    Most Recent Labs:   Lab Results   Component Value Date    WBC 7 00 02/17/2021    RBC 4 79 02/17/2021    HGB 14 0 02/17/2021    HCT 43 2 02/17/2021     02/17/2021    RDW 14 2 02/17/2021    NEUTROABS 4 30 02/17/2021    SODIUM 144 (H) 02/09/2021    K 3 6 02/09/2021     (H) 02/09/2021    CO2 28 02/09/2021    BUN 12 02/09/2021    CREATININE 0 92 02/09/2021    GLUC 96 02/09/2021    GLUF 91 11/06/2019    CALCIUM 9 2 02/09/2021    AST 18 02/09/2021    ALT 21 02/09/2021    ALKPHOS 33 (L) 02/09/2021    TP 5 7 (L) 02/09/2021    ALB 3 6 02/09/2021    TBILI 0 50 02/09/2021    CHOLESTEROL 150 02/17/2021    HDL 35 (L) 02/17/2021    TRIG 90 02/17/2021    LDLCALC 97 02/17/2021    NONHDLC 115 02/17/2021    LITHIUM 0 6 02/22/2021    AMMONIA 51 02/06/2021    CWV7QTIOPIVG 4 960 (H) 02/17/2021    FREET4 1 09 02/17/2021    RPR Non-Reactive 11/03/2019    HGBA1C 5 1 11/03/2019     11/03/2019     Admission Labs:   Admission on 02/16/2021   Component Date Value    Color, UA 02/18/2021 Straw     Clarity, UA 02/18/2021 Clear     Specific Gravity, UA 02/18/2021 1 010     pH, UA 02/18/2021 7 0     Leukocytes, UA 02/18/2021 Negative     Nitrite, UA 02/18/2021 Negative     Protein, UA 02/18/2021 Negative     Glucose, UA 02/18/2021 Negative     Ketones, UA 02/18/2021 Negative     Bilirubin, UA 02/18/2021 Negative     Blood, UA 02/18/2021 Negative     UROBILINOGEN UA 02/18/2021 Negative     WBC 02/17/2021 7 00     RBC 02/17/2021 4 79     Hemoglobin 02/17/2021 14 0     Hematocrit 02/17/2021 43 2     MCV 02/17/2021 90     MCH 02/17/2021 29 3     MCHC 02/17/2021 32 4     RDW 02/17/2021 14 2     MPV 02/17/2021 8 9     Platelets 99/40/2192 258     Neutrophils Relative 02/17/2021 61     Lymphocytes Relative 02/17/2021 27     Monocytes Relative 02/17/2021 9     Eosinophils Relative 02/17/2021 3     Basophils Relative 02/17/2021 1     Neutrophils Absolute 02/17/2021 4 30     Lymphocytes Absolute 02/17/2021 1 90     Monocytes Absolute 02/17/2021 0 60     Eosinophils Absolute 02/17/2021 0 20     Basophils Absolute 02/17/2021 0 00     Cholesterol 02/17/2021 150     Triglycerides 02/17/2021 90     HDL, Direct 02/17/2021 35*    LDL Calculated 02/17/2021 97     Non-HDL-Chol (CHOL-HDL) 02/17/2021 115     Magnesium 02/17/2021 2 6*    TSH 3RD GENERATON 02/17/2021 4 960*    Free T4 02/17/2021 1 09     Lithium Lvl 02/22/2021 0 6        Psychiatric Review of Systems:  Behavior over the last 24 hours:  Regressed  Sleep:  Poor sleep last night  Appetite: normal  Medication side effects: No  ROS: no complaints    Mental Status Evaluation:  Appearance:  bearded, disheveled, older than stated age and overweight   Behavior:  restless and fidgety   Speech:  articulation error, loud and profane   Mood:  "Bad"   Affect:  increased in intensity, increased in range and mood-congruent   Language naming objects and repeating phrases   Thought Process:  disorganized and perserverative   Thought Content:  No delusions elicited   Perceptual Disturbances: Denied auditory or visual patient but patient was observed talking to himself   Risk Potential: Denies suicidal or homicidal ideation, intent, or plan   Sensorium:  person, place and situation   Cognition:  recent and remote memory grossly intact   Consciousness:  alert and awake    Attention: attention span appeared shorter than expected for age   Insight:  limited but improving   Judgment: limited   Intellect Average intelligence   Gait/Station: normal gait/station and normal balance   Motor Activity: no abnormal movements     Memory: Short and long term memory intact     Progress Toward Goals:  Unchanged from yesterday    Recommended Treatment:   See above for assessment and plan       Continue following current medications:   Current Facility-Administered Medications   Medication Dose Route Frequency Provider Last Rate    acetaminophen  650 mg Oral Q6H PRN El Huang MD      acetaminophen  650 mg Oral Q4H PRN El Huang MD      acetaminophen  975 mg Oral Q6H PRN El Huang MD      benztropine  1 mg Intramuscular Q4H PRN Max 6/day El Huang MD      benztropine  1 mg Oral Q4H PRN Max 6/day El Huang MD      cholecalciferol  2,000 Units Oral Daily Lisa Lloyd PA-C      hydrOXYzine HCL  50 mg Oral Q6H PRN Max 4/day El Huang MD      Or    diphenhydrAMINE  50 mg Intramuscular Q6H PRN El Huang MD      GenTeal Tears Night-Time  1 application Both Eyes J3B PRN El Huang MD      hydrOXYzine HCL  100 mg Oral Q6H PRN Max 4/day El Huang MD      hydrOXYzine HCL  25 mg Oral Q6H PRN Max 4/day El Huang MD      lithium carbonate  750 mg Oral BID With Meals Adela Marinelli DO      LORazepam  2 mg Intramuscular Q2H PRN Max 3/day Karen Rivera MD      LORazepam  0 5 mg Oral BID Adela Marinelli DO      LORazepam  1 mg Oral Q4H PRN Karen Rivera MD      melatonin  3 mg Oral HS El Huang MD      metoprolol succinate  25 mg Oral Daily Ivis Lopez PA-C      OLANZapine  10 mg Oral Q3H PRN Max 3/day El Huang MD      Or    OLANZapine  10 mg Intramuscular Q3H PRN Max 3/day El Huang MD      OLANZapine  5 mg Oral Q3H PRN Max 6/day El Huang MD      Or    OLANZapine  5 mg Intramuscular Q3H PRN Max 6/day El Huang MD      OLANZapine  2 5 mg Oral Q3H PRN Max 8/day El Huang MD      polyethylene glycol  17 g Oral Daily PRN El Huang MD      senna-docusate sodium  1 tablet Oral Daily PRN [place] Keegan Joel MD      sterile water           sterile water           tamsulosin  0 4 mg Oral Daily With [employer] GEORGINA Lloyd      traZODone  50 mg Oral HS PRN Nolene Osgood, MD         Risks, benefits and possible side effects of Medications:   Risks, benefits, and possible side effects of medications explained to patient and patient verbalizes understanding  This note has been constructed using a voice recognition system  There may be translation, syntax, or grammatical errors  If you have any questions, please contact the dictating provider  DONA Francis    Psychiatry PGY-1

## 2021-02-24 NOTE — PROGRESS NOTES
Occupational Therapy Student Group Note    Attended afternoon life skills group  Pleasant and calm on approach  Sat away from peers but able to socialize with group members when appropriate  Fully participated in planned group activity and verbally shared ideals when prompted  Able to reflect on personal long-term and short term goals however, provided disorganized steps to attain goals  Patient benefited from instructions being repeated due to hyperverbal speech  Able to be verbally redirected during tangential speech and intrusive behaviors  Appeared cheerful and pleasant   Continues to make comments about medication management stating, "I want meds to be stabilized, not to be a science experiment "

## 2021-02-24 NOTE — PROGRESS NOTES
02/24/21 1100   Activity/Group Checklist   Group   (recovery group)   Attendance Attended  (left early)   Attendance Duration (min) 31-45   Interactions Disorganized interaction   Affect/Mood Blunted/flat   Goals Achieved Able to engage in interactions; Able to reflect/comment on own behavior;Able to self-disclose; Able to recieve feedback   Topic was boundaries;

## 2021-02-24 NOTE — NURSING NOTE
PRN ativan effective, pt out in the milieu laughing and making jokes  Pt attending groups  Will monitor

## 2021-02-24 NOTE — NURSING NOTE
Zyprexa was effective pt is visible, and cooperative  at this time   Pt refused his HS medication the writer reeducated  Patient the risk of not taking medication   Pt stats "I know all side effects "   Pt apologies to writer about what happed earlier   Will monitor

## 2021-02-24 NOTE — NURSING NOTE
Pt has been visible and social in the milieu  Pt remains disorganized with rambling and loud speech  Pt requested a list of his medications and this was given and reviewed with Brissa Leal  Pt denies SI/HI and AVH but is witnessed speaking and laughing to himself at times  Pt stated he is not sleeping well because " its not my own bed "  Pt is cooperative and pleasant so far this shift  Will continue to monitor

## 2021-02-24 NOTE — PLAN OF CARE
Problem: PSYCHOSIS  Goal: Will report no hallucinations or delusions  Description: Interventions:  - Administer medication as  ordered  - Every waking shifts and PRN assess for the presence of hallucinations and or delusions  - Assist with reality testing to support increasing orientation  - Assess if patient's hallucinations or delusions are encouraging self-harm or harm to others and intervene as appropriate  Outcome: Progressing     Problem: SELF CARE DEFICIT  Goal: Return ADL status to a safe level of function  Description: INTERVENTIONS:  - Administer medication as ordered  - Assess ADL deficits and provide assistive devices as needed  - Obtain PT/OT consults as needed  - Assist and instruct patient to increase activity and self care as tolerated  Outcome: Progressing     Problem: Risk for Violence/Aggression Toward Others  Goal: Treatment Goal: Refrain from acts of violence/aggression during length of stay, and demonstrate improved impulse control at the time of discharge  Outcome: Progressing

## 2021-02-24 NOTE — NURSING NOTE
Patient was awake off and on during the night  He was talking to himself and writing  Refused all PRN medications

## 2021-02-25 LAB — LITHIUM SERPL-SCNC: 0.7 MMOL/L (ref 0.6–1.2)

## 2021-02-25 PROCEDURE — 80178 ASSAY OF LITHIUM: CPT | Performed by: PSYCHIATRY & NEUROLOGY

## 2021-02-25 PROCEDURE — 99232 SBSQ HOSP IP/OBS MODERATE 35: CPT | Performed by: PSYCHIATRY & NEUROLOGY

## 2021-02-25 RX ORDER — OLANZAPINE 10 MG/1
5 INJECTION, POWDER, LYOPHILIZED, FOR SOLUTION INTRAMUSCULAR 2 TIMES DAILY
Status: DISCONTINUED | OUTPATIENT
Start: 2021-02-25 | End: 2021-02-26

## 2021-02-25 RX ORDER — LORAZEPAM 0.5 MG/1
0.5 TABLET ORAL 2 TIMES DAILY
Status: DISCONTINUED | OUTPATIENT
Start: 2021-02-25 | End: 2021-03-01

## 2021-02-25 RX ORDER — OLANZAPINE 5 MG/1
5 TABLET ORAL 2 TIMES DAILY
Status: DISCONTINUED | OUTPATIENT
Start: 2021-02-25 | End: 2021-02-26

## 2021-02-25 RX ADMIN — LORAZEPAM 0.5 MG: 0.5 TABLET ORAL at 08:33

## 2021-02-25 RX ADMIN — OLANZAPINE 5 MG: 5 TABLET, FILM COATED ORAL at 17:02

## 2021-02-25 RX ADMIN — LITHIUM CARBONATE 750 MG: 300 CAPSULE, GELATIN COATED ORAL at 08:33

## 2021-02-25 RX ADMIN — TAMSULOSIN HYDROCHLORIDE 0.4 MG: 0.4 CAPSULE ORAL at 17:02

## 2021-02-25 RX ADMIN — MELATONIN TAB 3 MG 3 MG: 3 TAB at 21:01

## 2021-02-25 RX ADMIN — LORAZEPAM 1 MG: 1 TABLET ORAL at 09:29

## 2021-02-25 RX ADMIN — LORAZEPAM 0.5 MG: 0.5 TABLET ORAL at 20:00

## 2021-02-25 RX ADMIN — LITHIUM CARBONATE 750 MG: 300 CAPSULE, GELATIN COATED ORAL at 17:02

## 2021-02-25 RX ADMIN — OLANZAPINE 5 MG: 5 TABLET, FILM COATED ORAL at 12:08

## 2021-02-25 NOTE — NURSING NOTE
Pt refused to take his metoprolol stating "I don't need that shit  It made my sister crazy "  Writer attempted to show pt his previous high blood pressures and pt refused to look stating "I don't want to look at numbers " Writer educated pt that if BP is left to get too high he will be at risk for a stroke  Pt stated "I am at risk for flipping the fuck out right now so lets get on with it "  Will continue to monitor

## 2021-02-25 NOTE — NURSING NOTE
Pt made meds over objection and was offered PO Zyprexa  Pt took scheduled PO Zyprexa and stated "I'm going to take this on protest but I'm tired of these doctors medicating me with these meds I'm not okay taking  I don't trust these doctors I only trust certain staff " Pt shortly after taking the medications requested to have his BP checked stating he felt shaking lightheaded and dizzy feels his BP is dropping  Writer checked vitals BP: 145/91, HR 73  Will monitor

## 2021-02-25 NOTE — NURSING NOTE
When staff asked the patient about blood work he stated that "he was toxic "  Pt did allow staff to do blood work  He has been laughing inappropriately and talking to himself    After he had done his blood work he came to staff and stated "the doctors should not be worried about his levels only his state of mind "

## 2021-02-25 NOTE — TREATMENT TEAM
02/25/21 0857   Team Members Present   Team Members Present Physician;Nurse;; Other (Discipline and Name)   Physician Team Member Dr BRODERICK   Nursing Team Member [address] [facility] Floor Management Team Member Leilani Camejo   Other (Discipline and Name) Ruff and residents   Patient/Family Present   Patient Present No   Patient's Family Present No     No progress -pt remains disorganized, labile, irritable, agitated and loud and disrespectful towards the staff  Pt attends some groups; needed frequent staff redirections for intrusive behaviors  Continue med management  Explore possible commitment options as pt continues to refuse some of meds  Continue to monitor

## 2021-02-25 NOTE — CASE MANAGEMENT
Pt was approached to discuss his request to be transferred to the [address] [place] [address] discussed importance of signing a URBANO for [facility]  Pt refused signing URBANO replied "Why would you want me to send to the [facility]  I want to go home  You guys ain't doing much besides getting me mad, you understand mister"  Pt loud, agitated, labile and preoccupied with his personal belongings "You better look into my missing things before I go home"  Pt walked away showing middle finger at this writer  CM to follow up

## 2021-02-25 NOTE — CASE MANAGEMENT
302 and 303 Rights reviewed with patient  Pt does not seem to understand the rights  Copy of the both rights were provided to the patient  CM to follow up as needed

## 2021-02-25 NOTE — PROGRESS NOTES
Progress Note - 6 [address] Gonzales Frandy Day 59 y o  male MRN: [id number]  Unit/Bed#: U 348-01 Encounter: [id number]    Assessment/Plan   Principal Problem:    Schizoaffective disorder, bipolar type ([facility] [address] )  Active Problems:    Hypertension    Medical clearance for psychiatric admission    BPH (benign prostatic hyperplasia)    Vitamin D insufficiency    History of rhabdomyolysis    Elevated TSH      · Begin Zyprexa 5 mg p o  b i d  for psychotic symptoms, or Zyprexa 5 mg IM b i d  if patient refuses p o  medication  · Continue Lithium 750 mg q 12 hours for psychotic symptoms  ? Li Level 02/25/21: 0 7  ? Recheck Li Level on Tuesday, 03/02/21  · Continue Ativan 0 5 mg b i d  for anxiety and agitation  · Continue Melatonin 3 mg q h s  for insomnia  · Continue to promote patient participation in therapeutic milieu  · Continue medical management by primary team   · Discharge disposition:  Two physician 302 completed, 303 hearing tomorrow    Subjective: The patient was evaluated this morning for continuity of care and no acute distress noted throughout the evaluation  Over the past 24 hours staff noted the patient was loud, agitated, and irritable  This morning the patient was verbally aggressive and threatening towards the  and required p r n  Ativan  Today on exam the patient was irritable and uncooperative and declined to answer any questions initially  Explained to patient we believe he is not improving on his current medications  Patient noted so what if I am psychotic? I can be psychotic if I want    Informed patient we will complete 302 and 303 involuntary admission paperwork, inform patient of his rights and pending hearing tomorrow morning      Current Medications:  Current Facility-Administered Medications   Medication Dose Route Frequency Provider Last Rate    acetaminophen  650 mg Oral Q6H PRN Cory Cedeno MD      acetaminophen  650 mg Oral Q4H PRN MD Yesenia Weeks acetaminophen  975 mg Oral Q6H PRN Minna Bethea MD      benztropine  1 mg Intramuscular Q4H PRN Max 6/day Minna Bethea MD      benztropine  1 mg Oral Q4H PRN Max 6/day Minna Bethea MD      cholecalciferol  2,000 Units Oral Daily Lisagraciela Lloyd PA-C      hydrOXYzine HCL  50 mg Oral Q6H PRN Max 4/day Minna Bethea MD      Or    diphenhydrAMINE  50 mg Intramuscular Q6H PRN Minna Bethea MD      GenTeal Tears Night-Time  1 application Both Eyes D5O PRN Minna Bethea MD      hydrOXYzine HCL  100 mg Oral Q6H PRN Max 4/day Minna Bethea MD      hydrOXYzine HCL  25 mg Oral Q6H PRN Max 4/day Minna Bethea MD      lithium carbonate  750 mg Oral BID With Meals Rosaland Hammock, DO      LORazepam  2 mg Intramuscular Q2H PRN Max 3/day Rosaland Hammock, DO      LORazepam  0 5 mg Oral BID Rosaland Hammock, DO      LORazepam  1 mg Oral Q4H PRN Rosaland Hammock, DO      melatonin  3 mg Oral HS Minna Bethea MD      metoprolol succinate  25 mg Oral Daily Ivis Lopez PA-C      OLANZapine  5 mg Oral BID Rosaland Hammock, DO      Or    OLANZapine  5 mg Intramuscular BID Rosaland Hammock, DO      polyethylene glycol  17 g Oral Daily PRN Minna Bethea MD      senna-docusate sodium  1 tablet Oral Daily PRN Minna Bethea MD      sterile water           sterile water           tamsulosin  0 4 mg Oral Daily With ARLEEN Lloyd PA-C      traZODone  50 mg Oral HS PRN Minna Bethea MD         Behavioral Health Medications:   all current active meds have been reviewed, continue current psychiatric medications and current meds:   Current Facility-Administered Medications   Medication Dose Route Frequency    acetaminophen (TYLENOL) tablet 650 mg  650 mg Oral Q6H PRN    acetaminophen (TYLENOL) tablet 650 mg  650 mg Oral Q4H PRN    acetaminophen (TYLENOL) tablet 975 mg  975 mg Oral Q6H PRN    benztropine (COGENTIN) injection 1 mg  1 mg Intramuscular Q4H PRN Max 6/day    benztropine (COGENTIN) tablet 1 mg  1 mg Oral Q4H PRN Max 6/day    cholecalciferol (VITAMIN D3) tablet 2,000 Units  2,000 Units Oral Daily    hydrOXYzine HCL (ATARAX) tablet 50 mg  50 mg Oral Q6H PRN Max 4/day    Or    diphenhydrAMINE (BENADRYL) injection 50 mg  50 mg Intramuscular Q6H PRN    GenTeal Tears Night-Time OINT 1 application  1 application Both Eyes A0J PRN    hydrOXYzine HCL (ATARAX) tablet 100 mg  100 mg Oral Q6H PRN Max 4/day    hydrOXYzine HCL (ATARAX) tablet 25 mg  25 mg Oral Q6H PRN Max 4/day    lithium carbonate capsule 750 mg  750 mg Oral BID With Meals    LORazepam (ATIVAN) injection 2 mg  2 mg Intramuscular Q2H PRN Max 3/day    LORazepam (ATIVAN) tablet 0 5 mg  0 5 mg Oral BID    LORazepam (ATIVAN) tablet 1 mg  1 mg Oral Q4H PRN    melatonin tablet 3 mg  3 mg Oral HS    metoprolol succinate (TOPROL-XL) 24 hr tablet 25 mg  25 mg Oral Daily    OLANZapine (ZyPREXA) tablet 5 mg  5 mg Oral BID    Or    OLANZapine (ZyPREXA) IM injection 5 mg  5 mg Intramuscular BID    polyethylene glycol (MIRALAX) packet 17 g  17 g Oral Daily PRN    senna-docusate sodium (SENOKOT S) 8 6-50 mg per tablet 1 tablet  1 tablet Oral Daily PRN    sterile water injection **ADS Override Pull**        sterile water injection **ADS Override Pull**        tamsulosin (FLOMAX) capsule 0 4 mg  0 4 mg Oral Daily With Dinner    traZODone (DESYREL) tablet 50 mg  50 mg Oral HS PRN     Vital signs in last 24 hours:  Temp:  [97 8 °F (36 6 °C)-99 °F (37 2 °C)] 97 8 °F (36 6 °C)  HR:  [72-84] 73  Resp:  [16] 16  BP: (134-145)/(78-91) 145/91    Laboratory results:    I have personally reviewed all pertinent laboratory/tests results    Most Recent Labs:   Lab Results   Component Value Date    WBC 7 00 02/17/2021    RBC 4 79 02/17/2021    HGB 14 0 02/17/2021    HCT 43 2 02/17/2021     02/17/2021    RDW 14 2 02/17/2021    NEUTROABS 4 30 02/17/2021    SODIUM 144 (H) 02/09/2021    K 3 6 02/09/2021    CL 108 (H) 02/09/2021    CO2 28 02/09/2021    BUN 12 02/09/2021    CREATININE 0 92 02/09/2021    GLUC 96 02/09/2021    GLUF 91 11/06/2019    CALCIUM 9 2 02/09/2021    AST 18 02/09/2021    ALT 21 02/09/2021    ALKPHOS 33 (L) 02/09/2021    TP 5 7 (L) 02/09/2021    ALB 3 6 02/09/2021    TBILI 0 50 02/09/2021    CHOLESTEROL 150 02/17/2021    HDL 35 (L) 02/17/2021    TRIG 90 02/17/2021    LDLCALC 97 02/17/2021    NONHDLC 115 02/17/2021    LITHIUM 0 7 02/25/2021    AMMONIA 51 02/06/2021    [id number] 4 960 (H) 02/17/2021    FREET4 1 09 02/17/2021    RPR Non-Reactive 11/03/2019    HGBA1C 5 1 11/03/2019     11/03/2019     Admission Labs:   Admission on 02/16/2021   Component Date Value    Color, UA 02/18/2021 Straw     Clarity, UA 02/18/2021 Clear     Specific Gravity, UA 02/18/2021 1 010     pH, UA 02/18/2021 7 0     Leukocytes, UA 02/18/2021 Negative     Nitrite, UA 02/18/2021 Negative     Protein, UA 02/18/2021 Negative     Glucose, UA 02/18/2021 Negative     Ketones, UA 02/18/2021 Negative     Bilirubin, UA 02/18/2021 Negative     Blood, UA 02/18/2021 Negative     UROBILINOGEN UA 02/18/2021 Negative     WBC 02/17/2021 7 00     RBC 02/17/2021 4 79     Hemoglobin 02/17/2021 14 0     Hematocrit 02/17/2021 43 2     MCV 02/17/2021 90     MCH 02/17/2021 29 3     MCHC 02/17/2021 32 4     RDW 02/17/2021 14 2     MPV 02/17/2021 8 9     Platelets 68/05/7684 258     Neutrophils Relative 02/17/2021 61     Lymphocytes Relative 02/17/2021 27     Monocytes Relative 02/17/2021 9     Eosinophils Relative 02/17/2021 3     Basophils Relative 02/17/2021 1     Neutrophils Absolute 02/17/2021 4 30     Lymphocytes Absolute 02/17/2021 1 90     Monocytes Absolute 02/17/2021 0 60     Eosinophils Absolute 02/17/2021 0 20     Basophils Absolute 02/17/2021 0 00     Cholesterol 02/17/2021 150     Triglycerides 02/17/2021 90     HDL, Direct 02/17/2021 35*    LDL Calculated 02/17/2021 97     Non-HDL-Chol (CHOL-HDL) 02/17/2021 115     Magnesium 02/17/2021 2 6*    TSH 3RD GENERATON 02/17/2021 4 960*    Free T4 02/17/2021 1 09     Lithium Lvl 02/22/2021 0 6     Lithium Lvl 02/25/2021 0 7        Psychiatric Review of Systems:  Behavior over the last 24 hours:  regressed  Sleep: poor  Appetite: normal  Medication side effects: No  ROS: no complaints    Mental Status Evaluation:  Appearance:  bearded, casually dressed, disheveled, older than stated age and overweight   Behavior:  restless and fidgety and uncooperative   Speech:  loud and profane   Mood:  irritable   Affect:  inappropriate, increased in intensity and increased in range   Language naming objects and repeating phrases   Thought Process:  illogical and tangential   Thought Content:  No delusions elicited, inappropriate   Perceptual Disturbances: Denies auditory or visual hallucinations, patient was noted talking to himself on multiple occasions   Risk Potential: Denies suicidal or homicidal ideation, intent, or plan   Sensorium:  person, place and situation   Cognition:  recent and remote memory grossly intact   Consciousness:  alert and awake    Attention: attention span appeared shorter than expected for age   Insight:  limited   Judgment: limited   Intellect Appears to be of average intelligence   Gait/Station: normal gait/station and normal balance   Motor Activity: no abnormal movements     Memory: Short and long term memory  not tested     Progress Toward Goals:  Not progressing, 302 and 303 involuntary admission paperwork completed, hearing tomorrow    Recommended Treatment:   See above for assessment and plan       Continue following current medications:   Current Facility-Administered Medications   Medication Dose Route Frequency Provider Last Rate    acetaminophen  650 mg Oral Q6H PRN Rinku Martinez MD      acetaminophen  650 mg Oral Q4H PRN Rinku Martinez MD      acetaminophen  975 mg Oral Q6H PRN Rinku Martinez MD     [facility] benztropine  1 mg Intramuscular Q4H PRN Max 6/day Shelton Coelho MD      benztropine  1 mg Oral Q4H PRN Max 6/day Shelton Coelho MD      cholecalciferol  2,000 Units Oral Daily Lisagraciela Lloyd PA-C      hydrOXYzine HCL  50 mg Oral Q6H PRN Max 4/day Shelton Coelho MD      Or    diphenhydrAMINE  50 mg Intramuscular Q6H PRN Shelton Coelho MD      GenTeal Tears Night-Time  1 application Both Eyes C2T PRN Shelton Coelho MD      hydrOXYzine HCL  100 mg Oral Q6H PRN Max 4/day Shelton Coelho MD      hydrOXYzine HCL  25 mg Oral Q6H PRN Max 4/day Shelton Coelho MD      lithium carbonate  750 mg Oral BID With Meals Thergraciela Mahan, DO      LORazepam  2 mg Intramuscular Q2H PRN Max 3/day Desiree Mahan, DO      LORazepam  0 5 mg Oral BID Carminaa Kalli, DO      LORazepam  1 mg Oral Q4H PRN Desiree Mahan, DO      melatonin  3 mg Oral HS Shelton Coelho MD      metoprolol succinate  25 mg Oral Daily Ivis Lopez PA-C      OLANZapine  5 mg Oral BID Thera Kalli, DO      Or    OLANZapine  5 mg Intramuscular BID Desiree Mahan, DO      polyethylene glycol  17 g Oral Daily PRN Shelton Coelho MD      senna-docusate sodium  1 tablet Oral Daily PRN Shelton Coelho MD      sterile water           sterile water           tamsulosin  0 4 mg Oral Daily With [employer] Jose Lloyd PA-C      traZODone  50 mg Oral HS PRN Shelton Coelho MD         Risks, benefits and possible side effects of Medications:   Risks, benefits, and possible side effects of medications explained to patient and patient verbalizes understanding  This note has been constructed using a voice recognition system  There may be translation, syntax, or grammatical errors  If you have any questions, please contact the dictating provider  DONA Castanon    Psychiatry PGY-1

## 2021-02-25 NOTE — NURSING NOTE
Pt verbally aggressive towards , making threats, cursing and putting up his middle fingers  Pt HAM scale score 26 and received PRN ativan 1mg @ 8752 per doctors recommendation  Will monitor

## 2021-02-25 NOTE — PROGRESS NOTES
Occupational Therapy Student Group Note    Attended morning community group  Pleasant on approach and demonstrated initial cooperation with filling out the goal card  Continued to have pressured and intrusive speech during peer sharing  Became increasingly agitated because, "no one is listening to me " Patient stood up to leave and threw pencil towards group facilitator before storming out of the group room  Returned to group room in a more calm and collected state  Sat among peers and observed having side bar conversations with nearby peers  Responded appropriately to redirection when tangential  Patient was able to provide insight to group on tangential and disruptive behavioral tendencies stating, "I know I say a lot of things and thanks for staying with me "    Patient given Relapse Prevention Plan to fill out independently by OTS  Form was later given to BYRNES where patient declined to complete document

## 2021-02-25 NOTE — CASE MANAGEMENT
Two doc 302 + ACT 77 was sent to [facility] ([facility]) AT [place]

## 2021-02-25 NOTE — CASE MANAGEMENT
303 petition completed and sent to the [facility]  Confirmed fax receipt  Hearing is scheduled over the phone on Friday, 2/26/21 at 8 AM  The attending physician has been informed

## 2021-02-25 NOTE — NURSING NOTE
Pt denies SI, HI, AH and VH  Pt appears internally preoccupied, pt observed talking to himself  Pt was emotional when speaking about his fathers passing, pt became tearful  Pt feels the doctors are not helping him but are just "doping" him up  Pt is visible in the milieu and attends groups  Medication and meal compliant  Will monitor

## 2021-02-25 NOTE — TREATMENT TEAM
02/24/21 0858   Team Meeting   Meeting Type Daily Rounds   Team Members Present   Team Members Present Physician;Nurse;; Other (Discipline and Name); Occupational Therapist   Physician Team Member DR BRODERICK   Nursing Team Member Irene   Care Management Team Member Donna Ruano   Other (Discipline and Name) Ruff   Patient/Family Present   Patient Present No   Patient's Family Present No     Pt seen agitated, irritable, loud  Security was called Zyprexa was given  Pt refused HS meds  Pt attends some groups  Continue med management  Pt requested to be transferred to a [address] [place] [address] to explore  Continue to monitor  Discharge to be determined

## 2021-02-25 NOTE — NURSING NOTE
PRN of ativan effective  Pt appears more relaxed and is sitting in his room listening to the radio  Pt currently in behavioral control  Will monitor

## 2021-02-26 PROBLEM — N43.3 HYDROCELE: Status: ACTIVE | Noted: 2021-02-26

## 2021-02-26 PROBLEM — H43.399 VITREOUS SYNERESIS: Status: ACTIVE | Noted: 2021-02-26

## 2021-02-26 PROBLEM — K42.9 UMBILICAL HERNIA: Status: ACTIVE | Noted: 2021-02-26

## 2021-02-26 PROBLEM — D12.6 BENIGN NEOPLASM OF COLON: Status: ACTIVE | Noted: 2021-02-26

## 2021-02-26 PROBLEM — H47.239 LARGE PHYSIOLOGIC CUPPING OF OPTIC DISC: Status: ACTIVE | Noted: 2021-02-26

## 2021-02-26 PROBLEM — G89.29 WRIST PAIN, CHRONIC, RIGHT: Status: ACTIVE | Noted: 2021-02-26

## 2021-02-26 PROBLEM — I48.91 ATRIAL FIBRILLATION (HCC): Status: ACTIVE | Noted: 2021-02-26

## 2021-02-26 PROBLEM — E78.5 HYPERLIPIDEMIA: Status: ACTIVE | Noted: 2021-02-26

## 2021-02-26 PROBLEM — K64.9 HEMORRHOIDS: Status: ACTIVE | Noted: 2021-02-26

## 2021-02-26 PROBLEM — H25.10 NUCLEAR SENILE CATARACT: Status: ACTIVE | Noted: 2021-02-26

## 2021-02-26 PROBLEM — H52.4 PRESBYOPIA: Status: ACTIVE | Noted: 2021-02-26

## 2021-02-26 PROBLEM — H35.369 MACULAR DRUSEN: Status: ACTIVE | Noted: 2021-02-26

## 2021-02-26 PROBLEM — H52.209 ASTIGMATISM: Status: ACTIVE | Noted: 2021-02-26

## 2021-02-26 PROBLEM — M25.531 WRIST PAIN, CHRONIC, RIGHT: Status: ACTIVE | Noted: 2021-02-26

## 2021-02-26 PROCEDURE — 99232 SBSQ HOSP IP/OBS MODERATE 35: CPT | Performed by: PSYCHIATRY & NEUROLOGY

## 2021-02-26 PROCEDURE — 99231 SBSQ HOSP IP/OBS SF/LOW 25: CPT | Performed by: PHYSICIAN ASSISTANT

## 2021-02-26 RX ORDER — OLANZAPINE 10 MG/1
7.5 INJECTION, POWDER, LYOPHILIZED, FOR SOLUTION INTRAMUSCULAR 2 TIMES DAILY
Status: DISCONTINUED | OUTPATIENT
Start: 2021-02-26 | End: 2021-03-01

## 2021-02-26 RX ORDER — OLANZAPINE 10 MG/1
10 TABLET ORAL
Status: DISCONTINUED | OUTPATIENT
Start: 2021-02-26 | End: 2021-03-05 | Stop reason: HOSPADM

## 2021-02-26 RX ORDER — OLANZAPINE 5 MG/1
5 TABLET ORAL
Status: DISCONTINUED | OUTPATIENT
Start: 2021-02-26 | End: 2021-03-05 | Stop reason: HOSPADM

## 2021-02-26 RX ORDER — OLANZAPINE 10 MG/1
5 INJECTION, POWDER, LYOPHILIZED, FOR SOLUTION INTRAMUSCULAR
Status: DISCONTINUED | OUTPATIENT
Start: 2021-02-26 | End: 2021-03-05 | Stop reason: HOSPADM

## 2021-02-26 RX ORDER — OLANZAPINE 2.5 MG/1
2.5 TABLET ORAL
Status: DISCONTINUED | OUTPATIENT
Start: 2021-02-26 | End: 2021-03-05 | Stop reason: HOSPADM

## 2021-02-26 RX ORDER — OLANZAPINE 10 MG/1
10 INJECTION, POWDER, LYOPHILIZED, FOR SOLUTION INTRAMUSCULAR
Status: DISCONTINUED | OUTPATIENT
Start: 2021-02-26 | End: 2021-03-05 | Stop reason: HOSPADM

## 2021-02-26 RX ADMIN — TAMSULOSIN HYDROCHLORIDE 0.4 MG: 0.4 CAPSULE ORAL at 17:13

## 2021-02-26 RX ADMIN — OLANZAPINE 5 MG: 5 TABLET, FILM COATED ORAL at 08:30

## 2021-02-26 RX ADMIN — MELATONIN TAB 3 MG 3 MG: 3 TAB at 21:20

## 2021-02-26 RX ADMIN — LORAZEPAM 0.5 MG: 0.5 TABLET ORAL at 21:20

## 2021-02-26 RX ADMIN — LORAZEPAM 0.5 MG: 0.5 TABLET ORAL at 11:26

## 2021-02-26 RX ADMIN — LITHIUM CARBONATE 750 MG: 300 CAPSULE, GELATIN COATED ORAL at 08:30

## 2021-02-26 RX ADMIN — LITHIUM CARBONATE 750 MG: 300 CAPSULE, GELATIN COATED ORAL at 17:13

## 2021-02-26 RX ADMIN — LORAZEPAM 2 MG: 2 INJECTION, SOLUTION INTRAMUSCULAR; INTRAVENOUS at 13:22

## 2021-02-26 RX ADMIN — OLANZAPINE 7.5 MG: 5 TABLET, FILM COATED ORAL at 17:18

## 2021-02-26 NOTE — NURSING NOTE
Pt denies SI,HI,AH,VH but appears internally preoccupied  Pt seen in dayroom and milieu  Pt remains in behavioral control  Pt  c/o pain in left wrist that has been " going on for a very long time"  Pt rates pain 6/10  Pt declined pain intervention  Will place call to medical  Medication and meal compliant  No further complaints or concerns observed on reported  Will continue to monitor

## 2021-02-26 NOTE — NURSING NOTE
Pt is visible on unit, labile, social, hyperactive, loud, intrusive at times  Pt is delusional and disorganized in conversation  Pt was dancing in doorway of his room, pt was also rambling about how he was going to "call [his] [employer] buddies " Pt is paranoid and suspicious, especially of medications  Pt is able to verbalize basic needs

## 2021-02-26 NOTE — CASE MANAGEMENT
303 hearing was held over the phone with [facility] [employer]  Pt attended the hearing and was not in agreement with the petition  303 is upheld up to 20 days

## 2021-02-26 NOTE — NURSING NOTE
Patient has been screaming and carrying on since his room was moved  At first he stated his shirt was lost and we needed to find it  He was advised he was the one that packed his stuff and when he last saw it  He states he is tired of explaining himself  An MHT did find his stuff tangled in his sheets  He then is upset as he states there are other things missing  He states it has been missing from when he was in the ambulance  When staff tries to asks him follow questions he goes of on a tangent and is not redirectable  He would not let staff talk and his behavior was explosive  He is also focused on the meds he is taking and why they are ordered  He states the doctor is messing with him  He calls staff "pill pushers "  At this time patient was given IM ativan 2mg with the presence of security  He was cooperative with the IM  He was advised to stay in his room to deescalate
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
## 2021-02-26 NOTE — PROGRESS NOTES
Went to meet with patient; when I arrived at his room he was sound asleep  He had an incident earlier in which he received an IM; see prior progress note  He also had a court hearing which he was 303'ed and he blames the psychiatrists for messing with his medications  When therapist saw patient earlier he was less labile and his thoughts remained tangential  He still minimizes his mental health and substance use  Therapist will try to meet with him next week

## 2021-02-26 NOTE — PROGRESS NOTES
Progress Note - Zoltan Frederick Day 1960, 59 y o  male MRN: [id number]    Unit/Bed#: [facility] 350-02 Encounter: [id number]    Primary Care Provider: Shyam Zuniga MD   Date and time admitted to hospital: 2021  6:21 PM    Wrist pain, chronic, right  Assessment & Plan  · Patient with chronic right wrist pain, no acute injury  · Continue Tylenol PRN  · Outpatient referral to orthopedics    Nurse Coordination of Care Discussion: Discussed assessment and plan with ADRIEN You    Discussions with Specialists or Other Care Team Provider: No    Education and Discussions with Family / Patient: Answered patients questions to the best of my ability    Time Spent for Care: 20 minutes  More than 50% of total time spent on counseling and coordination of care as described above  Current Length of Stay: 10 day(s)    Code Status: Level 1 - Full Code      Subjective:   Patient complaining of right wrist pain  Denies pain at rest, admits to 5/10 pain with flexion and twisting of wrist, denies numbness or tingling  Denies any recent injury  States he did not need to be seen, and that his wrist pain is chronic due to multiple fractures and arthritis  Patient refusing supportive treatment at this time stating " I dont need it"    Objective:     Vitals:   Temp (24hrs), Av 3 °F (36 8 °C), Min:97 9 °F (36 6 °C), Max:98 9 °F (37 2 °C)    Temp:  [97 9 °F (36 6 °C)-98 9 °F (37 2 °C)] 98 °F (36 7 °C)  HR:  [74-86] 86  Resp:  [16] 16  BP: (126-141)/(73-82) 132/79  Body mass index is 31 16 kg/m²  Physical Exam:     Physical Exam  Musculoskeletal:         General: Tenderness and deformity (right wrist calcification - chronic) present  No swelling or signs of injury  Arms:        Additional Data:     Labs:                            * I Have Reviewed All Lab Data Listed Above  * Additional Pertinent Lab Tests Reviewed:  All Labs Within Last 24 Hours Reviewed    Imaging:    Imaging Reports Reviewed Today Include: No new imaging for today      Last 24 Hours Medication List:   Current Facility-Administered Medications   Medication Dose Route Frequency Provider Last Rate    acetaminophen  650 mg Oral Q6H PRN Da Bhakta MD      acetaminophen  650 mg Oral Q4H PRN Da Bhakta MD      acetaminophen  975 mg Oral Q6H PRN Da Bhakta MD      benztropine  1 mg Intramuscular Q4H PRN Max 6/day Da Bhakta MD      benztropine  1 mg Oral Q4H PRN Max 6/day Da Bhakta MD      cholecalciferol  2,000 Units Oral Daily Lisaneetu Lloyd PA-C      hydrOXYzine HCL  50 mg Oral Q6H PRN Max 4/day Da Bhakta MD      Or    diphenhydrAMINE  50 mg Intramuscular Q6H PRN Da Bhakta MD      GenTeal Tears Night-Time  1 application Both Eyes H3B PRN Da Bhakta MD      hydrOXYzine HCL  100 mg Oral Q6H PRN Max 4/day Da Bhakta MD      hydrOXYzine HCL  25 mg Oral Q6H PRN Max 4/day Da Bhakta MD      lithium carbonate  750 mg Oral BID With Meals Aliene Mole, DO      LORazepam  2 mg Intramuscular Q2H PRN Max 3/day Aliene Mole, DO      LORazepam  0 5 mg Oral BID Aliene Mole, DO      LORazepam  1 mg Oral Q4H PRN Aliene Mole, DO      melatonin  3 mg Oral HS Da Bhakta MD      metoprolol succinate  25 mg Oral Daily Ivis Lopez PA-C      OLANZapine  5 mg Oral BID Aliene Mole, DO      Or    OLANZapine  5 mg Intramuscular BID Aliene Mole, DO      polyethylene glycol  17 g Oral Daily PRN Da Bhakta MD      senna-docusate sodium  1 tablet Oral Daily PRN Da Bhakta MD      sterile water           sterile water           tamsulosin  0 4 mg Oral Daily With [employer] Jose Lloyd PA-C      traZODone  50 mg Oral HS PRN Da Bhakta MD       Today, Patient Was Seen By: Jaci Hendrickson PA-C      ** Please Note: Dictation voice to text software may have been used in the creation of this document   **

## 2021-02-26 NOTE — NURSING NOTE
Pt observed sleeping comfortably  in his room after receiving IM Ativan  Pt is breathing easy and unlabored  No respiratory distress observed  No s/s of distress observed  Will continue to monitor

## 2021-02-27 PROCEDURE — 99232 SBSQ HOSP IP/OBS MODERATE 35: CPT | Performed by: PSYCHIATRY & NEUROLOGY

## 2021-02-27 RX ORDER — LANOLIN ALCOHOL/MO/W.PET/CERES
CREAM (GRAM) TOPICAL 3 TIMES DAILY PRN
Status: DISCONTINUED | OUTPATIENT
Start: 2021-02-27 | End: 2021-03-05 | Stop reason: HOSPADM

## 2021-02-27 RX ADMIN — OLANZAPINE 7.5 MG: 5 TABLET, FILM COATED ORAL at 09:00

## 2021-02-27 RX ADMIN — LORAZEPAM 0.5 MG: 0.5 TABLET ORAL at 11:23

## 2021-02-27 RX ADMIN — LITHIUM CARBONATE 750 MG: 300 CAPSULE, GELATIN COATED ORAL at 17:46

## 2021-02-27 RX ADMIN — OLANZAPINE 7.5 MG: 5 TABLET, FILM COATED ORAL at 17:47

## 2021-02-27 RX ADMIN — METOPROLOL SUCCINATE 25 MG: 25 TABLET, EXTENDED RELEASE ORAL at 21:40

## 2021-02-27 RX ADMIN — TAMSULOSIN HYDROCHLORIDE 0.4 MG: 0.4 CAPSULE ORAL at 17:46

## 2021-02-27 RX ADMIN — MELATONIN TAB 3 MG 3 MG: 3 TAB at 21:40

## 2021-02-27 RX ADMIN — LORAZEPAM 0.5 MG: 0.5 TABLET ORAL at 21:40

## 2021-02-27 RX ADMIN — LITHIUM CARBONATE 750 MG: 300 CAPSULE, GELATIN COATED ORAL at 08:59

## 2021-02-27 NOTE — PROGRESS NOTES
Occupational Therapy Student Group Note    Entered group room [facility] through community group session in an agitated state  Sat near group facilitator and did not interact with nearby peers  Became tangential with pressured speech when prompted to share goals  Stood up to leave while continuing to be hyperverbal about group therapy sessions stating, "I'm out of here " Returned to group room in a calmer state and showed group facilitator paperwork for the doctor  Remained appropriate and left shortly after this brief interaction

## 2021-02-27 NOTE — NURSING NOTE
Pt refused evening dose of scheduled metoprolol 25mg  Educated pt on importance of taking medications as prescribed

## 2021-02-27 NOTE — PLAN OF CARE
Problem: PSYCHOSIS  Goal: Will report no hallucinations or delusions  Description: Interventions:  - Administer medication as  ordered  - Every waking shifts and PRN assess for the presence of hallucinations and or delusions  - Assist with reality testing to support increasing orientation  - Assess if patient's hallucinations or delusions are encouraging self-harm or harm to others and intervene as appropriate  Outcome: Progressing     Problem: SELF CARE DEFICIT  Goal: Return ADL status to a safe level of function  Description: INTERVENTIONS:  - Administer medication as ordered  - Assess ADL deficits and provide assistive devices as needed  - Obtain PT/OT consults as needed  - Assist and instruct patient to increase activity and self care as tolerated  Outcome: Progressing     Problem: Ineffective Coping  Goal: Participates in unit activities  Description: Interventions:  - Provide therapeutic environment   - Provide required programming   - Redirect inappropriate behaviors   Outcome: Progressing     Problem: DISCHARGE PLANNING  Goal: Discharge to home or other facility with appropriate resources  Description: INTERVENTIONS:  - Identify barriers to discharge w/patient and caregiver  - Arrange for needed discharge resources and transportation as appropriate  - Identify discharge learning needs (meds, wound care, etc )  - Arrange for interpretive services to assist at discharge as needed  - Refer to Case Management Department for coordinating discharge planning if the patient needs post-hospital services based on physician/advanced practitioner order or complex needs related to functional status, cognitive ability, or social support system  Outcome: Progressing     Problem: Risk for Violence/Aggression Toward Others  Goal: Treatment Goal: Refrain from acts of violence/aggression during length of stay, and demonstrate improved impulse control at the time of discharge  Outcome: Progressing

## 2021-02-27 NOTE — PROGRESS NOTES
02/27/21 1030   Activity/Group Checklist   Group Other (Comment)  ([facility]; Open Choice, Process Discussion )   Attendance Attended   Attendance Duration (min) Greater than 60   Interactions Interacted appropriately   Affect/Mood Appropriate;Bright   Goals Achieved Able to listen to others; Able to engage in interactions; Able to recieve feedback; Able to give feedback to another  (Engaged with materials;  Full participation )

## 2021-02-27 NOTE — PROGRESS NOTES
Progress Note - Mikael Dhillon 67 Day 59 y o  male MRN: [id number]   Unit/Bed#: [facility] 350-02 Encounter: [id number]    Behavior over the last 24 hours: ermias Pan is agitated and uncooperative with session  He seems internally stimulated and distracted  He came to the room, but then started yelling and eventually walked out "I am telling you, lady  I am still angry at my team! I don't put much gem in psychiatry  I am done talking to you!" Resistive to take medications - has orders for refusal of oral medications, also at times requires PRN medications      Sleep: slept off and on  Appetite: normal  Medication side effects: No   ROS: no complaints, no shortness of breath, chest pain or abdominal pain, all other systems are negative    Mental Status Evaluation:    Appearance:  disheveled, marginal hygiene, looks older than stated age   Behavior:  agitated, demanding, uncooperative   Speech:  pressured, loud, tangential   Mood:  labile, irritable   Affect:  labile   Thought Process:  disorganized, illogical   Associations: tangential associations   Thought Content:  persecutory delusions   Perceptual Disturbances: denies auditory or visual hallucinations when asked, but appears distracted   Risk Potential: Suicidal ideation - None at present  Homicidal ideation - None at present  Potential for aggression - Yes, due to poor impulse control   Sensorium:  oriented to person, place and time/date   Memory:  recent and remote memory grossly intact   Consciousness:  alert and awake   Attention/Concentration: poor concentration and poor attention span   Insight:  poor   Judgment: poor   Gait/Station: normal gait/station, normal balance   Motor Activity: no abnormal movements     Vital signs in last 24 hours:    Temp:  [97 8 °F (36 6 °C)-98 2 °F (36 8 °C)] 98 2 °F (36 8 °C)  HR:  [71-91] 71  Resp:  [16-18] 18  BP: (126-145)/(68-81) 145/81    Laboratory results: I have personally reviewed all pertinent laboratory/tests results    Most Recent Labs:   Lab Results   Component Value Date    WBC 7 00 02/17/2021    RBC 4 79 02/17/2021    HGB 14 0 02/17/2021    HCT 43 2 02/17/2021     02/17/2021    RDW 14 2 02/17/2021    NEUTROABS 4 30 02/17/2021    SODIUM 144 (H) 02/09/2021    K 3 6 02/09/2021     (H) 02/09/2021    CO2 28 02/09/2021    BUN 12 02/09/2021    CREATININE 0 92 02/09/2021    GLUC 96 02/09/2021    CALCIUM 9 2 02/09/2021    AST 18 02/09/2021    ALT 21 02/09/2021    ALKPHOS 33 (L) 02/09/2021    TP 5 7 (L) 02/09/2021    ALB 3 6 02/09/2021    TBILI 0 50 02/09/2021    CHOLESTEROL 150 02/17/2021    HDL 35 (L) 02/17/2021    TRIG 90 02/17/2021    LDLCALC 97 02/17/2021    NONHDLC 115 02/17/2021    LITHIUM 0 7 02/25/2021    AMMONIA 51 02/06/2021    [id number] 4 960 (H) 02/17/2021    FREET4 1 09 02/17/2021       Suicide/Homicide Risk Assessment:    Risk of Harm to Self:   Nursing Suicide Risk Assessment Last 24 hours: Low Risk to Self: No evidence of suicidal thoughts or history;  Moderate Risk to Self: Age 48 or older ;    Current Specific Risk Factors include: mental illness diagnosis, current unstable mood, current psychotic symptoms, poor reasoning  Protective Factors: no current suicidal ideation  Based on today's assessment, Daniela Hammond presents the following risk of harm to self: moderate    Risk of Harm to Others:  Nursing Homicide Risk Assessment: Violence Risk to Others: Yes- Within the last 6 months  Based on today's assessment, Daniela Hammond presents the following risk of harm to others: moderate    The following interventions are recommended: behavioral checks every 7 minutes, continued hospitalization on locked unit    Progress Toward Goals: no progress, still irritable, still labile, poor insight, poor reality testing, still has psychotic symptoms    Assessment/Plan   Principal Problem:    Schizoaffective disorder, bipolar type ([facility] [address] )  Active Problems:    Hypertension    Medical clearance for psychiatric admission    BPH (benign prostatic hyperplasia)    Vitamin D insufficiency    History of rhabdomyolysis    Elevated TSH    Wrist pain, chronic, right      Recommended Treatment:     Planned medication and treatment changes:     All current active medications have been reviewed  Encourage group therapy, milieu therapy and occupational therapy  Behavioral Health checks every 7 minutes  On 303 commitment up to 20 days   On Zyprexa 7 5 mg bid with Zyprexa IM ordered for refusal    Continue all other medications:    Current Facility-Administered Medications   Medication Dose Route Frequency Provider Last Rate    acetaminophen  650 mg Oral Q6H PRN Tez Villatoro MD      acetaminophen  650 mg Oral Q4H PRN Tez Villatoro MD      acetaminophen  975 mg Oral Q6H PRN Tez Villatoro MD      benztropine  1 mg Intramuscular Q4H PRN Max 6/day Tez Villatoro MD      benztropine  1 mg Oral Q4H PRN Max 6/day Tez Villatoro MD      cholecalciferol  2,000 Units Oral Daily Lisa Lloyd PA-C      hydrOXYzine HCL  50 mg Oral Q6H PRN Max 4/day Tez Villatoro MD      Or    diphenhydrAMINE  50 mg Intramuscular Q6H PRN Tez Villatoro MD      GenTeal Tears Night-Time  1 application Both Eyes O9Y PRN Tez Villatoro MD      hydrOXYzine HCL  100 mg Oral Q6H PRN Max 4/day Tez Villatoro MD      hydrOXYzine HCL  25 mg Oral Q6H PRN Max 4/day Tez Villatoro MD      lithium carbonate  750 mg Oral BID With Meals Kilo Contras, DO      LORazepam  2 mg Intramuscular Q2H PRN Max 3/day Kilo Contras, DO      LORazepam  0 5 mg Oral BID Kilo Colinas, DO      LORazepam  1 mg Oral Q4H PRN Kilo Shawas, DO      melatonin  3 mg Oral HS Tez Villatoro MD      metoprolol succinate  25 mg Oral Daily Ivis Lopez PA-C      OLANZapine  10 mg Oral Q3H PRN Kilo Contras, DO      Or    OLANZapine  10 mg Intramuscular Q3H PRN Kaveh Bucca, DO      OLANZapine  5 mg Oral Q3H PRN Kilo Contras, DO Or    OLANZapine  5 mg Intramuscular Q3H PRN Woodlyn Kettle, DO      OLANZapine  7 5 mg Oral BID Domenico Kettle, DO      Or    OLANZapine  7 5 mg Intramuscular BID Woodlyn Kettle, DO      OLANZapine  2 5 mg Oral Q3H PRN Domenico Kettle, DO      polyethylene glycol  17 g Oral Daily PRN Natasha Ramirez MD      senna-docusate sodium  1 tablet Oral Daily PRN Natasha Ramirez MD      sterile water           sterile water           tamsulosin  0 4 mg Oral Daily With [facility] Jose Lloyd PA-C      traZODone  50 mg Oral HS PRN Natasha Ramirez MD       Risks / Benefits of Treatment:    Risks, benefits, and possible side effects of medications explained to patient  Patient has limited understanding of risks and benefits of treatment at this time and needs ongoing explanation of treatment benefits and treatment plan  Patient is not likely to improve without medications and requires administration of injectable medications against his will for refusal of oral medications to assure safety of self and others    Counseling / Coordination of Care:    Patient's progress discussed with staff in treatment team meeting  At this time patient has limited understanding of diagnosis, medication changes and treatment plan and will require further explanation      Irma Cooley MD 02/27/21

## 2021-02-27 NOTE — NURSING NOTE
Pt denies S/S SI/HI/AH/VH  Pt appears to be internally stimulated  Pt is medication and meal compliant  Pt able to make needs known  Patient out on unit and social with peers  Patient is loud and has been talking to himself in his room  Thoughts are disorganized

## 2021-02-27 NOTE — CMS CERTIFICATION NOTE
Recertification: Based upon physical, mental and social evaluations, I certify that inpatient psychiatric services continue to be medically necessary for this patient for a duration of 20 midnights for the treatment of  Schizoaffective disorder, bipolar type [facility])   Available alternative community resources still do not meet the patient's mental health care needs  I further attest that an established written individualized plan of care has been updated and is outlined in the patient's medical records

## 2021-02-27 NOTE — PROGRESS NOTES
Progress Note - Mikael Dhillon 67 Day 59 y o  male MRN: [id number]   Unit/Bed#: [facility] 350-02 Encounter: [id number]    Behavior over the last 24 hours: unchanged  Helen Kidney came to the room for the session, but then became again angry and agitated  He was focusing on his medications saying that he did not need to take any and that he was getting a rash on his face  No rash was noted during the exam  He again walked out of the room "I am done talking"  Minimal participation in milieu      Sleep: slept off and on  Appetite: normal  Medication side effects: No   ROS: reports "rash" - not observed, denies any shortness of breath or chest pain, all other systems are negative    Mental Status Evaluation:    Appearance:  disheveled, looks older than stated age   Behavior:  demanding, uncooperative   Speech:  pressured, loud, tangential   Mood:  labile, irritable   Affect:  labile   Thought Process:  disorganized, illogical   Associations: tangential associations   Thought Content:  persecutory delusions   Perceptual Disturbances: denies auditory or visual hallucinations when asked, but appears distracted   Risk Potential: Suicidal ideation - None at present  Homicidal ideation - None at present  Potential for aggression - Yes, due to poor impulse control   Sensorium:  oriented to person, place and time/date   Memory:  recent and remote memory grossly intact   Consciousness:  alert and awake   Attention/Concentration: poor concentration and poor attention span   Insight:  poor   Judgment: poor   Gait/Station: normal gait/station, normal balance   Motor Activity: no abnormal movements     Vital signs in last 24 hours:    Temp:  [98 3 °F (36 8 °C)-98 8 °F (37 1 °C)] 98 3 °F (36 8 °C)  HR:  [] 66  Resp:  [16] 16  BP: (154-185)/() 154/89    Laboratory results: I have personally reviewed all pertinent laboratory/tests results        Suicide/Homicide Risk Assessment:    Risk of Harm to Self:   Nursing Suicide Risk Assessment Last 24 hours: Low Risk to Self: No evidence of suicidal thoughts or history; Moderate Risk to Self: Age 48 or older ;    Current Specific Risk Factors include: mental illness diagnosis, current psychotic symptoms, poor reasoning  Protective Factors: no current suicidal ideation  Based on today's assessment, Laxmi Springer presents the following risk of harm to self: moderate    Risk of Harm to Others:  Nursing Homicide Risk Assessment: Violence Risk to Others: Yes- Within the last 6 months  Based on today's assessment, Laxmi Springer presents the following risk of harm to others: moderate    The following interventions are recommended: behavioral checks every 7 minutes, continued hospitalization on locked unit    Progress Toward Goals: no improvement, still irritable, still psychotic, poor insight, reality testing remains poor    Assessment/Plan   Principal Problem:    Schizoaffective disorder, bipolar type ([facility] [address] )  Active Problems:    Hypertension    Medical clearance for psychiatric admission    BPH (benign prostatic hyperplasia)    Vitamin D insufficiency    History of rhabdomyolysis    Elevated TSH    Wrist pain, chronic, right      Recommended Treatment:     Planned medication and treatment changes:     All current active medications have been reviewed  Encourage group therapy, milieu therapy and occupational therapy  Behavioral Health checks every 7 minutes  On 303 commitment  On Zyprexa 7 5 mg bid with Zyprexa IM for refusal of oral dose    Continue all other medications:    Current Facility-Administered Medications   Medication Dose Route Frequency Provider Last Rate    acetaminophen  650 mg Oral Q6H PRN Tez Villatoro MD      acetaminophen  650 mg Oral Q4H PRN Tez Vlilatoro MD      acetaminophen  975 mg Oral Q6H PRN Tez Villatoro MD      benztropine  1 mg Intramuscular Q4H PRN Max 6/day Tez Villatoro MD      benztropine  1 mg Oral Q4H PRN Max 6/day MD Flex Sawyer cholecalciferol  2,000 Units Oral Daily Lisagraciela Lloyd PA-C      hydrOXYzine HCL  50 mg Oral Q6H PRN Max 4/day Lois Jauregui MD      Or    diphenhydrAMINE  50 mg Intramuscular Q6H PRN Lois Jauregui MD      GenTeal Tears Night-Time  1 application Both Eyes [id number] PRN Lois Jauregui MD      hydrOXYzine HCL  100 mg Oral Q6H PRN Max 4/day Lois Jauregui MD      hydrOXYzine HCL  25 mg Oral Q6H PRN Max 4/day Lois Jauregui MD      lithium carbonate  750 mg Oral BID With Meals Bella Springfield, DO      LORazepam  2 mg Intramuscular Q2H PRN Max 3/day Bella Jessica, DO      LORazepam  0 5 mg Oral BID Bella [place], DO      LORazepam  1 mg Oral Q4H PRN Bella Jessica, DO      melatonin  3 mg Oral HS Lois Jauregui MD      metoprolol succinate  25 mg Oral Daily Ivis Lopez PA-C      OLANZapine  10 mg Oral Q3H PRN Bella Jessica, DO      Or    OLANZapine  10 mg Intramuscular Q3H PRN Bella [place], DO      OLANZapine  5 mg Oral Q3H PRN Bella [place], DO      Or    OLANZapine  5 mg Intramuscular Q3H PRN Bella [place], DO      OLANZapine  7 5 mg Oral BID Bella [place], DO      Or    OLANZapine  7 5 mg Intramuscular BID Bella Jessica, DO      OLANZapine  2 5 mg Oral Q3H PRN Bella [place], DO      polyethylene glycol  17 g Oral Daily PRN Lois Jauregui MD      senna-docusate sodium  1 tablet Oral Daily PRN Lois Jauregui MD      sterile water           sterile water           tamsulosin  0 4 mg Oral Daily With ARLEEN Lloyd PA-C      traZODone  50 mg Oral HS PRN Lois Jauregui MD      white petrolatum-mineral oil   Topical TID PRN Fernando Cai PA-C       Risks / Benefits of Treatment:    Risks, benefits, and possible side effects of medications explained to patient  Patient has limited understanding of risks and benefits of treatment at this time and needs ongoing explanation of treatment benefits and treatment plan    Patient is not likely to improve without medications and requires administration of injectable medications against his will for refusal of oral medications to assure safety of self and others    Counseling / Coordination of Care:    Patient's progress discussed with staff in treatment team meeting  Medications, treatment progress and treatment plan reviewed with patient      Wiliam Adames MD 02/28/21

## 2021-02-27 NOTE — PLAN OF CARE
Problem: Ineffective Coping  Goal: Participates in unit activities  Description: Interventions:  - Provide therapeutic environment   - Provide required programming   - Redirect inappropriate behaviors   2/27/2021 1349 by Kassandra Lofton  Outcome: Progressing  2/27/2021 0956 by Kassandra Lofton  Outcome: Progressing

## 2021-02-27 NOTE — NURSING NOTE
Pt denies S/S SI/HI/AH/VH  Pt appears to be internally responding  Pt medication and meal compliant  Pt able to make needs known  Out in milieu interacting with peers  Pt denies any unmet needs or complaints at this time  Will monitor

## 2021-02-28 PROCEDURE — 99232 SBSQ HOSP IP/OBS MODERATE 35: CPT | Performed by: PSYCHIATRY & NEUROLOGY

## 2021-02-28 RX ADMIN — TAMSULOSIN HYDROCHLORIDE 0.4 MG: 0.4 CAPSULE ORAL at 15:32

## 2021-02-28 RX ADMIN — LORAZEPAM 0.5 MG: 0.5 TABLET ORAL at 11:45

## 2021-02-28 RX ADMIN — LITHIUM CARBONATE 750 MG: 300 CAPSULE, GELATIN COATED ORAL at 15:31

## 2021-02-28 RX ADMIN — LITHIUM CARBONATE 750 MG: 300 CAPSULE, GELATIN COATED ORAL at 08:32

## 2021-02-28 RX ADMIN — OLANZAPINE 7.5 MG: 5 TABLET, FILM COATED ORAL at 08:30

## 2021-02-28 RX ADMIN — MELATONIN TAB 3 MG 3 MG: 3 TAB at 21:43

## 2021-02-28 RX ADMIN — LORAZEPAM 0.5 MG: 0.5 TABLET ORAL at 21:43

## 2021-02-28 RX ADMIN — OLANZAPINE 7.5 MG: 5 TABLET, FILM COATED ORAL at 17:07

## 2021-02-28 NOTE — NURSING NOTE
Patient has a bright affect this evening and is social on the phone with his mother  Pleasant upon approach  Patient joking and laughing with select peers  Patient visible and medication compliant  No negative behaviors noted  Patient more organized and cooperating with staff  No negative behaviors noted

## 2021-02-28 NOTE — NURSING NOTE
Pt denies S/S SI/HI/AH/VH  Pt appears to be internally stimulated  Pt is medication and meal compliant  Pt able to make needs known  Patient out on unit and social with peers  Patient is loud and has been talking to himself in his room    Thoughts are disorganized but patient is pleasant

## 2021-02-28 NOTE — NURSING NOTE
Patient approached the nurses station rambling on about how the dr's not listening to him about his "hives"  Patient assessed and did not noticed any hives but noted some red marks around his ankle and arms from scratching skin  Patient also talking about missing his wallet, he knows it went missing probably when SL had knocked him out  c meds and Danielle Neely took it  Patient walked away for a few minutes then returned back to the nurses station states "really trying to keep quiet but not happy with the dr"  Patient's  encouraged to talk to his dr tomorrow morning  Patient quickly responded "don't need to talk to them about anything and don't need any meds to help me neither"  Patient walked back to his room, will monitor

## 2021-03-01 LAB — LITHIUM SERPL-SCNC: 0.8 MMOL/L (ref 0.6–1.2)

## 2021-03-01 PROCEDURE — 80178 ASSAY OF LITHIUM: CPT | Performed by: PSYCHIATRY & NEUROLOGY

## 2021-03-01 PROCEDURE — 99232 SBSQ HOSP IP/OBS MODERATE 35: CPT | Performed by: PSYCHIATRY & NEUROLOGY

## 2021-03-01 RX ORDER — OLANZAPINE 10 MG/1
10 TABLET ORAL 2 TIMES DAILY
Status: DISCONTINUED | OUTPATIENT
Start: 2021-03-01 | End: 2021-03-03

## 2021-03-01 RX ORDER — LORAZEPAM 1 MG/1
1 TABLET ORAL 2 TIMES DAILY
Status: DISCONTINUED | OUTPATIENT
Start: 2021-03-01 | End: 2021-03-05 | Stop reason: HOSPADM

## 2021-03-01 RX ORDER — OLANZAPINE 10 MG/1
10 INJECTION, POWDER, LYOPHILIZED, FOR SOLUTION INTRAMUSCULAR 2 TIMES DAILY
Status: DISCONTINUED | OUTPATIENT
Start: 2021-03-01 | End: 2021-03-03

## 2021-03-01 RX ADMIN — LITHIUM CARBONATE 750 MG: 300 CAPSULE, GELATIN COATED ORAL at 17:17

## 2021-03-01 RX ADMIN — LORAZEPAM 0.5 MG: 0.5 TABLET ORAL at 11:57

## 2021-03-01 RX ADMIN — OLANZAPINE 7.5 MG: 5 TABLET, FILM COATED ORAL at 08:02

## 2021-03-01 RX ADMIN — MELATONIN TAB 3 MG 3 MG: 3 TAB at 21:01

## 2021-03-01 RX ADMIN — TAMSULOSIN HYDROCHLORIDE 0.4 MG: 0.4 CAPSULE ORAL at 17:17

## 2021-03-01 RX ADMIN — OLANZAPINE 10 MG: 10 TABLET, FILM COATED ORAL at 17:18

## 2021-03-01 RX ADMIN — LITHIUM CARBONATE 750 MG: 300 CAPSULE, GELATIN COATED ORAL at 08:01

## 2021-03-01 RX ADMIN — LORAZEPAM 1 MG: 1 TABLET ORAL at 20:57

## 2021-03-01 NOTE — PROGRESS NOTES
03/01/21 1100   Activity/Group Checklist   Group   (recovery group)   Attendance Attended   Attendance Duration (min) 46-60   Interactions Interacted appropriately   Affect/Mood Appropriate   Goals Achieved Able to listen to others; Able to engage in interactions; Able to self-disclose;Discussed self-esteem issues

## 2021-03-01 NOTE — PROGRESS NOTES
Met with patient he continues to be skeptical of the psychiatric team; continue focus on being court ordered for further treatment  He states he walked out on the doctor this morning  Patient believes he does well on Lithium and Ativan PRN  He then changed the subject to his missing belonging and how crisis forced him into the hospital  He still does not verbalize his behaviors that led to crisis coming  His focus is on what he needs to do to protect his belonging  Patient still is tangential but his thoughts are becoming more organized  He is calmer and less labile  He did acknowledge when he was younger he was a heavy drinker; this needs to be explored when he is more stable since his AUDIT score was a 6  Therapist will continue to offer support and encouragement to patient

## 2021-03-01 NOTE — PROGRESS NOTES
03/01/21 1300   Activity/Group Checklist   Group   (recovery group)   Attendance Attended   Attendance Duration (min) 46-60   Interactions Interacted appropriately   Affect/Mood Appropriate   Goals Achieved Discussed self-esteem issues; Identified distorted thoughts/beliefs; Able to listen to others; Able to engage in interactions; Able to reflect/comment on own behavior;Able to self-disclose; Able to recieve feedback; Able to give feedback to another

## 2021-03-01 NOTE — PROGRESS NOTES
BYRNES GROUP NOTE  Participated in Life Skills group  Hyperverbal but redirectible  Able to follow concept of group discussion, and did meaningfully contribute  Occasionally tangential   Sits among peers, social awareness fair to poor, frequently talking over other peers  Self disclosed and reflected on time in the [facility] and interactions with peers there

## 2021-03-01 NOTE — PROGRESS NOTES
Progress Note - 6 [facility] Day 59 y o  male MRN: [id number]  Unit/Bed#: U 350-02 Encounter: [id number]    Assessment/Plan   Principal Problem:    Schizoaffective disorder, bipolar type ([facility] [address] )  Active Problems:    Hypertension    Medical clearance for psychiatric admission    BPH (benign prostatic hyperplasia)    Vitamin D insufficiency    History of rhabdomyolysis    Elevated TSH    Wrist pain, chronic, right      · Increase Zyprexa 7 5 mg p o  b i d  to 10 mg p o  b i d  for psychotic symptoms, or Zyprexa 10 mg IM b i d  if patient refuses p o  medication  ? Added p r n  Zyprexa  · Continue Lithium 750 mg q 12 hours for psychotic symptoms  ? Li Level 03/01/21: 0 8  ? Recheck Yuliet Good Thursday, 03/04/21  · Increase Ativan from 0 5 mg to 1 mg b i d  for persistent anxiety and agitation  · Continue Melatonin 3 mg q h s  for insomnia  · Continue to promote patient participation in therapeutic milieu  · Continue medical management by primary team   · Discharge disposition:  continuing to titrate medications    Subjective: The patient was evaluated this morning for continuity of care and no acute distress noted throughout the evaluation  Over the past 24 hours staff noted the patient was disorganized, delusional, and loud but compliant with meals and psychiatric medication  The patient refused his hypertension medication and becomes angry when questioned about taking this medication  Today on exam the patient was disorganized, uncooperative, and loud and left the interview after becoming agitated  Patient reports he has developed a rash on his arms and legs and believes it is related to his lithium use  No rash was noted on exam, however  He reports "vela" mood and endorses good sleep/appetite/energy  He denies auditory or visual hallucinations and does not appear to be responding to internal stimuli  He denies suicidal or homicidal ideation, intent, or plan      Current Medications:  Current Facility-Administered Medications   Medication Dose Route Frequency Provider Last Rate    acetaminophen  650 mg Oral Q6H PRN Carmela Adkins MD      acetaminophen  650 mg Oral Q4H PRN Carmela Adkins MD      acetaminophen  975 mg Oral Q6H PRN Carmela Adkins MD      benztropine  1 mg Intramuscular Q4H PRN Max 6/day Carmela Adkins MD      benztropine  1 mg Oral Q4H PRN Max 6/day Carmela Adkins MD      cholecalciferol  2,000 Units Oral Daily Lisa Lloyd PA-C      hydrOXYzine HCL  50 mg Oral Q6H PRN Max 4/day Carmela Adkins MD      Or    diphenhydrAMINE  50 mg Intramuscular Q6H PRN Carmela Adkins MD      GenTeal Tears Night-Time  1 application Both Eyes V0F PRN Carmela Adkins MD      hydrOXYzine HCL  100 mg Oral Q6H PRN Max 4/day Carmela Adkins MD      hydrOXYzine HCL  25 mg Oral Q6H PRN Max 4/day Carmela Adkins MD      lithium carbonate  750 mg Oral BID With Meals Jordis Agnes, DO      LORazepam  2 mg Intramuscular Q2H PRN Max 3/day Jordis Agnes, DO      LORazepam  0 5 mg Oral BID Jordis Agnes, DO      LORazepam  1 mg Oral Q4H PRN Saraiis Agnes, DO      melatonin  3 mg Oral HS Carmela Adkins MD      metoprolol succinate  25 mg Oral Daily Ivis Lopez PA-C      OLANZapine  10 mg Oral Q3H PRN Jordis Agnes, DO      Or    OLANZapine  10 mg Intramuscular Q3H PRN Jordis Agnes, DO      OLANZapine  5 mg Oral Q3H PRN Jordis Agnes, DO      Or    OLANZapine  5 mg Intramuscular Q3H PRN Jordis Agnes, DO      OLANZapine  7 5 mg Oral BID Kaveh Bucca, DO      Or    OLANZapine  7 5 mg Intramuscular BID Kaveh Bucca, DO      OLANZapine  2 5 mg Oral Q3H PRN Jordis Agnes, DO      polyethylene glycol  17 g Oral Daily PRN Carmela Adkins MD      senna-docusate sodium  1 tablet Oral Daily PRN Carmela Adkins MD      sterile water           sterile water           tamsulosin  0 4 mg Oral Daily With [employer] GEORGINA Lloyd      traZODone  50 mg Oral HS PRN Minna Bethea MD      white petrolatum-mineral oil   Topical TID PRN Isma Nascimento PA-C         Behavioral Health Medications:   all current active meds have been reviewed, continue current psychiatric medications and current meds:   Current Facility-Administered Medications   Medication Dose Route Frequency    acetaminophen (TYLENOL) tablet 650 mg  650 mg Oral Q6H PRN    acetaminophen (TYLENOL) tablet 650 mg  650 mg Oral Q4H PRN    acetaminophen (TYLENOL) tablet 975 mg  975 mg Oral Q6H PRN    benztropine (COGENTIN) injection 1 mg  1 mg Intramuscular Q4H PRN Max 6/day    benztropine (COGENTIN) tablet 1 mg  1 mg Oral Q4H PRN Max 6/day    cholecalciferol (VITAMIN D3) tablet 2,000 Units  2,000 Units Oral Daily    hydrOXYzine HCL (ATARAX) tablet 50 mg  50 mg Oral Q6H PRN Max 4/day    Or    diphenhydrAMINE (BENADRYL) injection 50 mg  50 mg Intramuscular Q6H PRN    GenTeal Tears Night-Time OINT 1 application  1 application Both Eyes F7R PRN    hydrOXYzine HCL (ATARAX) tablet 100 mg  100 mg Oral Q6H PRN Max 4/day    hydrOXYzine HCL (ATARAX) tablet 25 mg  25 mg Oral Q6H PRN Max 4/day    lithium carbonate capsule 750 mg  750 mg Oral BID With Meals    LORazepam (ATIVAN) injection 2 mg  2 mg Intramuscular Q2H PRN Max 3/day    LORazepam (ATIVAN) tablet 1 mg  1 mg Oral Q4H PRN    LORazepam (ATIVAN) tablet 1 mg  1 mg Oral BID    melatonin tablet 3 mg  3 mg Oral HS    metoprolol succinate (TOPROL-XL) 24 hr tablet 25 mg  25 mg Oral Daily    OLANZapine (ZyPREXA) tablet 10 mg  10 mg Oral Q3H PRN    Or    OLANZapine (ZyPREXA) IM injection 10 mg  10 mg Intramuscular Q3H PRN    OLANZapine (ZyPREXA) tablet 10 mg  10 mg Oral BID    Or    OLANZapine (ZyPREXA) IM injection 10 mg  10 mg Intramuscular BID    OLANZapine (ZyPREXA) tablet 5 mg  5 mg Oral Q3H PRN    Or    OLANZapine (ZyPREXA) IM injection 5 mg  5 mg Intramuscular Q3H PRN    OLANZapine (ZyPREXA) tablet 2 5 mg  2 5 mg Oral Q3H PRN    polyethylene glycol (MIRALAX) packet 17 g  17 g Oral Daily PRN    senna-docusate sodium (SENOKOT S) 8 6-50 mg per tablet 1 tablet  1 tablet Oral Daily PRN    sterile water injection **ADS Override Pull**        sterile water injection **ADS Override Pull**        tamsulosin (FLOMAX) capsule 0 4 mg  0 4 mg Oral Daily With Dinner    traZODone (DESYREL) tablet 50 mg  50 mg Oral HS PRN    white petrolatum-mineral oil (EUCERIN,HYDROCERIN) cream   Topical TID PRN     Vital signs in last 24 hours:  Temp:  [97 8 °F (36 6 °C)-98 3 °F (36 8 °C)] 98 3 °F (36 8 °C)  HR:  [75-87] 75  Resp:  [16-18] 16  BP: (141-185)/(70-94) 150/93    Laboratory results:    I have personally reviewed all pertinent laboratory/tests results    Most Recent Labs:   Lab Results   Component Value Date    WBC 7 00 02/17/2021    RBC 4 79 02/17/2021    HGB 14 0 02/17/2021    HCT 43 2 02/17/2021     02/17/2021    RDW 14 2 02/17/2021    NEUTROABS 4 30 02/17/2021    SODIUM 144 (H) 02/09/2021    K 3 6 02/09/2021     (H) 02/09/2021    CO2 28 02/09/2021    BUN 12 02/09/2021    CREATININE 0 92 02/09/2021    GLUC 96 02/09/2021    GLUF 91 11/06/2019    CALCIUM 9 2 02/09/2021    AST 18 02/09/2021    ALT 21 02/09/2021    ALKPHOS 33 (L) 02/09/2021    TP 5 7 (L) 02/09/2021    ALB 3 6 02/09/2021    TBILI 0 50 02/09/2021    CHOLESTEROL 150 02/17/2021    HDL 35 (L) 02/17/2021    TRIG 90 02/17/2021    LDLCALC 97 02/17/2021    NONHDLC 115 02/17/2021    LITHIUM 0 8 03/01/2021    AMMONIA 51 02/06/2021    UBS3POSDRZNT 4 960 (H) 02/17/2021    FREET4 1 09 02/17/2021    RPR Non-Reactive 11/03/2019    HGBA1C 5 1 11/03/2019     11/03/2019     Labs in last 72 hours:   Recent Labs     03/01/21  0538   LITHIUM 0 8       Psychiatric Review of Systems:  Behavior over the last 24 hours:  unchanged  Sleep: normal  Appetite: normal  Medication side effects: Yes, patient reports rash, none observed on exam  ROS: rash    Mental Status Evaluation:  Appearance:  bearded, disheveled, older than stated age and overweight   Behavior:  psychomotor agitation, restless and fidgety and uncooperative   Speech:  loud and profane   Mood:  "Licona"   Affect:  increased in intensity, increased in range and labile   Language naming objects and repeating phrases   Thought Process:  goal directed and perserverative   Thought Content:  No delusions elicited   Perceptual Disturbances: Denies auditory or visual hallucinations and did not appear to be responding to internal stimuli   Risk Potential: Denies suicidal or homicidal ideation, intent, or plan   Sensorium:  person, place and situation   Cognition:  recent and remote memory grossly intact   Consciousness:  alert and awake    Attention: attention span appeared shorter than expected for age   Insight:  limited   Judgment: limited   Intellect Appears to be of average intelligence   Gait/Station: normal gait/station and normal balance   Motor Activity: no abnormal movements     Memory: Short and long term memory  intact     Progress Toward Goals:  Not progressing, continue to titrate medications    Recommended Treatment:   See above for assessment and plan       Continue following current medications:   Current Facility-Administered Medications   Medication Dose Route Frequency Provider Last Rate    acetaminophen  650 mg Oral Q6H PRN Michelle Dailey MD      acetaminophen  650 mg Oral Q4H PRN Michelle Dailey MD      acetaminophen  975 mg Oral Q6H PRN Michelle Dailey MD      benztropine  1 mg Intramuscular Q4H PRN Max 6/day Michelle Dailey MD      benztropine  1 mg Oral Q4H PRN Max 6/day Michelle Dailey MD      cholecalciferol  2,000 Units Oral Daily Lisa Lloyd PA-C      hydrOXYzine HCL  50 mg Oral Q6H PRN Max 4/day Michelle Dailey MD      Or    diphenhydrAMINE  50 mg Intramuscular Q6H PRN Michelle Dailey MD      GenTeal Tears Night-Time  1 application Both Eyes Q3H PRN Rinku Martinez MD      hydrOXYzine HCL  100 mg Oral Q6H PRN Max 4/day Rinku Martinez MD      hydrOXYzine HCL  25 mg Oral Q6H PRN Max 4/day Rinku Martinez MD      lithium carbonate  750 mg Oral BID With Meals Prnice Rainey,       LORazepam  2 mg Intramuscular Q2H PRN Max 3/day Prince Rainey,       LORazepam  0 5 mg Oral BID Prince Rainey, DO      LORazepam  1 mg Oral Q4H PRN Prince Rainey, DO      melatonin  3 mg Oral HS Rinku Martinez MD      metoprolol succinate  25 mg Oral Daily Ivis Lopez PA-C      OLANZapine  10 mg Oral Q3H PRN Prince Rainey, DO      Or    OLANZapine  10 mg Intramuscular Q3H PRN Prince Rainey, DO      OLANZapine  5 mg Oral Q3H PRN Prince Rainey, DO      Or    OLANZapine  5 mg Intramuscular Q3H PRN Prince Rainey, DO      OLANZapine  7 5 mg Oral BID Kaveh Bucca, DO      Or    OLANZapine  7 5 mg Intramuscular BID Prince Rainey, DO      OLANZapine  2 5 mg Oral Q3H PRN Prince Rainey, DO      polyethylene glycol  17 g Oral Daily PRN Rinku Martinez MD      senna-docusate sodium  1 tablet Oral Daily PRN Rinku Martinez MD      sterile water           sterile water           tamsulosin  0 4 mg Oral Daily With [employer] GEORGINA Lloyd      traZODone  50 mg Oral HS PRN Rinku Martinez MD      white petrolatum-mineral oil   Topical TID PRN Erendira Wilcox PA-C         Risks, benefits and possible side effects of Medications:   Risks, benefits, and possible side effects of medications explained to patient and patient verbalizes understanding  This note has been constructed using a voice recognition system  There may be translation, syntax, or grammatical errors  If you have any questions, please contact the dictating provider  DONA Ramírez    Psychiatry PGY-1

## 2021-03-01 NOTE — NURSING NOTE
Patient refused his blood pressure medication because he thinks all blood pressure medications have very bad side affects  RN discussed the importance of controlling his blood presures but he was uninterested

## 2021-03-01 NOTE — NURSING NOTE
Remains disorganized, tangential and delusional  No agitation or outbursts as of this time  Patient does feel that we are "posioning him" with the Lithium and feels that his Lithium level at 0 8 is making him toxic  Compliant with morning dose of Zyprexa and Lithium

## 2021-03-02 PROCEDURE — 99232 SBSQ HOSP IP/OBS MODERATE 35: CPT | Performed by: PSYCHIATRY & NEUROLOGY

## 2021-03-02 RX ADMIN — OLANZAPINE 10 MG: 10 TABLET, FILM COATED ORAL at 08:06

## 2021-03-02 RX ADMIN — MELATONIN TAB 3 MG 3 MG: 3 TAB at 21:12

## 2021-03-02 RX ADMIN — LORAZEPAM 1 MG: 1 TABLET ORAL at 21:11

## 2021-03-02 RX ADMIN — OLANZAPINE 10 MG: 10 TABLET, FILM COATED ORAL at 17:02

## 2021-03-02 RX ADMIN — TAMSULOSIN HYDROCHLORIDE 0.4 MG: 0.4 CAPSULE ORAL at 17:03

## 2021-03-02 RX ADMIN — Medication: at 11:05

## 2021-03-02 RX ADMIN — LITHIUM CARBONATE 750 MG: 300 CAPSULE, GELATIN COATED ORAL at 08:06

## 2021-03-02 RX ADMIN — LORAZEPAM 1 MG: 1 TABLET ORAL at 11:10

## 2021-03-02 RX ADMIN — LITHIUM CARBONATE 750 MG: 300 CAPSULE, GELATIN COATED ORAL at 17:02

## 2021-03-02 NOTE — NURSING NOTE
Pt remains loud and easily agitated especially around med pass  Pt stated " I will take these medications now but I am going back to my civilian dr after I leave this place and will be back on the right medications  I am not taking that blood pressure pill because these doctors are not my doctors so don't offer me that tonight "  Pt denies SI/HI and AVH but was observed in his room talking to self  Will continue to monitor

## 2021-03-02 NOTE — CASE MANAGEMENT
Pt approached this writer "Are you getting my discharge paperwork ready yet? Because I don't wan to stay here any longer"  Pt was did not seem to understand, as he was hyperverbal and talking over this writer was telling him about his treatment  Pt was redirected few times  Pt denied having SI, HI and AVH  Pt seems disorganized and lacks insights into his treatment  Continue to monitor

## 2021-03-02 NOTE — TREATMENT TEAM
03/02/21 0855   Team Meeting   Meeting Type Daily Rounds   Team Members Present   Team Members Present Physician;Nurse;; Other (Discipline and Name)   Physician Team Member Dr BRODERICK   Nursing Team Member [address] [facility] Floor Management Team Member Elliot Cain   Other (Discipline and Name) Ruff and residents   Patient/Family Present   Patient Present No   Patient's Family Present No     303, Pt remains disorganized, delusional and hyperverbal and less loud  Pt compliant with meds and meals  Pt was redirected by staff when found standing over roommates bed  Roommate had to go and sleep in other room  Explore alternative room for patient or take appropriate steps to avoid disturbance to other patient  Continue med management  Continue to monitor  Discharge to be determined

## 2021-03-02 NOTE — NURSING NOTE
Pt denies SI, HI, AH and VH  Pt is out in the milieu, social with peers/staff and attending groups  Pt is currently calm and cooperative but can be easily agitated  Pt has no complaints or concerns  Pt still loud and rambling, observed talking to himself at times  Medication and meal compliant  Will monitor

## 2021-03-02 NOTE — NURSING NOTE
Patient was found standing over his roommates bed  He was redirected not to do that behavior  His roommate is going to stay  His roommate is currently sleeping in another room

## 2021-03-02 NOTE — NURSING NOTE
Pt denies S/S of SI/HI/AH/VH  Pt medication and meal compliant stating " I will take what they tell me so I can get out of here " Pt out in milieu and interacting with peers  Pt denies any complaints or unmet needs at this time  Will monitor

## 2021-03-02 NOTE — TREATMENT TEAM
03/01/21 0845   Team Meeting   Meeting Type Daily Rounds   Team Members Present   Team Members Present Physician;Nurse;;Occupational Therapist;Other (Discipline and Name)   Physician Team Member Dr BRODERICK   Nursing Team Member [address] Management Team Member Whit Aj   Other (Discipline and Name) Ruff and residents   Patient/Family Present   Patient Present No   Patient's Family Present No     303, Pt seen on the unit, however remains loud, disorganized and internally stimulated  Needs staff redirections  Last night refused blood pressure meds  Continue med management  Continue to monitor  Discharge to be determined

## 2021-03-02 NOTE — PROGRESS NOTES
Progress Note - 6 [facility] Frandy Day 59 y o  male MRN: [id number]  Unit/Bed#: U 348-01 Encounter: [id number]    Assessment/Plan   Principal Problem:    Schizoaffective disorder, bipolar type ([address] )  Active Problems:    Hypertension    Medical clearance for psychiatric admission    BPH (benign prostatic hyperplasia)    Vitamin D insufficiency    History of rhabdomyolysis    Elevated TSH    Wrist pain, chronic, right      · Continue Zyprexa  10 mg p o  b i d  for psychotic symptoms, or Zyprexa 10 mg IM b i d  if patient refuses p o  medication  · Continue Lithium 750 mg q 12 hours for psychotic symptoms  ? Li Level 03/01/21: 0 8  ? Recheck Li Level on Thursday, 03/04/21  · Continue Ativan 1 mg b i d  for persistent anxiety and agitation  · Continue Melatonin 3 mg q h s  for insomnia  · Continue to promote patient participation in therapeutic milieu  · Continue medical management by primary team   · Discharge disposition:  continuing to titrate medications    Subjective: The patient was evaluated this morning for continuity of care and no acute distress noted throughout the evaluation  Over the past 24 hours staff noted the patient denied psychiatric symptoms and was meal and medication compliant  Patient was noted to be standing at his roommate bedside overnight and staring at him  Patient was redirected by staff not to engage in this type of behavior, however the patient's roommate left the room and slept in a group room for the rest of the night  The patient was moved to a single room today as a result of this behavior  Today on exam the patient was agitated and uncooperative and expressed anger about being hospitalized and his current medication regiment  Attempted to discuss patient's progress and medications, however patient declined  Patient became agitated after this writer attempted to speak with him, which was verbally deescalated by staff    Patient reported to staff that he denied suicidal or homicidal ideation or auditory or visual hallucinations      Current Medications:  Current Facility-Administered Medications   Medication Dose Route Frequency Provider Last Rate    acetaminophen  650 mg Oral Q6H PRN Da Bhakta MD      acetaminophen  650 mg Oral Q4H PRN Da Bhakta MD      acetaminophen  975 mg Oral Q6H PRN Da Bhakta MD      benztropine  1 mg Intramuscular Q4H PRN Max 6/day Da Bhakta MD      benztropine  1 mg Oral Q4H PRN Max 6/day Da Bhakta MD      cholecalciferol  2,000 Units Oral Daily Lisa Lloyd PA-C      hydrOXYzine HCL  50 mg Oral Q6H PRN Max 4/day Da Bhakta MD      Or    diphenhydrAMINE  50 mg Intramuscular Q6H PRN Da Bhakta MD      GenTeal Tears Night-Time  1 application Both Eyes H6H PRN Da Bhakta MD      hydrOXYzine HCL  100 mg Oral Q6H PRN Max 4/day Da Bhakta MD      hydrOXYzine HCL  25 mg Oral Q6H PRN Max 4/day Da Bhakta MD      lithium carbonate  750 mg Oral BID With Meals Aliene Mole, DO      LORazepam  2 mg Intramuscular Q2H PRN Max 3/day Aliene Mole, DO      LORazepam  1 mg Oral Q4H PRN Aliene Mole, DO      LORazepam  1 mg Oral BID Aliene Mole, DO      melatonin  3 mg Oral HS Da Bhakta MD      metoprolol succinate  25 mg Oral Daily Ivis Lopez PA-C      OLANZapine  10 mg Oral Q3H PRN Aliene Mole, DO      Or    OLANZapine  10 mg Intramuscular Q3H PRN Aliene Mole, DO      OLANZapine  10 mg Oral BID Aliene Mole, DO      Or    OLANZapine  10 mg Intramuscular BID Aliene Mole, DO      OLANZapine  5 mg Oral Q3H PRN Aliene Mole, DO      Or    OLANZapine  5 mg Intramuscular Q3H PRN Aliene Mole, DO      OLANZapine  2 5 mg Oral Q3H PRN Sushant Byron Bucca, DO      polyethylene glycol  17 g Oral Daily PRN Da Bhakta MD      senna-docusate sodium  1 tablet Oral Daily PRN Da Bhakta MD      sterile water           sterile water           tamsulosin  0 4 mg Oral Daily With [employer] GEORGINA Lloyd      traZODone  50 mg Oral HS PRN Stacey Rivera MD      white petrolatum-mineral oil   Topical TID PRN Danette Villalobos PA-C         Behavioral Health Medications:   all current active meds have been reviewed, continue current psychiatric medications and current meds:   Current Facility-Administered Medications   Medication Dose Route Frequency    acetaminophen (TYLENOL) tablet 650 mg  650 mg Oral Q6H PRN    acetaminophen (TYLENOL) tablet 650 mg  650 mg Oral Q4H PRN    acetaminophen (TYLENOL) tablet 975 mg  975 mg Oral Q6H PRN    benztropine (COGENTIN) injection 1 mg  1 mg Intramuscular Q4H PRN Max 6/day    benztropine (COGENTIN) tablet 1 mg  1 mg Oral Q4H PRN Max 6/day    cholecalciferol (VITAMIN D3) tablet 2,000 Units  2,000 Units Oral Daily    hydrOXYzine HCL (ATARAX) tablet 50 mg  50 mg Oral Q6H PRN Max 4/day    Or    diphenhydrAMINE (BENADRYL) injection 50 mg  50 mg Intramuscular Q6H PRN    GenTeal Tears Night-Time OINT 1 application  1 application Both Eyes U1P PRN    hydrOXYzine HCL (ATARAX) tablet 100 mg  100 mg Oral Q6H PRN Max 4/day    hydrOXYzine HCL (ATARAX) tablet 25 mg  25 mg Oral Q6H PRN Max 4/day    lithium carbonate capsule 750 mg  750 mg Oral BID With Meals    LORazepam (ATIVAN) injection 2 mg  2 mg Intramuscular Q2H PRN Max 3/day    LORazepam (ATIVAN) tablet 1 mg  1 mg Oral Q4H PRN    LORazepam (ATIVAN) tablet 1 mg  1 mg Oral BID    melatonin tablet 3 mg  3 mg Oral HS    metoprolol succinate (TOPROL-XL) 24 hr tablet 25 mg  25 mg Oral Daily    OLANZapine (ZyPREXA) tablet 10 mg  10 mg Oral Q3H PRN    Or    OLANZapine (ZyPREXA) IM injection 10 mg  10 mg Intramuscular Q3H PRN    OLANZapine (ZyPREXA) tablet 10 mg  10 mg Oral BID    Or    OLANZapine (ZyPREXA) IM injection 10 mg  10 mg Intramuscular BID    OLANZapine (ZyPREXA) tablet 5 mg  5 mg Oral Q3H PRN    Or    OLANZapine (ZyPREXA) IM injection 5 mg  5 mg Intramuscular Q3H PRN    OLANZapine (ZyPREXA) tablet 2 5 mg  2 5 mg Oral Q3H PRN    polyethylene glycol (MIRALAX) packet 17 g  17 g Oral Daily PRN    senna-docusate sodium (SENOKOT S) 8 6-50 mg per tablet 1 tablet  1 tablet Oral Daily PRN    sterile water injection **ADS Override Pull**        sterile water injection **ADS Override Pull**        tamsulosin (FLOMAX) capsule 0 4 mg  0 4 mg Oral Daily With D[employer]    traZODone (DESYREL) tablet 50 mg  50 mg Oral HS PRN    white petrolatum-mineral oil (EUCERIN,HYDROCERIN) cream   Topical TID PRN     Vital signs in last 24 hours:  Temp:  [98 2 °F (36 8 °C)-98 7 °F (37 1 °C)] 98 7 °F (37 1 °C)  HR:  [] 89  Resp:  [16] 16  BP: (132-166)/() 132/89    Laboratory results:    I have personally reviewed all pertinent laboratory/tests results    Most Recent Labs:   Lab Results   Component Value Date    WBC 7 00 02/17/2021    RBC 4 79 02/17/2021    HGB 14 0 02/17/2021    HCT 43 2 02/17/2021     02/17/2021    RDW 14 2 02/17/2021    NEUTROABS 4 30 02/17/2021    SODIUM 144 (H) 02/09/2021    K 3 6 02/09/2021     (H) 02/09/2021    CO2 28 02/09/2021    BUN 12 02/09/2021    CREATININE 0 92 02/09/2021    GLUC 96 02/09/2021    GLUF 91 11/06/2019    CALCIUM 9 2 02/09/2021    AST 18 02/09/2021    ALT 21 02/09/2021    ALKPHOS 33 (L) 02/09/2021    TP 5 7 (L) 02/09/2021    ALB 3 6 02/09/2021    TBILI 0 50 02/09/2021    CHOLESTEROL 150 02/17/2021    HDL 35 (L) 02/17/2021    TRIG 90 02/17/2021    LDLCALC 97 02/17/2021    NONHDLC 115 02/17/2021    LITHIUM 0 8 03/01/2021    AMMONIA 51 02/06/2021    AWB2RUJPMZJM 4 960 (H) 02/17/2021    FREET4 1 09 02/17/2021    RPR Non-Reactive 11/03/2019    HGBA1C 5 1 11/03/2019     11/03/2019     Admission Labs:   Admission on 02/16/2021   Component Date Value    Color, UA 02/18/2021 Straw     Clarity, UA 02/18/2021 Clear     Specific Gravity, UA 02/18/2021 1 010     pH, UA 02/18/2021 7 0     Leukocytes, UA 02/18/2021 Negative     Nitrite, UA 02/18/2021 Negative     Protein, UA 02/18/2021 Negative     Glucose, UA 02/18/2021 Negative     Ketones, UA 02/18/2021 Negative     Bilirubin, UA 02/18/2021 Negative     Blood, UA 02/18/2021 Negative     UROBILINOGEN UA 02/18/2021 Negative     WBC 02/17/2021 7 00     RBC 02/17/2021 4 79     Hemoglobin 02/17/2021 14 0     Hematocrit 02/17/2021 43 2     MCV 02/17/2021 90     MCH 02/17/2021 29 3     MCHC 02/17/2021 32 4     RDW 02/17/2021 14 2     MPV 02/17/2021 8 9     Platelets 57/31/6582 258     Neutrophils Relative 02/17/2021 61     Lymphocytes Relative 02/17/2021 27     Monocytes Relative 02/17/2021 9     Eosinophils Relative 02/17/2021 3     Basophils Relative 02/17/2021 1     Neutrophils Absolute 02/17/2021 4 30     Lymphocytes Absolute 02/17/2021 1 90     Monocytes Absolute 02/17/2021 0 60     Eosinophils Absolute 02/17/2021 0 20     Basophils Absolute 02/17/2021 0 00     Cholesterol 02/17/2021 150     Triglycerides 02/17/2021 90     HDL, Direct 02/17/2021 35*    LDL Calculated 02/17/2021 97     Non-HDL-Chol (CHOL-HDL) 02/17/2021 115     Magnesium 02/17/2021 2 6*    TSH 3RD GENERATON 02/17/2021 4 960*    Free T4 02/17/2021 1 09     Lithium Lvl 02/22/2021 0 6     Lithium Lvl 02/25/2021 0 7     Lithium Lvl 03/01/2021 0 8        Psychiatric Review of Systems:  Behavior over the last 24 hours:  regressed  Sleep: insomnia  Appetite: normal  Medication side effects: No  ROS: no complaints    Mental Status Evaluation:  Appearance:  bearded, disheveled, older than stated age and overweight   Behavior:  psychomotor agitation   Speech:  loud and profane   Mood:  "Get out of my face"   Affect:  mood-congruent and angry   Language naming objects and repeating phrases   Thought Process:  perserverative   Thought Content:  delusions  somatic   Perceptual Disturbances: Patient denied AVH to staff, however was observed speaking to himself Risk Potential: Denies suicidal or homicidal ideation, intent, or plan   Sensorium:  person and place   Cognition:  recent and remote memory grossly intact   Consciousness:  alert and awake    Attention: attention span appeared shorter than expected for age   Insight:  limited   Judgment: poor   Intellect Appears to be of average intelligence   Gait/Station: normal gait/station and normal balance   Motor Activity: no abnormal movements     Memory: Short and long term memory  intact     Progress Toward Goals:  Unchanged from yesterday, patient is still delusional, agitated, responding to internal stimuli    Recommended Treatment:   See above for assessment and plan       Continue following current medications:   Current Facility-Administered Medications   Medication Dose Route Frequency Provider Last Rate    acetaminophen  650 mg Oral Q6H PRN Lyndon Thomas MD      acetaminophen  650 mg Oral Q4H PRN Lyndon Thomas MD      acetaminophen  975 mg Oral Q6H PRN Lyndon Thomas MD      benztropine  1 mg Intramuscular Q4H PRN Max 6/day Lyndon Thomas MD      benztropine  1 mg Oral Q4H PRN Max 6/day Lyndon Thomas MD      cholecalciferol  2,000 Units Oral Daily Lisa Lloyd PA-C      hydrOXYzine HCL  50 mg Oral Q6H PRN Max 4/day Lyndon Thomas MD      Or    diphenhydrAMINE  50 mg Intramuscular Q6H PRN Lyndon Thomas MD      GenTeal Tears Night-Time  1 application Both Eyes F8S PRN Lyndon Thomas MD      hydrOXYzine HCL  100 mg Oral Q6H PRN Max 4/day Lyndon Thomas MD      hydrOXYzine HCL  25 mg Oral Q6H PRN Max 4/day Lyndon Thomas MD      lithium carbonate  750 mg Oral BID With Meals Jaylan Sky, DO      LORazepam  2 mg Intramuscular Q2H PRN Max 3/day Jaylan Sky, DO      LORazepam  1 mg Oral Q4H PRN Jaylan Sky, DO      LORazepam  1 mg Oral BID Jaylan Sky, DO      melatonin  3 mg Oral HS Lyndon Thomas MD      metoprolol succinate  25 mg Oral Daily Ivis Lopez PA-C      OLANZapine  10 mg Oral Q3H PRN Helene Im, DO      Or    OLANZapine  10 mg Intramuscular Q3H PRN Helene Im, DO      OLANZapine  10 mg Oral BID Helene Im, DO      Or    OLANZapine  10 mg Intramuscular BID Helene Im, DO      OLANZapine  5 mg Oral Q3H PRN Helene Im, DO      Or    OLANZapine  5 mg Intramuscular Q3H PRN Helene Im, DO      OLANZapine  2 5 mg Oral Q3H PRN Helene Im, DO      polyethylene glycol  17 g Oral Daily PRN Nolene Osgood, MD      senna-docusate sodium  1 tablet Oral Daily PRN Nolene Osgood, MD      sterile water           sterile water           tamsulosin  0 4 mg Oral Daily With [employer] GEORGINA Lloyd      traZODone  50 mg Oral HS PRN Nolene Osgood, MD      white petrolatum-mineral oil   Topical TID PRN Lissett Mcclain PA-C         Risks, benefits and possible side effects of Medications:   Risks, benefits, and possible side effects of medications explained to patient and patient verbalizes understanding  This note has been constructed using a voice recognition system  There may be translation, syntax, or grammatical errors  If you have any questions, please contact the dictating provider  DONA Francis    Psychiatry PGY-1

## 2021-03-02 NOTE — PROGRESS NOTES
Occupational Therapy Student Group Note    Attended afternoon leisure group  Arrived detention through group session  Remained cooperative and appropriate  Presented with bright affect and social with peers  Participated in [employer]  Demonstrated appropriate game and group etiquette such as remaining patient during peers turn, and encouraging others  Did not require redirection

## 2021-03-02 NOTE — PROGRESS NOTES
Occupational Therapy Student Group Note    Patient attended morning community group penitentiary through session  Patient was pleasant on approach  Actively participated in group discussion when prompted  Responded appropriately to redirection from group facilitator during tangential speech  Group facilitator approached patient after group concluded to share goals for which patient replied, "to know when I leave here  How am I supposed to be emotionally stable when the doctors make me angry and keep shooting me up with stuff "  This was the only brief agitated statement made and patient continued to remain calm and appropriate during this encounter

## 2021-03-02 NOTE — PROGRESS NOTES
03/02/21 1300   Activity/Group Checklist   Group   (recovery group)   Attendance Attended  (left early)   Attendance Duration (min) 31-45   Interactions Interacted appropriately   Affect/Mood Appropriate   Goals Achieved Able to listen to others

## 2021-03-03 PROCEDURE — 99232 SBSQ HOSP IP/OBS MODERATE 35: CPT | Performed by: PSYCHIATRY & NEUROLOGY

## 2021-03-03 RX ORDER — OLANZAPINE 10 MG/1
15 INJECTION, POWDER, LYOPHILIZED, FOR SOLUTION INTRAMUSCULAR 2 TIMES DAILY
Status: DISCONTINUED | OUTPATIENT
Start: 2021-03-03 | End: 2021-03-04

## 2021-03-03 RX ORDER — DIVALPROEX SODIUM 250 MG/1
250 TABLET, DELAYED RELEASE ORAL EVERY 8 HOURS SCHEDULED
Status: DISCONTINUED | OUTPATIENT
Start: 2021-03-03 | End: 2021-03-05 | Stop reason: HOSPADM

## 2021-03-03 RX ADMIN — OLANZAPINE 10 MG: 10 TABLET, FILM COATED ORAL at 08:43

## 2021-03-03 RX ADMIN — OLANZAPINE 15 MG: 10 TABLET, FILM COATED ORAL at 17:13

## 2021-03-03 RX ADMIN — LITHIUM CARBONATE 750 MG: 300 CAPSULE, GELATIN COATED ORAL at 08:43

## 2021-03-03 RX ADMIN — LORAZEPAM 1 MG: 1 TABLET ORAL at 21:28

## 2021-03-03 RX ADMIN — LORAZEPAM 1 MG: 1 TABLET ORAL at 12:25

## 2021-03-03 RX ADMIN — LITHIUM CARBONATE 750 MG: 300 CAPSULE, GELATIN COATED ORAL at 17:15

## 2021-03-03 RX ADMIN — TAMSULOSIN HYDROCHLORIDE 0.4 MG: 0.4 CAPSULE ORAL at 17:15

## 2021-03-03 RX ADMIN — Medication: at 08:47

## 2021-03-03 RX ADMIN — MELATONIN TAB 3 MG 3 MG: 3 TAB at 21:29

## 2021-03-03 NOTE — PROGRESS NOTES
Progress Note - 6 Saint Gonzales Frandy Day 59 y o  male MRN: [id number]  Unit/Bed#: U 348-01 Encounter: [id number]    Assessment/Plan   Principal Problem:    Schizoaffective disorder, bipolar type ([facility] [address] )  Active Problems:    Hypertension    Medical clearance for psychiatric admission    BPH (benign prostatic hyperplasia)    Vitamin D insufficiency    History of rhabdomyolysis    Elevated TSH    Wrist pain, chronic, right      · Increase Zyprexa from 10 mg p o  b i d  to 15 mg p o  b i d  for psychotic symptoms, or Zyprexa 15 mg IM b i d  if patient refuses p o  medication  · Begin Depakote 250 mg t i d  for mood stabilization  · Continue Lithium 750 mg q 12 hours for psychotic symptoms  ? Li Level 03/01/21: 0 8  ? Recheck Li Level on Thursday, 03/04/21  · Continue Ativan 1 mg b i d  for persistent anxiety and agitation  ? Do not administer within 1 hour of Zyprexa  · Continue Melatonin 3 mg q h s  for insomnia  · Continue to promote patient participation in therapeutic milieu  · Continue medical management by primary team   · Discharge disposition:  titrating medications, 303 status, discharge pending    Subjective: The patient was evaluated this morning for continuity of care and no acute distress noted throughout the evaluation  Over the past 24 hours staff noted the patient was loud and easily agitated  He complained to staff about his medication regiment but was meal and medication compliant  Today on exam the patient was disorganized and uncooperative and reports he feels frustrated at his continued hospitalization  Patient continues to lack insight into his behavior or need for medication management  He reports "how you think my mood is?" and endorses good sleep/appetite/energy  He denies auditory or visual hallucinations and staff has noted him responding to internal stimuli  He denies suicidal or homicidal ideation, intent, or plan    Patient did not cooperate with the remainder of the interview and was heard screaming to himself in his room      Current Medications:  Current Facility-Administered Medications   Medication Dose Route Frequency Provider Last Rate    acetaminophen  650 mg Oral Q6H PRN Dima Bautista MD      acetaminophen  650 mg Oral Q4H PRN Dima Bautista MD      acetaminophen  975 mg Oral Q6H PRN Dima Bautista MD      benztropine  1 mg Intramuscular Q4H PRN Max 6/day Dima Bautista MD      benztropine  1 mg Oral Q4H PRN Max 6/day Dima Bautista MD      cholecalciferol  2,000 Units Oral Daily Lisa DefranciscGEORGINA rodarte      hydrOXYzine HCL  50 mg Oral Q6H PRN Max 4/day Dima Batuista MD      Or    diphenhydrAMINE  50 mg Intramuscular Q6H PRN Dima Bautista MD      divalproex sodium  250 mg Oral Q8H [address] Kaveh Bucca, DO      GenTeal Tears Night-Time  1 application Both Eyes N7H PRN Dima Bautista MD      hydrOXYzine HCL  100 mg Oral Q6H PRN Max 4/day Dmia Bautista MD      hydrOXYzine HCL  25 mg Oral Q6H PRN Max 4/day Dima Bautista MD      lithium carbonate  750 mg Oral BID With Meals Asha Herb, DO      LORazepam  2 mg Intramuscular Q2H PRN Max 3/day Asha Herb, DO      LORazepam  1 mg Oral Q4H PRN Asha Herb, DO      LORazepam  1 mg Oral BID Asha Herb, DO      melatonin  3 mg Oral HS Dima Bautista MD      metoprolol succinate  25 mg Oral Daily Ivis Lopez PA-C      OLANZapine  10 mg Oral Q3H PRN Asha Herb, DO      Or    OLANZapine  10 mg Intramuscular Q3H PRN Asha Herb, DO      OLANZapine  15 mg Oral BID Asha Herb, DO      Or    OLANZapine  15 mg Intramuscular BID Asha Herb, DO      OLANZapine  5 mg Oral Q3H PRN Asha Herb, DO      Or    OLANZapine  5 mg Intramuscular Q3H PRN Asha Herb, DO      OLANZapine  2 5 mg Oral Q3H PRN Asha Herb, DO      polyethylene glycol  17 g Oral Daily PRN Dima Bautista MD      senna-docusate sodium  1 tablet Oral Daily PRN Nikunj Berry Evelyn Nicole MD      sterile water           sterile water           tamsulosin  0 4 mg Oral Daily With [employer] GEORGINA Lloyd      traZODone  50 mg Oral HS PRN Dania Licona MD      white petrolatum-mineral oil   Topical TID PRN Caty Sandoval PA-C         Behavioral Health Medications:   all current active meds have been reviewed, continue current psychiatric medications and current meds:   Current Facility-Administered Medications   Medication Dose Route Frequency    acetaminophen (TYLENOL) tablet 650 mg  650 mg Oral Q6H PRN    acetaminophen (TYLENOL) tablet 650 mg  650 mg Oral Q4H PRN    acetaminophen (TYLENOL) tablet 975 mg  975 mg Oral Q6H PRN    benztropine (COGENTIN) injection 1 mg  1 mg Intramuscular Q4H PRN Max 6/day    benztropine (COGENTIN) tablet 1 mg  1 mg Oral Q4H PRN Max 6/day    cholecalciferol (VITAMIN D3) tablet 2,000 Units  2,000 Units Oral Daily    hydrOXYzine HCL (ATARAX) tablet 50 mg  50 mg Oral Q6H PRN Max 4/day    Or    diphenhydrAMINE (BENADRYL) injection 50 mg  50 mg Intramuscular Q6H PRN    divalproex sodium (DEPAKOTE) EC tablet 250 mg  250 mg Oral Q8H [address]    GenTeal Tears Night-Time OINT 1 application  1 application Both Eyes E0A PRN    hydrOXYzine HCL (ATARAX) tablet 100 mg  100 mg Oral Q6H PRN Max 4/day    hydrOXYzine HCL (ATARAX) tablet 25 mg  25 mg Oral Q6H PRN Max 4/day    lithium carbonate capsule 750 mg  750 mg Oral BID With Meals    LORazepam (ATIVAN) injection 2 mg  2 mg Intramuscular Q2H PRN Max 3/day    LORazepam (ATIVAN) tablet 1 mg  1 mg Oral Q4H PRN    LORazepam (ATIVAN) tablet 1 mg  1 mg Oral BID    melatonin tablet 3 mg  3 mg Oral HS    metoprolol succinate (TOPROL-XL) 24 hr tablet 25 mg  25 mg Oral Daily    OLANZapine (ZyPREXA) tablet 10 mg  10 mg Oral Q3H PRN    Or    OLANZapine (ZyPREXA) IM injection 10 mg  10 mg Intramuscular Q3H PRN    OLANZapine (ZyPREXA) tablet 15 mg  15 mg Oral BID    Or    OLANZapine (ZyPREXA) IM injection 15 mg  15 mg Intramuscular BID    OLANZapine (ZyPREXA) tablet 5 mg  5 mg Oral Q3H PRN    Or    OLANZapine (ZyPREXA) IM injection 5 mg  5 mg Intramuscular Q3H PRN    OLANZapine (ZyPREXA) tablet 2 5 mg  2 5 mg Oral Q3H PRN    polyethylene glycol (MIRALAX) packet 17 g  17 g Oral Daily PRN    senna-docusate sodium (SENOKOT S) 8 6-50 mg per tablet 1 tablet  1 tablet Oral Daily PRN    sterile water injection **ADS Override Pull**        sterile water injection **ADS Override Pull**        tamsulosin (FLOMAX) capsule 0 4 mg  0 4 mg Oral Daily With Dinner    traZODone (DESYREL) tablet 50 mg  50 mg Oral HS PRN    white petrolatum-mineral oil (EUCERIN,HYDROCERIN) cream   Topical TID PRN     Vital signs in last 24 hours:  Temp:  [97 4 °F (36 3 °C)-97 8 °F (36 6 °C)] 97 8 °F (36 6 °C)  HR:  [67-89] 89  Resp:  [16] 16  BP: (124-142)/(77-88) 124/83    Laboratory results:    I have personally reviewed all pertinent laboratory/tests results    Most Recent Labs:   Lab Results   Component Value Date    WBC 7 00 02/17/2021    RBC 4 79 02/17/2021    HGB 14 0 02/17/2021    HCT 43 2 02/17/2021     02/17/2021    RDW 14 2 02/17/2021    NEUTROABS 4 30 02/17/2021    SODIUM 144 (H) 02/09/2021    K 3 6 02/09/2021     (H) 02/09/2021    CO2 28 02/09/2021    BUN 12 02/09/2021    CREATININE 0 92 02/09/2021    GLUC 96 02/09/2021    GLUF 91 11/06/2019    CALCIUM 9 2 02/09/2021    AST 18 02/09/2021    ALT 21 02/09/2021    ALKPHOS 33 (L) 02/09/2021    TP 5 7 (L) 02/09/2021    ALB 3 6 02/09/2021    TBILI 0 50 02/09/2021    CHOLESTEROL 150 02/17/2021    HDL 35 (L) 02/17/2021    TRIG 90 02/17/2021    LDLCALC 97 02/17/2021    NONHDLC 115 02/17/2021    LITHIUM 0 8 03/01/2021    AMMONIA 51 02/06/2021    [id number] 4 960 (H) 02/17/2021    FREET4 1 09 02/17/2021    RPR Non-Reactive 11/03/2019    HGBA1C 5 1 11/03/2019     11/03/2019     Admission Labs:   Admission on 02/16/2021   Component Date Value    Color, UA 02/18/2021 Straw     Clarity, UA 02/18/2021 Clear     Specific Gravity, UA 02/18/2021 1 010     pH, UA 02/18/2021 7 0     Leukocytes, UA 02/18/2021 Negative     Nitrite, UA 02/18/2021 Negative     Protein, UA 02/18/2021 Negative     Glucose, UA 02/18/2021 Negative     Ketones, UA 02/18/2021 Negative     Bilirubin, UA 02/18/2021 Negative     Blood, UA 02/18/2021 Negative     UROBILINOGEN UA 02/18/2021 Negative     WBC 02/17/2021 7 00     RBC 02/17/2021 4 79     Hemoglobin 02/17/2021 14 0     Hematocrit 02/17/2021 43 2     MCV 02/17/2021 90     MCH 02/17/2021 29 3     MCHC 02/17/2021 32 4     RDW 02/17/2021 14 2     MPV 02/17/2021 8 9     Platelets 97/40/7157 258     Neutrophils Relative 02/17/2021 61     Lymphocytes Relative 02/17/2021 27     Monocytes Relative 02/17/2021 9     Eosinophils Relative 02/17/2021 3     Basophils Relative 02/17/2021 1     Neutrophils Absolute 02/17/2021 4 30     Lymphocytes Absolute 02/17/2021 1 90     Monocytes Absolute 02/17/2021 0 60     Eosinophils Absolute 02/17/2021 0 20     Basophils Absolute 02/17/2021 0 00     Cholesterol 02/17/2021 150     Triglycerides 02/17/2021 90     HDL, Direct 02/17/2021 35*    LDL Calculated 02/17/2021 97     Non-HDL-Chol (CHOL-HDL) 02/17/2021 115     Magnesium 02/17/2021 2 6*    TSH 3RD GENERATON 02/17/2021 4 960*    Free T4 02/17/2021 1 09     Lithium Lvl 02/22/2021 0 6     Lithium Lvl 02/25/2021 0 7     Lithium Lvl 03/01/2021 0 8        Psychiatric Review of Systems:  Behavior over the last 24 hours:  unchanged  Sleep: normal  Appetite: normal  Medication side effects: No  ROS: no complaints    Mental Status Evaluation:  Appearance:  bearded, casually dressed, older than stated age and overweight   Behavior:  restless and fidgety   Speech:  loud and profane   Mood:  "How do you think my mood is?"   Affect:  increased in intensity, increased in range and labile   Language naming objects and repeating phrases Thought Process:  disorganized   Thought Content:  No delusions elicited   Perceptual Disturbances: Denies auditory or visual hallucinations and did not appear to be responding to internal stimuli  However staff has noted him frequently talking to himself  Risk Potential: Denies suicidal or homicidal ideation, intent, or plan   Sensorium:  person, place and situation   Cognition:  recent and remote memory grossly intact   Consciousness:  alert and awake    Attention: attention span appeared shorter than expected for age   Insight:  limited   Judgment: limited   Intellect Appears to be of average intelligence   Gait/Station: normal gait/station and normal balance   Motor Activity: no abnormal movements     Memory: Short and long term memory  intact     Progress Toward Goals:  Unchanged from yesterday, 303 status, titrating medications, discharge to be determined    Recommended Treatment:   See above for assessment and plan       Continue following current medications:   Current Facility-Administered Medications   Medication Dose Route Frequency Provider Last Rate    acetaminophen  650 mg Oral Q6H PRN Tez Villatoro MD      acetaminophen  650 mg Oral Q4H PRN Tez Villatoro MD      acetaminophen  975 mg Oral Q6H PRN Tez Villatoro MD      benztropine  1 mg Intramuscular Q4H PRN Max 6/day Tez Villatoro MD      benztropine  1 mg Oral Q4H PRN Max 6/day Tez Villatoro MD      cholecalciferol  2,000 Units Oral Daily Lisa Lloyd PA-C      hydrOXYzine HCL  50 mg Oral Q6H PRN Max 4/day Tez Villatoro MD      Or    diphenhydrAMINE  50 mg Intramuscular Q6H PRN Tez Villatoro MD      divalproex sodium  250 mg Oral Q8H [facility] Kaveh Lara DO      GenTeal Tears Night-Time  1 application Both Eyes B9C PRN Tez Villatoro MD      hydrOXYzine HCL  100 mg Oral Q6H PRN Max 4/day Tez Villatoro MD      hydrOXYzine HCL  25 mg Oral Q6H PRN Max 4/day Tez Villatoro MD     [facility] lithium carbonate  750 mg Oral BID With Meals Domenico Laurie, DO      LORazepam  2 mg Intramuscular Q2H PRN Max 3/day Chariton Kettle, DO      LORazepam  1 mg Oral Q4H PRN Chariton Kettle, DO      LORazepam  1 mg Oral BID Chariton Kettle, DO      melatonin  3 mg Oral HS Natasha Ramirez MD      metoprolol succinate  25 mg Oral Daily Ivis Lopez PA-C      OLANZapine  10 mg Oral Q3H PRN Chariton Kettle, DO      Or    OLANZapine  10 mg Intramuscular Q3H PRN Chariton Kettle, DO      OLANZapine  15 mg Oral BID Kaveh Bucca, DO      Or    OLANZapine  15 mg Intramuscular BID Chariton Kettle, DO      OLANZapine  5 mg Oral Q3H PRN Chariton Kettle, DO      Or    OLANZapine  5 mg Intramuscular Q3H PRN Domenico Kettle, DO      OLANZapine  2 5 mg Oral Q3H PRN Chariton Kettle, DO      polyethylene glycol  17 g Oral Daily PRN Natasha Ramirez MD      senna-docusate sodium  1 tablet Oral Daily PRN Natasha Ramirez MD      sterile water           sterile water           tamsulosin  0 4 mg Oral Daily With [employer] Jose Lloyd PA-C      traZODone  50 mg Oral HS PRN Natasha Ramirez MD      white petrolatum-mineral oil   Topical TID PRN Siobhan Vizcarra PA-C         Risks, benefits and possible side effects of Medications:   Risks, benefits, and possible side effects of medications explained to patient and patient verbalizes understanding  This note has been constructed using a voice recognition system  There may be translation, syntax, or grammatical errors  If you have any questions, please contact the dictating provider  DONA Nation    Psychiatry PGY-1

## 2021-03-03 NOTE — NURSING NOTE
Pt denies SI, HI, AH and VH  Pt is isolative to his room only out for meals and needs  Pt is currently calm and cooperative but can be easily agitated  Pt has no complaints or concerns  Medication and meal compliant  Will monitor

## 2021-03-03 NOTE — TREATMENT TEAM
03/03/21 0752   Team Meeting   Meeting Type Daily Rounds   Team Members Present   Team Members Present Physician;Nurse;; Other (Discipline and Name)   Physician Team Member Dr BRODERICK   Nursing Team Member [address] [facility] Floor Management Team Member Senia Penn   Other (Discipline and Name) Ruff and Residents   Patient/Family Present   Patient Present No   Patient's Family Present No     303, Pt remains loud, disorganized, easily gets irritated  Pt moved to single bed room after incident of pt standing roommates bedside overnight and staring at him  Continue med management  Discharge to be determined  Continue to monitor

## 2021-03-03 NOTE — PROGRESS NOTES
03/03/21 0903   Activity/Group Checklist   Group   (recovery group)   Attendance Refused   Writer attempted to encourage him to come to group; he refused stating politely in a calm voice "I am upset and want to be alone" then shut his door

## 2021-03-03 NOTE — PROGRESS NOTES
Occupational Therapy Student Group Note    Observed patient exploring and obtaining books from [employer] cart during the afternoon leisure group  Social with select peers  Looked through coloring book for coloring pages before leaving group room  No disruptive or disorganized behaviors noted

## 2021-03-03 NOTE — NURSING NOTE
Pt visible on milieu and is at times pleasant and cooperative and other times presents irritable  Pt continues to be compliant with meds, but tries to negotiate asking "which ones can I not take without getting a shot?" Pt demonstrates disorganized communication when talking  Pt denies SI/HI/AVH and reports good appetite and sleep  Pt then asked "do you have any other questions? I am a little grandiose, but I am not delusional " Will continue to monitor

## 2021-03-03 NOTE — CASE MANAGEMENT
CM met with patient in the hallway  Pt denies having SI, HI and AVH  Patient mentioned that "The doctor told me about walking to my mom  I am not sure why"  CM briefly stated that for coordination of his care  Pt was redirected as he was intermittently making random comments and easily getting irritated  When asked about signing a URBANO for his mother and [facility] / PCP  Pt replied "I am a resident and live with my mother  I am 74 y/o, I can look after myself  You don't need to coordinate with anyone but me"  Pt declined to sign URBANO and walked way from the writer  Pt remains loud, hyperverbal, disorganized lacks insights into his treatment  Continue to monitor

## 2021-03-03 NOTE — NURSING NOTE
Pt was approached by writer to offer new scheduled medication Depakote  Pt stated "That yessy boyd doctor is such a med pusher , you tell him why don't you give my the liquid form and we can go shot for shot and he can go first maybe we can even get another bottle of it  I'm gonna make a banner that says asshole stay the fuck out for those damn doctors  And what that doctor isn't even my doctor that Ramona dallas is what he can't make decisions so he needs another doctor  What one of them is the bad burt and the other is the patient advocacy burt  And why do these doctors want to talk to my mom she doesn't know me she just gave birth to me  I will allow one phone call with me there to talk to my mom but I don't want these doctors calling her all the time she doesn't need to be bothered by this shit  I'm my mom's  I need to get home and help her and change the damn locks  My nephew is a dope head and I don't need to stealing stuff along with his whore wife you see why I need to get out of here " Pt was offered again the PO Depakote but refused  Will monitor

## 2021-03-04 LAB — LITHIUM SERPL-SCNC: 0.8 MMOL/L (ref 0.6–1.2)

## 2021-03-04 PROCEDURE — 80178 ASSAY OF LITHIUM: CPT | Performed by: PSYCHIATRY & NEUROLOGY

## 2021-03-04 PROCEDURE — 99232 SBSQ HOSP IP/OBS MODERATE 35: CPT | Performed by: PSYCHIATRY & NEUROLOGY

## 2021-03-04 PROCEDURE — 80164 ASSAY DIPROPYLACETIC ACD TOT: CPT | Performed by: PSYCHIATRY & NEUROLOGY

## 2021-03-04 RX ORDER — LANOLIN ALCOHOL/MO/W.PET/CERES
3 CREAM (GRAM) TOPICAL
Qty: 30 TABLET | Refills: 1 | Status: CANCELLED | OUTPATIENT
Start: 2021-03-04 | End: 2021-04-03

## 2021-03-04 RX ORDER — MELATONIN
2000 DAILY
Qty: 60 TABLET | Refills: 1 | Status: CANCELLED | OUTPATIENT
Start: 2021-03-05 | End: 2021-04-04

## 2021-03-04 RX ORDER — OLANZAPINE 10 MG/1
10 TABLET ORAL 3 TIMES DAILY
Status: DISCONTINUED | OUTPATIENT
Start: 2021-03-04 | End: 2021-03-05 | Stop reason: HOSPADM

## 2021-03-04 RX ORDER — OLANZAPINE 10 MG/1
15 TABLET ORAL 2 TIMES DAILY
Qty: 90 TABLET | Refills: 1 | Status: CANCELLED | OUTPATIENT
Start: 2021-03-04 | End: 2021-04-03

## 2021-03-04 RX ORDER — OLANZAPINE 5 MG/1
5 TABLET ORAL DAILY
Status: COMPLETED | OUTPATIENT
Start: 2021-03-04 | End: 2021-03-04

## 2021-03-04 RX ORDER — OLANZAPINE 10 MG/1
5 INJECTION, POWDER, LYOPHILIZED, FOR SOLUTION INTRAMUSCULAR ONCE
Status: DISCONTINUED | OUTPATIENT
Start: 2021-03-04 | End: 2021-03-05 | Stop reason: HOSPADM

## 2021-03-04 RX ORDER — DIVALPROEX SODIUM 250 MG/1
250 TABLET, DELAYED RELEASE ORAL EVERY 8 HOURS SCHEDULED
Qty: 90 TABLET | Refills: 1 | Status: CANCELLED | OUTPATIENT
Start: 2021-03-04 | End: 2021-04-03

## 2021-03-04 RX ORDER — LITHIUM CARBONATE 150 MG/1
750 CAPSULE ORAL 2 TIMES DAILY WITH MEALS
Qty: 30 CAPSULE | Refills: 1 | Status: CANCELLED | OUTPATIENT
Start: 2021-03-04

## 2021-03-04 RX ORDER — OLANZAPINE 10 MG/1
10 INJECTION, POWDER, LYOPHILIZED, FOR SOLUTION INTRAMUSCULAR 3 TIMES DAILY
Status: DISCONTINUED | OUTPATIENT
Start: 2021-03-04 | End: 2021-03-05 | Stop reason: HOSPADM

## 2021-03-04 RX ADMIN — OLANZAPINE 15 MG: 10 TABLET, FILM COATED ORAL at 08:29

## 2021-03-04 RX ADMIN — OLANZAPINE 5 MG: 5 TABLET, FILM COATED ORAL at 15:52

## 2021-03-04 RX ADMIN — OLANZAPINE 10 MG: 10 TABLET, FILM COATED ORAL at 21:01

## 2021-03-04 RX ADMIN — MELATONIN TAB 3 MG 3 MG: 3 TAB at 21:01

## 2021-03-04 RX ADMIN — TAMSULOSIN HYDROCHLORIDE 0.4 MG: 0.4 CAPSULE ORAL at 15:52

## 2021-03-04 RX ADMIN — LITHIUM CARBONATE 750 MG: 300 CAPSULE, GELATIN COATED ORAL at 08:29

## 2021-03-04 RX ADMIN — Medication: at 10:46

## 2021-03-04 RX ADMIN — LORAZEPAM 1 MG: 1 TABLET ORAL at 21:01

## 2021-03-04 RX ADMIN — LORAZEPAM 1 MG: 1 TABLET ORAL at 12:00

## 2021-03-04 RX ADMIN — Medication: at 21:04

## 2021-03-04 RX ADMIN — LITHIUM CARBONATE 750 MG: 300 CAPSULE, GELATIN COATED ORAL at 15:52

## 2021-03-04 NOTE — TREATMENT TEAM
03/04/21 0846   Team Meeting   Meeting Type Daily Rounds   Team Members Present   Team Members Present Physician;Nurse;; Other (Discipline and Name)   Physician Team Member Dr BRODERICK   Nursing Team Member [address] [facility] Floor Management Team Member Hyun Norwood   Other (Discipline and Name) Ruff and residents   Patient/Family Present   Patient Present No   Patient's Family Present No     303, pt seen on the unit, remains disorganized, less irritable and loud  Refused Depakote  Continue med management  Pt is willing to sign a URBANO for his mother or also agreed to call his mother in his presence  Continue med management  Continue to monitor  Discharge to be determined

## 2021-03-04 NOTE — SOCIAL WORK
Worker spoke with pt's mother Hermilo Magana regarding discharge for tomorrow, she will pick pt up between [phone]  [facility]  [facility] to [facility], message was left to  regarding appointment

## 2021-03-04 NOTE — CASE MANAGEMENT
Pt was in agreement and with encouragement from resident physician, pt signed URBANO for his mother Rodger Graham phone: [phone]  CM called mother and provided status update  Mother states "Chalino Moore called me earlier to wish me a Happy B'day  I was happy to see him get little better than before"  CM to coordinate

## 2021-03-04 NOTE — PROGRESS NOTES
Progress Note - 6 [address] [facility] Frandy Day 59 y o  male MRN: [id number]  Unit/Bed#: U 348-01 Encounter: [id number]    Assessment/Plan   Principal Problem:    Schizoaffective disorder, bipolar type ([facility] [address] )  Active Problems:    Hypertension    Medical clearance for psychiatric admission    BPH (benign prostatic hyperplasia)    Vitamin D insufficiency    History of rhabdomyolysis    Elevated TSH    Wrist pain, chronic, right      · Continue Zyprexa 15 mg p o  b i d  for psychotic symptoms, or Zyprexa 15 mg IM b i d  if patient refuses p o  medication  · Continue Depakote 250 mg t i d  for mood stabilization  ? Patient has refused all doses of Depakote  · Continue Lithium 750 mg q 12 hours for psychotic symptoms  ? Li Level 03/04/21: 0 8  ? Recheck Kishan Sarabia, 03/08/21  · Continue Ativan 1 mg b i d  for persistent anxiety and agitation  ? Do not administer within 1 hour of Zyprexa  · Continue Melatonin 3 mg q h s  for insomnia  · Continue to promote patient participation in therapeutic milieu  · Continue medical management by primary team   · Discharge disposition:   Improving, discharge planned for tomorrow, 03/05/21, pending stable condition    Subjective: The patient was evaluated this morning for continuity of care and no acute distress noted throughout the evaluation  Over the past 24 hours staff noted the patient was Loud, grandiose, irritable but attended groups and was visible on the unit  He was compliant with his Zyprexa, lithium, and Ativan, however he refused his Depakote  Patient appears brighter today on exam and has signed in [address] for his mother and agreed to complete a phone call with him present to discuss discharge planning  Brissa Leal reports that he believes the treatment is working and he feels ready for discharge  He reports " better" mood and endorses good sleep/appetite /energy    He denies  Auditory or visual hallucinations and did not appear to be responding to internal stimuli  He denies suicidal or homicidal ideation, intent, or plan  Spoke with patient's mother, Rula Charles Mix Day [phone] (URBANO signed), to discussed patient's baseline and discharge planning  Based on conversation it appears that patient is at his baseline  He has been free of incident on the unit for over 72 hours and reports that he plans to be medication compliant on discharge  Informed patient that he will be eligible for discharge tomorrow pending stable condition      Current Medications:  Current Facility-Administered Medications   Medication Dose Route Frequency Provider Last Rate    acetaminophen  650 mg Oral Q6H PRN Rafael Michelle MD      acetaminophen  650 mg Oral Q4H PRN Rafael Michelle MD      acetaminophen  975 mg Oral Q6H PRN Rafael Michelle MD      benztropine  1 mg Intramuscular Q4H PRN Max 6/day Rafael Michelle MD      benztropine  1 mg Oral Q4H PRN Max 6/day Rafael Michelle MD      cholecalciferol  2,000 Units Oral Daily Lisa Lloyd PA-C      hydrOXYzine HCL  50 mg Oral Q6H PRN Max 4/day Rafael Michelle MD      Or    diphenhydrAMINE  50 mg Intramuscular Q6H PRN Rafael Michelle MD      divalproex sodium  250 mg Oral Q8H [address] Kaveh Bucca,       GenTeal Tears Night-Time  1 application Both Eyes Q5V PRN Rafael Michelle MD      hydrOXYzine HCL  100 mg Oral Q6H PRN Max 4/day Rafael Michelle MD      hydrOXYzine HCL  25 mg Oral Q6H PRN Max 4/day Rafael Michelle MD      lithium carbonate  750 mg Oral BID With Meals Suanne Sas, DO      LORazepam  2 mg Intramuscular Q2H PRN Max 3/day Suanne Sas, DO      LORazepam  1 mg Oral Q4H PRN Suanne Sas, DO      LORazepam  1 mg Oral BID Suanne Sas, DO      melatonin  3 mg Oral HS Rafael Michelle MD      metoprolol succinate  25 mg Oral Daily Ivis Lopez PA-C      OLANZapine  10 mg Oral Q3H PRN Suanne Sas, DO      Or    OLANZapine  10 mg Intramuscular Q3H PRN Suanne Sas, DO  OLANZapine  15 mg Oral BID Suanne Sas, DO      Or    OLANZapine  15 mg Intramuscular BID Suanne Sas, DO      OLANZapine  5 mg Oral Q3H PRN Suanne Sas, DO      Or    OLANZapine  5 mg Intramuscular Q3H PRN Suanne Sas, DO      OLANZapine  2 5 mg Oral Q3H PRN Suanne Sas, DO      polyethylene glycol  17 g Oral Daily PRN Rafael Michelle MD      senna-docusate sodium  1 tablet Oral Daily PRN Rafael Michelle MD      sterile water           sterile water           tamsulosin  0 4 mg Oral Daily With [employer] GEORGINA Lloyd      traZODone  50 mg Oral HS PRN Rafael Michelle MD      white petrolatum-mineral oil   Topical TID PRN Matthew Ceja PA-C         Behavioral Health Medications:   all current active meds have been reviewed, continue current psychiatric medications and current meds:   Current Facility-Administered Medications   Medication Dose Route Frequency    acetaminophen (TYLENOL) tablet 650 mg  650 mg Oral Q6H PRN    acetaminophen (TYLENOL) tablet 650 mg  650 mg Oral Q4H PRN    acetaminophen (TYLENOL) tablet 975 mg  975 mg Oral Q6H PRN    benztropine (COGENTIN) injection 1 mg  1 mg Intramuscular Q4H PRN Max 6/day    benztropine (COGENTIN) tablet 1 mg  1 mg Oral Q4H PRN Max 6/day    cholecalciferol (VITAMIN D3) tablet 2,000 Units  2,000 Units Oral Daily    hydrOXYzine HCL (ATARAX) tablet 50 mg  50 mg Oral Q6H PRN Max 4/day    Or    diphenhydrAMINE (BENADRYL) injection 50 mg  50 mg Intramuscular Q6H PRN    divalproex sodium (DEPAKOTE) EC tablet 250 mg  250 mg Oral Q8H [facility]    GenTeal Tears Night-Time OINT 1 application  1 application Both Eyes Q1K PRN    hydrOXYzine HCL (ATARAX) tablet 100 mg  100 mg Oral Q6H PRN Max 4/day    hydrOXYzine HCL (ATARAX) tablet 25 mg  25 mg Oral Q6H PRN Max 4/day    lithium carbonate capsule 750 mg  750 mg Oral BID With Meals    LORazepam (ATIVAN) injection 2 mg  2 mg Intramuscular Q2H PRN Max 3/day    LORazepam (ATIVAN) tablet 1 mg  1 mg Oral Q4H PRN    LORazepam (ATIVAN) tablet 1 mg  1 mg Oral BID    melatonin tablet 3 mg  3 mg Oral HS    metoprolol succinate (TOPROL-XL) 24 hr tablet 25 mg  25 mg Oral Daily    OLANZapine (ZyPREXA) tablet 10 mg  10 mg Oral Q3H PRN    Or    OLANZapine (ZyPREXA) IM injection 10 mg  10 mg Intramuscular Q3H PRN    OLANZapine (ZyPREXA) tablet 10 mg  10 mg Oral TID    Or    OLANZapine (ZyPREXA) IM injection 10 mg  10 mg Intramuscular TID    OLANZapine (ZyPREXA) tablet 5 mg  5 mg Oral Q3H PRN    Or    OLANZapine (ZyPREXA) IM injection 5 mg  5 mg Intramuscular Q3H PRN    OLANZapine (ZyPREXA) tablet 5 mg  5 mg Oral Daily    Followed by   [facility] OLANZapine (ZyPREXA) IM injection 5 mg  5 mg Intramuscular Once    OLANZapine (ZyPREXA) tablet 2 5 mg  2 5 mg Oral Q3H PRN    polyethylene glycol (MIRALAX) packet 17 g  17 g Oral Daily PRN    senna-docusate sodium (SENOKOT S) 8 6-50 mg per tablet 1 tablet  1 tablet Oral Daily PRN    sterile water injection **ADS Override Pull**        sterile water injection **ADS Override Pull**        tamsulosin (FLOMAX) capsule 0 4 mg  0 4 mg Oral Daily With [employer]    traZODone (DESYREL) tablet 50 mg  50 mg Oral HS PRN    white petrolatum-mineral oil (EUCERIN,HYDROCERIN) cream   Topical TID PRN     Vital signs in last 24 hours:  Temp:  [97 9 °F (36 6 °C)-98 4 °F (36 9 °C)] 98 4 °F (36 9 °C)  HR:  [80-89] 80  Resp:  [16-18] 16  BP: (124-164)/(68-93) 153/93    Laboratory results:    I have personally reviewed all pertinent laboratory/tests results    Most Recent Labs:   Lab Results   Component Value Date    WBC 7 00 02/17/2021    RBC 4 79 02/17/2021    HGB 14 0 02/17/2021    HCT 43 2 02/17/2021     02/17/2021    RDW 14 2 02/17/2021    NEUTROABS 4 30 02/17/2021    SODIUM 144 (H) 02/09/2021    K 3 6 02/09/2021     (H) 02/09/2021    CO2 28 02/09/2021    BUN 12 02/09/2021    CREATININE 0 92 02/09/2021    GLUC 96 02/09/2021    GLUF 91 11/06/2019    CALCIUM 9 2 02/09/2021    AST 18 02/09/2021    ALT 21 02/09/2021    ALKPHOS 33 (L) 02/09/2021    TP 5 7 (L) 02/09/2021    ALB 3 6 02/09/2021    TBILI 0 50 02/09/2021    CHOLESTEROL 150 02/17/2021    HDL 35 (L) 02/17/2021    TRIG 90 02/17/2021    LDLCALC 97 02/17/2021    NONHDLC 115 02/17/2021    LITHIUM 0 8 03/04/2021    AMMONIA 51 02/06/2021    HOL9GAKKMEOZ 4 960 (H) 02/17/2021    FREET4 1 09 02/17/2021    RPR Non-Reactive 11/03/2019    HGBA1C 5 1 11/03/2019     11/03/2019     Admission Labs:   Admission on 02/16/2021   Component Date Value    Color, UA 02/18/2021 Straw     Clarity, UA 02/18/2021 Clear     Specific Gravity, UA 02/18/2021 1 010     pH, UA 02/18/2021 7 0     Leukocytes, UA 02/18/2021 Negative     Nitrite, UA 02/18/2021 Negative     Protein, UA 02/18/2021 Negative     Glucose, UA 02/18/2021 Negative     Ketones, UA 02/18/2021 Negative     Bilirubin, UA 02/18/2021 Negative     Blood, UA 02/18/2021 Negative     UROBILINOGEN UA 02/18/2021 Negative     WBC 02/17/2021 7 00     RBC 02/17/2021 4 79     Hemoglobin 02/17/2021 14 0     Hematocrit 02/17/2021 43 2     MCV 02/17/2021 90     MCH 02/17/2021 29 3     MCHC 02/17/2021 32 4     RDW 02/17/2021 14 2     MPV 02/17/2021 8 9     Platelets 75/98/0546 258     Neutrophils Relative 02/17/2021 61     Lymphocytes Relative 02/17/2021 27     Monocytes Relative 02/17/2021 9     Eosinophils Relative 02/17/2021 3     Basophils Relative 02/17/2021 1     Neutrophils Absolute 02/17/2021 4 30     Lymphocytes Absolute 02/17/2021 1 90     Monocytes Absolute 02/17/2021 0 60     Eosinophils Absolute 02/17/2021 0 20     Basophils Absolute 02/17/2021 0 00     Cholesterol 02/17/2021 150     Triglycerides 02/17/2021 90     HDL, Direct 02/17/2021 35*    LDL Calculated 02/17/2021 97     Non-HDL-Chol (CHOL-HDL) 02/17/2021 115     Magnesium 02/17/2021 2 6*    TSH 3RD GENERATON 02/17/2021 4 960*    Free T4 02/17/2021 1 09     Lithium Lvl 02/22/2021 0  6     Lithium Lvl 02/25/2021 0 7     Lithium Lvl 03/01/2021 0 8     Lithium Lvl 03/04/2021 0 8        Psychiatric Review of Systems:  Behavior over the last 24 hours:  improved  Sleep: normal  Appetite: normal  Medication side effects: No  ROS: no complaints    Mental Status Evaluation:  Appearance:  bearded, casually dressed, older than stated age and overweight   Behavior:  More cooperative than previous days   Speech:  normal pitch and normal volume   Mood:  "Better"   Affect:  mood-congruent and Improved,  brighter than previous days   Language naming objects and repeating phrases   Thought Process:  More logical and goal-directed than previous days   Thought Content:  No delusions elicited   Perceptual Disturbances: Denies auditory or visual hallucinations and did not appear to be responding to internal stimuli   Risk Potential: Denies suicidal or homicidal ideation, intent, or plan   Sensorium:  person, place and situation   Cognition:  recent and remote memory grossly intact   Consciousness:  alert and awake    Attention: attention span and concentration were age appropriate   Insight:  Limited but improving   Judgment: Limited but improving   Intellect Appears to be of average intelligence   Gait/Station: normal gait/station and normal balance   Motor Activity: no abnormal movements     Memory: Short and long term memory  intact     Progress Toward Goals:  Progressing, plan for discharge to home tomorrow    Recommended Treatment:   See above for assessment and plan       Continue following current medications:   Current Facility-Administered Medications   Medication Dose Route Frequency Provider Last Rate    acetaminophen  650 mg Oral Q6H PRN El Huang MD      acetaminophen  650 mg Oral Q4H PRN El Huang MD      acetaminophen  975 mg Oral Q6H PRN El Huang MD      benztropine  1 mg Intramuscular Q4H PRN Max 6/day El Huang MD      benztropine  1 mg Oral Q4H PRN Max 6/day Martina Jacobs MD      cholecalciferol  2,000 Units Oral Daily Lisaneetu Lloyd PA-C      hydrOXYzine HCL  50 mg Oral Q6H PRN Max 4/day Martina Jacobs MD      Or    diphenhydrAMINE  50 mg Intramuscular Q6H PRN Martina Jacobs MD      divalproex sodium  250 mg Oral Q8H [address] Kaveh Bucca, DO      GenTeal Tears Night-Time  1 application Both Eyes O2X PRN Martina Jacobs MD      hydrOXYzine HCL  100 mg Oral Q6H PRN Max 4/day Martina Jacobs MD      hydrOXYzine HCL  25 mg Oral Q6H PRN Max 4/day Martina Jacobs MD      lithium carbonate  750 mg Oral BID With Meals [facility], DO      LORazepam  2 mg Intramuscular Q2H PRN Max 3/day [facility], DO      LORazepam  1 mg Oral Q4H PRN [facility], DO      LORazepam  1 mg Oral BID [facility], DO      melatonin  3 mg Oral HS Martina Jacobs MD      metoprolol succinate  25 mg Oral Daily Ivis Lopez PA-C      OLANZapine  10 mg Oral Q3H PRN [facility], DO      Or    OLANZapine  10 mg Intramuscular Q3H PRN [facility], DO      OLANZapine  15 mg Oral BID [facility], DO      Or    OLANZapine  15 mg Intramuscular BID [facility], DO      OLANZapine  5 mg Oral Q3H PRN [facility], DO      Or    OLANZapine  5 mg Intramuscular Q3H PRN [facility], DO      OLANZapine  2 5 mg Oral Q3H PRN [facility], DO      polyethylene glycol  17 g Oral Daily PRN Martina Jacobs MD      senna-docusate sodium  1 tablet Oral Daily PRN Martina Jacobs MD      sterile water           sterile water           tamsulosin  0 4 mg Oral Daily With ARLEEN Lloyd PA-C      traZODone  50 mg Oral HS PRN Martina Jacobs MD      white petrolatum-mineral oil   Topical TID PRN Laurie Monterroso PA-C         Risks, benefits and possible side effects of Medications:   Risks, benefits, and possible side effects of medications explained to patient and patient verbalizes understanding        This note has been constructed using a voice recognition system  There may be translation, syntax, or grammatical errors  If you have any questions, please contact the dictating provider  DONA Ware    Psychiatry PGY-1

## 2021-03-04 NOTE — DISCHARGE SUMMARY
Discharge Summary - [address] 59 y o  male MRN: [id number]  Unit/Bed#: -01 Encounter: [id number]     Admission Date: 02/17/21    Admission Orders (From admission, onward)     Ordered        02/16/21 1849  ED TO DIFFERENT CAMPUS [facility] UNIT or INPATIENT MEDICAL UNIT to [facility] UNIT (using Discharge Readmit Navigator) - Admit Patient to [address] [place] [address]  Once                         Discharge Date: 03/05/21    Attending Psychiatrist: Oumar Galarza MD    Reason for Admission:   Eron Encarnacion is a 59 y o  male,  single, unemployed, , living with his mother, with a past psychiatric history of schizoaffective disorder, bipolar type admitted to the inpatient behavioral health unit at [address] [place] [address] as a voluntary 201 commitment, subsequently made a involuntary 303 commitment, endorsing violent and aggressive behavior and agitation  From the history and physical dated 02/17/2021:    Mark Graham is a 59 y o  male, single, unemployed, , living with his mother presenting to [address] [place] [address], possessing pertinent psychiatric history of schizoaffective disorder, bipolar type subsequent to recent medical admission for suspected rhabdomyolysis/elevated CK  Patient was initially brought to the hospital by law enforcement after 302 was petitioned by his mother for "extreme violence and agitation" including threats and aggressive and agitated behavior    From medical discharge summary dated 02/12/2021:     In the emergency department workup patient was found to have elevated CK of 1254 with suspected rhabdomyolysis   Patient was admitted to the medical-surgical floor   Patient's CK level came back to within normal limits in 3 days   During his stay on the medical-surgical floor frequently patient was agitated aggressive and physically threatening to the caring staff nurses  Eufemia Webb was frequently requiring 4 limb restraints   Psych was consulted and they determined that patient does need admission but because of his acutely aggressive behavior and shortage of staff it was recommended that patient should be transferred to [place] [address] workers and crisis team coordinated patient's transfer      Patient was transported to the [address] [place] [address] Unit in soft restraints and was agitated on arrival   On exam he was uncooperative, disorganized, rambling with inappropriate laughter, bizarre speech, and loose associations  He appeared to be responding to internal stimuli  Despite multiple attempts to redirect the patient, the interview was unable to be completed  Shortly thereafter the patient became increasingly agitated and began slamming chairs and yelling  Patient was offered p o  Zyprexa and spitted on the floor  He was subsequently given 10 mg IM Zyprexa  Paige Aguilar was admitted to the [address] [place] [address] Unit for his safety, stabilization, and medication management  Hospital Course: Throughout the patient's hospital course the following medication adjustments were made:   Continue home lithium and titrated to a higher doses for manic symptoms   Discontinued home Valium, started Ativan for agitation   Started Zyprexa for mood stabilization and psychotic symptoms   Started melatonin for insomnia   Started Depakote for mood stabilization, however patient was noncompliant with this medication    The patient refused many of his medications initially and only agreed to lithium, however he did not show signs of improvement  Patient was frequently agitated on the unit and had security called on him on multiple occasions  He was subsequently converted to involuntary 303 status and made medications over objection  The patient adhered to their medication regimen and denied any acute adverse effects    Their mood brightened throughout the course of psychiatric management and he was seen in [facility] appropriately with peers  Walter Tulio did not demonstrate any further dangerous behavior to self or their peers his inpatient stay  On the day of discharge the patient reported he was doing well masking forward to discharge to home  He endorsed anxious mood and reports he is ready to leave, and admits to good sleep/appetite/energy  He denied auditory or visual hallucinations and did not appear to be responding to internal stimuli  He denies suicidal or homicidal ideation, intent, or plan  Patient reports that he plans to be medication compliant upon discharge  Patient was discharged with the medications listed below and follow-up appointments were made with the [place] for continuity of care      Behavioral Health Medications:   all current active meds have been reviewed, continue current psychiatric medications and current meds:   Current Facility-Administered Medications   Medication Dose Route Frequency    acetaminophen (TYLENOL) tablet 650 mg  650 mg Oral Q6H PRN    acetaminophen (TYLENOL) tablet 650 mg  650 mg Oral Q4H PRN    acetaminophen (TYLENOL) tablet 975 mg  975 mg Oral Q6H PRN    benztropine (COGENTIN) injection 1 mg  1 mg Intramuscular Q4H PRN Max 6/day    benztropine (COGENTIN) tablet 1 mg  1 mg Oral Q4H PRN Max 6/day    cholecalciferol (VITAMIN D3) tablet 2,000 Units  2,000 Units Oral Daily    hydrOXYzine HCL (ATARAX) tablet 50 mg  50 mg Oral Q6H PRN Max 4/day    Or    diphenhydrAMINE (BENADRYL) injection 50 mg  50 mg Intramuscular Q6H PRN    divalproex sodium (DEPAKOTE) EC tablet 250 mg  250 mg Oral Q8H [address]    GenTeal Tears Night-Time OINT 1 application  1 application Both Eyes B9E PRN    hydrOXYzine HCL (ATARAX) tablet 100 mg  100 mg Oral Q6H PRN Max 4/day    hydrOXYzine HCL (ATARAX) tablet 25 mg  25 mg Oral Q6H PRN Max 4/day    lithium carbonate capsule 750 mg  750 mg Oral BID With Meals    LORazepam (ATIVAN) injection 2 mg  2 mg Intramuscular Q2H PRN Max 3/day    LORazepam (ATIVAN) tablet 1 mg  1 mg Oral Q4H PRN    LORazepam (ATIVAN) tablet 1 mg  1 mg Oral BID    melatonin tablet 3 mg  3 mg Oral HS    metoprolol succinate (TOPROL-XL) 24 hr tablet 25 mg  25 mg Oral Daily    nicotine polacrilex (NICORETTE) gum 2 mg  2 mg Oral Q2H PRN    OLANZapine (ZyPREXA) tablet 10 mg  10 mg Oral Q3H PRN    Or    OLANZapine (ZyPREXA) IM injection 10 mg  10 mg Intramuscular Q3H PRN    OLANZapine (ZyPREXA) tablet 10 mg  10 mg Oral TID    Or    OLANZapine (ZyPREXA) IM injection 10 mg  10 mg Intramuscular TID    OLANZapine (ZyPREXA) tablet 5 mg  5 mg Oral Q3H PRN    Or    OLANZapine (ZyPREXA) IM injection 5 mg  5 mg Intramuscular Q3H PRN    OLANZapine (ZyPREXA) IM injection 5 mg  5 mg Intramuscular Once    OLANZapine (ZyPREXA) tablet 2 5 mg  2 5 mg Oral Q3H PRN    polyethylene glycol (MIRALAX) packet 17 g  17 g Oral Daily PRN    senna-docusate sodium (SENOKOT S) 8 6-50 mg per tablet 1 tablet  1 tablet Oral Daily PRN    sterile water injection **ADS Override Pull**        sterile water injection **ADS Override Pull**        tamsulosin (FLOMAX) capsule 0 4 mg  0 4 mg Oral Daily With Dinner    traZODone (DESYREL) tablet 50 mg  50 mg Oral HS PRN    white petrolatum-mineral oil (EUCERIN,HYDROCERIN) cream   Topical TID PRN     Labs/Imaging:   I have personally reviewed all pertinent laboratory/tests results    Most Recent Labs:   Lab Results   Component Value Date    WBC 7 00 02/17/2021    RBC 4 79 02/17/2021    HGB 14 0 02/17/2021    HCT 43 2 02/17/2021     02/17/2021    RDW 14 2 02/17/2021    NEUTROABS 4 30 02/17/2021    SODIUM 144 (H) 02/09/2021    K 3 6 02/09/2021     (H) 02/09/2021    CO2 28 02/09/2021    BUN 12 02/09/2021    CREATININE 0 92 02/09/2021    GLUC 96 02/09/2021    GLUF 91 11/06/2019    CALCIUM 9 2 02/09/2021    AST 18 02/09/2021    ALT 21 02/09/2021    ALKPHOS 33 (L) 02/09/2021    TP 5 7 (L) 02/09/2021    ALB 3 6 02/09/2021    TBILI 0 50 02/09/2021    CHOLESTEROL 150 02/17/2021    HDL 35 (L) 02/17/2021    TRIG 90 02/17/2021    LDLCALC 97 02/17/2021    NONHDLC 115 02/17/2021    VALPROICTOT <10 0 (L) 03/04/2021    LITHIUM 0 8 03/04/2021    AMMONIA 51 02/06/2021    UCH5RILZDQDF 4 960 (H) 02/17/2021    FREET4 1 09 02/17/2021    RPR Non-Reactive 11/03/2019    HGBA1C 5 1 11/03/2019     11/03/2019     Admission Labs:   Admission on 02/16/2021   Component Date Value    Color, UA 02/18/2021 Straw     Clarity, UA 02/18/2021 Clear     Specific Gravity, UA 02/18/2021 1 010     pH, UA 02/18/2021 7 0     Leukocytes, UA 02/18/2021 Negative     Nitrite, UA 02/18/2021 Negative     Protein, UA 02/18/2021 Negative     Glucose, UA 02/18/2021 Negative     Ketones, UA 02/18/2021 Negative     Bilirubin, UA 02/18/2021 Negative     Blood, UA 02/18/2021 Negative     UROBILINOGEN UA 02/18/2021 Negative     WBC 02/17/2021 7 00     RBC 02/17/2021 4 79     Hemoglobin 02/17/2021 14 0     Hematocrit 02/17/2021 43 2     MCV 02/17/2021 90     MCH 02/17/2021 29 3     MCHC 02/17/2021 32 4     RDW 02/17/2021 14 2     MPV 02/17/2021 8 9     Platelets 55/35/2664 258     Neutrophils Relative 02/17/2021 61     Lymphocytes Relative 02/17/2021 27     Monocytes Relative 02/17/2021 9     Eosinophils Relative 02/17/2021 3     Basophils Relative 02/17/2021 1     Neutrophils Absolute 02/17/2021 4 30     Lymphocytes Absolute 02/17/2021 1 90     Monocytes Absolute 02/17/2021 0 60     Eosinophils Absolute 02/17/2021 0 20     Basophils Absolute 02/17/2021 0 00     Cholesterol 02/17/2021 150     Triglycerides 02/17/2021 90     HDL, Direct 02/17/2021 35*    LDL Calculated 02/17/2021 97     Non-HDL-Chol (CHOL-HDL) 02/17/2021 115     Magnesium 02/17/2021 2 6*    TSH 3RD GENERATON 02/17/2021 4 960*    Free T4 02/17/2021 1 09     Lithium Lvl 02/22/2021 0 6     Lithium Lvl 02/25/2021 0 7     Lithium Lvl 03/01/2021 0 8     Lithium Lvl 03/04/2021 0 8     Valproic Acid, Total 03/04/2021 <10 0*       Mental Status at time of Discharge:   Appearance:  age appropriate, dressed appropriately, looks stated age, overweight  sitting comfortably in chair, adequate hygiene and grooming, cooperative with interview, good eye contact    Behavior:  cooperative   Speech:  normal rate, normal volume, normal pitch, fluent, clear and coherent   Mood:  "Anxious"   Affect:  mood-congruent and brighter   Language Within normal limits   Thought Process:  organized, logical, goal directed, normal rate of thoughts   Thought Content:  No verbalized delusions   Perceptual Disturbances: Denies auditory or visual hallucinations and Appears to be responding to internal stimuli   Risk Potential: Denies suicidal or homicidal ideation, intent, or plan   Sensorium:  person, place, time and current situation   Cognition:  Grossly intact   Consciousness:  alert and awake   Attention: attention span and concentration were age appropriate   Insight:  fair   Judgment: fair   Intellect appears to be of average intelligence   Gait/Station: normal gait/station and normal balance   Motor Activity: no abnormal movements     Discharge Diagnosis:   Patient Active Problem List   Diagnosis    Schizoaffective disorder, bipolar type ([facility] [address] )    Cannabis abuse, continuous    Alcohol abuse, continuous    Hypertension    Tobacco abuse    Medical clearance for psychiatric admission    BPH (benign prostatic hyperplasia)    Elevated CK    Vitamin D insufficiency    History of rhabdomyolysis    Elevated TSH    Wrist pain, chronic, right    Astigmatism    Atrial fibrillation (HCC)    Benign neoplasm of colon    Hemorrhoids    Hydrocele    Hyperlipidemia    Large physiologic cupping of optic disc    Macular drusen    Nuclear senile cataract    Presbyopia    Umbilical hernia    Vitreous syneresis       Discharge Medications:  See list above, as well as the after visit summary containing reconciled discharge medications provided to patient and family  Discharge instructions/Information to patient and family:   See after visit summary for information provided to patient and family  Provisions for Follow-Up Care:  See after visit summary for information related to follow-up care and any pertinent home health orders  This note has been constructed using a voice recognition system  There may be translation, syntax,  or grammatical errors  If you have any questions, please contact the dictating provider  DONA Cedillo    Psychiatry PGY-1

## 2021-03-04 NOTE — NURSING NOTE
Pt denies SI, HI, AH and VH  Pt is currently calm and cooperative but can be easily agitated at times  Pt is hostile towards doctors but is cooperative and pleasant with other floor staff  Pt stating "I'm going to refuse the Depakote cause it is not a medication over objection so I won't get a shot if I refuse it " Pt mainly isolative to his room with no complaints or concerns  Medication and meal compliant  Will monitor

## 2021-03-04 NOTE — DISCHARGE INSTR - OTHER ORDERS
Crisis Information  If you are experiencing a mental health emergency, you may call the [address] [facility] 24 hours a day, 7 days per week at [phone]  In [facility], call [phone]  When you need someone to listen, the Thom Gerber is available for 16 hours a day, 7 days a week, from the time of 7-10am and 2pm-2am   It is not available from the hours of 2am-6am and 10am-2pm  A representative can be reached at [phone]  Lizet Marcelino RN, our [employer], will be calling you after your discharge, on the phone number that you provided  She will be available as an additional support, if needed  If you wish to speak with her, you may contact Santy Lui at [phone]  What you need to know aboutcoronavirus disease 2019 (COVID-19)     What is coronavirus disease 2019 (COVID-19)? Coronavirus disease 2019 (COVID-19) is a respiratory illness that can spread from person to person  The virus that causes COVID-19 is a novel coronavirus that was first identified during an investigation into an outbreak in [place], [place]  Can people in the [place]  get COVID-19? Yes  COVID-19 is spreading from person to person in parts of the [place]  Risk of infection with COVID-19 is higher for people who are close contacts of someone known to have COVID-19, for example healthcare workers, or household members  Other people at higher risk for infection are those who live in or have recently been in an area with ongoing spread of COVID-19  Learn more about places with ongoing spread at   [employer] tn  html#geographic  Have there been cases of COVID-19 in the [place] ?   Yes  The first case of COVID-19 in the [place] was reported on January 21, 2020  The current count of cases of COVID-19 in the [place] is available on [employer] at [facility]    How does COVID-19 spread? The virus that causes COVID-19 probably emerged from an animal source, but is now spreading from person to person  The virus is thought to spread mainly between people who are in close contact with one another (within about 6 feet) through respiratory droplets produced when an infected person coughs or sneezes  It also may be possible that a person can get COVID-19 by touching a surface or object that has the virus on it and then touching their own mouth, nose, or possibly their eyes, but this is not thought to be the main way the virus spreads  Learn what is known about the spread of newly emerged coronaviruses at [facility]  What are the symptoms of COVID-19? Patients with COVID-19 have had mild to severe respiratory illness with symptoms of   fever   cough   shortness of breath  What are severe complications from this virus? Some patients have pneumonia in both lungs, multi-organ failure and in some cases death  How can I help protect myself? People can help protect themselves from respiratory illness with everyday preventive actions  Avoid close contact with people who are sick  Avoid touching your eyes, nose, and mouth withunwashed hands  Wash your hands often with soap and water for at least 20 seconds  Use an alcohol-based hand  that contains at least 60% alcohol if soap and water are not available  If you are sick, to keep from spreading respiratory illness to others, you should   Stay home when you are sick  Cover your cough or sneeze with a tissue, then throw the tissue in the trash  Clean and disinfect frequently touched objectsand surfaces  What should I do if I recently traveled from an area with ongoing spread of COVID-19? If you have traveled from an affected area, there may be restrictions on your movements for up to 2 weeks   If you develop symptoms during that period (fever, cough, trouble breathing), seek medical advice  Call the office of your health care provider before you go, and tell them about your travel and your symptoms  They will give you instructions on how to get care without exposing other people to your illness  While sick, avoid contact with people, don't go out and delay any travel to reduce the possibility of spreading illness to others  Is there a treatment? There is no specific antiviral treatment for COVID-19  People with COVID-19 can seek medical care to helprelieve symptoms  For more information: www cdc gov/RMHHC22SY 151509-R 03/03/2020       What to do if you are sick withcoronavirus disease 2019 (COVID-19)     If you are sick with COVID-19 or suspect you are infected with the virus that causes COVID-19, follow the steps below to help prevent the disease from spreading to people in your home and community  Stay home except to get medical care   You should restrict activities outside your home, except for getting medical care  Do not go to work, school, or public areas  Avoid using public transportation, ride-sharing, or taxis  Separate yourself from other people and animals inyour home  People: As much as possible, you should stay in a specific room and away from other people in your home  Also, you should use a separate bathroom, if available  Animals: Do not handle pets or other animals while sick  See COVID-19 and Animals for more information  Call ahead before visiting your doctor   If you have a medical appointment, call the healthcare provider and tell them that you have or may have COVID-19  This will help the healthcare provider's office take steps to keep other people from getting infected or exposed  Wear a facemask  You should wear a facemask when you are around other people (e g , sharing a room or vehicle) or pets and before you enter a healthcare provider's office   If you are not able to wear a facemask (for example, because it causes trouble breathing), then people who live with you should not stay in the same room with you, or they should wear a facemask if they enteryour room  Cover your coughs and sneezes   Cover your mouth and nose with a tissue when you cough or sneeze  Throw used tissues in a lined trash can; immediately wash your hands with soap and water for at least 20 seconds or clean your hands with an alcohol-based hand  that contains at least 60 to 95% alcohol, covering all surfaces of your hands and rubbing them together until they feel dry  Soap and water should be used preferentially if hands are visibly dirty  Avoid sharing personal household items   You should not share dishes, drinking glasses, cups, eating utensils, towels, or bedding with other people or pets in your home  After using these items, they should be washed thoroughly with soap and water  Clean your hands often  Wash your hands often with soap and water for at least 20 seconds  If soap and water are not available, clean your hands with an alcohol-based hand  that contains at least 60% alcohol, covering all surfaces of your hands and rubbing them together until they feel dry  Soap and water should be used preferentially if hands are visibly dirty  Avoid touching your eyes, nose, and mouth with unwashed hands  Clean all "high-touch" surfaces every day  High touch surfaces include counters, tabletops, doorknobs, bathroom fixtures, toilets, phones, keyboards, tablets, and bedside tables  Also, clean any surfaces that may have blood, stool, or body fluids on them  Use a household cleaning spray or wipe, according to the label instructions  Labels contain instructions for safe and effective use of the cleaning product including precautions you should take when applying the product, such as wearing gloves and making sure you have good ventilation during use of the product    Monitor your symptoms  Seek prompt medical attention if your illness is worsening (e g , difficulty breathing)  Before seeking care, call your healthcare provider and tell them that you have, or are being evaluated for, COVID-19  Put on a facemask before you enter the facility  These steps will help the healthcare provider's office to keep other people in the office or waiting room from getting infectedor exposed  Ask your healthcare provider to call the local or state health department  Persons who are placed under active monitoring or facilitated self-monitoring should follow instructions provided by their local health department or occupational health professionals, as appropriate  If you have a medical emergency and need to call 911, notify the dispatch personnel that you have, or are being evaluated for COVID-19  If possible, put on a facemask before emergency medical services arrive  Discontinuing home isolation  Patients with confirmed COVID-19 should remain under home isolation precautions until the risk of secondary transmission to others is thought to be low  The decision to discontinue home isolation precautions should be made on a case-by-case basis, in consultation with healthcare providers and [facility] and [facility] health departments  For more information: www cdc gov/UPLEV05HY 649625-Y 02/24/2020       Stay home when you are sick,except to get medical care  Wash your hands often with soap and water for at least 20 seconds  Cover your cough or sneeze with a tissue, then throw the tissue in the trash  Clean and disinfect frequently touched objects and surfaces  Avoid touching your eyes, nose, and mouth  STOP THE SPREAD OF GERMS  For more information: www cdc gov/COVID19 Avoid close contact with people who are sick  Help prevent the spread of respiratory diseases like COVID-19

## 2021-03-04 NOTE — BH TRANSITION RECORD
Contact Information: If you have any questions, concerns, pended studies, tests and/or procedures, or emergencies regarding your inpatient behavioral health visit  Please contact [address]  behavioral health unit 3B [phone] and ask to speak to a , nurse or physician  A contact is available 24 hours/ 7 days a week at this number  Summary of Procedures Performed During your Stay:  Below is a list of major procedures performed during your hospital stay and a summary of results:  - Cardiac Procedures/Studies: EKG 02/06/2021:  Normal sinus rhythm,   If studies are pending at discharge, follow up with your PCP and/or referring provider

## 2021-03-04 NOTE — PROGRESS NOTES
Occupational Therapy Student Group Note    Occupational Therapy Student Group Note    Attended partial duration of community meeting  Pleasant and bright on approach  Interacted appropriately with group facilitator and peers  Continues to be off topic at times but able to engage in conversation and is redirectable   Stated goal as, "talk to my mother today "

## 2021-03-05 ENCOUNTER — TELEPHONE (OUTPATIENT)
Dept: PSYCHIATRY | Facility: CLINIC | Age: 61
End: 2021-03-05

## 2021-03-05 VITALS
RESPIRATION RATE: 16 BRPM | SYSTOLIC BLOOD PRESSURE: 151 MMHG | WEIGHT: 211.6 LBS | HEIGHT: 69 IN | DIASTOLIC BLOOD PRESSURE: 90 MMHG | BODY MASS INDEX: 31.34 KG/M2 | OXYGEN SATURATION: 97 % | HEART RATE: 80 BPM | TEMPERATURE: 98.6 F

## 2021-03-05 LAB — VALPROATE SERPL-MCNC: <10 UG/ML (ref 50–120)

## 2021-03-05 PROCEDURE — 99238 HOSP IP/OBS DSCHRG MGMT 30/<: CPT | Performed by: PSYCHIATRY & NEUROLOGY

## 2021-03-05 RX ORDER — LITHIUM CARBONATE 150 MG/1
750 CAPSULE ORAL 2 TIMES DAILY WITH MEALS
Qty: 300 CAPSULE | Refills: 0 | Status: SHIPPED | OUTPATIENT
Start: 2021-03-05 | End: 2021-07-16 | Stop reason: HOSPADM

## 2021-03-05 RX ORDER — TAMSULOSIN HYDROCHLORIDE 0.4 MG/1
0.4 CAPSULE ORAL
Qty: 30 CAPSULE | Refills: 0 | Status: SHIPPED | OUTPATIENT
Start: 2021-03-05 | End: 2021-04-04

## 2021-03-05 RX ORDER — OLANZAPINE 10 MG/1
10 TABLET ORAL 3 TIMES DAILY
Qty: 90 TABLET | Refills: 0 | Status: SHIPPED | OUTPATIENT
Start: 2021-03-05 | End: 2021-07-16 | Stop reason: HOSPADM

## 2021-03-05 RX ORDER — LORAZEPAM 1 MG/1
1 TABLET ORAL 2 TIMES DAILY
Qty: 20 TABLET | Refills: 0 | Status: SHIPPED | OUTPATIENT
Start: 2021-03-05 | End: 2021-07-16 | Stop reason: HOSPADM

## 2021-03-05 RX ORDER — DIVALPROEX SODIUM 250 MG/1
250 TABLET, DELAYED RELEASE ORAL EVERY 8 HOURS SCHEDULED
Qty: 90 TABLET | Refills: 0 | Status: SHIPPED | OUTPATIENT
Start: 2021-03-05 | End: 2021-07-16 | Stop reason: HOSPADM

## 2021-03-05 RX ORDER — MELATONIN
2000 DAILY
Qty: 60 TABLET | Refills: 0 | Status: SHIPPED | OUTPATIENT
Start: 2021-03-06 | End: 2021-04-05

## 2021-03-05 RX ORDER — METOPROLOL SUCCINATE 25 MG/1
25 TABLET, EXTENDED RELEASE ORAL DAILY
Qty: 30 TABLET | Refills: 0 | Status: SHIPPED | OUTPATIENT
Start: 2021-03-05 | End: 2021-04-04

## 2021-03-05 RX ORDER — LANOLIN ALCOHOL/MO/W.PET/CERES
3 CREAM (GRAM) TOPICAL
Qty: 30 TABLET | Refills: 0 | Status: SHIPPED | OUTPATIENT
Start: 2021-03-05 | End: 2021-04-04

## 2021-03-05 RX ORDER — MINERAL OIL, WHITE PETROLATUM .03; .94 G/G; G/G
1 OINTMENT OPHTHALMIC
Qty: 1 TUBE | Refills: 0 | Status: SHIPPED | OUTPATIENT
Start: 2021-03-05 | End: 2021-04-04

## 2021-03-05 RX ADMIN — LORAZEPAM 1 MG: 1 TABLET ORAL at 12:36

## 2021-03-05 RX ADMIN — LITHIUM CARBONATE 750 MG: 300 CAPSULE, GELATIN COATED ORAL at 08:54

## 2021-03-05 RX ADMIN — Medication: at 09:14

## 2021-03-05 RX ADMIN — OLANZAPINE 10 MG: 10 TABLET, FILM COATED ORAL at 08:54

## 2021-03-05 NOTE — PROGRESS NOTES
03/05/21 0930   Activity/Group Checklist   Group   (recovery group)   Attendance Attended   Attendance Duration (min) 16-30   Interactions Interacted appropriately   Affect/Mood Appropriate  (irritable)   Goals Achieved Able to listen to others; Able to engage in interactions; Able to self-disclose

## 2021-03-05 NOTE — SOCIAL WORK
Pt is being discharged today, escorted home by a friend  Discharge address [address]  Pt has a follow-up with the [facility] in [facility] on 3/17 at 1:30pm via phone  Pt left with scripts  Pt denies all symptoms and in agreement with discharge

## 2021-03-05 NOTE — PROGRESS NOTES
03/05/21 1100   Activity/Group Checklist   Group   (recovery group)   Attendance Attended   Attendance Duration (min) 46-60   Interactions Interacted appropriately   Affect/Mood Appropriate   Goals Achieved Able to listen to others; Able to engage in interactions; Able to self-disclose; Able to recieve feedback; Able to give feedback to another   Patient's peers shared that he has had a positive impact on them; his affect brightened everytime

## 2021-03-05 NOTE — PROGRESS NOTES
03/05/21 0732   Team Meeting   Meeting Type Daily Rounds   Team Members Present   Team Members Present Physician;Nurse;   Physician Team Member Dr Christofer Mcdonough Team Member [facility] Management Team Member Shayna   Patient/Family Present   Patient Present No   Patient's Family Present No   Pt to be discharged today at [id number] with his mother, [facility]

## 2021-03-05 NOTE — TREATMENT TEAM
03/04/21 1400   Activity/Group Checklist   Group Other (Comment)  (Surviving Trauma Group/Education, Open Discussion)   Attendance Attended   Attendance Duration (min) 0-15  (In and out)   Interactions Interacted appropriately

## 2021-03-05 NOTE — NURSING NOTE
Patient stated his friend was coming to pick him up at 9:30  Explained to patient that his mother was supposed to pick him up between 3:30 and 4:00p  Patient became upset about this stating he "cant ride in a car with his mother because if she says on thing, I might blow "  He also became upset because no one talked to him about a discharge plan  Patient eventually calmed down when I explained I would refer this information to case management

## 2021-03-05 NOTE — PROGRESS NOTES
Met with patient to complete his Ders and relapse prevention  He appears to lack insight into why crisis is called and he is hospitalized  After our second attempt he realized he begins to argue and scream at his family as a warning sign  He lacks insight into his mental health and easily gets agitated with loud voice  If one does not respond, he will calm down quicker  He enjoys the idea people are afraid of him  Patient to be discharged today

## 2021-03-05 NOTE — SOCIAL WORK
[facility] on [facility]/17 at 1:30pm with Dr Marco A Son via phone

## 2021-03-05 NOTE — NURSING NOTE
Patient states he is ready for discharge  Patient was picked up by a "friend "  He did not want his mother to pick him up    Patient left with his belongings and blue folder containing AVS  His prescriptions were e-scribed

## 2021-03-05 NOTE — PLAN OF CARE
Problem: PSYCHOSIS  Goal: Will report no hallucinations or delusions  Description: Interventions:  - Administer medication as  ordered  - Every waking shifts and PRN assess for the presence of hallucinations and or delusions  - Assist with reality testing to support increasing orientation  - Assess if patient's hallucinations or delusions are encouraging self-harm or harm to others and intervene as appropriate  3/5/2021 0958 by Wendy Simon RN  Outcome: Completed  3/5/2021 0958 by Wendy Simon RN  Outcome: Progressing     Problem: SELF CARE DEFICIT  Goal: Return ADL status to a safe level of function  Description: INTERVENTIONS:  - Administer medication as ordered  - Assess ADL deficits and provide assistive devices as needed  - Obtain PT/OT consults as needed  - Assist and instruct patient to increase activity and self care as tolerated  3/5/2021 0958 by Wendy Simon RN  Outcome: Completed  3/5/2021 0958 by Wendy Simon RN  Outcome: Progressing     Problem: Ineffective Coping  Goal: Participates in unit activities  Description: Interventions:  - Provide therapeutic environment   - Provide required programming   - Redirect inappropriate behaviors   3/5/2021 0958 by Wendy Simon RN  Outcome: Completed  3/5/2021 0958 by Wendy Siomn RN  Outcome: Progressing     Problem: DISCHARGE PLANNING  Goal: Discharge to home or other facility with appropriate resources  Description: INTERVENTIONS:  - Identify barriers to discharge w/patient and caregiver  - Arrange for needed discharge resources and transportation as appropriate  - Identify discharge learning needs (meds, wound care, etc )  - Arrange for interpretive services to assist at discharge as needed  - Refer to Case Management Department for coordinating discharge planning if the patient needs post-hospital services based on physician/advanced practitioner order or complex needs related to functional status, cognitive ability, or social support system  3/5/2021 0958 by Florencia Hammer, RN  Outcome: Completed  3/5/2021 0958 by Florencia Hammer RN  Outcome: Progressing     Problem: Risk for Violence/Aggression Toward Others  Goal: Treatment Goal: Refrain from acts of violence/aggression during length of stay, and demonstrate improved impulse control at the time of discharge  3/5/2021 0958 by Florencia Hammer RN  Outcome: Completed  3/5/2021 0958 by Florencia Hammer RN  Outcome: Progressing

## 2021-06-18 ENCOUNTER — HOSPITAL ENCOUNTER (INPATIENT)
Facility: HOSPITAL | Age: 61
LOS: 27 days | Discharge: HOME | DRG: 885 | End: 2021-07-16
Attending: EMERGENCY MEDICINE | Admitting: HOSPITALIST
Payer: MEDICARE

## 2021-06-18 DIAGNOSIS — F25.0 SCHIZOAFFECTIVE DISORDER, BIPOLAR TYPE (HCC): Primary | ICD-10-CM

## 2021-06-18 DIAGNOSIS — F29 PSYCHOSES (HCC): ICD-10-CM

## 2021-06-18 LAB
ALBUMIN SERPL BCP-MCNC: 3.9 G/DL (ref 3.5–5.7)
ALP SERPL-CCNC: 40 U/L (ref 55–165)
ALT SERPL W P-5'-P-CCNC: 21 U/L (ref 7–52)
ANION GAP SERPL CALCULATED.3IONS-SCNC: 9 MMOL/L (ref 4–13)
AST SERPL W P-5'-P-CCNC: 26 U/L (ref 13–39)
BASOPHILS # BLD AUTO: 0 THOUSANDS/ΜL (ref 0–0.1)
BASOPHILS NFR BLD AUTO: 0 % (ref 0–2)
BILIRUB SERPL-MCNC: 1 MG/DL (ref 0.2–1)
BUN SERPL-MCNC: 12 MG/DL (ref 7–25)
CALCIUM SERPL-MCNC: 9.2 MG/DL (ref 8.6–10.5)
CHLORIDE SERPL-SCNC: 109 MMOL/L (ref 98–107)
CO2 SERPL-SCNC: 26 MMOL/L (ref 21–31)
CREAT SERPL-MCNC: 0.97 MG/DL (ref 0.7–1.3)
EOSINOPHIL # BLD AUTO: 0 THOUSAND/ΜL (ref 0–0.61)
EOSINOPHIL NFR BLD AUTO: 1 % (ref 0–5)
ERYTHROCYTE [DISTWIDTH] IN BLOOD BY AUTOMATED COUNT: 14.3 % (ref 11.5–14.5)
ETHANOL SERPL-MCNC: <10 MG/DL
GFR SERPL CREATININE-BSD FRML MDRD: 83 ML/MIN/1.73SQ M
GLUCOSE SERPL-MCNC: 126 MG/DL (ref 65–140)
GLUCOSE SERPL-MCNC: 126 MG/DL (ref 65–99)
HCT VFR BLD AUTO: 38 % (ref 42–47)
HGB BLD-MCNC: 12.7 G/DL (ref 14–18)
LITHIUM SERPL-SCNC: 0.2 MMOL/L (ref 1–1.2)
LYMPHOCYTES # BLD AUTO: 1 THOUSANDS/ΜL (ref 0.6–4.47)
LYMPHOCYTES NFR BLD AUTO: 18 % (ref 21–51)
MCH RBC QN AUTO: 29.9 PG (ref 26–34)
MCHC RBC AUTO-ENTMCNC: 33.5 G/DL (ref 31–37)
MCV RBC AUTO: 89 FL (ref 81–99)
MONOCYTES # BLD AUTO: 0.4 THOUSAND/ΜL (ref 0.17–1.22)
MONOCYTES NFR BLD AUTO: 7 % (ref 2–12)
NEUTROPHILS # BLD AUTO: 4.1 THOUSANDS/ΜL (ref 1.4–6.5)
NEUTS SEG NFR BLD AUTO: 74 % (ref 42–75)
PLATELET # BLD AUTO: 196 THOUSANDS/UL (ref 149–390)
PMV BLD AUTO: 8.5 FL (ref 8.6–11.7)
POTASSIUM SERPL-SCNC: 3 MMOL/L (ref 3.5–5.5)
PROT SERPL-MCNC: 5.9 G/DL (ref 6.4–8.9)
RBC # BLD AUTO: 4.26 MILLION/UL (ref 4.3–5.9)
SARS-COV-2 RNA RESP QL NAA+PROBE: NEGATIVE
SODIUM SERPL-SCNC: 144 MMOL/L (ref 134–143)
TSH SERPL DL<=0.05 MIU/L-ACNC: 1.78 UIU/ML (ref 0.45–5.33)
VALPROATE SERPL-MCNC: <10 UG/ML (ref 50–125)
WBC # BLD AUTO: 5.5 THOUSAND/UL (ref 4.8–10.8)

## 2021-06-18 PROCEDURE — U0003 INFECTIOUS AGENT DETECTION BY NUCLEIC ACID (DNA OR RNA); SEVERE ACUTE RESPIRATORY SYNDROME CORONAVIRUS 2 (SARS-COV-2) (CORONAVIRUS DISEASE [COVID-19]), AMPLIFIED PROBE TECHNIQUE, MAKING USE OF HIGH THROUGHPUT TECHNOLOGIES AS DESCRIBED BY CMS-2020-01-R: HCPCS | Performed by: EMERGENCY MEDICINE

## 2021-06-18 PROCEDURE — 85025 COMPLETE CBC W/AUTO DIFF WBC: CPT | Performed by: EMERGENCY MEDICINE

## 2021-06-18 PROCEDURE — 36415 COLL VENOUS BLD VENIPUNCTURE: CPT | Performed by: EMERGENCY MEDICINE

## 2021-06-18 PROCEDURE — 80164 ASSAY DIPROPYLACETIC ACD TOT: CPT | Performed by: EMERGENCY MEDICINE

## 2021-06-18 PROCEDURE — 80053 COMPREHEN METABOLIC PANEL: CPT | Performed by: EMERGENCY MEDICINE

## 2021-06-18 PROCEDURE — 82077 ASSAY SPEC XCP UR&BREATH IA: CPT | Performed by: EMERGENCY MEDICINE

## 2021-06-18 PROCEDURE — 93005 ELECTROCARDIOGRAM TRACING: CPT

## 2021-06-18 PROCEDURE — 84443 ASSAY THYROID STIM HORMONE: CPT | Performed by: EMERGENCY MEDICINE

## 2021-06-18 PROCEDURE — 99285 EMERGENCY DEPT VISIT HI MDM: CPT

## 2021-06-18 PROCEDURE — U0005 INFEC AGEN DETEC AMPLI PROBE: HCPCS | Performed by: EMERGENCY MEDICINE

## 2021-06-18 PROCEDURE — 99285 EMERGENCY DEPT VISIT HI MDM: CPT | Performed by: EMERGENCY MEDICINE

## 2021-06-18 PROCEDURE — 96366 THER/PROPH/DIAG IV INF ADDON: CPT

## 2021-06-18 PROCEDURE — 80178 ASSAY OF LITHIUM: CPT | Performed by: EMERGENCY MEDICINE

## 2021-06-18 PROCEDURE — 96365 THER/PROPH/DIAG IV INF INIT: CPT

## 2021-06-18 PROCEDURE — 82948 REAGENT STRIP/BLOOD GLUCOSE: CPT

## 2021-06-18 RX ORDER — MIDAZOLAM HYDROCHLORIDE 2 MG/2ML
5 INJECTION, SOLUTION INTRAMUSCULAR; INTRAVENOUS ONCE
Status: COMPLETED | OUTPATIENT
Start: 2021-06-18 | End: 2021-06-18

## 2021-06-18 RX ORDER — METHYLPREDNISOLONE SODIUM SUCCINATE 125 MG/2ML
125 INJECTION, POWDER, LYOPHILIZED, FOR SOLUTION INTRAMUSCULAR; INTRAVENOUS ONCE
Status: DISCONTINUED | OUTPATIENT
Start: 2021-06-18 | End: 2021-06-18

## 2021-06-18 RX ORDER — LORAZEPAM 2 MG/ML
2 INJECTION INTRAMUSCULAR ONCE
Status: COMPLETED | OUTPATIENT
Start: 2021-06-19 | End: 2021-06-19

## 2021-06-18 RX ORDER — EPINEPHRINE 1 MG/ML
0.3 INJECTION, SOLUTION, CONCENTRATE INTRAVENOUS ONCE
Status: DISCONTINUED | OUTPATIENT
Start: 2021-06-18 | End: 2021-06-18

## 2021-06-18 RX ORDER — POTASSIUM CHLORIDE 14.9 MG/ML
20 INJECTION INTRAVENOUS ONCE
Status: COMPLETED | OUTPATIENT
Start: 2021-06-18 | End: 2021-06-19

## 2021-06-18 RX ORDER — DIPHENHYDRAMINE HYDROCHLORIDE 50 MG/ML
50 INJECTION INTRAMUSCULAR; INTRAVENOUS ONCE
Status: DISCONTINUED | OUTPATIENT
Start: 2021-06-18 | End: 2021-06-18

## 2021-06-18 RX ADMIN — POTASSIUM CHLORIDE 20 MEQ: 14.9 INJECTION, SOLUTION INTRAVENOUS at 22:41

## 2021-06-18 RX ADMIN — MIDAZOLAM HYDROCHLORIDE 5 MG: 1 INJECTION, SOLUTION INTRAMUSCULAR; INTRAVENOUS at 21:00

## 2021-06-19 LAB
AMPHETAMINES SERPL QL SCN: NEGATIVE
ATRIAL RATE: 87 BPM
BARBITURATES UR QL: NEGATIVE
BENZODIAZ UR QL: POSITIVE
COCAINE UR QL: NEGATIVE
METHADONE UR QL: NEGATIVE
OPIATES UR QL SCN: NEGATIVE
OXYCODONE+OXYMORPHONE UR QL SCN: NEGATIVE
P AXIS: 80 DEGREES
PCP UR QL: NEGATIVE
POTASSIUM SERPL-SCNC: 3.5 MMOL/L (ref 3.5–5.5)
PR INTERVAL: 160 MS
QRS AXIS: -56 DEGREES
QRSD INTERVAL: 98 MS
QT INTERVAL: 420 MS
QTC INTERVAL: 505 MS
T WAVE AXIS: 59 DEGREES
THC UR QL: POSITIVE
VENTRICULAR RATE: 87 BPM

## 2021-06-19 PROCEDURE — 93010 ELECTROCARDIOGRAM REPORT: CPT | Performed by: INTERNAL MEDICINE

## 2021-06-19 PROCEDURE — 96366 THER/PROPH/DIAG IV INF ADDON: CPT

## 2021-06-19 PROCEDURE — 96376 TX/PRO/DX INJ SAME DRUG ADON: CPT

## 2021-06-19 PROCEDURE — 36415 COLL VENOUS BLD VENIPUNCTURE: CPT | Performed by: EMERGENCY MEDICINE

## 2021-06-19 PROCEDURE — 96375 TX/PRO/DX INJ NEW DRUG ADDON: CPT

## 2021-06-19 PROCEDURE — 80307 DRUG TEST PRSMV CHEM ANLYZR: CPT | Performed by: EMERGENCY MEDICINE

## 2021-06-19 PROCEDURE — 84132 ASSAY OF SERUM POTASSIUM: CPT | Performed by: EMERGENCY MEDICINE

## 2021-06-19 RX ORDER — LANOLIN ALCOHOL/MO/W.PET/CERES
3 CREAM (GRAM) TOPICAL
Status: DISCONTINUED | OUTPATIENT
Start: 2021-06-19 | End: 2021-07-16 | Stop reason: HOSPADM

## 2021-06-19 RX ORDER — OLANZAPINE 10 MG/1
10 TABLET ORAL EVERY 8 HOURS PRN
Status: DISCONTINUED | OUTPATIENT
Start: 2021-06-19 | End: 2021-07-16 | Stop reason: HOSPADM

## 2021-06-19 RX ORDER — ACETAMINOPHEN 325 MG/1
650 TABLET ORAL EVERY 4 HOURS PRN
Status: DISCONTINUED | OUTPATIENT
Start: 2021-06-19 | End: 2021-07-16 | Stop reason: HOSPADM

## 2021-06-19 RX ORDER — OLANZAPINE 10 MG/1
5 INJECTION, POWDER, LYOPHILIZED, FOR SOLUTION INTRAMUSCULAR EVERY 4 HOURS PRN
Status: DISCONTINUED | OUTPATIENT
Start: 2021-06-19 | End: 2021-07-16 | Stop reason: HOSPADM

## 2021-06-19 RX ORDER — AMOXICILLIN 250 MG
1 CAPSULE ORAL DAILY PRN
Status: DISCONTINUED | OUTPATIENT
Start: 2021-06-19 | End: 2021-07-16 | Stop reason: HOSPADM

## 2021-06-19 RX ORDER — POLYETHYLENE GLYCOL 3350 17 G/17G
17 POWDER, FOR SOLUTION ORAL DAILY PRN
Status: DISCONTINUED | OUTPATIENT
Start: 2021-06-19 | End: 2021-07-16 | Stop reason: HOSPADM

## 2021-06-19 RX ORDER — OLANZAPINE 10 MG/1
10 INJECTION, POWDER, LYOPHILIZED, FOR SOLUTION INTRAMUSCULAR EVERY 8 HOURS PRN
Status: DISCONTINUED | OUTPATIENT
Start: 2021-06-19 | End: 2021-07-16 | Stop reason: HOSPADM

## 2021-06-19 RX ORDER — LORAZEPAM 2 MG/ML
2 INJECTION INTRAMUSCULAR ONCE
Status: COMPLETED | OUTPATIENT
Start: 2021-06-19 | End: 2021-06-19

## 2021-06-19 RX ORDER — ACETAMINOPHEN 325 MG/1
975 TABLET ORAL EVERY 6 HOURS PRN
Status: DISCONTINUED | OUTPATIENT
Start: 2021-06-19 | End: 2021-07-16 | Stop reason: HOSPADM

## 2021-06-19 RX ORDER — BENZTROPINE MESYLATE 1 MG/1
1 TABLET ORAL
Status: DISCONTINUED | OUTPATIENT
Start: 2021-06-19 | End: 2021-07-16 | Stop reason: HOSPADM

## 2021-06-19 RX ORDER — POTASSIUM CHLORIDE 14.9 MG/ML
20 INJECTION INTRAVENOUS ONCE
Status: COMPLETED | OUTPATIENT
Start: 2021-06-19 | End: 2021-06-19

## 2021-06-19 RX ORDER — ACETAMINOPHEN 325 MG/1
650 TABLET ORAL EVERY 6 HOURS PRN
Status: DISCONTINUED | OUTPATIENT
Start: 2021-06-19 | End: 2021-07-16 | Stop reason: HOSPADM

## 2021-06-19 RX ORDER — OLANZAPINE 5 MG/1
5 TABLET ORAL EVERY 4 HOURS PRN
Status: DISCONTINUED | OUTPATIENT
Start: 2021-06-19 | End: 2021-07-16 | Stop reason: HOSPADM

## 2021-06-19 RX ORDER — LORAZEPAM 2 MG/ML
1 INJECTION INTRAMUSCULAR EVERY 4 HOURS PRN
Status: DISCONTINUED | OUTPATIENT
Start: 2021-06-19 | End: 2021-07-16 | Stop reason: HOSPADM

## 2021-06-19 RX ORDER — BENZTROPINE MESYLATE 1 MG/ML
1 INJECTION INTRAMUSCULAR; INTRAVENOUS
Status: DISCONTINUED | OUTPATIENT
Start: 2021-06-19 | End: 2021-07-16 | Stop reason: HOSPADM

## 2021-06-19 RX ORDER — LORAZEPAM 0.5 MG/1
0.5 TABLET ORAL EVERY 6 HOURS PRN
Status: DISCONTINUED | OUTPATIENT
Start: 2021-06-19 | End: 2021-07-16 | Stop reason: HOSPADM

## 2021-06-19 RX ORDER — HYDROXYZINE HYDROCHLORIDE 25 MG/1
50 TABLET, FILM COATED ORAL
Status: DISCONTINUED | OUTPATIENT
Start: 2021-06-19 | End: 2021-07-16 | Stop reason: HOSPADM

## 2021-06-19 RX ORDER — LORAZEPAM 2 MG/ML
2 INJECTION INTRAMUSCULAR
Status: DISCONTINUED | OUTPATIENT
Start: 2021-06-19 | End: 2021-07-16 | Stop reason: HOSPADM

## 2021-06-19 RX ORDER — LORAZEPAM 1 MG/1
1 TABLET ORAL
Status: DISCONTINUED | OUTPATIENT
Start: 2021-06-19 | End: 2021-07-16 | Stop reason: HOSPADM

## 2021-06-19 RX ORDER — OLANZAPINE 2.5 MG/1
2.5 TABLET ORAL EVERY 4 HOURS PRN
Status: DISCONTINUED | OUTPATIENT
Start: 2021-06-19 | End: 2021-07-16 | Stop reason: HOSPADM

## 2021-06-19 RX ORDER — MAGNESIUM HYDROXIDE/ALUMINUM HYDROXICE/SIMETHICONE 120; 1200; 1200 MG/30ML; MG/30ML; MG/30ML
30 SUSPENSION ORAL EVERY 4 HOURS PRN
Status: DISCONTINUED | OUTPATIENT
Start: 2021-06-19 | End: 2021-07-16 | Stop reason: HOSPADM

## 2021-06-19 RX ORDER — HYDROXYZINE HYDROCHLORIDE 25 MG/1
25 TABLET, FILM COATED ORAL
Status: DISCONTINUED | OUTPATIENT
Start: 2021-06-19 | End: 2021-07-16 | Stop reason: HOSPADM

## 2021-06-19 RX ADMIN — LORAZEPAM 2 MG: 2 INJECTION INTRAMUSCULAR; INTRAVENOUS at 00:02

## 2021-06-19 RX ADMIN — LORAZEPAM 1 MG: 1 TABLET ORAL at 19:17

## 2021-06-19 RX ADMIN — POTASSIUM CHLORIDE 20 MEQ: 14.9 INJECTION, SOLUTION INTRAVENOUS at 02:06

## 2021-06-19 RX ADMIN — LORAZEPAM 2 MG: 2 INJECTION INTRAMUSCULAR; INTRAVENOUS at 09:00

## 2021-06-19 NOTE — ED NOTES
Patient was taken out or 2-points restraints   to go to the bathroom to have a BM  Patient was calm cooperative   The whole time and was returned to bed and placed back in 2-points with any issues  Connie Pardo from [facility] was also here standing by while patient was in the bathroom and returned to bed   And placed back into 2-points      Ish Baez  06/19/21 9254

## 2021-06-19 NOTE — NURSING NOTE
Pt arrived on 302 involuntary admission after being brought to [facility] ED by police after fighting with family  Pt non compliant with medications prior to admission  Poor sleep  Sabianism preoccupation  Impulsive unpredictable behaviors  When patient arrived to unit skin assessment completed  Excessive long shower on admission but was redirectable by staff  Poor focus  Easily distracted  Unwilling to sign admission paperwork  Orientated to unit  Safety precautions in place  Will continue to monitor and assess

## 2021-06-19 NOTE — PLAN OF CARE
Problem: Alteration in Thoughts and Perception  Goal: Verbalize thoughts and feelings  Description: Interventions:  - Promote a nonjudgmental and trusting relationship with the patient through active listening and therapeutic communication  - Assess patient's level of functioning, behavior and potential for risk  - Engage patient in 1 on 1 interactions  - Encourage patient to express fears, feelings, frustrations, and discuss symptoms    - [place] patient to reality, help patient recognize reality-based thinking   - Administer medications as ordered and assess for potential side effects  - Provide the patient education related to the signs and symptoms of the illness and desired effects of prescribed medications  Outcome: Progressing  Goal: Refrain from acting on delusional thinking/internal stimuli  Description: Interventions:  - Monitor patient closely, per order   - Utilize least restrictive measures   - Set reasonable limits, give positive feedback for acceptable   - Administer medications as ordered and monitor of potential side effects  Outcome: Progressing  Goal: Agree to be compliant with medication regime, as prescribed and report medication side effects  Description: Interventions:  - Offer appropriate PRN medication and supervise ingestion; conduct AIMS, as needed   Outcome: Progressing  Goal: Recognize dysfunctional thoughts, communicate reality-based thoughts at the time of discharge  Description: Interventions:  - Provide medication and psycho-education to assist patient in compliance and developing insight into his/her illness   Outcome: Progressing  Goal: Complete daily ADLs, including personal hygiene independently, as able  Description: Interventions:  - Observe, teach, and assist patient with ADLS  - Monitor and promote a balance of rest/activity, with adequate nutrition and elimination   Outcome: Progressing     Problem: Ineffective Coping  Goal: Cooperates with admission process  Description: Interventions:   - Complete admission process  Outcome: Progressing  Goal: Identifies ineffective coping skills  Outcome: Progressing  Goal: Identifies healthy coping skills  Outcome: Progressing  Goal: Demonstrates healthy coping skills  Outcome: Progressing  Goal: Patient/Family participate in treatment and DC plans  Description: Interventions:  - Provide therapeutic environment  Outcome: Progressing  Goal: Patient/Family verbalizes awareness of resources  Outcome: Progressing  Goal: Understands least restrictive measures  Description: Interventions:  - Utilize least restrictive behavior  Outcome: Progressing  Goal: Free from restraint events  Description: - Utilize least restrictive measures   - Provide behavioral interventions   - Redirect inappropriate behaviors   Outcome: Progressing     Problem: Anxiety  Goal: Anxiety is at manageable level  Description: Interventions:  - Assess and monitor patient's anxiety level  - Monitor for signs and symptoms (heart palpitations, chest pain, shortness of breath, headaches, nausea, feeling jumpy, restlessness, irritable, apprehensive)  - Collaborate with interdisciplinary team and initiate plan and interventions as ordered    - [place] patient to unit/surroundings  - Explain treatment plan  - Encourage participation in care  - Encourage verbalization of concerns/fears  - Identify coping mechanisms  - Assist in developing anxiety-reducing skills  - Administer/offer alternative therapies  - Limit or eliminate stimulants  Outcome: Progressing     Problem: Risk for Violence/Aggression Toward Others  Goal: Control angry outbursts  Description: Interventions:  - Monitor patient closely, per order  - Ensure early verbal de-escalation  - Monitor prn medication needs  - Set reasonable/therapeutic limits, outline behavioral expectations, and consequences   - Provide a non-threatening milieu, utilizing the least restrictive interventions   Outcome: Progressing  Goal: Attend and participate in unit activities, including therapeutic, recreational, and educational groups  Description: Interventions:  - Provide therapeutic and educational activities daily, encourage attendance and participation, and document same in the medical record   Outcome: Progressing  Goal: Identify appropriate positive anger management techniques  Description: Interventions:  - Offer anger management and coping skills groups   - Staff will provide positive feedback for appropriate anger control  Outcome: Progressing     Problem: Alteration in Orientation  Goal: Interact with staff daily  Description: Interventions:  - Assess and re-assess patient's level of orientation  - Engage patient in 1 on 1 interactions, daily, for a minimum of 15 minutes   - Establish rapport/trust with patient   Outcome: Progressing  Goal: Express concerns related to confused thinking related to:  Description: Interventions:  - Encourage patient to express feelings, fears, frustrations, hopes  - Assign consistent caregivers   - [place]/re-orient patient as needed  - Allow comfort items, as appropriate  - Provide visual cues, signs, etc    Outcome: Progressing  Goal: Allow medical examinations, as recommended  Description: Interventions:  - Provide physical/neurological exams and/or referrals, per provider   Outcome: Progressing  Goal: Cooperate with recommended testing/procedures  Description: Interventions:  - Determine need for ancillary testing  - Observe for mental status changes  - Implement falls/precaution protocol   Outcome: Progressing     Problem: Individualized Interventions  Goal: Patient will verbalize appropriate use of telephone within 5 days  Description: Interventions:  - Treatment team to determine use of supervised phone privileges   Outcome: Progressing  Goal: Patient will verbalize need for hospitalization and will no longer attempt elopement within 5 days  Description: Interventions:  - Ongoing education to help patient understand need for hospitalization  Outcome: Progressing  Goal: Patient will recognize inappropriate behaviors and develop alternative behaviors within 5 days  Description: Interventions:  - Patient in collaboration with Treatment Team will develop a behavior management plan to help identify effective coping skills to deal with stressors  Outcome: Progressing

## 2021-06-19 NOTE — ED NOTES
Pt thrashing around bed ,  Screaming and cursing at staff  Pt grasping for IV line and monitor wires , biting at restraints yelling intelligibly  Provider aware awaiting orders        Flor Beltrán RN  06/19/21 9839

## 2021-06-19 NOTE — ED NOTES
CW met with patient and attempted to completed assessment and risk assessment  Patient presented to the ED via police due to fighting with his family increased psychosis and responding to internal stimuli  Patient is non medication compliant and preoccupied with [employer]  Patient is denying suicidal and homicidal ideations  Patient is actively yelling and becomes belligerent  Patient was offered a 201 and is not able to understand what that is at this time  Patient yelling give me the "F in ativan"  Patients 302 was angela Barber

## 2021-06-19 NOTE — ED NOTES
Bridget Suicide Risk Assessment deferred, as unable to assess while patient sleeping  Behavioral Health Assessment deferred as patient is sleeping and would benefit from additional rest   Vital signs deferred until patient awake, no signs or symptoms of respiratory distress at this time  Once patient is awake and able to participate, will complete assessments         Darryl Osullivan RN  06/19/21 9763

## 2021-06-19 NOTE — ED NOTES
Bridget Suicide Risk Assessment deferred, as unable to assess while patient sleeping  Behavioral Health Assessment deferred as patient is sleeping and would benefit from additional rest   Vital signs deferred until patient awake, no signs or symptoms of respiratory distress at this time  Once patient is awake and able to participate, will complete assessments         Deanne Toth RN  06/18/21 7834

## 2021-06-19 NOTE — ED NOTES
Pt eating breakfast tray, holding provided lunch tray  Pt stated he doesn't want the lunch one too at this time       Rebbecca Severin  06/19/21 8841

## 2021-06-19 NOTE — ED NOTES
Patient taken out of all Restraints at this time    Patient resting quietly at this time awaiting addmisson to [facility] at [address] [place] [address]  06/19/21 5352

## 2021-06-19 NOTE — ED NOTES
Patient bedding changed due to Patient using the bed pan and half  went in the bedpan and the other half went bed [facility] and cleaned up [address] and ED tech Lilia Busing Patient was calm and cooperative while getting cleaned and bedding changed  Patient was released one arm and leg at a time and placed back into 4-points restraints   And remains calm       Michael De La Cruz  06/19/21 5178

## 2021-06-19 NOTE — PROGRESS NOTES
Pt was becoming increasingly anxious, restless, and agitated  Pt yelling down hallway and slamming drawers in room  Pt needed PRN Ativan 1 mg @ 1917  Will continue to monitor
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
## 2021-06-19 NOTE — RESTRAINT FACE TO FACE
Restraint Face to Face   [place] 59 y o  male MRN: [id number]  Unit/Bed#: SH 01 Encounter: [id number]      Physical Evaluation: Fair, sedated  Purpose for Restraints/ Seclusion High risk for self harm, High risk for causing significant disruption of treatment environment , High risk for harm to others and high risk for flight  Patient's reaction to the intervention: [address]  Patient's medical condition: Fair  Patient's Behavioral condition: Fair  Restraints to be Continued
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
## 2021-06-20 PROCEDURE — 99222 1ST HOSP IP/OBS MODERATE 55: CPT | Performed by: PSYCHIATRY & NEUROLOGY

## 2021-06-20 RX ORDER — LITHIUM CARBONATE 150 MG/1
150 CAPSULE ORAL 2 TIMES DAILY WITH MEALS
Status: DISCONTINUED | OUTPATIENT
Start: 2021-06-20 | End: 2021-06-20

## 2021-06-20 RX ORDER — OLANZAPINE 2.5 MG/1
2.5 TABLET ORAL
Status: DISCONTINUED | OUTPATIENT
Start: 2021-06-20 | End: 2021-06-24

## 2021-06-20 RX ORDER — DIVALPROEX SODIUM 250 MG/1
250 TABLET, DELAYED RELEASE ORAL EVERY 8 HOURS SCHEDULED
Status: DISCONTINUED | OUTPATIENT
Start: 2021-06-20 | End: 2021-07-02

## 2021-06-20 RX ORDER — LITHIUM CARBONATE 300 MG/1
300 CAPSULE ORAL 2 TIMES DAILY WITH MEALS
Status: DISCONTINUED | OUTPATIENT
Start: 2021-06-20 | End: 2021-06-25

## 2021-06-20 RX ADMIN — LORAZEPAM 1 MG: 1 TABLET ORAL at 21:16

## 2021-06-20 RX ADMIN — LORAZEPAM 1 MG: 1 TABLET ORAL at 08:24

## 2021-06-20 RX ADMIN — LITHIUM CARBONATE 300 MG: 300 CAPSULE, GELATIN COATED ORAL at 17:29

## 2021-06-20 RX ADMIN — LORAZEPAM 1 MG: 1 TABLET ORAL at 17:32

## 2021-06-20 NOTE — H&P
Psychiatric Evaluation - [address] Day 59 y o  male MRN: [id number]  Unit/Bed#: -01 Encounter: [id number]    Assessment/Plan   Principal Problem:    Schizoaffective disorder, bipolar type (HCC)  Active Problems:    Cannabis abuse, continuous    Alcohol abuse, continuous    Hypertension    Tobacco abuse    BPH (benign prostatic hyperplasia)    Vitamin D insufficiency    Plan:   Risks, benefits and possible side effects of Medications:   Risks, benefits, and possible side effects of medications explained to patient and patient verbalizes understanding  1-Lithium 300mg bid  2-Depakote 250 mg q 8hrs   3-Zyprexa 2 5 mg at bedtime  4-Repeat EKG(Prolonged Q tc),Please obtain Mg,Ca  5-Medical consult  6-Unit Milue  7-Obtain Collateral information    Chief Complaint: "I don't want to talk about it"    History of Present Illness     Patient is a 59 y o  male presents with an extensive psychiatric hx notable for schizoaffective disorder with multiple previous psychiatric in-pt treatment including to [facility] brought in on a 302 petitioned by the police on account of disorganized,agaitated and combative behavior  Pt was apparently brought in handcuffs after the police had arrived ion the scene and found him fighting with multiple family members  During my evaluation with the patient he tells me that he had been recently hospitalized at a hospital in [place] called friends for about 2 weeks,he was discharged about 5 days ago and returned home,he admits to being non-compliant with his medications  He lost his keys to his house and could not get in,he then believed there had been people in the home stealing his things,he got into a fight with his family because he felt they had been complicit in it  When asked who had called the police,he states some "concerned citizen'  Pt denies auditory hallucinations but does present with a perplexed look to his fact that would strongly suggest that he is responding to internal stimuli,he also laughs inappropiately to himself theophylline pt struggles with limited insight and has a long history of poor medication compliance resulting in psychotic decompensation  Presently his mood is labile and impulsive and he admits to being easily agitated,he sleeps poorly at night with impulse control issues  Pt is prone to threatening and combative behavior  Pt denies anxiety related symptoms with no panic attacks  Pt admitted with a prolonged Qtc interval of 505  Syble Jose Patient was admitted to psychiatric unit on a involuntarily 302 commitment basis  Primary complaints include: psychotic decompensation and agitation  Onset of symptoms was abrupt starting 1 day ago with unchanged course since that time  Psychosocial Stressors: family and poor treatment compliance  Psychiatric Review Of Systems:  sleep: insomnia  appetite changes: no  weight changes: no  energy/anergy: yes  interest/pleasure/anhedonia: yes  somatic symptoms: yes  anxiety/panic: no  opal: yes  guilty/hopeless: no  self injurious behavior/risky behavior: yes    Historical Information     Past Psychiatric History: In Patient multiple previous psychiatric in-pt including to [facility],Recently discharged from as facility in [place]  Currently in treatment with denied    Past Suicide attempts: denied but a poor historian  Past Violent behavior: violently aggressive and combative,brought in by the police in [facility]  Past Psychiatric medication trial: Depakote,haldol,zyprexa,lithium,ativan,    Substance Abuse History:  E-Cigarette/Vaping    E-Cigarette Use Never User       E-Cigarette/Vaping Substances    Nicotine No     THC No     CBD No     Flavoring No     Other No     Unknown No        Social History     Tobacco History     Smoking Status  Current Every Day Smoker Smoking Frequency  1 pack/day for 40 years (40 pk yrs) Smoking Tobacco Type  Cigars    Smokeless Tobacco Use  Never Used Alcohol History     Alcohol Use Status  Yes Comment  whenever i want          Drug Use     Drug Use Status  Yes Types  Marijuana Comment  Patient denies currently using marijuana  Sexual Activity     Sexually Active  Not Currently Partners  Female          Activities of Daily Living    Not Asked               Additional Substance Use Detail     Questions Responses    Problems Due to Past Use of Alcohol? No    Problems Due to Past Use of Substances? No    Substance Use Assessment Denies substance use within the past 12 months    Alcohol Use Frequency 1 or 2 times/week    Cannabis frequency Daily    Comment: Daily on 9/7/2020     Heroin Frequency Denies use in past 12 months    Cannabis method Other    Comment: Puts in food     Cocaine frequency Never used    Comment: Never used on 9/7/2020     Crack Cocaine Frequency Denies use in past 12 months    Methamphetamine Frequency Denies use in past 12 months    Narcotic Frequency Denies use in past 12 months    Benzodiazepine Frequency Denies use in past 12 months    Amphetamine frequency Denies use in past 12 months    Barbituate Frequency Denies use use in past 12 months    Inhalant frequency Never used    Comment: Never used on 9/7/2020     Hallucinogen frequency Never used    Comment: Never used on 9/7/2020     Ecstasy frequency Never used    Comment: Never used on 9/7/2020     Other drug frequency Never used    Comment: Never used on 9/7/2020     Opiate frequency Denies use in past 12 months    Last reviewed by Carmen Bartlett on 6/19/2021        I have assessed this patient for substance use within the past 12 months    Family Psychiatric History:   Pt unwilling to elaborate    Social History:  Education: high school diploma/GED  Learning Disabilities: denied  Marital history: single  Living arrangement, social support: The patient alone independently  Occupational History: unemployed  Functioning Relationships: poor support system    Other Pertinent History: None      Traumatic History:   Abuse: denied  Other Traumatic Events: denied    Past Medical History:   Diagnosis Date    Alcohol abuse     Anxiety     Astigmatism     Depression     DJD (degenerative joint disease)     Gait abnormality     Head injury     History of colonic polyps     Hydrocele     Hyperlipidemia     Hypertension     Macular drusen     Presbyopia     PTSD (post-traumatic stress disorder)     Schizoaffective disorder (HCC)     Sepsis [facility] [address] [facility]     Substance abuse ([facility] )     Tobacco abuse     Umbilical hernia     Vitreous syneresis        Medical Review Of Systems:  Pertinent items are noted in HPI      Meds/Allergies   current meds:   Current Facility-Administered Medications   Medication Dose Route Frequency    acetaminophen (TYLENOL) tablet 650 mg  650 mg Oral Q6H PRN    acetaminophen (TYLENOL) tablet 650 mg  650 mg Oral Q4H PRN    acetaminophen (TYLENOL) tablet 975 mg  975 mg Oral Q6H PRN    aluminum-magnesium hydroxide-simethicone (MYLANTA) oral suspension 30 mL  30 mL Oral Q4H PRN    benztropine (COGENTIN) injection 1 mg  1 mg Intramuscular Q4H PRN Max 6/day    benztropine (COGENTIN) tablet 1 mg  1 mg Oral Q4H PRN Max 6/day    divalproex sodium (DEPAKOTE) EC tablet 250 mg  250 mg Oral Q8H [address]    hydrOXYzine HCL (ATARAX) tablet 25 mg  25 mg Oral Q6H PRN Max 4/day    hydrOXYzine HCL (ATARAX) tablet 50 mg  50 mg Oral Q4H PRN Max 4/day    Or    LORazepam (ATIVAN) injection 1 mg  1 mg Intramuscular Q4H PRN    lithium carbonate capsule 150 mg  150 mg Oral BID With Meals    LORazepam (ATIVAN) tablet 1 mg  1 mg Oral Q4H PRN Max 6/day    Or    LORazepam (ATIVAN) injection 2 mg  2 mg Intramuscular Q6H PRN Max 3/day    LORazepam (ATIVAN) tablet 0 5 mg  0 5 mg Oral Q6H PRN    melatonin tablet 3 mg  3 mg Oral HS    OLANZapine (ZyPREXA) tablet 10 mg  10 mg Oral Q8H PRN    Or    OLANZapine (ZyPREXA) IM injection 10 mg  10 mg Intramuscular Q8H PRN    OLANZapine (ZyPREXA) tablet 5 mg  5 mg Oral Q4H PRN    Or    OLANZapine (ZyPREXA) IM injection 5 mg  5 mg Intramuscular Q4H PRN    OLANZapine (ZyPREXA) tablet 2 5 mg  2 5 mg Oral Q4H PRN    polyethylene glycol (MIRALAX) packet 17 g  17 g Oral Daily PRN    senna-docusate sodium (SENOKOT S) 8 6-50 mg per tablet 1 tablet  1 tablet Oral Daily PRN     Allergies   Allergen Reactions    Haldol [Haloperidol]     Navane [Thiothixene]     Other      Adhesive tape         Objective   Vital signs in last 24 hours:  Temp:  [97 9 °F (36 6 °C)] 97 9 °F (36 6 °C)  HR:  [86] 86  Resp:  [16] 16  BP: (148)/(82) 148/82    No intake or output data in the 24 hours ending 06/20/21 1127    Mental Status Evaluation:  Appearance:  bearded and disheveled   Behavior:  psychomotor agitation and uncooperative   Speech:  pressured and tangential   Mood:  irritable   Affect:  labile   Language: unremarkable   Thought Process:  circumstantial, disorganized, flight of ideas, illogical and perserverative   Thought Content:  delusions  persecutory   Perceptual Disturbances: Auditory hallucinations with commands responding to internal stimuli   Risk Potential: Suicidal Ideations none  Homicidal Ideations none  Potential for Aggression Yes psychotic with a labile mood   Sensorium:  person, place and year   Memory:  recent and remote memory grossly intact   Consciousness:  awake    Attention: attention span and concentration were age appropriate   Intellect: within normal limits   Fund of Knowledge: awareness of current events: poor   Insight:  limited   Judgment: limited   Muscle Strength and Tone: wnl   Gait/Station: slow   Motor Activity: no abnormal movements     Lab Results: I have personally reviewed all pertinent laboratory/tests results     Most Recent Labs:   Lab Results   Component Value Date    WBC 5 50 06/18/2021    RBC 4 26 (L) 06/18/2021    HGB 12 7 (L) 06/18/2021    HCT 38 0 (L) 06/18/2021     06/18/2021    RDW 14 3 06/18/2021 NEUTROABS 4 10 06/18/2021    SODIUM 144 (H) 06/18/2021    K 3 5 06/19/2021     (H) 06/18/2021    CO2 26 06/18/2021    BUN 12 06/18/2021    CREATININE 0 97 06/18/2021    GLUC 126 (H) 06/18/2021    GLUF 91 11/06/2019    CALCIUM 9 2 06/18/2021    AST 26 06/18/2021    ALT 21 06/18/2021    ALKPHOS 40 (L) 06/18/2021    TP 5 9 (L) 06/18/2021    ALB 3 9 06/18/2021    TBILI 1 00 06/18/2021    CHOLESTEROL 150 02/17/2021    HDL 35 (L) 02/17/2021    TRIG 90 02/17/2021    LDLCALC 97 02/17/2021    NONHDLC 115 02/17/2021    VALPROICTOT <10 (L) 06/18/2021    LITHIUM 0 2 (L) 06/18/2021    AMMONIA 51 02/06/2021    GTO4OGHYUICO 1 780 06/18/2021    FREET4 1 09 02/17/2021    RPR Non-Reactive 11/03/2019    HGBA1C 5 1 11/03/2019     11/03/2019          Code Status: Level 1 - Full Code  Advance Directive and Living Will:      Power of :    POLST:        Patient Strengths/Assets: good physical health    Patient Barriers/Limitations: lack of social/family support, lack of stable employment, limited family ties, limited motivation, limited support system, noncompliant with medication, noncompliant with treatment, patient is on an involuntary commitment, poor insight    Counseling / Coordination of Care  Total floor / unit time spent today 50 minutes  Greater than 50% of total time was spent with the patient and / or family counseling and / or coordination of care   A description of the counseling / coordination of care:

## 2021-06-20 NOTE — NURSING NOTE
Patient had positive effect from Ativan  Calm cooperative and pleasant  Pt denies HI/SI Refused melatonin expressed it does not work for him    Denies AH/H will continue to monitor Q 7 min checks continue

## 2021-06-20 NOTE — QUICK NOTE
Patient family member brought in the following belongings     Remains with patient  Adelene Necessary long johns   Grey long lima   Olsen long lima   Tan long sleeve  3 pr of black socks   4 pr of boxers   [facility] [place]   Black sandals       Patient belonging closet   Black shorts with strings

## 2021-06-20 NOTE — NURSING NOTE
Assumed care of this patient at [phone] from previous nurse  Patient observed resting comfortably in their bed without signs/symptoms of distress  Will maintain on safety precautions and continual monitoring

## 2021-06-20 NOTE — PROGRESS NOTES
Patient has had broken sleep throughout the night  During periods of sleep non-labored breathing noted and patient showed no signs or symptoms of distress  No needs identified currently  Will remain on safety precautions and continual monitoring

## 2021-06-20 NOTE — PLAN OF CARE
Problem: Alteration in Thoughts and Perception  Goal: Treatment Goal: Gain control of psychotic behaviors/thinking, reduce/eliminate presenting symptoms and demonstrate improved reality functioning upon discharge  Outcome: Progressing  Goal: Verbalize thoughts and feelings  Description: Interventions:  - Promote a nonjudgmental and trusting relationship with the patient through active listening and therapeutic communication  - Assess patient's level of functioning, behavior and potential for risk  - Engage patient in 1 on 1 interactions  - Encourage patient to express fears, feelings, frustrations, and discuss symptoms    - [place] patient to reality, help patient recognize reality-based thinking   - Administer medications as ordered and assess for potential side effects  - Provide the patient education related to the signs and symptoms of the illness and desired effects of prescribed medications  Outcome: Progressing  Goal: Refrain from acting on delusional thinking/internal stimuli  Description: Interventions:  - Monitor patient closely, per order   - Utilize least restrictive measures   - Set reasonable limits, give positive feedback for acceptable   - Administer medications as ordered and monitor of potential side effects  Outcome: Progressing  Goal: Agree to be compliant with medication regime, as prescribed and report medication side effects  Description: Interventions:  - Offer appropriate PRN medication and supervise ingestion; conduct AIMS, as needed   Outcome: Progressing  Goal: Recognize dysfunctional thoughts, communicate reality-based thoughts at the time of discharge  Description: Interventions:  - Provide medication and psycho-education to assist patient in compliance and developing insight into his/her illness   Outcome: Progressing  Goal: Complete daily ADLs, including personal hygiene independently, as able  Description: Interventions:  - Observe, teach, and assist patient with ADLS  - Monitor and promote a balance of rest/activity, with adequate nutrition and elimination   Outcome: Progressing     Problem: Ineffective Coping  Goal: Cooperates with admission process  Description: Interventions:   - Complete admission process  Outcome: Progressing  Goal: Identifies ineffective coping skills  Outcome: Progressing  Goal: Identifies healthy coping skills  Outcome: Progressing  Goal: Demonstrates healthy coping skills  Outcome: Progressing  Goal: Patient/Family participate in treatment and DC plans  Description: Interventions:  - Provide therapeutic environment  Outcome: Progressing  Goal: Patient/Family verbalizes awareness of resources  Outcome: Progressing  Goal: Understands least restrictive measures  Description: Interventions:  - Utilize least restrictive behavior  Outcome: Progressing  Goal: Free from restraint events  Description: - Utilize least restrictive measures   - Provide behavioral interventions   - Redirect inappropriate behaviors   Outcome: Progressing     Problem: Anxiety  Goal: Anxiety is at manageable level  Description: Interventions:  - Assess and monitor patient's anxiety level  - Monitor for signs and symptoms (heart palpitations, chest pain, shortness of breath, headaches, nausea, feeling jumpy, restlessness, irritable, apprehensive)  - Collaborate with interdisciplinary team and initiate plan and interventions as ordered    - [place] patient to unit/surroundings  - Explain treatment plan  - Encourage participation in care  - Encourage verbalization of concerns/fears  - Identify coping mechanisms  - Assist in developing anxiety-reducing skills  - Administer/offer alternative therapies  - Limit or eliminate stimulants  Outcome: Progressing     Problem: Risk for Violence/Aggression Toward Others  Goal: Treatment Goal: Refrain from acts of violence/aggression during length of stay, and demonstrate improved impulse control at the time of discharge  Outcome: Progressing  Goal: Refrain from harming others  Outcome: Progressing  Goal: Refrain from destructive acts on the environment or property  Outcome: Progressing  Goal: Control angry outbursts  Description: Interventions:  - Monitor patient closely, per order  - Ensure early verbal de-escalation  - Monitor prn medication needs  - Set reasonable/therapeutic limits, outline behavioral expectations, and consequences   - Provide a non-threatening milieu, utilizing the least restrictive interventions   Outcome: Progressing  Goal: Attend and participate in unit activities, including therapeutic, recreational, and educational groups  Description: Interventions:  - Provide therapeutic and educational activities daily, encourage attendance and participation, and document same in the medical record   Outcome: Progressing  Goal: Identify appropriate positive anger management techniques  Description: Interventions:  - Offer anger management and coping skills groups   - Staff will provide positive feedback for appropriate anger control  Outcome: Progressing     Problem: Alteration in Orientation  Goal: Treatment Goal: Demonstrate a reduction of confusion and improved orientation to person, place, time and/or situation upon discharge, according to optimum baseline/ability  Outcome: Progressing  Goal: Interact with staff daily  Description: Interventions:  - Assess and re-assess patient's level of orientation  - Engage patient in 1 on 1 interactions, daily, for a minimum of 15 minutes   - Establish rapport/trust with patient   Outcome: Progressing  Goal: Express concerns related to confused thinking related to:  Description: Interventions:  - Encourage patient to express feelings, fears, frustrations, hopes  - Assign consistent caregivers   - Lee/re-orient patient as needed  - Allow comfort items, as appropriate  - Provide visual cues, signs, etc    Outcome: Progressing  Goal: Allow medical examinations, as recommended  Description: Interventions:  - Provide physical/neurological exams and/or referrals, per provider   Outcome: Progressing  Goal: Cooperate with recommended testing/procedures  Description: Interventions:  - Determine need for ancillary testing  - Observe for mental status changes  - Implement falls/precaution protocol   Outcome: Progressing     Problem: Individualized Interventions  Goal: Patient will verbalize appropriate use of telephone within 5 days  Description: Interventions:  - Treatment team to determine use of supervised phone privileges   Outcome: Progressing  Goal: Patient will verbalize need for hospitalization and will no longer attempt elopement within 5 days  Description: Interventions:  - Ongoing education to help patient understand need for hospitalization  Outcome: Progressing  Goal: Patient will recognize inappropriate behaviors and develop alternative behaviors within 5 days  Description: Interventions:  - Patient in collaboration with Treatment Team will develop a behavior management plan to help identify effective coping skills to deal with stressors  Outcome: Progressing

## 2021-06-20 NOTE — TREATMENT PLAN
TREATMENT PLAN REVIEW - [address] 59 y o  1960 male MRN: [id number]    [address] Room / Bed: [place] [address]/[facility] [id number] Encounter: [id number]          Admit Date/Time:  6/18/2021  8:53 PM    Treatment Team: Attending Provider: Kody Plummer MD; Certified Nursing Assistant: Nila Machado;  Patient Care Technician: Latrice Mcfarlane    Diagnosis: Principal Problem:    Schizoaffective disorder, bipolar type ([facility] [address] )  Active Problems:    Cannabis abuse, continuous    Alcohol abuse, continuous    Hypertension    Tobacco abuse    BPH (benign prostatic hyperplasia)    Vitamin D insufficiency      Patient Strengths/Assets: good physical health    Patient Barriers/Limitations: lack of social/family support, lack of stable employment, limited education    Short Term Goals: decrease in depressive symptoms, decrease in paranoid thoughts, decrease in psychotic symptoms, decrease in level of agitation, ability to stay safe on the unit, ability to stay free of restraints, improvement in insight, improvement in reasoning ability, sleep improvement, mood stabilization, acceptance of need for psychiatric treatment, acceptance of psychiatric medications    Long Term Goals: stabilization of mood, resolution of psychotic symptoms, improved insight, acceptance of need for psychiatric medications, acceptance of need for psychiatric treatment, acceptance of need for psychiatric follow up after discharge, acceptance of psychiatric diagnosis, establishment of family support upon discharge    Progress Towards Goals: starting psychiatric medications as prescribed, continue psychiatric medications as prescribed    Recommended Treatment: medication management, patient medication education, group therapy, milieu therapy, continued Behavioral Health psychiatric evaluation/assessment process    Treatment Frequency: daily medication monitoring, group and milieu therapy daily, monitoring through interdisciplinary rounds, monitoring through weekly patient care conferences    Expected Discharge Date:  7/1/21    Discharge Plan: referral to [address] [place] [address] Team for close psychiatric monitoring    Treatment Plan Created/Updated By: Florencio Oliver MD

## 2021-06-20 NOTE — PROGRESS NOTES
Pt politely refusing Depakote as prescribed  Pt states, "I told everyone lithium only  I don't take Depakote" Administered first dose lithium 300 mg po as prescribed  Administered prn ativan 1 mg po twice this shift which is effective  Brief periods of restlessness, agitation, and anxiety but was able to maintain control of behaviors  Pt refusing repeat EKG ordered due to elevated QTc 505  Despite education and prompting by RN pt declines  Will pass along to next shift  Pt aware staff will tray again at later time

## 2021-06-21 PROCEDURE — 99232 SBSQ HOSP IP/OBS MODERATE 35: CPT | Performed by: HOSPITALIST

## 2021-06-21 PROCEDURE — 99222 1ST HOSP IP/OBS MODERATE 55: CPT | Performed by: PHYSICIAN ASSISTANT

## 2021-06-21 RX ORDER — METOPROLOL SUCCINATE 50 MG/1
25 TABLET, EXTENDED RELEASE ORAL DAILY
Status: DISCONTINUED | OUTPATIENT
Start: 2021-06-22 | End: 2021-07-16 | Stop reason: HOSPADM

## 2021-06-21 RX ORDER — TAMSULOSIN HYDROCHLORIDE 0.4 MG/1
0.4 CAPSULE ORAL
Status: DISCONTINUED | OUTPATIENT
Start: 2021-06-22 | End: 2021-07-16 | Stop reason: HOSPADM

## 2021-06-21 RX ORDER — MELATONIN
2000 DAILY
Status: DISCONTINUED | OUTPATIENT
Start: 2021-06-22 | End: 2021-07-16 | Stop reason: HOSPADM

## 2021-06-21 RX ADMIN — LITHIUM CARBONATE 300 MG: 300 CAPSULE, GELATIN COATED ORAL at 08:21

## 2021-06-21 RX ADMIN — LORAZEPAM 1 MG: 1 TABLET ORAL at 21:35

## 2021-06-21 RX ADMIN — LITHIUM CARBONATE 300 MG: 300 CAPSULE, GELATIN COATED ORAL at 16:05

## 2021-06-21 NOTE — NURSING NOTE
Patient observed in the community  He is pleasant, cooperative  Social with staff and peers  Attends group  His appetite is good- 100% of meals  Thoughts disorganized  Appears disheveled  He is compliant with his medications  Displays no behaviors  Denies anxiety and depression  Denies SI/HI and hallucinations  He is able to make his needs known  Will CTM  Q7 minute safety checks in progress  unable to determine

## 2021-06-21 NOTE — PROGRESS NOTES
Patient mostly withdrawn to self and his room this evening, patient states he is upset with his current medications prescribed and states no one is listening to him  Speech pressured and difficult to understand at times  Patient refused scheduled HS medications despite education and reassurance, patient responded "I will re-discuss this with the treatment team but no thank you mj"  Patient overheard getting restless and agitated about this in his room, given 1 mg ativan as prescribed and grateful  Patient denies SI/HI and hallucinations  Labile  No aggressive behaviors  Makes needs known  Encouraged to allow reattempt of EKG but continues to decline  Will remain on safety precautions and continual monitoring
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
## 2021-06-21 NOTE — PROGRESS NOTES
06/21/21 1500   Team Meeting   Meeting Type Tx Team Meeting   Initial Conference Date 06/21/21   Next Conference Date 07/21/21   Team Members Present   Team Members Present Physician;Nurse;   Physician Team Member Dr Sherif Moreno Team Member Victorina Anderson RN   Social Work Team Member Abigail Chandra   Patient/Family Present   Patient Present Yes   Patient's Family Present No     Treatment plan reviewed with patient  Patient reports he did not agree that he was a threat to others in the community, nor does he really need a treatment plan  However, patient reports that he understands the treatment team has a job to do and was willing to sign the treatment plan  Copy was provided to patient  No other questions or concerns noted  Treatment team will continue to monitor patient's progress and adjust treatment plan as needed

## 2021-06-21 NOTE — PROGRESS NOTES
06/21/21 0800   Team Meeting   Meeting Type Daily Rounds   Initial Conference Date 06/21/21   Team Members Present   Team Members Present Physician;Nurse;   Physician Team Member Dr Dina Mooney; Hiral Nobles, [address] [place] [address]   Nursing Team Member Mandy Acuna, RN   Social Work Team Member Abigail Simpson   Patient/Family Present   Patient Present No   Patient's Family Present No     New admit  302  Readmit score 24 (red)  Restrained in the ED but cooperative on the unit  Disorganized  Disheveled  Non-compliant with meds in the community  Refusing EKG

## 2021-06-21 NOTE — PLAN OF CARE
Problem: Alteration in Thoughts and Perception  Goal: Treatment Goal: Gain control of psychotic behaviors/thinking, reduce/eliminate presenting symptoms and demonstrate improved reality functioning upon discharge  Outcome: Progressing  Goal: Verbalize thoughts and feelings  Description: Interventions:  - Promote a nonjudgmental and trusting relationship with the patient through active listening and therapeutic communication  - Assess patient's level of functioning, behavior and potential for risk  - Engage patient in 1 on 1 interactions  - Encourage patient to express fears, feelings, frustrations, and discuss symptoms    - [place] patient to reality, help patient recognize reality-based thinking   - Administer medications as ordered and assess for potential side effects  - Provide the patient education related to the signs and symptoms of the illness and desired effects of prescribed medications  Outcome: Progressing  Goal: Refrain from acting on delusional thinking/internal stimuli  Description: Interventions:  - Monitor patient closely, per order   - Utilize least restrictive measures   - Set reasonable limits, give positive feedback for acceptable   - Administer medications as ordered and monitor of potential side effects  Outcome: Progressing  Goal: Agree to be compliant with medication regime, as prescribed and report medication side effects  Description: Interventions:  - Offer appropriate PRN medication and supervise ingestion; conduct AIMS, as needed   Outcome: Progressing  Goal: Recognize dysfunctional thoughts, communicate reality-based thoughts at the time of discharge  Description: Interventions:  - Provide medication and psycho-education to assist patient in compliance and developing insight into his/her illness   Outcome: Progressing  Goal: Complete daily ADLs, including personal hygiene independently, as able  Description: Interventions:  - Observe, teach, and assist patient with ADLS  - Monitor and promote a balance of rest/activity, with adequate nutrition and elimination   Outcome: Progressing     Problem: Ineffective Coping  Goal: Cooperates with admission process  Description: Interventions:   - Complete admission process  Outcome: Progressing  Goal: Identifies ineffective coping skills  Outcome: Progressing  Goal: Identifies healthy coping skills  Outcome: Progressing  Goal: Demonstrates healthy coping skills  Outcome: Progressing  Goal: Patient/Family participate in treatment and DC plans  Description: Interventions:  - Provide therapeutic environment  Outcome: Progressing  Goal: Patient/Family verbalizes awareness of resources  Outcome: Progressing  Goal: Understands least restrictive measures  Description: Interventions:  - Utilize least restrictive behavior  Outcome: Progressing  Goal: Free from restraint events  Description: - Utilize least restrictive measures   - Provide behavioral interventions   - Redirect inappropriate behaviors   Outcome: Progressing     Problem: Anxiety  Goal: Anxiety is at manageable level  Description: Interventions:  - Assess and monitor patient's anxiety level  - Monitor for signs and symptoms (heart palpitations, chest pain, shortness of breath, headaches, nausea, feeling jumpy, restlessness, irritable, apprehensive)  - Collaborate with interdisciplinary team and initiate plan and interventions as ordered    - [place] patient to unit/surroundings  - Explain treatment plan  - Encourage participation in care  - Encourage verbalization of concerns/fears  - Identify coping mechanisms  - Assist in developing anxiety-reducing skills  - Administer/offer alternative therapies  - Limit or eliminate stimulants  Outcome: Progressing     Problem: Risk for Violence/Aggression Toward Others  Goal: Treatment Goal: Refrain from acts of violence/aggression during length of stay, and demonstrate improved impulse control at the time of discharge  Outcome: Progressing  Goal: Refrain from harming others  Outcome: Progressing  Goal: Refrain from destructive acts on the environment or property  Outcome: Progressing  Goal: Control angry outbursts  Description: Interventions:  - Monitor patient closely, per order  - Ensure early verbal de-escalation  - Monitor prn medication needs  - Set reasonable/therapeutic limits, outline behavioral expectations, and consequences   - Provide a non-threatening milieu, utilizing the least restrictive interventions   Outcome: Progressing  Goal: Attend and participate in unit activities, including therapeutic, recreational, and educational groups  Description: Interventions:  - Provide therapeutic and educational activities daily, encourage attendance and participation, and document same in the medical record   Outcome: Progressing  Goal: Identify appropriate positive anger management techniques  Description: Interventions:  - Offer anger management and coping skills groups   - Staff will provide positive feedback for appropriate anger control  Outcome: Progressing     Problem: Alteration in Orientation  Goal: Treatment Goal: Demonstrate a reduction of confusion and improved orientation to person, place, time and/or situation upon discharge, according to optimum baseline/ability  Outcome: Progressing  Goal: Interact with staff daily  Description: Interventions:  - Assess and re-assess patient's level of orientation  - Engage patient in 1 on 1 interactions, daily, for a minimum of 15 minutes   - Establish rapport/trust with patient   Outcome: Progressing  Goal: Express concerns related to confused thinking related to:  Description: Interventions:  - Encourage patient to express feelings, fears, frustrations, hopes  - Assign consistent caregivers   - [place]/re-orient patient as needed  - Allow comfort items, as appropriate  - Provide visual cues, signs, etc    Outcome: Progressing  Goal: Allow medical examinations, as recommended  Description: Interventions:  - Provide physical/neurological exams and/or referrals, per provider   Outcome: Progressing  Goal: Cooperate with recommended testing/procedures  Description: Interventions:  - Determine need for ancillary testing  - Observe for mental status changes  - Implement falls/precaution protocol   Outcome: Progressing     Problem: Individualized Interventions  Goal: Patient will verbalize appropriate use of telephone within 5 days  Description: Interventions:  - Treatment team to determine use of supervised phone privileges   Outcome: Progressing  Goal: Patient will verbalize need for hospitalization and will no longer attempt elopement within 5 days  Description: Interventions:  - Ongoing education to help patient understand need for hospitalization  Outcome: Progressing  Goal: Patient will recognize inappropriate behaviors and develop alternative behaviors within 5 days  Description: Interventions:  - Patient in collaboration with Treatment Team will develop a behavior management plan to help identify effective coping skills to deal with stressors  Outcome: Progressing     Problem: CARDIOVASCULAR - ADULT  Goal: Maintains optimal cardiac output and hemodynamic stability  Description: INTERVENTIONS:  - Monitor I/O, vital signs and rhythm  - Monitor for S/S and trends of decreased cardiac output  - Administer and titrate ordered vasoactive medications to optimize hemodynamic stability  - Assess quality of pulses, skin color and temperature  - Assess for signs of decreased coronary artery perfusion  - Instruct patient to report change in severity of symptoms  Outcome: Progressing  Goal: Absence of cardiac dysrhythmias or at baseline rhythm  Description: INTERVENTIONS:  - Continuous cardiac monitoring, vital signs, obtain 12 lead EKG if ordered  - Administer antiarrhythmic and heart rate control medications as ordered  - Monitor electrolytes and administer replacement therapy as ordered  Outcome: Progressing

## 2021-06-21 NOTE — SOCIAL WORK
CM faxed [phone] and 303 commitment paperwork to [facility] for hearing to be scheduled on Wednesday, June 23, 2021  CM also met and spoke with patient to review his rights under a 303 commitment  Patient was provided a copy of his rights as well  CM will continue to follow patient's progress and assist with discharge planning needs
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
## 2021-06-22 PROCEDURE — 99232 SBSQ HOSP IP/OBS MODERATE 35: CPT | Performed by: NURSE PRACTITIONER

## 2021-06-22 RX ADMIN — METOPROLOL SUCCINATE 25 MG: 50 TABLET, EXTENDED RELEASE ORAL at 08:34

## 2021-06-22 RX ADMIN — MELATONIN 3 MG: at 21:00

## 2021-06-22 RX ADMIN — DIVALPROEX SODIUM 250 MG: 250 TABLET, DELAYED RELEASE ORAL at 21:00

## 2021-06-22 RX ADMIN — OLANZAPINE 2.5 MG: 2.5 TABLET, FILM COATED ORAL at 21:00

## 2021-06-22 RX ADMIN — LORAZEPAM 1 MG: 1 TABLET ORAL at 22:48

## 2021-06-22 RX ADMIN — LITHIUM CARBONATE 300 MG: 300 CAPSULE, GELATIN COATED ORAL at 08:33

## 2021-06-22 RX ADMIN — DIVALPROEX SODIUM 250 MG: 250 TABLET, DELAYED RELEASE ORAL at 14:35

## 2021-06-22 RX ADMIN — TAMSULOSIN HYDROCHLORIDE 0.4 MG: 0.4 CAPSULE ORAL at 17:22

## 2021-06-22 RX ADMIN — LITHIUM CARBONATE 300 MG: 300 CAPSULE, GELATIN COATED ORAL at 17:21

## 2021-06-22 RX ADMIN — Medication 2000 UNITS: at 08:35

## 2021-06-22 NOTE — PLAN OF CARE
Problem: Alteration in Thoughts and Perception  Goal: Treatment Goal: Gain control of psychotic behaviors/thinking, reduce/eliminate presenting symptoms and demonstrate improved reality functioning upon discharge  Outcome: Progressing  Goal: Verbalize thoughts and feelings  Description: Interventions:  - Promote a nonjudgmental and trusting relationship with the patient through active listening and therapeutic communication  - Assess patient's level of functioning, behavior and potential for risk  - Engage patient in 1 on 1 interactions  - Encourage patient to express fears, feelings, frustrations, and discuss symptoms    - [place] patient to reality, help patient recognize reality-based thinking   - Administer medications as ordered and assess for potential side effects  - Provide the patient education related to the signs and symptoms of the illness and desired effects of prescribed medications  Outcome: Progressing  Goal: Refrain from acting on delusional thinking/internal stimuli  Description: Interventions:  - Monitor patient closely, per order   - Utilize least restrictive measures   - Set reasonable limits, give positive feedback for acceptable   - Administer medications as ordered and monitor of potential side effects  Outcome: Progressing     Problem: Ineffective Coping  Goal: Cooperates with admission process  Description: Interventions:   - Complete admission process  Outcome: Progressing
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
## 2021-06-22 NOTE — NURSING NOTE
Patient mostly withdrawn to self, intermittently visible in the milieu  Pressured, tangential, and garbled in speech  Patient continues to refuse scheduled HS medications, patient accuses providers of trying to poison him  Labile in mood but behaviors continue to be overall controlled  One bout of anxiety/agitation, given 1 mg of ativan as indicated  Overheard often reading pslams from his bible  Makes needs known  Appears disheveled but completes ADLs without issue  No suicidal or homicidal ideations expressed  Will remain on safety precautions and continual monitoring

## 2021-06-22 NOTE — NURSING NOTE
Patient has been out in the milieu, social with select peers, participating in groups  Patient's mood remains labile  Speech remains pressured, tangential at times  Patient has been compliant with meds of chose  Denies any S/H ideation  Also denies any form of hallucinations  Will continue to observe during 7 minute checks

## 2021-06-22 NOTE — NURSING NOTE
Patient has had broken sleep throughout the night  During periods of sleep non-labored breathing noted and patient showed no signs or symptoms of distress  No behaviors overnight  No needs identified currently  Will remain on safety precautions and continual monitoring

## 2021-06-22 NOTE — ASSESSMENT & PLAN NOTE
· Patient was medically cleared for inpatient behavior health admission the emergency room  · Patient remains medically cleared for inpatient behavior health admission  · Patient was placed under 302 for treatment

## 2021-06-22 NOTE — PROGRESS NOTES
06/22/21 0754   Team Meeting   Meeting Type Daily Rounds   Initial Conference Date 06/22/21   Team Members Present   Team Members Present Physician;Nurse;   Physician Team Member Dr Oscar Greco; Rajesh Wyatt [address] [place] [address]   Nursing Team Member Liza Lino, RN   Social Work Team Member Van [place], [place]   Patient/Family Present   Patient Present No   Patient's Family Present No     Will take Lithium, Ativan  Declines Zyprexa that he needs  Bipolar extremes  303 Hearing tomorrow  Labile mood  Demanding  No ROIs signed

## 2021-06-22 NOTE — PROGRESS NOTES
Progress Note - [address] Day 59 y o  male MRN: [id number]   Unit/Bed#: U 258-01 Encounter: [id number]    Behavior over the last 24 hours:      Oskar Aguilar remains labile, tangential, and delusional  He is unable to participate in a meaningful conversation due to thought disorganization and opal  He refuses to take any medications other than lithium and benzodiazepines  His insight and judgment are poor  He is present in the milieu and has not had any acute behaviors  ROS: no complaints, all other systems are negative    Mental Status Evaluation:    Appearance:  dressed inappropropriately   Behavior:  demanding, some agitation   Speech:  pressured, hypertalkative, tangential, disorganized, flight of ideas   Mood:  labile   Affect:  labile   Thought Process:  disorganized, illogical, tangential, flight of ideas   Associations: tangential associations   Thought Content:  grandiose and persecutory delusions, paranoid ideation   Perceptual Disturbances: denies auditory hallucinations when asked   Risk Potential: Suicidal ideation - None  Homicidal ideation - None  Potential for aggression - No   Sensorium:  oriented to person, place and time/date   Memory:  recent and remote memory grossly intact   Consciousness:  alert and awake   Attention: poor concentration and poor attention span   Insight:  significantly impaired   Judgment: significantly impaired   Gait/Station: normal gait/station, normal balance   Motor Activity: no abnormal movements     Vital signs in last 24 hours:    Temp:  [98 4 °F (36 9 °C)-99 6 °F (37 6 °C)] 99 6 °F (37 6 °C)  HR:  [84-95] 84  Resp:  [18] 18  BP: (139-159)/(85-97) 139/85    Laboratory results:  I have personally reviewed all pertinent laboratory/tests results      Progress Toward Goals: progressing    Assessment/Plan   Principal Problem:    Schizoaffective disorder, bipolar type (HCC)  Active Problems:    Cannabis abuse, continuous    Alcohol abuse, continuous Hypertension    Tobacco abuse    Medical clearance for psychiatric admission    BPH (benign prostatic hyperplasia)    Vitamin D insufficiency    Recommended Treatment:     Continue current medications  Continue to monitor  Discharge disposition and planning are ongoing      All current active medications have been reviewed  Encourage group therapy, milieu therapy and occupational therapy  Behavioral Health checks every 7 minutes    Current Facility-Administered Medications   Medication Dose Route Frequency Provider Last Rate    acetaminophen  650 mg Oral Q6H PRN Micheal Alba, PA-C      acetaminophen  650 mg Oral Q4H PRN Micheal Alba, PA-C      acetaminophen  975 mg Oral Q6H PRN Micheal Alba, PA-C      aluminum-magnesium hydroxide-simethicone  30 mL Oral Q4H PRN Micheal Alba, PA-C      benztropine  1 mg Intramuscular Q4H PRN Max 6/day Micheal Alba, PA-C      benztropine  1 mg Oral Q4H PRN Max 6/day Micheal Alba, PA-C      cholecalciferol  2,000 Units Oral Daily Thao Hash, PA-C      divalproex sodium  250 mg Oral Q8H [address] Kameron Howe MD      hydrOXYzine HCL  25 mg Oral Q6H PRN Max 4/day Micheal Alba, PA-C      hydrOXYzine HCL  50 mg Oral Q4H PRN Max 4/day Micheal Alba, PA-TY      Or    LORazepam  1 mg Intramuscular Q4H PRN Micheal Alba, PA-C      lithium carbonate  300 mg Oral BID With Meals Kameron Howe MD      LORazepam  1 mg Oral Q4H PRN Max 6/day Micheal Alba PA-C      Or    LORazepam  2 mg Intramuscular Q6H PRN Max 3/day Micheal Alba, PA-C      LORazepam  0 5 mg Oral Q6H PRN Micheal Alba, PA-C      melatonin  3 mg Oral HS Micheal Alba, PA-C      metoprolol succinate  25 mg Oral Daily Thao Hash, PA-C      OLANZapine  10 mg Oral Q8H PRN Micheal Alba, PA-C      Or    OLANZapine  10 mg Intramuscular Q8H PRN Micheal Alba, PA-C      OLANZapine  5 mg Oral Q4H PRN Micheal Alba, PA-C      Or    OLANZapine  5 mg Intramuscular Q4H PRN Ella Merino PA-C      OLANZapine  2 5 mg Oral Q4H PRN Ella Merino PA-C      OLANZapine  2 5 mg Oral HS Rizwan Ace MD      polyethylene glycol  17 g Oral Daily PRN Ella Merino PA-C      senna-docusate sodium  1 tablet Oral Daily PRN Ella Merino PA-C      tamsulosin  0 4 mg Oral Daily With Dinner Elle Lamb PA-C         Risks / Benefits of Treatment:    Risks, benefits, and possible side effects of medications explained to patient and patient verbalizes understanding and agreement for treatment  Counseling / Coordination of Care:      Patient's progress discussed with staff in treatment team meeting  Medications, treatment progress and treatment plan reviewed with patient

## 2021-06-22 NOTE — SOCIAL WORK
Patient requested this CM provide him with the phone number for the  who has been assigned to him regarding his 303 commitment hearing tomorrow  Patient has been assigned to [address] [place] [address] and CM provided him with the 's phone number [phone]  CM will continue to follow patient's progress and assist with discharge planning needs

## 2021-06-22 NOTE — ASSESSMENT & PLAN NOTE
· Patient declined the patch secondary to allergy and reports no teeth for the gum    He says he goes cold turkey when he is admitted

## 2021-06-23 PROCEDURE — 99232 SBSQ HOSP IP/OBS MODERATE 35: CPT | Performed by: HOSPITALIST

## 2021-06-23 RX ADMIN — DIVALPROEX SODIUM 250 MG: 250 TABLET, DELAYED RELEASE ORAL at 05:42

## 2021-06-23 RX ADMIN — Medication 2000 UNITS: at 08:05

## 2021-06-23 RX ADMIN — LORAZEPAM 1 MG: 1 TABLET ORAL at 09:25

## 2021-06-23 RX ADMIN — LITHIUM CARBONATE 300 MG: 300 CAPSULE, GELATIN COATED ORAL at 08:05

## 2021-06-23 RX ADMIN — METOPROLOL SUCCINATE 25 MG: 50 TABLET, EXTENDED RELEASE ORAL at 08:05

## 2021-06-23 RX ADMIN — DIVALPROEX SODIUM 250 MG: 250 TABLET, DELAYED RELEASE ORAL at 21:18

## 2021-06-23 RX ADMIN — LITHIUM CARBONATE 300 MG: 300 CAPSULE, GELATIN COATED ORAL at 15:37

## 2021-06-23 RX ADMIN — DIVALPROEX SODIUM 250 MG: 250 TABLET, DELAYED RELEASE ORAL at 13:34

## 2021-06-23 RX ADMIN — OLANZAPINE 2.5 MG: 2.5 TABLET, FILM COATED ORAL at 21:18

## 2021-06-23 RX ADMIN — MELATONIN 3 MG: at 21:18

## 2021-06-23 NOTE — PROGRESS NOTES
06/23/21 1330   Activity/Group Checklist   Group   (Art Expression)   Attendance Attended   Attendance Duration (min) Greater than 60  (Pt in and out of group)   Interactions Disorganized interaction   Affect/Mood Wide   Goals Achieved Identified feelings; Able to listen to others; Able to engage in interactions

## 2021-06-23 NOTE — PROGRESS NOTES
06/23/21 0800   Team Meeting   Meeting Type Daily Rounds   Initial Conference Date 06/23/21   Team Members Present   Team Members Present Physician;Nurse;   Physician Team Member Dr Avelino Hernandez; Arlene Siemens, [address] [place] [address]   Nursing Team Member Jose G Hernandez, ADRIEN   Social Work Team Member Abigail Hernandez   Patient/Family Present   Patient Present No   Patient's Family Present No     Disorganized  Loud at times  Selective with medications  Anxious over night and got ativan PRN  303 commitment hearing this morning at 10 AM  Reports he is prepared for his hearing

## 2021-06-23 NOTE — NURSING NOTE
Pt received on the unit awake and alert in the day room , he denies any depression, anxiety or SI, able to make needs known, pleasant, pressured speech, very social with peers and staff  Cooperative, refused ECG however  compliant with all medications and positive for pm snack  Q7 minute checks continued , continue to monitor

## 2021-06-23 NOTE — PROGRESS NOTES
06/23/21 1000   Activity/Group Checklist   Group   (Community Building and Processing)   Attendance Attended   Attendance Duration (min) 46-60  (pt in and out of group)   Interactions Disorganized interaction   Affect/Mood Wide   Goals Achieved Identified feelings; Identified triggers; Able to listen to others; Able to engage in interactions

## 2021-06-23 NOTE — NURSING NOTE
Pt had broken sleep through the night but mostly periods of rest  Prn Ativan administered for sever anxiety and agitation per patient, no behaviors, continued Q 7 checks

## 2021-06-23 NOTE — PLAN OF CARE
Problem: Alteration in Thoughts and Perception  Goal: Treatment Goal: Gain control of psychotic behaviors/thinking, reduce/eliminate presenting symptoms and demonstrate improved reality functioning upon discharge  Outcome: Progressing  Goal: Verbalize thoughts and feelings  Description: Interventions:  - Promote a nonjudgmental and trusting relationship with the patient through active listening and therapeutic communication  - Assess patient's level of functioning, behavior and potential for risk  - Engage patient in 1 on 1 interactions  - Encourage patient to express fears, feelings, frustrations, and discuss symptoms    - Heaters patient to reality, help patient recognize reality-based thinking   - Administer medications as ordered and assess for potential side effects  - Provide the patient education related to the signs and symptoms of the illness and desired effects of prescribed medications  Outcome: Progressing  Goal: Refrain from acting on delusional thinking/internal stimuli  Description: Interventions:  - Monitor patient closely, per order   - Utilize least restrictive measures   - Set reasonable limits, give positive feedback for acceptable   - Administer medications as ordered and monitor of potential side effects  Outcome: Progressing  Goal: Agree to be compliant with medication regime, as prescribed and report medication side effects  Description: Interventions:  - Offer appropriate PRN medication and supervise ingestion; conduct AIMS, as needed   Outcome: Progressing  Goal: Recognize dysfunctional thoughts, communicate reality-based thoughts at the time of discharge  Description: Interventions:  - Provide medication and psycho-education to assist patient in compliance and developing insight into his/her illness   Outcome: Progressing  Goal: Complete daily ADLs, including personal hygiene independently, as able  Description: Interventions:  - Observe, teach, and assist patient with ADLS  - Monitor and promote a balance of rest/activity, with adequate nutrition and elimination   Outcome: Progressing     Problem: Ineffective Coping  Goal: Cooperates with admission process  Description: Interventions:   - Complete admission process  Outcome: Progressing  Goal: Identifies ineffective coping skills  Outcome: Progressing  Goal: Identifies healthy coping skills  Outcome: Progressing  Goal: Demonstrates healthy coping skills  Outcome: Progressing  Goal: Patient/Family participate in treatment and DC plans  Description: Interventions:  - Provide therapeutic environment  Outcome: Progressing  Goal: Patient/Family verbalizes awareness of resources  Outcome: Progressing  Goal: Understands least restrictive measures  Description: Interventions:  - Utilize least restrictive behavior  Outcome: Progressing  Goal: Free from restraint events  Description: - Utilize least restrictive measures   - Provide behavioral interventions   - Redirect inappropriate behaviors   Outcome: Progressing  Goal: Participates in unit activities  Description: Interventions:  - Provide therapeutic environment   - Provide required programming   - Redirect inappropriate behaviors   Outcome: Progressing     Problem: Anxiety  Goal: Anxiety is at manageable level  Description: Interventions:  - Assess and monitor patient's anxiety level  - Monitor for signs and symptoms (heart palpitations, chest pain, shortness of breath, headaches, nausea, feeling jumpy, restlessness, irritable, apprehensive)  - Collaborate with interdisciplinary team and initiate plan and interventions as ordered    - Bernard patient to unit/surroundings  - Explain treatment plan  - Encourage participation in care  - Encourage verbalization of concerns/fears  - Identify coping mechanisms  - Assist in developing anxiety-reducing skills  - Administer/offer alternative therapies  - Limit or eliminate stimulants  Outcome: Progressing     Problem: Risk for Violence/Aggression Toward Others  Goal: Treatment Goal: Refrain from acts of violence/aggression during length of stay, and demonstrate improved impulse control at the time of discharge  Outcome: Progressing  Goal: Refrain from harming others  Outcome: Progressing  Goal: Refrain from destructive acts on the environment or property  Outcome: Progressing  Goal: Control angry outbursts  Description: Interventions:  - Monitor patient closely, per order  - Ensure early verbal de-escalation  - Monitor prn medication needs  - Set reasonable/therapeutic limits, outline behavioral expectations, and consequences   - Provide a non-threatening milieu, utilizing the least restrictive interventions   Outcome: Progressing  Goal: Identify appropriate positive anger management techniques  Description: Interventions:  - Offer anger management and coping skills groups   - Staff will provide positive feedback for appropriate anger control  Outcome: Progressing

## 2021-06-23 NOTE — PROGRESS NOTES
06/23/21 0730   Activity/Group Checklist   Group   (Goal Planning and Communication)   Attendance Attended   Attendance Duration (min) 31-45   Interactions Interacted appropriately   Affect/Mood Appropriate   Goals Achieved Identified feelings; Able to listen to others; Able to engage in interactions

## 2021-06-23 NOTE — PROGRESS NOTES
06/23/21 1200   Legal Information   Current Status: 303  (LC (exp  07/13/21))   Legal Documentation Status Filed     Hearing Documentation    303 hearing held today;  in attendance:  Zhou Boone- ;  [facility] PD office; staff psych; ;  pt in attendance;  18 recommendation upheld for up to an additional 20 days of inpt treatment at [facility];  303 expires: 07/13/2021

## 2021-06-23 NOTE — NURSING NOTE
Patient observed in the community  He is pleasant with an irritable edge  Pt endorses agitation and increased anxiety regarding his 303 hearing today  PRN Ativan 1 mg administered at 0925- effective  He attends group, participates in activity  He shows aggression by slamming drawers in his room but is able to remain controlled  He is disorganized, pressured speech  Denies SI/HI  Will CTM  Q7 minute safety checks in progress

## 2021-06-24 PROCEDURE — 99232 SBSQ HOSP IP/OBS MODERATE 35: CPT | Performed by: NURSE PRACTITIONER

## 2021-06-24 RX ORDER — OLANZAPINE 10 MG/1
10 TABLET ORAL
Status: DISCONTINUED | OUTPATIENT
Start: 2021-06-24 | End: 2021-07-01

## 2021-06-24 RX ADMIN — DIVALPROEX SODIUM 250 MG: 250 TABLET, DELAYED RELEASE ORAL at 05:58

## 2021-06-24 RX ADMIN — LORAZEPAM 1 MG: 1 TABLET ORAL at 20:48

## 2021-06-24 RX ADMIN — TAMSULOSIN HYDROCHLORIDE 0.4 MG: 0.4 CAPSULE ORAL at 17:08

## 2021-06-24 RX ADMIN — DIVALPROEX SODIUM 250 MG: 250 TABLET, DELAYED RELEASE ORAL at 13:10

## 2021-06-24 RX ADMIN — OLANZAPINE 10 MG: 10 TABLET, FILM COATED ORAL at 20:47

## 2021-06-24 RX ADMIN — LITHIUM CARBONATE 300 MG: 300 CAPSULE, GELATIN COATED ORAL at 17:08

## 2021-06-24 RX ADMIN — METOPROLOL SUCCINATE 25 MG: 50 TABLET, EXTENDED RELEASE ORAL at 10:23

## 2021-06-24 RX ADMIN — DIVALPROEX SODIUM 250 MG: 250 TABLET, DELAYED RELEASE ORAL at 20:48

## 2021-06-24 RX ADMIN — MELATONIN 3 MG: at 20:47

## 2021-06-24 RX ADMIN — Medication 2000 UNITS: at 08:22

## 2021-06-24 RX ADMIN — LITHIUM CARBONATE 300 MG: 300 CAPSULE, GELATIN COATED ORAL at 08:20

## 2021-06-24 NOTE — PROGRESS NOTES
06/24/21 0730   Activity/Group Checklist   Group   (Goal Planning and Communication)   Attendance Attended   Attendance Duration (min) 46-60   Interactions Disorganized interaction   Affect/Mood Bright; Incongruent   Goals Achieved Able to engage in interactions

## 2021-06-24 NOTE — PROGRESS NOTES
Progress Note - Mikael Dhillon 67 Day 59 y o  male MRN: [id number]   Unit/Bed#: [facility] 258-01 Encounter: [id number]    Behavior over the last 24 hours: remias Martinez seen today, appears to have ongoing mood instability with rambling pressured speech and tangentiality  Continues to be thought disordered  Continues to state he does feel he to be in the hospital despite evidence significant decompensation  Staff report he continues to be disorganized and loud on the unit    Mental Status Evaluation:    Appearance:  casually dressed, marginal hygiene, looks stated age, bearded   Behavior:  demanding, uncooperative   Speech:  pressured, loud   Mood:  manic   Affect:  labile, still reactive   Thought Process:  disorganized, illogical   Associations: loose associations   Thought Content:  ideas of reference   Perceptual Disturbances: denies auditory hallucinations when asked   Risk Potential: Suicidal ideation - None  Homicidal ideation - None   Sensorium:  oriented to person, place and time/date   Memory:  recent and remote memory grossly intact   Consciousness:  alert and awake   Attention: poor concentration and poor attention span   Insight:  impaired   Judgment: impaired   Gait/Station: normal gait/station   Motor Activity: no abnormal movements     Vital signs in last 24 hours:    Temp:  [97 9 °F (36 6 °C)-98 2 °F (36 8 °C)] 97 9 °F (36 6 °C)  HR:  [83-98] 83  Resp:  [16-18] 16  BP: (117-146)/(90-94) 146/94    Laboratory results: I have personally reviewed all pertinent laboratory/tests results          Assessment/Plan   Principal Problem:    Schizoaffective disorder, bipolar type (HCC)  Active Problems:    Cannabis abuse, continuous    Alcohol abuse, continuous    Hypertension    Tobacco abuse    Medical clearance for psychiatric admission    BPH (benign prostatic hyperplasia)    Vitamin D insufficiency    Recommended Treatment:     Planned medication and treatment changes:  Continue Depakote 250 mg every 8 hours, lithium 300 mg twice a day, olanzapine 2 5 mg at bedtime    Lithium level in the a m  adjust per level tomorrow  All current active medications have been reviewed  Encourage group therapy, milieu therapy and occupational therapy  Behavioral Health checks every 7 minutes  Current Facility-Administered Medications   Medication Dose Route Frequency Provider Last Rate    acetaminophen  650 mg Oral Q6H PRN Arvil Randal, PA-C      acetaminophen  650 mg Oral Q4H PRN Arvil Randal, PA-C      acetaminophen  975 mg Oral Q6H PRN Arvil Randal, PA-C      aluminum-magnesium hydroxide-simethicone  30 mL Oral Q4H PRN Arvil Randal, PA-C      benztropine  1 mg Intramuscular Q4H PRN Max 6/day Arvil Randal, PA-C      benztropine  1 mg Oral Q4H PRN Max 6/day Arvil Randal, PA-C      cholecalciferol  2,000 Units Oral Daily Opal Castelan PA-C      divalproex sodium  250 mg Oral Q8H [facility] Dewayne Cardoza MD      hydrOXYzine HCL  25 mg Oral Q6H PRN Max 4/day Arvil Randal, PA-C      hydrOXYzine HCL  50 mg Oral Q4H PRN Max 4/day Arvil Randal, PA-C      Or    LORazepam  1 mg Intramuscular Q4H PRN Arvil Randal, PA-C      lithium carbonate  300 mg Oral BID With Meals Dewayne Cardoza MD      LORazepam  1 mg Oral Q4H PRN Max 6/day Arvil Randal, PA-C      Or    LORazepam  2 mg Intramuscular Q6H PRN Max 3/day Arvil Randal, PA-C      LORazepam  0 5 mg Oral Q6H PRN Arvil Randal, PA-C      melatonin  3 mg Oral HS Arvil Randal, PA-C      metoprolol succinate  25 mg Oral Daily Opal Castelan PA-C      OLANZapine  10 mg Oral Q8H PRN Arvil Randal, PA-C      Or    OLANZapine  10 mg Intramuscular Q8H PRN Arvil Randal, PA-C      OLANZapine  5 mg Oral Q4H PRN Arvil Randal, PA-C      Or    OLANZapine  5 mg Intramuscular Q4H PRN Arvil Randal, PA-C      OLANZapine  2 5 mg Oral Q4H PRN Arvil Randal, PA-C      OLANZapine  2 5 mg Oral HS DewayneMD Lazarus Portillona polyethylene glycol  17 g Oral Daily PRN Wendi Vasquez PA-C      senna-docusate sodium  1 tablet Oral Daily PRN Wendi Vasquez PA-C      tamsulosin  0 4 mg Oral Daily With Dinner Lesli Kent PA-C         Risks / Benefits of Treatment:    Risks, benefits, and possible side effects of medications explained to patient  Patient does not verbalize understanding of benefits or risks of treatment refusal at this time and needs ongoing explanation of treatment benefits and treatment plan  Counseling / Coordination of Care: Total floor / unit time spent today 25 minutes  Greater than 50% of total time was spent with the patient and / or family counseling and / or coordination of care  A description of counseling / coordination of care:  Patient's progress discussed with staff in treatment team meeting  Medications, treatment progress and treatment plan reviewed with patient      Marcel Kwon MD 06/23/21

## 2021-06-24 NOTE — PLAN OF CARE
Problem: Alteration in Thoughts and Perception  Goal: Treatment Goal: Gain control of psychotic behaviors/thinking, reduce/eliminate presenting symptoms and demonstrate improved reality functioning upon discharge  Outcome: Progressing  Goal: Verbalize thoughts and feelings  Description: Interventions:  - Promote a nonjudgmental and trusting relationship with the patient through active listening and therapeutic communication  - Assess patient's level of functioning, behavior and potential for risk  - Engage patient in 1 on 1 interactions  - Encourage patient to express fears, feelings, frustrations, and discuss symptoms    - [place] patient to reality, help patient recognize reality-based thinking   - Administer medications as ordered and assess for potential side effects  - Provide the patient education related to the signs and symptoms of the illness and desired effects of prescribed medications  Outcome: Progressing  Goal: Refrain from acting on delusional thinking/internal stimuli  Description: Interventions:  - Monitor patient closely, per order   - Utilize least restrictive measures   - Set reasonable limits, give positive feedback for acceptable   - Administer medications as ordered and monitor of potential side effects  Outcome: Progressing  Goal: Recognize dysfunctional thoughts, communicate reality-based thoughts at the time of discharge  Description: Interventions:  - Provide medication and psycho-education to assist patient in compliance and developing insight into his/her illness   Outcome: Progressing  Goal: Complete daily ADLs, including personal hygiene independently, as able  Description: Interventions:  - Observe, teach, and assist patient with ADLS  - Monitor and promote a balance of rest/activity, with adequate nutrition and elimination   Outcome: Progressing     Problem: Ineffective Coping  Goal: Free from restraint events  Description: - Utilize least restrictive measures   - Provide behavioral interventions   - Redirect inappropriate behaviors   Outcome: Progressing  Goal: Participates in unit activities  Description: Interventions:  - Provide therapeutic environment   - Provide required programming   - Redirect inappropriate behaviors   Outcome: Progressing

## 2021-06-24 NOTE — NURSING NOTE
Patient has been visible on the unit, social with select peers  Speech remains pressured and tangential  Though process is illogical, persecutory at times  Patient continues to pick medication he will agree with taking  Denies S/H ideation  Also denies any form of hallucinations  Will be maintained on 7 minute safety checks

## 2021-06-24 NOTE — PROGRESS NOTES
06/24/21 0752   Team Meeting   Meeting Type Daily Rounds   Initial Conference Date 06/24/21   Team Members Present   Team Members Present Physician;Nurse;   Physician Team Member Dr Arslan Tavarez; Kush Dejesus, [address] [place] [address]   Nursing Team Member Radha Zuleta, RN   Social Work Team Member Vinny Powers, [place]   Patient/Family Present   Patient Present No   Patient's Family Present No       Rambling, tagential, awake since 3:30 am  Taking Zyprexa that, he thinks, is making him blind  303 was upheld yesterday

## 2021-06-24 NOTE — PROGRESS NOTES
Progress Note - [address] CA[address]AN[address] Day 59 y o  male MRN: [id number]   Unit/Bed#: [facility] 258-02 Encounter: [id number]    Behavior over the last 24 hours:      Vivi Darden remains hyperverbal, rambling, and tangential  He continues to pace the halls and talks to himself  He refused blood work this morning  He is selective with medications  He is unable to participate in a meaningful conversation  His speech is nonsensical  His thought process is disorganized  He has not had any physically aggressive behavior on the unit  His insight and judgment are poor  ROS: no complaints, all other systems are negative    Mental Status Evaluation:    Appearance:  disheveled   Behavior:  demanding, psychomotor agitation   Speech:  pressured, hypertalkative, tangential, nonsensical   Mood:  labile   Affect:  inappropriate, reactive   Thought Process:  disorganized, illogical   Associations: loose associations, flight of ideas   Thought Content:  paranoid ideation   Perceptual Disturbances: denies auditory hallucinations when asked   Risk Potential: Suicidal ideation - None  Homicidal ideation - None  Potential for aggression - No   Sensorium:  oriented to person, place and time/date   Memory:  recent and remote memory grossly intact   Consciousness:  alert and awake   Attention: poor concentration and poor attention span   Insight:  significantly impaired   Judgment: significantly impaired   Gait/Station: normal gait/station, normal balance   Motor Activity: no abnormal movements     Vital signs in last 24 hours:    Temp:  [97 4 °F (36 3 °C)-98 9 °F (37 2 °C)] 97 4 °F (36 3 °C)  HR:  [102-111] 111  Resp:  [18] 18  BP: (138-158)/() 158/102    Laboratory results:  I have personally reviewed all pertinent laboratory/tests results      Progress Toward Goals: no significant improvement    Assessment/Plan   Principal Problem:    Schizoaffective disorder, bipolar type (HCC)  Active Problems:    Cannabis abuse, continuous Alcohol abuse, continuous    Hypertension    Tobacco abuse    Medical clearance for psychiatric admission    BPH (benign prostatic hyperplasia)    Vitamin D insufficiency    Recommended Treatment:     Zyprexa increased to 10 mg HS  Continue Depakote 250 mg TID  Continue Lithium 300 mg BID  Continue to attempt to draw labs  Continue to monitor  Discharge disposition and planning are ongoing      All current active medications have been reviewed  Encourage group therapy, milieu therapy and occupational therapy  Behavioral Health checks every 7 minutes    Current Facility-Administered Medications   Medication Dose Route Frequency Provider Last Rate    acetaminophen  650 mg Oral Q6H PRN Wendi Vasquez PA-C      acetaminophen  650 mg Oral Q4H PRN Wendi Vasquez PA-C      acetaminophen  975 mg Oral Q6H PRN Wendi Vasquez PA-C      aluminum-magnesium hydroxide-simethicone  30 mL Oral Q4H PRN Wendi Vasquez PA-C      benztropine  1 mg Intramuscular Q4H PRN Max 6/day Wendi Vasquez PA-C      benztropine  1 mg Oral Q4H PRN Max 6/day Wendi Vasquez PA-C      cholecalciferol  2,000 Units Oral Daily Lesli Kent PA-C      divalproex sodium  250 mg Oral Q8H [facility] Martha Kunz MD      hydrOXYzine HCL  25 mg Oral Q6H PRN Max 4/day Wendi Vasquez PA-C      hydrOXYzine HCL  50 mg Oral Q4H PRN Max 4/day Wendi Vasquez PA-C      Or    LORazepam  1 mg Intramuscular Q4H PRN Wendi Vasquez PA-C      lithium carbonate  300 mg Oral BID With Meals Martha Kunz MD      LORazepam  1 mg Oral Q4H PRN Max 6/day Wendi Vasquez PA-C      Or    LORazepam  2 mg Intramuscular Q6H PRN Max 3/day Wendi Vasquez PA-C      LORazepam  0 5 mg Oral Q6H PRN Wendi Vasquez PA-C      melatonin  3 mg Oral HS Wendi Vasquez PA-C      metoprolol succinate  25 mg Oral Daily Pruddenny Kent PA-C      OLANZapine  10 mg Oral Q8H PRN Wendi Vasquez PA-C      Or    OLANZapine  10 mg Intramuscular Q8H PRN Rusty Julien Rene Benson PA-C      OLANZapine  5 mg Oral Q4H PRN Efrain Leal PA-C      Or    OLANZapine  5 mg Intramuscular Q4H PRN Efrain Leal PA-C      OLANZapine  10 mg Oral HS CARLOS Oneill      OLANZapine  2 5 mg Oral Q4H PRN Efrain Leal PA-C      polyethylene glycol  17 g Oral Daily PRN Efrain Leal PA-C      senna-docusate sodium  1 tablet Oral Daily PRN Efrain Leal PA-C      tamsulosin  0 4 mg Oral Daily With Dinner Brianne Astudillo PA-C         Risks / Benefits of Treatment:    Risks, benefits, and possible side effects of medications explained to patient and patient verbalizes understanding and agreement for treatment  Counseling / Coordination of Care:      Patient's progress discussed with staff in treatment team meeting  Medications, treatment progress and treatment plan reviewed with patient

## 2021-06-24 NOTE — PROGRESS NOTES
06/24/21 0973   Activity/Group Checklist   Group   ([employer])   Attendance Attended   Attendance Duration (min) Greater than 60  (Pt in and out of group)   Interactions Disorganized interaction   Affect/Mood Wide   Goals Achieved Identified feelings; Able to listen to others; Able to engage in interactions

## 2021-06-24 NOTE — NURSING NOTE
Pt in and out of room since 0330  Pt pacing halls and talking to self  No signs of distress noted  Continual monitoring and safety precautions maintained

## 2021-06-25 LAB — LITHIUM SERPL-SCNC: 0.3 MMOL/L (ref 1–1.2)

## 2021-06-25 PROCEDURE — 80178 ASSAY OF LITHIUM: CPT | Performed by: HOSPITALIST

## 2021-06-25 PROCEDURE — 99232 SBSQ HOSP IP/OBS MODERATE 35: CPT | Performed by: HOSPITALIST

## 2021-06-25 RX ADMIN — OLANZAPINE 10 MG: 10 TABLET, FILM COATED ORAL at 21:16

## 2021-06-25 RX ADMIN — LORAZEPAM 1 MG: 1 TABLET ORAL at 16:47

## 2021-06-25 RX ADMIN — LITHIUM CARBONATE 300 MG: 300 CAPSULE, GELATIN COATED ORAL at 08:09

## 2021-06-25 RX ADMIN — MELATONIN 3 MG: at 21:17

## 2021-06-25 RX ADMIN — Medication 2000 UNITS: at 08:08

## 2021-06-25 RX ADMIN — LORAZEPAM 0.5 MG: 0.5 TABLET ORAL at 21:16

## 2021-06-25 RX ADMIN — DIVALPROEX SODIUM 250 MG: 250 TABLET, DELAYED RELEASE ORAL at 15:21

## 2021-06-25 RX ADMIN — METOPROLOL SUCCINATE 25 MG: 50 TABLET, EXTENDED RELEASE ORAL at 08:08

## 2021-06-25 RX ADMIN — DIVALPROEX SODIUM 250 MG: 250 TABLET, DELAYED RELEASE ORAL at 21:15

## 2021-06-25 RX ADMIN — LITHIUM CARBONATE 450 MG: 300 CAPSULE, GELATIN COATED ORAL at 15:32

## 2021-06-25 RX ADMIN — DIVALPROEX SODIUM 250 MG: 250 TABLET, DELAYED RELEASE ORAL at 05:19

## 2021-06-25 NOTE — SOCIAL WORK
CM met and spoke with patient briefly reporting he was angry this AM but feels a bit better now since seeing the doctor  Patient still presents with pressured speech and hyperverbal  CM provided support  CM will continue to follow patient's progress and assist with discharge planning needs

## 2021-06-25 NOTE — NURSING NOTE
Pt is AAO4  Presents with labile mood and affect as well as high anxiety  PRN ativan given for anxiety with positive efficacy noted  Prior to PRN medication, pt was pacing the halls talking loudly to self and shouting at times stating "come at me then" to no specific person  When asked if pt is hallucinating, he denies  Compliant with nighttime medication  Observed sleeping during rounds  Will continue to monitor

## 2021-06-25 NOTE — PROGRESS NOTES
06/25/21 0730   Activity/Group Checklist   Group   (Goal Planning and Communication)   Attendance Attended   Attendance Duration (min) 16-30   Interactions Disorganized interaction   Affect/Mood Bright   Goals Achieved Able to engage in interactions

## 2021-06-25 NOTE — PROGRESS NOTES
06/25/21 1315   Activity/Group Checklist   Group   ([employer])   Attendance Attended   Attendance Duration (min) 31-45   Interactions Disorganized interaction   Affect/Mood Bright;Calm   Goals Achieved Able to listen to others; Able to engage in interactions

## 2021-06-25 NOTE — PROGRESS NOTES
Progress Note - Mikael Dhillon 67 Day 59 y o  male MRN: [id number]   Unit/Bed#: [facility] 258-02 Encounter: [id number]    Behavior over the last 24 hours: unchanged  Nasima Chris seen today, appears to have little change  Continues to have mood instability, rambling speech and pressured thought  He is completely tangential in his thought process  Continues to state he does not feel he belongs in the hospital   Argumentative regarding the need for medications  Denies any symptoms, denies any thoughts of self-harm or harm of others  Staff report disorganized in groups and on the unit  Mental Status Evaluation:    Appearance:  casually dressed, looks stated age, bearded   Behavior:  bizarre, demanding   Speech:  pressured, disorganized   Mood:  labile   Affect:  expansive, increased in intensity   Thought Process:  tangential, increased rate of thoughts   Associations: tangential associations   Thought Content:  grandiose delusions   Perceptual Disturbances: denies auditory hallucinations when asked   Risk Potential: Suicidal ideation - None  Homicidal ideation - None   Sensorium:  oriented to person, place and time/date   Memory:  recent and remote memory grossly intact   Consciousness:  alert and awake   Attention: decreased concentration and decreased attention span   Insight:  impaired   Judgment: impaired   Gait/Station: normal gait/station   Motor Activity: no abnormal movements     Vital signs in last 24 hours:    Temp:  [97 4 °F (36 3 °C)-98 9 °F (37 2 °C)] 98 9 °F (37 2 °C)  HR:  [] 89  Resp:  [18] 18  BP: (134-158)/() 134/82    Laboratory results: I have personally reviewed all pertinent laboratory/tests results          Assessment/Plan   Principal Problem:    Schizoaffective disorder, bipolar type ([facility] )  Active Problems:    Cannabis abuse, continuous    Alcohol abuse, continuous    Hypertension    Tobacco abuse    Medical clearance for psychiatric admission    BPH (benign prostatic hyperplasia)    Vitamin D insufficiency    Recommended Treatment:     Planned medication and treatment changes:  Increase lithium to 450 mg b i d  Today lithium level will be done on Monday   Zyprexa increased to 10 mg b i d   On June 24th  Continue Depakote 250 mg q 8 hours  All current active medications have been reviewed  Encourage group therapy, milieu therapy and occupational therapy  Behavioral Health checks every 7 minutes  Current Facility-Administered Medications   Medication Dose Route Frequency Provider Last Rate    acetaminophen  650 mg Oral Q6H PRN Karron Cluster, PA-C      acetaminophen  650 mg Oral Q4H PRN Karron Cluster, PA-C      acetaminophen  975 mg Oral Q6H PRN Karron Cluster, PA-C      aluminum-magnesium hydroxide-simethicone  30 mL Oral Q4H PRN Karron Cluster, PA-C      benztropine  1 mg Intramuscular Q4H PRN Max 6/day Karron Cluster, PA-C      benztropine  1 mg Oral Q4H PRN Max 6/day Karron Cluster, PA-C      cholecalciferol  2,000 Units Oral Daily Arville Costain, PA-C      divalproex sodium  250 mg Oral Q8H [address] Meghan Canales MD      hydrOXYzine HCL  25 mg Oral Q6H PRN Max 4/day Karron Cluster, PA-C      hydrOXYzine HCL  50 mg Oral Q4H PRN Max 4/day Karron Cluster, PA-C      Or    LORazepam  1 mg Intramuscular Q4H PRN Karron Cluster, PA-C      lithium carbonate  450 mg Oral BID With Meals Eddie Hines MD      LORazepam  1 mg Oral Q4H PRN Max 6/day Karron Cluster, PA-C      Or    LORazepam  2 mg Intramuscular Q6H PRN Max 3/day Karron Cluster, PA-C      LORazepam  0 5 mg Oral Q6H PRN Karron Cluster, PA-C      melatonin  3 mg Oral HS Karron Cluster, PA-C      metoprolol succinate  25 mg Oral Daily Arville Costain, PA-C      OLANZapine  10 mg Oral Q8H PRN Karron Cluster, PA-C      Or    OLANZapine  10 mg Intramuscular Q8H PRN Karron Cluster, PA-C      OLANZapine  5 mg Oral Q4H PRN Karron Cluster, PA-C      Or    OLANZapine  5 mg Intramuscular Q4H PRN Esther Reyes PA-C      OLANZapine  10 mg Oral HS [place] CARLOS Garnica      OLANZapine  2 5 mg Oral Q4H PRN Esther Reyes PA-C      polyethylene glycol  17 g Oral Daily PRN Esther Reyes PA-C      senna-docusate sodium  1 tablet Oral Daily PRN Esther Reyes PA-C      tamsulosin  0 4 mg Oral Daily With Dinner Doug Frye PA-C         Risks / Benefits of Treatment:    Risks, benefits, and possible side effects of medications explained to patient  Patient has limited understanding of risks and benefits of treatment at this time, but agrees to take medications as prescribed  Counseling / Coordination of Care: Total floor / unit time spent today 25 minutes  Greater than 50% of total time was spent with the patient and / or family counseling and / or coordination of care  A description of counseling / coordination of care:  Patient's progress discussed with staff in treatment team meeting  Medications, treatment progress and treatment plan reviewed with patient      Sari Woodward MD 06/25/21

## 2021-06-25 NOTE — PLAN OF CARE
Problem: Alteration in Thoughts and Perception  Goal: Treatment Goal: Gain control of psychotic behaviors/thinking, reduce/eliminate presenting symptoms and demonstrate improved reality functioning upon discharge  Outcome: Progressing  Goal: Verbalize thoughts and feelings  Description: Interventions:  - Promote a nonjudgmental and trusting relationship with the patient through active listening and therapeutic communication  - Assess patient's level of functioning, behavior and potential for risk  - Engage patient in 1 on 1 interactions  - Encourage patient to express fears, feelings, frustrations, and discuss symptoms    - [place] patient to reality, help patient recognize reality-based thinking   - Administer medications as ordered and assess for potential side effects  - Provide the patient education related to the signs and symptoms of the illness and desired effects of prescribed medications  Outcome: Progressing  Goal: Refrain from acting on delusional thinking/internal stimuli  Description: Interventions:  - Monitor patient closely, per order   - Utilize least restrictive measures   - Set reasonable limits, give positive feedback for acceptable   - Administer medications as ordered and monitor of potential side effects  Outcome: Progressing  Goal: Agree to be compliant with medication regime, as prescribed and report medication side effects  Description: Interventions:  - Offer appropriate PRN medication and supervise ingestion; conduct AIMS, as needed   Outcome: Progressing  Goal: Recognize dysfunctional thoughts, communicate reality-based thoughts at the time of discharge  Description: Interventions:  - Provide medication and psycho-education to assist patient in compliance and developing insight into his/her illness   Outcome: Progressing  Goal: Complete daily ADLs, including personal hygiene independently, as able  Description: Interventions:  - Observe, teach, and assist patient with ADLS  - Monitor and promote a balance of rest/activity, with adequate nutrition and elimination   Outcome: Progressing     Problem: Ineffective Coping  Goal: Cooperates with admission process  Description: Interventions:   - Complete admission process  Outcome: Progressing  Goal: Identifies ineffective coping skills  Outcome: Progressing  Goal: Identifies healthy coping skills  Outcome: Progressing

## 2021-06-25 NOTE — PROGRESS NOTES
06/25/21 0800   Team Meeting   Meeting Type Daily Rounds   Initial Conference Date 06/25/21   Team Members Present   Team Members Present Physician;Nurse;   Physician Team Member Dr Irma Solano Team Member Dwayne Forman RN   Social Work Team Member Abigail Tadeo   Patient/Family Present   Patient Present No   Patient's Family Present No     Social  Pressured speech  Loud  Rambling  Nonsensical speech  Paranoid  Bizarre  Accepted labs to be drawn this morning  Redirectable

## 2021-06-25 NOTE — PROGRESS NOTES
06/25/21 1015   Activity/Group Checklist   Group   ([employer])   Attendance Attended   Attendance Duration (min) Greater than 60   Interactions Disorganized interaction  (Pt needed frequent redirection)   Affect/Mood Bright;Calm   Goals Achieved Identified feelings; Able to listen to others; Able to engage in interactions; Able to give feedback to another

## 2021-06-25 NOTE — PROGRESS NOTES
06/24/21 1415   Activity/Group Checklist   Group   ([employer])   Attendance Attended   Attendance Duration (min) 31-45   Interactions Disorganized interaction   Affect/Mood Bright   Goals Achieved Able to engage in interactions

## 2021-06-25 NOTE — NURSING NOTE
Patient remains visible on the unit, participates in some of the groups offered  Patient has been showing increased agitation  Both him and roommate seem to be feeding off of one another  Patient has been becoming louder throughout the shift  Ativan 1 mg was administered  Effective at current time  Will be maintained on 7 minute safety checks

## 2021-06-26 PROCEDURE — 99232 SBSQ HOSP IP/OBS MODERATE 35: CPT | Performed by: NURSE PRACTITIONER

## 2021-06-26 RX ADMIN — Medication 2000 UNITS: at 08:12

## 2021-06-26 RX ADMIN — DIVALPROEX SODIUM 250 MG: 250 TABLET, DELAYED RELEASE ORAL at 14:14

## 2021-06-26 RX ADMIN — LORAZEPAM 1 MG: 1 TABLET ORAL at 16:11

## 2021-06-26 RX ADMIN — LORAZEPAM 1 MG: 1 TABLET ORAL at 06:15

## 2021-06-26 RX ADMIN — MELATONIN 3 MG: at 21:34

## 2021-06-26 RX ADMIN — LITHIUM CARBONATE 450 MG: 300 CAPSULE, GELATIN COATED ORAL at 08:12

## 2021-06-26 RX ADMIN — LITHIUM CARBONATE 450 MG: 300 CAPSULE, GELATIN COATED ORAL at 16:11

## 2021-06-26 RX ADMIN — OLANZAPINE 5 MG: 5 TABLET, FILM COATED ORAL at 08:12

## 2021-06-26 RX ADMIN — DIVALPROEX SODIUM 250 MG: 250 TABLET, DELAYED RELEASE ORAL at 21:34

## 2021-06-26 RX ADMIN — METOPROLOL SUCCINATE 25 MG: 50 TABLET, EXTENDED RELEASE ORAL at 08:12

## 2021-06-26 NOTE — PROGRESS NOTES
Progress Note - 6 [address] Frandy Day 59 y o  male MRN: [id number]  Unit/Bed#: [facility] 258-02 Encounter: [id number]    Assessment/Plan   Principal Problem:    Schizoaffective disorder, bipolar type ([address] )  Active Problems:    Cannabis abuse, continuous    Alcohol abuse, continuous    Hypertension    Tobacco abuse    Medical clearance for psychiatric admission    BPH (benign prostatic hyperplasia)    Vitamin D insufficiency  Patient was seen for continuing care and treatment  Case was discussed with nursing staff  Still with periods of interrupted sleep  Still loud and boisterous on the unit  Now on Zyprexa 10 mg HS and this will be monitored  Yesterday, he refused p r n  Of Zyprexa but did take a p r n  Of Ativan  We will continue to monitor patient in hospital   Still very disorganized agitated but not aggressive or assaultive here on the unit  Current lithium level is 0 3  Drawn on 06/25/2021    Behavior over the last 24 hours:  unchanged  Sleep: insomnia  Appetite: normal  Medication side effects: No  ROS: no complaints    Mental Status Evaluation:  Appearance:  disheveled   Behavior:  psychomotor agitation   Speech:  loud   Mood:  irritable and labile   Affect:  mood-congruent   Thought Process:  disorganized, flight of ideas and illogical   Thought Content:  delusions  grandiose and obsessions   Perceptual Disturbances: Denies   Risk Potential: Suicidal Ideations none  Homicidal Ideations none  Potential for Aggression No   Sensorium:  person and place   Memory:  recent and remote memory grossly intact   Consciousness:  alert and awake    Attention: attention span appeared shorter than expected for age   Insight:  Impaired   Judgment: Impaired   Gait/Station: normal gait/station   Motor Activity: no abnormal movements     Progress Toward Goals:  No change    Recommended Treatment: Continue with group therapy, milieu therapy and occupational therapy        Risks, benefits and possible side effects of Medications:   Risks, benefits, and possible side effects of medications explained to patient and patient verbalizes understanding  Medications:  Depakote 250 mg t i d   Lithium carbonate 450 mg b i d  Zyprexa 10 mg HS  Labs: I have personally reviewed all pertinent laboratory/tests results  Most Recent Labs:   Lab Results   Component Value Date    WBC 5 50 06/18/2021    RBC 4 26 (L) 06/18/2021    HGB 12 7 (L) 06/18/2021    HCT 38 0 (L) 06/18/2021     06/18/2021    RDW 14 3 06/18/2021    NEUTROABS 4 10 06/18/2021    SODIUM 144 (H) 06/18/2021    K 3 5 06/19/2021     (H) 06/18/2021    CO2 26 06/18/2021    BUN 12 06/18/2021    CREATININE 0 97 06/18/2021    GLUC 126 (H) 06/18/2021    GLUF 91 11/06/2019    CALCIUM 9 2 06/18/2021    AST 26 06/18/2021    ALT 21 06/18/2021    ALKPHOS 40 (L) 06/18/2021    TP 5 9 (L) 06/18/2021    ALB 3 9 06/18/2021    TBILI 1 00 06/18/2021    CHOLESTEROL 150 02/17/2021    HDL 35 (L) 02/17/2021    TRIG 90 02/17/2021    LDLCALC 97 02/17/2021    NONHDLC 115 02/17/2021    VALPROICTOT <10 (L) 06/18/2021    LITHIUM 0 3 (L) 06/25/2021    AMMONIA 51 02/06/2021    LRV7TRRNMVZT 1 780 06/18/2021    FREET4 1 09 02/17/2021    RPR Non-Reactive 11/03/2019    HGBA1C 5 1 11/03/2019     11/03/2019       Counseling / Coordination of Care  Total floor / unit time spent today 25 minutes  Greater than 50% of total time was spent with the patient and / or family counseling and / or coordination of care   A description of the counseling / coordination of care:  Medication management, to treatment, chart reviewed

## 2021-06-26 NOTE — NURSING NOTE
Pt woke this morning irritable, loud, yelling, slamming drawers  Pt's thought process is disorganized- fixated upon medications and speaking to the   PRN Zyprexa 5 mg administered at 0812- effective  Pt was much less pressured after taking PRN Zyprexa, was more calm and became cooperative  Took all AM medications  No further behaviors  His appetite is good- 100% of meals  Will CTM  Q7 minute safety checks in progress

## 2021-06-26 NOTE — NURSING NOTE
Pt approached nursing station c/o calloses on bilateral feet between the great toe that have small slits in them from being dry  Cleansed both feet with wound cleanser and patted dry, instructing patient to always wear nonskid socks when out of bed

## 2021-06-26 NOTE — NURSING NOTE
Pt observed resting most of the night but did have periods of interruption in sleep where he would walk to nurses station and mumble things that were nonsensical  Pt went back to room shortly after and was observed resting again  Q7 in place, will continue to monitor

## 2021-06-26 NOTE — NURSING NOTE
Pt was very loud, irritable stating inappropriate comments about certain staff members involved in his care  Started to pick out certain pills from his medication cup such as zyprexa and depakote stating "I told them I wont take this one" but then agreed to take the medications  After about a half hour pt approached nursing station stating in a low voice "tell them to assembly the men and get this poison out of me or I will mariano them all"  PRN ativan 0 5mg given for anxiety/restlessness, effective  Pt observed sleeping during rounds  Will continue to monitor

## 2021-06-27 PROCEDURE — 99232 SBSQ HOSP IP/OBS MODERATE 35: CPT | Performed by: PSYCHIATRY & NEUROLOGY

## 2021-06-27 RX ADMIN — LORAZEPAM 1 MG: 1 TABLET ORAL at 21:17

## 2021-06-27 RX ADMIN — DIVALPROEX SODIUM 250 MG: 250 TABLET, DELAYED RELEASE ORAL at 05:57

## 2021-06-27 RX ADMIN — LITHIUM CARBONATE 450 MG: 300 CAPSULE, GELATIN COATED ORAL at 08:15

## 2021-06-27 RX ADMIN — LORAZEPAM 1 MG: 1 TABLET ORAL at 15:58

## 2021-06-27 RX ADMIN — Medication 2000 UNITS: at 08:15

## 2021-06-27 RX ADMIN — METOPROLOL SUCCINATE 25 MG: 50 TABLET, EXTENDED RELEASE ORAL at 08:15

## 2021-06-27 RX ADMIN — MELATONIN 3 MG: at 21:17

## 2021-06-27 RX ADMIN — LITHIUM CARBONATE 450 MG: 300 CAPSULE, GELATIN COATED ORAL at 15:58

## 2021-06-27 RX ADMIN — DIVALPROEX SODIUM 250 MG: 250 TABLET, DELAYED RELEASE ORAL at 21:17

## 2021-06-27 RX ADMIN — DIVALPROEX SODIUM 250 MG: 250 TABLET, DELAYED RELEASE ORAL at 13:25

## 2021-06-27 RX ADMIN — OLANZAPINE 10 MG: 10 TABLET, FILM COATED ORAL at 21:17

## 2021-06-27 NOTE — PLAN OF CARE
Problem: Alteration in Thoughts and Perception  Goal: Treatment Goal: Gain control of psychotic behaviors/thinking, reduce/eliminate presenting symptoms and demonstrate improved reality functioning upon discharge  Outcome: Progressing  Goal: Verbalize thoughts and feelings  Description: Interventions:  - Promote a nonjudgmental and trusting relationship with the patient through active listening and therapeutic communication  - Assess patient's level of functioning, behavior and potential for risk  - Engage patient in 1 on 1 interactions  - Encourage patient to express fears, feelings, frustrations, and discuss symptoms    - [place] patient to reality, help patient recognize reality-based thinking   - Administer medications as ordered and assess for potential side effects  - Provide the patient education related to the signs and symptoms of the illness and desired effects of prescribed medications  Outcome: Progressing  Goal: Refrain from acting on delusional thinking/internal stimuli  Description: Interventions:  - Monitor patient closely, per order   - Utilize least restrictive measures   - Set reasonable limits, give positive feedback for acceptable   - Administer medications as ordered and monitor of potential side effects  Outcome: Progressing  Goal: Agree to be compliant with medication regime, as prescribed and report medication side effects  Description: Interventions:  - Offer appropriate PRN medication and supervise ingestion; conduct AIMS, as needed   Outcome: Progressing  Goal: Recognize dysfunctional thoughts, communicate reality-based thoughts at the time of discharge  Description: Interventions:  - Provide medication and psycho-education to assist patient in compliance and developing insight into his/her illness   Outcome: Progressing  Goal: Complete daily ADLs, including personal hygiene independently, as able  Description: Interventions:  - Observe, teach, and assist patient with ADLS  - Monitor and promote a balance of rest/activity, with adequate nutrition and elimination   Outcome: Progressing     Problem: Ineffective Coping  Goal: Cooperates with admission process  Description: Interventions:   - Complete admission process  Outcome: Progressing  Goal: Identifies ineffective coping skills  Outcome: Progressing  Goal: Identifies healthy coping skills  Outcome: Progressing  Goal: Demonstrates healthy coping skills  Outcome: Progressing  Goal: Patient/Family participate in treatment and DC plans  Description: Interventions:  - Provide therapeutic environment  Outcome: Progressing  Goal: Patient/Family verbalizes awareness of resources  Outcome: Progressing  Goal: Understands least restrictive measures  Description: Interventions:  - Utilize least restrictive behavior  Outcome: Progressing  Goal: Free from restraint events  Description: - Utilize least restrictive measures   - Provide behavioral interventions   - Redirect inappropriate behaviors   Outcome: Progressing  Goal: Participates in unit activities  Description: Interventions:  - Provide therapeutic environment   - Provide required programming   - Redirect inappropriate behaviors   Outcome: Progressing     Problem: Anxiety  Goal: Anxiety is at manageable level  Description: Interventions:  - Assess and monitor patient's anxiety level  - Monitor for signs and symptoms (heart palpitations, chest pain, shortness of breath, headaches, nausea, feeling jumpy, restlessness, irritable, apprehensive)  - Collaborate with interdisciplinary team and initiate plan and interventions as ordered    - [place] patient to unit/surroundings  - Explain treatment plan  - Encourage participation in care  - Encourage verbalization of concerns/fears  - Identify coping mechanisms  - Assist in developing anxiety-reducing skills  - Administer/offer alternative therapies  - Limit or eliminate stimulants  Outcome: Progressing     Problem: Risk for Violence/Aggression Toward Others  Goal: Treatment Goal: Refrain from acts of violence/aggression during length of stay, and demonstrate improved impulse control at the time of discharge  Outcome: Progressing  Goal: Refrain from harming others  Outcome: Progressing  Goal: Refrain from destructive acts on the environment or property  Outcome: Progressing  Goal: Control angry outbursts  Description: Interventions:  - Monitor patient closely, per order  - Ensure early verbal de-escalation  - Monitor prn medication needs  - Set reasonable/therapeutic limits, outline behavioral expectations, and consequences   - Provide a non-threatening milieu, utilizing the least restrictive interventions   Outcome: Progressing  Goal: Identify appropriate positive anger management techniques  Description: Interventions:  - Offer anger management and coping skills groups   - Staff will provide positive feedback for appropriate anger control  Outcome: Progressing

## 2021-06-27 NOTE — PROGRESS NOTES
C/O"I am doing not good, I demand to see treatment team, I am patient here , I take Depakote and olanzapine"    Report from staff regarding this patient received and record reviewed  prior to seeing this patient   Behavior over the last 24 hours:  Seen for schizoaffective disorder, on meds, , demanding to see treatment   Sleep:soso  Appetite:ok  Medication side effects:none  ROS:irritable, vela, intrusive, labile, loud  Mental Status Evaluation:  Appearance:  Dressed appropriately, long beard, irritbale   Behavior:  uncooperative   Speech:  loud   Mood:  angry   Affect:  inappropraite   Thought Process:  disorganzied   Thought Content:  paarnoid   Perceptual Disturbances: Denied AV hallucination   Risk Potential: NO MIKAYLA    Sensorium:  normal   Cognition:  intact   Consciousness:  Alert, OX3   Attention: Fair   Insight:  impaired   Judgment: impaired   Gait/Station: With in normal range   Motor Activity: With in normal range     Progress Toward Goals: working on current treatment goals, no changes  Made in treatment plan   Recommended Treatment: Continue with group therapy, milieu therapy and occupational therapy  Risks, benefits and possible side effects of Medications:   Risks, benefits, and possible side effects of medications explained to patient and patient verbalizes understanding        Medications:   current meds:   Current Facility-Administered Medications   Medication Dose Route Frequency    acetaminophen (TYLENOL) tablet 650 mg  650 mg Oral Q6H PRN    acetaminophen (TYLENOL) tablet 650 mg  650 mg Oral Q4H PRN    acetaminophen (TYLENOL) tablet 975 mg  975 mg Oral Q6H PRN    aluminum-magnesium hydroxide-simethicone (MYLANTA) oral suspension 30 mL  30 mL Oral Q4H PRN    benztropine (COGENTIN) injection 1 mg  1 mg Intramuscular Q4H PRN Max 6/day    benztropine (COGENTIN) tablet 1 mg  1 mg Oral Q4H PRN Max 6/day    cholecalciferol (VITAMIN D3) tablet 2,000 Units  2,000 Units Oral Daily    divalproex sodium (DEPAKOTE) EC tablet 250 mg  250 mg Oral Q8H [facility]    hydrOXYzine HCL (ATARAX) tablet 25 mg  25 mg Oral Q6H PRN Max 4/day    hydrOXYzine HCL (ATARAX) tablet 50 mg  50 mg Oral Q4H PRN Max 4/day    Or    LORazepam (ATIVAN) injection 1 mg  1 mg Intramuscular Q4H PRN    lithium carbonate capsule 450 mg  450 mg Oral BID With Meals    LORazepam (ATIVAN) tablet 1 mg  1 mg Oral Q4H PRN Max 6/day    Or    LORazepam (ATIVAN) injection 2 mg  2 mg Intramuscular Q6H PRN Max 3/day    LORazepam (ATIVAN) tablet 0 5 mg  0 5 mg Oral Q6H PRN    melatonin tablet 3 mg  3 mg Oral HS    metoprolol succinate (TOPROL-XL) 24 hr tablet 25 mg  25 mg Oral Daily    OLANZapine (ZyPREXA) tablet 10 mg  10 mg Oral Q8H PRN    Or    OLANZapine (ZyPREXA) IM injection 10 mg  10 mg Intramuscular Q8H PRN    OLANZapine (ZyPREXA) tablet 5 mg  5 mg Oral Q4H PRN    Or    OLANZapine (ZyPREXA) IM injection 5 mg  5 mg Intramuscular Q4H PRN    OLANZapine (ZyPREXA) tablet 10 mg  10 mg Oral HS    OLANZapine (ZyPREXA) tablet 2 5 mg  2 5 mg Oral Q4H PRN    polyethylene glycol (MIRALAX) packet 17 g  17 g Oral Daily PRN    senna-docusate sodium (SENOKOT S) 8 6-50 mg per tablet 1 tablet  1 tablet Oral Daily PRN    tamsulosin (FLOMAX) capsule 0 4 mg  0 4 mg Oral Daily With Dinner     Labs: NA    Assessment, Diagnosis  and Plan: continue with current meds and goals, F/U tomorrow    Counseling / Coordination of Care  Total floor / unit time spent today20 minutes  minutes  Greater than 50% of total time was spent with the patient and / or family counseling and / or coordination of care  A description of the counseling / coordination of care:      Edita Raines MD

## 2021-06-27 NOTE — PROGRESS NOTES
[facility]  This [facility] staff member inventoried patient's belongings with patient present  Patient's belongings were listed on a personal belongings checklist  Patient and this MHT signed personal belongings checklist to confirm all belongings    Belongings listed below:    Belongings to remain with patient:    3x shirts  2x vests  5x sock pairs  1x pair of shoe inserts  Assorted hair ties      Belongings in contraband storage:    1x white apple bag  2x pants  3x shirts  7x sock pairs  1x pair of sandals

## 2021-06-27 NOTE — NURSING NOTE
Pt's mood remains extremely labile  Rapid mood shifts  Anxious and irritable  Tangential  Loud and hostile  PRN Ativan given @ 5994

## 2021-06-27 NOTE — NURSING NOTE
Patient observed in the milieu  Much more controlled today- less irritable  His appetite is good- 100% of meals  Compliant with scheduled medications  Social w/ staff and peers  Thoughts are disorganized  Fixated on Dr prescribing him "poison"  Will CTM  Q7 minute safety checks in progress

## 2021-06-27 NOTE — PROGRESS NOTES
Patient agitated due to early morning medication, states it will make him blind and he's already broken his glasses  Pacing back and forth and declines PRNs at this time  Will remain on safety precautions and continual monitoring

## 2021-06-27 NOTE — PROGRESS NOTES
Patient visible in the milieu, social with his roommate  Behavior overall controlled this shift, but still has rapid mood shifts  Pressured, tangential, disorganized and garbled in speech at times  Still paranoid about being poisoned and selective with medications  Initially took scheduled  medications but then spat out zyprexa as he says "not taking the pill won't kill me like these doctors are trying to do" despite being educated on indication  Patient angrily and loudly dismissed this nurse and walked away  Observed having intermittent periods of being in a euphoric mood, laughing and dancing around  Denies SI/HI  Makes needs known  Will remain on safety precautions and continual monitoring

## 2021-06-28 PROCEDURE — 99232 SBSQ HOSP IP/OBS MODERATE 35: CPT | Performed by: HOSPITALIST

## 2021-06-28 RX ORDER — LITHIUM CARBONATE 300 MG/1
600 CAPSULE ORAL 2 TIMES DAILY WITH MEALS
Status: DISCONTINUED | OUTPATIENT
Start: 2021-06-28 | End: 2021-07-16 | Stop reason: HOSPADM

## 2021-06-28 RX ADMIN — LITHIUM CARBONATE 450 MG: 300 CAPSULE, GELATIN COATED ORAL at 08:13

## 2021-06-28 RX ADMIN — MELATONIN 3 MG: at 20:51

## 2021-06-28 RX ADMIN — Medication 2000 UNITS: at 08:14

## 2021-06-28 RX ADMIN — OLANZAPINE 10 MG: 10 TABLET, FILM COATED ORAL at 20:55

## 2021-06-28 RX ADMIN — LITHIUM CARBONATE 600 MG: 300 CAPSULE, GELATIN COATED ORAL at 15:35

## 2021-06-28 RX ADMIN — DIVALPROEX SODIUM 250 MG: 250 TABLET, DELAYED RELEASE ORAL at 05:55

## 2021-06-28 RX ADMIN — DIVALPROEX SODIUM 250 MG: 250 TABLET, DELAYED RELEASE ORAL at 13:10

## 2021-06-28 RX ADMIN — DIVALPROEX SODIUM 250 MG: 250 TABLET, DELAYED RELEASE ORAL at 20:51

## 2021-06-28 NOTE — NURSING NOTE
Pt received on the unit awake and alert in the day room , he denies any depression, anxiety or SI, able to make needs known, loud at times yet he was redirectable today, disorganzed ,pleasant and cooperative, active in groups compliant with medications and positive for lunch and  snack  Q7 minute checks continued ,  continue to monitor

## 2021-06-28 NOTE — NURSING NOTE
Patient continues to be loud, disorganized, and labile  Behaviors have been overall controlled  Irritable when medications are due, asking for "'s of the poison" and then has a verbal outburst after administration  Argued with a peer but was redirected  Given PRN ativan at 2117  Compliant with scheduled medications this evening  Social with select peers and staff  Continues to endorse lack of need for admission despite education  Makes needs known  Completes ADLs without issue  Showered and ate snack  No other concerns identified at this time  Will remain on safety precautions and continual monitoring

## 2021-06-28 NOTE — PROGRESS NOTES
Patient visible on the unit  He continues to be loud and disorganized  He is pleasant   Mood is labile  He initially refused morning vitals but then allowed the tech to obtain them when nurse was administering medications  Patient refused metoprolol but took all other medications as ordered  He stated he is very anxious but refused PRN anxiety medication  Denied depression,SI,HI, or hallucinations  Q 7 minute safety checks maintained

## 2021-06-28 NOTE — PROGRESS NOTES
06/28/21 0816   Team Meeting   Meeting Type Daily Rounds   Initial Conference Date 06/28/21   Team Members Present   Team Members Present Physician;Nurse;   Physician Team Member Dr Minerva Allison; Salmoe Mena [address] [place] [address]   Nursing Team Member Sheila Hastings, RN   Social Work Team Member [place], [place]   Patient/Family Present   Patient Present No   Patient's Family Present No     Labile, loud, disorganized; takes Depakote, Lithium Zyprexa

## 2021-06-28 NOTE — PROGRESS NOTES
06/28/21 7350   Activity/Group Checklist   Group   (Creative Expression)   Attendance Attended   Attendance Duration (min) Greater than 60   Interactions Disorganized interaction   Affect/Mood Wide   Goals Achieved Identified feelings; Able to listen to others; Able to engage in interactions

## 2021-06-28 NOTE — PROGRESS NOTES
06/28/21 0730   Activity/Group Checklist   Group   (Goal Planning and Communication)   Attendance Attended   Attendance Duration (min) 46-60   Interactions Disorganized interaction   Affect/Mood Normal range   Goals Achieved Identified feelings; Able to listen to others; Able to engage in interactions

## 2021-06-28 NOTE — NURSING NOTE
Patient observed asleep/resting throughout a majority of the night during q7 minute checks  Early awakening this morning  Non-labored breathing observed during periods of sleep/rest  Patient awake and irritable, stating "the shrink is never here when I'm angry"  Will remain on safety precautions and continual monitoring

## 2021-06-28 NOTE — PLAN OF CARE
Problem: Alteration in Thoughts and Perception  Goal: Treatment Goal: Gain control of psychotic behaviors/thinking, reduce/eliminate presenting symptoms and demonstrate improved reality functioning upon discharge  Outcome: Progressing  Goal: Verbalize thoughts and feelings  Description: Interventions:  - Promote a nonjudgmental and trusting relationship with the patient through active listening and therapeutic communication  - Assess patient's level of functioning, behavior and potential for risk  - Engage patient in 1 on 1 interactions  - Encourage patient to express fears, feelings, frustrations, and discuss symptoms    - [place] patient to reality, help patient recognize reality-based thinking   - Administer medications as ordered and assess for potential side effects  - Provide the patient education related to the signs and symptoms of the illness and desired effects of prescribed medications  Outcome: Progressing  Goal: Refrain from acting on delusional thinking/internal stimuli  Description: Interventions:  - Monitor patient closely, per order   - Utilize least restrictive measures   - Set reasonable limits, give positive feedback for acceptable   - Administer medications as ordered and monitor of potential side effects  Outcome: Progressing  Goal: Agree to be compliant with medication regime, as prescribed and report medication side effects  Description: Interventions:  - Offer appropriate PRN medication and supervise ingestion; conduct AIMS, as needed   Outcome: Progressing  Goal: Recognize dysfunctional thoughts, communicate reality-based thoughts at the time of discharge  Description: Interventions:  - Provide medication and psycho-education to assist patient in compliance and developing insight into his/her illness   Outcome: Progressing  Goal: Complete daily ADLs, including personal hygiene independently, as able  Description: Interventions:  - Observe, teach, and assist patient with ADLS  - Monitor and promote a balance of rest/activity, with adequate nutrition and elimination   Outcome: Progressing

## 2021-06-28 NOTE — PROGRESS NOTES
Progress Note - Mikael Dhillon 67 Day 59 y o  male MRN: [id number]   Unit/Bed#: [facility] 258-02 Encounter: [id number]    Behavior over the last 24 hours: unchanged  Janice Schaefer seen today, continues to have tangential thought process  He has rambling, pressured and nonsensical speech  Continues to be impulsive and argumentative  Reports he does not need any medications  When advised of increasing dose of lithium based on lab work done today he gets and walks  Has poor insight and judgment      Mental Status Evaluation:    Appearance:  disheveled, looks stated age, bearded   Behavior:  angry, bizarre   Speech:  pressured, loud   Mood:  irritable   Affect:  Labile, expansive and reactive   Thought Process:  illogical   Associations: loose associations   Thought Content:  paranoid ideation   Perceptual Disturbances: denies auditory hallucinations when asked   Risk Potential: Suicidal ideation - None  Homicidal ideation - None   Sensorium:  oriented to person, place and time/date   Memory:  recent and remote memory grossly intact   Consciousness:  alert and awake   Attention: decreased concentration and decreased attention span   Insight:  impaired   Judgment: impaired   Gait/Station: normal gait/station   Motor Activity: no abnormal movements     Vital signs in last 24 hours:    Temp:  [98 5 °F (36 9 °C)-99 2 °F (37 3 °C)] 99 2 °F (37 3 °C)  HR:  [] 109  Resp:  [14-18] 16  BP: (151-155)/(87-96) 151/87    Laboratory results: I have personally reviewed all pertinent laboratory/tests results  Assessment/Plan   Principal Problem:    Schizoaffective disorder, bipolar type (HCC)  Active Problems:    Cannabis abuse, continuous    Alcohol abuse, continuous    Hypertension    Tobacco abuse    Medical clearance for psychiatric admission    BPH (benign prostatic hyperplasia)    Vitamin D insufficiency    Recommended Treatment:     Planned medication and treatment changes:  Increase lithium to 600 mg b i d  will obtain lithium level on Friday July 2nd  Continue Zyprexa 10 mg at bedtime  Continue Depakote 250 mg every 8 hours  All current active medications have been reviewed  Encourage group therapy, milieu therapy and occupational therapy  Behavioral Health checks every 7 minutes  Current Facility-Administered Medications   Medication Dose Route Frequency Provider Last Rate    acetaminophen  650 mg Oral Q6H PRN Denis Rincon PA-C      acetaminophen  650 mg Oral Q4H PRN Denis Rincon PA-C      acetaminophen  975 mg Oral Q6H PRN Denis Rincon PA-C      aluminum-magnesium hydroxide-simethicone  30 mL Oral Q4H PRN Denis Rincon PA-C      benztropine  1 mg Intramuscular Q4H PRN Max 6/day Denis Rincon PA-C      benztropine  1 mg Oral Q4H PRN Max 6/day Denis Rincon PA-C      cholecalciferol  2,000 Units Oral Daily Dossie GEORGINA Cutler      divalproex sodium  250 mg Oral Q8H [address] Xavi Cavazos MD      hydrOXYzine HCL  25 mg Oral Q6H PRN Max 4/day Denis Rincon PA-C      hydrOXYzine HCL  50 mg Oral Q4H PRN Max 4/day Denis Rincon PA-C      Or    LORazepam  1 mg Intramuscular Q4H PRN Denis Rincon PA-C      lithium carbonate  600 mg Oral BID With Meals Edouard Zayas MD      LORazepam  1 mg Oral Q4H PRN Max 6/day Denis Rincon PA-C      Or    LORazepam  2 mg Intramuscular Q6H PRN Max 3/day Denis Rincon PA-C      LORazepam  0 5 mg Oral Q6H PRN Denis Rincon PA-C      melatonin  3 mg Oral HS Denis Rincon PA-C      metoprolol succinate  25 mg Oral Daily Dossie GEORGINA Cutler      OLANZapine  10 mg Oral Q8H PRN Denis Rincon PA-C      Or    OLANZapine  10 mg Intramuscular Q8H PRN Denis Rincon PA-C      OLANZapine  5 mg Oral Q4H PRN Denis Rincon PA-C      Or    OLANZapine  5 mg Intramuscular Q4H PRN Denis Rincon PA-C      OLANZapine  10 mg Oral HS CARLOS Oneill      OLANZapine  2 5 mg Oral Q4H PRN Denis Rincon PA-C      polyethylene glycol  17 g Oral Daily PRN Yen Dallas PA-C      senna-docusate sodium  1 tablet Oral Daily PRN Yen Dallas PA-C      tamsulosin  0 4 mg Oral Daily With Dinner Suhas Rueda PA-C         Risks / Benefits of Treatment:    Risks, benefits, and possible side effects of medications explained to patient  Patient has limited understanding of risks and benefits of treatment at this time, but agrees to take medications as prescribed  Counseling / Coordination of Care: Total floor / unit time spent today 25 minutes  Greater than 50% of total time was spent with the patient and / or family counseling and / or coordination of care  A description of counseling / coordination of care:  Patient's progress discussed with staff in treatment team meeting  Medications, treatment progress and treatment plan reviewed with patient    The    Will Palacios MD 06/28/21

## 2021-06-28 NOTE — PROGRESS NOTES
06/28/21 1000   Activity/Group Checklist   Group   (Self discovery reflection)   Attendance Attended   Attendance Duration (min) 46-60  (Pt in and out of group)   Interactions Disorganized interaction   Affect/Mood Wide   Goals Achieved Identified feelings; Able to listen to others; Able to engage in interactions

## 2021-06-29 LAB — LITHIUM SERPL-SCNC: 0.4 MMOL/L (ref 1–1.2)

## 2021-06-29 PROCEDURE — 80178 ASSAY OF LITHIUM: CPT | Performed by: HOSPITALIST

## 2021-06-29 RX ADMIN — LITHIUM CARBONATE 600 MG: 300 CAPSULE, GELATIN COATED ORAL at 15:49

## 2021-06-29 RX ADMIN — TAMSULOSIN HYDROCHLORIDE 0.4 MG: 0.4 CAPSULE ORAL at 15:52

## 2021-06-29 RX ADMIN — DIVALPROEX SODIUM 250 MG: 250 TABLET, DELAYED RELEASE ORAL at 14:00

## 2021-06-29 RX ADMIN — OLANZAPINE 10 MG: 10 TABLET, FILM COATED ORAL at 20:47

## 2021-06-29 RX ADMIN — Medication 2000 UNITS: at 07:43

## 2021-06-29 RX ADMIN — DIVALPROEX SODIUM 250 MG: 250 TABLET, DELAYED RELEASE ORAL at 05:07

## 2021-06-29 RX ADMIN — LITHIUM CARBONATE 600 MG: 300 CAPSULE, GELATIN COATED ORAL at 07:41

## 2021-06-29 RX ADMIN — DIVALPROEX SODIUM 250 MG: 250 TABLET, DELAYED RELEASE ORAL at 20:48

## 2021-06-29 RX ADMIN — MELATONIN 3 MG: at 20:48

## 2021-06-29 RX ADMIN — LORAZEPAM 0.5 MG: 0.5 TABLET ORAL at 20:48

## 2021-06-29 NOTE — PROGRESS NOTES
06/29/21 1000   Activity/Group Checklist   Group   (Positive Life Quotes)   Attendance Attended   Attendance Duration (min) Greater than 60   Interactions Disorganized interaction   Affect/Mood Wide   Goals Achieved Identified feelings; Discussed coping strategies; Able to listen to others; Able to engage in interactions

## 2021-06-29 NOTE — NURSING NOTE
Patient had a good evening , friendly and  approachable  , he denies any depression, anxiety or SI, able to make needs known, cooperative, compliant with medications and positive for pm snack  Q7 minute checks continued , continue to monitor

## 2021-06-29 NOTE — PLAN OF CARE
Problem: Alteration in Thoughts and Perception  Goal: Treatment Goal: Gain control of psychotic behaviors/thinking, reduce/eliminate presenting symptoms and demonstrate improved reality functioning upon discharge  Outcome: Progressing  Goal: Verbalize thoughts and feelings  Description: Interventions:  - Promote a nonjudgmental and trusting relationship with the patient through active listening and therapeutic communication  - Assess patient's level of functioning, behavior and potential for risk  - Engage patient in 1 on 1 interactions  - Encourage patient to express fears, feelings, frustrations, and discuss symptoms    - [place] patient to reality, help patient recognize reality-based thinking   - Administer medications as ordered and assess for potential side effects  - Provide the patient education related to the signs and symptoms of the illness and desired effects of prescribed medications  Outcome: Progressing  Goal: Refrain from acting on delusional thinking/internal stimuli  Description: Interventions:  - Monitor patient closely, per order   - Utilize least restrictive measures   - Set reasonable limits, give positive feedback for acceptable   - Administer medications as ordered and monitor of potential side effects  Outcome: Progressing  Goal: Agree to be compliant with medication regime, as prescribed and report medication side effects  Description: Interventions:  - Offer appropriate PRN medication and supervise ingestion; conduct AIMS, as needed   Outcome: Progressing  Goal: Recognize dysfunctional thoughts, communicate reality-based thoughts at the time of discharge  Description: Interventions:  - Provide medication and psycho-education to assist patient in compliance and developing insight into his/her illness   Outcome: Progressing  Goal: Complete daily ADLs, including personal hygiene independently, as able  Description: Interventions:  - Observe, teach, and assist patient with ADLS  - Monitor and promote a balance of rest/activity, with adequate nutrition and elimination   Outcome: Progressing     Problem: Ineffective Coping  Goal: Cooperates with admission process  Description: Interventions:   - Complete admission process  Outcome: Progressing  Goal: Identifies ineffective coping skills  Outcome: Progressing  Goal: Identifies healthy coping skills  Outcome: Progressing  Goal: Demonstrates healthy coping skills  Outcome: Progressing  Goal: Patient/Family participate in treatment and DC plans  Description: Interventions:  - Provide therapeutic environment  Outcome: Progressing  Goal: Patient/Family verbalizes awareness of resources  Outcome: Progressing  Goal: Understands least restrictive measures  Description: Interventions:  - Utilize least restrictive behavior  Outcome: Progressing  Goal: Free from restraint events  Description: - Utilize least restrictive measures   - Provide behavioral interventions   - Redirect inappropriate behaviors   Outcome: Progressing  Goal: Participates in unit activities  Description: Interventions:  - Provide therapeutic environment   - Provide required programming   - Redirect inappropriate behaviors   Outcome: Progressing     Problem: Anxiety  Goal: Anxiety is at manageable level  Description: Interventions:  - Assess and monitor patient's anxiety level  - Monitor for signs and symptoms (heart palpitations, chest pain, shortness of breath, headaches, nausea, feeling jumpy, restlessness, irritable, apprehensive)  - Collaborate with interdisciplinary team and initiate plan and interventions as ordered    - [place] patient to unit/surroundings  - Explain treatment plan  - Encourage participation in care  - Encourage verbalization of concerns/fears  - Identify coping mechanisms  - Assist in developing anxiety-reducing skills  - Administer/offer alternative therapies  - Limit or eliminate stimulants  Outcome: Progressing     Problem: Risk for Violence/Aggression Toward Others  Goal: Treatment Goal: Refrain from acts of violence/aggression during length of stay, and demonstrate improved impulse control at the time of discharge  Outcome: Progressing  Goal: Refrain from harming others  Outcome: Progressing  Goal: Refrain from destructive acts on the environment or property  Outcome: Progressing  Goal: Control angry outbursts  Description: Interventions:  - Monitor patient closely, per order  - Ensure early verbal de-escalation  - Monitor prn medication needs  - Set reasonable/therapeutic limits, outline behavioral expectations, and consequences   - Provide a non-threatening milieu, utilizing the least restrictive interventions   Outcome: Progressing  Goal: Identify appropriate positive anger management techniques  Description: Interventions:  - Offer anger management and coping skills groups   - Staff will provide positive feedback for appropriate anger control  Outcome: Progressing

## 2021-06-29 NOTE — NURSING NOTE
Patient pacing unit, talking to self  Periods of yelling out, irritable edge  Believes he is invisible and is unhappy being in hospital  Mood and affect labile, clanging  Refused Toprol XL stating "I'm here for the mind not the body", unable to redirect  Remains on 7" checks for safety and behaviors  Took all other medication as ordered

## 2021-06-29 NOTE — PROGRESS NOTES
06/29/21 1400   Activity/Group Checklist   Group   (Team Building and Communication)   Attendance Attended   Attendance Duration (min) 46-60   Interactions Disorganized interaction   Affect/Mood Wide   Goals Achieved Identified feelings; Discussed coping strategies; Able to listen to others; Able to engage in interactions

## 2021-06-29 NOTE — PROGRESS NOTES
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

## 2021-06-29 NOTE — SOCIAL WORK
CM met and spoke with patient this afternoon to discuss his questions  Patient inquired if there has been a discharge date set for him yet to which CM informed him that one has not been determined yet  Patient then asked to get a copy of his 303 commitment paperwork once received  CM informed once the order is provided he would get a copy  CM will continue to follow patient's progress and assist with discharge planning needs

## 2021-06-29 NOTE — NURSING NOTE
Patient had disturbed/broken sleep overnight  Non-labored breathing observed during periods of rest  Patient now awake, after patient was given scheduled morning dose of depakote he states "I hate this stuff it makes me blind make a note of that"  Will remain on safety precautions and continual monitoring

## 2021-06-29 NOTE — PROGRESS NOTES
06/29/21 0730   Activity/Group Checklist   Group   (Goal Planning and Communication)   Attendance Attended   Attendance Duration (min) 46-60   Interactions Disorganized interaction   Affect/Mood Wide   Goals Achieved Identified feelings; Discussed coping strategies; Able to listen to others; Able to engage in interactions

## 2021-06-30 PROCEDURE — 99232 SBSQ HOSP IP/OBS MODERATE 35: CPT | Performed by: NURSE PRACTITIONER

## 2021-06-30 RX ADMIN — TAMSULOSIN HYDROCHLORIDE 0.4 MG: 0.4 CAPSULE ORAL at 17:05

## 2021-06-30 RX ADMIN — LORAZEPAM 1 MG: 1 TABLET ORAL at 08:50

## 2021-06-30 RX ADMIN — DIVALPROEX SODIUM 250 MG: 250 TABLET, DELAYED RELEASE ORAL at 05:18

## 2021-06-30 RX ADMIN — Medication 2000 UNITS: at 08:33

## 2021-06-30 RX ADMIN — OLANZAPINE 10 MG: 10 TABLET, FILM COATED ORAL at 21:20

## 2021-06-30 RX ADMIN — LITHIUM CARBONATE 600 MG: 300 CAPSULE, GELATIN COATED ORAL at 08:33

## 2021-06-30 RX ADMIN — DIVALPROEX SODIUM 250 MG: 250 TABLET, DELAYED RELEASE ORAL at 21:20

## 2021-06-30 RX ADMIN — DIVALPROEX SODIUM 250 MG: 250 TABLET, DELAYED RELEASE ORAL at 14:00

## 2021-06-30 RX ADMIN — LITHIUM CARBONATE 600 MG: 300 CAPSULE, GELATIN COATED ORAL at 16:11

## 2021-06-30 RX ADMIN — MELATONIN 3 MG: at 21:20

## 2021-06-30 NOTE — PROGRESS NOTES
Patient in a deep sleep  Depakote not administered at this time  Nurse passed off in report to give when patient wakes up  Continue to monitor

## 2021-06-30 NOTE — PROGRESS NOTES
06/30/21 0800   Team Meeting   Meeting Type Daily Rounds   Initial Conference Date 06/30/21   Team Members Present   Team Members Present Physician;Nurse;   Physician Team Member Dr Cali Cardoza; Rajesh Wyatt, [address] [place] [address]   Nursing Team Member Ciro Burnett, ADRIEN   Social Work Team Member Abigail Hernandez   Patient/Family Present   Patient Present No   Patient's Family Present No     Poor sleep  Refusing some of his medications  Irritable

## 2021-06-30 NOTE — PROGRESS NOTES
06/30/21 1000   Activity/Group Checklist   Group   (Journal Making)   Attendance Attended   Attendance Duration (min) 0-15  (Pt in and out of group)   Interactions Disorganized interaction   Affect/Mood Wide   Goals Achieved Identified feelings; Able to listen to others; Able to engage in interactions

## 2021-06-30 NOTE — PLAN OF CARE
Problem: Alteration in Thoughts and Perception  Goal: Treatment Goal: Gain control of psychotic behaviors/thinking, reduce/eliminate presenting symptoms and demonstrate improved reality functioning upon discharge  Outcome: Progressing  Goal: Verbalize thoughts and feelings  Description: Interventions:  - Promote a nonjudgmental and trusting relationship with the patient through active listening and therapeutic communication  - Assess patient's level of functioning, behavior and potential for risk  - Engage patient in 1 on 1 interactions  - Encourage patient to express fears, feelings, frustrations, and discuss symptoms    - [place] patient to reality, help patient recognize reality-based thinking   - Administer medications as ordered and assess for potential side effects  - Provide the patient education related to the signs and symptoms of the illness and desired effects of prescribed medications  Outcome: Progressing  Goal: Refrain from acting on delusional thinking/internal stimuli  Description: Interventions:  - Monitor patient closely, per order   - Utilize least restrictive measures   - Set reasonable limits, give positive feedback for acceptable   - Administer medications as ordered and monitor of potential side effects  Outcome: Progressing  Goal: Agree to be compliant with medication regime, as prescribed and report medication side effects  Description: Interventions:  - Offer appropriate PRN medication and supervise ingestion; conduct AIMS, as needed   Outcome: Progressing  Goal: Recognize dysfunctional thoughts, communicate reality-based thoughts at the time of discharge  Description: Interventions:  - Provide medication and psycho-education to assist patient in compliance and developing insight into his/her illness   Outcome: Progressing  Goal: Complete daily ADLs, including personal hygiene independently, as able  Description: Interventions:  - Observe, teach, and assist patient with ADLS  - Monitor and promote a balance of rest/activity, with adequate nutrition and elimination   Outcome: Progressing

## 2021-06-30 NOTE — PROGRESS NOTES
Progress Note - [address] CASTANEDA Day 59 y o  male MRN: [id number]   Unit/Bed#: [place] 258-02 Encounter: [id number]    Behavior over the last 24 hours:      Valdo Singh was seen for an inpatient follow-up psychiatric visit this date  He remains labile, pressured, hyperverbal, and tangential   His speech is nonsensical   His thought process is disorganized with loose associations  He became angry at today's visit when told there was blood work ordered for Friday  He believes he does not need to be here and wants to be discharged immediately  Per psychiatric rounds, he left a bizarre message in his 's voice mail  He believes he is part of a [place] and states he will be calling [employer] agents to investigate  He has occasional verbal outbursts but has thus far been redirectable  His sleep is broken  He walks the halls at times responding to internal stimuli  His appetite is good      ROS: no complaints, all other systems are negative    Mental Status Evaluation:    Appearance:  disheveled, dressed inappropropriately   Behavior:  demanding, easily agitated   Speech:  pressured, hypertalkative, loud, tangential   Mood:  manic   Affect:  reactive   Thought Process:  disorganized, illogical, tangential   Associations: loose associations   Thought Content:  grandiose and persecutory delusions, paranoid ideation   Perceptual Disturbances: denies when asked, but talks to self at times   Risk Potential: Suicidal ideation - None  Homicidal ideation - angry, hostile feelings with no homicidal plan  Potential for aggression - No   Sensorium:  oriented to person   Memory:  recent and remote memory grossly intact   Consciousness:  alert and awake   Attention: poor concentration and poor attention span   Insight:  poor   Judgment: poor   Gait/Station: normal gait/station, normal balance   Motor Activity: no abnormal movements     Vital signs in last 24 hours:    Temp:  [98 5 °F (36 9 °C)-99 4 °F (37 4 °C)] 98 5 °F (36 9 °C)  HR:  [105] 105  Resp:  [16-17] 17  BP: (131-164)/(91-97) 131/97    Laboratory results:  I have personally reviewed all pertinent laboratory/tests results  Progress Toward Goals: no significant improvement    Assessment/Plan   Principal Problem:    Schizoaffective disorder, bipolar type (HCC)  Active Problems:    Cannabis abuse, continuous    Alcohol abuse, continuous    Hypertension    Tobacco abuse    Medical clearance for psychiatric admission    BPH (benign prostatic hyperplasia)    Vitamin D insufficiency    Recommended Treatment:     Continue current psychiatric medications as prescribed:    Depakote 250 mg every 8 hours  Lithium 600 mg twice daily  Melatonin 3 mg HS  Zyprexa 10 mg HS    Continue to monitor  Discharge disposition and planning are ongoing      All current active medications have been reviewed  Encourage group therapy, milieu therapy and occupational therapy  Behavioral Health checks every 7 minutes    Current Facility-Administered Medications   Medication Dose Route Frequency Provider Last Rate    acetaminophen  650 mg Oral Q6H PRN Lauretha Burnet, PA-C      acetaminophen  650 mg Oral Q4H PRN Lauretha Burnet, PA-C      acetaminophen  975 mg Oral Q6H PRN Lauretha Burnet, PA-C      aluminum-magnesium hydroxide-simethicone  30 mL Oral Q4H PRN Lauretha Burnet, PA-C      benztropine  1 mg Intramuscular Q4H PRN Max 6/day Lauretha Bonita, PA-C      benztropine  1 mg Oral Q4H PRN Max 6/day Lauretha Burnet, PA-C      cholecalciferol  2,000 Units Oral Daily KENDRA Small-TY      divalproex sodium  250 mg Oral Q8H [facility] Miriam Cintron MD      hydrOXYzine HCL  25 mg Oral Q6H PRN Max 4/day Lauretha Bonita, PA-C      hydrOXYzine HCL  50 mg Oral Q4H PRN Max 4/day Lauretha Bonita, PA-C      Or    LORazepam  1 mg Intramuscular Q4H PRN Lauretha Burnet, PA-C      lithium carbonate  600 mg Oral BID With Meals Deng Flores MD      LORazepam  1 mg Oral Q4H PRN Max 6/day León Chilel PA-C      Or    LORazepam  2 mg Intramuscular Q6H PRN Max 3/day León Chilel, PA-C      LORazepam  0 5 mg Oral Q6H PRN León Chilel, PA-C      melatonin  3 mg Oral HS León Chilel, PA-C      metoprolol succinate  25 mg Oral Daily KENDRA Alcala-TY      OLANZapine  10 mg Oral Q8H PRN León Chilel, PA-C      Or    OLANZapine  10 mg Intramuscular Q8H PRN León Chilel, PA-C      OLANZapine  5 mg Oral Q4H PRN León Chilel, PA-C      Or    OLANZapine  5 mg Intramuscular Q4H PRN León Chilel, PA-C      OLANZapine  10 mg Oral HS CARLOS Oneill      OLANZapine  2 5 mg Oral Q4H PRN León Chilel, PA-C      polyethylene glycol  17 g Oral Daily PRN León Chilel, PA-C      senna-docusate sodium  1 tablet Oral Daily PRN León Chilel, PA-C      tamsulosin  0 4 mg Oral Daily With Dinner Kristen Gonzalez PA-C         Risks / Benefits of Treatment:    Risks, benefits, and possible side effects of medications explained to patient and patient verbalizes understanding and agreement for treatment  Counseling / Coordination of Care:      Patient's progress discussed with staff in treatment team meeting  Medications, treatment progress and treatment plan reviewed with patient

## 2021-06-30 NOTE — PROGRESS NOTES
06/30/21 0730   Activity/Group Checklist   Group   (Goal Planning and Communication)   Attendance Attended   Attendance Duration (min) 46-60   Interactions Disorganized interaction   Affect/Mood Bright   Goals Achieved Identified feelings; Able to listen to others; Able to engage in interactions

## 2021-06-30 NOTE — NURSING NOTE
Slept sporadically throughout the night  Woke up at 0430  Walking the halls responding to internal stimuli  Compliant with morning medications  Requested to brush his teeth, supplies given  During times of sleep respirations even and unlabored  Safety measures maintained  Continual monitoring continues

## 2021-06-30 NOTE — PROGRESS NOTES
Patient visible on the unit  He is loud with pressured speech  He is pacing the halls  He did have a verbal disagreement with his roommate where staff had to separate them  Patient had increased anxiety and PRN ativan administered at 8:53 am  Ativan was effective  Patient is currently calm in dining room away from his roommate  Compliant with medications except for toprol patient refused  Patient denied SI,HI, or hallucinations  He stated he had some depression  Q 7 minute safety checks maintained

## 2021-07-01 PROBLEM — Z00.8 MEDICAL CLEARANCE FOR PSYCHIATRIC ADMISSION: Status: RESOLVED | Noted: 2020-10-14 | Resolved: 2021-07-01

## 2021-07-01 PROCEDURE — 99232 SBSQ HOSP IP/OBS MODERATE 35: CPT | Performed by: NURSE PRACTITIONER

## 2021-07-01 RX ORDER — LORAZEPAM 1 MG/1
1 TABLET ORAL 3 TIMES DAILY
Status: COMPLETED | OUTPATIENT
Start: 2021-07-01 | End: 2021-07-04

## 2021-07-01 RX ORDER — LORAZEPAM 0.5 MG/1
0.5 TABLET ORAL DAILY
Status: COMPLETED | OUTPATIENT
Start: 2021-07-09 | End: 2021-07-10

## 2021-07-01 RX ORDER — LORAZEPAM 1 MG/1
1 TABLET ORAL 2 TIMES DAILY
Status: COMPLETED | OUTPATIENT
Start: 2021-07-04 | End: 2021-07-06

## 2021-07-01 RX ORDER — LORAZEPAM 0.5 MG/1
0.5 TABLET ORAL 2 TIMES DAILY
Status: DISPENSED | OUTPATIENT
Start: 2021-07-06 | End: 2021-07-08

## 2021-07-01 RX ADMIN — LITHIUM CARBONATE 600 MG: 300 CAPSULE, GELATIN COATED ORAL at 08:42

## 2021-07-01 RX ADMIN — DIVALPROEX SODIUM 250 MG: 250 TABLET, DELAYED RELEASE ORAL at 21:55

## 2021-07-01 RX ADMIN — MELATONIN 3 MG: at 21:58

## 2021-07-01 RX ADMIN — LITHIUM CARBONATE 600 MG: 300 CAPSULE, GELATIN COATED ORAL at 15:34

## 2021-07-01 RX ADMIN — LORAZEPAM 1 MG: 1 TABLET ORAL at 13:20

## 2021-07-01 RX ADMIN — Medication 2000 UNITS: at 08:43

## 2021-07-01 RX ADMIN — DIVALPROEX SODIUM 250 MG: 250 TABLET, DELAYED RELEASE ORAL at 05:38

## 2021-07-01 RX ADMIN — OLANZAPINE 15 MG: 10 TABLET, FILM COATED ORAL at 21:56

## 2021-07-01 RX ADMIN — LORAZEPAM 1 MG: 1 TABLET ORAL at 15:34

## 2021-07-01 RX ADMIN — DIVALPROEX SODIUM 250 MG: 250 TABLET, DELAYED RELEASE ORAL at 14:00

## 2021-07-01 RX ADMIN — LORAZEPAM 1 MG: 1 TABLET ORAL at 21:56

## 2021-07-01 NOTE — SOCIAL WORK
CM met and spoke with patient to discuss his concerns  Patient requested new copies of his treatment plan, 302 and 303 commitment paperwork  CM provided these documents to patient  Patient was polite and grateful upon receiving these documents  CM will continue to follow patient's progress and assist with discharge planning needs

## 2021-07-01 NOTE — PROGRESS NOTES
07/01/21 0745   Team Meeting   Meeting Type Daily Rounds   Initial Conference Date 07/01/21   Team Members Present   Team Members Present Physician;Nurse;   Physician Team Member Dr Elias Simmons; Marylen Holiday, [place]   Nursing Team Member Gail Evans RN   Social Work Team Member Jane Gillette   Patient/Family Present   Patient Present No   Patient's Family Present No     Improved since his roommate was moved out of room  Now taking all meds  Flipped out at [place] yesterday, after he was informed that he will get blood test tomorrow  Better today  Labile

## 2021-07-01 NOTE — PROGRESS NOTES
07/01/21 0730   Activity/Group Checklist   Group   (Goal Planning and Communication)   Attendance Attended   Attendance Duration (min) 46-60   Interactions Disorganized interaction   Affect/Mood Bright; Wide   Goals Achieved Identified feelings; Discussed coping strategies; Able to listen to others; Able to engage in interactions

## 2021-07-01 NOTE — PROGRESS NOTES
Pt present in the milieu during breakfast, pleasant but bizarre interactions at time  Pt stated, "Oh shit, I'm gonna get my ass kicked now " When RN introduced himself as his nurse  Pt laughed about it later  Pt was joking around, jovial, did refuse his VS and subsequently his BP medication stating, "I don't need them anyway " Pt behaviors have thus far been calm and controlled  Pt denies anxiety and depression  Pt also denies si, hi, doesn't appear to be experiencing vh, ah  No complaints or concerns noted at this time  Continuous visual safety checks performed throughout the shift  Safety precautions maintained  Will continue to monitor

## 2021-07-01 NOTE — PROGRESS NOTES
Progress Note - [address] K Day 59 y o  male MRN: [id number]   Unit/Bed#: [facility] 258-02 Encounter: [id number]    Behavior over the last 24 hours:      Janice Schaefer was seen for an inpatient, follow up psychiatric visit this date  He remains labile, irritable, and disruptive in the milieu  At today's assessment, he was tearful, laughing, yelling, and pacing around the office  He initiates fights with peers due to screaming racial slurs and insults  His speech is nonsensical  He remains delusional, paranoid, and is easily agitated  His behavior is unpredictable and his mood is volatile  He is taking his medications as prescribed  ROS: no complaints, all other systems are negative    Mental Status Evaluation:    Appearance:  disheveled   Behavior:  agitated, bizarre, psychomotor agitation   Speech:  pressured, hypertalkative, tangential, disorganized, nonsensical, profane   Mood:  labile, irritable, manic, angry   Affect:  reactive   Thought Process:  disorganized, tangential   Associations: tangential associations   Thought Content:  paranoid ideation, grandiose   Perceptual Disturbances: none   Risk Potential: Suicidal ideation - None  Homicidal ideation - angry, hostile feelings  Potential for aggression - Yes, due to agitation   Sensorium:  oriented to person, place and time/date   Memory:  recent and remote memory grossly intact   Consciousness:  alert and awake   Attention: poor concentration and poor attention span   Insight:  significantly impaired   Judgment: significantly impaired   Gait/Station: normal gait/station, normal balance   Motor Activity: no abnormal movements     Vital signs in last 24 hours:    Resp:  [18] 18    Laboratory results:  I have personally reviewed all pertinent laboratory/tests results      Progress Toward Goals: no significant improvement    Assessment/Plan   Principal Problem:    Schizoaffective disorder, bipolar type (HCC)  Active Problems:    Cannabis abuse, continuous    Alcohol abuse, continuous    Hypertension    Tobacco abuse    BPH (benign prostatic hyperplasia)    Vitamin D insufficiency    Recommended Treatment:       Continue current psychiatric medications as prescribed:     Depakote 250 mg every 8 hours  Lithium 600 mg twice daily  Melatonin 3 mg HS  Zyprexa increased to 15 mg HS  Ativan initiated at 1 mg TID with taper schedule in place  Continue to monitor  Discharge disposition and planning are ongoing      All current active medications have been reviewed  Encourage group therapy, milieu therapy and occupational therapy  Behavioral Health checks every 7 minutes    Current Facility-Administered Medications   Medication Dose Route Frequency Provider Last Rate    acetaminophen  650 mg Oral Q6H PRN Denis Rincon PA-C      acetaminophen  650 mg Oral Q4H PRN Denis Rincon PA-C      acetaminophen  975 mg Oral Q6H PRN Denis Rincon PA-C      aluminum-magnesium hydroxide-simethicone  30 mL Oral Q4H PRN Denis Rnicon PA-C      benztropine  1 mg Intramuscular Q4H PRN Max 6/day Denis Rincon PA-C      benztropine  1 mg Oral Q4H PRN Max 6/day Denis Rincon PA-C      cholecalciferol  2,000 Units Oral Daily Dossija Cutler PA-C      divalproex sodium  250 mg Oral Q8H [address] Xavi Cavazos MD      hydrOXYzine HCL  25 mg Oral Q6H PRN Max 4/day Denis Rincon PA-C      hydrOXYzine HCL  50 mg Oral Q4H PRN Max 4/day Denis Rincon PA-C      Or    LORazepam  1 mg Intramuscular Q4H PRN Denis Rincon PA-C      lithium carbonate  600 mg Oral BID With Meals Edouard Zayas MD      LORazepam  1 mg Oral Q4H PRN Max 6/day Denis Rincon PA-C      Or    LORazepam  2 mg Intramuscular Q6H PRN Max 3/day Denis Rincon PA-C      LORazepam  0 5 mg Oral Q6H PRN Denis Rincon PA-C      LORazepam  1 mg Oral TID CARLOS Oneill      Followed by   Dalila Norris ON 7/4/2021] LORazepam  1 mg Oral BID CARLOS Oneill      Followed by   Melinda Salas [START ON 7/6/2021] LORazepam  0 5 mg Oral BID CARLOS Oneill      Followed by   Deedee Grant ON 7/9/2021] LORazepam  0 5 mg Oral Daily CARLOS Oneill      melatonin  3 mg Oral HS Esther Reyes PA-C      metoprolol succinate  25 mg Oral Daily Doug Frye PA-C      OLANZapine  10 mg Oral Q8H PRN Esther Reyes PA-C      Or    OLANZapine  10 mg Intramuscular Q8H PRN Esther Reyes PA-C      OLANZapine  5 mg Oral Q4H PRN Esther Reyes PA-C      Or    OLANZapine  5 mg Intramuscular Q4H PRN Esther Reyes PA-C      OLANZapine  10 mg Oral HS CARLOS Oneill      OLANZapine  2 5 mg Oral Q4H PRN Esther Reyes PA-C      polyethylene glycol  17 g Oral Daily PRN Esther Reyes PA-C      senna-docusate sodium  1 tablet Oral Daily PRN Esther Reyes PA-C      tamsulosin  0 4 mg Oral Daily With Dinner Doug Frye PA-C         Risks / Benefits of Treatment:    Risks, benefits, and possible side effects of medications explained to patient and patient verbalizes understanding and agreement for treatment  Counseling / Coordination of Care:      Patient's progress discussed with staff in treatment team meeting  Medications, treatment progress and treatment plan reviewed with patient

## 2021-07-01 NOTE — PROGRESS NOTES
07/01/21 1000   Activity/Group Checklist   Group   (Identifying Stressors)   Attendance Attended   Attendance Duration (min) Greater than 60   Interactions Disorganized interaction   Affect/Mood Appropriate;Bright;Calm   Goals Achieved Identified feelings; Displayed empathy;Able to listen to others; Able to engage in interactions; Able to self-disclose

## 2021-07-01 NOTE — NURSING NOTE
Patient noted to be agitated and initiating arguments with peers on the unit  Pt was screaming loudly angry and difficult to redirect  Was able to deescalate  patient  Patient was agreeable to taking Ativan 1mg  with positive effect  Will continue to monitor

## 2021-07-01 NOTE — PLAN OF CARE
Problem: Alteration in Thoughts and Perception  Goal: Verbalize thoughts and feelings  Description: Interventions:  - Promote a nonjudgmental and trusting relationship with the patient through active listening and therapeutic communication  - Assess patient's level of functioning, behavior and potential for risk  - Engage patient in 1 on 1 interactions  - Encourage patient to express fears, feelings, frustrations, and discuss symptoms    - [place] patient to reality, help patient recognize reality-based thinking   - Administer medications as ordered and assess for potential side effects  - Provide the patient education related to the signs and symptoms of the illness and desired effects of prescribed medications  Outcome: Progressing     Problem: Ineffective Coping  Goal: Free from restraint events  Description: - Utilize least restrictive measures   - Provide behavioral interventions   - Redirect inappropriate behaviors   Outcome: Progressing     Problem: Risk for Violence/Aggression Toward Others  Goal: Treatment Goal: Refrain from acts of violence/aggression during length of stay, and demonstrate improved impulse control at the time of discharge  Outcome: Progressing  Goal: Refrain from harming others  Outcome: Progressing  Goal: Refrain from destructive acts on the environment or property  Outcome: Progressing  Goal: Control angry outbursts  Description: Interventions:  - Monitor patient closely, per order  - Ensure early verbal de-escalation  - Monitor prn medication needs  - Set reasonable/therapeutic limits, outline behavioral expectations, and consequences   - Provide a non-threatening milieu, utilizing the least restrictive interventions   Outcome: Progressing

## 2021-07-01 NOTE — PROGRESS NOTES
07/01/21 1400   Activity/Group Checklist   Group   (Teamwork and Collaboration)   Attendance Attended   Attendance Duration (min) 31-45   Interactions Disorganized interaction   Affect/Mood Appropriate;Normal range   Goals Achieved Able to listen to others; Able to engage in interactions; Able to give feedback to another

## 2021-07-02 LAB
LITHIUM SERPL-SCNC: 0.6 MMOL/L (ref 1–1.2)
VALPROATE SERPL-MCNC: 51.1 UG/ML (ref 50–125)

## 2021-07-02 PROCEDURE — 99232 SBSQ HOSP IP/OBS MODERATE 35: CPT | Performed by: NURSE PRACTITIONER

## 2021-07-02 PROCEDURE — 80164 ASSAY DIPROPYLACETIC ACD TOT: CPT | Performed by: NURSE PRACTITIONER

## 2021-07-02 PROCEDURE — 80178 ASSAY OF LITHIUM: CPT | Performed by: NURSE PRACTITIONER

## 2021-07-02 RX ORDER — DIVALPROEX SODIUM 500 MG/1
500 TABLET, DELAYED RELEASE ORAL 2 TIMES DAILY
Status: DISCONTINUED | OUTPATIENT
Start: 2021-07-02 | End: 2021-07-06

## 2021-07-02 RX ADMIN — LORAZEPAM 1 MG: 1 TABLET ORAL at 17:26

## 2021-07-02 RX ADMIN — OLANZAPINE 15 MG: 10 TABLET, FILM COATED ORAL at 21:13

## 2021-07-02 RX ADMIN — LORAZEPAM 1 MG: 1 TABLET ORAL at 21:13

## 2021-07-02 RX ADMIN — LORAZEPAM 1 MG: 1 TABLET ORAL at 08:58

## 2021-07-02 RX ADMIN — LITHIUM CARBONATE 600 MG: 300 CAPSULE, GELATIN COATED ORAL at 17:25

## 2021-07-02 RX ADMIN — Medication 2000 UNITS: at 08:58

## 2021-07-02 RX ADMIN — DIVALPROEX SODIUM 250 MG: 250 TABLET, DELAYED RELEASE ORAL at 06:00

## 2021-07-02 RX ADMIN — LITHIUM CARBONATE 600 MG: 300 CAPSULE, GELATIN COATED ORAL at 08:58

## 2021-07-02 RX ADMIN — MELATONIN 3 MG: at 21:13

## 2021-07-02 RX ADMIN — DIVALPROEX SODIUM 500 MG: 500 TABLET, DELAYED RELEASE ORAL at 17:25

## 2021-07-02 NOTE — PROGRESS NOTES
07/02/21 0800   Team Meeting   Meeting Type Daily Rounds   Initial Conference Date 07/02/21   Team Members Present   Team Members Present Physician;Nurse;   Physician Team Member Dr Amalia Fuentes; Alka Ambriz, [address] [place] [address]   Nursing Team Member Sin Tarango, ADRIEN   Social Work Team Member Abigail Patel   Patient/Family Present   Patient Present No   Patient's Family Present No     VPA level 51 1  Lithium level 0 6  Intrusive yesterday with peers and caused verbal altercations  Making racial comments  Medications adjusted  Has responded well to these adjustments

## 2021-07-02 NOTE — PLAN OF CARE
Problem: Alteration in Thoughts and Perception  Goal: Treatment Goal: Gain control of psychotic behaviors/thinking, reduce/eliminate presenting symptoms and demonstrate improved reality functioning upon discharge  Outcome: Progressing  Goal: Verbalize thoughts and feelings  Description: Interventions:  - Promote a nonjudgmental and trusting relationship with the patient through active listening and therapeutic communication  - Assess patient's level of functioning, behavior and potential for risk  - Engage patient in 1 on 1 interactions  - Encourage patient to express fears, feelings, frustrations, and discuss symptoms    - [place] patient to reality, help patient recognize reality-based thinking   - Administer medications as ordered and assess for potential side effects  - Provide the patient education related to the signs and symptoms of the illness and desired effects of prescribed medications  Outcome: Progressing  Goal: Refrain from acting on delusional thinking/internal stimuli  Description: Interventions:  - Monitor patient closely, per order   - Utilize least restrictive measures   - Set reasonable limits, give positive feedback for acceptable   - Administer medications as ordered and monitor of potential side effects  Outcome: Progressing  Goal: Agree to be compliant with medication regime, as prescribed and report medication side effects  Description: Interventions:  - Offer appropriate PRN medication and supervise ingestion; conduct AIMS, as needed   Outcome: Progressing  Goal: Recognize dysfunctional thoughts, communicate reality-based thoughts at the time of discharge  Description: Interventions:  - Provide medication and psycho-education to assist patient in compliance and developing insight into his/her illness   Outcome: Progressing  Goal: Complete daily ADLs, including personal hygiene independently, as able  Description: Interventions:  - Observe, teach, and assist patient with ADLS  - Monitor and promote a balance of rest/activity, with adequate nutrition and elimination   Outcome: Progressing     Problem: Ineffective Coping  Goal: Cooperates with admission process  Description: Interventions:   - Complete admission process  Outcome: Progressing  Goal: Identifies ineffective coping skills  Outcome: Progressing  Goal: Identifies healthy coping skills  Outcome: Progressing  Goal: Demonstrates healthy coping skills  Outcome: Progressing  Goal: Patient/Family participate in treatment and DC plans  Description: Interventions:  - Provide therapeutic environment  Outcome: Progressing  Goal: Patient/Family verbalizes awareness of resources  Outcome: Progressing  Goal: Understands least restrictive measures  Description: Interventions:  - Utilize least restrictive behavior  Outcome: Progressing  Goal: Free from restraint events  Description: - Utilize least restrictive measures   - Provide behavioral interventions   - Redirect inappropriate behaviors   Outcome: Progressing  Goal: Participates in unit activities  Description: Interventions:  - Provide therapeutic environment   - Provide required programming   - Redirect inappropriate behaviors   Outcome: Progressing     Problem: Anxiety  Goal: Anxiety is at manageable level  Description: Interventions:  - Assess and monitor patient's anxiety level  - Monitor for signs and symptoms (heart palpitations, chest pain, shortness of breath, headaches, nausea, feeling jumpy, restlessness, irritable, apprehensive)  - Collaborate with interdisciplinary team and initiate plan and interventions as ordered    - [place] patient to unit/surroundings  - Explain treatment plan  - Encourage participation in care  - Encourage verbalization of concerns/fears  - Identify coping mechanisms  - Assist in developing anxiety-reducing skills  - Administer/offer alternative therapies  - Limit or eliminate stimulants  Outcome: Progressing     Problem: Risk for Violence/Aggression Toward Others  Goal: Treatment Goal: Refrain from acts of violence/aggression during length of stay, and demonstrate improved impulse control at the time of discharge  Outcome: Progressing  Goal: Refrain from harming others  Outcome: Progressing  Goal: Refrain from destructive acts on the environment or property  Outcome: Progressing  Goal: Control angry outbursts  Description: Interventions:  - Monitor patient closely, per order  - Ensure early verbal de-escalation  - Monitor prn medication needs  - Set reasonable/therapeutic limits, outline behavioral expectations, and consequences   - Provide a non-threatening milieu, utilizing the least restrictive interventions   Outcome: Progressing  Goal: Identify appropriate positive anger management techniques  Description: Interventions:  - Offer anger management and coping skills groups   - Staff will provide positive feedback for appropriate anger control  Outcome: Progressing

## 2021-07-02 NOTE — PROGRESS NOTES
07/02/21 0730   Activity/Group Checklist   Group   (Goal Planning and Communication)   Attendance Attended   Attendance Duration (min) 46-60   Interactions Disorganized interaction   Affect/Mood Appropriate;Bright   Goals Achieved Able to listen to others; Able to engage in interactions

## 2021-07-02 NOTE — PROGRESS NOTES
07/02/21 1000   Activity/Group Checklist   Group   (Creative Expression)   Attendance Attended   Attendance Duration (min) Greater than 60   Interactions Disorganized interaction   Affect/Mood Appropriate   Goals Achieved Identified feelings; Discussed coping strategies; Able to listen to others; Able to engage in interactions

## 2021-07-02 NOTE — PROGRESS NOTES
Progress Note - [address] CA[address] Day 59 y o  male MRN: [id number]   Unit/Bed#: [facility] 258-02 Encounter: [id number]    Behavior over the last 24 hours:      Laura Loco was seen for an inpatient follow-up psychiatric visit this date  He remains labile but less disruptive and confrontational since Zyprexa increase and addition of Ativan  He slept through the night and has not had any acute behaviors so far this shift  He was angry during assessment and walked out abruptly  His insight and judgment are poor  He wishes to be discharged immediately  He continues to require inpatient hospitalization in order to stabilize his mood as medications are being adjusted  ROS: no complaints, all other systems are negative    Mental Status Evaluation:    Appearance:  disheveled   Behavior:  easily agitated, inappropriate   Speech:  pressured, hypertalkative, loud   Mood:  manic   Affect:  reactive   Thought Process:  disorganized, tangential, racing of thoughts   Associations: tangential associations   Thought Content:  paranoid ideation   Perceptual Disturbances: none   Risk Potential: Suicidal ideation - None  Homicidal ideation - None  Potential for aggression - No   Sensorium:  oriented to person, place and time/date   Memory:  recent and remote memory grossly intact   Consciousness:  alert and awake   Attention: poor concentration and poor attention span   Insight:  poor   Judgment: poor   Gait/Station: normal gait/station, normal balance   Motor Activity: no abnormal movements     Vital signs in last 24 hours:    Temp:  [98 2 °F (36 8 °C)-98 6 °F (37 °C)] 98 6 °F (37 °C)  HR:  [] 88  Resp:  [16] 16  BP: (123-172)/(81-95) 146/95    Laboratory results:  I have personally reviewed all pertinent laboratory/tests results      Progress Toward Goals: progressing    Assessment/Plan   Principal Problem:    Schizoaffective disorder, bipolar type (HCC)  Active Problems:    Cannabis abuse, continuous    Alcohol abuse, continuous    Hypertension    Tobacco abuse    BPH (benign prostatic hyperplasia)    Vitamin D insufficiency    Recommended Treatment:     Depakote increased to 500 mg twice daily  Continue lithium 600 mg twice daily  Continue Ativan taper  Continue Zyprexa 15 mg HS  Continue to monitor  Discharge disposition planning are ongoing        All current active medications have been reviewed  Encourage group therapy, milieu therapy and occupational therapy  Behavioral Health checks every 7 minutes    Current Facility-Administered Medications   Medication Dose Route Frequency Provider Last Rate    acetaminophen  650 mg Oral Q6H PRN Lauretha Travis, GEORGINA      acetaminophen  650 mg Oral Q4H PRN Lauretha Bonita, PA-TY      acetaminophen  975 mg Oral Q6H PRN Lauretha Travis, PA-TY      aluminum-magnesium hydroxide-simethicone  30 mL Oral Q4H PRN Lauretha Travis, GEORGINA      benztropine  1 mg Intramuscular Q4H PRN Max 6/day Lauretha BonitaGEORGINA michaels      benztropine  1 mg Oral Q4H PRN Max 6/day Lauretha BonitaGEORGINA michaels      cholecalciferol  2,000 Units Oral Daily Ale Hu PA-C      divalproex sodium  250 mg Oral Q8H [address] Miriam Cintron MD      hydrOXYzine HCL  25 mg Oral Q6H PRN Max 4/day Hansetha GEORGINA Mes[place]      hydrOXYzine HCL  50 mg Oral Q4H PRN Max 4/day Viridianaa GEORGINA Mesa      Or    LORazepam  1 mg Intramuscular Q4H PRN Rima [place] PA-C      lithium carbonate  600 mg Oral BID With Meals Deng Flores MD      LORazepam  1 mg Oral Q4H PRN Max 6/day Rima Mesa PA-C      Or    LORazepam  2 mg Intramuscular Q6H PRN Max 3/day Hansetha GEORGINA Mesa      LORazepam  0 5 mg Oral Q6H PRN Rima Mesa PA-C      LORazepam  1 mg Oral TID CARLOS Oneill      Followed by   Theron Grier ON 7/4/2021] LORazepam  1 mg Oral BID CARLOS Oneill      Followed by   Theron Grier ON 7/6/2021] LORazepam  0 5 mg Oral BID CARLOS Oneill      Followed by   Theron Grier ON 7/9/2021] LORazepam  0 5 mg Oral Daily CARLOS Oneill      melatonin  3 mg Oral HS Micheal Toure PA-C      metoprolol succinate  25 mg Oral Daily Thao Bustamante PA-C      OLANZapine  10 mg Oral Q8H PRN Micheal Toure PA-C      Or    OLANZapine  10 mg Intramuscular Q8H PRN Micheal Toure, GEORGINA      OLANZapine  5 mg Oral Q4H PRN Micheal Toure PA-C      Or    OLANZapine  5 mg Intramuscular Q4H PRN Micheal Toure, GEORGINA      OLANZapine  15 mg Oral HS CALROS Oneill      OLANZapine  2 5 mg Oral Q4H PRN Micheal Toure PA-C      polyethylene glycol  17 g Oral Daily PRN Micheal Toure PA-C      senna-docusate sodium  1 tablet Oral Daily PRN Micheal Toure PA-C      tamsulosin  0 4 mg Oral Daily With Dinner Thao Bustamante PA-C         Risks / Benefits of Treatment:    Risks, benefits, and possible side effects of medications explained to patient and patient verbalizes understanding and agreement for treatment  Counseling / Coordination of Care:      Patient's progress discussed with staff in treatment team meeting  Medications, treatment progress and treatment plan reviewed with patient

## 2021-07-02 NOTE — NURSING NOTE
Patient withdrawn to his room for a majority of the evening, when patient did come out of his room, he has continued to be appropriate and controlled  Patient continues to ramble and is tangential in content, but no agitation or verbal outbursts had this evening  Overall pleasant and cooperative this shift  Patient took his medication without hesitation or paranoia expressed  No suicidal or homicidal ideations expressed  Will remain on safety precautions and continual monitoring

## 2021-07-02 NOTE — NURSING NOTE
Patient observed asleep throughout majority of the night  No signs/symptoms of distress displayed, non-labored breathing noted  No behaviors observed overnight  Will remain on safety precautions and continual monitoring

## 2021-07-02 NOTE — PROGRESS NOTES
07/02/21 1315   Activity/Group Checklist   Group   ([employer])   Attendance Did not attend  (Pt did not join group)

## 2021-07-03 PROCEDURE — 99232 SBSQ HOSP IP/OBS MODERATE 35: CPT | Performed by: PSYCHIATRY & NEUROLOGY

## 2021-07-03 RX ADMIN — LORAZEPAM 1 MG: 1 TABLET ORAL at 09:46

## 2021-07-03 RX ADMIN — DIVALPROEX SODIUM 500 MG: 500 TABLET, DELAYED RELEASE ORAL at 09:46

## 2021-07-03 RX ADMIN — LORAZEPAM 1 MG: 1 TABLET ORAL at 21:17

## 2021-07-03 RX ADMIN — LITHIUM CARBONATE 600 MG: 300 CAPSULE, GELATIN COATED ORAL at 16:48

## 2021-07-03 RX ADMIN — LITHIUM CARBONATE 600 MG: 300 CAPSULE, GELATIN COATED ORAL at 08:30

## 2021-07-03 RX ADMIN — MELATONIN 3 MG: at 21:16

## 2021-07-03 RX ADMIN — LORAZEPAM 1 MG: 1 TABLET ORAL at 16:48

## 2021-07-03 RX ADMIN — Medication 2000 UNITS: at 09:46

## 2021-07-03 RX ADMIN — OLANZAPINE 15 MG: 10 TABLET, FILM COATED ORAL at 21:16

## 2021-07-03 RX ADMIN — DIVALPROEX SODIUM 500 MG: 500 TABLET, DELAYED RELEASE ORAL at 16:48

## 2021-07-03 NOTE — NURSING NOTE
Patient had difficulty falling asleep  Interrupted sleep also noted  Patient took a warm shower to help assist him to sleep  Patient fell asleep at 0330  Respirations even and unlabored  Safety measures maintained  Woke up at [phone] and came to nursing station saying "Did somebody call me, I have a phone call"  Patient was informed no calls were made  Patient then started to ask about his medications and requested a list of the medications he is taking  This writer provided medication list  Patient thanked this writer  Continual observation continues  Will continue to monitor

## 2021-07-03 NOTE — NURSING NOTE
Pt visible on unit  Actively participating in treatment  Utilizing medication list to review prescribed medications doses and times  Less irritable  Behaviors more controlled  Less elevated  Less pressured  Able to communicate needs  Offers no complaints at this time  Safety precautions maintained  Will continue to monitor and assess

## 2021-07-03 NOTE — PROGRESS NOTES
Progress Note - 6 Saint Gonzales Frandy Day 59 y o  male MRN: [id number]  Unit/Bed#: U 258-02 Encounter: [id number]    Assessment/Plan   Principal Problem:    Schizoaffective disorder, bipolar type ([facility] [address] )  Active Problems:    Cannabis abuse, continuous    Alcohol abuse, continuous    Hypertension    Tobacco abuse    BPH (benign prostatic hyperplasia)    Vitamin D insufficiency      Behavior over the last 24 hours:  unchanged  Sleep:  Some insomnia  Appetite: normal  Medication side effects: No  ROS: Rash/itching on right side of face, otherwise all other systems negative for acute change     Veverly Belts was seen today for psychiatric follow-up  Patient was cooperative during encounter but was focus on medication regimen  He also appeared somewhat paranoid regarding his medication regimen  However, redirection was effective  Patient denies any AVH, nor did he appear to be responding to internal stimuli  He reports he slept well after he got a shower last night and his appetite is adequate  Veverly Belts has been compliant with medication regimen  He reported a rash on right side of face beneath his beard  Rash was visible; reports having for "a few days "  Patient then became focused on his medications causing the rash and again, became fixated on medication regimen  Veverly Belts denies any SI/HI and states my father raise me to be a man, not to fight      Mental Status Evaluation:  Appearance:  age appropriate, bearded and Layered clothing   Behavior:  Eccentric, cooperative   Speech:  loud and tangential   Mood:  euthymic   Affect:  increased in intensity and mood-congruent   Thought Process:  tangential   Thought Content:  Some paranoia   Perceptual Disturbances: None   Risk Potential: Suicidal Ideations none  Homicidal Ideations none  Potential for Aggression No   Sensorium:  person, place and time/date   Memory:  recent and remote memory grossly intact   Consciousness:  alert and awake    Attention: Poor attention and concentration   Insight:  Poor   Judgment: Poor   Gait/Station: normal gait/station and normal balance   Motor Activity: no abnormal movements     Progress Toward Goals:  Slight improvement  Medication changes made to Depakote yesterday  Patient had no outbursts so far today and mood appears to be more controlled as opposed to prior provider encounters  Continuing lorazepam taper and all other psychotropic medications as ordered  Depakote level due Monday, 07/05/2021  Recommended Treatment: Continue with group therapy, milieu therapy and occupational therapy  Risks, benefits and possible side effects of Medications:   Patient does not verbalize understanding at this time and will require further explanation        Medications:   all current active meds have been reviewed, continue current psychiatric medications and current meds:   Current Facility-Administered Medications   Medication Dose Route Frequency    acetaminophen (TYLENOL) tablet 650 mg  650 mg Oral Q6H PRN    acetaminophen (TYLENOL) tablet 650 mg  650 mg Oral Q4H PRN    acetaminophen (TYLENOL) tablet 975 mg  975 mg Oral Q6H PRN    aluminum-magnesium hydroxide-simethicone (MYLANTA) oral suspension 30 mL  30 mL Oral Q4H PRN    benztropine (COGENTIN) injection 1 mg  1 mg Intramuscular Q4H PRN Max 6/day    benztropine (COGENTIN) tablet 1 mg  1 mg Oral Q4H PRN Max 6/day    cholecalciferol (VITAMIN D3) tablet 2,000 Units  2,000 Units Oral Daily    divalproex sodium (DEPAKOTE) EC tablet 500 mg  500 mg Oral BID    hydrOXYzine HCL (ATARAX) tablet 25 mg  25 mg Oral Q6H PRN Max 4/day    hydrOXYzine HCL (ATARAX) tablet 50 mg  50 mg Oral Q4H PRN Max 4/day    Or    LORazepam (ATIVAN) injection 1 mg  1 mg Intramuscular Q4H PRN    lithium carbonate capsule 600 mg  600 mg Oral BID With Meals    LORazepam (ATIVAN) tablet 1 mg  1 mg Oral Q4H PRN Max 6/day    Or    LORazepam (ATIVAN) injection 2 mg  2 mg Intramuscular Q6H PRN Max 3/day    LORazepam (ATIVAN) tablet 0 5 mg  0 5 mg Oral Q6H PRN    LORazepam (ATIVAN) tablet 1 mg  1 mg Oral TID    Followed by   Braulio Portillo ON 7/4/2021] LORazepam (ATIVAN) tablet 1 mg  1 mg Oral BID    Followed by   Braulio Portillo ON 7/6/2021] LORazepam (ATIVAN) tablet 0 5 mg  0 5 mg Oral BID    Followed by   Braulio Portillo ON 7/9/2021] LORazepam (ATIVAN) tablet 0 5 mg  0 5 mg Oral Daily    melatonin tablet 3 mg  3 mg Oral HS    metoprolol succinate (TOPROL-XL) 24 hr tablet 25 mg  25 mg Oral Daily    OLANZapine (ZyPREXA) tablet 10 mg  10 mg Oral Q8H PRN    Or    OLANZapine (ZyPREXA) IM injection 10 mg  10 mg Intramuscular Q8H PRN    OLANZapine (ZyPREXA) tablet 5 mg  5 mg Oral Q4H PRN    Or    OLANZapine (ZyPREXA) IM injection 5 mg  5 mg Intramuscular Q4H PRN    OLANZapine (ZyPREXA) tablet 15 mg  15 mg Oral HS    OLANZapine (ZyPREXA) tablet 2 5 mg  2 5 mg Oral Q4H PRN    polyethylene glycol (MIRALAX) packet 17 g  17 g Oral Daily PRN    senna-docusate sodium (SENOKOT S) 8 6-50 mg per tablet 1 tablet  1 tablet Oral Daily PRN    tamsulosin (FLOMAX) capsule 0 4 mg  0 4 mg Oral Daily With Dinner     Labs: I have personally reviewed all pertinent laboratory/tests results  Depakote:   Lab Results   Component Value Date    VALPROICTOT 51 1 07/02/2021     Lithium:   Lab Results   Component Value Date    LITHIUM 0 6 (L) 07/02/2021     Counseling / Coordination of Care  Total floor / unit time spent today 25 minutes  Greater than 50% of total time was spent with the patient and / or family counseling and / or coordination of care

## 2021-07-03 NOTE — PLAN OF CARE
Problem: Alteration in Thoughts and Perception  Goal: Treatment Goal: Gain control of psychotic behaviors/thinking, reduce/eliminate presenting symptoms and demonstrate improved reality functioning upon discharge  Outcome: Progressing  Goal: Verbalize thoughts and feelings  Description: Interventions:  - Promote a nonjudgmental and trusting relationship with the patient through active listening and therapeutic communication  - Assess patient's level of functioning, behavior and potential for risk  - Engage patient in 1 on 1 interactions  - Encourage patient to express fears, feelings, frustrations, and discuss symptoms    - [place] patient to reality, help patient recognize reality-based thinking   - Administer medications as ordered and assess for potential side effects  - Provide the patient education related to the signs and symptoms of the illness and desired effects of prescribed medications  Outcome: Progressing  Goal: Refrain from acting on delusional thinking/internal stimuli  Description: Interventions:  - Monitor patient closely, per order   - Utilize least restrictive measures   - Set reasonable limits, give positive feedback for acceptable   - Administer medications as ordered and monitor of potential side effects  Outcome: Progressing  Goal: Agree to be compliant with medication regime, as prescribed and report medication side effects  Description: Interventions:  - Offer appropriate PRN medication and supervise ingestion; conduct AIMS, as needed   Outcome: Progressing  Goal: Recognize dysfunctional thoughts, communicate reality-based thoughts at the time of discharge  Description: Interventions:  - Provide medication and psycho-education to assist patient in compliance and developing insight into his/her illness   Outcome: Progressing  Goal: Complete daily ADLs, including personal hygiene independently, as able  Description: Interventions:  - Observe, teach, and assist patient with ADLS  - Monitor and promote a balance of rest/activity, with adequate nutrition and elimination   Outcome: Progressing     Problem: Ineffective Coping  Goal: Cooperates with admission process  Description: Interventions:   - Complete admission process  Outcome: Progressing  Goal: Identifies ineffective coping skills  Outcome: Progressing  Goal: Identifies healthy coping skills  Outcome: Progressing  Goal: Demonstrates healthy coping skills  Outcome: Progressing  Goal: Patient/Family participate in treatment and DC plans  Description: Interventions:  - Provide therapeutic environment  Outcome: Progressing  Goal: Patient/Family verbalizes awareness of resources  Outcome: Progressing  Goal: Understands least restrictive measures  Description: Interventions:  - Utilize least restrictive behavior  Outcome: Progressing  Goal: Free from restraint events  Description: - Utilize least restrictive measures   - Provide behavioral interventions   - Redirect inappropriate behaviors   Outcome: Progressing  Goal: Participates in unit activities  Description: Interventions:  - Provide therapeutic environment   - Provide required programming   - Redirect inappropriate behaviors   Outcome: Progressing     Problem: Anxiety  Goal: Anxiety is at manageable level  Description: Interventions:  - Assess and monitor patient's anxiety level  - Monitor for signs and symptoms (heart palpitations, chest pain, shortness of breath, headaches, nausea, feeling jumpy, restlessness, irritable, apprehensive)  - Collaborate with interdisciplinary team and initiate plan and interventions as ordered    - [place] patient to unit/surroundings  - Explain treatment plan  - Encourage participation in care  - Encourage verbalization of concerns/fears  - Identify coping mechanisms  - Assist in developing anxiety-reducing skills  - Administer/offer alternative therapies  - Limit or eliminate stimulants  Outcome: Progressing     Problem: Risk for Violence/Aggression Toward Others  Goal: Treatment Goal: Refrain from acts of violence/aggression during length of stay, and demonstrate improved impulse control at the time of discharge  Outcome: Progressing  Goal: Refrain from harming others  Outcome: Progressing  Goal: Refrain from destructive acts on the environment or property  Outcome: Progressing  Goal: Control angry outbursts  Description: Interventions:  - Monitor patient closely, per order  - Ensure early verbal de-escalation  - Monitor prn medication needs  - Set reasonable/therapeutic limits, outline behavioral expectations, and consequences   - Provide a non-threatening milieu, utilizing the least restrictive interventions   Outcome: Progressing  Goal: Identify appropriate positive anger management techniques  Description: Interventions:  - Offer anger management and coping skills groups   - Staff will provide positive feedback for appropriate anger control  Outcome: Progressing

## 2021-07-03 NOTE — NURSING NOTE
Patient alert and oriented  Walking halls freely at times  Comes in and out of room  Mood calm and cooperative  Denies SI/HI, hallucinations, depression, anxiety and pain at this time  Patient is fixated on his commitment paperwork  Patient continues to have rambled incoherent speech at times  Patient compliant with medication regimen  During night phone call patient became agitated with male peer when this male peer repeated what staff said because he thought patient could not hear staff  Patient was redirected to his room and was able to calm down  Able to express needs  Safety measures maintained  Safety checks continue  Will continue to monitor

## 2021-07-04 PROCEDURE — 99232 SBSQ HOSP IP/OBS MODERATE 35: CPT | Performed by: PSYCHIATRY & NEUROLOGY

## 2021-07-04 RX ADMIN — LITHIUM CARBONATE 600 MG: 300 CAPSULE, GELATIN COATED ORAL at 17:30

## 2021-07-04 RX ADMIN — DIVALPROEX SODIUM 500 MG: 500 TABLET, DELAYED RELEASE ORAL at 17:55

## 2021-07-04 RX ADMIN — TAMSULOSIN HYDROCHLORIDE 0.4 MG: 0.4 CAPSULE ORAL at 17:30

## 2021-07-04 RX ADMIN — MELATONIN 3 MG: at 21:22

## 2021-07-04 RX ADMIN — Medication 2000 UNITS: at 08:36

## 2021-07-04 RX ADMIN — LORAZEPAM 1 MG: 1 TABLET ORAL at 18:01

## 2021-07-04 RX ADMIN — OLANZAPINE 15 MG: 10 TABLET, FILM COATED ORAL at 21:22

## 2021-07-04 RX ADMIN — DIVALPROEX SODIUM 500 MG: 500 TABLET, DELAYED RELEASE ORAL at 08:36

## 2021-07-04 RX ADMIN — LITHIUM CARBONATE 600 MG: 300 CAPSULE, GELATIN COATED ORAL at 08:30

## 2021-07-04 RX ADMIN — LORAZEPAM 1 MG: 1 TABLET ORAL at 08:36

## 2021-07-04 NOTE — PROGRESS NOTES
Progress Note - 6 Saint Gonzales Frandy Day 59 y o  male MRN: [id number]  Unit/Bed#: -02 Encounter: [id number]    Assessment/Plan   Principal Problem:    Schizoaffective disorder, bipolar type ([facility] [address] )  Active Problems:    Cannabis abuse, continuous    Alcohol abuse, continuous    Hypertension    Tobacco abuse    BPH (benign prostatic hyperplasia)    Vitamin D insufficiency      Behavior over the last 24 hours:  unchanged  Sleep:  Improved  Appetite: normal  Medication side effects: No  ROS: no complaints and All other systems negative for acute change     Razia Yoni was seen this morning for psychiatric follow-up  Patient was slightly irritable, but cooperative during encounter  He continues to verbalize some paranoia regarding his medication regimen, specifically lorazepam and lithium  He reports mild anxiety and denies any depressive symptoms  He relates his anxiety to [facility] told me I would be out of here by July 1st   Raziajorge Vallejo denies any AVH/SI/HI  He reports improved sleep last night and his appetite remains adequate  He has also been compliant with psychotropic regimen  Mental Status Evaluation:  Appearance:  age appropriate, bearded and Layered clothing   Behavior:  Cooperative, eccentric   Speech:  loud and tangential   Mood:  irritable   Affect:  mood-congruent and redirectable   Thought Process:  goal directed   Thought Content:  Some paranoia   Perceptual Disturbances: None   Risk Potential: Suicidal Ideations none  Homicidal Ideations none  Potential for Aggression No   Sensorium:  person, place and time/date   Memory:  recent and remote memory grossly intact   Consciousness:  alert and awake    Attention: Poor attention and concentration   Insight:  Poor   Judgment: Poor   Gait/Station: normal gait/station and normal balance   Motor Activity: no abnormal movements     Progress Toward Goals:  Slight improvement  Sleep improved last night    Will continue with current psychotropic regimen; patient tolerating well  Depakote level to be drawn Monday, 07/05/2021  Recommended Treatment: Continue with group therapy, milieu therapy and occupational therapy  Risks, benefits and possible side effects of Medications:   Patient does not verbalize understanding at this time and will require further explanation        Medications:   all current active meds have been reviewed, continue current psychiatric medications and current meds:   Current Facility-Administered Medications   Medication Dose Route Frequency    acetaminophen (TYLENOL) tablet 650 mg  650 mg Oral Q6H PRN    acetaminophen (TYLENOL) tablet 650 mg  650 mg Oral Q4H PRN    acetaminophen (TYLENOL) tablet 975 mg  975 mg Oral Q6H PRN    aluminum-magnesium hydroxide-simethicone (MYLANTA) oral suspension 30 mL  30 mL Oral Q4H PRN    benztropine (COGENTIN) injection 1 mg  1 mg Intramuscular Q4H PRN Max 6/day    benztropine (COGENTIN) tablet 1 mg  1 mg Oral Q4H PRN Max 6/day    cholecalciferol (VITAMIN D3) tablet 2,000 Units  2,000 Units Oral Daily    divalproex sodium (DEPAKOTE) EC tablet 500 mg  500 mg Oral BID    hydrOXYzine HCL (ATARAX) tablet 25 mg  25 mg Oral Q6H PRN Max 4/day    hydrOXYzine HCL (ATARAX) tablet 50 mg  50 mg Oral Q4H PRN Max 4/day    Or    LORazepam (ATIVAN) injection 1 mg  1 mg Intramuscular Q4H PRN    lithium carbonate capsule 600 mg  600 mg Oral BID With Meals    LORazepam (ATIVAN) tablet 1 mg  1 mg Oral Q4H PRN Max 6/day    Or    LORazepam (ATIVAN) injection 2 mg  2 mg Intramuscular Q6H PRN Max 3/day    LORazepam (ATIVAN) tablet 0 5 mg  0 5 mg Oral Q6H PRN    LORazepam (ATIVAN) tablet 1 mg  1 mg Oral BID    Followed by   Carver Claude ON 7/6/2021] LORazepam (ATIVAN) tablet 0 5 mg  0 5 mg Oral BID    Followed by   Carver Claude ON 7/9/2021] LORazepam (ATIVAN) tablet 0 5 mg  0 5 mg Oral Daily    melatonin tablet 3 mg  3 mg Oral HS    metoprolol succinate (TOPROL-XL) 24 hr tablet 25 mg  25 mg Oral Daily    OLANZapine (ZyPREXA) tablet 10 mg  10 mg Oral Q8H PRN    Or    OLANZapine (ZyPREXA) IM injection 10 mg  10 mg Intramuscular Q8H PRN    OLANZapine (ZyPREXA) tablet 5 mg  5 mg Oral Q4H PRN    Or    OLANZapine (ZyPREXA) IM injection 5 mg  5 mg Intramuscular Q4H PRN    OLANZapine (ZyPREXA) tablet 15 mg  15 mg Oral HS    OLANZapine (ZyPREXA) tablet 2 5 mg  2 5 mg Oral Q4H PRN    polyethylene glycol (MIRALAX) packet 17 g  17 g Oral Daily PRN    senna-docusate sodium (SENOKOT S) 8 6-50 mg per tablet 1 tablet  1 tablet Oral Daily PRN    tamsulosin (FLOMAX) capsule 0 4 mg  0 4 mg Oral Daily With Dinner     Labs: I have personally reviewed all pertinent laboratory/tests results  Depakote:   Lab Results   Component Value Date    VALPROICTOT 51 1 07/02/2021     Lithium:   Lab Results   Component Value Date    LITHIUM 0 6 (L) 07/02/2021       Counseling / Coordination of Care  Total floor / unit time spent today 25 minutes  Greater than 50% of total time was spent with the patient and / or family counseling and / or coordination of care

## 2021-07-04 NOTE — NURSING NOTE
Pt's mood remains labile  Tangential at times  A couple yelling outbursts in his room, but for the most part has remained controlled  Pt was compliant with all scheduled medications except for the metoprolol  Pt reports the medications are "drying me up "  Pt questioning when he is going to be discharged since he has been taking his medications  Pt asking for a meeting with the treatment team   Explained to the pt this request will be passed on  Visible on the unit and will socialize with select peers  No acting out or aggressive behaviors  Denies hallucinations  Denies SI/HI  Cooperative with staff  Will continue to monitor and assess

## 2021-07-04 NOTE — NURSING NOTE
Patient spent most of the evening withdrawn to his room  Overall more subdued than usual   He did have a brief period of yelling obscenities from his room  Tells this writer he believes he is over medicated  Cooperative with HS medications    Mumbling something about not trusting anyone at [facility] they are all Muslims

## 2021-07-04 NOTE — PLAN OF CARE
Problem: Alteration in Thoughts and Perception  Goal: Verbalize thoughts and feelings  Description: Interventions:  - Promote a nonjudgmental and trusting relationship with the patient through active listening and therapeutic communication  - Assess patient's level of functioning, behavior and potential for risk  - Engage patient in 1 on 1 interactions  - Encourage patient to express fears, feelings, frustrations, and discuss symptoms    - [place] patient to reality, help patient recognize reality-based thinking   - Administer medications as ordered and assess for potential side effects  - Provide the patient education related to the signs and symptoms of the illness and desired effects of prescribed medications  Outcome: Progressing  Goal: Refrain from acting on delusional thinking/internal stimuli  Description: Interventions:  - Monitor patient closely, per order   - Utilize least restrictive measures   - Set reasonable limits, give positive feedback for acceptable   - Administer medications as ordered and monitor of potential side effects  Outcome: Progressing  Goal: Agree to be compliant with medication regime, as prescribed and report medication side effects  Description: Interventions:  - Offer appropriate PRN medication and supervise ingestion; conduct AIMS, as needed   Outcome: Progressing  Goal: Recognize dysfunctional thoughts, communicate reality-based thoughts at the time of discharge  Description: Interventions:  - Provide medication and psycho-education to assist patient in compliance and developing insight into his/her illness   Outcome: Progressing  Goal: Complete daily ADLs, including personal hygiene independently, as able  Description: Interventions:  - Observe, teach, and assist patient with ADLS  - Monitor and promote a balance of rest/activity, with adequate nutrition and elimination   Outcome: Progressing     Problem: Ineffective Coping  Goal: Cooperates with admission process  Description: Interventions:   - Complete admission process  Outcome: Completed  Goal: Identifies ineffective coping skills  Outcome: Progressing  Goal: Identifies healthy coping skills  Outcome: Progressing  Goal: Demonstrates healthy coping skills  Outcome: Progressing  Goal: Patient/Family participate in treatment and DC plans  Description: Interventions:  - Provide therapeutic environment  Outcome: Progressing  Goal: Patient/Family verbalizes awareness of resources  Outcome: Progressing  Goal: Understands least restrictive measures  Description: Interventions:  - Utilize least restrictive behavior  Outcome: Progressing  Goal: Free from restraint events  Description: - Utilize least restrictive measures   - Provide behavioral interventions   - Redirect inappropriate behaviors   Outcome: Progressing  Goal: Participates in unit activities  Description: Interventions:  - Provide therapeutic environment   - Provide required programming   - Redirect inappropriate behaviors   Outcome: Progressing     Problem: Anxiety  Goal: Anxiety is at manageable level  Description: Interventions:  - Assess and monitor patient's anxiety level  - Monitor for signs and symptoms (heart palpitations, chest pain, shortness of breath, headaches, nausea, feeling jumpy, restlessness, irritable, apprehensive)  - Collaborate with interdisciplinary team and initiate plan and interventions as ordered    - [place] patient to unit/surroundings  - Explain treatment plan  - Encourage participation in care  - Encourage verbalization of concerns/fears  - Identify coping mechanisms  - Assist in developing anxiety-reducing skills  - Administer/offer alternative therapies  - Limit or eliminate stimulants  Outcome: Progressing     Problem: Risk for Violence/Aggression Toward Others  Goal: Control angry outbursts  Description: Interventions:  - Monitor patient closely, per order  - Ensure early verbal de-escalation  - Monitor prn medication needs  - Set reasonable/therapeutic limits, outline behavioral expectations, and consequences   - Provide a non-threatening milieu, utilizing the least restrictive interventions   Outcome: Progressing  Goal: Identify appropriate positive anger management techniques  Description: Interventions:  - Offer anger management and coping skills groups   - Staff will provide positive feedback for appropriate anger control  Outcome: Progressing

## 2021-07-05 PROCEDURE — 99232 SBSQ HOSP IP/OBS MODERATE 35: CPT | Performed by: PHYSICIAN ASSISTANT

## 2021-07-05 RX ADMIN — DIVALPROEX SODIUM 500 MG: 500 TABLET, DELAYED RELEASE ORAL at 18:09

## 2021-07-05 RX ADMIN — METOPROLOL SUCCINATE 25 MG: 50 TABLET, EXTENDED RELEASE ORAL at 09:06

## 2021-07-05 RX ADMIN — MELATONIN 3 MG: at 21:48

## 2021-07-05 RX ADMIN — OLANZAPINE 15 MG: 10 TABLET, FILM COATED ORAL at 21:47

## 2021-07-05 RX ADMIN — Medication 2000 UNITS: at 09:07

## 2021-07-05 RX ADMIN — LITHIUM CARBONATE 600 MG: 300 CAPSULE, GELATIN COATED ORAL at 17:30

## 2021-07-05 RX ADMIN — TAMSULOSIN HYDROCHLORIDE 0.4 MG: 0.4 CAPSULE ORAL at 17:30

## 2021-07-05 RX ADMIN — LITHIUM CARBONATE 600 MG: 300 CAPSULE, GELATIN COATED ORAL at 08:30

## 2021-07-05 RX ADMIN — LORAZEPAM 1 MG: 1 TABLET ORAL at 09:07

## 2021-07-05 RX ADMIN — DIVALPROEX SODIUM 500 MG: 500 TABLET, DELAYED RELEASE ORAL at 09:07

## 2021-07-05 RX ADMIN — LORAZEPAM 1 MG: 1 TABLET ORAL at 18:10

## 2021-07-05 NOTE — NURSING NOTE
Pt's mood remains labile, but for the most part of the shift he was controlled  No c/o pain  Compliant with scheduled medications  Pt refused AM Depakote because he wanted to speak with provider  Pt states that he will be compliant with blood draw tomorrow morning  No acting out behaviors  Minimal verbal outbursts  Denies hallucinations  Denies SI/HI  Cooperative with staff  Will continue to monitor and assess

## 2021-07-05 NOTE — PLAN OF CARE
Problem: Alteration in Thoughts and Perception  Goal: Verbalize thoughts and feelings  Description: Interventions:  - Promote a nonjudgmental and trusting relationship with the patient through active listening and therapeutic communication  - Assess patient's level of functioning, behavior and potential for risk  - Engage patient in 1 on 1 interactions  - Encourage patient to express fears, feelings, frustrations, and discuss symptoms    - [place] patient to reality, help patient recognize reality-based thinking   - Administer medications as ordered and assess for potential side effects  - Provide the patient education related to the signs and symptoms of the illness and desired effects of prescribed medications  Outcome: Progressing  Goal: Refrain from acting on delusional thinking/internal stimuli  Description: Interventions:  - Monitor patient closely, per order   - Utilize least restrictive measures   - Set reasonable limits, give positive feedback for acceptable   - Administer medications as ordered and monitor of potential side effects  Outcome: Progressing  Goal: Agree to be compliant with medication regime, as prescribed and report medication side effects  Description: Interventions:  - Offer appropriate PRN medication and supervise ingestion; conduct AIMS, as needed   Outcome: Progressing  Goal: Recognize dysfunctional thoughts, communicate reality-based thoughts at the time of discharge  Description: Interventions:  - Provide medication and psycho-education to assist patient in compliance and developing insight into his/her illness   Outcome: Progressing  Goal: Complete daily ADLs, including personal hygiene independently, as able  Description: Interventions:  - Observe, teach, and assist patient with ADLS  - Monitor and promote a balance of rest/activity, with adequate nutrition and elimination   Outcome: Progressing     Problem: Ineffective Coping  Goal: Identifies ineffective coping skills  Outcome: least restrictive interventions   Outcome: Progressing  Goal: Identify appropriate positive anger management techniques  Description: Interventions:  - Offer anger management and coping skills groups   - Staff will provide positive feedback for appropriate anger control  Outcome: Progressing     Problem: DISCHARGE PLANNING  Goal: Discharge to home or other facility with appropriate resources  Description: INTERVENTIONS:  - Identify barriers to discharge w/patient and caregiver  - Arrange for needed discharge resources and transportation as appropriate  - Identify discharge learning needs (meds, wound care, etc )  - Arrange for interpretive services to assist at discharge as needed  - Refer to Case Management Department for coordinating discharge planning if the patient needs post-hospital services based on physician/advanced practitioner order or complex needs related to functional status, cognitive ability, or social support system  Outcome: Progressing

## 2021-07-05 NOTE — NURSING NOTE
Patient visible on unit  Pt calm and cooperative during assessment  Pt has pressured speech with labile mood  Pt compliant with HS medications  PT denies all  Pt is irritable when discussing family  Pt fixated on drug levels in hopes to be discharged when in therapeutic range  Will continue pt safety precautions and continual monitoring of mood/thoughts/behavior

## 2021-07-05 NOTE — PROGRESS NOTES
Patient has blood work ordered  Patient was asked  Patient refused until he speaks to doctor about his medications  Nurse made aware

## 2021-07-05 NOTE — PROGRESS NOTES
Progress Note - Mikael Dhillon 67 Day 59 y o  male MRN: [id number]   Unit/Bed#: [facility] 258-02 Encounter: [id number]    Behavior over the last 24 hours: slowly improving  Polo Ha is a 53-year-old male with a history of schizoaffective disorder bipolar type who presents for psychiatric follow-up  Patient is pleasant and mostly cooperative upon approach  His speech is pressured, hyper talkative, somewhat disorganized and very tangential   Endorsing some paranoid ideations regarding police officers  States he is taking his medications so he can eventually be discharged  Denies any SI or HI  Denies any AH or VH      Sleep: normal  Appetite: normal  Medication side effects: No   ROS: all other systems are negative    Mental Status Evaluation:    Appearance:  disheveled   Behavior:  pleasant, cooperative   Speech:  pressured, hypertalkative   Mood:  euphoric   Affect:  overbright, expansive   Thought Process:  tangential, increased rate of thoughts   Associations: tangential associations   Thought Content:  some paranoia, grandiose ideas   Perceptual Disturbances: no auditory hallucinations, no visual hallucinations   Risk Potential: Suicidal ideation - None at present  Homicidal ideation - None at present  Potential for aggression - No   Sensorium:  unable to assess   Memory:  recent and remote memory grossly intact   Consciousness:  alert and awake   Attention/Concentration: attention span and concentration appear shorter than expected for age   Insight:  fair   Judgment: fair   Gait/Station: normal gait/station   Motor Activity: no abnormal movements     Vital signs in last 24 hours:    Temp:  [97 7 °F (36 5 °C)-98 3 °F (36 8 °C)] 97 7 °F (36 5 °C)  HR:  [] 98  Resp:  [18] 18  BP: (145-152)/(83-92) 152/92    Laboratory results: I have personally reviewed all pertinent laboratory/tests results    Most Recent Labs:   Lab Results   Component Value Date    WBC 5 50 06/18/2021    RBC 4 26 (L) 06/18/2021 HGB 12 7 (L) 06/18/2021    HCT 38 0 (L) 06/18/2021     06/18/2021    RDW 14 3 06/18/2021    NEUTROABS 4 10 06/18/2021    SODIUM 144 (H) 06/18/2021    K 3 5 06/19/2021     (H) 06/18/2021    CO2 26 06/18/2021    BUN 12 06/18/2021    CREATININE 0 97 06/18/2021    GLUC 126 (H) 06/18/2021    CALCIUM 9 2 06/18/2021    AST 26 06/18/2021    ALT 21 06/18/2021    ALKPHOS 40 (L) 06/18/2021    TP 5 9 (L) 06/18/2021    ALB 3 9 06/18/2021    TBILI 1 00 06/18/2021    CHOLESTEROL 150 02/17/2021    HDL 35 (L) 02/17/2021    TRIG 90 02/17/2021    LDLCALC 97 02/17/2021    NONHDLC 115 02/17/2021    VALPROICTOT 51 1 07/02/2021    LITHIUM 0 6 (L) 07/02/2021    AMMONIA 51 02/06/2021    SBJ9BDAQMBZB 1 780 06/18/2021    FREET4 1 09 02/17/2021    RPR Non-Reactive 11/03/2019    HGBA1C 5 1 11/03/2019     11/03/2019       Progress Toward Goals: progressing    Assessment/Plan   Principal Problem:    Schizoaffective disorder, bipolar type ([facility] [address] )  Active Problems:    Cannabis abuse, continuous    Alcohol abuse, continuous    Hypertension    Tobacco abuse    BPH (benign prostatic hyperplasia)    Vitamin D insufficiency      Recommended Treatment:     Planned medication and treatment changes: All current active medications have been reviewed  Encourage group therapy, milieu therapy and occupational therapy  Behavioral Health checks every 7 minutes    Refused labs this AM because he wanted to speak with the provider first  Spoke with patient  Now he is amenable to having labs drawn  Lithium level low end normal at 0 6 on 7/2/21  VPA level low end normal at 51 1 that day as well  Depakote EC dosing was subsequently increased on 7/2 to 500mg BID  VPA level to be redrawn today  Patient remains manic but otherwise pleasant and mostly redirectable      Current Facility-Administered Medications   Medication Dose Route Frequency Provider Last Rate    acetaminophen  650 mg Oral Q6H PRN Gabino Lints, PA-C      acetaminophen 650 mg Oral Q4H PRN [facility], PA-C      acetaminophen  975 mg Oral Q6H PRN [facility], PA-C      aluminum-magnesium hydroxide-simethicone  30 mL Oral Q4H PRN [facility], PA-C      benztropine  1 mg Intramuscular Q4H PRN Max 6/day [facility], PA-C      benztropine  1 mg Oral Q4H PRN Max 6/day [facility], PA-C      cholecalciferol  2,000 Units Oral Daily Juan Miguel Francois, PA-C      divalproex sodium  500 mg Oral BID Lovena Deahector Scott, CRNP      hydrOXYzine HCL  25 mg Oral Q6H PRN Max 4/day [facility], PA-C      hydrOXYzine HCL  50 mg Oral Q4H PRN Max 4/day [facility], PA-C      Or    LORazepam  1 mg Intramuscular Q4H PRN [facility], PA-C      lithium carbonate  600 mg Oral BID With Meals Martine Enriquez MD      LORazepam  1 mg Oral Q4H PRN Max 6/day [facility], PA-C      Or    LORazepam  2 mg Intramuscular Q6H PRN Max 3/day [facility], PA-C      LORazepam  0 5 mg Oral Q6H PRN [facility], PA-C      LORazepam  1 mg Oral BID CARLOS Oneill      Followed by   Luis Britton ON 7/6/2021] LORazepam  0 5 mg Oral BID CARLOS Oneill      Followed by   Luis Britton ON 7/9/2021] LORazepam  0 5 mg Oral Daily CARLOS Oneill      melatonin  3 mg Oral HS [facility], PA-C      metoprolol succinate  25 mg Oral Daily Juan Miguel Francois, PA-C      OLANZapine  10 mg Oral Q8H PRN [facility], PA-C      Or    OLANZapine  10 mg Intramuscular Q8H PRN [facility], PA-C      OLANZapine  5 mg Oral Q4H PRN [facility], PA-C      Or    OLANZapine  5 mg Intramuscular Q4H PRN [facility], PA-C      OLANZapine  15 mg Oral HS KATE OneillNP      OLANZapine  2 5 mg Oral Q4H PRN [facility], PA-C      polyethylene glycol  17 g Oral Daily PRN Marcos Mckay PA-C      senna-docusate sodium  1 tablet Oral Daily PRN Marcos Mckay PA-C      tamsulosin  0 4 mg Oral Daily With Dinner Juan Miguel Francois PA-C       Risks / Benefits of Treatment:    Risks, benefits, and possible side effects of medications explained to patient and patient verbalizes understanding and agreement for treatment  Counseling / Coordination of Care:    Patient's progress discussed with staff in treatment team meeting  Medications, treatment progress and treatment plan reviewed with patient      Cesar Shankar PA-C 07/05/21

## 2021-07-06 LAB — VALPROATE SERPL-MCNC: 46.2 UG/ML (ref 50–125)

## 2021-07-06 PROCEDURE — 80164 ASSAY DIPROPYLACETIC ACD TOT: CPT | Performed by: NURSE PRACTITIONER

## 2021-07-06 PROCEDURE — 99232 SBSQ HOSP IP/OBS MODERATE 35: CPT | Performed by: NURSE PRACTITIONER

## 2021-07-06 RX ORDER — OLANZAPINE 10 MG/1
20 TABLET ORAL
Status: DISCONTINUED | OUTPATIENT
Start: 2021-07-06 | End: 2021-07-16 | Stop reason: HOSPADM

## 2021-07-06 RX ADMIN — LITHIUM CARBONATE 600 MG: 300 CAPSULE, GELATIN COATED ORAL at 17:39

## 2021-07-06 RX ADMIN — MELATONIN 3 MG: at 20:51

## 2021-07-06 RX ADMIN — Medication 2000 UNITS: at 08:34

## 2021-07-06 RX ADMIN — LITHIUM CARBONATE 600 MG: 300 CAPSULE, GELATIN COATED ORAL at 08:34

## 2021-07-06 RX ADMIN — LORAZEPAM 1 MG: 1 TABLET ORAL at 08:34

## 2021-07-06 RX ADMIN — DIVALPROEX SODIUM 500 MG: 500 TABLET, DELAYED RELEASE ORAL at 08:34

## 2021-07-06 RX ADMIN — DIVALPROEX SODIUM 750 MG: 500 TABLET, DELAYED RELEASE ORAL at 17:38

## 2021-07-06 RX ADMIN — OLANZAPINE 20 MG: 10 TABLET, FILM COATED ORAL at 20:51

## 2021-07-06 RX ADMIN — METOPROLOL SUCCINATE 25 MG: 50 TABLET, EXTENDED RELEASE ORAL at 08:33

## 2021-07-06 NOTE — SOCIAL WORK
CM met and spoke with patient and assisted him in organizing his [id number] and [phone] commitment paperwork  Patient inquired why the [address] are to be notified of his discharge  CM informed him that this was determined at the time of his admission to the ED as the [place] police were involved in his admission and likely just need the phone call as follow up  Patient requested the number for the [address] so that he may call himself  CM provided phone number to patient per request  CM will continue to follow patient's progress and assist with discharge planning needs

## 2021-07-06 NOTE — PROGRESS NOTES
07/06/21 1000   Activity/Group Checklist   Group Cognitive therapy   Attendance Attended   Attendance Duration (min) 31-45   Interactions Disorganized interaction   Affect/Mood Appropriate   Goals Achieved Identified feelings; Identified triggers

## 2021-07-06 NOTE — NURSING NOTE
Patient has been visible on the unit  Interacting appropriately with peers  No irritability this evening  Mood is slightly elevated  Giddy at times  Overall very pleasant    Cooperative with medications

## 2021-07-06 NOTE — PROGRESS NOTES
Patient attended with difficulty remaining on task  He has some moments of clear thinking, but his need to talk and ramble distracts  He appears to be knowledgeable of his illness and identified coping skills  He stated , " when I am having trouble, if they would just let me calm down"  He was referring to the calls to crisis and then the police  Overall good member

## 2021-07-06 NOTE — NURSING NOTE
Patient slept well overall  Our a few times for brief periods for a drink or to check the time    Khloe Hicks quickly back to sleep

## 2021-07-06 NOTE — PLAN OF CARE
Problem: Alteration in Thoughts and Perception  Goal: Treatment Goal: Gain control of psychotic behaviors/thinking, reduce/eliminate presenting symptoms and demonstrate improved reality functioning upon discharge  Outcome: Progressing  Goal: Verbalize thoughts and feelings  Description: Interventions:  - Promote a nonjudgmental and trusting relationship with the patient through active listening and therapeutic communication  - Assess patient's level of functioning, behavior and potential for risk  - Engage patient in 1 on 1 interactions  - Encourage patient to express fears, feelings, frustrations, and discuss symptoms    - [place] patient to reality, help patient recognize reality-based thinking   - Administer medications as ordered and assess for potential side effects  - Provide the patient education related to the signs and symptoms of the illness and desired effects of prescribed medications  Outcome: Progressing  Goal: Refrain from acting on delusional thinking/internal stimuli  Description: Interventions:  - Monitor patient closely, per order   - Utilize least restrictive measures   - Set reasonable limits, give positive feedback for acceptable   - Administer medications as ordered and monitor of potential side effects  Outcome: Progressing  Goal: Agree to be compliant with medication regime, as prescribed and report medication side effects  Description: Interventions:  - Offer appropriate PRN medication and supervise ingestion; conduct AIMS, as needed   Outcome: Progressing  Goal: Complete daily ADLs, including personal hygiene independently, as able  Description: Interventions:  - Observe, teach, and assist patient with ADLS  - Monitor and promote a balance of rest/activity, with adequate nutrition and elimination   Outcome: Progressing

## 2021-07-06 NOTE — SOCIAL WORK
CM received 7 VMs from over the weekend from patient requesting face to face update regarding his discharge date as he reports he does not need to be in the hospital  Patient also requesting to know why the police are to be notified upon his discharge  CM will follow up with patient and address these concerns  CM will continue to monitor patient's progress and assist with discharge planning needs

## 2021-07-06 NOTE — NURSING NOTE
Patient has been visible on the unit, pleasant and cooperative  Occasional pressured speech is noted but patients behavior has been mostly controlled  Participating in select groups  Denies any S/H ideation  Also denies any hallucinations  Will be maintained on 7 minute safety checks

## 2021-07-06 NOTE — PROGRESS NOTES
07/06/21 0752   Team Meeting   Meeting Type Daily Rounds   Initial Conference Date 07/06/21   Team Members Present   Team Members Present Physician;Nurse;   Physician Team Member Dr Lashonda Sun; [address]   Nursing Team Member Johana Lamar RN   Social Work Team Member Navin Kang, [place]   Patient/Family Present   Patient Present No   Patient's Family Present No     Seems a little improved  More friendly  Slept well  Made racial slurs that led to some altercatoins  Sherre Soulier

## 2021-07-06 NOTE — PROGRESS NOTES
Progress Note - [address] CASTAN[address] Day 59 y o  male MRN: [id number]   Unit/Bed#: [facility] 258-02 Encounter: [id number]    Behavior over the last 24 hours:      Delmis Mazariegos was seen for an inpatient follow-up psychiatric visit this date  At today's visit, he remains labile, hyperverbal, pressure, and easily agitated  He is still experiencing rapid mood shifts  He has not had any physically aggressive behaviors on the unit but often has verbal outbursts  Per psychiatric rounds, he has been less irritable and more cooperative  He is taking his medication as prescribed  His appetite and sleep are improving  He continues to require inpatient hospitalization while his medications are adjusted to stabilize his mood  ROS: no complaints, all other systems are negative    Mental Status Evaluation:    Appearance:  disheveled   Behavior:  demanding, easily agitated, restless   Speech:  pressured, hypertalkative, loud   Mood:  labile, irritable   Affect:  expansive   Thought Process:  disorganized, illogical, tangential   Associations: intact associations   Thought Content:  paranoid ideation, grandiose   Perceptual Disturbances: denies auditory hallucinations when asked, talks to self at times   Risk Potential: Suicidal ideation - None  Homicidal ideation - None  Potential for aggression - Not at present   Sensorium:  oriented to person, place and time/date   Memory:  recent and remote memory grossly intact   Consciousness:  alert and awake   Attention: poor concentration and poor attention span   Insight:  poor   Judgment: poor   Gait/Station: normal gait/station, normal balance   Motor Activity: no abnormal movements     Vital signs in last 24 hours:    Temp:  [98 3 °F (36 8 °C)-98 6 °F (37 °C)] 98 3 °F (36 8 °C)  HR:  [] 87  Resp:  [18] 18  BP: (134-140)/(84-87) 134/84    Laboratory results:  I have personally reviewed all pertinent laboratory/tests results      Progress Toward Goals: progressing    Assessment/Plan   Principal Problem:    Schizoaffective disorder, bipolar type (HCC)  Active Problems:    Cannabis abuse, continuous    Alcohol abuse, continuous    Hypertension    Tobacco abuse    BPH (benign prostatic hyperplasia)    Vitamin D insufficiency    Recommended Treatment:     Depakote increased to 750 mg twice daily  Repeat level ordered  Zyprexa increased to 20 mg q h s   Continue lithium 600 mg twice daily  Continue Ativan taper  Continue melatonin 3 mg HS  Continue to monitor  Discharge disposition and planning are ongoing        All current active medications have been reviewed  Encourage group therapy, milieu therapy and occupational therapy  Behavioral Health checks every 7 minutes    Current Facility-Administered Medications   Medication Dose Route Frequency Provider Last Rate    acetaminophen  650 mg Oral Q6H PRN Lanette Cervantes PA-C      acetaminophen  650 mg Oral Q4H PRN Lanette Cervantes PA-C      acetaminophen  975 mg Oral Q6H PRN Lanette Cervantes PA-C      aluminum-magnesium hydroxide-simethicone  30 mL Oral Q4H PRN Lanette Cervantes PA-C      benztropine  1 mg Intramuscular Q4H PRN Max 6/day Lanette Cervantes PA-C      benztropine  1 mg Oral Q4H PRN Max 6/day Lanette Cervantes PA-C      cholecalciferol  2,000 Units Oral Daily Roberto Pulliam PA-C      divalproex sodium  500 mg Oral BID CARLOS Oneill      hydrOXYzine HCL  25 mg Oral Q6H PRN Max 4/day Lanette Cervantes PA-C      hydrOXYzine HCL  50 mg Oral Q4H PRN Max 4/day Lanette Cervantes PA-C      Or    LORazepam  1 mg Intramuscular Q4H PRN Lanette Cervantes PA-C      lithium carbonate  600 mg Oral BID With Meals Avelino Hernandez MD      LORazepam  1 mg Oral Q4H PRN Max 6/day Lanette Cervantes PA-C      Or    LORazepam  2 mg Intramuscular Q6H PRN Max 3/day Lanette Cervantes PA-C      LORazepam  0 5 mg Oral Q6H PRN Lanette Cervantes PA-C      LORazepam  0 5 mg Oral BID CARLOS Kemp Followed by   Martín Hawley ON 7/9/2021] LORazepam  0 5 mg Oral Daily CARLOS Oneill      melatonin  3 mg Oral HS Janet Blancas PA-C      metoprolol succinate  25 mg Oral Daily Victorina Lee PA-C      OLANZapine  10 mg Oral Q8H PRN Janet Blancas, PA-C      Or    OLANZapine  10 mg Intramuscular Q8H PRN Janet Blancas, PA-C      OLANZapine  5 mg Oral Q4H PRN Janet Yonny, PA-C      Or    OLANZapine  5 mg Intramuscular Q4H PRN Janet Blancas, PA-C      OLANZapine  15 mg Oral HS CARLOS Oneill      OLANZapine  2 5 mg Oral Q4H PRN Janet Blancas, PA-C      polyethylene glycol  17 g Oral Daily PRN Janet Blancas, PA-C      senna-docusate sodium  1 tablet Oral Daily PRN Janet Blancas, PA-TY      tamsulosin  0 4 mg Oral Daily With Dinner Victorina Lee PA-C         Risks / Benefits of Treatment:    Risks, benefits, and possible side effects of medications explained to patient and patient verbalizes understanding and agreement for treatment  Counseling / Coordination of Care:      Patient's progress discussed with staff in treatment team meeting  Medications, treatment progress and treatment plan reviewed with patient

## 2021-07-07 PROCEDURE — 99232 SBSQ HOSP IP/OBS MODERATE 35: CPT | Performed by: NURSE PRACTITIONER

## 2021-07-07 RX ADMIN — TAMSULOSIN HYDROCHLORIDE 0.4 MG: 0.4 CAPSULE ORAL at 17:55

## 2021-07-07 RX ADMIN — OLANZAPINE 20 MG: 10 TABLET, FILM COATED ORAL at 21:15

## 2021-07-07 RX ADMIN — Medication 2000 UNITS: at 08:02

## 2021-07-07 RX ADMIN — MELATONIN 3 MG: at 21:15

## 2021-07-07 RX ADMIN — LITHIUM CARBONATE 600 MG: 300 CAPSULE, GELATIN COATED ORAL at 17:53

## 2021-07-07 RX ADMIN — DIVALPROEX SODIUM 750 MG: 500 TABLET, DELAYED RELEASE ORAL at 08:03

## 2021-07-07 RX ADMIN — DIVALPROEX SODIUM 750 MG: 500 TABLET, DELAYED RELEASE ORAL at 17:54

## 2021-07-07 RX ADMIN — LORAZEPAM 0.5 MG: 0.5 TABLET ORAL at 17:55

## 2021-07-07 RX ADMIN — LORAZEPAM 0.5 MG: 0.5 TABLET ORAL at 08:03

## 2021-07-07 RX ADMIN — LITHIUM CARBONATE 600 MG: 300 CAPSULE, GELATIN COATED ORAL at 08:03

## 2021-07-07 NOTE — PLAN OF CARE
Problem: Ineffective Coping  Goal: Free from restraint events  Description: - Utilize least restrictive measures   - Provide behavioral interventions   - Redirect inappropriate behaviors   Outcome: Progressing     Problem: Risk for Violence/Aggression Toward Others  Goal: Refrain from harming others  Outcome: Progressing

## 2021-07-07 NOTE — PLAN OF CARE
Problem: Alteration in Thoughts and Perception  Goal: Treatment Goal: Gain control of psychotic behaviors/thinking, reduce/eliminate presenting symptoms and demonstrate improved reality functioning upon discharge  Outcome: Progressing  Goal: Verbalize thoughts and feelings  Description: Interventions:  - Promote a nonjudgmental and trusting relationship with the patient through active listening and therapeutic communication  - Assess patient's level of functioning, behavior and potential for risk  - Engage patient in 1 on 1 interactions  - Encourage patient to express fears, feelings, frustrations, and discuss symptoms    - [place] patient to reality, help patient recognize reality-based thinking   - Administer medications as ordered and assess for potential side effects  - Provide the patient education related to the signs and symptoms of the illness and desired effects of prescribed medications  Outcome: Progressing  Goal: Refrain from acting on delusional thinking/internal stimuli  Description: Interventions:  - Monitor patient closely, per order   - Utilize least restrictive measures   - Set reasonable limits, give positive feedback for acceptable   - Administer medications as ordered and monitor of potential side effects  Outcome: Progressing  Goal: Agree to be compliant with medication regime, as prescribed and report medication side effects  Description: Interventions:  - Offer appropriate PRN medication and supervise ingestion; conduct AIMS, as needed   Outcome: Progressing     Problem: Ineffective Coping  Goal: Free from restraint events  Description: - Utilize least restrictive measures   - Provide behavioral interventions   - Redirect inappropriate behaviors   Outcome: Progressing     Problem: Anxiety  Goal: Anxiety is at manageable level  Description: Interventions:  - Assess and monitor patient's anxiety level     - Monitor for signs and symptoms (heart palpitations, chest pain, shortness of breath, headaches, nausea, feeling jumpy, restlessness, irritable, apprehensive)  - Collaborate with interdisciplinary team and initiate plan and interventions as ordered    - [place] patient to unit/surroundings  - Explain treatment plan  - Encourage participation in care  - Encourage verbalization of concerns/fears  - Identify coping mechanisms  - Assist in developing anxiety-reducing skills  - Administer/offer alternative therapies  - Limit or eliminate stimulants  Outcome: Progressing

## 2021-07-07 NOTE — PROGRESS NOTES
No acute behaviors overnight  No noted suicidal ideations or homicidal behaviors  Fluids at bedside to promote hydration  No aspiration risks noted  Patient observed sleeping during most q 7 minute safety checks  No observable distress or changes in medical condition  No complaints of pain  Will continue to monitor

## 2021-07-07 NOTE — SOCIAL WORK
Patient requested to meet with this CM to discuss his concerns and need for discharge as soon as possible  CM provided support and encouragement  Patient did note that he feels he is ignored by the psychiatrist, noting he has been wanting to speak with her one on one and has not been able to speak with her yet  CM recommended he continue with following the treatment plan the psychiatrist has already established for him and hopefully his symptoms will become more controlled so that he is safe for discharge home  Patient thanked this CM for sitting down with patient, noting he felt it was "theraputic"  CM will continue to follow patient's progress and assist with discharge planning needs

## 2021-07-07 NOTE — PROGRESS NOTES
07/07/21 0730   Activity/Group Checklist   Group   (Goal Planning and Communication)   Attendance Attended   Attendance Duration (min) 46-60   Interactions Disorganized interaction   Affect/Mood Appropriate;Bright;Calm   Goals Achieved Identified feelings; Able to listen to others; Able to engage in interactions; Able to manage/cope with feelings

## 2021-07-07 NOTE — PROGRESS NOTES
Progress Note - Mikael Dhillon 67 Day 59 y o  male MRN: [id number]   Unit/Bed#: [facility] 258-02 Encounter: [id number]    Behavior over the last 24 hours:      Praveen Angela remains labile and irritable at times but is overall more pleasant  He is slightly less pressured and his verbal outbursts are decreasing  He is still disorganized and tangential  At today's assessment, he was religiously preoccupied and was talking about [place] conspiracies and the end of the world  At one point, he was barking like a dog and playing with a play dough figurine  He remains discharge-focused and does not believe he needs to be here  ROS: no complaints, all other systems are negative    Mental Status Evaluation:    Appearance:  disheveled   Behavior:  bizarre, restless and fidgety   Speech:  pressured, hypertalkative, loud   Mood:  labile, still manic   Affect:  expansive   Thought Process:  disorganized   Associations: tangential associations   Thought Content:  Amish preoccupation, some paranoia   Perceptual Disturbances: none   Risk Potential: Suicidal ideation - None  Homicidal ideation - None  Potential for aggression - No   Sensorium:  oriented to person, place and time/date   Memory:  recent and remote memory grossly intact   Consciousness:  alert and awake   Attention: poor concentration and poor attention span   Insight:  poor   Judgment: poor   Gait/Station: normal gait/station, normal balance   Motor Activity: no abnormal movements     Vital signs in last 24 hours:    Temp:  [98 3 °F (36 8 °C)-99 °F (37 2 °C)] 99 °F (37 2 °C)  HR:  [94-98] 98  Resp:  [16-20] 16  BP: (119-149)/(91-94) 119/94    Laboratory results:  I have personally reviewed all pertinent laboratory/tests results      Progress Toward Goals: progressing    Assessment/Plan   Principal Problem:    Schizoaffective disorder, bipolar type (HCC)  Active Problems:    Cannabis abuse, continuous    Alcohol abuse, continuous    Hypertension    Tobacco abuse    BPH (benign prostatic hyperplasia)    Vitamin D insufficiency    Recommended Treatment:     Continue current medications as prescribed:    Depakote 750 mg twice daily  Repeat level ordered  Zyprexa 20 mg q h s   Continue lithium 600 mg twice daily  Continue Ativan taper  Continue melatonin 3 mg HS  Continue to monitor    Discharge disposition and planning are ongoing         All current active medications have been reviewed  Encourage group therapy, milieu therapy and occupational therapy  Behavioral Health checks every 7 minutes    All current active medications have been reviewed  Encourage group therapy, milieu therapy and occupational therapy  Behavioral Health checks every 7 minutes    Current Facility-Administered Medications   Medication Dose Route Frequency Provider Last Rate    acetaminophen  650 mg Oral Q6H PRN Raissa Duong PA-C      acetaminophen  650 mg Oral Q4H PRN Raissa Duong PA-C      acetaminophen  975 mg Oral Q6H PRN Raissa Duong PA-C      aluminum-magnesium hydroxide-simethicone  30 mL Oral Q4H PRN Raissa Duong PA-C      benztropine  1 mg Intramuscular Q4H PRN Max 6/day Raissa Duong PA-C      benztropine  1 mg Oral Q4H PRN Max 6/day Raissa Duong PA-C      cholecalciferol  2,000 Units Oral Daily Adriane Schwab, PA-C      divalproex sodium  750 mg Oral BID CARLOS Oneill      hydrOXYzine HCL  25 mg Oral Q6H PRN Max 4/day Raissa Duong PA-C      hydrOXYzine HCL  50 mg Oral Q4H PRN Max 4/day Raissa Duong PA-C      Or    LORazepam  1 mg Intramuscular Q4H PRN Raissa Duong PA-C      lithium carbonate  600 mg Oral BID With Meals Kody Plummer MD      LORazepam  1 mg Oral Q4H PRN Max 6/day Raissa Duong PA-C      Or    LORazepam  2 mg Intramuscular Q6H PRN Max 3/day Raissa Duong PA-C      LORazepam  0 5 mg Oral Q6H PRN Raissa Duong PA-C      LORazepam  0 5 mg Oral BID CARLOS Oneill      Followed by   Madina James ON 7/9/2021] LORazepam  0 5 mg Oral Daily CARLOS Oneill      melatonin  3 mg Oral HS Raissa Duong PA-C      metoprolol succinate  25 mg Oral Daily Adriane Schwab, PA-C      OLANZapine  10 mg Oral Q8H PRN Raissa Duong PA-C      Or    OLANZapine  10 mg Intramuscular Q8H PRN Raissa Duong PA-C      OLANZapine  5 mg Oral Q4H PRN Raissa Duong PA-C      Or    OLANZapine  5 mg Intramuscular Q4H PRN Raissa Duong PA-C      OLANZapine  2 5 mg Oral Q4H PRN Raissa Duong PA-C      OLANZapine  20 mg Oral HS CARLOS Oneill      polyethylene glycol  17 g Oral Daily PRN Raissa Duong PA-C      senna-docusate sodium  1 tablet Oral Daily PRN Raissa Duong PA-C      tamsulosin  0 4 mg Oral Daily With Dinner Adriane Schwab, PA-C         Risks / Benefits of Treatment:    Risks, benefits, and possible side effects of medications explained to patient and patient verbalizes understanding and agreement for treatment  Counseling / Coordination of Care:      Patient's progress discussed with staff in treatment team meeting  Medications, treatment progress and treatment plan reviewed with patient

## 2021-07-07 NOTE — PROGRESS NOTES
07/07/21 1000   Activity/Group Checklist   Group   (Relaxation and Expression)   Attendance Attended   Attendance Duration (min) Greater than 60   Interactions Interacted appropriately   Affect/Mood Appropriate;Bright   Goals Achieved Identified feelings; Able to listen to others; Able to engage in interactions; Able to self-disclose; Able to recieve feedback; Able to give feedback to another

## 2021-07-07 NOTE — PROGRESS NOTES
07/07/21 0800   Team Meeting   Meeting Type Daily Rounds   Initial Conference Date 07/07/21   Team Members Present   Team Members Present Physician;Nurse;   Physician Team Member Dr Deven Laurent; Hector Pearce [place]   Nursing Team Member Meseret Tinoco, ADRIEN   Social Work Team Member Abigail Vega   Patient/Family Present   Patient Present No   Patient's Family Present No     Presents with labile mood  Refusing select medications  Getting into verbal altercations with female peer on the unit

## 2021-07-07 NOTE — NURSING NOTE
Irritable tonight  Verbal altercation with peer  Voiced negative statements to fellow patient  Escalated  Saturnino Evans was redirected out of community room  Withdrew to room till situation abated  After this, patient maintained self on the periphery of social environment  Complaint with medications and snack  Currently resting in room without incident

## 2021-07-07 NOTE — NURSING NOTE
Patient remains expansive in mood, with periods of lability/ some irritability  No report of significant depression/ anxiety; no SI/HI or A/V hallucinations reported  Grandiose/ paranoid ideation expressed  Energy level remains elevated; patient is easily frustrated/ distracted, though has maintained behavioral control  Impulsive at times; speech pressured, rambling and disconnected at times  Intermittent group attendance  Cooperative with med administration  Appetite good  Limited, relevant staff/peer interaction  Will continue to reinforce appropriate controls/behaviors

## 2021-07-08 PROCEDURE — 99232 SBSQ HOSP IP/OBS MODERATE 35: CPT | Performed by: PHYSICIAN ASSISTANT

## 2021-07-08 RX ADMIN — DIVALPROEX SODIUM 750 MG: 500 TABLET, DELAYED RELEASE ORAL at 08:14

## 2021-07-08 RX ADMIN — MELATONIN 3 MG: at 21:18

## 2021-07-08 RX ADMIN — LORAZEPAM 0.5 MG: 0.5 TABLET ORAL at 08:16

## 2021-07-08 RX ADMIN — DIVALPROEX SODIUM 750 MG: 500 TABLET, DELAYED RELEASE ORAL at 17:19

## 2021-07-08 RX ADMIN — Medication 2000 UNITS: at 08:16

## 2021-07-08 RX ADMIN — TAMSULOSIN HYDROCHLORIDE 0.4 MG: 0.4 CAPSULE ORAL at 17:19

## 2021-07-08 RX ADMIN — LITHIUM CARBONATE 600 MG: 300 CAPSULE, GELATIN COATED ORAL at 17:19

## 2021-07-08 RX ADMIN — OLANZAPINE 20 MG: 10 TABLET, FILM COATED ORAL at 21:18

## 2021-07-08 RX ADMIN — LITHIUM CARBONATE 600 MG: 300 CAPSULE, GELATIN COATED ORAL at 08:15

## 2021-07-08 NOTE — NURSING NOTE
Slept well throughout most of the night  Patient woke up briefly and came to the nursing station to complain about the Doctor increasing his medications  Patient then left and went back to his room to lay down  Respirations even and unlabored  Safety measures maintained  Continual observation continues  Will continue to monitor

## 2021-07-08 NOTE — PROGRESS NOTES
Progress Note - [address] Frandy Day 59 y o  male MRN: [id number]  Unit/Bed#: [facility] 258-02 Encounter: [id number]    Assessment/Plan   Principal Problem:    Schizoaffective disorder, bipolar type ([address] )  Active Problems:    Cannabis abuse, continuous    Alcohol abuse, continuous    Hypertension    Tobacco abuse    BPH (benign prostatic hyperplasia)    Vitamin D insufficiency  Patient was seen for continuing care and treatment  Case was discussed with the nursing staff and treatment team   On examination today, the patient remains allowed but slightly more redirectable  He still gets irritable and he still gets angry but controls it better  Also, he had a meeting today with the leader of the treatment team and was discussed with the patient his episodes of intrusiveness and loud personality  He was a little more accepting of constructive criticism  Otherwise, we will keep the patient on current medication regimen  Discharge planning and disposition is ongoing      Behavior over the last 24 hours:  unchanged  Sleep:  Fluctuating sleep pattern  Appetite: normal  Medication side effects: No  ROS: no complaints    Mental Status Evaluation:  Appearance:  casually dressed and disheveled   Behavior:  psychomotor agitation   Speech:  pressured   Mood:  irritable and labile   Affect:  mood-congruent   Thought Process:  disorganized, flight of ideas and illogical   Thought Content:  delusions  obsessive/rumination and obsessions   Perceptual Disturbances: Denies   Risk Potential: Suicidal Ideations none  Homicidal Ideations none  Potential for Aggression No   Sensorium:  person, place and time/date   Memory:  recent and remote memory grossly intact   Consciousness:  alert and awake    Attention: attention span appeared shorter than expected for age   Insight:  limited   Judgment: limited   Gait/Station: normal gait/station   Motor Activity: no abnormal movements     Progress Toward Goals:  Is taking medications compliantly  Mood remains labile and irritable at times  Is more redirectable    Recommended Treatment: Continue with group therapy, milieu therapy and occupational therapy  Risks, benefits and possible side effects of Medications:   Risks, benefits, and possible side effects of medications explained to patient and patient verbalizes understanding  Medications:  Depakote 750 milligram b i d  Current Depakote level 46 2  Lithium carbonate 600 milligram b i d  Current lithium level 0 6  Zyprexa 20 milligram HS    Labs: I have personally reviewed all pertinent laboratory/tests results  Most Recent Labs:   Lab Results   Component Value Date    WBC 5 50 06/18/2021    RBC 4 26 (L) 06/18/2021    HGB 12 7 (L) 06/18/2021    HCT 38 0 (L) 06/18/2021     06/18/2021    RDW 14 3 06/18/2021    NEUTROABS 4 10 06/18/2021    SODIUM 144 (H) 06/18/2021    K 3 5 06/19/2021     (H) 06/18/2021    CO2 26 06/18/2021    BUN 12 06/18/2021    CREATININE 0 97 06/18/2021    GLUC 126 (H) 06/18/2021    GLUF 91 11/06/2019    CALCIUM 9 2 06/18/2021    AST 26 06/18/2021    ALT 21 06/18/2021    ALKPHOS 40 (L) 06/18/2021    TP 5 9 (L) 06/18/2021    ALB 3 9 06/18/2021    TBILI 1 00 06/18/2021    CHOLESTEROL 150 02/17/2021    HDL 35 (L) 02/17/2021    TRIG 90 02/17/2021    LDLCALC 97 02/17/2021    NONHDLC 115 02/17/2021    VALPROICTOT 46 2 (L) 07/06/2021    LITHIUM 0 6 (L) 07/02/2021    AMMONIA 51 02/06/2021    DIE5ONXUOTIX 1 780 06/18/2021    FREET4 1 09 02/17/2021    RPR Non-Reactive 11/03/2019    HGBA1C 5 1 11/03/2019     11/03/2019     Depakote:   Lab Results   Component Value Date    VALPROICTOT 46 2 (L) 07/06/2021     Lithium:   Lab Results   Component Value Date    LITHIUM 0 6 (L) 07/02/2021       Counseling / Coordination of Care  Total floor / unit time spent today 25 minutes  Greater than 50% of total time was spent with the patient and / or family counseling and / or coordination of care   A description of the counseling / coordination of care:  Medication management, chart review and treatment

## 2021-07-08 NOTE — PROGRESS NOTES
07/08/21 0730   Activity/Group Checklist   Group   (Goal Planning and Communication)   Attendance Attended   Attendance Duration (min) 46-60   Interactions Interacted appropriately   Affect/Mood Appropriate   Goals Achieved Identified feelings; Discussed coping strategies; Able to listen to others; Able to engage in interactions

## 2021-07-08 NOTE — PLAN OF CARE
Problem: Alteration in Thoughts and Perception  Goal: Treatment Goal: Gain control of psychotic behaviors/thinking, reduce/eliminate presenting symptoms and demonstrate improved reality functioning upon discharge  Outcome: Progressing  Goal: Verbalize thoughts and feelings  Description: Interventions:  - Promote a nonjudgmental and trusting relationship with the patient through active listening and therapeutic communication  - Assess patient's level of functioning, behavior and potential for risk  - Engage patient in 1 on 1 interactions  - Encourage patient to express fears, feelings, frustrations, and discuss symptoms    - [place] patient to reality, help patient recognize reality-based thinking   - Administer medications as ordered and assess for potential side effects  - Provide the patient education related to the signs and symptoms of the illness and desired effects of prescribed medications  Outcome: Progressing  Goal: Refrain from acting on delusional thinking/internal stimuli  Description: Interventions:  - Monitor patient closely, per order   - Utilize least restrictive measures   - Set reasonable limits, give positive feedback for acceptable   - Administer medications as ordered and monitor of potential side effects  Outcome: Progressing  Goal: Agree to be compliant with medication regime, as prescribed and report medication side effects  Description: Interventions:  - Offer appropriate PRN medication and supervise ingestion; conduct AIMS, as needed   Outcome: Progressing  Goal: Recognize dysfunctional thoughts, communicate reality-based thoughts at the time of discharge  Description: Interventions:  - Provide medication and psycho-education to assist patient in compliance and developing insight into his/her illness   Outcome: Progressing  Goal: Complete daily ADLs, including personal hygiene independently, as able  Description: Interventions:  - Observe, teach, and assist patient with ADLS  - Monitor and promote a balance of rest/activity, with adequate nutrition and elimination   Outcome: Progressing     Problem: Ineffective Coping  Goal: Identifies ineffective coping skills  Outcome: Progressing  Goal: Identifies healthy coping skills  Outcome: Progressing  Goal: Demonstrates healthy coping skills  Outcome: Progressing  Goal: Patient/Family participate in treatment and DC plans  Description: Interventions:  - Provide therapeutic environment  Outcome: Progressing  Goal: Patient/Family verbalizes awareness of resources  Outcome: Progressing  Goal: Understands least restrictive measures  Description: Interventions:  - Utilize least restrictive behavior  Outcome: Progressing  Goal: Free from restraint events  Description: - Utilize least restrictive measures   - Provide behavioral interventions   - Redirect inappropriate behaviors   Outcome: Progressing  Goal: Participates in unit activities  Description: Interventions:  - Provide therapeutic environment   - Provide required programming   - Redirect inappropriate behaviors   Outcome: Progressing     Problem: Anxiety  Goal: Anxiety is at manageable level  Description: Interventions:  - Assess and monitor patient's anxiety level  - Monitor for signs and symptoms (heart palpitations, chest pain, shortness of breath, headaches, nausea, feeling jumpy, restlessness, irritable, apprehensive)  - Collaborate with interdisciplinary team and initiate plan and interventions as ordered    - [place] patient to unit/surroundings  - Explain treatment plan  - Encourage participation in care  - Encourage verbalization of concerns/fears  - Identify coping mechanisms  - Assist in developing anxiety-reducing skills  - Administer/offer alternative therapies  - Limit or eliminate stimulants  Outcome: Progressing     Problem: Risk for Violence/Aggression Toward Others  Goal: Treatment Goal: Refrain from acts of violence/aggression during length of stay, and demonstrate improved impulse control at the time of discharge  Outcome: Progressing  Goal: Refrain from harming others  Outcome: Progressing  Goal: Refrain from destructive acts on the environment or property  Outcome: Progressing  Goal: Control angry outbursts  Description: Interventions:  - Monitor patient closely, per order  - Ensure early verbal de-escalation  - Monitor prn medication needs  - Set reasonable/therapeutic limits, outline behavioral expectations, and consequences   - Provide a non-threatening milieu, utilizing the least restrictive interventions   Outcome: Progressing  Goal: Identify appropriate positive anger management techniques  Description: Interventions:  - Offer anger management and coping skills groups   - Staff will provide positive feedback for appropriate anger control  Outcome: Progressing

## 2021-07-08 NOTE — NURSING NOTE
Patient visible in milieu, mood and affect labile  Thought process disorganized, grandiose delusions  Denies anxiety/depression, SI/HI, hallucinations  Refused scheduled Toprol Xl however was compliant with all other scheduled medication  Remains on 7" checks for safety and behaviors

## 2021-07-08 NOTE — NURSING NOTE
Pt presents as labile, loud at times during shift  Does have some grandiose delusions at times and disorganized thought process  Took all routine medications as ordered but stated "Ill take the depakote but write down that the doctor was not supposed to increase my dose because it makes me feel worse"  Pt observed resting in room  Will continue to monitor

## 2021-07-08 NOTE — NURSING NOTE
Met with patient by his request, to discuss concerns related to his continued admission  Validated patient feelings and reviewed program structure and typical progression towards discharge  This writer then discussed importance of boundaries with other patients, having been notified that patient is upsetting others with his intrusive and loud personality  Pt was resistantly accepting and agreeable to maintaining space from peers and being respectful of other's healing environment

## 2021-07-09 LAB — VALPROATE SERPL-MCNC: 68.4 UG/ML (ref 50–125)

## 2021-07-09 PROCEDURE — 99232 SBSQ HOSP IP/OBS MODERATE 35: CPT | Performed by: NURSE PRACTITIONER

## 2021-07-09 PROCEDURE — 80164 ASSAY DIPROPYLACETIC ACD TOT: CPT | Performed by: NURSE PRACTITIONER

## 2021-07-09 RX ADMIN — MELATONIN 3 MG: at 21:34

## 2021-07-09 RX ADMIN — DIVALPROEX SODIUM 750 MG: 500 TABLET, DELAYED RELEASE ORAL at 08:46

## 2021-07-09 RX ADMIN — LITHIUM CARBONATE 600 MG: 300 CAPSULE, GELATIN COATED ORAL at 08:46

## 2021-07-09 RX ADMIN — LITHIUM CARBONATE 600 MG: 300 CAPSULE, GELATIN COATED ORAL at 15:38

## 2021-07-09 RX ADMIN — Medication 2000 UNITS: at 08:46

## 2021-07-09 RX ADMIN — METOPROLOL SUCCINATE 25 MG: 50 TABLET, EXTENDED RELEASE ORAL at 08:46

## 2021-07-09 RX ADMIN — OLANZAPINE 20 MG: 10 TABLET, FILM COATED ORAL at 21:34

## 2021-07-09 RX ADMIN — DIVALPROEX SODIUM 750 MG: 500 TABLET, DELAYED RELEASE ORAL at 18:14

## 2021-07-09 RX ADMIN — LORAZEPAM 0.5 MG: 0.5 TABLET ORAL at 08:46

## 2021-07-09 RX ADMIN — LORAZEPAM 0.5 MG: 0.5 TABLET ORAL at 15:38

## 2021-07-09 RX ADMIN — TAMSULOSIN HYDROCHLORIDE 0.4 MG: 0.4 CAPSULE ORAL at 15:38

## 2021-07-09 NOTE — PROGRESS NOTES
07/09/21 0800   Team Meeting   Meeting Type Daily Rounds   Initial Conference Date 07/09/21   Team Members Present   Team Members Present Physician;Nurse;   Physician Team Member Dr Sari Woodward; [employer], [facility]   Nursing Team Member Cecilia Meyers, RN   Social Work Team Member Abigail Powers   Patient/Family Present   Patient Present No   Patient's Family Present No     Mood still presents labile  Thought process disorganized  Showing some improvements  Angry and irritable  VPA level is at 68  4  Refusing his medical medications

## 2021-07-09 NOTE — PROGRESS NOTES
Patient social and out in the community  Bright during assessment  Remains disorganized and tangential during assessments  Denies any suicidal thoughts or hallucinations  Pleasant during assessment  Compliant with medications and snacks  Maintained on q 7 minute checks  Will continue to monitor

## 2021-07-09 NOTE — PROGRESS NOTES
Patient observed sleeping during most Q 7 minute safety checks  Found sitting on side of bed, punched window, then returned to sleep  No reason given for this behavior  No SI/HI/AH/VH noted  Patient shows no other s/s of distress besides the window incident  No complaints of pain or aspiration risks  Non-labored breathing  Monitoring continues  Fluids at bedside to promote hydration

## 2021-07-09 NOTE — PROGRESS NOTES
07/09/21 1300   Activity/Group Checklist   Group   (Group Exercise)   Attendance Attended   Attendance Duration (min) 16-30   Interactions Interacted appropriately   Affect/Mood Appropriate   Goals Achieved Able to engage in interactions

## 2021-07-09 NOTE — NURSING NOTE
Pt is labile, unhappy about medication and being told of 304 hearing  Denies anxiety, depression, SI/HI or hallucinations  Still loud with pressured speech  Is compliant with routine medication but states "I do not agree with taking this"  Will continue to monitor

## 2021-07-09 NOTE — SOCIAL WORK
CM provided explanation of rights under 304 commitment to patient  Patient reported disagreement with the treatment team's recommendation and noted that he is not happy with his psychiatrist at this time  CM informed patient he will have a chance to participate in the hearing, scheduled at this time to happen on Tuesday, July 13, 2021  Patient thanked this CM for the information provided  CM will continue to follow patient's progress and assist with discharge planning needs

## 2021-07-09 NOTE — PROGRESS NOTES
07/09/21 1000   Activity/Group Checklist   Group   ([employer]s in My [employer] Art Therapy )   Attendance Attended   Attendance Duration (min) Greater than 60   Interactions Interacted appropriately   Affect/Mood Appropriate;Bright;Calm   Goals Achieved Identified feelings; Identified triggers; Discussed coping strategies; Able to listen to others; Able to engage in interactions; Able to recieve feedback; Able to give feedback to another

## 2021-07-09 NOTE — SOCIAL WORK
CM faxed completed 304 commitment paperwork to [facility] requesting a hearing be scheduled for Tuesday of next week  CM spoke with [facility] at [facility] and she reported she would reach out once she had the hearings confirmed  CM will continue to follow patient's progress and assist with discharge planning needs

## 2021-07-09 NOTE — PROGRESS NOTES
Progress Note - Mikael Dhillon 67 Day 59 y o  male MRN: [id number]   Unit/Bed#: [facility] 258-02 Encounter: [id number]    Behavior over the last 24 hours: unchanged  Linette Caller seen today, continues to have labile mood  He is displaying ongoing rambling speech and tangential thought  He is quite irritable and is easily agitated from time to time on the unit      Staff report lability and  irritability in groups and on the unit  Mental Status Evaluation:    Appearance:  Elderly male with long white hair and a beard, wearing shorts and a vest well-nourished marginal hygiene   Behavior:  agitated, angry, demanding   Speech:  pressured, hypertalkative, loud   Mood:  irritable, manic   Affect:  Labile, expansive and reactive   Thought Process:  disorganized, tangential   Associations: loose associations   Thought Content:  ruminations   Perceptual Disturbances: denies auditory hallucinations when asked   Risk Potential: Suicidal ideation - None at present  Homicidal ideation - None at present   Sensorium:  oriented to person, place and time/date   Memory:  recent and remote memory grossly intact   Consciousness:  alert and awake   Attention: decreased concentration and decreased attention span   Insight:  limited   Judgment: limited   Gait/Station: normal gait/station   Motor Activity: no abnormal movements     Vital signs in last 24 hours:    Temp:  [98 3 °F (36 8 °C)-99 4 °F (37 4 °C)] 98 3 °F (36 8 °C)  HR:  [91-93] 91  Resp:  [16] 16  BP: (134-165)/() 165/105    Laboratory results: I have personally reviewed all pertinent laboratory/tests results          Assessment/Plan   Principal Problem:    Schizoaffective disorder, bipolar type (HCC)  Active Problems:    Cannabis abuse, continuous    Alcohol abuse, continuous    Hypertension    Tobacco abuse    BPH (benign prostatic hyperplasia)    Vitamin D insufficiency    Recommended Treatment:     Planned medication and treatment changes:  Depakote 750 mg b i d  Lithium 600 mg b i d    Olanzapine 20 mg at bedtime  Three hundred four paperwork filled out today for patient as he will need ongoing inpatient psychiatric care  All current active medications have been reviewed  Encourage group therapy, milieu therapy and occupational therapy  Behavioral Health checks every 7 minutes  Current Facility-Administered Medications   Medication Dose Route Frequency Provider Last Rate    acetaminophen  650 mg Oral Q6H PRN Arvil Randal, PA-C      acetaminophen  650 mg Oral Q4H PRN Arvil Randal, PA-C      acetaminophen  975 mg Oral Q6H PRN Arvil Randal, PA-C      aluminum-magnesium hydroxide-simethicone  30 mL Oral Q4H PRN Arvil Randal, PA-C      benztropine  1 mg Intramuscular Q4H PRN Max 6/day Arvil Randal, PA-C      benztropine  1 mg Oral Q4H PRN Max 6/day Arvil Randal, PA-C      cholecalciferol  2,000 Units Oral Daily Opal Castelan PA-C      divalproex sodium  750 mg Oral BID CARLOS Oneill      hydrOXYzine HCL  25 mg Oral Q6H PRN Max 4/day Arvil Randal, PA-C      hydrOXYzine HCL  50 mg Oral Q4H PRN Max 4/day Arvil Randal, PA-C      Or    LORazepam  1 mg Intramuscular Q4H PRN Arvil Randal, PA-C      lithium carbonate  600 mg Oral BID With Meals Karen Sharif MD      LORazepam  1 mg Oral Q4H PRN Max 6/day Arvil Randal, PA-C      Or    LORazepam  2 mg Intramuscular Q6H PRN Max 3/day Arvil Randal, PA-C      LORazepam  0 5 mg Oral Q6H PRN Arvil Randal, PA-C      LORazepam  0 5 mg Oral Daily CARLOS Oneill      melatonin  3 mg Oral HS Arvil Randal, PA-C      metoprolol succinate  25 mg Oral Daily Opal Castelan PA-C      OLANZapine  10 mg Oral Q8H PRN Arvil Randal, PA-C      Or    OLANZapine  10 mg Intramuscular Q8H PRN Arvil Randal, PA-C      OLANZapine  5 mg Oral Q4H PRN Arvil Randal, PA-C      Or    OLANZapine  5 mg Intramuscular Q4H PRN Arvil Randal, PA-C      OLANZapine  2 5 mg Oral Q4H PRN Yen Dallas PA-C      OLANZapine  20 mg Oral HS [place] CARLOS Garnica      polyethylene glycol  17 g Oral Daily PRN Yen Dallas PA-C      senna-docusate sodium  1 tablet Oral Daily PRN Yen Dallas PA-C      tamsulosin  0 4 mg Oral Daily With Dinner Suhas Rueda PA-C         Risks / Benefits of Treatment:    Risks, benefits, and possible side effects of medications explained to patient and patient verbalizes understanding and agreement for treatment  Counseling / Coordination of Care: Total floor / unit time spent today 25 minutes  Greater than 50% of total time was spent with the patient and / or family counseling and / or coordination of care  A description of counseling / coordination of care:  Patient's progress discussed with staff in treatment team meeting  Medications, treatment progress and treatment plan reviewed with patient      Will Palacios MD 07/09/21

## 2021-07-09 NOTE — PROGRESS NOTES
07/09/21 1330   Activity/Group Checklist   Group   ([employer] [facility])   Attendance Attended   Attendance Duration (min) 16-30   Interactions Interacted appropriately   Affect/Mood Appropriate;Bright   Goals Achieved Identified feelings; Able to listen to others; Able to engage in interactions

## 2021-07-10 PROCEDURE — 99232 SBSQ HOSP IP/OBS MODERATE 35: CPT | Performed by: NURSE PRACTITIONER

## 2021-07-10 RX ORDER — DIVALPROEX SODIUM 500 MG/1
1000 TABLET, DELAYED RELEASE ORAL 2 TIMES DAILY
Status: DISCONTINUED | OUTPATIENT
Start: 2021-07-10 | End: 2021-07-16 | Stop reason: HOSPADM

## 2021-07-10 RX ADMIN — DIVALPROEX SODIUM 750 MG: 500 TABLET, DELAYED RELEASE ORAL at 08:35

## 2021-07-10 RX ADMIN — LORAZEPAM 0.5 MG: 0.5 TABLET ORAL at 08:32

## 2021-07-10 RX ADMIN — OLANZAPINE 20 MG: 10 TABLET, FILM COATED ORAL at 21:56

## 2021-07-10 RX ADMIN — MELATONIN 3 MG: at 21:56

## 2021-07-10 RX ADMIN — DIVALPROEX SODIUM 1000 MG: 500 TABLET, DELAYED RELEASE ORAL at 16:58

## 2021-07-10 RX ADMIN — LITHIUM CARBONATE 600 MG: 300 CAPSULE, GELATIN COATED ORAL at 08:35

## 2021-07-10 RX ADMIN — LITHIUM CARBONATE 600 MG: 300 CAPSULE, GELATIN COATED ORAL at 16:58

## 2021-07-10 RX ADMIN — ACETAMINOPHEN 975 MG: 325 TABLET ORAL at 02:39

## 2021-07-10 RX ADMIN — LORAZEPAM 1 MG: 1 TABLET ORAL at 16:58

## 2021-07-10 NOTE — NURSING NOTE
Patient continues to be labile and disorganized in content  No overt aggressive or agitated behaviors, but patient does have irritable mood shifts especially with other peers  He denies depression and anxiety  Speech is loud and pressured,and patient is tangential in content  Cooperative and polite with staff overall  SI/HI or hallucinations endorsed  Remains medication compliant despite still expressing that his scheduled zyprexa is "poison" before rolling his eyes and taking the medication  Patient often dramatically shows the nurse the inside of his mouth to prove he took the medication  Able to make needs known  Positive for snack  Will remain on safety precautions and continual monitoring

## 2021-07-10 NOTE — PLAN OF CARE
Problem: Alteration in Thoughts and Perception  Goal: Treatment Goal: Gain control of psychotic behaviors/thinking, reduce/eliminate presenting symptoms and demonstrate improved reality functioning upon discharge  Outcome: Progressing  Goal: Verbalize thoughts and feelings  Description: Interventions:  - Promote a nonjudgmental and trusting relationship with the patient through active listening and therapeutic communication  - Assess patient's level of functioning, behavior and potential for risk  - Engage patient in 1 on 1 interactions  - Encourage patient to express fears, feelings, frustrations, and discuss symptoms    - [place] patient to reality, help patient recognize reality-based thinking   - Administer medications as ordered and assess for potential side effects  - Provide the patient education related to the signs and symptoms of the illness and desired effects of prescribed medications  Outcome: Progressing  Goal: Refrain from acting on delusional thinking/internal stimuli  Description: Interventions:  - Monitor patient closely, per order   - Utilize least restrictive measures   - Set reasonable limits, give positive feedback for acceptable   - Administer medications as ordered and monitor of potential side effects  Outcome: Progressing  Goal: Agree to be compliant with medication regime, as prescribed and report medication side effects  Description: Interventions:  - Offer appropriate PRN medication and supervise ingestion; conduct AIMS, as needed   Outcome: Progressing  Goal: Recognize dysfunctional thoughts, communicate reality-based thoughts at the time of discharge  Description: Interventions:  - Provide medication and psycho-education to assist patient in compliance and developing insight into his/her illness   Outcome: Progressing  Goal: Complete daily ADLs, including personal hygiene independently, as able  Description: Interventions:  - Observe, teach, and assist patient with ADLS  - Monitor and promote a balance of rest/activity, with adequate nutrition and elimination   Outcome: Progressing     Problem: Anxiety  Goal: Anxiety is at manageable level  Description: Interventions:  - Assess and monitor patient's anxiety level  - Monitor for signs and symptoms (heart palpitations, chest pain, shortness of breath, headaches, nausea, feeling jumpy, restlessness, irritable, apprehensive)  - Collaborate with interdisciplinary team and initiate plan and interventions as ordered    - [place] patient to unit/surroundings  - Explain treatment plan  - Encourage participation in care  - Encourage verbalization of concerns/fears  - Identify coping mechanisms  - Assist in developing anxiety-reducing skills  - Administer/offer alternative therapies  - Limit or eliminate stimulants  Outcome: Progressing     Problem: Risk for Violence/Aggression Toward Others  Goal: Treatment Goal: Refrain from acts of violence/aggression during length of stay, and demonstrate improved impulse control at the time of discharge  Outcome: Progressing  Goal: Refrain from harming others  Outcome: Progressing  Goal: Refrain from destructive acts on the environment or property  Outcome: Progressing  Goal: Control angry outbursts  Description: Interventions:  - Monitor patient closely, per order  - Ensure early verbal de-escalation  - Monitor prn medication needs  - Set reasonable/therapeutic limits, outline behavioral expectations, and consequences   - Provide a non-threatening milieu, utilizing the least restrictive interventions   Outcome: Progressing  Goal: Identify appropriate positive anger management techniques  Description: Interventions:  - Offer anger management and coping skills groups   - Staff will provide positive feedback for appropriate anger control  Outcome: Progressing

## 2021-07-10 NOTE — NURSING NOTE
Mood is labile  Agitates easily but able to de-escalate quickly  Brief periods intermittently throughout shift  Pt declining any prn medications offered  Utilizing reading the bible as positive coping skill  Currently behaviors and mood controlled  Safety precautions maintained  Will continue to monitor and assess

## 2021-07-10 NOTE — PLAN OF CARE
Problem: Alteration in Thoughts and Perception  Goal: Verbalize thoughts and feelings  Description: Interventions:  - Promote a nonjudgmental and trusting relationship with the patient through active listening and therapeutic communication  - Assess patient's level of functioning, behavior and potential for risk  - Engage patient in 1 on 1 interactions  - Encourage patient to express fears, feelings, frustrations, and discuss symptoms    - [place] patient to reality, help patient recognize reality-based thinking   - Administer medications as ordered and assess for potential side effects  - Provide the patient education related to the signs and symptoms of the illness and desired effects of prescribed medications  Outcome: Progressing  Goal: Refrain from acting on delusional thinking/internal stimuli  Description: Interventions:  - Monitor patient closely, per order   - Utilize least restrictive measures   - Set reasonable limits, give positive feedback for acceptable   - Administer medications as ordered and monitor of potential side effects  Outcome: Progressing  Goal: Complete daily ADLs, including personal hygiene independently, as able  Description: Interventions:  - Observe, teach, and assist patient with ADLS  - Monitor and promote a balance of rest/activity, with adequate nutrition and elimination   Outcome: Progressing

## 2021-07-10 NOTE — NURSING NOTE
Patient now observed resting without signs or symptoms of distress noted  Patient had disturbed sleep overnight  Patient experienced 8/10 right shoulder pain, given 875 mg tylenol as indicated, and fell asleep for the nest of the night  Non-labored breathing observed  No acute behaviors  Will remain on safety precautions and continual monitoring

## 2021-07-10 NOTE — PROGRESS NOTES
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  He becomes loud and angry when taunted by peers  He is tangential and his speech is rambling, circumstantial, and at times nonsensical      ROS: no complaints, all other systems are negative    Mental Status Evaluation:    Appearance:  disheveled   Behavior:  easily agitated, responds to redirection   Speech:  pressured, hypertalkative   Mood:  labile, irritable   Affect:  reactive   Thought Process:  tangential, incoherent   Associations: tangential associations   Thought Content:  paranoid ideation   Perceptual Disturbances: none   Risk Potential: Suicidal ideation - None  Homicidal ideation - None  Potential for aggression - No   Sensorium:  oriented to person, place and time/date   Memory:  recent and remote memory grossly intact   Consciousness:  alert and awake   Attention: poor concentration and poor attention span   Insight:  poor   Judgment: poor   Gait/Station: normal gait/station, normal balance   Motor Activity: no abnormal movements     Vital signs in last 24 hours:    Temp:  [97 8 °F (36 6 °C)-99 1 °F (37 3 °C)] 97 8 °F (36 6 °C)  HR:  [91-99] 91  Resp:  [18] 18  BP: (137-139)/(86-97) 139/86    Laboratory results:  I have personally reviewed all pertinent laboratory/tests results      Progress Toward Goals: progressing    Assessment/Plan   Principal Problem:    Schizoaffective disorder, bipolar type (HCC)  Active Problems:    Cannabis abuse, continuous    Alcohol abuse, continuous    Hypertension Tobacco abuse    BPH (benign prostatic hyperplasia)    Vitamin D insufficiency    Recommended Treatment:     Depakote increased to 1000 mg twice daily  Repeat level ordered  Lithium 600 mg BID  Melatonin 3 mg HS  Zyprexa 20 mg HS    Continue to monitor  Discharge disposition and planning are ongoing      All current active medications have been reviewed  Encourage group therapy, milieu therapy and occupational therapy  Behavioral Health checks every 7 minutes    Current Facility-Administered Medications   Medication Dose Route Frequency Provider Last Rate    acetaminophen  650 mg Oral Q6H PRN Marivel Cervantes PA-C      acetaminophen  650 mg Oral Q4H PRN Marivel Cervantes PA-C      acetaminophen  975 mg Oral Q6H PRN Marivel Cervantes PA-C      aluminum-magnesium hydroxide-simethicone  30 mL Oral Q4H PRN Marivel Cervantes PA-C      benztropine  1 mg Intramuscular Q4H PRN Max 6/day Marivel Cervantes PA-C      benztropine  1 mg Oral Q4H PRN Max 6/day Marivel Cervantes PA-C      cholecalciferol  2,000 Units Oral Daily Jer Pavon PA-C      divalproex sodium  1,000 mg Oral BID [facility] CARL[facility] Scott      hydrOXYzine HCL  25 mg Oral Q6H PRN Max 4/day Marivel Cervantes PA-C      hydrOXYzine HCL  50 mg Oral Q4H PRN Max 4/day Marivel Cervantes PA-C      Or    LORazepam  1 mg Intramuscular Q4H PRN Marivel Cervantes PA-C      lithium carbonate  600 mg Oral BID With Meals Cali Cardoza MD      LORazepam  1 mg Oral Q4H PRN Max 6/day Marivel Cervantes PA-C      Or    LORazepam  2 mg Intramuscular Q6H PRN Max 3/day Marivel Cervantes PA-C      LORazepam  0 5 mg Oral Q6H PRN Marivel Cervantse PA-C      melatonin  3 mg Oral HS Marivel Cervantes PA-C      metoprolol succinate  25 mg Oral Daily Jer Pavon PA-C      OLANZapine  10 mg Oral Q8H PRN Marivel Cervantes PA-C      Or    OLANZapine  10 mg Intramuscular Q8H PRN Marivel Cervantes PA-C      OLANZapine  5 mg Oral Q4H PRN Marivel Cervantes PA-C      Or   Rosalie Shaikh OLANZapine  5 mg Intramuscular Q4H PRN KENDRA Mary-TY      OLANZapine  2 5 mg Oral Q4H PRN Silke Gomez, PA-C      OLANZapine  20 mg Oral HS CARLOS Oneill      polyethylene glycol  17 g Oral Daily PRN Silkeosito Gomez PA-C      senna-docusate sodium  1 tablet Oral Daily PRN Silkeosito Gomez PA-C      tamsulosin  0 4 mg Oral Daily With Dinner Chet Stoddard PA-C         Risks / Benefits of Treatment:    Risks, benefits, and possible side effects of medications explained to patient and patient verbalizes understanding and agreement for treatment  Counseling / Coordination of Care:      Patient's progress discussed with staff in treatment team meeting  Medications, treatment progress and treatment plan reviewed with patient

## 2021-07-11 PROCEDURE — 99232 SBSQ HOSP IP/OBS MODERATE 35: CPT | Performed by: NURSE PRACTITIONER

## 2021-07-11 RX ADMIN — LITHIUM CARBONATE 600 MG: 300 CAPSULE, GELATIN COATED ORAL at 17:53

## 2021-07-11 RX ADMIN — DIVALPROEX SODIUM 1000 MG: 500 TABLET, DELAYED RELEASE ORAL at 08:26

## 2021-07-11 RX ADMIN — Medication 2000 UNITS: at 08:26

## 2021-07-11 RX ADMIN — DIVALPROEX SODIUM 1000 MG: 500 TABLET, DELAYED RELEASE ORAL at 17:53

## 2021-07-11 RX ADMIN — OLANZAPINE 20 MG: 10 TABLET, FILM COATED ORAL at 21:13

## 2021-07-11 RX ADMIN — TAMSULOSIN HYDROCHLORIDE 0.4 MG: 0.4 CAPSULE ORAL at 17:53

## 2021-07-11 RX ADMIN — LITHIUM CARBONATE 600 MG: 300 CAPSULE, GELATIN COATED ORAL at 08:26

## 2021-07-11 RX ADMIN — MELATONIN 3 MG: at 21:14

## 2021-07-11 NOTE — PROGRESS NOTES
Progress Note - [address] CASTAN[address] Day 59 y o  male MRN: [id number]   Unit/Bed#: [facility] 258-02 Encounter: [id number]    Behavior over the last 24 hours:      Katlyn Sands was seen for an inpatient follow-up psychiatric visit this date  At today's visit, he remains hyperverbal and pressured but less irritable  He was able to sleep through the night without difficulty and has been more pleasant and cooperative with staff this date  He is taking medications as prescribed and is tolerating increase in Depakote without complications  His appetite is within normal limits  He remains discharge focused and wishes to leave as soon as possible  ROS: no complaints, all other systems are negative    Mental Status Evaluation:    Appearance:  casually dressed   Behavior:  pleasant, demanding   Speech:  pressured, hypertalkative, loud   Mood:  labile, less irritable   Affect:  expansive   Thought Process:  disorganized, tangential   Associations: tangential associations   Thought Content:  fixed delusions, paranoid ideation   Perceptual Disturbances: none   Risk Potential: Suicidal ideation - None  Homicidal ideation - None  Potential for aggression - No   Sensorium:  oriented to person, place and time/date   Memory:  recent and remote memory grossly intact   Consciousness:  alert and awake   Attention: poor concentration and poor attention span   Insight:  poor   Judgment: poor   Gait/Station: normal gait/station, normal balance   Motor Activity: no abnormal movements     Vital signs in last 24 hours:    Temp:  [98 7 °F (37 1 °C)] 98 7 °F (37 1 °C)  HR:  [57-97] 97  Resp:  [18] 18  BP: (132-138)/(85-89) 138/89    Laboratory results:  I have personally reviewed all pertinent laboratory/tests results      Progress Toward Goals: progressing    Assessment/Plan   Principal Problem:    Schizoaffective disorder, bipolar type (HCC)  Active Problems:    Cannabis abuse, continuous    Alcohol abuse, continuous    Hypertension Tobacco abuse    BPH (benign prostatic hyperplasia)    Vitamin D insufficiency    Recommended Treatment:        Depakote increased to 1000 mg twice daily  Repeat level ordered  Lithium 600 mg BID  Melatonin 3 mg HS  Zyprexa 20 mg HS     Continue to monitor    Discharge disposition and planning are ongoing          All current active medications have been reviewed  Encourage group therapy, milieu therapy and occupational therapy  Behavioral Health checks every 7 minutes    Current Facility-Administered Medications   Medication Dose Route Frequency Provider Last Rate    acetaminophen  650 mg Oral Q6H PRN Welsh Lowja, PA-TY      acetaminophen  650 mg Oral Q4H PRN Welsh Lowja, PA-TY      acetaminophen  975 mg Oral Q6H PRN Welsh Lowja, PA-C      aluminum-magnesium hydroxide-simethicone  30 mL Oral Q4H PRN Welsh Lowja, PA-TY      benztropine  1 mg Intramuscular Q4H PRN Max 6/day Welsh LowKENDRA peres-TY      benztropine  1 mg Oral Q4H PRN Max 6/day Welsh LowKENDAR peres-TY      cholecalciferol  2,000 Units Oral Daily Mercy Blunt PA-C      divalproex sodium  1,000 mg Oral BID CARLOS Oneill      hydrOXYzine HCL  25 mg Oral Q6H PRN Max 4/day Welsh LowGEORGINA peres      hydrOXYzine HCL  50 mg Oral Q4H PRN Max 4/day Welsh GEORGINA Ferrer      Or    LORazepam  1 mg Intramuscular Q4H PRN Welsh GEORGINA Ferrer      lithium carbonate  600 mg Oral BID With Meals Go Cramer MD      LORazepam  1 mg Oral Q4H PRN Max 6/day Welsh GEORGINA Ferrer      Or    LORazepam  2 mg Intramuscular Q6H PRN Max 3/day Welsh GEORGINA Ferrer      LORazepam  0 5 mg Oral Q6H PRN Welsh LowGEORGINA peres      melatonin  3 mg Oral HS Welsh GEORGINA Ferrer      metoprolol succinate  25 mg Oral Daily Mercy Blunt PA-C      OLANZapine  10 mg Oral Q8H PRN [facility]      Or    OLANZapine  10 mg Intramuscular Q8H PRN [facility]      OLANZapine  5 mg Oral Q4H PRN [place] GEORGINA Ferrer      Or  OLANZapine  5 mg Intramuscular Q4H PRN Janet Blancas, PA-C      OLANZapine  2 5 mg Oral Q4H PRN Janet Blancas, PAoTddC      OLANZapine  20 mg Oral HS CARLOS Oneill      polyethylene glycol  17 g Oral Daily PRN Janet Blancas, GEORGINA      senna-docusate sodium  1 tablet Oral Daily PRN Janet Blancas, GEORGINA      tamsulosin  0 4 mg Oral Daily With Dinner Victorina Lee PA-C         Risks / Benefits of Treatment:    Risks, benefits, and possible side effects of medications explained to patient and patient verbalizes understanding and agreement for treatment  Counseling / Coordination of Care:      Patient's progress discussed with staff in treatment team meeting  Medications, treatment progress and treatment plan reviewed with patient

## 2021-07-11 NOTE — NURSING NOTE
Patient was out in the community for the early part of the evening  Loud and mumbling  Irritable  Requested a list of his medications which was provided to him  Spent the rest of the evening withdrawn to his room and sleeping off and on  When woken for HS medications patient was pleasant  Speech again mumbled  Took all scheduled medications without a problem

## 2021-07-11 NOTE — NURSING NOTE
Pt visible on unit  Agitates easily but de-escalates quickly  Loud and pressured at times but redirectable  Pt continues to express that he doesn't not want to be on Depakote due to "side effects experienced when taking Depakote" Pt reports that he will take lithium and Zyprexa once discharged but will not be complaint with Depakote  Pt provided medication chart per patient request dated current as 7/11/21  Pt now agreeable to take Flomax stating "im now symptomatic" but would not further elaborate  Continues to refuse BP medications as prescribed

## 2021-07-11 NOTE — PLAN OF CARE
Problem: Alteration in Thoughts and Perception  Goal: Treatment Goal: Gain control of psychotic behaviors/thinking, reduce/eliminate presenting symptoms and demonstrate improved reality functioning upon discharge  Outcome: Progressing  Goal: Verbalize thoughts and feelings  Description: Interventions:  - Promote a nonjudgmental and trusting relationship with the patient through active listening and therapeutic communication  - Assess patient's level of functioning, behavior and potential for risk  - Engage patient in 1 on 1 interactions  - Encourage patient to express fears, feelings, frustrations, and discuss symptoms    - [place] patient to reality, help patient recognize reality-based thinking   - Administer medications as ordered and assess for potential side effects  - Provide the patient education related to the signs and symptoms of the illness and desired effects of prescribed medications  Outcome: Progressing  Goal: Refrain from acting on delusional thinking/internal stimuli  Description: Interventions:  - Monitor patient closely, per order   - Utilize least restrictive measures   - Set reasonable limits, give positive feedback for acceptable   - Administer medications as ordered and monitor of potential side effects  Outcome: Progressing  Goal: Agree to be compliant with medication regime, as prescribed and report medication side effects  Description: Interventions:  - Offer appropriate PRN medication and supervise ingestion; conduct AIMS, as needed   Outcome: Progressing  Goal: Recognize dysfunctional thoughts, communicate reality-based thoughts at the time of discharge  Description: Interventions:  - Provide medication and psycho-education to assist patient in compliance and developing insight into his/her illness   Outcome: Progressing  Goal: Complete daily ADLs, including personal hygiene independently, as able  Description: Interventions:  - Observe, teach, and assist patient with ADLS  - Monitor and promote a balance of rest/activity, with adequate nutrition and elimination   Outcome: Progressing     Problem: Anxiety  Goal: Anxiety is at manageable level  Description: Interventions:  - Assess and monitor patient's anxiety level  - Monitor for signs and symptoms (heart palpitations, chest pain, shortness of breath, headaches, nausea, feeling jumpy, restlessness, irritable, apprehensive)  - Collaborate with interdisciplinary team and initiate plan and interventions as ordered    - [place] patient to unit/surroundings  - Explain treatment plan  - Encourage participation in care  - Encourage verbalization of concerns/fears  - Identify coping mechanisms  - Assist in developing anxiety-reducing skills  - Administer/offer alternative therapies  - Limit or eliminate stimulants  Outcome: Progressing     Problem: Risk for Violence/Aggression Toward Others  Goal: Treatment Goal: Refrain from acts of violence/aggression during length of stay, and demonstrate improved impulse control at the time of discharge  Outcome: Progressing  Goal: Refrain from harming others  Outcome: Progressing  Goal: Refrain from destructive acts on the environment or property  Outcome: Progressing  Goal: Control angry outbursts  Description: Interventions:  - Monitor patient closely, per order  - Ensure early verbal de-escalation  - Monitor prn medication needs  - Set reasonable/therapeutic limits, outline behavioral expectations, and consequences   - Provide a non-threatening milieu, utilizing the least restrictive interventions   Outcome: Progressing  Goal: Identify appropriate positive anger management techniques  Description: Interventions:  - Offer anger management and coping skills groups   - Staff will provide positive feedback for appropriate anger control  Outcome: Progressing

## 2021-07-12 PROCEDURE — 99232 SBSQ HOSP IP/OBS MODERATE 35: CPT | Performed by: NURSE PRACTITIONER

## 2021-07-12 RX ADMIN — LITHIUM CARBONATE 600 MG: 300 CAPSULE, GELATIN COATED ORAL at 16:57

## 2021-07-12 RX ADMIN — LITHIUM CARBONATE 600 MG: 300 CAPSULE, GELATIN COATED ORAL at 08:30

## 2021-07-12 RX ADMIN — Medication 2000 UNITS: at 08:29

## 2021-07-12 RX ADMIN — DIVALPROEX SODIUM 1000 MG: 500 TABLET, DELAYED RELEASE ORAL at 16:57

## 2021-07-12 RX ADMIN — TAMSULOSIN HYDROCHLORIDE 0.4 MG: 0.4 CAPSULE ORAL at 16:57

## 2021-07-12 RX ADMIN — MELATONIN 3 MG: at 21:46

## 2021-07-12 RX ADMIN — OLANZAPINE 20 MG: 10 TABLET, FILM COATED ORAL at 21:46

## 2021-07-12 RX ADMIN — LORAZEPAM 1 MG: 1 TABLET ORAL at 14:02

## 2021-07-12 RX ADMIN — DIVALPROEX SODIUM 1000 MG: 500 TABLET, DELAYED RELEASE ORAL at 08:31

## 2021-07-12 NOTE — NURSING NOTE
Patient now observed resting without signs or symptoms of distress noted  Patient had disturbed sleep overnight  Non-labored breathing observed  Patient states he is nervous for 304 meeting this week  No acute behaviors  Will remain on safety precautions and continual monitoring

## 2021-07-12 NOTE — PROGRESS NOTES
Progress Note - [address] CASTAN[address] Day 59 y o  male MRN: [id number]   Unit/Bed#: [facility] 258-02 Encounter: [id number]    Behavior over the last 24 hours:      Chan Gagnon was seen for an inpatient follow-up psychiatric visit this date  At today's visit, he remains tangential, labile, and loud  His mood appears somewhat less agitated  He remains discharge focused  His insight and judgment are poor  He believes he does not need to take Depakote or Zyprexa because they are causing him to go blind and his esophagus is eroding  His speech is pressured and disorganized  Much of the conversation is nonsensical and unrelated  He denies any suicidal or homicidal ideation  His appetite and sleep are improving  He is having a 304 hearing tomorrow morning  ROS: no complaints, all other systems are negative    Mental Status Evaluation:    Appearance:  casually dressed   Behavior:  demanding, easily agitated, psychomotor agitation   Speech:  pressured, hypertalkative, loud   Mood:  labile   Affect:  expansive   Thought Process:  disorganized, tangential   Associations: loose associations   Thought Content:  persecutory delusions, paranoid ideation, grandiose   Perceptual Disturbances: none   Risk Potential: Suicidal ideation - None  Homicidal ideation - None  Potential for aggression - No   Sensorium:  oriented to person, place and time/date   Memory:  recent and remote memory grossly intact   Consciousness:  alert and awake   Attention: poor concentration and poor attention span   Insight:  poor   Judgment: poor   Gait/Station: normal gait/station, normal balance   Motor Activity: no abnormal movements     Vital signs in last 24 hours:    Temp:  [98 7 °F (37 1 °C)] 98 7 °F (37 1 °C)  HR:  [85] 85  Resp:  [20] 20  BP: (165)/(98) 165/98    Laboratory results:  I have personally reviewed all pertinent laboratory/tests results      Progress Toward Goals: progressing    Assessment/Plan   Principal Problem: Schizoaffective disorder, bipolar type ([facility] [address] )  Active Problems:    Cannabis abuse, continuous    Alcohol abuse, continuous    Hypertension    Tobacco abuse    BPH (benign prostatic hyperplasia)    Vitamin D insufficiency    Recommended Treatment:     Depakote increased to 1000 mg twice daily  Repeat level ordered  Lithium 600 mg BID  Melatonin 3 mg HS  Zyprexa 20 mg HS     Continue to monitor  Discharge disposition and planning are ongoing      All current active medications have been reviewed  Encourage group therapy, milieu therapy and occupational therapy  Behavioral Health checks every 7 minutes    Current Facility-Administered Medications   Medication Dose Route Frequency Provider Last Rate    acetaminophen  650 mg Oral Q6H PRN Janet Speck, PA-C      acetaminophen  650 mg Oral Q4H PRN Janet Speck, PA-C      acetaminophen  975 mg Oral Q6H PRN Janet Speck, PA-C      aluminum-magnesium hydroxide-simethicone  30 mL Oral Q4H PRN Janet Speck, PA-C      benztropine  1 mg Intramuscular Q4H PRN Max 6/day Janet Speck, PA-C      benztropine  1 mg Oral Q4H PRN Max 6/day Janet Speck, PA-C      cholecalciferol  2,000 Units Oral Daily Victorina Lee PA-C      divalproex sodium  1,000 mg Oral BID CARLOS Dixon      hydrOXYzine HCL  25 mg Oral Q6H PRN Max 4/day Janet Speck, PA-C      hydrOXYzine HCL  50 mg Oral Q4H PRN Max 4/day Janet Yonny, PA-C      Or    LORazepam  1 mg Intramuscular Q4H PRN Janet Speck, PA-C      lithium carbonate  600 mg Oral BID With Meals Nolan Lockhart MD      LORazepam  1 mg Oral Q4H PRN Max 6/day Janet Yonny, PA-C      Or    LORazepam  2 mg Intramuscular Q6H PRN Max 3/day Janet Speck, PA-C      LORazepam  0 5 mg Oral Q6H PRN Janet Speck, PA-C      melatonin  3 mg Oral HS Janet Yonny, PA-C      metoprolol succinate  25 mg Oral Daily Victorina Lee PA-C      OLANZapine  10 mg Oral Q8H PRN Janet Speck, PA-C      Or  OLANZapine  10 mg Intramuscular Q8H PRN Sulemana Dhruv, PA-C      OLANZapine  5 mg Oral Q4H PRN Sulemana Dhruv, PA-C      Or    OLANZapine  5 mg Intramuscular Q4H PRN Sulemana Anujaam, PA-C      OLANZapine  2 5 mg Oral Q4H PRN Sulemana Dhruv, PA-C      OLANZapine  20 mg Oral HS Ninoska Scott, CARLOS      polyethylene glycol  17 g Oral Daily PRN Sulemana Dhruv, PA-C      senna-docusate sodium  1 tablet Oral Daily PRN Sulemana Dhruv, PA-C      tamsulosin  0 4 mg Oral Daily With Dinner Jerzy Waite PA-C         Risks / Benefits of Treatment:    Risks, benefits, and possible side effects of medications explained to patient and patient verbalizes understanding and agreement for treatment  Counseling / Coordination of Care:      Patient's progress discussed with staff in treatment team meeting  Medications, treatment progress and treatment plan reviewed with patient

## 2021-07-12 NOTE — PROGRESS NOTES
07/12/21 0730   Activity/Group Checklist   Group   (Goal Planning and Communication)   Attendance Attended   Attendance Duration (min) 46-60   Interactions Interacted appropriately   Affect/Mood Appropriate   Goals Achieved Identified feelings; Able to listen to others; Able to engage in interactions   Pt came to group late  Pt was appropriate upon arrival, however pt then had verbal altercation with peer and promptly left group room and processed with staff

## 2021-07-12 NOTE — PROGRESS NOTES
07/12/21 1000   Activity/Group Checklist   Group   ([employer] and Art Therapy Processing)   Attendance Attended   Attendance Duration (min) 31-45  (PT came to group late and was then in and out of group)   Interactions Interacted appropriately   Affect/Mood Appropriate   Goals Achieved Identified feelings; Identified triggers

## 2021-07-12 NOTE — PLAN OF CARE
Problem: Alteration in Thoughts and Perception  Goal: Treatment Goal: Gain control of psychotic behaviors/thinking, reduce/eliminate presenting symptoms and demonstrate improved reality functioning upon discharge  Outcome: Progressing  Goal: Verbalize thoughts and feelings  Description: Interventions:  - Promote a nonjudgmental and trusting relationship with the patient through active listening and therapeutic communication  - Assess patient's level of functioning, behavior and potential for risk  - Engage patient in 1 on 1 interactions  - Encourage patient to express fears, feelings, frustrations, and discuss symptoms    - [place] patient to reality, help patient recognize reality-based thinking   - Administer medications as ordered and assess for potential side effects  - Provide the patient education related to the signs and symptoms of the illness and desired effects of prescribed medications  Outcome: Progressing  Goal: Refrain from acting on delusional thinking/internal stimuli  Description: Interventions:  - Monitor patient closely, per order   - Utilize least restrictive measures   - Set reasonable limits, give positive feedback for acceptable   - Administer medications as ordered and monitor of potential side effects  Outcome: Progressing  Goal: Agree to be compliant with medication regime, as prescribed and report medication side effects  Description: Interventions:  - Offer appropriate PRN medication and supervise ingestion; conduct AIMS, as needed   Outcome: Progressing  Goal: Recognize dysfunctional thoughts, communicate reality-based thoughts at the time of discharge  Description: Interventions:  - Provide medication and psycho-education to assist patient in compliance and developing insight into his/her illness   Outcome: Progressing  Goal: Complete daily ADLs, including personal hygiene independently, as able  Description: Interventions:  - Observe, teach, and assist patient with ADLS  - Monitor and promote a balance of rest/activity, with adequate nutrition and elimination   Outcome: Progressing

## 2021-07-12 NOTE — NURSING NOTE
Patient visible in the milieu  Labile, loud, and disorganized, but pleasant and polite during interactions with staff  Quickly agitates with other peers but has remained redirectable  Had one minor verbal outburst while on the phone  Patient continues to fixate on his medication and ask multiple times what he is prescribed and at what dosage  Took medication as prescribed  Makes needs clearly known  No other concerns identified at this time  Will remain on safety precautions and continual monitoring

## 2021-07-12 NOTE — PROGRESS NOTES
07/12/21 0800   Team Meeting   Meeting Type Daily Rounds   Initial Conference Date 07/12/21   Team Members Present   Team Members Present Physician;Nurse;   Physician Team Member Dr Will Palacios; Bia Carter, [address] [place] [address]   Nursing Team Member Dong Mann, ADRIEN   Social Work Team Member Rosalba Bamberger, [place]   Patient/Family Present   Patient Present No   Patient's Family Present No     Patient is loud, labile, antagonistic towards other peers and manic  Refusing Depakote medication   304 commitment hearing scheduled for tomorrow at 10:30 AM

## 2021-07-12 NOTE — NURSING NOTE
Patient awake and upset due to ordered Depakote  Patient states it will give him cancer and he's having too many side effects  Patient refuses further education on his medication  Will remain on safety precautions and continual monitoring

## 2021-07-12 NOTE — NURSING NOTE
Patient has been visible on the unit, compliant with medications of choice  Patient remains labile, thought process remains illogical at times  Ativan 1 mg administered for increased anxiety  Will be maintained on 7 minute safety checks

## 2021-07-12 NOTE — CMS CERTIFICATION NOTE
Recertification: Based upon physical, mental and social evaluations, I certify that inpatient psychiatric services continue to be medically necessary for this patient for a duration of 15 midnights for the treatment of  Schizoaffective disorder, bipolar type [facility])   Available alternative community resources still do not meet the patient's mental health care needs  I further attest that an established written individualized plan of care has been updated and is outlined in the patient's medical records

## 2021-07-12 NOTE — SOCIAL WORK
CM notified patient of the PD that was assigned to his 56 commitment hearing  Patient upset to learn that it was not the previous PD who was assigned to his 303 commitment hearing  Patient reported he will be calling his previous , Amilcar Aguero, and request his services  Patient asked this CM if this CM could request an extension since he would like to have more time to build his case with his   CM provided patient with the 's office number and encouraged him to speak with them  CM also called and spoke with Deidre Madsen with [facility] who stated [facility] does not give extensions, however, if patient would like an extension, perhaps his  could request one on his behalf  CM will reiterate this information to the patient  CM will continue to follow patient's progress and assist with discharge planning needs

## 2021-07-13 LAB — VALPROATE SERPL-MCNC: 73.5 UG/ML (ref 50–125)

## 2021-07-13 PROCEDURE — 99232 SBSQ HOSP IP/OBS MODERATE 35: CPT | Performed by: REGISTERED NURSE

## 2021-07-13 PROCEDURE — 80164 ASSAY DIPROPYLACETIC ACD TOT: CPT | Performed by: NURSE PRACTITIONER

## 2021-07-13 RX ADMIN — Medication 2000 UNITS: at 08:07

## 2021-07-13 RX ADMIN — METOPROLOL SUCCINATE 25 MG: 50 TABLET, EXTENDED RELEASE ORAL at 08:07

## 2021-07-13 RX ADMIN — TAMSULOSIN HYDROCHLORIDE 0.4 MG: 0.4 CAPSULE ORAL at 16:58

## 2021-07-13 RX ADMIN — MELATONIN 3 MG: at 21:35

## 2021-07-13 RX ADMIN — DIVALPROEX SODIUM 1000 MG: 500 TABLET, DELAYED RELEASE ORAL at 16:58

## 2021-07-13 RX ADMIN — DIVALPROEX SODIUM 1000 MG: 500 TABLET, DELAYED RELEASE ORAL at 08:07

## 2021-07-13 RX ADMIN — OLANZAPINE 20 MG: 10 TABLET, FILM COATED ORAL at 21:35

## 2021-07-13 RX ADMIN — LITHIUM CARBONATE 600 MG: 300 CAPSULE, GELATIN COATED ORAL at 08:06

## 2021-07-13 RX ADMIN — LITHIUM CARBONATE 600 MG: 300 CAPSULE, GELATIN COATED ORAL at 16:59

## 2021-07-13 NOTE — PLAN OF CARE
Problem: Alteration in Thoughts and Perception  Goal: Treatment Goal: Gain control of psychotic behaviors/thinking, reduce/eliminate presenting symptoms and demonstrate improved reality functioning upon discharge  Outcome: Progressing  Goal: Verbalize thoughts and feelings  Description: Interventions:  - Promote a nonjudgmental and trusting relationship with the patient through active listening and therapeutic communication  - Assess patient's level of functioning, behavior and potential for risk  - Engage patient in 1 on 1 interactions  - Encourage patient to express fears, feelings, frustrations, and discuss symptoms    - [place] patient to reality, help patient recognize reality-based thinking   - Administer medications as ordered and assess for potential side effects  - Provide the patient education related to the signs and symptoms of the illness and desired effects of prescribed medications  Outcome: Progressing  Goal: Refrain from acting on delusional thinking/internal stimuli  Description: Interventions:  - Monitor patient closely, per order   - Utilize least restrictive measures   - Set reasonable limits, give positive feedback for acceptable   - Administer medications as ordered and monitor of potential side effects  Outcome: Progressing     Problem: Ineffective Coping  Goal: Free from restraint events  Description: - Utilize least restrictive measures   - Provide behavioral interventions   - Redirect inappropriate behaviors   Outcome: Progressing  Goal: Participates in unit activities  Description: Interventions:  - Provide therapeutic environment   - Provide required programming   - Redirect inappropriate behaviors   Outcome: Progressing     Problem: Anxiety  Goal: Anxiety is at manageable level  Description: Interventions:  - Assess and monitor patient's anxiety level     - Monitor for signs and symptoms (heart palpitations, chest pain, shortness of breath, headaches, nausea, feeling jumpy, restlessness, irritable, apprehensive)  - Collaborate with interdisciplinary team and initiate plan and interventions as ordered    - [place] patient to unit/surroundings  - Explain treatment plan  - Encourage participation in care  - Encourage verbalization of concerns/fears  - Identify coping mechanisms  - Assist in developing anxiety-reducing skills  - Administer/offer alternative therapies  - Limit or eliminate stimulants  Outcome: Progressing     Problem: Risk for Violence/Aggression Toward Others  Goal: Refrain from harming others  Outcome: Progressing  Goal: Refrain from destructive acts on the environment or property  Outcome: Progressing

## 2021-07-13 NOTE — PROGRESS NOTES
07/13/21 1100   Legal Information   Current Status: 304  (LC (exp  10/11/21))   Legal Documentation Status Filed     Hearing Documentation    304 hearing held today;  in attendance:  Renee Antunez - ;  [address] office; staff psych; ; pt in attendance; [phone] recommendation upheld for up to an additional 90 days of inpt treatment at [facility];  304 expires: October 11, 2021

## 2021-07-13 NOTE — PROGRESS NOTES
Progress Note - [address] Day 59 y o  male MRN: [id number]  [facility] Encounter: [id number]    Assessment/Plan   Principal Problem:    Schizoaffective disorder, bipolar type ([address] )  Active Problems:    Cannabis abuse, continuous    Alcohol abuse, continuous    Hypertension    Tobacco abuse    BPH (benign prostatic hyperplasia)    Vitamin D insufficiency      Subjective:Patient was seen today for continuation of care, records reviewed and  patient was discussed with the morning treatment team  Dottie Johnson took medications today as prescribed  He was focused that Zyprexa is not good for him "due to glaucoma"  It was reported by staff that last evening he had a verbal altercation with peer, but was redirectable  He remains disorganized, tangential and loud  He reports that he "lost his hearing this morning"  He stated "I might try to appeal it"  He denies suicidal or homicidal ideation  He also denies any auditory or visual hallucinations  He was in groups today  on the periphery  Speech remains pressured  During conversation with provider content was nonsensical at times  He reports that his appetite is "really good and is trying to slow down" and he reports sleep is good  He offered no complaints to this writer  Dottie Johnson was polite and cooperative during conversation with provider         Psychiatric Review of Systems:    Sleep: normal  Appetite: increased  Medication side effects: Yes - Depakote "will erode my esophagus"    ROS: all other systems are negative, no other complaints    Vitals:  Vitals:    07/12/21 0751   BP: 165/98   Pulse: 85   Resp: 20   Temp: 98 7 °F (37 1 °C)   SpO2: 93%       Mental Status Evaluation:    Appearance:  casually dressed   Behavior:  bizarre, demanding, psychomotor agitation   Speech:  pressured, hypertalkative, loud   Mood:  labile   Affect:  expansive   Thought Process:  disorganized, circumstantial, tangential   Associations: loose associations   Thought Content:  Paranoia, [place]   Perceptual Disturbances: none   Risk Potential: Suicidal ideation - None  Homicidal ideation - None  Potential for aggression - No   Sensorium:  oriented to person, place and time/date   Memory:  recent and remote memory grossly intact   Consciousness:  alert and awake   Attention: poor concentration and poor attention span   Insight:  poor   Judgment: poor   Gait/Station: normal gait/station   Motor Activity: no abnormal movements     Laboratory results:  I have personally reviewed all pertinent laboratory/tests results      Progress Toward Goals:     Slowly progressing     Recommended Treatment:     All current active medications have been reviewed  Encourage group therapy, milieu therapy and occupational therapy  Behavioral Health checks every 7 minutes  Continue current medications:  Current Facility-Administered Medications   Medication Dose Route Frequency Provider Last Rate    acetaminophen  650 mg Oral Q6H PRN Christian Ferrer PA-C      acetaminophen  650 mg Oral Q4H PRN KENDRA Spears-TY      acetaminophen  975 mg Oral Q6H PRN KENDRA Spears-TY      aluminum-magnesium hydroxide-simethicone  30 mL Oral Q4H PRN Christian Ferrer PA-C      benztropine  1 mg Intramuscular Q4H PRN Max 6/day Christian Ferrer PA-C      benztropine  1 mg Oral Q4H PRN Max 6/day Christian Ferrer PA-C      cholecalciferol  2,000 Units Oral Daily Sybil Maddox PA-C      divalproex sodium  1,000 mg Oral BID CARLOS Oneill      hydrOXYzine HCL  25 mg Oral Q6H PRN Max 4/day Christian Ferrer PA-C      hydrOXYzine HCL  50 mg Oral Q4H PRN Max 4/day Christian Ferrer PA-C      Or    LORazepam  1 mg Intramuscular Q4H PRN Christian Ferrer PA-C      lithium carbonate  600 mg Oral BID With Meals Ainsley Loera MD      LORazepam  1 mg Oral Q4H PRN Max 6/day Christian Ferrer PA-C      Or    LORazepam  2 mg Intramuscular Q6H PRN Max 3/day Christian Ferrer PA-C      LORazepam  0 5 mg Oral Q6H PRN Radha An PA-C      melatonin  3 mg Oral HS Radha An PA-C      metoprolol succinate  25 mg Oral Daily Paty Braswell PA-C      OLANZapine  10 mg Oral Q8H PRN Radha An PA-C      Or    OLANZapine  10 mg Intramuscular Q8H PRN Radha An PA-C      OLANZapine  5 mg Oral Q4H PRN Radha An PA-C      Or    OLANZapine  5 mg Intramuscular Q4H PRN Radha An PA-C      OLANZapine  2 5 mg Oral Q4H PRN Radha An PA-C      OLANZapine  20 mg Oral HS CARLOS Oneill      polyethylene glycol  17 g Oral Daily PRN Radha An PA-C      senna-docusate sodium  1 tablet Oral Daily PRN Radha An PA-C      tamsulosin  0 4 mg Oral Daily With Dinner Paty Braswell PA-C         Risks / Benefits of Treatment:     Risks, benefits, and possible side effects of medications explained to patient and patient verbalizes understanding and agreement for treatment  Counseling / Coordination of Care:     Patient's progress reviewed with nursing staff  Medications, treatment progress and treatment plan reviewed with patient  Supportive counseling provided to the patient          CARLOS Gibbs

## 2021-07-13 NOTE — PROGRESS NOTES
07/13/21 2085   Activity/Group Checklist   Group   (Goal Planning and Communication)   Attendance Attended   Attendance Duration (min) 46-60   Interactions Interacted appropriately   Affect/Mood Appropriate   Goals Achieved Identified feelings; Discussed coping strategies; Able to listen to others; Able to engage in interactions

## 2021-07-13 NOTE — PROGRESS NOTES
07/13/21 0826   Team Meeting   Meeting Type Daily Rounds   Initial Conference Date 07/13/21   Team Members Present   Team Members Present Physician;Nurse;   Physician Team Member Jacquie Tinoco [facility]   Nursing Team Member Kayleen Barrios RN   Social Work Team Member Adriana Chung [place]   Patient/Family Present   Patient Present No   Patient's Family Present No     304 Hearing today  Took Depakote  Slept all night

## 2021-07-13 NOTE — NURSING NOTE
Patient has been visible on the milieu, social with select peers, pleasant and cooperative  Patient remains tangential with loud, pressured speech  Denies any hallucinations  Patient also denies any S/H ideation  Will continue to monitor during 7 minute safety checks

## 2021-07-13 NOTE — NURSING NOTE
Patient observed arguing with another peer at the start of shift but was redirected, patient went back to his room and started reading the bible as he states "the proverbs calm me down, I don't need a PRN"  No other outbursts observed this evening  Labile  Patient continues to be loud, pressured, tangential, and disorganized, he admits anxiety related to his upcoming 304 hearing, but states "I'm going to beat it"  Patient compliant with his medications  He continues to express that his depakote is making him feel sick  Positive for snack  Able make needs known  Pleasant and cooperative with staff  Will remain on safety precautions and continual monitoring

## 2021-07-13 NOTE — NURSING NOTE
Patient has been visible on the unit, pleasant and cooperative  Attending groups, responding appropriately during them  Patient has been compliant with medications  Denies S/H ideation  Patient also denies any hallucinations  Will be maintained on 7 minute safety checks

## 2021-07-13 NOTE — PROGRESS NOTES
07/13/21 6996   Activity/Group Checklist   Group   (The Perfect [employer]/Values and Art Therapy Processing)   Attendance Attended   Attendance Duration (min) Greater than 60   Interactions Interacted appropriately   Affect/Mood Appropriate;Calm   Goals Achieved Identified feelings; Identified triggers; Discussed coping strategies; Able to listen to others; Able to engage in interactions; Able to reflect/comment on own behavior;Able to manage/cope with feelings

## 2021-07-14 RX ADMIN — LITHIUM CARBONATE 600 MG: 300 CAPSULE, GELATIN COATED ORAL at 08:10

## 2021-07-14 RX ADMIN — DIVALPROEX SODIUM 1000 MG: 500 TABLET, DELAYED RELEASE ORAL at 17:05

## 2021-07-14 RX ADMIN — Medication 2000 UNITS: at 08:09

## 2021-07-14 RX ADMIN — MELATONIN 3 MG: at 21:40

## 2021-07-14 RX ADMIN — TAMSULOSIN HYDROCHLORIDE 0.4 MG: 0.4 CAPSULE ORAL at 17:04

## 2021-07-14 RX ADMIN — LITHIUM CARBONATE 600 MG: 300 CAPSULE, GELATIN COATED ORAL at 17:04

## 2021-07-14 RX ADMIN — OLANZAPINE 20 MG: 10 TABLET, FILM COATED ORAL at 21:40

## 2021-07-14 RX ADMIN — DIVALPROEX SODIUM 1000 MG: 500 TABLET, DELAYED RELEASE ORAL at 08:09

## 2021-07-14 NOTE — DISCHARGE INSTR - APPOINTMENTS
A follow up appointment has been made on your behalf for psychiatric medication management with [facility], [address] [place], [address]  Phone: [phone]  Your TELEPHONE appointment is scheduled for Thursday, July 22, 2021 at 10:30 AM with Dr Geovanna Godwin  Your discharge will be faxed to this provider for continuity of care  A follow up appointment has been made on your behalf for medication management with [facility], [address] [place], [address]  Phone: [phone]  Your appointment is scheduled for Friday, July 30, 2021 at 3 PM with Dr Bk Pollock  Please plan to arrive 15 minutes prior to your scheduled appointment, bring your insurance card(s) and photo ID  Your discharge will be faxed to this provider for continuity of care  Linda Delacruz RN, our [employer], will be calling you after your discharge, on the phone number that you provided  She will be available as an additional support, if needed  If you wish to speak with her, you may contact Radha Purdy at [phone]

## 2021-07-14 NOTE — PROGRESS NOTES
Patient had belongings dropped off:     With patient:  Pocket T-shirt  Jeans x2  Shorts x1  Green tshirt x1    In Belongings Closet:  Boxers x3  Pair socks x2  Blue bag  Hairbrush  shortsx2  Jeans x1  Gray polo shirt x3    In contraband:  Nail clippers  Hair Ties    Patient signed belonging paperwork

## 2021-07-14 NOTE — NURSING NOTE
Pt visible on unit, social with staff  Pt remains pressured and loud  Pt reports feeling anxious earlier in the day due to pts 304 meeting which was upheld for another 90 days of IP treatment  Pt sates "There's not much I can do, I'm just gonna do what they tell me to do while I'm here " Pt denies SI/HI/depression/AVH  Pt also mentioned dissatisfaction of scheduled Zyprexa with fears of side effects of the medication causing Glaucoma  This nurse reassured pt these concerns will be addressed with the treatment team  Pt compliant with HS meds  Will continue pt safety precautions and continual monitoring of mood/thoughts/behavior

## 2021-07-14 NOTE — PROGRESS NOTES
07/14/21 0800   Team Meeting   Meeting Type Daily Rounds   Initial Conference Date 07/14/21   Team Members Present   Team Members Present Physician;Nurse;   Physician Team Member Dr Eddie Hines; Lars Sunshine[address]   Nursing Team Member Paola Echevarria, ADRIEN   Social Work Team Member Abigail Bear   Patient/Family Present   Patient Present No   Patient's Family Present No     Shows improvement  Anxious about his 304 commitment hearing yesterday  Loud  Handled the 304 hearing well yesterday  Has been med compliant in the hospital  Anticipated discharge on Friday

## 2021-07-14 NOTE — PROGRESS NOTES
07/14/21 0996   Activity/Group Checklist   Group   (Leisure Activity Awareness)   Attendance Attended   Attendance Duration (min) Greater than 60   Interactions Interacted appropriately   Affect/Mood Appropriate;Bright;Calm   Goals Achieved Identified feelings; Discussed coping strategies; Displayed empathy;Able to listen to others; Able to engage in interactions; Able to self-disclose; Able to recieve feedback; Able to give feedback to another

## 2021-07-14 NOTE — DISCHARGE INSTR - OTHER ORDERS
You are being discharged to your home with your mother located at [address] [facility], [address]  (Phone# [phone])  Triggers you have identified during your hospitalization that led to your admission was increased mood lability, opal and aggression in the community  Coping skills you have identified during your hospitalization include listening to music and reading the Bible  If you are unable to deal with your distressed mood alone please contact [facility] at [phone]  If that is not effective and you continue to have aggressive behaviors and/or are in a distressed mood please contact Robina Duran at [phone], dial [phone] or go to the nearest emergency center  Crisis Information:  [facility] Crisis Hotline: 351.161.9758  *Peer Hotline (M-F 6-10pm): 7-196.126.7940  * Alcohol Anonymous: 286.808.1693  * D&A Commission: (4585-4717807 Suicide Prevention Lifeline:  5-608.810.7140  *Lori on Mental Illness ([place]) HELPLINE: 861.675.9726/Website: www caroline org  *Substance Abuse and [address] [facility] [employer], which is a confidential, free, 24-hour-a-day, 365-day-a-year, information service for individuals and family members facing mental health and/or substance use disorders  This service provides referrals to local treatment facilities, support groups, and community-based organizations  Callers can also order free publications and other information  Call 4-269.389.4963/Website: www [facility] gov  *[facility] 2-1-1: This is a toll free, confidential, 24-hour-a-day service which connects you to a community  in your area who can help you find services and resources that are available to you locally and provide critical services that can improve and save lives    Call: 211  /Website: https://musaMatchbookstacie lang/

## 2021-07-14 NOTE — PLAN OF CARE
Problem: Alteration in Thoughts and Perception  Goal: Treatment Goal: Gain control of psychotic behaviors/thinking, reduce/eliminate presenting symptoms and demonstrate improved reality functioning upon discharge  Outcome: Progressing  Goal: Verbalize thoughts and feelings  Description: Interventions:  - Promote a nonjudgmental and trusting relationship with the patient through active listening and therapeutic communication  - Assess patient's level of functioning, behavior and potential for risk  - Engage patient in 1 on 1 interactions  - Encourage patient to express fears, feelings, frustrations, and discuss symptoms    - [place] patient to reality, help patient recognize reality-based thinking   - Administer medications as ordered and assess for potential side effects  - Provide the patient education related to the signs and symptoms of the illness and desired effects of prescribed medications  Outcome: Progressing  Goal: Refrain from acting on delusional thinking/internal stimuli  Description: Interventions:  - Monitor patient closely, per order   - Utilize least restrictive measures   - Set reasonable limits, give positive feedback for acceptable   - Administer medications as ordered and monitor of potential side effects  Outcome: Progressing  Goal: Agree to be compliant with medication regime, as prescribed and report medication side effects  Description: Interventions:  - Offer appropriate PRN medication and supervise ingestion; conduct AIMS, as needed   Outcome: Progressing     Problem: Ineffective Coping  Goal: Identifies healthy coping skills  Outcome: Progressing  Goal: Understands least restrictive measures  Description: Interventions:  - Utilize least restrictive behavior  Outcome: Progressing  Goal: Free from restraint events  Description: - Utilize least restrictive measures   - Provide behavioral interventions   - Redirect inappropriate behaviors   Outcome: Progressing     Problem: Anxiety  Goal: Anxiety is at manageable level  Description: Interventions:  - Assess and monitor patient's anxiety level  - Monitor for signs and symptoms (heart palpitations, chest pain, shortness of breath, headaches, nausea, feeling jumpy, restlessness, irritable, apprehensive)  - Collaborate with interdisciplinary team and initiate plan and interventions as ordered    - [place] patient to unit/surroundings  - Explain treatment plan  - Encourage participation in care  - Encourage verbalization of concerns/fears  - Identify coping mechanisms  - Assist in developing anxiety-reducing skills  - Administer/offer alternative therapies  - Limit or eliminate stimulants  Outcome: Progressing     Problem: Risk for Violence/Aggression Toward Others  Goal: Treatment Goal: Refrain from acts of violence/aggression during length of stay, and demonstrate improved impulse control at the time of discharge  Outcome: Progressing  Goal: Refrain from harming others  Outcome: Progressing

## 2021-07-14 NOTE — PROGRESS NOTES
07/14/21 7820   Activity/Group Checklist   Group   (Goal Planning and Communication)   Attendance Attended   Attendance Duration (min) 46-60   Interactions Interacted appropriately   Affect/Mood Appropriate;Calm   Goals Achieved Identified feelings; Able to listen to others; Able to engage in interactions

## 2021-07-15 PROCEDURE — 99232 SBSQ HOSP IP/OBS MODERATE 35: CPT | Performed by: REGISTERED NURSE

## 2021-07-15 RX ADMIN — OLANZAPINE 20 MG: 10 TABLET, FILM COATED ORAL at 21:38

## 2021-07-15 RX ADMIN — MELATONIN 3 MG: at 21:38

## 2021-07-15 RX ADMIN — TAMSULOSIN HYDROCHLORIDE 0.4 MG: 0.4 CAPSULE ORAL at 16:46

## 2021-07-15 RX ADMIN — LITHIUM CARBONATE 600 MG: 300 CAPSULE, GELATIN COATED ORAL at 16:46

## 2021-07-15 RX ADMIN — DIVALPROEX SODIUM 1000 MG: 500 TABLET, DELAYED RELEASE ORAL at 16:46

## 2021-07-15 RX ADMIN — LITHIUM CARBONATE 600 MG: 300 CAPSULE, GELATIN COATED ORAL at 08:29

## 2021-07-15 RX ADMIN — Medication 2000 UNITS: at 08:29

## 2021-07-15 RX ADMIN — DIVALPROEX SODIUM 1000 MG: 500 TABLET, DELAYED RELEASE ORAL at 08:29

## 2021-07-15 NOTE — PROGRESS NOTES
07/15/21 2580   Activity/Group Checklist   Group   (Goal Planning and Communication)   Attendance Attended   Attendance Duration (min) 46-60   Interactions Interacted appropriately   Affect/Mood Appropriate;Bright;Calm   Goals Achieved Identified feelings; Discussed coping strategies; Able to listen to others; Able to engage in interactions

## 2021-07-15 NOTE — NURSING NOTE
Pt is AAOx4  Pt remains loud and pressured  Pt denies SI/HI/AVH  Pt also denies anxiety and depression  Pt is pleasant, cooperative and elated after meeting with doctor earlier in the day  Pt med compliant   No acute concerns or complaints  Will continue pt safety precautions and continual monitoring of mood/thoughts/behavior

## 2021-07-15 NOTE — PLAN OF CARE
Problem: Alteration in Thoughts and Perception  Goal: Treatment Goal: Gain control of psychotic behaviors/thinking, reduce/eliminate presenting symptoms and demonstrate improved reality functioning upon discharge  Outcome: Progressing  Goal: Verbalize thoughts and feelings  Description: Interventions:  - Promote a nonjudgmental and trusting relationship with the patient through active listening and therapeutic communication  - Assess patient's level of functioning, behavior and potential for risk  - Engage patient in 1 on 1 interactions  - Encourage patient to express fears, feelings, frustrations, and discuss symptoms    - [place] patient to reality, help patient recognize reality-based thinking   - Administer medications as ordered and assess for potential side effects  - Provide the patient education related to the signs and symptoms of the illness and desired effects of prescribed medications  Outcome: Progressing  Goal: Refrain from acting on delusional thinking/internal stimuli  Description: Interventions:  - Monitor patient closely, per order   - Utilize least restrictive measures   - Set reasonable limits, give positive feedback for acceptable   - Administer medications as ordered and monitor of potential side effects  Outcome: Progressing  Goal: Agree to be compliant with medication regime, as prescribed and report medication side effects  Description: Interventions:  - Offer appropriate PRN medication and supervise ingestion; conduct AIMS, as needed   Outcome: Progressing  Goal: Recognize dysfunctional thoughts, communicate reality-based thoughts at the time of discharge  Description: Interventions:  - Provide medication and psycho-education to assist patient in compliance and developing insight into his/her illness   Outcome: Progressing  Goal: Complete daily ADLs, including personal hygiene independently, as able  Description: Interventions:  - Observe, teach, and assist patient with ADLS  - Monitor and promote a balance of rest/activity, with adequate nutrition and elimination   Outcome: Progressing     Problem: Ineffective Coping  Goal: Demonstrates healthy coping skills  Outcome: Progressing  Goal: Free from restraint events  Description: - Utilize least restrictive measures   - Provide behavioral interventions   - Redirect inappropriate behaviors   Outcome: Progressing

## 2021-07-15 NOTE — NURSING NOTE
Patient was loud and labile  Patient responding to internal stimuli at times  Patient pacing the unit  Patient was med compliant  Support provided

## 2021-07-15 NOTE — PROGRESS NOTES
07/15/21 1000   Activity/Group Checklist   Group   (Self Portrait Collages)   Attendance Attended   Attendance Duration (min) 31-45   Interactions Interacted appropriately   Affect/Mood Appropriate;Calm   Goals Achieved Identified feelings; Discussed coping strategies; Displayed empathy;Identified distorted thoughts/beliefs; Able to listen to others; Able to engage in interactions

## 2021-07-15 NOTE — PROGRESS NOTES
07/15/21 0808   Team Meeting   Meeting Type Daily Rounds   Initial Conference Date 07/15/21   Team Members Present   Team Members Present Physician;Nurse;   Physician Team Member Chanda, [address] [place] [address]   Nursing Team Member Chalino Corral RN   Care Management Team Member Jane Trejo   Patient/Family Present   Patient Present No   Patient's Family Present No     DC tomorrow  Follow-up at [place]  Wants Lithium scheduled 12 hours apart  Blood pressure is decreasing

## 2021-07-15 NOTE — SOCIAL WORK
Per instruction from patient's [id number] commitment, CM called [employer] patrolman, [employer], and left a VM with patient present informing him that patient is discharged tomorrow to his home  CM provided contact information should he have more questions  CM will continue to follow patient's progress and assist with discharge planning needs

## 2021-07-15 NOTE — PROGRESS NOTES
Progress Note - [address] Day 59 y o  male MRN: [id number]  Unit/Bed#: [facility] 258-02 Encounter: [id number]    Assessment/Plan   Principal Problem:    Schizoaffective disorder, bipolar type ([address] )  Active Problems:    Cannabis abuse, continuous    Alcohol abuse, continuous    Hypertension    Tobacco abuse    BPH (benign prostatic hyperplasia)    Vitamin D insufficiency      Subjective:Patient was seen today for continuation of care, records reviewed and  patient was discussed with the morning case review team         Veverly Belts was pleasant and cooperative during visit with provider  He remains hyperverbal and tangential  Patient denies endorsing any suicidal or homicidal ideation  He remains medication compliant  Today he  denies any side effects from medications  He reported he was annoyed with a peer, but stated "I just stay away"  He reports" I want out asap"  He stated that he will take medications as ordered  He did once again mention that he is concerned that Zyprexa is not good for people with glaucoma but then stated "I will take all my medications"  He was focused that he only wants medications twice a day, it was reviewed that he is only prescribed medications this way  CARLOS reviewed his medication schedule with him       Psychiatric Review of Systems:    Sleep: normal  Appetite: normal  Medication side effects: No   ROS: no complaints, all other systems are negative    Vitals:  Vitals:    07/15/21 0808   BP: 147/91   Pulse: 78   Resp: 18   Temp: 98 7 °F (37 1 °C)   SpO2: 98%       Mental Status Evaluation:    Appearance:  casually dressed, adequate grooming   Behavior:  cooperative, bizarre   Speech:  pressured, hypertalkative, tangential   Mood:  improved   Affect:  expansive   Thought Process:  disorganized, circumstantial, tangential   Associations: loose associations   Thought Content:  some paranoia, grandiose   Perceptual Disturbances: none   Risk Potential: Suicidal ideation - None  Homicidal ideation - None  Potential for aggression - No   Sensorium:  oriented to person, place and time/date   Memory:  recent and remote memory grossly intact   Consciousness:  alert and awake   Attention: attention span and concentration appear shorter than expected for age   Insight:  limited   Judgment: limited   Gait/Station: normal gait/station   Motor Activity: no abnormal movements     Laboratory results:  I have personally reviewed all pertinent laboratory/tests results      Progress Toward Goals:     Progressing     Recommended Treatment:     All current active medications have been reviewed  Encourage group therapy, milieu therapy and occupational therapy  Behavioral Health checks every 7 minutes  Continue current medications:  Current Facility-Administered Medications   Medication Dose Route Frequency Provider Last Rate    acetaminophen  650 mg Oral Q6H PRN Wynetta Bream, PA-C      acetaminophen  650 mg Oral Q4H PRN Wynetta Bream, PA-C      acetaminophen  975 mg Oral Q6H PRN Wynetta Bream, PA-C      aluminum-magnesium hydroxide-simethicone  30 mL Oral Q4H PRN Wynetta Bream, PA-C      benztropine  1 mg Intramuscular Q4H PRN Max 6/day Wynetta Bream, PA-C      benztropine  1 mg Oral Q4H PRN Max 6/day Wynetta Bream, PA-C      cholecalciferol  2,000 Units Oral Daily Dala Aubrey, PA-C      divalproex sodium  1,000 mg Oral BID CARLOS Oneill      hydrOXYzine HCL  25 mg Oral Q6H PRN Max 4/day Wynetta Bretu, PA-C      hydrOXYzine HCL  50 mg Oral Q4H PRN Max 4/day Wymargaritoa Dhruv, PA-TY      Or    LORazepam  1 mg Intramuscular Q4H PRN Sulemana Dhruv, PA-C      lithium carbonate  600 mg Oral BID With Meals Teresa Dunn MD      LORazepam  1 mg Oral Q4H PRN Max 6/day Wymargaritoa Dhruv, PA-C      Or    LORazepam  2 mg Intramuscular Q6H PRN Max 3/day Wynetta Dhruv, PA-C      LORazepam  0 5 mg Oral Q6H PRN Wynetta Bream, PA-C      melatonin  3 mg Oral [facility] Shoshana Kaur PA-C      metoprolol succinate  25 mg Oral Daily Opal Castelan PA-C      OLANZapine  10 mg Oral Q8H PRN Arvil Randal, PA-C      Or    OLANZapine  10 mg Intramuscular Q8H PRN Arvil Randal, PA-C      OLANZapine  5 mg Oral Q4H PRN Arvil Randal, PA-C      Or    OLANZapine  5 mg Intramuscular Q4H PRN Arvil Randal, PA-C      OLANZapine  2 5 mg Oral Q4H PRN Arvil Randal, PA-C      OLANZapine  20 mg Oral HS CARLOS Oneill      polyethylene glycol  17 g Oral Daily PRN Arvil Randal, PA-C      senna-docusate sodium  1 tablet Oral Daily PRN Arvil Randal, PA-C      tamsulosin  0 4 mg Oral Daily With Dinner Opal Castelan PA-C         Risks / Benefits of Treatment:     Risks, benefits, and possible side effects of medications explained to patient and patient verbalizes understanding and agreement for treatment  Counseling / Coordination of Care:     Patient's progress reviewed with nursing staff  Medications, treatment progress and treatment plan reviewed with patient  Supportive counseling provided to the patient          CARLOS Bermudez

## 2021-07-16 VITALS
RESPIRATION RATE: 18 BRPM | WEIGHT: 210 LBS | OXYGEN SATURATION: 93 % | SYSTOLIC BLOOD PRESSURE: 158 MMHG | TEMPERATURE: 98 F | HEART RATE: 89 BPM | BODY MASS INDEX: 31.1 KG/M2 | HEIGHT: 69 IN | DIASTOLIC BLOOD PRESSURE: 104 MMHG

## 2021-07-16 RX ORDER — LITHIUM CARBONATE 600 MG/1
600 CAPSULE ORAL 2 TIMES DAILY WITH MEALS
Qty: 60 CAPSULE | Refills: 0 | Status: SHIPPED | OUTPATIENT
Start: 2021-07-16 | End: 2021-08-15

## 2021-07-16 RX ORDER — DIVALPROEX SODIUM 500 MG/1
1000 TABLET, DELAYED RELEASE ORAL 2 TIMES DAILY
Qty: 120 TABLET | Refills: 0 | Status: SHIPPED | OUTPATIENT
Start: 2021-07-16 | End: 2021-08-15

## 2021-07-16 RX ORDER — OLANZAPINE 20 MG/1
20 TABLET ORAL
Qty: 30 TABLET | Refills: 0 | Status: SHIPPED | OUTPATIENT
Start: 2021-07-16 | End: 2021-08-15

## 2021-07-16 RX ADMIN — LITHIUM CARBONATE 600 MG: 300 CAPSULE, GELATIN COATED ORAL at 08:22

## 2021-07-16 RX ADMIN — Medication 2000 UNITS: at 08:18

## 2021-07-16 RX ADMIN — DIVALPROEX SODIUM 1000 MG: 500 TABLET, DELAYED RELEASE ORAL at 08:22

## 2021-07-16 NOTE — DISCHARGE SUMMARY
Discharge Summary - [address] 59 y o  male MRN: [id number]  Unit/Bed#: [facility]13 Encounter: [id number]     Admission Date: 6/18/2021         Discharge Date: 7/16/2021 11:33 AM    Attending Psychiatrist: Deng Flores MD    Reason for Admission/HPI:     Principal Problem:    Schizoaffective disorder, bipolar type ([address] )  Active Problems:    Cannabis abuse, continuous    Alcohol abuse, continuous    Hypertension    Tobacco abuse    BPH (benign prostatic hyperplasia)    Vitamin D insufficiency    Angelica Oates is a 49-year-old male patient admitted on an involuntary 302 commitment basis to the adult behavioral health unit due to exacerbation of schizoaffective disorder including psychosis and erratic behavior  Per crisis evaluation completed by Crisis Worker Margaret Coats:    CW met with patient and attempted to completed assessment and risk assessment  Patient presented to the ED via police due to fighting with his family increased psychosis and responding to internal stimuli  Patient is non medication compliant and preoccupied with Baptism  Patient is denying suicidal and homicidal ideations  Patient is actively yelling and becomes belligerent  Patient was offered a 201 and is not able to understand what that is at this time  Patient yelling give me the "F in ativan"  Patients 302 was uphold  Per initial psychiatric evaluation completed by Dr Farrah Hernandez:    Patient is a 59 y o  male presents with an extensive psychiatric hx notable for schizoaffective disorder with multiple previous psychiatric in-pt treatment including to [facility] brought in on a 36 petitioned by the police on account of disorganized,agaitated and combative behavior  Pt was apparently brought in handcuffs after the police had arrived ion the scene and found him fighting with multiple family members  During my evaluation with the patient he tells me that he had been recently hospitalized at a hospital in [place] called friends for about 2 weeks,he was discharged about 5 days ago and returned home,he admits to being non-compliant with his medications  He lost his keys to his house and could not get in,he then believed there had been people in the home stealing his things,he got into a fight with his family because he felt they had been complicit in it  When asked who had called the police,he states some "concerned citizen'  Pt denies auditory hallucinations but does present with a perplexed look to his fact that would strongly suggest that he is responding to internal stimuli,he also laughs inappropiately to himself theophylline pt struggles with limited insight and has a long history of poor medication compliance resulting in psychotic decompensation  Presently his mood is labile and impulsive and he admits to being easily agitated,he sleeps poorly at night with impulse control issues  Pt is prone to threatening and combative behavior  Pt denies anxiety related symptoms with no panic attacks  Pt admitted with a prolonged Qtc interval of 505  [facility] Patient was admitted to psychiatric unit on a involuntarily 302 commitment basis        Hospital Course: The patient was admitted to the inpatient psychiatric unit and started on every 7 minutes precautions  During the hospitalization the patient was attending individual therapy, group therapy, milieu therapy and occupational therapy  Psychiatric medications were titrated over the hospital stay  To address mood instability, irritability, manic symptoms, impulsivity, agitation, paranoid ideation and delusional thoughts the patient was started on mood stabilizer Depakote and Lithium and antipsychotic medication Zyprexa  Medication doses were titrated during the hospital course   Prior to beginning of treatment medications risks and benefits and possible side effects including risk of kidney impairment related to treatment with Lithium, risk of liver impairment related to treatment with Depakote and risk of parkinsonian symptoms, Tardive Dyskinesia and metabolic syndrome related to treatment with antipsychotic medications were reviewed with the patient  The patient verbalized understanding and agreement for treatment  Patient's symptoms improved gradually over the hospital course  At the end of treatment the patient was doing well  Mood was stable at the time of discharge  The patient denied suicidal ideation, intent or plan at the time of discharge and denied homicidal ideation, intent or plan at the time of discharge  There was no overt psychosis at the time of discharge  Sleep and appetite were improved  The patient was tolerating medications and was not reporting any significant side effects at the time of discharge  Since Mercedes Huntley was doing well at the end of the hospitalization, treatment team felt that he could be safely discharged to outpatient care  The outpatient follow up with therapist and psychiatrist at [facility] was arranged by the unit  upon discharge  Mental Status at time of Discharge:     Appearance:  casually dressed   Behavior:  normal   Speech:  normal pitch and normal volume   Mood:  normal   Affect:  normal   Thought Process:  normal   Thought Content:  normal   Perceptual Disturbances: None   Risk Potential: Patient denies any suicidal or homicidal ideations     Sensorium:  person, place, time/date and situation   Cognition:  recent and remote memory grossly intact   Consciousness:  alert and awake    Attention: attention span appeared shorter than expected for age   Insight:  limited   Judgment: limited   Gait/Station: normal gait/station and normal balance   Motor Activity: no abnormal movements     Admission Diagnosis:Schizoaffective disorder, bipolar type ([facility] ) [F25 0]  Psychoses ([facility] ) [F29]  Encounter for psychological evaluation [Z00 8]    Discharge Diagnosis:   Principal Problem:    Schizoaffective disorder, bipolar type ([facility] )  Active Problems:    Cannabis abuse, continuous    Alcohol abuse, continuous    Hypertension    Tobacco abuse    BPH (benign prostatic hyperplasia)    Vitamin D insufficiency  Resolved Problems:    Medical clearance for psychiatric admission        Lab results:  Admission on 06/18/2021, Discharged on 07/16/2021   Component Date Value    WBC 06/18/2021 5 50     RBC 06/18/2021 4 26*    Hemoglobin 06/18/2021 12 7*    Hematocrit 06/18/2021 38 0*    MCV 06/18/2021 89     MCH 06/18/2021 29 9     MCHC 06/18/2021 33 5     RDW 06/18/2021 14 3     MPV 06/18/2021 8 5*    Platelets 39/29/0453 196     Neutrophils Relative 06/18/2021 74     Lymphocytes Relative 06/18/2021 18*    Monocytes Relative 06/18/2021 7     Eosinophils Relative 06/18/2021 1     Basophils Relative 06/18/2021 0     Neutrophils Absolute 06/18/2021 4 10     Lymphocytes Absolute 06/18/2021 1 00     Monocytes Absolute 06/18/2021 0 40     Eosinophils Absolute 06/18/2021 0 00     Basophils Absolute 06/18/2021 0 00     Sodium 06/18/2021 144*    Potassium 06/18/2021 3 0*    Chloride 06/18/2021 109*    CO2 06/18/2021 26     ANION GAP 06/18/2021 9     BUN 06/18/2021 12     Creatinine 06/18/2021 0 97     Glucose 06/18/2021 126*    Calcium 06/18/2021 9 2     AST 06/18/2021 26     ALT 06/18/2021 21     Alkaline Phosphatase 06/18/2021 40*    Total Protein 06/18/2021 5 9*    Albumin 06/18/2021 3 9     Total Bilirubin 06/18/2021 1 00     eGFR 06/18/2021 83     TSH 3RD GENERATON 06/18/2021 1 780     Amph/Meth UR 06/19/2021 Negative     Barbiturate Ur 06/19/2021 Negative     Benzodiazepine Urine 06/19/2021 Positive*    Cocaine Urine 06/19/2021 Negative     Methadone Urine 06/19/2021 Negative     Opiate Urine 06/19/2021 Negative     PCP Ur 06/19/2021 Negative     THC Urine 06/19/2021 Positive*    Oxycodone Urine 06/19/2021 Negative     Ethanol Lvl 06/18/2021 <10     SARS-CoV-2 06/18/2021 Negative     POC Glucose 06/18/2021 126     Valproic Acid, Total 06/18/2021 <10*    Lithium Lvl 06/18/2021 0 2*    Potassium 06/19/2021 3 5     Ventricular Rate 06/18/2021 87     Atrial Rate 06/18/2021 87     IN Interval 06/18/2021 160     QRSD Interval 06/18/2021 98     QT Interval 06/18/2021 420     QTC Interval 06/18/2021 505     P Axis 06/18/2021 80     QRS Axis 06/18/2021 -56     T Wave Axis 06/18/2021 59     Lithium Lvl 06/25/2021 0 3*    [place] Lvl 06/29/2021 0 4*    [place] Lvl 07/02/2021 0 6*    Valproic Acid, Total 07/02/2021 51 1     Valproic Acid, Total 07/06/2021 46 2*    Valproic Acid, Total 07/09/2021 68 4     Valproic Acid, Total 07/13/2021 73 5        Discharge Medications:  Discharge Medication List as of 7/16/2021 10:17 AM         Discharge Medication List as of 7/16/2021 10:17 AM      STOP taking these medications       LORazepam (ATIVAN) 1 mg tablet Comments:   Reason for Stopping:         nicotine polacrilex (NICORETTE) 2 mg gum Comments:   Reason for Stopping:              Discharge Medication List as of 7/16/2021 10:17 AM      CONTINUE these medications which have CHANGED    Details   divalproex sodium (DEPAKOTE) 500 mg EC tablet Take 2 tablets (1,000 mg total) by mouth 2 (two) times a day, Starting Fri 7/16/2021, Until Sun 8/15/2021, Normal      lithium carbonate 600 MG capsule Take 1 capsule (600 mg total) by mouth 2 (two) times a day with meals, Starting Fri 7/16/2021, Until Sun 8/15/2021, Normal      OLANZapine (ZyPREXA) 20 MG tablet Take 1 tablet (20 mg total) by mouth daily at bedtime, Starting Fri 7/16/2021, Until Sun 8/15/2021, Normal            Discharge Medication List as of 7/16/2021 10:17 AM      CONTINUE these medications which have NOT CHANGED    Details   cholecalciferol (VITAMIN D3) 1,000 units tablet Take 2 tablets (2,000 Units total) by mouth daily, Starting Sat 3/6/2021, Until Mon 4/5/2021, Normal      metoprolol succinate (TOPROL-XL) 25 mg 24 hr tablet Take 1 tablet (25 mg total) by mouth daily, Starting Fri 3/5/2021, Until Sun 4/4/2021, Normal      tamsulosin (FLOMAX) 0 4 mg Take 1 capsule (0 4 mg total) by mouth daily with dinner, Starting Fri 3/5/2021, Until Sun 4/4/2021, Normal              Discharge instructions/Information to patient and family:   See after visit summary for information provided to patient and family  Provisions for Follow-Up Care:  See after visit summary for information related to follow-up care and any pertinent home health orders  Discharge Statement   I spent 30 minutes discharging the patient  This time was spent on the day of discharge  I had direct contact with the patient on the day of discharge  Additional documentation is required if more than 30 minutes were spent on discharge  I reviewed with Mercedes Huntley importance of compliance with medications and outpatient treatment after discharge

## 2021-07-16 NOTE — NURSING NOTE
Patient DC from [facility] at [phone] to home  Was picked up by brother  Ambulated from unit and was accompanied by T  AVS reviewed with patient and all questions answered

## 2021-07-16 NOTE — PROGRESS NOTES
07/16/21 0753   Team Meeting   Meeting Type Daily Rounds   Initial Conference Date 07/16/21   Team Members Present   Team Members Present Physician;Nurse;   Physician Team Member Dr Kristyn Velazco; Jacquie [facility]el [facility]   Nursing Team Member Leni Moore, RN   Social Work Team Member Camille Calhoun [place]   Patient/Family Present   Patient Present No   Patient's Family Present No     DC today at 11 am, with pickup by his brother

## 2021-07-16 NOTE — NURSING NOTE
Pt AAOX4  Pt is pleasant and bright on approach  Pt is med compliant  Pt is social with peers and participates in group therapy sessions  Pt has a good appetite and nutrition is adequate  Pt denies SI/HI/anxiety/depression/AVH  Pt excited for discharge tomorrow   Will continue pt safety precautions and continual monitoring of mood/thoughts/behavior

## 2021-07-16 NOTE — PROGRESS NOTES
Patient D/C Belongings:    Contraband: Nail clippers x1, hair tie pack    Storage: blue bag x1, hair brush x1, shorts x3, jeans x1, polo shirt x1, t-shirts x5, tank top x1, white apple bag x1, pants x2, shirts x3, socks x7, sandals x1    In room: boxers x8, socks x10, t-shirt x5, jeans x2, shorts x2, long johns x3, long sleeve shirt x1, shoes x1, sandals x1, vest x2, hair ties, shoe inserts

## 2021-07-16 NOTE — DISCHARGE INSTRUCTIONS
Schizoaffective Disorder   WHAT YOU NEED TO KNOW:   Schizoaffective disorder is a long-term mental illness that may change how you think, feel, and act around others  You may not know what is real and what is not real    DISCHARGE INSTRUCTIONS:   Medicines:   · Antipsychotics: These medicines help decrease psychotic symptoms or severe agitation  You may need antiparkinson medicine to control muscle stiffness, twitches, and restlessness caused by antipsychotic medicines  · Antianxiety medicine: This medicine may be given to decrease anxiety and help you feel calm and relaxed  · Antidepressants: These medicines are given to decrease or stop the symptoms of depression, anxiety, and behavior problems  · Mood stabilizers: These medicines help control mood swings  · Anticonvulsants: This medicine is given to control seizures  It may also be used to decrease violent behavior and control your mood swings  · Blood pressure medicines: These may be used to help decrease motor tics (uncontrolled movements)  They may also help you feel calmer, more focused, and less irritable  · Anticholinergics: This medicine decreases the side effects of other medicines  · Take your medicine as directed  Contact your healthcare provider if you think your medicine is not helping or if you have side effects  Tell him or her if you are allergic to any medicine  Keep a list of the medicines, vitamins, and herbs you take  Include the amounts, and when and why you take them  Bring the list or the pill bottles to follow-up visits  Carry your medicine list with you in case of an emergency  Follow up with your healthcare provider or psychiatrist as directed: You may need to return to have your blood pressure and other symptoms checked  You may need blood tests to check the level of medicine in your blood  Write down your questions so you remember to ask them during your visits    Manage your symptoms: The following may help you feel better or prevent symptoms of schizoaffective disorder from coming back:  · Find support for yourself and your family:  Talk with others to help you cope with your illness better  This may also help to improve how you relate to others  · Keep all medical appointments: This will help manage your disease and the side-effects from medicines you may be taking  · Use your medicines as directed:  Put your medicines in a pillbox placed in an area you can easily see  Use a watch with an alarm to help you remember when it is time to take your medicine  Tell your healthcare provider if you know or think you might be pregnant  Do not stop taking your medicines without your healthcare provider's okay  A sudden stop can cause serious medical problems  · Watch for early signs of a relapse and seek help immediately:      ? How you think, feel, and see things has changed  ? You behave differently than usual     ? You become more nervous and upset, but do not know why     ? You eat less and have trouble sleeping  ? You have little or no interest in friends or activities  For support and more information:   · [employer]  [address] [facility], [facility] [address] , [address]ra [address]  Phone: [phone]  Phone: [phone]  Web Address: [employer] br  bewarket    · [address] [facility], Office of Public [employer]  [address] [place] [address] [place], [address]  [place], [place] [address]   Phone: [phone]  Phone: [phone]  Web Address: [facility] tn  Contact your healthcare provider or psychiatrist if:   · You think you are having a relapse  · You are having side effects from your medicine, or they are not helping      · You are not sleeping well or are sleeping more than usual     · You cannot eat or are eating more than usual     · You have muscle spasms, stiffness, or trouble walking  · Your sad feelings or thoughts change the way you function during the day  · You have questions or concerns about your condition or care  Seek care immediately or call 911 if:   · You feel like hurting or killing yourself or others  · You feel that your condition is getting worse  · You feel very upset, threaten someone, or you feel violent  · You suddenly have changes in your vision  · You suddenly have chest pain, trouble breathing, or a fever  © Copyright [address] Information is for End User's use only and may not be sold, redistributed or otherwise used for commercial purposes  All illustrations and images included in CareNotes® are the copyrighted property of A D A M , Inc  or [facility] Maria Ines Faria   The above information is an  only  It is not intended as medical advice for individual conditions or treatments  Talk to your doctor, nurse or pharmacist before following any medical regimen to see if it is safe and effective for you  Lithium (By mouth)   Lithium (LITH-ee-um)  Treats and helps prevent manic episodes of bipolar disorder  Brand Name(s): Lith-Eleonora, Lithobid   There may be other brand names for this medicine  When This Medicine Should Not Be Used: This medicine is not right for everyone  Do not use it if you had an allergic reaction to lithium, or if you are pregnant or breastfeeding  Do not give the extended-release tablets to anyone younger than 15years of age  How to Use This Medicine:   Capsule, Liquid, Tablet, Long Acting Tablet  · Take your medicine as directed  Your dose may need to be changed several times to find what works best for you  · There are several different forms of lithium  The dose for each is different and they are used at different times of the day   Do not change the type of medicine you take without talking to your doctor first   · Oral liquid: Measure the oral liquid medicine with a marked measuring spoon, oral syringe, or medicine cup  · Capsule, tablet, or extended-release tablet: Swallow the medicine whole  Do not crush, break, or chew it  · Use only the brand of medicine your doctor prescribed  Other brands may not work the same way  · Missed dose: Take a dose as soon as you remember  If it is almost time for your next dose, wait until then and take a regular dose  Do not take extra medicine to make up for a missed dose  · Store the medicine in a closed container at room temperature, away from heat, moisture, and direct light  Drugs and Foods to Avoid:   Ask your doctor or pharmacist before using any other medicine, including over-the-counter medicines, vitamins, and herbal products  · Some foods and medicines can affect how this medicine works  Tell your doctor if you are using any of the following:  ? Acetazolamide, buspirone, carbamazepine, fentanyl, fluoxetine, methyldopa, metronidazole, phenytoin, potassium iodide, sodium bicarbonate, theophylline, tramadol, tryptophan, Jodi's wort  ? Antacid  ? Blood pressure medicine  ? Diuretic (water pill)  ? Medicine for depression (including MAO inhibitor, SSRI, SNRI, TCA)  ? Medicine to treat mental illness (including haloperidol)  ? NSAID pain or arthritis medicine (including celecoxib, ibuprofen, indomethacin, naproxen, piroxicam)  ? Triptan medicine  Warnings While Using This Medicine:   · It is not safe to take this medicine during pregnancy  It could harm an unborn baby  Tell your doctor right away if you become pregnant  · Tell your doctor if you have kidney problems, heart or blood vessel disease (including Brugada syndrome), brain or nerve problems, or thyroid problems  · This medicine may cause the following problems:  ? Lithium toxicity  ? Heart problems  ? Kidney problems  ? Brain problems (including encephalopathic syndrome, pseudotumor cerebri)  ?  Serotonin syndrome (when used with certain medicines)  · Make sure any doctor or dentist who treats you knows that you are using this medicine  · This medicine may make you dizzy or drowsy  Do not drive or do anything else that could be dangerous until you know how this medicine affects you  · Your doctor will do lab tests at regular visits to check on the effects of this medicine  Keep all appointments  · Keep all medicine out of the reach of children  Never share your medicine with anyone  Possible Side Effects While Using This Medicine:   Call your doctor right away if you notice any of these side effects:  · Allergic reaction: Itching or hives, swelling in your face or hands, swelling or tingling in your mouth or throat, chest tightness, trouble breathing  · Change in how much or how often you urinate  · Confusion, problems with walking or balance, muscle movements you cannot control  · Diarrhea, vomiting, tremors, or drowsiness  · Fast, pounding, or uneven heartbeat  · Fever  · Lightheadedness or fainting  · Unusual tiredness or weakness  If you notice these less serious side effects, talk with your doctor:   · Mild nausea  · Mild thirst  If you notice other side effects that you think are caused by this medicine, tell your doctor  Call your doctor for medical advice about side effects  You may report side effects to FDA at 4-738-FDA-1506  © Copyright [employer] 2021 Information is for End User's use only and may not be sold, redistributed or otherwise used for commercial purposes  The above information is an  only  It is not intended as medical advice for individual conditions or treatments  Talk to your doctor, nurse or pharmacist before following any medical regimen to see if it is safe and effective for you  Divalproex (By mouth)   Divalproex Sodium (dye-OLEG-proe-ex ROWDY-henrique-um)  Treats seizures  Also treats bipolar disorder and helps prevent migraine headaches     Brand Name(s): Depakote, Depakote ER, Depakote Sprinkles   There may be other brand names for this medicine  When This Medicine Should Not Be Used: This medicine is not right for everyone  Do not use it if you had an allergic reaction to divalproex, valproate sodium, or valproic acid  Do not use it to prevent migraine headaches if you are pregnant, or if you have liver disease, or certain genetic disorders (including urea cycle disorder or mitochondrial disorder)  How to Use This Medicine:   Delayed Release Capsule, Delayed Release Tablet, Coated Tablet, Long Acting Tablet  · Take your medicine as directed  Your dose may need to be changed several times to find what works best for you  · You may take this medicine with food to decrease stomach upset  · Capsule, tablet, or extended-release tablet: Swallow the medicine whole  Do not crush, break, or chew it  · Sprinkle capsule: You may open the capsule and pour the medicine onto a teaspoonful of soft food (including pudding or applesauce)  Stir this mixture well and swallow it without chewing  · Part of the medicine may pass into your stool after your body has absorbed the medicine  Check with your doctor right away if this happens  · This medicine should come with a Medication Guide  Ask your pharmacist for a copy if you do not have one  · Missed dose: Take a dose as soon as you remember  If it is almost time for your next dose, wait until then and take a regular dose  Do not take extra medicine to make up for a missed dose  If you miss 2 or more doses, call your doctor  · Store the medicine in a closed container at room temperature, away from heat, moisture, and direct light  Drugs and Foods to Avoid:   Ask your doctor or pharmacist before using any other medicine, including over-the-counter medicines, vitamins, and herbal products  · Some medicines can affect how divalproex sodium works   Tell your doctor if you are using any of the following:   ? Amitriptyline, aspirin, chlorpromazine, clonazepam, diazepam, lorazepam, nortriptyline, propofol, rifampin, ritonavir, rufinamide, tolbutamide, zidovudine  ? Birth control pill  ? Blood thinner (including warfarin)  ? Carbapenem antibiotic (including ertapenem, imipenem, meropenem)  ? Other seizure medicines (including carbamazepine, ethosuximide, felbamate, lamotrigine, phenobarbital, phenytoin, primidone, topiramate)  · Alcohol, narcotic pain relievers, or sleeping pills may cause you to feel more lightheaded, dizzy, or faint when used together with this medicine  Warnings While Using This Medicine:   · It is not safe to take this medicine during pregnancy  It could harm an unborn baby  Tell your doctor right away if you become pregnant  · Tell your doctor if you are breastfeeding, or if you have kidney disease, blood disease, pancreas problems, a viral infection (including HIV or cytomegalovirus infection), or a history of depression or mental health problems  · This medicine may cause the following problems:  ? Liver problems  ? Pancreatitis (swelling of the pancreas)  ? Hyperammonemic encephalopathy (too much ammonia in your blood)  ? Depression or thoughts of suicide  ? Bleeding problems (including thrombocytopenia)  ? Hypothermia (low body temperature)  ? Drug reaction with eosinophilia and systemic symptoms (DRESS), which may damage the organs, including the liver, kidney, or heart  · This medicine may make you dizzy or drowsy  Do not drive or do anything else that could be dangerous until you know how this medicine affects you  · Talk with your doctor before using this medicine if you plan to have children  Some men who use this medicine have become infertile (unable to have children)  · Do not stop using this medicine suddenly  Your doctor will need to slowly decrease your dose before you stop it completely  · Tell any doctor or dentist who treats you that you are using this medicine  This medicine may affect certain medical test results    · Your doctor will do lab tests at regular visits to check on the effects of this medicine  Keep all appointments  · Keep all medicine out of the reach of children  Never share your medicine with anyone  Possible Side Effects While Using This Medicine:   Call your doctor right away if you notice any of these side effects:  · Allergic reaction: Itching or hives, swelling in your face or hands, swelling or tingling in your mouth or throat, chest tightness, trouble breathing  · Blistering, peeling, red skin rash  · Confusion, problems with memory, unusual drowsiness, clumsiness  · Dark urine or pale stools, loss of appetite, stomach pain, yellow skin or eyes  · Fever, rash, swollen glands in the neck, armpit, or groin  · Sudden and severe stomach pain, nausea, vomiting, lightheadedness  · Thoughts of hurting yourself, depression, unusual changes in behavior or moods  · Tiny red dots on the skin, especially on the lower legs  · Unusual bleeding, bruising, or weakness  If you notice these less serious side effects, talk with your doctor:   · Diarrhea, stomach upset  · Hair loss  · Tiredness, sleepiness  · Trouble sleeping, tremor  · Vision changes, dizziness, headache  If you notice other side effects that you think are caused by this medicine, tell your doctor  Call your doctor for medical advice about side effects  You may report side effects to FDA at 9-446-FDA-2944  © Copyright [address] 2021 Information is for End User's use only and may not be sold, redistributed or otherwise used for commercial purposes  The above information is an  only  It is not intended as medical advice for individual conditions or treatments  Talk to your doctor, nurse or pharmacist before following any medical regimen to see if it is safe and effective for you  Olanzapine (By mouth)   Olanzapine (dj-FCI-ga-peen)  Treats schizophrenia or bipolar disorder (manic-depressive illness)     Brand Name(s): ZyPREXA, ZyPREXA Zydis   There may be other brand names for this medicine  When This Medicine Should Not Be Used: This medicine is not right for everyone  Do not use it if you had an allergic reaction to olanzapine  How to Use This Medicine:   Tablet, Dissolving Tablet  · Take your medicine as directed  Your dose may need to be changed several times to find what works best for you  · Make sure your hands are dry before you handle the disintegrating tablet  Peel back the foil from the blister pack, then remove the tablet  Do not push the tablet through the foil  Place the tablet in your mouth  After it has melted, swallow or take a drink of water  · This medicine should come with a Medication Guide  Ask your pharmacist for a copy if you do not have one  · Missed dose: Take a dose as soon as you remember  If it is almost time for your next dose, wait until then and take a regular dose  Do not take extra medicine to make up for a missed dose  · Store the medicine in a closed container at room temperature, away from heat, moisture, and direct light  Keep the disintegrating tablet in the original package until you are ready to use it  Drugs and Foods to Avoid:   Ask your doctor or pharmacist before using any other medicine, including over-the-counter medicines, vitamins, and herbal products  · You must be careful if you are also using other medicine that might cause similar side effects as olanzapine  Make sure your doctor knows about all other medicines you are using  · Some medicines can affect how olanzapine works  Tell your doctor if you are using any of the following:  ? Carbamazepine, diazepam, fluoxetine, fluvoxamine, levodopa, omeprazole, or rifampin  ? Blood pressure medicine  · Tell your doctor if you use anything else that makes you sleepy  Some examples are allergy medicine, narcotic pain medicine, and alcohol  · Do not drink alcohol while you are using this medicine    · Tell your doctor if you smoke tobacco  You might need a different amount of this medicine if you smoke  Warnings While Using This Medicine:   · Tell your doctor if you are pregnant, or if you have kidney disease, liver disease, diabetes, glaucoma, prostate problems, problems with passing urine, breast cancer, seizures, or severe constipation  Tell your doctor if you have any kind of heart or circulation problems, including low blood pressure, heart failure, heart rhythm problems, or a history of a heart attack or stroke  Tell your doctor if you have a condition called phenylketonuria  · Do not breastfeed while you are using this medicine  · This medicine may cause the following problems:  ? Changes in behavior or mood, including thoughts of suicide  ? Neuroleptic malignant syndrome (a nerve and muscle problem)  ? Drug reaction with eosinophilia and systemic symptoms (DRESS)  ? High blood sugar, cholesterol, or triglyceride levels  ? Tardive dyskinesia (a muscle problem that may become permanent)  · This medicine may make you dizzy or drowsy, or may cause trouble with thinking or controlling body movement, which may lead to falls, fractures or other injuries  Do not drive or do anything that could be dangerous until you know how this medicine affects you  You may also feel lightheaded when getting up suddenly from a lying or sitting position, so stand up slowly  · This medicine may make you bleed, bruise, or get infections more easily  Take precautions to prevent illness and injury  Wash your hands often  · This medicine may make it more difficult for your body to cool down  Be careful to not become overheated during exercise or hot weather, because you could have heat stroke  · Your doctor will do lab tests at regular visits to check on the effects of this medicine  Keep all appointments  · Keep all medicine out of the reach of children  Never share your medicine with anyone    Possible Side Effects While Using This Medicine:   Call your doctor right away if you notice any of these side effects:  · Allergic reaction: Itching or hives, swelling in your face or hands, swelling or tingling in your mouth or throat, chest tightness, trouble breathing  · Eye pain, trouble seeing  · Feeling very thirsty or hungry, change in how much or how often you urinate  · Fever, chills, cough, sore throat, body aches  · Jerky muscle movement you cannot control (often in your face, tongue, or jaw)  · Lightheadedness, dizziness, fainting  · Seizures or tremors  · Sweating, confusion, uneven heartbeat, muscle stiffness  · Swollen breasts, or liquid discharge from your nipples (men or women)  · Swollen, painful, or tender lymph glands in neck, armpit, or groin  · Trouble breathing or swallowing  · Unusual behavior, thoughts of hurting yourself or others  · Unusual bleeding, bruising, or weakness  If you notice these less serious side effects, talk with your doctor:   · Constipation, upset stomach  · Dry mouth  · Headache, tiredness  · Sleepiness or unusual drowsiness  · Weight gain  If you notice other side effects that you think are caused by this medicine, tell your doctor  Call your doctor for medical advice about side effects  You may report side effects to FDA at 7-138-FDA-6624  © Copyright [employer] 2021 Information is for End User's use only and may not be sold, redistributed or otherwise used for commercial purposes  The above information is an  only  It is not intended as medical advice for individual conditions or treatments  Talk to your doctor, nurse or pharmacist before following any medical regimen to see if it is safe and effective for you

## 2021-07-16 NOTE — NURSING NOTE
Patient visible out and about  He is loud and speech is rambling and pressured  Mood is stable  No complaints of pain  Compliant with medications  Will continue monitoring

## 2021-07-16 NOTE — BH TRANSITION RECORD
Contact Information: If you have any questions, concerns, pended studies, tests and/or procedures, or emergencies regarding your inpatient behavioral health visit  Please contact Robina Cisneros" [facility] behavioral health unit [phone] and ask to speak to a , nurse or physician  A contact is available 24 hours/ 7 days a week at this number  Summary of Procedures Performed During your Stay:  Below is a list of major procedures performed during your hospital stay and a summary of results:  - No major procedures performed  Pending Studies (From admission, onward)    None        If studies are pending at discharge, follow up with your PCP and/or referring provider

## 2022-04-12 NOTE — PROGRESS NOTES
07/08/21 [id number]   Team Meeting   Meeting Type Daily Rounds   Initial Conference Date 07/08/21   Team Members Present   Team Members Present Physician;Nurse;   Physician Team Member Reny Grant   Nursing Team Member Ismael Garibay, ADRIEN   Social Work Team Member Berry Nath [place]   Patient/Family Present   Patient Present No   Patient's Family Present No     Verl CARLOS Jacome, also participated  Seems to be improving  Irritates a female peer  No

## 2023-01-07 NOTE — ASSESSMENT & PLAN NOTE
Patient : Callum Mayer Age: 60 year old Sex: male   MRN: [id number] Encounter Date: 1/7/2023  F02/FT02    History     Chief Complaint   Patient presents with   • Medication Administration Only       HPI    Callum Mayer is a 60 year old male presenting to the emergency department regarding medication administration. Pt is under police custody. Pt is on clonidine 2 tablets daily, amloddipine 10mg, lisinopril 40mg and lispro insulin 25 units. He reports that his doctor changes his insulin to 20 units. Last time he took his medication was yesterday 6-Jan-2023. Pt denies n/v/d. Pt also reports chronic back pain. He was prescribed by his doctor oxycodone as needed. Police currently has his medication. Pt is a dialysis patient; he gets dialysis MWF and got his full course yesterday. Denies any falls or injury to the back.     Chart Review: I reviewed the patient's medications, allergies, and past medical and surgical history in Epic. I reviewed the pt's PDMP. Pt's PCP prescribed the pt oxycodone 10mg. Pt is on 0.1 clonidine 2 tablets daily, amlodipine 10mg, lisinopril 40mg and insulin lispro 25 units.     No Known Allergies    Current Discharge Medication List      Prior to Admission Medications    Details   diphenhydrAMINE (Benadryl Allergy) 25 MG capsule Take 1 capsule by mouth 3 times daily as needed for Itching.  Qty: 30 capsule, Refills: 0      hydrOXYzine (ATARAX) 25 MG tablet Take 1 tablet by mouth 3 times daily as needed for Itching.  Qty: 10 tablet, Refills: 0      cloNIDine (CATAPRES) 0.1 MG tablet Take 2 tablets by mouth 2 times daily.  Qty: 120 tablet, Refills: 0      amLODIPine (NORVASC) 10 MG tablet Take 10 mg by mouth daily.      omeprazole (PrilOSEC) 20 MG capsule Take 1 capsule by mouth daily.      thiamine (VITAMIN B1) 100 MG tablet Take 1 tablet by mouth daily. Do not start before October 15, 2021.  Qty: 90 tablet, Refills: 0      sevelamer (RENAGEL) 800 MG tablet Take 1 tablet by mouth 3 times daily (with  · TSH 2/17/2021: 4 960  · 2/6/2021: 1 410  · patient is being treated with lithium   Recent lithium levels:   · 2/13/2021: 0 9  · 2/6/2021: 0 2  · Will check T4 levels meals).  Qty: 90 tablet, Refills: 2      risperiDONE (RisperDAL) 0.5 MG tablet Take 1 tablet by mouth every 12 hours.  Qty: 60 tablet, Refills: 1      melatonin 3 MG Take 2 tablets by mouth nightly.  Qty: 60 tablet, Refills: 0      folic acid (FOLATE) 1 MG tablet Take 1 tablet by mouth daily. Do not start before October 15, 2021.  Qty: 60 tablet, Refills: 0      docusate sodium, DSS, 100 MG Cap Take 1 capsule (100 mg) by mouth 2 times daily.  Qty: 60 capsule, Refills: 0      lisinopril (ZESTRIL) 40 MG tablet Take 1 tablet by mouth daily. Do not start before October 15, 2021.  Qty: 90 tablet, Refills: 0      atorvastatin (LIPITOR) 40 MG tablet Take 1 tablet by mouth nightly.  Qty: 90 tablet, Refills: 0      aspirin 81 MG EC tablet Take 1 tablet by mouth daily. Do not start before October 15, 2021.  Qty: 60 tablet, Refills: 0      fluticasone-salmeterol 500-50 MCG/DOSE inhaler Inhale 1 puff into the lungs two times daily.  Qty: 120 each, Refills: 0      mirtazapine (Remeron) 15 MG tablet Take 1 tablet by mouth nightly.  Qty: 90 tablet, Refills: 0      insulin lispro protamine-insulin lispro (HumaLOG Mix 75/25 KwikPen) (75-25) 100 UNIT/ML pen-injector Inject 25 Units into the skin 2 times daily (before meals). Prime 2 units before each dose.  Qty: 30 mL, Refills: 1      Linzess 72 MCG Cap TAKE 1 Capsule BY MOUTH 1 TIME A DAY      blood glucose (OneTouch Ultra) test strip Use to test blood sugars 4 times daily  Qty: 100 each, Refills: 1      blood glucose test strip Test blood sugar 4 times daily.  Qty: 100 each, Refills: 1      albuterol 108 (90 Base) MCG/ACT inhaler TAKE 2 PUFFS BY MOUTH EVERY 4 TO 6 HOURS AS NEEDED      Vitamin D, Ergocalciferol, 1.25 mg (50,000 units) capsule Take 1.25 mg by mouth 1 day a week. On Mondays.             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 (CMS/HCC)    •  Depression    • Diabetic neuropathy (CMS/[facility])    • DJD (degenerative joint disease)    • DM (diabetes mellitus) (CMS/[facility]) 1989    diagnosed 1989 - staredinsulin at time of diagnosis   • Essential (primary) hypertension    • Hepatitis C antibody test positive     never has been treated    • Neuropathy    • Paranoid schizophrenia (CMS/[facility])    • Schizoaffective disorder (CMS/[facility]) 09/2013    auditory hallucinations-paranoia-depression- suicide attempts last attempt in 2006   • Wears dentures     upper denture   • Wears glasses        Past Surgical History:   Procedure Laterality Date   • Av fistula placement Right 06/09/2021    Dr Dao   • Ct guided needle placement  3/12/2021    Kidney biopsy   • Foot surgery      age 4   • Ir kidney biopsy Left 03/12/2021    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 with 60% overall global glomerulosclerosis and significant chronic tubular interstitial changes       Family History   Problem Relation Age of Onset   • Diabetes Mother    • Hypertension Mother    • Kidney disease Mother         Kidney Failure   • Aneurysm Father         Brain aneurysm   • Other Sister         Murdered found strangled   • Diabetes Brother    • Hypertension Brother    • Peripheral Vascular Disease Brother         S/P Leg amputation -    • Other Brother         currently in retirement   • Diabetes Brother    • Hypertension Brother    • Cancer, Breast Sister         S/P mastectomy   • Psychiatric Sister    • Hypertension Sister    • ADHD/ADD Sister    • Other Sister         knee replacement   • Diabetes Sister    • Hypertension Sister    • Anemia Sister    • Hypertension Sister    • Hypertension Brother    • Diabetes Daughter        Social History     Tobacco Use   • Smoking status: Some Days     Packs/day: 0.25     Types: Cigarettes, Cigars   • Smokeless tobacco: Never   Vaping Use   • Vaping Use: never used   Substance Use Topics   • Alcohol use: No   • Drug use:  Not Currently     Types: Marijuana     Comment: used between the ages of 13- 23 years       Review of Systems     Review of Systems   Constitutional: Negative for activity change, chills and fever.   HENT: Negative for congestion and trouble swallowing.    Eyes: Negative for discharge and visual disturbance.   Respiratory: Negative for cough, chest tightness and shortness of breath.    Cardiovascular: Negative for chest pain and leg swelling.   Gastrointestinal: Negative for abdominal pain, constipation, diarrhea, nausea and vomiting.   Endocrine: Negative.    Genitourinary: Negative for difficulty urinating, dysuria, frequency and urgency.   Musculoskeletal: Positive for back pain (chronic). Negative for arthralgias.   Skin: Negative for color change and rash.   Allergic/Immunologic: Negative.    Neurological: Negative for dizziness, weakness, light-headedness and headaches.   Hematological: Negative.    Psychiatric/Behavioral: Negative.    All other systems reviewed and are negative.      Physical Exam     ED Triage Vitals [01/07/23 0855]   ED Triage Vitals Group      Temp 98.2 °F (36.8 °C)      Heart Rate 89      Resp 16      BP (!) 171/88      SpO2 99 %      EtCO2 mmHg       Height       Weight       Weight Scale Used       BMI (Calculated)       IBW/kg (Calculated)        Physical Exam  Vitals and nursing note reviewed.   Constitutional:       Appearance: He is well-developed.   HENT:      Head: Normocephalic and atraumatic.   Eyes:      Pupils: Pupils are equal, round, and reactive to light.   Musculoskeletal:         General: Normal range of motion.      Cervical back: Normal range of motion and neck supple.      Comments: Pt has a fistula to the right upper extremity with a strong palpable thrill.    Skin:     General: Skin is warm and dry.   Neurological:      Mental Status: He is alert and oriented to person, place, and time.   Psychiatric:         Judgment: Judgment normal.       Procedures      Procedures    Lab Results     No results found for this visit on 01/07/23.    EKG   None    Radiology Results     Imaging Results    None       Medications   insulin lispro protamine-insulin lispro (HumaLOG MIX) (75-25) 100 UNIT/ML injection 20 Units (has no administration in time range)   cloNIDine (CATAPRES) tablet 0.2 mg (0.2 mg Oral Given 1/7/23 1002)   amLODIPine (NORVASC) tablet 10 mg (10 mg Oral Given 1/7/23 1001)   lisinopril (ZESTRIL) tablet 40 mg (40 mg Oral Given 1/7/23 1001)   oxyCODONE-acetaminophen (PERCOCET) 5-325 MG tablet 2 tablet (2 tablets Oral Given 1/7/23 1000)       ED Course     ED Course as of 01/07/23 1039   Sat Jan 07, 2023   0937 I introduced myself to the pt who is under police custody. I reviewed the pt's PDMP. We will be giving the pt's prescribed medication in the ED. We discussed the plan of care. I advised the pt to return to the ED for any new or worsening sx. The pt understands and agrees with the plan. All questions answered. [SM]      ED Course User Index  [SM] Raquel Garcia       No cardiac monitor or imaging reviewed    MDM     Patient in police custody and presents to the ED for his normal medications. Will provide with insulin, antihypertensives and pain control. Reviewed PDMP where he has an active Rx for 10 mg oxycodone. Will not require admission.    Disposition       Clinical Impression and Diagnosis  9:37 AM       ED Diagnosis     Diagnosis Comment Associated Orders       Final diagnosis    Encounter for medication administration -- --          The patient was provided with a recommendation to follow up with a primary care provider and obtain reassessment of his/her blood pressure within three months.    Follow Up:  Raul Sims MD  [address] [facility] [id number] [phone]    Schedule an appointment as soon as possible for a visit   For follow up       Pt is discharged to home/self care in stable condition.        Discharge after Treatment  1/7/2023  9:34 AM  There is no comment       I have reviewed the information recorded by the scribe for accuracy and agree with its contents.    ____________________________________________________________________    Raquel Garcia acting as a scribe for Viktoriya Mathias PA-C.    Viktoriya Mathias PA-C  Dictation # [id number]  Scribe: Raquel Garcia    Attending Physician: Dr. Aaron Goldberg  Dictation # [id number]           Viktoriya Mathias PA-C  01/07/23 1040

## 2023-03-01 NOTE — PROGRESS NOTES
Chief Complaint        Intractable hiccups      History of Present Illness      Latrell Morales is a 40 y.o. male who presents to [facility] as a new patient with a history of intractable hiccups that have been present for 17 days.  Patient reports that he has been seen in the emergency department 3 times related to continued hiccups he was placed on baclofen, Thorazine, Reglan, Flexeril, Protonix and Phenergan with no relief.  Patient had CT scan performed of the chest which displayed a moderate size hiatal hernia and the wall of the esophagus being mildly and diffusely thickened which may be manifestation of esophagitis.  He reports the longest relief of hiccups he is experience was 1 to 2 hours over the past 17 days.  He was placed on multiple medications in the ER and reports minimal relief.  He reports vomiting at least once a day.  He reports a fresh bloody appearance to the vomiting on the second day of hiccups but none since.  No family history of colon cancer or gastric cancer.  He reports chest soreness and weight loss of 14 pounds as he has not been able to eat.    No colon or EGD on file for this patient     CT abdomen and pelvis w/contrast on 02.24.2023 moderate size hiatal hernia mild diffuse thickening of the esophagus    Most recent labs on 02.23.2023 see below.  Results       Result Review :   The following data was reviewed by: SHARONDA Peters on 03/02/2023     CMP    CMP 5/2/22 2/23/23   Glucose 66 88   BUN 18 16   Creatinine 1.32 (A) 1.03   eGFR 69.5 93.0   Sodium 141 136   Potassium 4.7 4.6   Chloride 106 103   Calcium 8.9 8.5 (A)   Total Protein 6.2 6.4   Albumin 4.00 3.6   Globulin 2.2 2.8   Total Bilirubin 0.4 0.4   Alkaline Phosphatase 40 29 (A)   AST (SGOT) 27 17   ALT (SGPT) 33 23   Albumin/Globulin Ratio 1.8 1.3   BUN/Creatinine Ratio 13.6 15.5   Anion Gap 9.5 7.7   (A) Abnormal value       Comments are available for some flowsheets but are not being  Patient Belongings:    Eryn Bidding pants (Steelers patch on them),1 pair of gray underwear, off white pajama pants, long sleeve brown shirt, 1 pair of blue jeans       * Brought in on 11/10/19 displayed.           CBC    CBC 5/2/22 2/23/23   WBC 5.87 5.32   RBC 5.26 4.97   Hemoglobin 16.2 14.5   Hematocrit 48.7 45.6   MCV 92.6 91.8   MCH 30.8 29.2   MCHC 33.3 31.8   RDW 13.2 12.7   Platelets 200 229                      Past Medical History       Past Medical History:   Diagnosis Date   • Abnormal electrocardiogram    • Abnormal level of hormones in specimens from other organs, systems and tissues    • High risk sexual behavior    • Insomnia    • Nontoxic goiter    • Other specified anxiety disorders        Past Surgical History:   Procedure Laterality Date   • KNEE SURGERY Left     xfive- titanium plate and screws   • VASECTOMY           Current Outpatient Medications:   •  baclofen (LIORESAL) 10 MG tablet, Take 1 tablet by mouth 3 (Three) Times a Day., Disp: 90 tablet, Rfl: 0  •  cyclobenzaprine (FLEXERIL) 5 MG tablet, Take 1 tablet by mouth Every 8 (Eight) Hours As Needed for Muscle Spasms for up to 21 days., Disp: 30 tablet, Rfl: 1  •  metoclopramide (REGLAN) 10 MG tablet, Take 1 tablet by mouth 3 (Three) Times a Day for 14 days., Disp: 42 tablet, Rfl: 0  •  pantoprazole (PROTONIX) 40 MG EC tablet, Take 1 tablet by mouth Daily., Disp: 30 tablet, Rfl: 0  •  sildenafil (Viagra) 100 MG tablet, Take 1/2 to 1 tablet an hour prior to sex, Disp: 30 tablet, Rfl: 2  •  simethicone (Gas-X) 80 MG chewable tablet, Chew 1 tablet Every 6 (Six) Hours for 14 days., Disp: 56 tablet, Rfl: 0     Allergies   Allergen Reactions   • Adhesive Tape Hives and Rash   • Latex Rash   • Sertraline Hcl Other (See Comments)     Erectile dysfunction       Family History   Problem Relation Age of Onset   • No Known Problems Mother    • Suicidality Father         cause of death   • No Known Problems Sister    • Coronary artery disease Maternal Grandmother    • Heart failure Maternal Grandfather         cause of death   • Heart attack Paternal Grandfather         cause of death   • Colon cancer Neg Hx         Social History     Social  "History Narrative   • Not on file       Objective       Objective     Vital Signs:   /57 (BP Location: Right arm, Patient Position: Sitting, Cuff Size: Large Adult)   Pulse 93   Ht 175.3 cm (69\")   Wt 99.8 kg (220 lb)   SpO2 100%   BMI 32.49 kg/m²     Body mass index is 32.49 kg/m².    Physical Exam  Constitutional:       General: He is not in acute distress.     Appearance: Normal appearance. He is well-developed and normal weight.   Eyes:      Conjunctiva/sclera: Conjunctivae normal.      Pupils: Pupils are equal, round, and reactive to light.      Visual Fields: Right eye visual fields normal and left eye visual fields normal.   Cardiovascular:      Rate and Rhythm: Normal rate and regular rhythm.      Heart sounds: Normal heart sounds.   Pulmonary:      Effort: Pulmonary effort is normal. No retractions.      Breath sounds: Normal breath sounds and air entry.      Comments: Inspection of chest: normal appearance  Abdominal:      General: Bowel sounds are normal.      Palpations: Abdomen is soft.      Tenderness: There is no abdominal tenderness.      Comments: No appreciable hepatosplenomegaly   Musculoskeletal:      Cervical back: Neck supple.      Right lower leg: No edema.      Left lower leg: No edema.   Lymphadenopathy:      Cervical: No cervical adenopathy.   Skin:     Findings: No lesion.      Comments: Turgor normal   Neurological:      Mental Status: He is alert and oriented to person, place, and time.   Psychiatric:         Mood and Affect: Mood and affect normal.              Assessment & Plan          Assessment and Plan    Diagnoses and all orders for this visit:    1. Hiatal hernia (Primary)    2. Intractable hiccups  -     pantoprazole (PROTONIX) 40 MG EC tablet; Take 1 tablet by mouth Daily.  Dispense: 30 tablet; Refill: 0    3. Abnormal CT scan, esophagus    4. Nausea    5. Nausea and vomiting, unspecified vomiting type    Other orders  -     baclofen (LIORESAL) 10 MG tablet; Take 1 " tablet by mouth 3 (Three) Times a Day.  Dispense: 90 tablet; Refill: 0  -     metoclopramide (REGLAN) 10 MG tablet; Take 1 tablet by mouth 3 (Three) Times a Day for 14 days.  Dispense: 42 tablet; Refill: 0  -     simethicone (Gas-X) 80 MG chewable tablet; Chew 1 tablet Every 6 (Six) Hours for 14 days.  Dispense: 56 tablet; Refill: 0       40-year-old male presenting the office today with a history of hiatal hernia, intractable hiccups, abnormal CT scan, nausea, vomiting and weight loss.  I have recommended that the patient undergo further evaluation with an EGD.  I have discussed this procedure in detail with the patient.  I have discussed the risks, benefits and alternatives.  I have discussed the risk of anesthesia, bleeding and perforation.  Patient understands these risks, benefits and alternatives and wishes to proceed.  I will schedule him for Monday with Dr. Bowles.  I have spoke with Dr. Bowles about this patient's care.  We will proceed with baclofen 10 mg 1 tablet 3 times daily, Reglan 10 mg 3 times daily and simethicone 1 tablet every 6 hours along with Protonix daily.  Patient agreeable to this plan and will follow-up in office after endoscopy.          Follow Up       Follow Up   Return for Follow up after endoscopy in office.  Patient was given instructions and counseling regarding his condition or for health maintenance advice. Please see specific information pulled into the AVS if appropriate.

## 2023-05-25 ENCOUNTER — HOSPITAL ENCOUNTER (EMERGENCY)
Facility: HOSPITAL | Age: 63
End: 2023-05-26
Attending: EMERGENCY MEDICINE

## 2023-05-25 DIAGNOSIS — R79.89 ELEVATED TSH: ICD-10-CM

## 2023-05-25 DIAGNOSIS — Z72.0 TOBACCO ABUSE: ICD-10-CM

## 2023-05-25 DIAGNOSIS — Z00.8 ENCOUNTER FOR PSYCHOLOGICAL EVALUATION: ICD-10-CM

## 2023-05-25 DIAGNOSIS — F10.10 ALCOHOL ABUSE, CONTINUOUS: ICD-10-CM

## 2023-05-25 DIAGNOSIS — E78.5 HYPERLIPIDEMIA: ICD-10-CM

## 2023-05-25 DIAGNOSIS — E55.9 VITAMIN D INSUFFICIENCY: ICD-10-CM

## 2023-05-25 DIAGNOSIS — F29 PSYCHOSES (HCC): Primary | ICD-10-CM

## 2023-05-25 DIAGNOSIS — I48.91 ATRIAL FIBRILLATION (HCC): ICD-10-CM

## 2023-05-25 DIAGNOSIS — F25.9 SCHIZOAFFECTIVE DISORDER (HCC): ICD-10-CM

## 2023-05-25 DIAGNOSIS — N40.0 BENIGN PROSTATIC HYPERPLASIA WITHOUT LOWER URINARY TRACT SYMPTOMS: ICD-10-CM

## 2023-05-25 DIAGNOSIS — I10 PRIMARY HYPERTENSION: ICD-10-CM

## 2023-05-25 LAB
ALBUMIN SERPL BCP-MCNC: 4.4 G/DL (ref 3.5–5)
ALP SERPL-CCNC: 36 U/L (ref 34–104)
ALT SERPL W P-5'-P-CCNC: 31 U/L (ref 7–52)
ANION GAP SERPL CALCULATED.3IONS-SCNC: 12 MMOL/L (ref 4–13)
APAP SERPL-MCNC: <10 UG/ML (ref 10–20)
AST SERPL W P-5'-P-CCNC: 49 U/L (ref 13–39)
ATRIAL RATE: 104 BPM
BASOPHILS # BLD AUTO: 0.05 THOUSANDS/ÂΜL (ref 0–0.1)
BASOPHILS NFR BLD AUTO: 0 % (ref 0–1)
BILIRUB SERPL-MCNC: 1.06 MG/DL (ref 0.2–1)
BUN SERPL-MCNC: 17 MG/DL (ref 5–25)
CALCIUM SERPL-MCNC: 10 MG/DL (ref 8.4–10.2)
CHLORIDE SERPL-SCNC: 104 MMOL/L (ref 96–108)
CO2 SERPL-SCNC: 22 MMOL/L (ref 21–32)
CREAT SERPL-MCNC: 1.28 MG/DL (ref 0.6–1.3)
EOSINOPHIL # BLD AUTO: 0.1 THOUSAND/ÂΜL (ref 0–0.61)
EOSINOPHIL NFR BLD AUTO: 1 % (ref 0–6)
ERYTHROCYTE [DISTWIDTH] IN BLOOD BY AUTOMATED COUNT: 12.9 % (ref 11.6–15.1)
ETHANOL SERPL-MCNC: <10 MG/DL
GFR SERPL CREATININE-BSD FRML MDRD: 58 ML/MIN/1.73SQ M
GLUCOSE SERPL-MCNC: 110 MG/DL (ref 65–140)
HCT VFR BLD AUTO: 39.8 % (ref 36.5–49.3)
HGB BLD-MCNC: 13.2 G/DL (ref 12–17)
IMM GRANULOCYTES # BLD AUTO: 0.03 THOUSAND/UL (ref 0–0.2)
IMM GRANULOCYTES NFR BLD AUTO: 0 % (ref 0–2)
LITHIUM SERPL-SCNC: 0.5 MMOL/L (ref 0.6–1.2)
LYMPHOCYTES # BLD AUTO: 2.11 THOUSANDS/ÂΜL (ref 0.6–4.47)
LYMPHOCYTES NFR BLD AUTO: 16 % (ref 14–44)
MCH RBC QN AUTO: 30.6 PG (ref 26.8–34.3)
MCHC RBC AUTO-ENTMCNC: 33.2 G/DL (ref 31.4–37.4)
MCV RBC AUTO: 92 FL (ref 82–98)
MONOCYTES # BLD AUTO: 1.01 THOUSAND/ÂΜL (ref 0.17–1.22)
MONOCYTES NFR BLD AUTO: 8 % (ref 4–12)
NEUTROPHILS # BLD AUTO: 9.54 THOUSANDS/ÂΜL (ref 1.85–7.62)
NEUTS SEG NFR BLD AUTO: 75 % (ref 43–75)
NRBC BLD AUTO-RTO: 0 /100 WBCS
P AXIS: 74 DEGREES
PLATELET # BLD AUTO: 223 THOUSANDS/UL (ref 149–390)
PMV BLD AUTO: 10.8 FL (ref 8.9–12.7)
POTASSIUM SERPL-SCNC: 4.1 MMOL/L (ref 3.5–5.3)
PR INTERVAL: 154 MS
PROT SERPL-MCNC: 6.8 G/DL (ref 6.4–8.4)
QRS AXIS: -18 DEGREES
QRSD INTERVAL: 86 MS
QT INTERVAL: 386 MS
QTC INTERVAL: 507 MS
RBC # BLD AUTO: 4.32 MILLION/UL (ref 3.88–5.62)
SALICYLATES SERPL-MCNC: <5 MG/DL (ref 3–20)
SODIUM SERPL-SCNC: 138 MMOL/L (ref 135–147)
T WAVE AXIS: 51 DEGREES
TSH SERPL DL<=0.05 MIU/L-ACNC: 5.29 UIU/ML (ref 0.45–4.5)
VALPROATE SERPL-MCNC: 11 UG/ML (ref 50–100)
VENTRICULAR RATE: 104 BPM
WBC # BLD AUTO: 12.84 THOUSAND/UL (ref 4.31–10.16)

## 2023-05-25 RX ORDER — OLANZAPINE 10 MG/1
10 INJECTION, POWDER, LYOPHILIZED, FOR SOLUTION INTRAMUSCULAR ONCE
Status: COMPLETED | OUTPATIENT
Start: 2023-05-25 | End: 2023-05-25

## 2023-05-25 RX ORDER — MIDAZOLAM HYDROCHLORIDE 2 MG/2ML
4 INJECTION, SOLUTION INTRAMUSCULAR; INTRAVENOUS ONCE
Status: COMPLETED | OUTPATIENT
Start: 2023-05-25 | End: 2023-05-25

## 2023-05-25 RX ADMIN — OLANZAPINE 10 MG: 10 INJECTION, POWDER, FOR SOLUTION INTRAMUSCULAR at 22:47

## 2023-05-25 RX ADMIN — MIDAZOLAM 4 MG: 1 INJECTION INTRAMUSCULAR; INTRAVENOUS at 22:48

## 2023-05-25 NOTE — LETTER
[address] [facility] [address]  Dept: [phone]      EMTALA TRANSFER CONSENT    NAME Kerline Graham                                         1960                              MRN [id number]    I have been informed of my rights regarding examination, treatment, and transfer   by Dr Bogdan Henderson: Continuity of care    Risks: Potential for delay in receiving treatment      Consent for Transfer:  I acknowledge that my medical condition has been evaluated and explained to me by the emergency department physician or other qualified medical person and/or my attending physician, who has recommended that I be transferred to the service of  Accepting Physician: Shira Adrian at [address] Name, Rizwan Fresh[place] : [facility] [place], [place]  The above potential benefits of such transfer, the potential risks associated with such transfer, and the probable risks of not being transferred have been explained to me, and I fully understand them  The doctor has explained that, in my case, the benefits of transfer outweigh the risks  I agree to be transferred  I authorize the performance of emergency medical procedures and treatments upon me in both transit and upon arrival at the receiving facility  Additionally, I authorize the release of any and all medical records to the receiving facility and request they be transported with me, if possible  I understand that the safest mode of transportation during a medical emergency is an ambulance and that the Hospital advocates the use of this mode of transport  Risks of traveling to the receiving facility by car, including absence of medical control, life sustaining equipment, such as oxygen, and medical personnel has been explained to me and I fully understand them  (BRENDA CORRECT BOX BELOW)  [ X ]  I consent to the stated transfer and to be transported by ambulance/helicopter    [  ] I consent to the stated transfer, but refuse transportation by ambulance and accept full responsibility for my transportation by car  I understand the risks of non-ambulance transfers and I exonerate the Hospital and its staff from any deterioration in my condition that results from this refusal     X___________________________________________    DATE  23  TIME________  Signature of patient or legally responsible individual signing on patient behalf           RELATIONSHIP TO PATIENT___on 302______________________                    Provider Certification    NAME Laly Graham                                         1960                              MRN [id number]    A medical screening exam was performed on the above named patient  Based on the examination:    Condition Necessitating Transfer The primary encounter diagnosis was Psychoses ([facility] [address] )  Diagnoses of Encounter for psychological evaluation, Schizoaffective disorder ([facility] [address] ), Hyperlipidemia, Vitamin D insufficiency, Tobacco abuse, Primary hypertension, Atrial fibrillation ([address] ), Benign prostatic hyperplasia without lower urinary tract symptoms, Alcohol abuse, continuous, and Elevated TSH were also pertinent to this visit      Patient Condition: The patient has been stabilized such that within reasonable medical probability, no material deterioration of the patient condition or the condition of the unborn child(aicha) is likely to result from the transfer    Reason for Transfer: Level of Care needed not available at this facility    Transfer Requirements: Facility [address] available and qualified personnel available for treatment as acknowledged by Sarah Jay [phone]  Agreed to accept transfer and to provide appropriate medical treatment as acknowledged by       Dinesh GONZALEZ  Appropriate medical records of the examination and treatment of the patient are provided at the time of transfer   [address], [address] ____IK___  Transfer will be performed by qualified personnel from [facility]  and appropriate transfer equipment as required, including the use of necessary and appropriate life support measures  Provider Certification: I have examined the patient and explained the following risks and benefits of being transferred/refusing transfer to the patient/family:  The patient is stable for psychiatric transfer because they are medically stable, and is protected from harming him/herself or others during transport      Based on these reasonable risks and benefits to the patient and/or the unborn child(aicha), and based upon the information available at the time of the patient’s examination, I certify that the medical benefits reasonably to be expected from the provision of appropriate medical treatments at another medical facility outweigh the increasing risks, if any, to the individual’s medical condition, and in the case of labor to the unborn child, from effecting the transfer      X____________________________________________ DATE 05/26/23        TIME_______      ORIGINAL - SEND TO MEDICAL RECORDS   COPY - SEND WITH PATIENT DURING TRANSFER

## 2023-05-26 ENCOUNTER — HOSPITAL ENCOUNTER (INPATIENT)
Facility: HOSPITAL | Age: 63
LOS: 31 days | Discharge: HOME | DRG: 885 | End: 2023-06-26
Attending: HOSPITALIST | Admitting: PSYCHIATRY & NEUROLOGY
Payer: MEDICARE

## 2023-05-26 VITALS
OXYGEN SATURATION: 97 % | DIASTOLIC BLOOD PRESSURE: 90 MMHG | RESPIRATION RATE: 22 BRPM | SYSTOLIC BLOOD PRESSURE: 151 MMHG | HEART RATE: 108 BPM | TEMPERATURE: 98.6 F

## 2023-05-26 DIAGNOSIS — F10.10 ALCOHOL ABUSE, CONTINUOUS: ICD-10-CM

## 2023-05-26 DIAGNOSIS — Z72.0 TOBACCO ABUSE: ICD-10-CM

## 2023-05-26 DIAGNOSIS — E53.8 VITAMIN B12 DEFICIENCY: ICD-10-CM

## 2023-05-26 DIAGNOSIS — N40.0 BENIGN PROSTATIC HYPERPLASIA WITHOUT LOWER URINARY TRACT SYMPTOMS: ICD-10-CM

## 2023-05-26 DIAGNOSIS — M54.50 LOW BACK PAIN: ICD-10-CM

## 2023-05-26 DIAGNOSIS — I48.91 ATRIAL FIBRILLATION (HCC): ICD-10-CM

## 2023-05-26 DIAGNOSIS — E78.5 HYPERLIPIDEMIA: ICD-10-CM

## 2023-05-26 DIAGNOSIS — R79.89 ELEVATED TSH: ICD-10-CM

## 2023-05-26 DIAGNOSIS — F20.9 SCHIZOPHRENIA (HCC): Primary | ICD-10-CM

## 2023-05-26 DIAGNOSIS — L03.90 CELLULITIS: ICD-10-CM

## 2023-05-26 DIAGNOSIS — N40.0 BPH (BENIGN PROSTATIC HYPERPLASIA): ICD-10-CM

## 2023-05-26 DIAGNOSIS — F43.10 PTSD (POST-TRAUMATIC STRESS DISORDER): ICD-10-CM

## 2023-05-26 DIAGNOSIS — L60.0 INGROWN TOENAIL OF LEFT FOOT: ICD-10-CM

## 2023-05-26 DIAGNOSIS — E55.9 VITAMIN D INSUFFICIENCY: ICD-10-CM

## 2023-05-26 DIAGNOSIS — G47.9 SLEEP DIFFICULTIES: ICD-10-CM

## 2023-05-26 DIAGNOSIS — I10 PRIMARY HYPERTENSION: ICD-10-CM

## 2023-05-26 DIAGNOSIS — F25.0 SCHIZOAFFECTIVE DISORDER, BIPOLAR TYPE (HCC): ICD-10-CM

## 2023-05-26 LAB
AMPHETAMINES SERPL QL SCN: NEGATIVE
BACTERIA UR QL AUTO: ABNORMAL /HPF
BARBITURATES UR QL: NEGATIVE
BENZODIAZ UR QL: POSITIVE
BILIRUB UR QL STRIP: ABNORMAL
CLARITY UR: CLEAR
COCAINE UR QL: NEGATIVE
COLOR UR: ABNORMAL
GLUCOSE UR STRIP-MCNC: NEGATIVE MG/DL
HGB UR QL STRIP.AUTO: ABNORMAL
KETONES UR STRIP-MCNC: ABNORMAL MG/DL
LEUKOCYTE ESTERASE UR QL STRIP: NEGATIVE
METHADONE UR QL: NEGATIVE
MUCOUS THREADS UR QL AUTO: ABNORMAL
NITRITE UR QL STRIP: NEGATIVE
NON-SQ EPI CELLS URNS QL MICRO: ABNORMAL /HPF
OPIATES UR QL SCN: NEGATIVE
OXYCODONE+OXYMORPHONE UR QL SCN: NEGATIVE
PCP UR QL: NEGATIVE
PH UR STRIP.AUTO: 6 [PH]
PROT UR STRIP-MCNC: ABNORMAL MG/DL
RBC #/AREA URNS AUTO: ABNORMAL /HPF
SP GR UR STRIP.AUTO: 1.02
T4 FREE SERPL-MCNC: 1.46 NG/DL (ref 0.61–1.12)
THC UR QL: POSITIVE
UROBILINOGEN UR QL STRIP.AUTO: 0.2 E.U./DL
WBC #/AREA URNS AUTO: ABNORMAL /HPF

## 2023-05-26 RX ORDER — ACETAMINOPHEN 325 MG/1
975 TABLET ORAL EVERY 6 HOURS PRN
Status: CANCELLED | OUTPATIENT
Start: 2023-05-26

## 2023-05-26 RX ORDER — OLANZAPINE 10 MG/1
5 INJECTION, POWDER, LYOPHILIZED, FOR SOLUTION INTRAMUSCULAR ONCE
Status: COMPLETED | OUTPATIENT
Start: 2023-05-26 | End: 2023-05-26

## 2023-05-26 RX ORDER — LORAZEPAM 2 MG/ML
1 INJECTION INTRAMUSCULAR EVERY 4 HOURS PRN
Status: DISCONTINUED | OUTPATIENT
Start: 2023-05-26 | End: 2023-06-26 | Stop reason: HOSPADM

## 2023-05-26 RX ORDER — ACETAMINOPHEN 325 MG/1
650 TABLET ORAL EVERY 4 HOURS PRN
Status: DISCONTINUED | OUTPATIENT
Start: 2023-05-26 | End: 2023-06-26 | Stop reason: HOSPADM

## 2023-05-26 RX ORDER — DIVALPROEX SODIUM 500 MG/1
500 TABLET, EXTENDED RELEASE ORAL
Status: DISCONTINUED | OUTPATIENT
Start: 2023-05-26 | End: 2023-05-27

## 2023-05-26 RX ORDER — HYDROXYZINE HYDROCHLORIDE 25 MG/1
25 TABLET, FILM COATED ORAL
Status: DISCONTINUED | OUTPATIENT
Start: 2023-05-26 | End: 2023-06-26 | Stop reason: HOSPADM

## 2023-05-26 RX ORDER — OLANZAPINE 10 MG/1
10 TABLET ORAL
Status: DISCONTINUED | OUTPATIENT
Start: 2023-05-26 | End: 2023-06-05

## 2023-05-26 RX ORDER — TRAZODONE HYDROCHLORIDE 100 MG/1
100 TABLET ORAL
Status: DISCONTINUED | OUTPATIENT
Start: 2023-05-26 | End: 2023-06-26 | Stop reason: HOSPADM

## 2023-05-26 RX ORDER — POLYETHYLENE GLYCOL 3350 17 G/17G
17 POWDER, FOR SOLUTION ORAL DAILY PRN
Status: CANCELLED | OUTPATIENT
Start: 2023-05-26

## 2023-05-26 RX ORDER — OLANZAPINE 5 MG/1
5 TABLET ORAL
Status: DISCONTINUED | OUTPATIENT
Start: 2023-05-26 | End: 2023-06-05

## 2023-05-26 RX ORDER — OLANZAPINE 2.5 MG/1
2.5 TABLET ORAL
Status: CANCELLED | OUTPATIENT
Start: 2023-05-26

## 2023-05-26 RX ORDER — OLANZAPINE 10 MG/1
10 INJECTION, POWDER, LYOPHILIZED, FOR SOLUTION INTRAMUSCULAR
Status: CANCELLED | OUTPATIENT
Start: 2023-05-26

## 2023-05-26 RX ORDER — OLANZAPINE 2.5 MG/1
10 TABLET ORAL
Status: CANCELLED | OUTPATIENT
Start: 2023-05-26

## 2023-05-26 RX ORDER — LORAZEPAM 2 MG/ML
1 INJECTION INTRAMUSCULAR EVERY 4 HOURS PRN
Status: CANCELLED | OUTPATIENT
Start: 2023-05-26

## 2023-05-26 RX ORDER — BENZTROPINE MESYLATE 1 MG/1
1 TABLET ORAL EVERY 6 HOURS PRN
Status: DISCONTINUED | OUTPATIENT
Start: 2023-05-26 | End: 2023-06-26 | Stop reason: HOSPADM

## 2023-05-26 RX ORDER — TRAZODONE HYDROCHLORIDE 50 MG/1
100 TABLET ORAL
Status: CANCELLED | OUTPATIENT
Start: 2023-05-26

## 2023-05-26 RX ORDER — NICOTINE 21 MG/24HR
1 PATCH, TRANSDERMAL 24 HOURS TRANSDERMAL DAILY
Status: DISCONTINUED | OUTPATIENT
Start: 2023-05-27 | End: 2023-06-02

## 2023-05-26 RX ORDER — HYDROXYZINE 50 MG/1
50 TABLET, FILM COATED ORAL
Status: DISCONTINUED | OUTPATIENT
Start: 2023-05-26 | End: 2023-06-26 | Stop reason: HOSPADM

## 2023-05-26 RX ORDER — LORAZEPAM 1 MG/1
1 TABLET ORAL EVERY 6 HOURS PRN
Status: DISCONTINUED | OUTPATIENT
Start: 2023-05-26 | End: 2023-06-26 | Stop reason: HOSPADM

## 2023-05-26 RX ORDER — MAGNESIUM HYDROXIDE/ALUMINUM HYDROXICE/SIMETHICONE 120; 1200; 1200 MG/30ML; MG/30ML; MG/30ML
30 SUSPENSION ORAL EVERY 4 HOURS PRN
Status: DISCONTINUED | OUTPATIENT
Start: 2023-05-26 | End: 2023-06-26 | Stop reason: HOSPADM

## 2023-05-26 RX ORDER — HYDROXYZINE 50 MG/1
50 TABLET, FILM COATED ORAL
Status: CANCELLED | OUTPATIENT
Start: 2023-05-26

## 2023-05-26 RX ORDER — OLANZAPINE 2.5 MG/1
2.5 TABLET ORAL
Status: DISCONTINUED | OUTPATIENT
Start: 2023-05-26 | End: 2023-06-05

## 2023-05-26 RX ORDER — OLANZAPINE 2.5 MG/1
5 TABLET ORAL
Status: CANCELLED | OUTPATIENT
Start: 2023-05-26

## 2023-05-26 RX ORDER — ACETAMINOPHEN 325 MG/1
650 TABLET ORAL EVERY 6 HOURS PRN
Status: CANCELLED | OUTPATIENT
Start: 2023-05-26

## 2023-05-26 RX ORDER — OLANZAPINE 10 MG/1
10 INJECTION, POWDER, LYOPHILIZED, FOR SOLUTION INTRAMUSCULAR
Status: DISCONTINUED | OUTPATIENT
Start: 2023-05-26 | End: 2023-06-05

## 2023-05-26 RX ORDER — ACETAMINOPHEN 325 MG/1
975 TABLET ORAL EVERY 6 HOURS PRN
Status: DISCONTINUED | OUTPATIENT
Start: 2023-05-26 | End: 2023-06-26 | Stop reason: HOSPADM

## 2023-05-26 RX ORDER — LORAZEPAM 1 MG/1
1 TABLET ORAL EVERY 6 HOURS PRN
Status: CANCELLED | OUTPATIENT
Start: 2023-05-26

## 2023-05-26 RX ORDER — DIVALPROEX SODIUM 250 MG/1
500 TABLET, EXTENDED RELEASE ORAL
Status: CANCELLED | OUTPATIENT
Start: 2023-05-26

## 2023-05-26 RX ORDER — ACETAMINOPHEN 325 MG/1
650 TABLET ORAL EVERY 6 HOURS PRN
Status: DISCONTINUED | OUTPATIENT
Start: 2023-05-26 | End: 2023-06-26 | Stop reason: HOSPADM

## 2023-05-26 RX ORDER — LORAZEPAM 2 MG/ML
2 INJECTION INTRAMUSCULAR
Status: CANCELLED | OUTPATIENT
Start: 2023-05-26

## 2023-05-26 RX ORDER — NICOTINE 21 MG/24HR
1 PATCH, TRANSDERMAL 24 HOURS TRANSDERMAL DAILY
Status: CANCELLED | OUTPATIENT
Start: 2023-05-26

## 2023-05-26 RX ORDER — ACETAMINOPHEN 325 MG/1
650 TABLET ORAL EVERY 4 HOURS PRN
Status: CANCELLED | OUTPATIENT
Start: 2023-05-26

## 2023-05-26 RX ORDER — HYDROXYZINE 50 MG/1
25 TABLET, FILM COATED ORAL
Status: CANCELLED | OUTPATIENT
Start: 2023-05-26

## 2023-05-26 RX ORDER — OLANZAPINE 10 MG/1
5 INJECTION, POWDER, LYOPHILIZED, FOR SOLUTION INTRAMUSCULAR
Status: CANCELLED | OUTPATIENT
Start: 2023-05-26

## 2023-05-26 RX ORDER — LORAZEPAM 2 MG/ML
2 INJECTION INTRAMUSCULAR
Status: DISCONTINUED | OUTPATIENT
Start: 2023-05-26 | End: 2023-06-26 | Stop reason: HOSPADM

## 2023-05-26 RX ORDER — MIDAZOLAM HYDROCHLORIDE 2 MG/2ML
2 INJECTION, SOLUTION INTRAMUSCULAR; INTRAVENOUS ONCE
Status: COMPLETED | OUTPATIENT
Start: 2023-05-26 | End: 2023-05-26

## 2023-05-26 RX ORDER — BENZTROPINE MESYLATE 0.5 MG/1
1 TABLET ORAL EVERY 6 HOURS PRN
Status: CANCELLED | OUTPATIENT
Start: 2023-05-26

## 2023-05-26 RX ORDER — MAGNESIUM HYDROXIDE/ALUMINUM HYDROXICE/SIMETHICONE 120; 1200; 1200 MG/30ML; MG/30ML; MG/30ML
30 SUSPENSION ORAL EVERY 4 HOURS PRN
Status: CANCELLED | OUTPATIENT
Start: 2023-05-26

## 2023-05-26 RX ORDER — OLANZAPINE 10 MG/1
5 INJECTION, POWDER, LYOPHILIZED, FOR SOLUTION INTRAMUSCULAR
Status: DISCONTINUED | OUTPATIENT
Start: 2023-05-26 | End: 2023-06-05

## 2023-05-26 RX ORDER — POLYETHYLENE GLYCOL 3350 17 G/17G
17 POWDER, FOR SOLUTION ORAL DAILY PRN
Status: DISCONTINUED | OUTPATIENT
Start: 2023-05-26 | End: 2023-06-26 | Stop reason: HOSPADM

## 2023-05-26 RX ADMIN — MIDAZOLAM 2 MG: 1 INJECTION INTRAMUSCULAR; INTRAVENOUS at 04:47

## 2023-05-26 RX ADMIN — DIVALPROEX SODIUM 500 MG: 500 TABLET, FILM COATED, EXTENDED RELEASE ORAL at 21:02

## 2023-05-26 RX ADMIN — TRAZODONE HYDROCHLORIDE 100 MG: 100 TABLET ORAL at 21:02

## 2023-05-26 RX ADMIN — LORAZEPAM 2 MG: 2 INJECTION INTRAMUSCULAR; INTRAVENOUS at 21:37

## 2023-05-26 RX ADMIN — OLANZAPINE 5 MG: 10 INJECTION, POWDER, FOR SOLUTION INTRAMUSCULAR at 15:36

## 2023-05-26 NOTE — PLAN OF CARE
Problem: CHONG  Goal: Will exhibit normal sleep and speech and no impulsivity  Description: INTERVENTIONS:  - Administer medication as ordered  - Set limits on impulsive behavior  - Make attempts to decrease external stimuli as possible  Outcome: Not Progressing     Problem: PSYCHOSIS  Goal: Will report no hallucinations or delusions  Description: Interventions:  - Administer medication as  ordered  - Every waking shifts and PRN assess for the presence of hallucinations and or delusions  - Assist with reality testing to support increasing orientation  - Assess if patient's hallucinations or delusions are encouraging self-harm or harm to others and intervene as appropriate  Outcome: Not Progressing     Problem: ANXIETY  Goal: Will report anxiety at manageable levels  Description: INTERVENTIONS:  - Administer medication as ordered  - Teach and encourage coping skills  - Provide emotional support  - Assess patient/family for anxiety and ability to cope  Outcome: Not Progressing  Goal: By discharge: Patient will verbalize 2 strategies to deal with anxiety  Description: Interventions:  - Identify any obvious source/trigger to anxiety  - Staff will assist patient in applying identified coping technique/skills  - Encourage attendance of scheduled groups and activities  Outcome: Not Progressing     Problem: SLEEP DISTURBANCE  Goal: Will exhibit normal sleeping pattern  Description: Interventions:  -  Assess the patients sleep pattern, noting recent changes  - Administer medication as ordered  - Decrease environmental stimuli, including noise, as appropriate during the night  - Encourage the patient to actively participate in unit groups and or exercise during the day to enhance ability to achieve adequate sleep at night  - Assess the patient, in the morning, encouraging a description of sleep experience  Outcome: Not Progressing     Problem: INVOLUNTARY ADMIT  Goal: Will cooperate with staff recommendations and doctor's orders and will demonstrate appropriate behavior  Description: INTERVENTIONS:  - Treat underlying conditions and offer medication as ordered  - Educate regarding involuntary admission procedures and rules  - Utilize positive consistent limit setting strategies to support patient and staff safety  Outcome: Not Progressing

## 2023-05-26 NOTE — ED NOTES
Insurance Authorization for admission:   Phone call placed to [address] [place] [address]   Phone number: [phone]  Spoke to **      ** days approved  Level of care: 302  Review on **  Authorization # **  Office closed unforseen circumstances/unable to obtain authorization at this time

## 2023-05-26 NOTE — ED CARE HANDOFF
Emergency Department Sign Out Note        Sign out and transfer of care from Dr Rita Carrion  See Separate Emergency Department note  The patient, Lashawn Graham, was evaluated by the previous provider for psychosis  Workup Completed:  Psych clearance labs  ED Course / Workup Pending (followup):  Placement with crisis evaluation  ED Course as of 05/26/23 1542   Fri May 26, 2023   1376 Received signout from Dr Rita Carrion, 302 secondary to agitation, psychosis  2 doctor 302 completed, needs crisis evaluation, 1 hour ago agitation  Medically cleared       Procedures  MDM        Disposition  Final diagnoses:   Encounter for psychological evaluation   Psychoses ([facility] )   [facility]     Time reflects when diagnosis was documented in both MDM as applicable and the Disposition within this note     Time User Action Codes Description Comment    5/25/2023 10:37 PM Check, Antonio Fall Add [Z00 8] Encounter for psychological evaluation     5/25/2023 10:37 PM Check, Antonio Fall Add [F29] Psychoses ([facility] )     5/25/2023 10:37 PM Check, Antonio Fall Add [F25 9] Schizoaffective disorder ([facility] )     5/25/2023 10:37 PM Check, Antonio Fall Modify [Z00 8] Encounter for psychological evaluation     5/25/2023 10:37 PM Check, Antonio Fall Modify [F29] Psychoses ([facility] )     5/26/2023  1:24 PM Idella Shonna Add [E78 5] Hyperlipidemia     5/26/2023  1:24 PM Idella Shonna Add [E55 9] Vitamin D insufficiency     5/26/2023  1:24 PM Idella Shonna Add [Z72 0] Tobacco abuse     5/26/2023  1:24 PM Medei, [address] Primary hypertension     5/26/2023  1:25 PM Idella Shonna Add [I48 91] Atrial fibrillation ([facility] )     5/26/2023  1:25 PM Idella Shonna Add [N40 0] Benign prostatic hyperplasia without lower urinary tract symptoms     5/26/2023  1:25 PM Idella Shonna Add [F10 10] Alcohol abuse, continuous     5/26/2023  1:26 PM Medei, [address] [D58 19] Elevated TSH       ED Disposition     ED Disposition   Transfer to [facility] Espar[facility] Condition   --    Date/Time   Thu May 25, 2023 11:40 PM    Comment   Joni Graham should be transferred out to [facility] and has been medically cleared  MD Documentation    [address] Most Recent Value   Patient Condition The patient has been stabilized such that within reasonable medical probability, no material deterioration of the patient condition or the condition of the unborn child(aicha) is likely to result from the transfer   Reason for Transfer Level of Care needed not available at this facility   Benefits of Transfer Continuity of care   Risks of Transfer Potential for delay in receiving treatment   Accepting Physician [address] Name, [facility]  [place], [place]    (Name & Tel number) Vivian Figueroa [phone]   Transported by (Company and Unit #) [facility]   Sending  Tomas Lopez   Provider Certification The patient is stable for psychiatric transfer because they are medically stable, and is protected from harming him/herself or others during transport      RN Documentation    Flowsheet Row Most [facility] Name, [facility]  [place], [place]    (Name & Tel number) Vivian Figueroa [phone]   Medications Reviewed with Next Provider of Service Yes   Transport Mode Ambulance   Transported by (Company and Unit #) [facility] Basic life support   Patient Belongings Disposition Sent with patient   Transfer Date 05/26/23   Transfer Time 1600      Follow-up Information    None       Patient's Medications   Discharge Prescriptions    No medications on file     No discharge procedures on file         ED Provider  Electronically Signed by     Ki Patino III,   05/26/23 6897

## 2023-05-26 NOTE — ED NOTES
"302 was petitioned and upheld by attending Dr Luis Mccullough  \"Brought in by EMS and Police for psych eval  He has Schizoaffective disorder and in non-compliant with meds  Tonight he was agitated, screams at neighbors  Upon arrival he displays labile mood, periods of agitation and aggressive behavior  He appears to be responding to internal stimuli  He appears to be incapable of taking care of self and is a danger to self and others\"  Crisis spoke with the patient who appears anxious  Patient appears to be responding to internal stimuli  Patient covering his ears, crying, covering his face  Patient unable to identify place and self  Patient incoherent at this time  Crisis unable to obtain needed assessment information possibly due to patient being in psychosis  Patient informed about the 36, his rights, inpatient hospitalization process  Patient appears to not understanding his rights  201 was not offered due to patient being incoherent at this time    "

## 2023-05-26 NOTE — ED NOTES
Bridget Suicide Risk Assessment deferred, as unable to assess while patient sleeping  Behavioral Health Assessment deferred as patient is sleeping and would benefit from additional rest   Vital signs deferred until patient awake, no signs or symptoms of respiratory distress at this time  Once patient is awake and able to participate, will complete assessments        Marycruz Goldman RN  05/26/23 3094

## 2023-05-26 NOTE — ED PROVIDER NOTES
History  Chief Complaint   Patient presents with   • Psychiatric Evaluation     Arrived via EMS/and [address] (Officer Anders)was on porch yelling at neighbors  Psych history     26-year-old male with significant psychiatric history including schizoaffective disorder, multiple inpatient psychiatric admissions who presents via EMS and sleeping [employer] police for psychiatric evaluation  Patient's family reportedly called because the patient was on the front porch yelling at neighbors  Upon his arrival to the emergency department, the patient is calm and cooperative but speaking nonsensically and difficult to understand  He does complain of some burning in his bilateral eyes, but is unable to expand any further  Unknown whether or not there is any associated blurry vision or double vision  Unknown whether or not the patient is compliant with his medications or not  No reported trauma or falls  No seizure-like activity  History is limited secondary to psychosis  EMS did irrigate both eyes with normal saline  Prior to Admission Medications   Prescriptions Last Dose Informant Patient Reported? Taking?    OLANZapine (ZyPREXA) 20 MG tablet   No No   Sig: Take 1 tablet (20 mg total) by mouth daily at bedtime   cholecalciferol (VITAMIN D3) 1,000 units tablet   No No   Sig: Take 2 tablets (2,000 Units total) by mouth daily   divalproex sodium (DEPAKOTE) 500 mg EC tablet   No No   Sig: Take 2 tablets (1,000 mg total) by mouth 2 (two) times a day   lithium carbonate 600 MG capsule   No No   Sig: Take 1 capsule (600 mg total) by mouth 2 (two) times a day with meals   metoprolol succinate (TOPROL-XL) 25 mg 24 hr tablet   No No   Sig: Take 1 tablet (25 mg total) by mouth daily   tamsulosin (FLOMAX) 0 4 mg   No No   Sig: Take 1 capsule (0 4 mg total) by mouth daily with dinner      Facility-Administered Medications: None       Past Medical History:   Diagnosis Date   • Alcohol abuse    • Anxiety    • Astigmatism • Depression    • DJD (degenerative joint disease)    • Gait abnormality    • Head injury    • History of colonic polyps    • Hydrocele    • Hyperlipidemia    • Hypertension    • Macular drusen    • Presbyopia    • PTSD (post-traumatic stress disorder)    • Schizoaffective disorder (HCC)    • Sepsis ([facility] [address] )    • Substance abuse ([facility] )    • Tobacco abuse    • Umbilical hernia    • Vitreous syneresis        Past Surgical History:   Procedure Laterality Date   • FRACTURE SURGERY     • INGUINAL HERNIA REPAIR         No family history on file  I have reviewed and agree with the history as documented  E-Cigarette/Vaping   • E-Cigarette Use Never User      E-Cigarette/Vaping Substances   • Nicotine No    • THC No    • CBD No    • Flavoring No    • Other No    • Unknown No      Social History     Tobacco Use   • Smoking status: Every Day     Packs/day: 1 00     Years: 40 00     Total pack years: 40 00     Types: Cigars, Cigarettes   • Smokeless tobacco: Never   Vaping Use   • Vaping Use: Never used   Substance Use Topics   • Alcohol use: Yes     Comment: whenever i want   • Drug use: Yes     Types: Marijuana     Comment: Patient denies currently using marijuana  Review of Systems   Unable to perform ROS: Psychiatric disorder       Physical Exam  Physical Exam  Vitals and nursing note reviewed  Constitutional:       General: He is not in acute distress  Comments: Unkempt    HENT:      Head: Normocephalic and atraumatic  Right Ear: External ear normal       Left Ear: External ear normal       Nose: Nose normal       Mouth/Throat:      Mouth: Mucous membranes are moist    Eyes:      Extraocular Movements: Extraocular movements intact  Pupils: Pupils are equal, round, and reactive to light  Comments: Bilateral conjunctival injection, no obvious foreign body   Cardiovascular:      Rate and Rhythm: Regular rhythm  Tachycardia present  Pulses: Normal pulses        Heart sounds: Normal heart sounds  No murmur heard  Pulmonary:      Effort: Pulmonary effort is normal  No respiratory distress  Breath sounds: Normal breath sounds  No stridor  Abdominal:      General: Abdomen is flat  Bowel sounds are normal       Tenderness: There is no abdominal tenderness  There is no guarding or rebound  Comments: Reducible umbilical hernia   Musculoskeletal:         General: No deformity  Normal range of motion  Cervical back: Normal range of motion and neck supple  Skin:     General: Skin is warm and dry  Neurological:      Mental Status: He is alert  Psychiatric:         Mood and Affect: Affect is labile  Speech: Speech is slurred           Vital Signs  ED Triage Vitals   Temperature Pulse Respirations Blood Pressure SpO2   05/25/23 2225 05/25/23 2222 05/25/23 2222 05/25/23 2222 05/25/23 2222   99 1 °F (37 3 °C) 104 18 154/96 94 %      Temp Source Heart Rate Source Patient Position - Orthostatic VS BP Location FiO2 (%)   05/25/23 2225 05/25/23 2222 05/25/23 2222 05/25/23 2222 --   Tympanic Monitor Lying Right arm       Pain Score       05/25/23 2222       3           Vitals:    05/25/23 2222   BP: 154/96   Pulse: 104   Patient Position - Orthostatic VS: Lying         Visual Acuity      ED Medications  Medications - No data to display    Diagnostic Studies  Results Reviewed     None                 No orders to display              Procedures  CriticalCare Time    Date/Time: 5/25/2023 10:52 PM    Performed by: Sally Bright MD  Authorized by: Sally Bright MD    Critical care provider statement:     Critical care time (minutes):  37    Critical care time was exclusive of:  Separately billable procedures and treating other patients and teaching time    Critical care was necessary to treat or prevent imminent or life-threatening deterioration of the following conditions:  CNS failure or compromise    Critical care was time spent personally by me on the following activities:  Obtaining history from patient or surrogate, development of treatment plan with patient or surrogate, evaluation of patient's response to treatment, examination of patient, ordering and performing treatments and interventions, ordering and review of laboratory studies, re-evaluation of patient's condition and review of old charts             ED Course  ED Course as of 05/25/23 2232   Thu May 25, 2023   2229 EKG interpreted by myself demonstrates sinus tachycardia with a rate of 104, left axis deviation, normal intervals, nonspecific ST segment and T waves                                             Medical Decision Making  26-year-old male with schizoaffective disorder presents for psychiatric evaluation  On exam, patient appears to be acutely psychotic, he is displaying labile mood, nonsensical speech, brief periods of agitation, unknown whether or not he is compliant with medications  There are no outward signs of trauma  We will check psychiatric screening labs and consult crisis  No signs of head trauma though I do not believe any CT imaging is necessary at this time  Patient placed on a one-to-one for his own safety and safety of staff  Will medicate as needed  Patient will require psychiatric admission given degree of psychosis  Amount and/or Complexity of Data Reviewed  Independent Historian: EMS  External Data Reviewed: labs, radiology and notes  Labs: ordered  ECG/medicine tests: ordered and independent interpretation performed  Risk  Prescription drug management  Decision regarding hospitalization  Disposition  Final diagnoses:   None     ED Disposition     None      Follow-up Information    None         Patient's Medications   Discharge Prescriptions    No medications on file       No discharge procedures on file      PDMP Review       Value Time User    PDMP Reviewed  Yes 3/5/2021  9:35 AM July Ibanez MD          ED Provider  Electronically Signed by Updated: 05/25/23 2300     Valproic Acid, Total 11 ug/mL     Salicylate level [[id number]]  (Normal) Collected: 05/25/23 2227    Lab Status: Final result Specimen: Blood from Arm, Left Updated: 05/53/50 7095     Salicylate Lvl <5 mg/dL     Acetaminophen level-If concentration is detectable, please discuss with medical  on call   [[id number]]  (Abnormal) Collected: 05/25/23 2227    Lab Status: Final result Specimen: Blood from Arm, Left Updated: 05/25/23 2300     Acetaminophen Level <10 ug/mL     Comprehensive metabolic panel [[id number]]  (Abnormal) Collected: 05/25/23 2227    Lab Status: Final result Specimen: Blood from Arm, Left Updated: 05/25/23 2300     Sodium 138 mmol/L      Potassium 4 1 mmol/L      Chloride 104 mmol/L      CO2 22 mmol/L      ANION GAP 12 mmol/L      BUN 17 mg/dL      Creatinine 1 28 mg/dL      Glucose 110 mg/dL      Calcium 10 0 mg/dL      AST 49 U/L      ALT 31 U/L      Alkaline Phosphatase 36 U/L      Total Protein 6 8 g/dL      Albumin 4 4 g/dL      Total Bilirubin 1 06 mg/dL      eGFR 58 ml/min/1 73sq m     Narrative:      [place] guidelines for Chronic Kidney Disease (CKD):   •  Stage 1 with normal or high GFR (GFR > 90 mL/min/1 73 square meters)  •  Stage 2 Mild CKD (GFR = 60-89 mL/min/1 73 square meters)  •  Stage 3A Moderate CKD (GFR = 45-59 mL/min/1 73 square meters)  •  Stage 3B Moderate CKD (GFR = 30-44 mL/min/1 73 square meters)  •  Stage 4 Severe CKD (GFR = 15-29 mL/min/1 73 square meters)  •  Stage 5 End Stage CKD (GFR <15 mL/min/1 73 square meters)  Note: GFR calculation is accurate only with a steady state creatinine    Ethanol [[id number]]  (Normal) Collected: 05/25/23 2227    Lab Status: Final result Specimen: Blood from Arm, Left Updated: 05/25/23 2259     Ethanol Lvl <10 mg/dL     Lithium level [[id number]]  (Abnormal) Collected: 05/25/23 2227    Lab Status: Final result Specimen: Blood from Arm, Left Updated: 05/25/23 2257     Lithium Lvl 0 5 mmol/L     CBC and differential [[id number]]  (Abnormal) Collected: 05/25/23 2227    Lab Status: Final result Specimen: Blood from Arm, Left Updated: 05/25/23 2236     WBC 12 84 Thousand/uL      RBC 4 32 Million/uL      Hemoglobin 13 2 g/dL      Hematocrit 39 8 %      MCV 92 fL      MCH 30 6 pg      MCHC 33 2 g/dL      RDW 12 9 %      MPV 10 8 fL      Platelets 148 Thousands/uL      nRBC 0 /100 WBCs      Neutrophils Relative 75 %      Immat GRANS % 0 %      Lymphocytes Relative 16 %      Monocytes Relative 8 %      Eosinophils Relative 1 %      Basophils Relative 0 %      Neutrophils Absolute 9 54 Thousands/µL      Immature Grans Absolute 0 03 Thousand/uL      Lymphocytes Absolute 2 11 Thousands/µL      Monocytes Absolute 1 01 Thousand/µL      Eosinophils Absolute 0 10 Thousand/µL      Basophils Absolute 0 05 Thousands/µL                  No orders to display              Procedures  CriticalCare Time    Date/Time: 5/25/2023 10:52 PM    Performed by: Marcy Jordan MD  Authorized by: Marcy Jordan MD    Critical care provider statement:     Critical care time (minutes):  37    Critical care time was exclusive of:  Separately billable procedures and treating other patients and teaching time    Critical care was necessary to treat or prevent imminent or life-threatening deterioration of the following conditions:  CNS failure or compromise    Critical care was time spent personally by me on the following activities:  Obtaining history from patient or surrogate, development of treatment plan with patient or surrogate, evaluation of patient's response to treatment, examination of patient, ordering and performing treatments and interventions, ordering and review of laboratory studies, re-evaluation of patient's condition and review of old charts             ED Course  ED Course as of 05/29/23 2134   Thu May 25, 2023   2229 EKG interpreted by myself demonstrates sinus tachycardia with a rate of 104, left axis deviation, normal intervals, nonspecific ST segment and T waves   2304 302 completed    2309 Patient resting comfortably following medications   2340 Patient medically cleared for inpatient psych   Fri May 26, 2023   9780 Patient with increased agitation, medicated with 2 mg of Versed                                             Medical Decision Making  76-year-old male with schizoaffective disorder presents for psychiatric evaluation  On exam, patient appears to be acutely psychotic, he is displaying labile mood, nonsensical speech, brief periods of agitation, unknown whether or not he is compliant with medications  There are no outward signs of trauma  We will check psychiatric screening labs and consult crisis  No signs of head trauma though I do not believe any CT imaging is necessary at this time  Patient placed on a one-to-one for his own safety and safety of staff  Will medicate as needed  Patient will require psychiatric admission given degree of psychosis  Encounter for psychological evaluation: acute illness or injury  Psychoses [facility]): acute illness or injury  Schizoaffective disorder [facility]): acute illness or injury  Amount and/or Complexity of Data Reviewed  Independent Historian: EMS  External Data Reviewed: labs, radiology and notes  Labs: ordered  ECG/medicine tests: ordered and independent interpretation performed  Risk  Prescription drug management  Decision regarding hospitalization            Disposition  Final diagnoses:   Encounter for psychological evaluation   Psychoses ([facility] )   Schizoaffective disorder ([facility] )     Time reflects when diagnosis was documented in both MDM as applicable and the Disposition within this note     Time User Action Codes Description Comment    5/25/2023 10:37 PM Check, Kaz Walters [Z00 8] Encounter for psychological evaluation     5/25/2023 10:37 PM Check, Kaz Walters [F29] Psychoses ([facility] [address] )     5/25/2023 10:37 PM Check, Kaz Lucas Add [F25 9] Schizoaffective disorder ([facility] )     5/25/2023 10:37 PM Check, Tim Retort Modify [Z00 8] Encounter for psychological evaluation     5/25/2023 10:37 PM Check, Alpine Retort Modify [F29] Psychoses ([facility] )     5/26/2023  1:24 PM Hakeem Fray Add [E78 5] Hyperlipidemia     5/26/2023  1:24 PM Hakeem Fray Add [E55 9] Vitamin D insufficiency     5/26/2023  1:24 PM Medei, Ann Prakash Add [Z72 0] Tobacco abuse     5/26/2023  1:24 PM Medei, [address] Primary hypertension     5/26/2023  1:25 PM Hakeem Fray Add [I48 91] Atrial fibrillation ([facility] )     5/26/2023  1:25 PM Hakeem Fray Add [N40 0] Benign prostatic hyperplasia without lower urinary tract symptoms     5/26/2023  1:25 PM Hakeem Fray Add [F10 10] Alcohol abuse, continuous     5/26/2023  1:26 PM Medei, [address] [R79 89] Elevated TSH       ED Disposition     ED Disposition   Transfer to [address] [place] [address]   --    Date/Time   Thu May 25, 2023 11:40 PM    Comment   Salima Graham should be transferred out to [facility] and has been medically cleared             MD Documentation    Kelly Malhotra Most Recent Value   Patient Condition The patient has been stabilized such that within reasonable medical probability, no material deterioration of the patient condition or the condition of the unborn child(aicha) is likely to result from the transfer   Reason for Transfer Level of Care needed not available at this facility   Benefits of Transfer Continuity of care   Risks of Transfer Potential for delay in receiving treatment   Accepting Physician [address] Name, [address]  [facility] [place]    (Name & Tel number) [facility] [phone]   Transported by (Company and Unit #) [employer] [facility]   Sending  [address]   Provider Certification The patient is stable for psychiatric transfer because they are medically stable, and is protected from harming him/herself or others during transport      RN Documentation    Kelly Malhotra Most Recent Value Accepting Facility Name, [address]  [facility] [place], [place]    (Name & Tel number) Ana Hebert [phone]   Medications Reviewed with Next Provider of Service Yes   Transport Mode Ambulance   Transported by (Company and Unit #) [facility] life [facility]   Patient Belongings Disposition Sent with patient   Transfer Date 05/26/23   Transfer Time 1600      Follow-up Information    None         Discharge Medication List as of 5/26/2023  4:09 PM      CONTINUE these medications which have NOT CHANGED    Details   cholecalciferol (VITAMIN D3) 1,000 units tablet Take 2 tablets (2,000 Units total) by mouth daily, Starting Sat 3/6/2021, Until Mon 4/5/2021, Normal      divalproex sodium (DEPAKOTE) 500 mg EC tablet Take 2 tablets (1,000 mg total) by mouth 2 (two) times a day, Starting Fri 7/16/2021, Until Sun 8/15/2021, Normal      lithium carbonate 600 MG capsule Take 1 capsule (600 mg total) by mouth 2 (two) times a day with meals, Starting Fri 7/16/2021, Until Sun 8/15/2021, Normal      metoprolol succinate (TOPROL-XL) 25 mg 24 hr tablet Take 1 tablet (25 mg total) by mouth daily, Starting Fri 3/5/2021, Until Sun 4/4/2021, Normal      OLANZapine (ZyPREXA) 20 MG tablet Take 1 tablet (20 mg total) by mouth daily at bedtime, Starting Fri 7/16/2021, Until Sun 8/15/2021, Normal      tamsulosin (FLOMAX) 0 4 mg Take 1 capsule (0 4 mg total) by mouth daily with dinner, Starting Fri 3/5/2021, Until Sun 4/4/2021, Normal             No discharge procedures on file      PDMP Review       Value Time User    PDMP Reviewed  Yes 3/5/2021  9:35 AM July Ibanez MD          ED Provider  Electronically Signed by           Ashlyn Rosario MD  05/29/23 5293

## 2023-05-26 NOTE — NURSING NOTE
PT came to [facility] on this date with the following belongings:        Remains with Patient:  Pair of Sandals x 1  Shirt x 2  Jeans x 1  Long johns x 1        Sent to Storage:  Belt x 1  Lighter x 1  Key x 1  Rolling papers   Button lights x 2      Pending to be secured by security:  $89 usd  [address]   AAA Card x 1  Covid Vaccine card x 1  PA EBT x 1  Mastercard debit x 1  PA DL x 1  [address] x 1

## 2023-05-26 NOTE — NURSING NOTE
Admission Note    Pt arrived to unit on a 302 involuntary admission from [address] [place] [address] ED  Pt compliant with initial skin check and shower completed KG RN DW RN  Pt unable to sign admission paper work  Suspicious  Paranoid  Speech disorganized  Mumbled  Not aggressive during initial assessment  Poor attention and focus  Oriented patient to unit  Provided dinner tray  Safety precautions maintained  Will continue to monitor and assess

## 2023-05-26 NOTE — LETTER
May 31, 2023     mary    Patient: Sigrid Graham   YOB: 1960   Date of Visit: 5/26/2023       Dear Dr Tia Mata: Thank you for referring Artie Lopez Gladys to me for evaluation  Below are the relevant portions of my assessment and plan of care  If you have questions, please do not hesitate to call me  I look forward to following Artie Lopez along with you           Sincerely,        No name on file        CC: No Recipients

## 2023-05-26 NOTE — ED NOTES
Patient becoming increasingly aggressive, IM medications given at this time        Alex Grady RN  05/25/23 6680

## 2023-05-26 NOTE — ED NOTES
Patient is accepted at [facility]  Patient is accepted by Roxanne Hassan/Intake  Transportation is arranged with pending  Transportation is scheduled for pending  Patient may go to the floor at  anytime after 1600  Nurse report is to be called to [phone] prior to patient transfer

## 2023-05-26 NOTE — ED NOTES
Due to patient history of threatening and competitive behaviors Crisis asked attending and pt's nurse to move patient to secluded area due to patient and others safety

## 2023-05-27 LAB
25(OH)D3 SERPL-MCNC: 26 NG/ML (ref 30–100)
ALBUMIN SERPL BCP-MCNC: 4.2 G/DL (ref 3.5–5)
ALP SERPL-CCNC: 34 U/L (ref 34–104)
ALT SERPL W P-5'-P-CCNC: 34 U/L (ref 7–52)
ANION GAP SERPL CALCULATED.3IONS-SCNC: 6 MMOL/L (ref 4–13)
AST SERPL W P-5'-P-CCNC: 41 U/L (ref 13–39)
BASOPHILS # BLD AUTO: 0.03 THOUSANDS/ÂΜL (ref 0–0.1)
BASOPHILS NFR BLD AUTO: 0 % (ref 0–1)
BILIRUB SERPL-MCNC: 0.65 MG/DL (ref 0.2–1)
BUN SERPL-MCNC: 15 MG/DL (ref 5–25)
CALCIUM SERPL-MCNC: 9.3 MG/DL (ref 8.4–10.2)
CHLORIDE SERPL-SCNC: 105 MMOL/L (ref 96–108)
CHOLEST SERPL-MCNC: 139 MG/DL
CO2 SERPL-SCNC: 30 MMOL/L (ref 21–32)
CREAT SERPL-MCNC: 0.93 MG/DL (ref 0.6–1.3)
EOSINOPHIL # BLD AUTO: 0.21 THOUSAND/ÂΜL (ref 0–0.61)
EOSINOPHIL NFR BLD AUTO: 3 % (ref 0–6)
ERYTHROCYTE [DISTWIDTH] IN BLOOD BY AUTOMATED COUNT: 13.1 % (ref 11.6–15.1)
EST. AVERAGE GLUCOSE BLD GHB EST-MCNC: 103 MG/DL
FOLATE SERPL-MCNC: 16 NG/ML
GFR SERPL CREATININE-BSD FRML MDRD: 85 ML/MIN/1.73SQ M
GLUCOSE P FAST SERPL-MCNC: 94 MG/DL (ref 65–99)
GLUCOSE SERPL-MCNC: 94 MG/DL (ref 65–140)
HBA1C MFR BLD: 5.2 %
HCT VFR BLD AUTO: 40.9 % (ref 36.5–49.3)
HDLC SERPL-MCNC: 39 MG/DL
HGB BLD-MCNC: 13.1 G/DL (ref 12–17)
IMM GRANULOCYTES # BLD AUTO: 0.01 THOUSAND/UL (ref 0–0.2)
IMM GRANULOCYTES NFR BLD AUTO: 0 % (ref 0–2)
LDLC SERPL CALC-MCNC: 76 MG/DL (ref 0–100)
LYMPHOCYTES # BLD AUTO: 2.22 THOUSANDS/ÂΜL (ref 0.6–4.47)
LYMPHOCYTES NFR BLD AUTO: 32 % (ref 14–44)
MCH RBC QN AUTO: 30.1 PG (ref 26.8–34.3)
MCHC RBC AUTO-ENTMCNC: 32 G/DL (ref 31.4–37.4)
MCV RBC AUTO: 94 FL (ref 82–98)
MONOCYTES # BLD AUTO: 0.58 THOUSAND/ÂΜL (ref 0.17–1.22)
MONOCYTES NFR BLD AUTO: 8 % (ref 4–12)
NEUTROPHILS # BLD AUTO: 3.84 THOUSANDS/ÂΜL (ref 1.85–7.62)
NEUTS SEG NFR BLD AUTO: 57 % (ref 43–75)
NONHDLC SERPL-MCNC: 100 MG/DL
NRBC BLD AUTO-RTO: 0 /100 WBCS
PLATELET # BLD AUTO: 206 THOUSANDS/UL (ref 149–390)
PMV BLD AUTO: 12.2 FL (ref 8.9–12.7)
POTASSIUM SERPL-SCNC: 3.8 MMOL/L (ref 3.5–5.3)
PROT SERPL-MCNC: 6.5 G/DL (ref 6.4–8.4)
RBC # BLD AUTO: 4.35 MILLION/UL (ref 3.88–5.62)
SODIUM SERPL-SCNC: 141 MMOL/L (ref 135–147)
TRIGL SERPL-MCNC: 122 MG/DL
VIT B12 SERPL-MCNC: 345 PG/ML (ref 180–914)
WBC # BLD AUTO: 6.89 THOUSAND/UL (ref 4.31–10.16)

## 2023-05-27 PROCEDURE — 80061 LIPID PANEL: CPT | Performed by: NURSE PRACTITIONER

## 2023-05-27 PROCEDURE — 82607 VITAMIN B-12: CPT

## 2023-05-27 PROCEDURE — 99223 1ST HOSP IP/OBS HIGH 75: CPT | Performed by: PSYCHIATRY & NEUROLOGY

## 2023-05-27 PROCEDURE — 82746 ASSAY OF FOLIC ACID SERUM: CPT

## 2023-05-27 PROCEDURE — 93005 ELECTROCARDIOGRAM TRACING: CPT

## 2023-05-27 PROCEDURE — 82306 VITAMIN D 25 HYDROXY: CPT

## 2023-05-27 PROCEDURE — 80053 COMPREHEN METABOLIC PANEL: CPT | Performed by: NURSE PRACTITIONER

## 2023-05-27 PROCEDURE — 85025 COMPLETE CBC W/AUTO DIFF WBC: CPT | Performed by: NURSE PRACTITIONER

## 2023-05-27 PROCEDURE — 83036 HEMOGLOBIN GLYCOSYLATED A1C: CPT | Performed by: NURSE PRACTITIONER

## 2023-05-27 RX ORDER — LEVOTHYROXINE SODIUM 0.03 MG/1
25 TABLET ORAL
Status: DISCONTINUED | OUTPATIENT
Start: 2023-05-28 | End: 2023-06-26 | Stop reason: HOSPADM

## 2023-05-27 RX ORDER — TAMSULOSIN HYDROCHLORIDE 0.4 MG/1
0.4 CAPSULE ORAL
Status: DISCONTINUED | OUTPATIENT
Start: 2023-05-28 | End: 2023-06-26 | Stop reason: HOSPADM

## 2023-05-27 RX ORDER — OLANZAPINE 10 MG/1
10 TABLET ORAL
Status: DISCONTINUED | OUTPATIENT
Start: 2023-05-27 | End: 2023-05-29

## 2023-05-27 RX ORDER — METOPROLOL SUCCINATE 25 MG/1
25 TABLET, EXTENDED RELEASE ORAL DAILY
Status: DISCONTINUED | OUTPATIENT
Start: 2023-05-28 | End: 2023-05-29

## 2023-05-27 RX ADMIN — OLANZAPINE 10 MG: 10 TABLET, FILM COATED ORAL at 21:04

## 2023-05-27 RX ADMIN — LORAZEPAM 1 MG: 1 TABLET ORAL at 22:57

## 2023-05-27 RX ADMIN — DIVALPROEX SODIUM 750 MG: 500 TABLET, FILM COATED, EXTENDED RELEASE ORAL at 21:04

## 2023-05-27 NOTE — PROGRESS NOTES
Pt pacing in the coto, disorganized, talking loud but redirectable at times  Encouragement needed for meds and pt spit ativan out x2, IM ativan given for increased agitation over attempting to make phone calls and wanting to speak with MD  Pt punching his stomach and noted to have umbilical hernia   Bible given to pt at his request  Q7 minute safety checks maintained

## 2023-05-27 NOTE — H&P
Trinh Barrios  DEL:[birth year] Karen Lechuga  TBY:[id number]    VTS:[id number]  Adm Date: 5/26/2023 1638  4:38 PM   ATT PHY: Melodie Sacks, [address]         Chief Complaint: psychotic symptoms      History of Presenting Illness: Keren Graham is a(n) 70y o  year old male who is admitted to [address] [place] [address] on involuntary 302 committment basis  Patient originally presented to [place] [address] ED on 6/23/4530 via EMS and police for psych evaluation after patient was yelling at neighbors on the front porch  Patient examined at bedside  Patient currently denies any complaints  Allergies   Allergen Reactions   • Haldol [Haloperidol]    • Navane [Thiothixene]    • Other      Adhesive tape       No current facility-administered medications on file prior to encounter       Current Outpatient Medications on File Prior to Encounter   Medication Sig Dispense Refill   • cholecalciferol (VITAMIN D3) 1,000 units tablet Take 2 tablets (2,000 Units total) by mouth daily 60 tablet 0   • divalproex sodium (DEPAKOTE) 500 mg EC tablet Take 2 tablets (1,000 mg total) by mouth 2 (two) times a day 120 tablet 0   • lithium carbonate 600 MG capsule Take 1 capsule (600 mg total) by mouth 2 (two) times a day with meals 60 capsule 0   • metoprolol succinate (TOPROL-XL) 25 mg 24 hr tablet Take 1 tablet (25 mg total) by mouth daily 30 tablet 0   • OLANZapine (ZyPREXA) 20 MG tablet Take 1 tablet (20 mg total) by mouth daily at bedtime 30 tablet 0   • tamsulosin (FLOMAX) 0 4 mg Take 1 capsule (0 4 mg total) by mouth daily with dinner 30 capsule 0     Active Ambulatory Problems     Diagnosis Date Noted   • Schizoaffective disorder, bipolar type ([facility] [address] ) 11/02/2019   • Cannabis abuse, continuous 11/02/2019   • Alcohol abuse, continuous 11/02/2019   • Hypertension 12/19/2019   • Tobacco abuse 10/14/2020   • BPH (benign prostatic hyperplasia) 10/15/2020   • Elevated CK 02/12/2021   • Vitamin D insufficiency 02/17/2021   • History of rhabdomyolysis 02/17/2021   • Elevated TSH 02/17/2021   • Wrist pain, chronic, right 02/26/2021   • Astigmatism 02/26/2021   • Atrial fibrillation ([facility] [address] ) 02/26/2021   • Benign neoplasm of colon 02/26/2021   • Hemorrhoids 02/26/2021   • Hydrocele 02/26/2021   • Hyperlipidemia 02/26/2021   • Large physiologic cupping of optic disc 02/26/2021   • Macular drusen 02/26/2021   • Nuclear senile cataract 02/26/2021   • Presbyopia 97/36/7282   • Umbilical hernia 55/47/2916   • Vitreous syneresis 02/26/2021     Resolved Ambulatory Problems     Diagnosis Date Noted   • Left foot pain 09/26/2020   • Medical clearance for psychiatric admission 10/14/2020     Past Medical History:   Diagnosis Date   • Alcohol abuse    • Anxiety    • Depression    • DJD (degenerative joint disease)    • Gait abnormality    • Head injury    • History of colonic polyps    • PTSD (post-traumatic stress disorder)    • Schizoaffective disorder ([facility] [address] )    • Sepsis ([facility] [address] )    • Substance abuse ([facility] [address] )      Past Surgical History:   Procedure Laterality Date   • FRACTURE SURGERY     • INGUINAL HERNIA REPAIR       Social History:   Social History     Socioeconomic History   • Marital status: Single     Spouse name: None   • Number of children: None   • Years of education: None   • Highest education level: None   Occupational History   • None   Tobacco Use   • Smoking status: Every Day     Packs/day: 1 00     Years: 40 00     Total pack years: 40 00     Types: Cigars, Cigarettes   • Smokeless tobacco: Never   Vaping Use   • Vaping Use: Never used   Substance and Sexual Activity   • Alcohol use: Yes     Comment: whenever i want   • Drug use: Yes     Types: Marijuana     Comment: Patient denies currently using marijuana      • Sexual activity: Not Currently     Partners: Female   Other Topics Concern   • None   Social History Narrative   • None     Social Determinants of Health     Financial Resource "Strain: Not on file   Food Insecurity: Not on file   Transportation Needs: Not on file   Physical Activity: Not on file   Stress: Not on file   Social Connections: Not on file   Intimate Partner Violence: Not on file   Housing Stability: Not on file     Family History: History reviewed  No pertinent family history  Review of Systems   Constitutional: Negative  Negative for chills and fever  HENT: Negative  Negative for ear pain and sore throat  Eyes: Negative for pain and visual disturbance  Respiratory: Negative  Negative for cough and shortness of breath  Cardiovascular: Negative for chest pain and palpitations  Gastrointestinal: Negative for abdominal pain and vomiting  Genitourinary: Negative for dysuria, frequency, hematuria and urgency  Musculoskeletal: Negative for arthralgias and back pain  Skin: Negative for color change and rash  Neurological: Negative  Negative for dizziness and headaches  All other systems reviewed and are negative  Physical Exam   Vitals: Blood pressure 150/98, pulse 89, temperature 97 7 °F (36 5 °C), temperature source Temporal, resp  rate 16, height 5' 9\" (1 753 m), weight 90 7 kg (200 lb), SpO2 97 %  ,Body mass index is 29 53 kg/m²  Constitutional: Awake, alert, in no acute distress  Head: Normocephalic and atraumatic  Mouth/Throat: Oropharynx is clear and moist     Eyes: Conjunctivae and EOM are normal    Neck: Neck supple  No thyromegaly present  Cardiovascular: Normal rate, regular rhythm and normal heart sounds  Pulmonary/Chest: Effort normal and breath sounds normal    Abdominal: Soft  Bowel sounds are normal  There is no tenderness  There is no rebound and no guarding  Neurological: Alert, oriented to person, place, and time  Musculoskeletal:  Nontender spine  Skin: Skin is warm and dry  No edema  Assessment     [facility](n[facility] 70 y o  male with schizoaffective disorder  1  Cardiac with hx HTN, HLD    Continue metoprolol " succinate 25 mg daily  LDL 76   2  Tobacco abuse  NRT  3  Vitamin D deficiency  Repeat levels  4  Hypothyroidism  TSH 5 291, FT4 1 46  Start levothyroxine 25 mcg daily  Repeat TSH in 6 weeks  5  BPH  Continue Flomax 0 4 mg daily  6  Psych with history of schizoaffective disorder  This is being managed by the psych team       Prognosis: Fair  Discharge Plan: In progress  Advanced Directives: I have discussed in detail with the patient the advanced directives  The patient states he does not have an appointed POA and does not have a living will  Patient's emergency contact is his mother, Palmira Said Day, whose number is [phone]  When discussing cardiac and pulmonary resuscitation efforts with the patient, the patient wishes to be full code  I have spent more than 50 minutes gathering data, doing physical examination, and discussing the advanced directives, which was witnessed by caring staff  The patient was discussed with Dr Deangelo Bai and he is in agreement with the above note

## 2023-05-27 NOTE — PROGRESS NOTES
Bag that is with Security:    [id number] [facility] several items on the list     Kayleigh Cody

## 2023-05-27 NOTE — H&P
Psychiatric Evaluation - Behavioral Health   This note was not shared with the patient due to reasonable likelihood of causing patient harm     Identification Data:Freddie Graham 70 y o  male MRN: [id number]  Unit/Bed#: [facility] 245-02 Encounter: [id number]    Chief Complaint: unstable mood, psychotic symptoms, aggressive behavior and agitation    History of Present Illness     Alma Graham is a 70 y o  male with a history of Schizoaffective Disorder who was admitted to the inpatient adult psychiatric unit on a involuntary 302 commitment basis due to unstable mood, psychotic symptoms, severe agitation and increased agitation  He was brought to ED by the police after he became agitated and screams at neighbor and being noncompliant with medication  The patient is well known to the unit team due to his previous admissions  UDS was positive for THC and Benzo  On evaluation in the inpatient psychiatric unit Rita Taylor presents guarded, irritable, disorganized, talking loud at times but has been redirectable since his admission  He is uncooperative with the interview and is currently focused on his back pain report  He continues paranoid and appeared to be responding to internal stimuli  Denies any current suicide, homicidal ideation but appears disorganized, paranoid and admits he has not been taking his medication  Denies any recent alcohol or illicit drug use despite his THC in UDS  No acute focal neurological deficit or change in mental status noted during examination      Psychiatric Review Of Systems:    Sleep changes: decreased  Appetite changes:no  Weight changes: no  Energy: no  Interest/pleasure/: no  Anhedonia: no  Anxiety: yes  Marya: history of mood swings  Guilt:  no  Hopeless:  no  Self injurious behavior/risky behavior: not recently  Suicidal ideation: no  Homicidal ideation: no  Auditory hallucinations: appears responding to internal stimuli  Visual hallucinations: no  Delusional thinking: paranoid delusions  Eating disorder history: no  Obsessive/compulsive symptoms: no    Historical Information     Past Psychiatric History:     Past Inpatient Psychiatric Treatment:   Multiple past inpatient psychiatric admissions  Past Outpatient Psychiatric Treatment:    Noncompliant with outpatient psychiatric treatment prior to admission  Past Suicide Attempts: denies   Past Violent Behavior: yes, history of increased agitation and aggressive behavior, usually brought by the police, has been restrained in the past in the emergency room in the inpatient unit  Past Psychiatric Medication Trials: Depakote, Lithium and Zyprexa     Substance Abuse History:    Social History     Tobacco History     Smoking Status  Every Day Smoking Frequency  1 pack/day for 40 00 years (40 00 ttl pk-yrs) Smoking Tobacco Type  Cigarettes, Cigars    Smokeless Tobacco Use  Never          Alcohol History     Alcohol Use Status  Yes Comment  whenever i want          Drug Use     Drug Use Status  Yes Types  Marijuana Comment  Patient denies currently using marijuana  Sexual Activity     Sexually Active  Not Currently Partners  Female          Activities of Daily Living    Not Asked               Additional Substance Use Detail     Questions Responses    Problems Due to Past Use of Alcohol? No    Problems Due to Past Use of Substances?  No    Substance Use Assessment Denies substance use within the past 12 months    Alcohol Use Frequency 1 or 2 times/week    Cannabis frequency Daily    Comment: Daily on 9/7/2020     Heroin Frequency Denies use in past 12 months    Cannabis method Other    Comment: Puts in food     Cocaine frequency Never used    Comment: Never used on 9/7/2020     Crack Cocaine Frequency Denies use in past 12 months    Methamphetamine Frequency Denies use in past 12 months    Narcotic Frequency Denies use in past 12 months    Benzodiazepine Frequency Denies use in past 12 months    Amphetamine frequency Denies use in past 12 months Barbituate Frequency Denies use use in past 12 months    Inhalant frequency Never used    Comment: Never used on 9/7/2020     Hallucinogen frequency Never used    Comment: Never used on 9/7/2020     Ecstasy frequency Never used    Comment: Never used on 9/7/2020     Other drug frequency Never used    Comment: Never used on 9/7/2020     Opiate frequency Denies use in past 12 months    Last reviewed by Toribio Magana RN on 5/26/2023        I have assessed this patient for substance use within the past 12 months    Alcohol use: denies current use  Recreational drug use: THC    Family Psychiatric History: family members with h/o mental illness    Social History:     Education: 11th grade  Marital History: single  Living Arrangement: lives alone  Occupational History: unemployed  Functioning Relationships: poor support system  Legal History: yes   History: yes    Traumatic History:   Denies    Past Medical History:      Past Medical History:   Diagnosis Date   • Alcohol abuse    • Anxiety    • Astigmatism    • Depression    • DJD (degenerative joint disease)    • Gait abnormality    • Head injury    • History of colonic polyps    • Hydrocele    • Hyperlipidemia    • Hypertension    • Macular drusen    • Presbyopia    • PTSD (post-traumatic stress disorder)    • Schizoaffective disorder ([facility] [address] )    • Sepsis ([facility] [address] )    • Substance abuse ([facility] )    • Tobacco abuse    • Umbilical hernia    • Vitreous syneresis      Past Surgical History:   Procedure Laterality Date   • FRACTURE SURGERY     • INGUINAL HERNIA REPAIR         Medical Review Of Systems:    Pertinent items are noted in HPI  Allergies: Allergies   Allergen Reactions   • Haldol [Haloperidol]    • Navane [Thiothixene]    • Other      Adhesive tape         Medications: All current active medications have been reviewed      OBJECTIVE:    Vital signs in last 24 hours:    Temp:  [98 2 °F (36 8 °C)-98 6 °F (37 °C)] 98 2 °F (36 8 °C)  HR:  [] 114  Resp: [16-22] 16  BP: (133-151)/(89-91) 142/89    No intake or output data in the 24 hours ending 05/27/23 1156     Mental Status Evaluation:    Appearance:  disheveled   Behavior:  guarded, uncooperative   Speech:  increased rate   Mood:  irritable   Affect:  increased in intensity   Language: naming objects   Thought Process:  disorganized, illogical   Associations: tangential associations   Thought Content:  paranoid delusions   Perceptual Disturbances: appears responding to internal stimuli   Risk Potential: Suicidal ideation - does not answer  Homicidal ideation - angry, hostile feelings  Potential for aggression - Yes, due to history of violence   Sensorium:  oriented to person, place and time/date   Memory:  recent and remote memory: unable to assess due to lack of cooperation   Consciousness:  alert and awake   Attention: attention span and concentration appear shorter than expected for age   Intellect: not examined   Fund of Knowledge: awareness of current events: limited   Insight:  poor   Judgment: poor   Muscle Strength Muscle Tone:   not examined   Gait/Station: normal gait/station   Motor Activity: no abnormal movements       Laboratory Results:   I have personally reviewed all pertinent laboratory/tests results    Most Recent Labs:   Lab Results   Component Value Date    ALB 4 2 05/27/2023    ALKPHOS 34 05/27/2023    ALT 34 05/27/2023    AMMONIA 51 02/06/2021    AST 41 (H) 05/27/2023    BUN 15 05/27/2023    CALCIUM 9 3 05/27/2023    CHOLESTEROL 139 05/27/2023     05/27/2023    CO2 30 05/27/2023    CREATININE 0 93 05/27/2023     11/03/2019    FREET4 1 46 (H) 05/25/2023    GLUC 94 05/27/2023    GLUF 94 05/27/2023    HCT 40 9 05/27/2023    HDL 39 (L) 05/27/2023    HGB 13 1 05/27/2023    HGBA1C 5 1 11/03/2019    K 3 8 05/27/2023    LDLCALC 76 05/27/2023    LITHIUM 0 5 (L) 05/25/2023    NEUTROABS 3 84 05/27/2023    NONHDLC 100 05/27/2023     05/27/2023    RBC 4 35 05/27/2023    RDW 13 1 05/27/2023    RPR Non-Reactive 11/03/2019    SODIUM 141 05/27/2023    TBILI 0 65 05/27/2023    TP 6 5 05/27/2023    TRIG 122 05/27/2023    GYM3HGBOWYIF 5 291 (H) 05/25/2023    VALPROICTOT 11 (L) 05/25/2023    WBC 6 89 05/27/2023       Imaging Studies:   No results found  Code Status: Prior  Advance Directive and Living Will: <no information>    Assessment/Plan   Principal Problem:    Schizoaffective disorder, bipolar type ([facility] [address] )  Active Problems:    Cannabis abuse, continuous    Hypertension    Tobacco abuse    BPH (benign prostatic hyperplasia)    Hyperlipidemia    Umbilical hernia      Patient Strengths: negotiates basic needs, ambulatory     Patient Barriers: noncompliant with medication, noncompliant with treatment, patient is on an involuntary commitment, poor insight, substance abuse    Treatment Plan:     Planned Treatment and Medication Changes: All current active medications have been reviewed  Encourage group therapy, milieu therapy and occupational therapy  Behavioral Health checks every 7 minutes  Begin Depakote 750 mg po hs, Zyprexa 10 mg po hs with plan to titrate up the dosage as needed  Consider reduction of Llithium in his regimen  Risks / Benefits of Treatment:    Risks, benefits, and possible side effects of medications explained to patient  Patient has limited understanding of risks and benefits of treatment at this time, but agrees to take medications as prescribed  Counseling / Coordination of Care:    Patient's presentation on admission and proposed treatment plan discussed with treatment team   Diagnosis, medication changes and treatment plan reviewed with patient  At this time patient has limited understanding of diagnosis, medication changes and treatment plan and will require further explanation      Inpatient Psychiatric Certification:    Estimated length of stay: 7 midnights      Azam Banegas MD 05/27/23

## 2023-05-27 NOTE — NURSING NOTE
Patient utilized prn Trazodone @ hs w/ positive effect as pt slept throughout the night without interruption, non labored breathing noted while asleep  Q 7 min safety checks maintained

## 2023-05-28 LAB
ATRIAL RATE: 90 BPM
P AXIS: 78 DEGREES
PR INTERVAL: 158 MS
QRS AXIS: -1 DEGREES
QRSD INTERVAL: 84 MS
QT INTERVAL: 400 MS
QTC INTERVAL: 489 MS
T WAVE AXIS: 45 DEGREES
VENTRICULAR RATE: 90 BPM

## 2023-05-28 PROCEDURE — 99232 SBSQ HOSP IP/OBS MODERATE 35: CPT

## 2023-05-28 PROCEDURE — 93010 ELECTROCARDIOGRAM REPORT: CPT | Performed by: INTERNAL MEDICINE

## 2023-05-28 RX ORDER — LITHIUM CARBONATE 300 MG/1
300 CAPSULE ORAL 2 TIMES DAILY
Status: DISCONTINUED | OUTPATIENT
Start: 2023-05-28 | End: 2023-05-30

## 2023-05-28 RX ORDER — ERGOCALCIFEROL 1.25 MG/1
50000 CAPSULE ORAL WEEKLY
Status: COMPLETED | OUTPATIENT
Start: 2023-05-29 | End: 2023-06-19

## 2023-05-28 RX ORDER — GINSENG 100 MG
1 CAPSULE ORAL 2 TIMES DAILY
Status: DISPENSED | OUTPATIENT
Start: 2023-05-28 | End: 2023-05-31

## 2023-05-28 RX ORDER — MELATONIN
1000 DAILY
Status: DISCONTINUED | OUTPATIENT
Start: 2023-06-20 | End: 2023-06-26 | Stop reason: HOSPADM

## 2023-05-28 RX ORDER — LITHIUM CARBONATE 300 MG/1
300 TABLET, FILM COATED, EXTENDED RELEASE ORAL 2 TIMES DAILY
Status: DISCONTINUED | OUTPATIENT
Start: 2023-05-28 | End: 2023-05-28

## 2023-05-28 RX ADMIN — LORAZEPAM 1 MG: 1 TABLET ORAL at 13:34

## 2023-05-28 RX ADMIN — LEVOTHYROXINE SODIUM 25 MCG: 25 TABLET ORAL at 07:10

## 2023-05-28 RX ADMIN — OLANZAPINE 10 MG: 10 TABLET, FILM COATED ORAL at 07:13

## 2023-05-28 RX ADMIN — LITHIUM CARBONATE 300 MG: 300 CAPSULE, GELATIN COATED ORAL at 18:41

## 2023-05-28 RX ADMIN — LITHIUM CARBONATE 300 MG: 300 CAPSULE, GELATIN COATED ORAL at 10:44

## 2023-05-28 RX ADMIN — TRAZODONE HYDROCHLORIDE 100 MG: 100 TABLET ORAL at 01:02

## 2023-05-28 RX ADMIN — DIVALPROEX SODIUM 750 MG: 500 TABLET, FILM COATED, EXTENDED RELEASE ORAL at 21:21

## 2023-05-28 RX ADMIN — OLANZAPINE 10 MG: 10 TABLET, FILM COATED ORAL at 21:21

## 2023-05-28 RX ADMIN — TAMSULOSIN HYDROCHLORIDE 0.4 MG: 0.4 CAPSULE ORAL at 18:25

## 2023-05-28 RX ADMIN — BACITRACIN ZINC 1 SMALL APPLICATION: 500 OINTMENT TOPICAL at 21:21

## 2023-05-28 NOTE — NURSING NOTE
New orders for bacitracin to bilateral ankles where skin appears irritated possibly rubbing from shoe wear  While apply as prescribed

## 2023-05-28 NOTE — PLAN OF CARE
Problem: CHONG  Goal: Will exhibit normal sleep and speech and no impulsivity  Description: INTERVENTIONS:  - Administer medication as ordered  - Set limits on impulsive behavior  - Make attempts to decrease external stimuli as possible  Outcome: Not Progressing     Problem: PSYCHOSIS  Goal: Will report no hallucinations or delusions  Description: Interventions:  - Administer medication as  ordered  - Every waking shifts and PRN assess for the presence of hallucinations and or delusions  - Assist with reality testing to support increasing orientation  - Assess if patient's hallucinations or delusions are encouraging self-harm or harm to others and intervene as appropriate  Outcome: Not Progressing     Problem: ANXIETY  Goal: Will report anxiety at manageable levels  Description: INTERVENTIONS:  - Administer medication as ordered  - Teach and encourage coping skills  - Provide emotional support  - Assess patient/family for anxiety and ability to cope  Outcome: Not Progressing     Problem: SLEEP DISTURBANCE  Goal: Will exhibit normal sleeping pattern  Description: Interventions:  -  Assess the patients sleep pattern, noting recent changes  - Administer medication as ordered  - Decrease environmental stimuli, including noise, as appropriate during the night  - Encourage the patient to actively participate in unit groups and or exercise during the day to enhance ability to achieve adequate sleep at night  - Assess the patient, in the morning, encouraging a description of sleep experience  Outcome: Not Progressing

## 2023-05-28 NOTE — NURSING NOTE
"During morning medications patient took metoprolol held it on his tongue than spit back into water cup stating \"I had enough of that\" Non compliant with scheduled medications IN am    "

## 2023-05-28 NOTE — PROGRESS NOTES
"Progress Note - Keiko Graham 70 y o  male MRN: [id number]    Unit/Bed#: [facility] 218-02 Encounter: [id number]        Subjective:   Patient seen and examined at bedside after reviewing the chart and discussing the case with the caring staff  Patient has no acute complaints  Physical Exam   Vitals: Blood pressure 169/78, pulse 98, temperature 97 8 °F (36 6 °C), temperature source Temporal, resp  rate 20, height 5' 9\" (1 753 m), weight 90 7 kg (200 lb), SpO2 98 %  ,Body mass index is 29 53 kg/m²  Constitutional: Patient in no acute distress  HEENT: PERR, EOMI, MMM  Cardiovascular: Normal rate and regular rhythm  Pulmonary/Chest: Effort normal and breath sounds normal    Abdomen: Nondistended  Assessment/Plan:  Keiko Graham is a(n) 70 y o  male with schizoaffective disorder      1  Cardiac with hx HTN, HLD  Continue metoprolol succinate 25 mg daily  LDL 76   2  Tobacco abuse  NRT  3  Vitamin D deficiency  Repeat levels  4  Hypothyroidism  TSH 5 291, FT4 1 46  Start levothyroxine 25 mcg daily  Repeat TSH in 6 weeks  5  BPH  Continue Flomax 0 4 mg daily  6  Vitamin D deficiency  Patient started on vitamin D2 50,000 units weekly for 4 weeks followed by vitamin D3 1000 units daily  7  Vitamin B12 deficiency  Patient started on vitamin B12 supplement  The patient was discussed with Dr Shukri Casper and he is in agreement with the above note    "

## 2023-05-28 NOTE — NURSING NOTE
Patient awake and upset but unable to verbalize the reason for his anger, asked to be in room 243 and punched walls and banged his head on the window  Agreeable to PRN Zyprexa, wants to talk to provider  Reassurance provided  Comfort measures provided  q7 minute checks ongoing

## 2023-05-28 NOTE — NURSING NOTE
"Patient observed in milieu, disheveled in appearance but observed caring for personal hygiene  Patient's affect expansive and mood very labile  Speech pressured, disorganized, and garbled at times  Patient reports feeling \"trapped\", he denies SI/HI and hallucinations but appears suspicious and paranoid about providers  Euphoric at times  Can be loud and irritable, but redirectable  Compliant with medication as ordered, provided care notes per request  Mouth check provided without asking  q7 minute checks ongoing     "

## 2023-05-28 NOTE — NURSING NOTE
Patient visibly anxious with eyes darting, pacing rapidly, asking for medication to calm down  Fajardo score 29  1 mg lorazepam provided
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
## 2023-05-29 LAB — VALPROATE SERPL-MCNC: 43 UG/ML (ref 50–100)

## 2023-05-29 PROCEDURE — 80164 ASSAY DIPROPYLACETIC ACD TOT: CPT | Performed by: NURSE PRACTITIONER

## 2023-05-29 PROCEDURE — 99232 SBSQ HOSP IP/OBS MODERATE 35: CPT | Performed by: STUDENT IN AN ORGANIZED HEALTH CARE EDUCATION/TRAINING PROGRAM

## 2023-05-29 RX ORDER — METOPROLOL SUCCINATE 50 MG/1
50 TABLET, EXTENDED RELEASE ORAL DAILY
Status: DISCONTINUED | OUTPATIENT
Start: 2023-05-30 | End: 2023-06-26 | Stop reason: HOSPADM

## 2023-05-29 RX ORDER — OLANZAPINE 10 MG/1
10 TABLET ORAL 2 TIMES DAILY
Status: DISCONTINUED | OUTPATIENT
Start: 2023-05-29 | End: 2023-06-05

## 2023-05-29 RX ADMIN — LITHIUM CARBONATE 300 MG: 300 CAPSULE, GELATIN COATED ORAL at 09:36

## 2023-05-29 RX ADMIN — ERGOCALCIFEROL 50000 UNITS: 1.25 CAPSULE ORAL at 09:36

## 2023-05-29 RX ADMIN — LITHIUM CARBONATE 300 MG: 300 CAPSULE, GELATIN COATED ORAL at 18:17

## 2023-05-29 RX ADMIN — TAMSULOSIN HYDROCHLORIDE 0.4 MG: 0.4 CAPSULE ORAL at 16:03

## 2023-05-29 RX ADMIN — DIVALPROEX SODIUM 750 MG: 500 TABLET, FILM COATED, EXTENDED RELEASE ORAL at 21:38

## 2023-05-29 RX ADMIN — OLANZAPINE 10 MG: 10 TABLET, FILM COATED ORAL at 18:17

## 2023-05-29 RX ADMIN — BACITRACIN ZINC 1 SMALL APPLICATION: 500 OINTMENT TOPICAL at 09:40

## 2023-05-29 RX ADMIN — TRAZODONE HYDROCHLORIDE 100 MG: 100 TABLET ORAL at 22:34

## 2023-05-29 RX ADMIN — CYANOCOBALAMIN TAB 500 MCG 1000 MCG: 500 TAB at 09:36

## 2023-05-29 RX ADMIN — BACITRACIN ZINC 1 SMALL APPLICATION: 500 OINTMENT TOPICAL at 21:41

## 2023-05-29 NOTE — PROGRESS NOTES
" 05/29/23   Team Meeting   Meeting Type Tx Team Meeting   Team Members Present   Team Members Present Physician;Nurse;   Physician Team Member Dr Nevin Bolton MD   Nursing Team Member Yael Venegas RN   Social Work Team Member Milka Roth LSW   Patient/Family Present   Patient Present Yes   Patient's Family Present No     Treatment team met with pt to review care plan and goals  Pt carefully read through treatment plan, then declined to sign plan stating, \"That's enough for today  \" Team signed care plan    "

## 2023-05-29 NOTE — NURSING NOTE
"Patient observed pacing in halls, responding to himself, rambling, expansive affect, labile mood  Disheveled in appearance however seen frequently tending to personal hygiene and styling beard and hair in various ways  States \"these doctors keep trying to heal me when there just isn't anything to be healed\"  Disorganized with rapid mood shifts with episodes of yelling, paranoia, euphoria, and tearfulness  Behavior redirectable  Compliant with medications and performs mouth checks without ask  Bacitracin applied as ordered  Makes needs known  q7 minute checks ongoing     "

## 2023-05-29 NOTE — NURSING NOTE
Pt visible on unit  Social with peers  Personal hygiene is good  Compliant with medications  Mood is less labile  Less disorganized  Speech is clearer and easier to understand  Actively engaged in unit activities  Safety precautions maintained  Will continue to monitor and assess

## 2023-05-29 NOTE — PROGRESS NOTES
"Progress Note - Colon Guess Day 70 y o  male MRN: [id number]    Unit/Bed#: [facility] 218-02 Encounter: [id number]        Subjective:   Patient seen and examined at bedside after reviewing the chart and discussing the case with the caring staff  Patient has no acute complaints  Blood pressures noted to be elevated with most recent readings 146/98, 153/93, 154/91  Physical Exam   Vitals: Blood pressure 146/98, pulse 101, temperature 98 4 °F (36 9 °C), temperature source Temporal, resp  rate 18, height 5' 9\" (1 753 m), weight 90 7 kg (200 lb), SpO2 99 %  ,Body mass index is 29 53 kg/m²  Constitutional: Patient in no acute distress  HEENT: PERR, EOMI, MMM  Cardiovascular: Normal rate and regular rhythm  Pulmonary/Chest: Effort normal and breath sounds normal    Abdomen: Nondistended  Assessment/Plan:  Colon Guess Day is a(n) 70 y o  male with schizoaffective disorder      1  Cardiac with hx HTN, HLD  Increase metoprolol succinate to 50 mg daily from 25 mg daily  LDL 76   2  Tobacco abuse  NRT  3  Hypothyroidism  TSH 5 291, FT4 1 46  Start levothyroxine 25 mcg daily  Repeat TSH in 6 weeks  4  BPH  Continue Flomax 0 4 mg daily  5  Vitamin D deficiency  Patient started on vitamin D2 50,000 units weekly for 4 weeks followed by vitamin D3 1000 units daily  6  Vitamin B12 deficiency  Patient started on vitamin B12 supplement  The patient was discussed with Dr Fabiola Young and he is in agreement with the above note    "

## 2023-05-29 NOTE — PROGRESS NOTES
Progress Note - Teddy [address] K Day 70 y o  male MRN: [id number]  Unit/Bed#: [facility] 218-02 Encounter: [id number]    Assessment/Plan   Principal Problem:    Schizoaffective disorder, bipolar type ([address] )  Active Problems:    Cannabis abuse, continuous    Hypertension    Tobacco abuse    BPH (benign prostatic hyperplasia)    Hyperlipidemia    Umbilical hernia    Recommended Treatment:   Increase Zyprexa to 10 mg twice a day to control psychosis    All current active medications have been reviewed  Encourage group therapy, milieu therapy and occupational therapy  Behavioral Health checks every 7 minutes  Status of Admission Status of Admission  Status of Admission: 302  Date 302 will [de-identified] 23  Medical management per SLIM  Commitment Status: 302  ----------------------------------------      Subjective: Patient was seen and examined at the bedside during morning rounds  Patient has bizarre appearance and disheveled  When he speaks he does not make sense of what he talks about  He has loose associations and he is paranoid  He appears responding to internal stimuli and unable to care for himself  This likely the patient will improve during the 302  And will require a 303 commitment       Behavior over the last 24 hours:  unchanged  Sleep: normal  Appetite: normal  Medication side effects: No  ROS: no complaints and all other systems are negative    Mental Status Evaluation:  Appearance:  disheveled   Behavior:  bizarre   Speech:  pressured, hypertalkative   Mood:  dysphoric   Affect:  constricted   Thought Process:  increased rate of thoughts   Associations: tangential associations   Thought Content:  persecutory and bizarre delusions   Perceptual Disturbances: auditory hallucinations, appears responding to internal stimuli   Risk Potential: Suicidal ideation - None at present  Homicidal ideation - None at present  Potential for aggression - Yes, due to acute psychosis   Sensorium:  oriented to person, place and time/date   Memory:  recent and remote memory: unable to assess due to lack of cooperation   Consciousness:  alert and awake   Attention/Concentration: attention span and concentration: unable to assess due to lack of cooperation   Insight:  impaired   Judgment: impaired   Gait/Station: normal gait/station   Motor Activity: no abnormal movements     Medications:   all current active meds have been reviewed, continue current psychiatric medications and current meds:   Current Facility-Administered Medications   Medication Dose Route Frequency   • acetaminophen (TYLENOL) tablet 650 mg  650 mg Oral Q6H PRN   • acetaminophen (TYLENOL) tablet 650 mg  650 mg Oral Q4H PRN   • acetaminophen (TYLENOL) tablet 975 mg  975 mg Oral Q6H PRN   • aluminum-magnesium hydroxide-simethicone (MYLANTA) oral suspension 30 mL  30 mL Oral Q4H PRN   • bacitracin topical ointment 1 small application  1 small application Topical BID   • benztropine (COGENTIN) tablet 1 mg  1 mg Oral Q6H PRN   • [START ON 6/20/2023] cholecalciferol (VITAMIN D3) tablet 1,000 Units  1,000 Units Oral Daily   • cyanocobalamin (VITAMIN B-12) tablet 1,000 mcg  1,000 mcg Oral Daily   • divalproex sodium (DEPAKOTE ER) 24 hr tablet 750 mg  750 mg Oral HS   • ergocalciferol (VITAMIN D2) capsule 50,000 Units  50,000 Units Oral Weekly   • hydrOXYzine HCL (ATARAX) tablet 25 mg  25 mg Oral Q6H PRN Max 4/day   • hydrOXYzine HCL (ATARAX) tablet 50 mg  50 mg Oral Q4H PRN Max 4/day    Or   • LORazepam (ATIVAN) injection 1 mg  1 mg Intramuscular Q4H PRN   • levothyroxine tablet 25 mcg  25 mcg Oral Early Morning   • lithium carbonate capsule 300 mg  300 mg Oral BID   • LORazepam (ATIVAN) tablet 1 mg  1 mg Oral Q6H PRN    Or   • LORazepam (ATIVAN) injection 2 mg  2 mg Intramuscular Q6H PRN Max 3/day   • [START ON 5/30/2023] metoprolol succinate (TOPROL-XL) 24 hr tablet 50 mg  50 mg Oral Daily   • nicotine (NICODERM CQ) 21 mg/24 hr TD 24 hr patch 1 patch  1 patch Transdermal Daily   • OLANZapine (ZyPREXA) tablet 10 mg  10 mg Oral Q3H PRN Max 3/day    Or   • OLANZapine (ZyPREXA) IM injection 10 mg  10 mg Intramuscular Q3H PRN Max 3/day   • OLANZapine (ZyPREXA) tablet 5 mg  5 mg Oral Q3H PRN Max 6/day    Or   • OLANZapine (ZyPREXA) IM injection 5 mg  5 mg Intramuscular Q3H PRN Max 6/day   • OLANZapine (ZyPREXA) tablet 10 mg  10 mg Oral BID   • OLANZapine (ZyPREXA) tablet 2 5 mg  2 5 mg Oral Q3H PRN Max 8/day   • polyethylene glycol (MIRALAX) packet 17 g  17 g Oral Daily PRN   • tamsulosin (FLOMAX) capsule 0 4 mg  0 4 mg Oral Daily With Dinner   • traZODone (DESYREL) tablet 100 mg  100 mg Oral HS PRN       Current Facility-Administered Medications   Medication Dose Route Frequency Provider Last Rate   • acetaminophen  650 mg Oral Q6H PRN CARLOS Fabian     • acetaminophen  650 mg Oral Q4H PRN [facility] CARLOS WHITMAN     • acetaminophen  975 mg Oral Q6H PRN CARLOS Fabian     • aluminum-magnesium hydroxide-simethicone  30 mL Oral Q4H PRN CARLOS Fabian     • bacitracin  1 small application Topical BID Funmi Milian PA-C     • benztropine  1 mg Oral Q6H PRN CARLOS Fabian     • [START ON 6/20/2023] cholecalciferol  1,000 Units Oral Daily Funmi Milian PA-C     • cyanocobalamin  1,000 mcg Oral Daily Funmi Milian PA-C     • divalproex sodium  750 mg Oral HS Liz Centeno MD     • ergocalciferol  50,000 Units Oral Weekly Funmi Milian PA-C     • hydrOXYzine HCL  25 mg Oral Q6H PRN Max 4/day CARLOS Fabian     • hydrOXYzine HCL  50 mg Oral Q4H PRN Max 4/day CARLOS Fabian      Or   • LORazepam  1 mg Intramuscular Q4H PRN CARLOS Fabian     • levothyroxine  25 mcg Oral Early Morning Funmi Milian PA-C     • lithium carbonate  300 mg Oral BID CARLOS Cook     • LORazepam  1 mg Oral Q6H PRN CARLOS Fabian      Or   • LORazepam  2 mg Intramuscular Q6H PRN Max 3/day CARLOS Fabian     • [START ON 5/30/2023] metoprolol succinate  50 mg Oral Daily Funmi Milian PA-C     • nicotine  1 patch Transdermal Daily [employer] CARLOS Flanagan     • OLANZapine  10 mg Oral Q3H PRN Max 3/day [employer] MedCARLOS frazier      Or   • OLANZapine  10 mg Intramuscular Q3H PRN Max 3/day [employer] CARLOS Flanagan     • OLANZapine  5 mg Oral Q3H PRN Max 6/day [employer] CARLOS Flanagan      Or   • OLANZapine  5 mg Intramuscular Q3H PRN Max 6/day Melissa CARLOS Hernandes     • OLANZapine  10 mg Oral BID [facility] Vin Navarrete MD     • OLANZapine  2 5 mg Oral Q3H PRN Max 8/day [employer] CARLOS Flanagan     • polyethylene glycol  17 g Oral Daily PRN [employer] CARLOS Flanagan     • tamsulosin  0 4 mg Oral Daily With Dinner Funmi Milian PA-C     • traZODone  100 mg Oral HS PRN CARLOS Nieto         Labs: I have personally reviewed all pertinent laboratory/tests results  Most Recent Labs:     Results from the past 24 hours: No results found for this or any previous visit (from the past 24 hour(s))    Most Recent Labs:   Lab Results   Component Value Date    ALB 4 2 05/27/2023    ALKPHOS 34 05/27/2023    ALT 34 05/27/2023    AMMONIA 51 02/06/2021    AST 41 (H) 05/27/2023    BUN 15 05/27/2023    CALCIUM 9 3 05/27/2023    CHOLESTEROL 139 05/27/2023     05/27/2023    CO2 30 05/27/2023    CREATININE 0 93 05/27/2023     05/27/2023    FREET4 1 46 (H) 05/25/2023    GLUC 94 05/27/2023    HCT 40 9 05/27/2023    HDL 39 (L) 05/27/2023    HGB 13 1 05/27/2023    HGBA1C 5 2 05/27/2023    K 3 8 05/27/2023    LDLCALC 76 05/27/2023    LITHIUM 0 5 (L) 05/25/2023    NEUTROABS 3 84 05/27/2023    NONHDLC 100 05/27/2023     05/27/2023    RBC 4 35 05/27/2023    RDW 13 1 05/27/2023    RPR Non-Reactive 11/03/2019    SODIUM 141 05/27/2023    TBILI 0 65 05/27/2023    TP 6 5 05/27/2023    TRIG 122 05/27/2023    [id number] 5 291 (H) 05/25/2023    VALPROICTOT 11 (L) 05/25/2023    WBC 6 89 05/27/2023     Last Laboratory Results with Depakote and/or Tegretol levels:   Lab Results   Component Value Date    ALB 4 2 05/27/2023    ALKPHOS 34 05/27/2023    ALT 34 05/27/2023    AST 41 (H) 05/27/2023    BUN 15 05/27/2023    CALCIUM 9 3 05/27/2023     05/27/2023    CO2 30 05/27/2023    CREATININE 0 93 05/27/2023    GLUC 94 05/27/2023    HCT 40 9 05/27/2023    HGB 13 1 05/27/2023    K 3 8 05/27/2023    NEUTROABS 3 84 05/27/2023     05/27/2023    RBC 4 35 05/27/2023    RDW 13 1 05/27/2023    SODIUM 141 05/27/2023    TBILI 0 65 05/27/2023    TP 6 5 05/27/2023    VALPROICTOT 11 (L) 05/25/2023    WBC 6 89 05/27/2023     Last Laboratory Results with Lithium level:   Lab Results   Component Value Date    BUN 15 05/27/2023    CALCIUM 9 3 05/27/2023     05/27/2023    CO2 30 05/27/2023    CREATININE 0 93 05/27/2023    GLUC 94 05/27/2023    K 3 8 05/27/2023    LITHIUM 0 5 (L) 05/25/2023    SODIUM 141 05/27/2023     Labs in last 72 hours:   Recent Labs     05/27/23  0551   ALB 4 2   ALKPHOS 34   ALT 34   AST 41*   BUN 15   CALCIUM 9 3   CHOLESTEROL 139      CO2 30   CREATININE 0 93   GLUC 94   HCT 40 9   HDL 39*   HGB 13 1   K 3 8   LDLCALC 76   NEUTROABS 3 84      RBC 4 35   RDW 13 1   SODIUM 141   TBILI 0 65   TP 6 5   TRIG 122   WBC 6 89     Admission Labs:   Admission on 05/26/2023   Component Date Value   • Sodium 05/27/2023 141    • Potassium 05/27/2023 3 8    • Chloride 05/27/2023 105    • CO2 05/27/2023 30    • ANION GAP 05/27/2023 6    • BUN 05/27/2023 15    • Creatinine 05/27/2023 0 93    • Glucose 05/27/2023 94    • Glucose, Fasting 05/27/2023 94    • Calcium 05/27/2023 9 3    • AST 05/27/2023 41 (H)    • ALT 05/27/2023 34    • Alkaline Phosphatase 05/27/2023 34    • Total Protein 05/27/2023 6 5    • Albumin 05/27/2023 4 2    • Total Bilirubin 05/27/2023 0 65    • eGFR 05/27/2023 85    • WBC 05/27/2023 6 89    • RBC 05/27/2023 4 35    • Hemoglobin 05/27/2023 13 1    • Hematocrit 05/27/2023 40 9    • MCV 05/27/2023 94    • MCH 05/27/2023 30 1    • MCHC 05/27/2023 32 0    • RDW 05/27/2023 13 1    • MPV 05/27/2023 12 2    • Platelets 75/66/2580 206    • nRBC 05/27/2023 0    • Neutrophils Relative 05/27/2023 57    • Immat GRANS % 05/27/2023 0    • Lymphocytes Relative 05/27/2023 32    • Monocytes Relative 05/27/2023 8    • Eosinophils Relative 05/27/2023 3    • Basophils Relative 05/27/2023 0    • Neutrophils Absolute 05/27/2023 3 84    • Immature Grans Absolute 05/27/2023 0 01    • Lymphocytes Absolute 05/27/2023 2 22    • Monocytes Absolute 05/27/2023 0 58    • Eosinophils Absolute 05/27/2023 0 21    • Basophils Absolute 05/27/2023 0 03    • Cholesterol 05/27/2023 139    • Triglycerides 05/27/2023 122    • HDL, Direct 05/27/2023 39 (L)    • LDL Calculated 05/27/2023 76    • Non-HDL-Chol (CHOL-HDL) 05/27/2023 100    • Hemoglobin A1C 05/27/2023 5 2    • EAG 05/27/2023 103    • Folate 05/27/2023 16 0    • Vit D, 25-Hydroxy 05/27/2023 26 0 (L)    • Vitamin B-12 05/27/2023 345    • Ventricular Rate 05/27/2023 90    • Atrial Rate 05/27/2023 90    • MN Interval 05/27/2023 158    • QRSD Interval 05/27/2023 84    • QT Interval 05/27/2023 400    • QTC Interval 05/27/2023 489    • P Axis 05/27/2023 78    • QRS Axis 05/27/2023 -1    • T Wave Axis 05/27/2023 45        Progress Toward Goals: working on accepting mental illness diagnosis    Risks / Benefits of Treatment:    Risks, benefits, and possible side effects of medications explained to patient  Patient has limited understanding of risks and benefits of treatment at this time, but agrees to take medications as prescribed  Counseling / Coordination of Care: Total floor / unit time spent today 20 minutes  Greater than 50% of total time was spent with the patient and / or family counseling and / or coordination of care  A description of counseling / coordination of care:  Patient's progress discussed with staff in treatment team meeting    Treatment plan, treatment progress and medication changes were reviewed with nursing staff, pharmacy service, and case management in interdisciplinary treatment team meeting  Medications, treatment progress and treatment plan reviewed with patient  Recent stressors including being homeless, limited support, social difficulties, everyday stressors, ongoing anxiety and chronic mental illness discussed with patient  Educated on importance of medication and treatment compliance  Reassurance and supportive therapy provided  Encouraged participation in milieu and group therapy on the unit        [place], [place] 05/29/23

## 2023-05-30 PROCEDURE — 99232 SBSQ HOSP IP/OBS MODERATE 35: CPT | Performed by: PSYCHIATRY & NEUROLOGY

## 2023-05-30 RX ORDER — DIVALPROEX SODIUM 500 MG/1
1000 TABLET, EXTENDED RELEASE ORAL
Status: DISCONTINUED | OUTPATIENT
Start: 2023-05-30 | End: 2023-05-31

## 2023-05-30 RX ADMIN — LORAZEPAM 1 MG: 1 TABLET ORAL at 04:25

## 2023-05-30 RX ADMIN — OLANZAPINE 10 MG: 10 TABLET, FILM COATED ORAL at 09:02

## 2023-05-30 RX ADMIN — LITHIUM CARBONATE 300 MG: 300 CAPSULE, GELATIN COATED ORAL at 09:02

## 2023-05-30 RX ADMIN — TAMSULOSIN HYDROCHLORIDE 0.4 MG: 0.4 CAPSULE ORAL at 15:47

## 2023-05-30 RX ADMIN — METOPROLOL SUCCINATE 50 MG: 50 TABLET, EXTENDED RELEASE ORAL at 09:02

## 2023-05-30 RX ADMIN — BACITRACIN ZINC 1 SMALL APPLICATION: 500 OINTMENT TOPICAL at 21:11

## 2023-05-30 RX ADMIN — OLANZAPINE 10 MG: 10 TABLET, FILM COATED ORAL at 17:39

## 2023-05-30 RX ADMIN — LORAZEPAM 1 MG: 1 TABLET ORAL at 15:00

## 2023-05-30 RX ADMIN — CYANOCOBALAMIN TAB 500 MCG 1000 MCG: 500 TAB at 09:02

## 2023-05-30 NOTE — PLAN OF CARE
Problem: CHONG  Goal: Will exhibit normal sleep and speech and no impulsivity  Description: INTERVENTIONS:  - Administer medication as ordered  - Set limits on impulsive behavior  - Make attempts to decrease external stimuli as possible  Outcome: Progressing     Problem: PSYCHOSIS  Goal: Will report no hallucinations or delusions  Description: Interventions:  - Administer medication as  ordered  - Every waking shifts and PRN assess for the presence of hallucinations and or delusions  - Assist with reality testing to support increasing orientation  - Assess if patient's hallucinations or delusions are encouraging self-harm or harm to others and intervene as appropriate  Outcome: Progressing     Problem: ANXIETY  Goal: Will report anxiety at manageable levels  Description: INTERVENTIONS:  - Administer medication as ordered  - Teach and encourage coping skills  - Provide emotional support  - Assess patient/family for anxiety and ability to cope  Outcome: Progressing

## 2023-05-30 NOTE — NURSING NOTE
Patient had intermittent awakenings  No distress noted during safety checks  q 7 minutes maintained

## 2023-05-30 NOTE — SOCIAL WORK
Sw attempted to meet with pt in exam room with RN, door open to complete psychosocial, ROIs  Pt denies current SI/HI/AVH/dep/anx, became irritable with sw, ended conversation, walked out of room  Pt declined to sign ROIs at this time  Pt presents delusional, bizarre, paranoid

## 2023-05-30 NOTE — NURSING NOTE
Patient visible on unit  Social with peers  Pleasant and cooperative  Mild confusion  Less disorganized  Denies any SI/HI, AV/H, depression or anxiety  Patient stated he has no clue as the reason he was admitted  Medication compliant

## 2023-05-30 NOTE — SOCIAL WORK
New 302, 303 peitions, rights, ACT 77 sent via secure email to [address] [email or web address] and [email or web address], requesting Friday hearing, pt does not understand rights at this time

## 2023-05-30 NOTE — PROGRESS NOTES
"Progress Note - Davonna Favorite Day 70 y o  male MRN: [id number]    Unit/Bed#: [facility] 218-02 Encounter: [id number]        Subjective:   Patient seen and examined at bedside after reviewing the chart and discussing the case with the caring staff  Patient has no acute complaints  Blood pressures noted to be elevated with most recent readings 146//93    Physical Exam   Vitals: Blood pressure 160/92, pulse 88, temperature 98 3 °F (36 8 °C), temperature source Temporal, resp  rate 18, height 5' 9\" (1 753 m), weight 90 7 kg (200 lb), SpO2 98 %  ,Body mass index is 29 53 kg/m²  Constitutional: Patient in no acute distress  HEENT: PERR, EOMI, MMM  Cardiovascular: Normal rate and regular rhythm  Pulmonary/Chest: Effort normal and breath sounds normal    Abdomen: Nondistended  Assessment/Plan:  Davonna Favorite Day is a(n) 70 y o  male with schizoaffective disorder      1  Cardiac with hx HTN, HLD  Increase metoprolol succinate to 50 mg daily from 25 mg daily  LDL 76   2  Tobacco abuse  NRT  3  Hypothyroidism  TSH 5 291, FT4 1 46  Start levothyroxine 25 mcg daily  Repeat TSH in 6 weeks  4  BPH  Continue Flomax 0 4 mg daily  5  Vitamin D deficiency  Patient started on vitamin D2 50,000 units weekly for 4 weeks followed by vitamin D3 1000 units daily  6  Vitamin B12 deficiency  Patient started on vitamin B12 supplement      "

## 2023-05-30 NOTE — NURSING NOTE
Patient pacing the hallway, kicking door of his room, observed of some tremors while talking to the patient,  And told this writer that he is trying best not to scream  offered prn medication, refused initially but came back to ask for it  Fajardo score is 25 and gave ativan 1 mg prn for severe anxiety  Will continue to monitor for effectiveness

## 2023-05-30 NOTE — PROGRESS NOTES
05/30/23 1030   Activity/Group Checklist   Group   (Self Reflection Art Therapy Processing)   Attendance Attended   Attendance Duration (min) Greater than 60   Interactions Interacted appropriately   Affect/Mood Appropriate;Bright;Calm   Goals Achieved Identified feelings; Identified triggers; Discussed coping strategies; Able to listen to others; Identified resources and support systems; Able to engage in interactions; Able to reflect/comment on own behavior;Able to manage/cope with feelings

## 2023-05-30 NOTE — PROGRESS NOTES
05/30/23   Team Meeting   Meeting Type Daily Rounds   Team Members Present   Team Members Present Physician;Nurse;   Physician Team Member Dr Brandi Mathur MD; HOSP DR RACHID CHAVEZ, [address] [place] [address]   Nursing Team Member Pillo CRAWFORD, [facility]   Social Work Team Member Poli Lozada; Poli Barrios   Patient/Family Present   Patient Present No   Patient's Family Present No     New 302, dx schizoaffective, bizarre, disorganized, med noncomp, aggressive, agitation, mild improvement, poor sleep, PRN 0425 effective, calmer, Depakote level 43

## 2023-05-30 NOTE — NURSING NOTE
Patient has been visible int he milieu  His speech is rambling and mumbled  Unable to uphold conversation with pt at this time- pt walks away from conversation mid sentence  His mood is labile  No episodes of anger noted thus far today- smiling frequently and laughing to himself  His appetite is good  He has been compliant with his scheduled medications  He offers no current complaints/ concerns  Plan of care continues  Q7 minute safety checks in progress

## 2023-05-30 NOTE — SOCIAL WORK
Sw attempted contact with pt to complete psychosocial, ROIs, pt resting comfortably in bed, sw to follow up at a later time

## 2023-05-30 NOTE — NURSING NOTE
Patient stated that the ativan 1 mg prn is effective  Patient has been able to stop pacing the hallway and was able to go back to bed

## 2023-05-30 NOTE — PROGRESS NOTES
05/30/23 0730   Activity/Group Checklist   Group   (Morning Meeting and Coffee)   Attendance Attended   Attendance Duration (min) 46-60   Interactions Interacted appropriately   Affect/Mood Appropriate;Calm   Goals Achieved Identified feelings; Discussed coping strategies

## 2023-05-31 PROCEDURE — 99232 SBSQ HOSP IP/OBS MODERATE 35: CPT | Performed by: PSYCHIATRY & NEUROLOGY

## 2023-05-31 RX ORDER — DIVALPROEX SODIUM 500 MG/1
500 TABLET, DELAYED RELEASE ORAL 2 TIMES DAILY
Status: DISCONTINUED | OUTPATIENT
Start: 2023-05-31 | End: 2023-06-01

## 2023-05-31 RX ADMIN — DIVALPROEX SODIUM 500 MG: 500 TABLET, DELAYED RELEASE ORAL at 17:22

## 2023-05-31 RX ADMIN — CYANOCOBALAMIN TAB 500 MCG 1000 MCG: 500 TAB at 08:34

## 2023-05-31 RX ADMIN — HYDROXYZINE HYDROCHLORIDE 50 MG: 50 TABLET, FILM COATED ORAL at 12:48

## 2023-05-31 RX ADMIN — TRAZODONE HYDROCHLORIDE 100 MG: 100 TABLET ORAL at 22:04

## 2023-05-31 RX ADMIN — DIVALPROEX SODIUM 500 MG: 500 TABLET, DELAYED RELEASE ORAL at 12:48

## 2023-05-31 RX ADMIN — METOPROLOL SUCCINATE 50 MG: 50 TABLET, EXTENDED RELEASE ORAL at 08:34

## 2023-05-31 RX ADMIN — OLANZAPINE 10 MG: 10 TABLET, FILM COATED ORAL at 08:34

## 2023-05-31 RX ADMIN — TAMSULOSIN HYDROCHLORIDE 0.4 MG: 0.4 CAPSULE ORAL at 17:23

## 2023-05-31 RX ADMIN — OLANZAPINE 10 MG: 10 TABLET, FILM COATED ORAL at 17:22

## 2023-05-31 NOTE — NURSING NOTE
Patient visible remains med compliant  Patient confused and labile mood  Remains bizarre and disorganized with conversation  Patient denies SI HI AVh depression and anxiety  Poor insight to disease  Patient refused hs dose of depakote stating he will throw them on the floor     No PRN utilized  Recent increase in hs dose of depakote to 1000mg and d/c lithium    Q 7 min safety checks main tained

## 2023-05-31 NOTE — PROGRESS NOTES
05/31/23   Team Meeting   Meeting Type Daily Rounds   Team Members Present   Team Members Present Physician;Nurse;   Physician Team Member Dr Amelie Bruner MD; HOSP DR RACHID CHAVEZ, [address] [place] [address]   Nursing Team Member [facility]   Social Work Team Member Cintia [place]; [place], [place]   Patient/Family Present   Patient Present No   Patient's Family Present No     Confused, labile, refused PM Depakote, poor insight, denies all psych symptoms, slept

## 2023-05-31 NOTE — SOCIAL WORK
Sw met with pt in pt's room to discuss Friday 303 hearing, pt presents irritable, labile, tangential, singing and dancing at times  Pt presents bizarre, confused about 303 hearing  Pt talked briefly about time served in [address] before stating to sing nursery rhymes

## 2023-05-31 NOTE — PROGRESS NOTES
05/31/23 1030   Activity/Group Checklist   Group   (Creative Writing Self Reflection)   Attendance Attended   Attendance Duration (min) Greater than 60   Interactions Disorganized interaction   Affect/Mood Appropriate   Goals Achieved Identified feelings; Able to listen to others; Able to engage in interactions

## 2023-05-31 NOTE — NURSING NOTE
Patient visible on unit, continues to be disorganized in thoughts and speech  Mood is labile, but no angry outbursts  Medication and meal compliant  Denies SI/HI/AVH and depression  Endorsed moderate anxiety this afternoon  See notes for PRN details  Continuous visual safety checks performed throughout the shift  All safety precautions maintained

## 2023-05-31 NOTE — PROGRESS NOTES
05/31/23 1250   Fajardo Anxiety Scale   Anxious Mood 4   Tension 3   Fears 3   Insomnia 0   Intellectual 2   Depressed Mood 1   Somatic Complaints: Muscular 0   Somatic Complaints: Sensory 0   Cardiovascular Symptoms 0   Respiratory Symptoms 0   Gastrointestinal Symptoms 0   Genitourinary Symptoms 3   Autonomic Symptoms 3   Behavior at Interview 3   Fajardo Anxiety Score 22   Patient came to nurses station and was angry and unable to focus  50mg Atarax given for [facility] on 22  Will monitor in one hour for effectiveness

## 2023-05-31 NOTE — PROGRESS NOTES
05/31/23 0730   Activity/Group Checklist   Group   (Morning Meeting and Coffee)   Attendance Attended   Attendance Duration (min) 46-60   Interactions Disorganized interaction   Affect/Mood Blunted/flat;Constricted   Goals Achieved Able to listen to others; Able to engage in interactions

## 2023-05-31 NOTE — PROGRESS NOTES
Progress Note - Teddy 2 K Day 70 y o  male MRN: @MRN   Unit/Bed#: [facility] [place] 218-02 Encounter: [id number]      Report from staff regarding this patient received and discussed, and records reviewed prior to seeing this patient  Behavior over the last 24 hours: unchanged  The patient was bizarre paranoid delusional, this writer suggested Gabrielle Gil to sign 201 initially, because the writer observed some improvement in the patient's mental state, however later on the patient's symptoms of psychosis became more evident and severe  At this point of time the patient has no insight into the need for treatment, and 302 was restarted  Patient remains agitated at times, argumentative, bizarre, confused and delusional today  Sleep: decreased  Appetite: fair  Medication side effects: No       Mental Status Evaluation:    Appearance:  disheveled   Mood:  dysphoric, irritable   Affect: constricted    Speech:  pressured   Thought Content:  paranoid delusions   Perceptual Disturbances: does not appear responding to internal stimuli   Risk Potential: Suicidal ideation - None, contracts for safety on the unit   Insight:  poor   Motor Activity: no abnormal movements       Laboratory results:  I have personally reviewed all pertinent laboratory results  Progress Toward Goals: no improvement    Assessment/Plan   Principal Problem:    Schizoaffective disorder, bipolar type (HCC)  Active Problems:    Cannabis abuse, continuous    Hypertension    Tobacco abuse    BPH (benign prostatic hyperplasia)    Hyperlipidemia    Umbilical hernia    Recommended Treatment: Continue antipsychotic medications and increase Depakote  After review of the medications was psychopharmacologist, decision was made to discontinue lithium and increase Depakote  Depakote was subtherapeutic  Planned medication and treatment changes: All current active medications have been reviewed    Continue treatment with group therapy, milieu therapy, "occupational therapy and medication management  ** Please Note: This note has been constructed using a voice recognition system  **      BMP: No results for input(s): \"BUN\", \"CL\", \"CO2\", \"K\", \"NA\" in the last 72 hours      Invalid input(s): \"CREA\", \"GLU\"  Vitals:    05/30/23 1900   BP: 153/90   Pulse: 87   Resp:    Temp:    SpO2:         Medication Administration - last 24 hours from 05/29/2023 2106 to 05/30/2023 2106       Date/Time Order Dose Route Action Action by     05/30/2023 0904 EDT nicotine (NICODERM CQ) 21 mg/24 hr TD 24 hr patch 1 patch 1 patch Transdermal Not Given Christen Sin LPN     44/00/4231 6911 EDT traZODone (DESYREL) tablet 100 mg 100 mg Oral Given Loulou Hurtado LPN     15/02/0490 5676 EDT LORazepam (ATIVAN) tablet 1 mg 1 mg Oral Given Anu Mcneill RN     05/30/2023 0425 EDT LORazepam (ATIVAN) tablet 1 mg 1 mg Oral Given Deion Bhardwaj RN     05/30/2023 1500 EDT LORazepam (ATIVAN) injection 2 mg -- Intramuscular See Alternative Anu Mcneill RN     05/30/2023 0425 EDT LORazepam (ATIVAN) injection 2 mg -- Intramuscular See Alternative Deion Bhardwaj RN     05/29/2023 2138 EDT divalproex sodium (DEPAKOTE ER) 24 hr tablet 750 mg 750 mg Oral Given Loulou Hurtado LPN     56/11/3959 7073 EDT tamsulosin (FLOMAX) capsule 0 4 mg 0 4 mg Oral Given Paige Byers RN     05/30/2023 0815 EDT levothyroxine tablet 25 mcg 25 mcg Oral Refused Deion Bhardwaj RN     05/30/2023 0902 EDT cyanocobalamin (VITAMIN B-12) tablet 1,000 mcg 1,000 mcg Oral Given Christen Sin LPN     68/60/0324 9860 EDT bacitracin topical ointment 1 small application 1 small application Topical Refused Rosibel Hay LPN     62/30/2042 1794 EDT bacitracin topical ointment 1 small application 1 small application Topical Given Loulou Hurtado, CLEMENTINEN     36/12/8520 5699 EDT lithium carbonate capsule 300 mg 300 mg Oral Given Christen Sin, CLEMENTINEN     23/66/2344 3006 EDT metoprolol succinate (TOPROL-XL) 24 hr tablet 50 mg 50 mg Oral Given " Katheryn Sahni LPN     82/66/9999 6713 EDT OLANZapine (ZyPREXA) tablet 10 mg 10 mg Oral Given María Rea RN     05/30/2023 0902 EDT OLANZapine (ZyPREXA) tablet 10 mg 10 mg Oral Given Katheryn Sahni LPN

## 2023-05-31 NOTE — SOCIAL WORK
303 hearing scheduled for Friday 6/2 8am per [email or web address]

## 2023-05-31 NOTE — SOCIAL WORK
Pt declined to complete remaining psychosocial, ROIs  Pt present irritable, labile; sw to follow up at a later time

## 2023-05-31 NOTE — PROGRESS NOTES
"  Progress Note - Teddy 2 K Day 70 y o  male MRN: @MRN   Unit/Bed#: [facility] 218-02 Encounter: [id number]      Report from staff regarding this patient received and discussed, and records reviewed prior to seeing this patient  Behavior over the last 24 hours: limited improvement  The patient was less agitated less angry still has disorganized pattern of thoughts, paranoid ideations  At times started to smile  Patient remains anxious, bizarre, confused, delusional, distracted and distressed today  Sleep: decreased  Appetite: fair  Medication side effects: No       Mental Status Evaluation:    Appearance:  dressed in hospital attire   Mood:  dysphoric, anxious   Affect: reactive, tearful, labile    Speech:  hypertalkative   Thought Content:  paranoid ideation   Perceptual Disturbances: does not appear responding to internal stimuli   Risk Potential: Suicidal ideation - None, contracts for safety on the unit   Insight:  impaired due to psychosis   Motor Activity: no abnormal movements       Laboratory results:  I have personally reviewed all pertinent laboratory results  Progress Toward Goals: no significant improvement    Assessment/Plan   Principal Problem:    Schizoaffective disorder, bipolar type (HCC)  Active Problems:    Cannabis abuse, continuous    Hypertension    Tobacco abuse    BPH (benign prostatic hyperplasia)    Hyperlipidemia    Umbilical hernia    Recommended Treatment: Continue treatment with antipsychotic medications, augmented by mood stabilizers  Planned medication and treatment changes: All current active medications have been reviewed  Continue treatment with group therapy, milieu therapy, occupational therapy and medication management  ** Please Note: This note has been constructed using a voice recognition system  **      BMP: No results for input(s): \"BUN\", \"CL\", \"CO2\", \"K\", \"NA\" in the last 72 hours      Invalid input(s): \"CREA\", \"GLU\"  Vitals:    05/31/23 " 0720   BP: 149/82   Pulse: 82   Resp: 18   Temp: 98 8 °F (37 1 °C)   SpO2: 98%        Medication Administration - last 24 hours from 05/30/2023 1256 to 05/31/2023 1256       Date/Time Order Dose Route Action Action by     05/31/2023 0833 EDT nicotine (NICODERM CQ) 21 mg/24 hr TD 24 hr patch 1 patch 1 patch Transdermal Not Given Alem Gallardo RN     05/31/2023 1248 EDT hydrOXYzine HCL (ATARAX) tablet 50 mg 50 mg Oral Given Alem Gallardo RN     05/31/2023 1248 EDT LORazepam (ATIVAN) injection 1 mg -- Intramuscular See Alternative Alem Gallardo RN     05/30/2023 1500 EDT LORazepam (ATIVAN) tablet 1 mg 1 mg Oral Given Goldy Dougherty RN     05/30/2023 1500 EDT LORazepam (ATIVAN) injection 2 mg -- Intramuscular See Alternative Goldy Dougherty RN     05/30/2023 1547 EDT tamsulosin (FLOMAX) capsule 0 4 mg 0 4 mg Oral Given Freida Fry RN     05/31/2023 0232 EDT levothyroxine tablet 25 mcg 25 mcg Oral Refused [place], [place]     40/79/3827 8149 EDT cyanocobalamin (VITAMIN B-12) tablet 1,000 mcg 1,000 mcg Oral Given Alem Gallardo RN     05/30/2023 2111 EDT bacitracin topical ointment 1 small application 1 small application Topical Given Freida Fry RN     05/31/2023 8759 EDT metoprolol succinate (TOPROL-XL) 24 hr tablet 50 mg 50 mg Oral Given Alem Gallardo RN     05/31/2023 6510 EDT OLANZapine (ZyPREXA) tablet 10 mg 10 mg Oral Given Alem Gallardo RN     05/30/2023 1739 EDT OLANZapine (ZyPREXA) tablet 10 mg 10 mg Oral Given Freida Fry RN     05/30/2023 2111 EDT divalproex sodium (DEPAKOTE ER) 24 hr tablet 1,000 mg 1,000 mg Oral Not Given Freida Fry RN     05/31/2023 1248 EDT divalproex sodium (DEPAKOTE) DR tablet 500 mg 500 mg Oral Given Alem Gallardo RN

## 2023-05-31 NOTE — PROGRESS NOTES
"Progress Note - Colon Guess Day 70 y o  male MRN: [id number]    Unit/Bed#: [facility] 218-02 Encounter: [id number]        Subjective:   Patient seen and examined at bedside after reviewing the chart and discussing the case with the caring staff  Patient has no acute complaints  Blood pressures noted to be improving with most recent readings 111//92    Physical Exam   Vitals: Blood pressure 149/82, pulse 82, temperature 98 8 °F (37 1 °C), temperature source Temporal, resp  rate 18, height 5' 9\" (1 753 m), weight 90 7 kg (200 lb), SpO2 98 %  ,Body mass index is 29 53 kg/m²  Constitutional: Patient in no acute distress  HEENT: PERR, EOMI, MMM  Cardiovascular: Normal rate and regular rhythm  Pulmonary/Chest: Effort normal and breath sounds normal    Abdomen: Nondistended  Assessment/Plan:  Colon Guess Day is a(n) 70 y o  male with schizoaffective disorder      1  Cardiac with hx HTN, HLD  Increase metoprolol succinate to 50 mg daily from 25 mg daily  LDL 76   2  Tobacco abuse  NRT  3  Hypothyroidism  TSH 5 291, FT4 1 46  Start levothyroxine 25 mcg daily  Repeat TSH in 6 weeks  4  BPH  Continue Flomax 0 4 mg daily  5  Vitamin D deficiency  Patient started on vitamin D2 50,000 units weekly for 4 weeks followed by vitamin D3 1000 units daily  6  Vitamin B12 deficiency  Patient started on vitamin B12 supplement    "

## 2023-06-01 PROCEDURE — 99232 SBSQ HOSP IP/OBS MODERATE 35: CPT | Performed by: PSYCHIATRY & NEUROLOGY

## 2023-06-01 RX ADMIN — LEVOTHYROXINE SODIUM 25 MCG: 25 TABLET ORAL at 04:56

## 2023-06-01 RX ADMIN — DIVALPROEX SODIUM 500 MG: 500 TABLET, DELAYED RELEASE ORAL at 17:38

## 2023-06-01 RX ADMIN — HYDROXYZINE HYDROCHLORIDE 50 MG: 50 TABLET, FILM COATED ORAL at 01:32

## 2023-06-01 RX ADMIN — TAMSULOSIN HYDROCHLORIDE 0.4 MG: 0.4 CAPSULE ORAL at 17:38

## 2023-06-01 RX ADMIN — OLANZAPINE 10 MG: 10 TABLET, FILM COATED ORAL at 04:13

## 2023-06-01 RX ADMIN — LORAZEPAM 2 MG: 2 INJECTION INTRAMUSCULAR; INTRAVENOUS at 18:19

## 2023-06-01 RX ADMIN — OLANZAPINE 10 MG: 10 TABLET, FILM COATED ORAL at 09:22

## 2023-06-01 RX ADMIN — LORAZEPAM 1 MG: 1 TABLET ORAL at 17:38

## 2023-06-01 RX ADMIN — OLANZAPINE 10 MG: 10 TABLET, FILM COATED ORAL at 17:38

## 2023-06-01 RX ADMIN — DIVALPROEX SODIUM 500 MG: 500 TABLET, DELAYED RELEASE ORAL at 09:22

## 2023-06-01 RX ADMIN — CYANOCOBALAMIN TAB 500 MCG 1000 MCG: 500 TAB at 09:22

## 2023-06-01 RX ADMIN — METOPROLOL SUCCINATE 50 MG: 50 TABLET, EXTENDED RELEASE ORAL at 09:22

## 2023-06-01 NOTE — NURSING NOTE
Patient is visible on the unit, withdrawn to self  Thoughts disorganized, speech is rapid & pressured  Cooperative & bright this evening  Requested Trazodone at 2205 to help him sleep  Denies SI/HI/AVH  Safety precautions maintained, Q 7 minute checks maintained

## 2023-06-01 NOTE — PROGRESS NOTES
06/01/23 1030   Activity/Group Checklist   Group   (Creative Expression Art Therapy)   Attendance Attended   Attendance Duration (min) Greater than 60   Interactions Disorganized interaction   Affect/Mood Wide  (hyperverbal)   Goals Achieved Identified feelings; Identified triggers; Discussed coping strategies; Able to listen to others; Able to engage in interactions

## 2023-06-01 NOTE — NURSING NOTE
Patient was awake pacing the hallways through out the night, trazodone was ineffective for him  PO zyprexa 10mg was effective for patient's agitation  Q 7 minute checks maintained

## 2023-06-01 NOTE — PROGRESS NOTES
"Progress Note - Cecille Graham 70 y o  male MRN: [id number]    Unit/Bed#: [facility] 218-02 Encounter: [id number]        Subjective:   Patient seen and examined at bedside after reviewing the chart and discussing the case with the caring staff  Patient has no acute complaints  Physical Exam   Vitals: Blood pressure 93/73, pulse 96, temperature 98 9 °F (37 2 °C), temperature source Temporal, resp  rate 18, height 5' 9\" (1 753 m), weight 90 7 kg (200 lb), SpO2 97 %  ,Body mass index is 29 53 kg/m²  Constitutional: Patient in no acute distress  HEENT: PERR, EOMI, MMM  Cardiovascular: Normal rate and regular rhythm  Pulmonary/Chest: Effort normal and breath sounds normal    Abdomen: Nondistended  Assessment/Plan:  Cecille Graham is a(n) 70 y o  male with schizoaffective disorder      1  Cardiac with hx HTN, HLD  Continue metoprolol succinate to 50 mg daily (incr 5/30/23)  LDL 76   2  Tobacco abuse  NRT  3  Hypothyroidism  TSH 5 291, FT4 1 46  Start levothyroxine 25 mcg daily  Repeat TSH in 6 weeks  4  BPH  Continue Flomax 0 4 mg daily  5  Vitamin D deficiency  Patient started on vitamin D2 50,000 units weekly for 4 weeks followed by vitamin D3 1000 units daily  6  Vitamin B12 deficiency  Patient started on vitamin B12 supplement  The patient was discussed with Dr Perry Malin and he is in agreement with the above note    "

## 2023-06-01 NOTE — SOCIAL WORK
Sw met with pt who was walking, talking to self in hallway  Pt declined to meet with sw to complete psychosocial, ROIs  Pt appeared bizarre, rambling about swimming in a creek and mud

## 2023-06-01 NOTE — NURSING NOTE
Gave patient 1mg ativan due to increased anxiety, pacing and yelling in room for estrada of 26 will monitor for effectiveness

## 2023-06-01 NOTE — NURSING NOTE
"Patient cont with high anxiety and stated he needed something else and that the previous med I gave didn't help and felt he was \"out of control\" patient was in room and yelling so gave patient 2mg ativan IM for estrada of 30 will monitor for effectiveness    "

## 2023-06-01 NOTE — PROGRESS NOTES
05/29/23 1330   Activity/Group Checklist   Group   (Self Assessment and Relapse Prevention Plan)   Attendance Attended   Attendance Duration (min) 31-45   Interactions Interacted appropriately   Affect/Mood Appropriate   Goals Achieved Identified feelings; Identified triggers; Identified relapse prevention strategies; Discussed coping strategies; Identified resources and support systems

## 2023-06-01 NOTE — NURSING NOTE
Patient overheard banging in his room & cursing to self loudly  Continues to be disorganized during conversation  Broset 3  PO Zyprexa 10mg administered  Will monitor effectiveness

## 2023-06-01 NOTE — PROGRESS NOTES
06/01/23    Team Meeting   Meeting Type Daily Rounds   Team Members Present   Team Members Present Physician;Nurse;   Physician Team Member Dr Roselia Cruz MD; HOSP DR RACHID CHAVEZ, [address] [place] [address]   Nursing Team Member Pillo CRAWFORD, [facility]   Social Work Team Member Poli Lozada; Poli Barrios   Patient/Family Present   Patient Present No   Patient's Family Present No     Poor sleep, disorganized, med comp, PRN frequent, irritable, loud, 303 hearing tomorrow 8am, declined to sign ROIs

## 2023-06-02 PROBLEM — F20.9 SCHIZOPHRENIA (HCC): Status: ACTIVE | Noted: 2019-11-02

## 2023-06-02 PROBLEM — H43.811 VITREOUS DEGENERATION, RIGHT EYE: Status: ACTIVE | Noted: 2023-06-02

## 2023-06-02 PROBLEM — H40.013 OPEN ANGLE WITH BORDERLINE FINDINGS, LOW RISK, BILATERAL: Status: ACTIVE | Noted: 2023-06-02

## 2023-06-02 PROCEDURE — 99232 SBSQ HOSP IP/OBS MODERATE 35: CPT | Performed by: PSYCHIATRY & NEUROLOGY

## 2023-06-02 RX ADMIN — TAMSULOSIN HYDROCHLORIDE 0.4 MG: 0.4 CAPSULE ORAL at 17:33

## 2023-06-02 RX ADMIN — DIVALPROEX SODIUM 750 MG: 250 TABLET, DELAYED RELEASE ORAL at 08:57

## 2023-06-02 RX ADMIN — METOPROLOL SUCCINATE 50 MG: 50 TABLET, EXTENDED RELEASE ORAL at 08:57

## 2023-06-02 RX ADMIN — DIVALPROEX SODIUM 750 MG: 250 TABLET, DELAYED RELEASE ORAL at 17:33

## 2023-06-02 RX ADMIN — OLANZAPINE 10 MG: 10 TABLET, FILM COATED ORAL at 17:33

## 2023-06-02 RX ADMIN — CYANOCOBALAMIN TAB 500 MCG 1000 MCG: 500 TAB at 08:57

## 2023-06-02 RX ADMIN — OLANZAPINE 10 MG: 10 TABLET, FILM COATED ORAL at 08:57

## 2023-06-02 NOTE — NURSING NOTE
Patient slept most of the evening & through the night, awoke early approx  0200  No distress noted  Continues with disorganized thoughts & pressured speech  Q 7 minute checks maintained

## 2023-06-02 NOTE — PROGRESS NOTES
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: [id number]    Unit/Bed#: [facility] 218-02 Encounter: [id number]        Subjective:   Patient seen and examined at bedside after reviewing the chart and discussing the case with the caring staff  Patient has no acute complaints  Physical Exam   Vitals: Blood pressure 143/82, pulse 95, temperature 98 °F (36 7 °C), temperature source Temporal, resp  rate 18, height 5' 9\" (1 753 m), weight 90 7 kg (200 lb), SpO2 98 %  ,Body mass index is 29 53 kg/m²  Constitutional: Patient in no acute distress  HEENT: PERR, EOMI, MMM  Cardiovascular: Normal rate and regular rhythm  Pulmonary/Chest: Effort normal and breath sounds normal    Abdomen: Nondistended  Assessment/Plan:  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

## 2023-06-02 NOTE — PROGRESS NOTES
"Progress Note - Teddy 2 K Day 70 y o  male MRN: @MRN   Unit/Bed#: [facility] 218-02 Encounter: [id number]      Report from staff regarding this patient received and discussed, and records reviewed prior to seeing this patient  Behavior over the last 24 hours: some improvement  Patient remains anxious, appropriate, bizarre, calm, cooperative with session, delusional, distracted and distressed today  Sleep: slept better  Appetite: fair  Medication side effects: No       Mental Status Evaluation:    Appearance:  dressed in hospital attire   Mood:  dysphoric, anxious   Affect: constricted    Speech:  increased rate, hypertalkative   Thought Content:  paranoid delusions   Perceptual Disturbances: no auditory hallucinations, no visual hallucinations, denies auditory hallucinations when asked, does not appear responding to internal stimuli   Risk Potential: Suicidal ideation - None, contracts for safety on the unit   Insight:  poor   Motor Activity: no abnormal movements       Laboratory results:  I have personally reviewed all pertinent laboratory results  Progress Toward Goals: limited improvement    Assessment/Plan   Principal Problem:    Schizoaffective disorder, bipolar type (HCC)  Active Problems:    Cannabis abuse, continuous    Hypertension    Tobacco abuse    BPH (benign prostatic hyperplasia)    Hyperlipidemia    Umbilical hernia    Recommended Treatment: The patient is continuing to have symptoms of psychosis, his mood is improving, however mood lability still persist   We will increase Depakote to 750 mg twice a day  We will check the level  Planned medication and treatment changes: All current active medications have been reviewed  Continue treatment with group therapy, milieu therapy, occupational therapy and medication management  ** Please Note: This note has been constructed using a voice recognition system   **      BMP: No results for input(s): \"BUN\", \"CL\", \"CO2\", \"K\", " "\"NA\" in the last 72 hours      Invalid input(s): \"CREA\", \"GLU\"  Vitals:    06/01/23 1900   BP: 108/58   Pulse: 91   Resp:    Temp:    SpO2:         Medication Administration - last 24 hours from 05/31/2023 2123 to 06/01/2023 2123       Date/Time Order Dose Route Action Action by     06/01/2023 0413 EDT OLANZapine (ZyPREXA) tablet 10 mg 10 mg Oral Given Eulene Cloud, RN     06/01/2023 0413 EDT OLANZapine (ZyPREXA) IM injection 10 mg -- Intramuscular See Alternative Eulene Cloud, RN     06/01/2023 9423 EDT nicotine (NICODERM CQ) 21 mg/24 hr TD 24 hr patch 1 patch 1 patch Transdermal Not Given Lianne 30     05/31/2023 2204 EDT traZODone (DESYREL) tablet 100 mg 100 mg Oral Given Eulene Cloud, RN     06/01/2023 0132 EDT hydrOXYzine HCL (ATARAX) tablet 50 mg 50 mg Oral Given Eulene Cloud, RN     06/01/2023 0132 EDT LORazepam (ATIVAN) injection 1 mg -- Intramuscular See Alternative Eulene Cloud, RN     06/01/2023 1819 EDT LORazepam (ATIVAN) tablet 1 mg -- Oral See Alternative [facility]     06/01/2023 1738 EDT LORazepam (ATIVAN) tablet 1 mg 1 mg Oral Given [facility]     06/01/2023 1819 EDT LORazepam (ATIVAN) injection 2 mg 2 mg Intramuscular Given [facility]     06/01/2023 1738 EDT LORazepam (ATIVAN) injection 2 mg -- Intramuscular See Alternative [facility]     06/01/2023 1738 EDT tamsulosin (FLOMAX) capsule 0 4 mg 0 4 mg Oral Given [facility]     06/01/2023 0600 EDT levothyroxine tablet 25 mcg -- Oral Canceled Entry Eulene Cloud, RN     06/01/2023 0456 EDT levothyroxine tablet 25 mcg 25 mcg Oral Given Eulene Cloud, RN     06/01/2023 4620 EDT cyanocobalamin (VITAMIN B-12) tablet 1,000 mcg 1,000 mcg Oral Given [facility]     06/01/2023 0096 EDT metoprolol succinate (TOPROL-XL) 24 hr tablet 50 mg 50 mg Oral Given [facility]     06/01/2023 1738 EDT OLANZapine (ZyPREXA) tablet 10 mg 10 mg Oral Given [facility]     06/01/2023 0922 EDT OLANZapine (ZyPREXA) tablet 10 mg 10 mg Oral Given " Lianne 30     06/01/2023 1738 EDT divalproex sodium (DEPAKOTE) DR tablet 500 mg 500 mg Oral Given [facility]     06/01/2023 3368 EDT divalproex sodium (DEPAKOTE) DR tablet 500 mg 500 mg Oral Given Lianne Smart

## 2023-06-02 NOTE — NURSING NOTE
"Met with patient this am in follow-up to concerns reported to this writer by patient's male peer  Notified patient of his behavior which is making some peers uncomfortable  Pt attentive and acknowledged stating, \"I am ignoring that burt, he acts like he is king mikhail\"  Pt admitted to racing/intrusive thoughts which occasionally make it difficult to control what he says and does  Offered positive reinforcement of decision to avoid peer  Offered support for control of behavior  Pt was hyperverbal during interaction but pleasant and controlled  Reviewed medication list at patient's request  Provided synthroid education, and printed ed at request  Pt thankful for conversation     "

## 2023-06-02 NOTE — PROGRESS NOTES
06/02/23 1030   Activity/Group Checklist   Group   ([employer])   Attendance Attended   Attendance Duration (min) Greater than 60   Interactions Interacted appropriately   Affect/Mood Appropriate;Bright   Goals Achieved Identified feelings; Identified triggers; Discussed coping strategies; Identified resources and support systems; Able to listen to others; Able to engage in interactions; Able to reflect/comment on own behavior;Able to manage/cope with feelings

## 2023-06-02 NOTE — NURSING NOTE
Patient visible on unit, social with staff  Medication and meal compliant  Patient showered  Patient denies SI/HI/AVH, depression and anxiety  Patient continues to speak in a disorganized way and is mumbled and often difficult to understand, but is pleasant and cooperative  Continuous visual safety checks performed throughout the shift  All safety precautions maintained

## 2023-06-02 NOTE — PROGRESS NOTES
06/02/23 0730   Activity/Group Checklist   Group   (Morning Meeting and Coffee)   Attendance Attended   Attendance Duration (min) 46-60   Interactions Interacted appropriately   Affect/Mood Appropriate   Goals Achieved Identified feelings; Able to listen to others; Able to engage in interactions

## 2023-06-02 NOTE — SOCIAL WORK
Sw attempted to meet with pt to discuss 303 hearing, pt resting in bed, sw to follow up with pt at a later time

## 2023-06-02 NOTE — PROGRESS NOTES
06/02/23   Team Meeting   Meeting Type Daily Rounds   Team Members Present   Team Members Present Physician;Nurse;   Physician Team Member Dr Messi Eason MD; HOSP DR RACHID CHAVEZ, [address] [facility] Team Member [facility]   Social Work Team Member Sheila Osorio [place]; Sophie [place]   Patient/Family Present   Patient Present No   Patient's Family Present No     303 this AM-results pending, poor sleep, disorganized, bizarre, irritable, inappropriate with staff  No improvements

## 2023-06-02 NOTE — SOCIAL WORK
Pt notified of 8am 303 hearing tomorrow; pt unsure if he wants to attend   Sw to follow up with pt tomorrow before hearing per pt request

## 2023-06-02 NOTE — SOCIAL WORK
Hearing Documentation    303 hearing held today;  in attendance:  Tressa Pena - ;  [address] office; staff psych; ;  pt declined attendance;  [phone] recommendation upheld for up to an additional 20 days of inpt treatment at [facility];  303 expires: 6/22/23

## 2023-06-02 NOTE — SOCIAL WORK
"Sw met with pt to review 303 hearing today 8am, pt became irritable, stating \"you are not making sense  You better get the fuck away from me before I start screaming and hurt you\"  Pt declined to attend hearing     "

## 2023-06-03 PROCEDURE — 99232 SBSQ HOSP IP/OBS MODERATE 35: CPT | Performed by: PSYCHIATRY & NEUROLOGY

## 2023-06-03 RX ORDER — LORAZEPAM 1 MG/1
1 TABLET ORAL
Status: DISCONTINUED | OUTPATIENT
Start: 2023-06-03 | End: 2023-06-06

## 2023-06-03 RX ORDER — LORAZEPAM 0.5 MG/1
0.5 TABLET ORAL 2 TIMES DAILY
Status: DISCONTINUED | OUTPATIENT
Start: 2023-06-03 | End: 2023-06-06

## 2023-06-03 RX ORDER — OLANZAPINE 5 MG/1
5 TABLET ORAL
Status: DISCONTINUED | OUTPATIENT
Start: 2023-06-03 | End: 2023-06-05

## 2023-06-03 RX ORDER — LORAZEPAM 0.5 MG/1
0.5 TABLET ORAL 2 TIMES DAILY
Status: DISCONTINUED | OUTPATIENT
Start: 2023-06-03 | End: 2023-06-03

## 2023-06-03 RX ADMIN — DIVALPROEX SODIUM 750 MG: 250 TABLET, DELAYED RELEASE ORAL at 08:34

## 2023-06-03 RX ADMIN — OLANZAPINE 10 MG: 10 TABLET, FILM COATED ORAL at 17:30

## 2023-06-03 RX ADMIN — OLANZAPINE 10 MG: 10 TABLET, FILM COATED ORAL at 08:34

## 2023-06-03 RX ADMIN — LORAZEPAM 1 MG: 1 TABLET ORAL at 21:31

## 2023-06-03 RX ADMIN — LORAZEPAM 1 MG: 1 TABLET ORAL at 14:41

## 2023-06-03 RX ADMIN — CYANOCOBALAMIN TAB 500 MCG 1000 MCG: 500 TAB at 08:34

## 2023-06-03 RX ADMIN — LORAZEPAM 0.5 MG: 0.5 TABLET ORAL at 10:26

## 2023-06-03 RX ADMIN — LORAZEPAM 1 MG: 1 TABLET ORAL at 02:31

## 2023-06-03 RX ADMIN — TAMSULOSIN HYDROCHLORIDE 0.4 MG: 0.4 CAPSULE ORAL at 17:31

## 2023-06-03 RX ADMIN — LORAZEPAM 0.5 MG: 0.5 TABLET ORAL at 20:45

## 2023-06-03 RX ADMIN — METOPROLOL SUCCINATE 50 MG: 50 TABLET, EXTENDED RELEASE ORAL at 08:34

## 2023-06-03 RX ADMIN — DIVALPROEX SODIUM 750 MG: 250 TABLET, DELAYED RELEASE ORAL at 17:30

## 2023-06-03 RX ADMIN — LEVOTHYROXINE SODIUM 25 MCG: 25 TABLET ORAL at 08:34

## 2023-06-03 RX ADMIN — OLANZAPINE 5 MG: 5 TABLET, FILM COATED ORAL at 21:31

## 2023-06-03 NOTE — NURSING NOTE
Patient is visible on the unit but withdrawn to self  Continues with disorganized thoughts & speech  Denies SI/HI/AVH  Q  7 minute checks maintained

## 2023-06-03 NOTE — PROGRESS NOTES
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: [id number]    Unit/Bed#: [facility] 224-02 Encounter: [id number]        Subjective:   Patient seen and examined at bedside after reviewing the chart and discussing the case with the caring staff  Patient has no acute complaints  Physical Exam   Vitals: Blood pressure 126/84, pulse 87, temperature 97 9 °F (36 6 °C), temperature source Temporal, resp  rate 19, height 5' 9\" (1 753 m), weight 92 7 kg (204 lb 6 4 oz), SpO2 95 %  ,Body mass index is 30 18 kg/m²  Constitutional: Patient in no acute distress  HEENT: PERR, EOMI, MMM  Cardiovascular: Normal rate and regular rhythm  Pulmonary/Chest: Effort normal and breath sounds normal    Abdomen: Nondistended  Assessment/Plan:  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

## 2023-06-03 NOTE — NURSING NOTE
Pt has been labile all shift, loud and tangential and disorganized  Pt accidentally set off his alarm because he knocked the doors together  Pt has anxiety and doctor prescribed ativan 1mg BID  Pt at times has been apologizing for his actions and stated that he's been unable to express himself his whole life and he wants to let it out sometimes  Pt has been very redirectable but has at times angered people in the milieu and had to be asked to step out and go to his room  Pt was compliant with meals and medications all shift although he asked what his medications were and asked to have them written down for him  Pt was reassessed after prn Ativan was administered and he was much calmer and was making a phone call, his voice is less pressured and he's much less agitated  Limit setting is being implemented to help pt control his impulses and has been effective  Continuous visual safety checks performed staggered q7 minutes  All safety precautions are in place  No acute concerns at this time  Plan of care continues

## 2023-06-03 NOTE — PROGRESS NOTES
"Progress Note - 6 [address] Christian Frandy Day 70 y o  male MRN: [id number]  Unit/Bed#: -02 Encounter: [id number]    Assessment/Plan   Principal Problem:    Schizophrenia ([address] )  Active Problems:    Cannabis abuse, continuous    Hypertension    Tobacco abuse    BPH (benign prostatic hyperplasia)    Hyperlipidemia    Umbilical hernia  Patient is extremely pressured and hyperverbal, difficult to interrupt or redirect in conversation  Display both nihilistic and Buddhist preoccupation and presently in a obvious manic state though not displaying any signs of physical aggression  Patient recently has Lithium discontinued a few days ago and is under going Depakote optimization with level set to be drawn on 6/5/23 and titrated accordingly  Mood stabilization is a present concerns as patient is adjusting to high blood level of Depakote  This provider dose wonder if Lithium might not be a more optimal choice given presentation and this will be evaluated once patient reaches therapeutic range  Nevertheless, will add night time dose of Zyprexa for further mood stabilization  Patient does well with as needed Ativan and a standing dose of this will be added during the day with slightly higher dose at night to assist with agitation  Patient has been scarring and triggering others on the unit and was warned about these behaviors  Patient and provider agreed that when it is perceived that his behaviors are escalating, he would like the opportunity to be redirected to his room for a \"time out  \" Report little to no awareness of his provoking of others  Patient is disorganized, tangential, and perseverative at times on various themes and his medications  Denies safety concerning symptoms but does appear internally stimulated/preoccupied  Has paranoia and disorganization in line with continued psychotic features  Endorses hearing demons though the vents  Will continue Depakote at current does without change   Will continue to " "monitor  Recommended Treatment:   1) Continue Depakote 750 mg PO BID for mood stabilization  Last VPA level subtherapeutic at 43  Redraw scheduled for 6/5/23 and will titrate accordingly  2) Consider switching back to Lithium if manic behaviors persist despite optimization of Depakote and Zyprexa  3) Continue Zyprexa 10 mg PO BID and add an additional 5 mg PO QHS for mood stabilization, agitation, and psychotic features  4) Start Ativan 0 5 mg PO BID and 1 mg PO QHS for agitation  5) Behavioral plan agreed upon with this provider and patient is aware that he might need to be redirected to his room occasionally from time to time for a \"time out  \"    Continue with group therapy, milieu therapy and occupational therapy  Continue frequent safety checks and vitals per unit protocol  Case discussed with treatment team   Risks, benefits and possible side effects of Medications: Risks, benefits, and possible side effects of medications have been explained to the patient, who verbalizes understanding    ------------------------------------------------------------    Subjective: Per nursing report, Nohemi Jackson has been somewhat cooperative on the unit and compliant with scheduled medications  Today, Nohemi Jackson is consenting for safety on the unit  He is disorganized in thought but states his mood and anxiety are controlled  Informs this provider he can hear demons through the vents, can hear wizards, and many other religiously theme topics  Denies VH or SI/HI though states he would defend himself if attacked  Does report hearing demons in the vents  Denies side effects to his medications or other physical symptoms  Does endorse poor sleep at night and just sitting on his chair \"relaxing\" but not sleeping for 4 of the last 5 days  Was agreeable to medication changes to help with evening issues  States he is eating without difficulty   Does voice understanding of being increased on Depakote and taken off Lithium but indicates he " "thinks Lithium would do a better job controlling his mood  Per nursing, patient urinated on floor, attempted to clear up with bed sheet, then hung bed sheet up to \"dry\" and set off unit alarm in the process  Staff does not believe patient was attempting to hang himself  Progress Toward Goals: significant regression    Psychiatric Review of Systems:  Behavior over the last 24 hours: regressed  Sleep: insomnia  Appetite: adequate  Medication side effects: insomnia, anxiety  ROS: Complete review of systems is negative except as noted above  Vital signs in last 24 hours:  Temp:  [98 4 °F (36 9 °C)-99 1 °F (37 3 °C)] 98 4 °F (36 9 °C)  HR:  [87-93] 87  Resp:  [18] 18  BP: (119-138)/(87-99) 138/99    Mental Status Exam:  Appearance:  alert, poor eye contact, appears older than stated age, marginal grooming/hygiene, overweight, bearded and long scraggly hair   Behavior:  limited cooperativity, guarded, evasive and argumentative, controlling, demanding at times   Motor: restless and fidgety, psychomotor agitation and normal gait and balance   Speech:  spontaneous, pressured, loud, hyperverbal and coherent   Mood: \"There are demons talking through the vents\"   Affect:  mood-congruent, expansive, labile, anxious and irritable   Thought Process:  disorganized, illogical, perseverative, circumstantial, tangential, flight of ideas   Thought Content: paranoid ideation, Spiritism preoccupation, nihilistic preoccupations   Perceptual disturbances: auditory hallucinations \"demons through the vents  \", visual hallucinations none and appears to be responding to internal stimuli   Risk Potential: No active or passive suicidal or homicidal ideation was verbalized during interview, Potential for aggression due to acute psychosis   Cognition: disoriented, memory impaired due to active psychosis, appears to be of average intelligence, impaired abstract reasoning, attention span appeared shorter than expected for age and cognition " not formally tested   Insight:  Poor   Judgment: Limited     Current Medications:  Current Facility-Administered Medications   Medication Dose Route Frequency Provider Last Rate   • acetaminophen  650 mg Oral Q6H PRN [place] Medei, CRNP     • acetaminophen  650 mg Oral Q4H PRN [place] Re[place] Medei, CRNP     • acetaminophen  975 mg Oral Q6H PRN [place] Re[place] Medei, CRNP     • aluminum-magnesium hydroxide-simethicone  30 mL Oral Q4H PRN [place] Re[place] Medei, CRNP     • benztropine  1 mg Oral Q6H PRN [place] Re[place] Medei, CRNP     • [START ON 6/20/2023] cholecalciferol  1,000 Units Oral Daily Funmi Milian PA-C     • cyanocobalamin  1,000 mcg Oral Daily Funmi Milian PA-C     • divalproex sodium  750 mg Oral BID Vernell Calixto MD     • ergocalciferol  50,000 Units Oral Weekly Funmi Milian PA-C     • hydrOXYzine HCL  25 mg Oral Q6H PRN Max 4/day [place] Re[place] Medei, CRNP     • hydrOXYzine HCL  50 mg Oral Q4H PRN Max 4/day [place] Re[place] Medei, CRNP      Or   • LORazepam  1 mg Intramuscular Q4H PRN [place] Re[place] Medei, CRNP     • levothyroxine  25 mcg Oral Early Morning Funmi Milian PA-C     • LORazepam  1 mg Oral Q6H PRN [place] Re[place] Medei, CRNP      Or   • LORazepam  2 mg Intramuscular Q6H PRN Max 3/day [place] Re[place] Medei, CRNP     • LORazepam  0 5 mg Oral BID Sherlie Marvel, DO     • LORazepam  1 mg Oral HS Sherlie Marvel, DO     • metoprolol succinate  50 mg Oral Daily Funmi Milian PA-C     • OLANZapine  10 mg Oral Q3H PRN Max 3/day [place] Re[place] Medei, CRNP      Or   • OLANZapine  10 mg Intramuscular Q3H PRN Max 3/day [place] Re[place] Medei, CRNP     • OLANZapine  5 mg Oral Q3H PRN Max 6/day [place] Re[place] Medei, CRNP      Or   • OLANZapine  5 mg Intramuscular Q3H PRN Max 6/day [facility] Medei, CRNP     • OLANZapine  10 mg Oral BID [facility], [facility]     • OLANZapine  2 5 mg Oral Q3H PRN Max 8/day CARLOS Quach     • OLANZapine  5 mg Oral HS Derrick Grier DO     • polyethylene glycol  17 g Oral Daily PRN CARLOS Adamson • tamsulosin  0 4 mg Oral Daily With Dinner Funmi Milian PA-C     • traZODone  100 mg Oral HS PRN CARLOS Carlos         Behavioral Health Medications: all current active meds have been reviewed  Changes as in plan section above  Laboratory results:  I have personally reviewed all pertinent laboratory/tests results  No results found for this or any previous visit (from the past 48 hour(s))       Alexandria Mauro DO

## 2023-06-03 NOTE — PROGRESS NOTES
Progress Note - Teddy 2 K Day 70 y o  male MRN: @MRN   Unit/Bed#: [facility] 094-43 Encounter: [id number]      Report from staff regarding this patient received and discussed, and records reviewed prior to seeing this patient  Behavior over the last 24 hours: slowly improving  Patient was continued to express disorganized thoughts, paranoid ideations but was even slightly cheerful at times while talking to the writer  Visible in the unit, no signs of aggression  Patient remains bizarre and calm today  Sleep: slept better  Appetite: fair  Medication side effects: No       Mental Status Evaluation:    Appearance:  dressed in hospital attire   Mood:  improved, dysphoric, anxious   Affect: reactive, brighter, constricted    Speech:  increased rate   Thought Content:  intrusive thoughts   Perceptual Disturbances: no auditory hallucinations, no visual hallucinations, denies auditory hallucinations when asked, does not appear responding to internal stimuli   Risk Potential: Suicidal ideation - None, contracts for safety on the unit   Insight:  impaired   Motor Activity: no abnormal movements       Laboratory results:  I have personally reviewed all pertinent laboratory results  Progress Toward Goals: no significant improvement    Assessment/Plan   Principal Problem:    Schizophrenia (HCC)  Active Problems:    Cannabis abuse, continuous    Hypertension    Tobacco abuse    BPH (benign prostatic hyperplasia)    Hyperlipidemia    Umbilical hernia    Recommended Treatment: Continue medication management with antipsychotic medication to treat the patient's psychotic disorder, supportive therapy was also provided, patient was receptive  Planned medication and treatment changes: All current active medications have been reviewed  Continue treatment with group therapy, milieu therapy, occupational therapy and medication management        ** Please Note: This note has been constructed using a voice "recognition system  **      BMP: No results for input(s): \"BUN\", \"CL\", \"CO2\", \"K\", \"NA\" in the last 72 hours      Invalid input(s): \"CREA\", \"GLU\"  Vitals:    06/02/23 1503   BP: (P) 119/87   Pulse: (P) 93   Resp: (P) 18   Temp: (P) 99 1 °F (37 3 °C)   SpO2: (P) 95%        Medication Administration - last 24 hours from 06/01/2023 2126 to 06/02/2023 2126       Date/Time Order Dose Route Action Action by     06/02/2023 0900 EDT nicotine (NICODERM CQ) 21 mg/24 hr TD 24 hr patch 1 patch 1 patch Transdermal Not Given Vaishali Mayer, ADRIEN     06/02/2023 1733 EDT tamsulosin (FLOMAX) capsule 0 4 mg 0 4 mg Oral Given Vaishali Mayer, ADRIEN     06/02/2023 8159 EDT levothyroxine tablet 25 mcg 25 mcg Oral Refused Flora Yancey, ADRIEN     06/02/2023 2834 EDT cyanocobalamin (VITAMIN B-12) tablet 1,000 mcg 1,000 mcg Oral Given Vaishali Mayer, RN     06/02/2023 5129 EDT metoprolol succinate (TOPROL-XL) 24 hr tablet 50 mg 50 mg Oral Given Vaishali Mayer, RN     06/02/2023 1733 EDT OLANZapine (ZyPREXA) tablet 10 mg 10 mg Oral Given Vaishali Mayer, RN     06/02/2023 0857 EDT OLANZapine (ZyPREXA) tablet 10 mg 10 mg Oral Given Vaishali Mayer, RN     06/02/2023 1733 EDT divalproex sodium (DEPAKOTE) DR tablet 750 mg 750 mg Oral Given Vaishali Mayer, RN     06/02/2023 1210 EDT divalproex sodium (DEPAKOTE) DR tablet 750 mg 750 mg Oral Given Vaishali Mayer, RN        "

## 2023-06-03 NOTE — NURSING NOTE
Pt observed in dining room anxious and agitated with peer on unit  Able to redirect  Mood elevated and labile  Pt redirected out of dining room and back to room  Administered ativan 1 mg po prn for [address] 25  Will evaluate effectiveness

## 2023-06-03 NOTE — NURSING NOTE
New orders for ativan 0 5 mg po BID  Pt verbalized understanding of medication education provided  Administered first dose as prescribed

## 2023-06-03 NOTE — NURSING NOTE
Pt had severe anxiety, unable to sleep, disorganized  Pt requested something, admin 1mg ativan @ 2438, upon reassessment @ 0330 ativan seemingly effective, pt appears to be asleep

## 2023-06-04 PROCEDURE — 99232 SBSQ HOSP IP/OBS MODERATE 35: CPT | Performed by: PHYSICIAN ASSISTANT

## 2023-06-04 RX ADMIN — METOPROLOL SUCCINATE 50 MG: 50 TABLET, EXTENDED RELEASE ORAL at 08:13

## 2023-06-04 RX ADMIN — DIVALPROEX SODIUM 750 MG: 250 TABLET, DELAYED RELEASE ORAL at 08:13

## 2023-06-04 RX ADMIN — OLANZAPINE 10 MG: 10 TABLET, FILM COATED ORAL at 08:13

## 2023-06-04 RX ADMIN — CYANOCOBALAMIN TAB 500 MCG 1000 MCG: 500 TAB at 08:13

## 2023-06-04 RX ADMIN — OLANZAPINE 10 MG: 10 TABLET, FILM COATED ORAL at 18:02

## 2023-06-04 RX ADMIN — LORAZEPAM 0.5 MG: 0.5 TABLET ORAL at 18:02

## 2023-06-04 RX ADMIN — TAMSULOSIN HYDROCHLORIDE 0.4 MG: 0.4 CAPSULE ORAL at 18:02

## 2023-06-04 RX ADMIN — LEVOTHYROXINE SODIUM 25 MCG: 25 TABLET ORAL at 06:14

## 2023-06-04 RX ADMIN — DIVALPROEX SODIUM 750 MG: 250 TABLET, DELAYED RELEASE ORAL at 18:02

## 2023-06-04 RX ADMIN — LORAZEPAM 0.5 MG: 0.5 TABLET ORAL at 08:13

## 2023-06-04 NOTE — NURSING NOTE
Pt present in the milieu during the duration of the shift and was pleasant, jovial, cooperative, helpful and had no altercations or negative behaviors other than being a little big rude in the morning to another RN  Pt was compliant with all medications, required no PRNs, denied anxiety and depression and reported no ah, vh, si, hi  Pt was logical in requests and responses, attended groups or attempted to remain in group, pt was visible, present on the unit at all times  Pt made phone calls and was calm and appropriate and pt is aware of all of his medications and was able to identify them while taking them  Pt is helpful to other peers and was social with select patients  Continuous visual safety checks performed staggered q7 minutes  All safety precautions are in place  No acute concerns at this time  Plan of care continues

## 2023-06-04 NOTE — PROGRESS NOTES
"Progress Note - Mikael Dhillon 67 Day 70 y o  male MRN: [id number]   Unit/Bed#: [facility] 224-02 Encounter: [id number]    Behavior over the last 24 hours: some improvement  Chart reviewed  Xavi Pickett seen alone in his room  States his mood is \"as good as it gets\" and he is \"worried about what is going to happen next\"  Wonders what he needs to do to be discharged  Becomes sad when he tells Rodney about death of nephew last year from heroin overdose    Seems more redirectable today  Sleep: ok  Appetite: fair  Medication side effects: \"tired\"  ROS: back pain    Mental Status Evaluation:    Appearance:  age appropriate   Behavior:  restless and fidgety   Speech:  hypertalkative, loud   Mood:  irritable   Affect:  labile   Thought Process:  disorganized, tangential   Associations: tangential associations   Thought Content:  paranoid ideation   Perceptual Disturbances: appears distracted   Risk Potential: Suicidal ideation - None at present  Homicidal ideation - None at present   Sensorium:  oriented to person, place and situation   Memory:  recent and remote memory: unable to assess due to lack of cooperation   Consciousness:  alert and awake   Attention: poor concentration and poor attention span   Insight:  poor   Judgment: limited   Gait/Station: normal gait/station   Motor Activity: no abnormal movements     Vital signs in last 24 hours:    Temp:  [97 9 °F (36 6 °C)-98 7 °F (37 1 °C)] 98 5 °F (36 9 °C)  HR:  [86-98] 98  Resp:  [18-19] 18  BP: ()/(71-92) 127/92    Laboratory results: I have personally reviewed all pertinent laboratory/tests results  Assessment/Plan   Principal Problem:    Schizophrenia ([facility] [address] )  Active Problems:    Cannabis abuse, continuous    Hypertension    Tobacco abuse    BPH (benign prostatic hyperplasia)    Hyperlipidemia    Umbilical hernia    Recommended Treatment:  Cont present treatment    Planned medication and treatment changes:  No change    Zyprexa increased and " ativan started yesterday  Cont zyprexa 10/15 mg, depakote 750 mg bid, ativan 0 5 mg bid and 1 mg q bedtime       All current active medications have been reviewed  Encourage group therapy, milieu therapy and occupational therapy  Behavioral Health checks every 7 minutes  Current Facility-Administered Medications   Medication Dose Route Frequency Provider Last Rate   • acetaminophen  650 mg Oral Q6H PRN Amita Ace Medei, CRNP     • acetaminophen  650 mg Oral Q4H PRN Amita Ace Medei, CRNP     • acetaminophen  975 mg Oral Q6H PRN Amita Ace Medei, CRNP     • aluminum-magnesium hydroxide-simethicone  30 mL Oral Q4H PRN Amita Ace Medei, CRNP     • benztropine  1 mg Oral Q6H PRN Amita Ace Medei, CRNP     • [START ON 6/20/2023] cholecalciferol  1,000 Units Oral Daily Funmi Milian PA-C     • cyanocobalamin  1,000 mcg Oral Daily Funmi Milian PA-C     • divalproex sodium  750 mg Oral BID Rishi Alcaraz MD     • ergocalciferol  50,000 Units Oral Weekly Funmi Milian PA-C     • hydrOXYzine HCL  25 mg Oral Q6H PRN Max 4/day Amita Ace Medei, CRNP     • hydrOXYzine HCL  50 mg Oral Q4H PRN Max 4/day Amita Ace Medei, CRNP      Or   • LORazepam  1 mg Intramuscular Q4H PRN Amita Ace Medei, CRNP     • levothyroxine  25 mcg Oral Early Morning Funmi Milian PA-C     • LORazepam  1 mg Oral Q6H PRN Amita Ace Medei, CRNP      Or   • LORazepam  2 mg Intramuscular Q6H PRN Max 3/day Amita Ace Medei, CRNP     • LORazepam  0 5 mg Oral BID Tamy Chan DO     • LORazepam  1 mg Oral HS Tamy Chan DO     • metoprolol succinate  50 mg Oral Daily Funmi Milian PA-C     • OLANZapine  10 mg Oral Q3H PRN Max 3/day Amita Ace Medei, CRNP      Or   • OLANZapine  10 mg Intramuscular Q3H PRN Max 3/day Amita Ace Medei, CRNP     • OLANZapine  5 mg Oral Q3H PRN Max 6/day Amita Ace Medei, CRNP      Or   • OLANZapine  5 mg Intramuscular Q3H PRN Max 6/day CARLOS Mo     • OLANZapine  10 mg Oral BID [facility] Thiago, MD     • OLANZapine  2 5 mg Oral Q3H PRN Max 8/day Valentino CARLOS Powell     • OLANZapine  5 mg Oral HS Arvilla Gal, DO     • polyethylene glycol  17 g Oral Daily PRN Valentino Aid Medei, CRNP     • tamsulosin  0 4 mg Oral Daily With Dinner Funmi Milian PA-C     • traZODone  100 mg Oral HS PRN Valentino Aid Medei, CRNP         Risks / Benefits of Treatment:    Risks, benefits, and possible side effects of medications explained to patient  Patient has limited understanding of risks and benefits of treatment at this time, but agrees to take medications as prescribed  Counseling / Coordination of Care: Total floor / unit time spent today 15 minutes  Greater than 50% of total time was spent with the patient and / or family counseling and / or coordination of care  A description of counseling / coordination of care:  Patient's progress discussed with staff in treatment team meeting  Medications, treatment progress and treatment plan reviewed with patient      Rosalinda Marie PA-C 06/04/23

## 2023-06-04 NOTE — PROGRESS NOTES
"Progress Note - Kanika Graham 70 y o  male MRN: [id number]    Unit/Bed#: [facility] 224-02 Encounter: [id number]        Subjective:   Patient seen and examined at bedside after reviewing the chart and discussing the case with the caring staff  Patient has no acute complaints  Physical Exam   Vitals: Blood pressure 95/71, pulse 86, temperature 98 7 °F (37 1 °C), temperature source Temporal, resp  rate 18, height 5' 9\" (1 753 m), weight 92 7 kg (204 lb 6 4 oz), SpO2 97 %  ,Body mass index is 30 18 kg/m²  Constitutional: Patient in no acute distress  HEENT: PERR, EOMI, MMM  Cardiovascular: Normal rate and regular rhythm  Pulmonary/Chest: Effort normal and breath sounds normal    Abdomen: Nondistended  Assessment/Plan:  Kanika Graham is a(n) 70 y o  male with schizoaffective disorder      1  Cardiac with hx HTN, HLD  Continue metoprolol succinate to 50 mg daily (incr 5/30/23)  LDL 76   2  Tobacco abuse  NRT  3  Hypothyroidism  TSH 5 291, FT4 1 46  Start levothyroxine 25 mcg daily  Repeat TSH in 6 weeks  4  BPH  Continue Flomax 0 4 mg daily  5  Vitamin D deficiency  Patient started on vitamin D2 50,000 units weekly for 4 weeks followed by vitamin D3 1000 units daily  6  Vitamin B12 deficiency  Patient started on vitamin B12 supplement    "

## 2023-06-04 NOTE — NURSING NOTE
Patient withdrawn to room  Patient labile, bizarre, disorganized but cooperative  Medication compliant  Patient seen talking to himself  Denies SI,HI,AVH, anxiety and depression  Safety checks continue Q 7 minutes

## 2023-06-04 NOTE — PLAN OF CARE
Problem: PSYCHOSIS  Goal: Will report no hallucinations or delusions  Description: Interventions:  - Administer medication as  ordered  - Every waking shifts and PRN assess for the presence of hallucinations and or delusions  - Assist with reality testing to support increasing orientation  - Assess if patient's hallucinations or delusions are encouraging self-harm or harm to others and intervene as appropriate  Outcome: Progressing     Problem: SLEEP DISTURBANCE  Goal: Will exhibit normal sleeping pattern  Description: Interventions:  -  Assess the patients sleep pattern, noting recent changes  - Administer medication as ordered  - Decrease environmental stimuli, including noise, as appropriate during the night  - Encourage the patient to actively participate in unit groups and or exercise during the day to enhance ability to achieve adequate sleep at night  - Assess the patient, in the morning, encouraging a description of sleep experience  Outcome: Progressing   See chart note

## 2023-06-05 LAB — VALPROATE SERPL-MCNC: 56 UG/ML (ref 50–100)

## 2023-06-05 PROCEDURE — 80164 ASSAY DIPROPYLACETIC ACD TOT: CPT | Performed by: PSYCHIATRY & NEUROLOGY

## 2023-06-05 PROCEDURE — 99232 SBSQ HOSP IP/OBS MODERATE 35: CPT | Performed by: HOSPITALIST

## 2023-06-05 RX ORDER — OLANZAPINE 15 MG/1
15 TABLET ORAL 2 TIMES DAILY
Status: DISCONTINUED | OUTPATIENT
Start: 2023-06-05 | End: 2023-06-06

## 2023-06-05 RX ORDER — RISPERIDONE 0.5 MG/1
0.5 TABLET, ORALLY DISINTEGRATING ORAL
Status: DISCONTINUED | OUTPATIENT
Start: 2023-06-05 | End: 2023-06-26 | Stop reason: HOSPADM

## 2023-06-05 RX ORDER — RISPERIDONE 2 MG/1
2 TABLET, ORALLY DISINTEGRATING ORAL
Status: DISCONTINUED | OUTPATIENT
Start: 2023-06-05 | End: 2023-06-06

## 2023-06-05 RX ORDER — RISPERIDONE 1 MG/1
1 TABLET, ORALLY DISINTEGRATING ORAL
Status: DISCONTINUED | OUTPATIENT
Start: 2023-06-05 | End: 2023-06-26 | Stop reason: HOSPADM

## 2023-06-05 RX ORDER — DIVALPROEX SODIUM 500 MG/1
1000 TABLET, DELAYED RELEASE ORAL EVERY 12 HOURS SCHEDULED
Status: DISCONTINUED | OUTPATIENT
Start: 2023-06-05 | End: 2023-06-12

## 2023-06-05 RX ADMIN — OLANZAPINE 10 MG: 10 TABLET, FILM COATED ORAL at 08:59

## 2023-06-05 RX ADMIN — LORAZEPAM 0.5 MG: 0.5 TABLET ORAL at 17:51

## 2023-06-05 RX ADMIN — DIVALPROEX SODIUM 1000 MG: 500 TABLET, DELAYED RELEASE ORAL at 20:25

## 2023-06-05 RX ADMIN — OLANZAPINE 10 MG: 10 INJECTION, POWDER, FOR SOLUTION INTRAMUSCULAR at 05:59

## 2023-06-05 RX ADMIN — OLANZAPINE 5 MG: 5 TABLET, FILM COATED ORAL at 11:38

## 2023-06-05 RX ADMIN — LORAZEPAM 0.5 MG: 0.5 TABLET ORAL at 08:59

## 2023-06-05 RX ADMIN — CYANOCOBALAMIN TAB 500 MCG 1000 MCG: 500 TAB at 08:58

## 2023-06-05 RX ADMIN — METOPROLOL SUCCINATE 50 MG: 50 TABLET, EXTENDED RELEASE ORAL at 08:58

## 2023-06-05 RX ADMIN — LORAZEPAM 1 MG: 1 TABLET ORAL at 21:16

## 2023-06-05 RX ADMIN — OLANZAPINE 15 MG: 15 TABLET, FILM COATED ORAL at 20:25

## 2023-06-05 RX ADMIN — ERGOCALCIFEROL 50000 UNITS: 1.25 CAPSULE ORAL at 08:59

## 2023-06-05 RX ADMIN — TAMSULOSIN HYDROCHLORIDE 0.4 MG: 0.4 CAPSULE ORAL at 16:16

## 2023-06-05 RX ADMIN — DIVALPROEX SODIUM 750 MG: 250 TABLET, DELAYED RELEASE ORAL at 08:58

## 2023-06-05 NOTE — PROGRESS NOTES
"Progress Note - [address] Frandy Day 70 y o  male MRN: [id number]  Unit/Bed#: -02 Encounter: [id number]    Assessment/Plan   Principal Problem:    Schizophrenia ([address] )  Active Problems:    Cannabis abuse, continuous    Hypertension    Tobacco abuse    BPH (benign prostatic hyperplasia)    Hyperlipidemia    Umbilical hernia      Behavior over the last 24 hours:  unchanged  Sleep: Poor  Appetite: normal  Medication side effects: No  ROS: no complaints and all other systems are negative    Alphia Cowden was seen today for psychiatric follow-up  He remains overtly psychotic, grandiose, and disorganized  Became agitated this morning and during group and required PRN Zyprexa x 2 doses; 1 IM and 1 PO  Has been compliant with medications  Often visible in the milieu and isolative to self  Unable to engage meaningfully in conversation  Upon termination of encounter, patient stated \"I'm going to need a body bag for someone  \" Sleep poor       Mental Status Evaluation:  Appearance:  bearded, disheveled and Hair in ponytail on forehead   Behavior:  Bizarre, distracted, agitated at times   Speech:  loud and Nonsensical at times   Mood:  irritable and labile   Affect:  increased in intensity and mood-congruent   Thought Process:  disorganized and illogical   Associations: loose associations   Thought Content:  Paranoid and bizarre delusions   Perceptual Disturbances: Appears internally preoccupied   Risk Potential: Suicidal Ideations none  Homicidal Ideations none  Potential for Aggression Yes due to paranoia, agitation, mood lability   Sensorium:  person   Memory:  recent and remote memory: unable to assess due to lack of cooperation   Consciousness:  alert and awake    Attention:  Poor attention/concentration   Insight:  impaired due to Psychosis   Judgment: impaired due to Psychosis   Gait/Station: normal gait/station, normal balance and slow   Motor Activity: no abnormal movements     Progress Toward Goals: " Unchanged  Remains psychotic and labile  Sleep is poor  Will further titrate Zyprexa 15 mg PO BID  Depakote level 56; drawn this morning  Will further titrate Depakote 1000 mg PO BID and repeat level 6/9/2023  No discharge date at this time  Recommended Treatment: Continue with group therapy, milieu therapy and occupational therapy  Risks, benefits and possible side effects of Medications:   Patient does not verbalize understanding at this time and will require further explanation        Medications:   Increase Depakote 1000 mg PO BID; repeat VPA level 6/9/2023 at 8 AM  Increase Zyprexa to 15 mg PO BID  Change PRN medications for agitation to Risperdal  all current active meds have been reviewed and current meds:   Current Facility-Administered Medications   Medication Dose Route Frequency   • acetaminophen (TYLENOL) tablet 650 mg  650 mg Oral Q6H PRN   • acetaminophen (TYLENOL) tablet 650 mg  650 mg Oral Q4H PRN   • acetaminophen (TYLENOL) tablet 975 mg  975 mg Oral Q6H PRN   • aluminum-magnesium hydroxide-simethicone (MYLANTA) oral suspension 30 mL  30 mL Oral Q4H PRN   • benztropine (COGENTIN) tablet 1 mg  1 mg Oral Q6H PRN   • [START ON 6/20/2023] cholecalciferol (VITAMIN D3) tablet 1,000 Units  1,000 Units Oral Daily   • cyanocobalamin (VITAMIN B-12) tablet 1,000 mcg  1,000 mcg Oral Daily   • divalproex sodium (DEPAKOTE) DR tablet 1,000 mg  1,000 mg Oral Q12H [address]   • ergocalciferol (VITAMIN D2) capsule 50,000 Units  50,000 Units Oral Weekly   • hydrOXYzine HCL (ATARAX) tablet 25 mg  25 mg Oral Q6H PRN Max 4/day   • hydrOXYzine HCL (ATARAX) tablet 50 mg  50 mg Oral Q4H PRN Max 4/day    Or   • LORazepam (ATIVAN) injection 1 mg  1 mg Intramuscular Q4H PRN   • levothyroxine tablet 25 mcg  25 mcg Oral Early Morning   • LORazepam (ATIVAN) tablet 1 mg  1 mg Oral Q6H PRN    Or   • LORazepam (ATIVAN) injection 2 mg  2 mg Intramuscular Q6H PRN Max 3/day   • LORazepam (ATIVAN) tablet 0 5 mg  0 5 mg Oral BID • LORazepam (ATIVAN) tablet 1 mg  1 mg Oral HS   • metoprolol succinate (TOPROL-XL) 24 hr tablet 50 mg  50 mg Oral Daily   • OLANZapine (ZyPREXA) tablet 10 mg  10 mg Oral Q3H PRN Max 3/day    Or   • OLANZapine (ZyPREXA) IM injection 10 mg  10 mg Intramuscular Q3H PRN Max 3/day   • OLANZapine (ZyPREXA) tablet 5 mg  5 mg Oral Q3H PRN Max 6/day    Or   • OLANZapine (ZyPREXA) IM injection 5 mg  5 mg Intramuscular Q3H PRN Max 6/day   • OLANZapine (ZyPREXA) tablet 15 mg  15 mg Oral BID   • OLANZapine (ZyPREXA) tablet 2 5 mg  2 5 mg Oral Q3H PRN Max 8/day   • polyethylene glycol (MIRALAX) packet 17 g  17 g Oral Daily PRN   • tamsulosin (FLOMAX) capsule 0 4 mg  0 4 mg Oral Daily With Dinner   • traZODone (DESYREL) tablet 100 mg  100 mg Oral HS PRN     Labs: I have personally reviewed all pertinent laboratory/tests results  CBC:   Lab Results   Component Value Date    HCT 40 9 05/27/2023    HGB 13 1 05/27/2023    MCH 30 1 05/27/2023    MCHC 32 0 05/27/2023    MCV 94 05/27/2023    MPV 12 2 05/27/2023    NEUTROABS 3 84 05/27/2023     05/27/2023    RBC 4 35 05/27/2023    RDW 13 1 05/27/2023    WBC 6 89 05/27/2023     CMP:   Lab Results   Component Value Date    AGAP 6 05/27/2023    ALB 4 2 05/27/2023    ALKPHOS 34 05/27/2023    ALT 34 05/27/2023    AST 41 (H) 05/27/2023    BUN 15 05/27/2023    CALCIUM 9 3 05/27/2023     05/27/2023    CO2 30 05/27/2023    CREATININE 0 93 05/27/2023    EGFR 85 05/27/2023    GLUC 94 05/27/2023    GLUF 94 05/27/2023    K 3 8 05/27/2023    SODIUM 141 05/27/2023    TBILI 0 65 05/27/2023    TP 6 5 05/27/2023     Depakote:   Lab Results   Component Value Date    VALPROICTOT 56 06/05/2023       Counseling / Coordination of Care  Total floor / unit time spent today 25 minutes  Greater than 50% of total time was spent with the patient and / or family counseling and / or coordination of care

## 2023-06-05 NOTE — PROGRESS NOTES
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: [id number]    Unit/Bed#: [facility] 224-02 Encounter: [id number]        Subjective:   Patient seen and examined at bedside after reviewing the chart and discussing the case with the caring staff  Patient has no acute complaints  Physical Exam   Vitals: Blood pressure 117/68, pulse 105, temperature 97 9 °F (36 6 °C), temperature source Temporal, resp  rate 18, height 5' 9\" (1 753 m), weight 92 7 kg (204 lb 6 4 oz), SpO2 98 %  ,Body mass index is 30 18 kg/m²  Constitutional: Patient in no acute distress  HEENT: PERR, EOMI, MMM  Cardiovascular: Normal rate and regular rhythm  Pulmonary/Chest: Effort normal and breath sounds normal    Abdomen: Nondistended  Assessment/Plan:  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

## 2023-06-05 NOTE — PLAN OF CARE
Problem: Ineffective Coping  Goal: Participates in unit activities  Description: Interventions:  - Provide therapeutic environment   - Provide required programming   - Redirect inappropriate behaviors   Outcome: Progressing   See chart note

## 2023-06-05 NOTE — PROGRESS NOTES
06/05/23 1030   Activity/Group Checklist   Group   (Art Therapy Self Reflection Processing)   Attendance Attended   Attendance Duration (min) 16-30  (pt was in and out of group, pt was asked to leave group after verbal outburst)   Interactions Disorganized interaction   Affect/Mood Wide   Goals Achieved Able to engage in interactions     PT came to start of group where he sat and started to engage with peers appropriately and began to draw  PT then had an outburst as a peer was speaking, where pt pounded his fist on the table and then stuck his middle finger up at another peer and began to raise his voice  AT reminded pt that this was not acceptable or appropriate behavior for the group setting and pt was asked to leave the group and return to his room to allow himself to calm down  PT was compliant with this redirection  About 30 minutes later, pt did rejoin the group where he was once again more appropriate

## 2023-06-05 NOTE — SOCIAL WORK
[address] provided completed 303 results which were upheld via email, copy on pts chart, copy sent to scanning
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
## 2023-06-05 NOTE — NURSING NOTE
Patient visible on unit  Pacing the halls  Irritable about wanting to see his wallet as this writer showed him the belongings sheet and he was still not happy even though he did say that if we reviewed the belonging sheet he would be ok with it  Denies any SI/HI, AV/H, anxiety or depression  Medication compliant

## 2023-06-05 NOTE — NURSING NOTE
Patient visible on unit  Pacing coto  Yelling nonsensical statement  Zyprexa 1138 for increase agitation with positive request Schedule PO medication administered as ordered  Cooperative  with mouth checks Denies  HI, SI  Appetite good   Continue on safety check

## 2023-06-05 NOTE — NURSING NOTE
"Pt is visible on the unit  Non compliant with medication, refusing his HS medication stated \"I don't need those tonight  They refused to give me a cat scan now I'm refusing the pills  \" Would not explain why pt needed a cat scan  Observed pacing in and out his room talking to himself  Displays disorganized thoughts, paranoia, flight of ideas  Pattern of speech loud and pressured stated, \"You think you known, you don't know  I know people  They want me to go their way, but I go my own way  \" Pt observed talking on the phone multiple times, trying to call his mother  Pt is internally preoccupied  Mood irritable  Appearance disheveled  Currently in room  7 minute safety checks continued     "
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
## 2023-06-05 NOTE — NURSING NOTE
Patient unable to fall back to sleep  Periodically yelling out from his room but calmer overall  Began escalating again toward 0530  Barricaded self in room  Control team called  Patient's room forcibly entered  IM zyprexa 10 mg given in R deltoid  Patient cooperative with process  Currently in his room randomly yelling out    Broset score 5

## 2023-06-05 NOTE — SOCIAL WORK
Sw notified pt of 303 results, pt presents irritable with rambling speech  Pt voiced not remembering when hearing was, support provided  Pt unable to complete psychosocial, ROIs at this time due to current agitation, walking away from Sw after learning of 303 results

## 2023-06-05 NOTE — PROGRESS NOTES
06/05/23 0730   Activity/Group Checklist   Group   (Morning Meeting and Coffee)   Attendance Attended   Attendance Duration (min) 46-60   Interactions Disorganized interaction   Affect/Mood Wide   Goals Achieved Identified feelings; Able to engage in interactions
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
## 2023-06-06 PROCEDURE — 99232 SBSQ HOSP IP/OBS MODERATE 35: CPT | Performed by: PSYCHIATRY & NEUROLOGY

## 2023-06-06 RX ORDER — OLANZAPINE 10 MG/1
10 TABLET ORAL 3 TIMES DAILY
Status: DISCONTINUED | OUTPATIENT
Start: 2023-06-06 | End: 2023-06-07

## 2023-06-06 RX ORDER — TRAZODONE HYDROCHLORIDE 100 MG/1
100 TABLET ORAL
Status: DISCONTINUED | OUTPATIENT
Start: 2023-06-06 | End: 2023-06-26 | Stop reason: HOSPADM

## 2023-06-06 RX ORDER — LORAZEPAM 0.5 MG/1
0.5 TABLET ORAL 3 TIMES DAILY
Status: DISCONTINUED | OUTPATIENT
Start: 2023-06-06 | End: 2023-06-21

## 2023-06-06 RX ADMIN — OLANZAPINE 10 MG: 10 TABLET, FILM COATED ORAL at 20:38

## 2023-06-06 RX ADMIN — LORAZEPAM 0.5 MG: 1 TABLET ORAL at 20:38

## 2023-06-06 RX ADMIN — LORAZEPAM 2 MG: 2 INJECTION INTRAMUSCULAR; INTRAVENOUS at 04:45

## 2023-06-06 RX ADMIN — CYANOCOBALAMIN TAB 500 MCG 1000 MCG: 500 TAB at 09:19

## 2023-06-06 RX ADMIN — TAMSULOSIN HYDROCHLORIDE 0.4 MG: 0.4 CAPSULE ORAL at 17:19

## 2023-06-06 RX ADMIN — TRAZODONE HYDROCHLORIDE 100 MG: 100 TABLET ORAL at 20:38

## 2023-06-06 RX ADMIN — DIVALPROEX SODIUM 1000 MG: 500 TABLET, DELAYED RELEASE ORAL at 09:19

## 2023-06-06 RX ADMIN — RISPERIDONE 2 MG: 2 TABLET, ORALLY DISINTEGRATING ORAL at 04:14

## 2023-06-06 RX ADMIN — LEVOTHYROXINE SODIUM 25 MCG: 25 TABLET ORAL at 09:19

## 2023-06-06 RX ADMIN — OLANZAPINE 15 MG: 15 TABLET, FILM COATED ORAL at 09:19

## 2023-06-06 RX ADMIN — LORAZEPAM 0.5 MG: 1 TABLET ORAL at 17:19

## 2023-06-06 RX ADMIN — METOPROLOL SUCCINATE 50 MG: 50 TABLET, EXTENDED RELEASE ORAL at 09:19

## 2023-06-06 RX ADMIN — RISPERIDONE 1 MG: 1 TABLET, ORALLY DISINTEGRATING ORAL at 19:52

## 2023-06-06 RX ADMIN — OLANZAPINE 10 MG: 10 TABLET, FILM COATED ORAL at 17:19

## 2023-06-06 RX ADMIN — LORAZEPAM 0.5 MG: 0.5 TABLET ORAL at 09:19

## 2023-06-06 RX ADMIN — DIVALPROEX SODIUM 1000 MG: 500 TABLET, DELAYED RELEASE ORAL at 20:38

## 2023-06-06 NOTE — NURSING NOTE
Patient pacing on unit  Patient  Irritable, bizarre and mumbling,  yelling at times in his room  Patient then came to nurses station and said he felt like he was going to explode  Agitation score 35,  Risperdal 1 mg given at 1952  Denies any SI,HI,AVH, depression, endorses anxiety  Valporic acid level on 6/5 was in  Normal limits at 56  Will monitor  Safety checks continue Q 7 minutes

## 2023-06-06 NOTE — PROGRESS NOTES
06/06/23   Team Meeting   Meeting Type Daily Rounds   Team Members Present   Team Members Present Physician;Nurse;   Physician Team Member Dr Diana Conner MD; [address] [facility]   Nursing Team Member [facility]   Care Management Team Member Poli Lozada; Poli Barrios   Patient/Family Present   Patient Present No   Patient's Family Present No     Med comp, decompensating, second night-punching walls, slamming doors, shadow boxing in halls, yelling, cursing; given PRN x2, poor sleep, intrusive  Depakote level 56  No redirectable, makes peers uncomfortable, growling  Overnight nursing requests no roommate

## 2023-06-06 NOTE — PROGRESS NOTES
06/06/23 0730   Activity/Group Checklist   Group   (Morning Meeting and Coffee)   Attendance Attended   Attendance Duration (min) 31-45  (pt was in and out of group room)   Interactions Disorganized interaction   Affect/Mood Wide   Goals Achieved Able to engage in interactions

## 2023-06-06 NOTE — NURSING NOTE
Patient visible in the milieu  Irritable and labile  Patient was agitated in the morning, banging on walls, having verbal outbursts that were not intelligible, and slamming the room to his door  Patient became less labile in the afternoon, he was able to have a more linear conversation and his speech was clearer  Patient was medication and meal compliant  Patient was not cooperative during assessment questions and started talking about how he was going to use his earplugs as a bobber when he goes fishing next  Around 1800 patient began pacing on the unit, singing, and going in and out of his room  This writer offered a PRN, patient declined stating that he medication here will not be the same as he is going to get from the [facility]  This writer provided support and reassurance  Patients BP was 84/55 at 1519  He was asymptomatic  Hydration was encouraged and BP increased to 140/80 at 1759  Continuous visual safety checks performed throughout the shift  All safety precautions maintained

## 2023-06-06 NOTE — PROGRESS NOTES
06/06/23 1030   Activity/Group Checklist   Group   (Creative Expressions Art Therapy)   Attendance Attended   Attendance Duration (min) Greater than 60   Interactions Disorganized interaction   Affect/Mood Wide   Goals Achieved Able to listen to others; Able to engage in interactions

## 2023-06-06 NOTE — PROGRESS NOTES
"Progress Note - Davonna Favorite Day 70 y o  male MRN: [id number]    Unit/Bed#: [facility] 224-02 Encounter: [id number]        Subjective:   Patient seen and examined at bedside after reviewing the chart and discussing the case with the caring staff  Patient has no acute complaints  Physical Exam   Vitals: Blood pressure 149/90, pulse 94, temperature 98 7 °F (37 1 °C), temperature source Temporal, resp  rate 18, height 5' 9\" (1 753 m), weight 92 7 kg (204 lb 6 4 oz), SpO2 99 %  ,Body mass index is 30 18 kg/m²  Constitutional: Patient in no acute distress  HEENT: PERR, EOMI, MMM  Cardiovascular: Normal rate and regular rhythm  Pulmonary/Chest: Effort normal and breath sounds normal    Abdomen: Nondistended  Assessment/Plan:  Davonna Favorite Day is a(n) 70 y o  male with schizoaffective disorder      1  Cardiac with hx HTN, HLD  Continue metoprolol succinate to 50 mg daily (incr 5/30/23)  LDL 76   2  Tobacco abuse  NRT  3  Hypothyroidism  TSH 5 291, FT4 1 46  Start levothyroxine 25 mcg daily  Repeat TSH in 6 weeks  4  BPH  Continue Flomax 0 4 mg daily  5  Vitamin D deficiency  Patient started on vitamin D2 50,000 units weekly for 4 weeks followed by vitamin D3 1000 units daily  6  Vitamin B12 deficiency  Patient started on vitamin B12 supplement    "

## 2023-06-06 NOTE — NURSING NOTE
Patient awake around 0330  Patient out in the coto shadow boxing and going into his room slamming doors and furniture  Patient was told repeatedly to either stay in his room quietly or walk the halls without making so much noise  Patient unable to do as requested  Becoming more and more agitated and irritable  Patient agreeable to taking prn  Prn risperdal 2mg given at 0415

## 2023-06-06 NOTE — NURSING NOTE
Prns IM ativan effective  Patient is starting to settle down and is in his room  Banging and punching have ceased    Will continue to monitor

## 2023-06-06 NOTE — SOCIAL WORK
"Sw met with pt who was banging on CM door; pt presents irritable, tangential asking for paper work he signed today with Sw  Sw explained pt did not sign any paper work today; pt walked away cursing, motioning to strangling someone against wall stating \"I will start doing this to you and everyone around here if I don't get what I'm asking for\"  Support provided; unsuccessful; RN notified     "

## 2023-06-06 NOTE — PROGRESS NOTES
Progress Note - Bartme 2 K Day 70 y o  male MRN: [id number]  Unit/Bed#: [facility] 224-02 Encounter: [id number]    Assessment/Plan   Principal Problem:    Schizophrenia ([address] )  Active Problems:    Cannabis abuse, continuous    Hypertension    Tobacco abuse    BPH (benign prostatic hyperplasia)    Hyperlipidemia    Umbilical hernia      Behavior over the last 24 hours:  unchanged  Sleep: Poor  Appetite: normal  Medication side effects: No  ROS: no complaints and all other systems are negative    [place] was seen for psychiatric follow-up  Remains labile, irritable, and disorganized  Continues to exhibit impaired insight and judgment  Unable to meaningfully engage in provider assessment  Often visible in the milieu and minimally social with peers  This morning, patient exhibited increased agitation requiring utilization of IM lorazepam   Sleep is poor, otherwise has been compliant with medications and meals  Mental Status Evaluation:  Appearance:  bearded and disheveled   Behavior:  Bizarre, irritable, intrusive at times   Speech:  loud   Mood:  irritable   Affect:  increased in intensity, increased in range and mood-congruent   Thought Process:  disorganized and illogical   Associations: loose associations   Thought Content:  Paranoid and bizarre delusions   Perceptual Disturbances: Appears internally preoccupied   Risk Potential: Suicidal Ideations none  Homicidal Ideations none  Potential for Aggression Yes due to paranoia, agitation, mood lability   Sensorium:  person and place   Memory:  recent and remote memory: unable to assess due to lack of cooperation   Consciousness:  alert and awake    Attention:  Poor attention/concentration   Insight:  impaired due to Psychosis   Judgment: impaired due to Psychosis   Gait/Station: normal gait/station and normal balance   Motor Activity: no abnormal movements     Progress Toward Goals: Remains psychotic and labile  Sleep poor    Tolerating increase of Zyprexa and Depakote well; repeat VPA level due 6/9/2023  Will add trazodone 100 mg PO QHS with sleep and decrease lorazepam to 0 5 mg TID to reduce suspected paradoxical agitation  Zyprexa will also be changed to 3 times daily dosing to assist with agitation and psychosis throughout the day  Recommended Treatment: Continue with group therapy, milieu therapy and occupational therapy  Risks, benefits and possible side effects of Medications:   Patient does not verbalize understanding at this time and will require further explanation        Medications:   Start trazodone 100 mg PO QHS  Decrease lorazepam 0 5 mg PO TID  Change Zyprexa 10mg PO TID  all current active meds have been reviewed and current meds:   Current Facility-Administered Medications   Medication Dose Route Frequency   • acetaminophen (TYLENOL) tablet 650 mg  650 mg Oral Q6H PRN   • acetaminophen (TYLENOL) tablet 650 mg  650 mg Oral Q4H PRN   • acetaminophen (TYLENOL) tablet 975 mg  975 mg Oral Q6H PRN   • aluminum-magnesium hydroxide-simethicone (MYLANTA) oral suspension 30 mL  30 mL Oral Q4H PRN   • benztropine (COGENTIN) tablet 1 mg  1 mg Oral Q6H PRN   • [START ON 6/20/2023] cholecalciferol (VITAMIN D3) tablet 1,000 Units  1,000 Units Oral Daily   • cyanocobalamin (VITAMIN B-12) tablet 1,000 mcg  1,000 mcg Oral Daily   • divalproex sodium (DEPAKOTE) DR tablet 1,000 mg  1,000 mg Oral Q12H [address]   • ergocalciferol (VITAMIN D2) capsule 50,000 Units  50,000 Units Oral Weekly   • hydrOXYzine HCL (ATARAX) tablet 25 mg  25 mg Oral Q6H PRN Max 4/day   • hydrOXYzine HCL (ATARAX) tablet 50 mg  50 mg Oral Q4H PRN Max 4/day    Or   • LORazepam (ATIVAN) injection 1 mg  1 mg Intramuscular Q4H PRN   • levothyroxine tablet 25 mcg  25 mcg Oral Early Morning   • LORazepam (ATIVAN) tablet 1 mg  1 mg Oral Q6H PRN    Or   • LORazepam (ATIVAN) injection 2 mg  2 mg Intramuscular Q6H PRN Max 3/day   • LORazepam (ATIVAN) tablet 0 5 mg  0 5 mg Oral TID   • metoprolol succinate (TOPROL-XL) 24 hr tablet 50 mg  50 mg Oral Daily   • OLANZapine (ZyPREXA) tablet 10 mg  10 mg Oral TID   • polyethylene glycol (MIRALAX) packet 17 g  17 g Oral Daily PRN   • risperiDONE (RisperDAL M-TAB) disintegrating tablet 0 5 mg  0 5 mg Oral Q4H PRN Max 6/day   • risperiDONE (RisperDAL M-TAB) disintegrating tablet 1 mg  1 mg Oral Q4H PRN Max 4/day   • risperiDONE (RisperDAL M-TAB) disintegrating tablet 3 mg  3 mg Oral Q6H PRN Max 3/day   • tamsulosin (FLOMAX) capsule 0 4 mg  0 4 mg Oral Daily With Dinner   • traZODone (DESYREL) tablet 100 mg  100 mg Oral HS PRN   • traZODone (DESYREL) tablet 100 mg  100 mg Oral HS     Labs: I have personally reviewed all pertinent laboratory/tests results  CBC:   Lab Results   Component Value Date    HCT 40 9 05/27/2023    HGB 13 1 05/27/2023    MCH 30 1 05/27/2023    MCHC 32 0 05/27/2023    MCV 94 05/27/2023    MPV 12 2 05/27/2023    NEUTROABS 3 84 05/27/2023     05/27/2023    RBC 4 35 05/27/2023    RDW 13 1 05/27/2023    WBC 6 89 05/27/2023     CMP:   Lab Results   Component Value Date    AGAP 6 05/27/2023    ALB 4 2 05/27/2023    ALKPHOS 34 05/27/2023    ALT 34 05/27/2023    AST 41 (H) 05/27/2023    BUN 15 05/27/2023    CALCIUM 9 3 05/27/2023     05/27/2023    CO2 30 05/27/2023    CREATININE 0 93 05/27/2023    EGFR 85 05/27/2023    GLUC 94 05/27/2023    GLUF 94 05/27/2023    K 3 8 05/27/2023    SODIUM 141 05/27/2023    TBILI 0 65 05/27/2023    TP 6 5 05/27/2023     Depakote:   Lab Results   Component Value Date    VALPROICTOT 56 06/05/2023       Counseling / Coordination of Care  Total floor / unit time spent today 25 minutes  Greater than 50% of total time was spent with the patient and / or family counseling and / or coordination of care   A description of the counseling / coordination of care: Medication education, treatment plan

## 2023-06-06 NOTE — SOCIAL WORK
Pt came up to sw opening a door, became intrusive with poor physical boundaries asking sw about legal status  After a brief discussion on 303 status, pt walked away irritable unwilling to complete psychosocial, ROIs

## 2023-06-06 NOTE — NURSING NOTE
Prn risperdal ineffective  Patient behaviors becoming louder and angrier  Delusions becoming more apparent  Says he needs his anger to keep fighting the devil  Patient continuing to yell and punch walls and doors  Unable to redirect  Agreeable to IM medication  IM ativan 2mg given in L deltoid without incident   Will monitor for effectiveness

## 2023-06-07 PROCEDURE — 99232 SBSQ HOSP IP/OBS MODERATE 35: CPT | Performed by: PSYCHIATRY & NEUROLOGY

## 2023-06-07 RX ORDER — OLANZAPINE 10 MG/1
10 TABLET, ORALLY DISINTEGRATING ORAL DAILY
Status: DISCONTINUED | OUTPATIENT
Start: 2023-06-08 | End: 2023-06-19

## 2023-06-07 RX ADMIN — RISPERIDONE 1 MG: 1 TABLET, ORALLY DISINTEGRATING ORAL at 03:15

## 2023-06-07 RX ADMIN — LORAZEPAM 0.5 MG: 1 TABLET ORAL at 21:19

## 2023-06-07 RX ADMIN — DIVALPROEX SODIUM 1000 MG: 500 TABLET, DELAYED RELEASE ORAL at 08:18

## 2023-06-07 RX ADMIN — TAMSULOSIN HYDROCHLORIDE 0.4 MG: 0.4 CAPSULE ORAL at 17:47

## 2023-06-07 RX ADMIN — CYANOCOBALAMIN TAB 500 MCG 1000 MCG: 500 TAB at 08:18

## 2023-06-07 RX ADMIN — RISPERIDONE 1 MG: 1 TABLET, ORALLY DISINTEGRATING ORAL at 10:57

## 2023-06-07 RX ADMIN — OLANZAPINE 10 MG: 10 TABLET, FILM COATED ORAL at 08:18

## 2023-06-07 RX ADMIN — ACETAMINOPHEN 975 MG: 325 TABLET ORAL at 03:08

## 2023-06-07 RX ADMIN — RISPERIDONE 3 MG: 2 TABLET, ORALLY DISINTEGRATING ORAL at 23:07

## 2023-06-07 RX ADMIN — LORAZEPAM 0.5 MG: 1 TABLET ORAL at 17:46

## 2023-06-07 RX ADMIN — TRAZODONE HYDROCHLORIDE 100 MG: 100 TABLET ORAL at 21:20

## 2023-06-07 RX ADMIN — OLANZAPINE 25 MG: 5 TABLET, ORALLY DISINTEGRATING ORAL at 21:19

## 2023-06-07 RX ADMIN — LORAZEPAM 0.5 MG: 1 TABLET ORAL at 08:18

## 2023-06-07 RX ADMIN — DIVALPROEX SODIUM 1000 MG: 500 TABLET, DELAYED RELEASE ORAL at 21:19

## 2023-06-07 RX ADMIN — ACETAMINOPHEN 975 MG: 325 TABLET ORAL at 23:11

## 2023-06-07 RX ADMIN — LEVOTHYROXINE SODIUM 25 MCG: 25 TABLET ORAL at 05:16

## 2023-06-07 NOTE — NURSING NOTE
Patient visible on unit, his mood is irritable and disorganized  Patient has been ruminating about family issues and they seem to be the cause of his agitation today  This writer attempted to listen and support patient, but he retreated to his room when he became overwhelmed  Patient can be loud and disruptive, but will go to his room  Unable to assess SI/HI/AVH due to patients inability to express his feelings  Patient endorses depression and anxiety but is not able to identify coping skills  Patient is medication and meal compliant  Continuous visual safety checks performed throughout the shift  All safety precautions maintained

## 2023-06-07 NOTE — PROGRESS NOTES
06/07/23   Team Meeting   Meeting Type Daily Rounds   Team Members Present   Team Members Present Physician;Nurse;   Physician Team Member Dr Dorita López MD; Basil Bell   Nursing Team Member ADRIEN Hinojosa; ADRIEN Turner; Karely Mendoza, [facility]   Social Work Team Member Poli Lozada; Sophie; [place]   Patient/Family Present   Patient Present No   Patient's Family Present No     Irritable, labile, agitated, rambling to self, evened out until 6pm yest, ramped up, more controlled than past, no kicking/punching objects, PRNs x2 effective  Temporary no roommate as of right now, will re-evaluate him  Meds adjusted  Depakote level Friday due  Sw to provided 303 paperwork copy to pt

## 2023-06-07 NOTE — PROGRESS NOTES
"Progress Note - Marychuy Graham 70 y o  male MRN: [id number]    [facility] Encounter: [id number]        Subjective:   Patient seen and examined at bedside after reviewing the chart and discussing the case with the caring staff  Patient has no acute complaints  Physical Exam   Vitals: Blood pressure 106/81, pulse 98, temperature 97 7 °F (36 5 °C), temperature source Temporal, resp  rate 18, height 5' 9\" (1 753 m), weight 92 7 kg (204 lb 6 4 oz), SpO2 96 %  ,Body mass index is 30 18 kg/m²  Constitutional: Patient in no acute distress  HEENT: PERR, EOMI, neck supple  Cardiovascular: Normal rate  Pulmonary/Chest: No respiratory distress  Abdomen: Nondistended  Assessment/Plan:  Marychuy Graham is a(n) 70 y o  male with schizoaffective disorder      1  Cardiac with hx HTN, HLD  Continue metoprolol succinate to 50 mg daily (incr 5/30/23)  LDL 76   2  Tobacco abuse  NRT  3  Hypothyroidism  TSH 5 291, FT4 1 46  Levothyroxine 25 mcg daily  Repeat TSH in 6 weeks  4  BPH  Continue Flomax 0 4 mg daily  5  Vitamin D deficiency  Patient started on vitamin D2 50,000 units weekly for 4 weeks followed by vitamin D3 1000 units daily  6  Vitamin B12 deficiency  Patient started on vitamin B12 supplement  The patient was discussed with Dr Silverio Ramirez and he is in agreement with the above note    "

## 2023-06-07 NOTE — PROGRESS NOTES
06/06/23 1300   Activity/Group Checklist   Group   (Self Assessment and Relapse Prevention Planning)   Attendance Attended   Attendance Duration (min) 16-30   Interactions Disorganized interaction   Affect/Mood Wide   Goals Achieved Identified feelings; Identified triggers; Discussed coping strategies; Identified resources and support systems; Able to listen to others; Able to engage in interactions; Able to reflect/comment on own behavior;Able to manage/cope with feelings     AT met with PT to complete self assessment and relapse prevention plan  PT displayed an appropriate mood and affect to situation  PT appeared with disorganized thoughts and rambling speech at times, however was redirectable during interviewing  PT identified his biggest stressor leading to this admission as the  brought him here and he doesn't know why  His greatest challenge is his attitude and moving around his living environment  What pt likes most about life is being here right now and you  What pt likes least about life is he doesn't know  PT completed high school and some college for electronics  PT reports that he is currently unemployed, however does enjoy helping neighbors and friends with household chores  PT enjoys reading, laying in the sun, fishing, eating fish, music and singing  PT would like to learn more about improving his concentration and memory, communication skills and relaxation techniques while he is here in the hospital  PT was able to identify a friend as a positive support system  PT does attend select art therapy groups

## 2023-06-07 NOTE — NURSING NOTE
Patient awake, at nurses station mumbling to staff  Patient heard talking to himself in his room  Patient also pacing in room  C/O 9/10 b/l hip pain  Tylenol 975 mg given at [address]

## 2023-06-07 NOTE — DISCHARGE INSTR - OTHER ORDERS
You will discharge home to [address] [facility], [place], [address]; Phone: [phone]  Triggers you identified during your hospital stay that led to bizarre behavior with delusions included: Family stressors and noncompliance with medication  Coping skills you identified during your hospital stay include: Outdoor activities that include hiking, fishing, camping, hunting, and going for drives  After discharge, if you find your coping skills are not effective and you continue to feel distressed, please talk with trusted family members and / or contact your providers at the [facility] Crisis Hotline: [phone]  *[place] Alcohol Anonymous: [address] Drug and Alcohol Commision (737) [phone] on [address] [facility] ([facility]) HELPLINE: 868.935.5231/Website: www caroline org  *Substance Abuse and [address]) [employer], which is a confidential, free, 24-hour-a-day, 365-day-a-year, information service for individuals and family members facing mental health and/or substance use disorders  This service provides referrals to local treatment facilities, support groups, and community-based organizations  Callers can also order free publications and other information  Call 3-698.428.3244/Website: www [facility] gov  *[employer] 2-1-1: This is a toll free, confidential, 24-hour-a-day service which connects you to a community  in your area who can help you find services and resources that are available to you locally and provide critical services that can improve and save lives  Call: 80  /Website: https://vigilSongAfterquinones net/     You declined a follow up primary care provider appointment drug & alcohol treatment, and case management referral at this time  Charmayne Savers or Evangelina, our Annika and Yara, will be calling you after your discharge, on the phone number that you provided    They will be available as an additional support, if needed  If you wish to speak with one of them, you may contact Candace Bailon at [phone] or Jaylon Burnett at [phone]  [facility] Crisis Phone Number : [phone]  If you have no insurance for outpatient Mental Health services you will need to have a   liability appointment with [facility] to assess you to qualify for funding  [facility] does NOT provide funding for Medications      WALK IN HOURS:    Monday, Tuesday, Thursday  - 9:00am - 11:30am  Wednesday - 9:00am - 11:30am and 1:00pm - 3:00pm    Our address: [facility], [address] [place] [address], [facility], [address] [place] [address] Road   (Red brick building diagonally across from the new [employer] arena-entrance on [facility])  Our phone number: [phone]    To be seen during walk in hours, you must bring:  *** Photo identification  *** Social Security Card (if you do not have your SS card, then please bring something else                                              with your name and address listed on it )    If you have INCOME and do not have services through medical assistance (which may include food stamps, OB/GYN coverage, etc ) you must also bring:   Proof of income:  *** Current paystub from employer  *** SSD/SSI letter for the current year - or copy of bank statement  *** Unemployment compensation statement  *** Interest or dividends from banking accounts    If your net income is over $10,080, you must also bring:  *** Doctor/Hospital Bills that are paid or unpaid from within the last year  *** Pharmacy print out of prescriptions paid for the past year  *** Child support, alimony  *** Real Estate tax statement        Sincerely,     [facility]  Department of [facility] Melendez cristy  bro[facility] 30  [address] [address],8Th Floor 3  [facility], [address] Fax [address] [place] [address],4Th Floor [phone]  [address] [place] [address] [phone]    Medicaid, SNAP, TANF, [address]  HELP:  If you or someone you know has a drug or alcohol problem, there is help:  Dorothy 44: [address]: [phone]  An assessment is the first step  In addition to those listed there are other programs available in the area but assessment is best to determine an appropriate level of care  If you DO NOT have Medical Assistance (MA) or [employer], an assessment can be scheduled at one of these providers:  605 [facility] 13, [address]  [phone]   [facility]  [address] , [place], [address]  [facility] [place] 84  [address]  [facility]  [address]  721 [facility] [place], 105 [facility]   [address] , [place], [id number] [facility] , [place], 98 [facility]  812 [facility] , [address], [place], [address]  [phone]     If you [address], an assessment can be scheduled at one of these providers:  [facility] Alcohol & Drug Abuse  [address] , [place], 98 [facility]  [address]  [facility] 13, [address] [place]  [address]  D&A Intake Unit  620 [facility] [address] , 1st Floor, [facility], [address]  [phone]  [address], 300 [facility],6Th Floor, OS, 4420 [facility] 5555 [address] [facility]  [address] , [place], [address]  [facility] 84  100 [facility]  [phone]   NET [facility])  [address] [place] [address], [facility], [address]  197 [facility]  [address] [place] [address] [address], 105 [facility]   [address] , [place], 98 [facility]  [address] [facility] 19 [address] [place], 98 [facility]  [phone]   300 [facility] , [address], [place], [address]  [phone]     If you [facility], an assessment can be scheduled at one of these providers  Please contact these Providers to determine if they are in your network plan:  [facility] D&A Intake Unit  620 [facility] [address] , 1st Floor, [place], [address]  [address]  [address] , [place], [address]  [phone]   [address]  [facility]  [phone]   NET (\[facility]\"")  [address] 57 [facility], [place], [address]  197 [facility]  1755 [facility] 176 [facility]  [address]  [facility] , [address], [place], 10 [facility]  The [facility] ([address], [place], [address] [place] [address]) offers persons with a mental illness a safe, healing environment to explore their personal and vocational potential and receive support in achieving their goals  Instead of traditional therapy, the work of the house is the rehabilitation  As members contribute meaningful work to the house, they build confidence and a sense of purpose  Be a Member - It’s Free  Membership in the [facility] is open to any [place] resident who is 25 or older and has a history of mental illness  Membership is free for life  [facility] Guarantees  A right to have a place to come  A right to meaningful relationships  A right to meaningful work  A right to belong    [facility]  101 [facility], [address] [place] [address]  Hours: Monday - Friday: 8 a m  - 4 p m     With varying hours on holidays    [address] ([address], Þ[facility] [place]) provides a stress-free atmosphere for persons 18 and older who have experienced mental health issues  What The [address] [employer]  Games  Outings  Holiday gatherings  Recovery  Employment  Education  Community Resources  Empowerment  Helps participants achieve their goals  Enhances quality of life  Encourages leadership    The [address] [place]  Hours: Monday through Friday: 4 p m  - 9 p m  Saturday: 2 p m  - 8 p m    Closed Sundays  Varying hours on holidays    Contact [phone]     The [address] [place] [address] of [address] [address] Michael Botello, [place], 98 [facility]  [phone] [address]: [phone]  F: [address] [facility] [address]  P: [phone]  F: [phone]

## 2023-06-07 NOTE — PROGRESS NOTES
"Progress Note - Mikael Dhillon 67 Day 70 y o  male MRN: [id number]   Unit/Bed#: -02 Encounter: [id number]    Behavior over the last 24 hours: unchanged  Koko Liu is a 40-year-old male diagnosed with schizophrenia, hypertension, hyperlipidemia, nicotine and marijuana use seen today in follow-up  Per staff, did utilize Risperdal last evening due to yelling and psychosis  Initially he was on the phone, where he got frustrated and was seen afterwards in his room  He continues to remain fairly disorganized, bizarre and illogical   Mood remains labile and easily irritable  Would become frustrated and walk away from writer and then become questioning and demanding why writer was not following him  States that he is going to file a civil lawsuit  Appears visibly preoccupied       Sleep: slept off and on  Appetite: normal  Medication side effects: No   ROS: no complaints, all other systems are negative    Mental Status Evaluation:    Appearance:  disheveled, marginal hygiene   Behavior:  bizarre, uncooperative, demanding   Speech:  increased rate, mumbled   Mood:  \"fine\"   Affect:  flat   Thought Process:  disorganized, illogical   Associations: tangential associations   Thought Content:  paranoid ideation   Perceptual Disturbances: appears responding to internal stimuli   Risk Potential: Suicidal ideation - None at present  Homicidal ideation - None at present  Potential for aggression - No   Sensorium:  oriented to person, place and time/date   Memory:  recent and remote memory: unable to assess due to lack of cooperation   Consciousness:  alert and awake   Attention/Concentration: attention span and concentration appear shorter than expected for age   Insight:  poor   Judgment: poor   Gait/Station: normal gait/station   Motor Activity: no abnormal movements     Vital signs in last 24 hours:    Temp:  [97 7 °F (36 5 °C)-98 8 °F (37 1 °C)] 97 7 °F (36 5 °C)  HR:  [77-98] 98  Resp:  [18] 18  BP: " ()/(55-81) 106/81    Laboratory results: I have personally reviewed all pertinent laboratory/tests results    Results from the past 24 hours: No results found for this or any previous visit (from the past 24 hour(s))  Most Recent Labs:   Lab Results   Component Value Date    ALB 4 2 05/27/2023    ALKPHOS 34 05/27/2023    ALT 34 05/27/2023    AMMONIA 51 02/06/2021    AST 41 (H) 05/27/2023    BUN 15 05/27/2023    CALCIUM 9 3 05/27/2023    CHOLESTEROL 139 05/27/2023     05/27/2023    CO2 30 05/27/2023    CREATININE 0 93 05/27/2023     05/27/2023    FREET4 1 46 (H) 05/25/2023    GLUC 94 05/27/2023    HCT 40 9 05/27/2023    HDL 39 (L) 05/27/2023    HGB 13 1 05/27/2023    HGBA1C 5 2 05/27/2023    K 3 8 05/27/2023    LDLCALC 76 05/27/2023    LITHIUM 0 5 (L) 05/25/2023    NEUTROABS 3 84 05/27/2023    NONHDLC 100 05/27/2023     05/27/2023    RBC 4 35 05/27/2023    RDW 13 1 05/27/2023    RPR Non-Reactive 11/03/2019    SODIUM 141 05/27/2023    TBILI 0 65 05/27/2023    TP 6 5 05/27/2023    TRIG 122 05/27/2023    BST1BMJDBQSV 5 291 (H) 05/25/2023    VALPROICTOT 56 06/05/2023    WBC 6 89 05/27/2023       Progress Toward Goals: progressing    Assessment/Plan   Principal Problem:    Schizophrenia ([facility] [address] )  Active Problems:    Cannabis abuse, continuous    Hypertension    Tobacco abuse    BPH (benign prostatic hyperplasia)    Hyperlipidemia    Umbilical hernia      Recommended Treatment:     Planned medication and treatment changes:     All current active medications have been reviewed  Encourage group therapy, milieu therapy and occupational therapy  Behavioral Health checks every 7 minutes  Switch Zyprexa to Zydis to ensure compliance  Switch to BID dosing vs  TID - Will now be on Zydis 10 mg daily and 25 mg HS   DC planning ongoing; continues to require inpatient hospitalization for ongoing symptom management    Current Facility-Administered Medications   Medication Dose Route Frequency Provider Last Rate • acetaminophen  650 mg Oral Q6H PRN [facility] HOSP CARLOS WHITMAN     • acetaminophen  650 mg Oral Q4H PRN [facility] HOSP CARLOS WHITMAN     • acetaminophen  975 mg Oral Q6H PRN Miles [facility] Medei, CRNP     • aluminum-magnesium hydroxide-simethicone  30 mL Oral Q4H PRN Miles [facility] Medei, CRNP     • benztropine  1 mg Oral Q6H PRN Miles [facility], CRNP     • [START ON 6/20/2023] cholecalciferol  1,000 Units Oral Daily Funmi Milian PA-C     • cyanocobalamin  1,000 mcg Oral Daily Funmi Milian PA-C     • divalproex sodium  1,000 mg Oral Q12H [address] [facility], CRNP     • ergocalciferol  50,000 Units Oral Weekly Funmi Milian PA-C     • hydrOXYzine HCL  25 mg Oral Q6H PRN Max 4/day Miles [facility] Medei, CRNP     • hydrOXYzine HCL  50 mg Oral Q4H PRN Max 4/day [facility], CRM[facility]      Or   • LORazepam  1 mg Intramuscular Q4H PRN [facility], CRNP     • levothyroxine  25 mcg Oral Early Morning Funmi Milian PA-C     • LORazepam  1 mg Oral Q6H PRN Miles [facility], CRMILI      Or   • LORazepam  2 mg Intramuscular Q6H PRN Max 3/day [facility], CRNP     • LORazepam  0 5 mg Oral TID Farhad Mayer MD     • metoprolol succinate  50 mg Oral Daily Funmi Milian PA-C     • [START ON 6/8/2023] OLANZapine  10 mg Oral Daily Ge Hicks PA-C     • OLANZapine  25 mg Oral AIDEN Tiwari PA-C     • polyethylene glycol  17 g Oral Daily PRN [facility] Medei, CRNP     • risperiDONE  0 5 mg Oral Q4H PRN Max 6/day [facility], CRNP     • risperiDONE  1 mg Oral Q4H PRN Max 4/day [facility], CRNP     • risperiDONE  3 mg Oral Q6H PRN Max 3/day Farhad Mayer MD     • tamsulosin  0 4 mg Oral Daily With Dinner Funmi Milian PA-C     • traZODone  100 mg Oral HS PRN CARLOS Mai     • traZODone  100 mg Oral HS CARLOS Mai       Risks / Benefits of Treatment:    Risks, benefits, and possible side effects of medications explained to patient and patient verbalizes understanding and agreement for treatment  Counseling / Coordination of Care: Total floor / unit time spent today 20 minutes  Greater than 50% of total time was spent with the patient and / or family counseling and / or coordination of care  A description of counseling / coordination of care:  Patient's progress discussed with staff in treatment team meeting  Medications, treatment progress and treatment plan reviewed with patient      Sallie Hurd PA-C 06/07/23

## 2023-06-07 NOTE — PROGRESS NOTES
06/07/23 0730   Activity/Group Checklist   Group   (Morning Meeting and Coffee)   Attendance Attended   Attendance Duration (min) 46-60   Interactions Disorganized interaction  (hyperverbal)   Affect/Mood Wide   Goals Achieved Identified feelings; Able to listen to others; Able to engage in interactions
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
## 2023-06-07 NOTE — NURSING NOTE
PRN Risperidal 1mg was mildly effective  Patient continued to pace the unit, was tangential in conversation, and restless  Patient was less aggressive

## 2023-06-07 NOTE — SOCIAL WORK
"Sw met with pt for check in, pt declined to complete psychosocial, ROIs  303 copy provided to pt, pt became escalated while reviewing it, grabbed genital area stating \"I will show you my package and go into their rooms and show them too\"  Sw notified RN in nurses station, pt walked out of room, threw highlighter against nurses station towards sw breaking it  Pt presents irritable, tangential with rambling speech    "

## 2023-06-08 PROCEDURE — 99232 SBSQ HOSP IP/OBS MODERATE 35: CPT | Performed by: HOSPITALIST

## 2023-06-08 RX ORDER — PRAZOSIN HYDROCHLORIDE 1 MG/1
1 CAPSULE ORAL
Status: DISCONTINUED | OUTPATIENT
Start: 2023-06-08 | End: 2023-06-26 | Stop reason: HOSPADM

## 2023-06-08 RX ADMIN — TRAZODONE HYDROCHLORIDE 100 MG: 100 TABLET ORAL at 21:12

## 2023-06-08 RX ADMIN — LORAZEPAM 0.5 MG: 1 TABLET ORAL at 17:54

## 2023-06-08 RX ADMIN — METOPROLOL SUCCINATE 50 MG: 50 TABLET, EXTENDED RELEASE ORAL at 08:30

## 2023-06-08 RX ADMIN — RISPERIDONE 3 MG: 2 TABLET, ORALLY DISINTEGRATING ORAL at 13:24

## 2023-06-08 RX ADMIN — DIVALPROEX SODIUM 1000 MG: 500 TABLET, DELAYED RELEASE ORAL at 08:28

## 2023-06-08 RX ADMIN — LORAZEPAM 0.5 MG: 1 TABLET ORAL at 08:29

## 2023-06-08 RX ADMIN — RISPERIDONE 3 MG: 2 TABLET, ORALLY DISINTEGRATING ORAL at 06:03

## 2023-06-08 RX ADMIN — PRAZOSIN HYDROCHLORIDE 1 MG: 1 CAPSULE ORAL at 21:12

## 2023-06-08 RX ADMIN — ACETAMINOPHEN 975 MG: 325 TABLET ORAL at 21:21

## 2023-06-08 RX ADMIN — DIVALPROEX SODIUM 1000 MG: 500 TABLET, DELAYED RELEASE ORAL at 21:12

## 2023-06-08 RX ADMIN — CYANOCOBALAMIN TAB 500 MCG 1000 MCG: 500 TAB at 08:29

## 2023-06-08 RX ADMIN — OLANZAPINE 25 MG: 5 TABLET, ORALLY DISINTEGRATING ORAL at 21:11

## 2023-06-08 RX ADMIN — OLANZAPINE 10 MG: 10 TABLET, ORALLY DISINTEGRATING ORAL at 08:29

## 2023-06-08 RX ADMIN — LORAZEPAM 0.5 MG: 1 TABLET ORAL at 21:12

## 2023-06-08 RX ADMIN — ALUMINUM HYDROXIDE, MAGNESIUM HYDROXIDE, AND DIMETHICONE 30 ML: 200; 20; 200 SUSPENSION ORAL at 05:47

## 2023-06-08 RX ADMIN — LEVOTHYROXINE SODIUM 25 MCG: 25 TABLET ORAL at 08:29

## 2023-06-08 RX ADMIN — TAMSULOSIN HYDROCHLORIDE 0.4 MG: 0.4 CAPSULE ORAL at 17:54

## 2023-06-08 NOTE — NURSING NOTE
Patient psychotic and easily agitated  Patient disheveled in appearance  Staff support provided  Q 7 minute safety checks maintained  Patient rambling and mumbling during assessment  Patient unable to answer any questions clearly  Patient compliant with medications  Patient walks in and out of groups with no participation  Patient remains easily excitable but cooperative  Staff will continue to monitor and support
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
## 2023-06-08 NOTE — NURSING NOTE
Patient withdrawn to room  Patient irritable, bizarre and disorganized, mumbled and rambling speech  When going into his room, patient was sitting on his chair covered with a sheet with one eye peeking out  Patient delusional and  making hand gesturing  and whispering  When asked what was wrong patient whispered [facility], there out to get me, they will cut off my head  Emotional support given  Unable to assess SI,HI,AVH  Patient compliant with his nighttime medications

## 2023-06-08 NOTE — NURSING NOTE
Patient came out of room tearful and remorseful  Talked to staff member he rushed and was apologetic  Emotional support given
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
## 2023-06-08 NOTE — PROGRESS NOTES
06/08/23 0730   Activity/Group Checklist   Group   (Morning Meeting and Coffee)   Attendance Attended   Attendance Duration (min) 46-60   Interactions Disorganized interaction   Affect/Mood Wide   Goals Achieved Identified feelings; Able to listen to others; Able to engage in interactions

## 2023-06-08 NOTE — NURSING NOTE
Patient visible on unit  Patient disheveled, rambling, mumbling but pleasant  Patient bizarre and disorganized  Talks to self when in his room  Poor insight, hard to get answers during assessment  Safety checks continue Q 7 minutes

## 2023-06-08 NOTE — NURSING NOTE
Patient ws agitated, yelling, and slamming doors  Patient verbalized need for medication to calm down  Patient given risperdal 3mg for severe agitation  Patient was calmer and more controlled after PRN
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
## 2023-06-08 NOTE — PROGRESS NOTES
06/07/23 1330   Activity/Group Checklist   Group   (Creative Expressions)   Attendance Attended   Attendance Duration (min) 46-60   Interactions Disorganized interaction   Affect/Mood Appropriate   Goals Achieved Identified feelings; Able to listen to others; Able to engage in interactions

## 2023-06-08 NOTE — NURSING NOTE
Patient banging in his room  Upon trying to enter patient had his door barricaded with his mattress and was guarding himself with it  Patient talking about the war and them coming to get him cursing at staff  Increasing agitation noted  Broset score 4, agitation score 39  Risperdal 3 mg given  Patient also  C/O back pain 10/10, Tylenol 975 given at 2311

## 2023-06-08 NOTE — PLAN OF CARE
Problem: PSYCHOSIS  Goal: Will report no hallucinations or delusions  Description: Interventions:  - Administer medication as  ordered  - Every waking shifts and PRN assess for the presence of hallucinations and or delusions  - Assist with reality testing to support increasing orientation  - Assess if patient's hallucinations or delusions are encouraging self-harm or harm to others and intervene as appropriate  Outcome: Not Progressing

## 2023-06-08 NOTE — PROGRESS NOTES
"Progress Note - Navin Graham 70 y o  male MRN: [id number]    Unit/Bed#: [facility] 224-02 Encounter: [id number]        Subjective:   Patient seen and examined at bedside after reviewing the chart and discussing the case with the caring staff  Patient has no acute complaints  Physical Exam   Vitals: Blood pressure 102/81, pulse 105, temperature 98 5 °F (36 9 °C), temperature source Temporal, resp  rate 18, height 5' 9\" (1 753 m), weight 92 7 kg (204 lb 6 4 oz), SpO2 98 %  ,Body mass index is 30 18 kg/m²  Constitutional: Patient in no acute distress  HEENT: PERR, EOMI, neck supple  Cardiovascular: Normal rate  Pulmonary/Chest: No respiratory distress  Abdomen: Nondistended  Assessment/Plan:  Navin Graham is a(n) 70 y o  male with schizoaffective disorder      1  Cardiac with hx HTN, HLD  Continue metoprolol succinate to 50 mg daily (incr 5/30/23)  LDL 76   2  Tobacco abuse  NRT  3  Hypothyroidism  TSH 5 291, FT4 1 46  Levothyroxine 25 mcg daily  Repeat TSH in 6 weeks  4  BPH  Continue Flomax 0 4 mg daily  5  Vitamin D deficiency  Patient started on vitamin D2 50,000 units weekly for 4 weeks followed by vitamin D3 1000 units daily  6  Vitamin B12 deficiency  Patient started on vitamin B12 supplement  The patient was discussed with Dr Corinne Gillespie and he is in agreement with the above note    "

## 2023-06-08 NOTE — SOCIAL WORK
Sw attempted to meet with pt to complete psychosocial, ROIs  Pt presents irritable, tangential, declining to participate  Pt was cursing, verbally threatening sw with RN present; verbal redirection provided for pt to step back from sw with success

## 2023-06-08 NOTE — PROGRESS NOTES
06/08/23 1030   Activity/Group Checklist   Group   (Meditation and Relaxing Your Mind)   Attendance Attended   Attendance Duration (min) 31-45  (pt was in and out of group)   Interactions Disorganized interaction   Affect/Mood Wide   Goals Achieved Identified feelings; Discussed coping strategies; Able to listen to others; Able to engage in interactions; Able to reflect/comment on own behavior;Able to manage/cope with feelings

## 2023-06-08 NOTE — PROGRESS NOTES
06/08/23 1330   Activity/Group Checklist   Group   (Pet Therapy [facility])   Attendance Attended   Attendance Duration (min) 16-30   Interactions Disorganized interaction   Affect/Mood Wide   Goals Achieved Identified feelings no known allergies

## 2023-06-09 LAB — VALPROATE SERPL-MCNC: 69 UG/ML (ref 50–100)

## 2023-06-09 PROCEDURE — 80164 ASSAY DIPROPYLACETIC ACD TOT: CPT | Performed by: NURSE PRACTITIONER

## 2023-06-09 PROCEDURE — 99232 SBSQ HOSP IP/OBS MODERATE 35: CPT | Performed by: PSYCHIATRY & NEUROLOGY

## 2023-06-09 RX ORDER — GINSENG 100 MG
1 CAPSULE ORAL 2 TIMES DAILY
Status: DISCONTINUED | OUTPATIENT
Start: 2023-06-09 | End: 2023-06-26 | Stop reason: HOSPADM

## 2023-06-09 RX ADMIN — LORAZEPAM 0.5 MG: 1 TABLET ORAL at 21:20

## 2023-06-09 RX ADMIN — TRAZODONE HYDROCHLORIDE 100 MG: 100 TABLET ORAL at 21:20

## 2023-06-09 RX ADMIN — LEVOTHYROXINE SODIUM 25 MCG: 25 TABLET ORAL at 09:45

## 2023-06-09 RX ADMIN — TAMSULOSIN HYDROCHLORIDE 0.4 MG: 0.4 CAPSULE ORAL at 17:30

## 2023-06-09 RX ADMIN — BACITRACIN ZINC 1 SMALL APPLICATION: 500 OINTMENT TOPICAL at 17:31

## 2023-06-09 RX ADMIN — METOPROLOL SUCCINATE 50 MG: 50 TABLET, EXTENDED RELEASE ORAL at 09:45

## 2023-06-09 RX ADMIN — DIVALPROEX SODIUM 1000 MG: 500 TABLET, DELAYED RELEASE ORAL at 09:45

## 2023-06-09 RX ADMIN — CYANOCOBALAMIN TAB 500 MCG 1000 MCG: 500 TAB at 09:45

## 2023-06-09 RX ADMIN — OLANZAPINE 10 MG: 10 TABLET, ORALLY DISINTEGRATING ORAL at 09:44

## 2023-06-09 RX ADMIN — LORAZEPAM 0.5 MG: 1 TABLET ORAL at 09:44

## 2023-06-09 RX ADMIN — OLANZAPINE 25 MG: 5 TABLET, ORALLY DISINTEGRATING ORAL at 21:19

## 2023-06-09 RX ADMIN — PRAZOSIN HYDROCHLORIDE 1 MG: 1 CAPSULE ORAL at 21:19

## 2023-06-09 RX ADMIN — DIVALPROEX SODIUM 1000 MG: 500 TABLET, DELAYED RELEASE ORAL at 21:20

## 2023-06-09 RX ADMIN — LORAZEPAM 0.5 MG: 1 TABLET ORAL at 17:30

## 2023-06-09 NOTE — PROGRESS NOTES
06/09/23 1400   Activity/Group Checklist   Group   [employer])   Attendance Attended   Attendance Duration (min) 46-60   Interactions Disorganized interaction   Affect/Mood Wide   Goals Achieved Identified feelings; Identified triggers; Discussed coping strategies; Able to listen to others; Able to engage in interactions; Able to reflect/comment on own behavior
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
## 2023-06-09 NOTE — NURSING NOTE
"Patient c/o 10/10 generalized pain  Tylenol 975 given at 2121 with positive results  Patient in his room disorganized, when asked what was wrong he stated \" Fn War\" emotional support given    "

## 2023-06-09 NOTE — PROGRESS NOTES
06/09/23   Team Meeting   Meeting Type Daily Rounds   Team Members Present   Team Members Present Physician;Nurse;   Physician Team Member Dr Kinjal Ag MD; Jennifer Rouse, [facility]   Nursing Team Member [place], RN   Social Work Team [facility]; [facility]   Patient/Family Present   Patient Present No   Patient's Family Present No     Depakote level due today, disheveled, rambling, disorganized, lacks insight, paranoid about belongings, irritable, restless agitation, toe pain-medical board

## 2023-06-09 NOTE — NURSING NOTE
Patient was pleasant and cooperative today  Patient less irritable and was not yelling as much  Staff support provided  Q 7 minute safety checks maintained  Patient did not answer assessment question when asked  Patient mumbling, rambling, and laughing to self  Patient compliant with medications  Bacitracin put on right toe  Patient remains disorganized but cooperative  Staff will continue to monitor and support

## 2023-06-09 NOTE — PROGRESS NOTES
"Progress Note - Shaunna Graahm 70 y o  male MRN: [id number]    Unit/Bed#: [facility] 224-02 Encounter: [id number]        Subjective:   Patient seen and examined at bedside after reviewing the chart and discussing the case with the caring staff  Patient complaining of ingrown toenail to left great toe  He states his pain is \"not bad\" but otherwise no complaints  Physical Exam   Vitals: Blood pressure 138/81, pulse 93, temperature 98 3 °F (36 8 °C), temperature source Temporal, resp  rate 18, height 5' 9\" (1 753 m), weight 92 7 kg (204 lb 6 4 oz), SpO2 98 %  ,Body mass index is 30 18 kg/m²  Constitutional: Patient in no acute distress  HEENT: PERR, EOMI, neck supple  Cardiovascular: Normal rate  Pulmonary/Chest: No respiratory distress  Abdomen: Nondistended  Skin: ingrown toenail left great toe, lateral nail edge with mild erythema and swelling but no drainage or tenderness  Assessment/Plan:  Shaunna Graham is a(n) 70 y o  male with schizoaffective disorder      1  Cardiac with hx HTN, HLD  Continue metoprolol succinate to 50 mg daily (incr 5/30/23)  LDL 76   2  Tobacco abuse  NRT  3  Hypothyroidism  TSH 5 291, FT4 1 46  Levothyroxine 25 mcg daily  Repeat TSH in 6 weeks  4  BPH  Continue Flomax 0 4 mg daily  5  Vitamin D deficiency  Patient started on vitamin D2 50,000 units weekly for 4 weeks followed by vitamin D3 1000 units daily  6  Vitamin B12 deficiency  Patient started on vitamin B12 supplement  7  Ingrown toenail  Bacitracin twice daily  Continue to monitor  Follow-up with podiatry on outpatient basis  The patient was discussed with Dr Christiano Jacobs and he is in agreement with the above note    "

## 2023-06-09 NOTE — PLAN OF CARE
Problem: CHONG  Goal: Will exhibit normal sleep and speech and no impulsivity  Description: INTERVENTIONS:  - Administer medication as ordered  - Set limits on impulsive behavior  - Make attempts to decrease external stimuli as possible  Outcome: Progressing     Problem: PSYCHOSIS  Goal: Will report no hallucinations or delusions  Description: Interventions:  - Administer medication as  ordered  - Every waking shifts and PRN assess for the presence of hallucinations and or delusions  - Assist with reality testing to support increasing orientation  - Assess if patient's hallucinations or delusions are encouraging self-harm or harm to others and intervene as appropriate  Outcome: Not Progressing     Problem: ANXIETY  Goal: Will report anxiety at manageable levels  Description: INTERVENTIONS:  - Administer medication as ordered  - Teach and encourage coping skills  - Provide emotional support  - Assess patient/family for anxiety and ability to cope  Outcome: Not Progressing  Goal: By discharge: Patient will verbalize 2 strategies to deal with anxiety  Description: Interventions:  - Identify any obvious source/trigger to anxiety  - Staff will assist patient in applying identified coping technique/skills  - Encourage attendance of scheduled groups and activities  Outcome: Not Progressing     Problem: SLEEP DISTURBANCE  Goal: Will exhibit normal sleeping pattern  Description: Interventions:  -  Assess the patients sleep pattern, noting recent changes  - Administer medication as ordered  - Decrease environmental stimuli, including noise, as appropriate during the night  - Encourage the patient to actively participate in unit groups and or exercise during the day to enhance ability to achieve adequate sleep at night  - Assess the patient, in the morning, encouraging a description of sleep experience  Outcome: Not Progressing

## 2023-06-09 NOTE — PROGRESS NOTES
"Progress Note - Mikael Dhillon 67 Day 70 y o  male MRN: [id number]   Unit/Bed#: -02 Encounter: [id number]    Behavior over the last 24 hours: unchanged  Breana Ace is a 75-year-old male diagnosed with schizophrenia, hypertension, hyperlipidemia, nicotine and marijuana use seen today in follow-up  Per staff, patient remains bizarre, disorganized and paranoid  Yesterday afternoon accepted Risperdal for agitation and yelling  At night still has challenges with sleep and was awake frequently  Continues to make references regarding wars  On presentation remains the same, slightly less irritable but remains paranoid  He has been carrying his belongings fearing that people will steal them       Sleep: poor, on and off  Appetite: normal  Medication side effects: No   ROS: no complaints, all other systems are negative    Mental Status Evaluation:    Appearance:  age appropriate, long gray hair and beard, dressed in regular clothing    Behavior:  bizarre, restless   Speech:  increased rate, mumbled   Mood:  \"I need to go\"   Affect:  flat   Thought Process:  disorganized, illogical   Associations: tangential associations   Thought Content:  persecutory delusions, paranoid ideation, preservative on discharge   Perceptual Disturbances: appears responding to internal stimuli   Risk Potential: Suicidal ideation - None at present  Homicidal ideation - None at present  Potential for aggression - No   Sensorium:  oriented to person, place and time/date   Memory:  recent and remote memory: unable to assess due to lack of cooperation   Consciousness:  alert and awake   Attention/Concentration: attention span and concentration appear shorter than expected for age   Insight:  poor   Judgment: poor   Gait/Station: normal gait/station   Motor Activity: no abnormal movements     Vital signs in last 24 hours:    Temp:  [98 1 °F (36 7 °C)-98 3 °F (36 8 °C)] 98 3 °F (36 8 °C)  HR:  [82-93] 93  Resp:  [18] 18  BP: " ()/(60-82) 138/81    Laboratory results: I have personally reviewed all pertinent laboratory/tests results    Results from the past 24 hours:   Recent Results (from the past 24 hour(s))   Valproic acid level, total    Collection Time: 06/09/23 10:00 AM   Result Value Ref Range    Valproic Acid, Total 69 50 - 100 ug/mL     Most Recent Labs:   Lab Results   Component Value Date    ALB 4 2 05/27/2023    ALKPHOS 34 05/27/2023    ALT 34 05/27/2023    AMMONIA 51 02/06/2021    AST 41 (H) 05/27/2023    BUN 15 05/27/2023    CALCIUM 9 3 05/27/2023    CHOLESTEROL 139 05/27/2023     05/27/2023    CO2 30 05/27/2023    CREATININE 0 93 05/27/2023     05/27/2023    FREET4 1 46 (H) 05/25/2023    GLUC 94 05/27/2023    HCT 40 9 05/27/2023    HDL 39 (L) 05/27/2023    HGB 13 1 05/27/2023    HGBA1C 5 2 05/27/2023    K 3 8 05/27/2023    LDLCALC 76 05/27/2023    LITHIUM 0 5 (L) 05/25/2023    NEUTROABS 3 84 05/27/2023    NONHDLC 100 05/27/2023     05/27/2023    RBC 4 35 05/27/2023    RDW 13 1 05/27/2023    RPR Non-Reactive 11/03/2019    SODIUM 141 05/27/2023    TBILI 0 65 05/27/2023    TP 6 5 05/27/2023    TRIG 122 05/27/2023    [id number] 5 291 (H) 05/25/2023    VALPROICTOT 69 06/09/2023    WBC 6 89 05/27/2023       Progress Toward Goals: progressing    Assessment/Plan   Principal Problem:    Schizophrenia ([address] )  Active Problems:    Cannabis abuse, continuous    Hypertension    Tobacco abuse    BPH (benign prostatic hyperplasia)    Hyperlipidemia    Umbilical hernia      Recommended Treatment:     Planned medication and treatment changes:     All current active medications have been reviewed  Encourage group therapy, milieu therapy and occupational therapy  Behavioral Health checks every 7 minutes  Continue current medications - consider increase in prazosin   VPA level reviewed - WNL  [place] planning ongoing; continues to require inpatient hospitalization for ongoing symptom management    Current Facility-Administered Medications   Medication Dose Route Frequency Provider Last Rate   • acetaminophen  650 mg Oral Q6H PRN [facility], CRNP     • acetaminophen  650 mg Oral Q4H PRN [facility] CARLOS WHITMAN     • acetaminophen  975 mg Oral Q6H PRN [facility], CRNP     • aluminum-magnesium hydroxide-simethicone  30 mL Oral Q4H PRN [facility], CRNP     • benztropine  1 mg Oral Q6H PRN [facility], CRNP     • [START ON 6/20/2023] cholecalciferol  1,000 Units Oral Daily Funmi Milian PA-C     • cyanocobalamin  1,000 mcg Oral Daily Funmi Milian PA-C     • divalproex sodium  1,000 mg Oral Q12H [address] [facility], CRNP     • ergocalciferol  50,000 Units Oral Weekly Funmi Milian PA-C     • hydrOXYzine HCL  25 mg Oral Q6H PRN Max 4/day [facility], CRNP     • hydrOXYzine HCL  50 mg Oral Q4H PRN Max 4/day [facility], CRMILI      Or   • LORazepam  1 mg Intramuscular Q4H PRN [facility], CRNP     • levothyroxine  25 mcg Oral Early Morning Funmi Milian PA-C     • LORazepam  1 mg Oral Q6H PRN [facility], CRMILI      Or   • LORazepam  2 mg Intramuscular Q6H PRN Max 3/day [facility], CRNP     • LORazepam  0 5 mg Oral TID Li Harris MD     • metoprolol succinate  50 mg Oral Daily Funmi Milian PA-C     • OLANZapine  10 mg Oral Daily America Hicks PA-C     • OLANZapine  25 mg Oral HS Silva Hopkins PA-C     • polyethylene glycol  17 g Oral Daily PRN [facility], CRNP     • prazosin  1 mg Oral HS Silva Hopkins PA-C     • risperiDONE  0 5 mg Oral Q4H PRN Max 6/day [facility], CRNP     • risperiDONE  1 mg Oral Q4H PRN Max 4/day CARLOS Mchugh     • risperiDONE  3 mg Oral Q6H PRN Max 3/day Li Harris MD     • tamsulosin  0 4 mg Oral Daily With Dinner Funmi Milian PA-C     • traZODone  100 mg Oral HS PRN CARLOS Mchugh     • traZODone  100 mg Oral HS CARLOS Mchugh       Risks / Benefits of Treatment:    Risks, benefits, and possible side effects of medications explained to patient and patient verbalizes understanding and agreement for treatment  Counseling / Coordination of Care: Total floor / unit time spent today 20 minutes  Greater than 50% of total time was spent with the patient and / or family counseling and / or coordination of care  A description of counseling / coordination of care:  Patient's progress discussed with staff in treatment team meeting  Medications, treatment progress and treatment plan reviewed with patient       Chauncey Ma PA-C 06/09/23

## 2023-06-09 NOTE — PROGRESS NOTES
06/09/23 0930   Activity/Group Checklist   Group   ([facility])   Attendance Attended   Attendance Duration (min) 31-45  (pt was in and out of group)   Interactions Disorganized interaction   Affect/Mood Wide   Goals Achieved Able to engage in interactions

## 2023-06-09 NOTE — PROGRESS NOTES
06/09/23 0730   Activity/Group Checklist   Group   (Morning Meeting and Coffee)   Attendance Attended   Attendance Duration (min) 46-60   Interactions Disorganized interaction   Affect/Mood Wide   Goals Achieved Able to listen to others; Able to engage in interactions

## 2023-06-10 PROCEDURE — 99232 SBSQ HOSP IP/OBS MODERATE 35: CPT | Performed by: PSYCHIATRY & NEUROLOGY

## 2023-06-10 RX ORDER — LIDOCAINE 50 MG/G
1 PATCH TOPICAL
Status: DISCONTINUED | OUTPATIENT
Start: 2023-06-10 | End: 2023-06-21

## 2023-06-10 RX ADMIN — LORAZEPAM 0.5 MG: 1 TABLET ORAL at 21:13

## 2023-06-10 RX ADMIN — LORAZEPAM 0.5 MG: 1 TABLET ORAL at 15:30

## 2023-06-10 RX ADMIN — TAMSULOSIN HYDROCHLORIDE 0.4 MG: 0.4 CAPSULE ORAL at 15:30

## 2023-06-10 RX ADMIN — BACITRACIN ZINC 1 SMALL APPLICATION: 500 OINTMENT TOPICAL at 17:31

## 2023-06-10 RX ADMIN — LORAZEPAM 1 MG: 1 TABLET ORAL at 12:44

## 2023-06-10 RX ADMIN — OLANZAPINE 10 MG: 10 TABLET, ORALLY DISINTEGRATING ORAL at 08:05

## 2023-06-10 RX ADMIN — DIVALPROEX SODIUM 1000 MG: 500 TABLET, DELAYED RELEASE ORAL at 21:12

## 2023-06-10 RX ADMIN — CYANOCOBALAMIN TAB 500 MCG 1000 MCG: 500 TAB at 08:04

## 2023-06-10 RX ADMIN — PRAZOSIN HYDROCHLORIDE 1 MG: 1 CAPSULE ORAL at 21:14

## 2023-06-10 RX ADMIN — TRAZODONE HYDROCHLORIDE 100 MG: 100 TABLET ORAL at 21:14

## 2023-06-10 RX ADMIN — OLANZAPINE 25 MG: 5 TABLET, ORALLY DISINTEGRATING ORAL at 21:13

## 2023-06-10 RX ADMIN — ACETAMINOPHEN 975 MG: 325 TABLET ORAL at 04:54

## 2023-06-10 RX ADMIN — DIVALPROEX SODIUM 1000 MG: 500 TABLET, DELAYED RELEASE ORAL at 08:04

## 2023-06-10 RX ADMIN — LORAZEPAM 0.5 MG: 1 TABLET ORAL at 08:05

## 2023-06-10 RX ADMIN — LEVOTHYROXINE SODIUM 25 MCG: 25 TABLET ORAL at 08:04

## 2023-06-10 RX ADMIN — BACITRACIN ZINC 1 SMALL APPLICATION: 500 OINTMENT TOPICAL at 08:05

## 2023-06-10 RX ADMIN — METOPROLOL SUCCINATE 50 MG: 50 TABLET, EXTENDED RELEASE ORAL at 08:04

## 2023-06-10 RX ADMIN — ACETAMINOPHEN 650 MG: 325 TABLET ORAL at 21:14

## 2023-06-10 NOTE — CMS CERTIFICATION NOTE
Recertification: Based upon physical, mental and social evaluations, I certify that inpatient psychiatric services continue to be medically necessary for this patient for a duration of 12 midnights for the treatment of  Schizophrenia [facility])   Available alternative community resources still do not meet the patient's mental health care needs  I further attest that an established written individualized plan of care has been updated and is outlined in the patient's medical records

## 2023-06-10 NOTE — PROGRESS NOTES
Items Put at the Nursing Station:    Note book with telephone numbers  Rubber bands for his hair    Items Given to him:    Eye Glasses in a Green Case

## 2023-06-10 NOTE — PROGRESS NOTES
Contraband:     All items received thave been sent to the supply room due to to many items:    2 Shorts  1 tan top  1 Blue Jeans 2 Socks   1 [employer] w/Emiliano

## 2023-06-10 NOTE — NURSING NOTE
Patient continues to complain about ongoing  Lower back pain  Rated 10/10  Medication tylenol 975mg po administered at 0454 and heat pack provided  Encouraged patient to rest   Will monitor effectiveness and make MD aware patient is requesting lidocaine patch

## 2023-06-10 NOTE — NURSING NOTE
Patient increasingly anxious and upset following lunch because he did not receive the correct order  He is requesting PRN medication  1mg Ativan administered at 1244

## 2023-06-10 NOTE — NURSING NOTE
Patient noted to have slept majority of the night  Up early related to lower back pain and utilized prn tylenol 975 as ordered (see previous note)  Non labored breathing noted while asleep  Q 7 min safety checks maintained

## 2023-06-10 NOTE — PLAN OF CARE
Problem: CHONG  Goal: Will exhibit normal sleep and speech and no impulsivity  Description: INTERVENTIONS:  - Administer medication as ordered  - Set limits on impulsive behavior  - Make attempts to decrease external stimuli as possible  Outcome: Progressing     Problem: PSYCHOSIS  Goal: Will report no hallucinations or delusions  Description: Interventions:  - Administer medication as  ordered  - Every waking shifts and PRN assess for the presence of hallucinations and or delusions  - Assist with reality testing to support increasing orientation  - Assess if patient's hallucinations or delusions are encouraging self-harm or harm to others and intervene as appropriate  Outcome: Not Progressing     Problem: ANXIETY  Goal: Will report anxiety at manageable levels  Description: INTERVENTIONS:  - Administer medication as ordered  - Teach and encourage coping skills  - Provide emotional support  - Assess patient/family for anxiety and ability to cope  Outcome: Not Progressing     Problem: Nutrition/Hydration-ADULT  Goal: Nutrient/Hydration intake appropriate for improving, restoring or maintaining nutritional needs  Description: Monitor and assess patient's nutrition/hydration status for malnutrition  Collaborate with interdisciplinary team and initiate plan and interventions as ordered  Monitor patient's weight and dietary intake as ordered or per policy  Utilize nutrition screening tool and intervene as necessary  Determine patient's food preferences and provide high-protein, high-caloric foods as appropriate       INTERVENTIONS:  - Monitor oral intake, urinary output, labs, and treatment plans  - Assess nutrition and hydration status and recommend course of action  - Evaluate amount of meals eaten  - Assist patient with eating if necessary   - Allow adequate time for meals  - Recommend/ encourage appropriate diets, oral nutritional supplements, and vitamin/mineral supplements  - Order, calculate, and assess calorie counts as needed  - Recommend, monitor, and adjust tube feedings and TPN/PPN based on assessed needs  - Assess need for intravenous fluids  - Provide specific nutrition/hydration education as appropriate  - Include patient/family/caregiver in decisions related to nutrition  Outcome: Progressing

## 2023-06-10 NOTE — NURSING NOTE
Upon reassessment of PRN medication, patient reports feeling less anxious and appears calmer  Medication effective

## 2023-06-10 NOTE — NURSING NOTE
Patient noted to be intermittently visible on the milieu pacing between hallway, dayroom, and his assigned room  Patient continues to present w/ mumbled and rambling speech pattern at times  No signs of increased irritability noted this evening  Noted to have improved since the last time this writer was assigned to his care  Noted to have brief periods of linear conversation before rambling w/ disorganized thoughts  Compliant w/ medication regimen w/ no issues  Patient showered after expressing need to wash after urinating on himself  Q 7 min safety checks continues and maintained

## 2023-06-10 NOTE — NURSING NOTE
Patient keeping to himself throughout the day thus far  He is disorganized and rambling in conversation  He denies feeling depressed or anxious  Denies hallucinations and suicidal thoughts  He requested printouts of all medications and the printouts were reviewed thoroughly with patient  He was compliant with medications  Bacitracin applied to right great toe  No signs of infection observed  Mild edema noted to right foot  No complaints at this time

## 2023-06-10 NOTE — PROGRESS NOTES
"Progress Note - 6 [facility] Fr[facility] Day 70 y o  male MRN: [id number]  Unit/Bed#: -02 Encounter: [id number]    Assessment/Plan   Principal Problem:    Schizophrenia ([address] )  Active Problems:    Cannabis abuse, continuous    Hypertension    Tobacco abuse    BPH (benign prostatic hyperplasia)    Hyperlipidemia    Umbilical hernia      • Continue medications as noted below  • Continue to promote patient participation in therapeutic milieu  • Continue medical management by primary team   • Discharge disposition:  Patient continues to be disorganized, rambling, and irritable, continues to require inpatient psychiatric hospitalization    Subjective: The patient was evaluated this morning for continuity of care and no acute distress noted throughout the evaluation  Over the past 24 hours staff noted the patient was visible on the unit, noted to be disorganized and rambling as well as irritable at times  The patient was meal medication compliant and received Ativan 1 mg for severe anxiety/agitation today at 12:44  This patient is well-known to writer from prior admission in 2021  Today on exam the patient was disorganized, required redirection, and was noted to be discussing \"war\" and \"corporate drug dealers  \"  The patient reports \"horrible\" mood and reports poor sleep due to racing thoughts, good appetite, and increased energy  He denied suicidal or homicidal ideation, when asked about hallucinations he reported \"I am hearing them now,\" and appeared paranoid and guarded  The patient denied adverse effects from his medication and was in agreement with plan to continue current medications      Current Medications:  Current Facility-Administered Medications   Medication Dose Route Frequency Provider Last Rate   • acetaminophen  650 mg Oral Q6H PRN CARLOS Fabian     • acetaminophen  650 mg Oral Q4H PRN CARLOS Fabian     • acetaminophen  975 mg Oral Q6H PRN CARLOS Scott     • " aluminum-magnesium hydroxide-simethicone  30 mL Oral Q4H PRN [facility], CRNP     • bacitracin  1 small application Topical BID Funmi Milian PA-C     • benztropine  1 mg Oral Q6H PRN [facility], CRNP     • [START ON 6/20/2023] cholecalciferol  1,000 Units Oral Daily Funmi Milian PA-C     • cyanocobalamin  1,000 mcg Oral Daily Funmi Milian PA-C     • divalproex sodium  1,000 mg Oral Q12H [address] [facility], CRNP     • ergocalciferol  50,000 Units Oral Weekly Funmi Milian PA-C     • hydrOXYzine HCL  25 mg Oral Q6H PRN Max 4/day [facility], CRNP     • hydrOXYzine HCL  50 mg Oral Q4H PRN Max 4/day [facility], CRNP      Or   • LORazepam  1 mg Intramuscular Q4H PRN [facility], CRNP     • levothyroxine  25 mcg Oral Early Morning Funmi Milian PA-C     • lidocaine  1 patch Topical HS Funmi Milian PA-C     • LORazepam  1 mg Oral Q6H PRN [facility], CRNP      Or   • LORazepam  2 mg Intramuscular Q6H PRN Max 3/day [facility], CRNP     • LORazepam  0 5 mg Oral TID Arelis Gutierrez MD     • metoprolol succinate  50 mg Oral Daily Funmi Milian PA-C     • OLANZapine  10 mg Oral Daily Radha Hicks PA-C     • OLANZapine  25 mg Oral HS Radha Hicks PA-C     • polyethylene glycol  17 g Oral Daily PRN [facility], CRNP     • prazosin  1 mg Oral HS Lacy Hammond PA-C     • risperiDONE  0 5 mg Oral Q4H PRN Max 6/day [facility], CRNP     • risperiDONE  1 mg Oral Q4H PRN Max 4/day [facility], CRNP     • risperiDONE  3 mg Oral Q6H PRN Max 3/day Arelis Gutierrez MD     • tamsulosin  0 4 mg Oral Daily With Dinner Funmi Milian PA-C     • traZODone  100 mg Oral HS PRN CARLOS Juarez     • traZODone  100 mg Oral HS [facility] CARLOS Flanagan         Behavioral Health Medications: all current active meds have been reviewed and continue current psychiatric medications      Vital signs in last 24 hours:  Temp:  [97 3 °F (36 3 °C)-98 5 °F (36 9 "°C)] 98 5 °F (36 9 °C)  HR:  [86-89] 86  Resp:  [18] 18  BP: (112-149)/(82-88) 112/82    Laboratory results:    I have personally reviewed all pertinent laboratory/tests results  Most Recent Labs:   Lab Results   Component Value Date    ALB 4 2 05/27/2023    ALKPHOS 34 05/27/2023    ALT 34 05/27/2023    AMMONIA 51 02/06/2021    AST 41 (H) 05/27/2023    BUN 15 05/27/2023    CALCIUM 9 3 05/27/2023    CHOLESTEROL 139 05/27/2023     05/27/2023    CO2 30 05/27/2023    CREATININE 0 93 05/27/2023     05/27/2023    FREET4 1 46 (H) 05/25/2023    GLUC 94 05/27/2023    GLUF 94 05/27/2023    HCT 40 9 05/27/2023    HDL 39 (L) 05/27/2023    HGB 13 1 05/27/2023    HGBA1C 5 2 05/27/2023    K 3 8 05/27/2023    LDLCALC 76 05/27/2023    LITHIUM 0 5 (L) 05/25/2023    NEUTROABS 3 84 05/27/2023    NONHDLC 100 05/27/2023     05/27/2023    RBC 4 35 05/27/2023    RDW 13 1 05/27/2023    RPR Non-Reactive 11/03/2019    SODIUM 141 05/27/2023    TBILI 0 65 05/27/2023    TP 6 5 05/27/2023    TRIG 122 05/27/2023    [id number] 5 291 (H) 05/25/2023    VALPROICTOT 69 06/09/2023    WBC 6 89 05/27/2023       Psychiatric Review of Systems:  Behavior over the last 24 hours:  unchanged  Sleep: decreased  Appetite: normal  Medication side effects: No   ROS: no complaints, all other systems are negative    Mental Status Evaluation:    Appearance:  disheveled, overweight   Behavior:  restless and fidgety, evasive, responds to redirection   Speech:  loud   Mood:   \"Horrible\"   Affect:  labile   Thought Process:  disorganized, illogical   Associations: loose associations   Thought Content:  paranoid delusions   Perceptual Disturbances: auditory hallucinations, talks to self at times, appears responding to internal stimuli   Risk Potential: Suicidal ideation - None at present  Homicidal ideation - None at present  Potential for aggression - No   Sensorium:  unable to assess   Memory:  recent and remote memory: unable to assess due to lack " of cooperation   Consciousness:  alert and awake   Attention/Concentration: poor concentration and poor attention span   Insight:  poor   Judgment: limited   Gait/Station: normal gait/station, normal balance   Motor Activity: no abnormal movements         Progress Toward Goals: Unchanged    Recommended Treatment:   See above for assessment and plan  Risks, benefits and possible side effects of Medications:   Risks, benefits, and possible side effects of medications explained to patient and patient verbalizes understanding  This note has been constructed using a voice recognition system  There may be translation, syntax, or grammatical errors  If you have any questions, please contact the dictating provider      DONA Sales

## 2023-06-10 NOTE — PROGRESS NOTES
"Progress Note - Dionicio Graham 70 y o  male MRN: [id number]    Unit/Bed#: [facility] 224-02 Encounter: [id number]        Subjective:   Patient seen and examined at bedside after reviewing the chart and discussing the case with the caring staff  Patient requesting lidocaine patch for chronic low back pain  He otherwise denies any complaints  Physical Exam   Vitals: Blood pressure 112/82, pulse 86, temperature 98 5 °F (36 9 °C), temperature source Temporal, resp  rate 18, height 5' 9\" (1 753 m), weight 93 3 kg (205 lb 9 6 oz), SpO2 97 %  ,Body mass index is 30 36 kg/m²  Constitutional: Patient in no acute distress  HEENT: PERR, EOMI, neck supple  Cardiovascular: Normal rate  Pulmonary/Chest: No respiratory distress  Abdomen: Nondistended  Assessment/Plan:  Dionicio Graham is a(n) 70 y o  male with schizoaffective disorder      1  Cardiac with hx HTN, HLD  Continue metoprolol succinate to 50 mg daily (incr 5/30/23)  LDL 76   2  Tobacco abuse  NRT  3  Hypothyroidism  TSH 5 291, FT4 1 46  Levothyroxine 25 mcg daily  Repeat TSH in 6 weeks  4  BPH  Continue Flomax 0 4 mg daily  5  Vitamin D deficiency  Patient started on vitamin D2 50,000 units weekly for 4 weeks followed by vitamin D3 1000 units daily  6  Vitamin B12 deficiency  Patient started on vitamin B12 supplement  7  Ingrown toenail  Bacitracin twice daily  Continue to monitor  Follow-up with podiatry on outpatient basis  8  Chronic low back pain  Add lidocaine patch daily  The patient was discussed with Dr Elaine Bundy and he is in agreement with the above note    "

## 2023-06-11 PROCEDURE — 99232 SBSQ HOSP IP/OBS MODERATE 35: CPT | Performed by: NURSE PRACTITIONER

## 2023-06-11 RX ADMIN — OLANZAPINE 10 MG: 10 TABLET, ORALLY DISINTEGRATING ORAL at 08:16

## 2023-06-11 RX ADMIN — TAMSULOSIN HYDROCHLORIDE 0.4 MG: 0.4 CAPSULE ORAL at 16:34

## 2023-06-11 RX ADMIN — LORAZEPAM 0.5 MG: 1 TABLET ORAL at 20:10

## 2023-06-11 RX ADMIN — DIVALPROEX SODIUM 1000 MG: 500 TABLET, DELAYED RELEASE ORAL at 20:15

## 2023-06-11 RX ADMIN — TRAZODONE HYDROCHLORIDE 100 MG: 100 TABLET ORAL at 20:16

## 2023-06-11 RX ADMIN — BACITRACIN ZINC 1 SMALL APPLICATION: 500 OINTMENT TOPICAL at 16:34

## 2023-06-11 RX ADMIN — ACETAMINOPHEN 650 MG: 325 TABLET ORAL at 20:09

## 2023-06-11 RX ADMIN — CYANOCOBALAMIN TAB 500 MCG 1000 MCG: 500 TAB at 08:16

## 2023-06-11 RX ADMIN — TRAZODONE HYDROCHLORIDE 100 MG: 100 TABLET ORAL at 23:58

## 2023-06-11 RX ADMIN — METOPROLOL SUCCINATE 50 MG: 50 TABLET, EXTENDED RELEASE ORAL at 08:15

## 2023-06-11 RX ADMIN — LORAZEPAM 1 MG: 1 TABLET ORAL at 12:03

## 2023-06-11 RX ADMIN — DIVALPROEX SODIUM 1000 MG: 500 TABLET, DELAYED RELEASE ORAL at 08:15

## 2023-06-11 RX ADMIN — OLANZAPINE 25 MG: 5 TABLET, ORALLY DISINTEGRATING ORAL at 20:14

## 2023-06-11 RX ADMIN — LEVOTHYROXINE SODIUM 25 MCG: 25 TABLET ORAL at 08:16

## 2023-06-11 RX ADMIN — PRAZOSIN HYDROCHLORIDE 1 MG: 1 CAPSULE ORAL at 20:19

## 2023-06-11 RX ADMIN — LIDOCAINE 1 PATCH: 700 PATCH TOPICAL at 20:23

## 2023-06-11 RX ADMIN — LORAZEPAM 0.5 MG: 1 TABLET ORAL at 16:34

## 2023-06-11 RX ADMIN — LORAZEPAM 0.5 MG: 1 TABLET ORAL at 08:16

## 2023-06-11 RX ADMIN — BACITRACIN ZINC 1 SMALL APPLICATION: 500 OINTMENT TOPICAL at 08:17

## 2023-06-11 NOTE — NURSING NOTE
Patient visible on unit most of the day talking and rambling  Patient denies SI, HI AVH  Patient is social with staff and peers  Patient is cooperative and pleasant  Patient has a sore left great toe cleaned it with ns applied bacitracin and applied Band-Aid  Patient has no complaints of pain

## 2023-06-11 NOTE — PROGRESS NOTES
Progress Note - [address] 70 y o  male MRN: [id number]  Unit/Bed#: U 224-02 Encounter: [id number]    Assessment/Plan   Principal Problem:    Schizophrenia ([address] )  Active Problems:    Cannabis abuse, continuous    Hypertension    Tobacco abuse    BPH (benign prostatic hyperplasia)    Hyperlipidemia    Umbilical hernia      Behavior over the last 24 hours:  unchanged  Sleep: normal  Appetite: normal  Medication side effects: No  ROS: no complaints and all other systems are negative    Cristy Marine was seen today for psychiatric follow-up  Remains labile, disorganized, and psychotic  Slept undisturbed throughout the night and has been compliant with medication regimen  Speech is pressured and rambling  Denied AVH/SI/HI, however appears to be intermittently preoccupied  Reported AH yesterday, but refused to elaborate on content  Mental Status Evaluation:  Appearance:  bearded and disheveled   Behavior:  Bizarre, redirectable   Speech:  pressured and Rambling, loud   Mood:  labile   Affect:  mood-congruent and redirectable   Thought Process:  disorganized and illogical   Associations: loose associations   Thought Content:  Paranoid   Perceptual Disturbances: Denied AVH, appears intermittently preoccupied   Risk Potential: Suicidal Ideations none  Homicidal Ideations none  Potential for Aggression No   Sensorium:  person and place   Memory:  recent and remote memory: unable to assess due to lack of cooperation, patient does not answer   Consciousness:  alert and awake    Attention:  Poor attention/concentration   Insight:  Impaired   Judgment: Impaired   Gait/Station: normal gait/station and normal balance   Motor Activity: no abnormal movements     Progress Toward Goals: Some improvement  Appears to be less agitated with decrease of routine lorazepam   Depakote level drawn 6/9/2023 and therapeutic at 69  Continues to exhibit illogical and disorganized thought process with loose associations  Paranoia and mood lability persists  Continue with current psychotropic regimen; patient tolerating well  No discharge date at this time  Recommended Treatment: Continue with group therapy, milieu therapy and occupational therapy  Risks, benefits and possible side effects of Medications:   Camillia Cooks has limited understanding of risk versus benefits of medications, but agrees to take as prescribed      Medications:   all current active meds have been reviewed, continue current psychiatric medications and current meds:   Current Facility-Administered Medications   Medication Dose Route Frequency   • acetaminophen (TYLENOL) tablet 650 mg  650 mg Oral Q6H PRN   • acetaminophen (TYLENOL) tablet 650 mg  650 mg Oral Q4H PRN   • acetaminophen (TYLENOL) tablet 975 mg  975 mg Oral Q6H PRN   • aluminum-magnesium hydroxide-simethicone (MYLANTA) oral suspension 30 mL  30 mL Oral Q4H PRN   • bacitracin topical ointment 1 small application  1 small application Topical BID   • benztropine (COGENTIN) tablet 1 mg  1 mg Oral Q6H PRN   • [START ON 6/20/2023] cholecalciferol (VITAMIN D3) tablet 1,000 Units  1,000 Units Oral Daily   • cyanocobalamin (VITAMIN B-12) tablet 1,000 mcg  1,000 mcg Oral Daily   • divalproex sodium (DEPAKOTE) DR tablet 1,000 mg  1,000 mg Oral Q12H [address]   • ergocalciferol (VITAMIN D2) capsule 50,000 Units  50,000 Units Oral Weekly   • hydrOXYzine HCL (ATARAX) tablet 25 mg  25 mg Oral Q6H PRN Max 4/day   • hydrOXYzine HCL (ATARAX) tablet 50 mg  50 mg Oral Q4H PRN Max 4/day    Or   • LORazepam (ATIVAN) injection 1 mg  1 mg Intramuscular Q4H PRN   • levothyroxine tablet 25 mcg  25 mcg Oral Early Morning   • lidocaine (LIDODERM) 5 % patch 1 patch  1 patch Topical HS   • LORazepam (ATIVAN) tablet 1 mg  1 mg Oral Q6H PRN    Or   • LORazepam (ATIVAN) injection 2 mg  2 mg Intramuscular Q6H PRN Max 3/day   • LORazepam (ATIVAN) tablet 0 5 mg  0 5 mg Oral TID   • metoprolol succinate (TOPROL-XL) 24 hr tablet 50 mg  50 mg Oral Daily   • OLANZapine (ZyPREXA ZYDIS) dispersible tablet 10 mg  10 mg Oral Daily   • OLANZapine (ZyPREXA ZYDIS) dispersible tablet 25 mg  25 mg Oral HS   • polyethylene glycol (MIRALAX) packet 17 g  17 g Oral Daily PRN   • prazosin (MINIPRESS) capsule 1 mg  1 mg Oral HS   • risperiDONE (RisperDAL M-TAB) disintegrating tablet 0 5 mg  0 5 mg Oral Q4H PRN Max 6/day   • risperiDONE (RisperDAL M-TAB) disintegrating tablet 1 mg  1 mg Oral Q4H PRN Max 4/day   • risperiDONE (RisperDAL M-TAB) disintegrating tablet 3 mg  3 mg Oral Q6H PRN Max 3/day   • tamsulosin (FLOMAX) capsule 0 4 mg  0 4 mg Oral Daily With Dinner   • traZODone (DESYREL) tablet 100 mg  100 mg Oral HS PRN   • traZODone (DESYREL) tablet 100 mg  100 mg Oral HS     Labs: I have personally reviewed all pertinent laboratory/tests results  CBC:   Lab Results   Component Value Date    HCT 40 9 05/27/2023    HGB 13 1 05/27/2023    MCH 30 1 05/27/2023    MCHC 32 0 05/27/2023    MCV 94 05/27/2023    MPV 12 2 05/27/2023    NEUTROABS 3 84 05/27/2023     05/27/2023    RBC 4 35 05/27/2023    RDW 13 1 05/27/2023    WBC 6 89 05/27/2023     CMP:   Lab Results   Component Value Date    AGAP 6 05/27/2023    ALB 4 2 05/27/2023    ALKPHOS 34 05/27/2023    ALT 34 05/27/2023    AST 41 (H) 05/27/2023    BUN 15 05/27/2023    CALCIUM 9 3 05/27/2023     05/27/2023    CO2 30 05/27/2023    CREATININE 0 93 05/27/2023    EGFR 85 05/27/2023    GLUC 94 05/27/2023    GLUF 94 05/27/2023    K 3 8 05/27/2023    SODIUM 141 05/27/2023    TBILI 0 65 05/27/2023    TP 6 5 05/27/2023     Depakote:   Lab Results   Component Value Date    VALPROICTOT 69 06/09/2023       Counseling / Coordination of Care  Total floor / unit time spent today 20 minutes  Greater than 50% of total time was spent with the patient and / or family counseling and / or coordination of care

## 2023-06-11 NOTE — NURSING NOTE
Upon reassessment of medication, patient is calmer and verbalizes feeling less anxious  Medication effective

## 2023-06-11 NOTE — PROGRESS NOTES
"Progress Note - Ermelinda Graham 70 y o  male MRN: [id number]    Unit/Bed#: [facility] 224-02 Encounter: [id number]        Subjective:   Patient seen and examined at bedside after reviewing the chart and discussing the case with the caring staff  Patient denies any acute complaints  Physical Exam   Vitals: Blood pressure 112/66, pulse 85, temperature 97 8 °F (36 6 °C), temperature source Temporal, resp  rate 18, height 5' 9\" (1 753 m), weight 93 3 kg (205 lb 9 6 oz), SpO2 95 %  ,Body mass index is 30 36 kg/m²  Constitutional: Patient in no acute distress  HEENT: PERR, EOMI, neck supple  Cardiovascular: Normal rate  Pulmonary/Chest: No respiratory distress  Abdomen: Nondistended  Assessment/Plan:  Ermelinda Graham is a(n) 70 y o  male with schizoaffective disorder      1  Cardiac with hx HTN, HLD  Continue metoprolol succinate to 50 mg daily (incr 5/30/23)  LDL 76   2  Tobacco abuse  NRT  3  Hypothyroidism  TSH 5 291, FT4 1 46  Levothyroxine 25 mcg daily  Repeat TSH in 6 weeks  4  BPH  Continue Flomax 0 4 mg daily  5  Vitamin D deficiency  Patient started on vitamin D2 50,000 units weekly for 4 weeks followed by vitamin D3 1000 units daily  6  Vitamin B12 deficiency  Patient started on vitamin B12 supplement  7  Ingrown toenail  Bacitracin twice daily  Continue to monitor  Follow-up with podiatry on outpatient basis  8  Chronic low back pain  Lidocaine patch daily  The patient was discussed with Dr Yosef Edmonds and he is in agreement with the above note    "

## 2023-06-11 NOTE — NURSING NOTE
Patient calm and controlled throughout the day today  He denies feeling depressed, anxious, or suicidal  No irritability or agitation noted thus far today  He is disorganized and rambling for the most part but has brief periods of lucidity in conversation  Bacitracin applied to right great toe  No signs of infection visible  Trace edema noted on top of foot  No complaints of pain

## 2023-06-11 NOTE — PLAN OF CARE
Problem: PSYCHOSIS  Goal: Will report no hallucinations or delusions  Description: Interventions:  - Administer medication as  ordered  - Every waking shifts and PRN assess for the presence of hallucinations and or delusions  - Assist with reality testing to support increasing orientation  - Assess if patient's hallucinations or delusions are encouraging self-harm or harm to others and intervene as appropriate  Outcome: Progressing     Problem: ANXIETY  Goal: Will report anxiety at manageable levels  Description: INTERVENTIONS:  - Administer medication as ordered  - Teach and encourage coping skills  - Provide emotional support  - Assess patient/family for anxiety and ability to cope  Outcome: Progressing  Goal: By discharge: Patient will verbalize 2 strategies to deal with anxiety  Description: Interventions:  - Identify any obvious source/trigger to anxiety  - Staff will assist patient in applying identified coping technique/skills  - Encourage attendance of scheduled groups and activities  Outcome: Progressing     Problem: SLEEP DISTURBANCE  Goal: Will exhibit normal sleeping pattern  Description: Interventions:  -  Assess the patients sleep pattern, noting recent changes  - Administer medication as ordered  - Decrease environmental stimuli, including noise, as appropriate during the night  - Encourage the patient to actively participate in unit groups and or exercise during the day to enhance ability to achieve adequate sleep at night  - Assess the patient, in the morning, encouraging a description of sleep experience  Outcome: Progressing

## 2023-06-11 NOTE — NURSING NOTE
Patient reporting increased anxiety and is requesting PRN medication  1mg Ativan administered at 1203 as indicated by [facility] score

## 2023-06-12 PROCEDURE — 99232 SBSQ HOSP IP/OBS MODERATE 35: CPT | Performed by: PSYCHIATRY & NEUROLOGY

## 2023-06-12 RX ORDER — OLANZAPINE 10 MG/1
30 TABLET, ORALLY DISINTEGRATING ORAL
Status: DISCONTINUED | OUTPATIENT
Start: 2023-06-13 | End: 2023-06-23

## 2023-06-12 RX ADMIN — CYANOCOBALAMIN TAB 500 MCG 1000 MCG: 500 TAB at 08:53

## 2023-06-12 RX ADMIN — LORAZEPAM 0.5 MG: 1 TABLET ORAL at 21:10

## 2023-06-12 RX ADMIN — ERGOCALCIFEROL 50000 UNITS: 1.25 CAPSULE ORAL at 08:54

## 2023-06-12 RX ADMIN — TAMSULOSIN HYDROCHLORIDE 0.4 MG: 0.4 CAPSULE ORAL at 17:25

## 2023-06-12 RX ADMIN — PRAZOSIN HYDROCHLORIDE 1 MG: 1 CAPSULE ORAL at 21:11

## 2023-06-12 RX ADMIN — OLANZAPINE 10 MG: 10 TABLET, ORALLY DISINTEGRATING ORAL at 08:53

## 2023-06-12 RX ADMIN — LORAZEPAM 0.5 MG: 1 TABLET ORAL at 17:25

## 2023-06-12 RX ADMIN — OLANZAPINE 25 MG: 5 TABLET, ORALLY DISINTEGRATING ORAL at 21:10

## 2023-06-12 RX ADMIN — BACITRACIN ZINC 1 SMALL APPLICATION: 500 OINTMENT TOPICAL at 17:25

## 2023-06-12 RX ADMIN — TRAZODONE HYDROCHLORIDE 100 MG: 100 TABLET ORAL at 21:11

## 2023-06-12 RX ADMIN — DIVALPROEX SODIUM 1250 MG: 250 TABLET, DELAYED RELEASE ORAL at 21:10

## 2023-06-12 RX ADMIN — METOPROLOL SUCCINATE 50 MG: 50 TABLET, EXTENDED RELEASE ORAL at 08:53

## 2023-06-12 RX ADMIN — LORAZEPAM 1 MG: 1 TABLET ORAL at 05:59

## 2023-06-12 RX ADMIN — BACITRACIN ZINC 1 SMALL APPLICATION: 500 OINTMENT TOPICAL at 08:53

## 2023-06-12 RX ADMIN — LEVOTHYROXINE SODIUM 25 MCG: 25 TABLET ORAL at 08:53

## 2023-06-12 RX ADMIN — DIVALPROEX SODIUM 1000 MG: 500 TABLET, DELAYED RELEASE ORAL at 08:53

## 2023-06-12 RX ADMIN — ACETAMINOPHEN 650 MG: 325 TABLET ORAL at 05:59

## 2023-06-12 RX ADMIN — LORAZEPAM 0.5 MG: 1 TABLET ORAL at 08:54

## 2023-06-12 NOTE — NURSING NOTE
"Patient withdrawn to room tonight  Assessment completed  No acute behaviors earlier in evening shift  Patient was pleasant  Had one delusional episode  Brooke Colón was found with rolled up papers, had himself between [employer] and desk  Stated, \"the ones that are coming are bad news, they will cut off my head  \"  Patient was reassured was safe  He relaxed and put down the papers  No further episodes  Was compliant with medications  Medication education completed  Requested PRN Trazodone 100 mg at 2358  Safe environment provided for safety and fall prevention  Maintained on q 7 minute checks  Fluids at bedside to promote hydration, offered during interactions  Will continue to monitor      "

## 2023-06-12 NOTE — NURSING NOTE
Patient states he is feeling a little better  Out in hallway, talking with another patient  Appears less fearful

## 2023-06-12 NOTE — NURSING NOTE
PRN Trazodone 100 mg given at 2358 was effective  Observed sleeping after 0120 and remains so as present  No respiratory distress or changes in medical condition  No acute behaviors overnight  Safety maintained via clutter-free environment, ID band, and given non-skid socks  Will continue to monitor

## 2023-06-12 NOTE — PROGRESS NOTES
06/12/23 1030   Activity/Group Checklist   Group   (TheThings That Matter To Us Most Art Therapy)   Attendance Did not attend  (AT group offered, pt elected to remain in room)

## 2023-06-12 NOTE — PROGRESS NOTES
06/12/23   Team Meeting   Meeting Type Daily Rounds   Team Members Present   Team Members Present Physician;Nurse;   Physician Team Member Dr Julee Rebollar MD; HOSP DR RACHID CHAVEZ, [address] [place] [address]   Nursing Team Member [facility]   Social Work Team Member Poli Lozada; Poli Barrios   Patient/Family Present   Patient Present No   Patient's Family Present No     Bizarre, paranoid, hiding behind furniture, delusional, mumbling, rambling, endorses back pain, PRN Ativan 1mg 0559 effective, agitated, PTSD/war focused

## 2023-06-12 NOTE — SOCIAL WORK
Sw met with pt for check in; denies all SI/HI/AVH/dep/anx; presents irritable, labile with rambling speech  Pt agreeable to call to mother with sw, provider

## 2023-06-12 NOTE — NURSING NOTE
"Patient observed hiding in room behind wall and bed  Called RN into room  Expressed fear of \"4 people coming\"  Admitted to severe anxiety  Rambling, rapid, disorganized speech noted  Restless and darting movements observed  Fajardo Scale 28  Alphia Cowden agreed to take Ativan 1 mg PO at 0559  Fluids given  1 to 1 and reassurance (that he was safe) provided  Will monitor for effect    "

## 2023-06-12 NOTE — SOCIAL WORK
Pt voiced wanting to make family call with provider today, call is dependent on provider availability with pt understanding  Support provided

## 2023-06-12 NOTE — PROGRESS NOTES
"Progress Note - Navin Graham 70 y o  male MRN: [id number]    Unit/Bed#: [facility] 224-02 Encounter: [id number]        Subjective:   Patient seen and examined at bedside after reviewing the chart and discussing the case with the caring staff  Patient denies any acute complaints  Patient reports that his left big toe is bothering him  Physical Exam   Vitals: Blood pressure 134/82, pulse 84, temperature 98 8 °F (37 1 °C), temperature source Temporal, resp  rate 16, height 5' 9\" (1 753 m), weight 93 3 kg (205 lb 9 6 oz), SpO2 96 %  ,Body mass index is 30 36 kg/m²  Constitutional: Patient in no acute distress  HEENT: PERR, EOMI, neck supple  Cardiovascular: Normal rate  Pulmonary/Chest: No respiratory distress  Abdomen: Nondistended  Assessment/Plan:  Navin Graham is a(n) 70 y o  male with schizoaffective disorder      1  Cardiac with hx HTN, HLD  Continue metoprolol succinate to 50 mg daily (incr 5/30/23)  LDL 76   2  Tobacco abuse  NRT  3  Hypothyroidism  TSH 5 291, FT4 1 46  Levothyroxine 25 mcg daily  Repeat TSH in 6 weeks  4  BPH  Continue Flomax 0 4 mg daily  5  Vitamin D deficiency  Patient started on vitamin D2 50,000 units weekly for 4 weeks followed by vitamin D3 1000 units daily  6  Vitamin B12 deficiency  Patient started on vitamin B12 supplement  7  Ingrown toenail  Bacitracin twice daily  Continue to monitor  I will consult podiatry for further evaluation and treatment  Follow-up with podiatry on outpatient basis  8  Chronic low back pain  Lidocaine patch daily    "

## 2023-06-12 NOTE — PLAN OF CARE
Problem: Ineffective Coping  Goal: Participates in unit activities  Description: Interventions:  - Provide therapeutic environment   - Provide required programming   - Redirect inappropriate behaviors   Outcome: Not Progressing No

## 2023-06-12 NOTE — PROGRESS NOTES
06/12/23 0730   Activity/Group Checklist   Group   (Morning Meeting and Coffee)   Attendance Attended   Attendance Duration (min) 46-60   Interactions Disorganized interaction   Affect/Mood Wide;Normal range   Goals Achieved Identified feelings; Able to listen to others; Able to engage in interactions

## 2023-06-12 NOTE — SOCIAL WORK
Pt came to sw with irritable edge, threatening to call  on Sw due to legal status  Attempts made to provide support; unsuccessful  Pt declined to meet with sw further to discuss tx plan, legal status, ROIs

## 2023-06-12 NOTE — PLAN OF CARE
Problem: CHONG  Goal: Will exhibit normal sleep and speech and no impulsivity  Description: INTERVENTIONS:  - Administer medication as ordered  - Set limits on impulsive behavior  - Make attempts to decrease external stimuli as possible  Outcome: Progressing     Problem: PSYCHOSIS  Goal: Will report no hallucinations or delusions  Description: Interventions:  - Administer medication as  ordered  - Every waking shifts and PRN assess for the presence of hallucinations and or delusions  - Assist with reality testing to support increasing orientation  - Assess if patient's hallucinations or delusions are encouraging self-harm or harm to others and intervene as appropriate  Outcome: Progressing     Problem: ANXIETY  Goal: Will report anxiety at manageable levels  Description: INTERVENTIONS:  - Administer medication as ordered  - Teach and encourage coping skills  - Provide emotional support  - Assess patient/family for anxiety and ability to cope  Outcome: Progressing  Goal: By discharge: Patient will verbalize 2 strategies to deal with anxiety  Description: Interventions:  - Identify any obvious source/trigger to anxiety  - Staff will assist patient in applying identified coping technique/skills  - Encourage attendance of scheduled groups and activities  Outcome: Progressing     Problem: SLEEP DISTURBANCE  Goal: Will exhibit normal sleeping pattern  Description: Interventions:  -  Assess the patients sleep pattern, noting recent changes  - Administer medication as ordered  - Decrease environmental stimuli, including noise, as appropriate during the night  - Encourage the patient to actively participate in unit groups and or exercise during the day to enhance ability to achieve adequate sleep at night  - Assess the patient, in the morning, encouraging a description of sleep experience  Outcome: Progressing

## 2023-06-12 NOTE — NURSING NOTE
Patient was pleasant and cooperative  Patient paranoid and delusional  Staff support provided  Q 7 minute safety checks maintained  Patient denied SI/HI during assessment  Patient rambling, mumbling, and yelling at times  Patient reported having flashbacks of the war  Patient was hiding in his room under the covers after dinner  Patient reassured he was safe and patient was willing to rejoin the community  Patient compliant with medications and groups  Staff will continue to monitor and support

## 2023-06-13 PROCEDURE — 99232 SBSQ HOSP IP/OBS MODERATE 35: CPT | Performed by: PSYCHIATRY & NEUROLOGY

## 2023-06-13 PROCEDURE — 99222 1ST HOSP IP/OBS MODERATE 55: CPT | Performed by: STUDENT IN AN ORGANIZED HEALTH CARE EDUCATION/TRAINING PROGRAM

## 2023-06-13 RX ORDER — PERPHENAZINE 4 MG/1
2 TABLET ORAL 2 TIMES DAILY
Status: DISCONTINUED | OUTPATIENT
Start: 2023-06-13 | End: 2023-06-14

## 2023-06-13 RX ORDER — CEPHALEXIN 500 MG/1
500 CAPSULE ORAL EVERY 6 HOURS SCHEDULED
Status: DISPENSED | OUTPATIENT
Start: 2023-06-13 | End: 2023-06-20

## 2023-06-13 RX ADMIN — CEPHALEXIN 500 MG: 500 CAPSULE ORAL at 21:09

## 2023-06-13 RX ADMIN — BACITRACIN ZINC 1 SMALL APPLICATION: 500 OINTMENT TOPICAL at 17:42

## 2023-06-13 RX ADMIN — LORAZEPAM 0.5 MG: 1 TABLET ORAL at 08:41

## 2023-06-13 RX ADMIN — BACITRACIN ZINC 1 SMALL APPLICATION: 500 OINTMENT TOPICAL at 08:42

## 2023-06-13 RX ADMIN — CEPHALEXIN 500 MG: 500 CAPSULE ORAL at 15:14

## 2023-06-13 RX ADMIN — LEVOTHYROXINE SODIUM 25 MCG: 25 TABLET ORAL at 08:41

## 2023-06-13 RX ADMIN — CYANOCOBALAMIN TAB 500 MCG 1000 MCG: 500 TAB at 08:41

## 2023-06-13 RX ADMIN — DIVALPROEX SODIUM 1250 MG: 250 TABLET, DELAYED RELEASE ORAL at 08:41

## 2023-06-13 RX ADMIN — OLANZAPINE 10 MG: 10 TABLET, ORALLY DISINTEGRATING ORAL at 08:41

## 2023-06-13 RX ADMIN — LORAZEPAM 0.5 MG: 1 TABLET ORAL at 21:09

## 2023-06-13 RX ADMIN — LORAZEPAM 0.5 MG: 1 TABLET ORAL at 15:13

## 2023-06-13 RX ADMIN — METOPROLOL SUCCINATE 50 MG: 50 TABLET, EXTENDED RELEASE ORAL at 08:41

## 2023-06-13 RX ADMIN — PERPHENAZINE 2 MG: 4 TABLET, FILM COATED ORAL at 17:41

## 2023-06-13 RX ADMIN — DIVALPROEX SODIUM 1250 MG: 250 TABLET, DELAYED RELEASE ORAL at 21:09

## 2023-06-13 RX ADMIN — OLANZAPINE 30 MG: 10 TABLET, ORALLY DISINTEGRATING ORAL at 21:10

## 2023-06-13 RX ADMIN — TRAZODONE HYDROCHLORIDE 100 MG: 100 TABLET ORAL at 21:09

## 2023-06-13 RX ADMIN — TAMSULOSIN HYDROCHLORIDE 0.4 MG: 0.4 CAPSULE ORAL at 15:31

## 2023-06-13 NOTE — PROGRESS NOTES
"Progress Note - Cecille Graham 70 y o  male MRN: [id number]    Unit/Bed#: U 224-02 Encounter: [id number]        Subjective:   Patient seen and examined at bedside after reviewing the chart and discussing the case with the caring staff  Patient denies any acute complaints  Patient reports that his left big toe is bothering him  I spoke with podiatrist Dr Yola Saravia yesterday and he informed me that they cannot do anything about his ingrown toenail at the hospital and patient needs to be sent to their office  Physical Exam   Vitals: Blood pressure 150/86, pulse 81, temperature 98 1 °F (36 7 °C), temperature source Temporal, resp  rate 18, height 5' 9\" (1 753 m), weight 93 3 kg (205 lb 9 6 oz), SpO2 97 %  ,Body mass index is 30 36 kg/m²  Constitutional: Patient in no acute distress  HEENT: PERR, EOMI, neck supple  Cardiovascular: Normal rate  Pulmonary/Chest: No respiratory distress  Abdomen: Nondistended  Assessment/Plan:  Cecille Graham is a(n) 70 y o  male with schizoaffective disorder      1  Cardiac with hx HTN, HLD  Continue metoprolol succinate to 50 mg daily (incr 5/30/23)  LDL 76   2  Tobacco abuse  NRT  3  Hypothyroidism  TSH 5 291, FT4 1 46  Levothyroxine 25 mcg daily  Repeat TSH in 6 weeks  4  BPH  Continue Flomax 0 4 mg daily  5  Vitamin D deficiency  Patient started on vitamin D2 50,000 units weekly for 4 weeks followed by vitamin D3 1000 units daily  6  Vitamin B12 deficiency  Patient started on vitamin B12 supplement  7  Ingrown toenail  Bacitracin twice daily  Continue to monitor  I will consult podiatry for further evaluation and treatment  Dr Yola Saravia wants this patient to be sent to their office for further evaluation and treatment of ingrown toenail  Follow-up with podiatry on outpatient basis  8  Chronic low back pain  Lidocaine patch daily    "

## 2023-06-13 NOTE — PROGRESS NOTES
06/13/23 0730   Activity/Group Checklist   Group   (Morning Meeting and Coffee)   Attendance Attended   Attendance Duration (min) 46-60   Interactions Disorganized interaction   Affect/Mood Wide  (hyperverbal)   Goals Achieved Identified feelings; Identified triggers; Able to listen to others; Able to engage in interactions

## 2023-06-13 NOTE — SOCIAL WORK
Education provided on ROIs, pt agreeable to sign for following:   URBANO signed  David Graham (mother) [address]

## 2023-06-13 NOTE — NURSING NOTE
Patient was pleasant and cooperative  Patient restless and paranoid but showing improvement  Staff support provided  Q 7 minute safety checks maintained  Patient compliant with medications and groups  Patient remains paranoid and has rambled speech at times  Patient less irritable today  Staff will continue to monitor and support

## 2023-06-13 NOTE — NURSING NOTE
Patient observed within the milieu, affect and mood remain labile but behavior has been controlled  Appears disheveled but does upkeep hygiene, however and oral care performed  Disorganized thoughts, rambled and pressured speech,  loose associations continued  No episodes of paranoia observed  Reassurance and comfort measures provided  Cleansed and covered L great toe  Compliant with all  medications as ordered with exception of lidocaine patch  Denies SI/HI and hallucinations despite internal preoccupiation at times

## 2023-06-13 NOTE — SOCIAL WORK
Sw met with pt to discuss ROIs, pt requested to read over ROIs for mother, psychiatric follow up independently, will meet with sw once reviewed to decide if he will sign forms

## 2023-06-13 NOTE — PROGRESS NOTES
06/13/23   Team Meeting   Meeting Type Daily Rounds   Team Members Present   Team Members Present Physician;Nurse;   Physician Team Member Dr Genaro Lezama MD; HOSP DR RACHID CHAVEZ, [address] [place] [address]   Nursing Team Member [facility]: Carina Shaver RN   Social Work Team Member Poli Lozada: Poli Barrios   Patient/Family Present   Patient Present No   Patient's Family Present No     meds adjusted, scared in room yesterday PM, PTSD improving slightly, less irritable, less loud, more pleasant, 304 petition this week

## 2023-06-13 NOTE — CONSULTS
Consult - Podiatry   Keren Graham 70 y o  male MRN: [id number]  Unit/Bed#: [facility] 224-02 Encounter: [id number]    Assessment/Plan     Assessment:  1  Left hallux pain   2  Left hallux paronychia     Plan:    -Patient presents with left hallux paronychia and pain  Patient will need to have partial toenail avulsion done within 1 to 2 weeks  I recommended to have patient transferred to our office to see Dr Erik Diego or myself  If toenail is not removed within the appropriate timeline this can further form deeper abscess, infection which could lead to bone and limb loss  -Rx Keflex for 7 days  -Dr Swati Vila is aware of ingrown toenail and our recommendations regarding outpt follow    -Continue local wound care to the ingrown nail site with triple antibiotic cover with a Band-Aid   -Addressed all questions and concerns  -Weight-bear as tolerated    History of Present Illness       HPI:  Keren Graham is a 70 y o  male with PMH significant for as listed below presents for an evaluation of left toe pain secondary to ingrown toenail  Patient did not provide history and nursing staff was present in the room who informed me patient is having toe pain since few days now  They have been applying triple antibiotic and covering with gauze and tape  Reports pain to the left hallux  Patient denies nausea vomiting fever chills shortness of breath  Consults  Review of Systems   Constitutional: Negative  HENT: Negative  Eyes: Negative  Respiratory: Negative  Cardiovascular: Negative  Gastrointestinal: Negative  Musculoskeletal: Left toe pain   Skin:Left toe cellulitis    Neurological: Negative  Psych: negative         Historical Information   Past Medical History:   Diagnosis Date   • Alcohol abuse    • Anxiety    • Astigmatism    • Depression    • DJD (degenerative joint disease)    • Gait abnormality    • Head injury    • History of colonic polyps    • Hydrocele    • Hyperlipidemia    • Hypertension    • Macular "drusen    • Presbyopia    • PTSD (post-traumatic stress disorder)    • Schizoaffective disorder ([facility] [address] )    • Sepsis ([facility] [address] )    • Substance abuse ([facility] )    • Tobacco abuse    • Umbilical hernia    • Vitreous syneresis      Past Surgical History:   Procedure Laterality Date   • FRACTURE SURGERY     • INGUINAL HERNIA REPAIR       Social History   Social History     Substance and Sexual Activity   Alcohol Use Yes    Comment: whenever i want     Social History     Substance and Sexual Activity   Drug Use Yes   • Types: Marijuana    Comment: Patient denies currently using marijuana  Social History     Tobacco Use   Smoking Status Every Day   • Packs/day: 1 00   • Years: 40 00   • Total pack years: 40 00   • Types: Cigars, Cigarettes   Smokeless Tobacco Never     Family History: History reviewed  No pertinent family history      Meds/Allergies   Medications Prior to Admission   Medication   • cholecalciferol (VITAMIN D3) 1,000 units tablet   • divalproex sodium (DEPAKOTE) 500 mg EC tablet   • lithium carbonate 600 MG capsule   • metoprolol succinate (TOPROL-XL) 25 mg 24 hr tablet   • OLANZapine (ZyPREXA) 20 MG tablet   • tamsulosin (FLOMAX) 0 4 mg     Allergies   Allergen Reactions   • Haldol [Haloperidol]    • Navane [Thiothixene]    • Other      Adhesive tape         Objective   First Vitals:   Blood Pressure: 133/91 (05/26/23 1650)  Pulse: 73 (05/26/23 1650)  Temperature: 98 3 °F (36 8 °C) (05/26/23 1650)  Temp Source: Temporal (05/26/23 1650)  Respirations: 20 (05/26/23 1650)  Height: 5' 9\" (175 3 cm) (05/26/23 1650)  Weight - Scale: 88 5 kg (195 lb 3 2 oz) (05/26/23 1650)  SpO2: 97 % (05/26/23 1650)    Current Vitals:   Blood Pressure: 150/86 (06/13/23 0733)  Pulse: 81 (06/13/23 0733)  Temperature: 98 1 °F (36 7 °C) (06/13/23 0733)  Temp Source: Temporal (06/13/23 0733)  Respirations: 18 (06/13/23 0733)  Height: 5' 9\" (175 3 cm) (06/05/23 1451)  Weight - Scale: 93 3 kg (205 lb 9 6 oz) (06/10/23 0900)  SpO2: 97 % " "(06/13/23 0733)    /86 (BP Location: Right arm)   Pulse 81   Temp 98 1 °F (36 7 °C) (Temporal)   Resp 18   Ht 5' 9\" (1 753 m)   Wt 93 3 kg (205 lb 9 6 oz)   SpO2 97%   BMI 30 36 kg/m²      Physical Exam    General Appearance:    Alert, cooperative, no distress   Head:    Normocephalic, without obvious abnormality, atraumatic   Neck:   Supple, symmetrical, trachea midline   Lungs:     Respirations unlabored, no audible wheezes   Abdomen:     Soft, non-tender   Lower Extremities:    Vascular:   Left pedal pulses palpable  Musculoskeletal:  MMT is 5/5 in all muscle compartments bilaterally  No gross deformities noted  Dermatological:  Left hallux medial toenail border with ingrown nail deformity noted  Serous drainage noted from the nail margin  Nail plate appear to be incurvated into the nail margin  There is mild hypergranulation tissue noted  With palpation  Localized erythema and edema present  Neurological:  Gross sensation is intact  Protective sensation is intact  Patient Denies numbness and/or paresthesias      Lab Results:   Admission on 05/26/2023   Component Date Value   • Sodium 05/27/2023 141    • Potassium 05/27/2023 3 8    • Chloride 05/27/2023 105    • CO2 05/27/2023 30    • ANION GAP 05/27/2023 6    • BUN 05/27/2023 15    • Creatinine 05/27/2023 0 93    • Glucose 05/27/2023 94    • Glucose, Fasting 05/27/2023 94    • Calcium 05/27/2023 9 3    • AST 05/27/2023 41 (H)    • ALT 05/27/2023 34    • Alkaline Phosphatase 05/27/2023 34    • Total Protein 05/27/2023 6 5    • Albumin 05/27/2023 4 2    • Total Bilirubin 05/27/2023 0 65    • eGFR 05/27/2023 85    • WBC 05/27/2023 6 89    • RBC 05/27/2023 4 35    • Hemoglobin 05/27/2023 13 1    • Hematocrit 05/27/2023 40 9    • MCV 05/27/2023 94    • MCH 05/27/2023 30 1    • MCHC 05/27/2023 32 0    • RDW 05/27/2023 13 1    • MPV 05/27/2023 12 2    • Platelets 66/94/5806 206    • nRBC 05/27/2023 0    • Neutrophils Relative 05/27/2023 57  " "  • Immat GRANS % 05/27/2023 0    • Lymphocytes Relative 05/27/2023 32    • Monocytes Relative 05/27/2023 8    • Eosinophils Relative 05/27/2023 3    • Basophils Relative 05/27/2023 0    • Neutrophils Absolute 05/27/2023 3 84    • Immature Grans Absolute 05/27/2023 0 01    • Lymphocytes Absolute 05/27/2023 2 22    • Monocytes Absolute 05/27/2023 0 58    • Eosinophils Absolute 05/27/2023 0 21    • Basophils Absolute 05/27/2023 0 03    • Cholesterol 05/27/2023 139    • Triglycerides 05/27/2023 122    • HDL, Direct 05/27/2023 39 (L)    • LDL Calculated 05/27/2023 76    • Non-HDL-Chol (CHOL-HDL) 05/27/2023 100    • Hemoglobin A1C 05/27/2023 5 2    • EAG 05/27/2023 103    • Folate 05/27/2023 16 0    • Vit D, 25-Hydroxy 05/27/2023 26 0 (L)    • Vitamin B-12 05/27/2023 345    • Ventricular Rate 05/27/2023 90    • Atrial Rate 05/27/2023 90    • NJ Interval 05/27/2023 158    • QRSD Interval 05/27/2023 84    • QT Interval 05/27/2023 400    • QTC Interval 05/27/2023 489    • P Axis 05/27/2023 78    • QRS Axis 05/27/2023 -1    • T Wave Axis 05/27/2023 45    • Valproic Acid, Total 05/29/2023 43 (L)    • Valproic Acid, Total 06/05/2023 56    • Valproic Acid, Total 06/09/2023 69                            Imaging: I have personally reviewed pertinent films in PACS  EKG, Pathology, and Other Studies: I have personally reviewed pertinent reports  Code Status: Level 1 - Full Code      Portions of the record may have been created with voice recognition software  Occasional wrong word or \"sound a like\" substitutions may have occurred due to the inherent limitations of voice recognition software  Read the chart carefully and recognize, using context, where substitutions have occurred            "

## 2023-06-13 NOTE — SOCIAL WORK
Todd Bro [phone]; spoke to Diana who states: July 6th 8am Dr Jamilah Paul MD medication mgmt [facility]  F: [phone] phone appt  Jonny Js Day (mother) [phone] contacted; sb left

## 2023-06-13 NOTE — PROGRESS NOTES
"Progress Note - 6 [address] Frandy Day 70 y o  male MRN: [id number]  Unit/Bed#: -02 Encounter: [id number]    Assessment/Plan   Principal Problem:    Schizophrenia ([address] )  Active Problems:    Cannabis abuse, continuous    Hypertension    Tobacco abuse    BPH (benign prostatic hyperplasia)    Hyperlipidemia    Umbilical hernia      Behavior over the last 24 hours: Some improvement  Sleep: Improved  Appetite: normal  Medication side effects: No  ROS: no complaints and all other systems are negative    Daisy Briseno was seen today for psychiatric follow-up  He continues to exhibit paranoia and disorganization, however was calm and cooperative throughout encounter  Reports improving anxiety and states he \"slept better last night  \"  Denies depression  Speech rambling at times  Often visible in the milieu, but keeps to self  Mental Status Evaluation:  Appearance:  bearded, casually dressed and Long gray hair, carrying hair brushes and orange   Behavior:  Bizarre, cooperative, calm   Speech:  loud and Rambling at times   Mood:  Less anxious, less labile   Affect:  mood-congruent   Thought Process:  circumstantial, disorganized and illogical   Associations: tangential associations   Thought Content:  Paranoid, bizarre delusions   Perceptual Disturbances: Denies AVH, however appears intermittently preoccupied   Risk Potential: Suicidal Ideations none  Homicidal Ideations none  Potential for Aggression Not at present   Sensorium:  person and place   Memory:  recent and remote memory: unable to assess due to lack of cooperation, patient does not answer   Consciousness:  alert and awake    Attention:  Decreased attention/concentration   Insight:  Impaired   Judgment: Impaired   Gait/Station: normal gait/station and normal balance   Motor Activity: no abnormal movements     Progress Toward Goals: Some improvement  Less anxious, less labile  Mood controlled today with no agitated outbursts  Sleep improved    " Tolerating increase of Depakote and Zyprexa well with no adverse effects  Will repeat Depakote level 6/16/2023 at 8 AM   No medication changes indicated today due to patient improvement  Recommended Treatment: Continue with group therapy, milieu therapy and occupational therapy  Risks, benefits and possible side effects of Medications:   Emelia Closs has limited understanding of risk versus benefits of medications, but agrees to take as prescribed      Medications:   all current active meds have been reviewed, continue current psychiatric medications and current meds:   Current Facility-Administered Medications   Medication Dose Route Frequency   • acetaminophen (TYLENOL) tablet 650 mg  650 mg Oral Q6H PRN   • acetaminophen (TYLENOL) tablet 650 mg  650 mg Oral Q4H PRN   • acetaminophen (TYLENOL) tablet 975 mg  975 mg Oral Q6H PRN   • aluminum-magnesium hydroxide-simethicone (MYLANTA) oral suspension 30 mL  30 mL Oral Q4H PRN   • bacitracin topical ointment 1 small application  1 small application Topical BID   • benztropine (COGENTIN) tablet 1 mg  1 mg Oral Q6H PRN   • [START ON 6/20/2023] cholecalciferol (VITAMIN D3) tablet 1,000 Units  1,000 Units Oral Daily   • cyanocobalamin (VITAMIN B-12) tablet 1,000 mcg  1,000 mcg Oral Daily   • divalproex sodium (DEPAKOTE) DR tablet 1,250 mg  1,250 mg Oral Q12H [address]   • ergocalciferol (VITAMIN D2) capsule 50,000 Units  50,000 Units Oral Weekly   • hydrOXYzine HCL (ATARAX) tablet 25 mg  25 mg Oral Q6H PRN Max 4/day   • hydrOXYzine HCL (ATARAX) tablet 50 mg  50 mg Oral Q4H PRN Max 4/day    Or   • LORazepam (ATIVAN) injection 1 mg  1 mg Intramuscular Q4H PRN   • levothyroxine tablet 25 mcg  25 mcg Oral Early Morning   • lidocaine (LIDODERM) 5 % patch 1 patch  1 patch Topical HS   • LORazepam (ATIVAN) tablet 1 mg  1 mg Oral Q6H PRN    Or   • LORazepam (ATIVAN) injection 2 mg  2 mg Intramuscular Q6H PRN Max 3/day   • LORazepam (ATIVAN) tablet 0 5 mg  0 5 mg Oral TID   • metoprolol succinate (TOPROL-XL) 24 hr tablet 50 mg  50 mg Oral Daily   • OLANZapine (ZyPREXA ZYDIS) dispersible tablet 10 mg  10 mg Oral Daily   • OLANZapine (ZyPREXA ZYDIS) dispersible tablet 30 mg  30 mg Oral HS   • polyethylene glycol (MIRALAX) packet 17 g  17 g Oral Daily PRN   • prazosin (MINIPRESS) capsule 1 mg  1 mg Oral HS   • risperiDONE (RisperDAL M-TAB) disintegrating tablet 0 5 mg  0 5 mg Oral Q4H PRN Max 6/day   • risperiDONE (RisperDAL M-TAB) disintegrating tablet 1 mg  1 mg Oral Q4H PRN Max 4/day   • risperiDONE (RisperDAL M-TAB) disintegrating tablet 3 mg  3 mg Oral Q6H PRN Max 3/day   • tamsulosin (FLOMAX) capsule 0 4 mg  0 4 mg Oral Daily With Dinner   • traZODone (DESYREL) tablet 100 mg  100 mg Oral HS PRN   • traZODone (DESYREL) tablet 100 mg  100 mg Oral HS     Labs: I have personally reviewed all pertinent laboratory/tests results  CBC:   Lab Results   Component Value Date    HCT 40 9 05/27/2023    HGB 13 1 05/27/2023    MCH 30 1 05/27/2023    MCHC 32 0 05/27/2023    MCV 94 05/27/2023    MPV 12 2 05/27/2023    NEUTROABS 3 84 05/27/2023     05/27/2023    RBC 4 35 05/27/2023    RDW 13 1 05/27/2023    WBC 6 89 05/27/2023     CMP:   Lab Results   Component Value Date    AGAP 6 05/27/2023    ALB 4 2 05/27/2023    ALKPHOS 34 05/27/2023    ALT 34 05/27/2023    AST 41 (H) 05/27/2023    BUN 15 05/27/2023    CALCIUM 9 3 05/27/2023     05/27/2023    CO2 30 05/27/2023    CREATININE 0 93 05/27/2023    EGFR 85 05/27/2023    GLUC 94 05/27/2023    GLUF 94 05/27/2023    K 3 8 05/27/2023    SODIUM 141 05/27/2023    TBILI 0 65 05/27/2023    TP 6 5 05/27/2023     Depakote:   Lab Results   Component Value Date    VALPROICTOT 69 06/09/2023       Counseling / Coordination of Care  Total floor / unit time spent today 25 minutes  Greater than 50% of total time was spent with the patient and / or family counseling and / or coordination of care   A description of the counseling / coordination of care: Medication education, treatment plan, supportive therapy

## 2023-06-13 NOTE — PROGRESS NOTES
"Progress Note - Teddy 2 K Day 70 y o  male MRN: @MRN   Unit/Bed#: [facility] 600-06 Encounter: [id number]      Report from staff regarding this patient received and discussed, and records reviewed prior to seeing this patient  Behavior over the last 24 hours: unchanged  The patient was seclusive, visible in the unit at times but avoided the writer, despite writer's several attempts to talk to the patient he tried to hide himself in the bathroom  The nurses reported the patient was not agitated but paranoid and at times bizarre  Patient remains anxious, bizarre, distressed, guarded and paranoid today  Sleep: insomnia  Appetite: fair  Medication side effects: No       Mental Status Evaluation:    Appearance:  disheveled   Mood:  dysphoric, anxious   Affect: constricted    Speech:  decreased rate, slow   Thought Content:  paranoid delusions   Perceptual Disturbances: does not appear responding to internal stimuli   Risk Potential: Suicidal ideation - unable to assess   Insight:  impaired due to psychosis   Motor Activity: no abnormal movements       Laboratory results:  I have personally reviewed all pertinent laboratory results  Progress Toward Goals: no significant improvement    Assessment/Plan   Principal Problem:    Schizophrenia (HCC)  Active Problems:    Cannabis abuse, continuous    Hypertension    Tobacco abuse    BPH (benign prostatic hyperplasia)    Hyperlipidemia    Umbilical hernia    Recommended Treatment: Further increase of Zyprexa is indicated  Adding first generation  antipsychotic will be considered, if no improvement  Planned medication and treatment changes: All current active medications have been reviewed  Continue treatment with group therapy, milieu therapy, occupational therapy and medication management  ** Please Note: This note has been constructed using a voice recognition system   **      BMP: No results for input(s): \"BUN\", \"CL\", \"CO2\", \"K\", \"NA\" in the " "last 72 hours      Invalid input(s): \"CREA\", \"GLU\"  Vitals:    06/12/23 1900   BP: 112/80   Pulse: 83   Resp:    Temp:    SpO2:         Medication Administration - last 24 hours from 06/11/2023 2133 to 06/12/2023 2133       Date/Time Order Dose Route Action Action by     06/11/2023 2358 EDT traZODone (DESYREL) tablet 100 mg 100 mg Oral Given Trevon Lemos RN     06/12/2023 0559 EDT LORazepam (ATIVAN) tablet 1 mg 1 mg Oral Given Trevon Lemos RN     06/12/2023 0559 EDT LORazepam (ATIVAN) injection 2 mg -- Intramuscular See Alternative Trevon Lemos RN     06/12/2023 0559 EDT acetaminophen (TYLENOL) tablet 650 mg 650 mg Oral Given Trevon Lemos RN     06/12/2023 1725 EDT tamsulosin (FLOMAX) capsule 0 4 mg 0 4 mg Oral Given Lenin Button, RN     06/12/2023 8808 EDT levothyroxine tablet 25 mcg 25 mcg Oral Given Lenin Button, RN     06/12/2023 6698 EDT ergocalciferol (VITAMIN D2) capsule 50,000 Units 50,000 Units Oral Given Lenin Button, RN     06/12/2023 4279 EDT cyanocobalamin (VITAMIN B-12) tablet 1,000 mcg 1,000 mcg Oral Given Lenin Button, RN     06/12/2023 0522 EDT metoprolol succinate (TOPROL-XL) 24 hr tablet 50 mg 50 mg Oral Given Leinn Button, RN     06/12/2023 3916 EDT divalproex sodium (DEPAKOTE) DR tablet 1,000 mg 1,000 mg Oral Given Lenin Button, RN     06/12/2023 2110 EDT LORazepam (ATIVAN) tablet 0 5 mg 0 5 mg Oral Given Alex Roldan, RN     06/12/2023 1725 EDT LORazepam (ATIVAN) tablet 0 5 mg 0 5 mg Oral Given Lenin Button, RN     06/12/2023 5603 EDT LORazepam (ATIVAN) tablet 0 5 mg 0 5 mg Oral Given Lenin Button, RN     06/12/2023 2111 EDT traZODone (DESYREL) tablet 100 mg 100 mg Oral Given Alex Roldan RN     06/11/2023 2200 EDT traZODone (DESYREL) tablet 100 mg -- Oral Canceled Entry Trevon Lemos RN     06/12/2023 2110 EDT OLANZapine (ZyPREXA ZYDIS) dispersible tablet 25 mg 25 mg Oral Given Alex Roldan RN     06/11/2023 2200 EDT OLANZapine (ZyPREXA ZYDIS) " dispersible tablet 25 mg -- Oral Canceled Entry Gabino Benton RN     06/12/2023 0853 EDT OLANZapine (ZyPREXA ZYDIS) dispersible tablet 10 mg 10 mg Oral Given Ramona Evans RN     06/12/2023 2111 EDT prazosin (MINIPRESS) capsule 1 mg 1 mg Oral Given Donavan Fine RN     06/11/2023 2200 EDT prazosin (MINIPRESS) capsule 1 mg -- Oral Canceled Entry Gabino Benton RN     06/12/2023 1725 EDT bacitracin topical ointment 1 small application 1 small application Topical Given Ramona Evans RN     06/12/2023 8985 EDT bacitracin topical ointment 1 small application 1 small application Topical Given Ramona Evans RN     06/12/2023 2111 EDT lidocaine (LIDODERM) 5 % patch 1 patch 1 patch Topical Not Given Donavan Fine RN     06/12/2023 0854 EDT lidocaine (LIDODERM) 5 % patch 1 patch 1 patch Topical Patch Removed Ramona Evans RN     06/12/2023 2110 EDT divalproex sodium (DEPAKOTE) DR tablet 1,250 mg 1,250 mg Oral Given Donavan Fine RN

## 2023-06-14 PROCEDURE — 99232 SBSQ HOSP IP/OBS MODERATE 35: CPT | Performed by: PSYCHIATRY & NEUROLOGY

## 2023-06-14 RX ORDER — PERPHENAZINE 4 MG/1
4 TABLET ORAL 2 TIMES DAILY
Status: DISCONTINUED | OUTPATIENT
Start: 2023-06-15 | End: 2023-06-19

## 2023-06-14 RX ADMIN — CEPHALEXIN 500 MG: 500 CAPSULE ORAL at 17:38

## 2023-06-14 RX ADMIN — LORAZEPAM 0.5 MG: 1 TABLET ORAL at 08:23

## 2023-06-14 RX ADMIN — DIVALPROEX SODIUM 1250 MG: 250 TABLET, DELAYED RELEASE ORAL at 21:26

## 2023-06-14 RX ADMIN — PERPHENAZINE 2 MG: 4 TABLET, FILM COATED ORAL at 08:23

## 2023-06-14 RX ADMIN — PERPHENAZINE 2 MG: 4 TABLET, FILM COATED ORAL at 17:38

## 2023-06-14 RX ADMIN — CEPHALEXIN 500 MG: 500 CAPSULE ORAL at 12:38

## 2023-06-14 RX ADMIN — CEPHALEXIN 500 MG: 500 CAPSULE ORAL at 05:06

## 2023-06-14 RX ADMIN — LORAZEPAM 0.5 MG: 1 TABLET ORAL at 21:26

## 2023-06-14 RX ADMIN — LEVOTHYROXINE SODIUM 25 MCG: 25 TABLET ORAL at 08:23

## 2023-06-14 RX ADMIN — METOPROLOL SUCCINATE 50 MG: 50 TABLET, EXTENDED RELEASE ORAL at 08:23

## 2023-06-14 RX ADMIN — BACITRACIN ZINC 1 SMALL APPLICATION: 500 OINTMENT TOPICAL at 08:24

## 2023-06-14 RX ADMIN — TAMSULOSIN HYDROCHLORIDE 0.4 MG: 0.4 CAPSULE ORAL at 17:38

## 2023-06-14 RX ADMIN — CYANOCOBALAMIN TAB 500 MCG 1000 MCG: 500 TAB at 08:23

## 2023-06-14 RX ADMIN — OLANZAPINE 30 MG: 10 TABLET, ORALLY DISINTEGRATING ORAL at 21:26

## 2023-06-14 RX ADMIN — LORAZEPAM 1 MG: 1 TABLET ORAL at 14:06

## 2023-06-14 RX ADMIN — DIVALPROEX SODIUM 1250 MG: 250 TABLET, DELAYED RELEASE ORAL at 08:23

## 2023-06-14 RX ADMIN — PRAZOSIN HYDROCHLORIDE 1 MG: 1 CAPSULE ORAL at 21:29

## 2023-06-14 RX ADMIN — CEPHALEXIN 500 MG: 500 CAPSULE ORAL at 23:11

## 2023-06-14 RX ADMIN — OLANZAPINE 10 MG: 10 TABLET, ORALLY DISINTEGRATING ORAL at 08:24

## 2023-06-14 RX ADMIN — ACETAMINOPHEN 975 MG: 325 TABLET ORAL at 21:25

## 2023-06-14 RX ADMIN — TRAZODONE HYDROCHLORIDE 100 MG: 100 TABLET ORAL at 21:26

## 2023-06-14 RX ADMIN — BACITRACIN ZINC 1 SMALL APPLICATION: 500 OINTMENT TOPICAL at 17:46

## 2023-06-14 RX ADMIN — LORAZEPAM 0.5 MG: 1 TABLET ORAL at 17:38

## 2023-06-14 NOTE — PROGRESS NOTES
06/14/23 1000   Activity/Group Checklist   Group   (Self Care Assessment and Discussion)   Attendance Attended   Attendance Duration (min) 46-60   Interactions Disorganized interaction   Affect/Mood Appropriate   Goals Achieved Identified feelings; Identified triggers; Discussed coping strategies; Able to listen to others; Able to reflect/comment on own behavior;Able to engage in interactions

## 2023-06-14 NOTE — NURSING NOTE
Patient noted to be intermittently visible on the milieu  Periods of restlessness noted w/ some improvement  No paranoid ideations/beahaviors noted this evening  Rambled speech at times  No irritability noted nor reported  Compliant w/ medication regimen  Patient aware of being started on antibiotic for his  L great toe  Continues to have brief periods of linear conversations before rambling w/ disorganized thoughts  Patient able to name and identify each hs medication before taking  Offers no complaints at this time  Refused Lidoderm patch this evening  Q 7 min safety checks continuous and maintained

## 2023-06-14 NOTE — SOCIAL WORK
Sue Dowfrandy Day (mother) [phone] returned call to cortes moran progress update, tx provided  Baseline: helpful, linear, friendly, goes to [employer], best friends with   Pt is able to return home upon dc  When unstable, pt will destroy property in home, say bizarre things  Call ended mutually

## 2023-06-14 NOTE — PROGRESS NOTES
"Progress Note - Thiago Graham 70 y o  male MRN: [id number]    Unit/Bed#: [facility] 224-02 Encounter: [id number]        Subjective:   Patient seen and examined at bedside after reviewing the chart and discussing the case with the caring staff  Patient denies any acute complaints  Patient was seen by podiatry yesterday and as per them patient has left hallux paronychia with pain  They have recommended partial toenail avulsion in 1 to 2 weeks  Patient has been put on Keflex for 7 days  Physical Exam   Vitals: Blood pressure 121/75, pulse 87, temperature 98 4 °F (36 9 °C), temperature source Temporal, resp  rate 18, height 5' 9\" (1 753 m), weight 93 3 kg (205 lb 9 6 oz), SpO2 97 %  ,Body mass index is 30 36 kg/m²  Constitutional: Patient in no acute distress  HEENT: PERR, EOMI, neck supple  Cardiovascular: Normal rate  Pulmonary/Chest: No respiratory distress  Abdomen: Nondistended  Assessment/Plan:  Thiago Graham is a(n) 70 y o  male with schizoaffective disorder      1  Cardiac with hx HTN, HLD  Continue metoprolol succinate to 50 mg daily (incr 5/30/23)  LDL 76   2  Tobacco abuse  NRT  3  Hypothyroidism  TSH 5 291, FT4 1 46  Levothyroxine 25 mcg daily  Repeat TSH in 6 weeks  4  BPH  Continue Flomax 0 4 mg daily  5  Vitamin D deficiency  Patient started on vitamin D2 50,000 units weekly for 4 weeks followed by vitamin D3 1000 units daily  6  Vitamin B12 deficiency  Patient started on vitamin B12 supplement  7  Ingrown toenail  Patient was seen by podiatry 6/13/2023 and as per them patient has left hallux paronychia with pain  They have recommended partial toenail avulsion in 1 to 2 weeks on outpatient basis  Patient has been put on Keflex 500 mg 4 times daily for 7 days  8  Chronic low back pain  Lidocaine patch daily    "

## 2023-06-14 NOTE — PROGRESS NOTES
06/13/23 1300   Activity/Group Checklist   Group   (Self Reflection Art Therapy)   Attendance Attended   Attendance Duration (min) Greater than 60   Interactions Interacted appropriately   Affect/Mood Appropriate   Goals Achieved Identified feelings; Discussed coping strategies; Able to listen to others; Able to engage in interactions; Able to reflect/comment on own behavior;Able to manage/cope with feelings

## 2023-06-14 NOTE — SOCIAL WORK
Support, reassurance provided to pt stating he asked for PRN after hearing distressing AH outside of room earlier today

## 2023-06-14 NOTE — NURSING NOTE
Patient slept majority of the night without interruption and early awakening of 0500 non labored breathing noted while asleep  Q 7 min safety checks maintained

## 2023-06-14 NOTE — PLAN OF CARE
Problem: CHONG  Goal: Will exhibit normal sleep and speech and no impulsivity  Description: INTERVENTIONS:  - Administer medication as ordered  - Set limits on impulsive behavior  - Make attempts to decrease external stimuli as possible  Outcome: Progressing     Problem: PSYCHOSIS  Goal: Will report no hallucinations or delusions  Description: Interventions:  - Administer medication as  ordered  - Every waking shifts and PRN assess for the presence of hallucinations and or delusions  - Assist with reality testing to support increasing orientation  - Assess if patient's hallucinations or delusions are encouraging self-harm or harm to others and intervene as appropriate  Outcome: Progressing     Problem: ANXIETY  Goal: Will report anxiety at manageable levels  Description: INTERVENTIONS:  - Administer medication as ordered  - Teach and encourage coping skills  - Provide emotional support  - Assess patient/family for anxiety and ability to cope  Outcome: Progressing     Problem: SLEEP DISTURBANCE  Goal: Will exhibit normal sleeping pattern  Description: Interventions:  -  Assess the patients sleep pattern, noting recent changes  - Administer medication as ordered  - Decrease environmental stimuli, including noise, as appropriate during the night  - Encourage the patient to actively participate in unit groups and or exercise during the day to enhance ability to achieve adequate sleep at night  - Assess the patient, in the morning, encouraging a description of sleep experience  Outcome: Progressing     Problem: Ineffective Coping  Goal: Participates in unit activities  Description: Interventions:  - Provide therapeutic environment   - Provide required programming   - Redirect inappropriate behaviors   Outcome: Not Progressing

## 2023-06-14 NOTE — PROGRESS NOTES
06/14/23   Team Meeting   Meeting Type Daily Rounds   Team Members Present   Team Members Present Physician;Nurse;   Physician Team Member Dr Brandi Mathur MD; HOSP DR RACHID CHAVEZ, [address] [place] [address]   Nursing Team Member Mary Grace [facility]   Social Work Team Member Poli Lozada; Poli Barrios   Patient/Family Present   Patient Present No   Patient's Family Present No     Disorganized at times, more linear conversations at times, no irritability, med comp, antibiotics for toenails, no behaviors, social  Depakote level Friday  Disturbing AH through walls, meds adjusted  304 tomorrow

## 2023-06-14 NOTE — PROGRESS NOTES
06/14/23 0730   Activity/Group Checklist   Group   (Morning Meeting and Coffee)   Attendance Attended   Attendance Duration (min) 46-60   Interactions Disorganized interaction   Affect/Mood Appropriate   Goals Achieved Identified feelings; Able to listen to others; Able to engage in interactions

## 2023-06-15 PROCEDURE — 99232 SBSQ HOSP IP/OBS MODERATE 35: CPT | Performed by: PSYCHIATRY & NEUROLOGY

## 2023-06-15 RX ADMIN — DIVALPROEX SODIUM 1250 MG: 250 TABLET, DELAYED RELEASE ORAL at 20:52

## 2023-06-15 RX ADMIN — CEPHALEXIN 500 MG: 500 CAPSULE ORAL at 17:37

## 2023-06-15 RX ADMIN — LEVOTHYROXINE SODIUM 25 MCG: 25 TABLET ORAL at 08:27

## 2023-06-15 RX ADMIN — LORAZEPAM 0.5 MG: 1 TABLET ORAL at 15:32

## 2023-06-15 RX ADMIN — LORAZEPAM 1 MG: 1 TABLET ORAL at 11:10

## 2023-06-15 RX ADMIN — METOPROLOL SUCCINATE 50 MG: 50 TABLET, EXTENDED RELEASE ORAL at 08:27

## 2023-06-15 RX ADMIN — BACITRACIN ZINC 1 SMALL APPLICATION: 500 OINTMENT TOPICAL at 17:37

## 2023-06-15 RX ADMIN — OLANZAPINE 10 MG: 10 TABLET, ORALLY DISINTEGRATING ORAL at 08:27

## 2023-06-15 RX ADMIN — CYANOCOBALAMIN TAB 500 MCG 1000 MCG: 500 TAB at 08:27

## 2023-06-15 RX ADMIN — BACITRACIN ZINC 1 SMALL APPLICATION: 500 OINTMENT TOPICAL at 08:27

## 2023-06-15 RX ADMIN — PERPHENAZINE 4 MG: 4 TABLET, FILM COATED ORAL at 17:37

## 2023-06-15 RX ADMIN — OLANZAPINE 30 MG: 10 TABLET, ORALLY DISINTEGRATING ORAL at 20:52

## 2023-06-15 RX ADMIN — CEPHALEXIN 500 MG: 500 CAPSULE ORAL at 23:06

## 2023-06-15 RX ADMIN — CEPHALEXIN 500 MG: 500 CAPSULE ORAL at 05:17

## 2023-06-15 RX ADMIN — DIVALPROEX SODIUM 1250 MG: 250 TABLET, DELAYED RELEASE ORAL at 08:27

## 2023-06-15 RX ADMIN — LORAZEPAM 0.5 MG: 1 TABLET ORAL at 08:27

## 2023-06-15 RX ADMIN — PERPHENAZINE 4 MG: 4 TABLET, FILM COATED ORAL at 08:27

## 2023-06-15 RX ADMIN — TRAZODONE HYDROCHLORIDE 100 MG: 100 TABLET ORAL at 20:52

## 2023-06-15 RX ADMIN — LORAZEPAM 0.5 MG: 1 TABLET ORAL at 20:52

## 2023-06-15 RX ADMIN — TAMSULOSIN HYDROCHLORIDE 0.4 MG: 0.4 CAPSULE ORAL at 15:32

## 2023-06-15 RX ADMIN — CEPHALEXIN 500 MG: 500 CAPSULE ORAL at 13:32

## 2023-06-15 NOTE — PROGRESS NOTES
06/15/23   Team Meeting   Meeting Type Daily Rounds   Team Members Present   Team Members Present Physician;;Nurse   Physician Team Member Dr Claudette Craft MD; HOSP DR RACHID CHAVEZ, [address] [facility] Team Member Mary Grace [facility]; Kayla Gallardo RN   Social Work Team Member Thom Lopez [place]; [place], [place]   Patient/Family Present   Patient Present No   Patient's Family Present No     PRN given for anx yest-effective, slammed door due to peers looking in his room, pleasant, cooperative, social, caring for ADLs, Depakote level tomorrow, med adjusted  304 petition today

## 2023-06-15 NOTE — NURSING NOTE
Patient compliant with meds and meals  Patient denies everything  Patient is social and visible, at times still disorganized but can have more linear conversations  Patient states ativan given at 1406 was effective he appeared relaxed  Patient is pleasant and cooperative  At times patient can be loud  Q 7 min behavioral and safety checks in place

## 2023-06-15 NOTE — TREATMENT TEAM
06/14/23 1406   Fajardo Anxiety Scale   Anxious Mood 4   Tension 4   Fears 3   Insomnia 2   Intellectual 3   Depressed Mood 3   Somatic Complaints: Muscular 1   Somatic Complaints: Sensory 1   Cardiovascular Symptoms 0   Respiratory Symptoms 0   Gastrointestinal Symptoms 0   Genitourinary Symptoms 0   Autonomic Symptoms 3   Behavior at Interview 4   Fajardo Anxiety Score 28     Patient c/o of anxiety due to peers looking into room patient requested something gave patient 1mg ativan   Will monitor for effectiveness

## 2023-06-15 NOTE — NURSING NOTE
Patient withdrawn to room most the night  C/O 8/10 back pain  Tylenol 975 mg given at 2125 with positive results  Patient refused Lidoderm patch, stated he's allergic to any adhesives  Continues on keflex for soft tissue infection  Denies SI,HI,AVH, anxiety or depression  Patient pleasant and cooperative  Rambled mumbled speech noted  No behaviors noted  Safety checks continue Q 7 minutes

## 2023-06-15 NOTE — PROGRESS NOTES
06/15/23 0730   Activity/Group Checklist   Group   (Morning Meeting and Coffee)   Attendance Attended   Attendance Duration (min) 46-60   Interactions Disorganized interaction   Affect/Mood Appropriate   Goals Achieved Identified feelings; Discussed coping strategies; Able to listen to others; Able to engage in interactions; Able to reflect/comment on own behavior;Able to manage/cope with feelings

## 2023-06-15 NOTE — NURSING NOTE
"Patient requested and received PRN ativan 1mg  Patient reports increased anxiety and states \"I need something, I cant even sit in group, which I should be doing, because I'm so anxious  \"  Will monitor effectiveness     "

## 2023-06-15 NOTE — PROGRESS NOTES
"Progress Note - Keren Graham 70 y o  male MRN: [id number]    Unit/Bed#: [facility] 224-02 Encounter: [id number]        Subjective:   Patient seen and examined at bedside after reviewing the chart and discussing the case with the caring staff  Patient denies any acute complaints  Physical Exam   Vitals: Blood pressure 124/82, pulse 73, temperature 97 5 °F (36 4 °C), temperature source Temporal, resp  rate 18, height 5' 9\" (1 753 m), weight 93 3 kg (205 lb 9 6 oz), SpO2 98 %  ,Body mass index is 30 36 kg/m²  Constitutional: Patient in no acute distress  HEENT: PERR, EOMI, neck supple  Cardiovascular: Normal rate  Pulmonary/Chest: No respiratory distress  Abdomen: Nondistended  Assessment/Plan:  Keren Graham is a(n) 70 y o  male with schizoaffective disorder      1  Cardiac with hx HTN, HLD  Continue metoprolol succinate to 50 mg daily (incr 5/30/23)  LDL 76   2  Tobacco abuse  NRT  3  Hypothyroidism  TSH 5 291, FT4 1 46  Levothyroxine 25 mcg daily  Repeat TSH in 6 weeks  4  BPH  Continue Flomax 0 4 mg daily  5  Vitamin D deficiency  Patient started on vitamin D2 50,000 units weekly for 4 weeks followed by vitamin D3 1000 units daily  6  Vitamin B12 deficiency  Patient started on vitamin B12 supplement  7  Ingrown toenail  Patient was seen by podiatry 6/13/2023, patient has left hallux paronychia with pain and they have recommended partial toenail avulsion in 1 to 2 weeks on outpatient basis  Patient was started on Keflex 500 mg 4 times daily for 7 days  8   Chronic low back pain  Lidocaine patch daily  The patient was discussed with Dr Swati Vila and he is in agreement with the above note      "

## 2023-06-15 NOTE — NURSING NOTE
Patient compliant with meds and meals  Patient denies everything  Patient is social and visible, at times still disorganized but can have more linear conversations  Patient states ativan was effective patient was relaxing in room  Patient is pleasant and cooperative  At times patient can be loud  Q 7 min behavioral and safety checks in place

## 2023-06-15 NOTE — PROGRESS NOTES
Progress Note - Teddy 2 K Day 70 y o  male MRN: @MRN   Unit/Bed#: [facility] 145-37 Encounter: [id number]      Report from staff regarding this patient received and discussed, and records reviewed prior to seeing this patient  Behavior over the last 24 hours: some improvement  The patient was more visible on the unit, was eager to communicate with the writer his thoughts and feelings  Had some euphoric moments when he laughed  Affect became more labile, less angry  He reported hearing voices and still disturbed by them  Patient remains anxious, bizarre and cooperative with session today  Sleep: slept better  Appetite: fair  Medication side effects: No       Mental Status Evaluation:    Appearance:  casually dressed   Mood:  improved, dysphoric, anxious   Affect: reactive, brighter, constricted    Speech:  hypertalkative   Thought Content:  paranoid ideation   Perceptual Disturbances: auditory hallucinations   Risk Potential: Suicidal ideation - None, contracts for safety on the unit   Insight:  impaired due to psychosis   Motor Activity: no abnormal movements       Laboratory results:  I have personally reviewed all pertinent laboratory results  Progress Toward Goals: slight improvement    Assessment/Plan   Principal Problem:    Schizophrenia (HCC)  Active Problems:    Cannabis abuse, continuous    Hypertension    Tobacco abuse    BPH (benign prostatic hyperplasia)    Hyperlipidemia    Umbilical hernia    Recommended Treatment: Starting small dose of antipsychotic Trilafon already helping the patient to feel less paranoid  We will increase Trilafon to 4 mg twice a day, we will consider tapering Zyprexa after increase Trilafon to therapeutic dose  Writer provided supportive therapy to the patient, we discussed his bright mind, and improving judgment  Patient insight into his condition remains poor  Planned medication and treatment changes:     All current active medications have been "reviewed  Continue treatment with group therapy, milieu therapy, occupational therapy and medication management  ** Please Note: This note has been constructed using a voice recognition system  **      BMP: No results for input(s): \"BUN\", \"CL\", \"CO2\", \"K\", \"NA\" in the last 72 hours      Invalid input(s): \"CREA\", \"GLU\"  Vitals:    06/14/23 2129   BP: 121/72   Pulse:    Resp:    Temp:    SpO2:       "

## 2023-06-15 NOTE — SOCIAL WORK
304 petition, ACT 68, rights submitted to [facility] via via [phone], secure email to [email or web address] 8am hearing  Pt understands rights, would like to speak to PD, attend hearing

## 2023-06-15 NOTE — PROGRESS NOTES
06/15/23 1000   Activity/Group Checklist   Group   (Expressive Writing for Self Reflection Art Therapy)   Attendance Attended   Attendance Duration (min) 31-45   Interactions Disorganized interaction   Affect/Mood Wide   Goals Achieved Identified feelings; Identified triggers; Discussed coping strategies; Able to listen to others; Able to engage in interactions

## 2023-06-15 NOTE — PLAN OF CARE
Problem: CHONG  Goal: Will exhibit normal sleep and speech and no impulsivity  Description: INTERVENTIONS:  - Administer medication as ordered  - Set limits on impulsive behavior  - Make attempts to decrease external stimuli as possible  Outcome: Progressing     Problem: PSYCHOSIS  Goal: Will report no hallucinations or delusions  Description: Interventions:  - Administer medication as  ordered  - Every waking shifts and PRN assess for the presence of hallucinations and or delusions  - Assist with reality testing to support increasing orientation  - Assess if patient's hallucinations or delusions are encouraging self-harm or harm to others and intervene as appropriate  Outcome: Progressing     Problem: ANXIETY  Goal: Will report anxiety at manageable levels  Description: INTERVENTIONS:  - Administer medication as ordered  - Teach and encourage coping skills  - Provide emotional support  - Assess patient/family for anxiety and ability to cope  Outcome: Progressing  Goal: By discharge: Patient will verbalize 2 strategies to deal with anxiety  Description: Interventions:  - Identify any obvious source/trigger to anxiety  - Staff will assist patient in applying identified coping technique/skills  - Encourage attendance of scheduled groups and activities  Outcome: Progressing     Problem: SLEEP DISTURBANCE  Goal: Will exhibit normal sleeping pattern  Description: Interventions:  -  Assess the patients sleep pattern, noting recent changes  - Administer medication as ordered  - Decrease environmental stimuli, including noise, as appropriate during the night  - Encourage the patient to actively participate in unit groups and or exercise during the day to enhance ability to achieve adequate sleep at night  - Assess the patient, in the morning, encouraging a description of sleep experience  Outcome: Progressing     Problem: INVOLUNTARY ADMIT  Goal: Will cooperate with staff recommendations and doctor's orders and will demonstrate appropriate behavior  Description: INTERVENTIONS:  - Treat underlying conditions and offer medication as ordered  - Educate regarding involuntary admission procedures and rules  - Utilize positive consistent limit setting strategies to support patient and staff safety  Outcome: Progressing

## 2023-06-16 LAB — VALPROATE SERPL-MCNC: 89 UG/ML (ref 50–100)

## 2023-06-16 PROCEDURE — 80164 ASSAY DIPROPYLACETIC ACD TOT: CPT | Performed by: NURSE PRACTITIONER

## 2023-06-16 PROCEDURE — 99232 SBSQ HOSP IP/OBS MODERATE 35: CPT | Performed by: NURSE PRACTITIONER

## 2023-06-16 RX ADMIN — DIVALPROEX SODIUM 1250 MG: 250 TABLET, DELAYED RELEASE ORAL at 20:53

## 2023-06-16 RX ADMIN — PERPHENAZINE 4 MG: 4 TABLET, FILM COATED ORAL at 17:19

## 2023-06-16 RX ADMIN — TAMSULOSIN HYDROCHLORIDE 0.4 MG: 0.4 CAPSULE ORAL at 17:19

## 2023-06-16 RX ADMIN — LORAZEPAM 0.5 MG: 1 TABLET ORAL at 20:54

## 2023-06-16 RX ADMIN — TRAZODONE HYDROCHLORIDE 100 MG: 100 TABLET ORAL at 20:54

## 2023-06-16 RX ADMIN — LORAZEPAM 0.5 MG: 1 TABLET ORAL at 17:19

## 2023-06-16 RX ADMIN — DIVALPROEX SODIUM 1250 MG: 250 TABLET, DELAYED RELEASE ORAL at 09:17

## 2023-06-16 RX ADMIN — CEPHALEXIN 500 MG: 500 CAPSULE ORAL at 23:11

## 2023-06-16 RX ADMIN — CEPHALEXIN 500 MG: 500 CAPSULE ORAL at 12:17

## 2023-06-16 RX ADMIN — BACITRACIN ZINC 1 SMALL APPLICATION: 500 OINTMENT TOPICAL at 09:18

## 2023-06-16 RX ADMIN — CYANOCOBALAMIN TAB 500 MCG 1000 MCG: 500 TAB at 09:18

## 2023-06-16 RX ADMIN — METOPROLOL SUCCINATE 50 MG: 50 TABLET, EXTENDED RELEASE ORAL at 09:17

## 2023-06-16 RX ADMIN — ACETAMINOPHEN 975 MG: 325 TABLET ORAL at 12:18

## 2023-06-16 RX ADMIN — CEPHALEXIN 500 MG: 500 CAPSULE ORAL at 17:19

## 2023-06-16 RX ADMIN — PRAZOSIN HYDROCHLORIDE 1 MG: 1 CAPSULE ORAL at 20:53

## 2023-06-16 RX ADMIN — BACITRACIN ZINC 1 SMALL APPLICATION: 500 OINTMENT TOPICAL at 17:20

## 2023-06-16 RX ADMIN — OLANZAPINE 10 MG: 10 TABLET, ORALLY DISINTEGRATING ORAL at 09:17

## 2023-06-16 RX ADMIN — PERPHENAZINE 4 MG: 4 TABLET, FILM COATED ORAL at 09:17

## 2023-06-16 RX ADMIN — LORAZEPAM 0.5 MG: 1 TABLET ORAL at 09:18

## 2023-06-16 RX ADMIN — CEPHALEXIN 500 MG: 500 CAPSULE ORAL at 05:15

## 2023-06-16 RX ADMIN — OLANZAPINE 30 MG: 10 TABLET, ORALLY DISINTEGRATING ORAL at 20:53

## 2023-06-16 RX ADMIN — LEVOTHYROXINE SODIUM 25 MCG: 25 TABLET ORAL at 09:17

## 2023-06-16 NOTE — PROGRESS NOTES
Progress Note - Teddy 2 K Day 70 y o  male MRN: @MRN   Unit/Bed#: [facility] 326-11 Encounter: [id number]      Report from staff regarding this patient received and discussed, and records reviewed prior to seeing this patient  Behavior over the last 24 hours: slowly improving  Patient remains anxious, argumentative, bizarre and cooperative with session today  Sleep: slept better  Appetite: fair  Medication side effects: No       Mental Status Evaluation:    Appearance:  improved grooming   Mood:  improved, anxious   Affect: constricted, labile    Speech:  hypertalkative   Thought Content:  paranoid ideation   Perceptual Disturbances: auditory hallucinations   Risk Potential: Suicidal ideation - None, contracts for safety on the unit   Insight:  significantly impaired   Motor Activity: no abnormal movements       Laboratory results:  I have personally reviewed all pertinent laboratory results  Progress Toward Goals: limited improvement    Assessment/Plan   Principal Problem:    Schizophrenia (HCC)  Active Problems:    Cannabis abuse, continuous    Hypertension    Tobacco abuse    BPH (benign prostatic hyperplasia)    Hyperlipidemia    Umbilical hernia    Recommended Treatment: Patient perphenazine was increased to address his treatment refractory psychotic disorder, no side effects  We will consider tapering patient's Zyprexa  Supportive therapy was provided  The need for continuation of inpatient treatment is evident and the writer submitted 304 medication    Planned medication and treatment changes: All current active medications have been reviewed  Continue treatment with group therapy, milieu therapy, occupational therapy and medication management  ** Please Note: This note has been constructed using a voice recognition system   **

## 2023-06-16 NOTE — PROGRESS NOTES
06/16/23 0730   Activity/Group Checklist   Group   (Morning Meeting and Coffee)   Attendance Attended   Attendance Duration (min) 46-60   Interactions Disorganized interaction   Affect/Mood Wide   Goals Achieved Identified feelings; Able to listen to others; Able to engage in interactions; Able to manage/cope with feelings

## 2023-06-16 NOTE — PROGRESS NOTES
06/16/23 1000   Activity/Group Checklist   Group   (Community Building Art Therapy)   Attendance Attended   Attendance Duration (min) 46-60  (pt was in and out of group)   Interactions Disorganized interaction   Affect/Mood Wide   Goals Achieved Identified feelings; Discussed coping strategies; Able to listen to others; Able to engage in interactions

## 2023-06-16 NOTE — PROGRESS NOTES
06/16/23   Team Meeting   Meeting Type Daily Rounds   Team Members Present   Team Members Present Nurse;Physician;   Physician Team Member Dr Rafiq Maza MD; HOSP DR RACHID CHAVEZ, [address] [place] [address]   Nursing Team Member [place], RN; Soumya Almaguer Wykassandra   Social Work Team Member Poli Lozada; Poli Barrios   Patient/Family Present   Patient Present No   Patient's Family Present No     304 hearing Tues 8am, visible, bright affect upon approach, less agitated, less disorganized, off no roommate today, med comp, meds adjusted, slept

## 2023-06-16 NOTE — NURSING NOTE
Patient observed sleeping during 7 minute checks  No distress noted  Non labored breathing  Safety checks continue  Likely d/t acute on chronic HFpEF   CXR w/ right pleural effusion increased in size  Bedside US 3/24 w/ trace Lt pleural effusion and moderate Rt pleural effusion  s/p IV Lasix, lasix 20mg by NGT on 3/29 - will hold off on further lasix as Na went up   As per pulm ;  No urgent need for thoracentesis at this time but will consider pending clinical course  Wean off O2, monitor O2 sats

## 2023-06-16 NOTE — PROGRESS NOTES
"Progress Note - 6 [address] 70 y o  male MRN: [id number]  Unit/Bed#: -02 Encounter: [id number]    Assessment/Plan   Principal Problem:    Schizophrenia ([address] )  Active Problems:    Cannabis abuse, continuous    Hypertension    Tobacco abuse    BPH (benign prostatic hyperplasia)    Hyperlipidemia    Umbilical hernia      Behavior over the last 24 hours: Improving  Sleep: Improved  Appetite: normal  Medication side effects: No  ROS: no complaints and all other systems are negative    Bowen Vogel was seen today for psychiatric follow-up  He was controlled with no agitation or irritability  Less disorganized with circumstantial thought process  Paranoia also improved  No longer endorsing auditory hallucinations or verbalizing people are speaking to him through the walls  He went on to say \"I have an internal dialogue inside my head that I hear, no longer through the walls  \"  He went on to describe AH \"inside my head\" as nonintrusive or bothersome  Denies SI/HI  Did not appear internally preoccupied today  Has been compliant with medications and meals and sleeping undisturbed throughout the night  Mental Status Evaluation:  Appearance:  age appropriate, casually dressed and Improved hygiene, bearded   Behavior:  Cooperative, improved eye contact   Speech:  loud   Mood:   \"All right\"   Affect:  mood-congruent, less labile   Thought Process:  circumstantial   Associations: circumstantial associations   Thought Content:  Less paranoid, some bizarre delusions   Perceptual Disturbances: Denied AVH, did not appear internally preoccupied   Risk Potential: Suicidal Ideations none  Homicidal Ideations none  Potential for Aggression No   Sensorium:  person, place and time/date   Memory:  recent and remote memory grossly intact   Consciousness:  alert and awake    Attention: attention span appeared shorter than expected for age   Insight:  Impaired   Judgment: Impaired, but improving   Gait/Station: " normal gait/station and normal balance   Motor Activity: no abnormal movements     Progress Toward Goals: Some improvement  Less agitated, less labile, and less disorganized  Thoughts circumstantial   Mood controlled today  Sleep also improved  Depakote level obtained this morning and therapeutic at 89  We will continue with current psychotropic regimen; patient tolerating well  Continues to require utilization of 2 antipsychotics for treatment of schizophrenia  No discharge date at this time  Recommended Treatment: Continue with group therapy, milieu therapy and occupational therapy  Risks, benefits and possible side effects of Medications:   [place] has limited understanding of risk versus benefits of medications, but agrees to take as prescribed      Medications:   all current active meds have been reviewed, continue current psychiatric medications and current meds:   Current Facility-Administered Medications   Medication Dose Route Frequency   • acetaminophen (TYLENOL) tablet 650 mg  650 mg Oral Q6H PRN   • acetaminophen (TYLENOL) tablet 650 mg  650 mg Oral Q4H PRN   • acetaminophen (TYLENOL) tablet 975 mg  975 mg Oral Q6H PRN   • aluminum-magnesium hydroxide-simethicone (MYLANTA) oral suspension 30 mL  30 mL Oral Q4H PRN   • bacitracin topical ointment 1 small application  1 small application Topical BID   • benztropine (COGENTIN) tablet 1 mg  1 mg Oral Q6H PRN   • cephalexin (KEFLEX) capsule 500 mg  500 mg Oral Q6H [address]   • [START ON 6/20/2023] cholecalciferol (VITAMIN D3) tablet 1,000 Units  1,000 Units Oral Daily   • cyanocobalamin (VITAMIN B-12) tablet 1,000 mcg  1,000 mcg Oral Daily   • divalproex sodium (DEPAKOTE) DR tablet 1,250 mg  1,250 mg Oral Q12H [address]   • ergocalciferol (VITAMIN D2) capsule 50,000 Units  50,000 Units Oral Weekly   • hydrOXYzine HCL (ATARAX) tablet 25 mg  25 mg Oral Q6H PRN Max 4/day   • hydrOXYzine HCL (ATARAX) tablet 50 mg  50 mg Oral Q4H PRN Max 4/day    Or   • LORazepam (ATIVAN) injection 1 mg  1 mg Intramuscular Q4H PRN   • levothyroxine tablet 25 mcg  25 mcg Oral Early Morning   • lidocaine (LIDODERM) 5 % patch 1 patch  1 patch Topical HS   • LORazepam (ATIVAN) tablet 1 mg  1 mg Oral Q6H PRN    Or   • LORazepam (ATIVAN) injection 2 mg  2 mg Intramuscular Q6H PRN Max 3/day   • LORazepam (ATIVAN) tablet 0 5 mg  0 5 mg Oral TID   • metoprolol succinate (TOPROL-XL) 24 hr tablet 50 mg  50 mg Oral Daily   • OLANZapine (ZyPREXA ZYDIS) dispersible tablet 10 mg  10 mg Oral Daily   • OLANZapine (ZyPREXA ZYDIS) dispersible tablet 30 mg  30 mg Oral HS   • perphenazine tablet 4 mg  4 mg Oral BID   • polyethylene glycol (MIRALAX) packet 17 g  17 g Oral Daily PRN   • prazosin (MINIPRESS) capsule 1 mg  1 mg Oral HS   • risperiDONE (RisperDAL M-TAB) disintegrating tablet 0 5 mg  0 5 mg Oral Q4H PRN Max 6/day   • risperiDONE (RisperDAL M-TAB) disintegrating tablet 1 mg  1 mg Oral Q4H PRN Max 4/day   • risperiDONE (RisperDAL M-TAB) disintegrating tablet 3 mg  3 mg Oral Q6H PRN Max 3/day   • tamsulosin (FLOMAX) capsule 0 4 mg  0 4 mg Oral Daily With Dinner   • traZODone (DESYREL) tablet 100 mg  100 mg Oral HS PRN   • traZODone (DESYREL) tablet 100 mg  100 mg Oral HS     Labs: I have personally reviewed all pertinent laboratory/tests results     CBC:   Lab Results   Component Value Date    WBC 6 89 05/27/2023    RBC 4 35 05/27/2023    HGB 13 1 05/27/2023    HCT 40 9 05/27/2023    MCV 94 05/27/2023     05/27/2023    MCH 30 1 05/27/2023    MCHC 32 0 05/27/2023    RDW 13 1 05/27/2023    MPV 12 2 05/27/2023    NEUTROABS 3 84 05/27/2023     CMP:   Lab Results   Component Value Date    SODIUM 141 05/27/2023    K 3 8 05/27/2023     05/27/2023    CO2 30 05/27/2023    AGAP 6 05/27/2023    BUN 15 05/27/2023    CREATININE 0 93 05/27/2023    GLUC 94 05/27/2023    GLUF 94 05/27/2023    CALCIUM 9 3 05/27/2023    AST 41 (H) 05/27/2023    ALT 34 05/27/2023    ALKPHOS 34 05/27/2023    TP 6 5 05/27/2023 ALB 4 2 05/27/2023    TBILI 0 65 05/27/2023    EGFR 85 05/27/2023     Depakote:   Lab Results   Component Value Date    VALPROICTOT 89 06/16/2023     EKG   Lab Results   Component Value Date    VENTRATE 90 05/27/2023    ATRIALRATE 90 05/27/2023    PRINT 158 05/27/2023    QRSDINT 84 05/27/2023    QTINT 400 05/27/2023    QTCINT 489 05/27/2023    PAXIS 78 05/27/2023    QRSAXIS -1 05/27/2023    TWAVEAXIS 45 05/27/2023       Counseling / Coordination of Care  Total floor / unit time spent today 30 minutes  Greater than 50% of total time was spent with the patient and / or family counseling and / or coordination of care   A description of the counseling / coordination of care: Medication education, treatment plan, supportive therapy, safety planning

## 2023-06-16 NOTE — NURSING NOTE
Patient visible on unit  Bright on approach  Refused Lidoderm patch  Minipress held for BP 99/64  Social with staff  Pleasant and cooperative, Keflex continues for toe infection  Denies SI,HI,AVH, anxiety or depression  Safety checks continue Q 7 minutes

## 2023-06-16 NOTE — NURSING NOTE
Patient compliant with meds and meals  Patient denies everything  Patient is social and visible, at times still disorganized but can have more linear conversations  Patient spoke with  today regarding his [facility] hearing on Tuesday and patient let this writer know that he felt it went well and stated he would call the patient the morning before the hearing  Patient is bright and pleasant at times is withdrawn to room and will state it do to the other peers being to loud  Patient did well with the room change today  Q 7 min behavioral and safety checks in place

## 2023-06-16 NOTE — SOCIAL WORK
Sw met with pt per pt request, CM office number written down for pt  Pt presents irritable, labile, rambling about going to [place]  Pt denies current SI/HI/AVH/dep/anx, is dc focused

## 2023-06-16 NOTE — PROGRESS NOTES
"Progress Note - Umberto Apgar Day 70 y o  male MRN: [id number]    Unit/Bed#: [facility] 224-02 Encounter: [id number]        Subjective:   Patient seen and examined at bedside after reviewing the chart and discussing the case with the caring staff  Patient denies any acute complaints  Physical Exam   Vitals: Blood pressure 135/84, pulse 72, temperature 97 8 °F (36 6 °C), temperature source Tympanic, resp  rate 18, height 5' 9\" (1 753 m), weight 93 3 kg (205 lb 9 6 oz), SpO2 97 %  ,Body mass index is 30 36 kg/m²  Constitutional: Patient in no acute distress  HEENT: PERR, EOMI, neck supple  Cardiovascular: Normal rate  Pulmonary/Chest: No respiratory distress  Abdomen: Nondistended  Assessment/Plan:  Umberto Apgar Day is a(n) 70 y o  male with schizoaffective disorder      1  Cardiac with hx HTN, HLD  Continue metoprolol succinate to 50 mg daily (incr 23)  LDL 76   2  Tobacco abuse  NRT  3  Hypothyroidism  TSH 5 291, FT4 1 46  Levothyroxine 25 mcg daily  Repeat TSH in 6 weeks  4  BPH  Continue Flomax 0 4 mg daily  5  Vitamin D deficiency  Patient started on vitamin D2 50,000 units weekly for 4 weeks followed by vitamin D3 1000 units daily  6  Vitamin B12 deficiency  Patient started on vitamin B12 supplement  7  Ingrown toenail  Patient was seen by podiatry 2023, patient has left hallux paronychia with pain and they have recommended partial toenail avulsion in 1 to 2 weeks on outpatient basis  Patient was started on Keflex 500 mg 4 times daily for 7 days  8   Chronic low back pain  Lidocaine patch daily  The patient was discussed with Dr Vikash De Jesus and he is in agreement with the above note    "

## 2023-06-16 NOTE — NURSING NOTE
Administered tylenol 975 mg for complaints of generalized pain 9/10 on numbered scale  Will evaluate effectiveness of medication

## 2023-06-17 PROCEDURE — 99232 SBSQ HOSP IP/OBS MODERATE 35: CPT | Performed by: PSYCHIATRY & NEUROLOGY

## 2023-06-17 RX ADMIN — LORAZEPAM 0.5 MG: 1 TABLET ORAL at 09:07

## 2023-06-17 RX ADMIN — BACITRACIN ZINC 1 SMALL APPLICATION: 500 OINTMENT TOPICAL at 09:33

## 2023-06-17 RX ADMIN — OLANZAPINE 10 MG: 10 TABLET, ORALLY DISINTEGRATING ORAL at 09:06

## 2023-06-17 RX ADMIN — CEPHALEXIN 500 MG: 500 CAPSULE ORAL at 17:33

## 2023-06-17 RX ADMIN — CEPHALEXIN 500 MG: 500 CAPSULE ORAL at 12:30

## 2023-06-17 RX ADMIN — DIVALPROEX SODIUM 1250 MG: 250 TABLET, DELAYED RELEASE ORAL at 09:07

## 2023-06-17 RX ADMIN — METOPROLOL SUCCINATE 50 MG: 50 TABLET, EXTENDED RELEASE ORAL at 09:07

## 2023-06-17 RX ADMIN — CEPHALEXIN 500 MG: 500 CAPSULE ORAL at 23:45

## 2023-06-17 RX ADMIN — TRAZODONE HYDROCHLORIDE 100 MG: 100 TABLET ORAL at 21:28

## 2023-06-17 RX ADMIN — TAMSULOSIN HYDROCHLORIDE 0.4 MG: 0.4 CAPSULE ORAL at 17:30

## 2023-06-17 RX ADMIN — BACITRACIN ZINC 1 SMALL APPLICATION: 500 OINTMENT TOPICAL at 17:34

## 2023-06-17 RX ADMIN — LORAZEPAM 0.5 MG: 1 TABLET ORAL at 21:32

## 2023-06-17 RX ADMIN — DIVALPROEX SODIUM 1250 MG: 250 TABLET, DELAYED RELEASE ORAL at 21:27

## 2023-06-17 RX ADMIN — CEPHALEXIN 500 MG: 500 CAPSULE ORAL at 05:04

## 2023-06-17 RX ADMIN — OLANZAPINE 30 MG: 10 TABLET, ORALLY DISINTEGRATING ORAL at 21:28

## 2023-06-17 RX ADMIN — PERPHENAZINE 4 MG: 4 TABLET, FILM COATED ORAL at 09:06

## 2023-06-17 RX ADMIN — PERPHENAZINE 4 MG: 4 TABLET, FILM COATED ORAL at 17:34

## 2023-06-17 RX ADMIN — LEVOTHYROXINE SODIUM 25 MCG: 25 TABLET ORAL at 09:07

## 2023-06-17 RX ADMIN — ACETAMINOPHEN 650 MG: 325 TABLET ORAL at 04:19

## 2023-06-17 RX ADMIN — CYANOCOBALAMIN TAB 500 MCG 1000 MCG: 500 TAB at 09:07

## 2023-06-17 RX ADMIN — PRAZOSIN HYDROCHLORIDE 1 MG: 1 CAPSULE ORAL at 21:28

## 2023-06-17 RX ADMIN — LORAZEPAM 0.5 MG: 1 TABLET ORAL at 17:30

## 2023-06-17 NOTE — PROGRESS NOTES
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: [id number]    Unit/Bed#: [facility] 033-03 Encounter: [id number]        Subjective:   Patient seen and examined at bedside after reviewing the chart and discussing the case with the caring staff  Patient denies any acute complaints  Physical Exam   Vitals: Blood pressure 140/79, pulse 80, temperature 97 5 °F (36 4 °C), temperature source Temporal, resp  rate 18, height 5' 9\" (1 753 m), weight 93 3 kg (205 lb 9 6 oz), SpO2 96 %  ,Body mass index is 30 36 kg/m²  Constitutional: Patient in no acute distress  HEENT: PERR, EOMI, neck supple  Cardiovascular: Normal rate  Pulmonary/Chest: No respiratory distress  Abdomen: Nondistended  Assessment/Plan:  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 with schizoaffective disorder      1  Cardiac with hx HTN, HLD  Continue metoprolol succinate to 50 mg daily (incr 5/30/23)  LDL 76   2  Tobacco abuse  NRT  3  Hypothyroidism  TSH 5 291, FT4 1 46  Levothyroxine 25 mcg daily  Repeat TSH in 6 weeks  4  BPH  Continue Flomax 0 4 mg daily  5  Vitamin D deficiency  Patient started on vitamin D2 50,000 units weekly for 4 weeks followed by vitamin D3 1000 units daily  6  Vitamin B12 deficiency  Patient started on vitamin B12 supplement  7  Ingrown toenail  Patient was seen by podiatry 6/13/2023, patient has left hallux paronychia with pain and they have recommended partial toenail avulsion in 1 to 2 weeks on outpatient basis  Patient was started on Keflex 500 mg 4 times daily for 7 days  8   Chronic low back pain  Lidocaine patch daily    "

## 2023-06-17 NOTE — NURSING NOTE
Patient was pleasant and cooperative  Patient more organized and linear  Staff support provided  Q 7 minute safety checks maintained  Patient denied SI/HI  Patient compliant with medications and groups  Patient still exhibiting mumbling speech at times  Staff will continue to monitor and support

## 2023-06-17 NOTE — NURSING NOTE
Patient visible on unit  Bright on approach  Refused Lidoderm patch  Social with staff  Rapid speech, disorganized but improved  Pleasant and cooperative, Keflex continues for toe infection  Denies SI,HI,AVH, anxiety or depression  Safety checks continue Q 7 minutes

## 2023-06-17 NOTE — PROGRESS NOTES
"Progress Note - 6 Saint Gonzales Frandy Day 70 y o  male MRN: [id number]  Unit/Bed#: -02 Encounter: [id number]    Assessment/Plan   Principal Problem:    Schizophrenia ([address] )  Active Problems:    Cannabis abuse, continuous    Hypertension    Tobacco abuse    BPH (benign prostatic hyperplasia)    Hyperlipidemia    Umbilical hernia    Recommended Treatment:   Continue current psychotropic medication regimen, no changes at this time  Continue with group therapy, milieu therapy and occupational therapy  Continue frequent safety checks and vitals per unit protocol  Case discussed with treatment team   Risks, benefits and possible side effects of Medications: Risks, benefits, and possible side effects of medications have been explained to the patient, who verbalizes understanding       ------------------------------------------------------------  Chief Complaint:  \"I want to know how to get out of here\"    Interval History: Per staff, patient has been compliant with medications, at times disorganzied  Radha Harrington is pleasant with staff but remains withdrawn to his room Patient reports that he wants to be discharged  He is disorganized in thought process as well as tangential  He is able to answer simple questions appropriately when asked directly  He reports taking a shower, sleeping well, reports he is compliant with medications and is overall feeling better  He does report that he prefers to stay in his room because his peers are loud and he doesn't like the noise  Medication compliant  Adverse effects of medications: denies    Progress Toward Goals: Not agitated     Psychiatric Review of Systems:  Behavior over the last 24 hours: improved  Sleep: normal  Appetite: good  Medication side effects: none verbalized  ROS: Complete review of systems is negative except as noted above      Vital signs in last 24 hours:  Temp:  [97 8 °F (36 6 °C)-98 5 °F (36 9 °C)] 98 5 °F (36 9 °C)  HR:  [] 90  Resp:  " "[18] 18  BP: ()/(68-86) 119/86    Mental Status Evaluation:  Appearance:  alert, good eye contact, appears stated age, casually dressed and marginal grooming/hygiene   Behavior:  calm and cooperative   Attitude:  cooperative   Speech:  spontaneous, loud, hyperverbal and coherent   Mood:  \"good\"   Affect:  increased range   Thought Process:  disorganized, tangential   Thought Content: no verbalized delusions or overt paranoia   Perceptual disturbances: no reported hallucinations and does not appear to be responding to internal stimuli at this time   Risk Potential: No active or passive suicidal or homicidal ideation was verbalized during interview   Cognition: oriented to self and situation, appears to be of average intelligence and cognition not formally tested   Insight:  Poor   Judgment: Poor     Current Medications:  Current Facility-Administered Medications   Medication Dose Route Frequency Provider Last Rate   • acetaminophen  650 mg Oral Q6H PRN Nelwyn Taylor Medei, CRNP     • acetaminophen  650 mg Oral Q4H PRN Nelwyn Taylor [facility]OS[facility]     • acetaminophen  975 mg Oral Q6H PRN Nelwyn Taylor Medei, CRNP     • aluminum-magnesium hydroxide-simethicone  30 mL Oral Q4H PRN Nelwyn Taylor Medei, CRNP     • bacitracin  1 small application Topical BID Funmi Milian PA-C     • benztropine  1 mg Oral Q6H PRN Nelwyn Taylor Medei, CRNP     • cephalexin  500 mg Oral Q6H [address] Milady Hand, DPM     • [START ON 6/20/2023] cholecalciferol  1,000 Units Oral Daily Funmi Milian PA-C     • cyanocobalamin  1,000 mcg Oral Daily Funmi Milian PA-C     • divalproex sodium  1,250 mg Oral Q12H [facility], [place]     • ergocalciferol  50,000 Units Oral Weekly Funmi Milian PA-C     • hydrOXYzine HCL  25 mg Oral Q6H PRN Max 4/day Melissa Flanagan, CRNP     • hydrOXYzine HCL  50 mg Oral Q4H PRN Max 4/day Nelwyn Taylor Medei, CRNP      Or   • LORazepam  1 mg Intramuscular Q4H PRN Nelwyn Taylor Medei, CRNP     • levothyroxine  25 mcg " Oral Early Morning Funmi Milian PA-C     • lidocaine  1 patch Topical HS Funmi Milian PA-C     • LORazepam  1 mg Oral Q6H PRN Valentino Aid CARLOS Flanagan      Or   • LORazepam  2 mg Intramuscular Q6H PRN Max 3/day Valentino Aid CARLOS Flanagan     • LORazepam  0 5 mg Oral TID Rigo Vogel MD     • metoprolol succinate  50 mg Oral Daily Funmi Milian PA-C     • OLANZapine  10 mg Oral Daily Dorota Lucia PA-C     • OLANZapine  30 mg Oral HS Mily Quevedo MD     • perphenazine  4 mg Oral BID Mily Quevedo MD     • polyethylene glycol  17 g Oral Daily PRN Valentino Aid CARLOS Flanagan     • prazosin  1 mg Oral HS Katelyn Vivar PA-C     • risperiDONE  0 5 mg Oral Q4H PRN Max 6/day Valentino Aid CARLOS Falnagan     • risperiDONE  1 mg Oral Q4H PRN Max 4/day Valentino Aid CARLOS Flanagan     • risperiDONE  3 mg Oral Q6H PRN Max 3/day Rigo Vogel MD     • tamsulosin  0 4 mg Oral Daily With Dinner Funmi Milian PA-C     • traZODone  100 mg Oral HS PRN Valentino Aid CARLOS Flanagan     • traZODone  100 mg Oral HS Sharlyne Favre, CRNP         Behavioral Health Medications: all current active meds have been reviewed  Changes as in plan section above  Laboratory results:  I have personally reviewed all pertinent laboratory/tests results  Recent Results (from the past 48 hour(s))   Valproic acid level, total    Collection Time: 06/16/23  8:23 AM   Result Value Ref Range    Valproic Acid, Total 89 50 - 100 ug/mL        This note has been constructed using a voice recognition system  There may be translation, syntax, or grammatical errors  If you have any questions, please contact the dictating provider

## 2023-06-18 PROCEDURE — 99232 SBSQ HOSP IP/OBS MODERATE 35: CPT | Performed by: PSYCHIATRY & NEUROLOGY

## 2023-06-18 RX ADMIN — CEPHALEXIN 500 MG: 500 CAPSULE ORAL at 13:33

## 2023-06-18 RX ADMIN — CEPHALEXIN 500 MG: 500 CAPSULE ORAL at 17:17

## 2023-06-18 RX ADMIN — PERPHENAZINE 4 MG: 4 TABLET, FILM COATED ORAL at 09:15

## 2023-06-18 RX ADMIN — OLANZAPINE 30 MG: 10 TABLET, ORALLY DISINTEGRATING ORAL at 21:00

## 2023-06-18 RX ADMIN — LORAZEPAM 0.5 MG: 1 TABLET ORAL at 21:00

## 2023-06-18 RX ADMIN — BACITRACIN ZINC 1 SMALL APPLICATION: 500 OINTMENT TOPICAL at 09:14

## 2023-06-18 RX ADMIN — LEVOTHYROXINE SODIUM 25 MCG: 25 TABLET ORAL at 09:14

## 2023-06-18 RX ADMIN — TAMSULOSIN HYDROCHLORIDE 0.4 MG: 0.4 CAPSULE ORAL at 17:17

## 2023-06-18 RX ADMIN — LORAZEPAM 0.5 MG: 1 TABLET ORAL at 17:18

## 2023-06-18 RX ADMIN — TRAZODONE HYDROCHLORIDE 100 MG: 100 TABLET ORAL at 21:00

## 2023-06-18 RX ADMIN — OLANZAPINE 10 MG: 10 TABLET, ORALLY DISINTEGRATING ORAL at 09:14

## 2023-06-18 RX ADMIN — LORAZEPAM 1 MG: 1 TABLET ORAL at 13:34

## 2023-06-18 RX ADMIN — PRAZOSIN HYDROCHLORIDE 1 MG: 1 CAPSULE ORAL at 21:00

## 2023-06-18 RX ADMIN — CEPHALEXIN 500 MG: 500 CAPSULE ORAL at 05:51

## 2023-06-18 RX ADMIN — PERPHENAZINE 4 MG: 4 TABLET, FILM COATED ORAL at 17:17

## 2023-06-18 RX ADMIN — CYANOCOBALAMIN TAB 500 MCG 1000 MCG: 500 TAB at 09:14

## 2023-06-18 RX ADMIN — DIVALPROEX SODIUM 1250 MG: 250 TABLET, DELAYED RELEASE ORAL at 09:14

## 2023-06-18 RX ADMIN — LORAZEPAM 0.5 MG: 1 TABLET ORAL at 09:15

## 2023-06-18 RX ADMIN — DIVALPROEX SODIUM 1250 MG: 250 TABLET, DELAYED RELEASE ORAL at 21:00

## 2023-06-18 RX ADMIN — METOPROLOL SUCCINATE 50 MG: 50 TABLET, EXTENDED RELEASE ORAL at 09:14

## 2023-06-18 NOTE — PROGRESS NOTES
"Progress Note - Nurys Graham 70 y o  male MRN: [id number]    Unit/Bed#: [facility] 390-24 Encounter: [id number]        Subjective:   Patient seen and examined at bedside after reviewing the chart and discussing the case with the caring staff  Patient denies any acute complaints  Physical Exam   Vitals: Blood pressure 114/90, pulse 88, temperature 97 7 °F (36 5 °C), temperature source Temporal, resp  rate 18, height 5' 9\" (1 753 m), weight 93 4 kg (205 lb 12 8 oz), SpO2 97 %  ,Body mass index is 30 39 kg/m²  Constitutional: Patient in no acute distress  HEENT: PERR, EOMI, neck supple  Cardiovascular: Normal rate  Pulmonary/Chest: No respiratory distress  Abdomen: Nondistended  Assessment/Plan:  Nurys Graham is a(n) 70 y o  male with schizoaffective disorder      1  Cardiac with hx HTN, HLD  Continue metoprolol succinate to 50 mg daily (incr 5/30/23)  LDL 76   2  Tobacco abuse  NRT  3  Hypothyroidism  TSH 5 291, FT4 1 46  Levothyroxine 25 mcg daily  Repeat TSH in 6 weeks  4  BPH  Continue Flomax 0 4 mg daily  5  Vitamin D deficiency  Patient started on vitamin D2 50,000 units weekly for 4 weeks followed by vitamin D3 1000 units daily  6  Vitamin B12 deficiency  Patient started on vitamin B12 supplement  7  Ingrown toenail  Patient was seen by podiatry 6/13/2023, patient has left hallux paronychia with pain and they have recommended partial toenail avulsion in 1 to 2 weeks on outpatient basis  Patient was started on Keflex 500 mg 4 times daily for 7 days  8   Chronic low back pain  Lidocaine patch daily    "

## 2023-06-18 NOTE — NURSING NOTE
Pleasant and cooperative with staff  Compliant with medication and group  Visible on the unit and social with staff and peers  Bright on approach  Eye contact good  Denied SI, HI, AVH, depression, or anxiety  7 minute safety checks continued

## 2023-06-18 NOTE — PROGRESS NOTES
06/18/23 1330   Fajardo Anxiety Scale   Anxious Mood 4   Tension 4   Fears 3   Insomnia 1   Intellectual 3   Depressed Mood 3   Somatic Complaints: Muscular 2   Somatic Complaints: Sensory 1   Cardiovascular Symptoms 1   Respiratory Symptoms 1   Gastrointestinal Symptoms 1   Genitourinary Symptoms 0   Autonomic Symptoms 1   Behavior at Interview 3   Fajardo Anxiety Score 28

## 2023-06-18 NOTE — NURSING NOTE
"Previously administered ativan prn for severe anxiety effective  Pt verbalizes feeling \"much calmer\" Speech less pressured  Behaviors controlled  Safety precautions maintained      "

## 2023-06-18 NOTE — NURSING NOTE
Patient observed within the milieu, mood seems to be less labile, no episodes of paranoia nor agitation this evening, pleasant and polite in interaction  He continues to have periods of disorganized thoughts and is tangential in conversation but able to refocus  Denies SI/HI and hallucinations, compliant with all medication with exception of lidoderm, reports ongoing left toe discomfort but declined intervention  q7 minute checks ongoing

## 2023-06-19 PROCEDURE — 99232 SBSQ HOSP IP/OBS MODERATE 35: CPT | Performed by: HOSPITALIST

## 2023-06-19 RX ORDER — PERPHENAZINE 4 MG/1
4 TABLET ORAL DAILY
Status: DISCONTINUED | OUTPATIENT
Start: 2023-06-20 | End: 2023-06-26 | Stop reason: HOSPADM

## 2023-06-19 RX ORDER — PERPHENAZINE 4 MG/1
8 TABLET ORAL
Status: DISCONTINUED | OUTPATIENT
Start: 2023-06-19 | End: 2023-06-26 | Stop reason: HOSPADM

## 2023-06-19 RX ADMIN — LEVOTHYROXINE SODIUM 25 MCG: 25 TABLET ORAL at 08:47

## 2023-06-19 RX ADMIN — LORAZEPAM 0.5 MG: 1 TABLET ORAL at 08:47

## 2023-06-19 RX ADMIN — LORAZEPAM 0.5 MG: 1 TABLET ORAL at 16:25

## 2023-06-19 RX ADMIN — CEPHALEXIN 500 MG: 500 CAPSULE ORAL at 12:28

## 2023-06-19 RX ADMIN — PRAZOSIN HYDROCHLORIDE 1 MG: 1 CAPSULE ORAL at 21:31

## 2023-06-19 RX ADMIN — LORAZEPAM 1 MG: 1 TABLET ORAL at 13:16

## 2023-06-19 RX ADMIN — PERPHENAZINE 4 MG: 4 TABLET, FILM COATED ORAL at 08:47

## 2023-06-19 RX ADMIN — BACITRACIN ZINC 1 SMALL APPLICATION: 500 OINTMENT TOPICAL at 18:49

## 2023-06-19 RX ADMIN — DIVALPROEX SODIUM 1250 MG: 250 TABLET, DELAYED RELEASE ORAL at 08:47

## 2023-06-19 RX ADMIN — CEPHALEXIN 500 MG: 500 CAPSULE ORAL at 23:49

## 2023-06-19 RX ADMIN — METOPROLOL SUCCINATE 50 MG: 50 TABLET, EXTENDED RELEASE ORAL at 08:47

## 2023-06-19 RX ADMIN — ERGOCALCIFEROL 50000 UNITS: 1.25 CAPSULE ORAL at 08:47

## 2023-06-19 RX ADMIN — OLANZAPINE 30 MG: 10 TABLET, ORALLY DISINTEGRATING ORAL at 21:30

## 2023-06-19 RX ADMIN — TRAZODONE HYDROCHLORIDE 100 MG: 100 TABLET ORAL at 21:42

## 2023-06-19 RX ADMIN — LORAZEPAM 0.5 MG: 1 TABLET ORAL at 21:31

## 2023-06-19 RX ADMIN — BACITRACIN ZINC 1 SMALL APPLICATION: 500 OINTMENT TOPICAL at 08:47

## 2023-06-19 RX ADMIN — DIVALPROEX SODIUM 1250 MG: 250 TABLET, DELAYED RELEASE ORAL at 21:30

## 2023-06-19 RX ADMIN — TRAZODONE HYDROCHLORIDE 100 MG: 100 TABLET ORAL at 02:12

## 2023-06-19 RX ADMIN — PERPHENAZINE 8 MG: 4 TABLET, FILM COATED ORAL at 21:30

## 2023-06-19 RX ADMIN — CEPHALEXIN 500 MG: 500 CAPSULE ORAL at 05:23

## 2023-06-19 RX ADMIN — TAMSULOSIN HYDROCHLORIDE 0.4 MG: 0.4 CAPSULE ORAL at 16:25

## 2023-06-19 RX ADMIN — CEPHALEXIN 500 MG: 500 CAPSULE ORAL at 00:27

## 2023-06-19 RX ADMIN — OLANZAPINE 10 MG: 10 TABLET, ORALLY DISINTEGRATING ORAL at 08:47

## 2023-06-19 RX ADMIN — CYANOCOBALAMIN TAB 500 MCG 1000 MCG: 500 TAB at 08:47

## 2023-06-19 RX ADMIN — CEPHALEXIN 500 MG: 500 CAPSULE ORAL at 18:46

## 2023-06-19 NOTE — PROGRESS NOTES
06/19/23 6088   Activity/Group Checklist   Group   (Morning Meeting and Coffee)   Attendance Attended   Attendance Duration (min) 46-60   Interactions Interacted appropriately   Affect/Mood Appropriate;Bright;Calm   Goals Achieved Identified feelings; Discussed coping strategies; Able to listen to others; Able to engage in interactions

## 2023-06-19 NOTE — PROGRESS NOTES
"Progress Note - Marychuy Graham 70 y o  male MRN: [id number]    Unit/Bed#: [facility] 731-54 Encounter: [id number]        Subjective:   Patient seen and examined at bedside after reviewing the chart and discussing the case with the caring staff  Patient examined at bedside  It was reported that patient has irritation of the right eye  When I asked the patient he said that he does not have any pain or acute irritation but he feels that his glasses needs to be changed as he has difficulty seeing it through his current glasses  Physical Exam   Vitals: Blood pressure 114/73, pulse 86, temperature 98 3 °F (36 8 °C), temperature source Temporal, resp  rate 18, height 5' 9\" (1 753 m), weight 93 4 kg (205 lb 12 8 oz), SpO2 97 %  ,Body mass index is 30 39 kg/m²  Constitutional: Patient in no acute distress  HEENT: PERR, EOMI, neck supple  Cardiovascular: Normal rate  Pulmonary/Chest: No respiratory distress  Abdomen: Nondistended  Assessment/Plan:  Marychuy Graham is a(n) 70 y o  male with schizoaffective disorder      1  Cardiac with hx HTN, HLD  Continue metoprolol succinate to 50 mg daily (incr 5/30/23)  LDL 76   2  Tobacco abuse  NRT  3  Hypothyroidism  TSH 5 291, FT4 1 46  Levothyroxine 25 mcg daily  Repeat TSH in 6 weeks  4  BPH  Continue Flomax 0 4 mg daily  5  Vitamin D deficiency  Patient started on vitamin D2 50,000 units weekly for 4 weeks followed by vitamin D3 1000 units daily  6  Vitamin B12 deficiency  Patient started on vitamin B12 supplement  7  Ingrown toenail  Patient was seen by podiatry 6/13/2023, patient has left hallux paronychia with pain and they have recommended partial toenail avulsion in 1 to 2 weeks on outpatient basis  Patient was started on Keflex 500 mg 4 times daily for 7 days  8   Chronic low back pain  Lidocaine patch daily    "

## 2023-06-19 NOTE — PROGRESS NOTES
"Progress Note - 6 Saint Gonzales Frandy Day 70 y o  male MRN: [id number]  Unit/Bed#: -02 Encounter: [id number]    Assessment/Plan   Principal Problem:    Schizophrenia ([address] )  Active Problems:    Cannabis abuse, continuous    Hypertension    Tobacco abuse    BPH (benign prostatic hyperplasia)    Hyperlipidemia    Umbilical hernia    Recommended Treatment:   Continue current psychotropic medication regimen, no changes at this time  Continue with group therapy, milieu therapy and occupational therapy  Continue frequent safety checks and vitals per unit protocol  Case discussed with treatment team   Risks, benefits and possible side effects of Medications: Risks, benefits, and possible side effects of medications have been explained to the patient, who verbalizes understanding       ------------------------------------------------------------  Chief Complaint:  \"I have court on Tuesday\"    Interval History: Per staff, patient has been bright, no disruptive behaviors  Patient reports that he does \"not like the odds of court case on Tuesday  \" During follow up Cesia Lucas is hyperverbal and thought process is tangential  He is bright and reports getting along with peers  He does admit to [facility] but states \"I'm not talking about the voices today  \"     Medication compliant  Adverse effects of medications: Denies    Progress Toward Goals: less irritable     Psychiatric Review of Systems:  Behavior over the last 24 hours: unchanged  Sleep: normal  Appetite: good  Medication side effects: none verbalized  ROS: Complete review of systems is negative except as noted above      Vital signs in last 24 hours:  Temp:  [97 7 °F (36 5 °C)-98 1 °F (36 7 °C)] 98 1 °F (36 7 °C)  HR:  [78-89] 78  Resp:  [18] 18  BP: (111-120)/(75-90) 120/83    Mental Status Evaluation:  Appearance:  alert, good eye contact, appears stated age, casually dressed and marginal grooming/hygiene   Behavior:  calm and cooperative   Attitude:  " "cooperative   Speech:  spontaneous, loud, hyperverbal and coherent   Mood:  \"good\"   Affect:  increased intensity, bright   Thought Process:  disorganized, tangential   Thought Content: no verbalized delusions or overt paranoia   Perceptual disturbances: no reported hallucinations and does not appear to be responding to internal stimuli at this time   Risk Potential: No active or passive suicidal or homicidal ideation was verbalized during interview   Cognition: oriented to self and situation, appears to be of average intelligence and cognition not formally tested   Insight:  Poor   Judgment: Poor     Current Medications:  Current Facility-Administered Medications   Medication Dose Route Frequency Provider Last Rate   • acetaminophen  650 mg Oral Q6H PRN Marnell Prader Medei, CRNP     • acetaminophen  650 mg Oral Q4H PRN Marnell Prader [facility]     • acetaminophen  975 mg Oral Q6H PRN Marnell Prader Medei, CRNP     • aluminum-magnesium hydroxide-simethicone  30 mL Oral Q4H PRN Marnell Prader Medei, CRNP     • bacitracin  1 small application Topical BID Funmi Milian PA-C     • benztropine  1 mg Oral Q6H PRN Marnell Prader Medei, CRNP     • cephalexin  500 mg Oral Q6H [address] Climmie SOPHIE Yao     • [START ON 6/20/2023] cholecalciferol  1,000 Units Oral Daily Funmi Milian PA-C     • cyanocobalamin  1,000 mcg Oral Daily Funmi Milian PA-C     • divalproex sodium  1,250 mg Oral Q12H [address], [place]     • ergocalciferol  50,000 Units Oral Weekly Funmi Milian PA-C     • hydrOXYzine HCL  25 mg Oral Q6H PRN Max 4/day Marnell Prader Medei, CRNP     • hydrOXYzine HCL  50 mg Oral Q4H PRN Max 4/day Marnell Prader Medei, CRMILI      Or   • LORazepam  1 mg Intramuscular Q4H PRN Marnell Prader Medei, CRNP     • levothyroxine  25 mcg Oral Early Morning Funmi Milian PA-C     • lidocaine  1 patch Topical HS Funmi Milian PA-C     • LORazepam  1 mg Oral Q6H PRN Marnell Prader Medei, CRMILI      Or   • LORazepam  2 mg Intramuscular Q6H PRN " Max 3/day CARLOS Arteaga     • LORazepam  0 5 mg Oral TID Nikhil Simpson MD     • metoprolol succinate  50 mg Oral Daily Funmi Milian PA-C     • OLANZapine  10 mg Oral Daily Nain Hicks PA-C     • OLANZapine  30 mg Oral HS Cory Saini MD     • perphenazine  4 mg Oral BID Cory Saini MD     • polyethylene glycol  17 g Oral Daily PRN Jose Infield Medkarin, CRNP     • prazosin  1 mg Oral HS Taina Petersen PA-C     • risperiDONE  0 5 mg Oral Q4H PRN Max 6/day Jose Infield Medei, CRMILI     • risperiDONE  1 mg Oral Q4H PRN Max 4/day Jose Infield Medkarin, CRMILI     • risperiDONE  3 mg Oral Q6H PRN Max 3/day Nikhil Simpson MD     • tamsulosin  0 4 mg Oral Daily With Dinner Funmi Milian PA-C     • traZODone  100 mg Oral HS PRN Jose Infield Medkarin, CARLOS     • traZODone  100 mg Oral HS CARLOS Arteaga         Behavioral Health Medications: all current active meds have been reviewed  Changes as in plan section above  Laboratory results:  I have personally reviewed all pertinent laboratory/tests results  No results found for this or any previous visit (from the past 48 hour(s))  This note has been constructed using a voice recognition system  There may be translation, syntax, or grammatical errors  If you have any questions, please contact the dictating provider

## 2023-06-19 NOTE — NUTRITION
06/19/23 1625   Biochemical Data,Medical Tests, and Procedures   Biochemical Data/Medical Tests/Procedures Lab values reviewed; Meds reviewed   Labs (Comment) No new labs   Meds (Comment) cogentin, kelfex, Vit D, Vit B12, depakote, levothyroxine, ativan, metoprolol succinate, zyprexa, minipress, desyrel   Nutrition-Focused Physical Exam   Nutrition-Focused Physical Exam Findings RN skin assessment reviewed; No skin issues documented   Medical-Related Concerns PMH reviewed   Adequacy of Intake   Nutrition Modality PO   Feeding Route   PO Independent   Current PO Intake   Current Diet Order Regular diet thin liquids   Current Meal Intake %   Estimated calorie intake compared to estimated need Nutrient needs met  PES Statement   Problem No nutrition diagnosis   Recommendations/Interventions   Malnutrition/BMI Present No  (does not meet criteria)   Summary Regular diet thin liquids  No nutrition supplements  Meal intakes 100%  6/17 205#, BMI=30 39; 10# undesired weight gain since admission 5/26 195#  No new labs  Medications reviewed  Trace RLE edema  Skin intact  Nutrient needs met     Interventions/Recommendations Continue current diet order   Education Assessment   Education Education not indicated at this time   Nutrition Complexity Risk   Nutrition complexity level Sign off   Follow up date 07/19/23

## 2023-06-19 NOTE — SOCIAL WORK
Darcy Graham (mother) [phone] contacted sw to contact for update, family thought pt had to call in tomorrow for hearing-education provided on [facility] hearing  Reports pt sounds improved on phone, rambling at times though  Call ended mutually

## 2023-06-19 NOTE — TREATMENT TEAM
06/19/23 1312   Fajardo Anxiety Scale   Anxious Mood 4   Tension 4   Fears 2   Insomnia 0   Intellectual 3   Depressed Mood 3   Somatic Complaints: Muscular 2   Somatic Complaints: Sensory 1   Cardiovascular Symptoms 1   Respiratory Symptoms 1   Gastrointestinal Symptoms 1   Genitourinary Symptoms 0   Autonomic Symptoms 1   Behavior at Interview 3   Fajardo Anxiety Score 26     Patient given 1 mg Ativan for severe anxiety r/t yelling on phone near patients room

## 2023-06-19 NOTE — SOCIAL WORK
Sw met with pt for check in, pt voices regret over being physical with hospital staff while psychotic, informs sw he feels like 'crazy', angry  Pt agreeable to [address] hearing tomorrow 8am with understanding of possible dc early next week pending med adjustment  Pt states his medications can either be sent to [facility], paper scripts to take to [address] [facility] [place] for 10 day supply with rest being mail ordered, or all sent to [address] [place] [address] for [place] provider review  [address] [phone], [address] 146 states; HUANG [place] provider can e-scribe or fax scripts (clearly state on script: discharge medication in order to prioritize) will be reviewed by [place] provider and rewritten by provider or pt can fill at [address] under Medicare costs   F: [phone]

## 2023-06-19 NOTE — PROGRESS NOTES
Progress Note - [address] CASTANEDA Day 70 y o  male MRN: [id number]   Unit/Bed#: Tish Orellana 528-76 Encounter: [id number]    Behavior over the last 24 hours: slowly improving  Jimmy Garay seen today, per staff report has been labile on the unit  Patient seen in follow-up today shows improvement with being less disorganized  Continues to have some tangential thought and rambling speech  He however is no longer highly irritable or with significant mood instability  Patient is showing beginnings of improvement  Sleep and appetite improved  Compliant with medication without reported side effects  Mental Status Evaluation:    Appearance:  casually dressed, marginal hygiene, looks stated age   Behavior:  cooperative   Speech:  increased rate, pressured   Mood:  labile   Affect:  overbright, expansive   Thought Process:  illogical, circumstantial   Associations: circumstantial associations   Thought Content:  no overt delusions   Perceptual Disturbances: denies auditory hallucinations when asked   Risk Potential: Suicidal ideation - None  Homicidal ideation - None   Sensorium:  oriented to person, place and time/date   Memory:  recent and remote memory grossly intact   Consciousness:  alert and awake   Attention: attention span and concentration are age appropriate   Insight:  limited   Judgment: limited   Gait/Station: normal gait/station   Motor Activity: no abnormal movements     Vital signs in last 24 hours:    Temp:  [98 1 °F (36 7 °C)-98 3 °F (36 8 °C)] 98 3 °F (36 8 °C)  HR:  [78-89] 86  Resp:  [18] 18  BP: (111-120)/(73-83) 114/73    Laboratory results: I have personally reviewed all pertinent laboratory/tests results          Assessment/Plan   Principal Problem:    [facility]  Active Problems:    Cannabis abuse, continuous    Hypertension    Tobacco abuse    BPH (benign prostatic hyperplasia)    Hyperlipidemia    Umbilical hernia    Recommended Treatment:     Planned medication and treatment changes:  Patient had initiation of perphenazine and Zyprexa provided limited benefit  We will continue cross tapering Zyprexa to perphenazine  Today we will decrease Zyprexa to 30 mg daily at bedtime, discontinuing daytime 10 mg dose  Increase perphenazine to 8 mg at bedtime and 4 in the daytime    Continue prazosin 1 mg daily at bedtime  Continue trazodone 100 mg daily at bedtime  Continue Depakote 1250 mg  twice daily  All current active medications have been reviewed  Encourage group therapy, milieu therapy and occupational therapy  Behavioral Health checks every 7 minutes  Current Facility-Administered Medications   Medication Dose Route Frequency Provider Last Rate   • acetaminophen  650 mg Oral Q6H PRN Pascual Davies Medkarin, CRNP     • acetaminophen  650 mg Oral Q4H PRN Pascual Davies Medkarin, CRNP     • acetaminophen  975 mg Oral Q6H PRN Pascual Davies Medkarin, CRNP     • aluminum-magnesium hydroxide-simethicone  30 mL Oral Q4H PRN Pascual Flanagan, CRNP     • bacitracin  1 small application Topical BID Funmi Milian PA-C     • benztropine  1 mg Oral Q6H PRN Pasucal Flanagan, CRNP     • cephalexin  500 mg Oral Q6H [address]     • [START ON 6/20/2023] cholecalciferol  1,000 Units Oral Daily Funmi Milian PA-C     • cyanocobalamin  1,000 mcg Oral Daily Funmi Milian PA-C     • divalproex sodium  1,250 mg Oral Q12H 1500 [facility] MD     • hydrOXYzine HCL  25 mg Oral Q6H PRN Max 4/day Pascual Flanagan CRMILI     • hydrOXYzine HCL  50 mg Oral Q4H PRN Max 4/day CARLOS Terrazas      Or   • LORazepam  1 mg Intramuscular Q4H PRN Pascual Flanagan CRMILI     • levothyroxine  25 mcg Oral Early Morning Funmi Milian PA-C     • lidocaine  1 patch Topical HS Funmi Milian PA-C     • LORazepam  1 mg Oral Q6H PRN CARLOS Terrazas      Or   • LORazepam  2 mg Intramuscular Q6H PRN Max 3/day PascualCARLOS Guillen     • LORazepam  0 5 mg Oral TID Kingsley Rodriguez MD     • metoprolol succinate  50 mg Oral Daily Funmi Milian PA-C     • OLANZapine  30 mg Oral HS Lorenzo Smith MD     • perphenazine  4 mg Oral BID Lorenzo Smith MD     • polyethylene glycol  17 g Oral Daily PRN Manjit Living CARLOS Flanagan     • prazosin  1 mg Oral HS Keyur Nogueira PA-C     • risperiDONE  0 5 mg Oral Q4H PRN Max 6/day Manjit Living CARLOS Flanagan     • risperiDONE  1 mg Oral Q4H PRN Max 4/day Manjit Living CARLOS Flanagan     • risperiDONE  3 mg Oral Q6H PRN Max 3/day Allen Hodgkins, MD     • tamsulosin  0 4 mg Oral Daily With Dinner Funmi Milian PA-C     • traZODone  100 mg Oral HS PRN [facility] CARLOS Flanagan     • traZODone  100 mg Oral HS Manjit Living CARLOS Flanagan         Risks / Benefits of Treatment:    Risks, benefits, and possible side effects of medications explained to patient and patient verbalizes understanding and agreement for treatment  Counseling / Coordination of Care: Total floor / unit time spent today 35 minutes  Greater than 50% of total time was spent with the patient and / or family counseling and / or coordination of care  A description of counseling / coordination of care:  Patient's progress discussed with staff in treatment team meeting  Medications, treatment progress and treatment plan reviewed with patient      Allen Hodgkins, MD 06/19/23

## 2023-06-19 NOTE — PLAN OF CARE
Problem: PSYCHOSIS  Goal: Will report no hallucinations or delusions  Description: Interventions:  - Administer medication as  ordered  - Every waking shifts and PRN assess for the presence of hallucinations and or delusions  - Assist with reality testing to support increasing orientation  - Assess if patient's hallucinations or delusions are encouraging self-harm or harm to others and intervene as appropriate  Outcome: Progressing     Problem: ANXIETY  Goal: Will report anxiety at manageable levels  Description: INTERVENTIONS:  - Administer medication as ordered  - Teach and encourage coping skills  - Provide emotional support  - Assess patient/family for anxiety and ability to cope  Outcome: Progressing     Problem: SLEEP DISTURBANCE  Goal: Will exhibit normal sleeping pattern  Description: Interventions:  -  Assess the patients sleep pattern, noting recent changes  - Administer medication as ordered  - Decrease environmental stimuli, including noise, as appropriate during the night  - Encourage the patient to actively participate in unit groups and or exercise during the day to enhance ability to achieve adequate sleep at night  - Assess the patient, in the morning, encouraging a description of sleep experience  Outcome: Progressing

## 2023-06-19 NOTE — NURSING NOTE
Patient observed asleep/resting throughout a majority of the night during q7 minute checks  Early awakening this morning  C/o itchy R eye  Placed on medical board Flushed with saline  Will remain on safety precautions and continual monitoring

## 2023-06-19 NOTE — PROGRESS NOTES
06/19/23   Team Meeting   Meeting Type Daily Rounds   Team Members Present   Team Members Present ;Physician;Nurse   Physician Team Member Dr Gwendolyn Pillai MD; HOSP DR RACHID CHAVEZ, [address] [place] [address]   Nursing Team Member Mynor Aleman RN   Social Work Team Member Poli Lozada; Poli Barrios   Patient/Family Present   Patient Present No   Patient's Family Present No     304 tomorrow 8am, improved, ramblings, irritable at times, labile, no behaviors, slept, Depakote level 89, cross taper

## 2023-06-19 NOTE — NURSING NOTE
Patient med and meal compliant Visible on unit social with staff Calm pleasant cooperative  Bright on approach likes to joke with staff overall good mood this shift  Less disorganized  Megha SI HI AVH anxiety and depression at this time  Safety checks maintained

## 2023-06-19 NOTE — PROGRESS NOTES
06/19/23 1100   Activity/Group Checklist   Group   (Creative Expression Art Therapy)   Attendance Attended   Attendance Duration (min) 46-60   Interactions Interacted appropriately   Affect/Mood Appropriate;Bright   Goals Achieved Identified feelings; Discussed coping strategies; Increased hopefulness; Able to listen to others; Able to engage in interactions; Able to reflect/comment on own behavior

## 2023-06-19 NOTE — NURSING NOTE
Patient seen within milieu, bright but elevated  Disorganized at times but controlled in behavior with no episodes of overt paranoia  Denies SI/HI and hallucinations  Very familiar with medications and their indications  Makes needs known  Refused Lidoderm  q7 minute checks ongoing

## 2023-06-19 NOTE — NURSING NOTE
Patient given 100 mg trazodone for difficulty falling asleep  Given at this time  q7 minute checks ongoing

## 2023-06-20 PROCEDURE — 99232 SBSQ HOSP IP/OBS MODERATE 35: CPT | Performed by: HOSPITALIST

## 2023-06-20 RX ADMIN — TAMSULOSIN HYDROCHLORIDE 0.4 MG: 0.4 CAPSULE ORAL at 16:45

## 2023-06-20 RX ADMIN — BACITRACIN ZINC 1 SMALL APPLICATION: 500 OINTMENT TOPICAL at 08:38

## 2023-06-20 RX ADMIN — METOPROLOL SUCCINATE 50 MG: 50 TABLET, EXTENDED RELEASE ORAL at 08:38

## 2023-06-20 RX ADMIN — CYANOCOBALAMIN TAB 500 MCG 1000 MCG: 500 TAB at 08:38

## 2023-06-20 RX ADMIN — ALUMINUM HYDROXIDE, MAGNESIUM HYDROXIDE, AND DIMETHICONE 30 ML: 200; 20; 200 SUSPENSION ORAL at 11:36

## 2023-06-20 RX ADMIN — TRAZODONE HYDROCHLORIDE 100 MG: 100 TABLET ORAL at 21:15

## 2023-06-20 RX ADMIN — LORAZEPAM 0.5 MG: 1 TABLET ORAL at 21:15

## 2023-06-20 RX ADMIN — PERPHENAZINE 4 MG: 4 TABLET, FILM COATED ORAL at 08:38

## 2023-06-20 RX ADMIN — LORAZEPAM 1 MG: 1 TABLET ORAL at 18:21

## 2023-06-20 RX ADMIN — CEPHALEXIN 500 MG: 500 CAPSULE ORAL at 05:16

## 2023-06-20 RX ADMIN — PRAZOSIN HYDROCHLORIDE 1 MG: 1 CAPSULE ORAL at 21:16

## 2023-06-20 RX ADMIN — Medication 1000 UNITS: at 08:38

## 2023-06-20 RX ADMIN — LORAZEPAM 0.5 MG: 1 TABLET ORAL at 08:38

## 2023-06-20 RX ADMIN — ACETAMINOPHEN 975 MG: 325 TABLET ORAL at 02:47

## 2023-06-20 RX ADMIN — DIVALPROEX SODIUM 1250 MG: 250 TABLET, DELAYED RELEASE ORAL at 21:15

## 2023-06-20 RX ADMIN — DIVALPROEX SODIUM 1250 MG: 250 TABLET, DELAYED RELEASE ORAL at 08:38

## 2023-06-20 RX ADMIN — LORAZEPAM 0.5 MG: 1 TABLET ORAL at 16:46

## 2023-06-20 RX ADMIN — LEVOTHYROXINE SODIUM 25 MCG: 25 TABLET ORAL at 08:38

## 2023-06-20 RX ADMIN — PERPHENAZINE 8 MG: 4 TABLET, FILM COATED ORAL at 21:15

## 2023-06-20 RX ADMIN — OLANZAPINE 30 MG: 10 TABLET, ORALLY DISINTEGRATING ORAL at 21:16

## 2023-06-20 RX ADMIN — BACITRACIN ZINC 1 SMALL APPLICATION: 500 OINTMENT TOPICAL at 17:53

## 2023-06-20 NOTE — PROGRESS NOTES
06/20/23   Team Meeting   Meeting Type Daily Rounds   Team Members Present   Team Members Present ;Physician;Nurse   Physician Team Member Dr Deborah Arenas MD; HOSP DR RACHID CHAVEZ, [address] [place] [address]   Nursing Team Member Lott Klinefelter, ADRIEN   Social Work Team Member Anel Kelly [facility]   Patient/Family Present   Patient Present No   Patient's Family Present No     DC next week, great day yesterday, no PRNs, pleasant

## 2023-06-20 NOTE — SOCIAL WORK
Patrice Graham (mother) [phone] contacted dean regarding pts hearing, pt misunderstanding he may be admitted for another 90 days, education provided to Patrice Do provided nursing station number for contact while dean is away on leave with her understanding another Cm will contact her with dc updates

## 2023-06-20 NOTE — PLAN OF CARE
Problem: CHONG  Goal: Will exhibit normal sleep and speech and no impulsivity  Description: INTERVENTIONS:  - Administer medication as ordered  - Set limits on impulsive behavior  - Make attempts to decrease external stimuli as possible  Outcome: Progressing     Problem: PSYCHOSIS  Goal: Will report no hallucinations or delusions  Description: Interventions:  - Administer medication as  ordered  - Every waking shifts and PRN assess for the presence of hallucinations and or delusions  - Assist with reality testing to support increasing orientation  - Assess if patient's hallucinations or delusions are encouraging self-harm or harm to others and intervene as appropriate  Outcome: Progressing     Problem: ANXIETY  Goal: Will report anxiety at manageable levels  Description: INTERVENTIONS:  - Administer medication as ordered  - Teach and encourage coping skills  - Provide emotional support  - Assess patient/family for anxiety and ability to cope  Outcome: Progressing  Goal: By discharge: Patient will verbalize 2 strategies to deal with anxiety  Description: Interventions:  - Identify any obvious source/trigger to anxiety  - Staff will assist patient in applying identified coping technique/skills  - Encourage attendance of scheduled groups and activities  Outcome: Progressing

## 2023-06-20 NOTE — NURSING NOTE
Tylenol seemingly effective, patient appears to be sleeping without evidence of pain  q7 minute checks ongoing

## 2023-06-20 NOTE — SOCIAL WORK
Sw met with pt to remind him of 8am 304 hearing, pt agreeable to attend, talked to PD last Friday via phone call

## 2023-06-20 NOTE — TREATMENT TEAM
06/20/23 1821   Fajardo Anxiety Scale   Anxious Mood 4   Tension 4   Fears 2   Insomnia 0   Intellectual 3   Depressed Mood 3   Somatic Complaints: Muscular 2   Somatic Complaints: Sensory 1   Cardiovascular Symptoms 1   Respiratory Symptoms 1   Gastrointestinal Symptoms 1   Genitourinary Symptoms 0   Autonomic Symptoms 1   Behavior at Interview 4   Fajardo Anxiety Score 27     Patient given 1 mg PRN Ativan for severe anxiety related to wanting to see the doctor

## 2023-06-20 NOTE — PROGRESS NOTES
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: [id number]    Unit/Bed#: Srinivasa Ridley 348-10 Encounter: [id number]        Subjective:   Patient seen and examined at bedside after reviewing the chart and discussing the case with the caring staff  Patient examined at bedside  Patient reports no acute symptoms  Physical Exam   Vitals: Blood pressure 116/70, pulse 82, temperature 98 1 °F (36 7 °C), temperature source Tympanic, resp  rate 18, height 5' 9\" (1 753 m), weight 93 4 kg (205 lb 12 8 oz), SpO2 95 %  ,Body mass index is 30 39 kg/m²  Constitutional: Patient in no acute distress  HEENT: PERR, EOMI, neck supple  Cardiovascular: Normal rate  Pulmonary/Chest: No respiratory distress  Abdomen: Nondistended  Assessment/Plan:  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 with schizoaffective disorder      1  Cardiac with hx HTN, HLD  Continue metoprolol succinate to 50 mg daily (incr 5/30/23)  LDL 76   2  Tobacco abuse  NRT  3  Hypothyroidism  TSH 5 291, FT4 1 46  Levothyroxine 25 mcg daily  Repeat TSH in 6 weeks  4  BPH  Continue Flomax 0 4 mg daily  5  Vitamin D deficiency  Patient started on vitamin D2 50,000 units weekly for 4 weeks followed by vitamin D3 1000 units daily  6  Vitamin B12 deficiency  Patient started on vitamin B12 supplement  7  Ingrown toenail  Patient was seen by podiatry 6/13/2023, patient has left hallux paronychia with pain and they have recommended partial toenail avulsion in 1 to 2 weeks on outpatient basis  Patient was started on Keflex 500 mg 4 times daily for 7 days  8   Chronic low back pain  Lidocaine patch daily    "

## 2023-06-20 NOTE — PROGRESS NOTES
06/20/23 0730   Activity/Group Checklist   Group   (Morning Meeting and Coffee)   Attendance Attended   Attendance Duration (min) 31-45   Interactions Interacted appropriately   Affect/Mood Appropriate   Goals Achieved Identified feelings; Able to listen to others; Able to engage in interactions

## 2023-06-20 NOTE — NURSING NOTE
Patient seen within the milieu, social with select peers and social with staff  His mood seems to be more subdued this evening although he continues to have moments of elevation and he continues to be somewhat tangential in speech at times  Pleasant and polite overall  Denies depression, anxiety, SI/HI and hallucinations  Denies pain  No overt paranoia  Compliant with all psychiatric medications  q7 minute checks ongoing

## 2023-06-20 NOTE — SOCIAL WORK
Hearing Documentation    304 hearing held today;  in attendance:  Vikram Solo  - ;  [address] [employer] PD office; staff psych; ;  pt;  [phone] recommendation upheld for up to an additional 90 days of inpt treatment at [facility];  304 expires: 9/18/23    Copy of completed 304 sent to scanning, on pt file, provided to pt per pt request

## 2023-06-20 NOTE — NURSING NOTE
Patient med and meal complaint visible on unit, irritable at times but over all calm and cooperative  Denies SI HI AVH anxiety and depression at this time Safety checks maintained

## 2023-06-20 NOTE — PROGRESS NOTES
Progress Note - [address] CASTAN[address] Day 70 y o  male MRN: [id number]   Unit/Bed#: Adela Villafana 112-84 Encounter: [id number]    Behavior over the last 24 hours: improving  Marisabel Sanford seen today, per staff report has been more cooperative less labile on the unit  Patient seen today in follow-up continues to have tangentiality, bizarre thought content at times  he however has improved with significantly less irritability and mood stability  He has been seen to be more cooperative on the unit and with treatment  Sleep and appetite fair  Compliant with medication without reported side effects        Mental Status Evaluation:    Appearance:  casually dressed, dressed appropriately, looks stated age   Behavior:  pleasant, still somewhat bizarre   Speech:  increased rate   Mood:  euthymic   Affect:  normal range and intensity   Thought Process:  illogical   Associations: circumstantial associations   Thought Content:  bizarre and fixed delusions   Perceptual Disturbances: denies auditory hallucinations when asked   Risk Potential: Suicidal ideation - None  Homicidal ideation - None   Sensorium:  oriented to person, place and time/date   Memory:  recent and remote memory grossly intact   Consciousness:  alert and awake   Attention: decreased concentration and poor concentration   Insight:  limited   Judgment: limited   Gait/Station: normal gait/station   Motor Activity: no abnormal movements     Vital signs in last 24 hours:    Temp:  [97 5 °F (36 4 °C)-98 1 °F (36 7 °C)] 98 1 °F (36 7 °C)  HR:  [63-82] 82  Resp:  [18] 18  BP: (114-120)/(65-73) 116/70    Laboratory results: I have personally reviewed all pertinent laboratory/tests results          Assessment/Plan   Principal Problem:    [facility]  Active Problems:    Cannabis abuse, continuous    Hypertension    Tobacco abuse    BPH (benign prostatic hyperplasia)    Hyperlipidemia    Umbilical hernia    Recommended Treatment:     Planned medication and treatment changes:  Participated in 304 hearing for patient today, patient and case management present  Continue perphenazine 8 mg at bedtime and 4 mg daily  Plan to increase perphenazine to 8 mg twice daily with slow taper down of olanzapine  Unclear whether patient will be able to be on just 1 antipsychotic at this time  He does require 2 antipsychotics currently as we are cross tapering    Continue prazosin 1 mg daily at bedtime  Continue trazodone 100 mg daily at bedtime    All current active medications have been reviewed  Encourage group therapy, milieu therapy and occupational therapy  Behavioral Health checks every 7 minutes  Current Facility-Administered Medications   Medication Dose Route Frequency Provider Last Rate   • acetaminophen  650 mg Oral Q6H PRN [facility], CRNP     • acetaminophen  650 mg Oral Q4H PRN [facility], CRNP     • acetaminophen  975 mg Oral Q6H PRN [facility]     • aluminum-magnesium hydroxide-simethicone  30 mL Oral Q4H PRN [facility] Medei, CRNP     • bacitracin  1 small application Topical BID Funmi Milian PA-C     • benztropine  1 mg Oral Q6H PRN [facility], CRNP     • cephalexin  500 mg Oral Q6H [address]     • cholecalciferol  1,000 Units Oral Daily Funmi Milian PA-C     • cyanocobalamin  1,000 mcg Oral Daily Funmi Milian PA-C     • divalproex sodium  1,250 mg Oral Q12H 1500 [facility] MD     • hydrOXYzine HCL  25 mg Oral Q6H PRN Max 4/day [facility], CRNP     • hydrOXYzine HCL  50 mg Oral Q4H PRN Max 4/day Caleb Skeeters Medei, CRNP      Or   • LORazepam  1 mg Intramuscular Q4H PRN [facility] Medei, CRNP     • levothyroxine  25 mcg Oral Early Morning Funmi Milian PA-C     • lidocaine  1 patch Topical HS Funmi Milian PA-C     • LORazepam  1 mg Oral Q6H PRN [facility], CRNP      Or   • LORazepam  2 mg Intramuscular Q6H PRN Max 3/day [facility], CRNP     • LORazepam  0 5 mg Oral TID Cooper Thrasher MD     • metoprolol succinate  50 mg Oral Daily Funmi Milian PA-C     • OLANZapine  30 mg Oral HS Josie Devine MD     • perphenazine  4 mg Oral Daily Rhett Olivia MD     • perphenazine  8 mg Oral HS Rhett Olivia MD     • polyethylene glycol  17 g Oral Daily PRN CARLOS Morel     • prazosin  1 mg Oral HS Faisal Dunham PA-C     • risperiDONE  0 5 mg Oral Q4H PRN Max 6/day CARLOS Morel     • risperiDONE  1 mg Oral Q4H PRN Max 4/day CARLOS Morel     • risperiDONE  3 mg Oral Q6H PRN Max 3/day Rhett Olivia MD     • tamsulosin  0 4 mg Oral Daily With Dinner Funmi Milian PA-C     • traZODone  100 mg Oral HS PRN CARLOS Morel     • traZODone  100 mg Oral HS CARLOS Morel         Risks / Benefits of Treatment:    Risks, benefits, and possible side effects of medications explained to patient and patient verbalizes understanding and agreement for treatment  Counseling / Coordination of Care: Total floor / unit time spent today 50 minutes  Greater than 50% of total time was spent with the patient and / or family counseling and / or coordination of care  A description of counseling / coordination of care:  Patient's progress discussed with staff in treatment team meeting  Medications, treatment progress and treatment plan reviewed with patient      Rhett Olivia MD 06/20/23

## 2023-06-21 PROBLEM — G47.9 SLEEP DIFFICULTIES: Status: ACTIVE | Noted: 2023-06-21

## 2023-06-21 PROBLEM — F43.10 PTSD (POST-TRAUMATIC STRESS DISORDER): Status: ACTIVE | Noted: 2023-06-21

## 2023-06-21 LAB
BACTERIA UR QL AUTO: NORMAL /HPF
BILIRUB UR QL STRIP: NEGATIVE
CLARITY UR: CLEAR
COLOR UR: YELLOW
GLUCOSE UR STRIP-MCNC: NEGATIVE MG/DL
HGB UR QL STRIP.AUTO: ABNORMAL
KETONES UR STRIP-MCNC: NEGATIVE MG/DL
LEUKOCYTE ESTERASE UR QL STRIP: NEGATIVE
NITRITE UR QL STRIP: NEGATIVE
NON-SQ EPI CELLS URNS QL MICRO: NORMAL /HPF
PH UR STRIP.AUTO: 7 [PH]
PROT UR STRIP-MCNC: NEGATIVE MG/DL
RBC #/AREA URNS AUTO: NORMAL /HPF
SP GR UR STRIP.AUTO: 1.01
UROBILINOGEN UR QL STRIP.AUTO: 0.2 E.U./DL
WBC #/AREA URNS AUTO: NORMAL /HPF

## 2023-06-21 PROCEDURE — 81001 URINALYSIS AUTO W/SCOPE: CPT | Performed by: FAMILY MEDICINE

## 2023-06-21 PROCEDURE — 99232 SBSQ HOSP IP/OBS MODERATE 35: CPT | Performed by: HOSPITALIST

## 2023-06-21 PROCEDURE — 81003 URINALYSIS AUTO W/O SCOPE: CPT | Performed by: FAMILY MEDICINE

## 2023-06-21 RX ORDER — DIVALPROEX SODIUM 250 MG/1
1250 TABLET, DELAYED RELEASE ORAL EVERY 12 HOURS SCHEDULED
Qty: 30 TABLET | Refills: 0 | Status: SHIPPED | OUTPATIENT
Start: 2023-06-21

## 2023-06-21 RX ORDER — OLANZAPINE 15 MG/1
15 TABLET ORAL
Qty: 30 TABLET | Refills: 0 | Status: SHIPPED | OUTPATIENT
Start: 2023-06-21 | End: 2023-07-21

## 2023-06-21 RX ORDER — LEVOTHYROXINE SODIUM 0.03 MG/1
25 TABLET ORAL
Qty: 30 TABLET | Refills: 0 | Status: SHIPPED | OUTPATIENT
Start: 2023-06-22

## 2023-06-21 RX ORDER — LIDOCAINE 50 MG/G
1 PATCH TOPICAL
Qty: 30 PATCH | Refills: 0 | Status: SHIPPED | OUTPATIENT
Start: 2023-06-21

## 2023-06-21 RX ORDER — MELATONIN
2000 DAILY
Qty: 60 TABLET | Refills: 0 | Status: SHIPPED | OUTPATIENT
Start: 2023-06-21

## 2023-06-21 RX ORDER — TAMSULOSIN HYDROCHLORIDE 0.4 MG/1
0.4 CAPSULE ORAL
Qty: 30 CAPSULE | Refills: 0 | Status: SHIPPED | OUTPATIENT
Start: 2023-06-21 | End: 2023-07-21

## 2023-06-21 RX ORDER — LORAZEPAM 0.5 MG/1
0.25 TABLET ORAL 3 TIMES DAILY
Status: DISCONTINUED | OUTPATIENT
Start: 2023-06-21 | End: 2023-06-22

## 2023-06-21 RX ORDER — METOPROLOL SUCCINATE 50 MG/1
50 TABLET, EXTENDED RELEASE ORAL DAILY
Qty: 30 TABLET | Refills: 0 | Status: SHIPPED | OUTPATIENT
Start: 2023-06-22

## 2023-06-21 RX ORDER — PERPHENAZINE 4 MG/1
4 TABLET ORAL DAILY
Qty: 30 TABLET | Refills: 0 | Status: SHIPPED | OUTPATIENT
Start: 2023-06-22 | End: 2023-07-22

## 2023-06-21 RX ORDER — PERPHENAZINE 8 MG/1
8 TABLET ORAL
Qty: 30 TABLET | Refills: 0 | Status: SHIPPED | OUTPATIENT
Start: 2023-06-21 | End: 2023-07-21

## 2023-06-21 RX ORDER — PRAZOSIN HYDROCHLORIDE 1 MG/1
1 CAPSULE ORAL
Qty: 30 CAPSULE | Refills: 0 | Status: SHIPPED | OUTPATIENT
Start: 2023-06-21 | End: 2023-07-21

## 2023-06-21 RX ORDER — TRAZODONE HYDROCHLORIDE 100 MG/1
100 TABLET ORAL
Qty: 30 TABLET | Refills: 0 | Status: SHIPPED | OUTPATIENT
Start: 2023-06-21 | End: 2023-07-21

## 2023-06-21 RX ADMIN — TAMSULOSIN HYDROCHLORIDE 0.4 MG: 0.4 CAPSULE ORAL at 16:38

## 2023-06-21 RX ADMIN — PERPHENAZINE 8 MG: 4 TABLET, FILM COATED ORAL at 20:34

## 2023-06-21 RX ADMIN — Medication 1000 UNITS: at 08:31

## 2023-06-21 RX ADMIN — BACITRACIN ZINC 1 SMALL APPLICATION: 500 OINTMENT TOPICAL at 08:31

## 2023-06-21 RX ADMIN — LORAZEPAM 0.25 MG: 0.5 TABLET ORAL at 20:34

## 2023-06-21 RX ADMIN — DIVALPROEX SODIUM 1250 MG: 250 TABLET, DELAYED RELEASE ORAL at 08:31

## 2023-06-21 RX ADMIN — TRAZODONE HYDROCHLORIDE 100 MG: 100 TABLET ORAL at 20:34

## 2023-06-21 RX ADMIN — PRAZOSIN HYDROCHLORIDE 1 MG: 1 CAPSULE ORAL at 20:33

## 2023-06-21 RX ADMIN — OLANZAPINE 30 MG: 10 TABLET, ORALLY DISINTEGRATING ORAL at 20:36

## 2023-06-21 RX ADMIN — BACITRACIN ZINC 1 SMALL APPLICATION: 500 OINTMENT TOPICAL at 18:26

## 2023-06-21 RX ADMIN — LORAZEPAM 0.5 MG: 1 TABLET ORAL at 08:31

## 2023-06-21 RX ADMIN — DIVALPROEX SODIUM 1250 MG: 250 TABLET, DELAYED RELEASE ORAL at 20:33

## 2023-06-21 RX ADMIN — CYANOCOBALAMIN TAB 500 MCG 1000 MCG: 500 TAB at 08:31

## 2023-06-21 RX ADMIN — LORAZEPAM 0.25 MG: 0.5 TABLET ORAL at 16:38

## 2023-06-21 RX ADMIN — METOPROLOL SUCCINATE 50 MG: 50 TABLET, EXTENDED RELEASE ORAL at 08:32

## 2023-06-21 RX ADMIN — LEVOTHYROXINE SODIUM 25 MCG: 25 TABLET ORAL at 08:31

## 2023-06-21 RX ADMIN — PERPHENAZINE 4 MG: 4 TABLET, FILM COATED ORAL at 08:31

## 2023-06-21 NOTE — PROGRESS NOTES
"Progress Note - Edmundo Graham 70 y o  male MRN: [id number]    Unit/Bed#: Jacqueline Cuellar 164-73 Encounter: [id number]        Subjective:   Patient seen and examined at bedside after reviewing the chart and discussing the case with the caring staff  Patient examined at bedside  Patient reports no acute symptoms  Physical Exam   Vitals: Blood pressure 131/77, pulse 65, temperature 98 4 °F (36 9 °C), temperature source Temporal, resp  rate 18, height 5' 9\" (1 753 m), weight 93 4 kg (205 lb 12 8 oz), SpO2 97 %  ,Body mass index is 30 39 kg/m²  Constitutional: Patient in no acute distress  HEENT: PERR, EOMI, neck supple  Cardiovascular: Normal rate  Pulmonary/Chest: No respiratory distress  Abdomen: Nondistended  Assessment/Plan:  Edmundo Graham is a(n) 70 y o  male with schizoaffective disorder      1  Cardiac with hx HTN, HLD  Continue metoprolol succinate to 50 mg daily (incr 5/30/23)  LDL 76   2  Tobacco abuse  NRT  3  Hypothyroidism  TSH 5 291, FT4 1 46  Levothyroxine 25 mcg daily  Repeat TSH in 6 weeks  4  BPH  Continue Flomax 0 4 mg daily  5  Vitamin D deficiency  Patient started on vitamin D2 50,000 units weekly for 4 weeks followed by vitamin D3 1000 units daily  6  Vitamin B12 deficiency  Patient started on vitamin B12 supplement  7  Ingrown toenail  Patient was seen by podiatry 6/13/2023, patient has left hallux paronychia with pain and they have recommended partial toenail avulsion in 1 to 2 weeks on outpatient basis  Patient was started on Keflex 500 mg 4 times daily for 7 days  8   Chronic low back pain  Lidocaine patch daily  The patient was discussed with Dr Silver Rivera and he is in agreement with the above note    "

## 2023-06-21 NOTE — NURSING NOTE
"Pt was cooperative with staff  Compliant with medication, refused Lidoderm patch stated \"they know I don't use that kind of stuff  \" Visible on the unit and social with peers  Eye contact good  Bright on approach  Pt is labile in mood, rambling in speech  Pt is resting in bed  7 minute safety checks continued      "

## 2023-06-21 NOTE — SOCIAL WORK
Anabell Seeds Day (mother) [phone] contacted regarding Monday dc, will ask family about picking pt up as she does not drive  Sw will contact her tomorrow to inquire about ride

## 2023-06-21 NOTE — PROGRESS NOTES
06/21/23 1021   Team Meeting   Meeting Type Tx Team Meeting   Team Members Present   Team Members Present ;Physician;Nurse   Physician Team Member Dr John Linares MD   Nursing Team Member Mary Grace [facility]   Social Work Team Member Royal Dougherty, [place]   Patient/Family Present   Patient Present Yes   Patient's Family Present No     Patient and treatment team met and reviewed pt strengths, limitations, coping skills, treatment plan and goals; pt agreeable and signed; copy on chart

## 2023-06-21 NOTE — SOCIAL WORK
"Sw met with pt for check in, dc planning  Pt denies current SI/HI/AVH/dep/anx, is agreeable to Monday [facility]; declined pcp, D&A tx, CM referrals stating \"I don't need any of that\"     "

## 2023-06-21 NOTE — PROGRESS NOTES
06/21/23   Team Meeting   Meeting Type Daily Rounds   Team Members Present   Team Members Present Nurse;;Physician   Physician Team Member Dr Lorrene Collet, MD; HOSP DR RACHID CHAVEZ, [address] [place] [address]   Nursing Team Member ADRIEN Hinojosa; Raul Youssef [place]; Josseline Matamoros, ADRIEN   Social Work Team Member Emily Gr [place]; Sophie [place]   Patient/Family Present   Patient Present No   Patient's Family Present No     Cooperative, visible, social, no behaviors, poor sleep, no PRNs  [place] Monday

## 2023-06-21 NOTE — PROGRESS NOTES
06/21/23 1000   Activity/Group Checklist   Group   (Musical Expression of Feelins and Emotions Art Therapy)   Attendance Attended   Attendance Duration (min) Greater than 60   Interactions Interacted appropriately   Affect/Mood Appropriate;Calm   Goals Achieved Identified feelings; Identified triggers; Discussed coping strategies; Able to listen to others; Able to engage in interactions; Identified resources and support systems; Able to reflect/comment on own behavior;Able to manage/cope with feelings; Able to self-disclose

## 2023-06-21 NOTE — PROGRESS NOTES
06/21/23 0730   Activity/Group Checklist   Group   (Morning Meeting and Coffee)   Attendance Attended   Attendance Duration (min) 46-60   Interactions Interacted appropriately   Affect/Mood Appropriate;Blunted/flat;Calm   Goals Achieved Identified feelings; Identified triggers; Discussed coping strategies; Identified resources and support systems; Able to listen to others; Able to engage in interactions; Able to reflect/comment on own behavior;Able to manage/cope with feelings

## 2023-06-21 NOTE — NURSING NOTE
"Patient med and meal compliant visible on unit social at times  Patient started off shift irritable focused on urine  After voiding several times through out the day with no issues patient stated he felt relived and apologized for \"being cranky\" Patient also participated in Alex with peers in day room  Denies SI HI AVH anxiety and depression at this time Safety checks maintained    "

## 2023-06-21 NOTE — NURSING NOTE
Received patient at 2300  Patient was awake numerous times during the night  Urine collected and sent

## 2023-06-21 NOTE — PROGRESS NOTES
Progress Note - [address] CASTANEDA Day 70 y o  male MRN: [id number]   Unit/Bed#: Cheri Nav[facility] 910-45 Encounter: [id number]    Behavior over the last 24 hours: slowly improving  Rita Taylor seen today, per staff report has been less labile and directable on the unit  Patient seen in follow-up today continues to have rambling speech with somewhat bizarre content at this time  He is however calm and cooperative on the unit with significant improvement of irritability and mood lability  Denies feeling depressed  Denies any thoughts of self-harm or others  Sleep and appetite fair  Compliant with medication without reported side effects  Mental Status Evaluation:    Appearance:  casually dressed, adequate grooming, looks stated age   Behavior:  cooperative   Speech:  normal rate, normal volume   Mood:  euthymic   Affect:  expansive   Thought Process:  circumstantial   Associations: circumstantial associations   Thought Content:  no overt delusions   Perceptual Disturbances: no auditory hallucinations, no visual hallucinations   Risk Potential: Suicidal ideation - None  Homicidal ideation - None   Sensorium:  oriented to person, place and time/date   Memory:  recent and remote memory grossly intact   Consciousness:  alert and awake   Attention: decreased concentration and decreased attention span   Insight:  limited   Judgment: limited   Gait/Station: normal gait/station   Motor Activity: no abnormal movements     Vital signs in last 24 hours:    Temp:  [98 4 °F (36 9 °C)-98 9 °F (37 2 °C)] 98 4 °F (36 9 °C)  HR:  [65-77] 65  Resp:  [18] 18  BP: ()/(65-86) 131/77    Laboratory results: I have personally reviewed all pertinent laboratory/tests results          Assessment/Plan   Principal Problem:    Sch[facility]ia ([facility] )  Active Problems:    Cannabis abuse, continuous    Hypertension    Tobacco abuse    BPH (benign prostatic hyperplasia)    Hyperlipidemia    Umbilical hernia    Recommended Treatment: Planned medication and treatment changes:  Patient is showing signs of improvement and approaching ability for discharge  Taper Ativan down to   25 mg 3 times daily as patient is now calm throughout the day  Continue taper and eventual DC in the next 2 to 3 days  No other medication changes today    All current active medications have been reviewed  Encourage group therapy, milieu therapy and occupational therapy  Behavioral Health checks every 7 minutes  Current Facility-Administered Medications   Medication Dose Route Frequency Provider Last Rate   • acetaminophen  650 mg Oral Q6H PRN Marnell Prader Medei, CRNP     • acetaminophen  650 mg Oral Q4H PRN Marnell Prader Medei, CRNP     • acetaminophen  975 mg Oral Q6H PRN Marnell Prader Medei, CRNP     • aluminum-magnesium hydroxide-simethicone  30 mL Oral Q4H PRN Marnell Prader Medei, CRNP     • bacitracin  1 small application Topical BID Funmi Milian PA-C     • benztropine  1 mg Oral Q6H PRN Marnell Prader Medei, CRNP     • cholecalciferol  1,000 Units Oral Daily Funmi Milian PA-C     • cyanocobalamin  1,000 mcg Oral Daily Funmi Milian PA-C     • divalproex sodium  1,250 mg Oral Q12H [address], [place]     • hydrOXYzine HCL  25 mg Oral Q6H PRN Max 4/day Marnell Prader Medei, CRNP     • hydrOXYzine HCL  50 mg Oral Q4H PRN Max 4/day Marnell Prader Medei, CRNP      Or   • LORazepam  1 mg Intramuscular Q4H PRN Marnell Prader Medei, CRNP     • levothyroxine  25 mcg Oral Early Morning Funmi Milian PA-C     • LORazepam  1 mg Oral Q6H PRN Marnell Prader Medei, CRNP      Or   • LORazepam  2 mg Intramuscular Q6H PRN Max 3/day Marnell Prader Medei, CRNP     • LORazepam  0 25 mg Oral TID Melodie Sacks, MD     • metoprolol succinate  50 mg Oral Daily Funmi Milian PA-C     • OLANZapine  30 mg Oral HS Irma Mello MD     • perphenazine  4 mg Oral Daily Melodie Sacks, MD     • perphenazine  8 mg Oral HS Melodie Sacks, MD     • polyethylene glycol  17 g Oral Daily PRN CARLOS Guillen     • prazosin  1 mg Oral HS [place], [place]     • risperiDONE  0 5 mg Oral Q4H PRN Max 6/day Layman Cheo HOS[facility] CARLOS WHITMAN     • risperiDONE  1 mg Oral Q4H PRN Max 4/day Layman [place] Medei, CARLOS     • risperiDONE  3 mg Oral Q6H PRN Max 3/day Nell Ocasio MD     • tamsulosin  0 4 mg Oral Daily With Dinner Funmi Milian PA-C     • traZODone  100 mg Oral HS PRN Layman Cheo Medei, CARLOS     • traZODone  100 mg Oral HS [facility] Cheo Medei, CARLOS         Risks / Benefits of Treatment:    Risks, benefits, and possible side effects of medications explained to patient and patient verbalizes understanding and agreement for treatment  Counseling / Coordination of Care: Total floor / unit time spent today 35 minutes  Greater than 50% of total time was spent with the patient and / or family counseling and / or coordination of care  A description of counseling / coordination of care:  Patient's progress discussed with staff in treatment team meeting  Medications, treatment progress and treatment plan reviewed with patient      Nell Ocasio MD 06/21/23

## 2023-06-22 PROCEDURE — 99232 SBSQ HOSP IP/OBS MODERATE 35: CPT | Performed by: HOSPITALIST

## 2023-06-22 RX ORDER — LORAZEPAM 0.5 MG/1
0.25 TABLET ORAL 2 TIMES DAILY
Status: DISCONTINUED | OUTPATIENT
Start: 2023-06-22 | End: 2023-06-23

## 2023-06-22 RX ADMIN — LORAZEPAM 0.25 MG: 0.5 TABLET ORAL at 08:36

## 2023-06-22 RX ADMIN — LEVOTHYROXINE SODIUM 25 MCG: 25 TABLET ORAL at 08:36

## 2023-06-22 RX ADMIN — PERPHENAZINE 4 MG: 4 TABLET, FILM COATED ORAL at 08:36

## 2023-06-22 RX ADMIN — DIVALPROEX SODIUM 1250 MG: 250 TABLET, DELAYED RELEASE ORAL at 08:36

## 2023-06-22 RX ADMIN — OLANZAPINE 30 MG: 10 TABLET, ORALLY DISINTEGRATING ORAL at 20:43

## 2023-06-22 RX ADMIN — PRAZOSIN HYDROCHLORIDE 1 MG: 1 CAPSULE ORAL at 20:44

## 2023-06-22 RX ADMIN — BACITRACIN ZINC 1 SMALL APPLICATION: 500 OINTMENT TOPICAL at 17:08

## 2023-06-22 RX ADMIN — Medication 1000 UNITS: at 08:37

## 2023-06-22 RX ADMIN — TAMSULOSIN HYDROCHLORIDE 0.4 MG: 0.4 CAPSULE ORAL at 17:09

## 2023-06-22 RX ADMIN — BACITRACIN ZINC 1 SMALL APPLICATION: 500 OINTMENT TOPICAL at 08:36

## 2023-06-22 RX ADMIN — METOPROLOL SUCCINATE 50 MG: 50 TABLET, EXTENDED RELEASE ORAL at 08:36

## 2023-06-22 RX ADMIN — PERPHENAZINE 8 MG: 4 TABLET, FILM COATED ORAL at 20:42

## 2023-06-22 RX ADMIN — TRAZODONE HYDROCHLORIDE 100 MG: 100 TABLET ORAL at 20:43

## 2023-06-22 RX ADMIN — DIVALPROEX SODIUM 1250 MG: 250 TABLET, DELAYED RELEASE ORAL at 20:44

## 2023-06-22 RX ADMIN — LORAZEPAM 0.25 MG: 0.5 TABLET ORAL at 17:09

## 2023-06-22 RX ADMIN — CYANOCOBALAMIN TAB 500 MCG 1000 MCG: 500 TAB at 08:37

## 2023-06-22 NOTE — PROGRESS NOTES
06/22/23   Team Meeting   Meeting Type Daily Rounds   Team Members Present   Team Members Present Physician;Nurse;   Physician Team Member Dr Franko Javed MD; HOSP DR RACHID CHAVEZ, [address] Team Member [place], [address] [place] [address]   Social Work Team Member Poli Lozada; Poli Barrios   Patient/Family Present   Patient Present No   Patient's Family Present No     DC Monday, visible, pleasant, not bizarre

## 2023-06-22 NOTE — NURSING NOTE
Patient was pleasant and cooperative  Patient selectively social  Staff support provided  Q 7 minute safety checks maintained  Patient denied SI/HI  Patient continues to ramble at times but does redirect and is able to hold content based conversations  Patient compliant with medications and groups  Patient told staff he is scheduled to leave on Monday and is looking forward to discharge  Staff will continue to monitor and support

## 2023-06-22 NOTE — SOCIAL WORK
Amalia Ortezow Day (mother) [phone] contacted regarding Monday DC  time, thought dc was Tuesday, will follow up with family

## 2023-06-22 NOTE — NURSING NOTE
Patient visible and pleasant  Conversation improved with no bizarre content  No acute behaviors  Med compliant  Patient denies SI HI AVH depression and anxiety  Ativan tapered to 0 25mg tid will continue to taper until d/c'd  No complaint of pain   Q 7 min safety checks maintained

## 2023-06-22 NOTE — PROGRESS NOTES
"Progress Note - [address] Day 70 y o  male MRN: [id number]  Unit/Bed#: -02 Encounter: [id number]    Assessment/Plan   Principal Problem:    Schizophrenia ([address] )  Active Problems:    Cannabis abuse, continuous    Hypertension    Tobacco abuse    BPH (benign prostatic hyperplasia)    Hyperlipidemia    Umbilical hernia    PTSD (post-traumatic stress disorder)    Sleep difficulties      Behavior over the last 24 hours: Slowly improving  Sleep: normal  Appetite: normal  Medication side effects: No  ROS: no complaints and all other systems are negative    Brooke Colón was seen today for psychiatric follow-up  He was pleasant and cooperative  Continues to ramble in conversation about bizarre content, but redirectable  Reports he had a \"good time\" participating in [employer] yesterday  Has had no agitated or aggressive behaviors toward self or others and remains compliant with medications and meals  He stated \"I don' like taking my psychiatric medicine, but I will keep taking it and do my part  I will talk with my doctor at the [place]  \"  Discussed with Brooke Colón plan to discharge home this coming Monday and he was satisfied to know that medications were already sent in to be filled by [place]  Denied AVH denies anxiety or depression and no longer endorsing auditory hallucinations coming from the walls  Has been appropriate in the milieu and intermittently attends groups  Mental Status Evaluation:  Appearance:  age appropriate, bearded and Long gray hair, casually dressed   Behavior:  Cooperative, bizarre, redirectable   Speech:  loud and Rambling   Mood:   \"All right\"   Affect:  mood-congruent   Thought Process:  tangential   Associations: tangential associations   Thought Content:  Bizarre delusions, no paranoia   Perceptual Disturbances: Denies AVH, did not appear internally preoccupied   Risk Potential: Suicidal Ideations none  Homicidal Ideations none  Potential for Aggression No   Sensorium:  person, place, " time/date and situation   Memory:  recent and remote memory grossly intact   Consciousness:  alert and awake    Attention: attention span appeared shorter than expected for age   Insight:  limited   Judgment: limited   Gait/Station: normal gait/station and normal balance   Motor Activity: no abnormal movements     Progress Toward Goals: Continues to exhibit improvement  Has had no recent agitation or behavioral outbursts  Appears less anxious and no longer endorsing AVH  Compliant with medications  Sleeping undisturbed throughout the night  Will continue lorazepam taper and decrease to 0 25 mg PO BID  continues to require utilization of 2 antipsychotics for treatment of schizophrenia  Anticipated discharge home 6/26/2023  Prescriptions sent in to [place] [id number] 6/21/2023  Recommended Treatment: Continue with group therapy, milieu therapy and occupational therapy  Risks, benefits and possible side effects of Medications:   Gabrielle Gil has limited understanding of risk versus benefits of medications, but agrees to take as prescribed      Medications:   Decrease lorazepam 0 25 mg PO BID  all current active meds have been reviewed and current meds:   Current Facility-Administered Medications   Medication Dose Route Frequency   • acetaminophen (TYLENOL) tablet 650 mg  650 mg Oral Q6H PRN   • acetaminophen (TYLENOL) tablet 650 mg  650 mg Oral Q4H PRN   • acetaminophen (TYLENOL) tablet 975 mg  975 mg Oral Q6H PRN   • aluminum-magnesium hydroxide-simethicone (MYLANTA) oral suspension 30 mL  30 mL Oral Q4H PRN   • bacitracin topical ointment 1 small application  1 small application Topical BID   • benztropine (COGENTIN) tablet 1 mg  1 mg Oral Q6H PRN   • cholecalciferol (VITAMIN D3) tablet 1,000 Units  1,000 Units Oral Daily   • cyanocobalamin (VITAMIN B-12) tablet 1,000 mcg  1,000 mcg Oral Daily   • divalproex sodium (DEPAKOTE) DR tablet 1,250 mg  1,250 mg Oral Q12H [facility]   • hydrOXYzine HCL (ATARAX) tablet 25 mg  25 mg Oral Q6H PRN Max 4/day   • hydrOXYzine HCL (ATARAX) tablet 50 mg  50 mg Oral Q4H PRN Max 4/day    Or   • LORazepam (ATIVAN) injection 1 mg  1 mg Intramuscular Q4H PRN   • levothyroxine tablet 25 mcg  25 mcg Oral Early Morning   • LORazepam (ATIVAN) tablet 1 mg  1 mg Oral Q6H PRN    Or   • LORazepam (ATIVAN) injection 2 mg  2 mg Intramuscular Q6H PRN Max 3/day   • LORazepam (ATIVAN) tablet 0 25 mg  0 25 mg Oral BID   • metoprolol succinate (TOPROL-XL) 24 hr tablet 50 mg  50 mg Oral Daily   • OLANZapine (ZyPREXA ZYDIS) dispersible tablet 30 mg  30 mg Oral HS   • perphenazine tablet 4 mg  4 mg Oral Daily   • perphenazine tablet 8 mg  8 mg Oral HS   • polyethylene glycol (MIRALAX) packet 17 g  17 g Oral Daily PRN   • prazosin (MINIPRESS) capsule 1 mg  1 mg Oral HS   • risperiDONE (RisperDAL M-TAB) disintegrating tablet 0 5 mg  0 5 mg Oral Q4H PRN Max 6/day   • risperiDONE (RisperDAL M-TAB) disintegrating tablet 1 mg  1 mg Oral Q4H PRN Max 4/day   • risperiDONE (RisperDAL M-TAB) disintegrating tablet 3 mg  3 mg Oral Q6H PRN Max 3/day   • tamsulosin (FLOMAX) capsule 0 4 mg  0 4 mg Oral Daily With Dinner   • traZODone (DESYREL) tablet 100 mg  100 mg Oral HS PRN   • traZODone (DESYREL) tablet 100 mg  100 mg Oral HS     Labs: I have personally reviewed all pertinent laboratory/tests results     CBC:   Lab Results   Component Value Date    WBC 6 89 05/27/2023    RBC 4 35 05/27/2023    HGB 13 1 05/27/2023    HCT 40 9 05/27/2023    MCV 94 05/27/2023     05/27/2023    MCH 30 1 05/27/2023    MCHC 32 0 05/27/2023    RDW 13 1 05/27/2023    MPV 12 2 05/27/2023    NEUTROABS 3 84 05/27/2023     CMP:   Lab Results   Component Value Date    SODIUM 141 05/27/2023    K 3 8 05/27/2023     05/27/2023    CO2 30 05/27/2023    AGAP 6 05/27/2023    BUN 15 05/27/2023    CREATININE 0 93 05/27/2023    GLUC 94 05/27/2023    GLUF 94 05/27/2023    CALCIUM 9 3 05/27/2023    AST 41 (H) 05/27/2023    ALT 34 05/27/2023 ALKPHOS 34 05/27/2023    TP 6 5 05/27/2023    ALB 4 2 05/27/2023    TBILI 0 65 05/27/2023    EGFR 85 05/27/2023     Depakote:   Lab Results   Component Value Date    VALPROICTOT 89 06/16/2023       Counseling / Coordination of Care  Total floor / unit time spent today 25 minutes  Greater than 50% of total time was spent with the patient and / or family counseling and / or coordination of care   A description of the counseling / coordination of care: Medication education, treatment plan, supportive therapy, safety/discharge planning

## 2023-06-22 NOTE — PROGRESS NOTES
06/22/23 0730   Activity/Group Checklist   Group   (Morning Meeting and Coffee)   Attendance Attended   Attendance Duration (min) 46-60   Interactions Interacted appropriately   Affect/Mood Appropriate;Calm   Goals Achieved Identified feelings; Discussed coping strategies; Able to listen to others; Able to engage in interactions; Able to reflect/comment on own behavior;Able to manage/cope with feelings

## 2023-06-22 NOTE — PROGRESS NOTES
"Progress Note - Mari Graham 70 y o  male MRN: [id number]    Unit/Bed#: [facility] 648-82 Encounter: [id number]        Subjective:   Patient seen and examined at bedside after reviewing the chart and discussing the case with the caring staff  Patient examined at bedside  Patient reports no acute symptoms  Physical Exam   Vitals: Blood pressure 152/93, pulse 78, temperature 98 °F (36 7 °C), temperature source Tympanic, resp  rate 18, height 5' 9\" (1 753 m), weight 93 4 kg (205 lb 12 8 oz), SpO2 96 %  ,Body mass index is 30 39 kg/m²  Constitutional: Patient in no acute distress  HEENT: PERR, EOMI, neck supple  Cardiovascular: Normal rate  Pulmonary/Chest: No respiratory distress  Abdomen: Nondistended  Assessment/Plan:  Mari Graham is a(n) 70 y o  male with schizoaffective disorder      1  Cardiac with hx HTN, HLD  Continue metoprolol succinate to 50 mg daily (incr 5/30/23)  LDL 76   2  Tobacco abuse  NRT  3  Hypothyroidism  TSH 5 291, FT4 1 46  Levothyroxine 25 mcg daily  Repeat TSH in 6 weeks  4  BPH  Continue Flomax 0 4 mg daily  5  Vitamin D deficiency  Patient started on vitamin D2 50,000 units weekly for 4 weeks followed by vitamin D3 1000 units daily  6  Vitamin B12 deficiency  Patient started on vitamin B12 supplement  7  Ingrown toenail  Patient was seen by podiatry 6/13/2023, patient has left hallux paronychia with pain and they have recommended partial toenail avulsion in 1 to 2 weeks on outpatient basis  Patient completed Keflex 500 mg 4 times daily for 7 days  8   Chronic low back pain  Lidocaine patch daily  The patient was discussed with Dr Rosa Elena Finch and he is in agreement with the above note    "

## 2023-06-23 PROCEDURE — 99232 SBSQ HOSP IP/OBS MODERATE 35: CPT | Performed by: NURSE PRACTITIONER

## 2023-06-23 RX ORDER — CEPHALEXIN 500 MG/1
500 CAPSULE ORAL EVERY 6 HOURS SCHEDULED
Status: DISCONTINUED | OUTPATIENT
Start: 2023-06-23 | End: 2023-06-26 | Stop reason: HOSPADM

## 2023-06-23 RX ORDER — LORAZEPAM 0.5 MG/1
0.25 TABLET ORAL DAILY
Status: COMPLETED | OUTPATIENT
Start: 2023-06-24 | End: 2023-06-25

## 2023-06-23 RX ORDER — OLANZAPINE 10 MG/1
20 TABLET, ORALLY DISINTEGRATING ORAL
Status: DISCONTINUED | OUTPATIENT
Start: 2023-06-23 | End: 2023-06-26 | Stop reason: HOSPADM

## 2023-06-23 RX ORDER — LORAZEPAM 0.5 MG/1
0.25 TABLET ORAL 2 TIMES DAILY
Status: COMPLETED | OUTPATIENT
Start: 2023-06-23 | End: 2023-06-23

## 2023-06-23 RX ADMIN — DIVALPROEX SODIUM 1250 MG: 250 TABLET, DELAYED RELEASE ORAL at 21:04

## 2023-06-23 RX ADMIN — CEPHALEXIN 500 MG: 500 CAPSULE ORAL at 23:31

## 2023-06-23 RX ADMIN — CYANOCOBALAMIN TAB 500 MCG 1000 MCG: 500 TAB at 08:00

## 2023-06-23 RX ADMIN — BACITRACIN ZINC 1 SMALL APPLICATION: 500 OINTMENT TOPICAL at 18:03

## 2023-06-23 RX ADMIN — DIVALPROEX SODIUM 1250 MG: 250 TABLET, DELAYED RELEASE ORAL at 08:00

## 2023-06-23 RX ADMIN — TAMSULOSIN HYDROCHLORIDE 0.4 MG: 0.4 CAPSULE ORAL at 16:27

## 2023-06-23 RX ADMIN — PRAZOSIN HYDROCHLORIDE 1 MG: 1 CAPSULE ORAL at 21:04

## 2023-06-23 RX ADMIN — Medication 1000 UNITS: at 08:01

## 2023-06-23 RX ADMIN — CEPHALEXIN 500 MG: 500 CAPSULE ORAL at 18:02

## 2023-06-23 RX ADMIN — OLANZAPINE 20 MG: 10 TABLET, ORALLY DISINTEGRATING ORAL at 21:04

## 2023-06-23 RX ADMIN — LORAZEPAM 0.25 MG: 0.5 TABLET ORAL at 18:02

## 2023-06-23 RX ADMIN — PERPHENAZINE 4 MG: 4 TABLET, FILM COATED ORAL at 08:01

## 2023-06-23 RX ADMIN — BACITRACIN ZINC 1 SMALL APPLICATION: 500 OINTMENT TOPICAL at 09:18

## 2023-06-23 RX ADMIN — PERPHENAZINE 8 MG: 4 TABLET, FILM COATED ORAL at 21:04

## 2023-06-23 RX ADMIN — LORAZEPAM 0.25 MG: 0.5 TABLET ORAL at 08:00

## 2023-06-23 RX ADMIN — METOPROLOL SUCCINATE 50 MG: 50 TABLET, EXTENDED RELEASE ORAL at 08:01

## 2023-06-23 RX ADMIN — LEVOTHYROXINE SODIUM 25 MCG: 25 TABLET ORAL at 07:49

## 2023-06-23 RX ADMIN — TRAZODONE HYDROCHLORIDE 100 MG: 100 TABLET ORAL at 21:04

## 2023-06-23 NOTE — PROGRESS NOTES
"Progress Note - Ophelia Graham 70 y o  male MRN: [id number]    Unit/Bed#: [place] 437192-11 Encounter: [id number]        Subjective:   Patient seen and examined at bedside after reviewing the chart and discussing the case with the caring staff  Patient reports no acute symptoms  Physical Exam   Vitals: Blood pressure 161/82, pulse 73, temperature 98 5 °F (36 9 °C), temperature source Temporal, resp  rate 16, height 5' 9\" (1 753 m), weight 93 4 kg (205 lb 12 8 oz), SpO2 98 %  ,Body mass index is 30 39 kg/m²  Constitutional: Patient in no acute distress  HEENT: PERR, EOMI, neck supple  Cardiovascular: Normal rate  Pulmonary/Chest: No respiratory distress  Abdomen: Nondistended  Assessment/Plan:  Ophelia Graham is a(n) 70 y o  male with schizoaffective disorder      1  Cardiac with hx HTN, HLD  Continue metoprolol succinate to 50 mg daily (incr 5/30/23)  LDL 76   2  Tobacco abuse  NRT  3  Hypothyroidism  TSH 5 291, FT4 1 46  Levothyroxine 25 mcg daily  Repeat TSH in 6 weeks  4  BPH  Continue Flomax 0 4 mg daily  5  Vitamin D deficiency  Patient started on vitamin D2 50,000 units weekly for 4 weeks followed by vitamin D3 1000 units daily  6  Vitamin B12 deficiency  Patient started on vitamin B12 supplement  7  Ingrown toenail  Patient was seen by podiatry 6/13/2023, patient has left hallux paronychia with pain and they have recommended partial toenail avulsion in 1 to 2 weeks on outpatient basis  Patient completed Keflex 500 mg 4 times daily for 7 days  8  Chronic low back pain  Tylenol as needed  Declines lidocaine patch  The patient was discussed with Dr Mary Ann Wesley and he is in agreement with the above note    "

## 2023-06-23 NOTE — PROGRESS NOTES
"Progress Note - 6 [facility] Frandy Day 70 y o  male MRN: [id number]  Unit/Bed#: -02 Encounter: [id number]    Assessment/Plan   Principal Problem:    Schizophrenia ([address] )  Active Problems:    Cannabis abuse, continuous    Hypertension    Tobacco abuse    BPH (benign prostatic hyperplasia)    Hyperlipidemia    Umbilical hernia    PTSD (post-traumatic stress disorder)    Sleep difficulties      Behavior over the last 24 hours:  unchanged  Sleep: normal  Appetite: normal  Medication side effects: No  ROS: no complaints and all other systems are negative    Marisabel Sanford was seen today for psychiatric follow-up  He was calm and cooperative  Continues to ramble about bizarre content, but responds to verbal redirection  Has had no recent episodes of agitation or irritability  Remains compliant with medications and sleeping undisturbed throughout the night  Exhibits no further episodes of overt anxiety or paranoia associated with PTSD  Often visible in the milieu and often social with staff      Mental Status Evaluation:  Appearance:  age appropriate, bearded, casually dressed and Long gray hair   Behavior:  Cooperative, pleasant, social, bizarre   Speech:  loud and Rambling, tangential   Mood:  'All right\"   Affect:  mood-congruent   Thought Process:  tangential   Associations: tangential associations   Thought Content:  Bizarre delusions, no paranoia   Perceptual Disturbances: Denies AVH, did not appear internally preoccupied   Risk Potential: Suicidal Ideations none  Homicidal Ideations none  Potential for Aggression No   Sensorium:  person, place, time/date and situation   Memory:  recent and remote memory grossly intact   Consciousness:  alert and awake    Attention: attention span appeared shorter than expected for age   Insight:  limited   Judgment: limited   Gait/Station: normal gait/station and normal balance   Motor Activity: no abnormal movements     Progress Toward Goals: Marisabel Sanford continues to exhibit " improvement with mood lability, anxiety, and paranoia  Appears to be approaching baseline and has had no recent episodes of agitation or irritability  Compliant with medications  We will continue lorazepam taper and decrease to 0 25 mg PO QD starting tomorrow x 2 days then discontinue  Continue remainder psychotropic regimen as ordered  Continues to require utilization of 2 antipsychotics for treatment of schizophrenia  Anticipated discharge home 6/26/2023 [facility]  Recommended Treatment: Continue with group therapy, milieu therapy and occupational therapy  Risks, benefits and possible side effects of Medications:   Emelia Closs has limited understanding of risk versus benefits of medications, but agrees to take as prescribed      Medications:   Decrease lorazepam 0 25 mg PO QD x 2 doses, then discontinue  all current active meds have been reviewed and current meds:   Current Facility-Administered Medications   Medication Dose Route Frequency   • acetaminophen (TYLENOL) tablet 650 mg  650 mg Oral Q6H PRN   • acetaminophen (TYLENOL) tablet 650 mg  650 mg Oral Q4H PRN   • acetaminophen (TYLENOL) tablet 975 mg  975 mg Oral Q6H PRN   • aluminum-magnesium hydroxide-simethicone (MYLANTA) oral suspension 30 mL  30 mL Oral Q4H PRN   • bacitracin topical ointment 1 small application  1 small application Topical BID   • benztropine (COGENTIN) tablet 1 mg  1 mg Oral Q6H PRN   • cholecalciferol (VITAMIN D3) tablet 1,000 Units  1,000 Units Oral Daily   • cyanocobalamin (VITAMIN B-12) tablet 1,000 mcg  1,000 mcg Oral Daily   • divalproex sodium (DEPAKOTE) DR tablet 1,250 mg  1,250 mg Oral Q12H MANNY   • hydrOXYzine HCL (ATARAX) tablet 25 mg  25 mg Oral Q6H PRN Max 4/day   • hydrOXYzine HCL (ATARAX) tablet 50 mg  50 mg Oral Q4H PRN Max 4/day    Or   • LORazepam (ATIVAN) injection 1 mg  1 mg Intramuscular Q4H PRN   • levothyroxine tablet 25 mcg  25 mcg Oral Early Morning   • LORazepam (ATIVAN) tablet 1 mg  1 mg Oral Q6H PRN    Or   • LORazepam (ATIVAN) injection 2 mg  2 mg Intramuscular Q6H PRN Max 3/day   • LORazepam (ATIVAN) tablet 0 25 mg  0 25 mg Oral BID   • [START ON 6/24/2023] LORazepam (ATIVAN) tablet 0 25 mg  0 25 mg Oral Daily   • metoprolol succinate (TOPROL-XL) 24 hr tablet 50 mg  50 mg Oral Daily   • OLANZapine (ZyPREXA ZYDIS) dispersible tablet 20 mg  20 mg Oral HS   • perphenazine tablet 4 mg  4 mg Oral Daily   • perphenazine tablet 8 mg  8 mg Oral HS   • polyethylene glycol (MIRALAX) packet 17 g  17 g Oral Daily PRN   • prazosin (MINIPRESS) capsule 1 mg  1 mg Oral HS   • risperiDONE (RisperDAL M-TAB) disintegrating tablet 0 5 mg  0 5 mg Oral Q4H PRN Max 6/day   • risperiDONE (RisperDAL M-TAB) disintegrating tablet 1 mg  1 mg Oral Q4H PRN Max 4/day   • risperiDONE (RisperDAL M-TAB) disintegrating tablet 3 mg  3 mg Oral Q6H PRN Max 3/day   • tamsulosin (FLOMAX) capsule 0 4 mg  0 4 mg Oral Daily With Dinner   • traZODone (DESYREL) tablet 100 mg  100 mg Oral HS PRN   • traZODone (DESYREL) tablet 100 mg  100 mg Oral HS     Labs: I have personally reviewed all pertinent laboratory/tests results     CBC:   Lab Results   Component Value Date    WBC 6 89 05/27/2023    RBC 4 35 05/27/2023    HGB 13 1 05/27/2023    HCT 40 9 05/27/2023    MCV 94 05/27/2023     05/27/2023    MCH 30 1 05/27/2023    MCHC 32 0 05/27/2023    RDW 13 1 05/27/2023    MPV 12 2 05/27/2023    NEUTROABS 3 84 05/27/2023     CMP:   Lab Results   Component Value Date    SODIUM 141 05/27/2023    K 3 8 05/27/2023     05/27/2023    CO2 30 05/27/2023    AGAP 6 05/27/2023    BUN 15 05/27/2023    CREATININE 0 93 05/27/2023    GLUC 94 05/27/2023    GLUF 94 05/27/2023    CALCIUM 9 3 05/27/2023    AST 41 (H) 05/27/2023    ALT 34 05/27/2023    ALKPHOS 34 05/27/2023    TP 6 5 05/27/2023    ALB 4 2 05/27/2023    TBILI 0 65 05/27/2023    EGFR 85 05/27/2023     Depakote:   Lab Results   Component Value Date    VALPROICTOT 89 06/16/2023       Counseling / Coordination of Care  Total floor / unit time spent today 25 minutes  Greater than 50% of total time was spent with the patient and / or family counseling and / or coordination of care   A description of the counseling / coordination of care: Medication education, treatment plan, safety/discharge planning

## 2023-06-23 NOTE — PROGRESS NOTES
06/23/23   Team Meeting   Meeting Type Daily Rounds   Team Members Present   Team Members Present Physician;Nurse;   Physician Team Member Dr Marlen Baldwin MD; Antwon GONZALEZ   Nursing Team Member Aurelio Roa RN   Social Work Team Member Jes VARGAS   Patient/Family Present   Patient Present No   Patient's Family Present No

## 2023-06-23 NOTE — PROGRESS NOTES
06/23/23 0730   Activity/Group Checklist   Group   (Morning Meeting and Coffee)   Attendance Attended   Attendance Duration (min) 46-60   Interactions Interacted appropriately   Affect/Mood Appropriate;Calm   Goals Achieved Identified feelings; Discussed coping strategies; Increased hopefulness; Able to listen to others; Able to engage in interactions; Able to reflect/comment on own behavior; Identified resources and support systems; Able to manage/cope with feelings

## 2023-06-23 NOTE — PROGRESS NOTES
06/23/23 1000   Activity/Group Checklist   Group   (Self Reflection Creative Expressive Art Therapy)   Attendance Attended   Attendance Duration (min) Greater than 60   Interactions Interacted appropriately   Affect/Mood Appropriate;Calm   Goals Achieved Identified feelings; Discussed coping strategies; Identified relapse prevention strategies; Increased hopefulness; Identified resources and support systems; Able to listen to others; Able to engage in interactions; Able to reflect/comment on own behavior;Able to manage/cope with feelings

## 2023-06-23 NOTE — NURSING NOTE
Patient visible on unit  Bright on approach  Pleasant and cooperative  Denies SI<HI<AVH< anxiety and depression  Looking forward to discharge on Monday  Safety checks continue Q 7 minutes

## 2023-06-23 NOTE — PROGRESS NOTES
06/22/23 1400   Activity/Group Checklist   Group   (Community Meeting and Check-In)   Attendance Attended   Attendance Duration (min) 46-60   Interactions Interacted appropriately   Affect/Mood Appropriate;Bright;Calm   Goals Achieved Identified feelings; Discussed coping strategies; Able to listen to others; Able to engage in interactions; Able to reflect/comment on own behavior;Able to manage/cope with feelings

## 2023-06-24 PROCEDURE — 99232 SBSQ HOSP IP/OBS MODERATE 35: CPT | Performed by: STUDENT IN AN ORGANIZED HEALTH CARE EDUCATION/TRAINING PROGRAM

## 2023-06-24 RX ADMIN — DIVALPROEX SODIUM 1250 MG: 250 TABLET, DELAYED RELEASE ORAL at 21:00

## 2023-06-24 RX ADMIN — CEPHALEXIN 500 MG: 500 CAPSULE ORAL at 06:01

## 2023-06-24 RX ADMIN — LORAZEPAM 0.25 MG: 0.5 TABLET ORAL at 08:09

## 2023-06-24 RX ADMIN — TAMSULOSIN HYDROCHLORIDE 0.4 MG: 0.4 CAPSULE ORAL at 18:00

## 2023-06-24 RX ADMIN — OLANZAPINE 20 MG: 10 TABLET, ORALLY DISINTEGRATING ORAL at 21:00

## 2023-06-24 RX ADMIN — CYANOCOBALAMIN TAB 500 MCG 1000 MCG: 500 TAB at 08:08

## 2023-06-24 RX ADMIN — BACITRACIN ZINC 1 SMALL APPLICATION: 500 OINTMENT TOPICAL at 18:03

## 2023-06-24 RX ADMIN — TRAZODONE HYDROCHLORIDE 100 MG: 100 TABLET ORAL at 21:00

## 2023-06-24 RX ADMIN — CEPHALEXIN 500 MG: 500 CAPSULE ORAL at 18:03

## 2023-06-24 RX ADMIN — PERPHENAZINE 8 MG: 4 TABLET, FILM COATED ORAL at 21:00

## 2023-06-24 RX ADMIN — CEPHALEXIN 500 MG: 500 CAPSULE ORAL at 12:30

## 2023-06-24 RX ADMIN — DIVALPROEX SODIUM 1250 MG: 250 TABLET, DELAYED RELEASE ORAL at 08:06

## 2023-06-24 RX ADMIN — BACITRACIN ZINC 1 SMALL APPLICATION: 500 OINTMENT TOPICAL at 09:48

## 2023-06-24 RX ADMIN — PERPHENAZINE 4 MG: 4 TABLET, FILM COATED ORAL at 08:06

## 2023-06-24 RX ADMIN — METOPROLOL SUCCINATE 50 MG: 50 TABLET, EXTENDED RELEASE ORAL at 08:09

## 2023-06-24 RX ADMIN — CEPHALEXIN 500 MG: 500 CAPSULE ORAL at 23:34

## 2023-06-24 RX ADMIN — LEVOTHYROXINE SODIUM 25 MCG: 25 TABLET ORAL at 08:06

## 2023-06-24 RX ADMIN — Medication 1000 UNITS: at 08:09

## 2023-06-24 RX ADMIN — PRAZOSIN HYDROCHLORIDE 1 MG: 1 CAPSULE ORAL at 21:00

## 2023-06-24 NOTE — PROGRESS NOTES
"Progress Note - Shaunna Graham 70 y o  male MRN: [id number]    Unit/Bed#: Ronn Mullins 979-84 Encounter: [id number]        Subjective:   Patient seen and examined at bedside after reviewing the chart and discussing the case with the caring staff  Patient complained of pain and redness to left toe yesterday and antibiotic was restarted  He reports his left great toe feels improved today  Physical Exam   Vitals: Blood pressure 139/85, pulse 84, temperature 97 5 °F (36 4 °C), temperature source Temporal, resp  rate 18, height 5' 9\" (1 753 m), weight 95 4 kg (210 lb 6 4 oz), SpO2 96 %  ,Body mass index is 31 07 kg/m²  Constitutional: Patient in no acute distress  HEENT: PERR, EOMI, neck supple  Cardiovascular: Normal rate  Pulmonary/Chest: No respiratory distress  Abdomen: Nondistended  Skin: Ingrown toenail left great toe with mild erythema and tenderness over lateral toenail border, no drainage noted, DP pulses 2+  Assessment/Plan:  Shaunna Graham is a(n) 70 y o  male with schizoaffective disorder      1  Cardiac with hx HTN, HLD  Continue metoprolol succinate to 50 mg daily (incr 5/30/23)  LDL 76   2  Tobacco abuse  NRT  3  Hypothyroidism  TSH 5 291, FT4 1 46  Levothyroxine 25 mcg daily  Repeat TSH in 6 weeks  4  BPH  Continue Flomax 0 4 mg daily  5  Vitamin D deficiency  Patient started on vitamin D2 50,000 units weekly for 4 weeks followed by vitamin D3 1000 units daily  6  Vitamin B12 deficiency  Patient started on vitamin B12 supplement  7  Chronic low back pain  Tylenol as needed  Declines lidocaine patch  8  Ingrown toenail  Patient was seen by podiatry 6/13/2023 (see note)  Recommended partial toenail avulsion in 1 to 2 weeks on outpatient basis  He completed Keflex 500 mg 4 times daily x 7 days which was extended yesterday for additional 7 days  Patient understands importance of following up with podiatry once discharge and to go to the ED for any worsening/new symptoms        The patient " was discussed with Dr Chasity Evans and he is in agreement with the above note

## 2023-06-24 NOTE — NURSING NOTE
Pt visible on unit  Social with peers  Personal hygiene is good  Compliant with medications  Mood is less labile  Less disorganized  Speech is clearer and easier to understand  Actively engaged in unit activities  Keflex continued for toe nail infection  Pt also receiving bacitracin BID as prescribed  Pt to follow up on discharge with podiatry in office  Provided current medication list and schedule per patient request  Pt verbalized understanding of education provided  Safety precautions maintained  Safety precautions maintained  Will continue to monitor and assess

## 2023-06-24 NOTE — CMS CERTIFICATION NOTE
Recertification: Based upon physical, mental and social evaluations, I certify that inpatient psychiatric services continue to be medically necessary for this patient for a duration of 30  midnights for the treatment of  Schizophrenia [facility])   Available alternative community resources still do not meet the patient's mental health care needs  I further attest that an established written individualized plan of care has been updated and is outlined in the patient's medical records

## 2023-06-24 NOTE — NURSING NOTE
Patient is visible on the unit and attends groups  Patient denies [address]  Patient is medication compliant  Patient is social with staff and peers  Patient is focused on discharge

## 2023-06-25 PROCEDURE — 99232 SBSQ HOSP IP/OBS MODERATE 35: CPT | Performed by: STUDENT IN AN ORGANIZED HEALTH CARE EDUCATION/TRAINING PROGRAM

## 2023-06-25 RX ADMIN — TRAZODONE HYDROCHLORIDE 100 MG: 100 TABLET ORAL at 21:19

## 2023-06-25 RX ADMIN — OLANZAPINE 20 MG: 10 TABLET, ORALLY DISINTEGRATING ORAL at 21:19

## 2023-06-25 RX ADMIN — Medication 1000 UNITS: at 08:47

## 2023-06-25 RX ADMIN — PRAZOSIN HYDROCHLORIDE 1 MG: 1 CAPSULE ORAL at 21:19

## 2023-06-25 RX ADMIN — DIVALPROEX SODIUM 1250 MG: 250 TABLET, DELAYED RELEASE ORAL at 21:19

## 2023-06-25 RX ADMIN — CEPHALEXIN 500 MG: 500 CAPSULE ORAL at 06:03

## 2023-06-25 RX ADMIN — BACITRACIN ZINC 1 SMALL APPLICATION: 500 OINTMENT TOPICAL at 08:47

## 2023-06-25 RX ADMIN — ACETAMINOPHEN 975 MG: 325 TABLET ORAL at 04:17

## 2023-06-25 RX ADMIN — LORAZEPAM 0.25 MG: 0.5 TABLET ORAL at 08:47

## 2023-06-25 RX ADMIN — LORAZEPAM 1 MG: 1 TABLET ORAL at 16:18

## 2023-06-25 RX ADMIN — METOPROLOL SUCCINATE 50 MG: 50 TABLET, EXTENDED RELEASE ORAL at 08:47

## 2023-06-25 RX ADMIN — LEVOTHYROXINE SODIUM 25 MCG: 25 TABLET ORAL at 08:47

## 2023-06-25 RX ADMIN — CEPHALEXIN 500 MG: 500 CAPSULE ORAL at 16:16

## 2023-06-25 RX ADMIN — CYANOCOBALAMIN TAB 500 MCG 1000 MCG: 500 TAB at 08:47

## 2023-06-25 RX ADMIN — PERPHENAZINE 8 MG: 4 TABLET, FILM COATED ORAL at 21:23

## 2023-06-25 RX ADMIN — CEPHALEXIN 500 MG: 500 CAPSULE ORAL at 21:18

## 2023-06-25 RX ADMIN — TAMSULOSIN HYDROCHLORIDE 0.4 MG: 0.4 CAPSULE ORAL at 16:18

## 2023-06-25 RX ADMIN — DIVALPROEX SODIUM 1250 MG: 250 TABLET, DELAYED RELEASE ORAL at 08:47

## 2023-06-25 RX ADMIN — BACITRACIN ZINC 1 SMALL APPLICATION: 500 OINTMENT TOPICAL at 16:17

## 2023-06-25 RX ADMIN — PERPHENAZINE 4 MG: 4 TABLET, FILM COATED ORAL at 08:47

## 2023-06-25 RX ADMIN — CEPHALEXIN 500 MG: 500 CAPSULE ORAL at 12:30

## 2023-06-25 NOTE — PROGRESS NOTES
"Progress Note - Edmundo Graham 70 y o  male MRN: [id number]    Unit/Bed#: Jacqueline Cuellar 174-01 Encounter: [id number]        Subjective:   Patient seen and examined at bedside after reviewing the chart and discussing the case with the caring staff  Patient has no acute complaints  Physical Exam   Vitals: Blood pressure 146/93, pulse 82, temperature 98 1 °F (36 7 °C), temperature source Temporal, resp  rate 16, height 5' 9\" (1 753 m), weight 95 4 kg (210 lb 6 4 oz), SpO2 96 %  ,Body mass index is 31 07 kg/m²  Constitutional: Patient in no acute distress  HEENT: PERR, EOMI, neck supple  Cardiovascular: Normal rate  Pulmonary/Chest: No respiratory distress  Abdomen: Nondistended  Assessment/Plan:  Edmundo Graham is a(n) 70 y o  male with schizoaffective disorder      1  Cardiac with hx HTN, HLD  Continue metoprolol succinate to 50 mg daily (incr 5/30/23)  LDL 76   2  Tobacco abuse  NRT  3  Hypothyroidism  TSH 5 291, FT4 1 46  Levothyroxine 25 mcg daily  Repeat TSH in 6 weeks  4  BPH  Continue Flomax 0 4 mg daily  5  Vitamin D deficiency  Patient started on vitamin D2 50,000 units weekly for 4 weeks followed by vitamin D3 1000 units daily  6  Vitamin B12 deficiency  Patient started on vitamin B12 supplement  7  Chronic low back pain  Tylenol as needed  Declines lidocaine patch  8  Ingrown toenail  Patient was seen by podiatry 6/13/2023 (see note)  Recommended partial toenail avulsion in 1 to 2 weeks on outpatient basis  He completed Keflex 500 mg 4 times daily x 7 days which was extended yesterday for additional 7 days  Patient understands importance of following up with podiatry once discharged and to go to ED for any worsening/new symptoms  The patient was discussed with Dr Silver Rivera and he is in agreement with the above note    "

## 2023-06-25 NOTE — NURSING NOTE
Fajardo scale 29  Administered 1 mg ativan po prn at 1618 as patient presents increasingly anxious and elevated  Running and stomping in hallway  Speech mumbled and disorganized  Behaviors correlate with a incident with another peer on unit  Blood pressure elevated initially 117/119  Reassessment 151/96  Medication effective as patient is able to eat dinner in dining room with peers  Appropriate behaviors observed  Mood and affect congruent  No further acute behaviors observed as this was isolated incident  Safety precautions maintained  Will continue to monitor and assess

## 2023-06-25 NOTE — NURSING NOTE
Patient is medication compliant this shift  Patient is visible on the unit and social with peers and staff  Patient denies [address]  Patient has a good appetite and attends group activities

## 2023-06-25 NOTE — NURSING NOTE
Patient slept well for most of the night  Up briefly with a c/o headache for which he received tylenol    Patient fell back to sleep within an hour

## 2023-06-25 NOTE — PROGRESS NOTES
Progress Note - Teddy [address] K Day 70 y o  male MRN: [id number]  Unit/Bed#: U 247-02 Encounter: [id number]    Assessment/Plan   Principal Problem:    Schizophrenia ([address] )  Active Problems:    Cannabis abuse, continuous    Hypertension    Tobacco abuse    BPH (benign prostatic hyperplasia)    Hyperlipidemia    Umbilical hernia    PTSD (post-traumatic stress disorder)    Sleep difficulties    Recommended Treatment:   All current active medications have been reviewed  Encourage group therapy, milieu therapy and occupational therapy  Behavioral Health checks every 7 minutes  Medical management per SLIM  Commitment Status: 304  ----------------------------------------      Subjective: Patient was seen and examined at the bedside during morning rounds  Patient has bizarre appearance and disheveled  When he speaks he does not make sense of what he talks about  He has loose associations and he is paranoid  He appears responding to internal stimuli and unable to care for himself   Patient is sexually preoccupied and disinhibited     Behavior over the last 24 hours:  unchanged  Sleep: normal  Appetite: normal  Medication side effects: No  ROS: no complaints and all other systems are negative    Mental Status Evaluation:  Appearance:  disheveled   Behavior:  bizarre   Speech:  pressured, hypertalkative   Mood:  dysphoric   Affect:  constricted   Thought Process:  increased rate of thoughts   Associations: tangential associations   Thought Content:  paranoid ideation   Perceptual Disturbances: auditory hallucinations, appears responding to internal stimuli   Risk Potential: Suicidal ideation - None at present  Homicidal ideation - None at present  Potential for aggression - Yes, due to acute psychosis   Sensorium:  oriented to person, place and time/date   Memory:  recent and remote memory: unable to assess due to lack of cooperation   Consciousness:  alert and awake   Attention/Concentration: attention span and concentration: unable to assess due to lack of cooperation   Insight:  impaired   Judgment: impaired   Gait/Station: normal gait/station   Motor Activity: no abnormal movements     Medications:   all current active meds have been reviewed, continue current psychiatric medications and current meds:   Current Facility-Administered Medications   Medication Dose Route Frequency   • acetaminophen (TYLENOL) tablet 650 mg  650 mg Oral Q6H PRN   • acetaminophen (TYLENOL) tablet 650 mg  650 mg Oral Q4H PRN   • acetaminophen (TYLENOL) tablet 975 mg  975 mg Oral Q6H PRN   • aluminum-magnesium hydroxide-simethicone (MYLANTA) oral suspension 30 mL  30 mL Oral Q4H PRN   • bacitracin topical ointment 1 small application  1 small application Topical BID   • benztropine (COGENTIN) tablet 1 mg  1 mg Oral Q6H PRN   • cephalexin (KEFLEX) capsule 500 mg  500 mg Oral Q6H [facility]   • cholecalciferol (VITAMIN D3) tablet 1,000 Units  1,000 Units Oral Daily   • cyanocobalamin (VITAMIN B-12) tablet 1,000 mcg  1,000 mcg Oral Daily   • divalproex sodium (DEPAKOTE) DR tablet 1,250 mg  1,250 mg Oral Q12H [facility]   • hydrOXYzine HCL (ATARAX) tablet 25 mg  25 mg Oral Q6H PRN Max 4/day   • hydrOXYzine HCL (ATARAX) tablet 50 mg  50 mg Oral Q4H PRN Max 4/day    Or   • LORazepam (ATIVAN) injection 1 mg  1 mg Intramuscular Q4H PRN   • levothyroxine tablet 25 mcg  25 mcg Oral Early Morning   • LORazepam (ATIVAN) tablet 1 mg  1 mg Oral Q6H PRN    Or   • LORazepam (ATIVAN) injection 2 mg  2 mg Intramuscular Q6H PRN Max 3/day   • LORazepam (ATIVAN) tablet 0 25 mg  0 25 mg Oral Daily   • metoprolol succinate (TOPROL-XL) 24 hr tablet 50 mg  50 mg Oral Daily   • OLANZapine (ZyPREXA ZYDIS) dispersible tablet 20 mg  20 mg Oral HS   • perphenazine tablet 4 mg  4 mg Oral Daily   • perphenazine tablet 8 mg  8 mg Oral HS   • polyethylene glycol (MIRALAX) packet 17 g  17 g Oral Daily PRN   • prazosin (MINIPRESS) capsule 1 mg  1 mg Oral HS   • risperiDONE (RisperDAL M-TAB) disintegrating tablet 0 5 mg  0 5 mg Oral Q4H PRN Max 6/day   • risperiDONE (RisperDAL M-TAB) disintegrating tablet 1 mg  1 mg Oral Q4H PRN Max 4/day   • risperiDONE (RisperDAL M-TAB) disintegrating tablet 3 mg  3 mg Oral Q6H PRN Max 3/day   • tamsulosin (FLOMAX) capsule 0 4 mg  0 4 mg Oral Daily With Dinner   • traZODone (DESYREL) tablet 100 mg  100 mg Oral HS PRN   • traZODone (DESYREL) tablet 100 mg  100 mg Oral HS       Current Facility-Administered Medications   Medication Dose Route Frequency Provider Last Rate   • acetaminophen  650 mg Oral Q6H PRN Ami Armor MedCARLOS frazier     • acetaminophen  650 mg Oral Q4H PRN Ami Armor HOSP CARLOS WHITMAN     • acetaminophen  975 mg Oral Q6H PRN Ami Armor MedCARLOS frazier     • aluminum-magnesium hydroxide-simethicone  30 mL Oral Q4H PRN Ami ArmCARLOS Gaspar     • bacitracin  1 small application Topical BID Funmi Milian PA-C     • benztropine  1 mg Oral Q6H PRN Ami ArmCARLOS Gaspar     • cephalexin  500 mg Oral Q6H [address] Funmi Milian PA-C     • cholecalciferol  1,000 Units Oral Daily Funmi Milian PA-C     • cyanocobalamin  1,000 mcg Oral Daily Funmi Milian PA-C     • divalproex sodium  1,250 mg Oral Q12H [facility], [place]     • hydrOXYzine HCL  25 mg Oral Q6H PRN Max 4/day CARLOS Burkett     • hydrOXYzine HCL  50 mg Oral Q4H PRN Max 4/day Ami CARLOS Munson      Or   • LORazepam  1 mg Intramuscular Q4H PRN CARLOS Burkett     • levothyroxine  25 mcg Oral Early Morning Funmi Milian PA-C     • LORazepam  1 mg Oral Q6H PRN Ami CARLOS Munson      Or   • LORazepam  2 mg Intramuscular Q6H PRN Max 3/day CARLOS Burkett     • LORazepam  0 25 mg Oral Daily CARLOS Mo     • metoprolol succinate  50 mg Oral Daily Funmi Milian PA-C     • OLANZapine  20 mg Oral HS Danae Castaneda MD     • perphenazine  4 mg Oral Daily Danae Castaneda MD     • perphenazine  8 mg Oral HS Danae Castaneda MD     • polyethylene glycol  17 g Oral Daily PRN Nelwyn CARLOS Lambert     • prazosin  1 mg Oral HS Ton Hicks PA-C     • risperiDONE  0 5 mg Oral Q4H PRN Max 6/day CARLOS Cowan     • risperiDONE  1 mg Oral Q4H PRN Max 4/day CARLOS Cowan     • risperiDONE  3 mg Oral Q6H PRN Max 3/day Cristian Hyde MD     • tamsulosin  0 4 mg Oral Daily With Dinner Funmi Milian PA-C     • traZODone  100 mg Oral HS PRN Nelwyn CALROS Lambert     • traZODone  100 mg Oral HS CARLOS Gandhi         Labs: I have personally reviewed all pertinent laboratory/tests results  Most Recent Labs:     Results from the past 24 hours: No results found for this or any previous visit (from the past 24 hour(s))    Most Recent Labs:   Lab Results   Component Value Date    WBC 6 89 05/27/2023    RBC 4 35 05/27/2023    HGB 13 1 05/27/2023    HCT 40 9 05/27/2023     05/27/2023    RDW 13 1 05/27/2023    NEUTROABS 3 84 05/27/2023    SODIUM 141 05/27/2023    K 3 8 05/27/2023     05/27/2023    CO2 30 05/27/2023    BUN 15 05/27/2023    CREATININE 0 93 05/27/2023    GLUC 94 05/27/2023    CALCIUM 9 3 05/27/2023    AST 41 (H) 05/27/2023    ALT 34 05/27/2023    ALKPHOS 34 05/27/2023    TP 6 5 05/27/2023    ALB 4 2 05/27/2023    TBILI 0 65 05/27/2023    CHOLESTEROL 139 05/27/2023    HDL 39 (L) 05/27/2023    TRIG 122 05/27/2023    LDLCALC 76 05/27/2023    NONHDLC 100 05/27/2023    VALPROICTOT 89 06/16/2023    LITHIUM 0 5 (L) 05/25/2023    AMMONIA 51 02/06/2021    NQH1MYEAGVIG 5 291 (H) 05/25/2023    FREET4 1 46 (H) 05/25/2023    RPR Non-Reactive 11/03/2019    HGBA1C 5 2 05/27/2023     05/27/2023     Last Laboratory Results with Depakote and/or Tegretol levels:   Lab Results   Component Value Date    WBC 6 89 05/27/2023    RBC 4 35 05/27/2023    HGB 13 1 05/27/2023    HCT 40 9 05/27/2023     05/27/2023    RDW 13 1 05/27/2023    NEUTROABS 3 84 05/27/2023    SODIUM 141 05/27/2023    K 3 8 05/27/2023     05/27/2023    CO2 30 05/27/2023 "   BUN 15 05/27/2023    CREATININE 0 93 05/27/2023    GLUC 94 05/27/2023    CALCIUM 9 3 05/27/2023    AST 41 (H) 05/27/2023    ALT 34 05/27/2023    ALKPHOS 34 05/27/2023    TP 6 5 05/27/2023    ALB 4 2 05/27/2023    TBILI 0 65 05/27/2023    VALPROICTOT 89 06/16/2023     Last Laboratory Results with Lithium level:   Lab Results   Component Value Date    SODIUM 141 05/27/2023    K 3 8 05/27/2023     05/27/2023    CO2 30 05/27/2023    BUN 15 05/27/2023    CREATININE 0 93 05/27/2023    GLUC 94 05/27/2023    CALCIUM 9 3 05/27/2023    LITHIUM 0 5 (L) 05/25/2023     Labs in last 72 hours:   No results for input(s): \"WBC\", \"RBC\", \"HGB\", \"HCT\", \"PLT\", \"RDW\", \"TOTANEUTABS\", \"NEUTROABS\", \"SODIUM\", \"K\", \"CL\", \"CO2\", \"BUN\", \"CREATININE\", \"GLUC\", \"CALCIUM\", \"AST\", \"ALT\", \"ALKPHOS\", \"TP\", \"ALB\", \"TBILI\", \"CHOLESTEROL\", \"HDL\", \"TRIG\", \"LDLCALC\", \"VALPROICTOT\", \"CARBAMAZEPIN\", \"LITHIUM\", \"AMMONIA\", \"WSE6XPLJQNQC\", \"FREET4\", \"T3FREE\", \"PREGTESTUR\", \"PREGSERUM\", \"HCG\", \"HCGQUANT\", \"RPR\" in the last 72 hours    Admission Labs:   Admission on 05/26/2023   Component Date Value   • Sodium 05/27/2023 141    • Potassium 05/27/2023 3 8    • Chloride 05/27/2023 105    • CO2 05/27/2023 30    • ANION GAP 05/27/2023 6    • BUN 05/27/2023 15    • Creatinine 05/27/2023 0 93    • Glucose 05/27/2023 94    • Glucose, Fasting 05/27/2023 94    • Calcium 05/27/2023 9 3    • AST 05/27/2023 41 (H)    • ALT 05/27/2023 34    • Alkaline Phosphatase 05/27/2023 34    • Total Protein 05/27/2023 6 5    • Albumin 05/27/2023 4 2    • Total Bilirubin 05/27/2023 0 65    • eGFR 05/27/2023 85    • WBC 05/27/2023 6 89    • RBC 05/27/2023 4 35    • Hemoglobin 05/27/2023 13 1    • Hematocrit 05/27/2023 40 9    • MCV 05/27/2023 94    • MCH 05/27/2023 30 1    • MCHC 05/27/2023 32 0    • RDW 05/27/2023 13 1    • MPV 05/27/2023 12 2    • Platelets 48/43/3220 206    • nRBC 05/27/2023 0    • Neutrophils Relative 05/27/2023 57    • Immat GRANS % 05/27/2023 0    • " Lymphocytes Relative 05/27/2023 32    • Monocytes Relative 05/27/2023 8    • Eosinophils Relative 05/27/2023 3    • Basophils Relative 05/27/2023 0    • Neutrophils Absolute 05/27/2023 3 84    • Immature Grans Absolute 05/27/2023 0 01    • Lymphocytes Absolute 05/27/2023 2 22    • Monocytes Absolute 05/27/2023 0 58    • Eosinophils Absolute 05/27/2023 0 21    • Basophils Absolute 05/27/2023 0 03    • Cholesterol 05/27/2023 139    • Triglycerides 05/27/2023 122    • HDL, Direct 05/27/2023 39 (L)    • LDL Calculated 05/27/2023 76    • Non-HDL-Chol (CHOL-HDL) 05/27/2023 100    • Hemoglobin A1C 05/27/2023 5 2    • EAG 05/27/2023 103    • Folate 05/27/2023 16 0    • Vit D, 25-Hydroxy 05/27/2023 26 0 (L)    • Vitamin B-12 05/27/2023 345    • Ventricular Rate 05/27/2023 90    • Atrial Rate 05/27/2023 90    • OR Interval 05/27/2023 158    • QRSD Interval 05/27/2023 84    • QT Interval 05/27/2023 400    • QTC Interval 05/27/2023 489    • P Axis 05/27/2023 78    • QRS Axis 05/27/2023 -1    • T Wave Axis 05/27/2023 45    • Valproic Acid, Total 05/29/2023 43 (L)    • Valproic Acid, Total 06/05/2023 56    • Valproic Acid, Total 06/09/2023 69    • Valproic Acid, Total 06/16/2023 89    • Color, UA 06/21/2023 Yellow    • Clarity, UA 06/21/2023 Clear    • Specific Gravity, UA 06/21/2023 1 010    • pH, UA 06/21/2023 7 0    • Leukocytes, UA 06/21/2023 Negative    • Nitrite, UA 06/21/2023 Negative    • Protein, UA 06/21/2023 Negative    • Glucose, UA 06/21/2023 Negative    • Ketones, UA 06/21/2023 Negative    • Urobilinogen, UA 06/21/2023 0 2    • Bilirubin, UA 06/21/2023 Negative    • Occult Blood, UA 06/21/2023 1+ (A)    • RBC, UA 06/21/2023 2-4    • WBC, UA 06/21/2023 None Seen    • Epithelial Cells 06/21/2023 None Seen    • Bacteria, UA 06/21/2023 Occasional        Progress Toward Goals: working on accepting mental illness diagnosis    Risks / Benefits of Treatment:    Risks, benefits, and possible side effects of medications explained to patient  Patient has limited understanding of risks and benefits of treatment at this time, but agrees to take medications as prescribed  Counseling / Coordination of Care: Total floor / unit time spent today 15 minutes  Greater than 50% of total time was spent with the patient and / or family counseling and / or coordination of care  A description of counseling / coordination of care:  Patient's progress discussed with staff in treatment team meeting  Treatment plan, treatment progress and medication changes were reviewed with nursing staff, pharmacy service, and case management in interdisciplinary treatment team meeting  Medications, treatment progress and treatment plan reviewed with patient  Recent stressors including being homeless, limited support, social difficulties, everyday stressors, ongoing anxiety and chronic mental illness discussed with patient  Educated on importance of medication and treatment compliance  Reassurance and supportive therapy provided  Encouraged participation in milieu and group therapy on the unit        [place], [place] 06/24/23

## 2023-06-25 NOTE — PLAN OF CARE
Problem: CHONG  Goal: Will exhibit normal sleep and speech and no impulsivity  Description: INTERVENTIONS:  - Administer medication as ordered  - Set limits on impulsive behavior  - Make attempts to decrease external stimuli as possible  Outcome: Progressing     Problem: ANXIETY  Goal: Will report anxiety at manageable levels  Description: INTERVENTIONS:  - Administer medication as ordered  - Teach and encourage coping skills  - Provide emotional support  - Assess patient/family for anxiety and ability to cope  Outcome: Progressing  Goal: By discharge: Patient will verbalize 2 strategies to deal with anxiety  Description: Interventions:  - Identify any obvious source/trigger to anxiety  - Staff will assist patient in applying identified coping technique/skills  - Encourage attendance of scheduled groups and activities  Outcome: Progressing     Problem: SLEEP DISTURBANCE  Goal: Will exhibit normal sleeping pattern  Description: Interventions:  -  Assess the patients sleep pattern, noting recent changes  - Administer medication as ordered  - Decrease environmental stimuli, including noise, as appropriate during the night  - Encourage the patient to actively participate in unit groups and or exercise during the day to enhance ability to achieve adequate sleep at night  - Assess the patient, in the morning, encouraging a description of sleep experience  Outcome: Progressing   See chart note

## 2023-06-25 NOTE — PROGRESS NOTES
Progress Note - Teddy [address] K Day 70 y o  male MRN: [id number]  Unit/Bed#: U 247-02 Encounter: [id number]    Assessment/Plan   Principal Problem:    Schizophrenia ([address] )  Active Problems:    Cannabis abuse, continuous    Hypertension    Tobacco abuse    BPH (benign prostatic hyperplasia)    Hyperlipidemia    Umbilical hernia    PTSD (post-traumatic stress disorder)    Sleep difficulties    Recommended Treatment:   All current active medications have been reviewed  Encourage group therapy, milieu therapy and occupational therapy  Behavioral Health checks every 7 minutes  Medical management per SLIM  Commitment Status: 304  ----------------------------------------      Subjective: Patient was seen and examined at the bedside during morning rounds  Patient has bizarre appearance and disheveled  When he speaks he does not make sense of what he talks about  He has loose associations and he is paranoid  He appears responding to internal stimuli and unable to care for himself       Behavior over the last 24 hours:  unchanged  Sleep: normal  Appetite: normal  Medication side effects: No  ROS: no complaints and all other systems are negative    Mental Status Evaluation:  Appearance:  disheveled   Behavior:  bizarre   Speech:  pressured, hypertalkative   Mood:  dysphoric   Affect:  constricted   Thought Process:  increased rate of thoughts   Associations: tangential associations   Thought Content:  paranoid ideation   Perceptual Disturbances: auditory hallucinations, appears responding to internal stimuli   Risk Potential: Suicidal ideation - None at present  Homicidal ideation - None at present  Potential for aggression - Yes, due to acute psychosis   Sensorium:  oriented to person, place and time/date   Memory:  recent and remote memory: unable to assess due to lack of cooperation   Consciousness:  alert and awake   Attention/Concentration: attention span and concentration: unable to assess due to lack of cooperation   Insight:  impaired   Judgment: impaired   Gait/Station: normal gait/station   Motor Activity: no abnormal movements     Medications:   all current active meds have been reviewed, continue current psychiatric medications and current meds:   Current Facility-Administered Medications   Medication Dose Route Frequency   • acetaminophen (TYLENOL) tablet 650 mg  650 mg Oral Q6H PRN   • acetaminophen (TYLENOL) tablet 650 mg  650 mg Oral Q4H PRN   • acetaminophen (TYLENOL) tablet 975 mg  975 mg Oral Q6H PRN   • aluminum-magnesium hydroxide-simethicone (MYLANTA) oral suspension 30 mL  30 mL Oral Q4H PRN   • bacitracin topical ointment 1 small application  1 small application Topical BID   • benztropine (COGENTIN) tablet 1 mg  1 mg Oral Q6H PRN   • cephalexin (KEFLEX) capsule 500 mg  500 mg Oral Q6H [facility]   • cholecalciferol (VITAMIN D3) tablet 1,000 Units  1,000 Units Oral Daily   • cyanocobalamin (VITAMIN B-12) tablet 1,000 mcg  1,000 mcg Oral Daily   • divalproex sodium (DEPAKOTE) DR tablet 1,250 mg  1,250 mg Oral Q12H [facility]   • hydrOXYzine HCL (ATARAX) tablet 25 mg  25 mg Oral Q6H PRN Max 4/day   • hydrOXYzine HCL (ATARAX) tablet 50 mg  50 mg Oral Q4H PRN Max 4/day    Or   • LORazepam (ATIVAN) injection 1 mg  1 mg Intramuscular Q4H PRN   • levothyroxine tablet 25 mcg  25 mcg Oral Early Morning   • LORazepam (ATIVAN) tablet 1 mg  1 mg Oral Q6H PRN    Or   • LORazepam (ATIVAN) injection 2 mg  2 mg Intramuscular Q6H PRN Max 3/day   • metoprolol succinate (TOPROL-XL) 24 hr tablet 50 mg  50 mg Oral Daily   • OLANZapine (ZyPREXA ZYDIS) dispersible tablet 20 mg  20 mg Oral HS   • perphenazine tablet 4 mg  4 mg Oral Daily   • perphenazine tablet 8 mg  8 mg Oral HS   • polyethylene glycol (MIRALAX) packet 17 g  17 g Oral Daily PRN   • prazosin (MINIPRESS) capsule 1 mg  1 mg Oral HS   • risperiDONE (RisperDAL M-TAB) disintegrating tablet 0 5 mg  0 5 mg Oral Q4H PRN Max 6/day   • risperiDONE (RisperDAL M-TAB) disintegrating tablet 1 mg  1 mg Oral Q4H PRN Max 4/day   • risperiDONE (RisperDAL M-TAB) disintegrating tablet 3 mg  3 mg Oral Q6H PRN Max 3/day   • tamsulosin (FLOMAX) capsule 0 4 mg  0 4 mg Oral Daily With Dinner   • traZODone (DESYREL) tablet 100 mg  100 mg Oral HS PRN   • traZODone (DESYREL) tablet 100 mg  100 mg Oral HS       Current Facility-Administered Medications   Medication Dose Route Frequency Provider Last Rate   • acetaminophen  650 mg Oral Q6H PRN Jose Infield Medei, CRNP     • acetaminophen  650 mg Oral Q4H PRN [facility] CARLOS WHITMAN     • acetaminophen  975 mg Oral Q6H PRN [facility], CRNP     • aluminum-magnesium hydroxide-simethicone  30 mL Oral Q4H PRN Jose Infield Medei, CRNP     • bacitracin  1 small application Topical BID Funmi Milian PA-C     • benztropine  1 mg Oral Q6H PRN Jose Infield Medei, CRNP     • cephalexin  500 mg Oral Q6H [address] Funmi Milian PA-C     • cholecalciferol  1,000 Units Oral Daily Funmi Milian PA-C     • cyanocobalamin  1,000 mcg Oral Daily Funmi Milian PA-C     • divalproex sodium  1,250 mg Oral Q12H [address], [place]     • hydrOXYzine HCL  25 mg Oral Q6H PRN Max 4/day Jose Infield Medei, CRNP     • hydrOXYzine HCL  50 mg Oral Q4H PRN Max 4/day [facility], CRMILI      Or   • LORazepam  1 mg Intramuscular Q4H PRN Jose Infield Medei, CRNP     • levothyroxine  25 mcg Oral Early Morning Funmi Milian PA-C     • LORazepam  1 mg Oral Q6H PRN [facility], CRNP      Or   • LORazepam  2 mg Intramuscular Q6H PRN Max 3/day Jose Infield Medei, CRNP     • metoprolol succinate  50 mg Oral Daily Funmi Milian PA-C     • OLANZapine  20 mg Oral HS Nikhil Simpson MD     • perphenazine  4 mg Oral Daily Nikhil Simpson MD     • perphenazine  8 mg Oral HS Nikhil Simpson MD     • polyethylene glycol  17 g Oral Daily PRN [facility] CARLOS Flanagan     • prazosin  1 mg Oral HS Taina Petersen PA-C     • risperiDONE  0 5 mg Oral Q4H PRN Max 6/day Yumiko Cano, CARLOS     • risperiDONE  1 mg Oral Q4H PRN Max 4/day CARLOS Altman     • risperiDONE  3 mg Oral Q6H PRN Max 3/day Nikhil Simpson MD     • tamsulosin  0 4 mg Oral Daily With Dinner Funmi Milian PA-C     • traZODone  100 mg Oral HS PRN CARLOS Altman     • traZODone  100 mg Oral HS CARLOS Arteaga         Labs: I have personally reviewed all pertinent laboratory/tests results  Most Recent Labs:     Results from the past 24 hours: No results found for this or any previous visit (from the past 24 hour(s))    Most Recent Labs:   Lab Results   Component Value Date    WBC 6 89 05/27/2023    RBC 4 35 05/27/2023    HGB 13 1 05/27/2023    HCT 40 9 05/27/2023     05/27/2023    RDW 13 1 05/27/2023    NEUTROABS 3 84 05/27/2023    SODIUM 141 05/27/2023    K 3 8 05/27/2023     05/27/2023    CO2 30 05/27/2023    BUN 15 05/27/2023    CREATININE 0 93 05/27/2023    GLUC 94 05/27/2023    CALCIUM 9 3 05/27/2023    AST 41 (H) 05/27/2023    ALT 34 05/27/2023    ALKPHOS 34 05/27/2023    TP 6 5 05/27/2023    ALB 4 2 05/27/2023    TBILI 0 65 05/27/2023    CHOLESTEROL 139 05/27/2023    HDL 39 (L) 05/27/2023    TRIG 122 05/27/2023    LDLCALC 76 05/27/2023    NONHDLC 100 05/27/2023    VALPROICTOT 89 06/16/2023    LITHIUM 0 5 (L) 05/25/2023    AMMONIA 51 02/06/2021    [id number] 5 291 (H) 05/25/2023    FREET4 1 46 (H) 05/25/2023    RPR Non-Reactive 11/03/2019    HGBA1C 5 2 05/27/2023     05/27/2023     Last Laboratory Results with Depakote and/or Tegretol levels:   Lab Results   Component Value Date    WBC 6 89 05/27/2023    RBC 4 35 05/27/2023    HGB 13 1 05/27/2023    HCT 40 9 05/27/2023     05/27/2023    RDW 13 1 05/27/2023    NEUTROABS 3 84 05/27/2023    SODIUM 141 05/27/2023    K 3 8 05/27/2023     05/27/2023    CO2 30 05/27/2023    BUN 15 05/27/2023    CREATININE 0 93 05/27/2023    GLUC 94 05/27/2023    CALCIUM 9 3 05/27/2023    AST 41 (H) 05/27/2023    ALT 34 05/27/2023    ALKPHOS "34 05/27/2023    TP 6 5 05/27/2023    ALB 4 2 05/27/2023    TBILI 0 65 05/27/2023    VALPROICTOT 89 06/16/2023     Last Laboratory Results with Lithium level:   Lab Results   Component Value Date    SODIUM 141 05/27/2023    K 3 8 05/27/2023     05/27/2023    CO2 30 05/27/2023    BUN 15 05/27/2023    CREATININE 0 93 05/27/2023    GLUC 94 05/27/2023    CALCIUM 9 3 05/27/2023    LITHIUM 0 5 (L) 05/25/2023     Labs in last 72 hours:   No results for input(s): \"WBC\", \"RBC\", \"HGB\", \"HCT\", \"PLT\", \"RDW\", \"TOTANEUTABS\", \"NEUTROABS\", \"SODIUM\", \"K\", \"CL\", \"CO2\", \"BUN\", \"CREATININE\", \"GLUC\", \"CALCIUM\", \"AST\", \"ALT\", \"ALKPHOS\", \"TP\", \"ALB\", \"TBILI\", \"CHOLESTEROL\", \"HDL\", \"TRIG\", \"LDLCALC\", \"VALPROICTOT\", \"CARBAMAZEPIN\", \"LITHIUM\", \"AMMONIA\", \"[id number]\", \"FREET4\", \"T3FREE\", \"PREGTESTUR\", \"PREGSERUM\", \"HCG\", \"HCGQUANT\", \"RPR\" in the last 72 hours    Admission Labs:   Admission on 05/26/2023   Component Date Value   • Sodium 05/27/2023 141    • Potassium 05/27/2023 3 8    • Chloride 05/27/2023 105    • CO2 05/27/2023 30    • ANION GAP 05/27/2023 6    • BUN 05/27/2023 15    • Creatinine 05/27/2023 0 93    • Glucose 05/27/2023 94    • Glucose, Fasting 05/27/2023 94    • Calcium 05/27/2023 9 3    • AST 05/27/2023 41 (H)    • ALT 05/27/2023 34    • Alkaline Phosphatase 05/27/2023 34    • Total Protein 05/27/2023 6 5    • Albumin 05/27/2023 4 2    • Total Bilirubin 05/27/2023 0 65    • eGFR 05/27/2023 85    • WBC 05/27/2023 6 89    • RBC 05/27/2023 4 35    • Hemoglobin 05/27/2023 13 1    • Hematocrit 05/27/2023 40 9    • MCV 05/27/2023 94    • MCH 05/27/2023 30 1    • MCHC 05/27/2023 32 0    • RDW 05/27/2023 13 1    • MPV 05/27/2023 12 2    • Platelets 67/92/8138 206    • nRBC 05/27/2023 0    • Neutrophils Relative 05/27/2023 57    • Immat GRANS % 05/27/2023 0    • Lymphocytes Relative 05/27/2023 32    • Monocytes Relative 05/27/2023 8    • Eosinophils Relative 05/27/2023 3    • Basophils Relative 05/27/2023 0    • Neutrophils " Absolute 05/27/2023 3 84    • Immature Grans Absolute 05/27/2023 0 01    • Lymphocytes Absolute 05/27/2023 2 22    • Monocytes Absolute 05/27/2023 0 58    • Eosinophils Absolute 05/27/2023 0 21    • Basophils Absolute 05/27/2023 0 03    • Cholesterol 05/27/2023 139    • Triglycerides 05/27/2023 122    • HDL, Direct 05/27/2023 39 (L)    • LDL Calculated 05/27/2023 76    • Non-HDL-Chol (CHOL-HDL) 05/27/2023 100    • Hemoglobin A1C 05/27/2023 5 2    • EAG 05/27/2023 103    • Folate 05/27/2023 16 0    • Vit D, 25-Hydroxy 05/27/2023 26 0 (L)    • Vitamin B-12 05/27/2023 345    • Ventricular Rate 05/27/2023 90    • Atrial Rate 05/27/2023 90    • VT Interval 05/27/2023 158    • QRSD Interval 05/27/2023 84    • QT Interval 05/27/2023 400    • QTC Interval 05/27/2023 489    • P Axis 05/27/2023 78    • QRS Axis 05/27/2023 -1    • T Wave Axis 05/27/2023 45    • Valproic Acid, Total 05/29/2023 43 (L)    • Valproic Acid, Total 06/05/2023 56    • Valproic Acid, Total 06/09/2023 69    • Valproic Acid, Total 06/16/2023 89    • Color, UA 06/21/2023 Yellow    • Clarity, UA 06/21/2023 Clear    • Specific Gravity, UA 06/21/2023 1 010    • pH, UA 06/21/2023 7 0    • Leukocytes, UA 06/21/2023 Negative    • Nitrite, UA 06/21/2023 Negative    • Protein, UA 06/21/2023 Negative    • Glucose, UA 06/21/2023 Negative    • Ketones, UA 06/21/2023 Negative    • Urobilinogen, UA 06/21/2023 0 2    • Bilirubin, UA 06/21/2023 Negative    • Occult Blood, UA 06/21/2023 1+ (A)    • RBC, UA 06/21/2023 2-4    • WBC, UA 06/21/2023 None Seen    • Epithelial Cells 06/21/2023 None Seen    • Bacteria, UA 06/21/2023 Occasional        Progress Toward Goals: working on accepting mental illness diagnosis    Risks / Benefits of Treatment:    Risks, benefits, and possible side effects of medications explained to patient  Patient has limited understanding of risks and benefits of treatment at this time, but agrees to take medications as prescribed      Counseling / Coordination of Care: Total floor / unit time spent today 15 minutes  Greater than 50% of total time was spent with the patient and / or family counseling and / or coordination of care  A description of counseling / coordination of care:  Patient's progress discussed with staff in treatment team meeting  Treatment plan, treatment progress and medication changes were reviewed with nursing staff, pharmacy service, and case management in interdisciplinary treatment team meeting  Medications, treatment progress and treatment plan reviewed with patient  Recent stressors including being homeless, limited support, social difficulties, everyday stressors, ongoing anxiety and chronic mental illness discussed with patient  Educated on importance of medication and treatment compliance  Reassurance and supportive therapy provided  Encouraged participation in milieu and group therapy on the unit        Shawgraciela Grier, [place] 06/25/23

## 2023-06-26 VITALS
DIASTOLIC BLOOD PRESSURE: 99 MMHG | SYSTOLIC BLOOD PRESSURE: 149 MMHG | HEIGHT: 69 IN | OXYGEN SATURATION: 96 % | HEART RATE: 88 BPM | BODY MASS INDEX: 31.16 KG/M2 | TEMPERATURE: 97.5 F | RESPIRATION RATE: 18 BRPM | WEIGHT: 210.4 LBS

## 2023-06-26 PROBLEM — G47.9 SLEEP DIFFICULTIES: Status: RESOLVED | Noted: 2023-06-21 | Resolved: 2023-06-26

## 2023-06-26 PROCEDURE — 99238 HOSP IP/OBS DSCHRG MGMT 30/<: CPT | Performed by: PSYCHIATRY & NEUROLOGY

## 2023-06-26 RX ORDER — CEPHALEXIN 500 MG/1
500 CAPSULE ORAL EVERY 6 HOURS SCHEDULED
Qty: 17 CAPSULE | Refills: 0 | Status: SHIPPED | OUTPATIENT
Start: 2023-06-26 | End: 2023-07-01

## 2023-06-26 RX ADMIN — Medication 1000 UNITS: at 08:36

## 2023-06-26 RX ADMIN — PERPHENAZINE 4 MG: 4 TABLET, FILM COATED ORAL at 08:36

## 2023-06-26 RX ADMIN — LEVOTHYROXINE SODIUM 25 MCG: 25 TABLET ORAL at 08:36

## 2023-06-26 RX ADMIN — METOPROLOL SUCCINATE 50 MG: 50 TABLET, EXTENDED RELEASE ORAL at 08:36

## 2023-06-26 RX ADMIN — BACITRACIN ZINC 1 SMALL APPLICATION: 500 OINTMENT TOPICAL at 08:37

## 2023-06-26 RX ADMIN — CYANOCOBALAMIN TAB 500 MCG 1000 MCG: 500 TAB at 08:36

## 2023-06-26 RX ADMIN — CEPHALEXIN 500 MG: 500 CAPSULE ORAL at 11:54

## 2023-06-26 RX ADMIN — CEPHALEXIN 500 MG: 500 CAPSULE ORAL at 05:52

## 2023-06-26 RX ADMIN — DIVALPROEX SODIUM 1250 MG: 250 TABLET, DELAYED RELEASE ORAL at 08:37

## 2023-06-26 NOTE — PLAN OF CARE
Problem: CHONG  Goal: Will exhibit normal sleep and speech and no impulsivity  Description: INTERVENTIONS:  - Administer medication as ordered  - Set limits on impulsive behavior  - Make attempts to decrease external stimuli as possible  Outcome: Progressing     Problem: PSYCHOSIS  Goal: Will report no hallucinations or delusions  Description: Interventions:  - Administer medication as  ordered  - Every waking shifts and PRN assess for the presence of hallucinations and or delusions  - Assist with reality testing to support increasing orientation  - Assess if patient's hallucinations or delusions are encouraging self-harm or harm to others and intervene as appropriate  Outcome: Progressing     Problem: ANXIETY  Goal: Will report anxiety at manageable levels  Description: INTERVENTIONS:  - Administer medication as ordered  - Teach and encourage coping skills  - Provide emotional support  - Assess patient/family for anxiety and ability to cope  Outcome: Progressing  Goal: By discharge: Patient will verbalize 2 strategies to deal with anxiety  Description: Interventions:  - Identify any obvious source/trigger to anxiety  - Staff will assist patient in applying identified coping technique/skills  - Encourage attendance of scheduled groups and activities  Outcome: Progressing     Problem: SLEEP DISTURBANCE  Goal: Will exhibit normal sleeping pattern  Description: Interventions:  -  Assess the patients sleep pattern, noting recent changes  - Administer medication as ordered  - Decrease environmental stimuli, including noise, as appropriate during the night  - Encourage the patient to actively participate in unit groups and or exercise during the day to enhance ability to achieve adequate sleep at night  - Assess the patient, in the morning, encouraging a description of sleep experience  Outcome: Progressing   See chart note

## 2023-06-26 NOTE — PROGRESS NOTES
"Progress Note - Barbara Graahm 70 y o  male MRN: [id number]    Unit/Bed#: [facility] 944-69 Encounter: [id number]        Subjective:   Patient seen and examined at bedside after reviewing the chart and discussing the case with the caring staff  Patient has no acute complaints  Patient is being discharged today  Patient is requesting all his prescriptions  I reviewed and reconciled patient's problem list and medications  Physical Exam   Vitals: Blood pressure 149/99, pulse 88, temperature 97 5 °F (36 4 °C), temperature source Temporal, resp  rate 18, height 5' 9\" (1 753 m), weight 95 4 kg (210 lb 6 4 oz), SpO2 96 %  ,Body mass index is 31 07 kg/m²  Constitutional: Patient in no acute distress  HEENT: PERR, EOMI, neck supple  Cardiovascular: Normal rate  Pulmonary/Chest: No respiratory distress  Abdomen: Nondistended  Assessment/Plan:  Barbara Graham is a(n) 70 y o  male with schizoaffective disorder  Medical clearance  Patient is medically cleared for discharge  All scripts will be sent out for the patient      1  Cardiac with hx HTN, HLD  Continue metoprolol succinate to 50 mg daily (incr 5/30/23)  LDL 76   2  Tobacco abuse  NRT  3  Hypothyroidism  TSH 5 291, FT4 1 46  Levothyroxine 25 mcg daily  Repeat TSH in 6 weeks  4  BPH  Continue Flomax 0 4 mg daily  5  Vitamin D deficiency  Patient started on vitamin D2 50,000 units weekly for 4 weeks followed by vitamin D3 1000 units daily  6  Vitamin B12 deficiency  Patient started on vitamin B12 supplement  7  Chronic low back pain  Tylenol as needed  Declines lidocaine patch  8  Ingrown toenail  Patient was seen by podiatry 6/13/2023 (see note)  Recommended partial toenail avulsion in 1 to 2 weeks on outpatient basis  He completed Keflex 500 mg 4 times daily x 7 days which was extended yesterday for additional 7 days    Patient understands importance of following up with podiatry once discharged and to go to ED for any worsening/new " symptoms

## 2023-06-26 NOTE — NURSING NOTE
Patient discharged from unit with all belongings  All discharge instructions, follow up appointments and medications reviewed  Patient walked to lobby by staff and met by mother

## 2023-06-26 NOTE — PROGRESS NOTES
06/26/23 0730   Activity/Group Checklist   Group   [employer])   Attendance Attended   Attendance Duration (min) 46-60   Interactions Disorganized interaction   Affect/Mood Wide  (anxious)   Goals Achieved Identified feelings; Discussed coping strategies; Able to listen to others; Able to engage in interactions

## 2023-06-26 NOTE — PLAN OF CARE
Problem: CHONG  Goal: Will exhibit normal sleep and speech and no impulsivity  Description: INTERVENTIONS:  - Administer medication as ordered  - Set limits on impulsive behavior  - Make attempts to decrease external stimuli as possible  Outcome: Progressing     Problem: ANXIETY  Goal: Will report anxiety at manageable levels  Description: INTERVENTIONS:  - Administer medication as ordered  - Teach and encourage coping skills  - Provide emotional support  - Assess patient/family for anxiety and ability to cope  Outcome: Progressing     Problem: Nutrition/Hydration-ADULT  Goal: Nutrient/Hydration intake appropriate for improving, restoring or maintaining nutritional needs  Description: Monitor and assess patient's nutrition/hydration status for malnutrition  Collaborate with interdisciplinary team and initiate plan and interventions as ordered  Monitor patient's weight and dietary intake as ordered or per policy  Utilize nutrition screening tool and intervene as necessary  Determine patient's food preferences and provide high-protein, high-caloric foods as appropriate       INTERVENTIONS:  - Monitor oral intake, urinary output, labs, and treatment plans  - Assess nutrition and hydration status and recommend course of action  - Evaluate amount of meals eaten  - Assist patient with eating if necessary   - Allow adequate time for meals  - Recommend/ encourage appropriate diets, oral nutritional supplements, and vitamin/mineral supplements  - Order, calculate, and assess calorie counts as needed  - Recommend, monitor, and adjust tube feedings and TPN/PPN based on assessed needs  - Assess need for intravenous fluids  - Provide specific nutrition/hydration education as appropriate  - Include patient/family/caregiver in decisions related to nutrition  Outcome: Progressing

## 2023-06-26 NOTE — NURSING NOTE
Presents with bright affect,denies depression,anxiety,SI,HI,AH,VH  He has loose associations and disorganized thought process yet pleasant and cooperative  He is visible on the unit,converses freely with peers and staff  We discussed the s/s of impending psychological decompensation and when top seek assistance  Will continue to educate,monitor,and provide safe,therapeutic milieu

## 2023-06-26 NOTE — NURSING NOTE
belongings sent at discharge with patient    Long johns x 1  Sandals x 1 pair  Shirt x 2  Jeans x 2  Belt x 1  Lighter x 1  Key x 1  Rolling papers x 1  $89 00 in bills  Assorted cards and assorted coins  EBT card x 1  [place] drivers [place]  Master card x 1  Pants x 1  Shorts x 3  pj pants x 1  Underwear x 3  Long johns x 1  t shirt x 2  Slides x 1 pair  Pants x 1  Top x 1  Socks x 2 pair  Shoes with laces x 1 pair  Eye glasses and case x 1  Note pad x 1

## 2023-06-26 NOTE — PROGRESS NOTES
06/26/23   Team Meeting   Meeting Type Daily Rounds   Team Members Present   Team Members Present Physician;Nurse;   Physician Team Member Dr Zehra Dia MD   Nursing Team Member Yuan Santo RN   Social Work Team Member Elana VARGAS   Patient/Family Present   Patient Present No   Patient's Family Present No

## 2023-06-26 NOTE — SOCIAL WORK
"CAROL had met with pt on Friday late afternoon to discuss pt's d/c Monday  Pt said he would be calling his mother, Gretchen Shepherdwa Day (Ph:  [phone]) to arrange transport home  CAROL spoke with pt this morning and pt became irritable  Said someone may be here at noon, but said, \"I'm not a child; when my time comes to be discharged, I'm leaving  If no one is there, I'll walk to [employer]  \"    CAROL placed call to pt's mother to verify d/c time  Mother said pt sounded \"better every day\" but said he became upset when he thought the discharge might not work out as planned  CAROL provided instructions for pt pickup  Mother expressed understanding and said she will arrive for pt at noon    "

## 2023-06-27 NOTE — DISCHARGE SUMMARY
Discharge Summary - [address] 70 y o  male MRN: [id number]  Unit/Bed#: [facility] 768-10 Encounter: [id number]     Admission Date: 5/26/2023         Discharge Date: 6/26/2023      Attending Psychiatrist: RIGO Jordan     Reason for Admission/HPI: Ya Paulson is a 70 y o  male with a history of Schizoaffective Disorder who was admitted to the inpatient adult psychiatric unit on a involuntary 302 commitment basis due to unstable mood, psychotic symptoms, severe agitation and increased agitation  He was brought to ED by the police after he became agitated and screams at neighbor and being noncompliant with medication  The patient is well known to the unit team due to his previous admissions  Hospital Course:       Mr Jarred Cox was admitted and all appropriate precautions were started  Pt was oriented to the unit  His treatment, including medication management and therapy was discussed with the patient, and  he agreed  Risks, benefits, and possible side effects of medications explained to patient and patient verbalizes understanding and agreement for treatment  During the hospitalization the patient was encouraged to attend individual therapy, group therapy, milieu therapy and occupational therapy  Patient condition significantly improved after his  medications were started  Pt agreed to start his medication after discussion of potential benefits and side effects  The patient initially had paranoid delusions, unstable mood, labile affect, as a result of treatment his condition significantly improved  I discussed the medication regimen and possible side effects of the medications with the patient prior to discharge  At the time of discharge Mr Graham was tolerating psychiatric medications  Please see After Discharge Summary for a completed list of discharge medications  Safety plan was discussed and approved by the patient   He was not manic, depressed, angry, or confused or psychotic on a day of discharge and was accountable for his actions  I discussed outpatient follow up with the patient, was arranged by the unit  upon discharge  Safety plan was discussed and approved by Mr   Reviewed with the patient importance of compliance with medications and outpatient treatment after discharge  The patient was competent to understand of risks and benefits of his actions  Mental Status at time of Discharge: Cheerful and pleasant  Did not have any thoughts of harming other people or himself  Improved mood  Mood congruent affect  Normal rate and volume of speech  [place] thoughts  Did not endorse paranoid delusions  Did not have suicidal or homicidal ideations  Fully alert  Oriented x3  Improved insight and judgment  Discharge Diagnosis:   Patient Active Problem List   Diagnosis   • Schizophrenia ([facility] [address] )   • Cannabis abuse, continuous   • Alcohol abuse, continuous   • Hypertension   • Tobacco abuse   • BPH (benign prostatic hyperplasia)   • Elevated CK   • Vitamin D insufficiency   • History of rhabdomyolysis   • Elevated TSH   • Wrist pain, chronic, right   • Astigmatism   • Atrial fibrillation (HCC)   • Benign neoplasm of colon   • Hemorrhoids   • Hydrocele   • Hyperlipidemia   • Large physiologic cupping of optic disc   • Macular drusen   • Nuclear senile cataract   • Presbyopia   • Umbilical hernia   • Vitreous syneresis   • Open angle with borderline findings, low risk, bilateral   • Vitreous degeneration, right eye   • PTSD (post-traumatic stress disorder)       Discharge Medications:  See after visit summary for reconciled discharge medications provided to patient and family  Discharge instructions/Information to patient and family:   See after visit summary for information provided to patient and family  Provisions for Follow-Up Care:  See after visit summary for information related to follow-up care and any pertinent home health orders        Discharge Statement   I spent 30 minutes discharging the patient  This time was spent on the day of discharge  I had direct contact with the patient on the day of discharge  Additional documentation is required if more than 30 minutes were spent on discharge  Portions of the record may have been created with voice recognition software

## 2023-07-07 NOTE — PLAN OF CARE
----- Message from Redd Tang Problem: Alteration in Thoughts and Perception  Goal: Treatment Goal: Gain control of psychotic behaviors/thinking, reduce/eliminate presenting symptoms and demonstrate improved reality functioning upon discharge  Outcome: Progressing  Goal: Verbalize thoughts and feelings  Description: Interventions:  - Promote a nonjudgmental and trusting relationship with the patient through active listening and therapeutic communication  - Assess patient's level of functioning, behavior and potential for risk  - Engage patient in 1 on 1 interactions  - Encourage patient to express fears, feelings, frustrations, and discuss symptoms    - [place] patient to reality, help patient recognize reality-based thinking   - Administer medications as ordered and assess for potential side effects  - Provide the patient education related to the signs and symptoms of the illness and desired effects of prescribed medications  Outcome: Progressing  Goal: Refrain from acting on delusional thinking/internal stimuli  Description: Interventions:  - Monitor patient closely, per order   - Utilize least restrictive measures   - Set reasonable limits, give positive feedback for acceptable   - Administer medications as ordered and monitor of potential side effects  Outcome: Progressing  Goal: Agree to be compliant with medication regime, as prescribed and report medication side effects  Description: Interventions:  - Offer appropriate PRN medication and supervise ingestion; conduct AIMS, as needed   Outcome: Progressing  Goal: Attend and participate in unit activities, including therapeutic, recreational, and educational groups  Description: Interventions:  -Encourage Visitation and family involvement in care  Outcome: Progressing  Goal: Recognize dysfunctional thoughts, communicate reality-based thoughts at the time of discharge  Description: Interventions:  - Provide medication and psycho-education to assist patient in compliance and developing insight into his/her illness   Outcome: Progressing  Goal: Complete daily ADLs, including personal hygiene independently, as able  Description: Interventions:  - Observe, teach, and assist patient with ADLS  - Monitor and promote a balance of rest/activity, with adequate nutrition and elimination   Outcome: Progressing     Problem: Risk for Violence/Aggression Toward Others  Goal: Treatment Goal: Refrain from acts of violence/aggression during length of stay, and demonstrate improved impulse control at the time of discharge  Outcome: Progressing  Goal: Verbalize thoughts and feelings  Description: Interventions:  - Assess and re-assess patient's level of risk, every waking shift  - Engage patient in 1:1 interactions, daily, for a minimum of 15 minutes   - Allow patient to express feelings and frustrations in a safe and non-threatening manner   - Establish rapport/trust with patient   Outcome: Progressing  Goal: Refrain from harming others  Outcome: Progressing  Goal: Refrain from destructive acts on the environment or property  Outcome: Progressing  Goal: Control angry outbursts  Description: Interventions:  - Monitor patient closely, per order  - Ensure early verbal de-escalation  - Monitor prn medication needs  - Set reasonable/therapeutic limits, outline behavioral expectations, and consequences   - Provide a non-threatening milieu, utilizing the least restrictive interventions   Outcome: Progressing  Goal: Attend and participate in unit activities, including therapeutic, recreational, and educational groups  Description: Interventions:  - Provide therapeutic and educational activities daily, encourage attendance and participation, and document same in the medical record   Outcome: Progressing  Goal: Identify appropriate positive anger management techniques  Description: Interventions:  - Offer anger management and coping skills groups   - Staff will provide positive feedback for appropriate anger control  Outcome: Progressing     Problem: Alteration in Orientation  Goal: Treatment Goal: Demonstrate a reduction of confusion and improved orientation to person, place, time and/or situation upon discharge, according to optimum baseline/ability  Outcome: Progressing  Goal: Interact with staff daily  Description: Interventions:  - Assess and re-assess patient's level of orientation  - Engage patient in 1 on 1 interactions, daily, for a minimum of 15 minutes   - Establish rapport/trust with patient   Outcome: Progressing  Goal: Express concerns related to confused thinking related to:  Description: Interventions:  - Encourage patient to express feelings, fears, frustrations, hopes  - Assign consistent caregivers   - [place]/re-orient patient as needed  - Allow comfort items, as appropriate  - Provide visual cues, signs, etc    Outcome: Progressing  Goal: Allow medical examinations, as recommended  Description: Interventions:  - Provide physical/neurological exams and/or referrals, per provider   Outcome: Progressing  Goal: Cooperate with recommended testing/procedures  Description: Interventions:  - Determine need for ancillary testing  - Observe for mental status changes  - Implement falls/precaution protocol   Outcome: Progressing  Goal: Attend and participate in unit activities, including therapeutic, recreational, and educational groups  Description: Interventions:  - Provide therapeutic and educational activities daily, encourage attendance and participation, and document same in the medical record   - Provide appropriate opportunities for reminiscence   - Provide a consistent daily routine   - Encourage family contact/visitation   Outcome: Progressing  Goal: Complete daily ADLs, including personal hygiene independently, as able  Description: Interventions:  - Observe, teach, and assist patient with ADLS  Outcome: Progressing

## 2024-01-01 NOTE — CASE MANAGEMENT
As per DR Desiree Tamayo the pt is medically cleared for admit to [facility]  Dr Desiree Tamayo reports there is no bed for the pt on the [facility]  Pt will need to go to [facility]  Pt is a 302  Notified Himanshu Minium in crisis of same 
Dr Gamez Shari here to evaluate the pt   1:1 at bedside  Pt still in restraints  [facility] texted Gale Pandey in crisis to determine if a bed has become available in the [facility] 
Information was faxed to Patricia Ferris at intake for review when a bed becomes available  Patricia Ferris confirmed information was received, no beds available today 
LOS: 1, URR score: 25 and Green, pt is not a 30 day re-admit or Medicare bundle  Chart reviewed or discharge planing purposes  Pt presented to ED with EMS who reported pt was found pacing his home in a manic paranoid state  EMS reports the pt went through a large wooden door to attack them and law enforcement  Pt was dumping beer all over himself, trying to cut himself, and throwing things  Pt was also making threats to harm his mother  Patient arrived via a police 407  Pt was to be admitted to [facility] but was diagnosed with Rhabdo and required medical admit  Pt pending medical clearance and psychiatry consult for B[facility] placement  Crisis to facilitate same  CM to follow  CM reviewed d/c planning process including the following: identifying help at home, patient preference for d/c planning needs, availability of treatment team to discuss questions or concerns patient and/or family may have regarding understanding medications and recognizing signs and symptoms once discharged  CM also encouraged patient to follow up with all recommended appointments after discharge  Patient advised of importance for patient and family to participate in managing patients medical well being 
Spoke to Clay Ladja from  Crisis who reports the pt needs a COVID test prior to [facility] accepting the pt  Dr Jaciel Ferris aware of same 
Spoke to Dr Tari Demarco who states the pt needs to go to the [facility] as he has been disruptive and needs PRN medications and restraints during the night  At this time no one is on duty on for crisis  TC to [address] to see if anyone is on for crisis  Left a VM  Dr Tari Demarco aware of same 
Spoke with Lisa Bryan at intake, confirmed information was received and will be reviewed when a bed becomes available 
Spoke with Nancy Negro at intake, confirmed information has been received and will be reviewed when a bed becomes available 
no

## 2024-02-17 ENCOUNTER — APPOINTMENT (EMERGENCY)
Dept: RADIOLOGY | Facility: HOSPITAL | Age: 64
End: 2024-02-17
Payer: MEDICARE

## 2024-02-17 ENCOUNTER — HOSPITAL ENCOUNTER (EMERGENCY)
Facility: HOSPITAL | Age: 64
End: 2024-02-18
Attending: EMERGENCY MEDICINE | Admitting: EMERGENCY MEDICINE
Payer: MEDICARE

## 2024-02-17 DIAGNOSIS — E78.5 HYPERLIPIDEMIA: Primary | ICD-10-CM

## 2024-02-17 DIAGNOSIS — N40.0 BENIGN PROSTATIC HYPERPLASIA WITHOUT LOWER URINARY TRACT SYMPTOMS: ICD-10-CM

## 2024-02-17 DIAGNOSIS — I10 PRIMARY HYPERTENSION: ICD-10-CM

## 2024-02-17 DIAGNOSIS — F20.9 SCHIZOPHRENIA (HCC): ICD-10-CM

## 2024-02-17 LAB
2HR DELTA HS TROPONIN: 1 NG/L
ALBUMIN SERPL BCP-MCNC: 4.3 G/DL (ref 3.5–5)
ALP SERPL-CCNC: 60 U/L (ref 34–104)
ALT SERPL W P-5'-P-CCNC: 23 U/L (ref 7–52)
AMPHETAMINES SERPL QL SCN: NEGATIVE
ANION GAP SERPL CALCULATED.3IONS-SCNC: 5 MMOL/L
AST SERPL W P-5'-P-CCNC: 24 U/L (ref 13–39)
ATRIAL RATE: 122 BPM
ATRIAL RATE: 340 BPM
ATRIAL RATE: 96 BPM
BARBITURATES UR QL: NEGATIVE
BASOPHILS # BLD AUTO: 0.03 THOUSANDS/ÂΜL (ref 0–0.1)
BASOPHILS NFR BLD AUTO: 1 % (ref 0–1)
BENZODIAZ UR QL: NEGATIVE
BILIRUB SERPL-MCNC: 0.34 MG/DL (ref 0.2–1)
BUN SERPL-MCNC: 15 MG/DL (ref 5–25)
CALCIUM SERPL-MCNC: 9.7 MG/DL (ref 8.4–10.2)
CARDIAC TROPONIN I PNL SERPL HS: 10 NG/L
CARDIAC TROPONIN I PNL SERPL HS: 11 NG/L
CHLORIDE SERPL-SCNC: 105 MMOL/L (ref 96–108)
CO2 SERPL-SCNC: 28 MMOL/L (ref 21–32)
COCAINE UR QL: NEGATIVE
CREAT SERPL-MCNC: 0.98 MG/DL (ref 0.6–1.3)
EOSINOPHIL # BLD AUTO: 0.14 THOUSAND/ÂΜL (ref 0–0.61)
EOSINOPHIL NFR BLD AUTO: 2 % (ref 0–6)
ERYTHROCYTE [DISTWIDTH] IN BLOOD BY AUTOMATED COUNT: 12.7 % (ref 11.6–15.1)
ETHANOL SERPL-MCNC: <10 MG/DL
GFR SERPL CREATININE-BSD FRML MDRD: 80 ML/MIN/1.73SQ M
GLUCOSE SERPL-MCNC: 116 MG/DL (ref 65–140)
HCT VFR BLD AUTO: 42.6 % (ref 36.5–49.3)
HGB BLD-MCNC: 14.1 G/DL (ref 12–17)
IMM GRANULOCYTES # BLD AUTO: 0.01 THOUSAND/UL (ref 0–0.2)
IMM GRANULOCYTES NFR BLD AUTO: 0 % (ref 0–2)
LYMPHOCYTES # BLD AUTO: 1.11 THOUSANDS/ÂΜL (ref 0.6–4.47)
LYMPHOCYTES NFR BLD AUTO: 18 % (ref 14–44)
MCH RBC QN AUTO: 29.8 PG (ref 26.8–34.3)
MCHC RBC AUTO-ENTMCNC: 33.1 G/DL (ref 31.4–37.4)
MCV RBC AUTO: 90 FL (ref 82–98)
METHADONE UR QL: NEGATIVE
MONOCYTES # BLD AUTO: 0.75 THOUSAND/ÂΜL (ref 0.17–1.22)
MONOCYTES NFR BLD AUTO: 12 % (ref 4–12)
NEUTROPHILS # BLD AUTO: 4.2 THOUSANDS/ÂΜL (ref 1.85–7.62)
NEUTS SEG NFR BLD AUTO: 67 % (ref 43–75)
NRBC BLD AUTO-RTO: 0 /100 WBCS
OPIATES UR QL SCN: NEGATIVE
OXYCODONE+OXYMORPHONE UR QL SCN: NEGATIVE
P AXIS: 65 DEGREES
PCP UR QL: NEGATIVE
PLATELET # BLD AUTO: 184 THOUSANDS/UL (ref 149–390)
PMV BLD AUTO: 10.3 FL (ref 8.9–12.7)
POTASSIUM SERPL-SCNC: 3.7 MMOL/L (ref 3.5–5.3)
PR INTERVAL: 156 MS
PROT SERPL-MCNC: 6.6 G/DL (ref 6.4–8.4)
QRS AXIS: -65 DEGREES
QRS AXIS: -70 DEGREES
QRS AXIS: -74 DEGREES
QRSD INTERVAL: 88 MS
QRSD INTERVAL: 90 MS
QRSD INTERVAL: 98 MS
QT INTERVAL: 326 MS
QT INTERVAL: 334 MS
QT INTERVAL: 348 MS
QTC INTERVAL: 439 MS
QTC INTERVAL: 468 MS
QTC INTERVAL: 501 MS
RBC # BLD AUTO: 4.73 MILLION/UL (ref 3.88–5.62)
SODIUM SERPL-SCNC: 138 MMOL/L (ref 135–147)
T WAVE AXIS: 48 DEGREES
T WAVE AXIS: 51 DEGREES
T WAVE AXIS: 70 DEGREES
T4 FREE SERPL-MCNC: 0.97 NG/DL (ref 0.61–1.12)
THC UR QL: NEGATIVE
TSH SERPL DL<=0.05 MIU/L-ACNC: 1.86 UIU/ML (ref 0.45–4.5)
VALPROATE SERPL-MCNC: <10 UG/ML (ref 50–100)
VENTRICULAR RATE: 118 BPM
VENTRICULAR RATE: 142 BPM
VENTRICULAR RATE: 96 BPM
WBC # BLD AUTO: 6.24 THOUSAND/UL (ref 4.31–10.16)

## 2024-02-17 PROCEDURE — 84443 ASSAY THYROID STIM HORMONE: CPT | Performed by: EMERGENCY MEDICINE

## 2024-02-17 PROCEDURE — 99285 EMERGENCY DEPT VISIT HI MDM: CPT | Performed by: EMERGENCY MEDICINE

## 2024-02-17 PROCEDURE — 71045 X-RAY EXAM CHEST 1 VIEW: CPT

## 2024-02-17 PROCEDURE — NC001 PR NO CHARGE: Performed by: STUDENT IN AN ORGANIZED HEALTH CARE EDUCATION/TRAINING PROGRAM

## 2024-02-17 PROCEDURE — 93005 ELECTROCARDIOGRAM TRACING: CPT

## 2024-02-17 PROCEDURE — 80053 COMPREHEN METABOLIC PANEL: CPT | Performed by: EMERGENCY MEDICINE

## 2024-02-17 PROCEDURE — 73130 X-RAY EXAM OF HAND: CPT

## 2024-02-17 PROCEDURE — 84484 ASSAY OF TROPONIN QUANT: CPT | Performed by: EMERGENCY MEDICINE

## 2024-02-17 PROCEDURE — 84439 ASSAY OF FREE THYROXINE: CPT | Performed by: EMERGENCY MEDICINE

## 2024-02-17 PROCEDURE — 80164 ASSAY DIPROPYLACETIC ACD TOT: CPT | Performed by: EMERGENCY MEDICINE

## 2024-02-17 PROCEDURE — 93010 ELECTROCARDIOGRAM REPORT: CPT | Performed by: INTERNAL MEDICINE

## 2024-02-17 PROCEDURE — 80307 DRUG TEST PRSMV CHEM ANLYZR: CPT | Performed by: EMERGENCY MEDICINE

## 2024-02-17 PROCEDURE — 73110 X-RAY EXAM OF WRIST: CPT

## 2024-02-17 PROCEDURE — 99284 EMERGENCY DEPT VISIT MOD MDM: CPT

## 2024-02-17 PROCEDURE — 82077 ASSAY SPEC XCP UR&BREATH IA: CPT

## 2024-02-17 PROCEDURE — 36415 COLL VENOUS BLD VENIPUNCTURE: CPT | Performed by: EMERGENCY MEDICINE

## 2024-02-17 PROCEDURE — 85025 COMPLETE CBC W/AUTO DIFF WBC: CPT | Performed by: EMERGENCY MEDICINE

## 2024-02-17 RX ORDER — LORAZEPAM 1 MG/1
2 TABLET ORAL ONCE
Status: COMPLETED | OUTPATIENT
Start: 2024-02-17 | End: 2024-02-17

## 2024-02-17 RX ORDER — QUETIAPINE FUMARATE 100 MG/1
200 TABLET, FILM COATED ORAL
Status: DISCONTINUED | OUTPATIENT
Start: 2024-02-17 | End: 2024-02-18 | Stop reason: HOSPADM

## 2024-02-17 RX ORDER — QUETIAPINE FUMARATE 200 MG/1
200 TABLET, FILM COATED ORAL
Status: ON HOLD | COMMUNITY

## 2024-02-17 RX ORDER — LITHIUM CARBONATE 300 MG/1
600 CAPSULE ORAL
Status: ON HOLD | COMMUNITY

## 2024-02-17 RX ORDER — TAMSULOSIN HYDROCHLORIDE 0.4 MG/1
0.4 CAPSULE ORAL
COMMUNITY
End: 2024-02-17 | Stop reason: SDUPTHER

## 2024-02-17 RX ORDER — OLANZAPINE 10 MG/1
10 TABLET ORAL EVERY MORNING
Status: ON HOLD | COMMUNITY

## 2024-02-17 RX ORDER — OLANZAPINE 20 MG/1
20 TABLET ORAL
Status: ON HOLD | COMMUNITY

## 2024-02-17 RX ORDER — TAMSULOSIN HYDROCHLORIDE 0.4 MG/1
0.4 CAPSULE ORAL
Status: DISCONTINUED | OUTPATIENT
Start: 2024-02-17 | End: 2024-02-18 | Stop reason: HOSPADM

## 2024-02-17 RX ORDER — ATORVASTATIN CALCIUM 10 MG/1
10 TABLET, FILM COATED ORAL DAILY
Status: ON HOLD | COMMUNITY

## 2024-02-17 RX ORDER — DILTIAZEM HYDROCHLORIDE 5 MG/ML
15 INJECTION INTRAVENOUS ONCE
Status: DISCONTINUED | OUTPATIENT
Start: 2024-02-17 | End: 2024-02-17

## 2024-02-17 RX ORDER — OLANZAPINE 10 MG/1
10 TABLET, ORALLY DISINTEGRATING ORAL ONCE
Status: COMPLETED | OUTPATIENT
Start: 2024-02-17 | End: 2024-02-17

## 2024-02-17 RX ORDER — ALPRAZOLAM 0.25 MG/1
0.25 TABLET ORAL ONCE
Status: COMPLETED | OUTPATIENT
Start: 2024-02-17 | End: 2024-02-17

## 2024-02-17 RX ORDER — OLANZAPINE 2.5 MG/1
10 TABLET, FILM COATED ORAL EVERY MORNING
Status: DISCONTINUED | OUTPATIENT
Start: 2024-02-18 | End: 2024-02-18 | Stop reason: HOSPADM

## 2024-02-17 RX ORDER — LORAZEPAM 2 MG/ML
2 INJECTION INTRAMUSCULAR ONCE
Status: DISCONTINUED | OUTPATIENT
Start: 2024-02-17 | End: 2024-02-17

## 2024-02-17 RX ORDER — ASPIRIN 325 MG
325 TABLET ORAL ONCE
Status: DISCONTINUED | OUTPATIENT
Start: 2024-02-17 | End: 2024-02-17

## 2024-02-17 RX ORDER — OLANZAPINE 10 MG/1
20 TABLET ORAL
Status: DISCONTINUED | OUTPATIENT
Start: 2024-02-17 | End: 2024-02-18 | Stop reason: HOSPADM

## 2024-02-17 RX ORDER — METOPROLOL SUCCINATE 50 MG/1
25 TABLET, EXTENDED RELEASE ORAL DAILY
Status: DISCONTINUED | OUTPATIENT
Start: 2024-02-17 | End: 2024-02-18 | Stop reason: HOSPADM

## 2024-02-17 RX ORDER — ASPIRIN 81 MG/1
324 TABLET, CHEWABLE ORAL ONCE
Status: COMPLETED | OUTPATIENT
Start: 2024-02-17 | End: 2024-02-17

## 2024-02-17 RX ADMIN — ALPRAZOLAM 0.25 MG: 0.25 TABLET ORAL at 20:17

## 2024-02-17 RX ADMIN — ASPIRIN 324 MG: 81 TABLET, CHEWABLE ORAL at 15:14

## 2024-02-17 RX ADMIN — QUETIAPINE FUMARATE 200 MG: 100 TABLET ORAL at 21:21

## 2024-02-17 RX ADMIN — OLANZAPINE 10 MG: 10 TABLET, ORALLY DISINTEGRATING ORAL at 13:59

## 2024-02-17 RX ADMIN — TAMSULOSIN HYDROCHLORIDE 0.4 MG: 0.4 CAPSULE ORAL at 18:40

## 2024-02-17 RX ADMIN — OLANZAPINE 20 MG: 10 TABLET, FILM COATED ORAL at 21:21

## 2024-02-17 RX ADMIN — LORAZEPAM 2 MG: 1 TABLET ORAL at 23:30
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
## 2024-02-17 NOTE — LETTER
"Section I - General Information    Name of Patient: Freddie Graham                 : 1960    Medicare #:____________________  Transport Date: 24 (PCS is valid for round trips on this date and for all repetitive trips in the 60-day range as noted below.)  Origin: [facility] EMERGENCY DEPARTMENT                                                         Destination:________________________________________________  Is the pt's stay covered under Medicare Part A (PPS/DRG)     (_) YES  (_) NO  Closest appropriate facility?  (_) YES  (_) NO  If no, why is transport to more distant facility required?________________________  If hosp-hosp transfer, describe services needed at 2nd facility not available at 1st facility? _________________________________  If hospice pt, is this transport related to pt's terminal illness? (_) YES (_) NO Describe____________________________________    Section II - Medical Necessity Questionnaire  Ambulance transportation is medically necessary only if other means of transport are contraindicated or would be potentially harmful to the patient. To meet this requirement, the patient must either be \"bed confined\" or suffer from a condition such that transport by means other than ambulance is contraindicated by the patient's condition. The following questions must be answered by the medical professional signing below for this form to be valid:    1)  Describe the MEDICAL CONDITION (physical and/or mental) of this patient AT THE TIME OF AMBULANCE TRANSPORT that requires the patient to be transported in an ambulance and why transport by other means is contraindicated by the patient's condition:__________________________________________________________________________________________________    2) Is the patient \"bed confined\" as defined below?     (_) YES  (_) NO  To be \"be confined\" the patient must satisfy all three of the following conditions: (1) unable to get up from bed " without Assistance; AND (2) unable to ambulate; AND (3) unable to sit in a chair or wheelchair.    3) Can this patient safely be transported by car or wheelchair van (i.e., seated during transport without a medical attendant or monitoring)?   (_) YES  (_) NO    4) In addition to completing questions 1-3 above, please check any of the following conditions that apply*:  *Note: supporting documentation for any boxes checked must be maintained in the patient's medical records  (_)Contractures   (_)Non-Healed Fractures  (_)Patient is confused (_)Patient is comatose (_)Moderate/severe pain on movement (_)Danger to self/others  (_)IV meds/fluids required (_)Patient is combative(_)Need or possible need for restraints (_)DVT requires elevation of lower extremity  (_)Medical attendant required (_)Requires oxygen-unable to self administer (_)Special handling/isolation/infection control precautions required (_)Unable to tolerate seated position for time needed to transport (_)Hemodynamic monitoring required en route (_)Unable to sit in a chair or wheelchair due to decubitus ulcers or other wounds (_)Cardiac monitoring required en route (_)Morbid obesity requires additional personnel/equipment to safely handle patient (_)Orthopedic device (backboard, halo, pins, traction, brace, wedge, etc,) requiring special handling during transport (_)Other(specify)_______________________________________________    Section III - Signature of Physician or Healthcare Professional    I certify that the above information is accurate based on my evaluation of this patient, and that the medical necessity provisions of 42 .40(e)(1) are met, requiring that this patient be transported by ambulance. I understand this information will be used by the Centers for Medicare and Medicaid Services (CMS) to support the determination of medical necessity for ambulance services. I represent that I am the beneficiary’s attending physician; or an employee  of the beneficiary’s attending physician, or the hospital or facility where the beneficiary is being treated and from which the beneficiary is being transported; that I have personal knowledge of the beneficiary’s condition at the time of transport; and that I meet all Medicare regulations and applicable State licensure laws for the credential indicated.     (_) If this box is checked, I also certify that the patient is physically or mentally incapable of signing the ambulance service's claim and that the institution with which I am affiliated has furnished care, services, or assistance to the patient.  My signature below is made on behalf of the patient pursuant to 42 CFR §424.36(b)(4). In accordance with 42 CFR §424.37, the specific reason(s) that the patient is physically or mentally incapable of signing the claim form is as follows: _________________________________________________________________________________________________________      Signature of Physician* or Healthcare Professional______________________________________________________________  Signature Date 02/18/24 (For scheduled repetitive transports, this form is not valid for transports performed more than 60 days after this date)    Printed Name & Credentials of Physician or Healthcare Professional (MD, DO, RN, etc.)________________________________  *Form must be signed by patient's attending physician for scheduled, repetitive transports. For non-repetitive, unscheduled ambulance transports, if unable to obtain the signature of the attending physician, any of the following may sign (choose appropriate option below)  (_) Physician Assistant   (_)  Clinical Nurse Specialist    (_)  Licensed Practical Nurse    (_)    (_)  Nurse Practitioner     (_)  Registered Nurse                (_)                         (_) Discharge Planner
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
## 2024-02-17 NOTE — ED NOTES
Pt refusing to have EKG monitoring agreeable to pulse ox HR 61-77 on monitor        Malvin Rayo, RN  02/17/24 9883

## 2024-02-18 ENCOUNTER — HOSPITAL ENCOUNTER (INPATIENT)
Facility: HOSPITAL | Age: 64
LOS: 29 days | Discharge: HOME | DRG: 885 | End: 2024-03-18
Attending: HOSPITALIST | Admitting: FAMILY MEDICINE
Payer: MEDICARE

## 2024-02-18 VITALS
OXYGEN SATURATION: 98 % | RESPIRATION RATE: 16 BRPM | DIASTOLIC BLOOD PRESSURE: 72 MMHG | SYSTOLIC BLOOD PRESSURE: 142 MMHG | TEMPERATURE: 98 F | HEART RATE: 87 BPM

## 2024-02-18 DIAGNOSIS — M54.50 LUMBAGO: ICD-10-CM

## 2024-02-18 DIAGNOSIS — G47.9 DIFFICULTY SLEEPING: ICD-10-CM

## 2024-02-18 DIAGNOSIS — Z72.0 TOBACCO ABUSE: Chronic | ICD-10-CM

## 2024-02-18 DIAGNOSIS — E55.9 VITAMIN D INSUFFICIENCY: ICD-10-CM

## 2024-02-18 DIAGNOSIS — E78.5 HYPERLIPIDEMIA: ICD-10-CM

## 2024-02-18 DIAGNOSIS — E53.8 VITAMIN B12 DEFICIENCY: ICD-10-CM

## 2024-02-18 DIAGNOSIS — L84 CALLUS OF FOOT: ICD-10-CM

## 2024-02-18 DIAGNOSIS — S90.821D: ICD-10-CM

## 2024-02-18 DIAGNOSIS — F25.0 SCHIZOAFFECTIVE DISORDER, BIPOLAR TYPE (HCC): Primary | ICD-10-CM

## 2024-02-18 DIAGNOSIS — I10 PRIMARY HYPERTENSION: ICD-10-CM

## 2024-02-18 DIAGNOSIS — N40.0 BENIGN PROSTATIC HYPERPLASIA WITHOUT LOWER URINARY TRACT SYMPTOMS: ICD-10-CM

## 2024-02-18 LAB
ATRIAL RATE: 120 BPM
BILIRUB UR QL STRIP: NEGATIVE
CLARITY UR: CLEAR
COLOR UR: YELLOW
GLUCOSE UR STRIP-MCNC: NEGATIVE MG/DL
HGB UR QL STRIP.AUTO: NEGATIVE
KETONES UR STRIP-MCNC: NEGATIVE MG/DL
LEUKOCYTE ESTERASE UR QL STRIP: NEGATIVE
NITRITE UR QL STRIP: NEGATIVE
PH UR STRIP.AUTO: 6.5 [PH]
PROT UR STRIP-MCNC: NEGATIVE MG/DL
QRS AXIS: 21 DEGREES
QRSD INTERVAL: 100 MS
QT INTERVAL: 284 MS
QTC INTERVAL: 399 MS
SP GR UR STRIP.AUTO: <=1.005
T WAVE AXIS: 64 DEGREES
UROBILINOGEN UR QL STRIP.AUTO: 0.2 E.U./DL
VENTRICULAR RATE: 119 BPM

## 2024-02-18 PROCEDURE — 81003 URINALYSIS AUTO W/O SCOPE: CPT | Performed by: EMERGENCY MEDICINE

## 2024-02-18 PROCEDURE — 93010 ELECTROCARDIOGRAM REPORT: CPT | Performed by: INTERNAL MEDICINE

## 2024-02-18 RX ORDER — OLANZAPINE 2.5 MG/1
2.5 TABLET, FILM COATED ORAL
Status: DISCONTINUED | OUTPATIENT
Start: 2024-02-18 | End: 2024-03-18 | Stop reason: HOSPADM

## 2024-02-18 RX ORDER — OLANZAPINE 10 MG/2ML
10 INJECTION, POWDER, FOR SOLUTION INTRAMUSCULAR
Status: DISCONTINUED | OUTPATIENT
Start: 2024-02-18 | End: 2024-03-18 | Stop reason: HOSPADM

## 2024-02-18 RX ORDER — LORAZEPAM 2 MG/ML
1 INJECTION INTRAMUSCULAR ONCE
Status: DISCONTINUED | OUTPATIENT
Start: 2024-02-18 | End: 2024-02-18

## 2024-02-18 RX ORDER — LORAZEPAM 2 MG/ML
2 INJECTION INTRAMUSCULAR EVERY 6 HOURS PRN
Status: CANCELLED | OUTPATIENT
Start: 2024-02-18

## 2024-02-18 RX ORDER — QUETIAPINE FUMARATE 200 MG/1
200 TABLET, FILM COATED ORAL
Status: DISCONTINUED | OUTPATIENT
Start: 2024-02-18 | End: 2024-02-19

## 2024-02-18 RX ORDER — ACETAMINOPHEN 325 MG/1
975 TABLET ORAL EVERY 6 HOURS PRN
Status: DISCONTINUED | OUTPATIENT
Start: 2024-02-18 | End: 2024-03-18 | Stop reason: HOSPADM

## 2024-02-18 RX ORDER — BENZTROPINE MESYLATE 1 MG/ML
1 INJECTION INTRAMUSCULAR; INTRAVENOUS
Status: DISCONTINUED | OUTPATIENT
Start: 2024-02-18 | End: 2024-03-18 | Stop reason: HOSPADM

## 2024-02-18 RX ORDER — OLANZAPINE 5 MG/1
5 TABLET ORAL
Status: DISCONTINUED | OUTPATIENT
Start: 2024-02-18 | End: 2024-03-18 | Stop reason: HOSPADM

## 2024-02-18 RX ORDER — TAMSULOSIN HYDROCHLORIDE 0.4 MG/1
0.4 CAPSULE ORAL
Status: DISCONTINUED | OUTPATIENT
Start: 2024-02-19 | End: 2024-03-18 | Stop reason: HOSPADM

## 2024-02-18 RX ORDER — HYDROXYZINE 50 MG/1
25 TABLET, FILM COATED ORAL
Status: CANCELLED | OUTPATIENT
Start: 2024-02-18

## 2024-02-18 RX ORDER — TAMSULOSIN HYDROCHLORIDE 0.4 MG/1
0.4 CAPSULE ORAL
Status: ON HOLD | COMMUNITY
End: 2024-03-16

## 2024-02-18 RX ORDER — BENZTROPINE MESYLATE 1 MG/ML
1 INJECTION INTRAMUSCULAR; INTRAVENOUS
Status: CANCELLED | OUTPATIENT
Start: 2024-02-18

## 2024-02-18 RX ORDER — BENZTROPINE MESYLATE 0.5 MG/1
1 TABLET ORAL
Status: CANCELLED | OUTPATIENT
Start: 2024-02-18

## 2024-02-18 RX ORDER — ACETAMINOPHEN 325 MG/1
975 TABLET ORAL EVERY 6 HOURS PRN
Status: CANCELLED | OUTPATIENT
Start: 2024-02-18

## 2024-02-18 RX ORDER — QUETIAPINE FUMARATE 100 MG/1
200 TABLET, FILM COATED ORAL
Status: CANCELLED | OUTPATIENT
Start: 2024-02-18

## 2024-02-18 RX ORDER — MAGNESIUM HYDROXIDE/ALUMINUM HYDROXICE/SIMETHICONE 120; 1200; 1200 MG/30ML; MG/30ML; MG/30ML
30 SUSPENSION ORAL EVERY 4 HOURS PRN
Status: CANCELLED | OUTPATIENT
Start: 2024-02-18

## 2024-02-18 RX ORDER — HYDROXYZINE 50 MG/1
100 TABLET, FILM COATED ORAL
Status: CANCELLED | OUTPATIENT
Start: 2024-02-18

## 2024-02-18 RX ORDER — MAGNESIUM HYDROXIDE/ALUMINUM HYDROXICE/SIMETHICONE 120; 1200; 1200 MG/30ML; MG/30ML; MG/30ML
30 SUSPENSION ORAL EVERY 4 HOURS PRN
Status: DISCONTINUED | OUTPATIENT
Start: 2024-02-18 | End: 2024-03-18 | Stop reason: HOSPADM

## 2024-02-18 RX ORDER — OLANZAPINE 10 MG/1
10 TABLET ORAL
Status: DISCONTINUED | OUTPATIENT
Start: 2024-02-18 | End: 2024-03-18 | Stop reason: HOSPADM

## 2024-02-18 RX ORDER — ACETAMINOPHEN 325 MG/1
650 TABLET ORAL EVERY 4 HOURS PRN
Status: CANCELLED | OUTPATIENT
Start: 2024-02-18

## 2024-02-18 RX ORDER — BISACODYL 10 MG
10 SUPPOSITORY, RECTAL RECTAL DAILY PRN
Status: CANCELLED | OUTPATIENT
Start: 2024-02-18

## 2024-02-18 RX ORDER — AMOXICILLIN 250 MG
1 CAPSULE ORAL DAILY PRN
Status: CANCELLED | OUTPATIENT
Start: 2024-02-18

## 2024-02-18 RX ORDER — BENZTROPINE MESYLATE 1 MG/1
1 TABLET ORAL
Status: DISCONTINUED | OUTPATIENT
Start: 2024-02-18 | End: 2024-03-18 | Stop reason: HOSPADM

## 2024-02-18 RX ORDER — ACETAMINOPHEN 325 MG/1
650 TABLET ORAL EVERY 6 HOURS PRN
Status: CANCELLED | OUTPATIENT
Start: 2024-02-18

## 2024-02-18 RX ORDER — POLYETHYLENE GLYCOL 3350 17 G/17G
17 POWDER, FOR SOLUTION ORAL DAILY PRN
Status: CANCELLED | OUTPATIENT
Start: 2024-02-18

## 2024-02-18 RX ORDER — HYDROXYZINE 50 MG/1
50 TABLET, FILM COATED ORAL
Status: DISCONTINUED | OUTPATIENT
Start: 2024-02-18 | End: 2024-03-18 | Stop reason: HOSPADM

## 2024-02-18 RX ORDER — LORAZEPAM 2 MG/ML
2 INJECTION INTRAMUSCULAR EVERY 6 HOURS PRN
Status: DISCONTINUED | OUTPATIENT
Start: 2024-02-18 | End: 2024-02-21

## 2024-02-18 RX ORDER — LORAZEPAM 1 MG/1
1 TABLET ORAL ONCE
Status: COMPLETED | OUTPATIENT
Start: 2024-02-18 | End: 2024-02-18

## 2024-02-18 RX ORDER — PROPRANOLOL HYDROCHLORIDE 20 MG/1
10 TABLET ORAL EVERY 8 HOURS PRN
Status: CANCELLED | OUTPATIENT
Start: 2024-02-18

## 2024-02-18 RX ORDER — METOPROLOL SUCCINATE 25 MG/1
25 TABLET, EXTENDED RELEASE ORAL DAILY
Status: DISCONTINUED | OUTPATIENT
Start: 2024-02-19 | End: 2024-02-25

## 2024-02-18 RX ORDER — ACETAMINOPHEN 325 MG/1
650 TABLET ORAL EVERY 4 HOURS PRN
Status: DISCONTINUED | OUTPATIENT
Start: 2024-02-18 | End: 2024-03-18 | Stop reason: HOSPADM

## 2024-02-18 RX ORDER — HYDROXYZINE 50 MG/1
50 TABLET, FILM COATED ORAL
Status: CANCELLED | OUTPATIENT
Start: 2024-02-18

## 2024-02-18 RX ORDER — OLANZAPINE 10 MG/2ML
5 INJECTION, POWDER, FOR SOLUTION INTRAMUSCULAR
Status: CANCELLED | OUTPATIENT
Start: 2024-02-18

## 2024-02-18 RX ORDER — PROPRANOLOL HYDROCHLORIDE 10 MG/1
10 TABLET ORAL EVERY 8 HOURS PRN
Status: DISCONTINUED | OUTPATIENT
Start: 2024-02-18 | End: 2024-03-18 | Stop reason: HOSPADM

## 2024-02-18 RX ORDER — OLANZAPINE 2.5 MG/1
5 TABLET, FILM COATED ORAL
Status: CANCELLED | OUTPATIENT
Start: 2024-02-18

## 2024-02-18 RX ORDER — HYDROXYZINE 50 MG/1
100 TABLET, FILM COATED ORAL
Status: DISCONTINUED | OUTPATIENT
Start: 2024-02-18 | End: 2024-02-21

## 2024-02-18 RX ORDER — BISACODYL 10 MG
10 SUPPOSITORY, RECTAL RECTAL DAILY PRN
Status: DISCONTINUED | OUTPATIENT
Start: 2024-02-18 | End: 2024-03-05

## 2024-02-18 RX ORDER — METOPROLOL SUCCINATE 50 MG/1
25 TABLET, EXTENDED RELEASE ORAL DAILY
Status: CANCELLED | OUTPATIENT
Start: 2024-02-19

## 2024-02-18 RX ORDER — OLANZAPINE 10 MG/2ML
5 INJECTION, POWDER, FOR SOLUTION INTRAMUSCULAR
Status: DISCONTINUED | OUTPATIENT
Start: 2024-02-18 | End: 2024-03-18 | Stop reason: HOSPADM

## 2024-02-18 RX ORDER — TRAZODONE HYDROCHLORIDE 100 MG/1
100 TABLET ORAL
Status: DISCONTINUED | OUTPATIENT
Start: 2024-02-18 | End: 2024-03-09

## 2024-02-18 RX ORDER — DIPHENHYDRAMINE HYDROCHLORIDE 50 MG/ML
50 INJECTION INTRAMUSCULAR; INTRAVENOUS EVERY 6 HOURS PRN
Status: CANCELLED | OUTPATIENT
Start: 2024-02-18

## 2024-02-18 RX ORDER — TRAZODONE HYDROCHLORIDE 50 MG/1
100 TABLET ORAL
Status: CANCELLED | OUTPATIENT
Start: 2024-02-18

## 2024-02-18 RX ORDER — POLYETHYLENE GLYCOL 3350 17 G/17G
17 POWDER, FOR SOLUTION ORAL DAILY PRN
Status: DISCONTINUED | OUTPATIENT
Start: 2024-02-18 | End: 2024-03-18 | Stop reason: HOSPADM

## 2024-02-18 RX ORDER — ACETAMINOPHEN 325 MG/1
650 TABLET ORAL EVERY 6 HOURS PRN
Status: DISCONTINUED | OUTPATIENT
Start: 2024-02-18 | End: 2024-03-18 | Stop reason: HOSPADM

## 2024-02-18 RX ORDER — OLANZAPINE 2.5 MG/1
2.5 TABLET, FILM COATED ORAL
Status: CANCELLED | OUTPATIENT
Start: 2024-02-18

## 2024-02-18 RX ORDER — OLANZAPINE 10 MG/2ML
10 INJECTION, POWDER, FOR SOLUTION INTRAMUSCULAR
Status: CANCELLED | OUTPATIENT
Start: 2024-02-18

## 2024-02-18 RX ORDER — DIPHENHYDRAMINE HYDROCHLORIDE 50 MG/ML
50 INJECTION INTRAMUSCULAR; INTRAVENOUS EVERY 6 HOURS PRN
Status: DISCONTINUED | OUTPATIENT
Start: 2024-02-18 | End: 2024-03-18 | Stop reason: HOSPADM

## 2024-02-18 RX ORDER — HYDROXYZINE HYDROCHLORIDE 25 MG/1
25 TABLET, FILM COATED ORAL
Status: DISCONTINUED | OUTPATIENT
Start: 2024-02-18 | End: 2024-03-18 | Stop reason: HOSPADM

## 2024-02-18 RX ORDER — OLANZAPINE 2.5 MG/1
10 TABLET, FILM COATED ORAL
Status: CANCELLED | OUTPATIENT
Start: 2024-02-18

## 2024-02-18 RX ORDER — TAMSULOSIN HYDROCHLORIDE 0.4 MG/1
0.4 CAPSULE ORAL
Status: CANCELLED | OUTPATIENT
Start: 2024-02-18

## 2024-02-18 RX ORDER — AMOXICILLIN 250 MG
1 CAPSULE ORAL DAILY PRN
Status: DISCONTINUED | OUTPATIENT
Start: 2024-02-18 | End: 2024-02-27

## 2024-02-18 RX ADMIN — LORAZEPAM 1 MG: 1 TABLET ORAL at 19:05

## 2024-02-18 RX ADMIN — TAMSULOSIN HYDROCHLORIDE 0.4 MG: 0.4 CAPSULE ORAL at 17:15

## 2024-02-18 RX ADMIN — TRAZODONE HYDROCHLORIDE 100 MG: 100 TABLET ORAL at 22:49

## 2024-02-18 RX ADMIN — QUETIAPINE FUMARATE 200 MG: 200 TABLET ORAL at 22:49

## 2024-02-18 RX ADMIN — OLANZAPINE 10 MG: 2.5 TABLET, FILM COATED ORAL at 09:59

## 2024-02-18 NOTE — ED NOTES
"Crisis met with pt to complete Crisis Intake and Safety Risk Assessment.  Introduce self, role, and evaluation process. Pt is a 64 ear old male who presents in ED via police for a psychiatric evaluation.  Crisis attempted to speak with pt. Pt is responding to internal stimuli, saying aliens are here for \"frere jacgue\" and starting singing frewillard melida. Pt looked at 201 and shred it to pieces.  The pt became irate, started yelling \"get out of here/\"  Crisis unable to complete assessment.  "
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
## 2024-02-18 NOTE — ED NOTES
"Pt thoughts and behaviors remain labile; speech pressured thought process remains disorganized. Patient observer notified author that Freddie was getting increasingly agitated yelling \"Its time for the Big bad mccain\" and began to punch observation glass. On assessment patient expressed he was angry and frustrated stating he wanted to be admitted to a psychiatric unit and not be in the ED. It was reiterated to the patient that bed search was ongoing. Pt offered a one time dose of ativan 2 mg. Offered and accepted refreshments as well. Ongoing monitoring will continue.      Malvin Rayo RN  02/17/24 6596    "

## 2024-02-18 NOTE — ED NOTES
See previous [facility] Suicide Risk Assessment. Re-screening not required unless change in behavior or suicidal ideation.  Behavioral Health Assessment deferred as patient is sleeping and would benefit from additional rest.  Vital signs deferred until patient awake, no signs or symptoms of respiratory distress at this time.    Once patient is awake and able to participate, will complete assessments.      Georgina Solorzano RN  02/18/24 0132

## 2024-02-18 NOTE — ED CARE HANDOFF
"Emergency Department Sign Out Note        Sign out and transfer of care from Dr. Herndon. See Separate Emergency Department note.     The patient, Freddie Graham, was evaluated by the previous provider for acute psychosis/schizophrenia.    Workup Completed:  Patient had emergency department workup done yesterday, 2/17/2024.  The patient's clinical course was complicated by a self resolved episode of atrial fibrillation.  The patient has a history of atrial fibrillation but apparently was refusing to take certain medications that he is normally on.    ED Course / Workup Pending (followup):  The patient was seen by crisis and currently is awaiting bed placement.  A 302 has been signed.                                  ED Course as of 02/19/24 2312   Sat Feb 17, 2024   1110 Patient with history of schizophrenia   1253 Patient likely not taking his meds   1321 Patient with history of atrial fibrillation in the chart however the patient notes that he is no longer taking metoprolol noting that his doctor told him to stop the medicine and that \"he did not need it.\"   1322 He admits he is still taking his Zyprexa, his Tamilosin, and His Seroquel.  Not taking Synthroid, metoprolol, atorvastatin, or lithium.   1354 ChadsCasc score - 2     1423 hs TnI 0hr: 10   1523 After urinating, patient's heart rate converted to a sinus rhythm at 97 beats a minute.  Patient was given his dose of metoprolol XL   1612 Patient has refused a dose of metoprolol in the ER.   1612 Patient refusing medications.   1649 Although the patient is medically cleared for psychiatric evaluation, the patient however did have a period of atrial fibrillation that was self resolving.  Patient refusing his medication.  I do not believe the patient is in the right mindset at this time anyway to fully refuse treatment, because I believe he is actively psychotic.  The patient will need medical follow-up due to atrial fibrillation but psychiatrically he will need " urgent care.   1703 Reviewed right hand x-ray with questionable lucency at second metatarsal base.  The patient has no pain to this area on his hand with palpation and is only complaining of bilateral wrist pain at this time.   1704 Case signed out to Dr. Nguyễn pending crisis evaluation and disposition.   1704 302 signed.   Sun Feb 18, 2024   0710 Received in signout.  Patient is a bed placement s/p 302     Procedures  Medical Decision Making  Amount and/or Complexity of Data Reviewed  Labs: ordered. Decision-making details documented in ED Course.  Radiology: ordered and independent interpretation performed.    Risk  OTC drugs.  Prescription drug management.  Decision regarding hospitalization.            Disposition  Final diagnoses:   Schizophrenia (HCC)     Time reflects when diagnosis was documented in both MDM as applicable and the Disposition within this note       Time User Action Codes Description Comment    2/18/2024 12:16 PM Sarah Carvalho [E78.5] Hyperlipidemia     2/18/2024 12:16 PM Sarah Carvalho Add [I10] Primary hypertension     2/18/2024 12:16 PM Sarah Carvalho Add [N40.0] Benign prostatic hyperplasia without lower urinary tract symptoms     2/19/2024 10:53 AM Kaveh Seymour [F20.9] Schizophrenia (HCC)           ED Disposition       ED Disposition   Transfer to Behavioral Health    Condition   --    Date/Time   Sat Feb 17, 2024  4:49 PM    Comment   Freddie Graham has been medically cleared for behavioral health evaluation and treatment..               MD Documentation      Flowsheet Row Most Recent Value   Accepting Physician Dr. Fuentes   Accepting Facility Name, [facility]    (Name & Tel number) Polly Lama [phone]   Transported by (Company and Unit #) [facility]   Sending MD Dr. Darden          RN Documentation      Flowsheet Row Most Recent Value   Accepting Facility Name, [facility]     (Name & Tel number) Polly aLma [phone]   Transported by (Company and Unit #) [facility]          Follow-up Information    None       Discharge Medication List as of 2/18/2024  8:24 PM        CONTINUE these medications which have NOT CHANGED    Details   cholecalciferol (VITAMIN D3) 1,000 units tablet Take 2 tablets (2,000 Units total) by mouth daily, Starting Wed 6/21/2023, Normal      !! OLANZapine (ZyPREXA) 10 mg tablet Take 10 mg by mouth every morning, Historical Med      !! OLANZapine (ZyPREXA) 20 MG tablet Take 20 mg by mouth daily at bedtime, Historical Med      QUEtiapine (SEROquel) 200 mg tablet Take 200 mg by mouth daily at bedtime, Historical Med      atorvastatin (LIPITOR) 10 mg tablet Take 10 mg by mouth daily, Historical Med      cyanocobalamin (VITAMIN B-12) 1000 MCG tablet Take 1 tablet (1,000 mcg total) by mouth daily Do not start before June 22, 2023., Starting Thu 6/22/2023, Normal      divalproex sodium (DEPAKOTE) 250 mg DR tablet Take 5 tablets (1,250 mg total) by mouth every 12 (twelve) hours, Starting Wed 6/21/2023, Normal      levothyroxine 25 mcg tablet Take 1 tablet (25 mcg total) by mouth daily in the early morning Do not start before June 22, 2023., Starting Thu 6/22/2023, Normal      lidocaine (LIDODERM) 5 % Apply 1 patch topically over 12 hours daily at bedtime Remove & Discard patch within 12 hours or as directed by MD, Starting Wed 6/21/2023, Normal      lithium carbonate 300 mg capsule Take 600 mg by mouth daily at bedtime, Historical Med      metoprolol succinate (TOPROL-XL) 50 mg 24 hr tablet Take 1 tablet (50 mg total) by mouth daily Do not start before June 22, 2023., Starting Thu 6/22/2023, Normal      perphenazine 4 mg tablet Take 1 tablet (4 mg total) by mouth in the morning Do not start before June 22, 2023., Starting Thu 6/22/2023, Until Sat 7/22/2023, Normal      perphenazine 8 mg tablet Take 1 tablet (8 mg total) by mouth daily at  bedtime, Starting Wed 6/21/2023, Until Fri 7/21/2023, Normal      prazosin (MINIPRESS) 1 mg capsule Take 1 capsule (1 mg total) by mouth daily at bedtime, Starting Wed 6/21/2023, Until Fri 7/21/2023, Normal       !! - Potential duplicate medications found. Please discuss with provider.        No discharge procedures on file.       ED Provider  Electronically Signed by     Kaveh Seymour Jr.,   02/19/24 1962

## 2024-02-18 NOTE — ED NOTES
Pt awake, asking for something to drink, and to be able to brush his teeth, toothbrush / tooth paste were provided. Both these items were then secured in the office. Pt given another blanket and is now resting.     Johanna Auguste  02/18/24 0683
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
## 2024-02-18 NOTE — ED NOTES
[facility] informed patient of acceptance to [facility]. Patient not rational and demanding to be taken in an ambulance. Patient questioned [facility] about role and became loud then asked [facility] to leave.  Patient not logical.

## 2024-02-18 NOTE — ED NOTES
302 bed search    [facility] - spoke to Alexandra - reported bed availability - asked for the chart to be faxed  [facility] - spoke to Nati - reported no bed availability  [facility] - spoke to Bonnie - reported bed availability - asked for the chart to be faxed  [facility] - spoke to Jacob - reported no bed availability   [facility] - spoke to Funmi - reported no bed availability   [facility] - spoke to Lea - reported only dual bed availability  [facility] - spoke to Patience - reported no bed availability   [facility] - spoke to Rosa - reported no bed availability   [facility] - no answer  [facility] - spoke to Mckay - reported no bed availability   [facility] -  spoke to Angie - reported no bed availability today - but reported chart can be faxed for discharge beds tomorrow.     Chart faxed to [facility]

## 2024-02-18 NOTE — ED NOTES
Patient is accepted at [facility].  Patient is accepted by Dr. Kelley per Intake/Karolina.      Transportation to be  arranged with [employer].          Nurse report is to be called to [phone] prior to patient transfer.

## 2024-02-18 NOTE — ED NOTES
Pt has been medication and meal compliant. Pt denies any si/hi at this time. Pt has been attending to his daily ADLs.  Pt denies any current issues at this time.      Jason Eckert RN  02/18/24 [id number]

## 2024-02-18 NOTE — ED NOTES
Pt remains calm and controlled pt has been able to let his needs be known to staff. Pt denies si/hi at this time. And appears bright during contact.      Jason Eckert RN  02/18/24 [id number]

## 2024-02-19 PROCEDURE — 99223 1ST HOSP IP/OBS HIGH 75: CPT | Performed by: PSYCHIATRY & NEUROLOGY

## 2024-02-19 RX ORDER — ATORVASTATIN CALCIUM 20 MG/1
TABLET, FILM COATED ORAL
COMMUNITY
Start: 2024-01-18 | End: 2024-03-18

## 2024-02-19 RX ORDER — NICOTINE 21 MG/24HR
1 PATCH, TRANSDERMAL 24 HOURS TRANSDERMAL DAILY
Status: DISCONTINUED | OUTPATIENT
Start: 2024-02-19 | End: 2024-02-21

## 2024-02-19 RX ORDER — QUETIAPINE FUMARATE 50 MG/1
50 TABLET, FILM COATED ORAL 2 TIMES DAILY
Status: DISCONTINUED | OUTPATIENT
Start: 2024-02-19 | End: 2024-02-20

## 2024-02-19 RX ADMIN — QUETIAPINE FUMARATE 50 MG: 50 TABLET ORAL at 12:15

## 2024-02-19 RX ADMIN — QUETIAPINE FUMARATE 50 MG: 50 TABLET ORAL at 17:54

## 2024-02-19 RX ADMIN — HYDROXYZINE HYDROCHLORIDE 100 MG: 50 TABLET, FILM COATED ORAL at 14:34

## 2024-02-19 RX ADMIN — QUETIAPINE FUMARATE 300 MG: 200 TABLET ORAL at 20:59

## 2024-02-19 RX ADMIN — TAMSULOSIN HYDROCHLORIDE 0.4 MG: 0.4 CAPSULE ORAL at 17:54

## 2024-02-19 NOTE — SOCIAL WORK
302, 303, rights, ACT 77 submitted to Nika Romero <[email or web address]> for  hearing tomorrow 8am. Pt does not fully understand rights to current psychotic disorder.   Nika email response states office is closed for President's Day.  faxed documentation to [phone], left vm on main court line [phone] as hearing must be held tomorrow.

## 2024-02-19 NOTE — PLAN OF CARE
Problem: Depression  Goal: Treatment Goal: Demonstrate behavioral control of depressive symptoms, verbalize feelings of improved mood/affect, and adopt new coping skills prior to discharge  Outcome: Progressing  Goal: Refrain from harming self  Description: Interventions:  - Monitor patient closely, per order   - Supervise medication ingestion, monitor effects and side effects   Outcome: Progressing     Problem: Anxiety  Goal: Anxiety is at manageable level  Description: Interventions:  - Assess and monitor patient's anxiety level.   - Monitor for signs and symptoms (heart palpitations, chest pain, shortness of breath, headaches, nausea, feeling jumpy, restlessness, irritable, apprehensive).   - Collaborate with interdisciplinary team and initiate plan and interventions as ordered.  - [place] patient to unit/surroundings  - Explain treatment plan  - Encourage participation in care  - Encourage verbalization of concerns/fears  - Identify coping mechanisms  - Assist in developing anxiety-reducing skills  - Administer/offer alternative therapies  - Limit or eliminate stimulants  Outcome: Progressing     Problem: Risk for Violence/Aggression Toward Others  Goal: Treatment Goal: Refrain from acts of violence/aggression during length of stay, and demonstrate improved impulse control at the time of discharge  Outcome: Progressing  Goal: Refrain from destructive acts on the environment or property  Outcome: Progressing  Goal: Control angry outbursts  Description: Interventions:  - Monitor patient closely, per order  - Ensure early verbal de-escalation  - Monitor prn medication needs  - Set reasonable/therapeutic limits, outline behavioral expectations, and consequences   - Provide a non-threatening milieu, utilizing the least restrictive interventions   Outcome: Progressing   See chart note

## 2024-02-19 NOTE — NURSING NOTE
"Patient requested PRN for anxiety. Patient noted pacing mumbling to self yelling at times on phone attempting to contact someone that \"keeps putting me on hold and telling me to press numbers that are not there\" 100 mg Atarax given.  "
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
## 2024-02-19 NOTE — TREATMENT PLAN
TREATMENT PLAN REVIEW - Behavioral Health Freddie Graham 64 y.o. 1960 male MRN: [id number]    [facility] Room / Bed: RUST 220/RUST 220-02 Encounter: [id number]          Admit Date/Time:  2/18/2024  8:42 PM    Treatment Team:   MD Chai Stovall MD Paul F Klose, RN Rosalie Henning Kaleyann Doyle Melani Searfoss Emily Rubio Christina Ayers Angela M Chapman, LPN Maya Camargo Kathy L Karanink    Diagnosis: Schizoaffective DO   Patient Strengths: negotiates basic needs     Patient Barriers: chronic mental illness    Short Term Goals: decrease in anxiety symptoms, decrease in psychotic symptoms, decrease in frequency of aggressive outbursts, ability to stay safe on the unit    Long Term Goals: improved reality testing, improved impulse control, improved insight, no agitation on the unit, no aggressive behavior on the unit, acceptance of need for psychiatric follow up after discharge, acceptance of psychiatric diagnosis, adequate self care, adequate sleep, adequate appetite    Progress Towards Goals: starting psychitric medications as prescribed    Recommended Treatment: medication management, patient medication education, group therapy, milieu therapy, continued Behavioral Health psychiatric evaluation/assessment process     Treatment Frequency: daily medication monitoring, group and milieu therapy daily, monitoring through interdisciplinary rounds, monitoring through weekly patient care conferences    Expected Discharge Date:  tbd    Discharge Plan: referral for outpatient medication management with a psychiatrist, referral for outpatient psychotherapy    Treatment Plan Created/Updated By: Ian Reyna MD
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
## 2024-02-19 NOTE — DISCHARGE INSTR - OTHER ORDERS
You are being discharged to your home located at [address] [place] [address]. Phone: [phone].    Triggers you have identified during your hospitalization that led to your admission of a distressed mood includes ineffective coping skills and medication noncompliance. Coping skills you have identified during your hospitalization include reading and music. If you are unable to deal with your distressed mood alone please contact Dimple Graham (Mother) [phone] . If that is not effective and you continue to have a distressed mood, are overwhelmed, or in crisis, please contact (Crisis #) [facility] Crisis Hotline: 769.332.5416 dial 191 or go to the nearest emergency center.      *[facility] Crisis Hotline: 158.248.1218  * Alcohol Anonymous: 861.335.4281  *[facility] Drug [facility] Alcohol [facility] [phone] 172-0282  *National [place] on Mental Illness (SHANNAN) HELPLINE: 403.379.4095/Website: www.shannan.org  *Substance Abuse and Mental Health Services Administration([facility]) National Helpline, which is a confidential, free, 24-hour-a-day, 365-day-a-year, information service for individuals and family members facing mental health and/or substance use disorders. This service provides referrals to local treatment facilities, support groups, and community-based organizations. Callers can also order free publications and other information.  Call 3-730-463-4618/Website: www.[facility].gov  *United Way 2-1-1: This is a toll free, confidential, 24-hour-a-day service which connects you to a community  in your area who can help you find services and resources that are available to you locally and provide critical services that can improve and save lives.  Call: 211  /Website: http://www.BomT[employer].com.org/     You declined the need for drug & alcohol treatment at this time.     Mary or Evangelina, our Behavioral Health Nurse Navigators, will be calling you after your discharge, on the phone number that you provided.  They  will be available as an additional support, if needed.   If you wish to speak with one of them, you may contact Mary at [phone] or Evangelina at [phone].      [facility] Office  [address] [place] [address]  [address]  [place] [address]  [phone] Fax [phone]  [facility] [phone]  [facility] Hotline (643) 244-9176    Medicaid, SNAP, TANF,  Assistance    Medical Services Included in MATP   Transportation is available to almost any service that [place] pays for. Transportation can be provided to: physicians, dentists, health clinics, podiatrists, rural health clinics, hospice programs, physical therapists, outpatient services, pharmacies, drug and alcohol clinics, mental health centers, outpatient rehab services, optometrists, dialysis clinics, psychologists, and ambulatory surgical services.   Services that [facility] does not include are emergency or other transportation requiring an ambulance, transportation to sheltered workshops, day care programs, transportation for visitation purposes, stretcher service, gqrn-avfpmbu-wldc service, transportation to non-medical services, and transportation during severe weather when deemed unsafe or transportation to any medical services that are not payable through the Medical Assistance Program.   Exceptional transportation costs such as air travel, lodging, meals, and attendants are paid for by local [facility] assistance offices instead of MATP.     County Contacts   County Phone Toll Free   [place] [phone] (7433) [phone]   [place] [phone]   [place] [phone]   [place] [phone] [phone]   [place] [phone] [phone]   [place] [phone]   [place] [phone] [phone]   [place] [phone] [phone]   [place] [phone] [phone]   [place] [phone] [phone]   [place] [phone] [phone]   [place] [phone] [phone]   [place] [phone] Same as Local   [place]  [phone] Same as Local   [place] [phone]   [place] [phone] [phone]   [place] [phone]   [place] [phone]   [place] [phone]-RIXA (2667) [phone]   [place] [phone]   [place] 618-540-TDFZ (1529) [phone]   [place] [phone] [phone]   [place] [phone]   [place] [phone]   [place] [phone] Same as Local   [place] [phone]   [place] [phone]   [place] 410-577-CZDR (7980) [phone]   [place] [phone]   [place] [phone] 170.977.6997   [place] [phone]   [place] [phone] 866.400.4848   [place] [phone] 398.128.2243   [place] [phone]   [place] [phone] Same as Local   [place] [phone]   [place] [phone]   [place] [phone] Same as Local   [place] [phone]   [place] [phone]   [place] [phone]   [place] [phone]   [place] [phone] [phone]   [place] [phone]   [place] [phone]   [place] [phone] Same as Local   [place] 593-503-SEUY (0533) [phone]   [place] [phone]   [place] 574-206-YBIT (3845) [phone]   [place] 159-960-HYFH (7412) [phone]   [place] [phone]   [place] [phone]   [place] [phone]   [place] [phone]   [place] 956-168-SKDD (5150) [phone]   [place] [phone]   [place] [phone]   [place] [phone]   [place] [phone]   [place] 600-366-IYGJ (9527) [phone]   [place] [phone]     [place] [phone]   [place] [phone] [phone]   [place] [phone]   [place] [phone] [phone]    [place] [phone] [phone]   [place] 997-564-GNTQe (7433) [phone]        HOW TO GET SUBSTANCE ABUSE HELP:  If you or someone you know has a drug or alcohol problem, there is help:  [facility] Drug [facility] Alcohol Abuse Services: 389.227.1002  [facility] Drug & Alcohol Abuse Services: [phone]  An assessment is the first step.   In addition to those listed there are other programs available in the area but assessment is best to determine an appropriate level of care.  If you DO NOT have Medical Assistance (MA) or Private Insurance, an assessment can be scheduled at one of these providers:  [facility]  [address] [place] [address]  [phone]   [address] [place] [address]  [phone]   [address] [place] [address]  [phone]   [facility]  [address] [place] [address]  [phone]   [facility]  [address] [place] [address]  [phone]   [facility]  [address] [place] [address]  [phone]   [facility]  [address], [address], [place] [address]  [phone]     If you HAVE Medical Assistance, an assessment can be scheduled at one of these providers:  [place] [facility]  [address] [place] [address]  [phone]   [facility]  [address] [place] [address]  [phone]   [facility] D[facility]A Intake [facility]  [address] [facility], 1st [address], [place] [address]  [phone]  [address] [place] [address], [address] [place] [address] [phone]   [address] [place] [address]  [phone]   [address] [place] [address]  [phone]   [facility] ([facility])  [address] [place] [address]  [phone]   [facility]  [address] [place] [address]  [phone]   [facility]  [address] [facility] [place] [address]  [phone]   [facility]  [address] [facility] [facility] [place] [address]  [phone]   [facility] [facility]  [address], [place] [address]  [phone]     If you HAVE Private Insurance, an assessment can be scheduled at one of these providers.  Please contact these Providers to determine if they are in your network plan:  [facility] D&A Intake Unit  [address] [facility] 1st [address], [place] [address]  [phone]   [facility]  [address] [place] [address]  [phone]   [facility]  [address] [place] [address]  [phone]   NET ([facility])  [address] [facility] [place] [address]  [phone]   [facility]  [address] [place] [address]  [phone]   [employer] [facility]  [address], [place] [address]  [phone]

## 2024-02-19 NOTE — PROGRESS NOTES
02/19/24 1048   Team Meeting   Meeting Type Daily Rounds   Team Members Present   Team Members Present Physician;Nurse;   Physician Team Member Dr Maribell PAYTON   Nursing Team Member Yudith JURADO   Social Work Team Member Issa VARGAS   Patient/Family Present   Patient Present Yes   Patient's Family Present No     Patient and treatment team met and reviewed pt strengths, limitations, coping skills, treatment plan and goals; pt agreeable and signed; copy on chart

## 2024-02-19 NOTE — NURSING NOTE
Patient compliant with meals refused AM BP medication. Visible on unit pacing coto mumbling to self yelling out at times, redirectable, tangential disorganized, paranoid  voicing multiple conspiracy theory's about the government and president. Denies SI JOSE TAYLOR at this time Safety checks maintained.

## 2024-02-19 NOTE — ED NOTES
Chung is a medicare replacement plan [phone].  No prior authorization required.     [place] no longer requires COB.

## 2024-02-19 NOTE — NURSING NOTE
Patient has had broken sleep throughout the night. During periods of sleep non-labored breathing noted and patient showed no signs or symptoms of distress. No behaviors overnight. No needs identified currently. Will remain on safety precautions and continual monitoring.

## 2024-02-19 NOTE — PLAN OF CARE
Problem: DISCHARGE PLANNING - CARE MANAGEMENT  Goal: Discharge to post-acute care or home with appropriate resources  Description: INTERVENTIONS:  - Conduct assessment to determine patient/family and health care team treatment goals, and need for post-acute services based on payer coverage, community resources, and patient preferences, and barriers to discharge  - Address psychosocial, clinical, and financial barriers to discharge as identified in assessment in conjunction with the patient/family and health care team  - Arrange appropriate level of post-acute services according to patient’s   needs and preference and payer coverage in collaboration with the physician and health care team  - Communicate with and update the patient/family, physician, and health care team regarding progress on the discharge plan  - Arrange appropriate transportation to post-acute venues  Outcome: Progressing     New 302, goal initiated.

## 2024-02-20 LAB
25(OH)D3 SERPL-MCNC: 31.4 NG/ML (ref 30–100)
FOLATE SERPL-MCNC: 10.7 NG/ML
TSH SERPL DL<=0.05 MIU/L-ACNC: 3.11 UIU/ML (ref 0.45–4.5)
VIT B12 SERPL-MCNC: 287 PG/ML (ref 180–914)

## 2024-02-20 PROCEDURE — 82306 VITAMIN D 25 HYDROXY: CPT | Performed by: FAMILY MEDICINE

## 2024-02-20 PROCEDURE — 84443 ASSAY THYROID STIM HORMONE: CPT | Performed by: FAMILY MEDICINE

## 2024-02-20 PROCEDURE — 82746 ASSAY OF FOLIC ACID SERUM: CPT | Performed by: FAMILY MEDICINE

## 2024-02-20 PROCEDURE — 82607 VITAMIN B-12: CPT | Performed by: FAMILY MEDICINE

## 2024-02-20 PROCEDURE — 99233 SBSQ HOSP IP/OBS HIGH 50: CPT | Performed by: PSYCHIATRY & NEUROLOGY

## 2024-02-20 RX ORDER — QUETIAPINE FUMARATE 400 MG/1
400 TABLET, FILM COATED ORAL
Status: DISCONTINUED | OUTPATIENT
Start: 2024-02-20 | End: 2024-02-21

## 2024-02-20 RX ORDER — QUETIAPINE FUMARATE 100 MG/1
100 TABLET, FILM COATED ORAL ONCE
Status: COMPLETED | OUTPATIENT
Start: 2024-02-20 | End: 2024-02-20

## 2024-02-20 RX ORDER — LITHIUM CARBONATE 300 MG/1
300 TABLET, FILM COATED, EXTENDED RELEASE ORAL
Status: DISCONTINUED | OUTPATIENT
Start: 2024-02-20 | End: 2024-02-20

## 2024-02-20 RX ORDER — QUETIAPINE FUMARATE 100 MG/1
100 TABLET, FILM COATED ORAL 2 TIMES DAILY
Status: DISCONTINUED | OUTPATIENT
Start: 2024-02-20 | End: 2024-02-21

## 2024-02-20 RX ADMIN — QUETIAPINE 400 MG: 400 TABLET ORAL at 21:09

## 2024-02-20 RX ADMIN — QUETIAPINE FUMARATE 50 MG: 50 TABLET ORAL at 09:00

## 2024-02-20 RX ADMIN — HYDROXYZINE HYDROCHLORIDE 50 MG: 50 TABLET, FILM COATED ORAL at 06:26

## 2024-02-20 RX ADMIN — BENZTROPINE MESYLATE 1 MG: 1 TABLET ORAL at 04:33

## 2024-02-20 RX ADMIN — QUETIAPINE FUMARATE 100 MG: 100 TABLET ORAL at 14:44

## 2024-02-20 RX ADMIN — QUETIAPINE FUMARATE 100 MG: 100 TABLET ORAL at 17:39

## 2024-02-20 RX ADMIN — TAMSULOSIN HYDROCHLORIDE 0.4 MG: 0.4 CAPSULE ORAL at 15:43

## 2024-02-20 RX ADMIN — OLANZAPINE 10 MG: 10 TABLET, FILM COATED ORAL at 14:32

## 2024-02-20 NOTE — PROGRESS NOTES
02/20/24   Team Meeting   Meeting Type Daily Rounds   Team Members Present   Team Members Present Physician;Nurse;   Physician Team Member Dr Maribell PAYTON; Panchito GONZALEZ; Dr Oz PAYTON   Nursing Team Member Yudith JURADO   Social Work Team Member Issa VARGAS, Isael VARGAS   OT Team Member Heather GARZA   Patient/Family Present   Patient Present No   Patient's Family Present No     303 hearing Friday 8am, pressured, tangential, grandiose, rapid mood shifts, reciting the bible, poor sleep 3.5hrs, redirectable, loud, paranoia, no aggression, increase seroquel, starting lithium, no dc date.

## 2024-02-20 NOTE — PROGRESS NOTES
"Progress Note - Freddie Graham 64 y.o. male MRN: [id number]    Unit/Bed#: [facility] 220-02 Encounter: [id number]        Subjective:   Patient seen and examined at bedside after reviewing the chart and discussing the case with the caring staff.      Patient examined at bedside.  Patient has bilateral foot/heel blisters.    Patient's labs including vitamin D and B12 levels are still pending.    Physical Exam   Vitals: Blood pressure 146/74, pulse 104, temperature 97.6 °F (36.4 °C), temperature source Temporal, resp. rate 17, height 5' 9\" (1.753 m), weight 84.6 kg (186 lb 6.4 oz), SpO2 98%.,Body mass index is 27.53 kg/m².  Constitutional: He appears well-developed.   HEENT: PERR, EOMI, MMM  Cardiovascular: Normal rate and regular rhythm.    Pulmonary/Chest: Effort normal and breath sounds normal.   Abdomen: Soft, + BS, NT    Assessment/Plan:  Freddie Graham is a(n) 64 y.o. year old male with MDD with Schizophrenia     1. Cardiac with hx HTN, HLD.  Continue metoprolol succinate 25 mg daily.  2. Tobacco abuse.  NRT.  3. Vitamin D deficiency.  Repeat levels.   4. Hypothyroidism.  TSH 2.67 on 1/23/2024.  Patient is not on levothyroxine.  I will repeat TSH.  5. BPH.  Continue Flomax 0.4 mg daily.  6.  Bilateral foot blisters.  I will put the patient on Sure Prep twice daily.  Podiatry consult will be put in.  "

## 2024-02-20 NOTE — NURSING NOTE
Patient was visible in the unit, ate his for food and accepted medication without hesitation, behavior is bizarre , thought process is disorganized, mood elated, affect is even, speech is load, mumble,pressured and inappropriate but easily redirectable. No SIB observed. Q-7 minutes safety check maintained. Patient slapped the phone after having phone conversation which seem not to go well.

## 2024-02-20 NOTE — SOCIAL WORK
Verbal redirection not successful to pt following sw in hallway, interrupting sw conversation with another pt. Pt presenting paranoid, agitated, irritable towards sw, ruminating on calling . NP, RN intervened as pt continued to follow sw in threatening manner.
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
## 2024-02-20 NOTE — NURSING NOTE
"Patient administered 50 mg atarax for increased anxiety related to \"this place getting real busy soon\". Fajardo score 18. Focused on provider and medication management.  "

## 2024-02-20 NOTE — NURSING NOTE
"Patient observed within the milieu, wandering halls or on the phone. He is relatively pleasant with staff, however observed mood and affect are extremely labile with periods of agitation and euphoria. His thoughts are disorganized, content loosely associated and delusional. Can be extremely loud and disruptive. Grandiose delusions expressed regarding running for mayor, paranoia expressed of providers, police and the government. Hypervigilant and hiding himself at times. States he is only agreeable to one psychiatric medication and \"will not be agreeable to giving out any blood\". Insight and judgement poor. Behavior overall controlled. Q7 minute checks ongoing.  "

## 2024-02-20 NOTE — NURSING NOTE
"Patient noted with increased agitation, cursing at staff, calling a staff member \"a rich bitch\". Patient threw his cup of fluids in the hallway. Patient saying \"my blood is racing\". Broset of 3. Zyprexa 10mg PO administered for same.   "

## 2024-02-20 NOTE — SOCIAL WORK
Kerry at [facility] contacted regarding continuance of 302 as it expires Thurs, hearing would not be scheduled today due to pg 7 of 302 being incorrect. Pg 7 sent to CM manager, nickie.     Kerry states a continuance of 302 is not guaranteed, requesting 302 be fixed before paperwork is filed.

## 2024-02-20 NOTE — PLAN OF CARE
Problem: Alteration in Thoughts and Perception  Goal: Refrain from acting on delusional thinking/internal stimuli  Description: Interventions:  - Monitor patient closely, per order   - Utilize least restrictive measures   - Set reasonable limits, give positive feedback for acceptable   - Administer medications as ordered and monitor of potential side effects  Outcome: Not Progressing

## 2024-02-20 NOTE — SOCIAL WORK
Kerry Vela [email or web address] confirmed she received correct pg 7 of 302.

## 2024-02-21 PROCEDURE — 99232 SBSQ HOSP IP/OBS MODERATE 35: CPT | Performed by: PSYCHIATRY & NEUROLOGY

## 2024-02-21 RX ORDER — TRAZODONE HYDROCHLORIDE 50 MG/1
50 TABLET ORAL
Status: DISCONTINUED | OUTPATIENT
Start: 2024-02-21 | End: 2024-02-21

## 2024-02-21 RX ORDER — DIVALPROEX SODIUM 500 MG/1
500 TABLET, DELAYED RELEASE ORAL 2 TIMES DAILY
Status: DISCONTINUED | OUTPATIENT
Start: 2024-02-21 | End: 2024-02-21

## 2024-02-21 RX ORDER — QUETIAPINE FUMARATE 100 MG/1
100 TABLET, FILM COATED ORAL ONCE
Status: DISCONTINUED | OUTPATIENT
Start: 2024-02-21 | End: 2024-02-21

## 2024-02-21 RX ORDER — LANOLIN ALCOHOL/MO/W.PET/CERES
3 CREAM (GRAM) TOPICAL
Status: DISCONTINUED | OUTPATIENT
Start: 2024-02-21 | End: 2024-02-21

## 2024-02-21 RX ORDER — MELATONIN
1000 DAILY
Status: DISCONTINUED | OUTPATIENT
Start: 2024-02-21 | End: 2024-03-18 | Stop reason: HOSPADM

## 2024-02-21 RX ORDER — LORAZEPAM 1 MG/1
1 TABLET ORAL EVERY 6 HOURS PRN
Status: DISCONTINUED | OUTPATIENT
Start: 2024-02-21 | End: 2024-03-13

## 2024-02-21 RX ORDER — DIVALPROEX SODIUM 500 MG/1
500 TABLET, DELAYED RELEASE ORAL 2 TIMES DAILY
Status: DISCONTINUED | OUTPATIENT
Start: 2024-02-21 | End: 2024-02-23

## 2024-02-21 RX ORDER — TRAZODONE HYDROCHLORIDE 100 MG/1
100 TABLET ORAL
Status: DISCONTINUED | OUTPATIENT
Start: 2024-02-21 | End: 2024-03-08

## 2024-02-21 RX ORDER — LANOLIN ALCOHOL/MO/W.PET/CERES
3 CREAM (GRAM) TOPICAL
Status: DISCONTINUED | OUTPATIENT
Start: 2024-02-21 | End: 2024-03-13

## 2024-02-21 RX ADMIN — Medication 3 MG: at 20:38

## 2024-02-21 RX ADMIN — QUETIAPINE FUMARATE 100 MG: 100 TABLET ORAL at 07:49

## 2024-02-21 RX ADMIN — QUETIAPINE 600 MG: 400 TABLET, FILM COATED, EXTENDED RELEASE ORAL at 20:37

## 2024-02-21 RX ADMIN — TRAZODONE HYDROCHLORIDE 100 MG: 100 TABLET ORAL at 20:37

## 2024-02-21 RX ADMIN — TAMSULOSIN HYDROCHLORIDE 0.4 MG: 0.4 CAPSULE ORAL at 18:04

## 2024-02-21 RX ADMIN — HYDROXYZINE HYDROCHLORIDE 100 MG: 50 TABLET, FILM COATED ORAL at 07:49

## 2024-02-21 RX ADMIN — OLANZAPINE 10 MG: 10 TABLET, FILM COATED ORAL at 05:18

## 2024-02-21 NOTE — PROGRESS NOTES
02/21/24    Team Meeting   Meeting Type Daily Rounds   Team Members Present   Team Members Present Physician;Nurse;Occupational Therapist;   Physician Team Member Dr Maribell PAYTON; Panchito GONZALEZ; Dr Oz PAYTON   Nursing Team Member Yudith JURADO   Social Work Team Member Issa VARGAS; Isael VARGAS   OT Team Member    Patient/Family Present   Patient Present No   Patient's Family Present No     304 Friday 8am, no roommate, loud, agitated, poor sleep, yelling, hitting walls, PRN ineffective, screaming, internally preoccupied, yelling in shower, threatening, inappropriate, refused lithium yesterday, increase Seroquel, no dc date.

## 2024-02-21 NOTE — NURSING NOTE
Patient awake most the night. Observed talking to himself. Patient disorganized, pressured and loud. Redirects easily.

## 2024-02-21 NOTE — NURSING NOTE
Prior med given moderately effective patient still appears restless and anxious but states he does feel calmer

## 2024-02-21 NOTE — PROGRESS NOTES
"Progress Note - Freddie Graham 64 y.o. male MRN: [id number]    Unit/Bed#: [facility] 220-02 Encounter: [id number]        Subjective:   Patient seen and examined at bedside after reviewing the chart and discussing the case with the caring staff.      Patient examined at bedside.  Patient questing that if he can discontinue his nicotine patch    On review of patient's labs it was found that patient's vitamin D level is still low 31.4 and B12 level is also low 287.    Physical Exam   Vitals: Blood pressure 106/77, pulse 95, temperature 98.5 °F (36.9 °C), temperature source Temporal, resp. rate 18, height 5' 9\" (1.753 m), weight 84.6 kg (186 lb 6.4 oz), SpO2 95%.,Body mass index is 27.53 kg/m².  Constitutional: He appears well-developed.   HEENT: PERR, EOMI, MMM  Cardiovascular: Normal rate and regular rhythm.    Pulmonary/Chest: Effort normal and breath sounds normal.   Abdomen: Soft, + BS, NT    Assessment/Plan:  Freddie Graham is a(n) 64 y.o. year old male with MDD with Schizophrenia     1. Cardiac with hx HTN, HLD.  Continue metoprolol succinate 25 mg daily.  2. Tobacco abuse.  I will discontinue nicotine patch as per patient's request 2/21/2024.  Patient may continue to get nicotine gum as needed.  3. Hypothyroidism.  TSH 2.67 on 1/23/2024.  Patient is not on levothyroxine.  I will repeat TSH.  4. BPH.  Continue Flomax 0.4 mg daily.  5.  Bilateral foot blisters.  I will put the patient on Sure Prep twice daily.  Podiatry consult will be put in.  6.  New vitamin D deficiency.  I will put the patient on vitamin D supplements.  7.  New vitamin B12 deficiency.  I will put the patient on vitamin B12 supplements.  "

## 2024-02-21 NOTE — PROGRESS NOTES
"Progress Note - Behavioral Health   Freddie CASTANEDA Day 64 y.o. male MRN: @MRN   Unit/Bed#: [facility] 220-02 Encounter: [id number]      Report from staff regarding this patient received and discussed, and records reviewed prior to seeing this patient  Behavior over the last 24 hours: unchanged.     Patient remains agitated at times, bizarre, cooperative with session, distracted, and distressed today.    Sleep: decreased  Appetite: fair  Medication side effects: No       Mental Status Evaluation:    Appearance:  disheveled   Mood:  dysphoric   Affect: constricted, labile    Speech:  pressured, hypertalkative   Thought Content:  paranoid ideation   Perceptual Disturbances: no auditory hallucinations, no visual hallucinations, denies auditory hallucinations when asked, does not appear responding to internal stimuli   Risk Potential: Suicidal ideation - None, contracts for safety on the unit   Insight:  poor   Motor Activity: no abnormal movements       Laboratory results:  I have personally reviewed all pertinent laboratory results.    Progress Toward Goals: no significant improvement    Assessment/Plan   Principal Problem:    Schizophrenia (HCC)    Recommended Treatment: We will continue to titrate Seroquel both in the daytime and nighttime to address the patient manic state.  The writer suggested lithium but the patient stated he was taking lithium before and he had some side effects but he could not remember exactly.  He was in agreement with increasing Seroquel.  His insight remained poor but he could be redirected after some episodic agitation talking to staff.    Planned medication and treatment changes:    All current active medications have been reviewed.  Continue treatment with group therapy, milieu therapy, occupational therapy and medication management.      ** Please Note: This note has been constructed using a voice recognition system. **      BMP: No results for input(s): \"NA\", \"K\", \"CL\", \"CO2\", \"BUN\" in the last 72 " "hours.    Invalid input(s): \"CREA\", \"GLU\"  Vitals:    02/20/24 1946   BP: 143/90   Pulse: 105   Resp: 18   Temp:    SpO2:         Medication Administration - last 24 hours from 02/19/2024 2055 to 02/20/2024 2055         Date/Time Order Dose Route Action Action by     02/20/2024 0900 EST metoprolol succinate (TOPROL-XL) 24 hr tablet 25 mg 25 mg Oral Not Given Angy Bernard LPN     02/20/2024 1543 EST tamsulosin (FLOMAX) capsule 0.4 mg 0.4 mg Oral Given Denton Martines RN     02/20/2024 1432 EST OLANZapine (ZyPREXA) tablet 10 mg 10 mg Oral Given Fany Zurita RN     02/20/2024 1432 EST OLANZapine (ZyPREXA) IM injection 10 mg -- Intramuscular See Alternative Fany Zurita RN     02/20/2024 0433 EST benztropine (COGENTIN) tablet 1 mg 1 mg Oral Given Rosa Velez RN     02/20/2024 0626 EST hydrOXYzine HCL (ATARAX) tablet 50 mg 50 mg Oral Given Yina Tuttle RN     02/20/2024 0626 EST diphenhydrAMINE (BENADRYL) injection 50 mg -- Intramuscular See Alternative Yina Tuttle RN     02/19/2024 2059 EST QUEtiapine (SEROquel) tablet 300 mg 300 mg Oral Given Yina Tuttle RN     02/20/2024 0900 EST QUEtiapine (SEROquel) tablet 50 mg 50 mg Oral Given Angy Bernard LPN     02/20/2024 0900 EST nicotine (NICODERM CQ) 21 mg/24 hr TD 24 hr patch 1 patch 1 patch Transdermal Not Given Angy Bernadr LPN     02/20/2024 1739 EST QUEtiapine (SEROquel) tablet 100 mg 100 mg Oral Given Denton Martines RN     02/20/2024 1444 EST QUEtiapine (SEROquel) tablet 100 mg 100 mg Oral Given Denton Martines RN          "

## 2024-02-21 NOTE — TREATMENT TEAM
02/21/24 0749   Fajardo Anxiety Scale   Anxious Mood 4   Tension 4   Fears 3   Insomnia 1   Intellectual 3   Depressed Mood 2   Somatic Complaints: Muscular 3   Somatic Complaints: Sensory 2   Cardiovascular Symptoms 1   Respiratory Symptoms 0   Gastrointestinal Symptoms 0   Genitourinary Symptoms 0   Autonomic Symptoms 3   Behavior at Interview 3   Fajardo Anxiety Score 29     Patient has increased restlessness and anxiety patient pacing and appears distracted. Administered 100mg atarax will monitor for effectiveness

## 2024-02-21 NOTE — NURSING NOTE
"Decreased agitation noted. Patient in hallway. Stated\" Im going to try to lay down\". Will continue to monitor.  "

## 2024-02-21 NOTE — NURSING NOTE
Patient pacing in hallways, increased agitation noted. Patient loud, yelling in his room and hallways, angry at being here. Slamming door to his room. Patient upset another peer. Agitation score 37, Broset 2. Zyprexa 10 mg given at 0518. Will monitor.

## 2024-02-22 PROCEDURE — 99232 SBSQ HOSP IP/OBS MODERATE 35: CPT | Performed by: PSYCHIATRY & NEUROLOGY

## 2024-02-22 RX ADMIN — Medication 3 MG: at 21:07

## 2024-02-22 RX ADMIN — QUETIAPINE 600 MG: 400 TABLET, FILM COATED, EXTENDED RELEASE ORAL at 21:07

## 2024-02-22 RX ADMIN — CYANOCOBALAMIN TAB 500 MCG 500 MCG: 500 TAB at 08:26

## 2024-02-22 RX ADMIN — TRAZODONE HYDROCHLORIDE 100 MG: 100 TABLET ORAL at 21:07

## 2024-02-22 RX ADMIN — CHOLECALCIFEROL TAB 25 MCG (1000 UNIT) 1000 UNITS: 25 TAB at 08:25

## 2024-02-22 RX ADMIN — QUETIAPINE FUMARATE 200 MG: 150 TABLET, EXTENDED RELEASE ORAL at 08:23

## 2024-02-22 NOTE — PROGRESS NOTES
02/20/24 1350   Activity/Group Checklist   Group   (Self Assessment)   Attendance Attended   Attendance Duration (min) 16-30   Interactions Disorganized interaction   Affect/Mood Wide   Goals Achieved Identified feelings;Identified triggers;Discussed coping strategies;Identified resources and support systems;Able to listen to others;Able to engage in interactions     AT met with pt to introduce self to pt and pt completed the self assessment form along with AT assistance. PT displayed a labile mood and affect, however was able to be redirected to maintain focus on discussion. PT remembered AT from multiple prior stays on the unit. PT reported that his biggest stressor leading to this admission was a crisis intervention call that brought him here because they knew it would get him off the block. PT enjoys playing games, music, singing, arts and crafts and talking. PT would like to learn more about improving concentration and memory, life skills, relaxation techniques and community support while he is here in the hospital. PT does engage in most art therapy groups where he often does require redirection for verbal behaviors, however is redirectable and able to stay for the entire group.

## 2024-02-22 NOTE — PROGRESS NOTES
02/22/24    Team Meeting   Meeting Type Daily Rounds   Team Members Present   Team Members Present Physician;Occupational Therapist;Nurse;   Physician Team Member Dr Maribell PAYTON; Panchito GONZALEZ; Dr Oz PAYTON   Nursing Team Member Sabina JURADO   Social Work Team Member Issa VARGAS; Isael VARGAS   OT Team Member Heather GARZA   Patient/Family Present   Patient Present No   Patient's Family Present No     303 hearing tomorrow 8am, visible, disorganized, loud, tangential, linear at times, fixated on CM, frustrated, refusing depakote due to hair loss, refused lithium, agitated, meds adjusted, increased seroquel, no dc date. Security requested to join 303 hearing tomorrow 8am.

## 2024-02-22 NOTE — NURSING NOTE
Patient noted to have broken sleep throughout the night. Non labored breathing noted while asleep. Q 7 min safety checks maintained.

## 2024-02-22 NOTE — SOCIAL WORK
Sw provided [place] [employer] contact for pt nurse to assist pt in call  before hearing tomorrow. Sw requested security be on unit tomorrow 8am.

## 2024-02-22 NOTE — PROGRESS NOTES
"Progress Note - Freddie Graham 64 y.o. male MRN: [id number]    Unit/Bed#: [facility] 251-02 Encounter: [id number]        Subjective:   Patient seen and examined at bedside after reviewing the chart and discussing the case with the caring staff.      Patient examined at bedside.  Patient has no acute symptoms.    Physical Exam   Vitals: Blood pressure 134/85, pulse 80, temperature 97.8 °F (36.6 °C), temperature source Temporal, resp. rate 17, height 5' 9\" (1.753 m), weight 84.6 kg (186 lb 6.4 oz), SpO2 98%.,Body mass index is 27.53 kg/m².  Constitutional: Patient in no acute distress.  HEENT: PERR, EOMI, MMM.  Cardiovascular: Normal rate and regular rhythm.    Pulmonary/Chest: Effort normal and breath sounds normal.   Abdomen: Soft, + BS, NT.    Assessment/Plan:  Freddie Graham is a(n) 64 y.o. year old male schizophrenia.     1. Cardiac with hx HTN, HLD.  Continue Toprol-XL 25 mg daily.  2. Tobacco abuse.  NRT.  3. Hypothyroidism.  Not on levothyroxine.  TSH normal 2/20/24.  4. BPH.  Continue Flomax 0.4 mg daily.  5. Bilateral foot blisters.  SurePrep twice daily.  Podiatry consulted  6. Vitamin D deficiency.  Patient started on vitamin D supplements.  7. Vitamin B12 deficiency.  Patient started on vitamin B12 supplements.    The patient was discussed with Dr. Gallagher and he is in agreement with the above note.  "

## 2024-02-22 NOTE — SOCIAL WORK
"Immediately as pt saw sw enter unit, pt became distressed, agitated, delusional, paranoid stating \"you went to school right? Are you going to help me today or am I signing out against medical advice. I don't know why you are having people put messages under my door\". Pt unable to be redirected, became irritable as sw attempted to end conversation to enter office.   "

## 2024-02-22 NOTE — NURSING NOTE
Patient compliant with meds and meals. Patient is loud and rambling tangential. Patient is cooperative and labile. Denies everything. Aggressive at times to select staff. Q 7 min behavioral and safety checks in place.

## 2024-02-22 NOTE — PLAN OF CARE
Problem: Depression  Goal: Treatment Goal: Demonstrate behavioral control of depressive symptoms, verbalize feelings of improved mood/affect, and adopt new coping skills prior to discharge  Outcome: Progressing  Goal: Refrain from harming self  Description: Interventions:  - Monitor patient closely, per order   - Supervise medication ingestion, monitor effects and side effects   Outcome: Progressing     Problem: Anxiety  Goal: Anxiety is at manageable level  Description: Interventions:  - Assess and monitor patient's anxiety level.   - Monitor for signs and symptoms (heart palpitations, chest pain, shortness of breath, headaches, nausea, feeling jumpy, restlessness, irritable, apprehensive).   - Collaborate with interdisciplinary team and initiate plan and interventions as ordered.  - [place] patient to unit/surroundings  - Explain treatment plan  - Encourage participation in care  - Encourage verbalization of concerns/fears  - Identify coping mechanisms  - Assist in developing anxiety-reducing skills  - Administer/offer alternative therapies  - Limit or eliminate stimulants  Outcome: Progressing     Problem: Risk for Violence/Aggression Toward Others  Goal: Control angry outbursts  Description: Interventions:  - Monitor patient closely, per order  - Ensure early verbal de-escalation  - Monitor prn medication needs  - Set reasonable/therapeutic limits, outline behavioral expectations, and consequences   - Provide a non-threatening milieu, utilizing the least restrictive interventions   Outcome: Progressing

## 2024-02-22 NOTE — PROGRESS NOTES
Progress Note - Behavioral Health   Freddie CASTANEDA Day 64 y.o. male MRN: @MRN   Unit/Bed#: [facility] 251-02 Encounter: [id number]      Report from staff regarding this patient received and discussed, and records reviewed prior to seeing this patient  Behavior over the last 24 hours: unchanged.     Patient remains agitated at times, anxious, argumentative, bizarre, cooperative with session, delusional, and distressed today.    Sleep: decreased  Appetite: fair  Medication side effects: No       Mental Status Evaluation:    Appearance:  dressed in hospital attire   Mood:  dysphoric, anxious   Affect: constricted, labile    Speech:  hypertalkative   Thought Content:  paranoid ideation   Perceptual Disturbances: does not appear responding to internal stimuli   Risk Potential: Suicidal ideation - None, contracts for safety on the unit   Insight:  significantly impaired   Motor Activity: no abnormal movements       Laboratory results:  I have personally reviewed all pertinent laboratory results.    Progress Toward Goals: minimal improvement    Assessment/Plan   Principal Problem:    Schizophrenia (HCC)    Recommended Treatment: To help the patient with consolidation of his medications will provide Seroquel twice a day with switch to extended release 600 mg at bedtime and 200 mg in the daytime, and provide Depakote to address patient's mood instability.  Motivational interview was provided with the patient to accept medication management.  Patient was superficially receptive.  To help with sleep improvement we will also increased trazodone at night.  It is unlikely that the current dose of trazodone may lead to further manic behavior.  Treatment of insomnia is important factor in treatment patient's mood disorder    Planned medication and treatment changes:    All current active medications have been reviewed.  Continue treatment with group therapy, milieu therapy, occupational therapy and medication management.      ** Please Note:  "This note has been constructed using a voice recognition system. **      BMP: No results for input(s): \"NA\", \"K\", \"CL\", \"CO2\", \"BUN\" in the last 72 hours.    Invalid input(s): \"CREA\", \"GLU\"  Vitals:    02/21/24 1944   BP: 146/97   Pulse: 92   Resp: 18   Temp:    SpO2:         Medication Administration - last 24 hours from 02/20/2024 2137 to 02/21/2024 2137         Date/Time Order Dose Route Action Action by     02/21/2024 0900 EST metoprolol succinate (TOPROL-XL) 24 hr tablet 25 mg -- Oral Canceled Entry [facility]     02/21/2024 0749 EST metoprolol succinate (TOPROL-XL) 24 hr tablet 25 mg 0 mg Oral Hold [facility]     02/21/2024 1804 EST tamsulosin (FLOMAX) capsule 0.4 mg 0.4 mg Oral Given [facility]     02/21/2024 0518 EST OLANZapine (ZyPREXA) tablet 10 mg 10 mg Oral Given Jenniffer Gombert, LPN     02/21/2024 0518 EST OLANZapine (ZyPREXA) IM injection 10 mg -- Intramuscular See Alternative [facility]     02/21/2024 0749 EST hydrOXYzine HCL (ATARAX) tablet 100 mg 100 mg Oral Given [facility]     02/21/2024 0749 EST LORazepam (ATIVAN) injection 2 mg -- Intramuscular See Alternative [facility]     02/21/2024 0821 EST nicotine (NICODERM CQ) 21 mg/24 hr TD 24 hr patch 1 patch 1 patch Transdermal Not Given [facility]     02/21/2024 0900 EST QUEtiapine (SEROquel) tablet 100 mg -- Oral Canceled Entry [facility]     02/21/2024 0749 EST QUEtiapine (SEROquel) tablet 100 mg 100 mg Oral Given [facility]     02/21/2024 2200 EST QUEtiapine (SEROquel XR) 24 hr tablet 600 mg -- Oral Canceled Entry Chivo Rivera RN     02/21/2024 2037 EST QUEtiapine (SEROquel XR) 24 hr tablet 600 mg 600 mg Oral Given Chivo Rivera RN     02/21/2024 1016 EST divalproex sodium (DEPAKOTE) DR tablet 500 mg 500 mg Oral Not Given [facility]     02/21/2024 1016 EST QUEtiapine (SEROquel) tablet 100 mg 100 mg Oral Not Given [facility]     02/21/2024 1041 EST cholecalciferol (VITAMIN D3) tablet 1,000 " Units -- Oral Canceled Entry [facility]     02/21/2024 1025 EST cyanocobalamin (VITAMIN B-12) tablet 500 mcg -- Oral Canceled Entry [facility]     02/21/2024 2100 EST divalproex sodium (DEPAKOTE) DR tablet 500 mg 500 mg Oral Not Given Chivo Rivera, ADRIEN     02/21/2024 2200 EST traZODone (DESYREL) tablet 100 mg -- Oral Canceled Entry Chivo Rivera, ADRIEN     02/21/2024 2037 EST traZODone (DESYREL) tablet 100 mg 100 mg Oral Given Chivo Rivera, ADRIEN     02/21/2024 2200 EST melatonin tablet 3 mg -- Oral Canceled Entry Chivo Rivera, ADRIEN     02/21/2024 2038 EST melatonin tablet 3 mg 3 mg Oral Given Chivo Rivera, ADRIEN

## 2024-02-22 NOTE — NURSING NOTE
This writer assisted patient with his phone call to the [facility] public defenders office. The  representing patient is named Jyoti Varela but was unavailable to speak with patient. Patient was satisfied with this information and stated he does not need to speak with her today. Patient satisfied with phone call and outcome. Patient is aware his 303 hearing is 2/23/24 at 0800.

## 2024-02-22 NOTE — NURSING NOTE
"Patient noted to be visible on the milieu. Patient is disorganized, loud and rambles. Aggressive at times r/t being inpatient. Refused hs Depakote patient states \"I don't want Depakote it makes my hair fall out\". Patient was willing to take remaining hs medications despite questioning why he is being given trazodone. Education provided w/ no evidence of learning. Q 7 min safety checks continuous and maintained.   "

## 2024-02-22 NOTE — PLAN OF CARE
Problem: Depression  Goal: Refrain from harming self  Description: Interventions:  - Monitor patient closely, per order   - Supervise medication ingestion, monitor effects and side effects   Outcome: Progressing     Problem: Risk for Violence/Aggression Toward Others  Goal: Refrain from destructive acts on the environment or property  Outcome: Progressing  Goal: Control angry outbursts  Description: Interventions:  - Monitor patient closely, per order  - Ensure early verbal de-escalation  - Monitor prn medication needs  - Set reasonable/therapeutic limits, outline behavioral expectations, and consequences   - Provide a non-threatening milieu, utilizing the least restrictive interventions   Outcome: Progressing     Problem: Ineffective Coping  Goal: Demonstrates healthy coping skills  Outcome: Not Progressing

## 2024-02-23 PROCEDURE — 99232 SBSQ HOSP IP/OBS MODERATE 35: CPT | Performed by: PSYCHIATRY & NEUROLOGY

## 2024-02-23 RX ORDER — OXCARBAZEPINE 150 MG/1
300 TABLET, FILM COATED ORAL 2 TIMES DAILY
Status: DISCONTINUED | OUTPATIENT
Start: 2024-02-23 | End: 2024-02-27

## 2024-02-23 RX ORDER — GINSENG 100 MG
1 CAPSULE ORAL 2 TIMES DAILY
Status: DISPENSED | OUTPATIENT
Start: 2024-02-23 | End: 2024-03-02

## 2024-02-23 RX ADMIN — TRAZODONE HYDROCHLORIDE 100 MG: 100 TABLET ORAL at 20:50

## 2024-02-23 RX ADMIN — QUETIAPINE FUMARATE 200 MG: 150 TABLET, EXTENDED RELEASE ORAL at 08:30

## 2024-02-23 RX ADMIN — Medication 3 MG: at 20:50

## 2024-02-23 RX ADMIN — CYANOCOBALAMIN TAB 500 MCG 500 MCG: 500 TAB at 08:30

## 2024-02-23 RX ADMIN — CHOLECALCIFEROL TAB 25 MCG (1000 UNIT) 1000 UNITS: 25 TAB at 08:37

## 2024-02-23 RX ADMIN — TAMSULOSIN HYDROCHLORIDE 0.4 MG: 0.4 CAPSULE ORAL at 18:26

## 2024-02-23 RX ADMIN — OXCARBAZEPINE 300 MG: 150 TABLET, FILM COATED ORAL at 18:26

## 2024-02-23 RX ADMIN — OLANZAPINE 5 MG: 5 TABLET, FILM COATED ORAL at 14:20

## 2024-02-23 RX ADMIN — QUETIAPINE 600 MG: 400 TABLET, FILM COATED, EXTENDED RELEASE ORAL at 20:49

## 2024-02-23 NOTE — PROGRESS NOTES
Progress Note - Behavioral Health   Freddie K Day 64 y.o. male MRN: @MRN   Unit/Bed#: [facility] 251-02 Encounter: [id number]      Report from staff regarding this patient received and discussed, and records reviewed prior to seeing this patient  Behavior over the last 24 hours: minimal improvement.  The patient had episodic agitations towards staff at times could be redirected.  Disorganized thoughts at times persist, patient agreed to continue with Seroquel but refused to take Depakote.  More visible in the unit.  Mixed manic state persist.    Patient remains agitated at times, anxious, argumentative, bizarre, cooperative with session, delusional, disorganized, distracted, and distressed today.    Sleep: decreased  Appetite: fair  Medication side effects: No       Mental Status Evaluation:    Appearance:  dressed in hospital attire, improved grooming   Mood:  dysphoric, anxious   Affect: constricted, labile    Speech:  hypertalkative   Thought Content:  paranoid ideation, intrusive thoughts, negative thinking   Perceptual Disturbances: no auditory hallucinations, no visual hallucinations, denies auditory hallucinations when asked, does not appear responding to internal stimuli   Risk Potential: Suicidal ideation - None, contracts for safety on the unit   Insight:  significantly impaired   Motor Activity: no abnormal movements       Laboratory results:  I have personally reviewed all pertinent laboratory results.    Progress Toward Goals: minimal improvement    Assessment/Plan   Principal Problem:    Schizophrenia (HCC)    Recommended Treatment: We will not make Seroquel adjustments today because the patient manic state started slowly improving, because the patient refused to take Depakote will discontinue this medications.  So writer discussed with the patient if we do not achieve mood stability in her day so writer would suggest another mood stabilizer that does not require frequent blood work.     Planned medication and  "treatment changes:    All current active medications have been reviewed.  Continue treatment with group therapy, milieu therapy, occupational therapy and medication management.      ** Please Note: This note has been constructed using a voice recognition system. **      BMP: No results for input(s): \"NA\", \"K\", \"CL\", \"CO2\", \"BUN\" in the last 72 hours.    Invalid input(s): \"CREA\", \"GLU\"  Vitals:    02/22/24 1519   BP: 138/78   Pulse: 101   Resp: 16   Temp: 99 °F (37.2 °C)   SpO2: 97%        Medication Administration - last 24 hours from 02/21/2024 1942 to 02/22/2024 1942         Date/Time Order Dose Route Action Action by     02/22/2024 0823 EST metoprolol succinate (TOPROL-XL) 24 hr tablet 25 mg 25 mg Oral Not Given [facility]     02/21/2024 2200 EST QUEtiapine (SEROquel XR) 24 hr tablet 600 mg -- Oral Canceled Entry Chivo Rivera, ADRIEN     02/21/2024 2037 EST QUEtiapine (SEROquel XR) 24 hr tablet 600 mg 600 mg Oral Given Chivo Rivera, ADRIEN     02/22/2024 0825 EST cholecalciferol (VITAMIN D3) tablet 1,000 Units 1,000 Units Oral Given [facility]     02/22/2024 0826 EST cyanocobalamin (VITAMIN B-12) tablet 500 mcg 500 mcg Oral Given [facility]     02/22/2024 0827 EST divalproex sodium (DEPAKOTE) DR tablet 500 mg 500 mg Oral Not Given [facility]     02/21/2024 2100 EST divalproex sodium (DEPAKOTE) DR tablet 500 mg 500 mg Oral Not Given Chivo Rivera, ADRIEN     02/22/2024 0823 EST QUEtiapine (SEROquel XR) 24 hr tablet 200 mg 200 mg Oral Given [facility]     02/21/2024 2200 EST traZODone (DESYREL) tablet 100 mg -- Oral Canceled Entry Chivo Rivera, ADRIEN     02/21/2024 2037 EST traZODone (DESYREL) tablet 100 mg 100 mg Oral Given Chivo Rivera RN     02/21/2024 2200 EST melatonin tablet 3 mg -- Oral Canceled Entry Chivo Rivera RN     02/21/2024 2038 EST melatonin tablet 3 mg 3 mg Oral Given Chivo Rivera RN            "

## 2024-02-23 NOTE — PLAN OF CARE
Problem: Risk for Violence/Aggression Toward Others  Goal: Refrain from destructive acts on the environment or property  Outcome: Progressing  Goal: Control angry outbursts  Description: Interventions:  - Monitor patient closely, per order  - Ensure early verbal de-escalation  - Monitor prn medication needs  - Set reasonable/therapeutic limits, outline behavioral expectations, and consequences   - Provide a non-threatening milieu, utilizing the least restrictive interventions   Outcome: Progressing     Problem: Ineffective Coping  Goal: Demonstrates healthy coping skills  Outcome: Progressing  Goal: Participates in unit activities  Description: Interventions:  - Provide therapeutic environment   - Provide required programming   - Redirect inappropriate behaviors   Outcome: Progressing

## 2024-02-23 NOTE — NURSING NOTE
Pt becoming increasingly anxious and easily agitates when on phone. Difficult to redirect. Behaviors escalate despite attempts to decrease stimulation. Administered 5 mg po Zyprexa prn for moderate agitation for [place] of 2. Will evaluate effectiveness.

## 2024-02-23 NOTE — PROGRESS NOTES
02/23/24    Team Meeting   Meeting Type Daily Rounds   Team Members Present   Team Members Present Physician;Nurse;Occupational Therapist;   Physician Team Member Dr Maribell PAYTON; Panchito GONZALEZ; Dr Oz PAYTON   Nursing Team Member Richard JURADO   Social Work Team Member Issa VARGAS; Isael VARGAS   OT Team Member Heather GARZA   Patient/Family Present   Patient Present No   Patient's Family Present No     303 hearing today, angry, rants, yells, punching walls, threatening, refusing depakote, meds adjusted, attends groups, manic, psychosis, no dc date.

## 2024-02-23 NOTE — NURSING NOTE
"Patient alert, verbal, and oriented. Speech is rambling. He is is tangential with loose associations in content. Patient is paranoid of others both providers, social service, and neighbors at home. Mood is labile. He refused Depakote stating \"I'm getting real tired of this fucking question, he was to take that off my list, nothing but a god damn liar.\" He was compliant with other HS medications. Remains cooperative with this nurse, no aggression. He frequently heard rambling to himself. He appears disheveled. Q 7 minute safety checks maintained. Will continue with plan of care.   "

## 2024-02-23 NOTE — NURSING NOTE
"Patient had difficulty falling asleep last evening. He said he had to \"complete a project\" before falling asleep. He states \"I don't know why I keep working here, I'm not being paid.\" He had early awakening. [place] loudly rambling and slamming his door. Mood remains irritable. Q 7 minute safety checks maintained.   "

## 2024-02-23 NOTE — PROGRESS NOTES
"Progress Note - Freddie Graham 64 y.o. male MRN: [id number]    Unit/Bed#: [facility] 251-02 Encounter: [id number]        Subjective:   Patient seen and examined at bedside after reviewing the chart and discussing the case with the caring staff.      Patient examined at bedside.  Patient has no acute symptoms.    Physical Exam   Vitals: Blood pressure 120/93, pulse 98, temperature (!) 97.1 °F (36.2 °C), temperature source Temporal, resp. rate 18, height 5' 9\" (1.753 m), weight 84.6 kg (186 lb 6.4 oz), SpO2 97%.,Body mass index is 27.53 kg/m².  Constitutional: Patient in no acute distress.  HEENT: PERR, EOMI, MMM.  Cardiovascular: Normal rate and regular rhythm.    Pulmonary/Chest: Effort normal and breath sounds normal.   Abdomen: Soft, + BS, NT.    Assessment/Plan:  Freddie Graham is a(n) 64 y.o. year old male schizophrenia.     1.  Cardiac with hx HTN, HLD.  Continue Toprol-XL 25 mg daily.  2.  Tobacco abuse.  NRT.  3.  Hypothyroidism.  Not on levothyroxine.  TSH normal 2/20/24.  4.  BPH.  Continue Flomax 0.4 mg daily.  5.  Bilateral foot blisters.  SurePrep twice daily.  Podiatry consulted.  Add bacitracin twice daily.  6.  Vitamin D deficiency.  Patient started on vitamin D supplements.  7.  Vitamin B12 deficiency.  Patient started on vitamin B12 supplements.    The patient was discussed with Dr. Gallagher and he is in agreement with the above note.  "

## 2024-02-23 NOTE — SOCIAL WORK
"Hearing Documentation    303 hearing held today;  in attendance:  [place]- ; [place] - [facility] PD office; staff psych; ;  pt;  303 recommendation upheld for up to an additional 20 days of inpt treatment at [facility];  303 expires: 3/11/24 (only 17 days due to continuance from [place]).     When sw reminded pt of 303 hearing today at 750am, pt became agitated, punching the wall calling sw a \"bitch\" stating \"you didn't do your fucking job and get me my witnesses. What do you mean my  will not be here? Why don't you tell the  I called you a bitch. I'll be there.\". Education provided on virtual hearing with [facility]. Nursing notified, RN and CC attended hearing in small group room with attending due to pt's agitation towards sw.     Copy of completed 304 received from [facility], copy on pt chart, copy sent to scanning. Copy provided to pt, education on appeal process provided.   "

## 2024-02-23 NOTE — NUTRITION
02/23/24 1235   Biochemical Data,Medical Tests, and Procedures   Biochemical Data/Medical Tests/Procedures Lab values reviewed;Meds reviewed   Labs (Comment) 2/17 CMP WNL, CBC WNL   Meds (Comment) maalox, cogentin, dulcolax, vitamin D3, vitamin B-12, atarax, ativan, melatonin, toprol-xl, zyprexa, trileptal, inderal, seroquel, senokot, flomax, desyrel   Nutrition-Focused Physical Exam   Nutrition-Focused Physical Exam Findings No skin issues documented;RN skin assessment reviewed   Medical-Related Concerns alcohol abuse, anxiety, depression, DJD, hyperlipidemia, HTN, tobacco abuse   Adequacy of Intake   Nutrition Modality PO   Feeding Route   PO Independent   Current PO Intake   Current Diet Order Regular diet thin liquids   Current Meal Intake %   Estimated calorie intake compared to estimated need Nutrient needs met.   PES Statement   Problem No nutrition diagnosis   Recommendations/Interventions   Malnutrition/BMI Present No  (does not meet 2 criteria)   Summary Length of stay. Regular diet thin liquids. Meal completions 100%. Patient reports good appetite. He reports he eats whatever he wants at home and follows no diet plan. Patient reports eating 2 meals and snacks at home. Skin intact. Patient reports no knowledge of weight changes. 2/18/24 - 186#; 6/3/23 - 204#, 18# (9%) weight loss. Weight loss present however not significant. Continue to monitor weight. Skin intact.   Interventions/Recommendations Continue current diet order   Education Assessment   Education Education not indicated at this time;Patient/caregiver not appropriate for education at this time   Nutrition Complexity Risk   Nutrition complexity level Low risk   Follow up date 03/08/24

## 2024-02-23 NOTE — NURSING NOTE
"Patient agitated this morning stating that provider is a liar. This nurse gave patient water, he put his hand through old cup. He then asked for a straw, was given a straw and blew paper off straw stating \"I must prepare my weapons\".   "

## 2024-02-24 PROCEDURE — 99232 SBSQ HOSP IP/OBS MODERATE 35: CPT | Performed by: PSYCHIATRY & NEUROLOGY

## 2024-02-24 RX ADMIN — BACITRACIN ZINC 1 SMALL APPLICATION: 500 OINTMENT TOPICAL at 10:20

## 2024-02-24 RX ADMIN — CHOLECALCIFEROL TAB 25 MCG (1000 UNIT) 1000 UNITS: 25 TAB at 10:20

## 2024-02-24 RX ADMIN — TAMSULOSIN HYDROCHLORIDE 0.4 MG: 0.4 CAPSULE ORAL at 16:37

## 2024-02-24 RX ADMIN — QUETIAPINE FUMARATE 200 MG: 150 TABLET, EXTENDED RELEASE ORAL at 10:05

## 2024-02-24 RX ADMIN — QUETIAPINE 600 MG: 400 TABLET, FILM COATED, EXTENDED RELEASE ORAL at 21:26

## 2024-02-24 RX ADMIN — OXCARBAZEPINE 300 MG: 150 TABLET, FILM COATED ORAL at 10:07

## 2024-02-24 RX ADMIN — CYANOCOBALAMIN TAB 500 MCG 500 MCG: 500 TAB at 10:06

## 2024-02-24 RX ADMIN — OXCARBAZEPINE 300 MG: 150 TABLET, FILM COATED ORAL at 21:26

## 2024-02-24 RX ADMIN — Medication 3 MG: at 21:26

## 2024-02-24 RX ADMIN — TRAZODONE HYDROCHLORIDE 100 MG: 100 TABLET ORAL at 21:26

## 2024-02-24 NOTE — NURSING NOTE
Sleep observed as sound during shift, no respiratory distress.  Continues on q 7 minute safety checks.  Clutter free environment continued to prevent falls. Fluids maintained at beside to promote hydration.  Will continue to monitor.

## 2024-02-24 NOTE — PROGRESS NOTES
"Progress Note - Freddie Graham 64 y.o. male MRN: [id number]    Unit/Bed#: [facility] 251-02 Encounter: [id number]        Subjective:   Patient seen and examined at bedside after reviewing the chart and discussing the case with the caring staff.      Patient examined at bedside.  Patient has no acute symptoms. Caring nurse is requesting surgical shoe for the patient as patient has wounds on his ankle and feet.    Physical Exam   Vitals: Blood pressure 99/84, pulse 105, temperature 97.6 °F (36.4 °C), temperature source Temporal, resp. rate 18, height 5' 9\" (1.753 m), weight 84.6 kg (186 lb 6.4 oz), SpO2 97%.,Body mass index is 27.53 kg/m².  Constitutional: Patient in no acute distress.  HEENT: PERR, EOMI, MMM.  Cardiovascular: Normal rate and regular rhythm.    Pulmonary/Chest: Effort normal and breath sounds normal.   Abdomen: Soft, + BS, NT.    Assessment/Plan:  Freddie Graham is a(n) 64 y.o. year old male schizophrenia.     1.  Cardiac with hx HTN, HLD.  Continue Toprol-XL 25 mg daily.  2.  Tobacco abuse.  NRT.  3.  Hypothyroidism.  Not on levothyroxine.  TSH normal 2/20/24.  4.  BPH.  Continue Flomax 0.4 mg daily.  5.  Bilateral foot blisters.  SurePrep twice daily.  Podiatry consulted.  Add bacitracin twice daily.  I will get surgical shoe for the patient 2/24/2024.  6.  Vitamin D deficiency.  Patient started on vitamin D supplements.  7.  Vitamin B12 deficiency.  Patient started on vitamin B12 supplements.  "

## 2024-02-24 NOTE — PLAN OF CARE
Problem: Depression  Goal: Treatment Goal: Demonstrate behavioral control of depressive symptoms, verbalize feelings of improved mood/affect, and adopt new coping skills prior to discharge  Outcome: Progressing  Goal: Refrain from harming self  Description: Interventions:  - Monitor patient closely, per order   - Supervise medication ingestion, monitor effects and side effects   Outcome: Progressing     Problem: Anxiety  Goal: Anxiety is at manageable level  Description: Interventions:  - Assess and monitor patient's anxiety level.   - Monitor for signs and symptoms (heart palpitations, chest pain, shortness of breath, headaches, nausea, feeling jumpy, restlessness, irritable, apprehensive).   - Collaborate with interdisciplinary team and initiate plan and interventions as ordered.  - [place] patient to unit/surroundings  - Explain treatment plan  - Encourage participation in care  - Encourage verbalization of concerns/fears  - Identify coping mechanisms  - Assist in developing anxiety-reducing skills  - Administer/offer alternative therapies  - Limit or eliminate stimulants  Outcome: Progressing     Problem: Risk for Violence/Aggression Toward Others  Goal: Treatment Goal: Refrain from acts of violence/aggression during length of stay, and demonstrate improved impulse control at the time of discharge  Outcome: Progressing  Goal: Refrain from destructive acts on the environment or property  Outcome: Progressing  Goal: Control angry outbursts  Description: Interventions:  - Monitor patient closely, per order  - Ensure early verbal de-escalation  - Monitor prn medication needs  - Set reasonable/therapeutic limits, outline behavioral expectations, and consequences   - Provide a non-threatening milieu, utilizing the least restrictive interventions   Outcome: Progressing     Problem: Alteration in Thoughts and Perception  Goal: Treatment Goal: Gain control of psychotic behaviors/thinking, reduce/eliminate presenting  symptoms and demonstrate improved reality functioning upon discharge  Outcome: Progressing  Goal: Refrain from acting on delusional thinking/internal stimuli  Description: Interventions:  - Monitor patient closely, per order   - Utilize least restrictive measures   - Set reasonable limits, give positive feedback for acceptable   - Administer medications as ordered and monitor of potential side effects  Outcome: Progressing  Goal: Recognize dysfunctional thoughts, communicate reality-based thoughts at the time of discharge  Description: Interventions:  - Provide medication and psycho-education to assist patient in compliance and developing insight into his/her illness   Outcome: Progressing

## 2024-02-24 NOTE — PROGRESS NOTES
"Progress Note - Behavioral Health   Freddie CASTANEDA Day 64 y.o. male MRN: @MRN   Unit/Bed#: [facility] 251-02 Encounter: [id number]      Report from staff regarding this patient received and discussed, and records reviewed prior to seeing this patient  Behavior over the last 24 hours: unchanged.     Patient remains agitated at times, bizarre, cooperative with session, delusional, distracted, and distressed today.    Sleep: decreased  Appetite: fair  Medication side effects: No       Mental Status Evaluation:    Appearance:  disheveled   Mood:  dysphoric, anxious   Affect: constricted, labile    Speech:  hypertalkative   Thought Content:  paranoid ideation, negative thinking   Perceptual Disturbances: no auditory hallucinations, no visual hallucinations, denies auditory hallucinations when asked, does not appear responding to internal stimuli   Risk Potential: Suicidal ideation - None, contracts for safety on the unit   Insight:  significantly impaired   Motor Activity: no abnormal movements       Laboratory results:  I have personally reviewed all pertinent laboratory results.    Progress Toward Goals: no significant improvement    Assessment/Plan   Principal Problem:    Schizophrenia (HCC)    Recommended Treatment: The patient declined to use Depakote stating it led to his losing hair.  Will change to Trileptal which is not associated with such side effects and still may provide mood stability sedation in addition to Seroquel that the patient take without any side effects.  303 was petitioned by the writer because of the concerns of the patient aggressive behavior and poor insight.    Planned medication and treatment changes:    All current active medications have been reviewed.  Continue treatment with group therapy, milieu therapy, occupational therapy and medication management.      ** Please Note: This note has been constructed using a voice recognition system. **      BMP: No results for input(s): \"NA\", \"K\", \"CL\", \"CO2\", " "\"BUN\" in the last 72 hours.    Invalid input(s): \"CREA\", \"GLU\"  Vitals:    02/23/24 2001   BP: 140/97   Pulse: (!) 117   Resp: 18   Temp: 99.4 °F (37.4 °C)   SpO2: 95%        Medication Administration - last 24 hours from 02/22/2024 2252 to 02/23/2024 2252         Date/Time Order Dose Route Action Action by     02/23/2024 0831 EST metoprolol succinate (TOPROL-XL) 24 hr tablet 25 mg 25 mg Oral Not Given Kim Ramos RN     02/23/2024 1826 EST tamsulosin (FLOMAX) capsule 0.4 mg 0.4 mg Oral Given Kim Ramos RN     02/23/2024 1420 EST OLANZapine (ZyPREXA) tablet 5 mg 5 mg Oral Given Kim Ramos RN     02/23/2024 1420 EST OLANZapine (ZyPREXA) IM injection 5 mg -- Intramuscular See Alternative Kim Ramos RN     02/23/2024 2200 EST QUEtiapine (SEROquel XR) 24 hr tablet 600 mg -- Oral Canceled Entry Dimitris Parnell RN     02/23/2024 2049 EST QUEtiapine (SEROquel XR) 24 hr tablet 600 mg 600 mg Oral Given Dimitris Parnell RN     02/23/2024 0837 EST cholecalciferol (VITAMIN D3) tablet 1,000 Units 1,000 Units Oral Given Kim Ramos RN     02/23/2024 0830 EST cyanocobalamin (VITAMIN B-12) tablet 500 mcg 500 mcg Oral Given Kim Ramos RN     02/23/2024 0838 EST divalproex sodium (DEPAKOTE) DR tablet 500 mg 500 mg Oral Not Given Kim Ramos RN     02/23/2024 0830 EST QUEtiapine (SEROquel XR) 24 hr tablet 200 mg 200 mg Oral Given Kim Ramos RN     02/23/2024 2200 EST traZODone (DESYREL) tablet 100 mg -- Oral Canceled Entry Dimitris Parnell RN     02/23/2024 2050 EST traZODone (DESYREL) tablet 100 mg 100 mg Oral Given Dimitris Parnell RN     02/23/2024 2200 EST melatonin tablet 3 mg -- Oral Canceled Entry Dimitris Parnell RN     02/23/2024 2050 EST melatonin tablet 3 mg 3 mg Oral Given Dimitris Parnell RN     02/23/2024 1826 EST OXcarbazepine (TRILEPTAL) tablet 300 mg 300 mg Oral Given Kim Ramos RN     02/23/2024 1836 EST bacitracin topical ointment 1 small application 1 " small application Topical Not Given Kim Ramos RN     02/23/2024 1835 EST bacitracin topical ointment 1 small application 1 small application Topical Not Given Kim Ramos RN

## 2024-02-24 NOTE — NURSING NOTE
Tangential and hyper-verbal during assessment.  No overt agitation this evening.   Pleasant during care.  Mild behaviors (redirectable) observe. Anxiety and depression were unable to be assessed. Freddie denied any suicidal ideations.   Used the phone this PM without issue. Maintained on q 7 minute safety checks.  Continues on fall risk protocols.  Will continue to monitor.

## 2024-02-25 PROCEDURE — 99232 SBSQ HOSP IP/OBS MODERATE 35: CPT | Performed by: PSYCHIATRY & NEUROLOGY

## 2024-02-25 RX ORDER — LISINOPRIL 10 MG/1
10 TABLET ORAL DAILY
Status: DISCONTINUED | OUTPATIENT
Start: 2024-02-25 | End: 2024-03-18 | Stop reason: HOSPADM

## 2024-02-25 RX ADMIN — OXCARBAZEPINE 300 MG: 150 TABLET, FILM COATED ORAL at 08:55

## 2024-02-25 RX ADMIN — QUETIAPINE FUMARATE 200 MG: 150 TABLET, EXTENDED RELEASE ORAL at 08:55

## 2024-02-25 RX ADMIN — CHOLECALCIFEROL TAB 25 MCG (1000 UNIT) 1000 UNITS: 25 TAB at 08:56

## 2024-02-25 RX ADMIN — TAMSULOSIN HYDROCHLORIDE 0.4 MG: 0.4 CAPSULE ORAL at 17:49

## 2024-02-25 RX ADMIN — CYANOCOBALAMIN TAB 500 MCG 500 MCG: 500 TAB at 08:55

## 2024-02-25 RX ADMIN — BACITRACIN ZINC 1 SMALL APPLICATION: 500 OINTMENT TOPICAL at 08:55

## 2024-02-25 RX ADMIN — LISINOPRIL 10 MG: 10 TABLET ORAL at 17:49

## 2024-02-25 RX ADMIN — QUETIAPINE 600 MG: 400 TABLET, FILM COATED, EXTENDED RELEASE ORAL at 21:36

## 2024-02-25 RX ADMIN — BACITRACIN ZINC 1 SMALL APPLICATION: 500 OINTMENT TOPICAL at 17:51

## 2024-02-25 RX ADMIN — BACITRACIN ZINC 1 SMALL APPLICATION: 500 OINTMENT TOPICAL at 22:04

## 2024-02-25 RX ADMIN — Medication 3 MG: at 21:37

## 2024-02-25 RX ADMIN — OXCARBAZEPINE 300 MG: 150 TABLET, FILM COATED ORAL at 21:36

## 2024-02-25 RX ADMIN — OLANZAPINE 5 MG: 5 TABLET, FILM COATED ORAL at 14:20

## 2024-02-25 RX ADMIN — TRAZODONE HYDROCHLORIDE 100 MG: 100 TABLET ORAL at 21:37

## 2024-02-25 NOTE — PROGRESS NOTES
"Progress Note - Behavioral Health   Freddie CASTANEDA Day 64 y.o. male MRN: @MRN   Unit/Bed#: [facility] 251-02 Encounter: [id number]      Report from staff regarding this patient received and discussed, and records reviewed prior to seeing this patient  Behavior over the last 24 hours: unchanged.     Patient remains agitated at times, argumentative, and bizarre today.    Sleep: decreased  Appetite: fair  Medication side effects: No       Mental Status Evaluation:    Appearance:  dressed in hospital attire   Mood:  dysphoric, anxious   Affect: constricted    Speech:  pressured, hypertalkative   Thought Content:  paranoid ideation   Perceptual Disturbances: no auditory hallucinations, no visual hallucinations, denies auditory hallucinations when asked, does not appear responding to internal stimuli   Risk Potential: Suicidal ideation - None, contracts for safety on the unit   Insight:  impaired due to psychosis   Motor Activity: no abnormal movements       Laboratory results:  I have personally reviewed all pertinent laboratory results.    Progress Toward Goals: no significant improvement    Assessment/Plan   Principal Problem:    Schizophrenia (HCC)    Recommended Treatment: The patient condition although not significantly improving is not getting worse, and the hope that adding Trileptal will contribute to mood stabilization together with Seroquel will not make medication changes today    Planned medication and treatment changes:    All current active medications have been reviewed.  Continue treatment with group therapy, milieu therapy, occupational therapy and medication management.      ** Please Note: This note has been constructed using a voice recognition system. **      BMP: No results for input(s): \"NA\", \"K\", \"CL\", \"CO2\", \"BUN\" in the last 72 hours.    Invalid input(s): \"CREA\", \"GLU\"  Vitals:    02/24/24 2009   BP: 151/84   Pulse: 97   Resp: 18   Temp:    SpO2:         Medication Administration - last 24 hours from " 02/23/2024 2323 to 02/24/2024 2323         Date/Time Order Dose Route Action Action by     02/24/2024 1019 EST metoprolol succinate (TOPROL-XL) 24 hr tablet 25 mg 25 mg Oral Not Given Kim Ramos RN     02/24/2024 1637 EST tamsulosin (FLOMAX) capsule 0.4 mg 0.4 mg Oral Given Yfn Prakash RN     02/24/2024 2126 EST QUEtiapine (SEROquel XR) 24 hr tablet 600 mg 600 mg Oral Given Megha Plasencia LPN     02/24/2024 1020 EST cholecalciferol (VITAMIN D3) tablet 1,000 Units 1,000 Units Oral Given Kim Ramos RN     02/24/2024 1006 EST cyanocobalamin (VITAMIN B-12) tablet 500 mcg 500 mcg Oral Given Kim Ramos RN     02/24/2024 1005 EST QUEtiapine (SEROquel XR) 24 hr tablet 200 mg 200 mg Oral Given Kim Ramos RN     02/24/2024 2126 EST traZODone (DESYREL) tablet 100 mg 100 mg Oral Given Megha Plasencia LPN     02/24/2024 2126 EST melatonin tablet 3 mg 3 mg Oral Given Megha Plasencia LPN     02/24/2024 2126 EST OXcarbazepine (TRILEPTAL) tablet 300 mg 300 mg Oral Given Megha Plasencia LPN     02/24/2024 1007 EST OXcarbazepine (TRILEPTAL) tablet 300 mg 300 mg Oral Given Kim Ramos RN     02/24/2024 1738 EST bacitracin topical ointment 1 small application 0 small application Topical Hold Kim Ramos RN     02/24/2024 1020 EST bacitracin topical ointment 1 small application 1 small application Topical Given Kim Ramos RN

## 2024-02-25 NOTE — NURSING NOTE
"Patient visible on unit. Internally preoccupied. Increased anxiety/agitation when talking on the phone. Denies any SI/HI,AV/H or depression. Endorses mild anxiety, feels like \"SW is dropping the ball on his care.\" Medication compliant. Q 7 continuous monitoring maintained.  "

## 2024-02-25 NOTE — NURSING NOTE
Patient has been sleeping without interruption all night.  No s/s of distress.  Monitoring continues

## 2024-02-25 NOTE — NURSING NOTE
Speech loud. Pressured. Disorganized. Pt focused on involuntary commitment status. Wants to appeal 303. Becoming increasingly agitated especially with increased stimulation on unit. Pt agreeable to take 5 mg Zyprexa po prn for moderate agitation for a Broset of 2. Will evaluate effectiveness.

## 2024-02-25 NOTE — PLAN OF CARE
Problem: Depression  Goal: Refrain from harming self  Description: Interventions:  - Monitor patient closely, per order   - Supervise medication ingestion, monitor effects and side effects   Outcome: Progressing     Problem: Alteration in Thoughts and Perception  Goal: Refrain from acting on delusional thinking/internal stimuli  Description: Interventions:  - Monitor patient closely, per order   - Utilize least restrictive measures   - Set reasonable limits, give positive feedback for acceptable   - Administer medications as ordered and monitor of potential side effects  Outcome: Not Progressing

## 2024-02-25 NOTE — NURSING NOTE
BLEPS assessment completed. 2+ Left Lower extremity 1+ Right lower extremity edema present. Pt instructed to elevate feet when in bed. Will communicate on medical communication board. Dressing on left heel clean dry and intact. Pt requesting to change dressing after shower this evening. New orders for bilateral surgical shoes. Provided education. Pt verbalized understanding. Applied as prescribed.

## 2024-02-25 NOTE — PROGRESS NOTES
"Progress Note - Freddie Graham 64 y.o. male MRN: [id number]    Unit/Bed#: [facility] 251-02 Encounter: [id number]        Subjective:   Patient seen and examined at bedside after reviewing the chart and discussing the case with the caring staff.      Patient examined at bedside.  Patient has been refusing to take his metoprolol since admission and even in the past.    Physical Exam   Vitals: Blood pressure 138/74, pulse 99, temperature 98.2 °F (36.8 °C), temperature source Temporal, resp. rate 18, height 5' 9\" (1.753 m), weight 84.6 kg (186 lb 6.4 oz), SpO2 95%.,Body mass index is 27.53 kg/m².  Constitutional: Patient in no acute distress.  HEENT: PERR, EOMI, MMM.  Cardiovascular: Normal rate and regular rhythm.    Pulmonary/Chest: Effort normal and breath sounds normal.   Abdomen: Soft, + BS, NT.    Assessment/Plan:  Freddie Graham is a(n) 64 y.o. year old male schizophrenia.     1.  Cardiac with hx HTN, HLD.  I will put the patient on lisinopril 10 mg daily.  Toprol-XL will be discontinued 2/25/2024.  2.  Tobacco abuse.  NRT.  3.  Hypothyroidism.  Not on levothyroxine.  TSH normal 2/20/24.  4.  BPH.  Continue Flomax 0.4 mg daily.  5.  Bilateral foot blisters.  SurePrep twice daily.  Podiatry consulted.  Add bacitracin twice daily.  I will get surgical shoe for the patient 2/24/2024.  6.  Vitamin D deficiency.  Patient started on vitamin D supplements.  7.  Vitamin B12 deficiency.  Patient started on vitamin B12 supplements.  "

## 2024-02-26 PROCEDURE — 99232 SBSQ HOSP IP/OBS MODERATE 35: CPT | Performed by: HOSPITALIST

## 2024-02-26 RX ADMIN — CYANOCOBALAMIN TAB 500 MCG 500 MCG: 500 TAB at 08:56

## 2024-02-26 RX ADMIN — Medication 3 MG: at 21:35

## 2024-02-26 RX ADMIN — OXCARBAZEPINE 300 MG: 150 TABLET, FILM COATED ORAL at 08:56

## 2024-02-26 RX ADMIN — QUETIAPINE 600 MG: 400 TABLET, FILM COATED, EXTENDED RELEASE ORAL at 21:35

## 2024-02-26 RX ADMIN — QUETIAPINE FUMARATE 200 MG: 150 TABLET, EXTENDED RELEASE ORAL at 08:56

## 2024-02-26 RX ADMIN — TAMSULOSIN HYDROCHLORIDE 0.4 MG: 0.4 CAPSULE ORAL at 17:43

## 2024-02-26 RX ADMIN — LISINOPRIL 10 MG: 10 TABLET ORAL at 08:56

## 2024-02-26 RX ADMIN — OLANZAPINE 2.5 MG: 2.5 TABLET, FILM COATED ORAL at 09:27

## 2024-02-26 RX ADMIN — OXCARBAZEPINE 300 MG: 150 TABLET, FILM COATED ORAL at 21:35

## 2024-02-26 RX ADMIN — OLANZAPINE 10 MG: 10 TABLET, FILM COATED ORAL at 13:10

## 2024-02-26 RX ADMIN — TRAZODONE HYDROCHLORIDE 100 MG: 100 TABLET ORAL at 21:35

## 2024-02-26 RX ADMIN — BACITRACIN ZINC 1 SMALL APPLICATION: 500 OINTMENT TOPICAL at 08:56

## 2024-02-26 RX ADMIN — LORAZEPAM 1 MG: 1 TABLET ORAL at 14:18

## 2024-02-26 RX ADMIN — CHOLECALCIFEROL TAB 25 MCG (1000 UNIT) 1000 UNITS: 25 TAB at 09:00

## 2024-02-26 NOTE — NURSING NOTE
PRN Zyprexa not effective at this time, patient  pacing up and down [place] continuing to yell and scream about staff, doctors  Ativan 1 mg given.

## 2024-02-26 NOTE — NURSING NOTE
Patient visible on unit. Internally preoccupied. Increased anxiety/agitation when talking on the phone. Denies any SI/HI,AV/H, anxiety or depression. Medication compliant. Cooperative with wound dressing change on left heel.Q 7 continuous monitoring maintained.

## 2024-02-26 NOTE — NURSING NOTE
Patient became agitated and was yelling and screaming after meeting with case management. Patient given zyprexa 10mg PO for severe agitation.

## 2024-02-26 NOTE — PLAN OF CARE
Problem: Depression  Goal: Refrain from harming self  Description: Interventions:  - Monitor patient closely, per order   - Supervise medication ingestion, monitor effects and side effects   Outcome: Progressing     Problem: Risk for Violence/Aggression Toward Others  Goal: Refrain from destructive acts on the environment or property  Outcome: Progressing

## 2024-02-26 NOTE — PROGRESS NOTES
"Progress Note - Behavioral Health   Freddie CASTANEDA Day 64 y.o. male MRN: @MRN   Unit/Bed#: [facility] 251-02 Encounter: [id number]      Report from staff regarding this patient received and discussed, and records reviewed prior to seeing this patient  Behavior over the last 24 hours: slowly improving.     Patient remains appropriate, argumentative, bizarre, calm, cooperative with session, delusional, distracted, and doing better today.    Sleep: slept better  Appetite: fair  Medication side effects: No       Mental Status Evaluation:    Appearance:  disheveled   Mood:  improved, dysphoric, anxious   Affect: constricted    Speech:  coherent, hypertalkative   Thought Content:  paranoid ideation   Perceptual Disturbances: no auditory hallucinations, no visual hallucinations, denies auditory hallucinations when asked, does not appear responding to internal stimuli   Risk Potential: Suicidal ideation - None, contracts for safety on the unit   Insight:  significantly impaired   Motor Activity: no abnormal movements       Laboratory results:  I have personally reviewed all pertinent laboratory results.    Progress Toward Goals: limited improvement    Assessment/Plan   Principal Problem:    Schizophrenia ([facility])    Recommended Treatment: Because of the patient's symptoms of improvement of his symptoms of opal no agitation, some better reality testing, the writer will not make medication adjustment.  Supportive therapy was provided, patient was receptive.    Planned medication and treatment changes:    All current active medications have been reviewed.  Continue treatment with group therapy, milieu therapy, occupational therapy and medication management.      ** Please Note: This note has been constructed using a voice recognition system. **      BMP: No results for input(s): \"NA\", \"K\", \"CL\", \"CO2\", \"BUN\" in the last 72 hours.    Invalid input(s): \"CREA\", \"GLU\"  Vitals:    02/25/24 2005   BP: 153/85   Pulse: 94   Resp: 16   Temp:    SpO2: " 96%        Medication Administration - last 24 hours from 02/24/2024 2321 to 02/25/2024 2321         Date/Time Order Dose Route Action Action by     02/25/2024 0855 EST metoprolol succinate (TOPROL-XL) 24 hr tablet 25 mg 25 mg Oral Not Given Kim Ramos RN     02/25/2024 1749 EST tamsulosin (FLOMAX) capsule 0.4 mg 0.4 mg Oral Given Kim Ramos RN     02/25/2024 1420 EST OLANZapine (ZyPREXA) tablet 5 mg 5 mg Oral Given Kim Ramos RN     02/25/2024 1420 EST OLANZapine (ZyPREXA) IM injection 5 mg -- Intramuscular See Alternative Kim Ramos RN     02/25/2024 2136 EST QUEtiapine (SEROquel XR) 24 hr tablet 600 mg 600 mg Oral Given Megha Plasencia LPN     02/25/2024 0856 EST cholecalciferol (VITAMIN D3) tablet 1,000 Units 1,000 Units Oral Given Kim Ramos RN     02/25/2024 0855 EST cyanocobalamin (VITAMIN B-12) tablet 500 mcg 500 mcg Oral Given Kim Ramos RN     02/25/2024 0855 EST QUEtiapine (SEROquel XR) 24 hr tablet 200 mg 200 mg Oral Given Kim Ramos RN     02/25/2024 2137 EST traZODone (DESYREL) tablet 100 mg 100 mg Oral Given Megha Plasencia LPN     02/25/2024 2137 EST melatonin tablet 3 mg 3 mg Oral Given Megha Plasencia LPN     02/25/2024 2136 EST OXcarbazepine (TRILEPTAL) tablet 300 mg 300 mg Oral Given Megha Plasencia LPN     02/25/2024 0855 EST OXcarbazepine (TRILEPTAL) tablet 300 mg 300 mg Oral Given Kim Ramos RN     02/25/2024 2204 EST bacitracin topical ointment 1 small application 1 small application Topical Given Megha Plasencia LPN     02/25/2024 1751 EST bacitracin topical ointment 1 small application 1 small application Topical Given Kim Ramos RN     02/25/2024 0855 EST bacitracin topical ointment 1 small application 1 small application Topical Given Kim Ramos RN     02/25/2024 1743 EST lisinopril (ZESTRIL) tablet 10 mg 10 mg Oral Given Kim Ramos RN

## 2024-02-26 NOTE — NURSING NOTE
"Patient requested PRN for mild agitation related to \"no one answering the phone\" Zyprexa 2.5 mg given  "

## 2024-02-26 NOTE — PROGRESS NOTES
Progress Note - Behavioral Health     Freddie CASTANEDA Day 64 y.o. male MRN: [id number]   Unit/Bed#: [facility] 251-02 Encounter: [id number]    Assessment/Plan   Principal Problem:    Schizoaffective disorder, bipolar type (HCC)    Patient remains manic with illogical and disorganized thought process and flight of ideas. Mood remains unstable. He remains tangential, hyperverbal, distracted, and unable to redirect in conversation. Labile and loud at times. Irritable edge and easily agitated with low frustration tolerance. He remains delusional with Voodoo preoccupation. Endorses paranoid ideations and persecutory delusions. He endorses difficulty controlling anger. Denies diagnosis or need for medications, but remains compliant on unit. Since he had a positive effect with Perphenazine in the past, will consider switching to first-generation antipsychotic for improved control of mood and psychosis, preferably Prolixin as this can be transitioned to an TOMAS at discharge to ensure compliance. When discussed with patient, he is resistant to any medication changes stating he does not need meds, but states he may agree if medication information is printed for him to read. Impaired insight and judgment.    Treatment Plan:  - Continue Seroquel  mg QAM and 600 mg QHS for psychosis and mood stabilization  - Consider cross-taper of Seroquel to Prolixin tomorrow for improved control of mood and psychosis; if improvement is seen, will plan to transition to TOMAS Prolixin to ensure compliance  - Dose equivalent of Seroquel 800 mg = Prolixin 20 mg  - Tomorrow discontinue AM Seroquel dose and initiate Prolixin 5 mg daily; cross titrate to therapeutic dose  - Continue Trileptal 300 mg BID for mood stabilization  - Continue Trazodone 100 mg QHS for sleep  - Will continue to monitor and make medication adjustments for improved mood control and behavioral control, and improved compliance after discharge    From previous admission at [facility]  "on 05/23, patient was stabilized and discharged on Zyprexa, Perphenazine, Depakote, and Prazosin. Prior to this, in 2021 was stabilized on Zyprexa, Depakote, and Lithium. Reported side effects to several medications.  All current active medications have been reviewed  Encourage group therapy, milieu therapy and occupational therapy  Continue treatment with group therapy, milieu therapy and occupational therapy  Behavioral Health checks every 7 minutes      Risks / Benefits of Treatment:  Risks, benefits, and possible side effects of medications explained to patient and patient verbalizes understanding and agreement for treatment.      Behavior over the last 24 hours: unchanged.     Per staff report, patient has been visible on the unit over the weekend. Attends groups. Appeared internally preoccupied at times. Sometimes disorganized. Had some episodes of increased agitation and anxiety. Irritable, argumentative, loud, and labile at times. Yells at times. Required PRN's of Zyprexa and Ativan for increased agitation. Overall cooperative with staff. He is medication selective. Denies SI/HI/AVH, depression, or anxiety. Remains medication and meal compliant. Slept well through the night.    Today, patient is seen sitting in group with peers. He appears disheveled. He is bright on initial approach, but becomes increasingly irritable during conversation. He remains illogical and disorganized thought process and flight of ideas. Mood remains labile and unstable. He remains tangential, hyperverbal, distracted, and unable to redirect in conversation. He interrupts throughout conversation and does not let provider speak. He is distracted throughout encounter and is unable to appropriately answer questions. He is loud at times and easily agitated with questioning. Endorses paranoid ideations and persecutory delusions stating that someone told \"lies\" about him to the crisis team to get him admitted to the inpatient unit and " "states, \"this always happens, and they won't tell me who did it.\" He states he was \"abused\" by the police and crisis team and that no one believes him. He remains delusional with Restorationist preoccupation. He states he was \"healed by God\" so he stopped taking his medications. Denies diagnosis or need for medications, but remains compliant on unit. He endorses difficulty controlling his anger, and states he will be aggressive if others provoke him first, but states reading the Bible helps him control his anger. He states mom and brother are his main support system and are the \"only real ones\" whom he trusts and who he feels truly care about him. He denies getting into an altercation with his mother prior to admission. He denies mother's statements on admission stating that she has Alzheimer's and cannot be trusted. He currently denies any thoughts of harm to self or others. He denies AVH and does not appear internally preoccupied. Impaired insight and judgment.    Later in the afternoon, patient escalated and became agitated, aggressive, and verbally threatening towards . He was yelling and pacing and not redirectable and required PRN Zyprexa and Ativan.    Sleep: normal, improved  Appetite: normal  Medication side effects: denies      Mental Status Evaluation:    Appearance:  age appropriate, dressed appropriately, disheveled   Behavior:  at times agitated, angry, bizarre, restless, somewhat demanding , easily distracted, easily agitated, irritable edge, non-redirectable   Speech:  hypertalkative, profane at times   Mood:  \"Good\"   Affect:  labile, irritable, angry , mood-incongruent   Thought Process:  disorganized, illogical, tangential, flooding of thoughts, flight of ideas, perseverative   Associations: tangential associations, flight of ideas   Thought Content:  persecutory delusions, Restorationist preoccupation, paranoid ideation   Perceptual Disturbances: denies auditory or visual hallucinations when " asked, does not appear responding to internal stimuli, does not appear internally preoccupied   Risk Potential: Suicidal ideation - None  Homicidal ideation - None at present   Sensorium:  oriented to person, place, and time/date, not oriented to situation         Memory:  recent and remote memory impaired      Consciousness:  alert and awake      Attention: poor concentration and poor attention span      Insight:  significantly impaired      Judgment: significantly impaired      Gait/Station: normal gait/station      Motor Activity: no abnormal movements     Vital signs in last 24 hours:    Temp:  [98.2 °F (36.8 °C)] 98.2 °F (36.8 °C)  HR:  [94-99] 94  Resp:  [16-18] 16  BP: (138-153)/(74-85) 153/85    Laboratory results: I have personally reviewed all pertinent laboratory/tests results.  Most Recent Labs:   Lab Results   Component Value Date    WBC 6.24 02/17/2024    RBC 4.73 02/17/2024    HGB 14.1 02/17/2024    HCT 42.6 02/17/2024     02/17/2024    RDW 12.7 02/17/2024    NEUTROABS 4.20 02/17/2024    SODIUM 138 02/17/2024    K 3.7 02/17/2024     02/17/2024    CO2 28 02/17/2024    BUN 15 02/17/2024    CREATININE 0.98 02/17/2024    GLUC 116 02/17/2024    GLUF 94 05/27/2023    CALCIUM 9.7 02/17/2024    AST 24 02/17/2024    ALT 23 02/17/2024    ALKPHOS 60 02/17/2024    TP 6.6 02/17/2024    ALB 4.3 02/17/2024    TBILI 0.34 02/17/2024    CHOLESTEROL 139 05/27/2023    HDL 39 (L) 05/27/2023    TRIG 122 05/27/2023    LDLCALC 76 05/27/2023    NONHDLC 100 05/27/2023    VALPROICTOT <10 (L) 02/17/2024    LITHIUM 0.5 (L) 05/25/2023    AMMONIA 51 02/06/2021    [id number] 3.106 02/20/2024    FREET4 0.97 02/17/2024    RPR Non-Reactive 11/03/2019    HGBA1C 5.2 05/27/2023     05/27/2023       Current Facility-Administered Medications   Medication Dose Route Frequency Provider Last Rate    acetaminophen  650 mg Oral Q6H PRN CARLOS Calhoun      acetaminophen  650 mg Oral Q4H PRN CARLOS Calhoun       acetaminophen  975 mg Oral Q6H PRN CARLOS Calhoun      aluminum-magnesium hydroxide-simethicone  30 mL Oral Q4H PRN CARLOS Calhoun      bacitracin  1 small application Topical BID Funmi Milian PA-C      benztropine  1 mg Intramuscular Q4H PRN Max 6/day CARLOS Calhoun      benztropine  1 mg Oral Q4H PRN Max 6/day CARLOS Calhoun      bisacodyl  10 mg Rectal Daily PRN CARLOS Calhoun      cholecalciferol  1,000 Units Oral Daily Chai Gallagher MD      cyanocobalamin  500 mcg Oral Daily Chai Gallagher MD      hydrOXYzine HCL  50 mg Oral Q6H PRN Max 4/day CARLOS Calhoun      Or    diphenhydrAMINE  50 mg Intramuscular Q6H PRN CARLOS Calhoun      hydrOXYzine HCL  25 mg Oral Q6H PRN Max 4/day CARLOS Calhoun      lisinopril  10 mg Oral Daily Chai Gallagher MD      LORazepam  1 mg Oral Q6H PRN Ian Reyna MD      melatonin  3 mg Oral HS Ian Reyna MD      nicotine polacrilex  2 mg Oral Q2H PRN CARLOS Calhoun      OLANZapine  10 mg Oral Q3H PRN Max 3/day CARLOS Calhoun      Or    OLANZapine  10 mg Intramuscular Q3H PRN Max 3/day CARLOS Calhoun      OLANZapine  5 mg Oral Q3H PRN Max 6/day CARLOS Calhoun      Or    OLANZapine  5 mg Intramuscular Q3H PRN Max 6/day CARLOS Calhoun      OLANZapine  2.5 mg Oral Q3H PRN Max 8/day CARLOS Calhoun      OXcarbazepine  300 mg Oral BID Ian Reyna MD      polyethylene glycol  17 g Oral Daily PRN CARLOS Calhoun      propranolol  10 mg Oral Q8H PRN CARLOS Calhoun      QUEtiapine  200 mg Oral Daily Ian Reyna MD      QUEtiapine  600 mg Oral HS Ian Reyna MD      senna-docusate sodium  1 tablet Oral Daily PRN CARLOS Calhoun      tamsulosin  0.4 mg Oral Daily With Dinner CARLOS Calhoun      traZODone  100 mg Oral HS PRN CARLOS Calhoun      traZODone  100 mg Oral HS Ian Reyna MD         Counseling / Coordination of  Care:    Total floor / unit time spent today 25 minutes. Greater than 50% of total time was spent with the patient and / or family counseling and / or coordination of care. A description of counseling / coordination of care:  Patient's progress discussed with staff in treatment team meeting.  Medications, treatment progress and treatment plan reviewed with patient.    Rae Ramirez DO 02/26/24

## 2024-02-26 NOTE — SOCIAL WORK
Sw met with pt for check in per pt request in milieu. Pt became agitated, irritable, verbally threatening and demeaning to sw, unable to redirect, talking over sw. Sw attempted to provide education again on 303 appeal process. Pt is focused on sw committing him to [facility], education attempted to be provided. Pt would let sw speak, left milieu declining to sign ROIs. Sw walked to nursing for assistance, pt once again became agitated, sw left situation. Sw notified attending of pt's aggressive behavior.

## 2024-02-26 NOTE — PROGRESS NOTES
"Progress Note - Freddie Graham 64 y.o. male MRN: [id number]    Unit/Bed#: [facility] 251-02 Encounter: [id number]        Subjective:   Patient seen and examined at bedside after reviewing the chart and discussing the case with the caring staff.      Patient examined at bedside.  Patient has no acute symptoms.     Physical Exam   Vitals: Blood pressure 153/85, pulse 94, temperature 98.2 °F (36.8 °C), temperature source Temporal, resp. rate 16, height 5' 9\" (1.753 m), weight 84.6 kg (186 lb 6.4 oz), SpO2 96%.,Body mass index is 27.53 kg/m².  Constitutional: Patient in no acute distress.  HEENT: PERR, EOMI, MMM.  Cardiovascular: Normal rate and regular rhythm.    Pulmonary/Chest: Effort normal and breath sounds normal.   Abdomen: Soft, + BS, NT.    Assessment/Plan:  Freddie Graham is a(n) 64 y.o. year old male schizophrenia.     1.  Cardiac with hx HTN, HLD.  I will put the patient on lisinopril 10 mg daily.  Toprol-XL will be discontinued due to noncompliance 2/25/2024.  2.  Tobacco abuse.  NRT.  3.  Hypothyroidism.  Not on levothyroxine.  TSH normal 2/20/24.  4.  BPH.  Continue Flomax 0.4 mg daily.  5.  Bilateral foot blisters.  SurePrep twice daily.  Podiatry consulted.  Add bacitracin twice daily.  I will get surgical shoe for the patient 2/24/2024.  6.  Vitamin D deficiency.  Patient started on vitamin D supplements.  7.  Vitamin B12 deficiency.  Patient started on vitamin B12 supplements.    The patient was discussed with Dr. Gallagher and he is in agreement with the above note.    "

## 2024-02-26 NOTE — PLAN OF CARE
Problem: Ineffective Coping  Goal: Participates in unit activities  Description: Interventions:  - Provide therapeutic environment   - Provide required programming   - Redirect inappropriate behaviors   Outcome: Progressing  Attended RT AM Group. Appropriate with exception of 1 brief period of lability. Redirectable.

## 2024-02-26 NOTE — NURSING NOTE
"Patient med and meal compliant visible on unit selectively social, several outbursts during the shift related to \"medication changes\" and \" no one here knows what they are doing\"  Patient later apologized for outbursts \"on unit\" disturbing other patients, but states it is \"these doctors fault\". Patient denies SI HI AVH anxiety and depression Patient says he has no idea why he is here or who made him come here other than \"the  don't like me\" no insight as to why the  were called. Dressing changed on blister to left foot but chose not to have bacitracin applied at this time, wants to shower first. Safety checks maintained.  "

## 2024-02-26 NOTE — PROGRESS NOTES
02/26/24   Team Meeting   Meeting Type Daily Rounds   Team Members Present   Team Members Present Physician;Nurse;;Occupational Therapist   Physician Team Member Dr Asher PAYTON; Panchito GONZALEZ; Dr Oz PAYTON; Dr Ashley AZEVEDO   Nursing Team Member Raymundo JURADO   Social Work Team Member Issa VARGAS; Isael VARGAS   OT Team Member Leonides JURADO   Patient/Family Present   Patient Present No   Patient's Family Present No     Loud, labile, yells at times, overall cooperative, med selective, med adjust, disorganized, no dc date.

## 2024-02-27 PROCEDURE — 99232 SBSQ HOSP IP/OBS MODERATE 35: CPT | Performed by: STUDENT IN AN ORGANIZED HEALTH CARE EDUCATION/TRAINING PROGRAM

## 2024-02-27 RX ORDER — AMOXICILLIN 250 MG
1 CAPSULE ORAL DAILY
Status: DISCONTINUED | OUTPATIENT
Start: 2024-02-27 | End: 2024-03-18 | Stop reason: HOSPADM

## 2024-02-27 RX ORDER — OXCARBAZEPINE 150 MG/1
150 TABLET, FILM COATED ORAL 3 TIMES DAILY
Status: DISCONTINUED | OUTPATIENT
Start: 2024-02-27 | End: 2024-03-04

## 2024-02-27 RX ADMIN — OXCARBAZEPINE 300 MG: 150 TABLET, FILM COATED ORAL at 08:56

## 2024-02-27 RX ADMIN — TAMSULOSIN HYDROCHLORIDE 0.4 MG: 0.4 CAPSULE ORAL at 16:10

## 2024-02-27 RX ADMIN — BACITRACIN ZINC 1 SMALL APPLICATION: 500 OINTMENT TOPICAL at 08:56

## 2024-02-27 RX ADMIN — QUETIAPINE FUMARATE 200 MG: 150 TABLET, EXTENDED RELEASE ORAL at 08:56

## 2024-02-27 RX ADMIN — TRAZODONE HYDROCHLORIDE 100 MG: 100 TABLET ORAL at 21:00

## 2024-02-27 RX ADMIN — CHOLECALCIFEROL TAB 25 MCG (1000 UNIT) 1000 UNITS: 25 TAB at 08:58

## 2024-02-27 RX ADMIN — LISINOPRIL 10 MG: 10 TABLET ORAL at 08:57

## 2024-02-27 RX ADMIN — Medication 3 MG: at 20:56

## 2024-02-27 RX ADMIN — QUETIAPINE 600 MG: 400 TABLET, FILM COATED, EXTENDED RELEASE ORAL at 20:55

## 2024-02-27 RX ADMIN — OXCARBAZEPINE 150 MG: 150 TABLET, FILM COATED ORAL at 20:55

## 2024-02-27 RX ADMIN — CYANOCOBALAMIN TAB 500 MCG 500 MCG: 500 TAB at 08:57

## 2024-02-27 NOTE — NURSING NOTE
Patient observed sleeping during the night. Non labored breathing noted. No distress or behaviors. Safety checks continue.

## 2024-02-27 NOTE — NURSING NOTE
Patient visible on unit. Patient rambling and loud at times. Easily redirected. Patient social with staff,Denies SI,HI,AVH, anxiety and depression. Medication compliant. Safety checks continue Q 7 minutes.

## 2024-02-27 NOTE — PROGRESS NOTES
02/27/24   Team Meeting   Meeting Type Daily Rounds   Team Members Present   Team Members Present Nurse;;Physician   Physician Team Member Dr Alejandro PAYTON; Panchito GONZALEZ; Dr Oz PAYTON; Dr Ashley AZEVEDO   Nursing Team Member Sabina JURADO   Social Work Team Member Issa VARGAS: Isael VARGAS   Patient/Family Present   Patient Present No   Patient's Family Present No     Manic, loud, agitated, aggressive, irritable, slept, med comp, orthopedic shoes, med selective, religiously preoccupied, paranoid, no dc date.

## 2024-02-27 NOTE — PROGRESS NOTES
"Progress Note - Freddie Graham 64 y.o. male MRN: [id number]    Unit/Bed#: [facility] 251-02 Encounter: [id number]        Subjective:   Patient seen and examined at bedside after reviewing the chart and discussing the case with the caring staff.      Patient examined at bedside.  Patient reports constipation which he attributes to starting new psych medication. Patient reports some nausea but denies any other symptoms.    Physical Exam   Vitals: Blood pressure 119/76, pulse 98, temperature 97.5 °F (36.4 °C), temperature source Temporal, resp. rate 18, height 5' 9\" (1.753 m), weight 84.6 kg (186 lb 6.4 oz), SpO2 98%.,Body mass index is 27.53 kg/m².  Constitutional: Patient in no acute distress.  HEENT: PERR, EOMI, MMM.  Cardiovascular: Normal rate and regular rhythm.    Pulmonary/Chest: Effort normal and breath sounds normal.   Abdomen: Soft, + BS, NT.    Assessment/Plan:  Freddie Graham is a(n) 64 y.o. year old male schizophrenia.     1.  Cardiac with hx HTN, HLD.  I will put the patient on lisinopril 10 mg daily.  Toprol-XL will be discontinued due to noncompliance 2/25/2024.  2.  Tobacco abuse.  NRT.  3.  Hypothyroidism.  Not on levothyroxine.  TSH normal 2/20/24.  4.  BPH.  Continue Flomax 0.4 mg daily.  5.  Bilateral foot blisters.  SurePrep twice daily.  Podiatry consulted.  Add bacitracin twice daily.  I will get surgical shoe for the patient 2/24/2024.  6.  Vitamin D deficiency.  Patient started on vitamin D supplements.  7.  Vitamin B12 deficiency.  Patient started on vitamin B12 supplements.  8. Constipation. Senakot daily and Miralax as needed.     The patient was discussed with Dr. Gallagher and he is in agreement with the above note.    "
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
## 2024-02-27 NOTE — NURSING NOTE
Patient med and meal compliant visible on unit, less irritable and labile today remains disorganized, but calm pleasant cooperative social with staff. Continues to question medications denies SI HI AVH anxiety and depression safety checks maintained

## 2024-02-28 PROCEDURE — 99232 SBSQ HOSP IP/OBS MODERATE 35: CPT | Performed by: HOSPITALIST

## 2024-02-28 RX ORDER — PALIPERIDONE 3 MG/1
3 TABLET, EXTENDED RELEASE ORAL DAILY
Status: COMPLETED | OUTPATIENT
Start: 2024-02-29 | End: 2024-02-29

## 2024-02-28 RX ORDER — PALIPERIDONE 6 MG/1
6 TABLET, EXTENDED RELEASE ORAL DAILY
Status: DISCONTINUED | OUTPATIENT
Start: 2024-03-01 | End: 2024-03-04

## 2024-02-28 RX ORDER — PALIPERIDONE 3 MG/1
3 TABLET, EXTENDED RELEASE ORAL DAILY
Status: DISCONTINUED | OUTPATIENT
Start: 2024-02-28 | End: 2024-02-28

## 2024-02-28 RX ADMIN — PALIPERIDONE 3 MG: 3 TABLET, EXTENDED RELEASE ORAL at 11:31

## 2024-02-28 RX ADMIN — LISINOPRIL 10 MG: 10 TABLET ORAL at 09:16

## 2024-02-28 RX ADMIN — QUETIAPINE 600 MG: 400 TABLET, FILM COATED, EXTENDED RELEASE ORAL at 21:29

## 2024-02-28 RX ADMIN — OXCARBAZEPINE 150 MG: 150 TABLET, FILM COATED ORAL at 17:13

## 2024-02-28 RX ADMIN — TAMSULOSIN HYDROCHLORIDE 0.4 MG: 0.4 CAPSULE ORAL at 17:13

## 2024-02-28 RX ADMIN — OXCARBAZEPINE 150 MG: 150 TABLET, FILM COATED ORAL at 21:30

## 2024-02-28 RX ADMIN — OXCARBAZEPINE 150 MG: 150 TABLET, FILM COATED ORAL at 09:18

## 2024-02-28 RX ADMIN — CYANOCOBALAMIN TAB 500 MCG 500 MCG: 500 TAB at 09:18

## 2024-02-28 RX ADMIN — TRAZODONE HYDROCHLORIDE 100 MG: 100 TABLET ORAL at 21:29

## 2024-02-28 RX ADMIN — QUETIAPINE FUMARATE 200 MG: 150 TABLET, EXTENDED RELEASE ORAL at 09:15

## 2024-02-28 RX ADMIN — Medication 3 MG: at 21:30

## 2024-02-28 RX ADMIN — BACITRACIN ZINC 1 SMALL APPLICATION: 500 OINTMENT TOPICAL at 09:14

## 2024-02-28 RX ADMIN — CHOLECALCIFEROL TAB 25 MCG (1000 UNIT) 1000 UNITS: 25 TAB at 09:18

## 2024-02-28 NOTE — PROGRESS NOTES
02/28/24   Team Meeting   Meeting Type Daily Rounds   Team Members Present   Team Members Present Physician;Nurse;;Other (Discipline and Name)   Physician Team Member Dr Asher PAYTON; Panchito GONZALEZ; Dr Oz PAYTON; Dr Ashley AZEVEDO   Nursing Team Member Yudith JURADO   Social Work Team Member Issa VARGAS; Isael VARGAS   OT Team Member Leonides JURADO   Patient/Family Present   Patient Present No   Patient's Family Present No     Cross taper to prolixin refused by pt, slept, responding to self, disorganized, layering clothing, illogical, tangential, painful constipation with meds, demanding, manic, no insight, meds adjusted-zyprexa possible, MOO, no dc date.

## 2024-02-28 NOTE — NURSING NOTE
Patient visible on the unit. Bright, pleasant and cooperative. Patient speech rambling. Social with staff. Patient internally pre occupied. Yelling at times. Denies SI,HI,AVH. Safety checks continue Q 7 minutes.

## 2024-02-28 NOTE — NURSING NOTE
Freddie presents alert and oriented, pleasant and social, and compliant with medication administration and treatment throughout the day. Pt is attending all groups and activities and asks a lot of questions regarding medications. He remained open to education provided by writer, though remained hyper focused on education provided. He reports feeling uneasy with medication adjustments, but is willing to give it a fair try. No side effects reported at this time.

## 2024-02-28 NOTE — PROGRESS NOTES
"Progress Note - Freddie Graham 64 y.o. male MRN: [id number]    Unit/Bed#: [facility] 251-02 Encounter: [id number]        Subjective:   Patient seen and examined at bedside after reviewing the chart and discussing the case with the caring staff.      Patient examined at bedside.  Patient has no acute symptoms. Patient reports painful BM. He refuses stool softener. Patient is drinking prune juice. Patient also states his ankle blisters are feeling much better.       Physical Exam   Vitals: Blood pressure 123/79, pulse 80, temperature 97.6 °F (36.4 °C), temperature source Temporal, resp. rate 18, height 5' 9\" (1.753 m), weight 84.6 kg (186 lb 6.4 oz), SpO2 97%.,Body mass index is 27.53 kg/m².  Constitutional: Patient in no acute distress.  HEENT: PERR, EOMI, MMM.  Cardiovascular: Normal rate and regular rhythm.    Pulmonary/Chest: Effort normal and breath sounds normal.   Abdomen: Soft, + BS, NT.    Assessment/Plan:  Freddie Graham is a(n) 64 y.o. year old male schizophrenia.     1.  Cardiac with hx HTN, HLD.  I will put the patient on lisinopril 10 mg daily.  Toprol-XL will be discontinued due to noncompliance 2/25/2024.  2.  Tobacco abuse.  NRT.  3.  Hypothyroidism.  Not on levothyroxine.  TSH normal 2/20/24.  4.  BPH.  Continue Flomax 0.4 mg daily.  5.  Bilateral foot blisters.  SurePrep twice daily.  Podiatry consulted.  Add bacitracin twice daily.  I will get surgical shoe for the patient 2/24/2024.  6.  Vitamin D deficiency.  Patient started on vitamin D supplements.  7.  Vitamin B12 deficiency.  Patient started on vitamin B12 supplements.  8. Constipation. Senakot daily and Miralax as needed.     The patient was discussed with Dr. Gallagher and he is in agreement with the above note.    "

## 2024-02-28 NOTE — PROGRESS NOTES
Progress Note - Behavioral Health     Freddie K Day 64 y.o. male MRN: [id number]   Unit/Bed#: [facility] 251-02 Encounter: [id number]    Assessment/Plan   Principal Problem:    Schizoaffective disorder, bipolar type (HCC)    Patient remains manic with illogical and disorganized thinking. Mood remains unstable, but he is slightly more calm today. He remains very tangential, hyperverbal, distracted, and difficult to redirect in conversation. Remains labile with irritable edge. He continues to endorse persecutory delusions and paranoia. Denies diagnosis or need for medications, but remains compliant on unit. He remains demanding and hesitant with medication changes, but agrees to cooperate with providers. Patient has not displayed improvement of mood control with Seroquel or Trileptal. Will continue taper to discontinuation of Trileptal and eventually initiate an alternative mood stabilizer for better control of opal. Will initiate cross taper of Seroquel to Invega with plan to transition to TOMAS Sustenna for ensured compliance, due to extensive history of medication noncompliance. Pt voices agreement. Impaired insight and judgment.    Treatment Plan:  - Cross taper Seroquel to Invega  - Initiate Invega 3 mg daily for psychosis and mood stabilization; titrate to 6 mg on 03/24  - Continue Seroquel  mg QAM and 600 mg QHS for psychosis and mood stabilization, begin Seroquel taper next week on 03/04  - Continue Trileptal 150 mg TID for mood stabilization; continue taper to discontinuation due to poor efficacy for mood control, and since Trileptal has evidence of reducing blood levels of Seroquel and to resolve constipation side effect  - Continue Trazodone 100 mg QHS for sleep  - Continue Senakot for constipation  - Will continue to monitor and make medication adjustments for improved mood control and behavioral control, and improved compliance after discharge    From previous admission at [facility] on 05/23, patient was  stabilized and discharged on Zyprexa, Perphenazine, Depakote, and Prazosin. Prior to this, in 2021 was stabilized on Zyprexa, Depakote, and Lithium. Reported side effects to several medications.  All current active medications have been reviewed  Encourage group therapy, milieu therapy and occupational therapy  Continue treatment with group therapy, milieu therapy and occupational therapy  Behavioral Health checks every 7 minutes      Risks / Benefits of Treatment:  Risks, benefits, and possible side effects of medications explained to patient and patient verbalizes understanding and agreement for treatment.      Behavior over the last 24 hours: unchanged.     Per staff report, patient has been visible on the unit. Attends groups and is social with selective peers. Remains disorganized, labile, slightly less irritable, rambling speech, yelling at times, but mostly pleasant and cooperative with staff. Seen responding to self at times. Seen layering clothing in bizarre manner and wearing facemask as headband. Remains illogical and tangential. Continues to question meds often. Denies SI/HI/AVH, depression, or anxiety. Remains medication and meal compliant. Slept well.    Today, patient is seen sitting in day room. He remains disheveled. He is bright on approach and maintains irritable edge throughout conversation. He remains illogical with disorganized thinking. He remains labile, very tangential, hyperverbal, and distracted, but slightly easier to redirect in conversation. He is somewhat more pleasant and calm today and more cooperative. Continues to endorse paranoid ideations and persecutory delusions about police and crisis team from events prior to admission. He continues endorse hostile feelings towards SW on unit. He continues to deny his diagnosis or need for medications, but remains compliant on unit. He remains hesitant and resistant to medication changes. He is discharge-focused and suspicious of providers  "stating, \"You just want to change my meds so you can keep me here longer.\" Reassurance and education is provided, and patient is somewhat receptive. He states he would like provider to contact and collaborate with his outpatient psychiatrist and come to an agreement about medication changes. He is agreeable to cooperate with treatment and switch from Seroquel to Invega with the plan to start TOMAS prior to discharge. Impaired insight and judgment.      Sleep: normal, improved  Appetite: normal  Medication side effects: constipation      Mental Status Evaluation:    Appearance:  age appropriate, dressed appropriately, disheveled   Behavior:  Variable, at times demanding, slightly less agitated, slightly more redirectable, easily distracted, irritable edge, somewhat more pleasant and engaging, more polite at times   Speech:  hypertalkative   Mood:  \"Good\"   Affect:  labile, reactive, mood-incongruent   Thought Process:  disorganized, illogical, circumstantial, tangential, perseverative, concrete   Associations: tangential associations   Thought Content:  persecutory delusions, Advent preoccupation, paranoid ideation   Perceptual Disturbances: denies auditory or visual hallucinations when asked, does not appear responding to internal stimuli, does not appear internally preoccupied   Risk Potential: Suicidal ideation - None  Homicidal ideation - None at present   Sensorium:  oriented to person, place, and time/date, not oriented to situation         Memory:  recent and remote memory impaired      Consciousness:  alert and awake      Attention: poor concentration and poor attention span      Insight:  significantly impaired      Judgment: significantly impaired      Gait/Station: normal gait/station      Motor Activity: no abnormal movements     Vital signs in last 24 hours:    Temp:  [97.6 °F (36.4 °C)-98.2 °F (36.8 °C)] 97.6 °F (36.4 °C)  HR:  [80-95] 80  Resp:  [18] 18  BP: (113-123)/(74-79) 123/79    Laboratory " results: I have personally reviewed all pertinent laboratory/tests results.  Most Recent Labs:   Lab Results   Component Value Date    WBC 6.24 02/17/2024    RBC 4.73 02/17/2024    HGB 14.1 02/17/2024    HCT 42.6 02/17/2024     02/17/2024    RDW 12.7 02/17/2024    NEUTROABS 4.20 02/17/2024    SODIUM 138 02/17/2024    K 3.7 02/17/2024     02/17/2024    CO2 28 02/17/2024    BUN 15 02/17/2024    CREATININE 0.98 02/17/2024    GLUC 116 02/17/2024    GLUF 94 05/27/2023    CALCIUM 9.7 02/17/2024    AST 24 02/17/2024    ALT 23 02/17/2024    ALKPHOS 60 02/17/2024    TP 6.6 02/17/2024    ALB 4.3 02/17/2024    TBILI 0.34 02/17/2024    CHOLESTEROL 139 05/27/2023    HDL 39 (L) 05/27/2023    TRIG 122 05/27/2023    LDLCALC 76 05/27/2023    NONHDLC 100 05/27/2023    VALPROICTOT <10 (L) 02/17/2024    LITHIUM 0.5 (L) 05/25/2023    AMMONIA 51 02/06/2021    [id number] 3.106 02/20/2024    FREET4 0.97 02/17/2024    RPR Non-Reactive 11/03/2019    HGBA1C 5.2 05/27/2023     05/27/2023       Current Facility-Administered Medications   Medication Dose Route Frequency Provider Last Rate    acetaminophen  650 mg Oral Q6H PRN CARLOS Calhoun      acetaminophen  650 mg Oral Q4H PRN CARLOS Calhoun      acetaminophen  975 mg Oral Q6H PRN CARLOS Calhoun      aluminum-magnesium hydroxide-simethicone  30 mL Oral Q4H PRN CARLOS Calhoun      bacitracin  1 small application Topical BID Funmi ANTONIO Milian PA-C      benztropine  1 mg Intramuscular Q4H PRN Max 6/day CARLOS Calhoun      benztropine  1 mg Oral Q4H PRN Max 6/day CARLOS Calhoun      bisacodyl  10 mg Rectal Daily PRN CARLOS Calhoun      cholecalciferol  1,000 Units Oral Daily Chai Gallagher MD      cyanocobalamin  500 mcg Oral Daily Chai Gallagher MD      hydrOXYzine HCL  50 mg Oral Q6H PRN Max 4/day CARLOS Calhoun      Or    diphenhydrAMINE  50 mg Intramuscular Q6H PRN CARLOS Calhoun      hydrOXYzine HCL  25 mg Oral Q6H PRN Max 4/day Sarah Carvalho  CARLOS      lisinopril  10 mg Oral Daily Chai Gallagher MD      LORazepam  1 mg Oral Q6H PRN Ian Reyna MD      melatonin  3 mg Oral HS Ian Reyna MD      nicotine polacrilex  2 mg Oral Q2H PRN Sarah Carvalho, CARLOS      OLANZapine  10 mg Oral Q3H PRN Max 3/day Sarah Carvalho, CRNP      Or    OLANZapine  10 mg Intramuscular Q3H PRN Max 3/day Sarah Carvalho, CRNP      OLANZapine  5 mg Oral Q3H PRN Max 6/day Sarah Carvalho, CRNP      Or    OLANZapine  5 mg Intramuscular Q3H PRN Max 6/day Sarah Carvalho, CRNP      OLANZapine  2.5 mg Oral Q3H PRN Max 8/day Sarah Carvalho, CARLOS      OXcarbazepine  150 mg Oral TID Rae Ramirez DO      paliperidone  3 mg Oral Daily Rae Ramirez DO      polyethylene glycol  17 g Oral Daily PRN Sarah Carvalho, CARLOS      propranolol  10 mg Oral Q8H PRN Sarah Carvalho, CARLOS      QUEtiapine  200 mg Oral Daily Ian Reyna MD      QUEtiapine  600 mg Oral HS Ian Reyna MD      senna-docusate sodium  1 tablet Oral Daily Joanne Cronin PA-C      tamsulosin  0.4 mg Oral Daily With Dinner CARLOS Calhoun      traZODone  100 mg Oral HS PRN Sarah Carvalho, CARLOS      traZODone  100 mg Oral HS Ian Reyna MD         Counseling / Coordination of Care:    Total floor / unit time spent today 25 minutes. Greater than 50% of total time was spent with the patient and / or family counseling and / or coordination of care. A description of counseling / coordination of care:  Patient's progress discussed with staff in treatment team meeting.  Medications, treatment progress and treatment plan reviewed with patient.    Rae Ramirez DO 02/28/24

## 2024-02-28 NOTE — PLAN OF CARE
Problem: Ineffective Coping  Goal: Participates in unit activities  Description: Interventions:  - Provide therapeutic environment   - Provide required programming   - Redirect inappropriate behaviors   Outcome: Progressing   Improved controls; attended both RT AM groups. Attentive/ minimal intrusiveness. Participated well.

## 2024-02-28 NOTE — NURSING NOTE
Patient slept well during Q 7 minute checks. Easy non labored breathing noted.No changes in medical condition. No behaviors noted. Monitoring continues.

## 2024-02-29 PROCEDURE — 99232 SBSQ HOSP IP/OBS MODERATE 35: CPT | Performed by: HOSPITALIST

## 2024-02-29 RX ADMIN — Medication 3 MG: at 21:02

## 2024-02-29 RX ADMIN — CHOLECALCIFEROL TAB 25 MCG (1000 UNIT) 1000 UNITS: 25 TAB at 08:19

## 2024-02-29 RX ADMIN — TAMSULOSIN HYDROCHLORIDE 0.4 MG: 0.4 CAPSULE ORAL at 16:46

## 2024-02-29 RX ADMIN — OXCARBAZEPINE 150 MG: 150 TABLET, FILM COATED ORAL at 21:02

## 2024-02-29 RX ADMIN — OXCARBAZEPINE 150 MG: 150 TABLET, FILM COATED ORAL at 16:46

## 2024-02-29 RX ADMIN — CYANOCOBALAMIN TAB 500 MCG 500 MCG: 500 TAB at 08:18

## 2024-02-29 RX ADMIN — QUETIAPINE 600 MG: 400 TABLET, FILM COATED, EXTENDED RELEASE ORAL at 21:02

## 2024-02-29 RX ADMIN — BACITRACIN ZINC 1 SMALL APPLICATION: 500 OINTMENT TOPICAL at 08:19

## 2024-02-29 RX ADMIN — PALIPERIDONE 3 MG: 3 TABLET, EXTENDED RELEASE ORAL at 08:19

## 2024-02-29 RX ADMIN — TRAZODONE HYDROCHLORIDE 100 MG: 100 TABLET ORAL at 21:02

## 2024-02-29 RX ADMIN — OXCARBAZEPINE 150 MG: 150 TABLET, FILM COATED ORAL at 08:18

## 2024-02-29 RX ADMIN — QUETIAPINE FUMARATE 200 MG: 150 TABLET, EXTENDED RELEASE ORAL at 08:18

## 2024-02-29 RX ADMIN — LISINOPRIL 10 MG: 10 TABLET ORAL at 08:19

## 2024-02-29 RX ADMIN — BACITRACIN ZINC 1 SMALL APPLICATION: 500 OINTMENT TOPICAL at 18:41

## 2024-02-29 RX ADMIN — SENNOSIDES AND DOCUSATE SODIUM 1 TABLET: 8.6; 5 TABLET ORAL at 08:19

## 2024-02-29 NOTE — NURSING NOTE
Patient is alert verbal and oriented in conversation. Mood is labile but he remains cooperative and bright with this nurse. He denies suicidal or homicidal thoughts. Patient has loose associations of thought content. Medications were reviewed with patient and he took all as prescribed. He showed this nurse pictures/art he has been working on while on the unit. Denies suicidal or homicidal thoughts. Paranoia persists. Patient denies any needs at this time. Q 7 minute safety checks maintained. Will continue with plan of care.

## 2024-02-29 NOTE — SOCIAL WORK
Hi and CM met with pt for verbal ROIs for pt mother, [facility] as pt was eating lunch and unable to sign ROIs.    Hi returned contact to Loan [phone] [facility]; sb left.     Contact made to Dimple Graham (Mother) [phone]; sb left.

## 2024-02-29 NOTE — SOCIAL WORK
[facility] [phone] [facility] returned contact to dean regarding possible resources for pt post dc. [facility] through text messages is the only transportation resource. Pt does not qualify for special mode transportation resource due to pts ability to independently walk. Pt can also use Medicaid transport resources which is provided on pt AVS. Call ended mutually.

## 2024-02-29 NOTE — PROGRESS NOTES
"Progress Note - Freddie Graham 64 y.o. male MRN: [id number]    Unit/Bed#: [facility] 251-02 Encounter: [id number]        Subjective:   Patient seen and examined at bedside after reviewing the chart and discussing the case with the caring staff.      Patient examined at bedside.  Patient has no acute symptoms.      Physical Exam   Vitals: Blood pressure 131/74, pulse 93, temperature 97.5 °F (36.4 °C), temperature source Temporal, resp. rate 17, height 5' 9\" (1.753 m), weight 84.6 kg (186 lb 6.4 oz), SpO2 94%.,Body mass index is 27.53 kg/m².  Constitutional: Patient in no acute distress.  HEENT: PERR, EOMI, MMM.  Cardiovascular: Normal rate and regular rhythm.    Pulmonary/Chest: Effort normal and breath sounds normal.   Abdomen: Soft, + BS, NT.    Assessment/Plan:  Freddie Graham is a(n) 64 y.o. year old male schizophrenia.     1.  Cardiac with hx HTN, HLD.  I will put the patient on lisinopril 10 mg daily.  Toprol-XL will be discontinued due to noncompliance 2/25/2024.  2.  Tobacco abuse.  NRT.  3.  Hypothyroidism.  Not on levothyroxine.  TSH normal 2/20/24.  4.  BPH.  Continue Flomax 0.4 mg daily.  5.  Bilateral foot blisters.  SurePrep twice daily.  Podiatry consulted.  Add bacitracin twice daily.  I will get surgical shoe for the patient 2/24/2024.  6.  Vitamin D deficiency.  Patient started on vitamin D supplements.  7.  Vitamin B12 deficiency.  Patient started on vitamin B12 supplements.  8. Constipation. Senakot daily and Miralax as needed.     The patient was discussed with Dr. Gallagher and he is in agreement with the above note.    "

## 2024-02-29 NOTE — SOCIAL WORK
Per pt OP [facility] - PACT team number 3 SW, Ext: [phone], Number: [phone] can be asked to maybe put in a referral for transport services and have info about other OP services

## 2024-02-29 NOTE — PLAN OF CARE
Problem: Depression  Goal: Refrain from harming self  Description: Interventions:  - Monitor patient closely, per order   - Supervise medication ingestion, monitor effects and side effects   Outcome: Progressing     Problem: Risk for Violence/Aggression Toward Others  Goal: Control angry outbursts  Description: Interventions:  - Monitor patient closely, per order  - Ensure early verbal de-escalation  - Monitor prn medication needs  - Set reasonable/therapeutic limits, outline behavioral expectations, and consequences   - Provide a non-threatening milieu, utilizing the least restrictive interventions   Outcome: Progressing     Problem: Ineffective Coping  Goal: Demonstrates healthy coping skills  Outcome: Progressing  Goal: Participates in unit activities  Description: Interventions:  - Provide therapeutic environment   - Provide required programming   - Redirect inappropriate behaviors   Outcome: Progressing  Goal: Understands least restrictive measures  Description: Interventions:  - Utilize least restrictive behavior  Outcome: Progressing  Goal: Free from restraint events  Description: - Utilize least restrictive measures   - Provide behavioral interventions   - Redirect inappropriate behaviors   Outcome: Progressing

## 2024-02-29 NOTE — PROGRESS NOTES
02/29/24    Team Meeting   Meeting Type Daily Rounds   Team Members Present   Team Members Present Physician;Nurse;;Occupational Therapist   Physician Team Member Dr Asher PAYTON; Panchito GONZALEZ; Dr Oz PAYTON; Dr Ashley AZEVEDO   Nursing Team Member Raymundo RN   Social Work Team Member Issa VARGAS; Isael VARGAS   OT Team Member Leonides JURADO   Patient/Family Present                  Other: [facility]   Patient Present Yes   Patient's Family Present No     Labile, some improvement, paranoid, disorganized, wrote letter to nurse with improved insight showing, agreeable to cross taper to Invega with TOMAS plan, OP provider agreeable to med adjust; no dc date.

## 2024-02-29 NOTE — NURSING NOTE
Patient was pleasant and cooperative, relates with staff and peers appropriately, except occasional load voice. Attended group with other peers. Speech is clear but seen talking to himself. Thought process is disorganized, mood is happy and affect is bright. Denies SI HI, depression and anxiety. Q- 7 minute safety check done and continue.

## 2024-02-29 NOTE — PROGRESS NOTES
Progress Note - Behavioral Health     [facility] Day 64 y.o. male MRN: [id number]   Unit/Bed#: [facility] 251-02 Encounter: [id number]    Assessment/Plan   Principal Problem:    Schizoaffective disorder, bipolar type (HCC)    Patient is more pleasant, polite, and cooperative today and less irritable. Remains illogical with disorganized thinking. Brighter affect. He remains tangential, hyperverbal, distracted, but easier to redirect in conversation. He continues to endorse persecutory delusions and paranoia. Poor reasoning ability and remains unable to reality test. Denies diagnosis or need for medications, but agrees to remain compliant. He is more agreeable and cooperative with medication adjustments. Pt has not displayed improvement of mood control with Seroquel or Trileptal. Will continue taper to discontinuation of Trileptal and eventually initiate an alternative mood stabilizer for better control of opal. Continue cross taper of Seroquel to Invega with plan to transition to TOMAS Sustenna for ensured compliance, due to extensive history of medication noncompliance. Pt voices agreement with plan. Impaired insight and judgment.    Treatment Plan:  - Cross taper Seroquel to Invega  - Continue Invega 3 mg daily for psychosis and mood stabilization; titrate to 6 mg on 03/01  - Continue Seroquel  mg QAM and 600 mg QHS for psychosis and mood stabilization, begin Seroquel taper next week on 03/04  - Continue Trileptal 150 mg TID for mood stabilization; continue taper to discontinuation due to poor efficacy for mood control, and since Trileptal has evidence of reducing blood levels of Seroquel and to resolve constipation side effect  - Plan to initiate mood stabilizer  - Continue Trazodone 100 mg QHS for sleep  - Continue Senakot for constipation  - Will continue to monitor and make medication adjustments for improved mood control and behavioral control, and improved compliance after discharge    From previous admission at  "[facility] on 05/23, patient was stabilized and discharged on Zyprexa, Perphenazine, Depakote, and Prazosin. Prior to this, in 2021 was stabilized on Zyprexa, Depakote, and Lithium. Reported side effects to several medications.  All current active medications have been reviewed  Encourage group therapy, milieu therapy and occupational therapy  Continue treatment with group therapy, milieu therapy and occupational therapy  Behavioral Health checks every 7 minutes      Risks / Benefits of Treatment:  Risks, benefits, and possible side effects of medications explained to patient and patient verbalizes understanding and agreement for treatment.      Behavior over the last 24 hours: unchanged.     Per staff report, patient has been visible on the unit. Participated well in groups with less intrusiveness. Social with selective peers. Bright on approach. Remains labile and paranoid at times, but is more pleasant and cooperative with staff. Loose associations. Asking staff many medication questions and is hyper focused on this education. Remains uneasy with med adjustments, but is more open. Wrote letter to nursing showing improved insight. Denies SI/HI/AVH, depression, or anxiety. Remains medication and meal compliant. Slept well.    Today, patient is seen sitting in day room. He remains disheveled. He is bright on approach. More pleasant, calm, and polite during conversation and no longer irritable. Remains easily distracted. He remains illogical with somewhat disorganized thinking. He remains labile, tangential, and hyperverbal, but is easier to redirect in conversation. He continues to endorse paranoid and persecutory delusions about recent abuse from police, the crisis team, and \"whoever keeps calling crisis on me to throw me in here.\" He mentions being followed at times when he is driving his car. He remains illogical with poor reality testing. He has distorted memory of previous events preceding IP admission. He " "continues to deny his diagnosis or need for medications, but agrees to remain compliant. When informed that provider discussed treatment plan with his outpatient psychiatrist, whom was in agreement with the plan, patient is satisfied and more agreeable with plan. He is more open to medication adjustments and asks if it will hasten discharge. He is agreeable to cooperate with treatment plan and switch from Seroquel to Invega with the plan to start TOMAS prior to discharge. Impaired insight and judgment.      Sleep: normal, improved  Appetite: normal  Medication side effects: constipation      Mental Status Evaluation:    Appearance:  age appropriate, dressed appropriately, disheveled   Behavior:  Variable, more cooperative, calm, less agitated, more redirectable, easily distracted, more pleasant and engaging, more polite at times   Speech:  hypertalkative   Mood:  \"Good\"   Affect:  labile at times, brighter, reactive, mood-congruent   Thought Process:  illogical, circumstantial, tangential, perseverative, concrete, still somewhat disorganized   Associations: tangential associations   Thought Content:  persecutory delusions, paranoid ideation   Perceptual Disturbances: denies auditory or visual hallucinations when asked, does not appear responding to internal stimuli, does not appear internally preoccupied   Risk Potential: Suicidal ideation - None  Homicidal ideation - None at present   Sensorium:  oriented to person, place, and time/date, not oriented to situation         Memory:  recent and remote memory impaired      Consciousness:  alert and awake      Attention: poor concentration and poor attention span      Insight:  significantly impaired      Judgment: significantly impaired, slightly improving      Gait/Station: normal gait/station      Motor Activity: no abnormal movements     Vital signs in last 24 hours:    Temp:  [97.5 °F (36.4 °C)-97.6 °F (36.4 °C)] 97.5 °F (36.4 °C)  HR:  [84-98] 93  Resp:  [17-18] " 17  BP: ()/(68-85) 131/74    Laboratory results: I have personally reviewed all pertinent laboratory/tests results.  Most Recent Labs:   Lab Results   Component Value Date    WBC 6.24 02/17/2024    RBC 4.73 02/17/2024    HGB 14.1 02/17/2024    HCT 42.6 02/17/2024     02/17/2024    RDW 12.7 02/17/2024    NEUTROABS 4.20 02/17/2024    SODIUM 138 02/17/2024    K 3.7 02/17/2024     02/17/2024    CO2 28 02/17/2024    BUN 15 02/17/2024    CREATININE 0.98 02/17/2024    GLUC 116 02/17/2024    GLUF 94 05/27/2023    CALCIUM 9.7 02/17/2024    AST 24 02/17/2024    ALT 23 02/17/2024    ALKPHOS 60 02/17/2024    TP 6.6 02/17/2024    ALB 4.3 02/17/2024    TBILI 0.34 02/17/2024    CHOLESTEROL 139 05/27/2023    HDL 39 (L) 05/27/2023    TRIG 122 05/27/2023    LDLCALC 76 05/27/2023    NONHDLC 100 05/27/2023    VALPROICTOT <10 (L) 02/17/2024    LITHIUM 0.5 (L) 05/25/2023    AMMONIA 51 02/06/2021    DHE0HUECFAQQ 3.106 02/20/2024    FREET4 0.97 02/17/2024    RPR Non-Reactive 11/03/2019    HGBA1C 5.2 05/27/2023     05/27/2023       Current Facility-Administered Medications   Medication Dose Route Frequency Provider Last Rate    acetaminophen  650 mg Oral Q6H PRN CARLOS Calhoun      acetaminophen  650 mg Oral Q4H PRN CARLOS Calhoun      acetaminophen  975 mg Oral Q6H PRN CARLOS Calhoun      aluminum-magnesium hydroxide-simethicone  30 mL Oral Q4H PRN CARLOS Calhoun      bacitracin  1 small application Topical BID Funmi Milian PA-C      benztropine  1 mg Intramuscular Q4H PRN Max 6/day CARLOS Calhonu      benztropine  1 mg Oral Q4H PRN Max 6/day CARLOS Calhoun      bisacodyl  10 mg Rectal Daily PRN CARLOS Calhoun      cholecalciferol  1,000 Units Oral Daily Chai Gallagher MD      cyanocobalamin  500 mcg Oral Daily Chai Gallagher MD      hydrOXYzine HCL  50 mg Oral Q6H PRN Max 4/day CARLOS Calhoun      Or    diphenhydrAMINE  50 mg Intramuscular Q6H PRN CARLOS Calhoun      hydrOXYzine HCL   25 mg Oral Q6H PRN Max 4/day CARLOS Calhoun      lisinopril  10 mg Oral Daily Chai Gallagher MD      LORazepam  1 mg Oral Q6H PRN Ian Reyna MD      melatonin  3 mg Oral HS Ian Reyna MD      nicotine polacrilex  2 mg Oral Q2H PRN Sarah Carvalho, CARLOS      OLANZapine  10 mg Oral Q3H PRN Max 3/day CARLOS Calhoun      Or    OLANZapine  10 mg Intramuscular Q3H PRN Max 3/day Sarah Carvalho, KATENP      OLANZapine  5 mg Oral Q3H PRN Max 6/day Sarah Carvalho, CARLOS      Or    OLANZapine  5 mg Intramuscular Q3H PRN Max 6/day Sarah Carvalho, CARLOS      OLANZapine  2.5 mg Oral Q3H PRN Max 8/day CARLOS Calhoun      OXcarbazepine  150 mg Oral TID Rae Ramirez DO      [START ON 3/1/2024] paliperidone  6 mg Oral Daily Rae Ramirez DO      polyethylene glycol  17 g Oral Daily PRN CARLOS Calhoun      propranolol  10 mg Oral Q8H PRN CARLOS Calhoun      QUEtiapine  200 mg Oral Daily Ian Reyna MD      QUEtiapine  600 mg Oral HS Ian Reyna MD      senna-docusate sodium  1 tablet Oral Daily Joanne Cronin PA-C      tamsulosin  0.4 mg Oral Daily With Dinner CARLOS Calhoun      traZODone  100 mg Oral HS PRN CARLOS Calhoun      traZODone  100 mg Oral HS Ian Reyna MD         Counseling / Coordination of Care:    Total floor / unit time spent today 25 minutes. Greater than 50% of total time was spent with the patient and / or family counseling and / or coordination of care. A description of counseling / coordination of care:  Patient's progress discussed with staff in treatment team meeting.  Medications, treatment progress and treatment plan reviewed with patient.    Rae Ramirez DO 02/29/24

## 2024-02-29 NOTE — NURSING NOTE
Patient appeared to be sleeping through the night. Respirations even and unlabored. Appears to be in no distress. Maintained q7 minute safety checks.

## 2024-03-01 PROCEDURE — 99232 SBSQ HOSP IP/OBS MODERATE 35: CPT | Performed by: HOSPITALIST

## 2024-03-01 RX ORDER — QUETIAPINE 400 MG/1
400 TABLET, FILM COATED, EXTENDED RELEASE ORAL
Status: DISCONTINUED | OUTPATIENT
Start: 2024-03-01 | End: 2024-03-04

## 2024-03-01 RX ADMIN — BACITRACIN ZINC 1 SMALL APPLICATION: 500 OINTMENT TOPICAL at 17:04

## 2024-03-01 RX ADMIN — OXCARBAZEPINE 150 MG: 150 TABLET, FILM COATED ORAL at 17:04

## 2024-03-01 RX ADMIN — TRAZODONE HYDROCHLORIDE 100 MG: 100 TABLET ORAL at 21:08

## 2024-03-01 RX ADMIN — QUETIAPINE 400 MG: 400 TABLET, FILM COATED, EXTENDED RELEASE ORAL at 21:07

## 2024-03-01 RX ADMIN — OXCARBAZEPINE 150 MG: 150 TABLET, FILM COATED ORAL at 08:34

## 2024-03-01 RX ADMIN — CHOLECALCIFEROL TAB 25 MCG (1000 UNIT) 1000 UNITS: 25 TAB at 08:34

## 2024-03-01 RX ADMIN — CYANOCOBALAMIN TAB 500 MCG 500 MCG: 500 TAB at 08:34

## 2024-03-01 RX ADMIN — SENNOSIDES AND DOCUSATE SODIUM 1 TABLET: 8.6; 5 TABLET ORAL at 08:34

## 2024-03-01 RX ADMIN — BACITRACIN ZINC 1 SMALL APPLICATION: 500 OINTMENT TOPICAL at 08:34

## 2024-03-01 RX ADMIN — LISINOPRIL 10 MG: 10 TABLET ORAL at 08:34

## 2024-03-01 RX ADMIN — Medication 3 MG: at 21:07

## 2024-03-01 RX ADMIN — TAMSULOSIN HYDROCHLORIDE 0.4 MG: 0.4 CAPSULE ORAL at 17:04

## 2024-03-01 RX ADMIN — QUETIAPINE FUMARATE 200 MG: 150 TABLET, EXTENDED RELEASE ORAL at 08:34

## 2024-03-01 RX ADMIN — PALIPERIDONE 6 MG: 6 TABLET, EXTENDED RELEASE ORAL at 08:34

## 2024-03-01 RX ADMIN — OXCARBAZEPINE 150 MG: 150 TABLET, FILM COATED ORAL at 21:07

## 2024-03-01 NOTE — PLAN OF CARE
Problem: Depression  Goal: Refrain from harming self  Description: Interventions:  - Monitor patient closely, per order   - Supervise medication ingestion, monitor effects and side effects   Outcome: Progressing     Problem: Risk for Violence/Aggression Toward Others  Goal: Refrain from destructive acts on the environment or property  Outcome: Progressing  Goal: Control angry outbursts  Description: Interventions:  - Monitor patient closely, per order  - Ensure early verbal de-escalation  - Monitor prn medication needs  - Set reasonable/therapeutic limits, outline behavioral expectations, and consequences   - Provide a non-threatening milieu, utilizing the least restrictive interventions   Outcome: Progressing     Problem: Ineffective Coping  Goal: Demonstrates healthy coping skills  Outcome: Progressing  Goal: Free from restraint events  Description: - Utilize least restrictive measures   - Provide behavioral interventions   - Redirect inappropriate behaviors   Outcome: Progressing

## 2024-03-01 NOTE — TREATMENT TEAM
"Pt offered pain medication for 7/10 generalized pain. Pt pleasantly declined, stating, \"I'm an old man who's lived hard. If I don't have pain, I'd be dead.\"   "

## 2024-03-01 NOTE — NURSING NOTE
Patient had belongings dropped off on this date, they are as followed:      Remains with Patient:  Pants x 2  Shorts x 1  Boxer x 3  Long alycia x 2  Thermal shirt x 1  Pair of socks x 5  Sweater x 1  Shirt x 3  Pair of sandals x 1    Sent to Storage:  Thermal shirt x 1  Tote bag x 1    Patient was present during the inventory process

## 2024-03-01 NOTE — PLAN OF CARE
Problem: Ineffective Coping  Goal: Participates in unit activities  Description: Interventions:  - Provide therapeutic environment   - Provide required programming   - Redirect inappropriate behaviors   Outcome: Progressing   Behaviorally controlled; remains hyperverbal at times with loosely connected association. Able to redirect. Participated in RT AM groups.

## 2024-03-01 NOTE — PROGRESS NOTES
"Progress Note - Freddie Graham 64 y.o. male MRN: [id number]    Unit/Bed#: [facility] 251-02 Encounter: [id number]        Subjective:   Patient seen and examined at bedside after reviewing the chart and discussing the case with the caring staff.      Patient examined at bedside.  Patient has no acute symptoms.      Physical Exam   Vitals: Blood pressure 141/92, pulse 105, temperature 97.7 °F (36.5 °C), temperature source Temporal, resp. rate 18, height 5' 9\" (1.753 m), weight 84.6 kg (186 lb 6.4 oz), SpO2 97%.,Body mass index is 27.53 kg/m².  Constitutional: Patient in no acute distress.  HEENT: PERR, EOMI, MMM.  Cardiovascular: Normal rate and regular rhythm.    Pulmonary/Chest: Effort normal and breath sounds normal.   Abdomen: Soft, + BS, NT.    Assessment/Plan:  Freddie Graham is a(n) 64 y.o. year old male schizophrenia.     1.  Cardiac with hx HTN, HLD.  I will put the patient on lisinopril 10 mg daily.  Toprol-XL will be discontinued due to noncompliance 2/25/2024.  2.  Tobacco abuse.  NRT.  3.  Hypothyroidism.  Not on levothyroxine.  TSH normal 2/20/24.  4.  BPH.  Continue Flomax 0.4 mg daily.  5.  Bilateral foot blisters.  SurePrep twice daily.  Podiatry consulted.  Add bacitracin twice daily.  I will get surgical shoe for the patient 2/24/2024.  6.  Vitamin D deficiency.  Patient started on vitamin D supplements.  7.  Vitamin B12 deficiency.  Patient started on vitamin B12 supplements.  8. Constipation. Senakot daily and Miralax as needed.     The patient was discussed with Dr. Gallagher and he is in agreement with the above note.    "
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
## 2024-03-01 NOTE — NURSING NOTE
Patient is alert verbal and oriented in conversation. Mood is labile but he remains cooperative and bright with this nurse. Patient noted to be singing. He denies suicidal or homicidal thoughts. Medications were reviewed with patient and he took all as prescribed. Patient continues to voice concerns that his medications will cause hair loss. Patient denies any needs at this time. Q 7 minute safety checks maintained. Will continue with plan of care.

## 2024-03-01 NOTE — PROGRESS NOTES
03/01/24   Team Meeting   Meeting Type Daily Rounds   Team Members Present   Team Members Present Physician;Nurse;Occupational Therapist;   Physician Team Member Dr Asher PAYTON; Panchito GONZALEZ; Dr Oz PAYTON; Dr Ashley AZEVEDO   Nursing Team Member Richard JURADO   Social Work Team Member Issa VARGAS; Isael VARGAS   OT Team Member Leonides JURADO   Patient/Family Present   Patient Present No   Patient's Family Present No     More clear in conversation, med comp, Seroquel to invega, one small outburst (nightmare) over night, more controlled, able to attend groups, redirectable, less intrusive, tangential at times, starting to reality test, continues to be paranoid, delusional, increase Invega 6mg, decreasing Seroquel, no dc date.

## 2024-03-01 NOTE — NURSING NOTE
Patient is bright and pleasant upon approach. He reports feeling great. Pt denies all psych symptoms when asked. He is tangential and hyperverbal during conversation. Pt requires frequent redirection to remain on topic. His thoughts are scattered and abstract. Pt is participating in groups, social with peers, and respectful towards staff.

## 2024-03-01 NOTE — NURSING NOTE
Patient appeared to be sleeping through much of the night. He was heard yelling out once loudly. Patient remained in bed when this nurse responded, appeared to be a nightmare. Respirations even and unlabored. Appears to be in no distress. Maintain q7 minute safety checks.

## 2024-03-02 PROCEDURE — 99232 SBSQ HOSP IP/OBS MODERATE 35: CPT | Performed by: NURSE PRACTITIONER

## 2024-03-02 RX ADMIN — CYANOCOBALAMIN TAB 500 MCG 500 MCG: 500 TAB at 08:15

## 2024-03-02 RX ADMIN — SENNOSIDES AND DOCUSATE SODIUM 1 TABLET: 8.6; 5 TABLET ORAL at 08:14

## 2024-03-02 RX ADMIN — CHOLECALCIFEROL TAB 25 MCG (1000 UNIT) 1000 UNITS: 25 TAB at 08:15

## 2024-03-02 RX ADMIN — Medication 3 MG: at 20:40

## 2024-03-02 RX ADMIN — OXCARBAZEPINE 150 MG: 150 TABLET, FILM COATED ORAL at 08:14

## 2024-03-02 RX ADMIN — LISINOPRIL 10 MG: 10 TABLET ORAL at 08:15

## 2024-03-02 RX ADMIN — TAMSULOSIN HYDROCHLORIDE 0.4 MG: 0.4 CAPSULE ORAL at 17:51

## 2024-03-02 RX ADMIN — BACITRACIN ZINC 1 SMALL APPLICATION: 500 OINTMENT TOPICAL at 08:16

## 2024-03-02 RX ADMIN — QUETIAPINE FUMARATE 200 MG: 150 TABLET, EXTENDED RELEASE ORAL at 08:15

## 2024-03-02 RX ADMIN — OXCARBAZEPINE 150 MG: 150 TABLET, FILM COATED ORAL at 17:51

## 2024-03-02 RX ADMIN — OXCARBAZEPINE 150 MG: 150 TABLET, FILM COATED ORAL at 20:41

## 2024-03-02 RX ADMIN — TRAZODONE HYDROCHLORIDE 100 MG: 100 TABLET ORAL at 20:40

## 2024-03-02 RX ADMIN — PALIPERIDONE 6 MG: 6 TABLET, EXTENDED RELEASE ORAL at 08:14

## 2024-03-02 RX ADMIN — QUETIAPINE 400 MG: 400 TABLET, FILM COATED, EXTENDED RELEASE ORAL at 20:41

## 2024-03-02 NOTE — NURSING NOTE
Patient visible on milieu.Mood improved.Present less labile.Patient report he's feeling ok.Appropriate.Disheveled. Loud speech.Tangential.Remain paranoid.  Schedule PO medication administered as ordered.Denies HI, SI. Utilize coping  skills coloring. Appetite good.Continue on safety check.

## 2024-03-02 NOTE — NURSING NOTE
Pt remained social and hyperverbal throughout the evening. He is occupying himself with multiple artistic outlets. He is coloring and writing feverishly. Pt compliant with medications without prompting. He denies side effects.

## 2024-03-02 NOTE — PROGRESS NOTES
"Progress Note - Behavioral Health   Freddie K Day 64 y.o. male MRN: [id number]  Unit/Bed#: [facility] 251-02 Encounter: [id number]    Assessment/Plan   Principal Problem:    Schizoaffective disorder, bipolar type (HCC)      Behavior over the last 24 hours:  unchanged  Sleep: Improved  Appetite: normal  Medication side effects: No  ROS:  Chronic back pain and all other systems are negative    Freddie has been appropriate and visible in the milieu.  No behavioral outbursts.  Remains paranoid and preservative regarding police interaction prior to admission.  Believes he is ready for discharge.  Mood controlled with no agitation or irritability.  Speech tangential.  Denied AVH, nor did patient appear preoccupied.  Exhibits impaired insight and judgment.  According to nursing staff, sleep significantly improved last evening.    Mental Status Evaluation:  Appearance:  Wearing paper scrubs, long gray hair, gray beard, shaved mustache, reading Bible group table   Behavior:  Bizarre, redirectable   Speech:  tangential   Mood:  \"I am good\"   Affect:  blunted   Thought Process:  perserverative   Associations: circumstantial associations   Thought Content:  Bizarre and paranoid delusions, ruminating thoughts   Perceptual Disturbances: Denied AVH, did not appear preoccupied   Risk Potential: Suicidal Ideations none  Homicidal Ideations none  Potential for Aggression Not at present   Sensorium:  person, place, and time/date   Memory:  patient does not answer   Consciousness:  alert and awake    Attention: Decreased attention/concentration   Insight:  Impaired   Judgment: Impaired   Gait/Station: normal gait/station, normal balance, and slow   Motor Activity: no abnormal movements     Progress Toward Goals: Slowly improving.  Has had no behavioral outbursts and sleep improved.  Less labile.  Continuing cross taper from Seroquel XR to Invega with plan to ultimately transition to Invega Sustenna TOMAS to ensure medication compliance.  Will " continue with current psychotropic regimen; no discharge date at this time.    Recommended Treatment: Continue with group therapy, milieu therapy and occupational therapy.      Risks, benefits and possible side effects of Medications:   Freddie has limited understanding of risk versus benefits of medications, but agrees to take as prescribed.    Medications: all current active meds have been reviewed, continue current psychiatric medications, and current meds:   Current Facility-Administered Medications   Medication Dose Route Frequency    acetaminophen (TYLENOL) tablet 650 mg  650 mg Oral Q6H PRN    acetaminophen (TYLENOL) tablet 650 mg  650 mg Oral Q4H PRN    acetaminophen (TYLENOL) tablet 975 mg  975 mg Oral Q6H PRN    aluminum-magnesium hydroxide-simethicone (MAALOX) oral suspension 30 mL  30 mL Oral Q4H PRN    benztropine (COGENTIN) injection 1 mg  1 mg Intramuscular Q4H PRN Max 6/day    benztropine (COGENTIN) tablet 1 mg  1 mg Oral Q4H PRN Max 6/day    bisacodyl (DULCOLAX) rectal suppository 10 mg  10 mg Rectal Daily PRN    cholecalciferol (VITAMIN D3) tablet 1,000 Units  1,000 Units Oral Daily    cyanocobalamin (VITAMIN B-12) tablet 500 mcg  500 mcg Oral Daily    hydrOXYzine HCL (ATARAX) tablet 50 mg  50 mg Oral Q6H PRN Max 4/day    Or    diphenhydrAMINE (BENADRYL) injection 50 mg  50 mg Intramuscular Q6H PRN    hydrOXYzine HCL (ATARAX) tablet 25 mg  25 mg Oral Q6H PRN Max 4/day    lisinopril (ZESTRIL) tablet 10 mg  10 mg Oral Daily    LORazepam (ATIVAN) tablet 1 mg  1 mg Oral Q6H PRN    melatonin tablet 3 mg  3 mg Oral HS    nicotine polacrilex (NICORETTE) gum 2 mg  2 mg Oral Q2H PRN    OLANZapine (ZyPREXA) tablet 10 mg  10 mg Oral Q3H PRN Max 3/day    Or    OLANZapine (ZyPREXA) IM injection 10 mg  10 mg Intramuscular Q3H PRN Max 3/day    OLANZapine (ZyPREXA) tablet 5 mg  5 mg Oral Q3H PRN Max 6/day    Or    OLANZapine (ZyPREXA) IM injection 5 mg  5 mg Intramuscular Q3H PRN Max 6/day    OLANZapine (ZyPREXA)  tablet 2.5 mg  2.5 mg Oral Q3H PRN Max 8/day    OXcarbazepine (TRILEPTAL) tablet 150 mg  150 mg Oral TID    paliperidone (INVEGA) 24 hr tablet 6 mg  6 mg Oral Daily    polyethylene glycol (MIRALAX) packet 17 g  17 g Oral Daily PRN    propranolol (INDERAL) tablet 10 mg  10 mg Oral Q8H PRN    QUEtiapine (SEROquel XR) 24 hr tablet 200 mg  200 mg Oral Daily    QUEtiapine (SEROquel XR) 24 hr tablet 400 mg  400 mg Oral HS    senna-docusate sodium (SENOKOT S) 8.6-50 mg per tablet 1 tablet  1 tablet Oral Daily    tamsulosin (FLOMAX) capsule 0.4 mg  0.4 mg Oral Daily With Dinner    traZODone (DESYREL) tablet 100 mg  100 mg Oral HS PRN    traZODone (DESYREL) tablet 100 mg  100 mg Oral HS   .    Labs: I have personally reviewed all pertinent laboratory/tests results. CBC:   Lab Results   Component Value Date    WBC 6.24 02/17/2024    RBC 4.73 02/17/2024    HGB 14.1 02/17/2024    HCT 42.6 02/17/2024    MCV 90 02/17/2024     02/17/2024    MCH 29.8 02/17/2024    MCHC 33.1 02/17/2024    RDW 12.7 02/17/2024    MPV 10.3 02/17/2024    NEUTROABS 4.20 02/17/2024     CMP:   Lab Results   Component Value Date    SODIUM 138 02/17/2024    K 3.7 02/17/2024     02/17/2024    CO2 28 02/17/2024    AGAP 5 02/17/2024    BUN 15 02/17/2024    CREATININE 0.98 02/17/2024    GLUC 116 02/17/2024    GLUF 94 05/27/2023    CALCIUM 9.7 02/17/2024    AST 24 02/17/2024    ALT 23 02/17/2024    ALKPHOS 60 02/17/2024    TP 6.6 02/17/2024    ALB 4.3 02/17/2024    TBILI 0.34 02/17/2024    EGFR 80 02/17/2024     Drug Screen:   Lab Results   Component Value Date    AMPMETHUR Negative 02/17/2024    BARBTUR Negative 02/17/2024    BDZUR Negative 02/17/2024    THCUR Negative 02/17/2024    COCAINEUR Negative 02/17/2024    METHADONEUR Negative 02/17/2024    OPIATEUR Negative 02/17/2024    PCPUR Negative 02/17/2024     EKG   Lab Results   Component Value Date    VENTRATE 96 02/17/2024    ATRIALRATE 96 02/17/2024    PRINT 156 02/17/2024    QRSDINT 88  02/17/2024    QTINT 348 02/17/2024    QTCINT 439 02/17/2024    PAXIS 65 02/17/2024    QRSAXIS -74 02/17/2024    TWAVEAXIS 48 02/17/2024       Counseling / Coordination of Care  Total floor / unit time spent today 20 minutes. Greater than 50% of total time was spent with the patient and / or family counseling and / or coordination of care. A description of the counseling / coordination of care: Medication education, treatment plan, supportive therapy

## 2024-03-02 NOTE — PROGRESS NOTES
"Progress Note - Freddie Graham 64 y.o. male MRN: [id number]    Unit/Bed#: [facility] 251-02 Encounter: [id number]        Subjective:   Patient seen and examined at bedside after reviewing the chart and discussing the case with the caring staff.      Patient examined at bedside.  Patient has no acute symptoms. Patient is frustrated about being in here. He does not want to miss his mother's birthday Monday.       Physical Exam   Vitals: Blood pressure 99/64, pulse (!) 111, temperature 98 °F (36.7 °C), temperature source Temporal, resp. rate 18, height 5' 9\" (1.753 m), weight 84.6 kg (186 lb 6.4 oz), SpO2 99%.,Body mass index is 27.53 kg/m².  Constitutional: Patient in no acute distress.  HEENT: PERR, EOMI, MMM.  Cardiovascular: Normal rate and regular rhythm.    Pulmonary/Chest: Effort normal and breath sounds normal.   Abdomen: Soft, + BS, NT.    Assessment/Plan:  Freddie Graham is a(n) 64 y.o. year old male schizophrenia.     1.  Cardiac with hx HTN, HLD.  I will put the patient on lisinopril 10 mg daily.  Toprol-XL will be discontinued due to noncompliance 2/25/2024.  2.  Tobacco abuse.  NRT.  3.  Hypothyroidism.  Not on levothyroxine.  TSH normal 2/20/24.  4.  BPH.  Continue Flomax 0.4 mg daily.  5.  Bilateral foot blisters.  SurePrep twice daily.  Podiatry consulted.  Add bacitracin twice daily.  I will get surgical shoe for the patient 2/24/2024.  6.  Vitamin D deficiency.  Patient started on vitamin D supplements.  7.  Vitamin B12 deficiency.  Patient started on vitamin B12 supplements.  8. Constipation. Senakot daily and Miralax as needed.     The patient was discussed with Dr. Gallagher and he is in agreement with the above note.    "

## 2024-03-02 NOTE — PLAN OF CARE
Problem: Depression  Goal: Refrain from harming self  Description: Interventions:  - Monitor patient closely, per order   - Supervise medication ingestion, monitor effects and side effects   Outcome: Progressing     Problem: Anxiety  Goal: Anxiety is at manageable level  Description: Interventions:  - Assess and monitor patient's anxiety level.   - Monitor for signs and symptoms (heart palpitations, chest pain, shortness of breath, headaches, nausea, feeling jumpy, restlessness, irritable, apprehensive).   - Collaborate with interdisciplinary team and initiate plan and interventions as ordered.  - Schwertner patient to unit/surroundings  - Explain treatment plan  - Encourage participation in care  - Encourage verbalization of concerns/fears  - Identify coping mechanisms  - Assist in developing anxiety-reducing skills  - Administer/offer alternative therapies  - Limit or eliminate stimulants  Outcome: Progressing     Problem: Risk for Violence/Aggression Toward Others  Goal: Refrain from destructive acts on the environment or property  Outcome: Progressing  Goal: Control angry outbursts  Description: Interventions:  - Monitor patient closely, per order  - Ensure early verbal de-escalation  - Monitor prn medication needs  - Set reasonable/therapeutic limits, outline behavioral expectations, and consequences   - Provide a non-threatening milieu, utilizing the least restrictive interventions   Outcome: Progressing     Problem: Ineffective Coping  Goal: Demonstrates healthy coping skills  Outcome: Progressing

## 2024-03-03 PROCEDURE — 99232 SBSQ HOSP IP/OBS MODERATE 35: CPT | Performed by: NURSE PRACTITIONER

## 2024-03-03 RX ORDER — AMOXICILLIN 875 MG/1
875 TABLET, COATED ORAL EVERY 12 HOURS SCHEDULED
Status: DISCONTINUED | OUTPATIENT
Start: 2024-03-03 | End: 2024-03-03

## 2024-03-03 RX ORDER — AMOXICILLIN 500 MG/1
500 CAPSULE ORAL EVERY 8 HOURS SCHEDULED
Status: DISCONTINUED | OUTPATIENT
Start: 2024-03-03 | End: 2024-03-03

## 2024-03-03 RX ORDER — AMOXICILLIN 500 MG/1
500 CAPSULE ORAL EVERY 8 HOURS SCHEDULED
Status: COMPLETED | OUTPATIENT
Start: 2024-03-03 | End: 2024-03-10

## 2024-03-03 RX ORDER — DIPHENHYDRAMINE HYDROCHLORIDE AND LIDOCAINE HYDROCHLORIDE AND ALUMINUM HYDROXIDE AND MAGNESIUM HYDRO
10 KIT EVERY 4 HOURS PRN
Status: DISCONTINUED | OUTPATIENT
Start: 2024-03-03 | End: 2024-03-18 | Stop reason: HOSPADM

## 2024-03-03 RX ADMIN — CYANOCOBALAMIN TAB 500 MCG 500 MCG: 500 TAB at 09:17

## 2024-03-03 RX ADMIN — SENNOSIDES AND DOCUSATE SODIUM 1 TABLET: 8.6; 5 TABLET ORAL at 09:17

## 2024-03-03 RX ADMIN — QUETIAPINE FUMARATE 200 MG: 150 TABLET, EXTENDED RELEASE ORAL at 09:17

## 2024-03-03 RX ADMIN — CHOLECALCIFEROL TAB 25 MCG (1000 UNIT) 1000 UNITS: 25 TAB at 09:17

## 2024-03-03 RX ADMIN — PALIPERIDONE 6 MG: 6 TABLET, EXTENDED RELEASE ORAL at 09:17

## 2024-03-03 RX ADMIN — QUETIAPINE 400 MG: 400 TABLET, FILM COATED, EXTENDED RELEASE ORAL at 21:11

## 2024-03-03 RX ADMIN — AMOXICILLIN 500 MG: 500 CAPSULE ORAL at 15:50

## 2024-03-03 RX ADMIN — OXCARBAZEPINE 150 MG: 150 TABLET, FILM COATED ORAL at 21:11

## 2024-03-03 RX ADMIN — TRAZODONE HYDROCHLORIDE 100 MG: 100 TABLET ORAL at 21:11

## 2024-03-03 RX ADMIN — TAMSULOSIN HYDROCHLORIDE 0.4 MG: 0.4 CAPSULE ORAL at 15:51

## 2024-03-03 RX ADMIN — OXCARBAZEPINE 150 MG: 150 TABLET, FILM COATED ORAL at 09:17

## 2024-03-03 RX ADMIN — AMOXICILLIN 500 MG: 500 CAPSULE ORAL at 21:13

## 2024-03-03 RX ADMIN — Medication 3 MG: at 21:11

## 2024-03-03 RX ADMIN — OXCARBAZEPINE 150 MG: 150 TABLET, FILM COATED ORAL at 15:51

## 2024-03-03 RX ADMIN — LISINOPRIL 10 MG: 10 TABLET ORAL at 09:17
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
## 2024-03-03 NOTE — PLAN OF CARE
Problem: Depression  Goal: Treatment Goal: Demonstrate behavioral control of depressive symptoms, verbalize feelings of improved mood/affect, and adopt new coping skills prior to discharge  Outcome: Progressing  Goal: Refrain from harming self  Description: Interventions:  - Monitor patient closely, per order   - Supervise medication ingestion, monitor effects and side effects   Outcome: Progressing     Problem: Anxiety  Goal: Anxiety is at manageable level  Description: Interventions:  - Assess and monitor patient's anxiety level.   - Monitor for signs and symptoms (heart palpitations, chest pain, shortness of breath, headaches, nausea, feeling jumpy, restlessness, irritable, apprehensive).   - Collaborate with interdisciplinary team and initiate plan and interventions as ordered.  - [place] patient to unit/surroundings  - Explain treatment plan  - Encourage participation in care  - Encourage verbalization of concerns/fears  - Identify coping mechanisms  - Assist in developing anxiety-reducing skills  - Administer/offer alternative therapies  - Limit or eliminate stimulants  Outcome: Progressing     Problem: Risk for Violence/Aggression Toward Others  Goal: Treatment Goal: Refrain from acts of violence/aggression during length of stay, and demonstrate improved impulse control at the time of discharge  Outcome: Progressing  Goal: Refrain from destructive acts on the environment or property  Outcome: Progressing  Goal: Control angry outbursts  Description: Interventions:  - Monitor patient closely, per order  - Ensure early verbal de-escalation  - Monitor prn medication needs  - Set reasonable/therapeutic limits, outline behavioral expectations, and consequences   - Provide a non-threatening milieu, utilizing the least restrictive interventions   Outcome: Progressing     Problem: Ineffective Coping  Goal: Free from restraint events  Description: - Utilize least restrictive measures   - Provide behavioral  interventions   - Redirect inappropriate behaviors   Outcome: Progressing   See chart note

## 2024-03-03 NOTE — NURSING NOTE
Patient has been observed sleeping for the majority of the night. Non-labored breathing and no signs of distress noted.  Q7 minute checks maintained for pt safety.

## 2024-03-03 NOTE — PROGRESS NOTES
"Progress Note - Behavioral Health   Freddie K Day 64 y.o. male MRN: [id number]  Unit/Bed#: [facility] 251-02 Encounter: [id number]    Assessment/Plan   Principal Problem:    Schizoaffective disorder, bipolar type (HCC)      Behavior over the last 24 hours: Some improvement  Sleep: Improved  Appetite: normal  Medication side effects: No  ROS:  Tooth pain, headache and all other systems are negative    Freddie has been visible and appropriate in the milieu.  Less intrusive.  Continues to verbalize paranoia regarding police and select members of treatment team.  Responds well to redirection.  Sleep continues to improve.  Speech less pressured.  No behavioral outbursts.  Preservative on discharge; wants to discharge tomorrow for his mother's birthday.    Mental Status Evaluation:  Appearance:  Layered clothing, gray beard, long gray hair, shaved mustache   Behavior:  Bizarre, cooperative, redirectable   Speech:  Less pressured   Mood:  \"Okay\"   Affect:  blunted   Thought Process:  illogical and tangential   Associations: circumstantial associations   Thought Content:  Paranoid   Perceptual Disturbances: Denied AVH, did not appear preoccupied   Risk Potential: Suicidal Ideations none  Homicidal Ideations none  Potential for Aggression Not at present   Sensorium:  person, place, and time/date   Memory:  recent and remote memory grossly intact   Consciousness:  alert and awake    Attention: Decreased attention/concentration   Insight:  Impaired   Judgment: Impaired   Gait/Station: normal gait/station, normal balance, and slow   Motor Activity: no abnormal movements     Progress Toward Goals: Less manic and mood more controlled with no behavioral outbursts.  Sleep improved.  Remains perseverative on discharge and paranoid regarding police and select members of treatment team.  Continuing cross taper from Seroquel XR to Invega.  Will continue with current psychotropic regimen; patient tolerating well.  No discharge date at this " time.    Recommended Treatment: Continue with group therapy, milieu therapy and occupational therapy.      Risks, benefits and possible side effects of Medications:   Freddie has limited understanding of risk versus benefits of medications, but agrees to take as prescribed.    Medications: all current active meds have been reviewed, continue current psychiatric medications, and current meds:   Current Facility-Administered Medications   Medication Dose Route Frequency    acetaminophen (TYLENOL) tablet 650 mg  650 mg Oral Q6H PRN    acetaminophen (TYLENOL) tablet 650 mg  650 mg Oral Q4H PRN    acetaminophen (TYLENOL) tablet 975 mg  975 mg Oral Q6H PRN    aluminum-magnesium hydroxide-simethicone (MAALOX) oral suspension 30 mL  30 mL Oral Q4H PRN    amoxicillin (AMOXIL) tablet 875 mg  875 mg Oral Q12H MANNY    benztropine (COGENTIN) injection 1 mg  1 mg Intramuscular Q4H PRN Max 6/day    benztropine (COGENTIN) tablet 1 mg  1 mg Oral Q4H PRN Max 6/day    bisacodyl (DULCOLAX) rectal suppository 10 mg  10 mg Rectal Daily PRN    cholecalciferol (VITAMIN D3) tablet 1,000 Units  1,000 Units Oral Daily    cyanocobalamin (VITAMIN B-12) tablet 500 mcg  500 mcg Oral Daily    hydrOXYzine HCL (ATARAX) tablet 50 mg  50 mg Oral Q6H PRN Max 4/day    Or    diphenhydrAMINE (BENADRYL) injection 50 mg  50 mg Intramuscular Q6H PRN    diphenhydramine, lidocaine, Al/Mg hydroxide, simethicone (Magic Mouthwash) oral solution 10 mL  10 mL Swish & Spit Q4H PRN    hydrOXYzine HCL (ATARAX) tablet 25 mg  25 mg Oral Q6H PRN Max 4/day    lisinopril (ZESTRIL) tablet 10 mg  10 mg Oral Daily    LORazepam (ATIVAN) tablet 1 mg  1 mg Oral Q6H PRN    melatonin tablet 3 mg  3 mg Oral HS    nicotine polacrilex (NICORETTE) gum 2 mg  2 mg Oral Q2H PRN    OLANZapine (ZyPREXA) tablet 10 mg  10 mg Oral Q3H PRN Max 3/day    Or    OLANZapine (ZyPREXA) IM injection 10 mg  10 mg Intramuscular Q3H PRN Max 3/day    OLANZapine (ZyPREXA) tablet 5 mg  5 mg Oral Q3H PRN Max  6/day    Or    OLANZapine (ZyPREXA) IM injection 5 mg  5 mg Intramuscular Q3H PRN Max 6/day    OLANZapine (ZyPREXA) tablet 2.5 mg  2.5 mg Oral Q3H PRN Max 8/day    OXcarbazepine (TRILEPTAL) tablet 150 mg  150 mg Oral TID    paliperidone (INVEGA) 24 hr tablet 6 mg  6 mg Oral Daily    polyethylene glycol (MIRALAX) packet 17 g  17 g Oral Daily PRN    propranolol (INDERAL) tablet 10 mg  10 mg Oral Q8H PRN    QUEtiapine (SEROquel XR) 24 hr tablet 200 mg  200 mg Oral Daily    QUEtiapine (SEROquel XR) 24 hr tablet 400 mg  400 mg Oral HS    senna-docusate sodium (SENOKOT S) 8.6-50 mg per tablet 1 tablet  1 tablet Oral Daily    tamsulosin (FLOMAX) capsule 0.4 mg  0.4 mg Oral Daily With Dinner    traZODone (DESYREL) tablet 100 mg  100 mg Oral HS PRN    traZODone (DESYREL) tablet 100 mg  100 mg Oral HS   .    Labs: I have personally reviewed all pertinent laboratory/tests results. CBC:   Lab Results   Component Value Date    WBC 6.24 02/17/2024    RBC 4.73 02/17/2024    HGB 14.1 02/17/2024    HCT 42.6 02/17/2024    MCV 90 02/17/2024     02/17/2024    MCH 29.8 02/17/2024    MCHC 33.1 02/17/2024    RDW 12.7 02/17/2024    MPV 10.3 02/17/2024    NEUTROABS 4.20 02/17/2024     CMP:   Lab Results   Component Value Date    SODIUM 138 02/17/2024    K 3.7 02/17/2024     02/17/2024    CO2 28 02/17/2024    AGAP 5 02/17/2024    BUN 15 02/17/2024    CREATININE 0.98 02/17/2024    GLUC 116 02/17/2024    GLUF 94 05/27/2023    CALCIUM 9.7 02/17/2024    AST 24 02/17/2024    ALT 23 02/17/2024    ALKPHOS 60 02/17/2024    TP 6.6 02/17/2024    ALB 4.3 02/17/2024    TBILI 0.34 02/17/2024    EGFR 80 02/17/2024     Drug Screen:   Lab Results   Component Value Date    AMPMETHUR Negative 02/17/2024    BARBTUR Negative 02/17/2024    BDZUR Negative 02/17/2024    THCUR Negative 02/17/2024    COCAINEUR Negative 02/17/2024    METHADONEUR Negative 02/17/2024    OPIATEUR Negative 02/17/2024    PCPUR Negative 02/17/2024     EKG   Lab Results    Component Value Date    VENTRATE 96 02/17/2024    ATRIALRATE 96 02/17/2024    PRINT 156 02/17/2024    QRSDINT 88 02/17/2024    QTINT 348 02/17/2024    QTCINT 439 02/17/2024    PAXIS 65 02/17/2024    QRSAXIS -74 02/17/2024    TWAVEAXIS 48 02/17/2024       Counseling / Coordination of Care  Total floor / unit time spent today 25 minutes. Greater than 50% of total time was spent with the patient and / or family counseling and / or coordination of care. A description of the counseling / coordination of care: Medication education, treatment plan, safety planning

## 2024-03-03 NOTE — NURSING NOTE
Patient visible on milieu.Remain paranoid.Pleasant and cooperative.  Schedule PO medication administered as ordered.Cooperative with mouth checks Denies HI,SI.Appetite good.Continue on safety check.   No/Unable to asses

## 2024-03-04 PROCEDURE — 99232 SBSQ HOSP IP/OBS MODERATE 35: CPT | Performed by: PSYCHIATRY & NEUROLOGY

## 2024-03-04 RX ORDER — OXCARBAZEPINE 150 MG/1
150 TABLET, FILM COATED ORAL EVERY 12 HOURS SCHEDULED
Status: DISCONTINUED | OUTPATIENT
Start: 2024-03-04 | End: 2024-03-06

## 2024-03-04 RX ADMIN — OXCARBAZEPINE 150 MG: 150 TABLET, FILM COATED ORAL at 20:32

## 2024-03-04 RX ADMIN — PALIPERIDONE 6 MG: 6 TABLET, EXTENDED RELEASE ORAL at 08:08

## 2024-03-04 RX ADMIN — CHOLECALCIFEROL TAB 25 MCG (1000 UNIT) 1000 UNITS: 25 TAB at 08:08

## 2024-03-04 RX ADMIN — CYANOCOBALAMIN TAB 500 MCG 500 MCG: 500 TAB at 08:08

## 2024-03-04 RX ADMIN — OXCARBAZEPINE 150 MG: 150 TABLET, FILM COATED ORAL at 08:08

## 2024-03-04 RX ADMIN — TRAZODONE HYDROCHLORIDE 100 MG: 100 TABLET ORAL at 20:32

## 2024-03-04 RX ADMIN — QUETIAPINE FUMARATE 200 MG: 150 TABLET, EXTENDED RELEASE ORAL at 08:07

## 2024-03-04 RX ADMIN — QUETIAPINE FUMARATE 200 MG: 150 TABLET, EXTENDED RELEASE ORAL at 20:32

## 2024-03-04 RX ADMIN — TAMSULOSIN HYDROCHLORIDE 0.4 MG: 0.4 CAPSULE ORAL at 17:14

## 2024-03-04 RX ADMIN — AMOXICILLIN 500 MG: 500 CAPSULE ORAL at 05:41

## 2024-03-04 RX ADMIN — LORAZEPAM 1 MG: 1 TABLET ORAL at 17:13

## 2024-03-04 RX ADMIN — AMOXICILLIN 500 MG: 500 CAPSULE ORAL at 13:57

## 2024-03-04 RX ADMIN — AMOXICILLIN 500 MG: 500 CAPSULE ORAL at 20:31

## 2024-03-04 RX ADMIN — SENNOSIDES AND DOCUSATE SODIUM 1 TABLET: 8.6; 5 TABLET ORAL at 08:08

## 2024-03-04 RX ADMIN — LISINOPRIL 10 MG: 10 TABLET ORAL at 08:08

## 2024-03-04 RX ADMIN — Medication 3 MG: at 20:31

## 2024-03-04 NOTE — PLAN OF CARE
Problem: Depression  Goal: Treatment Goal: Demonstrate behavioral control of depressive symptoms, verbalize feelings of improved mood/affect, and adopt new coping skills prior to discharge  Outcome: Progressing  Goal: Refrain from harming self  Description: Interventions:  - Monitor patient closely, per order   - Supervise medication ingestion, monitor effects and side effects   Outcome: Progressing     Problem: Risk for Violence/Aggression Toward Others  Goal: Treatment Goal: Refrain from acts of violence/aggression during length of stay, and demonstrate improved impulse control at the time of discharge  Outcome: Progressing  Goal: Refrain from destructive acts on the environment or property  Outcome: Progressing  Goal: Control angry outbursts  Description: Interventions:  - Monitor patient closely, per order  - Ensure early verbal de-escalation  - Monitor prn medication needs  - Set reasonable/therapeutic limits, outline behavioral expectations, and consequences   - Provide a non-threatening milieu, utilizing the least restrictive interventions   Outcome: Progressing  See chart note

## 2024-03-04 NOTE — NURSING NOTE
"Patient has been visible on the unit.  Attended evening snack and has been interacting with select peers.  Patient is in a pleasant mood this evening.  Mood and behaviors controlled.  C/o pain related to his tooth and general aches and pains related to arthritis.  Regarding his mental status, Patient says he takes the medications, \"more so for others than myself\"  Says, \"if you ask me, everyone else is a lot crazier than I ever was\"    "

## 2024-03-04 NOTE — PROGRESS NOTES
Progress Note - Behavioral Health     Freddie K Day 64 y.o. male MRN: [id number]   Unit/Bed#: [facility] 251-02 Encounter: [id number]    Assessment/Plan   Principal Problem:    Schizoaffective disorder, bipolar type (HCC)    Patient is more irritable with mood lability today. Remains illogical with poor reasoning ability and is unable to reality test. He remains tangential and hyperverbal, but is less distracted and somewhat easier to redirect. He becomes easily agitated and demanding during conversation. He continues to endorse persecutory and paranoid delusions. He continues to deny his diagnosis or need for medications. He is more dismissive and less cooperative with medication adjustments. Since he has not displayed improvement of mood control with Seroquel or Trileptal, we will continue to taper off Trileptal with plan to initiate an alternative mood stabilizer for better control of opal. Will continue cross taper of Seroquel to Invega with plan to transition to TOMAS Sustenna for ensured compliance, due to extensive history of medication noncompliance. Impaired insight and judgment.    Treatment Plan:  - Cross taper Seroquel to Invega  - Increase Invega 9 mg daily for psychosis and mood stabilization  - Decrease Seroquel XR to 200 mg QAM and 200 mg QHS for psychosis and mood stabilization, continue Seroquel taper  - Taper Trileptal 150 mg BID for mood stabilization; continue taper due to poor efficacy for mood control, and since Trileptal has evidence of reducing blood levels of Seroquel and to resolve constipation side effect  - Plan to initiate Depakote or Lithium  - Continue Trazodone 100 mg QHS for sleep  - Continue Senakot for constipation  - Will continue to monitor and make medication adjustments for improved mood control and behavioral control, and improved compliance after discharge  - 304 hearing Friday    From previous admission at [facility] on 05/23, patient was stabilized and discharged on Zyprexa,  "Perphenazine, Depakote, and Prazosin. Prior to this, in 2021 was stabilized on Zyprexa, Depakote, and Lithium. Reported side effects to several medications.  All current active medications have been reviewed  Encourage group therapy, milieu therapy and occupational therapy  Continue treatment with group therapy, milieu therapy and occupational therapy  Behavioral Health checks every 7 minutes      Risks / Benefits of Treatment:  Risks, benefits, and possible side effects of medications explained to patient and patient verbalizes understanding and agreement for treatment.      Behavior over the last 24 hours: slowly improving.     Per staff report, patient has been visible and interactive on the unit. Social with peers. Bright on approach. More redirectable. Remains delusional and paranoid at times, but is pleasant and cooperative with staff. Mood and behaviors controlled. Denies SI/HI/AVH, depression, or anxiety. Remains medication and meal compliant. Slept well.    Today, patient is seen reading in his room. He remains disheveled. He is irritable on approach. He dismissive during conversation and becomes very agitated when informed of 304 hearing. He remains hyperverbal, tangential, but is less distracted and is somewhat easier to redirect in conversation. He remains illogical with poor reasoning ability and is unable to reality test. He continues to endorse paranoid and persecutory delusions about the police, \"crisis intervention,\" \"the system,\" and \"you doctors.\" He continues to state, \"I'm not even supposed to be here. I called the police on someone else, but when they came they and abused me and arrested me, because I'm in the system and someone called crisis intervention and lied about me.\" He continues to have a distorted memory of events preceding IP admission. He continues to deny his diagnosis or need for medications, but agrees to remain compliant. He is very demanding about his treatment and wants to " "speak with his . Impaired insight and judgment.    Sleep: normal  Appetite: normal  Medication side effects: constipation, improving      Mental Status Evaluation:    Appearance:  age appropriate, dressed appropriately, disheveled, poor hygiene   Behavior:  Variable, demanding, easily agitated, somewhat cooperative, slightly more redirectable, sarcastic, dismissive   Speech:  hypertalkative, less garbled, more coherent   Mood:  \"horrible\"   Affect:  labile, bright at times, irritable edge, reactive   Thought Process:  illogical, circumstantial, tangential   Associations: tangential associations   Thought Content:  persecutory delusions, paranoid ideation   Perceptual Disturbances: denies auditory or visual hallucinations when asked, does not appear responding to internal stimuli, does not appear internally preoccupied   Risk Potential: Suicidal ideation - None  Homicidal ideation - None at present   Sensorium:  oriented to person, place, and time/date, not oriented to situation         Memory:  recent and remote memory impaired      Consciousness:  alert and awake      Attention: attention span and concentration are improving      Insight:  significantly impaired      Judgment: significantly impaired      Gait/Station: normal gait/station      Motor Activity: no abnormal movements     Vital signs in last 24 hours:    Temp:  [97.5 °F (36.4 °C)] 97.5 °F (36.4 °C)  HR:  [83-93] 89  Resp:  [16-18] 16  BP: (117-122)/(66-76) 118/76    Laboratory results: I have personally reviewed all pertinent laboratory/tests results.  Most Recent Labs:   Lab Results   Component Value Date    WBC 6.24 02/17/2024    RBC 4.73 02/17/2024    HGB 14.1 02/17/2024    HCT 42.6 02/17/2024     02/17/2024    RDW 12.7 02/17/2024    NEUTROABS 4.20 02/17/2024    SODIUM 138 02/17/2024    K 3.7 02/17/2024     02/17/2024    CO2 28 02/17/2024    BUN 15 02/17/2024    CREATININE 0.98 02/17/2024    GLUC 116 02/17/2024    GLUF 94 " 05/27/2023    CALCIUM 9.7 02/17/2024    AST 24 02/17/2024    ALT 23 02/17/2024    ALKPHOS 60 02/17/2024    TP 6.6 02/17/2024    ALB 4.3 02/17/2024    TBILI 0.34 02/17/2024    CHOLESTEROL 139 05/27/2023    HDL 39 (L) 05/27/2023    TRIG 122 05/27/2023    LDLCALC 76 05/27/2023    NONHDLC 100 05/27/2023    VALPROICTOT <10 (L) 02/17/2024    LITHIUM 0.5 (L) 05/25/2023    AMMONIA 51 02/06/2021    LZX4VHTTLNMJ 3.106 02/20/2024    FREET4 0.97 02/17/2024    RPR Non-Reactive 11/03/2019    HGBA1C 5.2 05/27/2023     05/27/2023       Current Facility-Administered Medications   Medication Dose Route Frequency Provider Last Rate    acetaminophen  650 mg Oral Q6H PRN CARLOS Calhoun      acetaminophen  650 mg Oral Q4H PRN CARLOS Calhoun      acetaminophen  975 mg Oral Q6H PRN CARLOS Calhoun      aluminum-magnesium hydroxide-simethicone  30 mL Oral Q4H PRN CARLOS Calhoun      amoxicillin  500 mg Oral Q8H MANYN Joanne Cronin PA-C      benztropine  1 mg Intramuscular Q4H PRN Max 6/day CARLOS Calhoun      benztropine  1 mg Oral Q4H PRN Max 6/day CARLOS Calhoun      bisacodyl  10 mg Rectal Daily PRN CARLOS Calhoun      cholecalciferol  1,000 Units Oral Daily Chai Gallagher MD      cyanocobalamin  500 mcg Oral Daily Chai Gallagher MD      hydrOXYzine HCL  50 mg Oral Q6H PRN Max 4/day CARLOS Calhoun      Or    diphenhydrAMINE  50 mg Intramuscular Q6H PRN CARLOS Calhoun      diphenhydramine, lidocaine, Al/Mg hydroxide, simethicone  10 mL Swish & Spit Q4H PRN Joanne Cronin PA-C      hydrOXYzine HCL  25 mg Oral Q6H PRN Max 4/day CARLOS Calhoun      lisinopril  10 mg Oral Daily Chai Gallagher MD      LORazepam  1 mg Oral Q6H PRN Ian Reyna MD      melatonin  3 mg Oral HS Ian Reyna MD      nicotine polacrilex  2 mg Oral Q2H PRN CARLOS Calhoun      OLANZapine  10 mg Oral Q3H PRN Max 3/day CARLOS Calhoun      Or    OLANZapine  10 mg Intramuscular Q3H  PRN Max 3/day CARLOS Calhoun      OLANZapine  5 mg Oral Q3H PRN Max 6/day CARLOS Calhoun      Or    OLANZapine  5 mg Intramuscular Q3H PRN Max 6/day CARLOS Calhoun      OLANZapine  2.5 mg Oral Q3H PRN Max 8/day CARLOS Calhoun      OXcarbazepine  150 mg Oral Q12H [facility] Rae Ramirez DO      [START ON 3/5/2024] paliperidone  9 mg Oral Daily Rae Ramirez DO      polyethylene glycol  17 g Oral Daily PRN CARLOS Calhoun      propranolol  10 mg Oral Q8H PRN CARLOS Calhoun      QUEtiapine  200 mg Oral Daily Ian Reyna MD      QUEtiapine  200 mg Oral HS Rae Ramirez DO      senna-docusate sodium  1 tablet Oral Daily Joanne Cronin PA-C      tamsulosin  0.4 mg Oral Daily With Dinner CARLOS Calhoun      traZODone  100 mg Oral HS PRN CARLOS Calhoun      traZODone  100 mg Oral HS Ian Reyna MD         Counseling / Coordination of Care:    Total floor / unit time spent today 25 minutes. Greater than 50% of total time was spent with the patient and / or family counseling and / or coordination of care. A description of counseling / coordination of care:  Patient's progress discussed with staff in treatment team meeting.  Medications, treatment progress and treatment plan reviewed with patient.    Rae Ramirez DO 03/04/24

## 2024-03-04 NOTE — SOCIAL WORK
Nika Romero [email or web address] confirmed receipt of 304 via fax, hearing scheduled Friday 8am.

## 2024-03-04 NOTE — PROGRESS NOTES
03/04/24   Team Meeting   Meeting Type Daily Rounds   Team Members Present   Team Members Present Physician;Occupational Therapist;;Nurse   Physician Team Member Dr Maribell PAYTON; Panchito GONZALEZ; Dr Ashley AZEVEDO   Nursing Team Member Yudith JURADO   Social Work Team Member Issa VARGAS   OT Team Member Heather GARZA   Patient/Family Present   Patient Present No   Patient's Family Present No     Controlled, no behaviors, tooth pain being tx by medical, med comp, slept, redirectable, increase Invega 9mg, decrease Seroquel and trileptal, starting mood stabilizer this week, Invega TOMAS plan, 304 needs to be submitted today; no dc date.

## 2024-03-04 NOTE — TREATMENT TEAM
"Patient given 1 mg Ativan for severe agitation. Patient yelling in halls about medication changes wanting to leave and his \"patient rights\"    03/04/24 7159   [place] Violence Checklist   Assessment type Reassessment   Irritability 1   Confusion 0   Boisterousness 1   Threatening physical violence 0   Verbal threats 1   Violence 0   Broset score 3       "

## 2024-03-04 NOTE — NURSING NOTE
Patient med and meal compliant Visible on unit agitated and irritable all shift, several outbursts yelling in coto about medications doctors and social workers. Med list and educations given again to patient. Zan TAYLOR anxiety and depression safety checks maintained.

## 2024-03-04 NOTE — PROGRESS NOTES
"Progress Note - Freddie Graham 64 y.o. male MRN: [id number]    Unit/Bed#: [facility] 251-02 Encounter: [id number]        Subjective:   Patient seen and examined at bedside after reviewing the chart and discussing the case with the caring staff.      Patient examined at bedside.  Patient has no acute complaints.       Physical Exam   Vitals: Blood pressure 118/76, pulse 89, temperature 97.5 °F (36.4 °C), temperature source Temporal, resp. rate 16, height 5' 9\" (1.753 m), weight 90.4 kg (199 lb 3.2 oz), SpO2 97%.,Body mass index is 29.42 kg/m².  Constitutional: Patient in no acute distress.  HEENT: PERR, EOMI, MMM.  Cardiovascular: Normal rate and regular rhythm.    Pulmonary/Chest: Effort normal and breath sounds normal.   Abdomen: Soft, + BS, NT.    Assessment/Plan:  Freddie Graham is a(n) 64 y.o. year old male schizophrenia.     1.  Cardiac with hx HTN, HLD.  I will put the patient on lisinopril 10 mg daily.  Toprol-XL will be discontinued due to noncompliance 2/25/2024.  2.  Tobacco abuse.  NRT.  3.  Hypothyroidism.  Not on levothyroxine.  TSH normal 2/20/24.  4.  BPH.  Continue Flomax 0.4 mg daily.  5.  Bilateral foot blisters.  SurePrep twice daily.  Podiatry consulted.  Add bacitracin twice daily.  I will get surgical shoe for the patient 2/24/2024.  6.  Vitamin D deficiency.  Patient started on vitamin D supplements.  7.  Vitamin B12 deficiency.  Patient started on vitamin B12 supplements.  8. Constipation. Senakot daily and Miralax as needed.   9. Dental caries/infection. Start patient on amoxicillin 500 mg TID for 7 days and magic mouthwash as needed. F/u with dentist outpatient.     The patient was discussed with Dr. Gallagher and he is in agreement with the above note.    "

## 2024-03-04 NOTE — SOCIAL WORK
Hi, RN met with pt to review 304 petition. Pt has limited understanding of rights due to current MH symptoms. Copy of 304 petition provided to pt per pt request. Pt continues to present labile, tangential.     304 request, rights, ACT [phone] made via fax to [facility] [phone] for Friday AM hearing.

## 2024-03-05 PROCEDURE — 99232 SBSQ HOSP IP/OBS MODERATE 35: CPT | Performed by: PSYCHIATRY & NEUROLOGY

## 2024-03-05 RX ORDER — LITHIUM CARBONATE 300 MG/1
300 TABLET, FILM COATED, EXTENDED RELEASE ORAL EVERY 12 HOURS SCHEDULED
Status: DISCONTINUED | OUTPATIENT
Start: 2024-03-05 | End: 2024-03-10

## 2024-03-05 RX ORDER — BISACODYL 10 MG
10 SUPPOSITORY, RECTAL RECTAL DAILY PRN
Status: DISCONTINUED | OUTPATIENT
Start: 2024-03-05 | End: 2024-03-18 | Stop reason: HOSPADM

## 2024-03-05 RX ADMIN — CYANOCOBALAMIN TAB 500 MCG 500 MCG: 500 TAB at 08:24

## 2024-03-05 RX ADMIN — CHOLECALCIFEROL TAB 25 MCG (1000 UNIT) 1000 UNITS: 25 TAB at 08:24

## 2024-03-05 RX ADMIN — TAMSULOSIN HYDROCHLORIDE 0.4 MG: 0.4 CAPSULE ORAL at 17:59

## 2024-03-05 RX ADMIN — QUETIAPINE FUMARATE 200 MG: 150 TABLET, EXTENDED RELEASE ORAL at 08:24

## 2024-03-05 RX ADMIN — AMOXICILLIN 500 MG: 500 CAPSULE ORAL at 06:17

## 2024-03-05 RX ADMIN — TRAZODONE HYDROCHLORIDE 100 MG: 100 TABLET ORAL at 21:51

## 2024-03-05 RX ADMIN — QUETIAPINE FUMARATE 200 MG: 150 TABLET, EXTENDED RELEASE ORAL at 21:51

## 2024-03-05 RX ADMIN — LITHIUM CARBONATE 300 MG: 300 TABLET, EXTENDED RELEASE ORAL at 21:51

## 2024-03-05 RX ADMIN — LISINOPRIL 10 MG: 10 TABLET ORAL at 08:24

## 2024-03-05 RX ADMIN — OXCARBAZEPINE 150 MG: 150 TABLET, FILM COATED ORAL at 08:24

## 2024-03-05 RX ADMIN — SENNOSIDES AND DOCUSATE SODIUM 1 TABLET: 8.6; 5 TABLET ORAL at 08:24

## 2024-03-05 RX ADMIN — OXCARBAZEPINE 150 MG: 150 TABLET, FILM COATED ORAL at 21:51

## 2024-03-05 RX ADMIN — AMOXICILLIN 500 MG: 500 CAPSULE ORAL at 14:59

## 2024-03-05 RX ADMIN — Medication 3 MG: at 21:51

## 2024-03-05 RX ADMIN — PALIPERIDONE 9 MG: 6 TABLET, EXTENDED RELEASE ORAL at 08:24

## 2024-03-05 RX ADMIN — AMOXICILLIN 500 MG: 500 CAPSULE ORAL at 21:51

## 2024-03-05 NOTE — PROGRESS NOTES
Progress Note - Behavioral Health     Freddie K Day 64 y.o. male MRN: [id number]   Unit/Bed#: [facility] 251-02 Encounter: [id number]    Assessment/Plan   Principal Problem:    Schizoaffective disorder, bipolar type (HCC)    Patient is more calm today, but maintains an irritable edge. Improved behavioral control today. Remains illogical with poor reasoning ability and is unable to reality test. He is somewhat less tangential, less hyperverbal, and less distracted and is easier to redirect. He remains somewhat easily agitated and demanding about medications. He is perseverating on upcoming court hearing and is poorly receptive to education. He continues to endorse persecutory and paranoid delusions. He continues to deny his diagnosis or need for treatment, but is cooperative with med adjustments. Impaired insight and judgment. Since he has not displayed improvement of mood control with Seroquel or Trileptal, we will continue to taper off Trileptal. Will initiate Lithium for better mood stabilization. Will continue cross taper of Seroquel to Invega with plan to transition to TOMAS Sustenna for ensured compliance, due to extensive history of medication noncompliance.    Treatment Plan:  - Cross taper Seroquel to Invega  - Continue Invega 9 mg daily for psychosis and mood stabilization  - Continue Seroquel  mg BID for psychosis and mood stabilization, continue taper  - Initiate Lithium 300 mg BID for mood stabilization  - Lithium trough level 03/08  - Continue Trileptal 150 mg BID for mood stabilization; continue taper due to poor efficacy for mood control, and since Trileptal has evidence of reducing blood levels of Seroquel and to further relieve constipation side effect  - Continue Trazodone 100 mg QHS for sleep  - Continue Senakot for constipation  - Will continue to monitor and make medication adjustments for improved mood control and behavioral control, and improved compliance after discharge  - 304 hearing  "Friday    From previous admission at [facility] on 05/23, patient was stabilized and discharged on Zyprexa, Perphenazine, Depakote, and Prazosin. Prior to this, in 2021 was stabilized on Zyprexa, Depakote, and Lithium. Reported side effects to several medications.  All current active medications have been reviewed  Encourage group therapy, milieu therapy and occupational therapy  Continue treatment with group therapy, milieu therapy and occupational therapy  Behavioral Health checks every 7 minutes      Risks / Benefits of Treatment:  Risks, benefits, and possible side effects of medications explained to patient and patient verbalizes understanding and agreement for treatment.      Behavior over the last 24 hours: slowly improving.     Per staff report, patient has been visible and social on the unit. Agitated and irritable throughout majority of yesterday. Several outbursts yesterday yelling about medications and SW, after being informed of [address] hearing. Perseverative about 304. Remains delusional with poor insight. Endorsed anger. Received PRN Ativan for severe agitation, which was effective. Poor understanding of psychoeducation provided. Denies all psych symptoms. Remains medication and meal compliant. Slept well.    Today, patient is seen sitting with peers in group activity. He remains disheveled. He is brighter on approach. He is more pleasant and calm today. He remains tangential, but is less distracted, less hyperverbal, and more redirectable. He remains preoccupied with 304 hearing and unable to understand education provided regarding reason for the hearing. He remains illogical with minimal reasoning ability and is unable to reality test. He continues to endorse paranoid and persecutory delusions about the police, \"crisis intervention,\" and staff. He is repetitive with some questions and displays poor memory. He continues to have a distorted memory of events that resulted in this  admission. He " "admits to difficulty controlling anger. He continues to deny his diagnosis or need for IP treatment, but agrees to remain compliant. He states he is interested in starting lithium, stating he was on 300 BID in the past and this was helpful for him. Impaired insight and judgment.    Sleep: normal  Appetite: normal  Medication side effects: constipation, improving      Mental Status Evaluation:    Appearance:  age appropriate, dressed appropriately, disheveled, poor hygiene   Behavior:  Variable, demanding, less easily agitated, more cooperative, more pleasant, slightly more redirectable, more polite, less distracted   Speech:  hypertalkative, more coherent   Mood:  \"Irritable\"   Affect:  Brighter, irritable edge, reactive   Thought Process:  illogical, circumstantial, tangential but slightly improving   Associations: tangential associations   Thought Content:  persecutory delusions, paranoid ideation   Perceptual Disturbances: denies auditory or visual hallucinations when asked, does not appear responding to internal stimuli, does not appear internally preoccupied   Risk Potential: Suicidal ideation - None  Homicidal ideation - None at present   Sensorium:  oriented to person, place, and time/date, not oriented to situation         Memory:  recent and remote memory impaired      Consciousness:  alert and awake      Attention: attention span and concentration are improving      Insight:  significantly impaired      Judgment: significantly impaired      Gait/Station: normal gait/station      Motor Activity: no abnormal movements     Vital signs in last 24 hours:    Temp:  [97.6 °F (36.4 °C)-98.2 °F (36.8 °C)] 97.6 °F (36.4 °C)  HR:  [] 74  Resp:  [16-18] 18  BP: (103-140)/(69-84) 140/84    Laboratory results: I have personally reviewed all pertinent laboratory/tests results.  Most Recent Labs:   Lab Results   Component Value Date    WBC 6.24 02/17/2024    RBC 4.73 02/17/2024    HGB 14.1 02/17/2024    HCT 42.6 " 02/17/2024     02/17/2024    RDW 12.7 02/17/2024    NEUTROABS 4.20 02/17/2024    SODIUM 138 02/17/2024    K 3.7 02/17/2024     02/17/2024    CO2 28 02/17/2024    BUN 15 02/17/2024    CREATININE 0.98 02/17/2024    GLUC 116 02/17/2024    GLUF 94 05/27/2023    CALCIUM 9.7 02/17/2024    AST 24 02/17/2024    ALT 23 02/17/2024    ALKPHOS 60 02/17/2024    TP 6.6 02/17/2024    ALB 4.3 02/17/2024    TBILI 0.34 02/17/2024    CHOLESTEROL 139 05/27/2023    HDL 39 (L) 05/27/2023    TRIG 122 05/27/2023    LDLCALC 76 05/27/2023    NONHDLC 100 05/27/2023    VALPROICTOT <10 (L) 02/17/2024    LITHIUM 0.5 (L) 05/25/2023    AMMONIA 51 02/06/2021    MSC5NYOUFGPB 3.106 02/20/2024    FREET4 0.97 02/17/2024    RPR Non-Reactive 11/03/2019    HGBA1C 5.2 05/27/2023     05/27/2023       Current Facility-Administered Medications   Medication Dose Route Frequency Provider Last Rate    acetaminophen  650 mg Oral Q6H PRN CARLOS Calhoun      acetaminophen  650 mg Oral Q4H PRN CARLOS Calhoun      acetaminophen  975 mg Oral Q6H PRN CARLOS Calhoun      aluminum-magnesium hydroxide-simethicone  30 mL Oral Q4H PRN CARLOS Calhoun      amoxicillin  500 mg Oral Q8H [facility] Joanne Cronin PA-C      benztropine  1 mg Intramuscular Q4H PRN Max 6/day CARLOS Calhoun      benztropine  1 mg Oral Q4H PRN Max 6/day CARLOS Calhoun      bisacodyl  10 mg Rectal Daily PRN Josefa Sterling MD      cholecalciferol  1,000 Units Oral Daily Chai Gallagher MD      cyanocobalamin  500 mcg Oral Daily Chai Gallagher MD      hydrOXYzine HCL  50 mg Oral Q6H PRN Max 4/day CARLOS Calhoun      Or    diphenhydrAMINE  50 mg Intramuscular Q6H PRN CARLOS Calhoun      diphenhydramine, lidocaine, Al/Mg hydroxide, simethicone  10 mL Swish [employer] Spit Q4H PRN Joanne Cronin PA-C      hydrOXYzine HCL  25 mg Oral Q6H PRN Max 4/day CARLOS Calhoun      lisinopril  10 mg Oral Daily [facility] MD Elliott      LORazepam  1 mg Oral Q6H PRN Ian  Shilpa Reyna MD      melatonin  3 mg Oral HS Ian Reyna MD      nicotine polacrilex  2 mg Oral Q2H PRN CARLOS Calhoun      OLANZapine  10 mg Oral Q3H PRN Max 3/day CARLOS Calhoun      Or    OLANZapine  10 mg Intramuscular Q3H PRN Max 3/day Sarah Carvalho, CARLOS      OLANZapine  5 mg Oral Q3H PRN Max 6/day CARLOS Calhoun      Or    OLANZapine  5 mg Intramuscular Q3H PRN Max 6/day Sarah Carvalho, CARLOS      OLANZapine  2.5 mg Oral Q3H PRN Max 8/day CARLOS Calhoun      OXcarbazepine  150 mg Oral Q12H [facility] Rae Ramirez,       paliperidone  9 mg Oral Daily Rae Ramirez DO      polyethylene glycol  17 g Oral Daily PRN CARLOS Calhoun      propranolol  10 mg Oral Q8H PRN CARLOS Calhoun      QUEtiapine  200 mg Oral Daily Ian Reyna MD      QUEtiapine  200 mg Oral HS Rae Ramirez DO      senna-docusate sodium  1 tablet Oral Daily Joanne Cronin PA-C      tamsulosin  0.4 mg Oral Daily With Dinner CARLOS Calhoun      traZODone  100 mg Oral HS PRN CARLOS Calhoun      traZODone  100 mg Oral HS Ian Reyna MD         Counseling / Coordination of Care:    Total floor / unit time spent today 25 minutes. Greater than 50% of total time was spent with the patient and / or family counseling and / or coordination of care. A description of counseling / coordination of care:  Patient's progress discussed with staff in treatment team meeting.  Medications, treatment progress and treatment plan reviewed with patient.    Rae Ramirez DO 03/05/24

## 2024-03-05 NOTE — PLAN OF CARE
Problem: Depression  Goal: Treatment Goal: Demonstrate behavioral control of depressive symptoms, verbalize feelings of improved mood/affect, and adopt new coping skills prior to discharge  Outcome: Progressing  Goal: Refrain from harming self  Description: Interventions:  - Monitor patient closely, per order   - Supervise medication ingestion, monitor effects and side effects   Outcome: Progressing     Problem: Anxiety  Goal: Anxiety is at manageable level  Description: Interventions:  - Assess and monitor patient's anxiety level.   - Monitor for signs and symptoms (heart palpitations, chest pain, shortness of breath, headaches, nausea, feeling jumpy, restlessness, irritable, apprehensive).   - Collaborate with interdisciplinary team and initiate plan and interventions as ordered.  - [place] patient to unit/surroundings  - Explain treatment plan  - Encourage participation in care  - Encourage verbalization of concerns/fears  - Identify coping mechanisms  - Assist in developing anxiety-reducing skills  - Administer/offer alternative therapies  - Limit or eliminate stimulants  Outcome: Progressing     Problem: Risk for Violence/Aggression Toward Others  Goal: Treatment Goal: Refrain from acts of violence/aggression during length of stay, and demonstrate improved impulse control at the time of discharge  Outcome: Progressing  Goal: Refrain from destructive acts on the environment or property  Outcome: Progressing     Problem: Alteration in Thoughts and Perception  Goal: Treatment Goal: Gain control of psychotic behaviors/thinking, reduce/eliminate presenting symptoms and demonstrate improved reality functioning upon discharge  Outcome: Progressing  Goal: Refrain from acting on delusional thinking/internal stimuli  Description: Interventions:  - Monitor patient closely, per order   - Utilize least restrictive measures   - Set reasonable limits, give positive feedback for acceptable   - Administer medications as  ordered and monitor of potential side effects  Outcome: Progressing   See chart note

## 2024-03-05 NOTE — CASE MANAGEMENT
"CM met with patient for status update. The patient was calmer, direct, though tangential, continuing with hyperverbal speech, now slower and better articulated. Stated he was mainly upset with the medication prescription and scheduling; \"need a more concise and direct path; it's too slow.\" Stated he felt better than prior to admission, attributing the change to \"time and less to worry about.\" Remained behaviorally controlled, ending the encounter, stating he was on his way to group.   "

## 2024-03-05 NOTE — PROGRESS NOTES
03/05/24   Team Meeting   Meeting Type Daily Rounds   Team Members Present   Team Members Present Physician;Occupational Therapist;Nurse;   Physician Team Member Dr Maribell PAYTON; Panchito GONZALEZ; Dr Ashley AZEVEDO   Nursing Team Member Yudith JURADO   Social Work Team Member Issa VARGAS   OT Team Member Heather GARZA   Patient/Family Present   Patient Present No   Patient's Family Present No     Labile, yelling, agitated, irritable, angry, cross taper to Invega, slept, med comp; 304 hearing Fri 8am, forgetful; no dc date.

## 2024-03-05 NOTE — ED NOTES
Bridget Suicide Risk Assessment deferred, as unable to assess while patient sleeping  Behavioral Health Assessment deferred as patient is sleeping and would benefit from additional rest   Vital signs deferred until patient awake, no signs or symptoms of respiratory distress at this time  Once patient is awake and able to participate, will complete assessments        Jessica Walker RN  05/26/23 [id number] Patient informed.

## 2024-03-05 NOTE — NURSING NOTE
Patient med and meal compliant  Visible on unit attended group social with select peers less irritable today cooperative pleasant still focused on medications, Denies SI HI AVH anxiety and depression at this time. Safety checks maintained.

## 2024-03-05 NOTE — PROGRESS NOTES
"Progress Note - Freddie Graham 64 y.o. male MRN: [id number]    Unit/Bed#: [facility] 251-02 Encounter: [id number]        Subjective:   Patient seen and examined at bedside after reviewing the chart and discussing the case with the caring staff.      Patient examined at bedside.  Patient is complaining of chronic back pain. He does not want to take any medications at this time.       Physical Exam   Vitals: Blood pressure 140/84, pulse 74, temperature 97.6 °F (36.4 °C), temperature source Temporal, resp. rate 18, height 5' 9\" (1.753 m), weight 90.4 kg (199 lb 3.2 oz), SpO2 97%.,Body mass index is 29.42 kg/m².  Constitutional: Patient in no acute distress.  HEENT: PERR, EOMI, MMM.  Cardiovascular: Normal rate and regular rhythm.    Pulmonary/Chest: Effort normal and breath sounds normal.   Abdomen: Soft, + BS, NT.    Assessment/Plan:  Freddie Graham is a(n) 64 y.o. year old male schizophrenia.     1.  Cardiac with hx HTN, HLD.  I will put the patient on lisinopril 10 mg daily.  Toprol-XL will be discontinued due to noncompliance 2/25/2024.  2.  Tobacco abuse.  NRT.  3.  Hypothyroidism.  Not on levothyroxine.  TSH normal 2/20/24.  4.  BPH.  Continue Flomax 0.4 mg daily.  5.  Bilateral foot blisters.  SurePrep twice daily.  Podiatry consulted.  Add bacitracin twice daily.  I will get surgical shoe for the patient 2/24/2024.  6.  Vitamin D deficiency.  Patient started on vitamin D supplements.  7.  Vitamin B12 deficiency.  Patient started on vitamin B12 supplements.  8. Constipation. Senakot daily and Miralax as needed.   9. Dental caries/infection. Start patient on amoxicillin 500 mg TID for 7 days and magic mouthwash as needed. F/u with dentist outpatient.     The patient was discussed with Dr. Gallagher and he is in agreement with the above note.    "

## 2024-03-05 NOTE — SOCIAL WORK
Sw assisted pt in calling  , left vm for Jyoti Varela requesting return call to speak to pt before 304 hearing Friday AM, nursing and pt phone numbers provided.    [facility] to [facility]: phone would not register pt's SSN. Sw attempted to call [facility] line from desk phone, also would not connect. Pt to follow up upon dc.

## 2024-03-05 NOTE — NURSING NOTE
"Patient has been visible on the unit, Attended evening snack and was observed talking with peers. Patient expressed his displeasure over the decision to convert him to a 304.  Feels he has wasted 17 days and his medications do not need to be adjusted due to constipation and hemorrhoids.  Says he can manage constipation on the outside by drinking more and/or drinking prune juice.  Feels as though his meds are \"being tinkered with\" unnecessarily.  Patient says he is not delusional or psychotic.  Reports getting angry sometimes but feels he is overall stable.  Patient was able to discuss these things in a calm, controlled, and logical manner.  Reassurance provided.    "

## 2024-03-06 PROCEDURE — 99232 SBSQ HOSP IP/OBS MODERATE 35: CPT | Performed by: PSYCHIATRY & NEUROLOGY

## 2024-03-06 RX ORDER — OXCARBAZEPINE 150 MG/1
75 TABLET, FILM COATED ORAL EVERY 12 HOURS SCHEDULED
Status: DISCONTINUED | OUTPATIENT
Start: 2024-03-06 | End: 2024-03-08

## 2024-03-06 RX ORDER — PALIPERIDONE 6 MG/1
12 TABLET, EXTENDED RELEASE ORAL DAILY
Status: DISCONTINUED | OUTPATIENT
Start: 2024-03-07 | End: 2024-03-18 | Stop reason: HOSPADM

## 2024-03-06 RX ADMIN — AMOXICILLIN 500 MG: 500 CAPSULE ORAL at 05:34

## 2024-03-06 RX ADMIN — TRAZODONE HYDROCHLORIDE 100 MG: 100 TABLET ORAL at 21:12

## 2024-03-06 RX ADMIN — SENNOSIDES AND DOCUSATE SODIUM 1 TABLET: 8.6; 5 TABLET ORAL at 09:03

## 2024-03-06 RX ADMIN — CYANOCOBALAMIN TAB 500 MCG 500 MCG: 500 TAB at 09:03

## 2024-03-06 RX ADMIN — AMOXICILLIN 500 MG: 500 CAPSULE ORAL at 14:55

## 2024-03-06 RX ADMIN — OXCARBAZEPINE 75 MG: 150 TABLET, FILM COATED ORAL at 21:10

## 2024-03-06 RX ADMIN — TAMSULOSIN HYDROCHLORIDE 0.4 MG: 0.4 CAPSULE ORAL at 18:16

## 2024-03-06 RX ADMIN — AMOXICILLIN 500 MG: 500 CAPSULE ORAL at 21:11

## 2024-03-06 RX ADMIN — QUETIAPINE FUMARATE 200 MG: 150 TABLET, EXTENDED RELEASE ORAL at 09:02

## 2024-03-06 RX ADMIN — LITHIUM CARBONATE 300 MG: 300 TABLET, EXTENDED RELEASE ORAL at 09:03

## 2024-03-06 RX ADMIN — OXCARBAZEPINE 150 MG: 150 TABLET, FILM COATED ORAL at 09:02

## 2024-03-06 RX ADMIN — PALIPERIDONE 9 MG: 6 TABLET, EXTENDED RELEASE ORAL at 09:03

## 2024-03-06 RX ADMIN — LITHIUM CARBONATE 300 MG: 300 TABLET, EXTENDED RELEASE ORAL at 21:15

## 2024-03-06 RX ADMIN — Medication 3 MG: at 21:10

## 2024-03-06 RX ADMIN — LISINOPRIL 10 MG: 10 TABLET ORAL at 09:03

## 2024-03-06 RX ADMIN — QUETIAPINE FUMARATE 200 MG: 150 TABLET, EXTENDED RELEASE ORAL at 21:09

## 2024-03-06 RX ADMIN — CHOLECALCIFEROL TAB 25 MCG (1000 UNIT) 1000 UNITS: 25 TAB at 09:03

## 2024-03-06 NOTE — PROGRESS NOTES
"Progress Note - Freddie Graham 64 y.o. male MRN: [id number]    Unit/Bed#: [facility] 251-02 Encounter: [id number]        Subjective:   Patient seen and examined at bedside after reviewing the chart and discussing the case with the caring staff.      Patient examined at bedside.  Patient has no acute symptoms.     Physical Exam   Vitals: Blood pressure 131/75, pulse 91, temperature 98.4 °F (36.9 °C), temperature source Temporal, resp. rate 16, height 5' 9\" (1.753 m), weight 90.4 kg (199 lb 3.2 oz), SpO2 97%.,Body mass index is 29.42 kg/m².  Constitutional: Patient in no acute distress.  HEENT: PERR, EOMI, MMM.  Cardiovascular: Normal rate and regular rhythm.    Pulmonary/Chest: Effort normal and breath sounds normal.   Abdomen: Soft, + BS, NT.    Assessment/Plan:  Freddie Graham is a(n) 64 y.o. year old male schizophrenia.     1.  Cardiac with hx HTN, HLD.  I will put the patient on lisinopril 10 mg daily.  Toprol-XL will be discontinued due to noncompliance 2/25/2024.  2.  Tobacco abuse.  NRT.  3.  Hypothyroidism.  Not on levothyroxine.  TSH normal 2/20/24.  4.  BPH.  Continue Flomax 0.4 mg daily.  5.  Bilateral foot blisters.  SurePrep twice daily.  Podiatry consulted.  Add bacitracin twice daily.  I will get surgical shoe for the patient 2/24/2024.  6.  Vitamin D deficiency.  Patient started on vitamin D supplements.  7.  Vitamin B12 deficiency.  Patient started on vitamin B12 supplements.  8. Constipation. Senakot daily and Miralax as needed.   9. Dental caries/infection. Start patient on amoxicillin 500 mg TID for 7 days and magic mouthwash as needed. F/u with dentist outpatient.     The patient was discussed with Dr. Gallagher and he is in agreement with the above note.    "

## 2024-03-06 NOTE — PROGRESS NOTES
Progress Note - Behavioral Health     Freddie K Day 64 y.o. male MRN: [id number]   Unit/Bed#: [facility] 251-02 Encounter: [id number]    Assessment/Plan   Principal Problem:    Schizoaffective disorder, bipolar type (HCC)    Patient is more pleasant, polite, and calm today with improved mood control. Less irritable with no behavioral outbursts. Less tangential, less hyperverbal, easy to redirect. Less controlling about medications. Remains illogical with poor reasoning ability and is unable to reality test. He continues to endorse persecutory and paranoid delusions. He continues to deny his diagnosis or need for treatment, but is cooperative with med adjustments. Impaired insight and judgment. Since he has not displayed improvement of mood control with Seroquel or Trileptal, we will continue cross-taper of Seroquel to Invega and cross-taper of Trileptal to Lithium. Plan to transition to TOMAS Sustenna for ensured compliance, due to extensive history of medication noncompliance.    Treatment Plan:  - Cross taper Seroquel to Invega  - Increase Invega 12 mg daily for psychosis and mood stabilization  - Taper Seroquel XR to 200 mg QHS for psychosis and mood stabilization, continue taper  - Continue Lithium 300 mg BID for mood stabilization  - Lithium trough level 03/08  - Taper Trileptal to 75 mg BID for mood stabilization; continue taper due to poor efficacy for mood control and to further relieve constipation side effect  - Continue Trazodone 100 mg QHS for sleep  - Continue Senakot for constipation  - Will continue to monitor and make medication adjustments for improved mood control and behavioral control, and improved compliance after discharge  - [address] hearing Friday    Patient is currently on dual antipsychotic therapy due to cross taper of Seroquel to Invega.  All current active medications have been reviewed  Encourage group therapy, milieu therapy and occupational therapy  Continue treatment with group therapy, milieu  "therapy and occupational therapy  Behavioral Health checks every 7 minutes      Risks / Benefits of Treatment:  Risks, benefits, and possible side effects of medications explained to patient and patient verbalizes understanding and agreement for treatment.      Behavior over the last 24 hours: improving.     Per staff report, patient has been visible and social on the unit. Attending groups. Less irritable and more pleasant. Remains focused on medications. Remains delusional with poor insight. No behavioral outbursts yesterday. Denies all psych symptoms. Remains medication and meal compliant. Slept well.    Today, patient is seen resting in room. He remains disheveled. He is brighter on approach. He is more pleasant, cooperative, polite, and calm today. He is less tangential and less hyperverbal, and easily redirectable. He is less irritable and less preoccupied with medications. He is less demanding and less controlling about treatment. He is happy about medication adjustments with adding Lithium and satisfied with current treatment plan. He remains illogical with poor reasoning ability and is unable to reality test. He continues to endorse paranoid and persecutory delusions about the police and \"crisis intervention.\" He is less discharge-focused and more calm regarding upcoming [address]. He continues to have a distorted memory of events that resulted in this IP admission. He continues to deny his diagnosis or need for IP treatment, but agrees to remain compliant with medications. Impaired insight and judgment.    Sleep: normal  Appetite: normal  Medication side effects: constipation, improving      Mental Status Evaluation:    Appearance:  age appropriate, dressed appropriately, disheveled, poor hygiene   Behavior:  more appropriate, more cooperative, more pleasant, easily redirectable, more polite, less distracted, forthcoming   Speech:  Less hypertalkative , coherent   Mood:  \"Good\"   Affect:  Brighter, " reactive, stable, appropriate, mood-congruent   Thought Process:  illogical, less circumstantial, less tangential   Associations: tangential associations   Thought Content:  persecutory delusions, paranoid ideation, less perseverative, less preoccupied with medications   Perceptual Disturbances: denies auditory or visual hallucinations when asked, does not appear responding to internal stimuli, does not appear internally preoccupied   Risk Potential: Suicidal ideation - None  Homicidal ideation - None at present   Sensorium:  oriented to person, place, and time/date, not oriented to situation         Memory:  recent and remote memory impaired      Consciousness:  alert and awake      Attention: attention span and concentration are improving      Insight:  impaired      Judgment: impaired      Gait/Station: normal gait/station      Motor Activity: no abnormal movements     Vital signs in last 24 hours:    Temp:  [96.7 °F (35.9 °C)-98.4 °F (36.9 °C)] 97.5 °F (36.4 °C)  HR:  [] 82  Resp:  [16-19] 19  BP: (108-131)/(65-75) 109/68    Laboratory results: I have personally reviewed all pertinent laboratory/tests results.  Most Recent Labs:   Lab Results   Component Value Date    WBC 6.24 02/17/2024    RBC 4.73 02/17/2024    HGB 14.1 02/17/2024    HCT 42.6 02/17/2024     02/17/2024    RDW 12.7 02/17/2024    NEUTROABS 4.20 02/17/2024    SODIUM 138 02/17/2024    K 3.7 02/17/2024     02/17/2024    CO2 28 02/17/2024    BUN 15 02/17/2024    CREATININE 0.98 02/17/2024    GLUC 116 02/17/2024    GLUF 94 05/27/2023    CALCIUM 9.7 02/17/2024    AST 24 02/17/2024    ALT 23 02/17/2024    ALKPHOS 60 02/17/2024    TP 6.6 02/17/2024    ALB 4.3 02/17/2024    TBILI 0.34 02/17/2024    CHOLESTEROL 139 05/27/2023    HDL 39 (L) 05/27/2023    TRIG 122 05/27/2023    LDLCALC 76 05/27/2023    NONHDLC 100 05/27/2023    VALPROICTOT <10 (L) 02/17/2024    LITHIUM 0.5 (L) 05/25/2023    AMMONIA 51 02/06/2021    [id number] 3.106  02/20/2024    FREET4 0.97 02/17/2024    RPR Non-Reactive 11/03/2019    HGBA1C 5.2 05/27/2023     05/27/2023       Current Facility-Administered Medications   Medication Dose Route Frequency Provider Last Rate    acetaminophen  650 mg Oral Q6H PRN CARLOS Calhoun      acetaminophen  650 mg Oral Q4H PRN CARLOS Calhoun      acetaminophen  975 mg Oral Q6H PRN CARLOS Calhoun      aluminum-magnesium hydroxide-simethicone  30 mL Oral Q4H PRN CARLOS Calhoun      amoxicillin  500 mg Oral Q8H [facility]      benztropine  1 mg Intramuscular Q4H PRN Max 6/day CARLOS Calhoun      benztropine  1 mg Oral Q4H PRN Max 6/day CARLOS Calhoun      bisacodyl  10 mg Rectal Daily PRN Josefa Sterling MD      cholecalciferol  1,000 Units Oral Daily Chai Gallagher MD      cyanocobalamin  500 mcg Oral Daily Chai Gallagher MD      hydrOXYzine HCL  50 mg Oral Q6H PRN Max 4/day CARLOS Calhoun      Or    diphenhydrAMINE  50 mg Intramuscular Q6H PRN CARLOS Calhoun      diphenhydramine, lidocaine, Al/Mg hydroxide, simethicone  10 mL Swish & Spit Q4H PRN Joanne Cronin PA-C      hydrOXYzine HCL  25 mg Oral Q6H PRN Max 4/day CARLOS Calhoun      lisinopril  10 mg Oral Daily Chai Gallagher MD      lithium carbonate  300 mg Oral Q12H [facility] Rae Ramirez DO      LORazepam  1 mg Oral Q6H PRN Ian Reyna MD      melatonin  3 mg Oral HS Ian Reyna MD      nicotine polacrilex  2 mg Oral Q2H PRN CARLOS Calhoun      OLANZapine  10 mg Oral Q3H PRN Max 3/day CARLOS Calhoun      Or    OLANZapine  10 mg Intramuscular Q3H PRN Max 3/day CARLOS Calhoun      OLANZapine  5 mg Oral Q3H PRN Max 6/day CARLOS Calhoun      Or    OLANZapine  5 mg Intramuscular Q3H PRN Max 6/day CARLOS Calhoun      OLANZapine  2.5 mg Oral Q3H PRN Max 8/day CARLOS Calhoun      OXcarbazepine  75 mg Oral Q12H [facility] Rae Ramirez DO      [START ON 3/7/2024] paliperidone  12 mg Oral  Daily Rae Ramirez DO      polyethylene glycol  17 g Oral Daily PRN CARLOS Calhoun      propranolol  10 mg Oral Q8H PRN CARLOS Calhoun      QUEtiapine  200 mg Oral HS Rae Ramirez DO      senna-docusate sodium  1 tablet Oral Daily Joanne Cronin PA-C      tamsulosin  0.4 mg Oral Daily With Dinner CARLOS Calhoun      traZODone  100 mg Oral HS PRN CARLOS Calhoun      traZODone  100 mg Oral HS Ian Reyna MD         Counseling / Coordination of Care:    Total floor / unit time spent today 25 minutes. Greater than 50% of total time was spent with the patient and / or family counseling and / or coordination of care. A description of counseling / coordination of care:  Patient's progress discussed with staff in treatment team meeting.  Medications, treatment progress and treatment plan reviewed with patient.    Rae Ramirez DO 03/06/24

## 2024-03-06 NOTE — NURSING NOTE
Patient noted to be visible and social on the milieu. No behavioral outburst on the milieu this evening. Denied all psychiatric symptoms. Compliant w/ hs med regimen. Offers no complaints. Q 7 min safety checks continuous and maintained.

## 2024-03-06 NOTE — NURSING NOTE
Patient appeared to be sleeping through the majority of the night, only noted to be awake once to check the time. Respirations even and unlabored. Appears to be in no distress. Maintained q7 minute safety checks.

## 2024-03-06 NOTE — PROGRESS NOTES
03/06/24   Team Meeting   Meeting Type Daily Rounds   Team Members Present   Team Members Present Physician;Nurse;Occupational Therapist;   Physician Team Member Dr Maribell PAYTON; Panchito GONZALEZ; Dr Ashley AZEVEDO   Nursing Team Member Yudith JURADO   Social Work Team Member Issa VARGAS   OT Team Member Heather GARZA   Patient/Family Present   Patient Present No   Patient's Family Present No     303 Fri 8am, more controlled, tx by medical for tooth infection, med comp with lithium, 300mg BID layering clothing, cannot reality testing, more pleasant, less tangential; no dc date.

## 2024-03-06 NOTE — PLAN OF CARE
Problem: Depression  Goal: Treatment Goal: Demonstrate behavioral control of depressive symptoms, verbalize feelings of improved mood/affect, and adopt new coping skills prior to discharge  Outcome: Progressing  Goal: Refrain from harming self  Description: Interventions:  - Monitor patient closely, per order   - Supervise medication ingestion, monitor effects and side effects   Outcome: Progressing     Problem: Anxiety  Goal: Anxiety is at manageable level  Description: Interventions:  - Assess and monitor patient's anxiety level.   - Monitor for signs and symptoms (heart palpitations, chest pain, shortness of breath, headaches, nausea, feeling jumpy, restlessness, irritable, apprehensive).   - Collaborate with interdisciplinary team and initiate plan and interventions as ordered.  - [place] patient to unit/surroundings  - Explain treatment plan  - Encourage participation in care  - Encourage verbalization of concerns/fears  - Identify coping mechanisms  - Assist in developing anxiety-reducing skills  - Administer/offer alternative therapies  - Limit or eliminate stimulants  Outcome: Progressing     Problem: Risk for Violence/Aggression Toward Others  Goal: Treatment Goal: Refrain from acts of violence/aggression during length of stay, and demonstrate improved impulse control at the time of discharge  Outcome: Progressing  Goal: Refrain from destructive acts on the environment or property  Outcome: Progressing

## 2024-03-06 NOTE — NURSING NOTE
Patient is currently resting in bed after breakfast meal. Patient brightens upon interaction and mood is congruent. Patient is pleasant, cooperative and controlled. Denies anxiety and depression. Denies SI/HI/AVH. Patient is compliant with medications. No acute behaviors noted. Q 7 min safety checks maintained. Fall precautions maintained.

## 2024-03-07 PROCEDURE — 99232 SBSQ HOSP IP/OBS MODERATE 35: CPT | Performed by: PSYCHIATRY & NEUROLOGY

## 2024-03-07 RX ADMIN — Medication 3 MG: at 21:07

## 2024-03-07 RX ADMIN — TRAZODONE HYDROCHLORIDE 100 MG: 100 TABLET ORAL at 21:07

## 2024-03-07 RX ADMIN — DICLOFENAC SODIUM 2 G: 10 GEL TOPICAL at 22:49

## 2024-03-07 RX ADMIN — LITHIUM CARBONATE 300 MG: 300 TABLET, EXTENDED RELEASE ORAL at 21:07

## 2024-03-07 RX ADMIN — SENNOSIDES AND DOCUSATE SODIUM 1 TABLET: 8.6; 5 TABLET ORAL at 08:22

## 2024-03-07 RX ADMIN — AMOXICILLIN 500 MG: 500 CAPSULE ORAL at 21:07

## 2024-03-07 RX ADMIN — DICLOFENAC SODIUM 2 G: 10 GEL TOPICAL at 17:51

## 2024-03-07 RX ADMIN — QUETIAPINE FUMARATE 200 MG: 150 TABLET, EXTENDED RELEASE ORAL at 21:07

## 2024-03-07 RX ADMIN — AMOXICILLIN 500 MG: 500 CAPSULE ORAL at 14:25

## 2024-03-07 RX ADMIN — LITHIUM CARBONATE 300 MG: 300 TABLET, EXTENDED RELEASE ORAL at 08:22

## 2024-03-07 RX ADMIN — OXCARBAZEPINE 75 MG: 150 TABLET, FILM COATED ORAL at 08:21

## 2024-03-07 RX ADMIN — DICLOFENAC SODIUM 2 G: 10 GEL TOPICAL at 14:25

## 2024-03-07 RX ADMIN — PALIPERIDONE 12 MG: 6 TABLET, EXTENDED RELEASE ORAL at 08:21

## 2024-03-07 RX ADMIN — AMOXICILLIN 500 MG: 500 CAPSULE ORAL at 05:45

## 2024-03-07 RX ADMIN — CYANOCOBALAMIN TAB 500 MCG 500 MCG: 500 TAB at 08:22

## 2024-03-07 RX ADMIN — CHOLECALCIFEROL TAB 25 MCG (1000 UNIT) 1000 UNITS: 25 TAB at 08:22

## 2024-03-07 RX ADMIN — OXCARBAZEPINE 75 MG: 150 TABLET, FILM COATED ORAL at 21:07

## 2024-03-07 RX ADMIN — TAMSULOSIN HYDROCHLORIDE 0.4 MG: 0.4 CAPSULE ORAL at 17:53

## 2024-03-07 RX ADMIN — LISINOPRIL 10 MG: 10 TABLET ORAL at 08:22

## 2024-03-07 NOTE — NURSING NOTE
Patient med and meal compliant. Visible on unit calm pleasant cooperative no behavioral outbursts Denies SI HI AVH anxiety and depression. Safety checks maintained

## 2024-03-07 NOTE — PROGRESS NOTES
03/07/24    Team Meeting   Meeting Type Daily Rounds   Team Members Present   Team Members Present Physician;Occupational Therapist;Nurse;   Physician Team Member Dr Maribell PAYTON; Panchito GONZALEZ; Dr Ashley AZEVEDO   Nursing Team Member Sabina JURADO   Social Work Team Member Issa VARGAS   OT Team Member Heather GARZA   Patient/Family Present   Patient Present No   Patient's Family Present No     Tomorrow 8am, improving, bright, more controled, lithium level 3/8, med comp, less tangential, more able to redirect, illogical, not reality testing, delusional, cooperative with med adjust, slept; no dc date.

## 2024-03-07 NOTE — NURSING NOTE
Freddie presents subdued, less talkative, napping throughout the entire evening. He reports feeling slowed down.

## 2024-03-07 NOTE — PROGRESS NOTES
"Progress Note - Freddie Graham 64 y.o. male MRN: [id number]    Unit/Bed#: [facility] 251-02 Encounter: [id number]        Subjective:   Patient seen and examined at bedside after reviewing the chart and discussing the case with the caring staff.      Patient examined at bedside.  Patient is complaining of chronic back pain. He has had issues with DJD for years but had more significant pain last night. No new injury. Patient is not taking any medications for pain. Patient refuses tylenol or motrin. Patient declines lidocaine patch due to adhesive allergy. Patient denies urinary sx or other symptoms at this time.    Physical Exam   Vitals: Blood pressure 126/72, pulse 90, temperature 98.6 °F (37 °C), temperature source Temporal, resp. rate 18, height 5' 9\" (1.753 m), weight 90.4 kg (199 lb 3.2 oz), SpO2 99%.,Body mass index is 29.42 kg/m².  Constitutional: Patient in no acute distress.  HEENT: PERR, EOMI, MMM.  Cardiovascular: Normal rate and regular rhythm.    Pulmonary/Chest: Effort normal and breath sounds normal.   Abdomen: Soft, + BS, NT.    Assessment/Plan:  Freddie Graham is a(n) 64 y.o. year old male schizophrenia.     1.  Cardiac with hx HTN, HLD.  I will put the patient on lisinopril 10 mg daily.  Toprol-XL will be discontinued due to noncompliance 2/25/2024.  2.  Tobacco abuse.  NRT.  3.  Hypothyroidism.  Not on levothyroxine.  TSH normal 2/20/24.  4.  BPH.  Continue Flomax 0.4 mg daily.  5.  Bilateral foot blisters.  SurePrep twice daily.  Podiatry consulted.  Add bacitracin twice daily.  I will get surgical shoe for the patient 2/24/2024.  6.  Vitamin D deficiency.  Patient started on vitamin D supplements.  7.  Vitamin B12 deficiency.  Patient started on vitamin B12 supplements.  8. Constipation. Senakot daily and Miralax as needed.   9. Dental caries/infection. Start patient on amoxicillin 500 mg TID for 7 days and magic mouthwash as needed. F/u with dentist outpatient.   10. DJD/back pain. Start patient on Voltaren " gel four times daily.     The patient was discussed with Dr. Gallagher and he is in agreement with the above note.
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
## 2024-03-08 PROCEDURE — 99233 SBSQ HOSP IP/OBS HIGH 50: CPT | Performed by: PSYCHIATRY & NEUROLOGY

## 2024-03-08 RX ORDER — TRAZODONE HYDROCHLORIDE 150 MG/1
150 TABLET ORAL
Status: DISCONTINUED | OUTPATIENT
Start: 2024-03-08 | End: 2024-03-09

## 2024-03-08 RX ORDER — OXCARBAZEPINE 150 MG/1
75 TABLET, FILM COATED ORAL
Status: DISCONTINUED | OUTPATIENT
Start: 2024-03-08 | End: 2024-03-11

## 2024-03-08 RX ADMIN — LITHIUM CARBONATE 300 MG: 300 TABLET, EXTENDED RELEASE ORAL at 08:57

## 2024-03-08 RX ADMIN — TAMSULOSIN HYDROCHLORIDE 0.4 MG: 0.4 CAPSULE ORAL at 17:57

## 2024-03-08 RX ADMIN — DICLOFENAC SODIUM 2 G: 10 GEL TOPICAL at 09:01

## 2024-03-08 RX ADMIN — CYANOCOBALAMIN TAB 500 MCG 500 MCG: 500 TAB at 08:57

## 2024-03-08 RX ADMIN — LITHIUM CARBONATE 300 MG: 300 TABLET, EXTENDED RELEASE ORAL at 21:37

## 2024-03-08 RX ADMIN — PALIPERIDONE 12 MG: 6 TABLET, EXTENDED RELEASE ORAL at 08:57

## 2024-03-08 RX ADMIN — OXCARBAZEPINE 75 MG: 150 TABLET, FILM COATED ORAL at 08:57

## 2024-03-08 RX ADMIN — TRAZODONE HYDROCHLORIDE 150 MG: 100 TABLET ORAL at 21:37

## 2024-03-08 RX ADMIN — OXCARBAZEPINE 75 MG: 150 TABLET, FILM COATED ORAL at 21:36

## 2024-03-08 RX ADMIN — AMOXICILLIN 500 MG: 500 CAPSULE ORAL at 06:23

## 2024-03-08 RX ADMIN — DICLOFENAC SODIUM 2 G: 10 GEL TOPICAL at 20:31

## 2024-03-08 RX ADMIN — TRAZODONE HYDROCHLORIDE 100 MG: 100 TABLET ORAL at 22:46

## 2024-03-08 RX ADMIN — LISINOPRIL 10 MG: 10 TABLET ORAL at 08:57

## 2024-03-08 RX ADMIN — AMOXICILLIN 500 MG: 500 CAPSULE ORAL at 15:17

## 2024-03-08 RX ADMIN — AMOXICILLIN 500 MG: 500 CAPSULE ORAL at 21:37

## 2024-03-08 RX ADMIN — SENNOSIDES AND DOCUSATE SODIUM 1 TABLET: 8.6; 5 TABLET ORAL at 08:58

## 2024-03-08 RX ADMIN — Medication 3 MG: at 21:37

## 2024-03-08 RX ADMIN — CHOLECALCIFEROL TAB 25 MCG (1000 UNIT) 1000 UNITS: 25 TAB at 08:58

## 2024-03-08 NOTE — SOCIAL WORK
Contact returned to Rosa [phone] at [facility] confirmed pt is still on unit.

## 2024-03-08 NOTE — PROGRESS NOTES
03/08/24    Team Meeting   Meeting Type Daily Rounds   Team Members Present   Team Members Present Physician;Nurse;Occupational Therapist;   Physician Team Member Dr Maribell PAYTON; Panchito GONZALEZ; Dr Ashley AZEVEDO   Nursing Team Member Richard JURADO   Social Work Team Member Issa VARGAS   OT Team Member Heather GARZA   Patient/Family Present   Patient Present No   Patient's Family Present No     304 hearing today, disorganized, delusional, pleasant, no outbursts, mildly irritable at times, less tangential, poor sleep, back pain tx by medical, calm, controlled, lithium level 3/9, plan Invega TOMAS; no dc date.

## 2024-03-08 NOTE — PROGRESS NOTES
"Progress Note - Freddie Graham 64 y.o. male MRN: [id number]    Unit/Bed#: [facility] 251-02 Encounter: [id number]        Subjective:   Patient seen and examined at bedside after reviewing the chart and discussing the case with the caring staff.      Patient examined at bedside.  Patient has no acute symptoms. States the Voltaren gel helped his back pain.     Physical Exam   Vitals: Blood pressure 114/77, pulse 70, temperature 97.5 °F (36.4 °C), temperature source Temporal, resp. rate 18, height 5' 9\" (1.753 m), weight 90.4 kg (199 lb 3.2 oz), SpO2 99%.,Body mass index is 29.42 kg/m².  Constitutional: Patient in no acute distress.  HEENT: PERR, EOMI, MMM.  Cardiovascular: Normal rate and regular rhythm.    Pulmonary/Chest: Effort normal and breath sounds normal.   Abdomen: Soft, + BS, NT.    Assessment/Plan:  Freddie Graham is a(n) 64 y.o. year old male schizophrenia.     1.  Cardiac with hx HTN, HLD.  I will put the patient on lisinopril 10 mg daily.  Toprol-XL will be discontinued due to noncompliance 2/25/2024.  2.  Tobacco abuse.  NRT.  3.  Hypothyroidism.  Not on levothyroxine.  TSH normal 2/20/24.  4.  BPH.  Continue Flomax 0.4 mg daily.  5.  Bilateral foot blisters.  SurePrep twice daily.  Podiatry consulted.  Add bacitracin twice daily.  I will get surgical shoe for the patient 2/24/2024.  6.  Vitamin D deficiency.  Patient started on vitamin D supplements.  7.  Vitamin B12 deficiency.  Patient started on vitamin B12 supplements.  8. Constipation. Senakot daily and Miralax as needed.   9. Dental caries/infection. Start patient on amoxicillin 500 mg TID for 7 days and magic mouthwash as needed. F/u with dentist outpatient.   10. DJD/back pain. Start patient on Voltaren gel four times daily.     The patient was discussed with Dr. Gallagher and he is in agreement with the above note.    "

## 2024-03-08 NOTE — NURSING NOTE
Patient visible on milieu.Pleasant and cooperative. Remain disorganize and  delusional. Schedule PO medication administered as ordered. Denies  HI, SI. Appetite good. Attend group.Continue on safety check .

## 2024-03-08 NOTE — PROGRESS NOTES
Progress Note - Behavioral Health     Freddie K Day 64 y.o. male MRN: [id number]   Unit/Bed#: [facility] 251-02 Encounter: [id number]    Assessment/Plan   Principal Problem:    Schizoaffective disorder, bipolar type (HCC)    Patient is pleasant, polite, and calm with improved mood control. More linear and less tangential. Hyperverbal, but easy to redirect. Remains illogical with poor reasoning ability and is unable to reality test. He continues to endorse persecutory delusions about police. He is more accepting of his diagnosis and accepting of medications, although denies need. He denies need to remain inpatient, but agrees to cooperate with IP treatment and med adjustments. Calm and cooperative during [address] hearing today. Impaired insight and limited, but improving judgment. Since he has not displayed improvement of mood control with Seroquel or Trileptal, we will continue cross-taper of Seroquel to Invega and cross-taper of Trileptal to Lithium. Continue titration of Lithium for mood stabilization. Will transition to TOMAS Sustenna for ensured compliance, due to extensive history of medication noncompliance.    Treatment Plan:  - Continue cross taper of Seroquel to Invega  - Continue Invega 12 mg daily for psychosis and mood stabilization; initiate TOMAS next week for ensured compliance  - Discontinue Seroquel XR  - Continue Lithium 300 mg BID for mood stabilization  - Lithium trough level 03/09, will titrate further pending level  - Taper Trileptal to 75 mg QHS for mood stabilization; continue taper off due to poor efficacy for mood control and constipation  - Increase Trazodone 150 mg QHS for sleep  - Continue Senakot for constipation  - Will continue to monitor and make medication adjustments for improved mood control  - 304 hearing completed    Patient is currently on dual antipsychotic therapy due to cross taper of Seroquel to Invega.  All current active medications have been reviewed  Encourage group therapy, milieu  therapy and occupational therapy  Continue treatment with group therapy, milieu therapy and occupational therapy  Behavioral Health checks every 7 minutes      Risks / Benefits of Treatment:  Risks, benefits, and possible side effects of medications explained to patient and patient verbalizes understanding and agreement for treatment.      Behavior over the last 24 hours: improving.     Per staff report, patient has been intermittently visible on the unit. Did not attend groups yesterday. More pleasant, subdued, no outbursts. Still delusional. Denies all psych symptoms. Remains medication and meal compliant.    Today, patient is seen laying in bed. Bright on approach. He is pleasant, cooperative, and polite. He is somewhat hyperverbal, but more redirectable. He reports waking at 2am last night and reports inability to fall back asleep. He states back pain improved with Voltaren gel, but he is still unable to tolerate sitting through full group due to pain. He continues to endorse persecutory delusions about the police abusing him, but appears to be much less distressed about this. He continues to have a distorted memory of events that resulted in this admission. He remains illogical with poor reasoning ability and is unable to reality test. He is more accepting of his diagnosis and accepting of taking medications and remaining compliant, although he still denies the need. He denies need to remain inpatient, but agrees to cooperate with IP stay and med adjustments. Impaired insight and limited, but improving judgment.    Calm, cooperative, and polite during court hearing today.    Sleep: difficulty falling asleep, early awakening, minimal sleep  Appetite: normal  Medication side effects: denies      Mental Status Evaluation:    Appearance:  age appropriate, dressed appropriately, disheveled   Behavior:  pleasant, cooperative, calm, more appropriate, more redirectable, polite, forthcoming   Speech:  improved ,  "coherent, somewhat hyperverbal   Mood:  \"Great\"   Affect:  Bright, stable, more appropriate, mood-congruent   Thought Process:  illogical, less tangential, more linear   Associations: tangential associations   Thought Content:  persecutory delusions   Perceptual Disturbances: denies auditory or visual hallucinations when asked, does not appear responding to internal stimuli, does not appear internally preoccupied   Risk Potential: Suicidal ideation - None  Homicidal ideation - None at present   Sensorium:  oriented to person, place, and time/date         Memory:  recent memory intact and remote memory impaired      Consciousness:  alert and awake      Attention: attention span and concentration are improving      Insight:  impaired      Judgment: limited, but improving      Gait/Station: normal gait/station      Motor Activity: no abnormal movements     Vital signs in last 24 hours:    Temp:  [97.5 °F (36.4 °C)-98.5 °F (36.9 °C)] 97.5 °F (36.4 °C)  HR:  [70-97] 70  Resp:  [18] 18  BP: (103-140)/(72-84) 114/77    Laboratory results: I have personally reviewed all pertinent laboratory/tests results.  Most Recent Labs:   Lab Results   Component Value Date    WBC 6.24 02/17/2024    RBC 4.73 02/17/2024    HGB 14.1 02/17/2024    HCT 42.6 02/17/2024     02/17/2024    RDW 12.7 02/17/2024    NEUTROABS 4.20 02/17/2024    SODIUM 138 02/17/2024    K 3.7 02/17/2024     02/17/2024    CO2 28 02/17/2024    BUN 15 02/17/2024    CREATININE 0.98 02/17/2024    GLUC 116 02/17/2024    GLUF 94 05/27/2023    CALCIUM 9.7 02/17/2024    AST 24 02/17/2024    ALT 23 02/17/2024    ALKPHOS 60 02/17/2024    TP 6.6 02/17/2024    ALB 4.3 02/17/2024    TBILI 0.34 02/17/2024    CHOLESTEROL 139 05/27/2023    HDL 39 (L) 05/27/2023    TRIG 122 05/27/2023    LDLCALC 76 05/27/2023    NONHDLC 100 05/27/2023    VALPROICTOT <10 (L) 02/17/2024    LITHIUM 0.5 (L) 05/25/2023    AMMONIA 51 02/06/2021    BOC0MAJARAXJ 3.106 02/20/2024    FREET4 0.97 " 02/17/2024    RPR Non-Reactive 11/03/2019    HGBA1C 5.2 05/27/2023     05/27/2023       Current Facility-Administered Medications   Medication Dose Route Frequency Provider Last Rate    acetaminophen  650 mg Oral Q6H PRN CARLOS Calhoun      acetaminophen  650 mg Oral Q4H PRN CARLOS Calhoun      acetaminophen  975 mg Oral Q6H PRN CARLOS Calhoun      aluminum-magnesium hydroxide-simethicone  30 mL Oral Q4H PRN CARLOS Calhoun      amoxicillin  500 mg Oral Q8H [facility] Joanne Cronin PA-C      benztropine  1 mg Intramuscular Q4H PRN Max 6/day CARLOS Calhoun      benztropine  1 mg Oral Q4H PRN Max 6/day CARLOS Calhoun      bisacodyl  10 mg Rectal Daily PRN Josefa Sterling MD      cholecalciferol  1,000 Units Oral Daily Chai Gallagher MD      cyanocobalamin  500 mcg Oral Daily Chai Gallagher MD      Diclofenac Sodium  2 g Topical 4x Daily Joanne Cronin PA-C      hydrOXYzine HCL  50 mg Oral Q6H PRN Max 4/day CARLOS Calhoun      Or    diphenhydrAMINE  50 mg Intramuscular Q6H PRN CARLOS Calhoun      diphenhydramine, lidocaine, Al/Mg hydroxide, simethicone  10 mL Swish & Spit Q4H PRN Joanne Cronin PA-C      hydrOXYzine HCL  25 mg Oral Q6H PRN Max 4/day CARLOS Calhoun      lisinopril  10 mg Oral Daily Chai Gallagher MD      lithium carbonate  300 mg Oral Q12H [facility] Rae Ramirez DO      LORazepam  1 mg Oral Q6H PRN Ian Reyna MD      melatonin  3 mg Oral HS Ian Reyna MD      nicotine polacrilex  2 mg Oral Q2H PRN CARLOS Calhoun      OLANZapine  10 mg Oral Q3H PRN Max 3/day CARLOS Calhoun      Or    OLANZapine  10 mg Intramuscular Q3H PRN Max 3/day CARLOS Calhoun      OLANZapine  5 mg Oral Q3H PRN Max 6/day CARLOS Calhoun      Or    OLANZapine  5 mg Intramuscular Q3H PRN Max 6/day CARLOS Calhoun      OLANZapine  2.5 mg Oral Q3H PRN Max 8/day CARLOS Calhoun      OXcarbazepine  75 mg Oral Q12H MANNY Ramirez DO       paliperidone  12 mg Oral Daily Rae Ramirez DO      polyethylene glycol  17 g Oral Daily PRN CARLOS Calhoun      propranolol  10 mg Oral Q8H PRN CARLOS Calhoun      senna-docusate sodium  1 tablet Oral Daily Joanne Cronin PA-C      tamsulosin  0.4 mg Oral Daily With Dinner CARLOS Calhoun      traZODone  100 mg Oral HS PRN CARLOS Calhoun      traZODone  100 mg Oral HS Ian Reyna MD         Counseling / Coordination of Care:    Total floor / unit time spent today 25 minutes. Greater than 50% of total time was spent with the patient and / or family counseling and / or coordination of care. A description of counseling / coordination of care:  Patient's progress discussed with staff in treatment team meeting.  Medications, treatment progress and treatment plan reviewed with patient.    Rae Ramirez DO 03/08/24

## 2024-03-08 NOTE — NURSING NOTE
Patient alert, verbal, and oriented and conversation. He was compliant with HS mediation administration. He is bright, cooperative with staff. Patient showered and is attending to his ADLS. He remains underlying delusional in conversation but speech has slowed and he is able to be redirected. He denies suicidal or homicidal thoughts. Q 7 minute safety checks maintained. Will continue with plan of care.

## 2024-03-08 NOTE — PLAN OF CARE
Problem: Anxiety  Goal: Anxiety is at manageable level  Description: Interventions:  - Assess and monitor patient's anxiety level.   - Monitor for signs and symptoms (heart palpitations, chest pain, shortness of breath, headaches, nausea, feeling jumpy, restlessness, irritable, apprehensive).   - Collaborate with interdisciplinary team and initiate plan and interventions as ordered.  - [place] patient to unit/surroundings  - Explain treatment plan  - Encourage participation in care  - Encourage verbalization of concerns/fears  - Identify coping mechanisms  - Assist in developing anxiety-reducing skills  - Administer/offer alternative therapies  - Limit or eliminate stimulants  Outcome: Progressing     Problem: Risk for Violence/Aggression Toward Others  Goal: Refrain from destructive acts on the environment or property  Outcome: Progressing  Goal: Control angry outbursts  Description: Interventions:  - Monitor patient closely, per order  - Ensure early verbal de-escalation  - Monitor prn medication needs  - Set reasonable/therapeutic limits, outline behavioral expectations, and consequences   - Provide a non-threatening milieu, utilizing the least restrictive interventions   Outcome: Progressing     Problem: Ineffective Coping  Goal: Identifies ineffective coping skills  Outcome: Progressing  Goal: Demonstrates healthy coping skills  Outcome: Progressing  Goal: Participates in unit activities  Description: Interventions:  - Provide therapeutic environment   - Provide required programming   - Redirect inappropriate behaviors   Outcome: Progressing

## 2024-03-08 NOTE — SOCIAL WORK
Hearing Documentation    304 hearing held today;  in attendance:  [facility] Marlow - ; [facility] office; staff psych; ;  pt;  304 recommendation upheld for up to an additional 90 days of inpt treatment at [facility];  304 expires: 6/6/24    Copy of final results received from [facility], copy sent to scanning, copy on pt chart

## 2024-03-09 LAB — LITHIUM SERPL-SCNC: 0.43 MMOL/L (ref 0.6–1.2)

## 2024-03-09 PROCEDURE — 80178 ASSAY OF LITHIUM: CPT

## 2024-03-09 PROCEDURE — 99232 SBSQ HOSP IP/OBS MODERATE 35: CPT | Performed by: STUDENT IN AN ORGANIZED HEALTH CARE EDUCATION/TRAINING PROGRAM

## 2024-03-09 RX ORDER — MIRTAZAPINE 15 MG/1
15 TABLET, FILM COATED ORAL
Status: DISCONTINUED | OUTPATIENT
Start: 2024-03-09 | End: 2024-03-12

## 2024-03-09 RX ADMIN — OXCARBAZEPINE 75 MG: 150 TABLET, FILM COATED ORAL at 20:54

## 2024-03-09 RX ADMIN — DICLOFENAC SODIUM 2 G: 10 GEL TOPICAL at 17:54

## 2024-03-09 RX ADMIN — LITHIUM CARBONATE 300 MG: 300 TABLET, EXTENDED RELEASE ORAL at 20:55

## 2024-03-09 RX ADMIN — DICLOFENAC SODIUM 2 G: 10 GEL TOPICAL at 12:24

## 2024-03-09 RX ADMIN — SENNOSIDES AND DOCUSATE SODIUM 1 TABLET: 8.6; 5 TABLET ORAL at 08:55

## 2024-03-09 RX ADMIN — HYDROXYZINE HYDROCHLORIDE 50 MG: 50 TABLET, FILM COATED ORAL at 00:08

## 2024-03-09 RX ADMIN — AMOXICILLIN 500 MG: 500 CAPSULE ORAL at 20:55

## 2024-03-09 RX ADMIN — AMOXICILLIN 500 MG: 500 CAPSULE ORAL at 05:58

## 2024-03-09 RX ADMIN — CHOLECALCIFEROL TAB 25 MCG (1000 UNIT) 1000 UNITS: 25 TAB at 08:55

## 2024-03-09 RX ADMIN — DICLOFENAC SODIUM 2 G: 10 GEL TOPICAL at 08:56

## 2024-03-09 RX ADMIN — LITHIUM CARBONATE 300 MG: 300 TABLET, EXTENDED RELEASE ORAL at 08:55

## 2024-03-09 RX ADMIN — Medication 3 MG: at 20:55

## 2024-03-09 RX ADMIN — PALIPERIDONE 12 MG: 6 TABLET, EXTENDED RELEASE ORAL at 08:54

## 2024-03-09 RX ADMIN — AMOXICILLIN 500 MG: 500 CAPSULE ORAL at 15:27

## 2024-03-09 RX ADMIN — TAMSULOSIN HYDROCHLORIDE 0.4 MG: 0.4 CAPSULE ORAL at 17:54

## 2024-03-09 RX ADMIN — CYANOCOBALAMIN TAB 500 MCG 500 MCG: 500 TAB at 08:55

## 2024-03-09 RX ADMIN — MIRTAZAPINE 15 MG: 15 TABLET, FILM COATED ORAL at 20:55

## 2024-03-09 RX ADMIN — LISINOPRIL 10 MG: 10 TABLET ORAL at 08:55

## 2024-03-09 NOTE — NURSING NOTE
Presents with bright affect,rapid speech and loose associations yet much improved since inpatient status.oswaldo is visible on the unit,cooperative with unit rules,medicaytions,converses with both staff and peers and attends select groups.We discussed the s/s of impending psychological decompensation and when to seek assistance,Will continue to educate,monitor,and provide safe,therapeutic milieu.

## 2024-03-09 NOTE — PLAN OF CARE
Problem: Depression  Goal: Refrain from harming self  Description: Interventions:  - Monitor patient closely, per order   - Supervise medication ingestion, monitor effects and side effects   Outcome: Progressing

## 2024-03-09 NOTE — PLAN OF CARE
Problem: Depression  Goal: Refrain from harming self  Description: Interventions:  - Monitor patient closely, per order   - Supervise medication ingestion, monitor effects and side effects   Outcome: Progressing     Problem: Anxiety  Goal: Anxiety is at manageable level  Description: Interventions:  - Assess and monitor patient's anxiety level.   - Monitor for signs and symptoms (heart palpitations, chest pain, shortness of breath, headaches, nausea, feeling jumpy, restlessness, irritable, apprehensive).   - Collaborate with interdisciplinary team and initiate plan and interventions as ordered.  - [place] patient to unit/surroundings  - Explain treatment plan  - Encourage participation in care  - Encourage verbalization of concerns/fears  - Identify coping mechanisms  - Assist in developing anxiety-reducing skills  - Administer/offer alternative therapies  - Limit or eliminate stimulants  Outcome: Progressing     Problem: Risk for Violence/Aggression Toward Others  Goal: Treatment Goal: Refrain from acts of violence/aggression during length of stay, and demonstrate improved impulse control at the time of discharge  Outcome: Progressing     Problem: Risk for Violence/Aggression Toward Others  Goal: Refrain from destructive acts on the environment or property  Outcome: Progressing     Problem: Risk for Violence/Aggression Toward Others  Goal: Control angry outbursts  Description: Interventions:  - Monitor patient closely, per order  - Ensure early verbal de-escalation  - Monitor prn medication needs  - Set reasonable/therapeutic limits, outline behavioral expectations, and consequences   - Provide a non-threatening milieu, utilizing the least restrictive interventions   Outcome: Progressing     Problem: Ineffective Coping  Goal: Participates in unit activities  Description: Interventions:  - Provide therapeutic environment   - Provide required programming   - Redirect inappropriate behaviors   Outcome: Progressing      Problem: Ineffective Coping  Goal: Patient/Family participate in treatment and DC plans  Description: Interventions:  - Provide therapeutic environment  Outcome: Progressing     Problem: Ineffective Coping  Goal: Understands least restrictive measures  Description: Interventions:  - Utilize least restrictive behavior  Outcome: Progressing     Problem: Ineffective Coping  Goal: Free from restraint events  Description: - Utilize least restrictive measures   - Provide behavioral interventions   - Redirect inappropriate behaviors   Outcome: Progressing     Problem: Ineffective Coping  Goal: Participates in unit activities  Description: Interventions:  - Provide therapeutic environment   - Provide required programming   - Redirect inappropriate behaviors   Outcome: Progressing     Problem: Alteration in Thoughts and Perception  Goal: Refrain from acting on delusional thinking/internal stimuli  Description: Interventions:  - Monitor patient closely, per order   - Utilize least restrictive measures   - Set reasonable limits, give positive feedback for acceptable   - Administer medications as ordered and monitor of potential side effects  Outcome: Progressing     Problem: Nutrition/Hydration-ADULT  Goal: Nutrient/Hydration intake appropriate for improving, restoring or maintaining nutritional needs  Description: Monitor and assess patient's nutrition/hydration status for malnutrition. Collaborate with interdisciplinary team and initiate plan and interventions as ordered.  Monitor patient's weight and dietary intake as ordered or per policy. Utilize nutrition screening tool and intervene as necessary. Determine patient's food preferences and provide high-protein, high-caloric foods as appropriate.     INTERVENTIONS:  - Monitor oral intake, urinary output, labs, and treatment plans  - Assess nutrition and hydration status and recommend course of action  - Evaluate amount of meals eaten  - Assist patient with eating if  necessary   - Allow adequate time for meals  - Recommend/ encourage appropriate diets, oral nutritional supplements, and vitamin/mineral supplements  - Order, calculate, and assess calorie counts as needed  - Recommend, monitor, and adjust tube feedings and TPN/PPN based on assessed needs  - Assess need for intravenous fluids  - Provide specific nutrition/hydration education as appropriate  - Include patient/family/caregiver in decisions related to nutrition  Outcome: Progressing

## 2024-03-09 NOTE — NURSING NOTE
1208 AM given  50mg Atarax for continued insomnia and mild restlessness and agitation.  Offered warm compress to apply to head to aid in sleep relaxation.  Patient did sleep throughout night.  [id number] checks in place.  Will continue to monitor.

## 2024-03-09 NOTE — NURSING NOTE
Patient visible on unit. Pleasant and cooperative. Mood improve. Remain delusional, disorganize, less irritable and  tangential.Schedule PO medication administered as ordered. Cooperative  with mouth checks Denies  anxiety, depression, hallucination, HI, SI. Appetite good.Continue on safety check

## 2024-03-10 PROCEDURE — 99232 SBSQ HOSP IP/OBS MODERATE 35: CPT | Performed by: STUDENT IN AN ORGANIZED HEALTH CARE EDUCATION/TRAINING PROGRAM

## 2024-03-10 RX ORDER — LITHIUM CARBONATE 450 MG
450 TABLET, EXTENDED RELEASE ORAL EVERY 12 HOURS SCHEDULED
Status: DISCONTINUED | OUTPATIENT
Start: 2024-03-10 | End: 2024-03-18 | Stop reason: HOSPADM

## 2024-03-10 RX ADMIN — DICLOFENAC SODIUM 2 G: 10 GEL TOPICAL at 08:57

## 2024-03-10 RX ADMIN — OXCARBAZEPINE 75 MG: 150 TABLET, FILM COATED ORAL at 21:08

## 2024-03-10 RX ADMIN — LISINOPRIL 10 MG: 10 TABLET ORAL at 08:53

## 2024-03-10 RX ADMIN — SENNOSIDES AND DOCUSATE SODIUM 1 TABLET: 8.6; 5 TABLET ORAL at 08:53

## 2024-03-10 RX ADMIN — MIRTAZAPINE 15 MG: 15 TABLET, FILM COATED ORAL at 21:09

## 2024-03-10 RX ADMIN — DICLOFENAC SODIUM 2 G: 10 GEL TOPICAL at 12:08

## 2024-03-10 RX ADMIN — Medication 3 MG: at 21:08

## 2024-03-10 RX ADMIN — DICLOFENAC SODIUM 2 G: 10 GEL TOPICAL at 17:43

## 2024-03-10 RX ADMIN — LITHIUM CARBONATE 300 MG: 300 TABLET, EXTENDED RELEASE ORAL at 08:53

## 2024-03-10 RX ADMIN — AMOXICILLIN 500 MG: 500 CAPSULE ORAL at 06:28

## 2024-03-10 RX ADMIN — LORAZEPAM 1 MG: 1 TABLET ORAL at 23:21

## 2024-03-10 RX ADMIN — TAMSULOSIN HYDROCHLORIDE 0.4 MG: 0.4 CAPSULE ORAL at 17:41

## 2024-03-10 RX ADMIN — CYANOCOBALAMIN TAB 500 MCG 500 MCG: 500 TAB at 08:53

## 2024-03-10 RX ADMIN — PALIPERIDONE 12 MG: 6 TABLET, EXTENDED RELEASE ORAL at 08:53

## 2024-03-10 RX ADMIN — LITHIUM CARBONATE 450 MG: 450 TABLET, EXTENDED RELEASE ORAL at 21:08

## 2024-03-10 RX ADMIN — DICLOFENAC SODIUM 2 G: 10 GEL TOPICAL at 21:08

## 2024-03-10 RX ADMIN — CHOLECALCIFEROL TAB 25 MCG (1000 UNIT) 1000 UNITS: 25 TAB at 08:53

## 2024-03-10 NOTE — PROGRESS NOTES
"Progress Note - Freddie Graham 64 y.o. male MRN: [id number]    Unit/Bed#: [email or web address] 251-02 Encounter: [id number]        Subjective:   Patient seen and examined at bedside after reviewing the chart and discussing the case with the caring staff.      Patient examined at bedside.  Patient has no acute symptoms.     Physical Exam   Vitals: Blood pressure 92/63, pulse 82, temperature 97.6 °F (36.4 °C), temperature source Temporal, resp. rate 16, height 5' 9\" (1.753 m), weight 90.7 kg (200 lb), SpO2 96%.,Body mass index is 29.53 kg/m².  Constitutional: Patient in no acute distress.  HEENT: PERR, EOMI, MMM.  Cardiovascular: Normal rate and regular rhythm.    Pulmonary/Chest: Effort normal and breath sounds normal.   Abdomen: Soft, + BS, NT.    Assessment/Plan:  Freddie Graham is a(n) 64 y.o. year old male schizophrenia.     1.  Cardiac with hx HTN, HLD.  I will put the patient on lisinopril 10 mg daily.  Toprol-XL will be discontinued due to noncompliance 2/25/2024.  2.  Tobacco abuse.  NRT.  3.  Hypothyroidism.  Not on levothyroxine.  TSH normal 2/20/24.  4.  BPH.  Continue Flomax 0.4 mg daily.  5.  Bilateral foot blisters.  SurePrep twice daily.  Podiatry consulted.  Add bacitracin twice daily.  I will get surgical shoe for the patient 2/24/2024.  6.  Vitamin D deficiency.  Patient started on vitamin D supplements.  7.  Vitamin B12 deficiency.  Patient started on vitamin B12 supplements.  8. Constipation. Senakot daily and Miralax as needed.   9. Dental caries/infection. Start patient on amoxicillin 500 mg TID for 7 days and magic mouthwash as needed. F/u with dentist outpatient.   10. DJD/back pain. Start patient on Voltaren gel four times daily.   "

## 2024-03-10 NOTE — NURSING NOTE
"Patient visible on unit. Social with peers. Stated \" a little anxious due to wanting to get a good night sleep, hoping new medication ordered at HS will work\". Patient retreated to bed immediately after snack. Denies any SI/HI,AV/H, or depression. Medication compliant. Q 7 continuous monitoring maintained.        "

## 2024-03-10 NOTE — PROGRESS NOTES
"Progress Note - Behavioral Health     Freddie CASTANEDA Day 64 y.o. male MRN: [id number]   Unit/Bed#: U 251-02 Encounter: [id number]    Behavior over the last 24 hours: unchanged.     Freddie seen today for follow-up, states he is feeling \"pretty good\", but does want to increase lithium as level was low 0.4 yesterday.  States that he used to be on 600 twice daily, but we discussed that he is currently on an ACE inhibitor, and I will be concerned about elevated lithium levels.  He states that he is feeling fairly well, and is agreement to only increase to 450 mg twice daily.  States he is tolerating the Invega, denies side effects.  Denies thoughts to himself or anyone else, denies any hallucinations.  States he slept very well last night with the mirtazapine, and appreciable of medication change.  Per nursing staff. Compliant with medications. Participates appropriately in milieu. Attends groups. Socializes more with peers.    Sleep: slept off and on, difficulty falling asleep  Appetite: normal  Medication side effects: No   ROS: no complaints, all other systems are negative    Mental Status Evaluation:    Appearance:  disheveled, dressed in hospital attire, bearded   Behavior:  pleasant, cooperative, good eye contact   Speech:  normal rate, normal volume, normal pitch   Mood:  less manic   Affect:  overbright, less labile   Thought Process:  circumstantial   Associations: circumstantial associations, but a little more coherent   Thought Content:  normal, no overt delusions   Perceptual Disturbances: no auditory hallucinations, no visual hallucinations   Risk Potential: Suicidal ideation - None at present  Homicidal ideation - None  Potential for aggression - No   Sensorium:  oriented to person, place, and time/date   Memory:  recent and remote memory grossly intact   Consciousness:  alert and awake   Attention/Concentration: decreased concentration and decreased attention span   Insight:  fair and improving   Judgment: Fair, " open to receiving treatment   Gait/Station: normal gait/station, normal balance   Motor Activity: no abnormal movements     Vital signs in last 24 hours:    Temp:  [97.9 °F (36.6 °C)-98 °F (36.7 °C)] 98 °F (36.7 °C)  HR:  [60-81] 60  Resp:  [16] 16  BP: ()/(65-80) 135/80    Laboratory results: I have personally reviewed all pertinent laboratory/tests results    Results from the past 24 hours:   Recent Results (from the past 24 hour(s))   Lithium level    Collection Time: 03/09/24  7:22 PM   Result Value Ref Range    Lithium Lvl 0.43 (L) 0.60 - 1.20 mmol/L       Suicide/Homicide Risk Assessment:    Risk of Harm to Self:   Nursing Suicide Risk Assessment Last 24 hours: C-SSRS Risk (Since Last Contact)  Calculated C-SSRS Risk Score (Since Last Contact): No Risk Indicated  Current Specific Risk Factors include: diagnosis of mood disorder, poor self care  Protective Factors: Protective Factors: The patient has desire to live, no current suicidal ideation, ability to communicate with staff on the unit, ability to make plans for the future, improved mood  Based on today's assessment, Freddie presents the following risk of harm to self: low    Risk of Harm to Others:  Nursing Homicide Risk Assessment: Violence Risk to Others: Denies within past 6 months  Current Specific Risk Factors include: diagnosis of mood disorder  Protective Factors: no current homicidal ideation, impulse control is improving, follows staff redirection  Based on today's assessment, Freddie presents the following risk of harm to others: minimal    The following interventions are recommended: behavioral checks every 7 minutes, continued hospitalization on locked unit    Progress Toward Goals: improving slowly, mood is stabilizing, reality testing is improving    Assessment/Plan   Principal Problem:    Schizoaffective disorder, bipolar type (HCC)      Recommended Treatment:     Planned medication and treatment changes:    All current active medications  have been reviewed  Encourage group therapy, milieu therapy and occupational therapy  Behavioral Health checks every 7 minutes  Increase lithium to 450 mg twice daily.  Will repeat lithium level in 5 days.  Will also repeat his BMP, as he has been changed to lisinopril during his hospital stay, so will want to monitor for any kidney injury with this combination of medications.  Blood pressure appears controlled with last several readings.   Continue current medications:    Current Facility-Administered Medications   Medication Dose Route Frequency Provider Last Rate    acetaminophen  650 mg Oral Q6H PRN CARLOS Calhoun      acetaminophen  650 mg Oral Q4H PRN CARLOS Calhoun      acetaminophen  975 mg Oral Q6H PRN CARLOS Calhoun      aluminum-magnesium hydroxide-simethicone  30 mL Oral Q4H PRN CARLOS Calhoun      benztropine  1 mg Intramuscular Q4H PRN Max 6/day CARLOS Calhoun      benztropine  1 mg Oral Q4H PRN Max 6/day CARLOS Calhoun      bisacodyl  10 mg Rectal Daily PRN Josefa Sterling MD      cholecalciferol  1,000 Units Oral Daily Chai Gallagher MD      cyanocobalamin  500 mcg Oral Daily Chai Gallagher MD      Diclofenac Sodium  2 g Topical 4x Daily Joanne Cronin PA-C      hydrOXYzine HCL  50 mg Oral Q6H PRN Max 4/day CARLOS Calhoun      Or    diphenhydrAMINE  50 mg Intramuscular Q6H PRN CARLOS Calhoun      diphenhydramine, lidocaine, Al/Mg hydroxide, simethicone  10 mL Swish & Spit Q4H PRN Joanne Cronin PA-C      hydrOXYzine HCL  25 mg Oral Q6H PRN Max 4/day CARLOS Calhoun      lisinopril  10 mg Oral Daily Chai Gallagher MD      lithium carbonate  450 mg Oral Q12H [facility] Jamar Salas, DO      LORazepam  1 mg Oral Q6H PRN Ian Reyna MD      melatonin  3 mg Oral HS Ian Reyna MD      mirtazapine  15 mg Oral HS Jamar Salas, DO      nicotine polacrilex  2 mg Oral Q2H PRN CARLOS Calhoun      OLANZapine  10 mg Oral Q3H PRN Max  3/day CARLOS Calhoun      Or    OLANZapine  10 mg Intramuscular Q3H PRN Max 3/day Sarah Carvalho, CARLOS      OLANZapine  5 mg Oral Q3H PRN Max 6/day CARLOS Calhoun      Or    OLANZapine  5 mg Intramuscular Q3H PRN Max 6/day Sarah Carvalho, CARLOS      OLANZapine  2.5 mg Oral Q3H PRN Max 8/day CARLOS Calhoun      OXcarbazepine  75 mg Oral HS Rae Kalaga, DO      paliperidone  12 mg Oral Daily Rae Kalaga, DO      polyethylene glycol  17 g Oral Daily PRN CARLOS Calhoun      propranolol  10 mg Oral Q8H PRN CARLOS Calhoun      senna-docusate sodium  1 tablet Oral Daily Joanne Cronin PA-C      tamsulosin  0.4 mg Oral Daily With Dinner CARLOS Calhoun       Risks / Benefits of Treatment:    Risks, benefits, and possible side effects of medications explained to patient and patient verbalizes understanding and agreement for treatment.    Counseling / Coordination of Care:    Total floor / unit time spent today 30 minutes. Greater than 50% of total time was spent with the patient and / or family counseling and / or coordination of care. A description of counseling / coordination of care:  Patient's progress discussed with staff in treatment team meeting.  Medications, treatment progress and treatment plan reviewed with patient.  Patient's diagnosis and treatment indicated reviewed with patient.  Educated on importance of medication and treatment compliance.  Encouraged participation in milieu and group therapy on the unit.  Crisis/safety plan discussed with patient.    Jamar Salas,  03/10/24

## 2024-03-10 NOTE — PLAN OF CARE
Problem: Depression  Goal: Treatment Goal: Demonstrate behavioral control of depressive symptoms, verbalize feelings of improved mood/affect, and adopt new coping skills prior to discharge  Outcome: Progressing  Goal: Refrain from harming self  Description: Interventions:  - Monitor patient closely, per order   - Supervise medication ingestion, monitor effects and side effects   Outcome: Progressing     Problem: Anxiety  Goal: Anxiety is at manageable level  Description: Interventions:  - Assess and monitor patient's anxiety level.   - Monitor for signs and symptoms (heart palpitations, chest pain, shortness of breath, headaches, nausea, feeling jumpy, restlessness, irritable, apprehensive).   - Collaborate with interdisciplinary team and initiate plan and interventions as ordered.  - [place] patient to unit/surroundings  - Explain treatment plan  - Encourage participation in care  - Encourage verbalization of concerns/fears  - Identify coping mechanisms  - Assist in developing anxiety-reducing skills  - Administer/offer alternative therapies  - Limit or eliminate stimulants  Outcome: Progressing     Problem: Risk for Violence/Aggression Toward Others  Goal: Treatment Goal: Refrain from acts of violence/aggression during length of stay, and demonstrate improved impulse control at the time of discharge  Outcome: Progressing  Goal: Refrain from destructive acts on the environment or property  Outcome: Progressing  Goal: Control angry outbursts  Description: Interventions:  - Monitor patient closely, per order  - Ensure early verbal de-escalation  - Monitor prn medication needs  - Set reasonable/therapeutic limits, outline behavioral expectations, and consequences   - Provide a non-threatening milieu, utilizing the least restrictive interventions   Outcome: Progressing     Problem: Alteration in Thoughts and Perception  Goal: Refrain from acting on delusional thinking/internal stimuli  Description: Interventions:  -  Monitor patient closely, per order   - Utilize least restrictive measures   - Set reasonable limits, give positive feedback for acceptable   - Administer medications as ordered and monitor of potential side effects  Outcome: Progressing   See chart note

## 2024-03-10 NOTE — NURSING NOTE
Patient visible on milieu.Pleasant and cooperative.Schedule PO medication administered as ordered.Denies HI, SI.Appetite good.Attend group.Continue on safety check.

## 2024-03-11 PROCEDURE — 99232 SBSQ HOSP IP/OBS MODERATE 35: CPT | Performed by: PSYCHIATRY & NEUROLOGY

## 2024-03-11 RX ADMIN — DICLOFENAC SODIUM 2 G: 10 GEL TOPICAL at 13:06

## 2024-03-11 RX ADMIN — DICLOFENAC SODIUM 2 G: 10 GEL TOPICAL at 09:06

## 2024-03-11 RX ADMIN — SENNOSIDES AND DOCUSATE SODIUM 1 TABLET: 8.6; 5 TABLET ORAL at 09:03

## 2024-03-11 RX ADMIN — CYANOCOBALAMIN TAB 500 MCG 500 MCG: 500 TAB at 09:03

## 2024-03-11 RX ADMIN — MIRTAZAPINE 15 MG: 15 TABLET, FILM COATED ORAL at 20:57

## 2024-03-11 RX ADMIN — LISINOPRIL 10 MG: 10 TABLET ORAL at 09:03

## 2024-03-11 RX ADMIN — CHOLECALCIFEROL TAB 25 MCG (1000 UNIT) 1000 UNITS: 25 TAB at 09:03

## 2024-03-11 RX ADMIN — LITHIUM CARBONATE 450 MG: 450 TABLET, EXTENDED RELEASE ORAL at 09:03

## 2024-03-11 RX ADMIN — PALIPERIDONE 12 MG: 6 TABLET, EXTENDED RELEASE ORAL at 09:03

## 2024-03-11 RX ADMIN — Medication 3 MG: at 20:57

## 2024-03-11 RX ADMIN — LORAZEPAM 1 MG: 1 TABLET ORAL at 22:11

## 2024-03-11 RX ADMIN — TAMSULOSIN HYDROCHLORIDE 0.4 MG: 0.4 CAPSULE ORAL at 17:24

## 2024-03-11 RX ADMIN — LITHIUM CARBONATE 450 MG: 450 TABLET, EXTENDED RELEASE ORAL at 20:57

## 2024-03-11 NOTE — PROGRESS NOTES
03/11/24   Team Meeting   Meeting Type Daily Rounds   Team Members Present   Team Members Present Physician;;Nurse;Occupational Therapist   Physician Team Member Dr Asher PAYTON; Dejon ERICKSON, Arcelia ERICKSON   Nursing Team Member Yudith JURADO   Social Work Team Member Issa VARGAS   OT Team Member    Patient/Family Present   Patient Present No   Patient's Family Present No     TOMAS Invega plan, controlled, disorganized at times, not delusional, pleasant, cooperative, sleep improved, dc this week possible.

## 2024-03-11 NOTE — PROGRESS NOTES
"Progress Note - Freddie Graham 64 y.o. male MRN: [id number]    Unit/Bed#: [facility] 251-02 Encounter: [id number]        Subjective:   Patient seen and examined at bedside after reviewing the chart and discussing the case with the caring staff.      Patient examined at bedside.  Patient is complaining of urinary urgency. Patient reports this has been going on for weeks now. Patient is on Flomax 0.4 mg daily for BPH. Patient states sometimes he feels like he won't make it to the bathroom in time. Patient denies difficulty urinating or burning with urination.     Physical Exam   Vitals: Blood pressure 141/88, pulse 76, temperature 97.5 °F (36.4 °C), temperature source Temporal, resp. rate 18, height 5' 9\" (1.753 m), weight 90.7 kg (200 lb), SpO2 99%.,Body mass index is 29.53 kg/m².  Constitutional: Patient in no acute distress.  HEENT: PERR, EOMI, MMM.  Cardiovascular: Normal rate and regular rhythm.    Pulmonary/Chest: Effort normal and breath sounds normal.   Abdomen: Soft, + BS, NT.    Assessment/Plan:  Freddie Graham is a(n) 64 y.o. year old male schizophrenia.     1.  Cardiac with hx HTN, HLD.  I will put the patient on lisinopril 10 mg daily.  Toprol-XL will be discontinued due to noncompliance 2/25/2024.  2.  Tobacco abuse.  NRT.  3.  Hypothyroidism.  Not on levothyroxine.  TSH normal 2/20/24.  4.  BPH.  Continue Flomax 0.4 mg daily.  5.  Bilateral foot blisters.  SurePrep twice daily.  Podiatry consulted.  Add bacitracin twice daily.  I will get surgical shoe for the patient 2/24/2024.  6.  Vitamin D deficiency.  Patient started on vitamin D supplements.  7.  Vitamin B12 deficiency.  Patient started on vitamin B12 supplements.  8. Constipation. Senakot daily and Miralax as needed.   9. Dental caries/infection. Start patient on amoxicillin 500 mg TID for 7 days and magic mouthwash as needed. F/u with dentist outpatient.   10. DJD/back pain. Start patient on Voltaren gel four times daily.     The patient was discussed with " Dr. Gallagher and he is in agreement with the above note.

## 2024-03-11 NOTE — PLAN OF CARE
Problem: Depression  Goal: Treatment Goal: Demonstrate behavioral control of depressive symptoms, verbalize feelings of improved mood/affect, and adopt new coping skills prior to discharge  Outcome: Progressing  Goal: Refrain from harming self  Description: Interventions:  - Monitor patient closely, per order   - Supervise medication ingestion, monitor effects and side effects   Outcome: Progressing     Problem: Anxiety  Goal: Anxiety is at manageable level  Description: Interventions:  - Assess and monitor patient's anxiety level.   - Monitor for signs and symptoms (heart palpitations, chest pain, shortness of breath, headaches, nausea, feeling jumpy, restlessness, irritable, apprehensive).   - Collaborate with interdisciplinary team and initiate plan and interventions as ordered.  - [place] patient to unit/surroundings  - Explain treatment plan  - Encourage participation in care  - Encourage verbalization of concerns/fears  - Identify coping mechanisms  - Assist in developing anxiety-reducing skills  - Administer/offer alternative therapies  - Limit or eliminate stimulants  Outcome: Progressing     Problem: Risk for Violence/Aggression Toward Others  Goal: Treatment Goal: Refrain from acts of violence/aggression during length of stay, and demonstrate improved impulse control at the time of discharge  Outcome: Progressing  Goal: Control angry outbursts  Description: Interventions:  - Monitor patient closely, per order  - Ensure early verbal de-escalation  - Monitor prn medication needs  - Set reasonable/therapeutic limits, outline behavioral expectations, and consequences   - Provide a non-threatening milieu, utilizing the least restrictive interventions   Outcome: Progressing   See chart note

## 2024-03-11 NOTE — NURSING NOTE
Patient out with c/o anxiety and inability to sleep despite reporting that he did not sleep during the day time hours.  Prn ativan 1mg given for estrada score of 29.  Will monitor for effectiveness

## 2024-03-11 NOTE — PROGRESS NOTES
"Progress Note - Behavioral Health     Freddie Graham 64 y.o. male MRN: [id number]   Unit/Bed#: [facility] 251-02 Encounter: [id number]    Behavior over the last 24 hours: unchanged.     Freddie is a 64-year-old male diagnosed with schizoaffective disorder, bipolar type seen today in follow-up.  Per staff, he has been relatively calmer and behaviors are more controlled.  Throughout interview today, was relatively cooperative but does continue to exhibit some irritable edge, especially when discussing medications.  He feels frustrated with ongoing med changes despite ongoing education from primary team.  Due to recent increase in lithium, suggested discontinuing Trileptal as well as start date of Invega TOMAS.  He feels that these changes will keep him from being discharged from the hospital.  When discussing Invega TOMAS specifically, he states that he does not want this.  Still exhibits some poor insight into his previous compliance history and benefits of TOMAS, \" I always take my medications.\"  Still reports difficulty falling asleep, but states that the Remeron has been helpful on occasion.    Sleep: difficulty falling asleep  Appetite: normal  Medication side effects: No   ROS: no complaints, all other systems are negative  PRNs: Ativan 1 mg p.o. last evening due to difficulty sleeping and anxiety    Mental Status Evaluation:    Appearance:  age appropriate, casually dressed, bearded and long hair in ponytail   Behavior:  cooperative, irritable edge at times , talkative but more redirectable   Speech:  normal rate, normal volume, normal pitch   Mood:  euthymic, \"Im alright.\"   Affect:  labile   Thought Process:  perseverative, concrete   Associations: concrete associations   Thought Content:  no overt delusions   Perceptual Disturbances: no auditory hallucinations, no visual hallucinations   Risk Potential: Suicidal ideation - None at present  Homicidal ideation - None at present  Potential for aggression - No   Sensorium:  " oriented to person, place, and time/date   Memory:  recent and remote memory grossly intact   Consciousness:  alert and awake   Attention/Concentration: attention span and concentration are age appropriate   Insight:  limited   Judgment: limited   Gait/Station: normal gait/station   Motor Activity: no abnormal movements     Vital signs in last 24 hours:    Temp:  [97.5 °F (36.4 °C)-97.6 °F (36.4 °C)] 97.5 °F (36.4 °C)  HR:  [76-82] 76  Resp:  [16-18] 18  BP: ()/(63-88) 141/88    Laboratory results: I have personally reviewed all pertinent laboratory/tests results    Results from the past 24 hours: No results found for this or any previous visit (from the past 24 hour(s)).  Most Recent Labs:   Lab Results   Component Value Date    WBC 6.24 02/17/2024    RBC 4.73 02/17/2024    HGB 14.1 02/17/2024    HCT 42.6 02/17/2024     02/17/2024    RDW 12.7 02/17/2024    NEUTROABS 4.20 02/17/2024    SODIUM 138 02/17/2024    K 3.7 02/17/2024     02/17/2024    CO2 28 02/17/2024    BUN 15 02/17/2024    CREATININE 0.98 02/17/2024    GLUC 116 02/17/2024    CALCIUM 9.7 02/17/2024    AST 24 02/17/2024    ALT 23 02/17/2024    ALKPHOS 60 02/17/2024    TP 6.6 02/17/2024    ALB 4.3 02/17/2024    TBILI 0.34 02/17/2024    CHOLESTEROL 139 05/27/2023    HDL 39 (L) 05/27/2023    TRIG 122 05/27/2023    LDLCALC 76 05/27/2023    NONHDLC 100 05/27/2023    VALPROICTOT <10 (L) 02/17/2024    LITHIUM 0.43 (L) 03/09/2024    AMMONIA 51 02/06/2021    ZEW2CCFWZHJY 3.106 02/20/2024    FREET4 0.97 02/17/2024    HGBA1C 5.2 05/27/2023     05/27/2023       Progress Toward Goals: progressing    Assessment/Plan   Principal Problem:    Schizoaffective disorder, bipolar type (HCC)      Recommended Treatment:     Planned medication and treatment changes:    All current active medications have been reviewed  Encourage group therapy, milieu therapy and occupational therapy  Behavioral Health checks every 7 minutes    Discontinue Trileptal for  tomorrow; lithium level & BMP scheduled for 3/14/2024  Continue all other medications  Currently refusing Invega TOMAS, will continue to monitor recent medication adjustments however will continue to provide education since he would benefit from this due to poor history with compliance  Discharge planning - TBD    Current Facility-Administered Medications   Medication Dose Route Frequency Provider Last Rate    acetaminophen  650 mg Oral Q6H PRN CARLOS Calhoun      acetaminophen  650 mg Oral Q4H PRN CARLOS Calhoun      acetaminophen  975 mg Oral Q6H PRN CARLOS Calhoun      aluminum-magnesium hydroxide-simethicone  30 mL Oral Q4H PRN CARLOS Calhoun      benztropine  1 mg Intramuscular Q4H PRN Max 6/day CARLOS Calhoun      benztropine  1 mg Oral Q4H PRN Max 6/day CARLOS Calhoun      bisacodyl  10 mg Rectal Daily PRN Josefa Sterling MD      cholecalciferol  1,000 Units Oral Daily Chai Gallagher MD      cyanocobalamin  500 mcg Oral Daily Chai Gallagher MD      Diclofenac Sodium  2 g Topical 4x Daily Joanne Cronin PA-C      hydrOXYzine HCL  50 mg Oral Q6H PRN Max 4/day CARLOS Calhoun      Or    diphenhydrAMINE  50 mg Intramuscular Q6H PRN CARLOS Calhoun      diphenhydramine, lidocaine, Al/Mg hydroxide, simethicone  10 mL Swish & Spit Q4H PRN Joanne Cronin PA-C      hydrOXYzine HCL  25 mg Oral Q6H PRN Max 4/day CARLOS Calhoun      lisinopril  10 mg Oral Daily Chai Gallagher MD      lithium carbonate  450 mg Oral Q12H [facility] Jamar Salas, DO      LORazepam  1 mg Oral Q6H PRN Ian Reyna MD      melatonin  3 mg Oral HS Ian Reyna MD      mirtazapine  15 mg Oral HS Jamar Salas, DO      nicotine polacrilex  2 mg Oral Q2H PRN CARLOS Calhoun      OLANZapine  10 mg Oral Q3H PRN Max 3/day CARLOS Calhoun      Or    OLANZapine  10 mg Intramuscular Q3H PRN Max 3/day Sarah Carvalho, CRNP      OLANZapine  5 mg Oral Q3H PRN Max 6/day Sarah Carvalho,  KATENP      Or    OLANZapine  5 mg Intramuscular Q3H PRN Max 6/day CARLOS Calhoun      OLANZapine  2.5 mg Oral Q3H PRN Max 8/day CARLOS Calhoun      OXcarbazepine  75 mg Oral HS Rae Kalaga, DO      paliperidone  12 mg Oral Daily Rae Arashaga, DO      polyethylene glycol  17 g Oral Daily PRN CARLOS Calhoun      propranolol  10 mg Oral Q8H PRN CARLOS Calhoun      senna-docusate sodium  1 tablet Oral Daily Joanne Cronin PA-C      tamsulosin  0.4 mg Oral Daily With Dinner CARLOS Calhoun       Risks / Benefits of Treatment:    Risks, benefits, and possible side effects of medications explained to patient and patient verbalizes understanding and agreement for treatment.    Counseling / Coordination of Care:    Total floor / unit time spent today 30 minutes. Greater than 50% of total time was spent with the patient and / or family counseling and / or coordination of care. A description of counseling / coordination of care:  Patient's progress discussed with staff in treatment team meeting.  Medications, treatment progress and treatment plan reviewed with patient.    Barbara Lucia PA-C 03/11/24

## 2024-03-11 NOTE — NURSING NOTE
Patient was pleasant and cooperative. Patient calmer and behaviors are controlled. Staff support provided. Q 7 minute safety checks maintained. Patient denied SI/HI. Patient told nurse he is feeling better. Patient compliant with medications and groups. Patient remained calm and controlled. No outbursts noted. Staff will continue to monitor and support.

## 2024-03-11 NOTE — NURSING NOTE
Patient has been visible on the unit.  Attended evening snack and was observed interacting with select peers.  Patient  is much calmer and less angry than he had been the last time this nurse spoke with him a week ago. Reports he is pleased he is back on lithium. Also says he has noticed an improvement in his mood and mental state since his sleep has improved.  He is happy with the sleep medications he is on and says he has gotten several solid nights of sleep which has typically been difficult for him

## 2024-03-11 NOTE — CASE MANAGEMENT
"CM met with patient for an update. The  patient was calm, cooperative, pleasant, with less mood lability and hyperverbal speech. Stated he felt improved; main focus was medication and sleep-related, stating, \"they keep changing my meds and I don't know which I'm taking or which is helping or not helping me to sleep. Complained of several nights of sleep disturbance with the need for a prn last night. Encouraged to write down the medications as well as sleep pattern to better assist with knowledge and level of comfort with medication regime and effects. Temperament remained even/ behaviorally controlled.   " The patient is a 53y Female complaining of chest pain.

## 2024-03-12 PROCEDURE — 99233 SBSQ HOSP IP/OBS HIGH 50: CPT | Performed by: HOSPITALIST

## 2024-03-12 RX ORDER — MIRTAZAPINE 15 MG/1
7.5 TABLET, FILM COATED ORAL
Status: DISCONTINUED | OUTPATIENT
Start: 2024-03-12 | End: 2024-03-13

## 2024-03-12 RX ADMIN — PALIPERIDONE 12 MG: 6 TABLET, EXTENDED RELEASE ORAL at 08:53

## 2024-03-12 RX ADMIN — Medication 3 MG: at 21:18

## 2024-03-12 RX ADMIN — CHOLECALCIFEROL TAB 25 MCG (1000 UNIT) 1000 UNITS: 25 TAB at 08:53

## 2024-03-12 RX ADMIN — CYANOCOBALAMIN TAB 500 MCG 500 MCG: 500 TAB at 08:53

## 2024-03-12 RX ADMIN — LISINOPRIL 10 MG: 10 TABLET ORAL at 08:53

## 2024-03-12 RX ADMIN — TAMSULOSIN HYDROCHLORIDE 0.4 MG: 0.4 CAPSULE ORAL at 17:36

## 2024-03-12 RX ADMIN — LITHIUM CARBONATE 450 MG: 450 TABLET, EXTENDED RELEASE ORAL at 21:18

## 2024-03-12 RX ADMIN — LITHIUM CARBONATE 450 MG: 450 TABLET, EXTENDED RELEASE ORAL at 08:53

## 2024-03-12 RX ADMIN — SENNOSIDES AND DOCUSATE SODIUM 1 TABLET: 8.6; 5 TABLET ORAL at 08:53

## 2024-03-12 RX ADMIN — DICLOFENAC SODIUM 2 G: 10 GEL TOPICAL at 11:52

## 2024-03-12 RX ADMIN — MIRTAZAPINE 7.5 MG: 15 TABLET, FILM COATED ORAL at 21:18

## 2024-03-12 RX ADMIN — DICLOFENAC SODIUM 2 G: 10 GEL TOPICAL at 08:56

## 2024-03-12 NOTE — PROGRESS NOTES
03/12/24   Team Meeting   Meeting Type Daily Rounds   Team Members Present   Team Members Present Physician;Nurse;Occupational Therapist;   Physician Team Member Dr Asher PAYTON; Arcelia ERICKSON; Rea ERICKSON   Nursing Team Member Yudith JURADO   Social Work Team Member Issa VARGAS   OT Team Member Wolf GARZA   Patient/Family Present   Patient Present No   Patient's Family Present No     Invega TOMAS plan possible, cooperative, attends groups, caring for ADLs, frustrated over poor sleep, decrease remeron 7.5mg, meds adjusted; no dc date.

## 2024-03-12 NOTE — SOCIAL WORK
MARLA placed call to [facility] [phone] to notify of PT scheduled discharge and to schedule follow up appointments. Spoke with Atif, he will update team. PT is scheduled for follow up with primary care 3/22/24 at 3:00pm with Dr. Zakiya Abraham MD at [address] [place] [address] address in person. Atif sent message to psych team as they complete their own scheduling requesting for them to contact MARLA direct in scheduling psych follow up appt. Call ended mutually.

## 2024-03-12 NOTE — NURSING NOTE
Patient out with c/o inability to sleep.  Patient ha sbeen lying down for an hour.  Prn ativan 1mg given per his request as it was effective last night. Will monitor for effectiveness

## 2024-03-12 NOTE — PROGRESS NOTES
"Progress Note - Freddie Graham 64 y.o. male MRN: [id number]    Unit/Bed#: [facility] 251-01 Encounter: [id number]        Subjective:   Patient seen and examined at bedside after reviewing the chart and discussing the case with the caring staff.      Patient examined at bedside.  Patient reports no acute symptoms.    Physical Exam   Vitals: Blood pressure 128/75, pulse 76, temperature 97.6 °F (36.4 °C), temperature source Temporal, resp. rate 16, height 5' 9\" (1.753 m), weight 90.7 kg (200 lb), SpO2 99%.,Body mass index is 29.53 kg/m².  Constitutional: Patient in no acute distress.  HEENT: PERR, EOMI, MMM.  Cardiovascular: Normal rate and regular rhythm.    Pulmonary/Chest: Effort normal and breath sounds normal.   Abdomen: Soft, + BS, NT.    Assessment/Plan:  Freddie Graham is a(n) 64 y.o. year old male schizophrenia.     1.  Cardiac with hx HTN, HLD.  I will put the patient on lisinopril 10 mg daily.  Toprol-XL will be discontinued due to noncompliance 2/25/2024.  2.  Tobacco abuse.  NRT.  3.  Hypothyroidism.  Not on levothyroxine.  TSH normal 2/20/24.  4.  BPH.  Continue Flomax 0.4 mg daily.  5.  Bilateral foot blisters.  SurePrep twice daily.  Podiatry consulted.  Add bacitracin twice daily.  I will get surgical shoe for the patient 2/24/2024.  6.  Vitamin D deficiency.  Patient started on vitamin D supplements.  7.  Vitamin B12 deficiency.  Patient started on vitamin B12 supplements.  8. Constipation. Senakot daily and Miralax as needed.   9. Dental caries/infection. Start patient on amoxicillin 500 mg TID for 7 days and magic mouthwash as needed. F/u with dentist outpatient.   10. DJD/back pain. Start patient on Voltaren gel four times daily.   "

## 2024-03-12 NOTE — NURSING NOTE
Patient has been visible on the unit  Attended evening snack and has been interacting with select peers.  Patient is calm and controlled.  Patient expresses concern over his sleep.  Worried bc he had to take ativan last night to sleep.  Concerned over his inconsistent ability to sleep well which he knows impacts his mood.  Does not want to rely on prn medication each night.  Will pass along to next shift to discuss with treatment team

## 2024-03-12 NOTE — SOCIAL WORK
[facility], CM returned call and spoke with Eva [phone], notified of PT scheduled discharge, Eva will update team. PT is scheduled for psych follow up with Dr. Leola White MD on 3/19/24 at 0800 via phone, call ended mutually.

## 2024-03-12 NOTE — PLAN OF CARE
Problem: Depression  Goal: Treatment Goal: Demonstrate behavioral control of depressive symptoms, verbalize feelings of improved mood/affect, and adopt new coping skills prior to discharge  Outcome: Progressing  Goal: Refrain from harming self  Description: Interventions:  - Monitor patient closely, per order   - Supervise medication ingestion, monitor effects and side effects   Outcome: Progressing     Problem: Anxiety  Goal: Anxiety is at manageable level  Description: Interventions:  - Assess and monitor patient's anxiety level.   - Monitor for signs and symptoms (heart palpitations, chest pain, shortness of breath, headaches, nausea, feeling jumpy, restlessness, irritable, apprehensive).   - Collaborate with interdisciplinary team and initiate plan and interventions as ordered.  - [place] patient to unit/surroundings  - Explain treatment plan  - Encourage participation in care  - Encourage verbalization of concerns/fears  - Identify coping mechanisms  - Assist in developing anxiety-reducing skills  - Administer/offer alternative therapies  - Limit or eliminate stimulants  Outcome: Progressing     Problem: Risk for Violence/Aggression Toward Others  Goal: Treatment Goal: Refrain from acts of violence/aggression during length of stay, and demonstrate improved impulse control at the time of discharge  Outcome: Progressing  Goal: Refrain from destructive acts on the environment or property  Outcome: Progressing  Goal: Control angry outbursts  Description: Interventions:  - Monitor patient closely, per order  - Ensure early verbal de-escalation  - Monitor prn medication needs  - Set reasonable/therapeutic limits, outline behavioral expectations, and consequences   - Provide a non-threatening milieu, utilizing the least restrictive interventions   Outcome: Progressing   See chart note

## 2024-03-12 NOTE — PROGRESS NOTES
Progress Note - Behavioral Health     Freddie CASTANEDA Day 64 y.o. male MRN: [id number]   Unit/Bed#: [facility] 251-01 Encounter: [id number]    Behavior over the last 24 hours: unchanged.     Freddie is a 64-year-old male diagnosed with schizoaffective disorder, bipolar type seen today in follow-up.  Patient was seen in office.  Patient is calm pleasant and cooperative.  Did discuss medication options and readdressed Invajay TOMAS.  Patient adamantly does not want any injectable medications.  Claims that he can definitely maintain compliance with his p.o. medications at home.  Reporting that he has been having some issues with insomnia and did receive as needed Ativan last evening.  Overall patient feels that his symptoms are gradually improving.  Denies any active suicidal ideation.  Currently no signs of paranoia or delusions.  Did discuss that Dr. Sterling suggested that we decrease his dose of Remeron to 7.5 mg daily, secondary to potential paradoxical effects on sleep at his current dose.  Patient agreeable with plan.  Patient is aware that he does have PRN medications available for his insomnia.  Had interesting discussion with patient about his  service.  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  Patient very bright and spot on when it comes to describing the different radar weapon systems.  [facility] [employer] [facility] joined the [employer] in 1977.  Did have some issues with misconduct and alcoholism.  [facility] did have one month of behavioral health inpatient treatment at [facility].  Was able to obtain honorable discharge.    Sleep: Reports insomnia and frequently waking up.  Appetite: normal  Medication side effects: No   ROS: no complaints, all other systems are negative    As needed Ativan 1 mg last evening for insomnia.  Patient states was not experiencing increased anxiety but just could not sleep.    Mental Status Evaluation:    Appearance:  age appropriate, casually dressed, bearded and long hair in ponytail    Behavior:  cooperative, overall pleasant.  Redirectable   Speech:  normal rate, normal volume, normal pitch   Mood:  anxious, some mild irritability with discussion of his medications.   Affect:  labile   Thought Process:  perseverative, concrete   Associations: concrete associations   Thought Content:  no overt delusions   Perceptual Disturbances: no auditory hallucinations, no visual hallucinations   Risk Potential: Suicidal ideation - None at present  Homicidal ideation - None at present  Potential for aggression - No   Sensorium:  oriented to person, place, and time/date   Memory:  recent and remote memory grossly intact   Consciousness:  alert and awake   Attention/Concentration: attention span and concentration are age appropriate   Insight:  limited   Judgment: limited   Gait/Station: normal gait/station   Motor Activity: no abnormal movements     Vital signs in last 24 hours:    Temp:  [97.6 °F (36.4 °C)-98.1 °F (36.7 °C)] 97.6 °F (36.4 °C)  HR:  [76-82] 76  Resp:  [16] 16  BP: ()/(62-75) 128/75    Laboratory results: I have personally reviewed all pertinent laboratory/tests results    Results from the past 24 hours: No results found for this or any previous visit (from the past 24 hour(s)).  Most Recent Labs:   Lab Results   Component Value Date    WBC 6.24 02/17/2024    RBC 4.73 02/17/2024    HGB 14.1 02/17/2024    HCT 42.6 02/17/2024     02/17/2024    RDW 12.7 02/17/2024    NEUTROABS 4.20 02/17/2024    SODIUM 138 02/17/2024    K 3.7 02/17/2024     02/17/2024    CO2 28 02/17/2024    BUN 15 02/17/2024    CREATININE 0.98 02/17/2024    GLUC 116 02/17/2024    CALCIUM 9.7 02/17/2024    AST 24 02/17/2024    ALT 23 02/17/2024    ALKPHOS 60 02/17/2024    TP 6.6 02/17/2024    ALB 4.3 02/17/2024    TBILI 0.34 02/17/2024    CHOLESTEROL 139 05/27/2023    HDL 39 (L) 05/27/2023    TRIG 122 05/27/2023    LDLCALC 76 05/27/2023    NONHDLC 100 05/27/2023    VALPROICTOT <10 (L) 02/17/2024    LITHIUM 0.43  (L) 03/09/2024    AMMONIA 51 02/06/2021    CBV3BIQQEOAP 3.106 02/20/2024    FREET4 0.97 02/17/2024    HGBA1C 5.2 05/27/2023     05/27/2023       Progress Toward Goals: progressing    Assessment/Plan   Principal Problem:    Schizoaffective disorder, bipolar type (HCC)      Recommended Treatment:     Planned medication and treatment changes:    All current active medications have been reviewed  Encourage group therapy, milieu therapy and occupational therapy  Behavioral Health checks every 7 minutes     Lithium level & BMP scheduled for 3/14/2024  Continue all other medications  Patient refusing Invega TOMAS.  States that he can definitely continue to be compliant with his medications and does not want any injectable medications.  Remeron decreased to 7.5 mg nightly.  Patient is aware that he has as needed medications for insomnia if needed.  Discharge planning - TBD    Current Facility-Administered Medications   Medication Dose Route Frequency Provider Last Rate    acetaminophen  650 mg Oral Q6H PRN CARLOS Calhoun      acetaminophen  650 mg Oral Q4H PRN CARLOS Calhoun      acetaminophen  975 mg Oral Q6H PRN CARLOS Calhoun      aluminum-magnesium hydroxide-simethicone  30 mL Oral Q4H PRN CARLOS Calhoun      benztropine  1 mg Intramuscular Q4H PRN Max 6/day CARLOS Calhoun      benztropine  1 mg Oral Q4H PRN Max 6/day CARLOS Calhoun      bisacodyl  10 mg Rectal Daily PRN Josefa Sterling MD      cholecalciferol  1,000 Units Oral Daily Chai Gallagher MD      cyanocobalamin  500 mcg Oral Daily Chai Gallagher MD      Diclofenac Sodium  2 g Topical 4x Daily Joanne Cronin PA-C      hydrOXYzine HCL  50 mg Oral Q6H PRN Max 4/day CARLOS Calhoun      Or    diphenhydrAMINE  50 mg Intramuscular Q6H PRN CARLOS Calhoun      diphenhydramine, lidocaine, Al/Mg hydroxide, simethicone  10 mL Swish & Spit Q4H PRN Joanne Cronin PA-C      hydrOXYzine HCL  25 mg Oral Q6H PRN Max 4/day Sarah Carvalho  CARLOS      lisinopril  10 mg Oral Daily Chai Gallagher MD      lithium carbonate  450 mg Oral Q12H [facility] Jamar Salas,       LORazepam  1 mg Oral Q6H PRN Ian Reyna MD      melatonin  3 mg Oral HS Ian Reyna MD      mirtazapine  7.5 mg Oral HS Price Paniagua PA-C      nicotine polacrilex  2 mg Oral Q2H PRN Sarah Carvalho, CARLOS      OLANZapine  10 mg Oral Q3H PRN Max 3/day Sarah Carvalho, CARLOS      Or    OLANZapine  10 mg Intramuscular Q3H PRN Max 3/day Sarah Carvalho, KATENP      OLANZapine  5 mg Oral Q3H PRN Max 6/day Sarah Carvalho, CARLOS      Or    OLANZapine  5 mg Intramuscular Q3H PRN Max 6/day Sarah Carvalho, CARLOS      OLANZapine  2.5 mg Oral Q3H PRN Max 8/day Sarah Carvalho, CARLOS      paliperidone  12 mg Oral Daily Rae Ramirez, DO      polyethylene glycol  17 g Oral Daily PRN Sarah Carvalho, CARLOS      propranolol  10 mg Oral Q8H PRN Sarah Carvalho, CARLOS      senna-docusate sodium  1 tablet Oral Daily Joanne Cronin PA-C      tamsulosin  0.4 mg Oral Daily With Dinner CARLOS Calhoun       Risks / Benefits of Treatment:    Risks, benefits, and possible side effects of medications explained to patient and patient verbalizes understanding and agreement for treatment.    Counseling / Coordination of Care:    Total floor / unit time spent today 30 minutes. Greater than 50% of total time was spent with the patient and / or family counseling and / or coordination of care. A description of counseling / coordination of care:  Patient's progress discussed with staff in treatment team meeting.  Medications, treatment progress and treatment plan reviewed with patient.    Price Paniagua PA-C 03/12/24

## 2024-03-12 NOTE — NURSING NOTE
Patient was pleasant and cooperative. Patient guarded but social. Staff support provided. Q 7 minute safety checks maintained. Patient denied SI/HI. Patient reported feeling his mood was overall better but also reported the importance of sleeping for his mental health. Patient compliant with medications and groups. Staff will continue to monitor and support.

## 2024-03-13 PROCEDURE — 99232 SBSQ HOSP IP/OBS MODERATE 35: CPT | Performed by: HOSPITALIST

## 2024-03-13 RX ORDER — LANOLIN ALCOHOL/MO/W.PET/CERES
6 CREAM (GRAM) TOPICAL
Status: DISCONTINUED | OUTPATIENT
Start: 2024-03-13 | End: 2024-03-14

## 2024-03-13 RX ORDER — LORAZEPAM 0.5 MG/1
0.5 TABLET ORAL EVERY 6 HOURS PRN
Status: DISCONTINUED | OUTPATIENT
Start: 2024-03-13 | End: 2024-03-18 | Stop reason: HOSPADM

## 2024-03-13 RX ADMIN — Medication 6 MG: at 21:45

## 2024-03-13 RX ADMIN — LITHIUM CARBONATE 450 MG: 450 TABLET, EXTENDED RELEASE ORAL at 21:45

## 2024-03-13 RX ADMIN — CYANOCOBALAMIN TAB 500 MCG 500 MCG: 500 TAB at 09:05

## 2024-03-13 RX ADMIN — LORAZEPAM 0.5 MG: 0.5 TABLET ORAL at 22:47

## 2024-03-13 RX ADMIN — OLANZAPINE 10 MG: 10 TABLET, FILM COATED ORAL at 23:45

## 2024-03-13 RX ADMIN — CHOLECALCIFEROL TAB 25 MCG (1000 UNIT) 1000 UNITS: 25 TAB at 09:05

## 2024-03-13 RX ADMIN — SENNOSIDES AND DOCUSATE SODIUM 1 TABLET: 8.6; 5 TABLET ORAL at 09:05

## 2024-03-13 RX ADMIN — TAMSULOSIN HYDROCHLORIDE 0.4 MG: 0.4 CAPSULE ORAL at 18:06

## 2024-03-13 RX ADMIN — PALIPERIDONE 12 MG: 6 TABLET, EXTENDED RELEASE ORAL at 09:05

## 2024-03-13 RX ADMIN — LITHIUM CARBONATE 450 MG: 450 TABLET, EXTENDED RELEASE ORAL at 09:05

## 2024-03-13 RX ADMIN — LISINOPRIL 10 MG: 10 TABLET ORAL at 09:05

## 2024-03-13 NOTE — NURSING NOTE
Patient appears to have slept throughout the night without interruption, non labored breathing noted while asleep. Q 7 min safety checks maintained.

## 2024-03-13 NOTE — PROGRESS NOTES
"Progress Note - Freddie Graham 64 y.o. male MRN: [id number]    Unit/Bed#: [facility] 251-01 Encounter: [id number]        Subjective:   Patient seen and examined at bedside after reviewing the chart and discussing the case with the caring staff.      Patient examined at bedside.  Patient reports no acute symptoms.    Physical Exam   Vitals: Blood pressure 111/75, pulse 87, temperature 97.9 °F (36.6 °C), temperature source Temporal, resp. rate 17, height 5' 9\" (1.753 m), weight 90.7 kg (200 lb), SpO2 99%.,Body mass index is 29.53 kg/m².  Constitutional: Patient in no acute distress.  HEENT: PERR, EOMI, MMM.  Cardiovascular: Normal rate and regular rhythm.    Pulmonary/Chest: Effort normal and breath sounds normal.   Abdomen: Soft, + BS, NT.    Assessment/Plan:  Freddie Graham is a(n) 64 y.o. year old male schizophrenia.     1.  Cardiac with hx HTN, HLD.  I will put the patient on lisinopril 10 mg daily.  Toprol-XL will be discontinued due to noncompliance 2/25/2024.  2.  Tobacco abuse.  NRT.  3.  Hypothyroidism.  Not on levothyroxine.  TSH normal 2/20/24.  4.  BPH.  Continue Flomax 0.4 mg daily.  5.  Bilateral foot blisters.  SurePrep twice daily.  Podiatry consulted.  Add bacitracin twice daily.  I will get surgical shoe for the patient 2/24/2024.  6.  Vitamin D deficiency.  Patient started on vitamin D supplements.  7.  Vitamin B12 deficiency.  Patient started on vitamin B12 supplements.  8.  Constipation. Senakot daily and Miralax as needed.   9.  Dental caries/infection.  Patient completed amoxicillin 500 mg TID for 7 days on 3/10/24.  Magic mouthwash as needed.  Patient advised to follow-up with dentist when discharged.   10.  DJD/back pain.  Apply Voltaren gel four times daily.     The patient was discussed with Dr. Gallagher and he is in agreement with the above note.  "

## 2024-03-13 NOTE — PROGRESS NOTES
03/13/24    Team Meeting   Meeting Type Daily Rounds   Team Members Present   Team Members Present Physician;Nurse;Occupational Therapist;   Physician Team Member Dr Asher PAYTON; Panchito GONZALEZ   Nursing Team Member Yudith JURADO   Social Work Team Member Issa VARGAS   OT Team Member Wolf GARZA   Patient/Family Present   Patient Present No   Patient's Family Present No     Controlled, no outbursts, more linear, better ADL care, slept, med comp, remeron dc, dc Friday.

## 2024-03-13 NOTE — PLAN OF CARE
Problem: Depression  Goal: Treatment Goal: Demonstrate behavioral control of depressive symptoms, verbalize feelings of improved mood/affect, and adopt new coping skills prior to discharge  Outcome: Progressing  Goal: Refrain from harming self  Description: Interventions:  - Monitor patient closely, per order   - Supervise medication ingestion, monitor effects and side effects   Outcome: Progressing     Problem: Ineffective Coping  Goal: Identifies ineffective coping skills  Outcome: Progressing     Problem: Ineffective Coping  Goal: Participates in unit activities  Description: Interventions:  - Provide therapeutic environment   - Provide required programming   - Redirect inappropriate behaviors   Outcome: Progressing

## 2024-03-13 NOTE — NURSING NOTE
Patient med and meal compliant Visible on unit calm pleasant cooperative. Denies SI HI AVH anxiety and depression Safety checks maintained.

## 2024-03-13 NOTE — PROGRESS NOTES
Progress Note - Behavioral Health   Freddie K Day 64 y.o. male MRN: [id number]  Unit/Bed#: [facility] 251-01 Encounter: [id number]    Assessment/Plan   Principal Problem:    Schizoaffective disorder, bipolar type (HCC)      Behavior over the last 24 hours: Improving  Sleep: Improved  Appetite: normal  Medication side effects: No  ROS:  Chronic back pain and all other systems are negative    Freddie has been visible and appropriate in the milieu with no behavioral outbursts.  Attending all milieu activities and participating appropriately.  Mood controlled.  Denies anxiety or depression.  Denies AVH, nor does patient appear to be responding to internal stimuli.  Sleep improved.  Reports he has been having favorable effect from PRN lorazepam before bed for anxiety.  Verbalizes readiness for discharge and is agreeable to continue medication compliance and attend outpatient follow-up appointments once discharged.    Mental Status Evaluation:  Appearance:  age appropriate, bearded, and long gray hair, casually dressed, improved hygiene   Behavior:  Cooperative, good eye contact, calm   Speech:  normal pitch and normal volume   Mood:  euthymic   Affect:  constricted and mood-congruent   Thought Process:  goal directed   Associations: circumstantial associations   Thought Content:  No overt delusions or paranoia verbalized   Perceptual Disturbances: Denies AVH, did not appear internally preoccupied   Risk Potential: Suicidal Ideations none  Homicidal Ideations none  Potential for Aggression No   Sensorium:  person, place, time/date, and situation   Memory:  recent and remote memory grossly intact   Consciousness:  alert and awake    Attention: attention span appeared shorter than expected for age   Insight:  limited   Judgment: limited   Gait/Station: normal gait/station and normal balance   Motor Activity: no abnormal movements     Progress Toward Goals: Improving.  Mood is controlled with no behavioral outbursts.  Does not appear  to be responding to internal stimuli.  Sleep also improved.  Plan is to taper PRN lorazepam 0.6mg PO Q 6 hours PRN severe anxiety in operation for upcoming discharge.  Will also discontinue Remeron 7.5 mg PO QHS to reduce polypharmacy and prevent activation associated with schizoaffective disorder.  Will titrate melatonin 6 mg PO QHS to assist with sleep since Remeron will be discontinued.  Anticipated discharge home Friday, 3/15/2024.    Recommended Treatment: Continue with group therapy, milieu therapy and occupational therapy.      Risks, benefits and possible side effects of Medications:   Freddie has limited understanding of risk versus benefits of medications, but agrees to take as prescribed.    Medications:   Decrease lorazepam 0.5 mg q 6 hours PRN severe anxiety  Discontinue Remeron 7.5mg PO QHS  Increase Melatonin 6mg PO QHS  all current active meds have been reviewed and current meds:   Current Facility-Administered Medications   Medication Dose Route Frequency    acetaminophen (TYLENOL) tablet 650 mg  650 mg Oral Q6H PRN    acetaminophen (TYLENOL) tablet 650 mg  650 mg Oral Q4H PRN    acetaminophen (TYLENOL) tablet 975 mg  975 mg Oral Q6H PRN    aluminum-magnesium hydroxide-simethicone (MAALOX) oral suspension 30 mL  30 mL Oral Q4H PRN    benztropine (COGENTIN) injection 1 mg  1 mg Intramuscular Q4H PRN Max 6/day    benztropine (COGENTIN) tablet 1 mg  1 mg Oral Q4H PRN Max 6/day    bisacodyl (DULCOLAX) rectal suppository 10 mg  10 mg Rectal Daily PRN    cholecalciferol (VITAMIN D3) tablet 1,000 Units  1,000 Units Oral Daily    cyanocobalamin (VITAMIN B-12) tablet 500 mcg  500 mcg Oral Daily    Diclofenac Sodium (VOLTAREN) 1 % topical gel 2 g  2 g Topical 4x Daily    hydrOXYzine HCL (ATARAX) tablet 50 mg  50 mg Oral Q6H PRN Max 4/day    Or    diphenhydrAMINE (BENADRYL) injection 50 mg  50 mg Intramuscular Q6H PRN    diphenhydramine, lidocaine, Al/Mg hydroxide, simethicone (Magic Mouthwash) oral solution 10  mL  10 mL Swish & Spit Q4H PRN    hydrOXYzine HCL (ATARAX) tablet 25 mg  25 mg Oral Q6H PRN Max 4/day    lisinopril (ZESTRIL) tablet 10 mg  10 mg Oral Daily    lithium carbonate (LITHOBID) CR tablet 450 mg  450 mg Oral Q12H MANNY    LORazepam (ATIVAN) tablet 0.5 mg  0.5 mg Oral Q6H PRN    melatonin tablet 6 mg  6 mg Oral HS    nicotine polacrilex (NICORETTE) gum 2 mg  2 mg Oral Q2H PRN    OLANZapine (ZyPREXA) tablet 10 mg  10 mg Oral Q3H PRN Max 3/day    Or    OLANZapine (ZyPREXA) IM injection 10 mg  10 mg Intramuscular Q3H PRN Max 3/day    OLANZapine (ZyPREXA) tablet 5 mg  5 mg Oral Q3H PRN Max 6/day    Or    OLANZapine (ZyPREXA) IM injection 5 mg  5 mg Intramuscular Q3H PRN Max 6/day    OLANZapine (ZyPREXA) tablet 2.5 mg  2.5 mg Oral Q3H PRN Max 8/day    paliperidone (INVEGA) 24 hr tablet 12 mg  12 mg Oral Daily    polyethylene glycol (MIRALAX) packet 17 g  17 g Oral Daily PRN    propranolol (INDERAL) tablet 10 mg  10 mg Oral Q8H PRN    senna-docusate sodium (SENOKOT S) 8.6-50 mg per tablet 1 tablet  1 tablet Oral Daily    tamsulosin (FLOMAX) capsule 0.4 mg  0.4 mg Oral Daily With Dinner   .    Labs: I have personally reviewed all pertinent laboratory/tests results. CBC:   Lab Results   Component Value Date    WBC 6.24 02/17/2024    RBC 4.73 02/17/2024    HGB 14.1 02/17/2024    HCT 42.6 02/17/2024    MCV 90 02/17/2024     02/17/2024    MCH 29.8 02/17/2024    MCHC 33.1 02/17/2024    RDW 12.7 02/17/2024    MPV 10.3 02/17/2024    NEUTROABS 4.20 02/17/2024     CMP:   Lab Results   Component Value Date    SODIUM 138 02/17/2024    K 3.7 02/17/2024     02/17/2024    CO2 28 02/17/2024    AGAP 5 02/17/2024    BUN 15 02/17/2024    CREATININE 0.98 02/17/2024    GLUC 116 02/17/2024    GLUF 94 05/27/2023    CALCIUM 9.7 02/17/2024    AST 24 02/17/2024    ALT 23 02/17/2024    ALKPHOS 60 02/17/2024    TP 6.6 02/17/2024    ALB 4.3 02/17/2024    TBILI 0.34 02/17/2024    EGFR 80 02/17/2024     Drug Screen:   Lab Results    Component Value Date    AMPMETHUR Negative 02/17/2024    BARBTUR Negative 02/17/2024    BDZUR Negative 02/17/2024    THCUR Negative 02/17/2024    COCAINEUR Negative 02/17/2024    METHADONEUR Negative 02/17/2024    OPIATEUR Negative 02/17/2024    PCPUR Negative 02/17/2024     EKG   Lab Results   Component Value Date    VENTRATE 96 02/17/2024    ATRIALRATE 96 02/17/2024    PRINT 156 02/17/2024    QRSDINT 88 02/17/2024    QTINT 348 02/17/2024    QTCINT 439 02/17/2024    PAXIS 65 02/17/2024    QRSAXIS -74 02/17/2024    TWAVEAXIS 48 02/17/2024       Counseling / Coordination of Care  Total floor / unit time spent today 25 minutes. Greater than 50% of total time was spent with the patient and / or family counseling and / or coordination of care. A description of the counseling / coordination of care: Medication education, treatment plan, safety/discharge planning

## 2024-03-13 NOTE — NURSING NOTE
Patient noted to be visible and social on the milieu. No behavioral outburst on the milieu this evening. Denied SI/HI/AVH, depression, and anxiety. Compliant w/ hs med regimen. Offers no complaints. Q 7 min safety checks continuous and maintained.

## 2024-03-14 LAB — LITHIUM SERPL-SCNC: 0.61 MMOL/L (ref 0.6–1.2)

## 2024-03-14 PROCEDURE — 80178 ASSAY OF LITHIUM: CPT | Performed by: STUDENT IN AN ORGANIZED HEALTH CARE EDUCATION/TRAINING PROGRAM

## 2024-03-14 PROCEDURE — 99232 SBSQ HOSP IP/OBS MODERATE 35: CPT | Performed by: HOSPITALIST

## 2024-03-14 RX ORDER — TRAZODONE HYDROCHLORIDE 100 MG/1
100 TABLET ORAL
Status: DISCONTINUED | OUTPATIENT
Start: 2024-03-14 | End: 2024-03-14

## 2024-03-14 RX ORDER — TRAZODONE HYDROCHLORIDE 150 MG/1
150 TABLET ORAL
Status: DISCONTINUED | OUTPATIENT
Start: 2024-03-14 | End: 2024-03-15

## 2024-03-14 RX ORDER — LANOLIN ALCOHOL/MO/W.PET/CERES
9 CREAM (GRAM) TOPICAL
Status: DISCONTINUED | OUTPATIENT
Start: 2024-03-14 | End: 2024-03-18 | Stop reason: HOSPADM

## 2024-03-14 RX ADMIN — OLANZAPINE 10 MG: 10 TABLET, FILM COATED ORAL at 23:15

## 2024-03-14 RX ADMIN — PALIPERIDONE 12 MG: 6 TABLET, EXTENDED RELEASE ORAL at 08:20

## 2024-03-14 RX ADMIN — LISINOPRIL 10 MG: 10 TABLET ORAL at 08:20

## 2024-03-14 RX ADMIN — CYANOCOBALAMIN TAB 500 MCG 500 MCG: 500 TAB at 08:20

## 2024-03-14 RX ADMIN — DICLOFENAC SODIUM 2 G: 10 GEL TOPICAL at 08:28

## 2024-03-14 RX ADMIN — LITHIUM CARBONATE 450 MG: 450 TABLET, EXTENDED RELEASE ORAL at 21:12

## 2024-03-14 RX ADMIN — LITHIUM CARBONATE 450 MG: 450 TABLET, EXTENDED RELEASE ORAL at 08:20

## 2024-03-14 RX ADMIN — CHOLECALCIFEROL TAB 25 MCG (1000 UNIT) 1000 UNITS: 25 TAB at 08:20

## 2024-03-14 RX ADMIN — SENNOSIDES AND DOCUSATE SODIUM 1 TABLET: 8.6; 5 TABLET ORAL at 08:20

## 2024-03-14 RX ADMIN — Medication 9 MG: at 21:12

## 2024-03-14 RX ADMIN — BENZTROPINE MESYLATE 1 MG: 1 TABLET ORAL at 00:14

## 2024-03-14 RX ADMIN — TAMSULOSIN HYDROCHLORIDE 0.4 MG: 0.4 CAPSULE ORAL at 17:25

## 2024-03-14 RX ADMIN — BENZTROPINE MESYLATE 1 MG: 1 TABLET ORAL at 23:15

## 2024-03-14 RX ADMIN — TRAZODONE HYDROCHLORIDE 150 MG: 150 TABLET ORAL at 21:12

## 2024-03-14 NOTE — PROGRESS NOTES
03/14/24    Team Meeting   Meeting Type Daily Rounds   Team Members Present   Team Members Present Physician;Nurse;Occupational Therapist;   Physician Team Member Dr Asher PAYTON; Panchito GONZALEZ   Nursing Team Member Raymundo JURADO   Social Work Team Member Issa VARGAS   OT Team Member Wolf GARZA   Patient/Family Present   Patient Present No   Patient's Family Present No     Labs tonight due, remeron dc, difficultly falling asleep, ativan PRN given for sleep, dc Monday.

## 2024-03-14 NOTE — PROGRESS NOTES
Progress Note - Behavioral Health     Freddie Graham 64 y.o. male MRN: [id number]   Unit/Bed#: [facility] 251-01 Encounter: [id number]    Behavior over the last 24 hours: slowly improving.     Freddie seen today, per staff report has been less hyperactive on the unit.  Per nursing patient had trouble sleeping last night.  Patient seen, on exam confirms with complaints of difficulty sleeping.  Freddie is concerned of decompensating due to lack of often a trigger for him.  Patient was tapered and discontinued off of Remeron, though this helped him sleep there was risk of opal with being on an antidepressant.  Discussed medication options with patient today.  He is agreeable to increasing melatonin and the addition of trazodone.         Mental Status Evaluation:    Appearance:  disheveled, marginal hygiene, looks stated age   Behavior:  restless   Speech:  normal rate, normal volume   Mood:  anxious   Affect:  normal range and intensity   Thought Process:  circumstantial   Associations: circumstantial associations   Thought Content:  no overt delusions   Perceptual Disturbances: no auditory hallucinations, no visual hallucinations   Risk Potential: Suicidal ideation - None at present  Homicidal ideation - None at present   Sensorium:  oriented to person, place, and time/date   Memory:  recent and remote memory grossly intact   Consciousness:  alert and awake   Attention: attention span and concentration are age appropriate   Insight:  limited   Judgment: limited   Gait/Station: normal gait/station   Motor Activity: no abnormal movements     Vital signs in last 24 hours:    Temp:  [97.8 °F (36.6 °C)-98.8 °F (37.1 °C)] 97.8 °F (36.6 °C)  HR:  [70-98] 70  Resp:  [16] 16  BP: (118-133)/(70-75) 129/74    Laboratory results: I have personally reviewed all pertinent laboratory/tests results.        Assessment/Plan   Principal Problem:    Schizoaffective disorder, bipolar type (HCC)    Recommended Treatment:     Planned medication and  treatment changes:  Increase melatonin to 9 mg at bedtime and add trazodone 150 mg at bedtime for insomnia.  Continue all other medications.    All current active medications have been reviewed  Encourage group therapy, milieu therapy and occupational therapy  Behavioral Health checks every 7 minutes  Current Facility-Administered Medications   Medication Dose Route Frequency Provider Last Rate    acetaminophen  650 mg Oral Q6H PRN CARLOS Calhoun      acetaminophen  650 mg Oral Q4H PRN CARLOS Calhoun      acetaminophen  975 mg Oral Q6H PRN CARLOS Calhoun      aluminum-magnesium hydroxide-simethicone  30 mL Oral Q4H PRN CARLOS Calhoun      benztropine  1 mg Intramuscular Q4H PRN Max 6/day CARLOS Calhoun      benztropine  1 mg Oral Q4H PRN Max 6/day CARLOS Calhoun      bisacodyl  10 mg Rectal Daily PRN Josefa Sterling MD      cholecalciferol  1,000 Units Oral Daily Chai Gallagher MD      cyanocobalamin  500 mcg Oral Daily Chai Gallagher MD      Diclofenac Sodium  2 g Topical 4x Daily Joanne Cronin PA-C      hydrOXYzine HCL  50 mg Oral Q6H PRN Max 4/day CARLOS Calhoun      Or    diphenhydrAMINE  50 mg Intramuscular Q6H PRN CARLOS Calhoun      diphenhydramine, lidocaine, Al/Mg hydroxide, simethicone  10 mL Swish & Spit Q4H PRN Joanne Cronin PA-C      hydrOXYzine HCL  25 mg Oral Q6H PRN Max 4/day CARLOS Calhoun      lisinopril  10 mg Oral Daily Chai Gallagher MD      lithium carbonate  450 mg Oral Q12H [facility] Jamar Salas DO      LORazepam  0.5 mg Oral Q6H PRN CARLOS Mo      melatonin  9 mg Oral HS CARLOS Mo      nicotine polacrilex  2 mg Oral Q2H PRN CARLOS Calhoun      OLANZapine  10 mg Oral Q3H PRN Max 3/day CARLOS Calhoun      Or    OLANZapine  10 mg Intramuscular Q3H PRN Max 3/day CARLOS Calhoun      OLANZapine  5 mg Oral Q3H PRN Max 6/day CARLOS Calhoun      Or    OLANZapine  5 mg Intramuscular Q3H PRN Max 6/day CARLOS Calhoun       OLANZapine  2.5 mg Oral Q3H PRN Max 8/day CARLOS Calhoun      paliperidone  12 mg Oral Daily Rae Ramirez       polyethylene glycol  17 g Oral Daily PRN CARLOS Calhoun      propranolol  10 mg Oral Q8H PRN CARLOS Calhoun      senna-docusate sodium  1 tablet Oral Daily Joanne Cronin PA-C      tamsulosin  0.4 mg Oral Daily With Dinner CARLOS Calhoun      traZODone  100 mg Oral HS CARLOS Mo         Risks / Benefits of Treatment:    Risks, benefits, and possible side effects of medications explained to patient and patient verbalizes understanding and agreement for treatment.    Counseling / Coordination of Care:    Total floor / unit time spent today 35 minutes. Greater than 50% of total time was spent with the patient and / or family counseling and / or coordination of care. A description of counseling / coordination of care:  Patient's progress discussed with staff in treatment team meeting.  Medications, treatment progress and treatment plan reviewed with patient.    Josefa Sterling MD 03/14/24

## 2024-03-14 NOTE — NURSING NOTE
Patient reports Ativan given prior was minimally effective and continues to remain anxious. Encouraged to utilize coping skills.

## 2024-03-14 NOTE — SOCIAL WORK
The [facility] [place] you requested for Freddie KAN in unit/room [place] [address] on 03/18/2024 is scheduled to arrive at 12:00pm EDT! [facility] is handling this ride and you can contact them at [phone].

## 2024-03-14 NOTE — NURSING NOTE
Patient c/o feeling increasingly anxious. Given Ativan 0.5 mg @ 2247 for estrada anxiety score of 27. Will monitor and follow up for effectiveness.

## 2024-03-14 NOTE — PLAN OF CARE
Problem: Depression  Goal: Treatment Goal: Demonstrate behavioral control of depressive symptoms, verbalize feelings of improved mood/affect, and adopt new coping skills prior to discharge  Outcome: Progressing     Problem: Anxiety  Goal: Anxiety is at manageable level  Description: Interventions:  - Assess and monitor patient's anxiety level.   - Monitor for signs and symptoms (heart palpitations, chest pain, shortness of breath, headaches, nausea, feeling jumpy, restlessness, irritable, apprehensive).   - Collaborate with interdisciplinary team and initiate plan and interventions as ordered.  - Welches patient to unit/surroundings  - Explain treatment plan  - Encourage participation in care  - Encourage verbalization of concerns/fears  - Identify coping mechanisms  - Assist in developing anxiety-reducing skills  - Administer/offer alternative therapies  - Limit or eliminate stimulants  Outcome: Progressing     Problem: Risk for Violence/Aggression Toward Others  Goal: Control angry outbursts  Description: Interventions:  - Monitor patient closely, per order  - Ensure early verbal de-escalation  - Monitor prn medication needs  - Set reasonable/therapeutic limits, outline behavioral expectations, and consequences   - Provide a non-threatening milieu, utilizing the least restrictive interventions   Outcome: Progressing     Problem: Ineffective Coping  Goal: Identifies ineffective coping skills  Outcome: Progressing

## 2024-03-14 NOTE — SOCIAL WORK
Sw met with pt for check in, pt presents calm, cooperative, agreeable to Monday dc with [facility]. States friend may be able to pick him up, otherwise will need to keep roundtrip request.

## 2024-03-14 NOTE — NURSING NOTE
Patient noted to have difficulty falling asleep. Noted to have fallen asleep around 0100. Non labored breathing noted while asleep. Q 7 min safety checks maintained.

## 2024-03-14 NOTE — PROGRESS NOTES
"Progress Note - Freddie Graham 64 y.o. male MRN: [id number]    Unit/Bed#: [facility] 251-01 Encounter: [id number]        Subjective:   Patient seen and examined at bedside after reviewing the chart and discussing the case with the caring staff.      Patient examined at bedside.  Patient reports no acute symptoms.    Physical Exam   Vitals: Blood pressure 129/74, pulse 70, temperature 97.8 °F (36.6 °C), temperature source Temporal, resp. rate 16, height 5' 9\" (1.753 m), weight 90.7 kg (200 lb), SpO2 98%.,Body mass index is 29.53 kg/m².  Constitutional: Patient in no acute distress.  HEENT: PERR, EOMI, MMM.  Cardiovascular: Normal rate and regular rhythm.    Pulmonary/Chest: Effort normal and breath sounds normal.   Abdomen: Soft, + BS, NT.    Assessment/Plan:  Freddie Graham is a(n) 64 y.o. year old male schizophrenia.     1.  Cardiac with hx HTN, HLD.  Patient started on lisinopril 10 mg daily and Toprol-XL discontinued due to noncompliance 2/25/2024.  2.  Tobacco abuse.  NRT.  3.  Hypothyroidism.  Not on levothyroxine.  TSH normal 2/20/24.  4.  BPH.  Continue Flomax 0.4 mg daily.  5.  Bilateral foot blisters.  Podiatry consulted.  Surgical shoe ordered 2/24/2024.  6.  Vitamin D deficiency.  Patient started on vitamin D supplements.  7.  Vitamin B12 deficiency.  Patient started on vitamin B12 supplements.  8.  Constipation.  Senakot daily and Miralax as needed.   9.  Dental caries/infection.  Patient completed amoxicillin 500 mg TID for 7 days on 3/10/24.  Magic mouthwash as needed.  Patient advised to follow-up with dentist when discharged.   10.  DJD/back pain.  Apply Voltaren gel four times daily.     The patient was discussed with Dr. Gallagher and he is in agreement with the above note.  "

## 2024-03-14 NOTE — NURSING NOTE
Patient pacing in hallways, angry that he can't sleep. Patient given ativan for anxiety at 2245 which had mild results. Patient upset the DR d/c and changed his medications around. Patient becoming more agitated, raising his voice. Redirection not effective. Agitation score 36. Zyprexa 10 mg given.

## 2024-03-14 NOTE — NURSING NOTE
Patient was quiet and cooperative. Patient social with staff and select peers. Staff support provided. Q 7 minute safety checks maintained. Patient denied SI/HI. Patient compliant with medications and groups. Staff will continue to monitor and support.

## 2024-03-15 LAB
ANION GAP SERPL CALCULATED.3IONS-SCNC: 7 MMOL/L (ref 4–13)
BUN SERPL-MCNC: 17 MG/DL (ref 5–25)
CALCIUM SERPL-MCNC: 9.2 MG/DL (ref 8.4–10.2)
CHLORIDE SERPL-SCNC: 109 MMOL/L (ref 96–108)
CO2 SERPL-SCNC: 24 MMOL/L (ref 21–32)
CREAT SERPL-MCNC: 0.96 MG/DL (ref 0.6–1.3)
GFR SERPL CREATININE-BSD FRML MDRD: 82 ML/MIN/1.73SQ M
GLUCOSE P FAST SERPL-MCNC: 90 MG/DL (ref 65–99)
GLUCOSE SERPL-MCNC: 90 MG/DL (ref 65–140)
POTASSIUM SERPL-SCNC: 4.3 MMOL/L (ref 3.5–5.3)
SODIUM SERPL-SCNC: 140 MMOL/L (ref 135–147)

## 2024-03-15 PROCEDURE — 99232 SBSQ HOSP IP/OBS MODERATE 35: CPT | Performed by: HOSPITALIST

## 2024-03-15 PROCEDURE — 80048 BASIC METABOLIC PNL TOTAL CA: CPT | Performed by: STUDENT IN AN ORGANIZED HEALTH CARE EDUCATION/TRAINING PROGRAM

## 2024-03-15 RX ORDER — MIRTAZAPINE 15 MG/1
7.5 TABLET, FILM COATED ORAL
Status: DISCONTINUED | OUTPATIENT
Start: 2024-03-15 | End: 2024-03-18 | Stop reason: HOSPADM

## 2024-03-15 RX ADMIN — MIRTAZAPINE 7.5 MG: 15 TABLET, FILM COATED ORAL at 21:40

## 2024-03-15 RX ADMIN — Medication 9 MG: at 21:40

## 2024-03-15 RX ADMIN — LITHIUM CARBONATE 450 MG: 450 TABLET, EXTENDED RELEASE ORAL at 08:18

## 2024-03-15 RX ADMIN — CYANOCOBALAMIN TAB 500 MCG 500 MCG: 500 TAB at 08:18

## 2024-03-15 RX ADMIN — LITHIUM CARBONATE 450 MG: 450 TABLET, EXTENDED RELEASE ORAL at 21:40

## 2024-03-15 RX ADMIN — TAMSULOSIN HYDROCHLORIDE 0.4 MG: 0.4 CAPSULE ORAL at 17:28

## 2024-03-15 RX ADMIN — CHOLECALCIFEROL TAB 25 MCG (1000 UNIT) 1000 UNITS: 25 TAB at 08:18

## 2024-03-15 RX ADMIN — SENNOSIDES AND DOCUSATE SODIUM 1 TABLET: 8.6; 5 TABLET ORAL at 08:18

## 2024-03-15 RX ADMIN — PALIPERIDONE 12 MG: 6 TABLET, EXTENDED RELEASE ORAL at 08:18

## 2024-03-15 RX ADMIN — LISINOPRIL 10 MG: 10 TABLET ORAL at 08:18

## 2024-03-15 NOTE — NURSING NOTE
Prn Zyprexa given prior noted to be effective evidenced by patient less visibly agitated and resting comfortably in bed at this time.

## 2024-03-15 NOTE — PROGRESS NOTES
"Progress Note - Behavioral Health   Freddie CASTANEDA Day 64 y.o. male MRN: [id number]  Unit/Bed#: -02 Encounter: [id number]    Assessment/Plan   Principal Problem:    Schizoaffective disorder, bipolar type (HCC)      Behavior over the last 24 hours:  unchanged  Sleep: Poor  Appetite: normal  Medication side effects: No  ROS: no complaints and all other systems are negative    Freddie remains visible and appropriate in the milieu.  Mood is controlled with no agitated or behavioral outbursts.  Identifies difficulty with sleep since discontinuation of Remeron.  Mood described as \"tired\" today.  Thoughts organized and goal directed with no delusional content verbalized.  Has been tending to ADLs independently and compliant with medications and meals.  Spoke about his desire to visit his father's grave at [facility] and perhaps 1 day visit his brother in [place].    Mental Status Evaluation:  Appearance:  bearded and long gray hair, casually dressed   Behavior:  Cooperative   Speech:  Deep, tangential   Mood:  \"All right\"  and \"tired\"   Affect:  mood-congruent   Thought Process:  goal directed and linear   Associations: circumstantial associations   Thought Content:  No overt delusions or paranoia verbalized   Perceptual Disturbances: Denies AVH, did not appear internally preoccupied   Risk Potential: Suicidal Ideations none  Homicidal Ideations none  Potential for Aggression No   Sensorium:  person, place, time/date, and situation   Memory:  recent and remote memory grossly intact   Consciousness:  alert and awake    Attention: attention span and concentration were age appropriate   Insight:  limited   Judgment: limited   Gait/Station: normal gait/station, normal balance, and slow   Motor Activity: no abnormal movements     Progress Toward Goals: Mood controlled with no behavioral outbursts, agitation, or overt psychosis.  Maintaining safety on unit.  Sleep poor.  Plan is to discontinue trazodone and restart Remeron " 7.5 mg PO QHS which has been effective prior for sleep.  Will assess for S/S activation/mood instability.  Otherwise, should patient maintain mood control and sleep improved throughout the weekend, plan is to discharge home Monday, 3/18/2024, with outpatient psychiatric follow-up.    Recommended Treatment: Continue with group therapy, milieu therapy and occupational therapy.      Risks, benefits and possible side effects of Medications:   Freddie has limited understanding of risk versus benefits of medications, but agrees to take as prescribed.    Medications:   Discontinue trazodone 150 mg PO QHS  Restart Remeron 7.5 mg PO QHS  all current active meds have been reviewed and current meds:   Current Facility-Administered Medications   Medication Dose Route Frequency    acetaminophen (TYLENOL) tablet 650 mg  650 mg Oral Q6H PRN    acetaminophen (TYLENOL) tablet 650 mg  650 mg Oral Q4H PRN    acetaminophen (TYLENOL) tablet 975 mg  975 mg Oral Q6H PRN    aluminum-magnesium hydroxide-simethicone (MAALOX) oral suspension 30 mL  30 mL Oral Q4H PRN    benztropine (COGENTIN) injection 1 mg  1 mg Intramuscular Q4H PRN Max 6/day    benztropine (COGENTIN) tablet 1 mg  1 mg Oral Q4H PRN Max 6/day    bisacodyl (DULCOLAX) rectal suppository 10 mg  10 mg Rectal Daily PRN    cholecalciferol (VITAMIN D3) tablet 1,000 Units  1,000 Units Oral Daily    cyanocobalamin (VITAMIN B-12) tablet 500 mcg  500 mcg Oral Daily    Diclofenac Sodium (VOLTAREN) 1 % topical gel 2 g  2 g Topical 4x Daily    hydrOXYzine HCL (ATARAX) tablet 50 mg  50 mg Oral Q6H PRN Max 4/day    Or    diphenhydrAMINE (BENADRYL) injection 50 mg  50 mg Intramuscular Q6H PRN    diphenhydramine, lidocaine, Al/Mg hydroxide, simethicone (Magic Mouthwash) oral solution 10 mL  10 mL Swish & Spit Q4H PRN    hydrOXYzine HCL (ATARAX) tablet 25 mg  25 mg Oral Q6H PRN Max 4/day    lisinopril (ZESTRIL) tablet 10 mg  10 mg Oral Daily    lithium carbonate (LITHOBID) CR tablet 450 mg  450  mg Oral Q12H MANNY    LORazepam (ATIVAN) tablet 0.5 mg  0.5 mg Oral Q6H PRN    melatonin tablet 9 mg  9 mg Oral HS    mirtazapine (REMERON) tablet 7.5 mg  7.5 mg Oral HS    nicotine polacrilex (NICORETTE) gum 2 mg  2 mg Oral Q2H PRN    OLANZapine (ZyPREXA) tablet 10 mg  10 mg Oral Q3H PRN Max 3/day    Or    OLANZapine (ZyPREXA) IM injection 10 mg  10 mg Intramuscular Q3H PRN Max 3/day    OLANZapine (ZyPREXA) tablet 5 mg  5 mg Oral Q3H PRN Max 6/day    Or    OLANZapine (ZyPREXA) IM injection 5 mg  5 mg Intramuscular Q3H PRN Max 6/day    OLANZapine (ZyPREXA) tablet 2.5 mg  2.5 mg Oral Q3H PRN Max 8/day    paliperidone (INVEGA) 24 hr tablet 12 mg  12 mg Oral Daily    polyethylene glycol (MIRALAX) packet 17 g  17 g Oral Daily PRN    propranolol (INDERAL) tablet 10 mg  10 mg Oral Q8H PRN    senna-docusate sodium (SENOKOT S) 8.6-50 mg per tablet 1 tablet  1 tablet Oral Daily    tamsulosin (FLOMAX) capsule 0.4 mg  0.4 mg Oral Daily With Dinner   .    Labs: I have personally reviewed all pertinent laboratory/tests results. CBC:   Lab Results   Component Value Date    WBC 6.24 02/17/2024    RBC 4.73 02/17/2024    HGB 14.1 02/17/2024    HCT 42.6 02/17/2024    MCV 90 02/17/2024     02/17/2024    MCH 29.8 02/17/2024    MCHC 33.1 02/17/2024    RDW 12.7 02/17/2024    MPV 10.3 02/17/2024    NEUTROABS 4.20 02/17/2024     CMP:   Lab Results   Component Value Date    SODIUM 140 03/15/2024    K 4.3 03/15/2024     (H) 03/15/2024    CO2 24 03/15/2024    AGAP 7 03/15/2024    BUN 17 03/15/2024    CREATININE 0.96 03/15/2024    GLUC 90 03/15/2024    GLUF 90 03/15/2024    CALCIUM 9.2 03/15/2024    AST 24 02/17/2024    ALT 23 02/17/2024    ALKPHOS 60 02/17/2024    TP 6.6 02/17/2024    ALB 4.3 02/17/2024    TBILI 0.34 02/17/2024    EGFR 82 03/15/2024     EKG   Lab Results   Component Value Date    VENTRATE 96 02/17/2024    ATRIALRATE 96 02/17/2024    PRINT 156 02/17/2024    QRSDINT 88 02/17/2024    QTINT 348 02/17/2024    QTCINT 439  02/17/2024    PAXIS 65 02/17/2024    QRSAXIS -74 02/17/2024    TWAVEAXIS 48 02/17/2024       Counseling / Coordination of Care  Total floor / unit time spent today 30 minutes. Greater than 50% of total time was spent with the patient and / or family counseling and / or coordination of care. A description of the counseling / coordination of care: medication education, treatment plan, safety/discharge planning

## 2024-03-15 NOTE — SOCIAL WORK
Pt requesting meds be send to [facility] in [place] for dc Monday, states friend may pick him up around 11am but will confirm with sw on Monday AM.

## 2024-03-15 NOTE — PLAN OF CARE
Problem: Depression  Goal: Treatment Goal: Demonstrate behavioral control of depressive symptoms, verbalize feelings of improved mood/affect, and adopt new coping skills prior to discharge  Outcome: Progressing     Problem: Anxiety  Goal: Anxiety is at manageable level  Description: Interventions:  - Assess and monitor patient's anxiety level.   - Monitor for signs and symptoms (heart palpitations, chest pain, shortness of breath, headaches, nausea, feeling jumpy, restlessness, irritable, apprehensive).   - Collaborate with interdisciplinary team and initiate plan and interventions as ordered.  - [place] patient to unit/surroundings  - Explain treatment plan  - Encourage participation in care  - Encourage verbalization of concerns/fears  - Identify coping mechanisms  - Assist in developing anxiety-reducing skills  - Administer/offer alternative therapies  - Limit or eliminate stimulants  Outcome: Progressing     Problem: Ineffective Coping  Goal: Participates in unit activities  Description: Interventions:  - Provide therapeutic environment   - Provide required programming   - Redirect inappropriate behaviors   Outcome: Progressing     Problem: Alteration in Thoughts and Perception  Goal: Refrain from acting on delusional thinking/internal stimuli  Description: Interventions:  - Monitor patient closely, per order   - Utilize least restrictive measures   - Set reasonable limits, give positive feedback for acceptable   - Administer medications as ordered and monitor of potential side effects  Outcome: Progressing     Problem: DISCHARGE PLANNING - CARE MANAGEMENT  Goal: Discharge to post-acute care or home with appropriate resources  Description: INTERVENTIONS:  - Conduct assessment to determine patient/family and health care team treatment goals, and need for post-acute services based on payer coverage, community resources, and patient preferences, and barriers to discharge  - Address psychosocial, clinical, and  financial barriers to discharge as identified in assessment in conjunction with the patient/family and health care team  - Arrange appropriate level of post-acute services according to patient’s   needs and preference and payer coverage in collaboration with the physician and health care team  - Communicate with and update the patient/family, physician, and health care team regarding progress on the discharge plan  - Arrange appropriate transportation to post-acute venues  Outcome: Progressing

## 2024-03-15 NOTE — NURSING NOTE
"Patient reports having trouble falling asleep. Patient was given scheduled Trazodone 150 mg and melatonin 9 for sleep. Patient stated \"I told them before trazodone wasn't working and it used to make my eye twitch and give me headaches it's not doing that now but I just can't sleep.\" Patient agreed to make another attempt to fall asleep and will report to this writer if he has any further issues w/ the attempt.   "

## 2024-03-15 NOTE — PROGRESS NOTES
"Progress Note - Freddie Graham 64 y.o. male MRN: [id number]    Unit/Bed#: [facility] 254-02 Encounter: [id number]        Subjective:   Patient seen and examined at bedside after reviewing the chart and discussing the case with the caring staff.      Patient examined at bedside.  Patient reports no acute symptoms.    Physical Exam   Vitals: Blood pressure 110/69, pulse 84, temperature 98.2 °F (36.8 °C), temperature source Temporal, resp. rate 17, height 5' 9\" (1.753 m), weight 90.7 kg (200 lb), SpO2 95%.,Body mass index is 29.53 kg/m².  Constitutional: Patient in no acute distress.  HEENT: PERR, EOMI, MMM.  Cardiovascular: Normal rate and regular rhythm.    Pulmonary/Chest: Effort normal and breath sounds normal.   Abdomen: Soft, + BS, NT.    Assessment/Plan:  Freddie Graham is a(n) 64 y.o. year old male schizophrenia.     1.  Cardiac with hx HTN, HLD.  Patient started on lisinopril 10 mg daily and Toprol-XL discontinued due to noncompliance 2/25/2024.  2.  Tobacco abuse.  NRT.  3.  Hypothyroidism.  Not on levothyroxine.  TSH normal 2/20/24.  4.  BPH.  Continue Flomax 0.4 mg daily.  5.  Bilateral foot blisters.  Podiatry consulted.  Surgical shoe ordered 2/24/2024.  6.  Vitamin D deficiency.  Patient started on vitamin D supplements.  7.  Vitamin B12 deficiency.  Patient started on vitamin B12 supplements.  8.  Constipation.  Senakot daily and Miralax as needed.   9.  Dental caries/infection.  Patient completed amoxicillin 500 mg TID for 7 days on 3/10/24.  Magic mouthwash as needed.  Patient advised to follow-up with dentist when discharged.   10.  DJD/back pain.  Apply Voltaren gel four times daily.     The patient was discussed with Dr. Gallagher and he is in agreement with the above note.  "

## 2024-03-15 NOTE — NURSING NOTE
Patient pacing in hallways, angry that he can't sleep. Patient becoming more agitated, raising his voice. Redirection not effective. Agitation score 36. Zyprexa 10 mg given. Will monitor and follow up for effectiveness.

## 2024-03-15 NOTE — NURSING NOTE
Patient calm and controlled this morning. He appears somewhat drowsy and states he had some difficulty falling alseep last night. Denies feeling anxious or depressed this morning. Denies SI/HI and hallucinations. He showered and appears clean. Appetite is good. Compliant with all medications.

## 2024-03-15 NOTE — PROGRESS NOTES
03/15/24    Team Meeting   Meeting Type Daily Rounds   Team Members Present   Team Members Present Physician;Nurse;Occupational Therapist;   Physician Team Member Dr Asher PAYTON; Panchito GONZALEZ   Nursing Team Member Richard JURADO   Social Work Team Member Issa VARGAS   OT Team Member Wolf GARZA   Patient/Family Present   Patient Present No   Patient's Family Present No     DC Monday, poor sleep, lithium.61, PRN ineffective, appears tired, adding Remeron 7.5mg.

## 2024-03-15 NOTE — NURSING NOTE
Patient noted to be visible and social on the milieu. No behavioral outburst on the milieu this evening. Denied SI/HI/AVH, depression, and anxiety. Compliant w/ hs med regimen. Only offers c/o issues with sleep. Q 7 min safety checks continuous and maintained.

## 2024-03-16 PROCEDURE — 99232 SBSQ HOSP IP/OBS MODERATE 35: CPT

## 2024-03-16 RX ORDER — LISINOPRIL 10 MG/1
10 TABLET ORAL DAILY
Qty: 30 TABLET | Refills: 0 | Status: SHIPPED | OUTPATIENT
Start: 2024-03-17

## 2024-03-16 RX ORDER — TAMSULOSIN HYDROCHLORIDE 0.4 MG/1
0.4 CAPSULE ORAL
Qty: 30 CAPSULE | Refills: 0 | Status: SHIPPED | OUTPATIENT
Start: 2024-03-16

## 2024-03-16 RX ORDER — MELATONIN
2000 DAILY
Qty: 60 TABLET | Refills: 0 | Status: SHIPPED | OUTPATIENT
Start: 2024-03-16

## 2024-03-16 RX ADMIN — TAMSULOSIN HYDROCHLORIDE 0.4 MG: 0.4 CAPSULE ORAL at 17:35

## 2024-03-16 RX ADMIN — CYANOCOBALAMIN TAB 500 MCG 500 MCG: 500 TAB at 09:17

## 2024-03-16 RX ADMIN — CHOLECALCIFEROL TAB 25 MCG (1000 UNIT) 1000 UNITS: 25 TAB at 09:16

## 2024-03-16 RX ADMIN — LITHIUM CARBONATE 450 MG: 450 TABLET, EXTENDED RELEASE ORAL at 20:50

## 2024-03-16 RX ADMIN — LISINOPRIL 10 MG: 10 TABLET ORAL at 09:16

## 2024-03-16 RX ADMIN — Medication 9 MG: at 20:49

## 2024-03-16 RX ADMIN — MIRTAZAPINE 7.5 MG: 15 TABLET, FILM COATED ORAL at 20:50

## 2024-03-16 RX ADMIN — PALIPERIDONE 12 MG: 6 TABLET, EXTENDED RELEASE ORAL at 09:16

## 2024-03-16 RX ADMIN — SENNOSIDES AND DOCUSATE SODIUM 1 TABLET: 8.6; 5 TABLET ORAL at 09:16

## 2024-03-16 RX ADMIN — LITHIUM CARBONATE 450 MG: 450 TABLET, EXTENDED RELEASE ORAL at 09:16

## 2024-03-16 NOTE — NURSING NOTE
[id number]- Resting quietly. Only woke up  one time at 0500.  Checked time and went back to bed. Respirations even and unlabored.  No distress noted.  Safety precautions maintained.  Q 7 min checks maintained.  Will continue to monitor.

## 2024-03-16 NOTE — NURSING NOTE
Patient observed within the milieu, appropriate in affect, and reports pleasant mood. He does admit to some frustration with poor sleep but hopes improvement during the weekend. He denies anxiety, depression, SI/HI and hallucinations. Denies current pain. Behavior and mood controlled. Q7 minute checks ongoing.

## 2024-03-16 NOTE — PLAN OF CARE
Problem: Depression  Goal: Treatment Goal: Demonstrate behavioral control of depressive symptoms, verbalize feelings of improved mood/affect, and adopt new coping skills prior to discharge  Outcome: Progressing  Goal: Refrain from harming self  Description: Interventions:  - Monitor patient closely, per order   - Supervise medication ingestion, monitor effects and side effects   Outcome: Progressing

## 2024-03-16 NOTE — NURSING NOTE
Patient awake in room.Alert and Oriented.Pleasant and cooperative.  Mood improved since admission.Schedule PO medication administered as ordered. Denies anxiety, depression hallucination,HI,SI.Appetite good.Continue on safety check.

## 2024-03-16 NOTE — PROGRESS NOTES
"Progress Note - Freddie Graham 64 y.o. male MRN: [id number]    Unit/Bed#: [facility] 254-02 Encounter: [id number]        Subjective:   Patient seen and examined at bedside after reviewing the chart and discussing the case with the caring staff.      Patient examined at bedside.  Patient reports no acute symptoms.    Patient is being discharged on Monday, 3/18/2024.  Patient is requesting his prescriptions.  I reviewed and reconciled patient's problem list and medications.    Physical Exam   Vitals: Blood pressure 144/70, pulse 85, temperature 97.5 °F (36.4 °C), temperature source Temporal, resp. rate 16, height 5' 9\" (1.753 m), weight 90.7 kg (200 lb), SpO2 97%.,Body mass index is 29.53 kg/m².  Constitutional: Patient in no acute distress.  HEENT: PERR, EOMI, MMM.  Cardiovascular: Normal rate and regular rhythm.    Pulmonary/Chest: Effort normal and breath sounds normal.   Abdomen: Soft, + BS, NT.    Assessment/Plan:  Freddie Graham is a(n) 64 y.o. year old male schizophrenia.     Medical clearance.  Patient is medically cleared for discharge.  All scripts will be sent out for the patient.    1.  Cardiac with hx HTN, HLD.  Patient started on lisinopril 10 mg daily and Toprol-XL discontinued due to noncompliance 2/25/2024.  2.  Tobacco abuse.  NRT.  3.  Hypothyroidism.  Not on levothyroxine.  TSH normal 2/20/24.  4.  BPH.  Continue Flomax 0.4 mg daily.  5.  Bilateral foot blisters.  Podiatry consulted.  Surgical shoe ordered 2/24/2024.  6.  Vitamin D deficiency.  Patient started on vitamin D supplements.  7.  Vitamin B12 deficiency.  Patient started on vitamin B12 supplements.  8.  Constipation.  Senakot daily and Miralax as needed.   9.  Dental caries/infection.  Patient completed amoxicillin 500 mg TID for 7 days on 3/10/24.  Magic mouthwash as needed.  Patient advised to follow-up with dentist when discharged.   10.  DJD/back pain.  Apply Voltaren gel four times daily.   "

## 2024-03-17 PROCEDURE — 99232 SBSQ HOSP IP/OBS MODERATE 35: CPT

## 2024-03-17 RX ADMIN — Medication 9 MG: at 20:59

## 2024-03-17 RX ADMIN — LITHIUM CARBONATE 450 MG: 450 TABLET, EXTENDED RELEASE ORAL at 20:59

## 2024-03-17 RX ADMIN — CYANOCOBALAMIN TAB 500 MCG 500 MCG: 500 TAB at 08:54

## 2024-03-17 RX ADMIN — TAMSULOSIN HYDROCHLORIDE 0.4 MG: 0.4 CAPSULE ORAL at 17:55

## 2024-03-17 RX ADMIN — PALIPERIDONE 12 MG: 6 TABLET, EXTENDED RELEASE ORAL at 08:54

## 2024-03-17 RX ADMIN — CHOLECALCIFEROL TAB 25 MCG (1000 UNIT) 1000 UNITS: 25 TAB at 08:55

## 2024-03-17 RX ADMIN — MIRTAZAPINE 7.5 MG: 15 TABLET, FILM COATED ORAL at 20:59

## 2024-03-17 RX ADMIN — LITHIUM CARBONATE 450 MG: 450 TABLET, EXTENDED RELEASE ORAL at 08:54

## 2024-03-17 RX ADMIN — SENNOSIDES AND DOCUSATE SODIUM 1 TABLET: 8.6; 5 TABLET ORAL at 08:54

## 2024-03-17 NOTE — PROGRESS NOTES
"Progress Note - Behavioral Health   Freddie CASTANEDA Day 64 y.o. male MRN: [id number]  Unit/Bed#: -02 Encounter: [id number]    Assessment/Plan   Principal Problem:    Schizoaffective disorder, bipolar type (HCC)      Recommended Treatment:     No medication changes at this time    Continue lithium 450 mg twice daily for mood stability  Continue Invega 12 mg daily for mood stability and psychosis  Continue Remeron 7.5 mg at bedtime for sleep  Continue melatonin 9 mg at bedtime for sleep    Continue with group therapy, milieu therapy and occupational therapy.    Continue frequent safety checks and vitals per unit protocol.  Case discussed with treatment team.  Risks, benefits and possible side effects of Medications: Risks, benefits, and possible side effects of medications have been explained to the patient, who verbalizes understanding    ------------------------------------------------------------    Subjective:     Per nursing report, on the inpatient psychiatric unit Freddie has been nearing behavioral baseline and discharge is anticipated for Monday. On the inpatient psychiatric unit Freddie has been visible in the milieu, pleasant and cooperative. On nursing assessment yesterday evening he voiced no issues or concerns, denying symptoms of depression and anxiety, SI, HI, and AVH. On the inpatient unit Freddie has been medication and meal compliant.    Today, Freddie reports feeling \"great.\" He reports feeling \"hopeful\" for discharge on Monday and states he will be returning to live with his mother. He states that he is looking forward to enjoying the good weather. On the inpatient unit Freddie reports he has been spending his time reading the bible.    At the time of interview, Freddie voices no issues or concerns and reports sustained improvements in his psychiatric symptoms on the inpatient unit. Freddie reports on the inpatient unit he has been eating well. He reports he has been sleeping well with the addition of Remeron. " "Freddie has been taking his psychiatric medications as prescribed and he denies medication side effects.    Freddie denies suicidal ideation, homicidal ideation, visual and auditory hallucinations.     Progress Toward Goals: slow improvement    Psychiatric Review of Systems:  Behavior over the last 24 hours: unchanged, remains in positive behavioral control  Sleep: normal  Appetite: normal compared to baseline  Medication side effects: none verbalized  ROS: Complete review of systems is negative except as noted above.    Vital signs in last 24 hours:  Temp:  [97.5 °F (36.4 °C)-98.4 °F (36.9 °C)] 98.4 °F (36.9 °C)  HR:  [74-85] 74  Resp:  [16-18] 16  BP: (108-144)/(65-76) 114/76    Mental Status Exam:  Appearance:  alert, good eye contact, appears stated age, casually dressed, fair grooming and hygiene, bearded, and smiling   Behavior:  calm and cooperative   Motor: no abnormal movements and normal gait and balance   Speech:  spontaneous, clear, normal rate, normal volume, and coherent   Mood:  \"Great\"   Affect:  euthymic   Thought Process:  Linear, concrete, goal directed   Thought Content: no verbalized delusions or overt paranoia   Perceptual disturbances: denies current hallucinations and does not appear to be responding to internal stimuli at this time   Risk Potential: No active suicidal ideation, No active homicidal ideation   Cognition: oriented to person, place, time, and situation, memory grossly intact, appears to be of average intelligence, impaired abstract reasoning, age-appropriate attention span and concentration, and cognition not formally tested   Insight:  Limited   Judgment: Limited     Current Medications:  Current Facility-Administered Medications   Medication Dose Route Frequency Provider Last Rate    acetaminophen  650 mg Oral Q6H PRN CARLOS Calhoun      acetaminophen  650 mg Oral Q4H PRN CARLOS Calhoun      acetaminophen  975 mg Oral Q6H PRN CARLOS Calhoun      aluminum-magnesium " hydroxide-simethicone  30 mL Oral Q4H PRN CARLOS Calhoun      benztropine  1 mg Intramuscular Q4H PRN Max 6/day CARLOS Calhoun      benztropine  1 mg Oral Q4H PRN Max 6/day CARLOS Calhoun      bisacodyl  10 mg Rectal Daily PRN Josefa Sterling MD      cholecalciferol  1,000 Units Oral Daily Chai ElliottMD alison      cyanocobalamin  500 mcg Oral Daily Chai Elliott, MD      Diclofenac Sodium  2 g Topical 4x Daily Joanne Cronin PA-C      hydrOXYzine HCL  50 mg Oral Q6H PRN Max 4/day CARLOS Calhoun      Or    diphenhydrAMINE  50 mg Intramuscular Q6H PRN CARLOS Calhoun      diphenhydramine, lidocaine, Al/Mg hydroxide, simethicone  10 mL Swish & Spit Q4H PRN Joanne Cronin PA-C      hydrOXYzine HCL  25 mg Oral Q6H PRN Max 4/day CARLOS Calhoun      lisinopril  10 mg Oral Daily Chai Gallagher MD      lithium carbonate  450 mg Oral Q12H [facility] Jamar Salas, DO      LORazepam  0.5 mg Oral Q6H PRN Melissa M Medei, CARLOS      melatonin  9 mg Oral HS Melissa M Medei, CARLOS      mirtazapine  7.5 mg Oral HS Melissa M Medei, CARLOS      nicotine polacrilex  2 mg Oral Q2H PRN CARLOS Calhoun      OLANZapine  10 mg Oral Q3H PRN Max 3/day CARLOS Calhoun      Or    OLANZapine  10 mg Intramuscular Q3H PRN Max 3/day CARLOS Calhoun      OLANZapine  5 mg Oral Q3H PRN Max 6/day CARLOS Calhoun      Or    OLANZapine  5 mg Intramuscular Q3H PRN Max 6/day CARLOS Calhoun      OLANZapine  2.5 mg Oral Q3H PRN Max 8/day CARLOS Calhoun      paliperidone  12 mg Oral Daily Rae Ramirez, DO      polyethylene glycol  17 g Oral Daily PRN CARLOS Calhoun      propranolol  10 mg Oral Q8H PRN CARLOS Calhoun      senna-docusate sodium  1 tablet Oral Daily Joanne Cronin PA-C      tamsulosin  0.4 mg Oral Daily With Dinner CARLOS Calhoun         Behavioral Health Medications: all current active meds have been reviewed. Changes as in plan section above.    Laboratory results:  I have personally  reviewed all pertinent laboratory/tests results.  Recent Results (from the past 48 hour(s))   Basic metabolic panel    Collection Time: 03/15/24  6:00 AM   Result Value Ref Range    Sodium 140 135 - 147 mmol/L    Potassium 4.3 3.5 - 5.3 mmol/L    Chloride 109 (H) 96 - 108 mmol/L    CO2 24 21 - 32 mmol/L    ANION GAP 7 4 - 13 mmol/L    BUN 17 5 - 25 mg/dL    Creatinine 0.96 0.60 - 1.30 mg/dL    Glucose 90 65 - 140 mg/dL    Glucose, Fasting 90 65 - 99 mg/dL    Calcium 9.2 8.4 - 10.2 mg/dL    eGFR 82 ml/min/1.73sq m        Tiago Orta MD

## 2024-03-17 NOTE — NURSING NOTE
Patient visible on unit.Pleasant and cooperative.Schedule PO medication administered as ordered.Denies hallucination, HI,SI.Appetite good.Continue on safety check.

## 2024-03-17 NOTE — PLAN OF CARE
Problem: Depression  Goal: Treatment Goal: Demonstrate behavioral control of depressive symptoms, verbalize feelings of improved mood/affect, and adopt new coping skills prior to discharge  Outcome: Progressing  Goal: Refrain from harming self  Description: Interventions:  - Monitor patient closely, per order   - Supervise medication ingestion, monitor effects and side effects   Outcome: Progressing     Problem: Anxiety  Goal: Anxiety is at manageable level  Description: Interventions:  - Assess and monitor patient's anxiety level.   - Monitor for signs and symptoms (heart palpitations, chest pain, shortness of breath, headaches, nausea, feeling jumpy, restlessness, irritable, apprehensive).   - Collaborate with interdisciplinary team and initiate plan and interventions as ordered.  - Sterling patient to unit/surroundings  - Explain treatment plan  - Encourage participation in care  - Encourage verbalization of concerns/fears  - Identify coping mechanisms  - Assist in developing anxiety-reducing skills  - Administer/offer alternative therapies  - Limit or eliminate stimulants  Outcome: Progressing     Problem: Risk for Violence/Aggression Toward Others  Goal: Treatment Goal: Refrain from acts of violence/aggression during length of stay, and demonstrate improved impulse control at the time of discharge  Outcome: Progressing  Goal: Refrain from destructive acts on the environment or property  Outcome: Progressing  Goal: Control angry outbursts  Description: Interventions:  - Monitor patient closely, per order  - Ensure early verbal de-escalation  - Monitor prn medication needs  - Set reasonable/therapeutic limits, outline behavioral expectations, and consequences   - Provide a non-threatening milieu, utilizing the least restrictive interventions   Outcome: Progressing     Problem: Alteration in Thoughts and Perception  Goal: Treatment Goal: Gain control of psychotic behaviors/thinking, reduce/eliminate presenting  symptoms and demonstrate improved reality functioning upon discharge  Outcome: Progressing  Goal: Refrain from acting on delusional thinking/internal stimuli  Description: Interventions:  - Monitor patient closely, per order   - Utilize least restrictive measures   - Set reasonable limits, give positive feedback for acceptable   - Administer medications as ordered and monitor of potential side effects  Outcome: Progressing  Goal: Recognize dysfunctional thoughts, communicate reality-based thoughts at the time of discharge  Description: Interventions:  - Provide medication and psycho-education to assist patient in compliance and developing insight into his/her illness   Outcome: Progressing  See chart note

## 2024-03-17 NOTE — PROGRESS NOTES
"Progress Note - Freddie Graham 64 y.o. male MRN: [id number]    Unit/Bed#: [facility] 254-02 Encounter: [id number]        Subjective:   Patient seen and examined at bedside after reviewing the chart and discussing the case with the caring staff.      Patient examined at bedside.  Patient reports no acute symptoms.    Patient is being discharged on Monday, 3/18/2024.  Patient is requesting his prescriptions.  I reviewed and reconciled patient's problem list and medications.    Physical Exam   Vitals: Blood pressure 108/73, pulse 64, temperature 97.5 °F (36.4 °C), temperature source Tympanic, resp. rate 16, height 5' 9\" (1.753 m), weight 92.2 kg (203 lb 3.2 oz), SpO2 96%.,Body mass index is 30.01 kg/m².  Constitutional: Patient in no acute distress.  HEENT: PERR, EOMI, MMM.  Cardiovascular: Normal rate and regular rhythm.    Pulmonary/Chest: Effort normal and breath sounds normal.   Abdomen: Soft, + BS, NT.    Assessment/Plan:  Freddie Graham is a(n) 64 y.o. year old male schizophrenia.     Medical clearance.  Patient is medically cleared for discharge.  All scripts will be sent out for the patient.    1.  Cardiac with hx HTN, HLD.  Patient started on lisinopril 10 mg daily and Toprol-XL discontinued due to noncompliance 2/25/2024.  2.  Tobacco abuse.  NRT.  3.  Hypothyroidism.  Not on levothyroxine.  TSH normal 2/20/24.  4.  BPH.  Continue Flomax 0.4 mg daily.  5.  Bilateral foot blisters.  Podiatry consulted.  Surgical shoe ordered 2/24/2024.  6.  Vitamin D deficiency.  Patient started on vitamin D supplements.  7.  Vitamin B12 deficiency.  Patient started on vitamin B12 supplements.  8.  Constipation.  Senakot daily and Miralax as needed.   9.  Dental caries/infection.  Patient completed amoxicillin 500 mg TID for 7 days on 3/10/24.  Magic mouthwash as needed.  Patient advised to follow-up with dentist when discharged.   10.  DJD/back pain.  Apply Voltaren gel four times daily.   "

## 2024-03-17 NOTE — NURSING NOTE
Patient has been visible on the unit.  Attended evening snack and was observed interacting with select peers.  Patient is very calm this evening.  Eye contact is good.  Speech volume and rate are completely appropriate.  Patient denies s/I and reports he is feeling good with his current medications.  Proudly reports he has been able to sleep the past several nights.  Says he found that he needs to allow more time for the mediation to work before deciding he can't sleep.  Insight improved.  Cooperative with medications

## 2024-03-18 VITALS
TEMPERATURE: 97.9 F | HEIGHT: 69 IN | RESPIRATION RATE: 18 BRPM | WEIGHT: 203.2 LBS | DIASTOLIC BLOOD PRESSURE: 83 MMHG | SYSTOLIC BLOOD PRESSURE: 127 MMHG | HEART RATE: 85 BPM | BODY MASS INDEX: 30.1 KG/M2 | OXYGEN SATURATION: 95 %

## 2024-03-18 PROCEDURE — 99238 HOSP IP/OBS DSCHRG MGMT 30/<: CPT | Performed by: NURSE PRACTITIONER

## 2024-03-18 RX ORDER — MIRTAZAPINE 7.5 MG/1
7.5 TABLET, FILM COATED ORAL
Qty: 30 TABLET | Refills: 0 | Status: SHIPPED | OUTPATIENT
Start: 2024-03-18 | End: 2024-04-17

## 2024-03-18 RX ORDER — LITHIUM CARBONATE 450 MG
450 TABLET, EXTENDED RELEASE ORAL EVERY 12 HOURS SCHEDULED
Qty: 60 TABLET | Refills: 0 | Status: SHIPPED | OUTPATIENT
Start: 2024-03-18 | End: 2024-04-17

## 2024-03-18 RX ORDER — PALIPERIDONE 6 MG/1
12 TABLET, EXTENDED RELEASE ORAL DAILY
Qty: 60 TABLET | Refills: 0 | Status: SHIPPED | OUTPATIENT
Start: 2024-03-18 | End: 2024-04-17

## 2024-03-18 RX ORDER — LANOLIN ALCOHOL/MO/W.PET/CERES
9 CREAM (GRAM) TOPICAL
Qty: 90 TABLET | Refills: 0 | Status: SHIPPED | OUTPATIENT
Start: 2024-03-18 | End: 2024-04-17

## 2024-03-18 RX ADMIN — CYANOCOBALAMIN TAB 500 MCG 500 MCG: 500 TAB at 08:45

## 2024-03-18 RX ADMIN — PALIPERIDONE 12 MG: 6 TABLET, EXTENDED RELEASE ORAL at 08:45

## 2024-03-18 RX ADMIN — LISINOPRIL 10 MG: 10 TABLET ORAL at 08:45

## 2024-03-18 RX ADMIN — SENNOSIDES AND DOCUSATE SODIUM 1 TABLET: 8.6; 5 TABLET ORAL at 08:45

## 2024-03-18 RX ADMIN — LITHIUM CARBONATE 450 MG: 450 TABLET, EXTENDED RELEASE ORAL at 08:45

## 2024-03-18 RX ADMIN — DICLOFENAC SODIUM 2 G: 10 GEL TOPICAL at 08:47

## 2024-03-18 RX ADMIN — CHOLECALCIFEROL TAB 25 MCG (1000 UNIT) 1000 UNITS: 25 TAB at 08:45

## 2024-03-18 NOTE — NURSING NOTE
Patient escorted to the main lobby with all belongings. All discharge paperwork reviewed with patient. Patient appropriate in mood and affect. Patient denied SI/HI.

## 2024-03-18 NOTE — PLAN OF CARE
Problem: Depression  Goal: Treatment Goal: Demonstrate behavioral control of depressive symptoms, verbalize feelings of improved mood/affect, and adopt new coping skills prior to discharge  Outcome: Progressing  Goal: Refrain from harming self  Description: Interventions:  - Monitor patient closely, per order   - Supervise medication ingestion, monitor effects and side effects   Outcome: Progressing     Problem: Risk for Violence/Aggression Toward Others  Goal: Treatment Goal: Refrain from acts of violence/aggression during length of stay, and demonstrate improved impulse control at the time of discharge  Outcome: Progressing  Goal: Refrain from destructive acts on the environment or property  Outcome: Progressing  Goal: Control angry outbursts  Description: Interventions:  - Monitor patient closely, per order  - Ensure early verbal de-escalation  - Monitor prn medication needs  - Set reasonable/therapeutic limits, outline behavioral expectations, and consequences   - Provide a non-threatening milieu, utilizing the least restrictive interventions   Outcome: Progressing     Problem: Ineffective Coping  Goal: Participates in unit activities  Description: Interventions:  - Provide therapeutic environment   - Provide required programming   - Redirect inappropriate behaviors   Outcome: Progressing     Problem: Alteration in Thoughts and Perception  Goal: Treatment Goal: Gain control of psychotic behaviors/thinking, reduce/eliminate presenting symptoms and demonstrate improved reality functioning upon discharge  Outcome: Progressing  Goal: Refrain from acting on delusional thinking/internal stimuli  Description: Interventions:  - Monitor patient closely, per order   - Utilize least restrictive measures   - Set reasonable limits, give positive feedback for acceptable   - Administer medications as ordered and monitor of potential side effects  Outcome: Progressing  See chart note

## 2024-03-18 NOTE — PROGRESS NOTES
03/18/24   Team Meeting   Meeting Type Daily Rounds   Team Members Present   Team Members Present Nurse;Physician;Occupational Therapist;   Physician Team Member Dr Maribell PAYTON; Panchito GONZALEZ   Nursing Team Member Yudith JURADO   Social Work Team Member Issa VARGAS; Isael VARGAS   OT Team Member    Patient/Family Present   Patient Present No   Patient's Family Present No     [facility], psych OP appts; declined need for D&A.

## 2024-03-18 NOTE — PROGRESS NOTES
"Progress Note - Behavioral Health   Freddie CASTANEDA Day 64 y.o. male MRN: [id number]  Unit/Bed#: -02 Encounter: [id number]    Assessment/Plan   Principal Problem:    Schizoaffective disorder, bipolar type (HCC)      Recommended Treatment:     No medication changes at this time    Patient requests 1 month of medications be sent to [facility] in [place]    Continue Lithium 450 mg twice daily for mood stability  Continue Invega 12 mg daily for mood stability and psychosis  Continue Remeron 7.5 mg at bedtime for sleep  Continue melatonin 9 mg at bedtime for sleep    Continue with group therapy, milieu therapy and occupational therapy.    Continue frequent safety checks and vitals per unit protocol.  Case discussed with treatment team.  Risks, benefits and possible side effects of Medications: Risks, benefits, and possible side effects of medications have been explained to the patient, who verbalizes understanding    ------------------------------------------------------------    Subjective:     Per nursing report, on the inpatient psychiatric unit Freddie appears to be at behavioral baseline and discharge is anticipated for Monday. On the inpatient unit Freddie has been visible in the milieu, pleasant and cooperative. On nursing assessments Freddie reported feeling \"good\" on his current medications. On nursing assessments yesterday Freddie voiced no issues or concerns, denying symptoms of depression, anxiety, SI, HI, and AVH. On the inpatient unit Freddie has been medication and meal compliant.    Today, Freddie reports he feels \"good.\" He is happy to report that he has been sleeping well on his current medication regimen. Freddie reports he is looking forward to discharge tomorrow and he reports that he will plan to work on gardening when he is discharged from the hospital. Freddie reports that he plans to spend the day socializing and reading the Bible.    At the time of interview, Freddie reports no issues or concerns and " "reports sustained improvements in his psychiatric symptoms on the inpatient unit. Freddie reports he has been eating well and sleeping well. Freddie has been taking his psychiatric medications as prescribed and he denies medication side effects.    Freddie denies suicidal ideation, homicidal ideation, visual and auditory hallucinations.    Progress Toward Goals: slow improvement    Psychiatric Review of Systems:  Behavior over the last 24 hours: unchanged, remains in positive behavioral control  Sleep: normal  Appetite: normal compared to baseline  Medication side effects: none verbalized  ROS: Complete review of systems is negative except as noted above.    Vital signs in last 24 hours:  Temp:  [97.5 °F (36.4 °C)-99.1 °F (37.3 °C)] 99.1 °F (37.3 °C)  HR:  [64-78] 78  Resp:  [16-18] 18  BP: (108-121)/(69-73) 117/69    Mental Status Exam:  Appearance:  alert, good eye contact, appears stated age, casually dressed, fair grooming and hygiene, bearded, and smiling   Behavior:  calm and cooperative   Motor: no abnormal movements and normal gait and balance   Speech:  spontaneous, clear, normal rate, normal volume, and coherent   Mood:  \"Good\"   Affect:  euthymic   Thought Process:  Linear, concrete, goal directed   Thought Content: no verbalized delusions or overt paranoia   Perceptual disturbances: denies current hallucinations and does not appear to be responding to internal stimuli at this time   Risk Potential: No active suicidal ideation, No active homicidal ideation   Cognition: oriented to person, place, time, and situation, memory grossly intact, appears to be of average intelligence, impaired abstract reasoning, age-appropriate attention span and concentration, and cognition not formally tested   Insight:  Limited   Judgment: Limited     Current Medications:  Current Facility-Administered Medications   Medication Dose Route Frequency Provider Last Rate    acetaminophen  650 mg Oral Q6H PRN CARLOS Calhoun      " acetaminophen  650 mg Oral Q4H PRN CARLOS Calhoun      acetaminophen  975 mg Oral Q6H PRN CARLOS Calhoun      aluminum-magnesium hydroxide-simethicone  30 mL Oral Q4H PRN CARLOS Calhoun      benztropine  1 mg Intramuscular Q4H PRN Max 6/day Sarah Carvalho, CARLOS      benztropine  1 mg Oral Q4H PRN Max 6/day CARLOS Calhoun      bisacodyl  10 mg Rectal Daily PRN Josefa Sterling MD      cholecalciferol  1,000 Units Oral Daily Chai Elliott, MD      cyanocobalamin  500 mcg Oral Daily Chai Cedenoed, MD      Diclofenac Sodium  2 g Topical 4x Daily Joanne Cronin PA-C      hydrOXYzine HCL  50 mg Oral Q6H PRN Max 4/day CARLOS Calhoun      Or    diphenhydrAMINE  50 mg Intramuscular Q6H PRN Sarah Carvalho, CARLOS      diphenhydramine, lidocaine, Al/Mg hydroxide, simethicone  10 mL Swish & Spit Q4H PRN Joanne Cronin PA-C      hydrOXYzine HCL  25 mg Oral Q6H PRN Max 4/day CARLOS Calhoun      lisinopril  10 mg Oral Daily Chai Gallagher MD      lithium carbonate  450 mg Oral Q12H [facility] Jamar Salas, DO      LORazepam  0.5 mg Oral Q6H PRN Melissa M Medei, CARLOS      melatonin  9 mg Oral HS Melissa M Medei, CRNP      mirtazapine  7.5 mg Oral HS Melissa M Medei, CRMILI      nicotine polacrilex  2 mg Oral Q2H PRN Sarah Carvalho, CARLOS      OLANZapine  10 mg Oral Q3H PRN Max 3/day CARLOS Calhoun      Or    OLANZapine  10 mg Intramuscular Q3H PRN Max 3/day CARLOS Calhuon      OLANZapine  5 mg Oral Q3H PRN Max 6/day CARLOS Calhoun      Or    OLANZapine  5 mg Intramuscular Q3H PRN Max 6/day Sarah Carvalho, CARLOS      OLANZapine  2.5 mg Oral Q3H PRN Max 8/day Sarah Carvalho, CARLOS      paliperidone  12 mg Oral Daily Rae Arashaga, DO      polyethylene glycol  17 g Oral Daily PRN CARLOS Calhoun      propranolol  10 mg Oral Q8H PRN CARLOS Calhoun      senna-docusate sodium  1 tablet Oral Daily Joanne Cronin PA-C      tamsulosin  0.4 mg Oral Daily With Dinner CARLOS Calhoun         Behavioral Health  Medications: all current active meds have been reviewed. Changes as in plan section above.    Laboratory results:  I have personally reviewed all pertinent laboratory/tests results.  No results found for this or any previous visit (from the past 48 hour(s)).     Tiago Orta MD

## 2024-03-18 NOTE — DISCHARGE SUMMARY
"Discharge Summary - Behavioral Health   Freddie Graham 64 y.o. male MRN: [id number]  Unit/Bed#: -02 Encounter: [id number]     Admission Date: 2/18/2024         Discharge Date: 3/18/2024 11:05 AM    Attending Psychiatrist: Dr. Ian Reyna    Reason for Admission/HPI: Depression [F32.A]  Schizophrenia (HCC) [F20.9]      According to H&P by Dr. Reyna 2/19/24:    History of Present Illness   Freddie Graham is a 64 y.o. male with a history of Schizophrenia who was admitted to the inpatient psychiatric unit on a involuntary 302 commitment basis due to unstable mood, signs of acute psychosis, psychotic symptoms, delusional thoughts, paranoid ideation, increased agitation, and aggressive behavior.     Symptoms prior to admission included anger outbursts and difficulty controlling anger. Onset of symptoms was gradual starting several days ago with rapidly worsening course since that time. Stressors preceding admission included chronic mental illness and difficulty with anger management.      ED Crisis wrote: \"Pt is a 64 year old male who presents in ED via police for a psychiatric evaluation. Crisis attempted to speak with pt. Pt is responding to internal stimuli, saying aliens are here. Pt looked at 201 and shred it to pieces. The pt became irate, started yelling \"get out of here.\" Crisis unable to complete assessment. ED attending note reported: \" Appears patient was in a verbal and physical altercation with his mother after which police arrived on scene patient became very irritable and aggressive with police.  As such it took several officers to restrain patient who was further handcuffed and brought here to the emergency department for evaluation.  Police were unable to obtain any meaningful history from patient as he was irate.  Chart review looks like patient has extensive psychiatric history and was recently discharged from psychiatric care in [place]\".      On initial evaluation after admission to " the inpatient psychiatric unit the patient had paranoid ideations, delusions about police, had poor insight into his recent altercation with his mother who called police, had disorganized pattern of thought but no longer was agitated after receiving Seroquel.  The patient was interested to continue with Seroquel.     Patient had several inpatient treatments was a previous patient of the writer.  He had diagnosis of schizophrenia he also received treatment in the [facility].  He had medical discharge from the [employer] after 2 years of service.    Hospital Course: The patient was admitted to the inpatient psychiatric unit and started on every 7 minutes precautions. During the hospitalization the patient was attending individual therapy, group therapy, milieu therapy and occupational therapy.    Psychiatric medications were titrated over the hospital stay to address mood instability, aggresive behavior, agitation, psychotic symptoms, paranoid ideation, and delusional thoughts.  Freddie was started on mood stabilizer Lithium CR, antipsychotic medication Invega, and hypnotic medication Melatonin and Remeron. Medication doses were titrated during the hospital course. Prior to beginning of treatment medications risks and benefits and possible side effects including risk of kidney impairment related to treatment with Lithium, risk of parkinsonian symptoms, Tardive Dyskinesia and metabolic syndrome related to treatment with antipsychotic medications, risk of cardiovascular events in elderly related to treatment with antipsychotic medications, risk of suicidality and serotonin syndrome related to treatment with antidepressants, and risk of impaired next-day mental alertness, complex sleep-related behavior and dependence related to treatment with hypnotic medications were reviewed with the patient.  Freddie had limited understanding of risk versus benefits of medications, but agreed to take as prescribed.  Medication education  remained ongoing throughout hospitalization.    Early in treatment, Freddie was continued on outpatient medication, Seroquel and titrated to antipsychotic dosing, however elicited poor therapeutic response.  Mood remained labile, agitated, and accompanied with ongoing psychotic symptoms.  Patient adamantly refused Depakote at which point lithium was initiated for mood control.  After medication education, patient was agreeable to cross taper Seroquel to Invega for treatment of psychosis and mood control.      With titration of lithium and Invega, Freddie exhibited improved mood control and decrease symptoms of psychosis.  Began to reality test and was no longer exhibiting behavioral outbursts.  During this time, patient was visible and participatory in milieu activities with appropriate behavior.  Had some difficulty initiating sleep at which point Remeron was temporarily added and effective, but discontinued to decrease risk of mood instability with schizoaffective disorder.  Melatonin titrated to 9 mg at at bedtime and trazodone added to assist with sleep, however was ineffective.  Remeron was restarted and effective for sleep and patient was able to maintain mood control with no signs or symptoms of opal or hypomania.      With the preceding medication changes, Freddie was able to maintain mood control, safety, and was no longer exhibiting overt symptoms of psychosis.  Reality testing continued to improve.  Self-care was also improved.  Freddie appeared to be approaching his psychiatric baseline and decision was made to begin discharge preparations.    Prior to discharge, Freddie verbalized an adequate safety plan to utilize should he feel he become a danger to himself or others or experience a mental health crisis.  This plan includes talking with his mother, contacting his outpatient psychiatric provider, utilizing crisis hotline, or returning to the nearest emergency department.  Freddie identified his family and gem  "as strong protective factors against suicide.  Forward thinking with plan to maintain medication compliance, outpatient psychiatric appointments, and goal of wanting to visit his father at [facility] in [place], and possibly travel to [place] to visit his brother.     We felt that Freddie achieved the maximum benefit of inpatient stay at that point, was at baseline at the end of the hospitalization and could now follow up with outpatient treatment. He also felt stable and ready for discharge at the end of the hospital stay.  CM notified patient's mother, Dimple, of discharge via voicemail.     [facility] for [facility] medication management  and PCP Dr. Abraham was arranged by the unit  upon discharge.  A 30-day supply of psychotropic medications was submitted to [facility]'s pharmacy on day of discharge.    Last lithium level drawn 3/14/2024 and therapeutic at 0.61    Recommend transitioning to Invega Sustenna TOMAS on outpatient basis to ensure medication compliance.      Mental Status at time of Discharge:     Appearance:  age appropriate, bearded, casually dressed, and well groomed, long gray hair   Behavior:  Cooperative, calm   Speech:  normal pitch, normal volume, and deep voice   Mood:  \"Alright\"   Affect:  mood-congruent and appropriate   Thought Process:  goal directed and linear, forward thinking   Thought Content:  No overt delusions or paranoia verbalized   Perceptual Disturbances: Denied AVH, did not appear internally preoccupied   Risk Potential: Suicidal Ideations none, Homicidal Ideations none, and Potential for Aggression No   Sensorium:  person, place, time/date, and situation   Cognition:  recent and remote memory grossly intact   Consciousness:  alert and awake    Attention: attention span appeared shorter than expected for age   Insight:  limited   Judgment: limited   Gait/Station: normal gait/station " and normal balance   Motor Activity: no abnormal movements     Admission Diagnosis:Depression [F32.A]  Schizophrenia (HCC) [F20.9]    Discharge Diagnosis:   Principal Problem:    Schizoaffective disorder, bipolar type (HCC)  Active Problems:    Difficulty sleeping  Resolved Problems:    * No resolved hospital problems. *    Lab results:  Admission on 02/18/2024, Discharged on 03/18/2024   Component Date Value    Vitamin B-12 02/20/2024 287     Vit D, 25-Hydroxy 02/20/2024 31.4     Folate 02/20/2024 10.7     TSH 3RD GENERATON 02/20/2024 3.106     [place] Lvl 03/09/2024 0.43 (L)     Lithium Lvl 03/14/2024 0.61     Sodium 03/15/2024 140     Potassium 03/15/2024 4.3     Chloride 03/15/2024 109 (H)     CO2 03/15/2024 24     ANION GAP 03/15/2024 7     BUN 03/15/2024 17     Creatinine 03/15/2024 0.96     Glucose 03/15/2024 90     Glucose, Fasting 03/15/2024 90     Calcium 03/15/2024 9.2     eGFR 03/15/2024 82        Discharge Medications:  Discharge Medication List as of 3/18/2024 10:03 AM        START taking these medications    Details   Diclofenac Sodium (VOLTAREN) 1 % Apply 2 g topically 4 (four) times a day, Starting Sat 3/16/2024, Normal      lisinopril (ZESTRIL) 10 mg tablet Take 1 tablet (10 mg total) by mouth daily, Starting Sun 3/17/2024, Normal      lithium carbonate (LITHOBID) 450 mg CR tablet Take 1 tablet (450 mg total) by mouth every 12 (twelve) hours, Starting Mon 3/18/2024, Until Wed 4/17/2024, Normal      melatonin 3 mg Take 3 tablets (9 mg total) by mouth daily at bedtime, Starting Mon 3/18/2024, Until Wed 4/17/2024, Normal      mirtazapine (REMERON) 7.5 MG tablet Take 1 tablet (7.5 mg total) by mouth daily at bedtime, Starting Mon 3/18/2024, Until Wed 4/17/2024, Normal      nicotine polacrilex (NICORETTE) 2 mg gum Chew 1 each (2 mg total) every 2 (two) hours as needed for smoking cessation, Starting Sat 3/16/2024, Normal      paliperidone (INVEGA) 6 MG 24 hr tablet Take 2 tablets (12 mg total) by mouth  daily, Starting Mon 3/18/2024, Until Wed 4/17/2024, Normal              Discharge Medication List as of 3/18/2024 10:03 AM        STOP taking these medications       atorvastatin (LIPITOR) 10 mg tablet Comments:   Reason for Stopping:         atorvastatin (LIPITOR) 20 mg tablet Comments:   Reason for Stopping:         divalproex sodium (DEPAKOTE) 250 mg DR tablet Comments:   Reason for Stopping:         levothyroxine 25 mcg tablet Comments:   Reason for Stopping:         lidocaine (LIDODERM) 5 % Comments:   Reason for Stopping:         lithium carbonate 300 mg capsule Comments:   Reason for Stopping:         metoprolol succinate (TOPROL-XL) 50 mg 24 hr tablet Comments:   Reason for Stopping:         OLANZapine (ZyPREXA) 10 mg tablet Comments:   Reason for Stopping:         OLANZapine (ZyPREXA) 20 MG tablet Comments:   Reason for Stopping:         perphenazine 4 mg tablet Comments:   Reason for Stopping:         perphenazine 8 mg tablet Comments:   Reason for Stopping:         prazosin (MINIPRESS) 1 mg capsule Comments:   Reason for Stopping:         QUEtiapine (SEROquel) 200 mg tablet Comments:   Reason for Stopping:                Discharge Medication List as of 3/18/2024 10:03 AM        CONTINUE these medications which have CHANGED    Details   cholecalciferol (VITAMIN D3) 1,000 units tablet Take 2 tablets (2,000 Units total) by mouth daily, Starting Sat 3/16/2024, Normal      cyanocobalamin (VITAMIN B-12) 500 MCG tablet Take 1 tablet (500 mcg total) by mouth daily, Starting Sun 3/17/2024, Normal      tamsulosin (FLOMAX) 0.4 mg Take 1 capsule (0.4 mg total) by mouth daily with dinner, Starting Sat 3/16/2024, Normal              Discharge Medication List as of 3/18/2024 10:03 AM           Discharge instructions/Information to patient and family:   See after visit summary for information provided to patient and family.      Provisions for Follow-Up Care:  See after visit summary for information related to follow-up  care and any pertinent home health orders.      Discharge Statement:    I spent 25 minutes discharging the patient. This time was spent on the day of discharge. I had direct contact with the patient on the day of discharge.     I reviewed with Freddie importance of compliance with medications and outpatient treatment after discharge.  I discussed the medication regimen and possible side effects of the medications with Freddie prior to discharge. At the time of discharge he was tolerating psychiatric medications.  I discussed outpatient follow up with Freddie.  I reviewed with Freddie crisis plan and safety plan upon discharge.  Freddie agreed to abstain from drug and alcohol use after discharge.  Freddie has been filing controlled prescriptions on time as prescribed according to [place] Prescription Drug Monitoring Program.    CARLOS Mo 03/18/24

## 2024-03-18 NOTE — BH TRANSITION RECORD
Contact Information: If you have any questions, concerns, pended studies, tests and/or procedures, or emergencies regarding your inpatient behavioral health visit. Please contact [facility] # [phone] and ask to speak to a , nurse or physician. A contact is available 24 hours/ 7 days a week at this number.     Summary of Procedures Performed During your Stay:  Below is a list of major procedures performed during your hospital stay and a summary of results:  - No major procedures performed.    Pending Studies (From admission, onward)      None          Please follow up on the above pending studies with your PCP and/or referring provider.

## 2024-03-18 NOTE — DISCHARGE INSTR - AVS FIRST PAGE
A 30-day [facility] to cover until next psychiatric medication management appointment    Last lithium level therapeutic at 0.61 on 3/14/2024

## 2024-03-18 NOTE — NURSING NOTE
No significant changes,  Patient reports he slept well last evening and is looking forward to dc tomorrow.  Calm and cooperative.

## 2024-03-18 NOTE — PROGRESS NOTES
"Progress Note - Freddie Graham 64 y.o. male MRN: [id number]    Unit/Bed#: [facility] 254-02 Encounter: [id number]        Subjective:   Patient seen and examined at bedside after reviewing the chart and discussing the case with the caring staff.      Patient examined at bedside.  Patient reports no acute symptoms.    Patient is being discharged today.  Patient is requesting his prescriptions.  I reviewed and reconciled patient's problem list and medications.    Physical Exam   Vitals: Blood pressure 127/83, pulse 85, temperature 97.9 °F (36.6 °C), temperature source Temporal, resp. rate 18, height 5' 9\" (1.753 m), weight 92.2 kg (203 lb 3.2 oz), SpO2 95%.,Body mass index is 30.01 kg/m².  Constitutional: Patient in no acute distress.  HEENT: PERR, EOMI, MMM.  Cardiovascular: Normal rate and regular rhythm.    Pulmonary/Chest: Effort normal and breath sounds normal.   Abdomen: Soft, + BS, NT.    Assessment/Plan:  Freddie Graham is a(n) 64 y.o. year old male schizophrenia.     Medical clearance.  Patient is medically cleared for discharge.  All scripts will be sent out for the patient.    1.  Cardiac with hx HTN, HLD.  Patient started on lisinopril 10 mg daily and Toprol-XL discontinued due to noncompliance 2/25/2024.  2.  Tobacco abuse.  NRT.  3.  Hypothyroidism.  Not on levothyroxine.  TSH normal 2/20/24.  4.  BPH.  Continue Flomax 0.4 mg daily.  5.  Bilateral foot blisters.  Podiatry consulted.  Surgical shoe ordered 2/24/2024.  6.  Vitamin D deficiency.  Patient started on vitamin D supplements.  7.  Vitamin B12 deficiency.  Patient started on vitamin B12 supplements.  8.  Constipation.  Senakot daily and Miralax as needed.   9.  Dental caries/infection.  Patient completed amoxicillin 500 mg TID for 7 days on 3/10/24.  Magic mouthwash as needed.  Patient advised to follow-up with dentist when discharged.   10.  DJD/back pain.  Apply Voltaren gel four times daily.   "

## 2024-03-18 NOTE — PLAN OF CARE
Problem: DISCHARGE PLANNING - CARE MANAGEMENT  Goal: Discharge to post-acute care or home with appropriate resources  Description: INTERVENTIONS:  - Conduct assessment to determine patient/family and health care team treatment goals, and need for post-acute services based on payer coverage, community resources, and patient preferences, and barriers to discharge  - Address psychosocial, clinical, and financial barriers to discharge as identified in assessment in conjunction with the patient/family and health care team  - Arrange appropriate level of post-acute services according to patient’s   needs and preference and payer coverage in collaboration with the physician and health care team  - Communicate with and update the patient/family, physician, and health care team regarding progress on the discharge plan  - Arrange appropriate transportation to post-acute venues  Outcome: Completed     [facility] appts; declined need for D&A.

## 2024-03-18 NOTE — SOCIAL WORK
Sw met with pt for dc review, pt agreeable to dc today 11am (friend transport home) with [facility], pcp follow up. IMM explained to pt who expressed verbal confirmation of understanding. Declined right to appeal dc. Signed IMM in scan bin to be entered into pt's EHR, copy provided to pt.

## 2024-03-18 NOTE — PLAN OF CARE
Problem: Depression  Goal: Treatment Goal: Demonstrate behavioral control of depressive symptoms, verbalize feelings of improved mood/affect, and adopt new coping skills prior to discharge  Outcome: Adequate for Discharge  Goal: Refrain from harming self  Description: Interventions:  - Monitor patient closely, per order   - Supervise medication ingestion, monitor effects and side effects   Outcome: Adequate for Discharge     Problem: Anxiety  Goal: Anxiety is at manageable level  Description: Interventions:  - Assess and monitor patient's anxiety level.   - Monitor for signs and symptoms (heart palpitations, chest pain, shortness of breath, headaches, nausea, feeling jumpy, restlessness, irritable, apprehensive).   - Collaborate with interdisciplinary team and initiate plan and interventions as ordered.  - [place]dick patient to unit/surroundings  - Explain treatment plan  - Encourage participation in care  - Encourage verbalization of concerns/fears  - Identify coping mechanisms  - Assist in developing anxiety-reducing skills  - Administer/offer alternative therapies  - Limit or eliminate stimulants  Outcome: Adequate for Discharge     Problem: Risk for Violence/Aggression Toward Others  Goal: Treatment Goal: Refrain from acts of violence/aggression during length of stay, and demonstrate improved impulse control at the time of discharge  Outcome: Adequate for Discharge  Goal: Refrain from destructive acts on the environment or property  Outcome: Adequate for Discharge  Goal: Control angry outbursts  Description: Interventions:  - Monitor patient closely, per order  - Ensure early verbal de-escalation  - Monitor prn medication needs  - Set reasonable/therapeutic limits, outline behavioral expectations, and consequences   - Provide a non-threatening milieu, utilizing the least restrictive interventions   Outcome: Adequate for Discharge     Problem: Ineffective Coping  Goal: Cooperates with admission  process  Description: Interventions:   - Complete admission process  Outcome: Adequate for Discharge  Goal: Identifies ineffective coping skills  Outcome: Adequate for Discharge  Goal: Demonstrates healthy coping skills  Outcome: Adequate for Discharge  Goal: Participates in unit activities  Description: Interventions:  - Provide therapeutic environment   - Provide required programming   - Redirect inappropriate behaviors   Outcome: Adequate for Discharge  Goal: Patient/Family participate in treatment and DC plans  Description: Interventions:  - Provide therapeutic environment  Outcome: Adequate for Discharge  Goal: Patient/Family verbalizes awareness of resources  Outcome: Adequate for Discharge  Goal: Understands least restrictive measures  Description: Interventions:  - Utilize least restrictive behavior  Outcome: Adequate for Discharge  Goal: Free from restraint events  Description: - Utilize least restrictive measures   - Provide behavioral interventions   - Redirect inappropriate behaviors   Outcome: Adequate for Discharge     Problem: Ineffective Coping  Goal: Participates in unit activities  Description: Interventions:  - Provide therapeutic environment   - Provide required programming   - Redirect inappropriate behaviors   Outcome: Adequate for Discharge     Problem: Alteration in Thoughts and Perception  Goal: Treatment Goal: Gain control of psychotic behaviors/thinking, reduce/eliminate presenting symptoms and demonstrate improved reality functioning upon discharge  Outcome: Adequate for Discharge  Goal: Refrain from acting on delusional thinking/internal stimuli  Description: Interventions:  - Monitor patient closely, per order   - Utilize least restrictive measures   - Set reasonable limits, give positive feedback for acceptable   - Administer medications as ordered and monitor of potential side effects  Outcome: Adequate for Discharge  Goal: Recognize dysfunctional thoughts, communicate reality-based  thoughts at the time of discharge  Description: Interventions:  - Provide medication and psycho-education to assist patient in compliance and developing insight into his/her illness   Outcome: Adequate for Discharge

## 2024-03-22 NOTE — PROGRESS NOTES
03/18/24 0900   Activity/Group Checklist   Group   (Relapse Prevention Discussion and Planning)   Attendance Attended   Attendance Duration (min) 16-30   Interactions Interacted appropriately   Affect/Mood Bright;Calm   Goals Achieved Identified feelings;Identified triggers;Identified relapse prevention strategies;Discussed safety plan;Discussed coping strategies;Discussed discharge plans;Increased hopefulness     AT met with PT to discuss relapse prevention planning and PT completed the RP form. PT was calm and cooperative and displayed a bright mood and affect. PT reported that he feels ready for discharge and states that he has a good plan to stay healthy for the future.     Strengths and things worth living for include: Андрей Bocanegra and his [employer]ism  Triggers, stressors and situations that place me at risk for a crisis situation are: things people say to him, things people do, sleep problems and his own behaviors.   Warning signs that indicate I may need help: being angry, loud and wanting to punch things  Coping skills I can use that help me calm down and keep me safe are: deep breathing, keeping his medical appointments, reading, taking a walk and listening to music  Family, friends, and organizations I can call for support: Corbin Colvin and family members

## 2024-06-28 NOTE — PLAN OF CARE
Problem: Alteration in Thoughts and Perception  Goal: Treatment Goal: Gain control of psychotic behaviors/thinking, reduce/eliminate presenting symptoms and demonstrate improved reality functioning upon discharge  Outcome: Progressing  Goal: Verbalize thoughts and feelings  Description: Interventions:  - Promote a nonjudgmental and trusting relationship with the patient through active listening and therapeutic communication  - Assess patient's level of functioning, behavior and potential for risk  - Engage patient in 1 on 1 interactions  - Encourage patient to express fears, feelings, frustrations, and discuss symptoms    - [place] patient to reality, help patient recognize reality-based thinking   - Administer medications as ordered and assess for potential side effects  - Provide the patient education related to the signs and symptoms of the illness and desired effects of prescribed medications  Outcome: Progressing  Goal: Refrain from acting on delusional thinking/internal stimuli  Description: Interventions:  - Monitor patient closely, per order   - Utilize least restrictive measures   - Set reasonable limits, give positive feedback for acceptable   - Administer medications as ordered and monitor of potential side effects  Outcome: Progressing  Goal: Agree to be compliant with medication regime, as prescribed and report medication side effects  Description: Interventions:  - Offer appropriate PRN medication and supervise ingestion; conduct AIMS, as needed   Outcome: Progressing  Goal: Recognize dysfunctional thoughts, communicate reality-based thoughts at the time of discharge  Description: Interventions:  - Provide medication and psycho-education to assist patient in compliance and developing insight into his/her illness   Outcome: Progressing  Goal: Complete daily ADLs, including personal hygiene independently, as able  Description: Interventions:  - Observe, teach, and assist patient with ADLS  - Monitor and [Time Spent: ___ minutes] : I have spent [unfilled] minutes of time on the encounter. promote a balance of rest/activity, with adequate nutrition and elimination   Outcome: Progressing     Problem: Ineffective Coping  Goal: Cooperates with admission process  Description: Interventions:   - Complete admission process  Outcome: Progressing  Goal: Identifies ineffective coping skills  Outcome: Progressing  Goal: Identifies healthy coping skills  Outcome: Progressing  Goal: Demonstrates healthy coping skills  Outcome: Progressing  Goal: Patient/Family participate in treatment and DC plans  Description: Interventions:  - Provide therapeutic environment  Outcome: Progressing  Goal: Patient/Family verbalizes awareness of resources  Outcome: Progressing  Goal: Understands least restrictive measures  Description: Interventions:  - Utilize least restrictive behavior  Outcome: Progressing  Goal: Free from restraint events  Description: - Utilize least restrictive measures   - Provide behavioral interventions   - Redirect inappropriate behaviors   Outcome: Progressing  Goal: Participates in unit activities  Description: Interventions:  - Provide therapeutic environment   - Provide required programming   - Redirect inappropriate behaviors   Outcome: Progressing     Problem: Anxiety  Goal: Anxiety is at manageable level  Description: Interventions:  - Assess and monitor patient's anxiety level  - Monitor for signs and symptoms (heart palpitations, chest pain, shortness of breath, headaches, nausea, feeling jumpy, restlessness, irritable, apprehensive)  - Collaborate with interdisciplinary team and initiate plan and interventions as ordered    - [place] patient to unit/surroundings  - Explain treatment plan  - Encourage participation in care  - Encourage verbalization of concerns/fears  - Identify coping mechanisms  - Assist in developing anxiety-reducing skills  - Administer/offer alternative therapies  - Limit or eliminate stimulants  Outcome: Progressing     Problem: Risk for Violence/Aggression Toward Others  Goal: Treatment Goal: Refrain from acts of violence/aggression during length of stay, and demonstrate improved impulse control at the time of discharge  Outcome: Progressing  Goal: Refrain from harming others  Outcome: Progressing  Goal: Refrain from destructive acts on the environment or property  Outcome: Progressing  Goal: Control angry outbursts  Description: Interventions:  - Monitor patient closely, per order  - Ensure early verbal de-escalation  - Monitor prn medication needs  - Set reasonable/therapeutic limits, outline behavioral expectations, and consequences   - Provide a non-threatening milieu, utilizing the least restrictive interventions   Outcome: Progressing  Goal: Identify appropriate positive anger management techniques  Description: Interventions:  - Offer anger management and coping skills groups   - Staff will provide positive feedback for appropriate anger control  Outcome: Progressing

## 2025-04-06 ENCOUNTER — HOSPITAL ENCOUNTER (EMERGENCY)
Facility: HOSPITAL | Age: 65
End: 2025-04-06
Attending: EMERGENCY MEDICINE
Payer: MEDICARE

## 2025-04-06 ENCOUNTER — HOSPITAL ENCOUNTER (INPATIENT)
Facility: HOSPITAL | Age: 65
LOS: 19 days | Discharge: HOME | DRG: 885 | End: 2025-04-25
Attending: STUDENT IN AN ORGANIZED HEALTH CARE EDUCATION/TRAINING PROGRAM | Admitting: STUDENT IN AN ORGANIZED HEALTH CARE EDUCATION/TRAINING PROGRAM
Payer: MEDICARE

## 2025-04-06 VITALS
HEIGHT: 69 IN | DIASTOLIC BLOOD PRESSURE: 83 MMHG | TEMPERATURE: 97.9 F | OXYGEN SATURATION: 95 % | RESPIRATION RATE: 18 BRPM | BODY MASS INDEX: 30.01 KG/M2 | SYSTOLIC BLOOD PRESSURE: 124 MMHG | HEART RATE: 97 BPM

## 2025-04-06 DIAGNOSIS — N40.0 BENIGN PROSTATIC HYPERPLASIA WITHOUT LOWER URINARY TRACT SYMPTOMS: ICD-10-CM

## 2025-04-06 DIAGNOSIS — E87.6 HYPOKALEMIA: ICD-10-CM

## 2025-04-06 DIAGNOSIS — Z00.8 MEDICAL CLEARANCE FOR PSYCHIATRIC ADMISSION: ICD-10-CM

## 2025-04-06 DIAGNOSIS — F25.0 SCHIZOAFFECTIVE DISORDER, BIPOLAR TYPE (HCC): Primary | ICD-10-CM

## 2025-04-06 DIAGNOSIS — E78.2 MIXED HYPERLIPIDEMIA: ICD-10-CM

## 2025-04-06 DIAGNOSIS — F29 PSYCHOSIS (HCC): ICD-10-CM

## 2025-04-06 DIAGNOSIS — E55.9 VITAMIN D INSUFFICIENCY: ICD-10-CM

## 2025-04-06 DIAGNOSIS — F31.9 BIPOLAR DISORDER (HCC): Primary | ICD-10-CM

## 2025-04-06 PROBLEM — R46.89 COMBATIVE BEHAVIOR: Status: ACTIVE | Noted: 2023-07-01

## 2025-04-06 PROBLEM — D64.9 ANEMIA: Status: ACTIVE | Noted: 2025-04-06

## 2025-04-06 PROBLEM — F25.9 ACUTE EXACERBATION OF CHRONIC SCHIZOAFFECTIVE SCHIZOPHRENIA (HCC): Chronic | Status: ACTIVE | Noted: 2019-03-18

## 2025-04-06 PROBLEM — I49.9 CARDIAC ARRHYTHMIA: Status: ACTIVE | Noted: 2025-04-06

## 2025-04-06 PROBLEM — F10.20 ALCOHOL DEPENDENCE (HCC): Status: ACTIVE | Noted: 2025-04-06

## 2025-04-06 PROBLEM — F17.211 TOBACCO DEPENDENCE DUE TO CIGARETTES, IN REMISSION: Status: ACTIVE | Noted: 2025-04-06

## 2025-04-06 PROBLEM — C67.5 MALIGNANT NEOPLASM OF URINARY BLADDER NECK (HCC): Status: ACTIVE | Noted: 2025-01-03

## 2025-04-06 PROBLEM — F12.20 CANNABIS DEPENDENCE (HCC): Status: ACTIVE | Noted: 2025-04-06

## 2025-04-06 LAB
ALBUMIN SERPL BCG-MCNC: 4.2 G/DL (ref 3.5–5)
ALP SERPL-CCNC: 43 U/L (ref 34–104)
ALT SERPL W P-5'-P-CCNC: 23 U/L (ref 7–52)
AMPHETAMINES SERPL QL SCN: NEGATIVE
ANION GAP SERPL CALCULATED.3IONS-SCNC: 7 MMOL/L (ref 4–13)
AST SERPL W P-5'-P-CCNC: 28 U/L (ref 13–39)
BARBITURATES UR QL: NEGATIVE
BASOPHILS # BLD AUTO: 0.04 THOUSANDS/ÂΜL (ref 0–0.1)
BASOPHILS NFR BLD AUTO: 1 % (ref 0–1)
BENZODIAZ UR QL: NEGATIVE
BILIRUB SERPL-MCNC: 1.02 MG/DL (ref 0.2–1)
BUN SERPL-MCNC: 8 MG/DL (ref 5–25)
CALCIUM SERPL-MCNC: 9.5 MG/DL (ref 8.4–10.2)
CHLORIDE SERPL-SCNC: 108 MMOL/L (ref 96–108)
CO2 SERPL-SCNC: 21 MMOL/L (ref 21–32)
COCAINE UR QL: NEGATIVE
CREAT SERPL-MCNC: 0.75 MG/DL (ref 0.6–1.3)
EOSINOPHIL # BLD AUTO: 0.11 THOUSAND/ÂΜL (ref 0–0.61)
EOSINOPHIL NFR BLD AUTO: 2 % (ref 0–6)
ERYTHROCYTE [DISTWIDTH] IN BLOOD BY AUTOMATED COUNT: 13 % (ref 11.6–15.1)
ETHANOL SERPL-MCNC: <10 MG/DL
FENTANYL UR QL SCN: NEGATIVE
GFR SERPL CREATININE-BSD FRML MDRD: 94 ML/MIN/1.73SQ M
GLUCOSE SERPL-MCNC: 165 MG/DL (ref 65–140)
HCT VFR BLD AUTO: 39.9 % (ref 36.5–49.3)
HGB BLD-MCNC: 13.3 G/DL (ref 12–17)
HYDROCODONE UR QL SCN: NEGATIVE
IMM GRANULOCYTES # BLD AUTO: 0.02 THOUSAND/UL (ref 0–0.2)
IMM GRANULOCYTES NFR BLD AUTO: 0 % (ref 0–2)
LITHIUM SERPL-SCNC: 0.61 MMOL/L (ref 0.6–1.2)
LYMPHOCYTES # BLD AUTO: 1.09 THOUSANDS/ÂΜL (ref 0.6–4.47)
LYMPHOCYTES NFR BLD AUTO: 15 % (ref 14–44)
MCH RBC QN AUTO: 29.4 PG (ref 26.8–34.3)
MCHC RBC AUTO-ENTMCNC: 33.3 G/DL (ref 31.4–37.4)
MCV RBC AUTO: 88 FL (ref 82–98)
METHADONE UR QL: NEGATIVE
MONOCYTES # BLD AUTO: 0.5 THOUSAND/ÂΜL (ref 0.17–1.22)
MONOCYTES NFR BLD AUTO: 7 % (ref 4–12)
NEUTROPHILS # BLD AUTO: 5.39 THOUSANDS/ÂΜL (ref 1.85–7.62)
NEUTS SEG NFR BLD AUTO: 75 % (ref 43–75)
NRBC BLD AUTO-RTO: 0 /100 WBCS
OPIATES UR QL SCN: NEGATIVE
OXYCODONE+OXYMORPHONE UR QL SCN: NEGATIVE
PCP UR QL: NEGATIVE
PLATELET # BLD AUTO: 165 THOUSANDS/UL (ref 149–390)
PMV BLD AUTO: 10.4 FL (ref 8.9–12.7)
POTASSIUM SERPL-SCNC: 3.4 MMOL/L (ref 3.5–5.3)
PROT SERPL-MCNC: 6.4 G/DL (ref 6.4–8.4)
RBC # BLD AUTO: 4.53 MILLION/UL (ref 3.88–5.62)
SODIUM SERPL-SCNC: 136 MMOL/L (ref 135–147)
THC UR QL: POSITIVE
TSH SERPL DL<=0.05 MIU/L-ACNC: 2.41 UIU/ML (ref 0.45–4.5)
WBC # BLD AUTO: 7.15 THOUSAND/UL (ref 4.31–10.16)

## 2025-04-06 PROCEDURE — 99284 EMERGENCY DEPT VISIT MOD MDM: CPT

## 2025-04-06 PROCEDURE — 80053 COMPREHEN METABOLIC PANEL: CPT | Performed by: EMERGENCY MEDICINE

## 2025-04-06 PROCEDURE — 99285 EMERGENCY DEPT VISIT HI MDM: CPT | Performed by: EMERGENCY MEDICINE

## 2025-04-06 PROCEDURE — 85025 COMPLETE CBC W/AUTO DIFF WBC: CPT | Performed by: EMERGENCY MEDICINE

## 2025-04-06 PROCEDURE — 80178 ASSAY OF LITHIUM: CPT | Performed by: EMERGENCY MEDICINE

## 2025-04-06 PROCEDURE — 82077 ASSAY SPEC XCP UR&BREATH IA: CPT | Performed by: EMERGENCY MEDICINE

## 2025-04-06 PROCEDURE — 93005 ELECTROCARDIOGRAM TRACING: CPT

## 2025-04-06 PROCEDURE — 96372 THER/PROPH/DIAG INJ SC/IM: CPT

## 2025-04-06 PROCEDURE — 80307 DRUG TEST PRSMV CHEM ANLYZR: CPT | Performed by: EMERGENCY MEDICINE

## 2025-04-06 PROCEDURE — 36415 COLL VENOUS BLD VENIPUNCTURE: CPT | Performed by: EMERGENCY MEDICINE

## 2025-04-06 PROCEDURE — 84443 ASSAY THYROID STIM HORMONE: CPT | Performed by: EMERGENCY MEDICINE

## 2025-04-06 RX ORDER — BISACODYL 10 MG
10 SUPPOSITORY, RECTAL RECTAL DAILY PRN
Status: DISCONTINUED | OUTPATIENT
Start: 2025-04-06 | End: 2025-04-07

## 2025-04-06 RX ORDER — LORAZEPAM 1 MG/1
1 TABLET ORAL
Status: CANCELLED | OUTPATIENT
Start: 2025-04-06

## 2025-04-06 RX ORDER — ACETAMINOPHEN 325 MG/1
650 TABLET ORAL EVERY 4 HOURS PRN
Status: DISCONTINUED | OUTPATIENT
Start: 2025-04-06 | End: 2025-04-25 | Stop reason: HOSPADM

## 2025-04-06 RX ORDER — OLANZAPINE 10 MG/2ML
5 INJECTION, POWDER, FOR SOLUTION INTRAMUSCULAR
Status: DISCONTINUED | OUTPATIENT
Start: 2025-04-06 | End: 2025-04-17

## 2025-04-06 RX ORDER — OLANZAPINE 10 MG/2ML
5 INJECTION, POWDER, FOR SOLUTION INTRAMUSCULAR
Status: CANCELLED | OUTPATIENT
Start: 2025-04-06

## 2025-04-06 RX ORDER — HYDROXYZINE HYDROCHLORIDE 25 MG/1
25 TABLET, FILM COATED ORAL
Status: CANCELLED | OUTPATIENT
Start: 2025-04-06

## 2025-04-06 RX ORDER — AMOXICILLIN 250 MG
1 CAPSULE ORAL DAILY PRN
Status: CANCELLED | OUTPATIENT
Start: 2025-04-06

## 2025-04-06 RX ORDER — BISACODYL 10 MG
10 SUPPOSITORY, RECTAL RECTAL DAILY PRN
Status: CANCELLED | OUTPATIENT
Start: 2025-04-06

## 2025-04-06 RX ORDER — TRAZODONE HYDROCHLORIDE 50 MG/1
50 TABLET ORAL
Status: DISCONTINUED | OUTPATIENT
Start: 2025-04-06 | End: 2025-04-25 | Stop reason: HOSPADM

## 2025-04-06 RX ORDER — TRAZODONE HYDROCHLORIDE 50 MG/1
50 TABLET ORAL
Status: CANCELLED | OUTPATIENT
Start: 2025-04-06

## 2025-04-06 RX ORDER — ACETAMINOPHEN 325 MG/1
650 TABLET ORAL EVERY 4 HOURS PRN
Status: CANCELLED | OUTPATIENT
Start: 2025-04-06

## 2025-04-06 RX ORDER — LORAZEPAM 2 MG/ML
1 INJECTION INTRAMUSCULAR
Status: DISCONTINUED | OUTPATIENT
Start: 2025-04-06 | End: 2025-04-09

## 2025-04-06 RX ORDER — BISACODYL 10 MG
10 SUPPOSITORY, RECTAL RECTAL DAILY PRN
Status: DISCONTINUED | OUTPATIENT
Start: 2025-04-06 | End: 2025-04-06 | Stop reason: SDUPTHER

## 2025-04-06 RX ORDER — PHENAZOPYRIDINE HYDROCHLORIDE 200 MG/1
200 TABLET, FILM COATED ORAL 3 TIMES DAILY PRN
COMMUNITY
Start: 2024-12-27

## 2025-04-06 RX ORDER — MAGNESIUM HYDROXIDE/ALUMINUM HYDROXICE/SIMETHICONE 120; 1200; 1200 MG/30ML; MG/30ML; MG/30ML
30 SUSPENSION ORAL EVERY 4 HOURS PRN
Status: CANCELLED | OUTPATIENT
Start: 2025-04-06

## 2025-04-06 RX ORDER — LORAZEPAM 2 MG/ML
2 INJECTION INTRAMUSCULAR ONCE AS NEEDED
Status: COMPLETED | OUTPATIENT
Start: 2025-04-06 | End: 2025-04-06

## 2025-04-06 RX ORDER — ACETAMINOPHEN 325 MG/1
975 TABLET ORAL EVERY 6 HOURS PRN
Status: CANCELLED | OUTPATIENT
Start: 2025-04-06

## 2025-04-06 RX ORDER — OLANZAPINE 5 MG/1
5 TABLET, FILM COATED ORAL
Status: CANCELLED | OUTPATIENT
Start: 2025-04-06

## 2025-04-06 RX ORDER — POLYETHYLENE GLYCOL 3350 17 G/17G
17 POWDER, FOR SOLUTION ORAL DAILY PRN
Status: DISCONTINUED | OUTPATIENT
Start: 2025-04-06 | End: 2025-04-07

## 2025-04-06 RX ORDER — HYDROXYZINE HYDROCHLORIDE 25 MG/1
25 TABLET, FILM COATED ORAL
Status: DISCONTINUED | OUTPATIENT
Start: 2025-04-06 | End: 2025-04-25 | Stop reason: HOSPADM

## 2025-04-06 RX ORDER — LORAZEPAM 1 MG/1
1 TABLET ORAL
Status: DISCONTINUED | OUTPATIENT
Start: 2025-04-06 | End: 2025-04-09

## 2025-04-06 RX ORDER — POTASSIUM CHLORIDE 1500 MG/1
40 TABLET, EXTENDED RELEASE ORAL ONCE
Status: COMPLETED | OUTPATIENT
Start: 2025-04-06 | End: 2025-04-06

## 2025-04-06 RX ORDER — HYDROXYZINE HYDROCHLORIDE 50 MG/1
50 TABLET, FILM COATED ORAL
Status: DISCONTINUED | OUTPATIENT
Start: 2025-04-06 | End: 2025-04-25 | Stop reason: HOSPADM

## 2025-04-06 RX ORDER — LORAZEPAM 2 MG/ML
2 INJECTION INTRAMUSCULAR ONCE AS NEEDED
Status: DISCONTINUED | OUTPATIENT
Start: 2025-04-06 | End: 2025-04-06

## 2025-04-06 RX ORDER — OLANZAPINE 2.5 MG/1
2.5 TABLET, FILM COATED ORAL
Status: DISCONTINUED | OUTPATIENT
Start: 2025-04-06 | End: 2025-04-17

## 2025-04-06 RX ORDER — MAGNESIUM HYDROXIDE/ALUMINUM HYDROXICE/SIMETHICONE 120; 1200; 1200 MG/30ML; MG/30ML; MG/30ML
30 SUSPENSION ORAL EVERY 4 HOURS PRN
Status: DISCONTINUED | OUTPATIENT
Start: 2025-04-06 | End: 2025-04-25 | Stop reason: HOSPADM

## 2025-04-06 RX ORDER — OLANZAPINE 2.5 MG/1
2.5 TABLET, FILM COATED ORAL
Status: CANCELLED | OUTPATIENT
Start: 2025-04-06

## 2025-04-06 RX ORDER — OLANZAPINE 5 MG/1
5 TABLET, FILM COATED ORAL
Status: DISCONTINUED | OUTPATIENT
Start: 2025-04-06 | End: 2025-04-17

## 2025-04-06 RX ORDER — AMOXICILLIN 250 MG
1 CAPSULE ORAL DAILY PRN
Status: DISCONTINUED | OUTPATIENT
Start: 2025-04-06 | End: 2025-04-25 | Stop reason: HOSPADM

## 2025-04-06 RX ORDER — HYDROXYZINE HYDROCHLORIDE 50 MG/1
50 TABLET, FILM COATED ORAL
Status: CANCELLED | OUTPATIENT
Start: 2025-04-06

## 2025-04-06 RX ORDER — OLANZAPINE 10 MG/2ML
10 INJECTION, POWDER, FOR SOLUTION INTRAMUSCULAR ONCE
Status: COMPLETED | OUTPATIENT
Start: 2025-04-06 | End: 2025-04-06

## 2025-04-06 RX ORDER — LORAZEPAM 2 MG/ML
1 INJECTION INTRAMUSCULAR
Status: CANCELLED | OUTPATIENT
Start: 2025-04-06

## 2025-04-06 RX ORDER — POLYETHYLENE GLYCOL 3350 17 G/17G
17 POWDER, FOR SOLUTION ORAL DAILY PRN
Status: CANCELLED | OUTPATIENT
Start: 2025-04-06

## 2025-04-06 RX ORDER — ACETAMINOPHEN 325 MG/1
975 TABLET ORAL EVERY 6 HOURS PRN
Status: DISCONTINUED | OUTPATIENT
Start: 2025-04-06 | End: 2025-04-25 | Stop reason: HOSPADM

## 2025-04-06 RX ADMIN — OLANZAPINE 10 MG: 10 INJECTION, POWDER, FOR SOLUTION INTRAMUSCULAR at 05:06

## 2025-04-06 RX ADMIN — POTASSIUM CHLORIDE 40 MEQ: 1500 TABLET, EXTENDED RELEASE ORAL at 17:43

## 2025-04-06 RX ADMIN — Medication 3 MG: at 21:17

## 2025-04-06 RX ADMIN — LORAZEPAM 2 MG: 2 INJECTION INTRAMUSCULAR; INTRAVENOUS at 10:16

## 2025-04-06 NOTE — ED PROVIDER NOTES
Time reflects when diagnosis was documented in both MDM as applicable and the Disposition within this note       Time User Action Codes Description Comment    4/6/2025  6:57 AM Nilo Norris [F31.9] Bipolar disorder (HCC)     4/6/2025  6:57 AM Nilo Norris Add [F29] Psychosis (HCC)           ED Disposition       ED Disposition   Transfer to Behavioral Health Condition   --    Date/Time   Sun Apr 6, 2025  6:57 AM    Comment   Freddie Graham should be transferred out to Inpatient psych  and has been medically cleared.               Assessment & Plan       Medical Decision Making  Patient is a 65-year-old male with longstanding psychiatric history presenting via EMS with  present for psychiatric evaluation.  Patient was reportedly combative with EMS personnel and broke down his cell front door.  EMS staff, who know the patient well, report recent changes to his psychiatric medications, and state the patient's brother reports he is not being compliant with medications.    Will acquire basic labs, TSH, blood alcohol, urine drug screen, and lithium level.  Plan to uphold 302 signed by  from scene and attending physician Dr. Norris.  Patient awaiting crisis evaluation.    Amount and/or Complexity of Data Reviewed  Labs: ordered.    Risk  Prescription drug management.  Decision regarding hospitalization.             Medications   LORazepam (ATIVAN) injection 2 mg (has no administration in time range)   OLANZapine (ZyPREXA) IM injection 10 mg (10 mg Intramuscular Given 4/6/25 0506)       ED Risk Strat Scores                            SBIRT 22yo+      Flowsheet Row Most Recent Value   Initial Alcohol Screen: US AUDIT-C     1. How often do you have a drink containing alcohol? 0 Filed at: 04/06/2025 0519   2. How many drinks containing alcohol do you have on a typical day you are drinking?  0 Filed at: 04/06/2025 0519   3b. FEMALE Any Age, or MALE 65+: How often do you have 4 or more  "drinks on one occassion? 0 Filed at: 04/06/2025 0519   Audit-C Score 0 Filed at: 04/06/2025 0519   JANNETH: How many times in the past year have you...    Used an illegal drug or used a prescription medication for non-medical reasons? Never Filed at: 04/06/2025 0519                            History of Present Illness       Chief Complaint   Patient presents with    Psychiatric Evaluation     Via EMS/BLS w/[place] [employer] with complaint of requiring psychiatric evaluation; known schizophrenic who has a history of self stopping medication; arrives verbally aggressive with PD and staff; pt agreeable to sign 201; pt lives with mother who recently left for vacation in [place]; PD states they were at the house yesterday \"because he thought people had broken in to his house\"; pt states no one broke into pt's home       Past Medical History:   Diagnosis Date    Alcohol abuse     Anxiety     Astigmatism     Depression     DJD (degenerative joint disease)     Gait abnormality     Head injury     History of colonic polyps     Hydrocele     Hyperlipidemia     Hypertension     Macular drusen     Presbyopia     PTSD (post-traumatic stress disorder)     Schizoaffective disorder (HCC)     Sepsis (HCC)     Substance abuse (HCC)     Tobacco abuse     Umbilical hernia     Vitreous syneresis       Past Surgical History:   Procedure Laterality Date    FRACTURE SURGERY      INGUINAL HERNIA REPAIR        History reviewed. No pertinent family history.   Social History     Tobacco Use    Smoking status: Every Day     Current packs/day: 1.00     Average packs/day: 1 pack/day for 40.0 years (40.0 ttl pk-yrs)     Types: Cigars, Cigarettes    Smokeless tobacco: Never   Vaping Use    Vaping status: Never Used   Substance Use Topics    Alcohol use: Yes     Comment: whenever i want    Drug use: Yes     Types: Marijuana     Comment: Patient denies currently using marijuana.       E-Cigarette/Vaping    E-Cigarette Use Never User     "   E-Cigarette/Vaping Substances    Nicotine No     THC No     CBD No     Flavoring No     Other No     Unknown No       I have reviewed and agree with the history as documented.     Freddie is a 65-year-old male with longstanding psych history presenting to the ED via EMS from his home.  Police were called to his residence as he reported to 911 that there were people trying to break into his home.  When police arrived, patient became combative and uncooperative and reportedly broke down his front door.  EMS was subsequently called to have him transported to the hospital for psychiatric evaluation.  According to EMS personnel who know the patient well, his psychiatric medications were recently adjusted and the patient's brother reports that he has not been compliant with his medications.  Patient is unable to explain the course of events from his perspective and states that he was just looking for his wallet and keys.  He offers no acute complaints at this time and is continuously uncooperative.  Psychiatric medications include lithium, paliperidone, and Remeron in the evenings.      History provided by:  Patient and EMS personnel   used: No        Review of Systems   HENT: Negative.     Eyes: Negative.    Respiratory: Negative.     Cardiovascular: Negative.    Gastrointestinal: Negative.    Genitourinary: Negative.    Musculoskeletal: Negative.    Skin: Negative.    Neurological: Negative.    Psychiatric/Behavioral:  Positive for agitation and behavioral problems.    All other systems reviewed and are negative.          Objective       ED Triage Vitals [04/06/25 0505]   Temperature Pulse Blood Pressure Respirations SpO2 Patient Position - Orthostatic VS   99.3 °F (37.4 °C) (!) 112 127/92 20 96 % Lying      Temp Source Heart Rate Source BP Location FiO2 (%) Pain Score    Temporal Monitor Right arm -- No Pain      Vitals      Date and Time Temp Pulse SpO2 Resp BP Pain Score FACES Pain Rating User    04/06/25 0505 99.3 °F (37.4 °C) 112 96 % 20 127/92 -- -- GC   04/06/25 0505 -- -- -- -- -- No Pain -- TB            Physical Exam  Constitutional:       Comments: Overall unkempt and evident poor hygiene   HENT:      Head: Normocephalic and atraumatic.      Right Ear: External ear normal.      Left Ear: External ear normal.      Mouth/Throat:      Comments: Poor dentition  Eyes:      General:         Right eye: No discharge.         Left eye: No discharge.      Conjunctiva/sclera: Conjunctivae normal.   Cardiovascular:      Rate and Rhythm: Normal rate and regular rhythm.   Pulmonary:      Effort: Pulmonary effort is normal.      Breath sounds: Normal breath sounds. No wheezing, rhonchi or rales.   Abdominal:      Tenderness: There is no abdominal tenderness. There is no guarding or rebound.   Skin:     General: Skin is warm and dry.   Neurological:      Mental Status: He is alert.   Psychiatric:         Speech: Speech is tangential.         Behavior: Behavior is uncooperative, agitated and aggressive.         Judgment: Judgment is impulsive.         Results Reviewed       Procedure Component Value Units Date/Time    Rapid drug screen, urine [[id number]] Collected: 04/06/25 0637    Lab Status: In process Specimen: Urine, Other Updated: 04/06/25 0648    TSH [[id number]]  (Normal) Collected: 04/06/25 0526    Lab Status: Final result Specimen: Blood from Arm, Left Updated: 04/06/25 0610     TSH 3RD GENERATON 2.409 uIU/mL     Ethanol [[id number]]  (Normal) Collected: 04/06/25 0531    Lab Status: Final result Specimen: Blood from Arm, Left Updated: 04/06/25 0554     Ethanol Lvl <10 mg/dL     Comprehensive metabolic panel [[id number]]  (Abnormal) Collected: 04/06/25 0526    Lab Status: Final result Specimen: Blood from Arm, Left Updated: 04/06/25 0554     Sodium 136 mmol/L      Potassium 3.4 mmol/L      Chloride 108 mmol/L      CO2 21 mmol/L      ANION GAP 7 mmol/L      BUN 8 mg/dL      Creatinine 0.75 mg/dL      Glucose  165 mg/dL      Calcium 9.5 mg/dL      AST 28 U/L      ALT 23 U/L      Alkaline Phosphatase 43 U/L      Total Protein 6.4 g/dL      Albumin 4.2 g/dL      Total Bilirubin 1.02 mg/dL      eGFR 94 ml/min/1.73sq m     Narrative:      National Kidney Disease Foundation guidelines for Chronic Kidney Disease (CKD):     Stage 1 with normal or high GFR (GFR > 90 mL/min/1.73 square meters)    Stage 2 Mild CKD (GFR = 60-89 mL/min/1.73 square meters)    Stage 3A Moderate CKD (GFR = 45-59 mL/min/1.73 square meters)    Stage 3B Moderate CKD (GFR = 30-44 mL/min/1.73 square meters)    Stage 4 Severe CKD (GFR = 15-29 mL/min/1.73 square meters)    Stage 5 End Stage CKD (GFR <15 mL/min/1.73 square meters)  Note: GFR calculation is accurate only with a steady state creatinine    Lithium level [[id number]]  (Normal) Collected: 04/06/25 0526    Lab Status: Final result Specimen: Blood from Arm, Left Updated: 04/06/25 0553     Lithium Lvl 0.61 mmol/L     CBC and differential [[id number]] Collected: 04/06/25 0526    Lab Status: Final result Specimen: Blood from Arm, Left Updated: 04/06/25 0539     WBC 7.15 Thousand/uL      RBC 4.53 Million/uL      Hemoglobin 13.3 g/dL      Hematocrit 39.9 %      MCV 88 fL      MCH 29.4 pg      MCHC 33.3 g/dL      RDW 13.0 %      MPV 10.4 fL      Platelets 165 Thousands/uL      nRBC 0 /100 WBCs      Segmented % 75 %      Immature Grans % 0 %      Lymphocytes % 15 %      Monocytes % 7 %      Eosinophils Relative 2 %      Basophils Relative 1 %      Absolute Neutrophils 5.39 Thousands/µL      Absolute Immature Grans 0.02 Thousand/uL      Absolute Lymphocytes 1.09 Thousands/µL      Absolute Monocytes 0.50 Thousand/µL      Eosinophils Absolute 0.11 Thousand/µL      Basophils Absolute 0.04 Thousands/µL             No orders to display       Procedures    ED Medication and Procedure Management   Prior to Admission Medications   Prescriptions Last Dose Informant Patient Reported? Taking?   Diclofenac Sodium  (VOLTAREN) 1 %   No No   Sig: Apply 2 g topically 4 (four) times a day   cholecalciferol (VITAMIN D3) 1,000 units tablet   No No   Sig: Take 2 tablets (2,000 Units total) by mouth daily   cyanocobalamin (VITAMIN B-12) 500 MCG tablet   No No   Sig: Take 1 tablet (500 mcg total) by mouth daily   lisinopril (ZESTRIL) 10 mg tablet   No No   Sig: Take 1 tablet (10 mg total) by mouth daily   lithium carbonate (LITHOBID) 450 mg CR tablet   No No   Sig: Take 1 tablet (450 mg total) by mouth every 12 (twelve) hours   mirtazapine (REMERON) 7.5 MG tablet   No No   Sig: Take 1 tablet (7.5 mg total) by mouth daily at bedtime   nicotine polacrilex (NICORETTE) 2 mg gum   No No   Sig: Chew 1 each (2 mg total) every 2 (two) hours as needed for smoking cessation   paliperidone (INVEGA) 6 MG 24 hr tablet   No No   Sig: Take 2 tablets (12 mg total) by mouth daily   tamsulosin (FLOMAX) 0.4 mg   No No   Sig: Take 1 capsule (0.4 mg total) by mouth daily with dinner      Facility-Administered Medications: None     Patient's Medications   Discharge Prescriptions    No medications on file     No discharge procedures on file.  ED SEPSIS DOCUMENTATION   Time reflects when diagnosis was documented in both MDM as applicable and the Disposition within this note       Time User Action Codes Description Comment    4/6/2025  6:57 AM Nilo Norris [F31.9] Bipolar disorder (HCC)     4/6/2025  6:57 AM Nilo Norris [F29] Psychosis (HCC)                  Donaldo Daugherty PA-C  04/06/25 0722

## 2025-04-06 NOTE — ED ATTENDING ATTESTATION
"4/6/2025  I, Nilo Norris III, DO, saw and evaluated the patient. I have discussed the patient with the resident/non-physician practitioner and agree with the resident's/non-physician practitioner's findings, Plan of Care, and MDM as documented in the resident's/non-physician practitioner's note, except where noted. All available labs and Radiology studies were reviewed.  I was present for key portions of any procedure(s) performed by the resident/non-physician practitioner and I was immediately available to provide assistance.       At this point I agree with the current assessment done in the Emergency Department.  I have conducted an independent evaluation of this patient a history and physical is as follows:    ED Course  ED Course as of 04/06/25 1409   Sun Apr 06, 2025   0520 Patient seen, evaluated, examined while EM AP fellow evaluating the patient, this is a 65-year-old gentleman history of affective disorder bipolar subtype, presents with local law enforcement due to members were concerned of the patient was acting erratic, while on scene the patient became belligerent with law enforcement requiring EMS to be summoned, they were on scene for an extended period of time patient was \"uncooperative\", upon arrival patient was using multiple profanities at nearly every staff member, took significant resources and at least 10 minutes to verbally de-escalate the patient, patient's current status is concerning for acute psychosis, required chemical restraint at the bedside.   0553 Patient was willing to sign a 201 but due to the volatile behavior, flight of ideas, tangential process, Officer Venkata Ayala, [place] [employer] petitioned 302, I will uphold 302 due to the above observations, patient's brother Benny Graham @ [phone], noted the patient does have a history of changing his mind frequently and was concerned that if he was allowed to sign a 201 he would immediately leave.   0620 302 upheld by me "         Critical Care Time  Procedures

## 2025-04-06 NOTE — LETTER
[facility] EMERGENCY DEPARTMENT  [address] [place] [address]  Dept: [phone]      EMTALA TRANSFER CONSENT    NAME Freddie Graham                                         1960                              MRN [id number]    I have been informed of my rights regarding examination, treatment, and transfer   by Dr. Dimitris Henson DO    Benefits: Other benefits (Include comment)_______________________ (Inpatient Psych Tx (302))    Risks: Potential for delay in receiving treatment    Consent for Transfer:  I acknowledge that my medical condition has been evaluated and explained to me by the emergency department physician or other qualified medical person and/or my attending physician, who has recommended that I be transferred to the service of  Accepting Physician: Dr. Braga at Accepting Facility Name, City & State : [facility]. The above potential benefits of such transfer, the potential risks associated with such transfer, and the probable risks of not being transferred have been explained to me, and I fully understand them.  The doctor has explained that, in my case, the benefits of transfer outweigh the risks.  I agree to be transferred.    I authorize the performance of emergency medical procedures and treatments upon me in both transit and upon arrival at the receiving facility.  Additionally, I authorize the release of any and all medical records to the receiving facility and request they be transported with me, if possible.  I understand that the safest mode of transportation during a medical emergency is an ambulance and that the Hospital advocates the use of this mode of transport. Risks of traveling to the receiving facility by car, including absence of medical control, life sustaining equipment, such as oxygen, and medical personnel has been explained to me and I fully understand them.    (BRENDA CORRECT BOX BELOW)  [  ]  I consent to the stated  transfer and to be transported by ambulance/helicopter.  [  ]  I consent to the stated transfer, but refuse transportation by ambulance and accept full responsibility for my transportation by car.  I understand the risks of non-ambulance transfers and I exonerate the Hospital and its staff from any deterioration in my condition that results from this refusal.    X___________________________________________    DATE  25  TIME________  Signature of patient or legally responsible individual signing on patient behalf           RELATIONSHIP TO PATIENT_________________________                        Provider Certification    NAME Freddie Graham                              1960                              MRN [id number]    A medical screening exam was performed on the above named patient.  Based on the examination:    Condition Necessitating Transfer The primary encounter diagnosis was Bipolar disorder (HCC). Diagnoses of Psychosis (HCC), Medical clearance for psychiatric admission, and Hypokalemia were also pertinent to this visit.    Patient Condition: The patient has been stabilized such that within reasonable medical probability, no material deterioration of the patient condition or the condition of the unborn child(aicha) is likely to result from the transfer    Reason for Transfer: Level of Care needed not available at this facility    Transfer Requirements: Facility [facility]   Space available and qualified personnel available for treatment as acknowledged by Georgina Ontiveros  Agreed to accept transfer and to provide appropriate medical treatment as acknowledged by       Dr. Braga  Appropriate medical records of the examination and treatment of the patient are provided at the time of transfer   STAFF INITIAL WHEN COMPLETED ___JG____  Transfer will be performed by qualified personnel from ______________________  and appropriate transfer equipment as required, including the use of  necessary and appropriate life support measures.    Provider Certification: I have examined the patient and explained the following risks and benefits of being transferred/refusing transfer to the patient/family:  The patient is stable for psychiatric transfer because they are medically stable, and is protected from harming him/herself or others during transport      Based on these reasonable risks and benefits to the patient and/or the unborn child(aicha), and based upon the information available at the time of the patient’s examination, I certify that the medical benefits reasonably to be expected from the provision of appropriate medical treatments at another medical facility outweigh the increasing risks, if any, to the individual’s medical condition, and in the case of labor to the unborn child, from effecting the transfer.    X____________________________________________ DATE 04/06/25        TIME_______      ORIGINAL - SEND TO MEDICAL RECORDS   COPY - SEND WITH PATIENT DURING TRANSFER

## 2025-04-06 NOTE — ED TRIAGE NOTES
"Via EMS/BLS [place] [employer] with complaint of requiring psychiatric evaluation; known schizophrenic who has a history of self stopping medication; arrives verbally aggressive with PD and staff; pt agreeable to sign 201; pt lives with mother who recently left for vacation in [place]; PD states they were at the house yesterday \"because he thought people had broken in to his house\"; pt states no one broke into pt's home  "

## 2025-04-06 NOTE — ED NOTES
Patient screened upon arrival using [place] Suicide Risk Assessment with result of HIGH   Re-screening not required unless change in behavior or suicidal ideation.  Patient's environment appears to be free of unnecessary equipment/cords, and other objects commonly identified for self harm or harm to others.  Behavioral Health Assessment deferred as patient is sleeping and would benefit from additional rest.  Vital signs deferred until patient awake, no signs or symptoms of respiratory distress at this time.    Once patient is awake and able to participate, will complete assessments.  Will continue to monitor patient until Crisis and the Care team can make appropriate disposition and/or transfer/admission accommodations.        Price Bell, RN  04/06/25 6706

## 2025-04-06 NOTE — LETTER
[facility] EMERGENCY DEPARTMENT  [address] [place] [address]  Dept: [phone]      EMTALA TRANSFER CONSENT    NAME Freddie Graham                           1960                              MRN [id number]    I have been informed of my rights regarding examination, treatment, and transfer   by Dr. Benjamin Darden DO    Benefits: Other benefits (Include comment)_______________________ (Inpatient Psych Tx (302))    Risks: Potential for delay in receiving treatment    I authorize the performance of emergency medical procedures and treatments upon me in both transit and upon arrival at the receiving facility.  Additionally, I authorize the release of any and all medical records to the receiving facility and request they be transported with me, if possible.  I understand that the safest mode of transportation during a medical emergency is an ambulance and that the Hospital advocates the use of this mode of transport. Risks of traveling to the receiving facility by car, including absence of medical control, life sustaining equipment, such as oxygen, and medical personnel has been explained to me and I fully understand them.    (BRENDA CORRECT BOX BELOW)  [X]  I consent to the stated transfer and to be transported by ambulance/helicopter.  [  ]  I consent to the stated transfer, but refuse transportation by ambulance and accept full responsibility for my transportation by car.  I understand the risks of non-ambulance transfers and I exonerate the Hospital and its staff from any deterioration in my condition that results from this refusal.    X___________________________________________    DATE  25  TIME________  Signature of patient or legally responsible individual signing on patient behalf           RELATIONSHIP TO PATIENT_________________________                                      Provider Certification    NAME Freddie Graham                               1960                               MRN [id number]    A medical screening exam was performed on the above named patient.  Based on the examination:    Condition Necessitating Transfer The primary encounter diagnosis was Bipolar disorder (HCC). Diagnoses of Psychosis (HCC), Medical clearance for psychiatric admission, and Hypokalemia were also pertinent to this visit.    Patient Condition: The patient has been stabilized such that within reasonable medical probability, no material deterioration of the patient condition or the condition of the unborn child(aicha) is likely to result from the transfer    Reason for Transfer: Level of Care needed not available at this facility    Transfer Requirements: Facility  - [facility]   Space available and qualified personnel available for treatment as acknowledged by Georgina Ontiveros  Agreed to accept transfer and to provide appropriate medical treatment as acknowledged by       Dr. Braga  Appropriate medical records of the examination and treatment of the patient are provided at the time of transfer   STAFF INITIAL WHEN COMPLETED __JG_____  Transfer will be performed by qualified personnel from ______________________  and appropriate transfer equipment as required, including the use of necessary and appropriate life support measures.    Provider Certification: I have examined the patient and explained the following risks and benefits of being transferred/refusing transfer to the patient/family:  The patient is stable for psychiatric transfer because they are medically stable, and is protected from harming him/herself or others during transport      Based on these reasonable risks and benefits to the patient and/or the unborn child(aicha), and based upon the information available at the time of the patient’s examination, I certify that the medical benefits reasonably to be expected from the provision of appropriate medical treatments at another medical facility outweigh the  increasing risks, if any, to the individual’s medical condition, and in the case of labor to the unborn child, from effecting the transfer.    X____________________________________________ DATE 04/06/25        TIME_______      ORIGINAL - SEND TO MEDICAL RECORDS   COPY - SEND WITH PATIENT DURING TRANSFER

## 2025-04-06 NOTE — ED NOTES
Patient screened upon arrival using [place] Suicide Risk Assessment with result of high  Re-screening not required unless change in behavior or suicidal ideation.  Patient's environment appears to be free of unnecessary equipment/cords, and other objects commonly identified for self harm or harm to others.  Behavioral Health Assessment deferred as patient is sleeping and would benefit from additional rest.  Vital signs deferred until patient awake, no signs or symptoms of respiratory distress at this time.    Once patient is awake and able to participate, will complete assessments.  Will continue to monitor patient until Crisis and the Care team can make appropriate disposition and/or transfer/admission accommodations.        Price Bell, ADRIEN  04/06/25 0710

## 2025-04-06 NOTE — ED NOTES
Patient is [facility].  Patient is accepted by Dr. Braga per David in Intake.     Transportation is arranged with [facility].     Transportation is scheduled with [facility] @ 445pm. Intake/Physician/Charge RN aware.     Nurse report is to be called to [phone] prior to patient transfer.    Georgina Ontiveros, [place]N  04/06/25  0747

## 2025-04-06 NOTE — LETTER
"Section I - General Information    Name of Patient: Freddie Graham                 : 1960    Medicare #:____________________  Transport Date: 25 (PCS is valid for round trips on this date and for all repetitive trips in the 60-day range as noted below.)  Origin: [facility] EMERGENCY DEPARTMENT                                                         Destination:________________________________________________  Is the pt's stay covered under Medicare Part A (PPS/DRG)     (_) YES  (_) NO  Closest appropriate facility?  (_) YES  (_) NO  If no, why is transport to more distant facility required?________________________  If hosp-hosp transfer, describe services needed at 2nd facility not available at 1st facility? _________________________________  If hospice pt, is this transport related to pt's terminal illness? (_) YES (_) NO Describe____________________________________    Section II - Medical Necessity Questionnaire  Ambulance transportation is medically necessary only if other means of transport are contraindicated or would be potentially harmful to the patient. To meet this requirement, the patient must either be \"bed confined\" or suffer from a condition such that transport by means other than ambulance is contraindicated by the patient's condition. The following questions must be answered by the medical professional signing below for this form to be valid:    1)  Describe the MEDICAL CONDITION (physical and/or mental) of this patient AT THE TIME OF AMBULANCE TRANSPORT that requires the patient to be transported in an ambulance and why transport by other means is contraindicated by the patient's condition:__________________________________________________________________________________________________    2) Is the patient \"bed confined\" as defined below?     (_) YES  (_) NO  To be \"be confined\" the patient must satisfy all three of the following conditions: (1) unable to get up from bed " without Assistance; AND (2) unable to ambulate; AND (3) unable to sit in a chair or wheelchair.    3) Can this patient safely be transported by car or wheelchair van (i.e., seated during transport without a medical attendant or monitoring)?   (_) YES  (_) NO    4) In addition to completing questions 1-3 above, please check any of the following conditions that apply*:  *Note: supporting documentation for any boxes checked must be maintained in the patient's medical records  (_)Contractures   (_)Non-Healed Fractures  (_)Patient is confused (_)Patient is comatose (_)Moderate/severe pain on movement (_)Danger to self/others  (_)IV meds/fluids required (_)Patient is combative(_)Need or possible need for restraints (_)DVT requires elevation of lower extremity  (_)Medical attendant required (_)Requires oxygen-unable to self administer (_)Special handling/isolation/infection control precautions required (_)Unable to tolerate seated position for time needed to transport (_)Hemodynamic monitoring required en route (_)Unable to sit in a chair or wheelchair due to decubitus ulcers or other wounds (_)Cardiac monitoring required en route (_)Morbid obesity requires additional personnel/equipment to safely handle patient (_)Orthopedic device (backboard, halo, pins, traction, brace, wedge, etc,) requiring special handling during transport (_)Other(specify)_______________________________________________    Section III - Signature of Physician or Healthcare Professional    I certify that the above information is accurate based on my evaluation of this patient, and that the medical necessity provisions of 42 .40(e)(1) are met, requiring that this patient be transported by ambulance. I understand this information will be used by the Centers for Medicare and Medicaid Services (CMS) to support the determination of medical necessity for ambulance services. I represent that I am the beneficiary’s attending physician; or an employee  of the beneficiary’s attending physician, or the hospital or facility where the beneficiary is being treated and from which the beneficiary is being transported; that I have personal knowledge of the beneficiary’s condition at the time of transport; and that I meet all Medicare regulations and applicable State licensure laws for the credential indicated.     (_) If this box is checked, I also certify that the patient is physically or mentally incapable of signing the ambulance service's claim and that the institution with which I am affiliated has furnished care, services, or assistance to the patient.  My signature below is made on behalf of the patient pursuant to 42 CFR §424.36(b)(4). In accordance with 42 CFR §424.37, the specific reason(s) that the patient is physically or mentally incapable of signing the claim form is as follows: _________________________________________________________________________________________________________      Signature of Physician* or Healthcare Professional______________________________________________________________  Signature Date 04/06/25 (For scheduled repetitive transports, this form is not valid for transports performed more than 60 days after this date)    Printed Name & Credentials of Physician or Healthcare Professional (MD, DO, RN, etc.)________________________________  *Form must be signed by patient's attending physician for scheduled, repetitive transports. For non-repetitive, unscheduled ambulance transports, if unable to obtain the signature of the attending physician, any of the following may sign (choose appropriate option below)  (_) Physician Assistant   (_)  Clinical Nurse Specialist    (_)  Licensed Practical Nurse    (_)    (_)  Nurse Practitioner     (_)  Registered Nurse                (_)                         (_) Discharge Planner

## 2025-04-06 NOTE — ED NOTES
"CIS met with patient and completed assessment to the best of ability.Freddie is a 65 year old male you presented with police on a 302. Patient called police because he thought they were breaking into his apartment. Patient is oriented to person and location.  He is tearful, yelling and laughing throughout assessment.  Patient stated that his mother is in [place] visiting his brother but he can't fly .  Freddie is mumbling people he doesn't like and say that he wants to \"squish the shit out of them the literal shit\".  He also stated that he feels we are living in the [facility].  Patient has a long history of mental janet; schizophrenia,delusional thoughts, increased agitation, paranoid ideation and aggressive behavior. Patient is not willing to sign a 201 he stated he will see \"PETEY in court.\"       "

## 2025-04-06 NOTE — ED NOTES
Insurance Authorization for admission:   Phone call placed to [facility] MEDICARE REP/[employer] ([facility])  Phone number: [phone].     Automated message indicates that they are closed, open M-F and to call back during normal business hours. Will inform supervisor to have completed during next shift.   ** days approved.  Level of care: Critical access hospital (302).  Review on **.   Authorization # **.        Georgina Ontiveros, [place]  04/06/25 2041

## 2025-04-06 NOTE — ED NOTES
302, CRF and ACT 77 sent to [facility].     Georgina Ontiveros, BR[place]N  04/06/25  1932

## 2025-04-07 PROBLEM — C68.9 UROTHELIAL CARCINOMA (HCC): Status: ACTIVE | Noted: 2025-04-07

## 2025-04-07 PROBLEM — G47.33 OSA (OBSTRUCTIVE SLEEP APNEA): Status: ACTIVE | Noted: 2025-04-07

## 2025-04-07 PROBLEM — Z91.199 NONCOMPLIANCE: Status: ACTIVE | Noted: 2025-04-07

## 2025-04-07 PROBLEM — F12.10 MILD TETRAHYDROCANNABINOL (THC) ABUSE: Status: ACTIVE | Noted: 2019-11-02

## 2025-04-07 PROBLEM — F39 MOOD INSOMNIA (HCC): Status: ACTIVE | Noted: 2025-04-07

## 2025-04-07 PROBLEM — R73.09 ELEVATED HEMOGLOBIN A1C: Status: ACTIVE | Noted: 2025-04-07

## 2025-04-07 PROBLEM — F51.05 MOOD INSOMNIA (HCC): Status: ACTIVE | Noted: 2025-04-07

## 2025-04-07 LAB
25(OH)D3 SERPL-MCNC: 32.6 NG/ML (ref 30–100)
ALBUMIN SERPL BCG-MCNC: 3.9 G/DL (ref 3.5–5)
ALP SERPL-CCNC: 46 U/L (ref 34–104)
ALT SERPL W P-5'-P-CCNC: 21 U/L (ref 7–52)
ANION GAP SERPL CALCULATED.3IONS-SCNC: 6 MMOL/L (ref 4–13)
AST SERPL W P-5'-P-CCNC: 21 U/L (ref 13–39)
ATRIAL RATE: 73 BPM
BILIRUB SERPL-MCNC: 0.69 MG/DL (ref 0.2–1)
BUN SERPL-MCNC: 10 MG/DL (ref 5–25)
CALCIUM SERPL-MCNC: 9.7 MG/DL (ref 8.4–10.2)
CHLORIDE SERPL-SCNC: 110 MMOL/L (ref 96–108)
CHOLEST SERPL-MCNC: 112 MG/DL (ref ?–200)
CO2 SERPL-SCNC: 26 MMOL/L (ref 21–32)
CREAT SERPL-MCNC: 0.86 MG/DL (ref 0.6–1.3)
EST. AVERAGE GLUCOSE BLD GHB EST-MCNC: 117 MG/DL
FOLATE SERPL-MCNC: 10.8 NG/ML
GFR SERPL CREATININE-BSD FRML MDRD: 89 ML/MIN/1.73SQ M
GLUCOSE P FAST SERPL-MCNC: 87 MG/DL (ref 65–99)
GLUCOSE SERPL-MCNC: 87 MG/DL (ref 65–140)
HBA1C MFR BLD: 5.7 %
HDLC SERPL-MCNC: 50 MG/DL
LDLC SERPL CALC-MCNC: 49 MG/DL (ref 0–100)
MAGNESIUM SERPL-MCNC: 2 MG/DL (ref 1.9–2.7)
NONHDLC SERPL-MCNC: 62 MG/DL
P AXIS: 79 DEGREES
POTASSIUM SERPL-SCNC: 4.3 MMOL/L (ref 3.5–5.3)
PR INTERVAL: 170 MS
PROT SERPL-MCNC: 6.4 G/DL (ref 6.4–8.4)
QRS AXIS: -47 DEGREES
QRSD INTERVAL: 88 MS
QT INTERVAL: 400 MS
QTC INTERVAL: 440 MS
SODIUM SERPL-SCNC: 142 MMOL/L (ref 135–147)
T WAVE AXIS: -39 DEGREES
TREPONEMA PALLIDUM IGG+IGM AB [PRESENCE] IN SERUM OR PLASMA BY IMMUNOASSAY: NORMAL
TRIGL SERPL-MCNC: 64 MG/DL (ref ?–150)
TSH SERPL DL<=0.05 MIU/L-ACNC: 1.8 UIU/ML (ref 0.45–4.5)
VENTRICULAR RATE: 73 BPM
VIT B12 SERPL-MCNC: 623 PG/ML (ref 180–914)

## 2025-04-07 PROCEDURE — 83036 HEMOGLOBIN GLYCOSYLATED A1C: CPT | Performed by: STUDENT IN AN ORGANIZED HEALTH CARE EDUCATION/TRAINING PROGRAM

## 2025-04-07 PROCEDURE — 84443 ASSAY THYROID STIM HORMONE: CPT | Performed by: STUDENT IN AN ORGANIZED HEALTH CARE EDUCATION/TRAINING PROGRAM

## 2025-04-07 PROCEDURE — 82306 VITAMIN D 25 HYDROXY: CPT | Performed by: STUDENT IN AN ORGANIZED HEALTH CARE EDUCATION/TRAINING PROGRAM

## 2025-04-07 PROCEDURE — 82746 ASSAY OF FOLIC ACID SERUM: CPT | Performed by: STUDENT IN AN ORGANIZED HEALTH CARE EDUCATION/TRAINING PROGRAM

## 2025-04-07 PROCEDURE — 83735 ASSAY OF MAGNESIUM: CPT | Performed by: STUDENT IN AN ORGANIZED HEALTH CARE EDUCATION/TRAINING PROGRAM

## 2025-04-07 PROCEDURE — 82607 VITAMIN B-12: CPT | Performed by: STUDENT IN AN ORGANIZED HEALTH CARE EDUCATION/TRAINING PROGRAM

## 2025-04-07 PROCEDURE — 99253 IP/OBS CNSLTJ NEW/EST LOW 45: CPT | Performed by: NURSE PRACTITIONER

## 2025-04-07 PROCEDURE — 80061 LIPID PANEL: CPT | Performed by: STUDENT IN AN ORGANIZED HEALTH CARE EDUCATION/TRAINING PROGRAM

## 2025-04-07 PROCEDURE — 86780 TREPONEMA PALLIDUM: CPT | Performed by: STUDENT IN AN ORGANIZED HEALTH CARE EDUCATION/TRAINING PROGRAM

## 2025-04-07 PROCEDURE — 80053 COMPREHEN METABOLIC PANEL: CPT | Performed by: STUDENT IN AN ORGANIZED HEALTH CARE EDUCATION/TRAINING PROGRAM

## 2025-04-07 PROCEDURE — 99223 1ST HOSP IP/OBS HIGH 75: CPT | Performed by: PSYCHIATRY & NEUROLOGY

## 2025-04-07 PROCEDURE — 93010 ELECTROCARDIOGRAM REPORT: CPT | Performed by: INTERNAL MEDICINE

## 2025-04-07 RX ORDER — PALIPERIDONE 6 MG/1
12 TABLET, EXTENDED RELEASE ORAL
Status: DISCONTINUED | OUTPATIENT
Start: 2025-04-07 | End: 2025-04-07

## 2025-04-07 RX ORDER — POLYETHYLENE GLYCOL 3350 17 G/17G
17 POWDER, FOR SOLUTION ORAL DAILY PRN
Status: DISCONTINUED | OUTPATIENT
Start: 2025-04-07 | End: 2025-04-25 | Stop reason: HOSPADM

## 2025-04-07 RX ORDER — ATORVASTATIN CALCIUM 10 MG/1
10 TABLET, FILM COATED ORAL
Status: DISCONTINUED | OUTPATIENT
Start: 2025-04-07 | End: 2025-04-25 | Stop reason: HOSPADM

## 2025-04-07 RX ORDER — TRAZODONE HYDROCHLORIDE 100 MG/1
100 TABLET ORAL
Status: DISCONTINUED | OUTPATIENT
Start: 2025-04-07 | End: 2025-04-08

## 2025-04-07 RX ORDER — BISACODYL 10 MG
10 SUPPOSITORY, RECTAL RECTAL DAILY PRN
Status: DISCONTINUED | OUTPATIENT
Start: 2025-04-07 | End: 2025-04-25 | Stop reason: HOSPADM

## 2025-04-07 RX ORDER — TAMSULOSIN HYDROCHLORIDE 0.4 MG/1
0.4 CAPSULE ORAL
Status: DISCONTINUED | OUTPATIENT
Start: 2025-04-07 | End: 2025-04-25 | Stop reason: HOSPADM

## 2025-04-07 RX ORDER — PALIPERIDONE 6 MG/1
6 TABLET, EXTENDED RELEASE ORAL
Status: DISCONTINUED | OUTPATIENT
Start: 2025-04-07 | End: 2025-04-08

## 2025-04-07 RX ORDER — PHENAZOPYRIDINE HYDROCHLORIDE 100 MG/1
200 TABLET, FILM COATED ORAL 3 TIMES DAILY PRN
Status: DISCONTINUED | OUTPATIENT
Start: 2025-04-07 | End: 2025-04-25 | Stop reason: HOSPADM

## 2025-04-07 RX ORDER — FOLIC ACID 1 MG/1
1 TABLET ORAL DAILY
Status: DISCONTINUED | OUTPATIENT
Start: 2025-04-08 | End: 2025-04-25 | Stop reason: HOSPADM

## 2025-04-07 RX ORDER — LANOLIN ALCOHOL/MO/W.PET/CERES
100 CREAM (GRAM) TOPICAL DAILY
Status: DISCONTINUED | OUTPATIENT
Start: 2025-04-08 | End: 2025-04-25 | Stop reason: HOSPADM

## 2025-04-07 RX ADMIN — TAMSULOSIN HYDROCHLORIDE 0.4 MG: 0.4 CAPSULE ORAL at 17:14

## 2025-04-07 RX ADMIN — LITHIUM CARBONATE 450 MG: 300 CAPSULE, GELATIN COATED ORAL at 13:26

## 2025-04-07 RX ADMIN — TRAZODONE HYDROCHLORIDE 100 MG: 100 TABLET ORAL at 21:36

## 2025-04-07 RX ADMIN — LITHIUM CARBONATE 450 MG: 300 CAPSULE, GELATIN COATED ORAL at 17:14

## 2025-04-07 RX ADMIN — PALIPERIDONE 6 MG: 6 TABLET, EXTENDED RELEASE ORAL at 21:36

## 2025-04-07 RX ADMIN — LORAZEPAM 1 MG: 1 TABLET ORAL at 12:45

## 2025-04-07 RX ADMIN — Medication 3 MG: at 21:37

## 2025-04-07 RX ADMIN — ATORVASTATIN CALCIUM 10 MG: 10 TABLET, FILM COATED ORAL at 17:14

## 2025-04-07 NOTE — PLAN OF CARE
Problem: PSYCHOSIS  Goal: Will report no hallucinations or delusions  Description: Interventions:- Administer medication as  ordered- Every waking shifts and PRN assess for the presence of hallucinations and or delusions- Assist with reality testing to support increasing orientation- Assess if patient's hallucinations or delusions are encouraging self-harm or harm to others and intervene as appropriate  4/7/2025 0258 by Arya Swartz RN MSN  Outcome: Progressing  4/6/2025 2000 by ADRIEN Rivera  Outcome: Not Progressing     Problem: BEHAVIOR  Goal: Pt/Family maintain appropriate behavior and adhere to behavioral management agreement, if implemented  Description: INTERVENTIONS:- Assess the family dynamic - Encourage verbalization of thoughts and concerns in a socially appropriate manner- Assess patient/family's coping skills and non-compliant behavior (including use of illegal substances).- Utilize positive, consistent limit setting strategies supporting safety of patient, staff and others- Initiate consult with Case Management, Spiritual Care or other ancillary services as appropriate- If a patient's/visitor's behavior jeopardizes the safety of the patient, staff, or others, refer to organization procedure. - Notify Security of behavior or suspected illegal substances which indicate the need for search of the patient and/or belongings- Encourage participation in the decision making process about a behavioral management agreement; implement if patient meets criteria  4/7/2025 0258 by Arya Swartz RN MSN  Outcome: Progressing  4/6/2025 2000 by Arya Swartz RN MSN  Outcome: Not Progressing     Problem: ANXIETY  Goal: Will report anxiety at manageable levels  Description: INTERVENTIONS:- Administer medication as ordered- Teach and encourage coping skills- Provide emotional support- Assess patient/family for anxiety and ability to cope  4/7/2025 0258 by Arya Swartz RN MSN  Outcome: Progressing  4/6/2025 2000  by ADRIEN Rivera  Outcome: Progressing  Goal: By discharge: Patient will verbalize 2 strategies to deal with anxiety  Description: Interventions:- Identify any obvious source/trigger to anxiety- Staff will assist patient in applying identified coping technique/skills- Encourage attendance of scheduled groups and activities  4/7/2025 0258 by ADRIEN Rivera  Outcome: Progressing  4/6/2025 2000 by ADRIEN Rivera  Outcome: Progressing     Problem: INVOLUNTARY ADMIT  Goal: Will cooperate with staff recommendations and doctor's orders and will demonstrate appropriate behavior  Description: INTERVENTIONS:- Treat underlying conditions and offer medication as ordered- Educate regarding involuntary admission procedures and rules- Utilize positive consistent limit setting strategies to support patient and staff safety  4/7/2025 0258 by ADRIEN Rivera  Outcome: Progressing  4/6/2025 2000 by ADRIEN Rivera  Outcome: Progressing     Problem: Alteration in Thoughts and Perception  Goal: Treatment Goal: Gain control of psychotic behaviors/thinking, reduce/eliminate presenting symptoms and demonstrate improved reality functioning upon discharge  4/7/2025 0258 by ADRIEN Rivera  Outcome: Progressing  4/6/2025 2000 by ADRIEN Rivera  Outcome: Not Progressing  Goal: Verbalize thoughts and feelings  Description: Interventions:- Promote a nonjudgmental and trusting relationship with the patient through active listening and therapeutic communication- Assess patient's level of functioning, behavior and potential for risk- Engage patient in 1 on 1 interactions- Encourage patient to express fears, feelings, frustrations, and discuss symptoms  - [place] patient to reality, help patient recognize reality-based thinking - Administer medications as ordered and assess for potential side effects- Provide the patient education related to the signs and symptoms of the illness and desired effects of  prescribed medications  4/7/2025 0258 by ADRIEN Rivera  Outcome: Progressing  4/6/2025 2000 by ADRIEN Rivera  Outcome: Not Progressing  Goal: Refrain from acting on delusional thinking/internal stimuli  Description: Interventions:- Monitor patient closely, per order - Utilize least restrictive measures - Set reasonable limits, give positive feedback for acceptable - Administer medications as ordered and monitor of potential side effects  4/7/2025 0258 by ADRIEN Rivera  Outcome: Progressing  4/6/2025 2000 by ADRIEN Rivera  Outcome: Not Progressing  Goal: Agree to be compliant with medication regime, as prescribed and report medication side effects  Description: Interventions:- Offer appropriate PRN medication and supervise ingestion; conduct AIMS, as needed   4/7/2025 0258 by ADRIEN Rivera  Outcome: Progressing  4/6/2025 2000 by ADRIEN Rivera  Outcome: Not Progressing  Goal: Attend and participate in unit activities, including therapeutic, recreational, and educational groups  Description: Interventions:-Encourage Visitation and family involvement in care  4/7/2025 0258 by ADRIEN Rivera  Outcome: Progressing  4/6/2025 2000 by ADRIEN Rivera  Outcome: Not Progressing  Goal: Recognize dysfunctional thoughts, communicate reality-based thoughts at the time of discharge  Description: Interventions:- Provide medication and psycho-education to assist patient in compliance and developing insight into his/her illness   4/7/2025 0258 by ADRIEN Rivera  Outcome: Progressing  4/6/2025 2000 by ADRIEN Rivera  Outcome: Not Progressing  Goal: Complete daily ADLs, including personal hygiene independently, as able  Description: Interventions:- Observe, teach, and assist patient with ADLS- Monitor and promote a balance of rest/activity, with adequate nutrition and elimination   4/7/2025 0258 by ADRIEN Rivera  Outcome: Progressing  4/6/2025 2000 by Arya Swartz  RN MSN  Outcome: Not Progressing     Problem: Risk for Violence/Aggression Toward Others  Goal: Treatment Goal: Refrain from acts of violence/aggression during length of stay, and demonstrate improved impulse control at the time of discharge  4/7/2025 0258 by ADRIEN Rivera  Outcome: Progressing  4/6/2025 2000 by ADRIEN Rivera  Outcome: Not Progressing  Goal: Verbalize thoughts and feelings  Description: Interventions:- Assess and re-assess patient's level of risk, every waking shift- Engage patient in 1:1 interactions, daily, for a minimum of 15 minutes - Allow patient to express feelings and frustrations in a safe and non-threatening manner - Establish rapport/trust with patient   4/7/2025 0258 by ADRIEN Rivera  Outcome: Progressing  4/6/2025 2000 by ADRIEN Rivera  Outcome: Not Progressing  Goal: Refrain from harming others  4/7/2025 0258 by ADRIEN Rivera  Outcome: Progressing  4/6/2025 2000 by ADRIEN Rivera  Outcome: Not Progressing  Goal: Refrain from destructive acts on the environment or property  4/7/2025 0258 by ADRIEN Rivera  Outcome: Progressing  4/6/2025 2000 by ADRIEN Rivera  Outcome: Not Progressing  Goal: Control angry outbursts  Description: Interventions:- Monitor patient closely, per order- Ensure early verbal de-escalation- Monitor prn medication needs- Set reasonable/therapeutic limits, outline behavioral expectations, and consequences - Provide a non-threatening milieu, utilizing the least restrictive interventions   4/7/2025 0258 by ADRIEN Rivera  Outcome: Progressing  4/6/2025 2000 by ADRIEN Rivera  Outcome: Not Progressing  Goal: Attend and participate in unit activities, including therapeutic, recreational, and educational groups  Description: Interventions:- Provide therapeutic and educational activities daily, encourage attendance and participation, and document same in the medical record   4/7/2025 0258 by Arya Swartz  RN MSN  Outcome: Progressing  4/6/2025 2000 by Arya Swartz RN MSN  Outcome: Not Progressing  Goal: Identify appropriate positive anger management techniques  Description: Interventions:- Offer anger management and coping skills groups - Staff will provide positive feedback for appropriate anger control  4/7/2025 0258 by Arya Swartz RN MSN  Outcome: Progressing  4/6/2025 2000 by Arya Swartz RN MSN  Outcome: Not Progressing

## 2025-04-07 NOTE — TREATMENT PLAN
TREATMENT PLAN REVIEW - Behavioral Health Freddie CASTANEDA Day 65 y.o. 1960 male MRN: [id number]    [facility] 6B [facility] Encounter: [id number]          Admit Date/Time:  2025  7:21 PM    Treatment Team:   Kaveh Lara, DO Joselyn Olivo Michael Apgar Melissa Figueroa, LPN Sally Velazquez Robles Lisa [place]    Diagnosis: Principal Problem:    Schizoaffective disorder, bipolar type (HCC)  Active Problems:    Mood insomnia (HCC)    Mild tetrahydrocannabinol (THC) abuse    Alcohol abuse, continuous    Medical clearance for psychiatric admission    Hyperlipidemia    Noncompliance    Urothelial carcinoma (HCC)    LISET (obstructive sleep apnea)    Elevated hemoglobin A1c      Patient Strengths/Assets: capable of independent living, good past treatment response, motivation for treatment/growth, negotiates basic needs, patient is on a voluntary commitment    Patient Barriers/Limitations: chronic mental illness, difficulty adapting, immature, limited support system, relationship issues, noncompliant with medication, poor insight, self-care deficit, unable to express basic needs    Short Term Goals: decrease in anxiety symptoms, decrease in paranoid thoughts, decrease in psychotic symptoms, decrease in level of agitation, ability to stay safe on the unit, ability to stay free of restraints, improvement in ability to express basic needs, improvement in insight, improvement in reality testing    Long Term Goals: improvement in depression, improvement in anxiety, stabilization of mood, resolution of psychotic symptoms, improvement in reality testing, improvement in reasoning ability, improved insight, able to express basic needs, acceptance of need for psychiatric medications, acceptance of need for psychiatric treatment, acceptance of need for psychiatric follow up after discharge, adequate sleep, adequate appetite, appropriate interaction with  family    Progress Towards Goals: starting psychiatric medications as prescribed    Recommended Treatment: medication management, patient medication education, group therapy, milieu therapy, continued Behavioral Health psychiatric evaluation/assessment process    Treatment Frequency: daily medication monitoring, group and milieu therapy daily, monitoring through interdisciplinary rounds, monitoring through weekly patient care conferences    Expected Discharge Date:  Up to 30 midnights    Discharge Plan: referral for outpatient medication management with a psychiatrist, referral for outpatient psychotherapy, referrals as indicated, return to previous living arrangement    Treatment Plan Created/Updated By: Kaveh Lara DO

## 2025-04-07 NOTE — CMS CERTIFICATION NOTE
Certification: Based upon physical, mental and social evaluations, I certify that inpatient psychiatric services are medically necessary for this patient for a duration of 30 midnights for the treatment of Schizoaffective disorder, bipolar type (HCC)  Available alternative community resources do not meet the patient's mental health care needs.  I further attest that an established written individualized plan of care has been implemented and is outlined in the patient's medical records.

## 2025-04-07 NOTE — ASSESSMENT & PLAN NOTE
Patient recently had a Zio patch where he went to [facility] cardiology on 3/13/2025  He was thought to have pauses no A-fib pauses as long as 5.4 seconds which they feel is related to obstructive sleep apnea  We will continue to monitor  Patient needs a sleep study as an outpatient

## 2025-04-07 NOTE — PROGRESS NOTES
04/07/25 1158   Team Meeting   Meeting Type Tx Team Meeting   Initial Conference Date 04/07/25   Team Members Present   Team Members Present Physician;Nurse;   Physician Team Member Marco   Nursing Team Member Jason SIERRA   Care Management Team Member Jason MEJIA   Patient/Family Present   Patient Present Yes   Patient's Family Present No     Tx plan was reviewed and discussed with Pt. Pt was encouraged to attend groups. Medication was discussed with Pt. Pt signed tx plan.

## 2025-04-07 NOTE — ASSESSMENT & PLAN NOTE
Per family member patient has not been taking his psych meds  Please educate patient on the importance of medication adherence as well as medical and psychological follow-up

## 2025-04-07 NOTE — NURSING NOTE
Ativan 1 mg PRN administered PO per provider verbal order. Mouth check complete, patient accepted.

## 2025-04-07 NOTE — ASSESSMENT & PLAN NOTE
Papillary in nature  Patient will continue to take Pyridium 200 mg p.o. 3 times daily as needed spasms  Patient will continue take Flomax 0.4 mg p.o. daily

## 2025-04-07 NOTE — PLAN OF CARE
Pt attends group and visible.   Problem: Risk for Violence/Aggression Toward Others  Goal: Attend and participate in unit activities, including therapeutic, recreational, and educational groups  Description: Interventions:- Provide therapeutic and educational activities daily, encourage attendance and participation, and document same in the medical record   Outcome: Progressing

## 2025-04-07 NOTE — PLAN OF CARE
Problem: PSYCHOSIS  Goal: Will report no hallucinations or delusions  Description: Interventions:- Administer medication as  ordered- Every waking shifts and PRN assess for the presence of hallucinations and or delusions- Assist with reality testing to support increasing orientation- Assess if patient's hallucinations or delusions are encouraging self-harm or harm to others and intervene as appropriate  Outcome: Not Progressing     Problem: BEHAVIOR  Goal: Pt/Family maintain appropriate behavior and adhere to behavioral management agreement, if implemented  Description: INTERVENTIONS:- Assess the family dynamic - Encourage verbalization of thoughts and concerns in a socially appropriate manner- Assess patient/family's coping skills and non-compliant behavior (including use of illegal substances).- Utilize positive, consistent limit setting strategies supporting safety of patient, staff and others- Initiate consult with Case Management, Spiritual Care or other ancillary services as appropriate- If a patient's/visitor's behavior jeopardizes the safety of the patient, staff, or others, refer to organization procedure. - Notify Security of behavior or suspected illegal substances which indicate the need for search of the patient and/or belongings- Encourage participation in the decision making process about a behavioral management agreement; implement if patient meets criteria  Outcome: Not Progressing     Problem: ANXIETY  Goal: Will report anxiety at manageable levels  Description: INTERVENTIONS:- Administer medication as ordered- Teach and encourage coping skills- Provide emotional support- Assess patient/family for anxiety and ability to cope  Outcome: Progressing  Goal: By discharge: Patient will verbalize 2 strategies to deal with anxiety  Description: Interventions:- Identify any obvious source/trigger to anxiety- Staff will assist patient in applying identified coping technique/skills- Encourage attendance of  scheduled groups and activities  Outcome: Progressing     Problem: INVOLUNTARY ADMIT  Goal: Will cooperate with staff recommendations and doctor's orders and will demonstrate appropriate behavior  Description: INTERVENTIONS:- Treat underlying conditions and offer medication as ordered- Educate regarding involuntary admission procedures and rules- Utilize positive consistent limit setting strategies to support patient and staff safety  Outcome: Progressing     Problem: Alteration in Thoughts and Perception  Goal: Treatment Goal: Gain control of psychotic behaviors/thinking, reduce/eliminate presenting symptoms and demonstrate improved reality functioning upon discharge  Outcome: Not Progressing  Goal: Verbalize thoughts and feelings  Description: Interventions:- Promote a nonjudgmental and trusting relationship with the patient through active listening and therapeutic communication- Assess patient's level of functioning, behavior and potential for risk- Engage patient in 1 on 1 interactions- Encourage patient to express fears, feelings, frustrations, and discuss symptoms  - [place] patient to reality, help patient recognize reality-based thinking - Administer medications as ordered and assess for potential side effects- Provide the patient education related to the signs and symptoms of the illness and desired effects of prescribed medications  Outcome: Not Progressing  Goal: Refrain from acting on delusional thinking/internal stimuli  Description: Interventions:- Monitor patient closely, per order - Utilize least restrictive measures - Set reasonable limits, give positive feedback for acceptable - Administer medications as ordered and monitor of potential side effects  Outcome: Not Progressing  Goal: Agree to be compliant with medication regime, as prescribed and report medication side effects  Description: Interventions:- Offer appropriate PRN medication and supervise ingestion; conduct AIMS, as needed   Outcome: Not  Progressing  Goal: Attend and participate in unit activities, including therapeutic, recreational, and educational groups  Description: Interventions:-Encourage Visitation and family involvement in care  Outcome: Not Progressing  Goal: Recognize dysfunctional thoughts, communicate reality-based thoughts at the time of discharge  Description: Interventions:- Provide medication and psycho-education to assist patient in compliance and developing insight into his/her illness   Outcome: Not Progressing  Goal: Complete daily ADLs, including personal hygiene independently, as able  Description: Interventions:- Observe, teach, and assist patient with ADLS- Monitor and promote a balance of rest/activity, with adequate nutrition and elimination   Outcome: Not Progressing     Problem: Risk for Violence/Aggression Toward Others  Goal: Treatment Goal: Refrain from acts of violence/aggression during length of stay, and demonstrate improved impulse control at the time of discharge  Outcome: Not Progressing  Goal: Verbalize thoughts and feelings  Description: Interventions:- Assess and re-assess patient's level of risk, every waking shift- Engage patient in 1:1 interactions, daily, for a minimum of 15 minutes - Allow patient to express feelings and frustrations in a safe and non-threatening manner - Establish rapport/trust with patient   Outcome: Not Progressing  Goal: Refrain from harming others  Outcome: Not Progressing  Goal: Refrain from destructive acts on the environment or property  Outcome: Not Progressing  Goal: Control angry outbursts  Description: Interventions:- Monitor patient closely, per order- Ensure early verbal de-escalation- Monitor prn medication needs- Set reasonable/therapeutic limits, outline behavioral expectations, and consequences - Provide a non-threatening milieu, utilizing the least restrictive interventions   Outcome: Not Progressing  Goal: Attend and participate in unit activities, including  therapeutic, recreational, and educational groups  Description: Interventions:- Provide therapeutic and educational activities daily, encourage attendance and participation, and document same in the medical record   Outcome: Not Progressing  Goal: Identify appropriate positive anger management techniques  Description: Interventions:- Offer anger management and coping skills groups - Staff will provide positive feedback for appropriate anger control  Outcome: Not Progressing

## 2025-04-07 NOTE — H&P
H&P - Behavioral Health   Name: Freddie Graham 65 y.o. male I MRN: [id number]  Unit/Bed#: [facility] 650-01 I Date of Admission: 4/6/2025   Date of Service: 4/7/2025 I Hospital Day: 1     Assessment & Plan  Schizoaffective disorder, bipolar type (HCC)  Continue prior to admission lithium 450 mg twice daily  Patient reports compliance with this medication  Lithium level 4/6/2025: 0.61 therapeutic  Restart prior to admission Invega at 6 mg nightly with plans to titrate as tolerated versus changed to alternative antipsychotic  Patient reports noncompliance with this medication  Says he is not agreeable to long-acting injectable, prefers oral medication  Reports that Invega has worked well in the past but that he would like alternative antipsychotic medication, will continue to monitor  Mild tetrahydrocannabinol (THC) abuse  Encourage cessation  Alcohol abuse, continuous  Encourage cessation  Mood insomnia (HCC)  Discontinue prior to admission Remeron due to noncompliance  Begin trazodone 100 mg nightly 04/07/2025  Melatonin 3 mg nightly    Current medications:  Current Facility-Administered Medications   Medication Dose Route Frequency Provider Last Rate    acetaminophen  650 mg Oral Q4H PRN Reshma Braga MD      acetaminophen  650 mg Oral Q4H PRN Reshma Braga MD      acetaminophen  975 mg Oral Q6H PRN Reshma Braga MD      aluminum-magnesium hydroxide-simethicone  30 mL Oral Q4H PRN Reshma Braga MD      atorvastatin  10 mg Oral Daily With Dinner CARLOS Pollock      bisacodyl  10 mg Rectal Daily PRN Reshma Braga MD      [START ON 4/8/2025] Cholecalciferol  2,000 Units Oral Daily CARLOS Pollock      [START ON 4/8/2025] folic acid  1 mg Oral Daily CARLOS Pollock      hydrOXYzine HCL  25 mg Oral Q6H PRN Max 4/day Reshma Braga MD      hydrOXYzine HCL  50 mg Oral Q6H PRN Max 4/day Reshma Braga MD      lithium carbonate  450 mg Oral BID With Meals Kaveh Lara,       LORazepam  1 mg  Intramuscular Q6H PRN Max 3/day Reshma Braga MD      LORazepam  1 mg Oral Q6H PRN Max 3/day Reshma Braga MD      melatonin  3 mg Oral HS Reshma Braga MD      [START ON 4/8/2025] multivitamin-minerals  1 tablet Oral Daily CARLOS Pollock      nicotine polacrilex  2 mg Oral Q4H PRN Reshma Braga MD      OLANZapine  5 mg Intramuscular Q3H PRN Max 3/day Reshma Braga MD      OLANZapine  2.5 mg Oral Q4H PRN Max 6/day Reshma Braga MD      OLANZapine  5 mg Oral Q4H PRN Max 3/day Reshma Braga MD      OLANZapine  5 mg Oral Q3H PRN Max 3/day Reshma Braga MD      paliperidone  6 mg Oral HS Kaveh Lara,       phenazopyridine  200 mg Oral TID PRN CARLOS Pollock      polyethylene glycol  17 g Oral Daily PRN Reshma Braga MD      senna-docusate sodium  1 tablet Oral Daily PRN Reshma Braga MD      tamsulosin  0.4 mg Oral Daily With Dinner CARLOS Pollock      [START ON 4/8/2025] thiamine  100 mg Oral Daily CARLOS Pollock      traZODone  100 mg Oral HS Kaveh Lara, DO      traZODone  50 mg Oral HS PRN Reshma Braga MD         Risks/Benefits of Treatment:     Risks, benefits, and possible side effects of medications explained to patient and patient verbalizes understanding and agreement for treatment.    Treatment Planning:      - Encourage early mobility and having a structured day  - Provide frequent re-orientation, and cognitive stimulation  - Ensure assistive devices are in proper working order (eye-glasses, hearing aids)  - Encourage adequate hydration, nutrition and monitor bowel movements  - Maintain sleep-wake cycle: Uninterrupted sleep time; low-level lighting at night  - Fall precaution  - f/u SLIM recs regarding the medical problems   - Continue medication titration and treatment plan; adjust medication to optimize treatment response and as clinically indicated.   - Observation:Routine  - Legal Status: 302  - VS: as per unit protocol  - Encourage group  "attendance and milieu therapy  - Dispo: To be determined  - Estimated Discharge Day: 4/28/2025     Psychiatric Evaluation    Chief Complaint: \"Dr. Lares\"    History of Present Illness     Freddie Graham is a 65 y.o. male,  single, , domiciled with his mother, on disability income, w/ PMH of LISET, hyperlipidemia, elevated HbA1c, hypertension, urothelial carcinoma, and PPH of schizoaffective disorder bipolar type, multiple prior psychiatric admissions, no prior SA, positive h/o self-injurious behavior, who was admitted to [facility] unit 6B on an involuntary 302 admission with paranoid delusions and labile mood.  Per ED crisis evaluation completed by Barbara Liang on 04/06/2025:  CIS met with patient and completed assessment to the best of ability.Freddie is a 65 year old male you presented with police on a 302. Patient called police because he thought they were breaking into his apartment. Patient is oriented to person and location. He is tearful, yelling and laughing throughout assessment. Patient stated that his mother is in [place] visiting his brother but he can't fly . Freddie is mumbling people he doesn't like and say that he wants to \"squish the shit out of them the literal shit\". He also stated that he feels we are living in the [facility]. Patient has a long history of mental janet; schizophrenia,delusional thoughts, increased agitation, paranoid ideation and aggressive behavior. Patient is not willing to sign a 201 he stated he will see \"PETEY in court.\"     Per chart review patient last admitted to [facility] 2/18/2024-3/18/2024 and discharged on the following medication regimen:  Lithium 450 mg twice daily  Melatonin 3 mg nightly  Remeron 7.5 mg nightly  Invega 12 mg daily  Since this discharge has followed with outpatient [facility] Dr[facility] Monzon.    The patient was admitted to the inpatient psychiatry unit for further psychiatric stabilization.  On initial evaluation " "after admission to the inpatient psychiatric unit Freddie was alert and oriented in all spheres, but floridly manic.  He has loud speech, labile mood, yelling at times, crying at times, requiring redirection to complete the evaluation.  Symptoms prior to admission included feeling depressed, self-abusive thoughts, hopelessness, helplessness, poor concentration, mood swings, manic symptoms, increased irritability, racing thoughts, agitation, paranoid ideation, delusional thoughts, disorganized thinking process, anxiety symptoms, noncompliance with medications, and poor memory.  Stressors preceding admission included mother traveling to [place], medication noncompliance, and THC use. Freddie is very paranoid, tangential in thought process.  For example, he reports that he wants to be \"exonerated from the \" and when asked what crime he committed he replied \"he was a lousy .\"  He states he is upset that his mother has traveled to [place], says \"I wanted to go to [place] with her.\"  However, he states that he needed to stay home to take care of his dog.  Then he screams out \"my mom is gone forever.\"  He began crying while talking about his dog.  States that he is troubled by tinnitus in his ear.  Patient states he \"needs an Ativan\" to calm down.  Nursing was notified and as needed Ativan was administered orally to patient.  Patient is agreeable to signing 201 admission, he is agreeable to medication management.  He states that he would like his antipsychotic medication Invega changed.  However due to noncompliance we will restart his Invega at a lower dose and as patient's mood becomes more stable in the coming days we will evaluate for potential antipsychotic medication adjustments.  He also states that he no longer wants to take Remeron.  We discussed beginning trazodone instead of this.  Explained risks and benefits of medication, answered patient questions, and obtained informed consent for treatment.  Patient was " in agreement with plan.      Psychiatric Review Of Systems:  Sleep changes: decreased  Appetite changes: decreased  Weight changes: no  Energy/anergy: decreased  Interest/pleasure/anhedonia: decreased  Somatic symptoms: no  Anxiety/panic: worrying, anxiety attacks  Marya: currently manic symptoms  Guilty/hopeless: yes  Self injurious behavior/risky behavior: not recently  Suicidal ideation: not at present  Homicidal ideation: no  Auditory hallucinations: no   Visual hallucinations: no  Other hallucinations: no  Delusional thinking: paranoid delusions, persecutory delusions  Eating disorder history: no  Obsessive/compulsive symptoms: no    Historical Information     Past Psychiatric History:     Past Inpatient Psychiatric Treatment:   Multiple past inpatient psychiatric admissions   Past Outpatient Psychiatric Treatment:    Was in outpatient psychiatric treatment in the past with a psychiatrist  Past Suicide Attempts: history of self abusive behavior  Past Violent Behavior: no  Past Psychiatric Medication Trials: multiple psychiatric medication trials    Substance Abuse History:    Social History       Tobacco History       Smoking Status  Every Day Current Packs/Day  1 pack/day Average Packs/Day  1 pack/day for 40.0 years (40.0 ttl pk-yrs) Smoking Tobacco Type  Cigarettes, Cigars   Pack Year History     Packs/Day From To Years    1   40.0      Smokeless Tobacco Use  Never              Alcohol History       Alcohol Use Status  Yes Comment  whenever i want              Drug Use       Drug Use Status  Yes Types  Marijuana Comment  Patient denies currently using marijuana.               Sexual Activity       Sexually Active  Not Currently Partners  Female              Other Factors    Not Asked                 Additional Substance Use Detail       Questions Responses    Problems Due to Past Use of Alcohol? No    Problems Due to Past Use of Substances? No    Substance Use Assessment Denies substance use within the past  12 months    Alcohol Use Frequency 1 or 2 times/week    Cannabis frequency Daily    Comment: Daily on 9/7/2020     Heroin Frequency Denies use in past 12 months    Cannabis method Other    Comment: Puts in food     Cocaine frequency Never used    Comment: Never used on 9/7/2020     Crack Cocaine Frequency Denies use in past 12 months    Methamphetamine Frequency Denies use in past 12 months    Narcotic Frequency Denies use in past 12 months    Benzodiazepine Frequency Denies use in past 12 months    Amphetamine frequency Denies use in past 12 months    Barbituate Frequency Denies use use in past 12 months    Inhalant frequency Never used    Comment: Never used on 9/7/2020     Hallucinogen frequency Never used    Comment: Never used on 9/7/2020     Ecstasy frequency Never used    Comment: Never used on 9/7/2020     Other drug frequency Never used    Comment: Never used on 9/7/2020     Opiate frequency Denies use in past 12 months    Not reviewed.          I have assessed this patient for substance use within the past 12 months    THC use daily  History of alcohol use    Family Psychiatric History:     No family history on file.    Social History:  Education: high school graduate  Learning Disabilities: none  Marital History: single  Children: none  Living Arrangement: lives in home with mother  Occupational History: unemployed  Functioning Relationships: limited support system  Legal History: unknown   History:  , service unknown  Access to firearms: none    Traumatic History:   Abuse:not willing to provide details  Other Traumatic Events:  unknown    Past Medical History:   Diagnosis Date    Alcohol abuse     Anxiety     Astigmatism     Depression     DJD (degenerative joint disease)     Gait abnormality     Head injury     History of colonic polyps     Hydrocele     Hyperlipidemia     Hypertension     Macular drusen     Presbyopia     PTSD (post-traumatic stress disorder)     Schizoaffective disorder  (HCC)     Sepsis (HCC)     Substance abuse (HCC)     Tobacco abuse     Umbilical hernia     Vitreous syneresis      Past Surgical History:   Procedure Laterality Date    FRACTURE SURGERY      INGUINAL HERNIA REPAIR       Meds/Allergies     Objective :  Temp:  [97.3 °F (36.3 °C)-98.5 °F (36.9 °C)] 98 °F (36.7 °C)  HR:  [] 87  BP: (119-142)/(63-90) 142/90  Resp:  [16-18] 17  SpO2:  [91 %-96 %] 96 %  O2 Device: None (Room air)    Temp:  [97.3 °F (36.3 °C)-98.5 °F (36.9 °C)] 98 °F (36.7 °C)  HR:  [] 87  BP: (119-142)/(63-90) 142/90  Resp:  [16-18] 17  SpO2:  [91 %-96 %] 96 %  O2 Device: None (Room air)    Mental Status Evaluation:  Appearance:  disheveled, marginal hygiene, underweight   Behavior:  bizarre, demanding, requires redirection   Speech:  delayed, loud, increased volume   Mood:  dysphoric, anxious   Affect:  labile   Thought Process:  disorganized, illogical, impaired abstract reasoning   Associations: loose associations   Thought Content:  paranoid and bizarre delusions, paranoid ideation, ruminating thoughts   Perceptual Disturbances: denies auditory or visual hallucinations when asked, appears distracted, appears preoccupied   Risk Potential: Suicidal ideation - None at present  Homicidal ideation - None at present  Potential for aggression - Yes, due to acute psychosis   Sensorium:  oriented to person, place, and time/date   Memory:  recent and remote memory grossly intact   Consciousness:  alert and awake   Attention/Concentration: poor concentration and poor attention span   Insight:  limited   Judgment: limited   Gait/Station: normal gait/station, normal balance   Motor Activity: no abnormal movements         Patient Strengths/Assets: capable of independent living, good past treatment response, motivation for treatment/growth, negotiates basic needs, patient is on a voluntary commitment    Patient Barriers/Limitations: chronic mental illness, difficulty adapting, immature, limited support  system, relationship issues, noncompliant with medication, poor insight, self-care deficit, unable to express basic needs        Lab Results: I have reviewed the following results:  Admission Labs:   Admission on 04/06/2025   Component Date Value    TSH 3RD GENERATON 04/07/2025 1.798     Sodium 04/07/2025 142     Potassium 04/07/2025 4.3     Chloride 04/07/2025 110 (H)     CO2 04/07/2025 26     ANION GAP 04/07/2025 6     BUN 04/07/2025 10     Creatinine 04/07/2025 0.86     Glucose 04/07/2025 87     Glucose, Fasting 04/07/2025 87     Calcium 04/07/2025 9.7     AST 04/07/2025 21     ALT 04/07/2025 21     Alkaline Phosphatase 04/07/2025 46     Total Protein 04/07/2025 6.4     Albumin 04/07/2025 3.9     Total Bilirubin 04/07/2025 0.69     eGFR 04/07/2025 89     Magnesium 04/07/2025 2.0     Vitamin B-12 04/07/2025 623     Folate 04/07/2025 10.8     Vit D, 25-Hydroxy 04/07/2025 32.6     Cholesterol 04/07/2025 112     Triglycerides 04/07/2025 64     HDL, Direct 04/07/2025 50     LDL Calculated 04/07/2025 49     Non-HDL-Chol (CHOL-HDL) 04/07/2025 62     Treponema Pallidium Tota* 04/07/2025 Non-reactive     Hemoglobin A1C 04/07/2025 5.7 (H)     EAG 04/07/2025 117        Imaging Results Review: No pertinent imaging studies reviewed.  Other Study Results Review: EKG was reviewed.   ECG 12-lead 4/6/2025: QTc interval 440    Diet:       Diet Orders   (From admission, onward)                 Start     Ordered    04/07/25 1227  Diet Regular; Regular House; Consistent Carbohydrate Diet Level 3 (6 carb servings/90 grams CHO/meal)  Diet effective now        References:    Adult Nutrition Support Algorithm    RD Therapeutic Diet Order Protocol   Question Answer Comment   Diet Type Regular    Regular Regular House    Other Restriction(s): Consistent Carbohydrate Diet Level 3 (6 carb servings/90 grams CHO/meal)    Allow Registered Dietitian to adjust diet order per protocol Yes        04/07/25 1227                     Code Status: Level  1 - Full Code  Advance Directive and Living Will: <no information>  Next of Kin: Extended Emergency Contact Information  Primary Emergency Contact: Benny Graham  Mobile Phone: [phone]  Relation: Brother   needed? No  Secondary Emergency Contact: Dimple Graham  Address: [address] [place] [id number] [facility]  Home Phone: [phone]  Relation: Mother    Administrative Statements     Counseling / Coordination of Care:   Patient's progress discussed with staff in treatment team meeting.  Medication changes reviewed with staff in treatment team meeting..    Inpatient Psychiatric Certification:  Estimated length of stay: 30 midnights   Based upon physical, mental and social evaluations, I certify that inpatient psychiatric services are medically necessary for this patient for a duration of 30 midnights for the treatment of Schizoaffective disorder, bipolar type (HCC)  Available alternative community resources do not meet the patient's mental health care needs.  I further attest that an established written individualized plan of care has been implemented and is outlined in the patient's medical records.    Kaveh Lara DO 04/07/25

## 2025-04-07 NOTE — TREATMENT TEAM
"Pt attended groups.  Pt needs redirection at times.  Pt paranoid, tangential and suspicious.  Pt did thank group facilitator at the end of group for \"running a well planned group\".  Provided journal.  Pt stated he was a voluntary patient and that was the best decision he made.         04/07/25 0900 04/07/25 1000 04/07/25 1100   Activity/Group Checklist   Group Exercise Other (Comment)  (Community meeting: affirmation and self improvement) Other (Comment)  (Mindfulness)   Attendance Attended Attended  (late) Did not attend   Attendance Duration (min) 31-45 16-30  --    Interactions Disorganized interaction  (struggled to follow along; intermittent participation) Disorganized interaction  --    Affect/Mood Appropriate Wide  --    Goals Achieved Able to listen to others;Able to engage in interactions Identified triggers;Identified feelings;Identified relapse prevention strategies  --       04/07/25 1330   Activity/Group Checklist   Group Other (Comment)  (Journaling and Serenity (prayer))   Attendance Attended   Attendance Duration (min) 46-60   Interactions Disorganized interaction   Affect/Mood Wide   Goals Achieved Identified relapse prevention strategies;Identified triggers;Identified feelings;Discussed coping strategies;Increased hopefulness;Able to engage in interactions;Able to listen to others          "
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
## 2025-04-07 NOTE — ASSESSMENT & PLAN NOTE
Continue prior to admission lithium 450 mg twice daily  Patient reports compliance with this medication  Lithium level 4/6/2025: 0.61 therapeutic  Restart prior to admission Invega at 6 mg nightly with plans to titrate as tolerated versus changed to alternative antipsychotic  Patient reports noncompliance with this medication  Says he is not agreeable to long-acting injectable, prefers oral medication  Reports that Invega has worked well in the past but that he would like alternative antipsychotic medication, will continue to monitor

## 2025-04-07 NOTE — CONSULTS
Consultation - Hospitalist   Name: Freddie Graham 65 y.o. male I MRN: [id number]  Unit/Bed#: [facility] 650-01 I Date of Admission: 4/6/2025   Date of Service: 4/7/2025 I Hospital Day: 1   Inpatient consult for Medical Clearance for  patient  Consult performed by: CARLOS Pollock  Consult ordered by: Reshma Braga MD        Physician Requesting Evaluation: Kaveh Lara DO   Reason for Evaluation / Principal Problem: Medical clearance for psychiatric admission    Assessment & Plan  Medical clearance for psychiatric admission  Vital signs stable afebrile patient seen and examined by myself at the bedside labs from today reviewed by myself sodium 142 potassium 4.3 creatinine 0.86  Patient medically stable for admit  Please reach out to  IM with any medical questions or concerns  Mild tetrahydrocannabinol (THC) abuse  U-Tox positive for THC  THC cessation education  Alcohol abuse, continuous  Alcohol abuse  MVI thiamine multivitamin  Alcohol cessation education  Hyperlipidemia  Patient will continue to take his home dose of Lipitor 10 mg p.o. daily  Noncompliance  Per family member patient has not been taking his psych meds  Please educate patient on the importance of medication adherence as well as medical and psychological follow-up  Urothelial carcinoma (HCC)  Papillary in nature  Patient will continue to take Pyridium 200 mg p.o. 3 times daily as needed spasms  Patient will continue take Flomax 0.4 mg p.o. daily  LISET (obstructive sleep apnea)  Patient recently had a Zio patch where he went to [facility] cardiology on 3/13/2025  He was thought to have pauses no A-fib pauses as long as 5.4 seconds which they feel is related to obstructive sleep apnea  We will continue to monitor  Patient needs a sleep study as an outpatient  Elevated hemoglobin A1c  A1c from today 5.7  Will change his diet to carb controlled 6 g        Collaboration of Care: Were Recommendations Directly Discussed with Primary Treatment  Team? - No     History of Present Illness   Freddie Graham is a 65 y.o. male who is originally admitted to the psychiatry service due to 302, schizoaffective disorder bipolar disorder. We are consulted for medical clearance for admission to Behavioral Health Unit and treatment of underlying psychiatric illness.      Patient presents to [facility] ED on 4/6/2025 via EMS after he felt somebody was breaking down his door.  Patient has a longstanding history of schizoaffective disorder as well as bipolar disorder greater than 1 year.  Patient's last U admission was again [facility] 2/18/2024 3 through 18 2024.  He is now transferred to the [facility] [place] for stabilization of his mental health history and issues.  Past medical history is significant for tobacco abuse, alcohol abuse, THC abuse, mixed hyperlipidemia, sinus bradycardia with sinus pauses on a Zio patch from 3/18/2025.  He has papillary urothelial carcinoma as well as noncompliance with his medical care as well as his psychological medications and follow-up.    Review of Systems   Constitutional:  Positive for activity change and appetite change. Negative for chills and fever.   HENT:  Negative for ear pain and sore throat.    Eyes:  Negative for pain and visual disturbance.   Respiratory:  Negative for cough and shortness of breath.    Cardiovascular:  Negative for chest pain and palpitations.   Gastrointestinal:  Negative for abdominal pain and vomiting.   Genitourinary:  Negative for dysuria and hematuria.   Musculoskeletal:  Negative for arthralgias and back pain.   Skin:  Negative for color change and rash.   Neurological:  Negative for seizures and syncope.   Psychiatric/Behavioral:  Positive for decreased concentration, dysphoric mood and sleep disturbance.    All other systems reviewed and are negative.      Historical Information   Past Medical History:   Diagnosis Date    Alcohol abuse     Anxiety     Astigmatism     Depression     DJD  (degenerative joint disease)     Gait abnormality     Head injury     History of colonic polyps     Hydrocele     Hyperlipidemia     Hypertension     Macular drusen     Presbyopia     PTSD (post-traumatic stress disorder)     Schizoaffective disorder (HCC)     Sepsis (HCC)     Substance abuse (HCC)     Tobacco abuse     Umbilical hernia     Vitreous syneresis      Past Surgical History:   Procedure Laterality Date    FRACTURE SURGERY      INGUINAL HERNIA REPAIR       Social History     Tobacco Use    Smoking status: Every Day     Current packs/day: 1.00     Average packs/day: 1 pack/day for 40.0 years (40.0 ttl pk-yrs)     Types: Cigars, Cigarettes    Smokeless tobacco: Never   Vaping Use    Vaping status: Never Used   Substance and Sexual Activity    Alcohol use: Yes     Comment: whenever i want    Drug use: Yes     Types: Marijuana     Comment: Patient denies currently using marijuana.     Sexual activity: Not Currently     Partners: Female     E-Cigarette/Vaping    E-Cigarette Use Never User      E-Cigarette/Vaping Substances    Nicotine No     THC No     CBD No     Flavoring No     Other No     Unknown No        Marital Status: Single    Meds/Allergies   I have reviewed home medications using recent Epic encounter.  Prior to Admission medications    Medication Sig Start Date End Date Taking? Authorizing Provider   cholecalciferol (VITAMIN D3) 1,000 units tablet Take 2 tablets (2,000 Units total) by mouth daily 3/16/24  Yes Chai Glalagher MD   cyanocobalamin (VITAMIN B-12) 500 MCG tablet Take 1 tablet (500 mcg total) by mouth daily 3/17/24  Yes Chai Gallagher MD   tamsulosin (FLOMAX) 0.4 mg Take 1 capsule (0.4 mg total) by mouth daily with dinner 3/16/24  Yes Chai Gallagher MD   Diclofenac Sodium (VOLTAREN) 1 % Apply 2 g topically 4 (four) times a day  Patient not taking: Reported on 4/6/2025 3/16/24   Chai Gallagher MD   lisinopril (ZESTRIL) 10 mg tablet Take 1 tablet (10 mg total) by mouth daily 3/17/24   Chai  "MD Elliott   lithium carbonate (LITHOBID) 450 mg CR tablet Take 1 tablet (450 mg total) by mouth every 12 (twelve) hours 3/18/24 4/17/24  CARLOS Mo   melatonin 3 mg Take 3 mg by mouth    Historical Provider, MD   mirtazapine (REMERON) 7.5 MG tablet Take 1 tablet (7.5 mg total) by mouth daily at bedtime 3/18/24 4/17/24  CARLOS Mo   nicotine polacrilex (NICORETTE) 2 mg gum Chew 1 each (2 mg total) every 2 (two) hours as needed for smoking cessation 3/16/24   Chai Gallagher MD   paliperidone (INVEGA) 6 MG 24 hr tablet Take 2 tablets (12 mg total) by mouth daily 3/18/24 4/17/24  CARLOS Mo   phenazopyridine (PYRIDIUM) 200 mg tablet Take 200 mg by mouth Three times daily as needed 12/27/24   Historical Provider, MD     Allergies   Allergen Reactions    Haldol [Haloperidol]     Navane [Thiothixene (Tiotixene)]     Other      Adhesive tape         Objective :  Temp:  [97.3 °F (36.3 °C)-98.5 °F (36.9 °C)] 98.5 °F (36.9 °C)  HR:  [] 83  BP: (119-131)/(63-83) 127/73  Resp:  [16-18] 17  SpO2:  [91 %-97 %] 93 %  O2 Device: None (Room air)    Height: 5' 9\" (175.3 cm) (04/06/25 1940)  Weight - Scale: 92.5 kg (204 lb) (04/06/25 1940)  Physical Exam  Vitals and nursing note reviewed.   Constitutional:       Appearance: Normal appearance. He is normal weight.      Comments: Disheveled malodorous appears older than stated age   HENT:      Head: Normocephalic and atraumatic.      Right Ear: Tympanic membrane normal.      Left Ear: Tympanic membrane normal.      Nose: Nose normal.      Mouth/Throat:      Mouth: Mucous membranes are dry.   Eyes:      Extraocular Movements: Extraocular movements intact.      Pupils: Pupils are equal, round, and reactive to light.   Cardiovascular:      Rate and Rhythm: Normal rate and regular rhythm.      Pulses: Normal pulses.      Heart sounds: Normal heart sounds.   Pulmonary:      Effort: Pulmonary effort is normal.      Breath sounds: Normal breath sounds. "   Abdominal:      General: Abdomen is flat. Bowel sounds are normal.      Palpations: Abdomen is soft.   Musculoskeletal:         General: Normal range of motion.      Cervical back: Normal range of motion and neck supple.   Skin:     General: Skin is warm and dry.      Capillary Refill: Capillary refill takes 2 to 3 seconds.   Neurological:      Mental Status: He is alert and oriented to person, place, and time.   Psychiatric:         Attention and Perception: Attention normal.         Mood and Affect: Mood is elated.         Speech: Speech normal.         Behavior: Behavior normal. Behavior is cooperative.           Lab Results: I have reviewed the following results:  Results from last 7 days   Lab Units 04/06/25  0526   WBC Thousand/uL 7.15   HEMOGLOBIN g/dL 13.3   HEMATOCRIT % 39.9   PLATELETS Thousands/uL 165   SEGS PCT % 75   LYMPHO PCT % 15   MONO PCT % 7   EOS PCT % 2     Results from last 7 days   Lab Units 04/07/25  0510   SODIUM mmol/L 142   POTASSIUM mmol/L 4.3   CHLORIDE mmol/L 110*   CO2 mmol/L 26   BUN mg/dL 10   CREATININE mg/dL 0.86   ANION GAP mmol/L 6   CALCIUM mg/dL 9.7   ALBUMIN g/dL 3.9   TOTAL BILIRUBIN mg/dL 0.69   ALK PHOS U/L 46   ALT U/L 21   AST U/L 21   GLUCOSE RANDOM mg/dL 87             Lab Results   Component Value Date/Time    HGBA1C 5.2 05/27/2023 05:51 AM    HGBA1C 5.1 11/03/2019 06:26 AM    HGBA1C 5.2 03/25/2019 08:20 AM           Imaging Results Review: No pertinent imaging studies reviewed.  Other Study Results Review: EKG was reviewed.   4/3/2025   Administrative Statements   I have spent a total time of 45 minutes in caring for this patient on the day of the visit/encounter including Diagnostic results, Prognosis, Risks and benefits of tx options, Instructions for management, Patient and family education, Importance of tx compliance, Risk factor reductions, Impressions, Counseling / Coordination of care, Documenting in the medical record, Reviewing/placing orders in the  medical record (including tests, medications, and/or procedures), Obtaining or reviewing history  , and Communicating with other healthcare professionals .  ** Please Note: This note has been constructed using a voice recognition system. **

## 2025-04-07 NOTE — NURSING NOTE
Pt anxious with bright affect, silly at times.  Med-compliant with good appetite and steady gait.  VSS.  Attended groups.  Pt agitated briefly with difficulty making phone call but redirected.  Pt accepted Ativan 1 mg po prn at 1245 with positive effect.  Monitored for safety and support.

## 2025-04-07 NOTE — ASSESSMENT & PLAN NOTE
Vital signs stable afebrile patient seen and examined by myself at the bedside labs from today reviewed by myself sodium 142 potassium 4.3 creatinine 0.86  Patient medically stable for admit  Please reach out to [facility] with any medical questions or concerns
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
## 2025-04-07 NOTE — ED NOTES
Insurance Authorization for admission:     Phone call placed to [employer]  Phone number: [phone]  Spoke to Denys ALVAREZ  Level of care: Inpatient Psych  Authorization # [id number]    Pending status for days approved and review   Fax clincial with todays date to [phone]

## 2025-04-07 NOTE — ASSESSMENT & PLAN NOTE
Discontinue prior to admission Remeron due to noncompliance  Begin trazodone 100 mg nightly 04/07/2025  Melatonin 3 mg nightly
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
## 2025-04-08 PROCEDURE — 99233 SBSQ HOSP IP/OBS HIGH 50: CPT | Performed by: PSYCHIATRY & NEUROLOGY

## 2025-04-08 RX ORDER — PALIPERIDONE 9 MG/1
9 TABLET, EXTENDED RELEASE ORAL
Status: DISCONTINUED | OUTPATIENT
Start: 2025-04-08 | End: 2025-04-09

## 2025-04-08 RX ORDER — LITHIUM CARBONATE 300 MG/1
600 TABLET, FILM COATED, EXTENDED RELEASE ORAL
Status: DISCONTINUED | OUTPATIENT
Start: 2025-04-08 | End: 2025-04-08

## 2025-04-08 RX ORDER — TRIAMCINOLONE ACETONIDE 0.25 MG/G
CREAM TOPICAL 2 TIMES DAILY
Status: DISCONTINUED | OUTPATIENT
Start: 2025-04-08 | End: 2025-04-25 | Stop reason: HOSPADM

## 2025-04-08 RX ORDER — LITHIUM CARBONATE 300 MG/1
600 TABLET, FILM COATED, EXTENDED RELEASE ORAL
Status: DISCONTINUED | OUTPATIENT
Start: 2025-04-08 | End: 2025-04-15

## 2025-04-08 RX ORDER — TEMAZEPAM 7.5 MG/1
7.5 CAPSULE ORAL
Status: DISCONTINUED | OUTPATIENT
Start: 2025-04-08 | End: 2025-04-09

## 2025-04-08 RX ORDER — LITHIUM CARBONATE 450 MG
450 TABLET, EXTENDED RELEASE ORAL DAILY
Status: DISCONTINUED | OUTPATIENT
Start: 2025-04-09 | End: 2025-04-16

## 2025-04-08 RX ORDER — LITHIUM CARBONATE 300 MG/1
600 CAPSULE ORAL
Status: DISCONTINUED | OUTPATIENT
Start: 2025-04-08 | End: 2025-04-08

## 2025-04-08 RX ORDER — LITHIUM CARBONATE 300 MG/1
600 TABLET, FILM COATED, EXTENDED RELEASE ORAL DAILY
Status: DISCONTINUED | OUTPATIENT
Start: 2025-04-09 | End: 2025-04-08

## 2025-04-08 RX ORDER — LITHIUM CARBONATE 450 MG
450 TABLET, EXTENDED RELEASE ORAL EVERY 12 HOURS SCHEDULED
Status: DISCONTINUED | OUTPATIENT
Start: 2025-04-08 | End: 2025-04-08

## 2025-04-08 RX ADMIN — Medication 3 MG: at 21:18

## 2025-04-08 RX ADMIN — LITHIUM CARBONATE 600 MG: 300 TABLET, FILM COATED, EXTENDED RELEASE ORAL at 21:17

## 2025-04-08 RX ADMIN — LITHIUM CARBONATE 450 MG: 300 CAPSULE, GELATIN COATED ORAL at 08:09

## 2025-04-08 RX ADMIN — TAMSULOSIN HYDROCHLORIDE 0.4 MG: 0.4 CAPSULE ORAL at 17:05

## 2025-04-08 RX ADMIN — THIAMINE HCL TAB 100 MG 100 MG: 100 TAB at 08:09

## 2025-04-08 RX ADMIN — Medication 2000 UNITS: at 08:09

## 2025-04-08 RX ADMIN — TRIAMCINOLONE ACETONIDE 1 APPLICATION: 0.25 CREAM TOPICAL at 17:48

## 2025-04-08 RX ADMIN — LORAZEPAM 1 MG: 1 TABLET ORAL at 11:38

## 2025-04-08 RX ADMIN — LITHIUM CARBONATE 450 MG: 450 TABLET, EXTENDED RELEASE ORAL at 11:39

## 2025-04-08 RX ADMIN — ATORVASTATIN CALCIUM 10 MG: 10 TABLET, FILM COATED ORAL at 17:05

## 2025-04-08 RX ADMIN — LORAZEPAM 1 MG: 1 TABLET ORAL at 09:47

## 2025-04-08 RX ADMIN — FOLIC ACID 1 MG: 1 TABLET ORAL at 08:10

## 2025-04-08 RX ADMIN — TEMAZEPAM 7.5 MG: 7.5 CAPSULE ORAL at 21:18

## 2025-04-08 RX ADMIN — Medication 1 TABLET: at 08:09

## 2025-04-08 RX ADMIN — PALIPERIDONE 9 MG: 9 TABLET, EXTENDED RELEASE ORAL at 21:17

## 2025-04-08 RX ADMIN — LORAZEPAM 1 MG: 1 TABLET ORAL at 19:43

## 2025-04-08 NOTE — PROGRESS NOTES
Pastoral Care Progress Note          Chaplaincy Interventions Utilized:   Empowerment: Encouraged focus on present, Encouraged self-care, Normalized experience of patient/family, and Provided chaplaincy education    Exploration: Explored hope, Explored emotional needs & resources, and Explored spiritual needs & resources    Collaboration: Consulted with interdisciplinary team     Relationship Building: Cultivated a relationship of care and support, Listened empathically, and [facility]     visited with the pt per his request. The pt wanted to talk about his gem, and this  provided reflective listening as he shared about how his connection to God was important to him, and how he hopes to share that connection with others. The pt called his  on the floor phone and asked for this  to say hello on the message he left.  took a walk around the floor as the pt discussed some things regarding his health and his perspective on life.  normalized the pt's feels and provided the pt with a sense of connection.  informed the pt of spirituality group which will be happening tomorrow at 10:30 am.         Chaplaincy Outcomes Achieved:  Expressed gratitude, Expressed humor, Expressed peace, Expressed ultimate hope, Identified meaningful connections, and Identified priorities    The pt has a robust gem that is a driving motivation for his life. He expressed that he was doing well and was appreciative of connecting with the  to discuss his gem and his life. The pt expressed sorrow for his community, but also found hope in his gem and showed a desire to help others find positive coping through God.      Spiritual Coping Strategies Utilized:   Spiritual gratitude, Spiritual meaning, and Spiritual community    The pt's spiritual coping is strong and helps to sustain him.

## 2025-04-08 NOTE — ASSESSMENT & PLAN NOTE
Continue lithium 450 mg daily with breakfast, increase HS dose to 600 mg nightly (4/8/2025)  Patient reports compliance with this medication  Lithium level 4/6/2025: 0.61 therapeutic  Lithium level and BMP on 4/13/2025  Increase Invega to 9 mg nightly with plans to titrate further as tolerated (04/08/2025)  Patient not agreeable to long-acting injectable, prefers oral medication  Reports that Invega has worked well in the past but that he would like alternative antipsychotic medication, will continue to monitor

## 2025-04-08 NOTE — NURSING NOTE
Pt anxious, restless and bizarre, calling out at times.  Pt given Ativan 1 mg po prn at 0947 with no effect.  VSS.  Good appetite and steady gait.  Attended group.  Monitored for safety and support.

## 2025-04-08 NOTE — PROGRESS NOTES
04/08/25 0800   Team Meeting   Meeting Type Daily Rounds   Team Members Present   Team Members Present Physician;Nurse;   Physician Team Member Maria Luz/Mechelle/Marco/Lele/Kalie   Nursing Team Member Garth/Veena/Taty   Care Management Team Member Atif MEJIA   Patient/Family Present   Patient Present No   Patient's Family Present No     Patient had Ativan yesterday. He is anxious and paranoid. He is eating and sleeping well. He attended three groups yesterday. He is bizarre and delusional at times, but mostly pleasant with care. Lithium to be increased. Patient discharge is pending stabilization of mood and medications.

## 2025-04-08 NOTE — NURSING NOTE
Pt cooperative disorganized in conversation at times with rambling. Wanting a printout of his medication so he knows what he is taking informed him to talk to physician. Present in milieu for meals and medication. Denies SI/HI. Q 15 minute checks maintained.

## 2025-04-08 NOTE — TREATMENT TEAM
04/08/25 1945   Fajardo Anxiety Scale   Anxious Mood 4   Tension 4   Fears 4   Insomnia 0   Intellectual 4   Depressed Mood 1   Somatic Complaints: Muscular 0   Somatic Complaints: Sensory 0   Cardiovascular Symptoms 0   Respiratory Symptoms 0   Gastrointestinal Symptoms 0   Genitourinary Symptoms 0   Autonomic Symptoms 4   Behavior at Interview 4   Fajardo Anxiety Score 25     Pt having increased anxiety rambling on about increased urination and he is not going to take any of the medication tonight until the doctor fixes it. PRN Ativan 1 mg given.

## 2025-04-08 NOTE — ASSESSMENT & PLAN NOTE
Discontinue prior to admission Remeron due to noncompliance  Discontinue trazodone 100 mg nightly (received 1 dose on 04/07/2025) due to ineffectiveness  Begin Restoril 7.5 mg nightly (04/08/2025)  Melatonin 3 mg nightly

## 2025-04-08 NOTE — PLAN OF CARE
Problem: ANXIETY  Goal: Will report anxiety at manageable levels  Description: INTERVENTIONS:- Administer medication as ordered- Teach and encourage coping skills- Provide emotional support- Assess patient/family for anxiety and ability to cope  Outcome: Progressing  Goal: By discharge: Patient will verbalize 2 strategies to deal with anxiety  Description: Interventions:- Identify any obvious source/trigger to anxiety- Staff will assist patient in applying identified coping technique/skills- Encourage attendance of scheduled groups and activities  Outcome: Progressing     Problem: Alteration in Thoughts and Perception  Goal: Treatment Goal: Gain control of psychotic behaviors/thinking, reduce/eliminate presenting symptoms and demonstrate improved reality functioning upon discharge  Outcome: Progressing  Goal: Verbalize thoughts and feelings  Description: Interventions:- Promote a nonjudgmental and trusting relationship with the patient through active listening and therapeutic communication- Assess patient's level of functioning, behavior and potential for risk- Engage patient in 1 on 1 interactions- Encourage patient to express fears, feelings, frustrations, and discuss symptoms  - [place] patient to reality, help patient recognize reality-based thinking - Administer medications as ordered and assess for potential side effects- Provide the patient education related to the signs and symptoms of the illness and desired effects of prescribed medications  Outcome: Progressing

## 2025-04-08 NOTE — PROGRESS NOTES
Progress Note - Behavioral Health   Name: Freddie Graham 65 y.o. male I MRN: [id number]  Unit/Bed#: [facility] 650-01 I Date of Admission: 4/6/2025   Date of Service: 4/8/2025 I Hospital Day: 2    Assessment & Plan  Schizoaffective disorder, bipolar type (HCC)  Continue lithium 450 mg daily with breakfast, increase HS dose to 600 mg nightly (4/8/2025)  Patient reports compliance with this medication  Lithium level 4/6/2025: 0.61 therapeutic  Lithium level and BMP on 4/13/2025  Increase Invega to 9 mg nightly with plans to titrate further as tolerated (04/08/2025)  Patient not agreeable to long-acting injectable, prefers oral medication  Reports that Invega has worked well in the past but that he would like alternative antipsychotic medication, will continue to monitor  Mild tetrahydrocannabinol (THC) abuse  Encourage cessation  Alcohol abuse, continuous  Encourage cessation  Mood insomnia (HCC)  Discontinue prior to admission Remeron due to noncompliance  Discontinue trazodone 100 mg nightly (received 1 dose on 04/07/2025) due to ineffectiveness  Begin Restoril 7.5 mg nightly (04/08/2025)  Melatonin 3 mg nightly    Current medications:  Current Facility-Administered Medications   Medication Dose Route Frequency Provider Last Rate    acetaminophen  650 mg Oral Q4H PRN Reshma Braga MD      acetaminophen  650 mg Oral Q4H PRN Reshma Braga MD      acetaminophen  975 mg Oral Q6H PRN Reshma Braga MD      aluminum-magnesium hydroxide-simethicone  30 mL Oral Q4H PRN Reshma Braga MD      atorvastatin  10 mg Oral Daily With Dinner CARLOS Pollock      bisacodyl  10 mg Rectal Daily PRN Reshma Braga MD      Cholecalciferol  2,000 Units Oral Daily CARLOS Pollock      folic acid  1 mg Oral Daily CARLOS Pollock      hydrOXYzine HCL  25 mg Oral Q6H PRN Max 4/day Reshma Braga MD      hydrOXYzine HCL  50 mg Oral Q6H PRN Max 4/day Reshma Braga MD      [START ON 4/9/2025] lithium carbonate  450 mg  Oral Daily With Breakfast Kaveh Lara, DO      lithium carbonate  600 mg Oral Daily With Dinner Kaveh Lara, DO      LORazepam  1 mg Intramuscular Q6H PRN Max 3/day Reshma Braga MD      LORazepam  1 mg Oral Q6H PRN Max 3/day Reshma Braga MD      melatonin  3 mg Oral HS Reshma Braga MD      multivitamin-minerals  1 tablet Oral Daily CARLOS Pollock      nicotine polacrilex  2 mg Oral Q4H PRN Reshma Braga MD      OLANZapine  5 mg Intramuscular Q3H PRN Max 3/day Reshma Braga MD      OLANZapine  2.5 mg Oral Q4H PRN Max 6/day Reshma Braga MD      OLANZapine  5 mg Oral Q4H PRN Max 3/day Reshma Braga MD      OLANZapine  5 mg Oral Q3H PRN Max 3/day Reshma Braga MD      paliperidone  6 mg Oral HS Kaveh Lara, DO      phenazopyridine  200 mg Oral TID PRN CARLOS Pollock      polyethylene glycol  17 g Oral Daily PRN Reshma Braga MD      senna-docusate sodium  1 tablet Oral Daily PRN Reshma Braga MD      tamsulosin  0.4 mg Oral Daily With Dinner CARLOS Pollock      thiamine  100 mg Oral Daily CARLOS Pollock      traZODone  100 mg Oral HS Kaveh Lara, DO      traZODone  50 mg Oral HS PRN Reshma Braga MD          Risks/Benefits of Treatment:     Risks, benefits, and possible side effects of medications explained to patient and patient verbalizes understanding and agreement for treatment.    Progress Toward Goals: no significant progress    Treatment Planning:      - Encourage early mobility and having a structured day  - Provide frequent re-orientation, and cognitive stimulation  - Ensure assistive devices are in proper working order (eye-glasses, hearing aids)  - Encourage adequate hydration, nutrition and monitor bowel movements  - Maintain sleep-wake cycle: Uninterrupted sleep time; low-level lighting at night  - Fall precaution  - f/u SLIM recs regarding the medical problems   - Continue medication titration and treatment plan; adjust medication to  "optimize treatment response and as clinically indicated.   - Observation:Routine  - Legal Status: 201  - VS: as per unit protocol  - Encourage group attendance and milieu therapy  - Dispo: To be determined  - Long Stay Certification : Not Applicable  - Estimated Discharge Day: 4/28/2025     Subjective       Patient was visited on unit for continuing care; chart reviewed and discussed with multidisciplinary treatment team.     Patient remains disorganized, labile and on irritable edge, and requires frequent redirection and reorientation by the staff. No reports of aggression or self-injurious behavior on unit.  Received PRN Ativan 1 mg for anxiety yesterday.    Patient accepted all offered medications and no adverse effects of medications noted or reported.    Per treatment team:  Patient had Ativan yesterday. He is anxious and paranoid. He is eating and sleeping well. He attended three groups yesterday. He is bizarre and delusional at times, but mostly pleasant with care.     On evaluation today Freddie was manic, labile mood, requiring extensive redirection.  Patient laughing and talking to himself, states \"I am schizophrenic.\"  Patient then began crying and stated \"I just want to see Justice.\"  Patient tangential, then stating that he wants to get  \"before I die.\"  Despite manic symptoms and labile mood, patient engage in treatment planning and medication management.  He states that he would like to further titrate Invega today and is agreeable to increasing lithium dose.  We also discussed beginning Restoril for sleep as trazodone was ineffective last night.  When asked about suicidal ideation he replied \"yes but not me.\"  Nursing notified of patient's anxiety, patient administered as needed Ativan 1 mg at the conclusion of the evaluation today.    Sleep: difficulty falling asleep  Appetite: normal  Medication side effects: No  ROS: review of systems as noted above in HPI/Subjective report, all other systems " "are negative    Objective :  Temp:  [97.1 °F (36.2 °C)-98 °F (36.7 °C)] 97.6 °F (36.4 °C)  HR:  [85-94] 85  BP: (142-165)/(89-99) 165/99  Resp:  [17-18] 18  SpO2:  [94 %-98 %] 98 %  O2 Device: None (Room air)    Temp:  [97.1 °F (36.2 °C)-98 °F (36.7 °C)] 97.6 °F (36.4 °C)  HR:  [85-94] 85  BP: (142-165)/(89-99) 165/99  Resp:  [17-18] 18  SpO2:  [94 %-98 %] 98 %  O2 Device: None (Room air)    Mental Status Evaluation:  Appearance:  disheveled, marginal hygiene, overweight   Behavior:  agitated, bizarre, irritable, restless and fidgety, requires redirection   Speech:  pressured, hypertalkative, loud   Mood:  irritable, manic   Affect:  labile, increased in intensity, crying, laughing   Thought Process:  illogical, tangential, racing of thoughts   Associations: flight of ideas   Thought Content:  persecutory delusions, paranoid ideation   Perceptual Disturbances: denies auditory or visual hallucinations when asked, but appears preoccupied   Risk Potential: Suicidal ideation - Yes, states \"yes but not me\"  Homicidal ideation - None at present  Potential for aggression - Yes, due to poor impulse control   Sensorium:  oriented to person, place, and time/date   Memory:  recent and remote memory grossly intact   Consciousness:  alert and awake   Attention/Concentration: poor concentration and poor attention span   Insight:  limited   Judgment: limited   Gait/Station: normal gait/station, normal balance   Motor Activity: no abnormal movements           Lab Results: I have reviewed the following results:  Most Recent Labs:   Lab Results   Component Value Date    WBC 7.15 04/06/2025    RBC 4.53 04/06/2025    HGB 13.3 04/06/2025    HCT 39.9 04/06/2025     04/06/2025    RDW 13.0 04/06/2025    NEUTROABS 5.39 04/06/2025    SODIUM 142 04/07/2025    K 4.3 04/07/2025     (H) 04/07/2025    CO2 26 04/07/2025    BUN 10 04/07/2025    CREATININE 0.86 04/07/2025    GLUC 87 04/07/2025    CALCIUM 9.7 04/07/2025    AST 21 " 04/07/2025    ALT 21 04/07/2025    ALKPHOS 46 04/07/2025    TP 6.4 04/07/2025    ALB 3.9 04/07/2025    TBILI 0.69 04/07/2025    CHOLESTEROL 112 04/07/2025    HDL 50 04/07/2025    TRIG 64 04/07/2025    LDLCALC 49 04/07/2025    NONHDLC 62 04/07/2025    VALPROICTOT <10 (L) 02/17/2024    LITHIUM 0.61 04/06/2025    AMMONIA 51 02/06/2021    OFI7VYCKEGFR 1.798 04/07/2025    FREET4 0.97 02/17/2024    HGBA1C 5.7 (H) 04/07/2025     04/07/2025       Administrative Statements     Counseling / Coordination of Care:   Patient's progress discussed with staff in treatment team meeting.  Medication changes reviewed with staff in treatment team meeting..    Kaveh Lara DO 04/08/25

## 2025-04-09 PROCEDURE — 99232 SBSQ HOSP IP/OBS MODERATE 35: CPT | Performed by: PSYCHIATRY & NEUROLOGY

## 2025-04-09 RX ORDER — LOXAPINE SUCCINATE 10 MG/1
10 TABLET ORAL 2 TIMES DAILY
Status: DISCONTINUED | OUTPATIENT
Start: 2025-04-09 | End: 2025-04-11

## 2025-04-09 RX ORDER — LORAZEPAM 2 MG/ML
1 INJECTION INTRAMUSCULAR 2 TIMES DAILY PRN
Status: DISCONTINUED | OUTPATIENT
Start: 2025-04-09 | End: 2025-04-25 | Stop reason: HOSPADM

## 2025-04-09 RX ORDER — LORAZEPAM 1 MG/1
1 TABLET ORAL 2 TIMES DAILY PRN
Status: DISCONTINUED | OUTPATIENT
Start: 2025-04-09 | End: 2025-04-25 | Stop reason: HOSPADM

## 2025-04-09 RX ORDER — WATER 10 ML/10ML
INJECTION INTRAMUSCULAR; INTRAVENOUS; SUBCUTANEOUS
Status: COMPLETED
Start: 2025-04-09 | End: 2025-04-09

## 2025-04-09 RX ORDER — PALIPERIDONE 6 MG/1
6 TABLET, EXTENDED RELEASE ORAL
Status: DISCONTINUED | OUTPATIENT
Start: 2025-04-09 | End: 2025-04-11

## 2025-04-09 RX ORDER — TEMAZEPAM 15 MG/1
15 CAPSULE ORAL
Status: DISCONTINUED | OUTPATIENT
Start: 2025-04-09 | End: 2025-04-10

## 2025-04-09 RX ADMIN — TRIAMCINOLONE ACETONIDE: 0.25 CREAM TOPICAL at 17:15

## 2025-04-09 RX ADMIN — TEMAZEPAM 15 MG: 15 CAPSULE ORAL at 21:20

## 2025-04-09 RX ADMIN — Medication 1 TABLET: at 08:08

## 2025-04-09 RX ADMIN — LITHIUM CARBONATE 600 MG: 300 TABLET, FILM COATED, EXTENDED RELEASE ORAL at 21:20

## 2025-04-09 RX ADMIN — TRIAMCINOLONE ACETONIDE: 0.25 CREAM TOPICAL at 08:08

## 2025-04-09 RX ADMIN — Medication 2000 UNITS: at 08:08

## 2025-04-09 RX ADMIN — LITHIUM CARBONATE 450 MG: 450 TABLET, EXTENDED RELEASE ORAL at 08:08

## 2025-04-09 RX ADMIN — OLANZAPINE 5 MG: 10 INJECTION, POWDER, FOR SOLUTION INTRAMUSCULAR at 17:30

## 2025-04-09 RX ADMIN — FOLIC ACID 1 MG: 1 TABLET ORAL at 08:08

## 2025-04-09 RX ADMIN — ATORVASTATIN CALCIUM 10 MG: 10 TABLET, FILM COATED ORAL at 17:01

## 2025-04-09 RX ADMIN — TAMSULOSIN HYDROCHLORIDE 0.4 MG: 0.4 CAPSULE ORAL at 17:01

## 2025-04-09 RX ADMIN — Medication 3 MG: at 21:20

## 2025-04-09 RX ADMIN — THIAMINE HCL TAB 100 MG 100 MG: 100 TAB at 08:08

## 2025-04-09 RX ADMIN — WATER 2.1 ML: 1 INJECTION, SOLUTION INTRAMUSCULAR; INTRAVENOUS; SUBCUTANEOUS at 17:30

## 2025-04-09 RX ADMIN — LOXAPINE 10 MG: 10 CAPSULE ORAL at 18:46

## 2025-04-09 RX ADMIN — PALIPERIDONE 6 MG: 6 TABLET, EXTENDED RELEASE ORAL at 21:20

## 2025-04-09 NOTE — ASSESSMENT & PLAN NOTE
Continue lithium 450 mg daily with breakfast, increase HS dose to 600 mg nightly (4/8/2025)  Patient reports compliance with this medication  Lithium level 4/6/2025: 0.61 therapeutic  Lithium level and BMP on 4/13/2025  Cross-taper Invega to loxapine  Taper Invega to 6 mg nightly with plans to taper further and discontinue (04/09/2025)  Begin loxapine 10 mg twice daily (04/09/2025)  Patient not agreeable to long-acting injectable, prefers oral medication  Reports that Invega has worked well in the past but that he would like alternative antipsychotic medication, will continue to monitor

## 2025-04-09 NOTE — ED NOTES
Insurance Authorization for admission:   UR outreached to crisis indicating [facility] never received clinicals and requested they be faxed to [phone]. Clinicals faxed as requested.

## 2025-04-09 NOTE — NURSING NOTE
Pt less anxious with no agitation noted.  Pt silly and singing at times.  Med-compliant with good appetite and steady gait.  VSS.  Attended groups and social with peers.  Guarded with no overt paranoia noted.  Monitored for safety and support.

## 2025-04-09 NOTE — PROGRESS NOTES
Pastoral Care Progress Note          Chaplaincy Interventions Utilized:   Empowerment: Encouraged focus on present, Encouraged self-care, Facilitated group experience, and Normalized experience of patient/family    Exploration: Explored hope, Explored emotional needs & resources, and Explored spiritual needs & resources    Relationship Building: Cultivated a relationship of care and support, Listened empathically, and Hospitality    The pt participated in spirituality group facilitated by the  today. Pt contributed with his thoughts and feelings. Pt expressed his primary way of dealing with difficult emotions is to let himself feel them fully. Pt was also able to identify ways in which his gem assist him in his coping.

## 2025-04-09 NOTE — PLAN OF CARE
Pt attended group and able to self reflect.     Problem: Alteration in Thoughts and Perception  Goal: Attend and participate in unit activities, including therapeutic, recreational, and educational groups  Description: Interventions:-Encourage Visitation and family involvement in care  Outcome: Progressing

## 2025-04-09 NOTE — NURSING NOTE
Pt cooperative yet labile at times especially towards male peers. Rambling in conversation at times. Auditory hallucinations with responses noted. Medication and meal compliant. Denies SI/HI. Q 15 minute checks maintained.
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
## 2025-04-09 NOTE — PLAN OF CARE
Problem: BEHAVIOR  Goal: Pt/Family maintain appropriate behavior and adhere to behavioral management agreement, if implemented  Description: INTERVENTIONS:- Assess the family dynamic - Encourage verbalization of thoughts and concerns in a socially appropriate manner- Assess patient/family's coping skills and non-compliant behavior (including use of illegal substances).- Utilize positive, consistent limit setting strategies supporting safety of patient, staff and others- Initiate consult with Case Management, Spiritual Care or other ancillary services as appropriate- If a patient's/visitor's behavior jeopardizes the safety of the patient, staff, or others, refer to organization procedure. - Notify Security of behavior or suspected illegal substances which indicate the need for search of the patient and/or belongings- Encourage participation in the decision making process about a behavioral management agreement; implement if patient meets criteria  Outcome: Progressing     Problem: ANXIETY  Goal: Will report anxiety at manageable levels  Description: INTERVENTIONS:- Administer medication as ordered- Teach and encourage coping skills- Provide emotional support- Assess patient/family for anxiety and ability to cope  Outcome: Progressing  Goal: By discharge: Patient will verbalize 2 strategies to deal with anxiety  Description: Interventions:- Identify any obvious source/trigger to anxiety- Staff will assist patient in applying identified coping technique/skills- Encourage attendance of scheduled groups and activities  Outcome: Progressing
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
## 2025-04-09 NOTE — NURSING NOTE
PRN Ativan 1 mg semi effective. Pt still continues to ramble in conversation on occasion. Agreeable to take HS medication and discuss the issues with the medical provider in the morning.

## 2025-04-09 NOTE — TREATMENT TEAM
Pt attends groups.  Pt pleasant and cooperative  Pt  displayed positive thinking pattens.    04/09/25 0900 04/09/25 1000 04/09/25 1100   Activity/Group Checklist   Group Exercise Community meeting Self Esteem   Attendance Attended Attended Attended   Attendance Duration (min) 31-45 31-45 31-45   Interactions Disorganized interaction Disorganized interaction Interacted appropriately   Affect/Mood Appropriate Wide Appropriate   Goals Achieved Able to listen to others;Able to engage in interactions Identified triggers;Identified relapse prevention strategies;Able to reflect/comment on own behavior;Able to engage in interactions;Able to listen to others Identified feelings;Discussed self-esteem issues;Able to listen to others;Able to engage in interactions;Able to self-disclose;Identified resources and support systems      04/09/25 1330   Activity/Group Checklist   Group Other (Comment)  (Communictaion and remincing)   Attendance Attended   Attendance Duration (min) 46-60   Interactions Other (Comment)  (needed redirection for focus)   Affect/Mood Appropriate   Goals Achieved Identified triggers;Identified feelings;Discussed coping strategies;Discussed self-esteem issues;Able to listen to others;Able to engage in interactions

## 2025-04-09 NOTE — TREATMENT TEAM
"   04/09/25 6598   Broset Violence Checklist   Assessment type Shift   Irritability 1   Confusion 0   Boisterousness 1   Threatening physical violence 0   Verbal threats 1   Violence 1   Broset score 4     Patient initialy with a Broset score of 2 for being irritable and boisterous and making inappropriate comments but as writing nurse was getting medication patient was verbally aggressive threatening to hit staff if they put their hands on him was yelling \"get out of my face and get me my ativan\". Patient was escorted to quiet room where he received Zyprexa 5 mg IM on left deltoid. After IM patient Hit himself 3 times on his left deltoid. Writing nurse educated patient on acceptable behaviors and reminded patient if he tries to self harm he will have to be restrained. Patient Irritable states \"yeah whatever I aint gonna do I again it was only three times\". Patient remained in quiet room for 10 minutes to cool off and then came back out to the milieu.  "

## 2025-04-09 NOTE — ASSESSMENT & PLAN NOTE
Discontinue prior to admission Remeron due to noncompliance  Discontinued trazodone 100 mg nightly (received 1 dose on 04/07/2025) due to ineffectiveness  Increase Restoril to 15 mg nightly (04/09/2025)  Melatonin 3 mg nightly

## 2025-04-10 PROCEDURE — 99232 SBSQ HOSP IP/OBS MODERATE 35: CPT | Performed by: PSYCHIATRY & NEUROLOGY

## 2025-04-10 RX ORDER — GABAPENTIN 300 MG/1
300 CAPSULE ORAL
Status: DISCONTINUED | OUTPATIENT
Start: 2025-04-10 | End: 2025-04-11

## 2025-04-10 RX ORDER — WATER 10 ML/10ML
INJECTION INTRAMUSCULAR; INTRAVENOUS; SUBCUTANEOUS
Status: COMPLETED
Start: 2025-04-10 | End: 2025-04-10

## 2025-04-10 RX ADMIN — TRIAMCINOLONE ACETONIDE: 0.25 CREAM TOPICAL at 08:37

## 2025-04-10 RX ADMIN — LOXAPINE 10 MG: 10 CAPSULE ORAL at 17:16

## 2025-04-10 RX ADMIN — Medication 2000 UNITS: at 08:37

## 2025-04-10 RX ADMIN — OLANZAPINE 5 MG: 10 INJECTION, POWDER, FOR SOLUTION INTRAMUSCULAR at 15:22

## 2025-04-10 RX ADMIN — ATORVASTATIN CALCIUM 10 MG: 10 TABLET, FILM COATED ORAL at 17:16

## 2025-04-10 RX ADMIN — Medication 3 MG: at 21:27

## 2025-04-10 RX ADMIN — PALIPERIDONE 6 MG: 6 TABLET, EXTENDED RELEASE ORAL at 21:27

## 2025-04-10 RX ADMIN — THIAMINE HCL TAB 100 MG 100 MG: 100 TAB at 08:37

## 2025-04-10 RX ADMIN — TAMSULOSIN HYDROCHLORIDE 0.4 MG: 0.4 CAPSULE ORAL at 17:16

## 2025-04-10 RX ADMIN — TRIAMCINOLONE ACETONIDE: 0.25 CREAM TOPICAL at 17:17

## 2025-04-10 RX ADMIN — FOLIC ACID 1 MG: 1 TABLET ORAL at 08:37

## 2025-04-10 RX ADMIN — Medication 1 TABLET: at 08:37

## 2025-04-10 RX ADMIN — OLANZAPINE 5 MG: 5 TABLET, FILM COATED ORAL at 19:57

## 2025-04-10 RX ADMIN — GABAPENTIN 300 MG: 300 CAPSULE ORAL at 21:27

## 2025-04-10 RX ADMIN — WATER 2.1 ML: 1 INJECTION, SOLUTION INTRAMUSCULAR; INTRAVENOUS; SUBCUTANEOUS at 15:22

## 2025-04-10 RX ADMIN — LORAZEPAM 1 MG: 1 TABLET ORAL at 09:55

## 2025-04-10 RX ADMIN — LITHIUM CARBONATE 600 MG: 300 TABLET, FILM COATED, EXTENDED RELEASE ORAL at 21:27

## 2025-04-10 RX ADMIN — LITHIUM CARBONATE 450 MG: 450 TABLET, EXTENDED RELEASE ORAL at 08:37

## 2025-04-10 RX ADMIN — LOXAPINE 10 MG: 10 CAPSULE ORAL at 08:37

## 2025-04-10 NOTE — ASSESSMENT & PLAN NOTE
Continue lithium 450 mg daily with breakfast, increase HS dose to 600 mg nightly (4/8/2025)  Patient reports compliance with this medication  Lithium level 4/6/2025: 0.61 therapeutic  Lithium level and BMP on 4/13/2025  Cross-taper Invega to loxapine  Tapered Invega to 6 mg nightly with plans to taper further and discontinue (04/09/2025)  Continue loxapine 10 mg twice daily (04/09/2025)  Patient not agreeable to long-acting injectable, prefers oral medication  Reports that Invega has worked well in the past but that he would like alternative antipsychotic medication, will continue to monitor

## 2025-04-10 NOTE — PROGRESS NOTES
Pt tangential and left group early.  Pt needs redirection.  Pt fixated on smoking and having a patch.  Encouraged pt to speak with Nursing.      04/10/25 1000   Activity/Group Checklist   Group Other (Comment)  (Community meeting:  relapse and recovery)   Attendance Attended  (left early)   Attendance Duration (min) 16-30   Interactions Disorganized interaction   Affect/Mood Wide  (restless)   Goals Achieved Identified feelings;Identified triggers;Identified relapse prevention strategies;Discussed coping strategies;Discussed self-esteem issues;Identified resources and support systems;Able to listen to others;Able to engage in interactions

## 2025-04-10 NOTE — PROGRESS NOTES
Progress Note - Behavioral Health   Name: Freddie Graham 65 y.o. male I MRN: [id number]  Unit/Bed#: [facility] 650-01 I Date of Admission: 4/6/2025   Date of Service: 4/10/2025 I Hospital Day: 4    Assessment & Plan  Schizoaffective disorder, bipolar type (HCC)  Continue lithium 450 mg daily with breakfast, increase HS dose to 600 mg nightly (4/8/2025)  Patient reports compliance with this medication  Lithium level 4/6/2025: 0.61 therapeutic  Lithium level and BMP on 4/13/2025  Cross-taper Invega to loxapine  Tapered Invega to 6 mg nightly with plans to taper further and discontinue (04/09/2025)  Continue loxapine 10 mg twice daily (04/09/2025)  Patient not agreeable to long-acting injectable, prefers oral medication  Reports that Invega has worked well in the past but that he would like alternative antipsychotic medication, will continue to monitor  Mild tetrahydrocannabinol (THC) abuse  Encourage cessation  Alcohol abuse, continuous  Encourage cessation  Mood insomnia (HCC)  Discontinue prior to admission Remeron due to noncompliance  Discontinued trazodone 100 mg nightly (received 1 dose on 04/07/2025) due to ineffectiveness  Discontinue Restoril 15 mg nightly (04/10/2025) due to ineffectiveness  Begin gabapentin 300 mg nightly for anxiety and to aid with sleep  Melatonin 3 mg nightly    Current medications:  Current Facility-Administered Medications   Medication Dose Route Frequency Provider Last Rate    acetaminophen  650 mg Oral Q4H PRN Reshma Braga MD      acetaminophen  650 mg Oral Q4H PRN Reshma Braga MD      acetaminophen  975 mg Oral Q6H PRN Reshma Braga MD      aluminum-magnesium hydroxide-simethicone  30 mL Oral Q4H PRN Reshma Braga MD      atorvastatin  10 mg Oral Daily With Dinner CARLOS Pollock      bisacodyl  10 mg Rectal Daily PRN Reshma Braga MD      Cholecalciferol  2,000 Units Oral Daily CARLOS Pollock      folic acid  1 mg Oral Daily CARLOS Pollock       gabapentin  300 mg Oral HS Kaveh Bucca, DO      hydrOXYzine HCL  25 mg Oral Q6H PRN Max 4/day Reshma Braga MD      hydrOXYzine HCL  50 mg Oral Q6H PRN Max 4/day Reshma Braga MD      lithium carbonate  450 mg Oral Daily Kaveh Bucca, DO      lithium carbonate  600 mg Oral HS Kaveh Bucca, DO      LORazepam  1 mg Intramuscular BID PRN Kaveh Bucca, DO      LORazepam  1 mg Oral BID PRN Kaveh Bucca, DO      loxapine  10 mg Oral BID Kaveh Bucca, DO      melatonin  3 mg Oral HS Reshma Braga MD      multivitamin-minerals  1 tablet Oral Daily CARLOS Pollock      nicotine polacrilex  2 mg Oral Q4H PRN Reshma Braga MD      OLANZapine  5 mg Intramuscular Q3H PRN Max 3/day Reshma Braga MD      OLANZapine  2.5 mg Oral Q4H PRN Max 6/day Reshma Braga MD      OLANZapine  5 mg Oral Q4H PRN Max 3/day Reshma Braga MD      OLANZapine  5 mg Oral Q3H PRN Max 3/day Reshma Braga MD      paliperidone  6 mg Oral HS Kaveh Bucca, DO      phenazopyridine  200 mg Oral TID PRN CARLOS Pollock      polyethylene glycol  17 g Oral Daily PRN Reshma Braga MD      senna-docusate sodium  1 tablet Oral Daily PRN Reshma Braga MD      tamsulosin  0.4 mg Oral Daily With Dinner CARLOS Pollock      thiamine  100 mg Oral Daily CARLOS Pollock      traZODone  50 mg Oral HS PRN Reshma Braga MD      triamcinolone   Topical BID CARLOS Pollock          Risks/Benefits of Treatment:     Risks, benefits, and possible side effects of medications explained to patient and patient verbalizes understanding and agreement for treatment.    Progress Toward Goals: no improvement    Treatment Planning:      - Encourage early mobility and having a structured day  - Provide frequent re-orientation, and cognitive stimulation  - Ensure assistive devices are in proper working order (eye-glasses, hearing aids)  - Encourage adequate hydration, nutrition and monitor bowel movements  - Maintain  "sleep-wake cycle: Uninterrupted sleep time; low-level lighting at night  - Fall precaution  - f/u SLIM recs regarding the medical problems   - Continue medication titration and treatment plan; adjust medication to optimize treatment response and as clinically indicated.   - Observation:Routine  - Legal Status: 201  - VS: as per unit protocol  - Encourage group attendance and milieu therapy  - Dispo: To be determined  - Long Stay Certification : Not Applicable  - Estimated Discharge Day: 4/28/2025     Subjective       Patient was visited on unit for continuing care; chart reviewed and discussed with multidisciplinary treatment team.     Patient remains disorganized, labile and on irritable edge, and requires frequent redirection and reorientation by the staff. Received IM Zyprexa 5 mg for agitation last night and was sent to unlocked seclusion room for 10 minutes.    Patient accepted all offered medications and no adverse effects of medications noted or reported.    Per staff patient was making crude and explicit remarks to staff last night.  Became agitated, demanding Ativan.  Received IM Zyprexa and cooled off in unclocked seclusion room.    The patient was evaluated in a private room today.  He continues to be labile, with garbled speech, laughing at times, crying at times.  His mood is very unstable.  He is tangential and disorganized.  Delusional and paranoid.  He states \"I want the  to come and testify and purge or themselves.\"  He talks about \"2  in my home looking for things.\"  He then begins crying and saying \"they took all my toys away… I do not play with guns.  I am an expert.\"  Patient then begins laughing and discussing \"marijuana socks.\"  Patient not responsive to redirection.  Patient reports poor sleep last night and we discussed changing Restoril to an alternative sleep aid.  We discussed trialing gabapentin tonight at bedtime and patient was in agreement plan.    Sleep: insomnia, decreased, " difficulty falling asleep  Appetite: normal  Medication side effects: No  ROS: review of systems as noted above in HPI/Subjective report, all other systems are negative    Objective :  Temp:  [97.8 °F (36.6 °C)-99.4 °F (37.4 °C)] 97.9 °F (36.6 °C)  HR:  [75-97] 78  BP: (146-168)/(70-96) 168/70  Resp:  [17-18] 18  SpO2:  [95 %-98 %] 98 %  O2 Device: None (Room air)    Temp:  [97.8 °F (36.6 °C)-99.4 °F (37.4 °C)] 97.9 °F (36.6 °C)  HR:  [75-97] 78  BP: (146-168)/(70-96) 168/70  Resp:  [17-18] 18  SpO2:  [95 %-98 %] 98 %  O2 Device: None (Room air)    Mental Status Evaluation:  Appearance:  disheveled, marginal hygiene, overweight, bearded   Behavior:  agitated, bizarre, demanding, requires redirection   Speech:  loud, garbled   Mood:  dysphoric, anxious   Affect:  labile, increased in intensity, tearful, laughing   Thought Process:  disorganized, illogical, impaired abstract reasoning   Associations: loose associations   Thought Content:  persecutory delusions, paranoid ideation, ruminating thoughts   Perceptual Disturbances: denies auditory or visual hallucinations when asked, appears distracted, appears preoccupied, talking to self   Risk Potential: Suicidal ideation - None at present  Homicidal ideation - None at present  Potential for aggression - Yes, due to acute psychosis and poor impulse control   Sensorium:  oriented to person, place, and time/date   Memory:  recent and remote memory grossly intact   Consciousness:  alert and awake   Attention/Concentration: poor concentration and poor attention span   Insight:  limited   Judgment: poor   Gait/Station: normal gait/station, normal balance   Motor Activity: no abnormal movements           Lab Results: I have reviewed the following results:  Most Recent Labs:   Lab Results   Component Value Date    WBC 7.15 04/06/2025    RBC 4.53 04/06/2025    HGB 13.3 04/06/2025    HCT 39.9 04/06/2025     04/06/2025    RDW 13.0 04/06/2025    NEUTROABS 5.39 04/06/2025     SODIUM 142 04/07/2025    K 4.3 04/07/2025     (H) 04/07/2025    CO2 26 04/07/2025    BUN 10 04/07/2025    CREATININE 0.86 04/07/2025    GLUC 87 04/07/2025    CALCIUM 9.7 04/07/2025    AST 21 04/07/2025    ALT 21 04/07/2025    ALKPHOS 46 04/07/2025    TP 6.4 04/07/2025    ALB 3.9 04/07/2025    TBILI 0.69 04/07/2025    CHOLESTEROL 112 04/07/2025    HDL 50 04/07/2025    TRIG 64 04/07/2025    LDLCALC 49 04/07/2025    NONHDLC 62 04/07/2025    VALPROICTOT <10 (L) 02/17/2024    LITHIUM 0.61 04/06/2025    AMMONIA 51 02/06/2021    ZUM6XERCCWAU 1.798 04/07/2025    FREET4 0.97 02/17/2024    HGBA1C 5.7 (H) 04/07/2025     04/07/2025       Administrative Statements     Counseling / Coordination of Care:   Patient's progress discussed with staff in treatment team meeting.  Medication changes reviewed with staff in treatment team meeting..    Kaveh Lara DO 04/10/25

## 2025-04-10 NOTE — NURSING NOTE
Patient is calm and receptive to staff when approached, has no more outburst this far. PRN Zyprexa 5 mg IM was effective.

## 2025-04-10 NOTE — PLAN OF CARE
Problem: PSYCHOSIS  Goal: Will report no hallucinations or delusions  Description: Interventions:- Administer medication as  ordered- Every waking shifts and PRN assess for the presence of hallucinations and or delusions- Assist with reality testing to support increasing orientation- Assess if patient's hallucinations or delusions are encouraging self-harm or harm to others and intervene as appropriate  Outcome: Not Progressing     Problem: ANXIETY  Goal: Will report anxiety at manageable levels  Description: INTERVENTIONS:- Administer medication as ordered- Teach and encourage coping skills- Provide emotional support- Assess patient/family for anxiety and ability to cope  Outcome: Progressing     Problem: Alteration in Thoughts and Perception  Goal: Verbalize thoughts and feelings  Description: Interventions:- Promote a nonjudgmental and trusting relationship with the patient through active listening and therapeutic communication- Assess patient's level of functioning, behavior and potential for risk- Engage patient in 1 on 1 interactions- Encourage patient to express fears, feelings, frustrations, and discuss symptoms  - [place] patient to reality, help patient recognize reality-based thinking - Administer medications as ordered and assess for potential side effects- Provide the patient education related to the signs and symptoms of the illness and desired effects of prescribed medications  Outcome: Progressing  Goal: Refrain from acting on delusional thinking/internal stimuli  Description: Interventions:- Monitor patient closely, per order - Utilize least restrictive measures - Set reasonable limits, give positive feedback for acceptable - Administer medications as ordered and monitor of potential side effects  Outcome: Not Progressing  Goal: Agree to be compliant with medication regime, as prescribed and report medication side effects  Description: Interventions:- Offer appropriate PRN medication and supervise  ingestion; conduct AIMS, as needed   Outcome: Progressing     Problem: Risk for Violence/Aggression Toward Others  Goal: Verbalize thoughts and feelings  Description: Interventions:- Assess and re-assess patient's level of risk, every waking shift- Engage patient in 1:1 interactions, daily, for a minimum of 15 minutes - Allow patient to express feelings and frustrations in a safe and non-threatening manner - Establish rapport/trust with patient   Outcome: Progressing  Goal: Refrain from harming others  Outcome: Progressing  Goal: Control angry outbursts  Description: Interventions:- Monitor patient closely, per order- Ensure early verbal de-escalation- Monitor prn medication needs- Set reasonable/therapeutic limits, outline behavioral expectations, and consequences - Provide a non-threatening milieu, utilizing the least restrictive interventions   Outcome: Progressing

## 2025-04-10 NOTE — NURSING NOTE
Patient is much more calm but at times continues to make inappropriate comments, Patient was educated on appropriate unit behavior. PRN Zyprexa appears effective.

## 2025-04-10 NOTE — ASSESSMENT & PLAN NOTE
Discontinue prior to admission Remeron due to noncompliance  Discontinued trazodone 100 mg nightly (received 1 dose on 04/07/2025) due to ineffectiveness  Discontinue Restoril 15 mg nightly (04/10/2025) due to ineffectiveness  Begin gabapentin 300 mg nightly for anxiety and to aid with sleep  Melatonin 3 mg nightly

## 2025-04-10 NOTE — NURSING NOTE
Patient awake and visible for snack. Pleasant and able to make needs known. Reports anxiety, denies all other psych s/s. Medication compliant and cooperative with care. Safety precautions maintained.

## 2025-04-10 NOTE — NURSING NOTE
Pt anxious and silly at times.  Pt with PM agitation, pacing and bizarre.  Accepted Ativan 1 mg po prn for severe anxiety at 0955 with some positive effect. Pt less pressured with less pacing.  Attended group.  Med-compliant with no overt paranoia noted.  Monitored for safety and support.

## 2025-04-10 NOTE — TREATMENT TEAM
04/10/25 0800   Team Meeting   Meeting Type Daily Rounds   Team Members Present   Team Members Present Physician;Nurse;;   Physician Team Member Dr. Lara/Dr. Royal/Anjum   Nursing Team Member Perry/Veena   Care Management Team Member Rashel   Social Work Team Member Saturnino   Patient/Family Present   Patient Present No   Patient's Family Present No     Patient reported to have BM on 4/8 shower on 4/10. Patient given zyprexa at 1740. Patient endorses anxiety, having behaviors, irritable. Patient did not sleep last night, pacing the hallways. Patient agreed to start loxapine. Patient discharge is pending stabilization of mood and medications.

## 2025-04-10 NOTE — TREATMENT TEAM
"   04/10/25 1518   Broset Violence Checklist   Assessment type Shift   Irritability 1   Confusion 0   Boisterousness 1   Threatening physical violence 0   Verbal threats 0   Violence 1   Broset score 3     Patient was irritable and yelling inappropriate comments at peers. Was offered Zyprexa 5 mg PO and refused it stated \" Im not taking that fucking shit\". Patient then stormed off into bed room where he threw the door shut and the hit something loudly. Patient Broset score came up to a 3 due to violence. Patient was given Zyprexa 5 mg IM on right deltoid.   "

## 2025-04-11 PROCEDURE — 99232 SBSQ HOSP IP/OBS MODERATE 35: CPT | Performed by: PSYCHIATRY & NEUROLOGY

## 2025-04-11 RX ORDER — GABAPENTIN 300 MG/1
300 CAPSULE ORAL 3 TIMES DAILY
Status: DISCONTINUED | OUTPATIENT
Start: 2025-04-11 | End: 2025-04-25 | Stop reason: HOSPADM

## 2025-04-11 RX ORDER — LOXAPINE SUCCINATE 25 MG/1
25 TABLET ORAL 2 TIMES DAILY
Status: DISCONTINUED | OUTPATIENT
Start: 2025-04-11 | End: 2025-04-13

## 2025-04-11 RX ORDER — PALIPERIDONE 3 MG/1
3 TABLET, EXTENDED RELEASE ORAL
Status: DISCONTINUED | OUTPATIENT
Start: 2025-04-11 | End: 2025-04-13

## 2025-04-11 RX ADMIN — LOXAPINE 25 MG: 25 CAPSULE ORAL at 17:45

## 2025-04-11 RX ADMIN — OLANZAPINE 5 MG: 5 TABLET, FILM COATED ORAL at 09:22

## 2025-04-11 RX ADMIN — FOLIC ACID 1 MG: 1 TABLET ORAL at 08:03

## 2025-04-11 RX ADMIN — LOXAPINE 10 MG: 10 CAPSULE ORAL at 08:03

## 2025-04-11 RX ADMIN — Medication 3 MG: at 21:21

## 2025-04-11 RX ADMIN — GABAPENTIN 300 MG: 300 CAPSULE ORAL at 16:48

## 2025-04-11 RX ADMIN — TRIAMCINOLONE ACETONIDE: 0.25 CREAM TOPICAL at 08:02

## 2025-04-11 RX ADMIN — TAMSULOSIN HYDROCHLORIDE 0.4 MG: 0.4 CAPSULE ORAL at 16:48

## 2025-04-11 RX ADMIN — OLANZAPINE 5 MG: 5 TABLET, FILM COATED ORAL at 17:32

## 2025-04-11 RX ADMIN — Medication 2000 UNITS: at 08:03

## 2025-04-11 RX ADMIN — LITHIUM CARBONATE 450 MG: 450 TABLET, EXTENDED RELEASE ORAL at 08:02

## 2025-04-11 RX ADMIN — THIAMINE HCL TAB 100 MG 100 MG: 100 TAB at 08:03

## 2025-04-11 RX ADMIN — LORAZEPAM 1 MG: 1 TABLET ORAL at 10:26

## 2025-04-11 RX ADMIN — ATORVASTATIN CALCIUM 10 MG: 10 TABLET, FILM COATED ORAL at 16:48

## 2025-04-11 RX ADMIN — Medication 1 TABLET: at 08:02

## 2025-04-11 RX ADMIN — GABAPENTIN 300 MG: 300 CAPSULE ORAL at 21:21

## 2025-04-11 RX ADMIN — PALIPERIDONE 3 MG: 3 TABLET, EXTENDED RELEASE ORAL at 21:21

## 2025-04-11 RX ADMIN — LITHIUM CARBONATE 600 MG: 300 TABLET, FILM COATED, EXTENDED RELEASE ORAL at 21:21

## 2025-04-11 NOTE — NURSING NOTE
Pt verbally threatening toward peer, accepted Zyprexa 5 mg po prn with minimal effect.  More redirectable.  Good appetite and steady gait.  VSS.  Med-compliant.  Pt expressed suspiciousness and persecutory delusions.  Monitored for safety and support.

## 2025-04-11 NOTE — PROGRESS NOTES
04/11/25 0800   Team Meeting   Meeting Type Daily Rounds   Team Members Present   Team Members Present ;Physician;Nurse;Other (Discipline and Name)   Physician Team Member Lele/Kalie/Marco   Nursing Team Member Atif/Vianney/Veena/Juan   Care Management Team Member Atif   OT Team Member Blanca   Other (Discipline and Name) Mandie - Pharmacy   Patient/Family Present   Patient Present No   Patient's Family Present No     Patient denies symptoms and had his Restoril increased. He was agitated and was given po ativan last night at 9:55, then Zyprexa 15:22 and it was effective. Patient threw a pillow with paperwork in it at his roommate. He slept in open seclusion. He had 0, 50, 100.  Patient discharge is pending stabilization of mood and medications.

## 2025-04-11 NOTE — TREATMENT TEAM
"Pt visible in groups and restless at times. Pt walked out of afternoon group and did not participate.      04/11/25 0900 04/11/25 1000 04/11/25 1100   Activity/Group Checklist   Group Other (Comment)  (Rec Tx: Board & Card Games; Music) Other (Comment)  (Community meeting\" \"Highlighting\" vitality factors) Music   Attendance Attended Attended  (left early) Attended   Attendance Duration (min) 0-15 31-45 16-30   Interactions Disorganized interaction  (antagonistic towards peer) Disorganized interaction Interacted appropriately   Affect/Mood Wide Wide Appropriate   Goals Achieved Able to engage in interactions;Able to listen to others Identified triggers;Identified feelings;Identified relapse prevention strategies;Discussed coping strategies;Able to listen to others Able to listen to others        04/11/25 1330   Activity/Group Checklist   Group Other (Comment)  (Reflection and trivia)   Attendance Attended  (left early)   Attendance Duration (min) 0-15   Interactions Disorganized interaction   Affect/Mood Wide   Goals Achieved Identified feelings;Identified triggers;Discussed coping strategies;Able to listen to others        "

## 2025-04-11 NOTE — ASSESSMENT & PLAN NOTE
Continue lithium 450 mg daily with breakfast, increase HS dose to 600 mg nightly (4/8/2025)  Patient reports compliance with this medication  Lithium level 4/6/2025: 0.61 therapeutic  Lithium level and BMP on 4/13/2025  Cross-taper Invega to loxapine  Taper Invega to 3 mg nightly with plans to taper further and discontinue (04/11/2025)  Increase loxapine to 25 mg twice daily (04/11/2025)  Increase gabapentin to 300 mg 3 times daily dosing (04/11/2025)  Patient not agreeable to long-acting injectable, prefers oral medication  Reports that Invega has worked well in the past but that he would like alternative antipsychotic medication, will continue to monitor

## 2025-04-11 NOTE — NURSING NOTE
Pt anxious and irritable with periods of agitation and verbal aggression.  Pt accepted Ativan 1 mg po prn with some positive effect.  Monitored for safety and support.

## 2025-04-11 NOTE — NURSING NOTE
PRN Zyprexa 5mg administered at 1732 for severe agitation. Peer reported patient was posturing towards him. While RN and this peer were speaking in his bedroom, patient was standing near doorway and intensely staring. Patient offered PO and initially refused but accepted w/ encouragement. Patient then began aggressively pushing on bedroom door and walls. Loud and cursing. Escorted to quiet room w/ 3 nurses. Door to quiet room remains open. Patient currently making loud comments. Results pending.

## 2025-04-11 NOTE — PROGRESS NOTES
Pastoral Care Progress Note          Chaplaincy Interventions Utilized:   Empowerment: Encouraged focus on present, Encouraged self-care, Facilitated group experience, and Normalized experience of patient/family    Exploration: Explored hope, Explored emotional needs & resources, and Explored spiritual needs & resources    Relationship Building: Cultivated a relationship of care and support, Listened empathically, and Hospitality    The pt participated in spirituality group facilitated by the  today. Pt contributed to group discussion.

## 2025-04-11 NOTE — PLAN OF CARE
Pt attended group and visible.     Problem: Risk for Violence/Aggression Toward Others  Goal: Attend and participate in unit activities, including therapeutic, recreational, and educational groups  Description: Interventions:- Provide therapeutic and educational activities daily, encourage attendance and participation, and document same in the medical record   Outcome: Progressing

## 2025-04-11 NOTE — NURSING NOTE
Patient needed to be escorted to the quite room with the door open for posturing towards another peer and making peers uncomfortable. PRN Zyprexa was given by staff and patient was acceptable of this after strong encouragement. He is labile and irritable, requesting PRN Ativan but not meeting the parameters for Ativan. Compliant with medications and care. Safety checks ongoing.

## 2025-04-11 NOTE — PROGRESS NOTES
Progress Note - Behavioral Health   Name: Freddie Graham 65 y.o. male I MRN: [id number]  Unit/Bed#: [facility] 650-01 I Date of Admission: 4/6/2025   Date of Service: 4/11/2025 I Hospital Day: 5    Assessment & Plan  Schizoaffective disorder, bipolar type (HCC)  Continue lithium 450 mg daily with breakfast, increase HS dose to 600 mg nightly (4/8/2025)  Patient reports compliance with this medication  Lithium level 4/6/2025: 0.61 therapeutic  Lithium level and BMP on 4/13/2025  Cross-taper Invega to loxapine  Taper Invega to 3 mg nightly with plans to taper further and discontinue (04/11/2025)  Increase loxapine to 25 mg twice daily (04/11/2025)  Increase gabapentin to 300 mg 3 times daily dosing (04/11/2025)  Patient not agreeable to long-acting injectable, prefers oral medication  Reports that Invega has worked well in the past but that he would like alternative antipsychotic medication, will continue to monitor  Mild tetrahydrocannabinol (THC) abuse  Encourage cessation  Alcohol abuse, continuous  Encourage cessation  Mood insomnia (HCC)  Discontinue prior to admission Remeron due to noncompliance  Discontinued trazodone 100 mg nightly (received 1 dose on 04/07/2025) due to ineffectiveness  Discontinued Restoril 15 mg nightly (04/10/2025) due to ineffectiveness  Increase gabapentin to 300 mg 3 times daily dosing (04/11/2025)  Melatonin 3 mg nightly    Current medications:  Current Facility-Administered Medications   Medication Dose Route Frequency Provider Last Rate    acetaminophen  650 mg Oral Q4H PRN Reshma Braga MD      acetaminophen  650 mg Oral Q4H PRN Reshma Braga MD      acetaminophen  975 mg Oral Q6H PRN Reshma Braga MD      aluminum-magnesium hydroxide-simethicone  30 mL Oral Q4H PRN Reshma Braga MD      atorvastatin  10 mg Oral Daily With Dinner CARLOS Pollock      bisacodyl  10 mg Rectal Daily PRN Reshma Braga MD      Cholecalciferol  2,000 Units Oral Daily CARLOS Pollock       folic acid  1 mg Oral Daily CARLOS Pollock      gabapentin  300 mg Oral TID Kaveh Bucca, DO      hydrOXYzine HCL  25 mg Oral Q6H PRN Max 4/day Reshma Braga MD      hydrOXYzine HCL  50 mg Oral Q6H PRN Max 4/day Reshma Braga MD      lithium carbonate  450 mg Oral Daily Kaveh Bucca, DO      lithium carbonate  600 mg Oral HS Kaveh Bucca, DO      LORazepam  1 mg Intramuscular BID PRN Kaveh Bucca, DO      LORazepam  1 mg Oral BID PRN Kaveh Bucca, DO      loxapine  25 mg Oral BID Kaveh Bucca, DO      melatonin  3 mg Oral HS Reshma Braga MD      multivitamin-minerals  1 tablet Oral Daily CARLOS Pollock      nicotine polacrilex  2 mg Oral Q4H PRN Reshma Braga MD      OLANZapine  5 mg Intramuscular Q3H PRN Max 3/day Reshma Braga MD      OLANZapine  2.5 mg Oral Q4H PRN Max 6/day Reshma Braga MD      OLANZapine  5 mg Oral Q4H PRN Max 3/day Reshma Braga MD      OLANZapine  5 mg Oral Q3H PRN Max 3/day Reshma Braga MD      paliperidone  3 mg Oral HS Kaveh Lara, DO      phenazopyridine  200 mg Oral TID PRN CARLOS Pollock      polyethylene glycol  17 g Oral Daily PRN Reshma Braga MD      senna-docusate sodium  1 tablet Oral Daily PRN Reshma Braga MD      tamsulosin  0.4 mg Oral Daily With Dinner CARLOS Pollock      thiamine  100 mg Oral Daily CARLOS Pollock      traZODone  50 mg Oral HS PRN Reshma Braga MD      triamcinolone   Topical BID CARLOS Pollock          Risks/Benefits of Treatment:     Risks, benefits, and possible side effects of medications explained to patient and patient verbalizes understanding and agreement for treatment.    Progress Toward Goals: no progress    Treatment Planning:      - Encourage early mobility and having a structured day  - Provide frequent re-orientation, and cognitive stimulation  - Ensure assistive devices are in proper working order (eye-glasses, hearing aids)  - Encourage adequate hydration,  "nutrition and monitor bowel movements  - Maintain sleep-wake cycle: Uninterrupted sleep time; low-level lighting at night  - Fall precaution  - f/u SLIM recs regarding the medical problems   - Continue medication titration and treatment plan; adjust medication to optimize treatment response and as clinically indicated.   - Observation:Routine  - Legal Status: 201  - VS: as per unit protocol  - Encourage group attendance and milieu therapy  - Dispo: To be determined  - Long Stay Certification : Not Applicable  - Estimated Discharge Day: 4/28/2025     Subjective       Patient was visited on unit for continuing care; chart reviewed and discussed with multidisciplinary treatment team.     Patient remains disorganized, labile and on irritable edge, and requires frequent redirection and reorientation by the staff. Received as needed Ativan 1 mg for anxiety, Zyprexa 5 mg IM for agitation, Zyprexa 5 mg p.o. x 2 for agitation.    Patient accepted all offered medications and no adverse effects of medications noted or reported.    Per staff patient has been agitated, making inappropriate comments and inappropriate behavior (sexual comments, threw a pillow at roommate), high anxiety, requiring multiple as needed medications.    The patient was evaluated in the milieu today.  He was irritable, yelling, garbled speech, appeared to be responding to internal stimuli, disorganized, and uncooperative with the evaluation.  He yells \"I am being harassed\" and says he \"wants to slap the shit out of the bunch of people.\"  He discusses being limited in his \"phone call privileges\" and request to speak with the \" to get discharged.\"  He says he needs to return home because his \"mom is coming home from [place].\"  Reviewed medication changes with patient as well as current medication list.  Recommended gabapentin to increase to 3 times daily dosing for improved control of anxiety and mood stabilization and in an attempt to limit " as needed medication usage.  Patient became irritable and angry and walked away from this writer.    Sleep: insomnia, decreased  Appetite: normal  Medication side effects: No  ROS: review of systems as noted above in HPI/Subjective report, all other systems are negative    Objective :  Temp:  [97.5 °F (36.4 °C)-97.9 °F (36.6 °C)] 97.5 °F (36.4 °C)  HR:  [78-94] 94  BP: (120-168)/(70-83) 120/83  Resp:  [18] 18  SpO2:  [94 %-98 %] 94 %  O2 Device: None (Room air)    Temp:  [97.5 °F (36.4 °C)-97.9 °F (36.6 °C)] 97.5 °F (36.4 °C)  HR:  [78-94] 94  BP: (120-168)/(70-83) 120/83  Resp:  [18] 18  SpO2:  [94 %-98 %] 94 %  O2 Device: None (Room air)    Mental Status Evaluation:  Appearance:  disheveled, marginal hygiene, overweight, bearded   Behavior:  angry, irritable, uncooperative, restless and fidgety   Speech:  pressured, loud, garbled   Mood:  irritable, manic   Affect:  labile   Thought Process:  illogical, tangential, racing of thoughts   Associations: flight of ideas   Thought Content:  persecutory delusions, paranoid ideation   Perceptual Disturbances: denies auditory or visual hallucinations when asked, but appears responding to internal stimuli   Risk Potential: Suicidal ideation - does not answer  Homicidal ideation - does not answer  Potential for aggression - Yes, due to poor impulse control   Sensorium:  does not answer   Memory:  patient does not answer   Consciousness:  alert and awake   Attention/Concentration: poor concentration and poor attention span   Insight:  poor   Judgment: poor   Gait/Station: normal gait/station, normal balance   Motor Activity: no abnormal movements           Lab Results: I have reviewed the following results:  Most Recent Labs:   Lab Results   Component Value Date    WBC 7.15 04/06/2025    RBC 4.53 04/06/2025    HGB 13.3 04/06/2025    HCT 39.9 04/06/2025     04/06/2025    RDW 13.0 04/06/2025    NEUTROABS 5.39 04/06/2025    SODIUM 142 04/07/2025    K 4.3 04/07/2025    CL  110 (H) 04/07/2025    CO2 26 04/07/2025    BUN 10 04/07/2025    CREATININE 0.86 04/07/2025    GLUC 87 04/07/2025    CALCIUM 9.7 04/07/2025    AST 21 04/07/2025    ALT 21 04/07/2025    ALKPHOS 46 04/07/2025    TP 6.4 04/07/2025    ALB 3.9 04/07/2025    TBILI 0.69 04/07/2025    CHOLESTEROL 112 04/07/2025    HDL 50 04/07/2025    TRIG 64 04/07/2025    LDLCALC 49 04/07/2025    NONHDLC 62 04/07/2025    VALPROICTOT <10 (L) 02/17/2024    LITHIUM 0.61 04/06/2025    AMMONIA 51 02/06/2021    FDX2VHALJPJK 1.798 04/07/2025    FREET4 0.97 02/17/2024    HGBA1C 5.7 (H) 04/07/2025     04/07/2025       Administrative Statements     Counseling / Coordination of Care:   Patient's progress discussed with staff in treatment team meeting.  Medication changes reviewed with staff in treatment team meeting..    Kaveh Lara DO 04/11/25

## 2025-04-11 NOTE — ASSESSMENT & PLAN NOTE
Discontinue prior to admission Remeron due to noncompliance  Discontinued trazodone 100 mg nightly (received 1 dose on 04/07/2025) due to ineffectiveness  Discontinued Restoril 15 mg nightly (04/10/2025) due to ineffectiveness  Increase gabapentin to 300 mg 3 times daily dosing (04/11/2025)  Melatonin 3 mg nightly

## 2025-04-11 NOTE — NURSING NOTE
Patient appears calm but continues to have to be redirected due to inappropriate comments and behaviors blowing kisses at staff. PRN Zyprexa partially effective.

## 2025-04-11 NOTE — TREATMENT TEAM
04/10/25 1955   Broset Violence Checklist   Assessment type Shift   Irritability 1   Confusion 0   Boisterousness 1   Threatening physical violence 0   Verbal threats 1   Violence 0   Broset score 3     Patient irritable, has been redirected multiple times but continues to instigate peers. Has been using foul language in the milieu hitting walls and making verbal threats to peers and staff. PRN Zyprexa 5 mg given PO.

## 2025-04-12 PROCEDURE — 99232 SBSQ HOSP IP/OBS MODERATE 35: CPT | Performed by: PSYCHIATRY & NEUROLOGY

## 2025-04-12 RX ADMIN — TRIAMCINOLONE ACETONIDE 1 APPLICATION: 0.25 CREAM TOPICAL at 08:40

## 2025-04-12 RX ADMIN — GABAPENTIN 300 MG: 300 CAPSULE ORAL at 21:01

## 2025-04-12 RX ADMIN — GABAPENTIN 300 MG: 300 CAPSULE ORAL at 08:19

## 2025-04-12 RX ADMIN — TRIAMCINOLONE ACETONIDE 1 APPLICATION: 0.25 CREAM TOPICAL at 17:07

## 2025-04-12 RX ADMIN — THIAMINE HCL TAB 100 MG 100 MG: 100 TAB at 08:17

## 2025-04-12 RX ADMIN — Medication 1 TABLET: at 08:17

## 2025-04-12 RX ADMIN — ATORVASTATIN CALCIUM 10 MG: 10 TABLET, FILM COATED ORAL at 17:06

## 2025-04-12 RX ADMIN — GABAPENTIN 300 MG: 300 CAPSULE ORAL at 17:06

## 2025-04-12 RX ADMIN — TAMSULOSIN HYDROCHLORIDE 0.4 MG: 0.4 CAPSULE ORAL at 17:06

## 2025-04-12 RX ADMIN — LITHIUM CARBONATE 450 MG: 450 TABLET, EXTENDED RELEASE ORAL at 08:17

## 2025-04-12 RX ADMIN — FOLIC ACID 1 MG: 1 TABLET ORAL at 08:17

## 2025-04-12 RX ADMIN — Medication 3 MG: at 21:00

## 2025-04-12 RX ADMIN — LOXAPINE 25 MG: 25 CAPSULE ORAL at 17:06

## 2025-04-12 RX ADMIN — Medication 2000 UNITS: at 08:17

## 2025-04-12 RX ADMIN — LITHIUM CARBONATE 600 MG: 300 TABLET, FILM COATED, EXTENDED RELEASE ORAL at 21:00

## 2025-04-12 RX ADMIN — LOXAPINE 25 MG: 25 CAPSULE ORAL at 08:17

## 2025-04-12 RX ADMIN — PALIPERIDONE 3 MG: 3 TABLET, EXTENDED RELEASE ORAL at 21:00

## 2025-04-12 RX ADMIN — LORAZEPAM 1 MG: 1 TABLET ORAL at 11:01

## 2025-04-12 NOTE — PROGRESS NOTES
Progress Note - Behavioral Health   Name: Freddie Graham 65 y.o. male I MRN: [id number]  Unit/Bed#: [facility] 650-01 I Date of Admission: 4/6/2025   Date of Service: 4/12/2025 I Hospital Day: 6    Assessment & Plan  Schizoaffective disorder, bipolar type (HCC)  Continue lithium 450 mg daily with breakfast, increase HS dose to 600 mg nightly (4/8/2025)  Patient reports compliance with this medication  Lithium level 4/6/2025: 0.61 therapeutic  Lithium level and BMP on 4/13/2025  Cross-taper Invega to loxapine  Tapered Invega to 3 mg nightly with plans to taper further and discontinue (04/11/2025)  Loxapine 25 mg twice daily (04/11/2025)  Gabapentin to 300 mg 3 times daily dosing (04/11/2025)  Patient not agreeable to long-acting injectable, prefers oral medication  Reports that Invega has worked well in the past but that he would like alternative antipsychotic medication, will continue to monitor  Mild tetrahydrocannabinol (THC) abuse  Encourage cessation  Alcohol abuse, continuous  Encourage cessation  Mood insomnia (HCC)  Discontinue prior to admission Remeron due to noncompliance  Discontinued trazodone 100 mg nightly (received 1 dose on 04/07/2025) due to ineffectiveness  Discontinued Restoril 15 mg nightly (04/10/2025) due to ineffectiveness  Gabapentin 300 mg 3 times daily dosing (04/11/2025)  Melatonin 3 mg nightly    Current medications:  Current Facility-Administered Medications   Medication Dose Route Frequency Provider Last Rate    acetaminophen  650 mg Oral Q4H PRN Reshma Braga MD      acetaminophen  650 mg Oral Q4H PRN Reshma Braga MD      acetaminophen  975 mg Oral Q6H PRN Reshma Braga MD      aluminum-magnesium hydroxide-simethicone  30 mL Oral Q4H PRN Reshma Braga MD      atorvastatin  10 mg Oral Daily With Dinner CARLOS Pollock      bisacodyl  10 mg Rectal Daily PRN Reshma Braga MD      Cholecalciferol  2,000 Units Oral Daily CARLOS Pollock      folic acid  1 mg Oral Daily  CARLOS Pollock      gabapentin  300 mg Oral TID Kaveh Bucca, DO      hydrOXYzine HCL  25 mg Oral Q6H PRN Max 4/day Reshma Braga MD      hydrOXYzine HCL  50 mg Oral Q6H PRN Max 4/day Reshma Braga MD      lithium carbonate  450 mg Oral Daily Kaveh Bucca, DO      lithium carbonate  600 mg Oral HS Kaveh Bucca, DO      LORazepam  1 mg Intramuscular BID PRN Kvaeh Bucca, DO      LORazepam  1 mg Oral BID PRN Kaveh Bucca, DO      loxapine  25 mg Oral BID Kaveh Bucca, DO      melatonin  3 mg Oral HS Reshma Braga MD      multivitamin-minerals  1 tablet Oral Daily CARLOS Pollock      nicotine polacrilex  2 mg Oral Q4H PRN Reshma Braga MD      OLANZapine  5 mg Intramuscular Q3H PRN Max 3/day Reshma Braga MD      OLANZapine  2.5 mg Oral Q4H PRN Max 6/day Reshma Braga MD      OLANZapine  5 mg Oral Q4H PRN Max 3/day Reshma Braga MD      OLANZapine  5 mg Oral Q3H PRN Max 3/day Reshma Braga MD      paliperidone  3 mg Oral HS Kaveh Bucca, DO      phenazopyridine  200 mg Oral TID PRN CARLOS Pollock      polyethylene glycol  17 g Oral Daily PRN Reshma Braga MD      senna-docusate sodium  1 tablet Oral Daily PRN Reshma Braga MD      tamsulosin  0.4 mg Oral Daily With Dinner CARLOS Pollock      thiamine  100 mg Oral Daily CARLOS Pollock      traZODone  50 mg Oral HS PRN Reshma Braga MD      triamcinolone   Topical BID CARLOS Pollock          Risks/Benefits of Treatment:     Risks, benefits, and possible side effects of medications explained to patient and patient verbalizes understanding and agreement for treatment.    Progress Toward Goals: minimal improvement    Treatment Planning:      - Encourage early mobility and having a structured day  - Provide frequent re-orientation, and cognitive stimulation  - Ensure assistive devices are in proper working order (eye-glasses, hearing aids)  - Encourage adequate hydration, nutrition and  "monitor bowel movements  - Maintain sleep-wake cycle: Uninterrupted sleep time; low-level lighting at night  - Fall precaution  - f/u SLIM recs regarding the medical problems   - Continue medication titration and treatment plan; adjust medication to optimize treatment response and as clinically indicated.   - Observation:Routine  - Legal Status: 201  - VS: as per unit protocol  - Encourage group attendance and milieu therapy  - Dispo: To be determined  - Long Stay Certification : Not Applicable  - Estimated Discharge Day: 4/28/2025     Subjective       Patient was visited on unit for continuing care; chart reviewed and discussed with multidisciplinary treatment team.     Patient remains disorganized, labile and on irritable edge, and requires frequent redirection and reorientation by the staff. Received as needed Ativan 1 mg for anxiety and as needed Zyprexa 5 mg x 2 for agitation.    Patient accepted all offered medications and no adverse effects of medications noted or reported.    The patient was evaluated in a private area of the milieu today.  He continues to be bizarre on interaction, labile, loud, garbled speech at times, responding to internal stimuli, not responsive to redirection.  The patient today says \"I feel like I am in charge\" and begins laughing to himself.  He then states that he is \"pissed off at the police because they stole my camera.\"  He states that he \"do not like taking Zyprexa\" and encourage patient to remain in behavioral control to limit the use of as needed medications.  Patient is tolerating other medications without issue at this time.  He denied suicidal or homicidal ideation.  He he appears distracted and is responding to internal stimuli/talking to himself at times    Sleep: normal  Appetite: normal  Medication side effects: No  ROS: review of systems as noted above in HPI/Subjective report, all other systems are negative    Objective :  Temp:  [97.4 °F (36.3 °C)-100.1 °F (37.8 °C)] " 100.1 °F (37.8 °C)  HR:  [] 101  BP: (122-139)/(79-80) 122/79  Resp:  [17-18] 18  SpO2:  [93 %] 93 %  O2 Device: None (Room air)    Temp:  [97.4 °F (36.3 °C)-100.1 °F (37.8 °C)] 100.1 °F (37.8 °C)  HR:  [] 101  BP: (122-139)/(79-80) 122/79  Resp:  [17-18] 18  SpO2:  [93 %] 93 %  O2 Device: None (Room air)    Mental Status Evaluation:  Appearance:  disheveled, marginal hygiene   Behavior:  angry, bizarre, requires redirection   Speech:  loud, garbled   Mood:  dysphoric   Affect:  labile, expansive, increased in intensity   Thought Process:  disorganized, illogical, impaired abstract reasoning   Associations: loose associations   Thought Content:  persecutory delusions, paranoid ideation, ruminating thoughts   Perceptual Disturbances: denies auditory or visual hallucinations when asked, appears distracted, appears preoccupied, talks to self at times   Risk Potential: Suicidal ideation - None at present  Homicidal ideation - None at present  Potential for aggression - Yes, due to acute psychosis   Sensorium:  oriented to person, place, and time/date   Memory:  recent and remote memory grossly intact   Consciousness:  alert and awake   Attention/Concentration: poor concentration and poor attention span   Insight:  limited   Judgment: limited   Gait/Station: normal gait/station, normal balance   Motor Activity: no abnormal movements           Lab Results: I have reviewed the following results:  Most Recent Labs:   Lab Results   Component Value Date    WBC 7.15 04/06/2025    RBC 4.53 04/06/2025    HGB 13.3 04/06/2025    HCT 39.9 04/06/2025     04/06/2025    RDW 13.0 04/06/2025    NEUTROABS 5.39 04/06/2025    SODIUM 142 04/07/2025    K 4.3 04/07/2025     (H) 04/07/2025    CO2 26 04/07/2025    BUN 10 04/07/2025    CREATININE 0.86 04/07/2025    GLUC 87 04/07/2025    CALCIUM 9.7 04/07/2025    AST 21 04/07/2025    ALT 21 04/07/2025    ALKPHOS 46 04/07/2025    TP 6.4 04/07/2025    ALB 3.9 04/07/2025     TBILI 0.69 04/07/2025    CHOLESTEROL 112 04/07/2025    HDL 50 04/07/2025    TRIG 64 04/07/2025    LDLCALC 49 04/07/2025    NONHDLC 62 04/07/2025    VALPROICTOT <10 (L) 02/17/2024    LITHIUM 0.61 04/06/2025    AMMONIA 51 02/06/2021    [id number] 1.798 04/07/2025    FREET4 0.97 02/17/2024    HGBA1C 5.7 (H) 04/07/2025     04/07/2025       Administrative Statements     Counseling / Coordination of Care:   Patient's progress discussed with staff in treatment team meeting.  Medication changes reviewed with staff in treatment team meeting..    Kaveh Lara DO 04/12/25

## 2025-04-12 NOTE — PLAN OF CARE
Problem: PSYCHOSIS  Goal: Will report no hallucinations or delusions  Description: Interventions:- Administer medication as  ordered- Every waking shifts and PRN assess for the presence of hallucinations and or delusions- Assist with reality testing to support increasing orientation- Assess if patient's hallucinations or delusions are encouraging self-harm or harm to others and intervene as appropriate  Outcome: Progressing     Problem: BEHAVIOR  Goal: Pt/Family maintain appropriate behavior and adhere to behavioral management agreement, if implemented  Description: INTERVENTIONS:- Assess the family dynamic - Encourage verbalization of thoughts and concerns in a socially appropriate manner- Assess patient/family's coping skills and non-compliant behavior (including use of illegal substances).- Utilize positive, consistent limit setting strategies supporting safety of patient, staff and others- Initiate consult with Case Management, Spiritual Care or other ancillary services as appropriate- If a patient's/visitor's behavior jeopardizes the safety of the patient, staff, or others, refer to organization procedure. - Notify Security of behavior or suspected illegal substances which indicate the need for search of the patient and/or belongings- Encourage participation in the decision making process about a behavioral management agreement; implement if patient meets criteria  Outcome: Progressing     Problem: ANXIETY  Goal: Will report anxiety at manageable levels  Description: INTERVENTIONS:- Administer medication as ordered- Teach and encourage coping skills- Provide emotional support- Assess patient/family for anxiety and ability to cope  Outcome: Progressing  Goal: By discharge: Patient will verbalize 2 strategies to deal with anxiety  Description: Interventions:- Identify any obvious source/trigger to anxiety- Staff will assist patient in applying identified coping technique/skills- Encourage attendance of  scheduled groups and activities  Outcome: Progressing     Problem: INVOLUNTARY ADMIT  Goal: Will cooperate with staff recommendations and doctor's orders and will demonstrate appropriate behavior  Description: INTERVENTIONS:- Treat underlying conditions and offer medication as ordered- Educate regarding involuntary admission procedures and rules- Utilize positive consistent limit setting strategies to support patient and staff safety  Outcome: Progressing     Problem: Alteration in Thoughts and Perception  Goal: Treatment Goal: Gain control of psychotic behaviors/thinking, reduce/eliminate presenting symptoms and demonstrate improved reality functioning upon discharge  Outcome: Progressing  Goal: Verbalize thoughts and feelings  Description: Interventions:- Promote a nonjudgmental and trusting relationship with the patient through active listening and therapeutic communication- Assess patient's level of functioning, behavior and potential for risk- Engage patient in 1 on 1 interactions- Encourage patient to express fears, feelings, frustrations, and discuss symptoms  - [place] patient to reality, help patient recognize reality-based thinking - Administer medications as ordered and assess for potential side effects- Provide the patient education related to the signs and symptoms of the illness and desired effects of prescribed medications  Outcome: Progressing  Goal: Refrain from acting on delusional thinking/internal stimuli  Description: Interventions:- Monitor patient closely, per order - Utilize least restrictive measures - Set reasonable limits, give positive feedback for acceptable - Administer medications as ordered and monitor of potential side effects  Outcome: Progressing  Goal: Agree to be compliant with medication regime, as prescribed and report medication side effects  Description: Interventions:- Offer appropriate PRN medication and supervise ingestion; conduct AIMS, as needed   Outcome:  Progressing  Goal: Recognize dysfunctional thoughts, communicate reality-based thoughts at the time of discharge  Description: Interventions:- Provide medication and psycho-education to assist patient in compliance and developing insight into his/her illness   Outcome: Progressing  Goal: Complete daily ADLs, including personal hygiene independently, as able  Description: Interventions:- Observe, teach, and assist patient with ADLS- Monitor and promote a balance of rest/activity, with adequate nutrition and elimination   Outcome: Progressing     Problem: Risk for Violence/Aggression Toward Others  Goal: Treatment Goal: Refrain from acts of violence/aggression during length of stay, and demonstrate improved impulse control at the time of discharge  Outcome: Progressing  Goal: Verbalize thoughts and feelings  Description: Interventions:- Assess and re-assess patient's level of risk, every waking shift- Engage patient in 1:1 interactions, daily, for a minimum of 15 minutes - Allow patient to express feelings and frustrations in a safe and non-threatening manner - Establish rapport/trust with patient   Outcome: Progressing  Goal: Refrain from harming others  Outcome: Progressing  Goal: Refrain from destructive acts on the environment or property  Outcome: Progressing  Goal: Control angry outbursts  Description: Interventions:- Monitor patient closely, per order- Ensure early verbal de-escalation- Monitor prn medication needs- Set reasonable/therapeutic limits, outline behavioral expectations, and consequences - Provide a non-threatening milieu, utilizing the least restrictive interventions   Outcome: Progressing  Goal: Identify appropriate positive anger management techniques  Description: Interventions:- Offer anger management and coping skills groups - Staff will provide positive feedback for appropriate anger control  Outcome: Progressing     Problem: DISCHARGE PLANNING  Goal: Discharge to home or other facility with  appropriate resources  Description: INTERVENTIONS:- Identify barriers to discharge w/patient and caregiver- Arrange for needed discharge resources and transportation as appropriate- Identify discharge learning needs (meds, wound care, etc.)- Arrange for interpretive services to assist at discharge as needed- Refer to Case Management Department for coordinating discharge planning if the patient needs post-hospital services based on physician/advanced practitioner order or complex needs related to functional status, cognitive ability, or social support system  Outcome: Progressing

## 2025-04-12 NOTE — NURSING NOTE
PRN Zyprexa 5 mg PO effective for agitation. Patient calmed down and exited the quite room on his own and apologized for his behavior to peers.

## 2025-04-12 NOTE — TREATMENT TEAM
04/12/25 1100   Fajardo Anxiety Scale   Anxious Mood 4   Tension 4   Fears 4   Insomnia 0   Intellectual 4   Depressed Mood 3   Somatic Complaints: Muscular 0   Somatic Complaints: Sensory 0   Cardiovascular Symptoms 0   Respiratory Symptoms 0   Gastrointestinal Symptoms 0   Genitourinary Symptoms 0   Autonomic Symptoms 4   Behavior at Interview 4   Fajardo Anxiety Score 27     PRN Ativan 1 mg given for increased anxiety.

## 2025-04-12 NOTE — ASSESSMENT & PLAN NOTE
Discontinue prior to admission Remeron due to noncompliance  Discontinued trazodone 100 mg nightly (received 1 dose on 04/07/2025) due to ineffectiveness  Discontinued Restoril 15 mg nightly (04/10/2025) due to ineffectiveness  Gabapentin 300 mg 3 times daily dosing (04/11/2025)  Melatonin 3 mg nightly

## 2025-04-12 NOTE — NURSING NOTE
"Patient with early awakening @ 0430 walking the coto, reading the Bible in his room. Patient is mumbling about his mother having to travel from [place] in bad weather reporting \"it is not right\".  Engaged patient in supportive conversation and calming methods that are moderately effective.  "

## 2025-04-12 NOTE — ASSESSMENT & PLAN NOTE
Continue lithium 450 mg daily with breakfast, increase HS dose to 600 mg nightly (4/8/2025)  Patient reports compliance with this medication  Lithium level 4/6/2025: 0.61 therapeutic  Lithium level and BMP on 4/13/2025  Cross-taper Invega to loxapine  Tapered Invega to 3 mg nightly with plans to taper further and discontinue (04/11/2025)  Loxapine 25 mg twice daily (04/11/2025)  Gabapentin to 300 mg 3 times daily dosing (04/11/2025)  Patient not agreeable to long-acting injectable, prefers oral medication  Reports that Invega has worked well in the past but that he would like alternative antipsychotic medication, will continue to monitor

## 2025-04-12 NOTE — NURSING NOTE
Patient was tearful when it was time for HS medication. Unknown reason and patient refused to answer.

## 2025-04-12 NOTE — NURSING NOTE
Pt cooperative. Needs reminders about not getting involved with other peers comments towards nursing staff. Anxiety noted with rambling speech at times. Noted to be responding to auditory hallucinations although he denies. Medication and meal compliant. Denies SI/HI. Q 15 minute checks maintained.

## 2025-04-13 LAB
ANION GAP SERPL CALCULATED.3IONS-SCNC: 5 MMOL/L (ref 4–13)
BUN SERPL-MCNC: 11 MG/DL (ref 5–25)
CALCIUM SERPL-MCNC: 9.3 MG/DL (ref 8.4–10.2)
CHLORIDE SERPL-SCNC: 106 MMOL/L (ref 96–108)
CO2 SERPL-SCNC: 30 MMOL/L (ref 21–32)
CREAT SERPL-MCNC: 0.88 MG/DL (ref 0.6–1.3)
GFR SERPL CREATININE-BSD FRML MDRD: 88 ML/MIN/1.73SQ M
GLUCOSE P FAST SERPL-MCNC: 91 MG/DL (ref 65–99)
GLUCOSE SERPL-MCNC: 91 MG/DL (ref 65–140)
LITHIUM SERPL-SCNC: 0.79 MMOL/L (ref 0.6–1.2)
POTASSIUM SERPL-SCNC: 4.4 MMOL/L (ref 3.5–5.3)
SODIUM SERPL-SCNC: 141 MMOL/L (ref 135–147)

## 2025-04-13 PROCEDURE — 80178 ASSAY OF LITHIUM: CPT | Performed by: PSYCHIATRY & NEUROLOGY

## 2025-04-13 PROCEDURE — 80048 BASIC METABOLIC PNL TOTAL CA: CPT | Performed by: PSYCHIATRY & NEUROLOGY

## 2025-04-13 PROCEDURE — 99232 SBSQ HOSP IP/OBS MODERATE 35: CPT | Performed by: PSYCHIATRY & NEUROLOGY

## 2025-04-13 RX ORDER — LOXAPINE SUCCINATE 50 MG/1
50 TABLET ORAL 2 TIMES DAILY
Status: DISCONTINUED | OUTPATIENT
Start: 2025-04-13 | End: 2025-04-16

## 2025-04-13 RX ORDER — TRAZODONE HYDROCHLORIDE 100 MG/1
100 TABLET ORAL
Status: DISCONTINUED | OUTPATIENT
Start: 2025-04-13 | End: 2025-04-23

## 2025-04-13 RX ORDER — CLONAZEPAM 1 MG/1
1 TABLET ORAL 2 TIMES DAILY
Status: DISCONTINUED | OUTPATIENT
Start: 2025-04-13 | End: 2025-04-15

## 2025-04-13 RX ADMIN — OLANZAPINE 5 MG: 5 TABLET, FILM COATED ORAL at 08:07

## 2025-04-13 RX ADMIN — CLONAZEPAM 1 MG: 1 TABLET ORAL at 12:19

## 2025-04-13 RX ADMIN — Medication 1 TABLET: at 08:07

## 2025-04-13 RX ADMIN — THIAMINE HCL TAB 100 MG 100 MG: 100 TAB at 08:07

## 2025-04-13 RX ADMIN — TRIAMCINOLONE ACETONIDE: 0.25 CREAM TOPICAL at 17:11

## 2025-04-13 RX ADMIN — LOXAPINE 50 MG: 50 CAPSULE ORAL at 17:10

## 2025-04-13 RX ADMIN — FOLIC ACID 1 MG: 1 TABLET ORAL at 08:07

## 2025-04-13 RX ADMIN — GABAPENTIN 300 MG: 300 CAPSULE ORAL at 21:42

## 2025-04-13 RX ADMIN — GABAPENTIN 300 MG: 300 CAPSULE ORAL at 08:07

## 2025-04-13 RX ADMIN — OLANZAPINE 5 MG: 5 TABLET, FILM COATED ORAL at 18:07

## 2025-04-13 RX ADMIN — LOXAPINE 25 MG: 25 CAPSULE ORAL at 08:07

## 2025-04-13 RX ADMIN — Medication 3 MG: at 21:42

## 2025-04-13 RX ADMIN — TAMSULOSIN HYDROCHLORIDE 0.4 MG: 0.4 CAPSULE ORAL at 17:10

## 2025-04-13 RX ADMIN — LITHIUM CARBONATE 450 MG: 450 TABLET, EXTENDED RELEASE ORAL at 08:07

## 2025-04-13 RX ADMIN — ATORVASTATIN CALCIUM 10 MG: 10 TABLET, FILM COATED ORAL at 17:10

## 2025-04-13 RX ADMIN — LITHIUM CARBONATE 600 MG: 300 TABLET, FILM COATED, EXTENDED RELEASE ORAL at 21:42

## 2025-04-13 RX ADMIN — TRAZODONE HYDROCHLORIDE 100 MG: 100 TABLET ORAL at 21:42

## 2025-04-13 RX ADMIN — CLONAZEPAM 1 MG: 1 TABLET ORAL at 17:12

## 2025-04-13 RX ADMIN — TRAZODONE HYDROCHLORIDE 50 MG: 50 TABLET ORAL at 01:59

## 2025-04-13 RX ADMIN — GABAPENTIN 300 MG: 300 CAPSULE ORAL at 17:10

## 2025-04-13 RX ADMIN — Medication 2000 UNITS: at 08:06

## 2025-04-13 RX ADMIN — TRIAMCINOLONE ACETONIDE: 0.25 CREAM TOPICAL at 08:09

## 2025-04-13 NOTE — NURSING NOTE
Patient visible in the unit and is loud and can be disruptive with other peers. Patient needs to be frequently be redirected. He is compliant with medications. Patient is responding to auditory hallucination, but patient denies it. Will frequently monitor for safety and support.   No negative

## 2025-04-13 NOTE — TREATMENT TEAM
04/13/25 0800   Agitated Behavior Scale   Short Attention Span, Easy Distractibility, Inability to Concentrate 2   Impulsive, Impatient, Low Tolerance for Pain or Frustration 3   Uncooperative, Resistant to Care, Demanding 3   Violent and/or Threatening Violence Toward People or Property 3   Explosive and/or Unpredictable Anger 2   Rocking, Rubbing, Moaning, Other Self-Stimulating Behavior 3   Pulling at Tubes, Restraints, etc. 1   Wandering from Treatment Area 3   Restlessness, Pacing, Excessive Movement 3   Repetitive Behaviors, Motor and/or Verbal 3   Rapid, Loud or Excessive Talking 3   Sudden Changes of Mood 3   Easily Initiated - Excessive Crying and/or Laughter 3   Self-Abusiveness, Physical and/or Verbal 3     PRN Zyprexa 5 mg given for agitation at 0807.

## 2025-04-13 NOTE — ASSESSMENT & PLAN NOTE
Discontinue prior to admission Remeron due to noncompliance  Trrailed trazodone 100 mg nightly (received 1 dose on 04/07/2025)  Discontinued Restoril 15 mg nightly (04/10/2025) due to ineffectiveness  Melatonin 3 mg nightly  Rest of plan as per principal problem

## 2025-04-13 NOTE — ASSESSMENT & PLAN NOTE
Continue lithium 450 mg daily with breakfast, increase HS dose to 600 mg nightly (4/8/2025)  Patient reports compliance with this medication  Lithium level 4/6/2025: 0.61 therapeutic  Labs 4/13/2025:   Lithium level 0.79  BMP no concerns  Cross-taper Invega to loxapine  Discontinue Invega 3 mg nightly due to ineffectiveness (04/13/2025)  Increase loxapine to 50 mg twice daily (04/13/2025)  Patient not agreeable to long-acting injectable, prefers oral medication  Reports that Invega has worked well in the past but that he would like alternative antipsychotic medication, will continue to monitor  Gabapentin 300 mg 3 times daily dosing (04/11/2025)  Begin scheduled Klonopin 1 mg twice daily for severe anxiety and off label use for mood stabilization 4/13/2025  Begin trazodone 100 mg nightly for insomnia and as an adjunct for mood

## 2025-04-13 NOTE — TREATMENT TEAM
04/13/25 1810   Broset Violence Checklist   Assessment type Shift   Irritability 1   Confusion 1   Boisterousness 1   Threatening physical violence 1   Verbal threats 1   Violence 0   Broset score 5     Increased agitation with verbal threats towards peers. PRN Zyprexa 5 mg administered.

## 2025-04-13 NOTE — PROGRESS NOTES
Progress Note - Behavioral Health   Name: Freddie Graham 65 y.o. male I MRN: [id number]  Unit/Bed#: [facility] 650-01 I Date of Admission: 4/6/2025   Date of Service: 4/13/2025 I Hospital Day: 7    Assessment & Plan  Schizoaffective disorder, bipolar type (HCC)  Continue lithium 450 mg daily with breakfast, increase HS dose to 600 mg nightly (4/8/2025)  Patient reports compliance with this medication  Lithium level 4/6/2025: 0.61 therapeutic  Labs 4/13/2025:   Lithium level 0.79  BMP no concerns  Cross-taper Invega to loxapine  Discontinue Invega 3 mg nightly due to ineffectiveness (04/13/2025)  Increase loxapine to 50 mg twice daily (04/13/2025)  Patient not agreeable to long-acting injectable, prefers oral medication  Reports that Invega has worked well in the past but that he would like alternative antipsychotic medication, will continue to monitor  Gabapentin 300 mg 3 times daily dosing (04/11/2025)  Begin scheduled Klonopin 1 mg twice daily for severe anxiety and off label use for mood stabilization 4/13/2025  Begin trazodone 100 mg nightly for insomnia and as an adjunct for mood  Mild tetrahydrocannabinol (THC) abuse  Encourage cessation  Alcohol abuse, continuous  Encourage cessation  Mood insomnia (HCC)  Discontinue prior to admission Remeron due to noncompliance  Trrailed trazodone 100 mg nightly (received 1 dose on 04/07/2025)  Discontinued Restoril 15 mg nightly (04/10/2025) due to ineffectiveness  Melatonin 3 mg nightly  Rest of plan as per principal problem    Current medications:  Current Facility-Administered Medications   Medication Dose Route Frequency Provider Last Rate    acetaminophen  650 mg Oral Q4H PRN Reshma Braga MD      acetaminophen  650 mg Oral Q4H PRN Reshma Braga MD      acetaminophen  975 mg Oral Q6H PRN Reshma Braga MD      aluminum-magnesium hydroxide-simethicone  30 mL Oral Q4H PRN Reshma Braga MD      atorvastatin  10 mg Oral Daily With Dinner CARLOS Pollock       bisacodyl  10 mg Rectal Daily PRN Reshma Braga MD      Cholecalciferol  2,000 Units Oral Daily CARLOS Pollock      clonazePAM  1 mg Oral BID Kaveh Bucca, DO      folic acid  1 mg Oral Daily CARLOS Pollock      gabapentin  300 mg Oral TID Kaveh Bucca, DO      hydrOXYzine HCL  25 mg Oral Q6H PRN Max 4/day Reshma Braga MD      hydrOXYzine HCL  50 mg Oral Q6H PRN Max 4/day Reshma Braga MD      lithium carbonate  450 mg Oral Daily Kaveh Bucca, DO      lithium carbonate  600 mg Oral HS Kaveh Bucca, DO      LORazepam  1 mg Intramuscular BID PRN Kaveh Bucca, DO      LORazepam  1 mg Oral BID PRN Kaveh Bucca, DO      loxapine  50 mg Oral BID Kaveh Bucca, DO      melatonin  3 mg Oral HS Reshma Braga MD      multivitamin-minerals  1 tablet Oral Daily CARLOS Pollock      nicotine polacrilex  2 mg Oral Q4H PRN Reshma Braga MD      OLANZapine  5 mg Intramuscular Q3H PRN Max 3/day Reshma Braga MD      OLANZapine  2.5 mg Oral Q4H PRN Max 6/day Reshma Braga MD      OLANZapine  5 mg Oral Q4H PRN Max 3/day Reshma Braga MD      OLANZapine  5 mg Oral Q3H PRN Max 3/day Reshma Braga MD      phenazopyridine  200 mg Oral TID PRN CARLOS Pollock      polyethylene glycol  17 g Oral Daily PRN Reshma Braga MD      senna-docusate sodium  1 tablet Oral Daily PRN Reshma Braga MD      tamsulosin  0.4 mg Oral Daily With Dinner CARLOS Pollock      thiamine  100 mg Oral Daily CARLOS Pollock      traZODone  100 mg Oral HS Kaveh Bucca, DO      traZODone  50 mg Oral HS PRN Reshma Braga MD      triamcinolone   Topical BID CARLOS Pollock          Risks/Benefits of Treatment:     Risks, benefits, and possible side effects of medications explained to patient and patient verbalizes understanding and agreement for treatment.    Progress Toward Goals: no improvement    Treatment Planning:      - Encourage early mobility and having a structured  "day  - Provide frequent re-orientation, and cognitive stimulation  - Ensure assistive devices are in proper working order (eye-glasses, hearing aids)  - Encourage adequate hydration, nutrition and monitor bowel movements  - Maintain sleep-wake cycle: Uninterrupted sleep time; low-level lighting at night  - Fall precaution  - f/u SLIM recs regarding the medical problems   - Continue medication titration and treatment plan; adjust medication to optimize treatment response and as clinically indicated.   - Observation:Routine  - Legal Status: 201  - VS: as per unit protocol  - Encourage group attendance and milieu therapy  - Dispo: To be determined  - Long Stay Certification : Not Applicable  - Estimated Discharge Day: 4/28/2025     Subjective       Patient was visited on unit for continuing care; chart reviewed and discussed with multidisciplinary treatment team.     Patient remains disorganized, labile and on irritable edge, and requires frequent redirection and reorientation by the staff. Received as needed Ativan 1 mg yesterday afternoon for anxiety, trazodone 50 mg overnight for insomnia, and Zyprexa 5 mg this morning for agitation.    Patient accepted all offered medications and no adverse effects of medications noted or reported.    Per staff patient has been responding to internal stimuli, instigating arguments with peers on the unit, had poor sleep, agitated at times.  The patient was evaluated in his room today.  He continues to be bizarre, paranoid, on approach patient had his leg lifted up in the air placed behind his head.  He states \"I am stretching\" and then begins laughing hysterically.  Redirection provided.  Patient then became angry and tearful and stated \"it is time to talk about a discharge plan, the hospital is not can to do anything to help my mental health.\"  He then begins talking about police officers in his town and says they are \"trying to enter my mind.\"  Patient began crying and asking this " "writer to look up \"Julienne Martina\" so that we can \"find out what happened to my dad.\"  Patient endorsed depression and anxiety.  He reports energy is high.  Sleep was poor last night and patient is agreeable to starting trazodone tonight.  He begins yelling and is requesting \"Xanax\" and we discussed scheduling Klonopin to which patient was agreeable.     Sleep: insomnia  Appetite: increased  Medication side effects: No  ROS: review of systems as noted above in HPI/Subjective report, all other systems are negative    Objective :  Temp:  [98.1 °F (36.7 °C)-100.1 °F (37.8 °C)] 98.1 °F (36.7 °C)  HR:  [] 99  BP: (121-153)/(75-86) 153/86  Resp:  [18-20] 18  SpO2:  [93 %-95 %] 95 %  O2 Device: None (Room air)    Temp:  [98.1 °F (36.7 °C)-100.1 °F (37.8 °C)] 98.1 °F (36.7 °C)  HR:  [] 99  BP: (121-153)/(75-86) 153/86  Resp:  [18-20] 18  SpO2:  [93 %-95 %] 95 %  O2 Device: None (Room air)    Mental Status Evaluation:  Appearance:  disheveled, marginal hygiene   Behavior:  angry, bizarre, demanding, uncooperative, requires redirection   Speech:  loud, garbled   Mood:  dysphoric, anxious   Affect:  expansive, increased in intensity   Thought Process:  disorganized, illogical, impaired abstract reasoning   Associations: loose associations   Thought Content:  persecutory delusions, paranoid ideation, ruminating thoughts   Perceptual Disturbances: denies visual hallucinations when asked, appears distracted, appears preoccupied, talks to self at times   Risk Potential: Suicidal ideation - None at present  Homicidal ideation - None at present  Potential for aggression - Yes, due to acute psychosis   Sensorium:  oriented to person, place, and time/date   Memory:  recent and remote memory grossly intact   Consciousness:  alert and awake   Attention/Concentration: poor concentration and poor attention span   Insight:  limited   Judgment: limited   Gait/Station: normal gait/station, normal balance   Motor Activity: no " abnormal movements           Lab Results: I have reviewed the following results:  Most Recent Labs:   Lab Results   Component Value Date    WBC 7.15 04/06/2025    RBC 4.53 04/06/2025    HGB 13.3 04/06/2025    HCT 39.9 04/06/2025     04/06/2025    RDW 13.0 04/06/2025    NEUTROABS 5.39 04/06/2025    SODIUM 141 04/13/2025    K 4.4 04/13/2025     04/13/2025    CO2 30 04/13/2025    BUN 11 04/13/2025    CREATININE 0.88 04/13/2025    GLUC 91 04/13/2025    CALCIUM 9.3 04/13/2025    AST 21 04/07/2025    ALT 21 04/07/2025    ALKPHOS 46 04/07/2025    TP 6.4 04/07/2025    ALB 3.9 04/07/2025    TBILI 0.69 04/07/2025    CHOLESTEROL 112 04/07/2025    HDL 50 04/07/2025    TRIG 64 04/07/2025    LDLCALC 49 04/07/2025    NONHDLC 62 04/07/2025    VALPROICTOT <10 (L) 02/17/2024    LITHIUM 0.79 04/13/2025    AMMONIA 51 02/06/2021    SJP8SFXELLBN 1.798 04/07/2025    FREET4 0.97 02/17/2024    HGBA1C 5.7 (H) 04/07/2025     04/07/2025       Administrative Statements     Counseling / Coordination of Care:   Patient's progress discussed with staff in treatment team meeting.  Medication changes reviewed with staff in treatment team meeting..    Kaveh Lara DO 04/13/25

## 2025-04-13 NOTE — NURSING NOTE
Patient was given scheduled klonopin and he did say it is making him have a motor mouth and he feels light on his feet. He went to lay down at this time.

## 2025-04-13 NOTE — PLAN OF CARE
Problem: ANXIETY  Goal: Will report anxiety at manageable levels  Description: INTERVENTIONS:- Administer medication as ordered- Teach and encourage coping skills- Provide emotional support- Assess patient/family for anxiety and ability to cope  Outcome: Progressing  Goal: By discharge: Patient will verbalize 2 strategies to deal with anxiety  Description: Interventions:- Identify any obvious source/trigger to anxiety- Staff will assist patient in applying identified coping technique/skills- Encourage attendance of scheduled groups and activities  Outcome: Progressing     Problem: Risk for Violence/Aggression Toward Others  Goal: Treatment Goal: Refrain from acts of violence/aggression during length of stay, and demonstrate improved impulse control at the time of discharge  Outcome: Progressing  Goal: Verbalize thoughts and feelings  Description: Interventions:- Assess and re-assess patient's level of risk, every waking shift- Engage patient in 1:1 interactions, daily, for a minimum of 15 minutes - Allow patient to express feelings and frustrations in a safe and non-threatening manner - Establish rapport/trust with patient   Outcome: Progressing  Goal: Refrain from harming others  Outcome: Progressing  Goal: Refrain from destructive acts on the environment or property  Outcome: Progressing  Goal: Control angry outbursts  Description: Interventions:- Monitor patient closely, per order- Ensure early verbal de-escalation- Monitor prn medication needs- Set reasonable/therapeutic limits, outline behavioral expectations, and consequences - Provide a non-threatening milieu, utilizing the least restrictive interventions   Outcome: Progressing  Goal: Attend and participate in unit activities, including therapeutic, recreational, and educational groups  Description: Interventions:- Provide therapeutic and educational activities daily, encourage attendance and participation, and document same in the medical record   Outcome:  Progressing  Goal: Identify appropriate positive anger management techniques  Description: Interventions:- Offer anger management and coping skills groups - Staff will provide positive feedback for appropriate anger control  Outcome: Progressing

## 2025-04-14 RX ADMIN — Medication 1 TABLET: at 08:00

## 2025-04-14 RX ADMIN — Medication 2000 UNITS: at 08:00

## 2025-04-14 RX ADMIN — TRAZODONE HYDROCHLORIDE 100 MG: 100 TABLET ORAL at 21:08

## 2025-04-14 RX ADMIN — Medication 3 MG: at 21:08

## 2025-04-14 RX ADMIN — OLANZAPINE 5 MG: 5 TABLET, FILM COATED ORAL at 14:17

## 2025-04-14 RX ADMIN — TRIAMCINOLONE ACETONIDE 1 APPLICATION: 0.25 CREAM TOPICAL at 18:59

## 2025-04-14 RX ADMIN — LITHIUM CARBONATE 450 MG: 450 TABLET, EXTENDED RELEASE ORAL at 08:00

## 2025-04-14 RX ADMIN — TRIAMCINOLONE ACETONIDE: 0.25 CREAM TOPICAL at 11:52

## 2025-04-14 RX ADMIN — FOLIC ACID 1 MG: 1 TABLET ORAL at 08:00

## 2025-04-14 RX ADMIN — LORAZEPAM 1 MG: 2 INJECTION INTRAMUSCULAR; INTRAVENOUS at 17:08

## 2025-04-14 RX ADMIN — CLONAZEPAM 1 MG: 1 TABLET ORAL at 08:00

## 2025-04-14 RX ADMIN — LOXAPINE 50 MG: 50 CAPSULE ORAL at 08:00

## 2025-04-14 RX ADMIN — OLANZAPINE 5 MG: 5 TABLET, FILM COATED ORAL at 04:21

## 2025-04-14 RX ADMIN — TAMSULOSIN HYDROCHLORIDE 0.4 MG: 0.4 CAPSULE ORAL at 17:37

## 2025-04-14 RX ADMIN — LITHIUM CARBONATE 600 MG: 300 TABLET, FILM COATED, EXTENDED RELEASE ORAL at 21:07

## 2025-04-14 RX ADMIN — THIAMINE HCL TAB 100 MG 100 MG: 100 TAB at 08:00

## 2025-04-14 RX ADMIN — LOXAPINE 50 MG: 50 CAPSULE ORAL at 17:37

## 2025-04-14 RX ADMIN — GABAPENTIN 300 MG: 300 CAPSULE ORAL at 17:37

## 2025-04-14 RX ADMIN — GABAPENTIN 300 MG: 300 CAPSULE ORAL at 08:00

## 2025-04-14 RX ADMIN — GABAPENTIN 300 MG: 300 CAPSULE ORAL at 21:08

## 2025-04-14 RX ADMIN — ATORVASTATIN CALCIUM 10 MG: 10 TABLET, FILM COATED ORAL at 17:37

## 2025-04-14 NOTE — TREATMENT TEAM
04/14/25 0800   Team Meeting   Meeting Type Daily Rounds   Team Members Present   Team Members Present Physician;Nurse;;   Physician Team Member Dr. Braga / Dr. Lara / Dr. Royal / LINA Valero   Nursing Team Member Garth/Veena   Care Management Team Member Rashel   Social Work Team Member Saturnino   Patient/Family Present   Patient Present No   Patient's Family Present No     Patient is eating 100%, klonopin given with paradox reaction with evening dose, unsure if related. Patient awake at 2am. Zyprexa 5 PO, trazodone 100 HS, lithium level 0.79. Zyprexa at 0420 with good effect. Patient discharge is pending stabilization of mood and medications.

## 2025-04-14 NOTE — TREATMENT TEAM
04/14/25 1709   Fajardo Anxiety Scale   Anxious Mood 4   Tension 4   Fears 4   Insomnia 0   Intellectual 2   Depressed Mood 3   Somatic Complaints: Muscular 0   Somatic Complaints: Sensory 2   Cardiovascular Symptoms 2   Respiratory Symptoms 0   Gastrointestinal Symptoms 2   Genitourinary Symptoms 0   Autonomic Symptoms 3   Behavior at Interview 4   Fajardo Anxiety Score 30     PRN Ativan 1 mg given IM for increased agitation and anxiety with threatening behaviors.

## 2025-04-14 NOTE — PLAN OF CARE
Pt attends group and visible.   Problem: Alteration in Thoughts and Perception  Goal: Attend and participate in unit activities, including therapeutic, recreational, and educational groups  Description: Interventions:-Encourage Visitation and family involvement in care  Outcome: Progressing

## 2025-04-14 NOTE — TREATMENT TEAM
Pt attempts groups.  Pt restless and disorganized.  Pt unable to stay for duration.   Pt  not able to focus.      04/14/25 0900 04/14/25 1000 04/14/25 1100   Activity/Group Checklist   Group Exercise Other (Comment)  (Community meeting: self care checklist) Personal control   Attendance Attended Attended  (left early) Attended   Attendance Duration (min) 16-30 16-30 0-15   Interactions Disorganized interaction Disorganized interaction Disorganized interaction   Affect/Mood Appropriate Wide Appropriate   Goals Achieved Able to listen to others;Able to engage in interactions Identified feelings;Identified triggers;Identified relapse prevention strategies Identified feelings;Able to engage in interactions        04/14/25 1330   Activity/Group Checklist   Group Other (Comment)   Attendance Attended  (late)   Attendance Duration (min) 0-15   Interactions Disorganized interaction   Affect/Mood Constricted   Goals Achieved Identified feelings;Identified triggers;Identified relapse prevention strategies;Discussed coping strategies;Able to listen to others

## 2025-04-14 NOTE — PROGRESS NOTES
Progress Note - Behavioral Health   Name: Freddie Graham 65 y.o. male I MRN: [id number]  Unit/Bed#: [facility] 645-01 I Date of Admission: 4/6/2025   Date of Service: 4/14/2025 I Hospital Day: 8    Assessment & Plan  Schizoaffective disorder, bipolar type (HCC)  As of 04/14/2025 the patient continues to express significant disorganization in both his behavior and thought process.  Patient continues to have poor insight and judgment.  Interview was limited due to the significance of his disorganized thought process.    Continue lithium 450 mg daily with breakfast, increase HS dose to 600 mg nightly (4/8/2025)  Patient reports compliance with this medication  Lithium level 4/6/2025: 0.61 therapeutic  Labs 4/13/2025:   Lithium level 0.79  BMP no concerns  Continue loxapine to 50 mg twice daily (04/13/2025)  Patient not agreeable to long-acting injectable, prefers oral medication  Reports that Invega has worked well in the past but that he would like alternative antipsychotic medication, will continue to monitor  Gabapentin 300 mg 3 times daily dosing (04/11/2025)  Continue scheduled Klonopin 1 mg twice daily for severe anxiety and off label use for mood stabilization 4/13/2025  Continue trazodone 100 mg nightly for insomnia and as an adjunct for mood  Mild tetrahydrocannabinol (THC) abuse  Encourage cessation  Alcohol abuse, continuous  Encourage cessation  Mood insomnia (HCC)  Discontinue prior to admission Remeron due to noncompliance  Trrailed trazodone 100 mg nightly (received 1 dose on 04/07/2025)  Discontinued Restoril 15 mg nightly (04/10/2025) due to ineffectiveness  Melatonin 3 mg nightly  Rest of plan as per principal problem    Current medications:  Current Facility-Administered Medications   Medication Dose Route Frequency Provider Last Rate    acetaminophen  650 mg Oral Q4H PRN Reshma Braga MD      acetaminophen  650 mg Oral Q4H PRN Reshma Braga MD      acetaminophen  975 mg Oral Q6H PRN Reshma Braga MD       aluminum-magnesium hydroxide-simethicone  30 mL Oral Q4H PRN Reshma Braga MD      atorvastatin  10 mg Oral Daily With Dinner CARLOS Pollock      bisacodyl  10 mg Rectal Daily PRN Reshma Braga MD      Cholecalciferol  2,000 Units Oral Daily CARLOS Pollock      clonazePAM  1 mg Oral BID Kaveh Bucca, DO      folic acid  1 mg Oral Daily CARLOS Pollock      gabapentin  300 mg Oral TID Kaveh Bucca, DO      hydrOXYzine HCL  25 mg Oral Q6H PRN Max 4/day Reshma Braga MD      hydrOXYzine HCL  50 mg Oral Q6H PRN Max 4/day Reshma Braga MD      lithium carbonate  450 mg Oral Daily Kaveh Bucca, DO      lithium carbonate  600 mg Oral HS Kaveh Bucca, DO      LORazepam  1 mg Intramuscular BID PRN Kaveh Bucca, DO      LORazepam  1 mg Oral BID PRN Kaveh Bucca, DO      loxapine  50 mg Oral BID Kaveh Bucca, DO      melatonin  3 mg Oral HS Reshma Braga MD      multivitamin-minerals  1 tablet Oral Daily CARLOS Pollock      nicotine polacrilex  2 mg Oral Q4H PRN Reshma Braga MD      OLANZapine  5 mg Intramuscular Q3H PRN Max 3/day Reshma Braga MD      OLANZapine  2.5 mg Oral Q4H PRN Max 6/day Reshma Braga MD      OLANZapine  5 mg Oral Q4H PRN Max 3/day Reshma Braga MD      OLANZapine  5 mg Oral Q3H PRN Max 3/day Reshma Braga MD      phenazopyridine  200 mg Oral TID PRN CARLOS Pollock      polyethylene glycol  17 g Oral Daily PRN Reshma Braga MD      senna-docusate sodium  1 tablet Oral Daily PRN Reshma Braga MD      tamsulosin  0.4 mg Oral Daily With Dinner CARLOS Pollock      thiamine  100 mg Oral Daily CARLOS Pollock      traZODone  100 mg Oral HS Kaveh Bucca, DO      traZODone  50 mg Oral HS PRN Reshma Braga MD      triamcinolone   Topical BID CARLOS Pollock          Risks/Benefits of Treatment:     Risks, benefits, and possible side effects of medications explained to patient and patient verbalizes  "understanding and agreement for treatment.    Progress Toward Goals: improving    Treatment Planning:      - Encourage early mobility and having a structured day  - Provide frequent re-orientation, and cognitive stimulation  - Ensure assistive devices are in proper working order (eye-glasses, hearing aids)  - Encourage adequate hydration, nutrition and monitor bowel movements  - Maintain sleep-wake cycle: Uninterrupted sleep time; low-level lighting at night  - Fall precaution  - f/u SLIM recs regarding the medical problems   - Continue medication titration and treatment plan; adjust medication to optimize treatment response and as clinically indicated.   - Observation:Routine  - Legal Status: 201  - VS: as per unit protocol  - Encourage group attendance and milieu therapy  - Dispo: To be determined  - Long Stay Certification : Not Applicable  - Estimated Discharge Day: 4/28/2025     Subjective       Patient was visited on unit for continuing care; chart reviewed and discussed with multidisciplinary treatment team.     Patient continues to be visible in the milieu and interacts with staff and peers. No reports of aggression or self-injurious behavior on unit. No PRN medications used in the past 24 hours.  Nursing reported that the patient had a potential paradoxical reaction to his Klonopin.  Patient was noted to be awake until 0200 despite receiving Klonopin.    Patient accepted all offered medications and no adverse effects of medications noted or reported.    Throughout interview today patient was disorganized in thought process and behavior.  Patient was tangential and rambled about various subjects.  At times he appeared to be religiously preoccupied.  At one point during the interview the patient randomly stated to this writer \"suck cock in the hell sir.\"  Patient reported his mood today as \"like shit.\"  He did not expand on why he was feeling poorly.  When asked how the patient was sleeping he responded \"well " "enough to be on the block.\"  He endorses an adequate appetite.  Patient denied medication side effects.  Patient denied suicidal ideation.  When asked about homicidal ideation the patient responded \"sometimes I feel like I am not.\"  He clarified that he does not want to harm anyone.  When asked about auditory hallucinations he responded \"at least not internally.\"  When asked for clarification the patient refused to answer.  Patient denied visual hallucinations.      Sleep: normal  Appetite: normal  Medication side effects: No  ROS: review of systems as noted above in HPI/Subjective report, all other systems are negative    Objective :  Temp:  [98 °F (36.7 °C)-98.8 °F (37.1 °C)] 98 °F (36.7 °C)  HR:  [90-91] 90  BP: ()/(78-87) 146/87  Resp:  [18] 18  SpO2:  [93 %-94 %] 94 %  O2 Device: None (Room air)    Temp:  [98 °F (36.7 °C)-98.8 °F (37.1 °C)] 98 °F (36.7 °C)  HR:  [90-91] 90  BP: ()/(78-87) 146/87  Resp:  [18] 18  SpO2:  [93 %-94 %] 94 %  O2 Device: None (Room air)    Mental Status Evaluation:    Appearance:  disheveled, marginal hygiene, dressed in hospital attire, looks stated age   Behavior:  pleasant, bizarre   Speech:  normal rate, normal volume, normal pitch   Mood:  \"Like shit.\"   Affect:  Inappropriately bright at times but otherwise blunted   Thought Process:  disorganized, tangential   Thought Content:  Catholic preoccupation   Perceptual Disturbances: auditory hallucinations of voices.  Denied visual hallucinations.  Patient appears to be responding to internal stimuli.   Risk Potential: Suicidal Ideation -  None at present  Homicidal Ideation -  None at present  Potential for Aggression - Yes, due to poor impulse control   Sensorium:  oriented to person, place, and time/date   Memory:  recent and remote memory grossly intact   Consciousness:  alert and awake   Attention/Concentration: attention span and concentration are age appropriate   Insight:  poor   Judgment: poor   Gait/Station: " normal gait/station, normal balance   Motor Activity: no abnormal movements         Lab Results: I have reviewed the following results:  Results from the past 24 hours: No results found for this or any previous visit (from the past 24 hours).    Administrative Statements     Counseling / Coordination of Care:   Patient's progress discussed with staff in treatment team meeting.  Medication changes reviewed with staff in treatment team meeting..    Param Manjarrez DO 04/14/25  PGY-II

## 2025-04-14 NOTE — NURSING NOTE
Pt Labile with verbal aggression at times towards peers. Needed multiple redirection this evening. Had some tearful episodes, talking about the  bothering him and taking him away. Outburst during mealtime was directed to room to eat dinner as not to disturb others. Medication and meal compliant. Denies SI/HI. Endorses feeling safe on unit. Q 15 minute checks maintained.

## 2025-04-14 NOTE — NURSING NOTE
Pt irritable and confrontational with peers but redirectable.  Med-compliant with good appetite and steady gait.  Attended groups.  Guarded affect and talking to self at times.  Monitored for safety and support.

## 2025-04-14 NOTE — PLAN OF CARE
Problem: PSYCHOSIS  Goal: Will report no hallucinations or delusions  Description: Interventions:- Administer medication as  ordered- Every waking shifts and PRN assess for the presence of hallucinations and or delusions- Assist with reality testing to support increasing orientation- Assess if patient's hallucinations or delusions are encouraging self-harm or harm to others and intervene as appropriate  Outcome: Progressing     Problem: BEHAVIOR  Goal: Pt/Family maintain appropriate behavior and adhere to behavioral management agreement, if implemented  Description: INTERVENTIONS:- Assess the family dynamic - Encourage verbalization of thoughts and concerns in a socially appropriate manner- Assess patient/family's coping skills and non-compliant behavior (including use of illegal substances).- Utilize positive, consistent limit setting strategies supporting safety of patient, staff and others- Initiate consult with Case Management, Spiritual Care or other ancillary services as appropriate- If a patient's/visitor's behavior jeopardizes the safety of the patient, staff, or others, refer to organization procedure. - Notify Security of behavior or suspected illegal substances which indicate the need for search of the patient and/or belongings- Encourage participation in the decision making process about a behavioral management agreement; implement if patient meets criteria  Outcome: Progressing     Problem: ANXIETY  Goal: Will report anxiety at manageable levels  Description: INTERVENTIONS:- Administer medication as ordered- Teach and encourage coping skills- Provide emotional support- Assess patient/family for anxiety and ability to cope  Outcome: Progressing  Goal: By discharge: Patient will verbalize 2 strategies to deal with anxiety  Description: Interventions:- Identify any obvious source/trigger to anxiety- Staff will assist patient in applying identified coping technique/skills- Encourage attendance of  scheduled groups and activities  Outcome: Progressing     Problem: Alteration in Thoughts and Perception  Goal: Treatment Goal: Gain control of psychotic behaviors/thinking, reduce/eliminate presenting symptoms and demonstrate improved reality functioning upon discharge  Outcome: Progressing  Goal: Verbalize thoughts and feelings  Description: Interventions:- Promote a nonjudgmental and trusting relationship with the patient through active listening and therapeutic communication- Assess patient's level of functioning, behavior and potential for risk- Engage patient in 1 on 1 interactions- Encourage patient to express fears, feelings, frustrations, and discuss symptoms  - [place] patient to reality, help patient recognize reality-based thinking - Administer medications as ordered and assess for potential side effects- Provide the patient education related to the signs and symptoms of the illness and desired effects of prescribed medications  Outcome: Progressing  Goal: Refrain from acting on delusional thinking/internal stimuli  Description: Interventions:- Monitor patient closely, per order - Utilize least restrictive measures - Set reasonable limits, give positive feedback for acceptable - Administer medications as ordered and monitor of potential side effects  Outcome: Progressing  Goal: Agree to be compliant with medication regime, as prescribed and report medication side effects  Description: Interventions:- Offer appropriate PRN medication and supervise ingestion; conduct AIMS, as needed   Outcome: Progressing  Goal: Attend and participate in unit activities, including therapeutic, recreational, and educational groups  Description: Interventions:-Encourage Visitation and family involvement in care  Outcome: Progressing  Goal: Recognize dysfunctional thoughts, communicate reality-based thoughts at the time of discharge  Description: Interventions:- Provide medication and psycho-education to assist patient in  compliance and developing insight into his/her illness   Outcome: Progressing  Goal: Complete daily ADLs, including personal hygiene independently, as able  Description: Interventions:- Observe, teach, and assist patient with ADLS- Monitor and promote a balance of rest/activity, with adequate nutrition and elimination   Outcome: Progressing

## 2025-04-14 NOTE — TREATMENT TEAM
04/14/25 0417   Broset Violence Checklist   Assessment type Shift   Irritability 1   Confusion 1   Boisterousness 1   Threatening physical violence 0   Verbal threats 0   Violence 0   Broset score 3     Zyprexa 5 mg given PO for moderation agitation and increased irritable mood. Will F/U for effectiveness.

## 2025-04-14 NOTE — ASSESSMENT & PLAN NOTE
As of 04/14/2025 the patient continues to express significant disorganization in both his behavior and thought process.  Patient continues to have poor insight and judgment.  Interview was limited due to the significance of his disorganized thought process.    Continue lithium 450 mg daily with breakfast, increase HS dose to 600 mg nightly (4/8/2025)  Patient reports compliance with this medication  Lithium level 4/6/2025: 0.61 therapeutic  Labs 4/13/2025:   Lithium level 0.79  BMP no concerns  Continue loxapine to 50 mg twice daily (04/13/2025)  Patient not agreeable to long-acting injectable, prefers oral medication  Reports that Invega has worked well in the past but that he would like alternative antipsychotic medication, will continue to monitor  Gabapentin 300 mg 3 times daily dosing (04/11/2025)  Continue scheduled Klonopin 1 mg twice daily for severe anxiety and off label use for mood stabilization 4/13/2025  Continue trazodone 100 mg nightly for insomnia and as an adjunct for mood

## 2025-04-15 LAB
ALBUMIN SERPL BCG-MCNC: 4.1 G/DL (ref 3.5–5)
ALP SERPL-CCNC: 52 U/L (ref 34–104)
ALT SERPL W P-5'-P-CCNC: 21 U/L (ref 7–52)
ANION GAP SERPL CALCULATED.3IONS-SCNC: 4 MMOL/L (ref 4–13)
AST SERPL W P-5'-P-CCNC: 25 U/L (ref 13–39)
BASOPHILS # BLD AUTO: 0.04 THOUSANDS/ÂΜL (ref 0–0.1)
BASOPHILS NFR BLD AUTO: 1 % (ref 0–1)
BILIRUB SERPL-MCNC: 0.41 MG/DL (ref 0.2–1)
BILIRUB UR QL STRIP: NEGATIVE
BUN SERPL-MCNC: 15 MG/DL (ref 5–25)
CALCIUM SERPL-MCNC: 9.4 MG/DL (ref 8.4–10.2)
CHLORIDE SERPL-SCNC: 107 MMOL/L (ref 96–108)
CLARITY UR: CLEAR
CO2 SERPL-SCNC: 30 MMOL/L (ref 21–32)
COLOR UR: NORMAL
CREAT SERPL-MCNC: 0.98 MG/DL (ref 0.6–1.3)
EOSINOPHIL # BLD AUTO: 0.23 THOUSAND/ÂΜL (ref 0–0.61)
EOSINOPHIL NFR BLD AUTO: 4 % (ref 0–6)
ERYTHROCYTE [DISTWIDTH] IN BLOOD BY AUTOMATED COUNT: 12.8 % (ref 11.6–15.1)
GFR SERPL CREATININE-BSD FRML MDRD: 79 ML/MIN/1.73SQ M
GLUCOSE SERPL-MCNC: 103 MG/DL (ref 65–140)
GLUCOSE UR STRIP-MCNC: NEGATIVE MG/DL
HCT VFR BLD AUTO: 39.9 % (ref 36.5–49.3)
HGB BLD-MCNC: 12.9 G/DL (ref 12–17)
HGB UR QL STRIP.AUTO: NEGATIVE
IMM GRANULOCYTES # BLD AUTO: 0.01 THOUSAND/UL (ref 0–0.2)
IMM GRANULOCYTES NFR BLD AUTO: 0 % (ref 0–2)
KETONES UR STRIP-MCNC: NEGATIVE MG/DL
LEUKOCYTE ESTERASE UR QL STRIP: NEGATIVE
LITHIUM SERPL-SCNC: 0.63 MMOL/L (ref 0.6–1.2)
LYMPHOCYTES # BLD AUTO: 1.24 THOUSANDS/ÂΜL (ref 0.6–4.47)
LYMPHOCYTES NFR BLD AUTO: 22 % (ref 14–44)
MCH RBC QN AUTO: 29.2 PG (ref 26.8–34.3)
MCHC RBC AUTO-ENTMCNC: 32.3 G/DL (ref 31.4–37.4)
MCV RBC AUTO: 90 FL (ref 82–98)
MONOCYTES # BLD AUTO: 0.49 THOUSAND/ÂΜL (ref 0.17–1.22)
MONOCYTES NFR BLD AUTO: 9 % (ref 4–12)
NEUTROPHILS # BLD AUTO: 3.59 THOUSANDS/ÂΜL (ref 1.85–7.62)
NEUTS SEG NFR BLD AUTO: 64 % (ref 43–75)
NITRITE UR QL STRIP: NEGATIVE
NRBC BLD AUTO-RTO: 0 /100 WBCS
PH UR STRIP.AUTO: 7 [PH]
PLATELET # BLD AUTO: 174 THOUSANDS/UL (ref 149–390)
PMV BLD AUTO: 10.3 FL (ref 8.9–12.7)
POTASSIUM SERPL-SCNC: 4.2 MMOL/L (ref 3.5–5.3)
PROT SERPL-MCNC: 6.4 G/DL (ref 6.4–8.4)
PROT UR STRIP-MCNC: NEGATIVE MG/DL
RBC # BLD AUTO: 4.42 MILLION/UL (ref 3.88–5.62)
SODIUM SERPL-SCNC: 141 MMOL/L (ref 135–147)
SP GR UR STRIP.AUTO: 1.01 (ref 1–1.04)
UROBILINOGEN UA: NEGATIVE MG/DL
WBC # BLD AUTO: 5.6 THOUSAND/UL (ref 4.31–10.16)

## 2025-04-15 PROCEDURE — 81003 URINALYSIS AUTO W/O SCOPE: CPT

## 2025-04-15 PROCEDURE — 80053 COMPREHEN METABOLIC PANEL: CPT

## 2025-04-15 PROCEDURE — 80178 ASSAY OF LITHIUM: CPT

## 2025-04-15 PROCEDURE — 85025 COMPLETE CBC W/AUTO DIFF WBC: CPT

## 2025-04-15 RX ORDER — LITHIUM CARBONATE 300 MG/1
600 TABLET, FILM COATED, EXTENDED RELEASE ORAL
Status: DISCONTINUED | OUTPATIENT
Start: 2025-04-15 | End: 2025-04-25 | Stop reason: HOSPADM

## 2025-04-15 RX ORDER — LITHIUM CARBONATE 300 MG/1
300 TABLET, FILM COATED, EXTENDED RELEASE ORAL EVERY 12 HOURS SCHEDULED
Status: CANCELLED | OUTPATIENT
Start: 2025-04-15

## 2025-04-15 RX ORDER — CLONAZEPAM 0.5 MG/1
0.5 TABLET ORAL 2 TIMES DAILY
Status: DISCONTINUED | OUTPATIENT
Start: 2025-04-15 | End: 2025-04-15

## 2025-04-15 RX ADMIN — TRAZODONE HYDROCHLORIDE 100 MG: 100 TABLET ORAL at 21:08

## 2025-04-15 RX ADMIN — ATORVASTATIN CALCIUM 10 MG: 10 TABLET, FILM COATED ORAL at 17:12

## 2025-04-15 RX ADMIN — LITHIUM CARBONATE 450 MG: 450 TABLET, EXTENDED RELEASE ORAL at 08:47

## 2025-04-15 RX ADMIN — TRIAMCINOLONE ACETONIDE: 0.25 CREAM TOPICAL at 17:12

## 2025-04-15 RX ADMIN — GABAPENTIN 300 MG: 300 CAPSULE ORAL at 21:07

## 2025-04-15 RX ADMIN — LOXAPINE 50 MG: 50 CAPSULE ORAL at 08:46

## 2025-04-15 RX ADMIN — GABAPENTIN 300 MG: 300 CAPSULE ORAL at 08:47

## 2025-04-15 RX ADMIN — TRIAMCINOLONE ACETONIDE: 0.25 CREAM TOPICAL at 08:59

## 2025-04-15 RX ADMIN — THIAMINE HCL TAB 100 MG 100 MG: 100 TAB at 08:46

## 2025-04-15 RX ADMIN — TAMSULOSIN HYDROCHLORIDE 0.4 MG: 0.4 CAPSULE ORAL at 17:12

## 2025-04-15 RX ADMIN — Medication 1 TABLET: at 08:46

## 2025-04-15 RX ADMIN — FOLIC ACID 1 MG: 1 TABLET ORAL at 08:47

## 2025-04-15 RX ADMIN — Medication 2000 UNITS: at 08:46

## 2025-04-15 RX ADMIN — GABAPENTIN 300 MG: 300 CAPSULE ORAL at 16:12

## 2025-04-15 RX ADMIN — CLONAZEPAM 1 MG: 1 TABLET ORAL at 08:48

## 2025-04-15 RX ADMIN — Medication 3 MG: at 21:08

## 2025-04-15 NOTE — PROGRESS NOTES
Progress Note - Behavioral Health   Name: Freddie Graham 65 y.o. male I MRN: [id number]  Unit/Bed#: [facility] 645-01 I Date of Admission: 4/6/2025   Date of Service: 4/15/2025 I Hospital Day: 9    Assessment & Plan  Schizoaffective disorder, bipolar type (HCC)  As of 04/15/2025 the patient continues to be disorganized and bizarre.  He denies psychiatric symptoms but can be observed responding to internal stimuli.  Patient also has now developed significant sialorrhea and had 1 episode of incontinence.  He also reported new double vision.  He also continues to have an unsteady gait.  Due to the symptoms starting after change in medications lithium and loxapine were held and clonidine was discontinued.  CMP, CBC, UA with reflex, and lithium level labs were ordered.    Hold lithium 450 and 600 mg (Held on 04/15/2025)  Patient reports compliance with this medication  Lithium level 4/6/2025: 0.61 therapeutic  Labs 4/13/2025:   Lithium level 0.79  BMP no concerns  Hold loxapine 50 mg twice daily (04/15/2025)  Patient not agreeable to long-acting injectable, prefers oral medication  Reports that Invega has worked well in the past but that he would like alternative antipsychotic medication, will continue to monitor  Labs ordered on 04/15/2025:  CMP  CBC  UA with reflex  Lithium level  Gabapentin 300 mg 3 times daily dosing (04/11/2025)  Discontinue scheduled Klonopin twice daily for severe anxiety and off label use for mood stabilization 4/13/2025  Continue trazodone 100 mg nightly for insomnia and as an adjunct for mood  Mild tetrahydrocannabinol (THC) abuse  Encourage cessation  Alcohol abuse, continuous  Encourage cessation  Mood insomnia (HCC)  Per principal problem    Current medications:  Current Facility-Administered Medications   Medication Dose Route Frequency Provider Last Rate    acetaminophen  650 mg Oral Q4H PRN Reshma Braga MD      acetaminophen  650 mg Oral Q4H PRN Reshma Braga MD      acetaminophen  975 mg Oral  Q6H PRN Reshma Braga MD      aluminum-magnesium hydroxide-simethicone  30 mL Oral Q4H PRN Reshma Braga MD      atorvastatin  10 mg Oral Daily With Dinner CARLOS Pollock      bisacodyl  10 mg Rectal Daily PRN Reshma Braga MD      Cholecalciferol  2,000 Units Oral Daily Janet Espinoza, CARLOS      clonazePAM  1 mg Oral BID Kaveh Bucca, DO      folic acid  1 mg Oral Daily CARLOS Pollock      gabapentin  300 mg Oral TID Kaveh Bucca, DO      hydrOXYzine HCL  25 mg Oral Q6H PRN Max 4/day Reshma Braga MD      hydrOXYzine HCL  50 mg Oral Q6H PRN Max 4/day Reshma Braga MD      lithium carbonate  450 mg Oral Daily Akveh Bucca, DO      lithium carbonate  600 mg Oral HS Kaveh Bucca, DO      LORazepam  1 mg Intramuscular BID PRN Kaveh Bucca, DO      LORazepam  1 mg Oral BID PRN Kaveh Bucca, DO      loxapine  50 mg Oral BID Kaveh Bucca, DO      melatonin  3 mg Oral HS Reshma Braga MD      multivitamin-minerals  1 tablet Oral Daily CARLOS Pollock      nicotine polacrilex  2 mg Oral Q4H PRN Reshma Braga MD      OLANZapine  5 mg Intramuscular Q3H PRN Max 3/day Reshma Braga MD      OLANZapine  2.5 mg Oral Q4H PRN Max 6/day Reshma Braga MD      OLANZapine  5 mg Oral Q4H PRN Max 3/day Reshma Braga MD      OLANZapine  5 mg Oral Q3H PRN Max 3/day Reshma Braga MD      phenazopyridine  200 mg Oral TID PRN CARLOS Pollock      polyethylene glycol  17 g Oral Daily PRN Reshma Braga MD      senna-docusate sodium  1 tablet Oral Daily PRN Reshma Braga MD      tamsulosin  0.4 mg Oral Daily With Dinner Janet Espinoza, CARLOS      thiamine  100 mg Oral Daily CARLOS Pollock      traZODone  100 mg Oral HS Kaveh Bucca, DO      traZODone  50 mg Oral HS PRN Reshma Braga MD      triamcinolone   Topical BID CARLOS Pollock          Risks/Benefits of Treatment:     Risks, benefits, and possible side effects of medications explained to patient  "and patient verbalizes understanding and agreement for treatment.    Progress Toward Goals: Unchanged    Treatment Planning:      - Encourage early mobility and having a structured day  - Provide frequent re-orientation, and cognitive stimulation  - Ensure assistive devices are in proper working order (eye-glasses, hearing aids)  - Encourage adequate hydration, nutrition and monitor bowel movements  - Maintain sleep-wake cycle: Uninterrupted sleep time; low-level lighting at night  - Fall precaution  - f/u SLIM recs regarding the medical problems   - Continue medication titration and treatment plan; adjust medication to optimize treatment response and as clinically indicated.   - Observation:Routine  - Legal Status: 201  - VS: as per unit protocol  - Encourage group attendance and milieu therapy  - Dispo: To be determined  - Long Stay Certification : Not Applicable  - Estimated Discharge Day: 4/28/2025     Subjective       Patient was visited on unit for continuing care; chart reviewed and discussed with multidisciplinary treatment team.  On approach, the patient was calm and superficially cooperative, minimizing the sxs.    Patient continues to be visible in the milieu and interacts with staff and peers. No reports of aggression or self-injurious behavior on unit.  Per nursing, patient received Zyprexa 5 mg and Ativan after becoming briefly agitated.    Patient accepted all offered medications and no adverse effects of medications noted or reported.    Patient was observed to have an unsteady gait, was sialorrhea, and had 1 episode of incontinence shortly after interview.  He also reported double vision.  Patient was observed throughout the morning and since his symptoms persisted his medications were held and labs that are listed above were ordered.    Patient today initially stated that he \"feels like shit.\"  He explains that this is due to his neck and back pain.  However, he reports his mood is \"pretty good.\" " "He continues to be disorganized and bizarre. He denies auditory and visual hallucinations but can be observed responding to internal stimuli. He denied suicidal and homicidal ideation.  He endorsed adequate sleep and appetite.    Sleep: normal  Appetite: normal  Medication side effects: Yes - sialorrhea, incontinence, double vision, unsteady gait  ROS: review of systems as noted above in HPI/Subjective report, all other systems are negative    Objective :  Temp:  [97 °F (36.1 °C)-97.9 °F (36.6 °C)] 97.9 °F (36.6 °C)  HR:  [68-98] 98  BP: (125-163)/(82-87) 125/82  Resp:  [18] 18  SpO2:  [90 %-95 %] 90 %  O2 Device: None (Room air)    Temp:  [97 °F (36.1 °C)-97.9 °F (36.6 °C)] 97.9 °F (36.6 °C)  HR:  [68-98] 98  BP: (125-163)/(82-87) 125/82  Resp:  [18] 18  SpO2:  [90 %-95 %] 90 %  O2 Device: None (Room air)    Mental Status Evaluation:    Appearance:  disheveled, marginal hygiene, looks stated age, overweight, bearded   Behavior:  bizarre   Speech:  Slow rate, soft, slightly slurred   Mood:  \"Pretty good.\"   Affect:  blunted with occasional inappropriate smiling   Thought Process:  disorganized, tangential   Thought Content:  no overt delusions   Perceptual Disturbances: denies auditory or visual hallucinations when asked, but appears responding to internal stimuli   Risk Potential: Suicidal Ideation -  None at present  Homicidal Ideation -  None at present  Potential for Aggression - Yes, due to agitation   Sensorium:  oriented to person, place, and time/date   Memory:  recent and remote memory grossly intact   Consciousness:  alert and awake   Attention/Concentration: attention span and concentration are age appropriate   Insight:  poor   Judgment: poor   Gait/Station: unstable gait   Motor Activity: no abnormal movements         Lab Results: I have reviewed the following results:  Results from the past 24 hours:   Recent Results (from the past 24 hours)   CBC and differential    Collection Time: 04/15/25  3:07 PM "   Result Value Ref Range    WBC 5.60 4.31 - 10.16 Thousand/uL    RBC 4.42 3.88 - 5.62 Million/uL    Hemoglobin 12.9 12.0 - 17.0 g/dL    Hematocrit 39.9 36.5 - 49.3 %    MCV 90 82 - 98 fL    MCH 29.2 26.8 - 34.3 pg    MCHC 32.3 31.4 - 37.4 g/dL    RDW 12.8 11.6 - 15.1 %    MPV 10.3 8.9 - 12.7 fL    Platelets 174 149 - 390 Thousands/uL    nRBC 0 /100 WBCs    Segmented % 64 43 - 75 %    Immature Grans % 0 0 - 2 %    Lymphocytes % 22 14 - 44 %    Monocytes % 9 4 - 12 %    Eosinophils Relative 4 0 - 6 %    Basophils Relative 1 0 - 1 %    Absolute Neutrophils 3.59 1.85 - 7.62 Thousands/µL    Absolute Immature Grans 0.01 0.00 - 0.20 Thousand/uL    Absolute Lymphocytes 1.24 0.60 - 4.47 Thousands/µL    Absolute Monocytes 0.49 0.17 - 1.22 Thousand/µL    Eosinophils Absolute 0.23 0.00 - 0.61 Thousand/µL    Basophils Absolute 0.04 0.00 - 0.10 Thousands/µL       Administrative Statements     Counseling / Coordination of Care:   Patient's progress discussed with staff in treatment team meeting.  Medication changes reviewed with staff in treatment team meeting..    Param Manjarrez DO 04/15/25  PGY-II

## 2025-04-15 NOTE — CASE MANAGEMENT
Cm completed and faxed referral for Intensive  through [facility].     [facility] Fax - [phone]

## 2025-04-15 NOTE — ASSESSMENT & PLAN NOTE
As of 04/15/2025 the patient continues to be disorganized and bizarre.  He denies psychiatric symptoms but can be observed responding to internal stimuli.  Patient also has now developed significant sialorrhea and had 1 episode of incontinence.  He also reported new double vision.  He also continues to have an unsteady gait.  Due to the symptoms starting after change in medications lithium and loxapine were held and clonidine was discontinued.  CMP, CBC, UA with reflex, and lithium level labs were ordered.    Hold lithium 450 and 600 mg (Held on 04/15/2025)  Patient reports compliance with this medication  Lithium level 4/6/2025: 0.61 therapeutic  Labs 4/13/2025:   Lithium level 0.79  BMP no concerns  Hold loxapine 50 mg twice daily (04/15/2025)  Patient not agreeable to long-acting injectable, prefers oral medication  Reports that Invega has worked well in the past but that he would like alternative antipsychotic medication, will continue to monitor  Labs ordered on 04/15/2025:  CMP  CBC  UA with reflex  Lithium level  Gabapentin 300 mg 3 times daily dosing (04/11/2025)  Discontinue scheduled Klonopin twice daily for severe anxiety and off label use for mood stabilization 4/13/2025  Continue trazodone 100 mg nightly for insomnia and as an adjunct for mood

## 2025-04-15 NOTE — TREATMENT TEAM
04/15/25 0900 04/15/25 1000 04/15/25 1345   Activity/Group Checklist   Group Exercise Other (Comment)  (Group Art Therapy/Psychodynamic, Creatively Exploring Alternative Options followed by Process Discussion) Other (Comment)  (Reflection and gratitude)   Attendance Did not attend Attended Did not attend   Attendance Duration (min)  --  31-45  (90 min)  --    Interactions  --  Disorganized interaction  --    Affect/Mood  --  Appropriate  --    Goals Achieved  --  Able to listen to others;Able to engage in interactions  --

## 2025-04-15 NOTE — PROGRESS NOTES
04/15/25 0800   Team Meeting   Meeting Type Daily Rounds   Team Members Present   Team Members Present Physician;Nurse;   Physician Team Member Maria Luz/Mechelle/Marco/Lele/Kalie   Nursing Team Member Garth/Veena/Taty   Care Management Team Member Atif MEJIA   Social Work Team Member Saturnino   Patient/Family Present   Patient Present No   Patient's Family Present No     Patient attended four groups. His lithium level is 0.79. His room was changed as he has been distracting to his roommate. Given Zyprexa 5 at 14:17 for generalized threats. Given Ativan IM later in the evening due to increased agitation. He has been an instigator with other residents. Patient discharge is pending stabilization of mood and medications.

## 2025-04-15 NOTE — NURSING NOTE
Patient is alert able to make his needs known. He is pleasant on approach. He is medication and meal compliant. Patient is labile and loud at times. Patient appeared extremely tired this shift and had one episode of bladder incontinence. Provider is aware n/o noted. He attended one group. He denies all psych s/s. Will cont to monitor. Safety checks ongoing.

## 2025-04-15 NOTE — DISCHARGE INSTR - APPOINTMENTS
Behavioral Health Nurse Navigator, Mary or Leelee will be calling you after your discharge, on the phone number that you provided.  They will be available as an additional support, if needed.   If you wish to speak with Mary, you may contact her at [phone].

## 2025-04-15 NOTE — DISCHARGE INSTR - OTHER ORDERS
If you are experiencing a mental health emergency, you may call the [facility] Crisis Intervention Office 24 hours a day, 7 days per week at [phone].     In [facility], call [phone].     When you need someone to listen, the Warmline is available for 16 hours a day, 7 days a week, from the time of 7-10am and 2pm-2am.  It is not available from the hours of 2am-6am and 10am-2pm. A representative can be reached at [phone].

## 2025-04-15 NOTE — NURSING NOTE
Patient labile with extreme periods of verbal aggressiveness at times. Was given PRN which was effective for a few hours. Has been having increased disorientation and unsteadiness after arousing. Placed on a bed alarm for safety.Medication and meal compliant. Denies SI/HI. Q 15 minute checks maintained.

## 2025-04-15 NOTE — NURSING NOTE
Patient is alert able to make his needs known. It was reported to this nurse that the patient was being verbally aggressive towards two of his peers. Patient stated that he wanted to use the phone but the two ladies sat by the phone for over an hour and could not use it. Patient got very upset and started using profanity words. Patient was easily redirected. Will cont to monitor.

## 2025-04-15 NOTE — NURSING NOTE
Patient pulled on the cable from his posey bed alarm and was trying to break the bed alarm apart. Patient at this time is refusing bed alarm.

## 2025-04-15 NOTE — PLAN OF CARE
Problem: BEHAVIOR  Goal: Pt/Family maintain appropriate behavior and adhere to behavioral management agreement, if implemented  Description: INTERVENTIONS:- Assess the family dynamic - Encourage verbalization of thoughts and concerns in a socially appropriate manner- Assess patient/family's coping skills and non-compliant behavior (including use of illegal substances).- Utilize positive, consistent limit setting strategies supporting safety of patient, staff and others- Initiate consult with Case Management, Spiritual Care or other ancillary services as appropriate- If a patient's/visitor's behavior jeopardizes the safety of the patient, staff, or others, refer to organization procedure. - Notify Security of behavior or suspected illegal substances which indicate the need for search of the patient and/or belongings- Encourage participation in the decision making process about a behavioral management agreement; implement if patient meets criteria  4/15/2025 0349 by Alen Liang LPN  Outcome: Not Progressing  4/15/2025 0348 by Alen Liang LPN  Outcome: Progressing     Problem: INVOLUNTARY ADMIT  Goal: Will cooperate with staff recommendations and doctor's orders and will demonstrate appropriate behavior  Description: INTERVENTIONS:- Treat underlying conditions and offer medication as ordered- Educate regarding involuntary admission procedures and rules- Utilize positive consistent limit setting strategies to support patient and staff safety  4/15/2025 0349 by Alen Liang LPN  Outcome: Not Progressing  4/15/2025 0348 by Alen Liang LPN  Outcome: Progressing     Problem: Alteration in Thoughts and Perception  Goal: Treatment Goal: Gain control of psychotic behaviors/thinking, reduce/eliminate presenting symptoms and demonstrate improved reality functioning upon discharge  4/15/2025 0349 by Alen Liang LPN  Outcome: Not Progressing  4/15/2025 0348 by Alen Liang  LPN  Outcome: Progressing     Problem: Alteration in Thoughts and Perception  Goal: Agree to be compliant with medication regime, as prescribed and report medication side effects  Description: Interventions:- Offer appropriate PRN medication and supervise ingestion; conduct AIMS, as needed   4/15/2025 0349 by Alen Liang LPN  Outcome: Progressing  4/15/2025 0348 by Alen Liang LPN  Outcome: Progressing     Problem: Alteration in Thoughts and Perception  Goal: Attend and participate in unit activities, including therapeutic, recreational, and educational groups  Description: Interventions:-Encourage Visitation and family involvement in care  4/15/2025 0349 by Alen Liang LPN  Outcome: Progressing  4/15/2025 0348 by Alen Liang LPN  Outcome: Progressing

## 2025-04-16 RX ORDER — LITHIUM CARBONATE 450 MG
450 TABLET, EXTENDED RELEASE ORAL DAILY
Status: DISCONTINUED | OUTPATIENT
Start: 2025-04-16 | End: 2025-04-25 | Stop reason: HOSPADM

## 2025-04-16 RX ORDER — LOXAPINE SUCCINATE 50 MG/1
50 TABLET ORAL 2 TIMES DAILY
Status: DISCONTINUED | OUTPATIENT
Start: 2025-04-16 | End: 2025-04-17

## 2025-04-16 RX ORDER — GLYCOPYRROLATE 1 MG/1
1 TABLET ORAL 3 TIMES DAILY PRN
Status: DISCONTINUED | OUTPATIENT
Start: 2025-04-16 | End: 2025-04-17

## 2025-04-16 RX ADMIN — LORAZEPAM 1 MG: 1 TABLET ORAL at 23:55

## 2025-04-16 RX ADMIN — LITHIUM CARBONATE 600 MG: 300 TABLET, FILM COATED, EXTENDED RELEASE ORAL at 21:12

## 2025-04-16 RX ADMIN — TRIAMCINOLONE ACETONIDE: 0.25 CREAM TOPICAL at 08:32

## 2025-04-16 RX ADMIN — LOXAPINE 50 MG: 50 CAPSULE ORAL at 09:52

## 2025-04-16 RX ADMIN — OLANZAPINE 5 MG: 5 TABLET, FILM COATED ORAL at 09:52

## 2025-04-16 RX ADMIN — FOLIC ACID 1 MG: 1 TABLET ORAL at 08:31

## 2025-04-16 RX ADMIN — LOXAPINE 50 MG: 50 CAPSULE ORAL at 17:29

## 2025-04-16 RX ADMIN — Medication 2000 UNITS: at 08:31

## 2025-04-16 RX ADMIN — GABAPENTIN 300 MG: 300 CAPSULE ORAL at 08:31

## 2025-04-16 RX ADMIN — TRAZODONE HYDROCHLORIDE 100 MG: 100 TABLET ORAL at 21:12

## 2025-04-16 RX ADMIN — TAMSULOSIN HYDROCHLORIDE 0.4 MG: 0.4 CAPSULE ORAL at 16:48

## 2025-04-16 RX ADMIN — ATORVASTATIN CALCIUM 10 MG: 10 TABLET, FILM COATED ORAL at 16:48

## 2025-04-16 RX ADMIN — LITHIUM CARBONATE 450 MG: 450 TABLET, EXTENDED RELEASE ORAL at 09:52

## 2025-04-16 RX ADMIN — TRAZODONE HYDROCHLORIDE 50 MG: 50 TABLET ORAL at 01:17

## 2025-04-16 RX ADMIN — Medication 3 MG: at 21:12

## 2025-04-16 RX ADMIN — GABAPENTIN 300 MG: 300 CAPSULE ORAL at 21:12

## 2025-04-16 RX ADMIN — Medication 1 TABLET: at 08:31

## 2025-04-16 RX ADMIN — THIAMINE HCL TAB 100 MG 100 MG: 100 TAB at 08:31

## 2025-04-16 RX ADMIN — OLANZAPINE 5 MG: 5 TABLET, FILM COATED ORAL at 16:51

## 2025-04-16 RX ADMIN — GABAPENTIN 300 MG: 300 CAPSULE ORAL at 16:48

## 2025-04-16 NOTE — NURSING NOTE
"Patient irritable and mumbling when walking past peers. RN heard patient state \"Are you all ready to die tonight?\" Multiple reports by peers that patient is making them feel uncomfortable. Escorted to quiet room w/ dinner tray. PRN Zyprexa 5mg administered at 1651. Attempted to discuss inappropriate comments and expectations of behaviors. Patient not accepting and stated \"You're pretty.\" Results pending.   "

## 2025-04-16 NOTE — TREATMENT TEAM
04/16/25 1100   Activity/Group Checklist   Group Nursing Education  (Sleep hygiene held by [facility] students)   Attendance Attended

## 2025-04-16 NOTE — NURSING NOTE
"Patient showered w/ clothing on. Patient bizarre sitting in corner of room on radiator w/ long hair covering face and wearing wet clothing. When asked to put on dry clothes patient shouted \"I am!\" But remained seated on radiator. Patient later changed into dry clothing independently.   "

## 2025-04-16 NOTE — NURSING NOTE
Pt labile, disorganized and unsteady after arousing. He had tearful episodes. Pt preoccupied with Islam book in his room. Pt received PRN trazodone 50mg PO @ 0117am . Safety maintained. Will continue to monitor.

## 2025-04-16 NOTE — NURSING NOTE
PRN Zyprexa mildly effective. Patient less loud and disruptive. Remains irritable and easily agitated.

## 2025-04-16 NOTE — NURSING NOTE
Pt was visible on the hallway. Endorses anxiety, denies all other psych s/s. Pt responding his internal stimuli. Compliant with HS medications. Safety maintained. Will continue to monitor.

## 2025-04-16 NOTE — NURSING NOTE
Patient remains irritable, bizarre, and disorganized. Continues to make inappropriate comments that make others feel uncomfortable. Patient needs redirection. Currently pacing in and out of bedroom.

## 2025-04-16 NOTE — NURSING NOTE
PRN Zyprexa mildly effective. Patient remains in quiet room w/ door open talking about shooting people in the face and being a good shooter. Difficult to redirect.

## 2025-04-16 NOTE — PROGRESS NOTES
Progress Note - Behavioral Health   Name: Freddie Graham 65 y.o. male I MRN: [id number]  Unit/Bed#: [facility] 645-01 I Date of Admission: 4/6/2025   Date of Service: 4/16/2025 I Hospital Day: 10    Assessment & Plan  Schizoaffective disorder, bipolar type (HCC)  As of 04/16/2025 the patient appears to have a less unsteady gait, decrease in double vision, and less sialorrhea.  However, patient is still endorsing the symptoms.  Patient continues to be disorganized and slightly paranoid but is mostly pleasant.    Continue lithium 450 and 600 mg (Held on 04/15/2025)  Patient reports compliance with this medication  Lithium level 4/6/2025: 0.61 therapeutic  Labs 4/13/2025:   Lithium level 0.79  BMP no concerns  Continue loxapine 50 mg twice daily (04/15/2025)  Patient not agreeable to long-acting injectable, prefers oral medication  Reports that Invega has worked well in the past but that he would like alternative antipsychotic medication, will continue to monitor  Start glycopyrrolate 1 mg 3 times daily as needed for sialorrhea  Labs ordered on 04/15/2025:  CMP  Can CBC  UA with reflex  Lithium level  Gabapentin 300 mg 3 times daily dosing (04/11/2025)  Discontinue scheduled Klonopin twice daily for severe anxiety and off label use for mood stabilization 4/13/2025  Continue trazodone 100 mg nightly for insomnia and as an adjunct for mood  Mild tetrahydrocannabinol (THC) abuse  Encourage cessation  Alcohol abuse, continuous  Encourage cessation  Mood insomnia (HCC)  Per principal problem    Current medications:  Current Facility-Administered Medications   Medication Dose Route Frequency Provider Last Rate    acetaminophen  650 mg Oral Q4H PRN Reshma Braga MD      acetaminophen  650 mg Oral Q4H PRN Reshma Braga MD      acetaminophen  975 mg Oral Q6H PRN Reshma Braga MD      aluminum-magnesium hydroxide-simethicone  30 mL Oral Q4H PRN Reshma Braga MD      atorvastatin  10 mg Oral Daily With Dinner Janet Espinoza  CARLOS      bisacodyl  10 mg Rectal Daily PRN Reshma Braga MD      Cholecalciferol  2,000 Units Oral Daily CARLOS Pollock      folic acid  1 mg Oral Daily CARLOS Pollock      gabapentin  300 mg Oral TID Kaveh Bucca, DO      hydrOXYzine HCL  25 mg Oral Q6H PRN Max 4/day Reshma Braga MD      hydrOXYzine HCL  50 mg Oral Q6H PRN Max 4/day Reshma Braga MD      [Held by provider] lithium carbonate  450 mg Oral Daily Kaveh Bucca, DO      [Held by provider] lithium carbonate  600 mg Oral HS Param Manjarrez, DO      LORazepam  1 mg Intramuscular BID PRN Kaveh Bucca, DO      LORazepam  1 mg Oral BID PRN Kaveh Bucca, DO      [Held by provider] loxapine  50 mg Oral BID Kaveh Bucca, DO      melatonin  3 mg Oral HS Reshma Braga MD      multivitamin-minerals  1 tablet Oral Daily CARLOS Pollock      nicotine polacrilex  2 mg Oral Q4H PRN Reshma Braga MD      OLANZapine  5 mg Intramuscular Q3H PRN Max 3/day Reshma Braga MD      OLANZapine  2.5 mg Oral Q4H PRN Max 6/day Reshma Braga MD      OLANZapine  5 mg Oral Q4H PRN Max 3/day Reshma Braga MD      OLANZapine  5 mg Oral Q3H PRN Max 3/day Reshma Braga MD      phenazopyridine  200 mg Oral TID PRN CARLOS Pollock      polyethylene glycol  17 g Oral Daily PRN Reshma Braga MD      senna-docusate sodium  1 tablet Oral Daily PRN Reshma Braga MD      tamsulosin  0.4 mg Oral Daily With Dinner CARLOS Pollock      thiamine  100 mg Oral Daily CARLOS Pollock      traZODone  100 mg Oral HS Kaveh Bucca, DO      traZODone  50 mg Oral HS PRN Reshma Braga MD      triamcinolone   Topical BID CARLOS Pollock          Risks/Benefits of Treatment:     Risks, benefits, and possible side effects of medications explained to patient and patient verbalizes understanding and agreement for treatment.    Progress Toward Goals: improving    Treatment Planning:      - Encourage early mobility and having a  "structured day  - Provide frequent re-orientation, and cognitive stimulation  - Ensure assistive devices are in proper working order (eye-glasses, hearing aids)  - Encourage adequate hydration, nutrition and monitor bowel movements  - Maintain sleep-wake cycle: Uninterrupted sleep time; low-level lighting at night  - Fall precaution  - f/u SLIM recs regarding the medical problems   - Continue medication titration and treatment plan; adjust medication to optimize treatment response and as clinically indicated.   - Observation:Routine  - Legal Status: 201  - VS: as per unit protocol  - Encourage group attendance and milieu therapy  - Dispo: To be determined  - Long Stay Certification : Not Applicable  - Estimated Discharge Day: 4/28/2025     Subjective       Patient was visited on unit for continuing care; chart reviewed and discussed with multidisciplinary treatment team.     Patient continues to be visible in the milieu and interacts with staff and peers. No reports of aggression or self-injurious behavior on unit. No PRN medications used in the past 24 hours.    Patient accepted all offered medications and no adverse effects of medications noted or reported.    Patient stated today that his symptoms from yesterday have improved but he still remains somewhat unsteady, has occasional double vision, and occasionally has sialorrhea.  He describes his mood today as \"absolutely disgusted.\"  He explained he feels this way because \"this place is no place for an old soul.\"  Patient endorsed adequate sleep and appetite.  He denied medication side effects aside from what has previously been mentioned.  He denied auditory and visual hallucinations.  Despite denying hallucinations the patient could be heard talking to himself in his room.  He denied suicidal and homicidal ideation.    Sleep: normal  Appetite: normal  Medication side effects: No  ROS: review of systems as noted above in HPI/Subjective report, all other systems " "are negative    Objective :  Temp:  [97.5 °F (36.4 °C)-98.8 °F (37.1 °C)] 97.5 °F (36.4 °C)  HR:  [85-96] 96  BP: (129-140)/(83-87) 129/83  Resp:  [18] 18  SpO2:  [93 %-94 %] 94 %  O2 Device: None (Room air)    Temp:  [97.5 °F (36.4 °C)-98.8 °F (37.1 °C)] 97.5 °F (36.4 °C)  HR:  [85-96] 96  BP: (129-140)/(83-87) 129/83  Resp:  [18] 18  SpO2:  [93 %-94 %] 94 %  O2 Device: None (Room air)    Mental Status Evaluation:    Appearance:  disheveled, marginal hygiene, dressed in hospital attire, looks stated age   Behavior:  pleasant, bizarre   Speech:  Normal volume, slow rate   Mood:  \"Absolutely disgusted.\"   Affect:  labile   Thought Process:  disorganized   Thought Content:  some paranoia   Perceptual Disturbances: denies auditory or visual hallucinations when asked, but appears preoccupied   Risk Potential: Suicidal Ideation -  None at present  Homicidal Ideation -  None at present  Potential for Aggression - Yes, due to acute psychosis   Sensorium:  oriented to person, place, and time/date   Memory:  recent and remote memory grossly intact   Consciousness:  alert and awake   Attention/Concentration: attention span and concentration are age appropriate   Insight:  poor   Judgment: poor   Gait/Station: unstable gait   Motor Activity: no abnormal movements         Lab Results: I have reviewed the following results:  Results from the past 24 hours:   Recent Results (from the past 24 hours)   UA w Reflex to Microscopic w Reflex to Culture    Collection Time: 04/15/25  5:46 PM    Specimen: Urine, Clean Catch   Result Value Ref Range    Color, UA Straw Straw, Yellow, Pale Yellow    Clarity, UA Clear Clear, Other    Specific Gravity, UA 1.010 1.003 - 1.040    pH, UA 7.0 4.5, 5.0, 5.5, 6.0, 6.5, 7.0, 7.5, 8.0    Leukocytes, UA Negative Negative    Nitrite, UA Negative Negative    Protein, UA Negative Negative mg/dl    Glucose, UA Negative Negative mg/dl    Ketones, UA Negative Negative mg/dl    Bilirubin, UA Negative Negative "    Occult Blood, UA Negative Negative    UROBILINOGEN UA Negative 1.0, Negative mg/dL   Lithium level    Collection Time: 04/15/25  8:50 PM   Result Value Ref Range    Lithium Lvl 0.63 0.60 - 1.20 mmol/L     COVID19:   Lab Results   Component Value Date    SARSCOV2 Negative 06/18/2021       Administrative Statements     Counseling / Coordination of Care:   Patient's progress discussed with staff in treatment team meeting.  Medication changes reviewed with staff in treatment team meeting..    Param Manjarrez DO 04/16/25  PGY-II

## 2025-04-16 NOTE — PROGRESS NOTES
Pastoral Care Progress Note          Chaplaincy Interventions Utilized:   Empowerment: Encouraged focus on present, Encouraged self-care, Facilitated group experience, and Normalized experience of patient/family    Exploration: Explored hope, Explored emotional needs & resources, and Explored spiritual needs & resources    Relationship Building: Cultivated a relationship of care and support, Listened empathically, and Hospitality    The pt participated in spirituality group facilitated by the  today. Pt shared in group and became tearful but was encouraged by fellow group members.

## 2025-04-16 NOTE — NURSING NOTE
Patient loud and boisterous. Making barking noises and disrupting the milieu. PRN Zyprexa 5mg at 0952 for moderate agitation. Results pending.

## 2025-04-16 NOTE — TREATMENT TEAM
Pt attended groups.  Pt needed redirection for staring at another peer and also disorganized speech when others talking.     04/16/25 0900 04/16/25 1000   Activity/Group Checklist   Group Exercise Community meeting   Attendance Attended Attended  (late)   Attendance Duration (min) 31-45 31-45   Interactions Disorganized interaction Disorganized interaction   Affect/Mood Appropriate Incongruent  (needed rediretcion for staring at peer)   Goals Achieved Able to listen to others;Able to engage in interactions Identified triggers;Identified feelings;Identified relapse prevention strategies;Discussed coping strategies;Able to listen to others;Able to engage in interactions

## 2025-04-16 NOTE — PROGRESS NOTES
04/16/25 0800   Team Meeting   Meeting Type Daily Rounds   Team Members Present   Team Members Present Physician;Nurse;   Physician Team Member Marco/Kalie/Lele   Nursing Team Member Lavonne/Veena/Vianney   Care Management Team Member Atif   OT Team Member Blanca   Patient/Family Present   Patient Present No   Patient's Family Present No     Patient is a 201. He attended one group and states that he is tired. Lithium level is 0.63. Patient is still bizarre and tangential. He did not sleep but is eating 100% of meals. Labile and disorganized and responding internally. Klonopin potentially to be removed. Patient discharge is pending stabilization of mood and medications.

## 2025-04-16 NOTE — TREATMENT TEAM
04/16/25 1100   Activity/Group Checklist   Group Nursing Education  (Sleep hygiene held by [facility] students)   Attendance Did not attend

## 2025-04-16 NOTE — ASSESSMENT & PLAN NOTE
As of 04/16/2025 the patient appears to have a less unsteady gait, decrease in double vision, and less sialorrhea.  However, patient is still endorsing the symptoms.  Patient continues to be disorganized and slightly paranoid but is mostly pleasant.    Continue lithium 450 and 600 mg (Held on 04/15/2025)  Patient reports compliance with this medication  Lithium level 4/6/2025: 0.61 therapeutic  Labs 4/13/2025:   Lithium level 0.79  BMP no concerns  Continue loxapine 50 mg twice daily (04/15/2025)  Patient not agreeable to long-acting injectable, prefers oral medication  Reports that Invega has worked well in the past but that he would like alternative antipsychotic medication, will continue to monitor  Start glycopyrrolate 1 mg 3 times daily as needed for sialorrhea  Labs ordered on 04/15/2025:  CMP  Can CBC  UA with reflex  Lithium level  Gabapentin 300 mg 3 times daily dosing (04/11/2025)  Discontinue scheduled Klonopin twice daily for severe anxiety and off label use for mood stabilization 4/13/2025  Continue trazodone 100 mg nightly for insomnia and as an adjunct for mood

## 2025-04-16 NOTE — PLAN OF CARE
Problem: ANXIETY  Goal: Will report anxiety at manageable levels  Description: INTERVENTIONS:- Administer medication as ordered- Teach and encourage coping skills- Provide emotional support- Assess patient/family for anxiety and ability to cope  Outcome: Progressing  Goal: By discharge: Patient will verbalize 2 strategies to deal with anxiety  Description: Interventions:- Identify any obvious source/trigger to anxiety- Staff will assist patient in applying identified coping technique/skills- Encourage attendance of scheduled groups and activities  Outcome: Progressing     Problem: Alteration in Thoughts and Perception  Goal: Treatment Goal: Gain control of psychotic behaviors/thinking, reduce/eliminate presenting symptoms and demonstrate improved reality functioning upon discharge  Outcome: Progressing  Goal: Verbalize thoughts and feelings  Description: Interventions:- Promote a nonjudgmental and trusting relationship with the patient through active listening and therapeutic communication- Assess patient's level of functioning, behavior and potential for risk- Engage patient in 1 on 1 interactions- Encourage patient to express fears, feelings, frustrations, and discuss symptoms  - [place] patient to reality, help patient recognize reality-based thinking - Administer medications as ordered and assess for potential side effects- Provide the patient education related to the signs and symptoms of the illness and desired effects of prescribed medications  Outcome: Progressing  Goal: Refrain from acting on delusional thinking/internal stimuli  Description: Interventions:- Monitor patient closely, per order - Utilize least restrictive measures - Set reasonable limits, give positive feedback for acceptable - Administer medications as ordered and monitor of potential side effects  Outcome: Progressing  Goal: Agree to be compliant with medication regime, as prescribed and report medication side effects  Description:  Interventions:- Offer appropriate PRN medication and supervise ingestion; conduct AIMS, as needed   Outcome: Progressing

## 2025-04-16 NOTE — NURSING NOTE
Patient is loud, labile, bizarre, and instigating arguments w/ peers. Visible and isolative to self. Med and meal compliant. Attended exercise and community meeting. Patient observed responding to internal stimuli and easily agitated. Needed redirection multiple times from specific male peer. Continuous rounding maintained.

## 2025-04-17 RX ORDER — CHLORPROMAZINE HYDROCHLORIDE 50 MG/1
50 TABLET, FILM COATED ORAL
Status: DISCONTINUED | OUTPATIENT
Start: 2025-04-17 | End: 2025-04-17

## 2025-04-17 RX ORDER — CHLORPROMAZINE HCI 25 MG/ML
50 INJECTION INTRAMUSCULAR
Status: DISCONTINUED | OUTPATIENT
Start: 2025-04-17 | End: 2025-04-17

## 2025-04-17 RX ORDER — LOXAPINE SUCCINATE 25 MG/1
25 TABLET ORAL ONCE
Status: CANCELLED | OUTPATIENT
Start: 2025-04-17 | End: 2025-04-17

## 2025-04-17 RX ORDER — CHLORPROMAZINE HCI 25 MG/ML
50 INJECTION INTRAMUSCULAR EVERY 6 HOURS PRN
Status: DISCONTINUED | OUTPATIENT
Start: 2025-04-17 | End: 2025-04-25 | Stop reason: HOSPADM

## 2025-04-17 RX ORDER — CHLORPROMAZINE HYDROCHLORIDE 25 MG/1
25 TABLET, FILM COATED ORAL EVERY 6 HOURS
Status: DISCONTINUED | OUTPATIENT
Start: 2025-04-17 | End: 2025-04-17

## 2025-04-17 RX ORDER — CHLORPROMAZINE HYDROCHLORIDE 25 MG/1
25 TABLET, FILM COATED ORAL EVERY 6 HOURS PRN
Status: DISCONTINUED | OUTPATIENT
Start: 2025-04-17 | End: 2025-04-25 | Stop reason: HOSPADM

## 2025-04-17 RX ORDER — GLYCOPYRROLATE 1 MG/1
1 TABLET ORAL 3 TIMES DAILY
Status: DISCONTINUED | OUTPATIENT
Start: 2025-04-17 | End: 2025-04-21

## 2025-04-17 RX ORDER — WATER 10 ML/10ML
INJECTION INTRAMUSCULAR; INTRAVENOUS; SUBCUTANEOUS
Status: COMPLETED
Start: 2025-04-17 | End: 2025-04-17

## 2025-04-17 RX ORDER — CHLORPROMAZINE HYDROCHLORIDE 50 MG/1
50 TABLET, FILM COATED ORAL EVERY 6 HOURS PRN
Status: DISCONTINUED | OUTPATIENT
Start: 2025-04-17 | End: 2025-04-17

## 2025-04-17 RX ORDER — CHLORPROMAZINE HYDROCHLORIDE 50 MG/1
50 TABLET, FILM COATED ORAL
Status: DISCONTINUED | OUTPATIENT
Start: 2025-04-17 | End: 2025-04-25 | Stop reason: HOSPADM

## 2025-04-17 RX ADMIN — TRAZODONE HYDROCHLORIDE 100 MG: 100 TABLET ORAL at 21:31

## 2025-04-17 RX ADMIN — GABAPENTIN 300 MG: 300 CAPSULE ORAL at 21:31

## 2025-04-17 RX ADMIN — Medication 2000 UNITS: at 09:11

## 2025-04-17 RX ADMIN — GABAPENTIN 300 MG: 300 CAPSULE ORAL at 15:14

## 2025-04-17 RX ADMIN — GLYCOPYRROLATE 1 MG: 1 TABLET ORAL at 15:14

## 2025-04-17 RX ADMIN — GLYCOPYRROLATE 1 MG: 1 TABLET ORAL at 21:51

## 2025-04-17 RX ADMIN — TAMSULOSIN HYDROCHLORIDE 0.4 MG: 0.4 CAPSULE ORAL at 17:33

## 2025-04-17 RX ADMIN — LITHIUM CARBONATE 450 MG: 450 TABLET, EXTENDED RELEASE ORAL at 09:11

## 2025-04-17 RX ADMIN — OLANZAPINE 5 MG: 10 INJECTION, POWDER, FOR SOLUTION INTRAMUSCULAR at 10:01

## 2025-04-17 RX ADMIN — FOLIC ACID 1 MG: 1 TABLET ORAL at 09:11

## 2025-04-17 RX ADMIN — LOXAPINE 75 MG: 25 CAPSULE ORAL at 17:33

## 2025-04-17 RX ADMIN — Medication 1 TABLET: at 09:11

## 2025-04-17 RX ADMIN — Medication 3 MG: at 21:31

## 2025-04-17 RX ADMIN — TRIAMCINOLONE ACETONIDE: 0.25 CREAM TOPICAL at 09:13

## 2025-04-17 RX ADMIN — LITHIUM CARBONATE 600 MG: 300 TABLET, FILM COATED, EXTENDED RELEASE ORAL at 21:31

## 2025-04-17 RX ADMIN — GABAPENTIN 300 MG: 300 CAPSULE ORAL at 09:11

## 2025-04-17 RX ADMIN — THIAMINE HCL TAB 100 MG 100 MG: 100 TAB at 09:11

## 2025-04-17 RX ADMIN — WATER 10 ML: 1 INJECTION, SOLUTION INTRAMUSCULAR; INTRAVENOUS; SUBCUTANEOUS at 10:01

## 2025-04-17 RX ADMIN — ATORVASTATIN CALCIUM 10 MG: 10 TABLET, FILM COATED ORAL at 17:33

## 2025-04-17 RX ADMIN — LOXAPINE 50 MG: 50 CAPSULE ORAL at 09:11

## 2025-04-17 NOTE — NURSING NOTE
PRN Zyprexa IM moderately effective. Patient less loud and disruptive. Remains bizarre, easily irritable, and intrusive. Currently pacing halls.

## 2025-04-17 NOTE — NURSING NOTE
Patient was visible in the milieu with no peer interaction with layered clothes. Denies all psych s/s. No behaviors noted but remained bizarre and disheveled. C/o left hip pain 10/10 but declined tylenol offered. No needs expressed. Took his HS medications. Safety checks ongoing.

## 2025-04-17 NOTE — NURSING NOTE
Patient mumbling and making comments towards others. Behavior remains inappropriate for milieu. PRN Zyprexa 5mg IM administered at 1001. Results pending. Patient in quiet room w/ door open.

## 2025-04-17 NOTE — PROGRESS NOTES
Progress Note - Behavioral Health   Name: Freddie Graham 65 y.o. male I MRN: [id number]  Unit/Bed#: [facility] 645-01 I Date of Admission: 4/6/2025   Date of Service: 4/17/2025 I Hospital Day: 11    Assessment & Plan  Schizoaffective disorder, bipolar type (HCC)  As of 04/17/2025 the patient was agitated, guarded, and argumentative.  Patient does appear to have.  He continues to endorse intermittent double vision and sialorrhea.  He agreed to schedule glycopyrrolate to address the sialorrhea.  Patient received multiple as needed Zyprexa in the past 24 hours.  The Zyprexa did appear to be ineffective so his PRNs for Zyprexa will be changed to Thorazine.    Continue lithium 450 and 600 mg (Held on 04/15/2025)  Patient reports compliance with this medication  Lithium level 4/6/2025: 0.61 therapeutic  Labs 4/13/2025:   Lithium level 0.79  BMP no concerns  Increase loxapine to 75 mg twice daily (04/15/2025)  Patient not agreeable to long-acting injectable, prefers oral medication  Reports that Invega has worked well in the past but that he would like alternative antipsychotic medication, will continue to monitor  Schedule glycopyrrolate 1 mg 3 times daily for sialorrhea  Labs ordered on 04/15/2025:  CMP  Can CBC  UA with reflex  Lithium level  Gabapentin 300 mg 3 times daily dosing (04/11/2025)  Discontinue scheduled Klonopin twice daily for severe anxiety and off label use for mood stabilization 4/13/2025  Continue trazodone 100 mg nightly for insomnia and as an adjunct for mood  Mild tetrahydrocannabinol (THC) abuse  Encourage cessation  Alcohol abuse, continuous  Encourage cessation  Mood insomnia (HCC)  Per principal problem    Current medications:  Current Facility-Administered Medications   Medication Dose Route Frequency Provider Last Rate    acetaminophen  650 mg Oral Q4H PRN Reshma Braga MD      acetaminophen  650 mg Oral Q4H PRN Reshma Braga MD      acetaminophen  975 mg Oral Q6H PRN Reshma Braga MD       aluminum-magnesium hydroxide-simethicone  30 mL Oral Q4H PRN Reshma Braga MD      atorvastatin  10 mg Oral Daily With Dinner CARLOS Pollock      bisacodyl  10 mg Rectal Daily PRN Reshma Braga MD      Cholecalciferol  2,000 Units Oral Daily CARLOS Pollock      folic acid  1 mg Oral Daily CARLOS Pollock      gabapentin  300 mg Oral TID Kaveh Bucca, DO      glycopyrrolate  1 mg Oral TID PRN Param Manjarrez, DO      hydrOXYzine HCL  25 mg Oral Q6H PRN Max 4/day Reshma Braga MD      hydrOXYzine HCL  50 mg Oral Q6H PRN Max 4/day Reshma Braga MD      lithium carbonate  450 mg Oral Daily Kaveh Bucca, DO      lithium carbonate  600 mg Oral HS Kaveh Bucca, DO      LORazepam  1 mg Intramuscular BID PRN Kaveh Bucca, DO      LORazepam  1 mg Oral BID PRN Kaveh Bucca, DO      loxapine  50 mg Oral BID Kaveh Bucca, DO      melatonin  3 mg Oral HS Reshma Braga MD      multivitamin-minerals  1 tablet Oral Daily CARLOS Pollock      nicotine polacrilex  2 mg Oral Q4H PRN Reshma Braga MD      OLANZapine  5 mg Intramuscular Q3H PRN Max 3/day Reshma Braga MD      OLANZapine  2.5 mg Oral Q4H PRN Max 6/day Reshma Braga MD      OLANZapine  5 mg Oral Q4H PRN Max 3/day Reshma Braga MD      OLANZapine  5 mg Oral Q3H PRN Max 3/day Reshma Braga MD      phenazopyridine  200 mg Oral TID PRN CARLOS Pollock      polyethylene glycol  17 g Oral Daily PRN Reshma Braga MD      senna-docusate sodium  1 tablet Oral Daily PRN Reshma Braga MD      tamsulosin  0.4 mg Oral Daily With Dinner CARLOS Pollock      thiamine  100 mg Oral Daily CARLOS Pollock      traZODone  100 mg Oral HS Kaveh Bucca, DO      traZODone  50 mg Oral HS PRN Reshma Braga MD      triamcinolone   Topical BID CARLOS Pollock          Risks/Benefits of Treatment:     Risks, benefits, and possible side effects of medications explained to patient and patient verbalizes  "understanding and agreement for treatment.    Progress Toward Goals: Unchanged    Treatment Planning:      - Encourage early mobility and having a structured day  - Provide frequent re-orientation, and cognitive stimulation  - Ensure assistive devices are in proper working order (eye-glasses, hearing aids)  - Encourage adequate hydration, nutrition and monitor bowel movements  - Maintain sleep-wake cycle: Uninterrupted sleep time; low-level lighting at night  - Fall precaution  - f/u SLIM recs regarding the medical problems   - Continue medication titration and treatment plan; adjust medication to optimize treatment response and as clinically indicated.   - Observation:Routine  - Legal Status: 201  - VS: as per unit protocol  - Encourage group attendance and milieu therapy  - Dispo: To be determined  - Long Stay Certification : Not Applicable  - Estimated Discharge Day: 4/28/2025     Subjective       Patient was visited on unit for continuing care; chart reviewed and discussed with multidisciplinary treatment team.     Patient continues to be visible in the milieu and interacts with staff and peers.  Patient had multiple periods of agitation and aggression requiring multiple PRNs.  It was also reported that the patient only slept for 2 hours and was disruptive at night.    Patient accepted all offered medications and no adverse effects of medications noted or reported.    Today during interview patient was more agitated and guarded than days prior.  Patient was also argumentative and on multiple occasions talked over this writer.  Patient was perseverative about being discharged.  Patient described his mood as \"better.\"  He endorsed adequate sleep despite nursing reports that he only slept 2 hours.  Patient also stated regarding sleep \"I like to be up early to start the day.\"  Patient endorsed an adequate appetite.  Patient continues to endorse intermittent drooling and double vision.  He denied suicidal and " "homicidal ideation.  He denied auditory visual hallucinations.    Sleep: normal  Appetite: normal  Medication side effects: No  ROS: review of systems as noted above in HPI/Subjective report, all other systems are negative    Objective :  Temp:  [96.6 °F (35.9 °C)-97.7 °F (36.5 °C)] 96.6 °F (35.9 °C)  HR:  [85-90] 85  BP: (154-157)/(88-96) 157/88  Resp:  [17-18] 18  SpO2:  [92 %-95 %] 94 %  O2 Device: None (Room air)    Temp:  [96.6 °F (35.9 °C)-97.7 °F (36.5 °C)] 96.6 °F (35.9 °C)  HR:  [85-90] 85  BP: (154-157)/(88-96) 157/88  Resp:  [17-18] 18  SpO2:  [92 %-95 %] 94 %  O2 Device: None (Room air)    Mental Status Evaluation:    Appearance:  disheveled, marginal hygiene, wearing hospital clothes, looks stated age   Behavior:  agitated, guarded   Speech:  Slow rate, normal volume   Mood:  \"Better\"   Affect:  blunted   Thought Process:  disorganized   Thought Content:  paranoid ideation   Perceptual Disturbances: denies auditory or visual hallucinations when asked, but appears responding to internal stimuli   Risk Potential: Suicidal Ideation -  None at present  Homicidal Ideation -  None at present  Potential for Aggression - No   Sensorium:  oriented to person, place, and time/date   Memory:  recent and remote memory grossly intact   Consciousness:  alert and awake   Attention/Concentration: attention span and concentration are age appropriate   Insight:  poor   Judgment: poor   Gait/Station: normal gait/station, normal balance   Motor Activity: no abnormal movements         Lab Results: I have reviewed the following results:  Results from the past 24 hours: No results found for this or any previous visit (from the past 24 hours).    Administrative Statements     Counseling / Coordination of Care:   Patient's progress discussed with staff in treatment team meeting.  Medication changes reviewed with staff in treatment team meeting..    Param Manjarrez DO 04/17/25  PGY-II  "

## 2025-04-17 NOTE — PLAN OF CARE
Problem: PSYCHOSIS  Goal: Will report no hallucinations or delusions  Description: Interventions:- Administer medication as  ordered- Every waking shifts and PRN assess for the presence of hallucinations and or delusions- Assist with reality testing to support increasing orientation- Assess if patient's hallucinations or delusions are encouraging self-harm or harm to others and intervene as appropriate  Outcome: Progressing     Problem: BEHAVIOR  Goal: Pt/Family maintain appropriate behavior and adhere to behavioral management agreement, if implemented  Description: INTERVENTIONS:- Assess the family dynamic - Encourage verbalization of thoughts and concerns in a socially appropriate manner- Assess patient/family's coping skills and non-compliant behavior (including use of illegal substances).- Utilize positive, consistent limit setting strategies supporting safety of patient, staff and others- Initiate consult with Case Management, Spiritual Care or other ancillary services as appropriate- If a patient's/visitor's behavior jeopardizes the safety of the patient, staff, or others, refer to organization procedure. - Notify Security of behavior or suspected illegal substances which indicate the need for search of the patient and/or belongings- Encourage participation in the decision making process about a behavioral management agreement; implement if patient meets criteria  Outcome: Progressing     Problem: ANXIETY  Goal: Will report anxiety at manageable levels  Description: INTERVENTIONS:- Administer medication as ordered- Teach and encourage coping skills- Provide emotional support- Assess patient/family for anxiety and ability to cope  Outcome: Progressing  Goal: By discharge: Patient will verbalize 2 strategies to deal with anxiety  Description: Interventions:- Identify any obvious source/trigger to anxiety- Staff will assist patient in applying identified coping technique/skills- Encourage attendance of  scheduled groups and activities  Outcome: Progressing     Problem: INVOLUNTARY ADMIT  Goal: Will cooperate with staff recommendations and doctor's orders and will demonstrate appropriate behavior  Description: INTERVENTIONS:- Treat underlying conditions and offer medication as ordered- Educate regarding involuntary admission procedures and rules- Utilize positive consistent limit setting strategies to support patient and staff safety  Outcome: Progressing     Problem: Alteration in Thoughts and Perception  Goal: Treatment Goal: Gain control of psychotic behaviors/thinking, reduce/eliminate presenting symptoms and demonstrate improved reality functioning upon discharge  Outcome: Progressing  Goal: Verbalize thoughts and feelings  Description: Interventions:- Promote a nonjudgmental and trusting relationship with the patient through active listening and therapeutic communication- Assess patient's level of functioning, behavior and potential for risk- Engage patient in 1 on 1 interactions- Encourage patient to express fears, feelings, frustrations, and discuss symptoms  - [place] patient to reality, help patient recognize reality-based thinking - Administer medications as ordered and assess for potential side effects- Provide the patient education related to the signs and symptoms of the illness and desired effects of prescribed medications  Outcome: Progressing  Goal: Refrain from acting on delusional thinking/internal stimuli  Description: Interventions:- Monitor patient closely, per order - Utilize least restrictive measures - Set reasonable limits, give positive feedback for acceptable - Administer medications as ordered and monitor of potential side effects  Outcome: Progressing  Goal: Agree to be compliant with medication regime, as prescribed and report medication side effects  Description: Interventions:- Offer appropriate PRN medication and supervise ingestion; conduct AIMS, as needed   Outcome:  Progressing  Goal: Attend and participate in unit activities, including therapeutic, recreational, and educational groups  Description: Interventions:-Encourage Visitation and family involvement in care  Outcome: Progressing  Goal: Recognize dysfunctional thoughts, communicate reality-based thoughts at the time of discharge  Description: Interventions:- Provide medication and psycho-education to assist patient in compliance and developing insight into his/her illness   Outcome: Progressing  Goal: Complete daily ADLs, including personal hygiene independently, as able  Description: Interventions:- Observe, teach, and assist patient with ADLS- Monitor and promote a balance of rest/activity, with adequate nutrition and elimination   Outcome: Progressing     Problem: Risk for Violence/Aggression Toward Others  Goal: Treatment Goal: Refrain from acts of violence/aggression during length of stay, and demonstrate improved impulse control at the time of discharge  Outcome: Progressing  Goal: Verbalize thoughts and feelings  Description: Interventions:- Assess and re-assess patient's level of risk, every waking shift- Engage patient in 1:1 interactions, daily, for a minimum of 15 minutes - Allow patient to express feelings and frustrations in a safe and non-threatening manner - Establish rapport/trust with patient   Outcome: Progressing  Goal: Refrain from harming others  Outcome: Progressing  Goal: Refrain from destructive acts on the environment or property  Outcome: Progressing  Goal: Control angry outbursts  Description: Interventions:- Monitor patient closely, per order- Ensure early verbal de-escalation- Monitor prn medication needs- Set reasonable/therapeutic limits, outline behavioral expectations, and consequences - Provide a non-threatening milieu, utilizing the least restrictive interventions   Outcome: Progressing  Goal: Attend and participate in unit activities, including therapeutic, recreational, and  educational groups  Description: Interventions:- Provide therapeutic and educational activities daily, encourage attendance and participation, and document same in the medical record   Outcome: Progressing  Goal: Identify appropriate positive anger management techniques  Description: Interventions:- Offer anger management and coping skills groups - Staff will provide positive feedback for appropriate anger control  Outcome: Progressing     Problem: DISCHARGE PLANNING  Goal: Discharge to home or other facility with appropriate resources  Description: INTERVENTIONS:- Identify barriers to discharge w/patient and caregiver- Arrange for needed discharge resources and transportation as appropriate- Identify discharge learning needs (meds, wound care, etc.)- Arrange for interpretive services to assist at discharge as needed- Refer to Case Management Department for coordinating discharge planning if the patient needs post-hospital services based on physician/advanced practitioner order or complex needs related to functional status, cognitive ability, or social support system  Outcome: Progressing

## 2025-04-17 NOTE — NURSING NOTE
Patient is labile, bizarre, and disorganized. Pacing halls. Isolative to self. Appetite is excellent. Hygiene appears poor. Wearing hospital attire w/ long hair and unkempt beard. Medication compliant. Attended bingo group. Requires redirection but behavior more appropriate compared to earlier in shift. Continuous rounding maintained.

## 2025-04-17 NOTE — TREATMENT TEAM
04/16/25 2353   F[place]ardo Anxiety Scale   Anxious Mood 3   Tension 4   Fears 3   Insomnia 2   Intellectual 2   Depressed Mood 3   Somatic Complaints: Muscular 0   Somatic Complaints: Sensory 1   Cardiovascular Symptoms 1   Respiratory Symptoms 0   Gastrointestinal Symptoms 0   Genitourinary Symptoms 0   Autonomic Symptoms 2   Behavior at Interview 4   Fajardo Anxiety Score 25     Patient pacing in room, mumbling, getting in and out of bed. Offered and taken PRN Ativan 1mg @ 2355.

## 2025-04-17 NOTE — PROGRESS NOTES
"   04/17/25 0800   Team Meeting   Meeting Type Daily Rounds   Team Members Present   Team Members Present Physician;Nurse;   Physician Team Member Maria Luz/Mechelle/Marco/Lele/Kalie   Nursing Team Member Garth/Veena/Taty   Care Management Team Member Atif MEJIA   Social Work Team Member Saturnino   Patient/Family Present   Patient Present No   Patient's Family Present No     Patient was acting out, was loud, barking like a dog, responding to internal stimuli. Wearing multiple layers of clothes and got into the shower. He was heard mumbling about demons. He was placed in open seclusion and given Zyprexa po two separate times. Patient yelled to everyone at dinner \"are you ready to die?!\" He was placed in seclusion again in order to calm down. PRN Ativan 23:65. He was slamming doors and being loud. He slept on the mattress in the seclusion room for 45 minutes but was up again pacing. He slept for about 2 hours the entire night.  Patient discharge is pending stabilization of mood and medications.   "

## 2025-04-17 NOTE — NURSING NOTE
Patient appears sleeping in open quiet room at this time. No outward signs of distress noted. Respirations even and non labored. PRN Ativan appeared effective.

## 2025-04-17 NOTE — NURSING NOTE
Patient awake, still pacing in room, going between room and bathroom, poked a whole in cup while drinking. Patient pants wet, refused to take them off. Patient asked to return to open quiet room. Patient in open quiet room at this time.

## 2025-04-18 PROCEDURE — 99232 SBSQ HOSP IP/OBS MODERATE 35: CPT | Performed by: PSYCHIATRY & NEUROLOGY

## 2025-04-18 RX ADMIN — THIAMINE HCL TAB 100 MG 100 MG: 100 TAB at 08:42

## 2025-04-18 RX ADMIN — LOXAPINE 75 MG: 25 CAPSULE ORAL at 08:46

## 2025-04-18 RX ADMIN — LOXAPINE 75 MG: 25 CAPSULE ORAL at 17:00

## 2025-04-18 RX ADMIN — GABAPENTIN 300 MG: 300 CAPSULE ORAL at 21:56

## 2025-04-18 RX ADMIN — Medication 3 MG: at 21:56

## 2025-04-18 RX ADMIN — TAMSULOSIN HYDROCHLORIDE 0.4 MG: 0.4 CAPSULE ORAL at 16:55

## 2025-04-18 RX ADMIN — TRIAMCINOLONE ACETONIDE: 0.25 CREAM TOPICAL at 08:45

## 2025-04-18 RX ADMIN — GABAPENTIN 300 MG: 300 CAPSULE ORAL at 16:55

## 2025-04-18 RX ADMIN — TRAZODONE HYDROCHLORIDE 100 MG: 100 TABLET ORAL at 21:56

## 2025-04-18 RX ADMIN — GLYCOPYRROLATE 1 MG: 1 TABLET ORAL at 08:43

## 2025-04-18 RX ADMIN — TRAZODONE HYDROCHLORIDE 50 MG: 50 TABLET ORAL at 00:46

## 2025-04-18 RX ADMIN — GABAPENTIN 300 MG: 300 CAPSULE ORAL at 08:42

## 2025-04-18 RX ADMIN — Medication 2000 UNITS: at 08:42

## 2025-04-18 RX ADMIN — TRIAMCINOLONE ACETONIDE 1 APPLICATION: 0.25 CREAM TOPICAL at 17:40

## 2025-04-18 RX ADMIN — GLYCOPYRROLATE 1 MG: 1 TABLET ORAL at 16:58

## 2025-04-18 RX ADMIN — ATORVASTATIN CALCIUM 10 MG: 10 TABLET, FILM COATED ORAL at 16:55

## 2025-04-18 RX ADMIN — LITHIUM CARBONATE 600 MG: 300 TABLET, FILM COATED, EXTENDED RELEASE ORAL at 21:56

## 2025-04-18 RX ADMIN — GLYCOPYRROLATE 1 MG: 1 TABLET ORAL at 22:09

## 2025-04-18 RX ADMIN — LITHIUM CARBONATE 450 MG: 450 TABLET, EXTENDED RELEASE ORAL at 08:42

## 2025-04-18 RX ADMIN — FOLIC ACID 1 MG: 1 TABLET ORAL at 08:42

## 2025-04-18 RX ADMIN — Medication 1 TABLET: at 08:42

## 2025-04-18 NOTE — NURSING NOTE
Freddie has been quiet, calm, and cooperative. He had been speaking quietly on the telephone. Otherwise, he has been withdrawn to his room. He currently appears to be asleep. Medication compliant. Safety measures maintained.

## 2025-04-18 NOTE — PLAN OF CARE
Problem: PSYCHOSIS  Goal: Will report no hallucinations or delusions  Description: Interventions:- Administer medication as  ordered- Every waking shifts and PRN assess for the presence of hallucinations and or delusions- Assist with reality testing to support increasing orientation- Assess if patient's hallucinations or delusions are encouraging self-harm or harm to others and intervene as appropriate  Outcome: Progressing     Problem: BEHAVIOR  Goal: Pt/Family maintain appropriate behavior and adhere to behavioral management agreement, if implemented  Description: INTERVENTIONS:- Assess the family dynamic - Encourage verbalization of thoughts and concerns in a socially appropriate manner- Assess patient/family's coping skills and non-compliant behavior (including use of illegal substances).- Utilize positive, consistent limit setting strategies supporting safety of patient, staff and others- Initiate consult with Case Management, Spiritual Care or other ancillary services as appropriate- If a patient's/visitor's behavior jeopardizes the safety of the patient, staff, or others, refer to organization procedure. - Notify Security of behavior or suspected illegal substances which indicate the need for search of the patient and/or belongings- Encourage participation in the decision making process about a behavioral management agreement; implement if patient meets criteria  Outcome: Progressing     Problem: ANXIETY  Goal: Will report anxiety at manageable levels  Description: INTERVENTIONS:- Administer medication as ordered- Teach and encourage coping skills- Provide emotional support- Assess patient/family for anxiety and ability to cope  Outcome: Progressing  Goal: By discharge: Patient will verbalize 2 strategies to deal with anxiety  Description: Interventions:- Identify any obvious source/trigger to anxiety- Staff will assist patient in applying identified coping technique/skills- Encourage attendance of  scheduled groups and activities  Outcome: Progressing     Problem: INVOLUNTARY ADMIT  Goal: Will cooperate with staff recommendations and doctor's orders and will demonstrate appropriate behavior  Description: INTERVENTIONS:- Treat underlying conditions and offer medication as ordered- Educate regarding involuntary admission procedures and rules- Utilize positive consistent limit setting strategies to support patient and staff safety  Outcome: Progressing     Problem: Alteration in Thoughts and Perception  Goal: Treatment Goal: Gain control of psychotic behaviors/thinking, reduce/eliminate presenting symptoms and demonstrate improved reality functioning upon discharge  Outcome: Progressing  Goal: Verbalize thoughts and feelings  Description: Interventions:- Promote a nonjudgmental and trusting relationship with the patient through active listening and therapeutic communication- Assess patient's level of functioning, behavior and potential for risk- Engage patient in 1 on 1 interactions- Encourage patient to express fears, feelings, frustrations, and discuss symptoms  - [place] patient to reality, help patient recognize reality-based thinking - Administer medications as ordered and assess for potential side effects- Provide the patient education related to the signs and symptoms of the illness and desired effects of prescribed medications  Outcome: Progressing  Goal: Refrain from acting on delusional thinking/internal stimuli  Description: Interventions:- Monitor patient closely, per order - Utilize least restrictive measures - Set reasonable limits, give positive feedback for acceptable - Administer medications as ordered and monitor of potential side effects  Outcome: Progressing  Goal: Agree to be compliant with medication regime, as prescribed and report medication side effects  Description: Interventions:- Offer appropriate PRN medication and supervise ingestion; conduct AIMS, as needed   Outcome:  Progressing  Goal: Attend and participate in unit activities, including therapeutic, recreational, and educational groups  Description: Interventions:-Encourage Visitation and family involvement in care  Outcome: Progressing  Goal: Recognize dysfunctional thoughts, communicate reality-based thoughts at the time of discharge  Description: Interventions:- Provide medication and psycho-education to assist patient in compliance and developing insight into his/her illness   Outcome: Progressing  Goal: Complete daily ADLs, including personal hygiene independently, as able  Description: Interventions:- Observe, teach, and assist patient with ADLS- Monitor and promote a balance of rest/activity, with adequate nutrition and elimination   Outcome: Progressing     Problem: Risk for Violence/Aggression Toward Others  Goal: Treatment Goal: Refrain from acts of violence/aggression during length of stay, and demonstrate improved impulse control at the time of discharge  Outcome: Progressing  Goal: Verbalize thoughts and feelings  Description: Interventions:- Assess and re-assess patient's level of risk, every waking shift- Engage patient in 1:1 interactions, daily, for a minimum of 15 minutes - Allow patient to express feelings and frustrations in a safe and non-threatening manner - Establish rapport/trust with patient   Outcome: Progressing  Goal: Refrain from harming others  Outcome: Progressing  Goal: Refrain from destructive acts on the environment or property  Outcome: Progressing  Goal: Control angry outbursts  Description: Interventions:- Monitor patient closely, per order- Ensure early verbal de-escalation- Monitor prn medication needs- Set reasonable/therapeutic limits, outline behavioral expectations, and consequences - Provide a non-threatening milieu, utilizing the least restrictive interventions   Outcome: Progressing  Goal: Attend and participate in unit activities, including therapeutic, recreational, and  educational groups  Description: Interventions:- Provide therapeutic and educational activities daily, encourage attendance and participation, and document same in the medical record   Outcome: Progressing  Goal: Identify appropriate positive anger management techniques  Description: Interventions:- Offer anger management and coping skills groups - Staff will provide positive feedback for appropriate anger control  Outcome: Progressing     Problem: DISCHARGE PLANNING  Goal: Discharge to home or other facility with appropriate resources  Description: INTERVENTIONS:- Identify barriers to discharge w/patient and caregiver- Arrange for needed discharge resources and transportation as appropriate- Identify discharge learning needs (meds, wound care, etc.)- Arrange for interpretive services to assist at discharge as needed- Refer to Case Management Department for coordinating discharge planning if the patient needs post-hospital services based on physician/advanced practitioner order or complex needs related to functional status, cognitive ability, or social support system  Outcome: Progressing

## 2025-04-18 NOTE — NURSING NOTE
Patient is visible in the milieu interacts with select peers. He is pleasant on approach.  He is medication and meal compliant. He paces the coto. Patient is loud, hyper verbal and bizarre. He denies all psych s/s. Will cont to monitor. Call bell within reach.

## 2025-04-18 NOTE — PROGRESS NOTES
Pastoral Care Progress Note          Chaplaincy Interventions Utilized:   Empowerment: Encouraged focus on present, Encouraged self-care, Facilitated group experience, and Normalized experience of patient/family    Exploration: Explored hope, Explored emotional needs & resources, and Explored spiritual needs & resources    Relationship Building: Cultivated a relationship of care and support, Listened empathically, and Hospitality    The pt participated in spirituality group facilitated by the  today. Pt was active in group discussion.

## 2025-04-18 NOTE — PROGRESS NOTES
04/18/25 0800   Team Meeting   Meeting Type Daily Rounds   Team Members Present   Team Members Present Physician;Nurse;   Physician Team Member Maria Luz/Mechelle/Marco/Lele/Kalie   Nursing Team Member Garth/Veena/Taty   Care Management Team Member Atif MEJIA   Social Work Team Member Saturnino   Patient/Family Present   Patient Present No   Patient's Family Present No     Patient slept 3/4 hours last night but was quiet and not disruptive when he was awake. His trazodone is to be increased. He received an IM of Zyprexa in the am and was in open seclusion but had a good day after that otherwise. Patient discharge is pending stabilization of mood and medications.

## 2025-04-18 NOTE — ASSESSMENT & PLAN NOTE
As of 04/17/2025 the patient was agitated, guarded, and argumentative.  Patient does appear to have.  He continues to endorse intermittent double vision and sialorrhea.  He agreed to schedule glycopyrrolate to address the sialorrhea.  Patient received multiple as needed Zyprexa in the past 24 hours.  The Zyprexa did appear to be ineffective so his PRNs for Zyprexa will be changed to Thorazine.    Continue lithium 450 and 600 mg (Held on 04/15/2025)  Patient reports compliance with this medication  Lithium level 4/6/2025: 0.61 therapeutic  Labs 4/13/2025:   Lithium level 0.79  BMP no concerns  Continue loxapine 75 mg twice daily (04/15/2025)  Patient not agreeable to long-acting injectable, prefers oral medication  Reports that Invega has worked well in the past but that he would like alternative antipsychotic medication, will continue to monitor  Schedule glycopyrrolate 1 mg 3 times daily for sialorrhea  Labs ordered on 04/15/2025:  CMP  CBC  UA with reflex  Lithium level  Gabapentin 300 mg 3 times daily dosing (04/11/2025)  Discontinued scheduled Klonopin twice daily due to oversedation and gait instability (4/15/2025)  Increase trazodone to 150 mg nightly for insomnia and as an adjunct for mood (4/18/2025)

## 2025-04-18 NOTE — CASE MANAGEMENT
"CM called mother to speak with her about patient status and update her on progress. She would to call CM back with a time and date to visit once she can find a ride in to see patient. She states that he calls her everyday and sometimes he sounds like he is \"with it\" and calm, but other times he sounds \"all over the place.\" She thinks that he does sound most of the time that he is improving. He has also been calling other relatives regularly. She is now back home from traveling and she said he is absolutely able to return home once discharged. CM will continue to update her throughout his stay.     Dimple Graham (Mother)  [phone] (H)  "

## 2025-04-18 NOTE — PROGRESS NOTES
Progress Note - Behavioral Health   Name: Freddie Graham 65 y.o. male I MRN: [id number]  Unit/Bed#: [facility] 645-01 I Date of Admission: 4/6/2025   Date of Service: 4/18/2025 I Hospital Day: 12    Assessment & Plan  Schizoaffective disorder, bipolar type (HCC)  As of 04/17/2025 the patient was agitated, guarded, and argumentative.  Patient does appear to have.  He continues to endorse intermittent double vision and sialorrhea.  He agreed to schedule glycopyrrolate to address the sialorrhea.  Patient received multiple as needed Zyprexa in the past 24 hours.  The Zyprexa did appear to be ineffective so his PRNs for Zyprexa will be changed to Thorazine.    Continue lithium 450 and 600 mg (Held on 04/15/2025)  Patient reports compliance with this medication  Lithium level 4/6/2025: 0.61 therapeutic  Labs 4/13/2025:   Lithium level 0.79  BMP no concerns  Continue loxapine 75 mg twice daily (04/15/2025)  Patient not agreeable to long-acting injectable, prefers oral medication  Reports that Invega has worked well in the past but that he would like alternative antipsychotic medication, will continue to monitor  Schedule glycopyrrolate 1 mg 3 times daily for sialorrhea  Labs ordered on 04/15/2025:  CMP  CBC  UA with reflex  Lithium level  Gabapentin 300 mg 3 times daily dosing (04/11/2025)  Discontinued scheduled Klonopin twice daily due to oversedation and gait instability (4/15/2025)  Increase trazodone to 150 mg nightly for insomnia and as an adjunct for mood (4/18/2025)  Mild tetrahydrocannabinol (THC) abuse  Encourage cessation  Alcohol abuse, continuous  Encourage cessation  Mood insomnia (HCC)  Per principal problem    Current medications:  Current Facility-Administered Medications   Medication Dose Route Frequency Provider Last Rate    acetaminophen  650 mg Oral Q4H PRN Reshma Braga MD      acetaminophen  650 mg Oral Q4H PRN Reshma Braga MD      acetaminophen  975 mg Oral Q6H PRN Reshma Braga MD       aluminum-magnesium hydroxide-simethicone  30 mL Oral Q4H PRN Reshma Braga MD      atorvastatin  10 mg Oral Daily With Dinner CARLOS Pollock      bisacodyl  10 mg Rectal Daily PRN Reshma Braga MD      chlorproMAZINE  50 mg Intramuscular Q6H PRN [facility], DO      chlorproMAZINE  25 mg Oral Q6H PRN [facility], DO      chlorproMAZINE  50 mg Oral Q6H PRN Max 4/day Kaveh Bucca, DO      Cholecalciferol  2,000 Units Oral Daily CARLOS Pollock      folic acid  1 mg Oral Daily CARLOS Pollock      gabapentin  300 mg Oral TID Kaveh Bucca, DO      glycopyrrolate  1 mg Oral TID [facility], DO      hydrOXYzine HCL  25 mg Oral Q6H PRN Max 4/day Reshma Braga MD      hydrOXYzine HCL  50 mg Oral Q6H PRN Max 4/day Reshma Braga MD      lithium carbonate  450 mg Oral Daily Kaveh Bucca, DO      lithium carbonate  600 mg Oral HS Kaveh Bucca, DO      LORazepam  1 mg Intramuscular BID PRN Kaveh Bucca, DO      LORazepam  1 mg Oral BID PRN Kaveh Bucca, DO      loxapine  75 mg Oral BID [facility], DO      melatonin  3 mg Oral HS Reshma Braga MD      multivitamin-minerals  1 tablet Oral Daily CARLOS Pollock      nicotine polacrilex  2 mg Oral Q4H PRN Reshma Braga MD      phenazopyridine  200 mg Oral TID PRN CARLOS Pollock      polyethylene glycol  17 g Oral Daily PRN Reshma Braga MD      senna-docusate sodium  1 tablet Oral Daily PRN Reshma Braga MD      tamsulosin  0.4 mg Oral Daily With Dinner CARLOS Pollock      thiamine  100 mg Oral Daily CARLOS Pollock      traZODone  100 mg Oral HS Kaveh Bucca, DO      traZODone  50 mg Oral HS PRN Reshma Braga MD      triamcinolone   Topical BID CARLOS Pollock          Risks/Benefits of Treatment:     Risks, benefits, and possible side effects of medications explained to patient and patient verbalizes understanding and agreement for treatment.    Progress Toward  "Goals: slight improvement    Treatment Planning:      - Encourage early mobility and having a structured day  - Provide frequent re-orientation, and cognitive stimulation  - Ensure assistive devices are in proper working order (eye-glasses, hearing aids)  - Encourage adequate hydration, nutrition and monitor bowel movements  - Maintain sleep-wake cycle: Uninterrupted sleep time; low-level lighting at night  - Fall precaution  - f/u SLIM recs regarding the medical problems   - Continue medication titration and treatment plan; adjust medication to optimize treatment response and as clinically indicated.   - Observation:Routine  - Legal Status: 201  - VS: as per unit protocol  - Encourage group attendance and milieu therapy  - Dispo: To be determined  - Long Stay Certification : Not Applicable  - Estimated Discharge Day: 4/28/2025     Subjective       Patient was visited on unit for continuing care; chart reviewed and discussed with multidisciplinary treatment team.     Patient remains disorganized, labile and on irritable edge, and requires frequent redirection and reorientation by the staff. Received PRN Zyprexa 5 mg IM for agitation yesterday morning and Trazodone 50 mg overnight for insomnia.      Patient accepted all offered medications and no adverse effects of medications noted or reported.    Per staff, patient agitated in the morning but more cooperative later in the day.  The patient was evaluated in his room today.  On approach, patient was talking to himself, listening to loud rock music which is baseline for this patient.  He was more pleasant today, states that he feels that he is slowly improving.  He remains preoccupied, he is making lists of disorganized notes, showing this writer \"contact my Reverend, I have his name down here.  He will talk to you\" patient appears to be tolerating increased dose of loxapine without issue, sialorrhea appears improved today with glycopyrrolate as well.    Sleep: " decreased  Appetite: normal  Medication side effects: No  ROS: review of systems as noted above in HPI/Subjective report, all other systems are negative    Objective :  Temp:  [97.9 °F (36.6 °C)-99.6 °F (37.6 °C)] 97.9 °F (36.6 °C)  HR:  [83-95] 91  BP: (121-142)/(60-92) 121/60  Resp:  [18] 18  SpO2:  [94 %-95 %] 95 %  O2 Device: None (Room air)    Temp:  [97.9 °F (36.6 °C)-99.6 °F (37.6 °C)] 97.9 °F (36.6 °C)  HR:  [83-95] 91  BP: (121-142)/(60-92) 121/60  Resp:  [18] 18  SpO2:  [94 %-95 %] 95 %  O2 Device: None (Room air)    Mental Status Evaluation:  Appearance:  disheveled, marginal hygiene   Behavior:  restless and fidgety, requires redirection, less irritable   Speech:  Less pressured, less garbled   Mood:  irritable, manic   Affect:  Less labile   Thought Process:  illogical, tangential   Associations: flight of ideas   Thought Content:  some paranoia, grandiose ideas   Perceptual Disturbances: denies auditory or visual hallucinations when asked, but appears responding to internal stimuli, talks to self at times   Risk Potential: Suicidal ideation - None at present  Homicidal ideation - None at present  Potential for aggression - Yes, due to poor impulse control   Sensorium:  oriented to person, place, and time/date   Memory:  recent and remote memory grossly intact   Consciousness:  alert and awake   Attention/Concentration: poor concentration and poor attention span   Insight:  limited   Judgment: limited   Gait/Station: normal gait/station, normal balance   Motor Activity: no abnormal movements           Lab Results: I have reviewed the following results:  Most Recent Labs:   Lab Results   Component Value Date    WBC 5.60 04/15/2025    RBC 4.42 04/15/2025    HGB 12.9 04/15/2025    HCT 39.9 04/15/2025     04/15/2025    RDW 12.8 04/15/2025    NEUTROABS 3.59 04/15/2025    SODIUM 141 04/15/2025    K 4.2 04/15/2025     04/15/2025    CO2 30 04/15/2025    BUN 15 04/15/2025    CREATININE 0.98  04/15/2025    GLUC 103 04/15/2025    CALCIUM 9.4 04/15/2025    AST 25 04/15/2025    ALT 21 04/15/2025    ALKPHOS 52 04/15/2025    TP 6.4 04/15/2025    ALB 4.1 04/15/2025    TBILI 0.41 04/15/2025    CHOLESTEROL 112 04/07/2025    HDL 50 04/07/2025    TRIG 64 04/07/2025    LDLCALC 49 04/07/2025    NONHDLC 62 04/07/2025    VALPROICTOT <10 (L) 02/17/2024    LITHIUM 0.63 04/15/2025    AMMONIA 51 02/06/2021    [id number] 1.798 04/07/2025    FREET4 0.97 02/17/2024    HGBA1C 5.7 (H) 04/07/2025     04/07/2025       Administrative Statements     Counseling / Coordination of Care:   Patient's progress discussed with staff in treatment team meeting.  Medication changes reviewed with staff in treatment team meeting..    Kaveh Lara DO 04/18/25

## 2025-04-19 PROCEDURE — 99232 SBSQ HOSP IP/OBS MODERATE 35: CPT | Performed by: PSYCHIATRY & NEUROLOGY

## 2025-04-19 RX ADMIN — CHLORPROMAZINE HYDROCHLORIDE 50 MG: 50 TABLET, COATED ORAL at 03:48

## 2025-04-19 RX ADMIN — THIAMINE HCL TAB 100 MG 100 MG: 100 TAB at 08:07

## 2025-04-19 RX ADMIN — GABAPENTIN 300 MG: 300 CAPSULE ORAL at 16:51

## 2025-04-19 RX ADMIN — FOLIC ACID 1 MG: 1 TABLET ORAL at 08:07

## 2025-04-19 RX ADMIN — TAMSULOSIN HYDROCHLORIDE 0.4 MG: 0.4 CAPSULE ORAL at 16:51

## 2025-04-19 RX ADMIN — GABAPENTIN 300 MG: 300 CAPSULE ORAL at 21:59

## 2025-04-19 RX ADMIN — LITHIUM CARBONATE 450 MG: 450 TABLET, EXTENDED RELEASE ORAL at 08:07

## 2025-04-19 RX ADMIN — ATORVASTATIN CALCIUM 10 MG: 10 TABLET, FILM COATED ORAL at 16:51

## 2025-04-19 RX ADMIN — LOXAPINE 75 MG: 25 CAPSULE ORAL at 08:07

## 2025-04-19 RX ADMIN — GLYCOPYRROLATE 1 MG: 1 TABLET ORAL at 22:00

## 2025-04-19 RX ADMIN — Medication 2000 UNITS: at 08:07

## 2025-04-19 RX ADMIN — TRIAMCINOLONE ACETONIDE 1 APPLICATION: 0.25 CREAM TOPICAL at 12:07

## 2025-04-19 RX ADMIN — TRAZODONE HYDROCHLORIDE 100 MG: 100 TABLET ORAL at 21:59

## 2025-04-19 RX ADMIN — TRAZODONE HYDROCHLORIDE 50 MG: 50 TABLET ORAL at 01:36

## 2025-04-19 RX ADMIN — GLYCOPYRROLATE 1 MG: 1 TABLET ORAL at 08:11

## 2025-04-19 RX ADMIN — GABAPENTIN 300 MG: 300 CAPSULE ORAL at 08:07

## 2025-04-19 RX ADMIN — Medication 3 MG: at 21:59

## 2025-04-19 RX ADMIN — TRIAMCINOLONE ACETONIDE: 0.25 CREAM TOPICAL at 17:16

## 2025-04-19 RX ADMIN — GLYCOPYRROLATE 1 MG: 1 TABLET ORAL at 16:53

## 2025-04-19 RX ADMIN — LORAZEPAM 1 MG: 1 TABLET ORAL at 07:19

## 2025-04-19 RX ADMIN — LOXAPINE 75 MG: 25 CAPSULE ORAL at 17:15

## 2025-04-19 RX ADMIN — LITHIUM CARBONATE 600 MG: 300 TABLET, FILM COATED, EXTENDED RELEASE ORAL at 22:00

## 2025-04-19 RX ADMIN — Medication 1 TABLET: at 08:07

## 2025-04-19 NOTE — NURSING NOTE
Patient remains awake but calmer in room and begins to ambulate in hallway for early awakening.  Thorazine mostly effective.

## 2025-04-19 NOTE — NURSING NOTE
"Patient significantly calmer and less anxious. Patient pleasant and calm, stating \"Thank you ma'am\" when provided with his morning medications. PRN Ativan 1 mg appears effective at this time.   "

## 2025-04-19 NOTE — NURSING NOTE
Patient asked for and received 50mg Trazadone @ 0136 for insomnia.  Additional linens were provided also.

## 2025-04-19 NOTE — NURSING NOTE
"Trazadone given @ 0136 for insomnia not effective.  Patient awake with lights on in room reporting \"I can't sleep.\"  "

## 2025-04-19 NOTE — PROGRESS NOTES
Progress Note - Behavioral Health   Name: Freddie Graham 65 y.o. male I MRN: [id number]  Unit/Bed#: [facility] 645-01 I Date of Admission: 4/6/2025   Date of Service: 4/19/2025 I Hospital Day: 13    Assessment & Plan  Schizoaffective disorder, bipolar type (HCC)  As of 04/17/2025 the patient was agitated, guarded, and argumentative.  Patient does appear to have.  He continues to endorse intermittent double vision and sialorrhea.  He agreed to schedule glycopyrrolate to address the sialorrhea.  Patient received multiple as needed Zyprexa in the past 24 hours.  The Zyprexa did appear to be ineffective so his PRNs for Zyprexa will be changed to Thorazine.    Continue lithium 450 and 600 mg (Held on 04/15/2025)  Patient reports compliance with this medication  Lithium level 4/6/2025: 0.61 therapeutic  Labs 4/13/2025:   Lithium level 0.79  BMP no concerns  Continue loxapine 75 mg twice daily (04/15/2025)  Patient not agreeable to long-acting injectable, prefers oral medication  Reports that Invega has worked well in the past but that he would like alternative antipsychotic medication, will continue to monitor  Schedule glycopyrrolate 1 mg 3 times daily for sialorrhea  Labs ordered on 04/15/2025:  CMP  CBC  UA with reflex  Lithium level  Gabapentin 300 mg 3 times daily dosing (04/11/2025)  Discontinued scheduled Klonopin twice daily due to oversedation and gait instability (4/15/2025)  Increase trazodone to 150 mg nightly for insomnia and as an adjunct for mood (4/18/2025)  Mild tetrahydrocannabinol (THC) abuse  Encourage cessation  Alcohol abuse, continuous  Encourage cessation  Mood insomnia (HCC)  Per principal problem    Current medications:  Current Facility-Administered Medications   Medication Dose Route Frequency Provider Last Rate    acetaminophen  650 mg Oral Q4H PRN Reshma Braga MD      acetaminophen  650 mg Oral Q4H PRN Reshma Braga MD      acetaminophen  975 mg Oral Q6H PRN Reshma Braga MD       aluminum-magnesium hydroxide-simethicone  30 mL Oral Q4H PRN Reshma Braga MD      atorvastatin  10 mg Oral Daily With Dinner CARLOS Pollock      bisacodyl  10 mg Rectal Daily PRN Reshma Braga MD      chlorproMAZINE  50 mg Intramuscular Q6H PRN [facility], DO      chlorproMAZINE  25 mg Oral Q6H PRN [facility], DO      chlorproMAZINE  50 mg Oral Q6H PRN Max 4/day Kaveh Bucca, DO      Cholecalciferol  2,000 Units Oral Daily CARLOS Pollock      folic acid  1 mg Oral Daily CARLOS Pollock      gabapentin  300 mg Oral TID Kaveh Bucca, DO      glycopyrrolate  1 mg Oral TID [facility], DO      hydrOXYzine HCL  25 mg Oral Q6H PRN Max 4/day Reshma Braga MD      hydrOXYzine HCL  50 mg Oral Q6H PRN Max 4/day Reshma Braga MD      lithium carbonate  450 mg Oral Daily Kaveh Bucca, DO      lithium carbonate  600 mg Oral HS Kaveh Bucca, DO      LORazepam  1 mg Intramuscular BID PRN Kaveh Bucca, DO      LORazepam  1 mg Oral BID PRN Kaveh Bucca, DO      loxapine  75 mg Oral BID [facility], DO      melatonin  3 mg Oral HS Reshma Braga MD      multivitamin-minerals  1 tablet Oral Daily CARLOS Pollock      nicotine polacrilex  2 mg Oral Q4H PRN Reshma Braga MD      phenazopyridine  200 mg Oral TID PRN CARLOS Pollock      polyethylene glycol  17 g Oral Daily PRN Reshma Braga MD      senna-docusate sodium  1 tablet Oral Daily PRN Reshma Braga MD      tamsulosin  0.4 mg Oral Daily With Dinner CARLOS Pollock      thiamine  100 mg Oral Daily CARLOS Pollock      traZODone  100 mg Oral HS Kaveh Bucca, DO      traZODone  50 mg Oral HS PRN Reshma Braga MD      triamcinolone   Topical BID CARLOS Pollock          Risks/Benefits of Treatment:     Risks, benefits, and possible side effects of medications explained to patient and patient verbalizes understanding and agreement for treatment.    Progress Toward  "Goals:     Treatment Planning:      - Encourage early mobility and having a structured day  - Provide frequent re-orientation, and cognitive stimulation  - Ensure assistive devices are in proper working order (eye-glasses, hearing aids)  - Encourage adequate hydration, nutrition and monitor bowel movements  - Maintain sleep-wake cycle: Uninterrupted sleep time; low-level lighting at night  - Fall precaution  - f/u SLIM recs regarding the medical problems   - Continue medication titration and treatment plan; adjust medication to optimize treatment response and as clinically indicated.   - Observation:Routine  - Legal Status: 201  - VS: as per unit protocol  - Encourage group attendance and milieu therapy  - Dispo: To be determined  - Long Stay Certification : Not Applicable  - Estimated Discharge Day: 4/28/2025     Watson Frazier is seen this morning in hallway. Initially refusing to speak with writer, \"I'll see you later doc\". Seen in the afternoon at bedside, slouched jail down hospital bed with legs resting on lower bed rail. He is mostly cooperative, but still disorganized and difficult to follow with garbled speech. Continues to demonstrate mood liability, with rapid shifts from euphoria to irritability to tearfulness within short periods. Staff mention he was making attempts to do, \"splits\" in the hallway and required redirection. He did not sleep last night and was slamming bedroom doors & spilling water on himself. He accepted Thorazine and Trazodone PRN throughout the night which was ineffective.    Sleep: decreased  Appetite: normal  Medication side effects: No  ROS: review of systems as noted above in HPI/Subjective report, all other systems are negative    Objective :  Temp:  [97.9 °F (36.6 °C)-99 °F (37.2 °C)] 99 °F (37.2 °C)  HR:  [] 89  BP: (121-146)/(60-96) 146/96  Resp:  [18] 18  SpO2:  [94 %-96 %] 94 %  O2 Device: None (Room air)    Temp:  [97.9 °F (36.6 °C)-99 °F (37.2 °C)] 99 °F " "(37.2 °C)  HR:  [] 89  BP: (121-146)/(60-96) 146/96  Resp:  [18] 18  SpO2:  [94 %-96 %] 94 %  O2 Device: None (Room air)    Mental Status Evaluation:  Appearance:  disheveled, marginal hygiene   Behavior:  restless and fidgety, requires redirection, less irritable   Speech:  Increased rate, garbled   Mood:  \"Fine\"   Affect:  Labile   Thought Process:  illogical, tangential   Associations: flight of ideas   Thought Content:  some paranoia, grandiose ideas   Perceptual Disturbances: denies auditory or visual hallucinations when asked, but appears responding to internal stimuli, talks to self at times   Risk Potential: Suicidal ideation - None at present  Homicidal ideation - None at present  Potential for aggression - Yes, due to poor impulse control   Sensorium:     Memory:  recent and remote memory grossly intact   Consciousness:  alert and awake   Attention/Concentration: poor concentration and poor attention span   Insight:  limited   Judgment: limited   Gait/Station: In bed   Motor Activity: no abnormal movements           Lab Results: I have reviewed the following results:  Most Recent Labs:   Lab Results   Component Value Date    WBC 5.60 04/15/2025    RBC 4.42 04/15/2025    HGB 12.9 04/15/2025    HCT 39.9 04/15/2025     04/15/2025    RDW 12.8 04/15/2025    NEUTROABS 3.59 04/15/2025    SODIUM 141 04/15/2025    K 4.2 04/15/2025     04/15/2025    CO2 30 04/15/2025    BUN 15 04/15/2025    CREATININE 0.98 04/15/2025    GLUC 103 04/15/2025    CALCIUM 9.4 04/15/2025    AST 25 04/15/2025    ALT 21 04/15/2025    ALKPHOS 52 04/15/2025    TP 6.4 04/15/2025    ALB 4.1 04/15/2025    TBILI 0.41 04/15/2025    CHOLESTEROL 112 04/07/2025    HDL 50 04/07/2025    TRIG 64 04/07/2025    LDLCALC 49 04/07/2025    NONHDLC 62 04/07/2025    VALPROICTOT <10 (L) 02/17/2024    LITHIUM 0.63 04/15/2025    AMMONIA 51 02/06/2021    [id number] 1.798 04/07/2025    FREET4 0.97 02/17/2024    HGBA1C 5.7 (H) 04/07/2025    EAG " 117 04/07/2025       Administrative Statements     Counseling / Coordination of Care:   Patient's progress discussed with staff in treatment team meeting.  Medication changes reviewed with staff in treatment team meeting..    Barbara Lucia PA-C 04/19/25

## 2025-04-19 NOTE — TREATMENT TEAM
"   04/19/25 0719   Fajardo Anxiety Scale   Anxious Mood 4   Tension 4   Fears 3   Insomnia 2   Intellectual 3   Depressed Mood 0   Somatic Complaints: Muscular 2   Somatic Complaints: Sensory 2   Cardiovascular Symptoms 0   Respiratory Symptoms 0   Gastrointestinal Symptoms 0   Genitourinary Symptoms 0   Autonomic Symptoms 2   Behavior at Interview 3   Fajardo Anxiety Score 25     Patient approached the nurse's station, appeared anxious and agitated. Patient tearful intermittently stating \"I want to go home.\" Patient able to be verbally redirected, became tearful again. PRN Ativan 1 mg administered PO at 0719.   "

## 2025-04-19 NOTE — PLAN OF CARE
Problem: PSYCHOSIS  Goal: Will report no hallucinations or delusions  Description: Interventions:- Administer medication as  ordered- Every waking shifts and PRN assess for the presence of hallucinations and or delusions- Assist with reality testing to support increasing orientation- Assess if patient's hallucinations or delusions are encouraging self-harm or harm to others and intervene as appropriate  Outcome: Progressing     Problem: BEHAVIOR  Goal: Pt/Family maintain appropriate behavior and adhere to behavioral management agreement, if implemented  Description: INTERVENTIONS:- Assess the family dynamic - Encourage verbalization of thoughts and concerns in a socially appropriate manner- Assess patient/family's coping skills and non-compliant behavior (including use of illegal substances).- Utilize positive, consistent limit setting strategies supporting safety of patient, staff and others- Initiate consult with Case Management, Spiritual Care or other ancillary services as appropriate- If a patient's/visitor's behavior jeopardizes the safety of the patient, staff, or others, refer to organization procedure. - Notify Security of behavior or suspected illegal substances which indicate the need for search of the patient and/or belongings- Encourage participation in the decision making process about a behavioral management agreement; implement if patient meets criteria  Outcome: Progressing     Problem: ANXIETY  Goal: Will report anxiety at manageable levels  Description: INTERVENTIONS:- Administer medication as ordered- Teach and encourage coping skills- Provide emotional support- Assess patient/family for anxiety and ability to cope  Outcome: Progressing  Goal: By discharge: Patient will verbalize 2 strategies to deal with anxiety  Description: Interventions:- Identify any obvious source/trigger to anxiety- Staff will assist patient in applying identified coping technique/skills- Encourage attendance of  scheduled groups and activities  Outcome: Progressing     Problem: INVOLUNTARY ADMIT  Goal: Will cooperate with staff recommendations and doctor's orders and will demonstrate appropriate behavior  Description: INTERVENTIONS:- Treat underlying conditions and offer medication as ordered- Educate regarding involuntary admission procedures and rules- Utilize positive consistent limit setting strategies to support patient and staff safety  Outcome: Progressing     Problem: Alteration in Thoughts and Perception  Goal: Treatment Goal: Gain control of psychotic behaviors/thinking, reduce/eliminate presenting symptoms and demonstrate improved reality functioning upon discharge  Outcome: Progressing  Goal: Verbalize thoughts and feelings  Description: Interventions:- Promote a nonjudgmental and trusting relationship with the patient through active listening and therapeutic communication- Assess patient's level of functioning, behavior and potential for risk- Engage patient in 1 on 1 interactions- Encourage patient to express fears, feelings, frustrations, and discuss symptoms  - Maud patient to reality, help patient recognize reality-based thinking - Administer medications as ordered and assess for potential side effects- Provide the patient education related to the signs and symptoms of the illness and desired effects of prescribed medications  Outcome: Progressing  Goal: Agree to be compliant with medication regime, as prescribed and report medication side effects  Description: Interventions:- Offer appropriate PRN medication and supervise ingestion; conduct AIMS, as needed   Outcome: Progressing  Goal: Recognize dysfunctional thoughts, communicate reality-based thoughts at the time of discharge  Description: Interventions:- Provide medication and psycho-education to assist patient in compliance and developing insight into his/her illness   Outcome: Progressing  Goal: Complete daily ADLs, including personal hygiene  independently, as able  Description: Interventions:- Observe, teach, and assist patient with ADLS- Monitor and promote a balance of rest/activity, with adequate nutrition and elimination   Outcome: Progressing     Problem: Risk for Violence/Aggression Toward Others  Goal: Treatment Goal: Refrain from acts of violence/aggression during length of stay, and demonstrate improved impulse control at the time of discharge  Outcome: Progressing  Goal: Verbalize thoughts and feelings  Description: Interventions:- Assess and re-assess patient's level of risk, every waking shift- Engage patient in 1:1 interactions, daily, for a minimum of 15 minutes - Allow patient to express feelings and frustrations in a safe and non-threatening manner - Establish rapport/trust with patient   Outcome: Progressing  Goal: Refrain from harming others  Outcome: Progressing  Goal: Refrain from destructive acts on the environment or property  Outcome: Progressing  Goal: Control angry outbursts  Description: Interventions:- Monitor patient closely, per order- Ensure early verbal de-escalation- Monitor prn medication needs- Set reasonable/therapeutic limits, outline behavioral expectations, and consequences - Provide a non-threatening milieu, utilizing the least restrictive interventions   Outcome: Progressing  Goal: Identify appropriate positive anger management techniques  Description: Interventions:- Offer anger management and coping skills groups - Staff will provide positive feedback for appropriate anger control  Outcome: Progressing     Problem: DISCHARGE PLANNING  Goal: Discharge to home or other facility with appropriate resources  Description: INTERVENTIONS:- Identify barriers to discharge w/patient and caregiver- Arrange for needed discharge resources and transportation as appropriate- Identify discharge learning needs (meds, wound care, etc.)- Arrange for interpretive services to assist at discharge as needed- Refer to Case Management  Department for coordinating discharge planning if the patient needs post-hospital services based on physician/advanced practitioner order or complex needs related to functional status, cognitive ability, or social support system  Outcome: Progressing

## 2025-04-19 NOTE — NURSING NOTE
"Patient cooperative, visible on millieu, with bizarre behaviors at times attempting to perform a \"split\" with legs outstretched in opposite directions while holding onto wall rails.  Patient educated on safe and appropriate behaviors while on unit.  He is able to make his needs known and takes medications as scheduled.  He denies depression, anxiety, HI/SI/AVH and reports he is ready to go back home.  Patient directed to speak with provider and CM relative to discharge.  Patient has no unmet needs and safety plan reviewed but needs reinforcement.        "

## 2025-04-19 NOTE — NURSING NOTE
"Patient continues to report insomnia, has all lights on in room, squeezed cup of water onto floor.  He complains his socks and pants are now wet so he cannot sleep.  Patient then slams bathroom door then opens and is only moderately redirectable.   He was given Thorazine 50mg PO @ 0346 for moderate/severe agitation and provided new socks and dry pants.  Patient directed to turn off at least one light in room and to lie down and rest.  Patient reports \"I will try.\"  "

## 2025-04-20 PROCEDURE — 99232 SBSQ HOSP IP/OBS MODERATE 35: CPT | Performed by: PSYCHIATRY & NEUROLOGY

## 2025-04-20 RX ADMIN — Medication 2000 UNITS: at 08:17

## 2025-04-20 RX ADMIN — GLYCOPYRROLATE 1 MG: 1 TABLET ORAL at 21:09

## 2025-04-20 RX ADMIN — LITHIUM CARBONATE 450 MG: 450 TABLET, EXTENDED RELEASE ORAL at 08:17

## 2025-04-20 RX ADMIN — LITHIUM CARBONATE 600 MG: 300 TABLET, FILM COATED, EXTENDED RELEASE ORAL at 21:09

## 2025-04-20 RX ADMIN — GABAPENTIN 300 MG: 300 CAPSULE ORAL at 21:09

## 2025-04-20 RX ADMIN — THIAMINE HCL TAB 100 MG 100 MG: 100 TAB at 08:17

## 2025-04-20 RX ADMIN — HYDROXYZINE HYDROCHLORIDE 50 MG: 50 TABLET, FILM COATED ORAL at 17:17

## 2025-04-20 RX ADMIN — FOLIC ACID 1 MG: 1 TABLET ORAL at 08:17

## 2025-04-20 RX ADMIN — Medication 3 MG: at 21:09

## 2025-04-20 RX ADMIN — TRAZODONE HYDROCHLORIDE 100 MG: 100 TABLET ORAL at 21:08

## 2025-04-20 RX ADMIN — GABAPENTIN 300 MG: 300 CAPSULE ORAL at 08:17

## 2025-04-20 RX ADMIN — LOXAPINE 75 MG: 25 CAPSULE ORAL at 08:17

## 2025-04-20 RX ADMIN — ATORVASTATIN CALCIUM 10 MG: 10 TABLET, FILM COATED ORAL at 17:17

## 2025-04-20 RX ADMIN — TAMSULOSIN HYDROCHLORIDE 0.4 MG: 0.4 CAPSULE ORAL at 17:17

## 2025-04-20 RX ADMIN — GABAPENTIN 300 MG: 300 CAPSULE ORAL at 17:17

## 2025-04-20 RX ADMIN — TRIAMCINOLONE ACETONIDE 1 APPLICATION: 0.25 CREAM TOPICAL at 08:17

## 2025-04-20 RX ADMIN — LOXAPINE 75 MG: 25 CAPSULE ORAL at 17:27

## 2025-04-20 RX ADMIN — GLYCOPYRROLATE 1 MG: 1 TABLET ORAL at 17:17

## 2025-04-20 RX ADMIN — GLYCOPYRROLATE 1 MG: 1 TABLET ORAL at 08:17

## 2025-04-20 RX ADMIN — Medication 1 TABLET: at 08:17

## 2025-04-20 NOTE — ASSESSMENT & PLAN NOTE
As of 2025 the patient was agitated, guarded, and argumentative.  Patient does appear to have.  He continues to endorse intermittent double vision and sialorrhea.  He agreed to schedule glycopyrrolate to address the sialorrhea.  Patient received multiple as needed Zyprexa in the past 24 hours.  The Zyprexa did appear to be ineffective so his PRNs for Zyprexa will be changed to Thorazine.    Continue lithium 450 and 600 mg (Held on 04/15/2025)  Patient reports compliance with this medication  Lithium monitorin2025 - 0.61   2025 - 0.79  4/15/25 - 0.63  Continue loxapine 75 mg twice daily (04/15/2025)  Patient not agreeable to long-acting injectable, prefers oral medication  Reports that Invega has worked well in the past but that he would like alternative antipsychotic medication, will continue to monitor  Continue glycopyrrolate 1 mg 3 times daily for sialorrhea  Labs ordered on 04/15/2025:  CMP  CBC  UA with reflex  Lithium level  Gabapentin 300 mg 3 times daily dosing (2025)  Discontinued scheduled Klonopin twice daily due to oversedation and gait instability (4/15/2025)  Increase trazodone to 150 mg nightly for insomnia and as an adjunct for mood (2025)

## 2025-04-20 NOTE — NURSING NOTE
Patient visible throughout milieu, interacting with staff and peers. Patient has to be reminded of boundaries and limits at times, but responds well to verbal redirection. Patient denied SI/HI/AVH, although does appear internally preoccupied at times. Patient pleasant and cooperative with staff. Patient accepted scheduled medications. Patient able to and encouraged to inform staff of any needs.

## 2025-04-20 NOTE — PROGRESS NOTES
Progress Note - Behavioral Health   Name: Freddie Graham 65 y.o. male I MRN: [id number]  Unit/Bed#: [facility] 645-01 I Date of Admission: 2025   Date of Service: 2025 I Hospital Day: 14    Assessment & Plan  Schizoaffective disorder, bipolar type (HCC)  As of 2025 the patient was agitated, guarded, and argumentative.  Patient does appear to have.  He continues to endorse intermittent double vision and sialorrhea.  He agreed to schedule glycopyrrolate to address the sialorrhea.  Patient received multiple as needed Zyprexa in the past 24 hours.  The Zyprexa did appear to be ineffective so his PRNs for Zyprexa will be changed to Thorazine.    Continue lithium 450 and 600 mg (Held on 04/15/2025)  Patient reports compliance with this medication  Lithium monitorin2025 - 0.61   2025 - 0.79  4/15/25 - 0.63  Continue loxapine 75 mg twice daily (04/15/2025)  Patient not agreeable to long-acting injectable, prefers oral medication  Reports that Invega has worked well in the past but that he would like alternative antipsychotic medication, will continue to monitor  Continue glycopyrrolate 1 mg 3 times daily for sialorrhea  Labs ordered on 04/15/2025:  CMP  CBC  UA with reflex  Lithium level  Gabapentin 300 mg 3 times daily dosing (2025)  Discontinued scheduled Klonopin twice daily due to oversedation and gait instability (4/15/2025)  Increase trazodone to 150 mg nightly for insomnia and as an adjunct for mood (2025)  Mild tetrahydrocannabinol (THC) abuse  Encourage cessation  Alcohol abuse, continuous  Encourage cessation  Mood insomnia (HCC)  Per principal problem    Current medications:  Current Facility-Administered Medications   Medication Dose Route Frequency Provider Last Rate    acetaminophen  650 mg Oral Q4H PRN Reshma Braga MD      acetaminophen  650 mg Oral Q4H PRN Reshma Braga MD      acetaminophen  975 mg Oral Q6H PRN Reshma Braga MD      aluminum-magnesium hydroxide-simethicone   30 mL Oral Q4H PRN Reshma Braga MD      atorvastatin  10 mg Oral Daily With Dinner CARLOS Pollock      bisacodyl  10 mg Rectal Daily PRN Reshma Braga MD      chlorproMAZINE  50 mg Intramuscular Q6H PRN [facility], DO      chlorproMAZINE  25 mg Oral Q6H PRN [facility], DO      chlorproMAZINE  50 mg Oral Q6H PRN Max 4/day Kaveh Bucca, DO      Cholecalciferol  2,000 Units Oral Daily CARLOS Pollock      folic acid  1 mg Oral Daily CARLOS Pollock      gabapentin  300 mg Oral TID Kaveh Bucca, DO      glycopyrrolate  1 mg Oral TID [facility], DO      hydrOXYzine HCL  25 mg Oral Q6H PRN Max 4/day Reshma Braga MD      hydrOXYzine HCL  50 mg Oral Q6H PRN Max 4/day Reshma Braga MD      lithium carbonate  450 mg Oral Daily Kaveh Bucca, DO      lithium carbonate  600 mg Oral HS Kaveh Bucca, DO      LORazepam  1 mg Intramuscular BID PRN Kaveh Bucca, DO      LORazepam  1 mg Oral BID PRN Kaveh Bucca, DO      loxapine  75 mg Oral BID [facility], DO      melatonin  3 mg Oral HS Reshma Braga MD      multivitamin-minerals  1 tablet Oral Daily CARLOS Pollock      nicotine polacrilex  2 mg Oral Q4H PRN Reshma Braga MD      phenazopyridine  200 mg Oral TID PRN CARLOS Pollock      polyethylene glycol  17 g Oral Daily PRN Reshma Braga MD      senna-docusate sodium  1 tablet Oral Daily PRN Reshma Braga MD      tamsulosin  0.4 mg Oral Daily With Dinner CARLOS Pollock      thiamine  100 mg Oral Daily CARLOS Pollock      traZODone  100 mg Oral HS Kaveh Bucca, DO      traZODone  50 mg Oral HS PRN Reshma Braga MD      triamcinolone   Topical BID CARLOS Pollock          Risks/Benefits of Treatment:     Risks, benefits, and possible side effects of medications explained to patient and patient verbalizes understanding and agreement for treatment.    Progress Toward Goals:     Treatment Planning:      -  Encourage early mobility and having a structured day  - Provide frequent re-orientation, and cognitive stimulation  - Ensure assistive devices are in proper working order (eye-glasses, hearing aids)  - Encourage adequate hydration, nutrition and monitor bowel movements  - Maintain sleep-wake cycle: Uninterrupted sleep time; low-level lighting at night  - Fall precaution  - f/u SLIM recs regarding the medical problems   - Continue medication titration and treatment plan; adjust medication to optimize treatment response and as clinically indicated.   - Observation:Routine  - Legal Status: 201  - VS: as per unit protocol  - Encourage group attendance and milieu therapy  - Dispo: To be determined  - Long Stay Certification : Not Applicable  - Estimated Discharge Day: 4/28/2025     Subjective       Freddie continues to exhibit ongoing manic symptoms however less intense than previously. Has been slowly improving. Thoughts continue to remain at times disorganized with flight of ideas, elevated self-esteem and grandiosity. Has been requiring redirection for intrusiveness and inappropriate flirtatious behavior toward female staff. Mood remains elevated and over bright, with no overt aggression noted. Intermittently required limit setting for safety as he was running down the coto, hopping, exclaiming he was the . Still perseverative on discharge, expressing strongly that he needs to return home to take care of his mother who previously was treated for breast cancer.     Sleep: decreased  Appetite: normal  Medication side effects: No  ROS: review of systems as noted above in HPI/Subjective report, all other systems are negative    Objective :  Temp:  [97.5 °F (36.4 °C)-99.5 °F (37.5 °C)] 97.5 °F (36.4 °C)  HR:  [80-91] 88  BP: (118-148)/(76-98) 118/76  Resp:  [18] 18  SpO2:  [91 %-94 %] 93 %  O2 Device: None (Room air)    Temp:  [97.5 °F (36.4 °C)-99.5 °F (37.5 °C)] 97.5 °F (36.4 °C)  HR:  [80-91] 88  BP:  "(118-148)/(76-98) 118/76  Resp:  [18] 18  SpO2:  [91 %-94 %] 93 %  O2 Device: None (Room air)    Mental Status Evaluation:  Appearance:  disheveled, marginal hygiene   Behavior:  restless and fidgety, requires redirection, requires limit setting and boundaries   Speech:  Increased rate, clearer today than previously   Mood:  \"Great!\"   Affect:  Labile   Thought Process:  Less disorganized   Associations: Still with flight of ideas   Thought Content:  Inflated self-esteem, grandiosity   Perceptual Disturbances: Denies auditory or visual hallucinations   Risk Potential: Suicidal ideation - None at present  Homicidal ideation - None at present  Potential for aggression - Yes, due to poor impulse control   Sensorium:  Alert to person and place   Memory:  recent and remote memory grossly intact   Consciousness:  alert and awake   Attention/Concentration: Decreased focus and attention span   Insight:  limited   Judgment: limited   Gait/Station: Normal gait and station   Motor Activity: no abnormal movements           Lab Results: I have reviewed the following results:  Most Recent Labs:   Lab Results   Component Value Date    WBC 5.60 04/15/2025    RBC 4.42 04/15/2025    HGB 12.9 04/15/2025    HCT 39.9 04/15/2025     04/15/2025    RDW 12.8 04/15/2025    NEUTROABS 3.59 04/15/2025    SODIUM 141 04/15/2025    K 4.2 04/15/2025     04/15/2025    CO2 30 04/15/2025    BUN 15 04/15/2025    CREATININE 0.98 04/15/2025    GLUC 103 04/15/2025    CALCIUM 9.4 04/15/2025    AST 25 04/15/2025    ALT 21 04/15/2025    ALKPHOS 52 04/15/2025    TP 6.4 04/15/2025    ALB 4.1 04/15/2025    TBILI 0.41 04/15/2025    CHOLESTEROL 112 04/07/2025    HDL 50 04/07/2025    TRIG 64 04/07/2025    LDLCALC 49 04/07/2025    NONHDLC 62 04/07/2025    VALPROICTOT <10 (L) 02/17/2024    LITHIUM 0.63 04/15/2025    AMMONIA 51 02/06/2021    AZM1QEDEFNOJ 1.798 04/07/2025    FREET4 0.97 02/17/2024    HGBA1C 5.7 (H) 04/07/2025     04/07/2025 "       Administrative Statements     Counseling / Coordination of Care:   Patient's progress discussed with staff in treatment team meeting.  Medication changes reviewed with staff in treatment team meeting..    Barbara Lucia PA-C 04/20/25

## 2025-04-20 NOTE — NURSING NOTE
"Patient calm, cooperative, moderately visible on millieu with layered clothing.  He reports he is feeling \"OK\" just \"waiting to go home\".  He denies depression, anxiety, HI/SI/AVH and is able to make needs known.  Patient has no unmet needs at this time and takes medications as scheduled.  Safety plan reviewed.    "

## 2025-04-20 NOTE — TREATMENT TEAM
04/20/25 1700   Fajardo Anxiety Scale   Anxious Mood 4   Tension 4   Fears 3   Insomnia 1   Intellectual 2   Depressed Mood 0   Somatic Complaints: Muscular 2   Somatic Complaints: Sensory 2   Cardiovascular Symptoms 0   Respiratory Symptoms 0   Gastrointestinal Symptoms 0   Genitourinary Symptoms 0   Autonomic Symptoms 2   Behavior at Interview 3   Fajardo Anxiety Score 23     Prn Atarax 50 mg givne for anxiety.

## 2025-04-20 NOTE — NURSING NOTE
"Patient with poor sleep, awakening throughout the night, walking the hallway, and exclaiming to the nursing staff \"Happy Easter\".  Patient strongly encouraged to gain more sleep however he declined.   "

## 2025-04-20 NOTE — PLAN OF CARE
Problem: PSYCHOSIS  Goal: Will report no hallucinations or delusions  Description: Interventions:- Administer medication as  ordered- Every waking shifts and PRN assess for the presence of hallucinations and or delusions- Assist with reality testing to support increasing orientation- Assess if patient's hallucinations or delusions are encouraging self-harm or harm to others and intervene as appropriate  Outcome: Progressing     Problem: ANXIETY  Goal: Will report anxiety at manageable levels  Description: INTERVENTIONS:- Administer medication as ordered- Teach and encourage coping skills- Provide emotional support- Assess patient/family for anxiety and ability to cope  Outcome: Progressing  Goal: By discharge: Patient will verbalize 2 strategies to deal with anxiety  Description: Interventions:- Identify any obvious source/trigger to anxiety- Staff will assist patient in applying identified coping technique/skills- Encourage attendance of scheduled groups and activities  Outcome: Progressing     Problem: INVOLUNTARY ADMIT  Goal: Will cooperate with staff recommendations and doctor's orders and will demonstrate appropriate behavior  Description: INTERVENTIONS:- Treat underlying conditions and offer medication as ordered- Educate regarding involuntary admission procedures and rules- Utilize positive consistent limit setting strategies to support patient and staff safety  Outcome: Progressing     Problem: Alteration in Thoughts and Perception  Goal: Treatment Goal: Gain control of psychotic behaviors/thinking, reduce/eliminate presenting symptoms and demonstrate improved reality functioning upon discharge  Outcome: Progressing  Goal: Verbalize thoughts and feelings  Description: Interventions:- Promote a nonjudgmental and trusting relationship with the patient through active listening and therapeutic communication- Assess patient's level of functioning, behavior and potential for risk- Engage patient in 1 on 1  interactions- Encourage patient to express fears, feelings, frustrations, and discuss symptoms  - [place] patient to reality, help patient recognize reality-based thinking - Administer medications as ordered and assess for potential side effects- Provide the patient education related to the signs and symptoms of the illness and desired effects of prescribed medications  Outcome: Progressing  Goal: Refrain from acting on delusional thinking/internal stimuli  Description: Interventions:- Monitor patient closely, per order - Utilize least restrictive measures - Set reasonable limits, give positive feedback for acceptable - Administer medications as ordered and monitor of potential side effects  Outcome: Progressing  Goal: Agree to be compliant with medication regime, as prescribed and report medication side effects  Description: Interventions:- Offer appropriate PRN medication and supervise ingestion; conduct AIMS, as needed   Outcome: Progressing  Goal: Attend and participate in unit activities, including therapeutic, recreational, and educational groups  Description: Interventions:-Encourage Visitation and family involvement in care  Outcome: Progressing  Goal: Recognize dysfunctional thoughts, communicate reality-based thoughts at the time of discharge  Description: Interventions:- Provide medication and psycho-education to assist patient in compliance and developing insight into his/her illness   Outcome: Progressing  Goal: Complete daily ADLs, including personal hygiene independently, as able  Description: Interventions:- Observe, teach, and assist patient with ADLS- Monitor and promote a balance of rest/activity, with adequate nutrition and elimination   Outcome: Progressing     Problem: Risk for Violence/Aggression Toward Others  Goal: Treatment Goal: Refrain from acts of violence/aggression during length of stay, and demonstrate improved impulse control at the time of discharge  Outcome: Progressing  Goal:  Verbalize thoughts and feelings  Description: Interventions:- Assess and re-assess patient's level of risk, every waking shift- Engage patient in 1:1 interactions, daily, for a minimum of 15 minutes - Allow patient to express feelings and frustrations in a safe and non-threatening manner - Establish rapport/trust with patient   Outcome: Progressing  Goal: Refrain from harming others  Outcome: Progressing  Goal: Refrain from destructive acts on the environment or property  Outcome: Progressing  Goal: Identify appropriate positive anger management techniques  Description: Interventions:- Offer anger management and coping skills groups - Staff will provide positive feedback for appropriate anger control  Outcome: Progressing     Problem: DISCHARGE PLANNING  Goal: Discharge to home or other facility with appropriate resources  Description: INTERVENTIONS:- Identify barriers to discharge w/patient and caregiver- Arrange for needed discharge resources and transportation as appropriate- Identify discharge learning needs (meds, wound care, etc.)- Arrange for interpretive services to assist at discharge as needed- Refer to Case Management Department for coordinating discharge planning if the patient needs post-hospital services based on physician/advanced practitioner order or complex needs related to functional status, cognitive ability, or social support system  Outcome: Progressing

## 2025-04-21 RX ORDER — GLYCOPYRROLATE 1 MG/1
2 TABLET ORAL 3 TIMES DAILY
Status: DISCONTINUED | OUTPATIENT
Start: 2025-04-21 | End: 2025-04-23

## 2025-04-21 RX ADMIN — ATORVASTATIN CALCIUM 10 MG: 10 TABLET, FILM COATED ORAL at 17:09

## 2025-04-21 RX ADMIN — Medication 2000 UNITS: at 08:05

## 2025-04-21 RX ADMIN — TRAZODONE HYDROCHLORIDE 100 MG: 100 TABLET ORAL at 22:24

## 2025-04-21 RX ADMIN — TRIAMCINOLONE ACETONIDE 1 APPLICATION: 0.25 CREAM TOPICAL at 17:16

## 2025-04-21 RX ADMIN — GLYCOPYRROLATE 2 MG: 1 TABLET ORAL at 16:08

## 2025-04-21 RX ADMIN — GLYCOPYRROLATE 1 MG: 1 TABLET ORAL at 08:05

## 2025-04-21 RX ADMIN — LITHIUM CARBONATE 600 MG: 300 TABLET, FILM COATED, EXTENDED RELEASE ORAL at 22:24

## 2025-04-21 RX ADMIN — GABAPENTIN 300 MG: 300 CAPSULE ORAL at 21:25

## 2025-04-21 RX ADMIN — LORAZEPAM 1 MG: 1 TABLET ORAL at 02:51

## 2025-04-21 RX ADMIN — GLYCOPYRROLATE 2 MG: 1 TABLET ORAL at 21:24

## 2025-04-21 RX ADMIN — LITHIUM CARBONATE 450 MG: 450 TABLET, EXTENDED RELEASE ORAL at 08:05

## 2025-04-21 RX ADMIN — TAMSULOSIN HYDROCHLORIDE 0.4 MG: 0.4 CAPSULE ORAL at 17:10

## 2025-04-21 RX ADMIN — FOLIC ACID 1 MG: 1 TABLET ORAL at 08:05

## 2025-04-21 RX ADMIN — LOXAPINE 75 MG: 25 CAPSULE ORAL at 08:05

## 2025-04-21 RX ADMIN — THIAMINE HCL TAB 100 MG 100 MG: 100 TAB at 08:05

## 2025-04-21 RX ADMIN — LOXAPINE 75 MG: 25 CAPSULE ORAL at 17:10

## 2025-04-21 RX ADMIN — GABAPENTIN 300 MG: 300 CAPSULE ORAL at 08:05

## 2025-04-21 RX ADMIN — GABAPENTIN 300 MG: 300 CAPSULE ORAL at 16:09

## 2025-04-21 RX ADMIN — Medication 1 TABLET: at 08:05

## 2025-04-21 RX ADMIN — Medication 3 MG: at 22:24

## 2025-04-21 NOTE — PROGRESS NOTES
04/21/25 0800   Team Meeting   Meeting Type Daily Rounds   Team Members Present   Team Members Present Physician;Nurse;   Physician Team Member Maria Luz/Mechelle/Marco/Lele/Kalie   Nursing Team Member Garth/Veena/Taty   Care Management Team Member Atif MEJIA   Social Work Team Member Saturnino   Patient/Family Present   Patient Present No   Patient's Family Present No     Patient had a decent weekend. He was labile and loud but redirectable. He is focused on discharge and seeing his mother. He was given thorazine 50mg Saturday night for slamming his door over and over again. He was improved after the thorazine. Patient discharge is pending stabilization of mood and medications.

## 2025-04-21 NOTE — ASSESSMENT & PLAN NOTE
As of 2025 the patient was agitated, guarded, and argumentative.  Patient does appear to have.  He continues to endorse intermittent double vision and sialorrhea.  He agreed to schedule glycopyrrolate to address the sialorrhea.  Patient received multiple as needed Zyprexa in the past 24 hours.  The Zyprexa did appear to be ineffective so his PRNs for Zyprexa will be changed to Thorazine.    Continue lithium 450 and 600 mg (Held on 04/15/2025)  Patient reports compliance with this medication  Lithium monitorin2025 - 0.61   2025 - 0.79  4/15/25 - 0.63  Continue loxapine 75 mg twice daily (04/15/2025)  Patient not agreeable to long-acting injectable, prefers oral medication  Reports that Invega has worked well in the past but that he would like alternative antipsychotic medication, will continue to monitor  Increase glycopyrrolate to 2 mg 3 times daily for sialorrhea  Labs ordered on 04/15/2025:  CMP  CBC  UA with reflex  Lithium level  Gabapentin 300 mg 3 times daily dosing (2025)  Discontinued scheduled Klonopin twice daily due to oversedation and gait instability (4/15/2025)  Increase trazodone to 150 mg nightly for insomnia and as an adjunct for mood (2025)

## 2025-04-21 NOTE — TREATMENT TEAM
Pt attended groups and able to self reflect.  Pt is able to state his progress and needs redirection/guidance/structure at times. Pt able to identify coping skills.     04/21/25 0900 04/21/25 1000 04/21/25 1100   Activity/Group Checklist   Group Exercise Community meeting Nursing Education   Attendance Attended Attended Attended   Attendance Duration (min) 46-60 31-45 46-60   Interactions Interacted appropriately Other (Comment)  (in and out, didn't want to sit in a chair) Interacted appropriately   Affect/Mood Appropriate Other (Comment)  (raised hand, needed guidance) Appropriate   Goals Achieved Able to listen to others;Able to engage in interactions Identified feelings;Identified triggers;Identified relapse prevention strategies;Discussed coping strategies;Discussed self-esteem issues;Able to reflect/comment on own behavior;Able to engage in interactions;Able to listen to others  --         04/21/25 1330   Activity/Group Checklist   Group Other (Comment)  (Coping skills)   Attendance Attended   Attendance Duration (min) 46-60   Interactions Interacted appropriately   Affect/Mood Appropriate   Goals Achieved Identified feelings;Identified relapse prevention strategies;Discussed coping strategies;Discussed self-esteem issues
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
## 2025-04-21 NOTE — PLAN OF CARE
Problem: PSYCHOSIS  Goal: Will report no hallucinations or delusions  Description: Interventions:- Administer medication as  ordered- Every waking shifts and PRN assess for the presence of hallucinations and or delusions- Assist with reality testing to support increasing orientation- Assess if patient's hallucinations or delusions are encouraging self-harm or harm to others and intervene as appropriate  Outcome: Progressing     Problem: BEHAVIOR  Goal: Pt/Family maintain appropriate behavior and adhere to behavioral management agreement, if implemented  Description: INTERVENTIONS:- Assess the family dynamic - Encourage verbalization of thoughts and concerns in a socially appropriate manner- Assess patient/family's coping skills and non-compliant behavior (including use of illegal substances).- Utilize positive, consistent limit setting strategies supporting safety of patient, staff and others- Initiate consult with Case Management, Spiritual Care or other ancillary services as appropriate- If a patient's/visitor's behavior jeopardizes the safety of the patient, staff, or others, refer to organization procedure. - Notify Security of behavior or suspected illegal substances which indicate the need for search of the patient and/or belongings- Encourage participation in the decision making process about a behavioral management agreement; implement if patient meets criteria  Outcome: Progressing     Problem: ANXIETY  Goal: Will report anxiety at manageable levels  Description: INTERVENTIONS:- Administer medication as ordered- Teach and encourage coping skills- Provide emotional support- Assess patient/family for anxiety and ability to cope  Outcome: Progressing  Goal: By discharge: Patient will verbalize 2 strategies to deal with anxiety  Description: Interventions:- Identify any obvious source/trigger to anxiety- Staff will assist patient in applying identified coping technique/skills- Encourage attendance of  scheduled groups and activities  Outcome: Progressing     Problem: INVOLUNTARY ADMIT  Goal: Will cooperate with staff recommendations and doctor's orders and will demonstrate appropriate behavior  Description: INTERVENTIONS:- Treat underlying conditions and offer medication as ordered- Educate regarding involuntary admission procedures and rules- Utilize positive consistent limit setting strategies to support patient and staff safety  Outcome: Progressing     Problem: Alteration in Thoughts and Perception  Goal: Treatment Goal: Gain control of psychotic behaviors/thinking, reduce/eliminate presenting symptoms and demonstrate improved reality functioning upon discharge  Outcome: Progressing  Goal: Verbalize thoughts and feelings  Description: Interventions:- Promote a nonjudgmental and trusting relationship with the patient through active listening and therapeutic communication- Assess patient's level of functioning, behavior and potential for risk- Engage patient in 1 on 1 interactions- Encourage patient to express fears, feelings, frustrations, and discuss symptoms  - [place] patient to reality, help patient recognize reality-based thinking - Administer medications as ordered and assess for potential side effects- Provide the patient education related to the signs and symptoms of the illness and desired effects of prescribed medications  Outcome: Progressing  Goal: Refrain from acting on delusional thinking/internal stimuli  Description: Interventions:- Monitor patient closely, per order - Utilize least restrictive measures - Set reasonable limits, give positive feedback for acceptable - Administer medications as ordered and monitor of potential side effects  Outcome: Progressing  Goal: Agree to be compliant with medication regime, as prescribed and report medication side effects  Description: Interventions:- Offer appropriate PRN medication and supervise ingestion; conduct AIMS, as needed   Outcome:  Progressing  Goal: Attend and participate in unit activities, including therapeutic, recreational, and educational groups  Description: Interventions:-Encourage Visitation and family involvement in care  Outcome: Progressing  Goal: Recognize dysfunctional thoughts, communicate reality-based thoughts at the time of discharge  Description: Interventions:- Provide medication and psycho-education to assist patient in compliance and developing insight into his/her illness   Outcome: Progressing  Goal: Complete daily ADLs, including personal hygiene independently, as able  Description: Interventions:- Observe, teach, and assist patient with ADLS- Monitor and promote a balance of rest/activity, with adequate nutrition and elimination   Outcome: Progressing     Problem: Risk for Violence/Aggression Toward Others  Goal: Treatment Goal: Refrain from acts of violence/aggression during length of stay, and demonstrate improved impulse control at the time of discharge  Outcome: Progressing  Goal: Verbalize thoughts and feelings  Description: Interventions:- Assess and re-assess patient's level of risk, every waking shift- Engage patient in 1:1 interactions, daily, for a minimum of 15 minutes - Allow patient to express feelings and frustrations in a safe and non-threatening manner - Establish rapport/trust with patient   Outcome: Progressing  Goal: Refrain from harming others  Outcome: Progressing  Goal: Refrain from destructive acts on the environment or property  Outcome: Progressing  Goal: Control angry outbursts  Description: Interventions:- Monitor patient closely, per order- Ensure early verbal de-escalation- Monitor prn medication needs- Set reasonable/therapeutic limits, outline behavioral expectations, and consequences - Provide a non-threatening milieu, utilizing the least restrictive interventions   Outcome: Progressing     Problem: DISCHARGE PLANNING  Goal: Discharge to home or other facility with appropriate  resources  Description: INTERVENTIONS:- Identify barriers to discharge w/patient and caregiver- Arrange for needed discharge resources and transportation as appropriate- Identify discharge learning needs (meds, wound care, etc.)- Arrange for interpretive services to assist at discharge as needed- Refer to Case Management Department for coordinating discharge planning if the patient needs post-hospital services based on physician/advanced practitioner order or complex needs related to functional status, cognitive ability, or social support system  Outcome: Progressing

## 2025-04-21 NOTE — CMS CERTIFICATION NOTE
Recertification: Based upon physical, mental and social evaluations, I certify that inpatient psychiatric services continue to be medically necessary for this patient for a duration of 30 midnights for the treatment of  Schizoaffective disorder, bipolar type (HCC) Available alternative community resources still do not meet the patient's mental health care needs. I further attest that an established written individualized plan of care has been updated and is outlined in the patient's medical records.

## 2025-04-21 NOTE — NURSING NOTE
Patient with severe anxiety unable to return to sleep, walking the halls, and changing clothes and combing hair.  He was given 1mg Ativan PO @ 0251.  Patient encouraged to lower lights and rest in room for remainder of night.

## 2025-04-21 NOTE — CASE MANAGEMENT
Cm reached out to patient's mother Dimple to discuss discharge. She is on board with discharged and is ready to have him home.     Dimple Graham (Mother)   [phone] (H)

## 2025-04-21 NOTE — NURSING NOTE
Patient calm, cooperative, mostly withdrawn to room listening to the radio and napping.  He is focused on discharge and has poor sleep hygiene with insomnia and interrupted sleep.  Patient awakens for the day around 0400 for the past three days.  He takes medications as scheduled and is able to make needs known.  He denies HI/SI/AVH and reports moderate anxiety and depression due to inpatient status.  Safety plan reviewed but needs reinforcement.  No negative behaviors.

## 2025-04-21 NOTE — NURSING NOTE
Patient is alert and oriented able to make his needs known. He is pleasant on approach. He is visible  in the milieu with no peer interactions. He is medication and meal compliant. He denies all psych s/s. Will cont to monitor. Safety checks ongoing.

## 2025-04-21 NOTE — TREATMENT TEAM
Pt completed admission Bh relapse prevention.  Pt signed and copy in chart.  Crisis, wamline and [phone] numbers provided.  [facility]  Provider Dr Everett.  Pt attends groups.      04/21/25 [id number]   Activity/Group Checklist   Group Admission/Discharge   Attendance Attended   Attendance Duration (min) 16-30   Interactions Interacted appropriately   Affect/Mood Appropriate   Goals Achieved Identified triggers;Identified feelings;Identified relapse prevention strategies;Discussed self-esteem issues;Able to engage in interactions;Able to listen to others

## 2025-04-21 NOTE — NURSING NOTE
Patient visible throughout milieu, displaying some disorganized behaviors. Patient verbally redirectable. Patient denied SI/HI/AVH. Patient displays circumstantial speech on assessment. Patient accepted scheduled medications. Patient displays steady gait. Patient able to and encouraged to inform staff of any needs.

## 2025-04-22 RX ORDER — BENZTROPINE MESYLATE 0.5 MG/1
0.5 TABLET ORAL 2 TIMES DAILY
Status: DISCONTINUED | OUTPATIENT
Start: 2025-04-22 | End: 2025-04-25 | Stop reason: HOSPADM

## 2025-04-22 RX ADMIN — Medication 3 MG: at 21:06

## 2025-04-22 RX ADMIN — Medication 1 TABLET: at 08:25

## 2025-04-22 RX ADMIN — BENZTROPINE MESYLATE 0.5 MG: 0.5 TABLET ORAL at 11:18

## 2025-04-22 RX ADMIN — GABAPENTIN 300 MG: 300 CAPSULE ORAL at 08:24

## 2025-04-22 RX ADMIN — GABAPENTIN 300 MG: 300 CAPSULE ORAL at 21:06

## 2025-04-22 RX ADMIN — BENZTROPINE MESYLATE 0.5 MG: 0.5 TABLET ORAL at 17:05

## 2025-04-22 RX ADMIN — GABAPENTIN 300 MG: 300 CAPSULE ORAL at 16:46

## 2025-04-22 RX ADMIN — LOXAPINE 75 MG: 25 CAPSULE ORAL at 17:05

## 2025-04-22 RX ADMIN — ACETAMINOPHEN 975 MG: 325 TABLET, FILM COATED ORAL at 11:18

## 2025-04-22 RX ADMIN — LITHIUM CARBONATE 450 MG: 450 TABLET, EXTENDED RELEASE ORAL at 08:25

## 2025-04-22 RX ADMIN — TRAZODONE HYDROCHLORIDE 50 MG: 50 TABLET ORAL at 01:55

## 2025-04-22 RX ADMIN — GLYCOPYRROLATE 2 MG: 1 TABLET ORAL at 08:24

## 2025-04-22 RX ADMIN — ATORVASTATIN CALCIUM 10 MG: 10 TABLET, FILM COATED ORAL at 16:46

## 2025-04-22 RX ADMIN — FOLIC ACID 1 MG: 1 TABLET ORAL at 08:25

## 2025-04-22 RX ADMIN — TAMSULOSIN HYDROCHLORIDE 0.4 MG: 0.4 CAPSULE ORAL at 16:47

## 2025-04-22 RX ADMIN — THIAMINE HCL TAB 100 MG 100 MG: 100 TAB at 08:24

## 2025-04-22 RX ADMIN — GLYCOPYRROLATE 2 MG: 1 TABLET ORAL at 16:46

## 2025-04-22 RX ADMIN — LITHIUM CARBONATE 600 MG: 300 TABLET, FILM COATED, EXTENDED RELEASE ORAL at 21:06

## 2025-04-22 RX ADMIN — TRAZODONE HYDROCHLORIDE 50 MG: 50 TABLET ORAL at 23:23

## 2025-04-22 RX ADMIN — LOXAPINE 75 MG: 25 CAPSULE ORAL at 08:25

## 2025-04-22 RX ADMIN — GLYCOPYRROLATE 2 MG: 1 TABLET ORAL at 21:06

## 2025-04-22 RX ADMIN — Medication 2000 UNITS: at 08:25

## 2025-04-22 RX ADMIN — TRIAMCINOLONE ACETONIDE: 0.25 CREAM TOPICAL at 08:25

## 2025-04-22 RX ADMIN — TRIAMCINOLONE ACETONIDE 1 APPLICATION: 0.25 CREAM TOPICAL at 17:05

## 2025-04-22 RX ADMIN — TRAZODONE HYDROCHLORIDE 100 MG: 100 TABLET ORAL at 21:06

## 2025-04-22 NOTE — NURSING NOTE
"Patient medicated for c/o headache and \"twitching.\" Tylenol and Cogentin administered as per order. Will monitor   "

## 2025-04-22 NOTE — PROGRESS NOTES
Pastoral Care Progress Note          Chaplaincy Interventions Utilized:   Empowerment: Encouraged focus on present, Encouraged self-care, and Normalized experience of patient/family    Exploration: Explored hope and Explored relational needs & resources    Collaboration: Encouraged adherence to treatment plan     Relationship Building: Cultivated a relationship of care and support, Listened empathically, and Hospitality     visited with the pt while rounding on the floor.  inquired about how the pt was doing and he stated that he was doing well, and that he had a good Easter. The pt talked about how it had been difficult to accept the need to be in the hospital, but he feels he has been able to accept it recently. He believes he is in the right place and that he is going to be better off after his stay here.  normalized the adjustment period and encouraged him in his acceptance and hope related to his situation.        Chaplaincy Outcomes Achieved:  Progressed toward acceptance and Progressed toward focus on present    The pt has progressed to the point of acceptance and is able to focus on his treatment here.

## 2025-04-22 NOTE — PROGRESS NOTES
Progress Note - Behavioral Health   Name: Freddie Graham 65 y.o. male I MRN: [id number]  Unit/Bed#: [facility] 645-01 I Date of Admission: 2025   Date of Service: 2025 I Hospital Day: 16    Assessment & Plan  Schizoaffective disorder, bipolar type (HCC)  As of 25 patient continues to be disorganized but his behaviors are improving.  Drooling has improved as well.  He did report today that he was feeling tremulous.  Will trial the patient on Cogentin for restlessness/tremors.  Can consider lowering lithium in the future if not improved on Cogentin.    Continue lithium 450 and 600 mg (Held on 04/15/2025)  Patient reports compliance with this medication  Lithium monitorin2025 - 0.61   2025 - 0.79  4/15/25 - 0.63  Continue loxapine 75 mg twice daily (04/15/2025)  Patient not agreeable to long-acting injectable, prefers oral medication  Reports that Invega has worked well in the past but that he would like alternative antipsychotic medication, will continue to monitor  Continue glycopyrrolate 2 mg 3 times daily for sialorrhea  Start Cogentin 0.5 mg twice daily for restlessness  Labs ordered on 04/15/2025:  CMP  CBC  UA with reflex  Lithium level  Gabapentin 300 mg 3 times daily dosing (2025)  Discontinued scheduled Klonopin twice daily due to oversedation and gait instability (4/15/2025)  Increase trazodone to 150 mg nightly for insomnia and as an adjunct for mood (2025)  Mild tetrahydrocannabinol (THC) abuse  Encourage cessation  Alcohol abuse, continuous  Encourage cessation  Mood insomnia (HCC)  Per principal problem    Current Medications:    Current Facility-Administered Medications:     atorvastatin (LIPITOR) tablet 10 mg, Oral, Daily With Dinner    Cholecalciferol (VITAMIN D3) tablet 2,000 Units, Oral, Daily    folic acid (FOLVITE) tablet 1 mg, Oral, Daily    gabapentin (NEURONTIN) capsule 300 mg, Oral, TID    glycopyrrolate (ROBINUL) tablet 2 mg, Oral, TID    lithium carbonate  (LITHOBID) CR tablet 450 mg, Oral, Daily    lithium carbonate (LITHOBID) CR tablet 600 mg, Oral, HS    loxapine (LOXITANE) capsule 75 mg, Oral, BID    melatonin tablet 3 mg, Oral, HS    multivitamin-minerals (CENTRUM) tablet 1 tablet, Oral, Daily    tamsulosin (FLOMAX) capsule 0.4 mg, Oral, Daily With Dinner    thiamine tablet 100 mg, Oral, Daily    traZODone (DESYREL) tablet 100 mg, Oral, HS    triamcinolone (KENALOG) 0.025 % cream, Topical, BID    Current Facility-Administered Medications:     acetaminophen (TYLENOL) tablet 650 mg, Oral, Q4H PRN    acetaminophen (TYLENOL) tablet 650 mg, Oral, Q4H PRN    acetaminophen (TYLENOL) tablet 975 mg, Oral, Q6H PRN    aluminum-magnesium hydroxide-simethicone (MAALOX) oral suspension 30 mL, Oral, Q4H PRN    bisacodyl (DULCOLAX) rectal suppository 10 mg, Rectal, Daily PRN    chlorproMAZINE (THORAZINE) injection SOLN 50 mg, Intramuscular, Q6H PRN    chlorproMAZINE (THORAZINE) tablet 25 mg, Oral, Q6H PRN    chlorproMAZINE (THORAZINE) tablet 50 mg, Oral, Q6H PRN Max 4/day    hydrOXYzine HCL (ATARAX) tablet 25 mg, Oral, Q6H PRN Max 4/day    hydrOXYzine HCL (ATARAX) tablet 50 mg, Oral, Q6H PRN Max 4/day    LORazepam (ATIVAN) injection 1 mg, Intramuscular, BID PRN    LORazepam (ATIVAN) tablet 1 mg, Oral, BID PRN    nicotine polacrilex (NICORETTE) gum 2 mg, Oral, Q4H PRN    phenazopyridine (PYRIDIUM) tablet 200 mg, Oral, TID PRN    polyethylene glycol (MIRALAX) packet 17 g, Oral, Daily PRN    senna-docusate sodium (SENOKOT S) 8.6-50 mg per tablet 1 tablet, Oral, Daily PRN    traZODone (DESYREL) tablet 50 mg, Oral, HS PRN    Risks/Benefits of Treatment:     Risks, benefits, and possible side effects of medications explained to patient and patient verbalizes understanding and agreement for treatment.    Progress Toward Goals: improving gradually    Treatment Planning:      - Encourage early mobility and having a structured day  - Provide frequent re-orientation, and cognitive  "stimulation  - Ensure assistive devices are in proper working order (eye-glasses, hearing aids)  - Encourage adequate hydration, nutrition and monitor bowel movements  - Maintain sleep-wake cycle: Uninterrupted sleep time; low-level lighting at night  - Fall precaution  - Follow up with [facility] regarding the medical problems   - Continue medication titration and treatment plan; adjust medication to optimize treatment response and as clinically indicated.   - Observation: Routine  - VS: as per unit protocol  - Encourage group attendance and milieu therapy  - Dispo: To be determined  - Estimated Discharge Day: 4/23/2025   - Legal Status : Voluntary 201 commitment.  - Long Stay Certification : Not Applicable    Subjective     Freddie continues to exhibit disorganized thinking and speech. During the interview, he expressed a desire to meet with the mayor of his hometown to report that the police were trying to shoot him. He also discussed concerns about a DUI court case, although it is unclear if the legal issue is real. He stated that his primary complaint in addition to all mentioned above was back pain, which was still causing pain and discomfort. He also reports tremors and drooling. He believed that the cogentin- prescribed for tremulousness- was prescribed to address back pain. He also continues to have issues with drooling for which glycopyrrolate was prescribed (recently increased to 2mg TID).  Freddie later in the day reported that his drooling is actually improved.  Reported his mood today is \"better.\"  Freddie denies auditory and visual hallucinations. He stated that he is sleeping well and waking up refreshed, but occasionally wakes up at night. His appetite is good.  He is denying anxiety. Denies suicidal or homicidal ideation.    Medication side effects: Tremors, drooling  ROS: review of systems as noted above in HPI/Subjective report, all other systems are negative    Objective :  Temp:  [97.2 °F (36.2 °C)-97.9 °F " "(36.6 °C)] 97.9 °F (36.6 °C)  HR:  [] 103  BP: (142-155)/() 142/99  Resp:  [16-18] 16  SpO2:  [94 %-97 %] 95 %  O2 Device: None (Room air)    Mental Status Evaluation:    Appearance:  disheveled, marginal hygiene, looks stated age   Behavior:  cooperative, pleasant   Speech:  spontaneous   Mood:  \"Better.\"   Affect:  blunted, occasional brightness   Thought Process:  disorganized, illogical, tangential, incoherent   Thought Content:  persecutory delusions, somatic preoccupation, mild paranoia, ideas of reference   Perceptual Disturbances: no auditory hallucinations, no visual hallucinations, does not appear responding to internal stimuli   Risk Potential: Suicidal Ideation -  None at present  Homicidal Ideation -  None at present  Potential for Aggression - Not at present   Sensorium:  disoriented to situation   Memory:  Did not assess   Consciousness:  awake   Attention/Concentration: poor attention span   Insight:  limited but improving   Judgment: limited but improving   Gait/Station: slow gait   Motor Activity: tremulousness         Lab Results: I have reviewed the following results:  Results from the past 24 hours: No results found for this or any previous visit (from the past 24 hours).    Administrative Statements     Counseling / Coordination of Care:   Patient's progress discussed with staff in treatment team meeting.  Medication changes reviewed with staff in treatment team meeting.    Portions of the record may have been created with voice recognition software. Occasional wrong word or \"sound a like\" substitutions may have occurred due to the inherent limitations of voice recognition software. Read the chart carefully and recognize, using context, where substitutions have occurred.    Param Manjarrez DO 04/22/25  PGY-II  "

## 2025-04-22 NOTE — PLAN OF CARE
Problem: PSYCHOSIS  Goal: Will report no hallucinations or delusions  Description: Interventions:- Administer medication as  ordered- Every waking shifts and PRN assess for the presence of hallucinations and or delusions- Assist with reality testing to support increasing orientation- Assess if patient's hallucinations or delusions are encouraging self-harm or harm to others and intervene as appropriate  Outcome: Progressing     Problem: ANXIETY  Goal: Will report anxiety at manageable levels  Description: INTERVENTIONS:- Administer medication as ordered- Teach and encourage coping skills- Provide emotional support- Assess patient/family for anxiety and ability to cope  Outcome: Progressing     Problem: Alteration in Thoughts and Perception  Goal: Treatment Goal: Gain control of psychotic behaviors/thinking, reduce/eliminate presenting symptoms and demonstrate improved reality functioning upon discharge  Outcome: Progressing

## 2025-04-22 NOTE — PROGRESS NOTES
04/22/25 0800   Team Meeting   Meeting Type Daily Rounds   Team Members Present   Team Members Present Physician;Nurse;   Physician Team Member Maria Luz/Mechelle/Marco/Lele/Kalie   Nursing Team Member Garth/Veena/Taty   Care Management Team Member Atif MEJIA   Social Work Team Member Saturnino   Patient/Family Present   Patient Present No   Patient's Family Present No     Patient eating well and attending groups. He has been calm and cooperative and his behaviors have improved. He is for discharge possibly late this week. He still has broken sleep but is improving. Patient discharge is pending stabilization of mood and medications.

## 2025-04-22 NOTE — ASSESSMENT & PLAN NOTE
As of 25 patient continues to be disorganized but his behaviors are improving.  Drooling has improved as well.  He did report today that he was feeling tremulous.  Will trial the patient on Cogentin for restlessness/tremors.  Can consider lowering lithium in the future if not improved on Cogentin.    Continue lithium 450 and 600 mg (Held on 04/15/2025)  Patient reports compliance with this medication  Lithium monitorin2025 - 0.61   2025 - 0.79  4/15/25 - 0.63  Continue loxapine 75 mg twice daily (04/15/2025)  Patient not agreeable to long-acting injectable, prefers oral medication  Reports that Invega has worked well in the past but that he would like alternative antipsychotic medication, will continue to monitor  Continue glycopyrrolate 2 mg 3 times daily for sialorrhea  Start Cogentin 0.5 mg twice daily for restlessness  Labs ordered on 04/15/2025:  CMP  CBC  UA with reflex  Lithium level  Gabapentin 300 mg 3 times daily dosing (2025)  Discontinued scheduled Klonopin twice daily due to oversedation and gait instability (4/15/2025)  Increase trazodone to 150 mg nightly for insomnia and as an adjunct for mood (2025)

## 2025-04-22 NOTE — TREATMENT TEAM
"Pt attended group.  Pt less disruptive and more engaged in group contact.  Redirection needed at times and restless.      04/22/25 0900 04/22/25 1000 04/22/25 1330   Activity/Group Checklist   Group Other (Comment)  (Open Recreation (Felt coloring posters, music, board games, card games)) Other (Comment)  (Group Art Therapy/Psychodynamic, Open Choice followed by Process Discussion) Other (Comment)  (Reflection, \"Earth Day\" awareness and trivia)   Attendance Did not attend Attended Attended  (in and out)   Attendance Duration (min)  --  Greater than 60  (90 min) 46-60   Interactions  --  Interacted appropriately Other (Comment)  (restless at times, saised hand for responses, needed redirection for language)   Affect/Mood  --  Appropriate Normal range   Goals Achieved  --  Able to listen to others;Able to engage in interactions Identified feelings;Identified relapse prevention strategies;Discussed coping strategies;Able to listen to others;Able to engage in interactions         "

## 2025-04-23 PROCEDURE — 99232 SBSQ HOSP IP/OBS MODERATE 35: CPT | Performed by: PSYCHIATRY & NEUROLOGY

## 2025-04-23 RX ORDER — GLYCOPYRROLATE 1 MG/1
1 TABLET ORAL 3 TIMES DAILY
Status: DISCONTINUED | OUTPATIENT
Start: 2025-04-23 | End: 2025-04-25 | Stop reason: HOSPADM

## 2025-04-23 RX ADMIN — TRAZODONE HYDROCHLORIDE 150 MG: 100 TABLET ORAL at 21:23

## 2025-04-23 RX ADMIN — GLYCOPYRROLATE 2 MG: 1 TABLET ORAL at 08:26

## 2025-04-23 RX ADMIN — GABAPENTIN 300 MG: 300 CAPSULE ORAL at 21:23

## 2025-04-23 RX ADMIN — CHLORPROMAZINE HYDROCHLORIDE 50 MG: 50 TABLET, COATED ORAL at 03:53

## 2025-04-23 RX ADMIN — LOXAPINE 75 MG: 25 CAPSULE ORAL at 17:13

## 2025-04-23 RX ADMIN — TAMSULOSIN HYDROCHLORIDE 0.4 MG: 0.4 CAPSULE ORAL at 17:13

## 2025-04-23 RX ADMIN — LOXAPINE 75 MG: 25 CAPSULE ORAL at 08:26

## 2025-04-23 RX ADMIN — GABAPENTIN 300 MG: 300 CAPSULE ORAL at 08:26

## 2025-04-23 RX ADMIN — Medication 3 MG: at 21:23

## 2025-04-23 RX ADMIN — GLYCOPYRROLATE 1 MG: 1 TABLET ORAL at 17:13

## 2025-04-23 RX ADMIN — BENZTROPINE MESYLATE 0.5 MG: 0.5 TABLET ORAL at 17:13

## 2025-04-23 RX ADMIN — TRIAMCINOLONE ACETONIDE: 0.25 CREAM TOPICAL at 17:16

## 2025-04-23 RX ADMIN — BENZTROPINE MESYLATE 0.5 MG: 0.5 TABLET ORAL at 08:26

## 2025-04-23 RX ADMIN — TRIAMCINOLONE ACETONIDE 1 APPLICATION: 0.25 CREAM TOPICAL at 08:26

## 2025-04-23 RX ADMIN — GABAPENTIN 300 MG: 300 CAPSULE ORAL at 17:13

## 2025-04-23 RX ADMIN — FOLIC ACID 1 MG: 1 TABLET ORAL at 08:25

## 2025-04-23 RX ADMIN — LITHIUM CARBONATE 450 MG: 450 TABLET, EXTENDED RELEASE ORAL at 08:26

## 2025-04-23 RX ADMIN — THIAMINE HCL TAB 100 MG 100 MG: 100 TAB at 08:26

## 2025-04-23 RX ADMIN — Medication 2000 UNITS: at 08:26

## 2025-04-23 RX ADMIN — ATORVASTATIN CALCIUM 10 MG: 10 TABLET, FILM COATED ORAL at 17:13

## 2025-04-23 RX ADMIN — GLYCOPYRROLATE 1 MG: 1 TABLET ORAL at 21:23

## 2025-04-23 RX ADMIN — LITHIUM CARBONATE 600 MG: 300 TABLET, FILM COATED, EXTENDED RELEASE ORAL at 21:23

## 2025-04-23 RX ADMIN — HYDROXYZINE HYDROCHLORIDE 50 MG: 50 TABLET, FILM COATED ORAL at 01:06

## 2025-04-23 RX ADMIN — Medication 1 TABLET: at 08:26

## 2025-04-23 NOTE — PROGRESS NOTES
04/23/25 0816   Team Meeting   Meeting Type Daily Rounds   Team Members Present   Team Members Present Physician;Nurse;;   Physician Team Member Maria Luz/Marco/Lele/David   Nursing Team Member Veena   Care Management Team Member Terra Fernandez   Social Work Team Member Saturnino   Patient/Family Present   Patient Present No   Patient's Family Present No     Pt is a 201. Pt is visible in the milieu. Patient is pleasant and cooperative.Presenting brighter and more communicative with staff and peers. Pt is attending group. He is medication/meal compliant. Pt projected to discharge 04/25/2025 to his home.

## 2025-04-23 NOTE — PROGRESS NOTES
Pastoral Care Progress Note          Chaplaincy Interventions Utilized:   Empowerment: Encouraged focus on present, Encouraged self-care, Facilitated group experience, and Normalized experience of patient/family    Exploration: Explored hope, Explored emotional needs & resources, Explored relational needs & resources, and Explored spiritual needs & resources    Relationship Building: Cultivated a relationship of care and support, Listened empathically, and Hospitality    The pt participated in spirituality group facilitated by the  today. Pt was active in discussion.

## 2025-04-23 NOTE — NURSING NOTE
"Prune juice give for c/o constipation. PRN offered but declined stating \"I don't want no more chemicals in my body.\"  "

## 2025-04-23 NOTE — PLAN OF CARE
Problem: PSYCHOSIS  Goal: Will report no hallucinations or delusions  Description: Interventions:- Administer medication as  ordered- Every waking shifts and PRN assess for the presence of hallucinations and or delusions- Assist with reality testing to support increasing orientation- Assess if patient's hallucinations or delusions are encouraging self-harm or harm to others and intervene as appropriate  Outcome: Progressing     Problem: BEHAVIOR  Goal: Pt/Family maintain appropriate behavior and adhere to behavioral management agreement, if implemented  Description: INTERVENTIONS:- Assess the family dynamic - Encourage verbalization of thoughts and concerns in a socially appropriate manner- Assess patient/family's coping skills and non-compliant behavior (including use of illegal substances).- Utilize positive, consistent limit setting strategies supporting safety of patient, staff and others- Initiate consult with Case Management, Spiritual Care or other ancillary services as appropriate- If a patient's/visitor's behavior jeopardizes the safety of the patient, staff, or others, refer to organization procedure. - Notify Security of behavior or suspected illegal substances which indicate the need for search of the patient and/or belongings- Encourage participation in the decision making process about a behavioral management agreement; implement if patient meets criteria  Outcome: Progressing     Problem: ANXIETY  Goal: Will report anxiety at manageable levels  Description: INTERVENTIONS:- Administer medication as ordered- Teach and encourage coping skills- Provide emotional support- Assess patient/family for anxiety and ability to cope  Outcome: Progressing     Problem: ANXIETY  Goal: By discharge: Patient will verbalize 2 strategies to deal with anxiety  Description: Interventions:- Identify any obvious source/trigger to anxiety- Staff will assist patient in applying identified coping technique/skills- Encourage  attendance of scheduled groups and activities  Outcome: Progressing     Problem: Alteration in Thoughts and Perception  Goal: Verbalize thoughts and feelings  Description: Interventions:- Promote a nonjudgmental and trusting relationship with the patient through active listening and therapeutic communication- Assess patient's level of functioning, behavior and potential for risk- Engage patient in 1 on 1 interactions- Encourage patient to express fears, feelings, frustrations, and discuss symptoms  - [place] patient to reality, help patient recognize reality-based thinking - Administer medications as ordered and assess for potential side effects- Provide the patient education related to the signs and symptoms of the illness and desired effects of prescribed medications  Outcome: Progressing  Goal: Refrain from acting on delusional thinking/internal stimuli  Description: Interventions:- Monitor patient closely, per order - Utilize least restrictive measures - Set reasonable limits, give positive feedback for acceptable - Administer medications as ordered and monitor of potential side effects  Outcome: Progressing     Problem: Risk for Violence/Aggression Toward Others  Goal: Verbalize thoughts and feelings  Description: Interventions:- Assess and re-assess patient's level of risk, every waking shift- Engage patient in 1:1 interactions, daily, for a minimum of 15 minutes - Allow patient to express feelings and frustrations in a safe and non-threatening manner - Establish rapport/trust with patient   Outcome: Progressing  Goal: Refrain from harming others  Outcome: Progressing  Goal: Control angry outbursts  Description: Interventions:- Monitor patient closely, per order- Ensure early verbal de-escalation- Monitor prn medication needs- Set reasonable/therapeutic limits, outline behavioral expectations, and consequences - Provide a non-threatening milieu, utilizing the least restrictive interventions   Outcome:  Progressing

## 2025-04-23 NOTE — TREATMENT TEAM
04/23/25 0104   [place]ardo Anxiety Scale   Anxious Mood 3   Tension 3   Fears 3   Insomnia 4   Intellectual 3   Depressed Mood 3   Somatic Complaints: Muscular 2   Somatic Complaints: Sensory 0   Cardiovascular Symptoms 0   Respiratory Symptoms 0   Gastrointestinal Symptoms 0   Genitourinary Symptoms 0   Autonomic Symptoms 0   Behavior at Interview 3   [place]ardo Anxiety Score 24     PRN Atarax given for moderate anxiety

## 2025-04-23 NOTE — NURSING NOTE
Patient is bizarre, disorganized, and hyper verbal. Appetite is excellent. Appears disheveled in layered clothing. Medication compliant. Attending groups but disruptive at times. Denies psychiatric symptoms but continues to need redirection for disruptive behavior. Easily redirected. Continuous rounding maintained.

## 2025-04-23 NOTE — PROGRESS NOTES
Progress Note - Behavioral Health   Name: Freddie Graham 65 y.o. male I MRN: [id number]  Unit/Bed#: [facility] 645-01 I Date of Admission: 2025   Date of Service: 2025 I Hospital Day: 17    Assessment & Plan  Schizoaffective disorder, bipolar type (HCC)  As of 25 patient continues to be disorganized but his behaviors are improving.  Drooling has improved as well.  He did report today that he was feeling tremulous.  Will trial the patient on Cogentin for restlessness/tremors.  Can consider lowering lithium in the future if not improved on Cogentin.    Continue lithium 450 and 600 mg (Held on 04/15/2025)  Patient reports compliance with this medication  Lithium monitorin2025 - 0.61   2025 - 0.79  4/15/25 - 0.63  Continue loxapine 75 mg twice daily (04/15/2025)  Patient not agreeable to long-acting injectable, prefers oral medication  Reports that Invega has worked well in the past but that he would like alternative antipsychotic medication, will continue to monitor  Continue glycopyrrolate 2 mg 3 times daily for sialorrhea  Decrease glycopyrrolate to 1 mg 3 times daily due to symptom improvement   Start Cogentin 0.5 mg twice daily for restlessness  Labs ordered on 04/15/2025:  CMP  CBC  UA with reflex  Lithium level  Gabapentin 300 mg 3 times daily dosing (2025)  Discontinued scheduled Klonopin twice daily due to oversedation and gait instability (4/15/2025)  Increase trazodone to 150 mg nightly for insomnia and as an adjunct for mood (2025)  Mild tetrahydrocannabinol (THC) abuse  Encourage cessation  Alcohol abuse, continuous  Encourage cessation  Mood insomnia (HCC)  Per principal problem    Current Medications:    Current Facility-Administered Medications:     atorvastatin (LIPITOR) tablet 10 mg, Oral, Daily With Dinner    benztropine (COGENTIN) tablet 0.5 mg, Oral, BID    Cholecalciferol (VITAMIN D3) tablet 2,000 Units, Oral, Daily    folic acid (FOLVITE) tablet 1 mg, Oral, Daily     gabapentin (NEURONTIN) capsule 300 mg, Oral, TID    glycopyrrolate (ROBINUL) tablet 1 mg, Oral, TID    lithium carbonate (LITHOBID) CR tablet 450 mg, Oral, Daily    lithium carbonate (LITHOBID) CR tablet 600 mg, Oral, HS    loxapine (LOXITANE) capsule 75 mg, Oral, BID    melatonin tablet 3 mg, Oral, HS    multivitamin-minerals (CENTRUM) tablet 1 tablet, Oral, Daily    tamsulosin (FLOMAX) capsule 0.4 mg, Oral, Daily With Dinner    thiamine tablet 100 mg, Oral, Daily    traZODone (DESYREL) tablet 100 mg, Oral, HS    triamcinolone (KENALOG) 0.025 % cream, Topical, BID    Current Facility-Administered Medications:     acetaminophen (TYLENOL) tablet 650 mg, Oral, Q4H PRN    acetaminophen (TYLENOL) tablet 650 mg, Oral, Q4H PRN    acetaminophen (TYLENOL) tablet 975 mg, Oral, Q6H PRN    aluminum-magnesium hydroxide-simethicone (MAALOX) oral suspension 30 mL, Oral, Q4H PRN    bisacodyl (DULCOLAX) rectal suppository 10 mg, Rectal, Daily PRN    chlorproMAZINE (THORAZINE) injection SOLN 50 mg, Intramuscular, Q6H PRN    chlorproMAZINE (THORAZINE) tablet 25 mg, Oral, Q6H PRN    chlorproMAZINE (THORAZINE) tablet 50 mg, Oral, Q6H PRN Max 4/day    hydrOXYzine HCL (ATARAX) tablet 25 mg, Oral, Q6H PRN Max 4/day    hydrOXYzine HCL (ATARAX) tablet 50 mg, Oral, Q6H PRN Max 4/day    LORazepam (ATIVAN) injection 1 mg, Intramuscular, BID PRN    LORazepam (ATIVAN) tablet 1 mg, Oral, BID PRN    nicotine polacrilex (NICORETTE) gum 2 mg, Oral, Q4H PRN    phenazopyridine (PYRIDIUM) tablet 200 mg, Oral, TID PRN    polyethylene glycol (MIRALAX) packet 17 g, Oral, Daily PRN    senna-docusate sodium (SENOKOT S) 8.6-50 mg per tablet 1 tablet, Oral, Daily PRN    traZODone (DESYREL) tablet 50 mg, Oral, HS PRN    Risks/Benefits of Treatment:     Risks, benefits, and possible side effects of medications explained to patient and patient verbalizes understanding and agreement for treatment.    Progress Toward Goals: improving    Treatment Planning:      -  Encourage early mobility and having a structured day  - Provide frequent re-orientation, and cognitive stimulation  - Ensure assistive devices are in proper working order (eye-glasses, hearing aids)  - Encourage adequate hydration, nutrition and monitor bowel movements  - Maintain sleep-wake cycle: Uninterrupted sleep time; low-level lighting at night  - Fall precaution  - Follow up with [facility] regarding the medical problems   - Continue medication titration and treatment plan; adjust medication to optimize treatment response and as clinically indicated.   - Observation: Routine  - VS: as per unit protocol  - Encourage group attendance and milieu therapy  - Dispo: To be determined  - Estimated Discharge Day: 4/23/2025   - Legal Status : Voluntary 201 commitment.  - Long Stay Certification : Not Applicable    Subjective     Patient was visited on unit for continuing care; chart reviewed and discussed with multidisciplinary treatment team.  On approach, the patient was disorganized and disheveled on approach.  Currently on irritable edge, Freddie is more dismissive and preoccupied with discharge.  Redirection utilized and successful, he is more receptive to education.  Moderate agitation noted overnight, patient did receive Thorazine 50 mg p.o. at 03 53 in addition to Atarax 50 mg p.o. at 0106 for insomnia and/anxiety and trazodone 50 mg at 2323 for insomnia.  Trazodone to be increased 150 mg nightly to assist with mood starting this evening.  We will also decrease Robinul to 1 mg 3 times daily as Cogentin recently initiated at 0.5 mg twice daily for EPS symptoms including mild tremor.  No changes in appetite.  Patient continues to be bizarre and disorganized at times but is slowly approaching his baseline.  No SI/HI.  No AVH upon direct questioning.  Tentative discharge ongoing..    Patient continues to be visible in the milieu and interacts with staff and peers. Received Thorazine 50 mg p.o. at 03 53 for moderate  agitation, Atarax 50 mg at 0106 for anxiety/insomnia and trazodone 50 mg at 2323 for insomnia.    Patient accepted all offered medications and no adverse effects of medications noted or reported.    Sleep: improved  Appetite: normal  Medication side effects: Yes - tremor  ROS: review of systems as noted above in HPI/Subjective report, all other systems are negative    Objective :  Temp:  [97.7 °F (36.5 °C)-98.3 °F (36.8 °C)] 97.7 °F (36.5 °C)  HR:  [] 92  BP: (110-128)/(74-78) 110/74  Resp:  [18] 18  SpO2:  [92 %-95 %] 94 %  O2 Device: None (Room air)    Mental Status Evaluation:    Appearance:  marginal hygiene, dressed inappropropriately   Behavior:  bizarre, guarded, still at times agitated   Speech:  normal rate and volume   Mood:  less dysphoric, less irritable   Affect:  blunted   Thought Process:  disorganized, illogical, tangential   Thought Content:  persecutory delusions, some paranoia   Perceptual Disturbances: denies auditory hallucinations when asked, does not appear responding to internal stimuli   Risk Potential: Suicidal Ideation -  None  Homicidal Ideation -  None  Potential for Aggression - No   Sensorium:  oriented to person and place   Memory:  recent and remote memory: unable to assess due to lack of cooperation   Consciousness:  alert and awake   Attention/Concentration: decreased attention span and decreased concentration   Insight:  limited   Judgment: limited   Gait/Station: normal gait/station   Motor Activity: abnormal movement noted: mild tremor present               Lab Results: I have reviewed the following results:  Most Recent Labs:   Lab Results   Component Value Date    WBC 5.60 04/15/2025    RBC 4.42 04/15/2025    HGB 12.9 04/15/2025    HCT 39.9 04/15/2025     04/15/2025    RDW 12.8 04/15/2025    NEUTROABS 3.59 04/15/2025    SODIUM 141 04/15/2025    K 4.2 04/15/2025     04/15/2025    CO2 30 04/15/2025    BUN 15 04/15/2025    CREATININE 0.98 04/15/2025    GLUC 103  "04/15/2025    CALCIUM 9.4 04/15/2025    AST 25 04/15/2025    ALT 21 04/15/2025    ALKPHOS 52 04/15/2025    TP 6.4 04/15/2025    ALB 4.1 04/15/2025    TBILI 0.41 04/15/2025    CHOLESTEROL 112 04/07/2025    HDL 50 04/07/2025    TRIG 64 04/07/2025    LDLCALC 49 04/07/2025    NONHDLC 62 04/07/2025    VALPROICTOT <10 (L) 02/17/2024    LITHIUM 0.63 04/15/2025    AMMONIA 51 02/06/2021    XBI1KTFVOCTE 1.798 04/07/2025    FREET4 0.97 02/17/2024    TREPONEMAPA Non-reactive 04/07/2025    HGBA1C 5.7 (H) 04/07/2025     04/07/2025       Administrative Statements     Counseling / Coordination of Care:   Patient's progress discussed with staff in treatment team meeting.  Medication changes reviewed with staff in treatment team meeting.    Portions of the record may have been created with voice recognition software. Occasional wrong word or \"sound a like\" substitutions may have occurred due to the inherent limitations of voice recognition software. Read the chart carefully and recognize, using context, where substitutions have occurred.    CARLOS Sampson 04/23/25  "

## 2025-04-23 NOTE — ASSESSMENT & PLAN NOTE
As of 25 patient continues to be disorganized but his behaviors are improving.  Drooling has improved as well.  He did report today that he was feeling tremulous.  Will trial the patient on Cogentin for restlessness/tremors.  Can consider lowering lithium in the future if not improved on Cogentin.    Continue lithium 450 and 600 mg (Held on 04/15/2025)  Patient reports compliance with this medication  Lithium monitorin2025 - 0.61   2025 - 0.79  4/15/25 - 0.63  Continue loxapine 75 mg twice daily (04/15/2025)  Patient not agreeable to long-acting injectable, prefers oral medication  Reports that Invega has worked well in the past but that he would like alternative antipsychotic medication, will continue to monitor  Continue glycopyrrolate 2 mg 3 times daily for sialorrhea  Decrease glycopyrrolate to 1 mg 3 times daily due to symptom improvement   Start Cogentin 0.5 mg twice daily for restlessness  Labs ordered on 04/15/2025:  CMP  CBC  UA with reflex  Lithium level  Gabapentin 300 mg 3 times daily dosing (2025)  Discontinued scheduled Klonopin twice daily due to oversedation and gait instability (4/15/2025)  Increase trazodone to 150 mg nightly for insomnia and as an adjunct for mood (2025)

## 2025-04-23 NOTE — TREATMENT TEAM
04/23/25 0351   Broset Violence Checklist   Assessment type Shift   Irritability 1   Confusion 1   Boisterousness 0   Threatening physical violence 0   Verbal threats 0   Violence 0   Broset score 2     PRN Thorazine given for moderate agitation

## 2025-04-23 NOTE — NURSING NOTE
Patient is pleasant and cooperative. He is medication compliant. Denies S+S at this time. He is worried regarding side effects of his medications. Emotional support provided and informed of scheduled cogentin. He is able to make his needs known and is encouraged to express any unmet needs to staff. Safety check ongoing.

## 2025-04-23 NOTE — NURSING NOTE
Patient stated PRN Atarax was partially effective for helping his anxiety   Speech Therapy    Patient was not available for their therapy session at this time. Pt receiving bedside morning cares via family. Reason not seen: Other (comment) (06/01/17 0910).  Re-Attempt Plan: Will re-attempt per established treatment plan (06/01/17 0910).

## 2025-04-23 NOTE — TREATMENT TEAM
Pt attended all groups.  Pt restless and irritable in am (pants were falling down)  Firm limits needed to be set.  Pt did later apologize for behaviors.  Pt aware of possible d/c by Friday but stated he is not ready yet but hopeful.      04/23/25 0900 04/23/25 1000 04/23/25 1330   Activity/Group Checklist   Group Exercise Community meeting Self Esteem   Attendance Attended Attended  (in and out) Attended   Attendance Duration (min) 16-30 0-15 46-60   Interactions Interacted appropriately Disorganized interaction Interacted appropriately   Affect/Mood Normal range;Appropriate Wide Appropriate   Goals Achieved Able to listen to others;Able to engage in interactions Identified triggers;Identified feelings;Discussed coping strategies;Able to listen to others Identified triggers;Identified feelings;Discussed coping strategies;Able to listen to others;Able to self-disclose;Able to engage in interactions

## 2025-04-24 PROCEDURE — 99232 SBSQ HOSP IP/OBS MODERATE 35: CPT | Performed by: PSYCHIATRY & NEUROLOGY

## 2025-04-24 RX ADMIN — TRAZODONE HYDROCHLORIDE 150 MG: 100 TABLET ORAL at 21:16

## 2025-04-24 RX ADMIN — GABAPENTIN 300 MG: 300 CAPSULE ORAL at 21:17

## 2025-04-24 RX ADMIN — LITHIUM CARBONATE 450 MG: 450 TABLET, EXTENDED RELEASE ORAL at 08:52

## 2025-04-24 RX ADMIN — GABAPENTIN 300 MG: 300 CAPSULE ORAL at 08:51

## 2025-04-24 RX ADMIN — Medication 3 MG: at 21:16

## 2025-04-24 RX ADMIN — TRIAMCINOLONE ACETONIDE: 0.25 CREAM TOPICAL at 17:36

## 2025-04-24 RX ADMIN — Medication 2000 UNITS: at 08:52

## 2025-04-24 RX ADMIN — BENZTROPINE MESYLATE 0.5 MG: 0.5 TABLET ORAL at 17:35

## 2025-04-24 RX ADMIN — Medication 1 TABLET: at 08:51

## 2025-04-24 RX ADMIN — ATORVASTATIN CALCIUM 10 MG: 10 TABLET, FILM COATED ORAL at 17:35

## 2025-04-24 RX ADMIN — GABAPENTIN 300 MG: 300 CAPSULE ORAL at 17:35

## 2025-04-24 RX ADMIN — LOXAPINE 75 MG: 25 CAPSULE ORAL at 08:52

## 2025-04-24 RX ADMIN — GLYCOPYRROLATE 1 MG: 1 TABLET ORAL at 08:52

## 2025-04-24 RX ADMIN — TRAZODONE HYDROCHLORIDE 50 MG: 50 TABLET ORAL at 01:34

## 2025-04-24 RX ADMIN — GLYCOPYRROLATE 1 MG: 1 TABLET ORAL at 17:36

## 2025-04-24 RX ADMIN — BENZTROPINE MESYLATE 0.5 MG: 0.5 TABLET ORAL at 08:52

## 2025-04-24 RX ADMIN — LITHIUM CARBONATE 600 MG: 300 TABLET, FILM COATED, EXTENDED RELEASE ORAL at 21:16

## 2025-04-24 RX ADMIN — FOLIC ACID 1 MG: 1 TABLET ORAL at 08:52

## 2025-04-24 RX ADMIN — TAMSULOSIN HYDROCHLORIDE 0.4 MG: 0.4 CAPSULE ORAL at 17:36

## 2025-04-24 RX ADMIN — TRIAMCINOLONE ACETONIDE: 0.25 CREAM TOPICAL at 08:55

## 2025-04-24 RX ADMIN — THIAMINE HCL TAB 100 MG 100 MG: 100 TAB at 08:52

## 2025-04-24 RX ADMIN — GLYCOPYRROLATE 1 MG: 1 TABLET ORAL at 21:17

## 2025-04-24 RX ADMIN — LOXAPINE 75 MG: 25 CAPSULE ORAL at 17:36

## 2025-04-24 NOTE — ASSESSMENT & PLAN NOTE
As of 25 patient continues to be disorganized but his behaviors are improving.  Drooling has improved as well.  He did report today that he was feeling tremulous.  Will trial the patient on Cogentin for restlessness/tremors.  Can consider lowering lithium in the future if not improved on Cogentin.    Continue lithium 450 and 600 mg (Held on 04/15/2025)  Patient reports compliance with this medication  Lithium monitorin2025 - 0.61   2025 - 0.79  4/15/25 - 0.63  Continue loxapine 75 mg twice daily (04/15/2025)  Patient not agreeable to long-acting injectable, prefers oral medication  Reports that Invega has worked well in the past but that he would like alternative antipsychotic medication, will continue to monitor  Continue glycopyrrolate 1 mg 3 times daily for sialorrhea  Start Cogentin 0.5 mg twice daily for restlessness  Labs ordered on 04/15/2025:  CMP  CBC  UA with reflex  Lithium level  Gabapentin 300 mg 3 times daily dosing (2025)  Discontinued scheduled Klonopin twice daily due to oversedation and gait instability (4/15/2025)  Increase trazodone to 150 mg nightly for insomnia and as an adjunct for mood (2025)

## 2025-04-24 NOTE — PROGRESS NOTES
04/24/25 0900   Team Meeting   Meeting Type Daily Rounds   Team Members Present   Team Members Present Physician;Nurse;;   Physician Team Member Maria Luz/Marco/Lele/David   Nursing Team Member Veena   Care Management Team Member Terra Fernandez   Social Work Team Member Saturnino   Patient/Family Present   Patient Present No   Patient's Family Present No     Pt is a 201.Patient visible in the milieu and communicates with staff and peers. Pt is bizarre, disorganized, disruptive  and hyper verbal. Pt is meal and medication compliant. Pt is attended 4 groups. Pt denies all psych s/s. Patient discharge is projected to discharge 04/25/2025.

## 2025-04-24 NOTE — NURSING NOTE
"Patient is bizarre, hyper verbal, disorganized, and preoccupied. Visible on milieu and social w/ peers. Appetite is excellent. Hygiene appears fair. Brushed and styled long beard. Medication compliant. Attending groups but had to leave because he \"saw a spider.\" Reports he is ready to D/C home. Continuous rounding maintained.   "

## 2025-04-24 NOTE — PROGRESS NOTES
Progress Note - Behavioral Health   Name: Freddie Graham 65 y.o. male I MRN: [id number]  Unit/Bed#: [facility] 645-01 I Date of Admission: 2025   Date of Service: 2025 I Hospital Day: 18    Assessment & Plan  Schizoaffective disorder, bipolar type (HCC)  As of 25 patient continues to be disorganized but his behaviors are improving.  Drooling has improved as well.  He did report today that he was feeling tremulous.  Will trial the patient on Cogentin for restlessness/tremors.  Can consider lowering lithium in the future if not improved on Cogentin.    Continue lithium 450 and 600 mg (Held on 04/15/2025)  Patient reports compliance with this medication  Lithium monitorin2025 - 0.61   2025 - 0.79  4/15/25 - 0.63  Continue loxapine 75 mg twice daily (04/15/2025)  Patient not agreeable to long-acting injectable, prefers oral medication  Reports that Invega has worked well in the past but that he would like alternative antipsychotic medication, will continue to monitor  Continue glycopyrrolate 1 mg 3 times daily for sialorrhea  Start Cogentin 0.5 mg twice daily for restlessness  Labs ordered on 04/15/2025:  CMP  CBC  UA with reflex  Lithium level  Gabapentin 300 mg 3 times daily dosing (2025)  Discontinued scheduled Klonopin twice daily due to oversedation and gait instability (4/15/2025)  Increase trazodone to 150 mg nightly for insomnia and as an adjunct for mood (2025)  Mild tetrahydrocannabinol (THC) abuse  Encourage cessation  Alcohol abuse, continuous  Encourage cessation  Mood insomnia (HCC)  Per principal problem    Current Medications:    Current Facility-Administered Medications:     atorvastatin (LIPITOR) tablet 10 mg, Oral, Daily With Dinner    benztropine (COGENTIN) tablet 0.5 mg, Oral, BID    Cholecalciferol (VITAMIN D3) tablet 2,000 Units, Oral, Daily    folic acid (FOLVITE) tablet 1 mg, Oral, Daily    gabapentin (NEURONTIN) capsule 300 mg, Oral, TID    glycopyrrolate  (ROBINUL) tablet 1 mg, Oral, TID    lithium carbonate (LITHOBID) CR tablet 450 mg, Oral, Daily    lithium carbonate (LITHOBID) CR tablet 600 mg, Oral, HS    loxapine (LOXITANE) capsule 75 mg, Oral, BID    melatonin tablet 3 mg, Oral, HS    multivitamin-minerals (CENTRUM) tablet 1 tablet, Oral, Daily    tamsulosin (FLOMAX) capsule 0.4 mg, Oral, Daily With Dinner    thiamine tablet 100 mg, Oral, Daily    traZODone (DESYREL) tablet 150 mg, Oral, HS    triamcinolone (KENALOG) 0.025 % cream, Topical, BID    Current Facility-Administered Medications:     acetaminophen (TYLENOL) tablet 650 mg, Oral, Q4H PRN    acetaminophen (TYLENOL) tablet 650 mg, Oral, Q4H PRN    acetaminophen (TYLENOL) tablet 975 mg, Oral, Q6H PRN    aluminum-magnesium hydroxide-simethicone (MAALOX) oral suspension 30 mL, Oral, Q4H PRN    bisacodyl (DULCOLAX) rectal suppository 10 mg, Rectal, Daily PRN    chlorproMAZINE (THORAZINE) injection SOLN 50 mg, Intramuscular, Q6H PRN    chlorproMAZINE (THORAZINE) tablet 25 mg, Oral, Q6H PRN    chlorproMAZINE (THORAZINE) tablet 50 mg, Oral, Q6H PRN Max 4/day    hydrOXYzine HCL (ATARAX) tablet 25 mg, Oral, Q6H PRN Max 4/day    hydrOXYzine HCL (ATARAX) tablet 50 mg, Oral, Q6H PRN Max 4/day    LORazepam (ATIVAN) injection 1 mg, Intramuscular, BID PRN    LORazepam (ATIVAN) tablet 1 mg, Oral, BID PRN    nicotine polacrilex (NICORETTE) gum 2 mg, Oral, Q4H PRN    phenazopyridine (PYRIDIUM) tablet 200 mg, Oral, TID PRN    polyethylene glycol (MIRALAX) packet 17 g, Oral, Daily PRN    senna-docusate sodium (SENOKOT S) 8.6-50 mg per tablet 1 tablet, Oral, Daily PRN    traZODone (DESYREL) tablet 50 mg, Oral, HS PRN    Risks/Benefits of Treatment:     Risks, benefits, and possible side effects of medications explained to patient and patient verbalizes understanding and agreement for treatment.    Progress Toward Goals: improving    Treatment Planning:      - Encourage early mobility and having a structured day  - Provide  frequent re-orientation, and cognitive stimulation  - Ensure assistive devices are in proper working order (eye-glasses, hearing aids)  - Encourage adequate hydration, nutrition and monitor bowel movements  - Maintain sleep-wake cycle: Uninterrupted sleep time; low-level lighting at night  - Fall precaution  - Follow up with [facility] regarding the medical problems   - Continue medication titration and treatment plan; adjust medication to optimize treatment response and as clinically indicated.   - Observation: Routine  - VS: as per unit protocol  - Encourage group attendance and milieu therapy  - Dispo: To be determined  - Estimated Discharge Day: 4/23/2025   - Legal Status : Voluntary 201 commitment.  - Long Stay Certification : Not Applicable    Subjective     Patient was visited on unit for continuing care; chart reviewed and discussed with multidisciplinary treatment team.  On approach, the patient was less disorganized and more logical.  Currently discussing his mother, patient verbalizes that he is worried and would like to go home.  Updated on current plan of care, he continues to be in agreement with current medication regimen and states that everything is going well.  Sleep intermittently still disturbed, patient made aware that he will need follow-up and possible sleep study post discharge.  No current SI/HI or AVH.  He does not verbalize any overt delusions and is more redirectable.  Tentative discharge ongoing..    Patient continues to be visible in the milieu and interacts with staff and peers. Received trazodone 50 mg as needed at 01 34 for insomnia.    Patient accepted all offered medications and no adverse effects of medications noted or reported.    Sleep: improved  Appetite: normal  Medication side effects:   ROS: review of systems as noted above in HPI/Subjective report, all other systems are negative    Objective :  Temp:  [97.2 °F (36.2 °C)-98.1 °F (36.7 °C)] 98.1 °F (36.7 °C)  HR:  [85-92] 90  BP:  "(114-152)/(76-87) 114/76  Resp:  [16-20] 16  SpO2:  [93 %-96 %] 96 %  O2 Device: None (Room air)    Mental Status Evaluation:    Appearance:  marginal hygiene, dressed inappropropriately   Behavior:  Bizarre, guarded   Speech:  normal rate and volume   Mood:  \"I am okay \"   Affect:  blunted   Thought Process:  disorganized, illogical, tangential   Thought Content:  Mild paranoia   Perceptual Disturbances: denies auditory hallucinations when asked, does not appear responding to internal stimuli   Risk Potential: Suicidal Ideation -  None  Homicidal Ideation -  None  Potential for Aggression - No   Sensorium:  Oriented to person and place   Memory:  recent and remote memory: unable to assess due to lack of cooperation   Consciousness:  alert and awake   Attention/Concentration: decreased attention span and decreased concentration   Insight:  limited   Judgment: limited   Gait/Station: normal gait/station   Motor Activity: Mild tremor noted               Lab Results: I have reviewed the following results:  Most Recent Labs:   Lab Results   Component Value Date    WBC 5.60 04/15/2025    RBC 4.42 04/15/2025    HGB 12.9 04/15/2025    HCT 39.9 04/15/2025     04/15/2025    RDW 12.8 04/15/2025    NEUTROABS 3.59 04/15/2025    SODIUM 141 04/15/2025    K 4.2 04/15/2025     04/15/2025    CO2 30 04/15/2025    BUN 15 04/15/2025    CREATININE 0.98 04/15/2025    GLUC 103 04/15/2025    CALCIUM 9.4 04/15/2025    AST 25 04/15/2025    ALT 21 04/15/2025    ALKPHOS 52 04/15/2025    TP 6.4 04/15/2025    ALB 4.1 04/15/2025    TBILI 0.41 04/15/2025    CHOLESTEROL 112 04/07/2025    HDL 50 04/07/2025    TRIG 64 04/07/2025    LDLCALC 49 04/07/2025    NONHDLC 62 04/07/2025    VALPROICTOT <10 (L) 02/17/2024    LITHIUM 0.63 04/15/2025    AMMONIA 51 02/06/2021    [id number] 1.798 04/07/2025    FREET4 0.97 02/17/2024    TREPONEMAPA Non-reactive 04/07/2025    HGBA1C 5.7 (H) 04/07/2025     04/07/2025       Administrative " "Statements     Counseling / Coordination of Care:   Patient's progress discussed with staff in treatment team meeting.  Medication changes reviewed with staff in treatment team meeting.    Portions of the record may have been created with voice recognition software. Occasional wrong word or \"sound a like\" substitutions may have occurred due to the inherent limitations of voice recognition software. Read the chart carefully and recognize, using context, where substitutions have occurred.    CARLOS Sampson 04/24/25  "

## 2025-04-24 NOTE — PLAN OF CARE
Problem: ANXIETY  Goal: Will report anxiety at manageable levels  Description: INTERVENTIONS:- Administer medication as ordered- Teach and encourage coping skills- Provide emotional support- Assess patient/family for anxiety and ability to cope  Outcome: Progressing     Problem: Alteration in Thoughts and Perception  Goal: Verbalize thoughts and feelings  Description: Interventions:- Promote a nonjudgmental and trusting relationship with the patient through active listening and therapeutic communication- Assess patient's level of functioning, behavior and potential for risk- Engage patient in 1 on 1 interactions- Encourage patient to express fears, feelings, frustrations, and discuss symptoms  - [place] patient to reality, help patient recognize reality-based thinking - Administer medications as ordered and assess for potential side effects- Provide the patient education related to the signs and symptoms of the illness and desired effects of prescribed medications  Outcome: Progressing  Goal: Agree to be compliant with medication regime, as prescribed and report medication side effects  Description: Interventions:- Offer appropriate PRN medication and supervise ingestion; conduct AIMS, as needed   Outcome: Progressing  Goal: Attend and participate in unit activities, including therapeutic, recreational, and educational groups  Description: Interventions:-Encourage Visitation and family involvement in care  Outcome: Progressing     Problem: Risk for Violence/Aggression Toward Others  Goal: Refrain from harming others  Outcome: Progressing  Goal: Control angry outbursts  Description: Interventions:- Monitor patient closely, per order- Ensure early verbal de-escalation- Monitor prn medication needs- Set reasonable/therapeutic limits, outline behavioral expectations, and consequences - Provide a non-threatening milieu, utilizing the least restrictive interventions   Outcome: Progressing

## 2025-04-24 NOTE — TREATMENT TEAM
Pt attended group and visible.  Pt needs limit setting as disruptive at times.      04/24/25 1000   Activity/Group Checklist   Group Community meeting   Attendance Attended   Attendance Duration (min) 31-45   Interactions Other (Comment)  (fixated on spider on outside window)   Affect/Mood Wide   Goals Achieved Identified triggers;Identified feelings;Discussed coping strategies;Discussed self-esteem issues;Able to engage in interactions;Able to listen to others

## 2025-04-24 NOTE — NURSING NOTE
Patient visible in the milieu and communicates with staff and peers. He is pleasant on approach and compliant with medications.  Currently denies all psych s/s. Will frequently monitor for safety and support.

## 2025-04-25 VITALS
SYSTOLIC BLOOD PRESSURE: 147 MMHG | BODY MASS INDEX: 30.49 KG/M2 | OXYGEN SATURATION: 96 % | DIASTOLIC BLOOD PRESSURE: 92 MMHG | RESPIRATION RATE: 17 BRPM | HEIGHT: 69 IN | HEART RATE: 85 BPM | WEIGHT: 205.9 LBS | TEMPERATURE: 97.2 F

## 2025-04-25 PROBLEM — Z00.8 MEDICAL CLEARANCE FOR PSYCHIATRIC ADMISSION: Status: RESOLVED | Noted: 2020-10-14 | Resolved: 2025-04-25

## 2025-04-25 PROCEDURE — 99238 HOSP IP/OBS DSCHRG MGMT 30/<: CPT | Performed by: PSYCHIATRY & NEUROLOGY

## 2025-04-25 RX ORDER — LOXAPINE SUCCINATE 25 MG/1
75 TABLET ORAL 2 TIMES DAILY
Qty: 180 CAPSULE | Refills: 0 | Status: SHIPPED | OUTPATIENT
Start: 2025-04-25 | End: 2025-05-25

## 2025-04-25 RX ORDER — CHOLECALCIFEROL (VITAMIN D3) 25 MCG
2000 TABLET ORAL DAILY
Qty: 60 TABLET | Refills: 0 | Status: SHIPPED | OUTPATIENT
Start: 2025-04-25 | End: 2025-05-25

## 2025-04-25 RX ORDER — LITHIUM CARBONATE 450 MG
450 TABLET, EXTENDED RELEASE ORAL DAILY
Qty: 30 TABLET | Refills: 1 | Status: SHIPPED | OUTPATIENT
Start: 2025-04-26 | End: 2025-06-25

## 2025-04-25 RX ORDER — GLYCOPYRROLATE 1 MG/1
1 TABLET ORAL 3 TIMES DAILY
Qty: 90 TABLET | Refills: 0 | Status: SHIPPED | OUTPATIENT
Start: 2025-04-25 | End: 2025-05-25

## 2025-04-25 RX ORDER — GABAPENTIN 300 MG/1
300 CAPSULE ORAL 3 TIMES DAILY
Qty: 90 CAPSULE | Refills: 0 | Status: SHIPPED | OUTPATIENT
Start: 2025-04-25 | End: 2025-05-25

## 2025-04-25 RX ORDER — TRAZODONE HYDROCHLORIDE 150 MG/1
150 TABLET ORAL
Qty: 30 TABLET | Refills: 0 | Status: SHIPPED | OUTPATIENT
Start: 2025-04-25 | End: 2025-05-25

## 2025-04-25 RX ORDER — LITHIUM CARBONATE 300 MG/1
600 TABLET, FILM COATED, EXTENDED RELEASE ORAL
Qty: 60 TABLET | Refills: 0 | Status: SHIPPED | OUTPATIENT
Start: 2025-04-25 | End: 2025-05-25

## 2025-04-25 RX ORDER — BENZTROPINE MESYLATE 0.5 MG/1
0.5 TABLET ORAL 2 TIMES DAILY
Qty: 60 TABLET | Refills: 0 | Status: SHIPPED | OUTPATIENT
Start: 2025-04-25 | End: 2025-05-25

## 2025-04-25 RX ORDER — ATORVASTATIN CALCIUM 10 MG/1
10 TABLET, FILM COATED ORAL
Qty: 30 TABLET | Refills: 0 | Status: SHIPPED | OUTPATIENT
Start: 2025-04-25 | End: 2025-05-25

## 2025-04-25 RX ORDER — TAMSULOSIN HYDROCHLORIDE 0.4 MG/1
0.4 CAPSULE ORAL
Qty: 30 CAPSULE | Refills: 0 | Status: SHIPPED | OUTPATIENT
Start: 2025-04-25 | End: 2025-05-25

## 2025-04-25 RX ADMIN — THIAMINE HCL TAB 100 MG 100 MG: 100 TAB at 08:12

## 2025-04-25 RX ADMIN — ACETAMINOPHEN 975 MG: 325 TABLET, FILM COATED ORAL at 02:30

## 2025-04-25 RX ADMIN — Medication 2000 UNITS: at 08:13

## 2025-04-25 RX ADMIN — TRIAMCINOLONE ACETONIDE: 0.25 CREAM TOPICAL at 08:20

## 2025-04-25 RX ADMIN — Medication 1 TABLET: at 08:12

## 2025-04-25 RX ADMIN — LITHIUM CARBONATE 450 MG: 450 TABLET, EXTENDED RELEASE ORAL at 08:14

## 2025-04-25 RX ADMIN — GABAPENTIN 300 MG: 300 CAPSULE ORAL at 08:13

## 2025-04-25 RX ADMIN — GLYCOPYRROLATE 1 MG: 1 TABLET ORAL at 10:08

## 2025-04-25 RX ADMIN — FOLIC ACID 1 MG: 1 TABLET ORAL at 08:13

## 2025-04-25 RX ADMIN — LOXAPINE 75 MG: 25 CAPSULE ORAL at 08:12

## 2025-04-25 RX ADMIN — BENZTROPINE MESYLATE 0.5 MG: 0.5 TABLET ORAL at 08:12

## 2025-04-25 NOTE — BH TRANSITION RECORD
Contact Information: If you have any questions, concerns, pended studies, tests and/or procedures, or emergencies regarding your inpatient behavioral health visit. Please contact [facility] [phone] and ask to speak to a , nurse or physician. A contact is available 24 hours/ 7 days a week at this number.     Summary of Procedures Performed During your Stay:  Below is a list of major procedures performed during your hospital stay and a summary of results:  - Cardiac Procedures/Studies: ECG- Normal Sinus Rhythm  .    Pending Studies (From admission, onward)      None          Please follow up on the above pending studies with your PCP and/or referring provider.

## 2025-04-25 NOTE — PROGRESS NOTES
04/25/25 0913   Team Meeting   Meeting Type Daily Rounds   Team Members Present   Team Members Present Physician;Nurse;;   Physician Team Member Maria Luz/Marco/Lele/David   Nursing Team Member Veena   Care Management Team Member Terra Fernandez   Social Work Team Member Saturnino   Patient/Family Present   Patient Present No   Patient's Family Present No     Pt is a 201. Last Shower  reported to be on 04/24/2025. No BM reported at this time. Pt continues being hyper verbal. Patient visible in the unit. Pt is cooperative and compliant with medications. Pt is social with staff/peers. Pt denies all psych s/s. Patient discharge is pending stabilization of mood and medications.

## 2025-04-25 NOTE — TREATMENT TEAM
04/25/25 0230   Pain Assessment   Pain Assessment Tool 0-10   Pain Score 8   Pain Location/Orientation Location: Back;Orientation: Lower   Pain Radiating Towards NA   Pain Onset/Description Onset: Ongoing   Effect of Pain on Daily Activities sleep   Patient's Stated Pain Goal No pain   Hospital Pain Intervention(s) Medication (See MAR)   Multiple Pain Sites No     PRN Tylenol 975 mg given for back pain at 0230.

## 2025-04-25 NOTE — PLAN OF CARE
Problem: Alteration in Thoughts and Perception  Goal: Verbalize thoughts and feelings  Description: Interventions:- Promote a nonjudgmental and trusting relationship with the patient through active listening and therapeutic communication- Assess patient's level of functioning, behavior and potential for risk- Engage patient in 1 on 1 interactions- Encourage patient to express fears, feelings, frustrations, and discuss symptoms  - [place] patient to reality, help patient recognize reality-based thinking - Administer medications as ordered and assess for potential side effects- Provide the patient education related to the signs and symptoms of the illness and desired effects of prescribed medications  Outcome: Progressing  Goal: Attend and participate in unit activities, including therapeutic, recreational, and educational groups  Description: Interventions:-Encourage Visitation and family involvement in care  Outcome: Progressing     Problem: Risk for Violence/Aggression Toward Others  Goal: Refrain from harming others  Outcome: Progressing  Goal: Control angry outbursts  Description: Interventions:- Monitor patient closely, per order- Ensure early verbal de-escalation- Monitor prn medication needs- Set reasonable/therapeutic limits, outline behavioral expectations, and consequences - Provide a non-threatening milieu, utilizing the least restrictive interventions   Outcome: Progressing  Goal: Identify appropriate positive anger management techniques  Description: Interventions:- Offer anger management and coping skills groups - Staff will provide positive feedback for appropriate anger control  Outcome: Progressing

## 2025-04-25 NOTE — NURSING NOTE
Patient visible in the unit. He is cooperative and compliant with medications. He is social with staff and peers. Patient makes his needs known. Currently denies all psych s/s. Will frequently monitor for safety and support.   Yes

## 2025-04-25 NOTE — DISCHARGE SUMMARY
Discharge Summary - Behavioral Health   Freddie CASTANEDA Day 65 y.o. male MRN: [id number]  Unit/Bed#: [facility] 645-01 Encounter: [id number]     Admission Date:   Admission Orders (From admission, onward)       Ordered        04/06/25 1927  ED TO DIFFERENT CAMPUS [facility] UNIT or INPATIENT MEDICAL UNIT to [facility] UNIT (using Discharge Readmit Navigator) - Admit Patient to  Behavioral Health Unit  Once                                Discharge Date: 04/25/25     Attending Psychiatrist: Kaveh Lara DO     Admission Diagnosis:    Principal Problem:    Schizoaffective disorder, bipolar type (HCC)  Active Problems:    Mild tetrahydrocannabinol (THC) abuse    Alcohol abuse, continuous    Medical clearance for psychiatric admission    Hyperlipidemia    Noncompliance    Urothelial carcinoma (HCC)    LISET (obstructive sleep apnea)    Elevated hemoglobin A1c    Mood insomnia (HCC)      Discharge Diagnosis:     Assessment & Plan  Schizoaffective disorder, bipolar type (HCC)  As of 04/22/25 patient continues to be disorganized but his behaviors are improving.  Drooling has improved as well.  He did report today that he was feeling tremulous.  Will trial the patient on Cogentin for restlessness/tremors.  Can consider lowering lithium in the future if not improved on Cogentin.    Continue lithium 450 daily and 600 mg nightly for mood stabilization  Continue loxapine 75 mg twice daily  Patient not agreeable to long-acting injectable, prefers oral medication  Reports that Invega has worked well in the past but that he would like alternative antipsychotic medication, will continue to monitor  Continue Glycopyrrolate 1 mg 3 times daily for sialorrhea  Continue Cogentin 0.5 mg twice daily for restlessness  Continue Gabapentin 300 mg 3 times daily dosing   Continue Trazodone 150 mg nightly for insomnia and as an adjunct for mood  Mild tetrahydrocannabinol (THC) abuse  Encourage cessation  Alcohol abuse, continuous  Encourage cessation  Mood insomnia  "([facility])  Per principal problem    Reason for Admission/HPI:     \"Freddie Graham is a 65 y.o. male,  single, , domiciled with his mother, on disability income, w/ PMH of LISET, hyperlipidemia, elevated HbA1c, hypertension, urothelial carcinoma, and PPH of schizoaffective disorder bipolar type, multiple prior psychiatric admissions, no prior SA, positive h/o self-injurious behavior, who was admitted to [facility] unit 6B on an involuntary 302 admission with paranoid delusions and labile mood.  Per ED crisis evaluation completed by Barbara Liang on 04/06/2025:  CIS met with patient and completed assessment to the best of ability.Freddie is a 65 year old male you presented with police on a 302. Patient called police because he thought they were breaking into his apartment. Patient is oriented to person and location. He is tearful, yelling and laughing throughout assessment. Patient stated that his mother is in [place] visiting his brother but he can't fly . Freddie is mumbling people he doesn't like and say that he wants to \"squish the shit out of them the literal shit\". He also stated that he feels we are living in the [facility]. Patient has a long history of mental janet; schizophrenia,delusional thoughts, increased agitation, paranoid ideation and aggressive behavior. Patient is not willing to sign a 201 he stated he will see \"PETEY in court.\"      Per chart review patient last admitted to [facility] 2/18/2024-3/18/2024 and discharged on the following medication regimen:  Lithium 450 mg twice daily  Melatonin 3 mg nightly  Remeron 7.5 mg nightly  Invega 12 mg daily  Since this discharge has [facility] Monzon.     The patient was admitted to the inpatient psychiatry unit for further psychiatric stabilization.  On initial evaluation after admission to the inpatient psychiatric unit Freddie was alert and oriented in all spheres, but floridly manic.  He has " "loud speech, labile mood, yelling at times, crying at times, requiring redirection to complete the evaluation.  Symptoms prior to admission included feeling depressed, self-abusive thoughts, hopelessness, helplessness, poor concentration, mood swings, manic symptoms, increased irritability, racing thoughts, agitation, paranoid ideation, delusional thoughts, disorganized thinking process, anxiety symptoms, noncompliance with medications, and poor memory.  Stressors preceding admission included mother traveling to [place], medication noncompliance, and THC use. Freddie is very paranoid, tangential in thought process.  For example, he reports that he wants to be \"exonerated from the \" and when asked what crime he committed he replied \"he was a lousy .\"  He states he is upset that his mother has traveled to [place], says \"I wanted to go to [place] with her.\"  However, he states that he needed to stay home to take care of his dog.  Then he screams out \"my mom is gone forever.\"  He began crying while talking about his dog.  States that he is troubled by tinnitus in his ear.  Patient states he \"needs an Ativan\" to calm down.  Nursing was notified and as needed Ativan was administered orally to patient.  Patient is agreeable to signing 201 admission, he is agreeable to medication management.  He states that he would like his antipsychotic medication Invega changed.  However due to noncompliance we will restart his Invega at a lower dose and as patient's mood becomes more stable in the coming days we will evaluate for potential antipsychotic medication adjustments.  He also states that he no longer wants to take Remeron.  We discussed beginning trazodone instead of this.  Explained risks and benefits of medication, answered patient questions, and obtained informed consent for treatment.  Patient was in agreement with plan\"    Past Medical History:   Diagnosis Date    Alcohol abuse     Anxiety     Astigmatism     " Depression     DJD (degenerative joint disease)     Gait abnormality     Head injury     History of colonic polyps     Hydrocele     Hyperlipidemia     Hypertension     Macular drusen     Presbyopia     PTSD (post-traumatic stress disorder)     Schizoaffective disorder (HCC)     Sepsis (HCC)     Substance abuse (HCC)     Tobacco abuse     Umbilical hernia     Vitreous syneresis      Past Surgical History:   Procedure Laterality Date    FRACTURE SURGERY      INGUINAL HERNIA REPAIR         Medications:    All current active medications have been reviewed.    Allergies:     Allergies   Allergen Reactions    Haldol [Haloperidol]     Navane [Thiothixene (Tiotixene)]     Other      Adhesive tape         Please refer to the initial H&P for full details.      Vital signs in last 24 hours:    Temp:  [97.2 °F (36.2 °C)-97.7 °F (36.5 °C)] 97.2 °F (36.2 °C)  HR:  [85-98] 85  BP: (105-147)/(81-93) 147/92  Resp:  [16-17] 17  SpO2:  [94 %-96 %] 96 %  O2 Device: None (Room air)      Intake/Output Summary (Last 24 hours) at 4/25/2025 0944  Last data filed at 4/25/2025 0847  Gross per 24 hour   Intake 960 ml   Output --   Net 960 ml         Hospital Course:   On admission, Freddie was admitted to the inpatient psychiatric unit and started on Behavioral Health checks for safety monitoring and close observation for safety monitoring. During the hospitalization he was attending individual therapy, group therapy, milieu therapy and occupational therapy..  Upon admission he was seen by medical service for medical clearance for inpatient treatment and medical follow up.    Freddie was started on prior home regimen including lithium 450 mg twice daily for mood stabilization and Invega 6 mg nightly for schizoaffective disorder.  Invega maximized with partial response, medication cross titrated to loxapine which was effective in managing symptoms.  Mild EPS symptoms noted including sialorrhea and restlessness, Robinul 1 mg 3 times daily and  Cogentin 0.5 mg twice daily added into regimen for adverse effects.  Insomnia also noted during inpatient stay, patient trialed on Restoril and Remeron before trazodone implemented and titrated to 150 mg nightly.  Klonopin 0.5 mg twice daily also added into regimen and subsequently discontinued due to oversedation. Lithium also titrated with last level obtained on 4/15/2025 and therapeutic at 0.63.  Patient, family and treatment team in agreement for the patient to be discharged today as he is currently at his baseline with no overt delusions.  Crisis safety plan reviewed, he is agreeable to reach out to [facility] or crisis hotline if feeling unsafe.  No access to guns or any other weaponry verbalized upon direct questioning.  We will continue with plan to discharge at this time.       Freddie's medications were titrated as appropriate until discharge, including:    Continue lithium 450 daily and 600 mg nightly for mood stabilization  Continue loxapine 75 mg twice daily for schizoaffective disorder  Continue Glycopyrrolate 1 mg 3 times daily for sialorrhea  Continue Cogentin 0.5 mg twice daily for restlessness  Continue Gabapentin 300 mg 3 times daily dosing for anxiety  Continue Trazodone 150 mg nightly for insomnia and as an adjunct for mood    Prior to beginning of treatment medications risks and benefits and possible side effects including risk of kidney impairment related to treatment with Lithium, risk of parkinsonian symptoms, Tardive Dyskinesia and metabolic syndrome related to treatment with antipsychotic medications, risk of cardiovascular events in elderly related to treatment with antipsychotic medications, and risk of suicidality and serotonin syndrome related to treatment with antidepressants were reviewed with Freddie. Freddie verbalized understanding and agreement for treatment.  Freddie tolerated these medications with no acute side effects. The patient's mood brightened over the course of treatment, and he was  seen in [facility] interacting appropriately with peers. Freddie did not demonstrate dangerous behavior to self or others during his inpatient stay.     On the day of discharge, he denied suicidal ideation, intent or plan at the time of discharge and denied homicidal ideation, intent or plan at the time of discharge. There was no overt psychosis at the time of discharge. He was participating appropriately in milieu at the time of discharge. Behavior was appropriate on the unit at the time of discharge. Sleep and appetite were improved.      Since he was doing well at the end of the hospitalization, treatment team felt that he could be safely discharged to outpatient care. The outpatient follow up with a psychiatrist was arranged by the unit  upon discharge.    I reviewed with Freddie the importance of compliance with medications and outpatient treatment after discharge., I discussed the medication regimen and possible side effects of the medications with Freddie prior to discharge. At the time of discharge he was tolerating psychiatric medications., I discussed outpatient follow up with Freddie., I reviewed with Freddie crisis plan and safety plan upon discharge., Freddie was competent to understand risks and benefits of withholding information and risks and benefits of his actions., and Freddie agreed to abstain from drug and alcohol use after discharge. The patient understands the importance of taking their medications and attending their outpatient appointments. The patient knows that if there are concerns for safety to utilize their coping skills and can call the suicide hotline as well as 911 if there are concerns for safety.      Behavioral Health Medications: all current active meds have been reviewed and continue current psychiatric medications.  Discharge on Two Antipsychotic Medications : No    Labs/Imaging:   I have personally reviewed all pertinent laboratory/tests results.  Most Recent Labs:   Lab Results  "  Component Value Date    WBC 5.60 04/15/2025    RBC 4.42 04/15/2025    HGB 12.9 04/15/2025    HCT 39.9 04/15/2025     04/15/2025    RDW 12.8 04/15/2025    NEUTROABS 3.59 04/15/2025    SODIUM 141 04/15/2025    K 4.2 04/15/2025     04/15/2025    CO2 30 04/15/2025    BUN 15 04/15/2025    CREATININE 0.98 04/15/2025    GLUC 103 04/15/2025    GLUF 91 04/13/2025    CALCIUM 9.4 04/15/2025    AST 25 04/15/2025    ALT 21 04/15/2025    ALKPHOS 52 04/15/2025    TP 6.4 04/15/2025    ALB 4.1 04/15/2025    TBILI 0.41 04/15/2025    CHOLESTEROL 112 04/07/2025    HDL 50 04/07/2025    TRIG 64 04/07/2025    LDLCALC 49 04/07/2025    NONHDLC 62 04/07/2025    VALPROICTOT <10 (L) 02/17/2024    LITHIUM 0.63 04/15/2025    AMMONIA 51 02/06/2021    MBT0QOOTLQTO 1.798 04/07/2025    FREET4 0.97 02/17/2024    RPR Non-Reactive 11/03/2019    HGBA1C 5.7 (H) 04/07/2025     04/07/2025       Mental Status at time of Discharge:   Appearance:  age appropriate, dressed appropriately, looks stated age, sitting comfortably in chair, adequate hygiene and grooming, cooperative with interview, good eye contact    Behavior:  cooperative   Speech:  normal volume and hypertalkative   Mood:  \"I can't wait to go home and see my mother\"   Affect:  mood-congruent   Language Within normal limits   Thought Process:  normal rate of thoughts, perseverative, less disorganized   Associations: concrete associations      Thought Content:  No verbalized delusions and Delusions less intense   Perceptual Disturbances: Denies auditory or visual hallucinations and Does not appear to be responding to internal stimuli   Risk Potential: Denies suicidal or homicidal ideation, plan, or intent   Sensorium:  person and place   Cognition:  Impaired   Consciousness:  alert and awake   Attention: attention span and concentration were age appropriate   Insight:  limited   Judgment: limited   Intellect impaired abstract thinking   Gait/Station: normal gait/station "   Motor Activity: no abnormal movements     Suicide/Homicide Risk Assessment:  Risk of Harm to Self:   The following ratings are based on assessment at the time of discharge  Demographic risk factors include: , age: over 50 or older  Historical Risk Factors include: chronic psychiatric problems  Current Specific Risk Factors include: recent inpatient psychiatric admission - being discharged today  Protective Factors: no current suicidal ideation  Weapons/Firearms: none. The following steps have been taken to ensure weapons are properly secured: not applicable  Based on today's assessment, Freddie presents the following risk of harm to self: minimal    Risk of Harm to Others:  The following ratings are based on assessment at the time of discharge  Demographic Risk Factors include: male.  Historical Risk Factors include: history of aggressive behavior.  Current Specific Risk Factors include: recent difficulty with impulse control  Protective Factors: no current homicidal ideation  Weapons/Firearms: none. The following steps have been taken to ensure weapons are properly secured: not applicable  Based on today's assessment, Freddie presents the following risk of harm to others: minimal    The following interventions are recommended: outpatient follow up with a psychiatrist    Discharge Medications:  See list above, as well as the after visit summary for all reconciled discharge medications provided to patient and family.      Discharge instructions/Information to patient and family:   See after visit summary for information provided to patient and family.      Provisions for Follow-Up Care:  See after visit summary for information related to follow-up care and any pertinent home health orders.        CARLOS Sampson 04/25/25      This note was completed in part utilizing Dragon dictation Software. Grammatical, translation, syntax errors, random word insertions, spelling mistakes, and incomplete  sentences may be an occasional consequence of this system secondary to software limitations with voice recognition, ambient noise, and hardware issues. If you have any questions or concerns about the content, text, or information contained within the body of this dictation, please contact the provider for clarification.

## 2025-04-25 NOTE — ASSESSMENT & PLAN NOTE
As of 04/22/25 patient continues to be disorganized but his behaviors are improving.  Drooling has improved as well.  He did report today that he was feeling tremulous.  Will trial the patient on Cogentin for restlessness/tremors.  Can consider lowering lithium in the future if not improved on Cogentin.    Continue lithium 450 daily and 600 mg nightly for mood stabilization  Continue loxapine 75 mg twice daily  Patient not agreeable to long-acting injectable, prefers oral medication  Reports that Invega has worked well in the past but that he would like alternative antipsychotic medication, will continue to monitor  Continue Glycopyrrolate 1 mg 3 times daily for sialorrhea  Continue Cogentin 0.5 mg twice daily for restlessness  Continue Gabapentin 300 mg 3 times daily dosing   Continue Trazodone 150 mg nightly for insomnia and as an adjunct for mood

## 2025-04-25 NOTE — CASE MANAGEMENT
Pt to D/C today. Pt denies SI/HI/AVH. Pt oriented x3. Pt to d/c to his home where he lives with his mother. Anne will  upon discharge. Pt to follow up with ICM, PCP, and Psychiatrist. Scripts sent to preferred pharmacy.     Discharge Address: [address] [place] [address], [address]  Phone: [phone]

## 2025-04-25 NOTE — NURSING NOTE
PT was D/C to home D/C instruction was give to PT and PT verbalized understanding of  D/C instruction.  All personal belonging was given to PT.  6 B staff accompany PT to lobby.

## 2025-05-06 ENCOUNTER — HOSPITAL ENCOUNTER (EMERGENCY)
Facility: HOSPITAL | Age: 65
End: 2025-05-08
Attending: EMERGENCY MEDICINE
Payer: MEDICARE

## 2025-05-06 ENCOUNTER — TELEPHONE (OUTPATIENT)
Dept: PSYCHIATRY | Facility: CLINIC | Age: 65
End: 2025-05-06

## 2025-05-06 DIAGNOSIS — Z00.8 MEDICAL CLEARANCE FOR PSYCHIATRIC ADMISSION: ICD-10-CM

## 2025-05-06 DIAGNOSIS — Z91.148 HISTORY OF MEDICATION NONCOMPLIANCE: ICD-10-CM

## 2025-05-06 DIAGNOSIS — Z00.8 ENCOUNTER FOR PSYCHOLOGICAL EVALUATION: Primary | ICD-10-CM

## 2025-05-06 DIAGNOSIS — F25.9 SCHIZOAFFECTIVE DISORDER (HCC): ICD-10-CM

## 2025-05-06 DIAGNOSIS — F31.9 BIPOLAR DISORDER (HCC): ICD-10-CM

## 2025-05-06 LAB
ALBUMIN SERPL BCG-MCNC: 4.3 G/DL (ref 3.5–5)
ALP SERPL-CCNC: 44 U/L (ref 34–104)
ALT SERPL W P-5'-P-CCNC: 31 U/L (ref 7–52)
AMPHETAMINES SERPL QL SCN: NEGATIVE
ANION GAP SERPL CALCULATED.3IONS-SCNC: 9 MMOL/L (ref 4–13)
AST SERPL W P-5'-P-CCNC: 30 U/L (ref 13–39)
ATRIAL RATE: 84 BPM
BARBITURATES UR QL: NEGATIVE
BASOPHILS # BLD AUTO: 0.04 THOUSANDS/ÂΜL (ref 0–0.1)
BASOPHILS NFR BLD AUTO: 1 % (ref 0–1)
BENZODIAZ UR QL: NEGATIVE
BILIRUB SERPL-MCNC: 0.81 MG/DL (ref 0.2–1)
BUN SERPL-MCNC: 15 MG/DL (ref 5–25)
CALCIUM SERPL-MCNC: 9.3 MG/DL (ref 8.4–10.2)
CHLORIDE SERPL-SCNC: 105 MMOL/L (ref 96–108)
CO2 SERPL-SCNC: 23 MMOL/L (ref 21–32)
COCAINE UR QL: NEGATIVE
CREAT SERPL-MCNC: 0.83 MG/DL (ref 0.6–1.3)
EOSINOPHIL # BLD AUTO: 0.05 THOUSAND/ÂΜL (ref 0–0.61)
EOSINOPHIL NFR BLD AUTO: 1 % (ref 0–6)
ERYTHROCYTE [DISTWIDTH] IN BLOOD BY AUTOMATED COUNT: 13.1 % (ref 11.6–15.1)
ETHANOL SERPL-MCNC: <10 MG/DL
FENTANYL UR QL SCN: NEGATIVE
GFR SERPL CREATININE-BSD FRML MDRD: 91 ML/MIN/1.73SQ M
GLUCOSE SERPL-MCNC: 111 MG/DL (ref 65–140)
HCT VFR BLD AUTO: 41.3 % (ref 36.5–49.3)
HGB BLD-MCNC: 13.6 G/DL (ref 12–17)
HYDROCODONE UR QL SCN: NEGATIVE
IMM GRANULOCYTES # BLD AUTO: 0.01 THOUSAND/UL (ref 0–0.2)
IMM GRANULOCYTES NFR BLD AUTO: 0 % (ref 0–2)
LITHIUM SERPL-SCNC: <0.1 MMOL/L (ref 0.6–1.2)
LYMPHOCYTES # BLD AUTO: 1.12 THOUSANDS/ÂΜL (ref 0.6–4.47)
LYMPHOCYTES NFR BLD AUTO: 19 % (ref 14–44)
MCH RBC QN AUTO: 28.8 PG (ref 26.8–34.3)
MCHC RBC AUTO-ENTMCNC: 32.9 G/DL (ref 31.4–37.4)
MCV RBC AUTO: 88 FL (ref 82–98)
METHADONE UR QL: NEGATIVE
MONOCYTES # BLD AUTO: 0.42 THOUSAND/ÂΜL (ref 0.17–1.22)
MONOCYTES NFR BLD AUTO: 7 % (ref 4–12)
NEUTROPHILS # BLD AUTO: 4.33 THOUSANDS/ÂΜL (ref 1.85–7.62)
NEUTS SEG NFR BLD AUTO: 72 % (ref 43–75)
NRBC BLD AUTO-RTO: 0 /100 WBCS
OPIATES UR QL SCN: NEGATIVE
OXYCODONE+OXYMORPHONE UR QL SCN: NEGATIVE
P AXIS: 67 DEGREES
PCP UR QL: NEGATIVE
PLATELET # BLD AUTO: 199 THOUSANDS/UL (ref 149–390)
PMV BLD AUTO: 9.7 FL (ref 8.9–12.7)
POTASSIUM SERPL-SCNC: 3.5 MMOL/L (ref 3.5–5.3)
PR INTERVAL: 148 MS
PROT SERPL-MCNC: 6.7 G/DL (ref 6.4–8.4)
QRS AXIS: -46 DEGREES
QRSD INTERVAL: 92 MS
QT INTERVAL: 406 MS
QTC INTERVAL: 480 MS
RBC # BLD AUTO: 4.72 MILLION/UL (ref 3.88–5.62)
SODIUM SERPL-SCNC: 137 MMOL/L (ref 135–147)
T WAVE AXIS: 70 DEGREES
THC UR QL: NEGATIVE
TSH SERPL DL<=0.05 MIU/L-ACNC: 1.94 UIU/ML (ref 0.45–4.5)
VENTRICULAR RATE: 84 BPM
WBC # BLD AUTO: 5.97 THOUSAND/UL (ref 4.31–10.16)

## 2025-05-06 PROCEDURE — 99285 EMERGENCY DEPT VISIT HI MDM: CPT | Performed by: EMERGENCY MEDICINE

## 2025-05-06 PROCEDURE — 36415 COLL VENOUS BLD VENIPUNCTURE: CPT | Performed by: EMERGENCY MEDICINE

## 2025-05-06 PROCEDURE — 84443 ASSAY THYROID STIM HORMONE: CPT | Performed by: EMERGENCY MEDICINE

## 2025-05-06 PROCEDURE — 85025 COMPLETE CBC W/AUTO DIFF WBC: CPT | Performed by: EMERGENCY MEDICINE

## 2025-05-06 PROCEDURE — 93005 ELECTROCARDIOGRAM TRACING: CPT

## 2025-05-06 PROCEDURE — 82077 ASSAY SPEC XCP UR&BREATH IA: CPT | Performed by: EMERGENCY MEDICINE

## 2025-05-06 PROCEDURE — 99223 1ST HOSP IP/OBS HIGH 75: CPT | Performed by: PSYCHIATRY & NEUROLOGY

## 2025-05-06 PROCEDURE — 99284 EMERGENCY DEPT VISIT MOD MDM: CPT

## 2025-05-06 PROCEDURE — 93010 ELECTROCARDIOGRAM REPORT: CPT | Performed by: INTERNAL MEDICINE

## 2025-05-06 PROCEDURE — 80307 DRUG TEST PRSMV CHEM ANLYZR: CPT | Performed by: EMERGENCY MEDICINE

## 2025-05-06 PROCEDURE — 80053 COMPREHEN METABOLIC PANEL: CPT | Performed by: EMERGENCY MEDICINE

## 2025-05-06 PROCEDURE — 80178 ASSAY OF LITHIUM: CPT | Performed by: EMERGENCY MEDICINE

## 2025-05-06 RX ORDER — OLANZAPINE 2.5 MG/1
5 TABLET, FILM COATED ORAL
Status: DISCONTINUED | OUTPATIENT
Start: 2025-05-06 | End: 2025-05-08 | Stop reason: HOSPADM

## 2025-05-06 RX ORDER — BENZTROPINE MESYLATE 0.5 MG/1
0.5 TABLET ORAL 2 TIMES DAILY
Status: DISCONTINUED | OUTPATIENT
Start: 2025-05-06 | End: 2025-05-08 | Stop reason: HOSPADM

## 2025-05-06 RX ORDER — LORAZEPAM 1 MG/1
2 TABLET ORAL ONCE
Status: COMPLETED | OUTPATIENT
Start: 2025-05-06 | End: 2025-05-06

## 2025-05-06 RX ORDER — TRAZODONE HYDROCHLORIDE 50 MG/1
150 TABLET ORAL
Status: DISCONTINUED | OUTPATIENT
Start: 2025-05-06 | End: 2025-05-08 | Stop reason: HOSPADM

## 2025-05-06 RX ORDER — TAMSULOSIN HYDROCHLORIDE 0.4 MG/1
0.4 CAPSULE ORAL
Status: DISCONTINUED | OUTPATIENT
Start: 2025-05-06 | End: 2025-05-08 | Stop reason: HOSPADM

## 2025-05-06 RX ORDER — LOXAPINE SUCCINATE 25 MG/1
75 TABLET ORAL 2 TIMES DAILY
Status: DISCONTINUED | OUTPATIENT
Start: 2025-05-06 | End: 2025-05-08 | Stop reason: HOSPADM

## 2025-05-06 RX ORDER — GLYCOPYRROLATE 1 MG/1
1 TABLET ORAL 3 TIMES DAILY
Status: DISCONTINUED | OUTPATIENT
Start: 2025-05-06 | End: 2025-05-08 | Stop reason: HOSPADM

## 2025-05-06 RX ORDER — ATORVASTATIN CALCIUM 10 MG/1
10 TABLET, FILM COATED ORAL
Status: DISCONTINUED | OUTPATIENT
Start: 2025-05-06 | End: 2025-05-08 | Stop reason: HOSPADM

## 2025-05-06 RX ORDER — LITHIUM CARBONATE 300 MG/1
300 TABLET, FILM COATED, EXTENDED RELEASE ORAL EVERY 12 HOURS SCHEDULED
Status: DISCONTINUED | OUTPATIENT
Start: 2025-05-06 | End: 2025-05-08 | Stop reason: HOSPADM

## 2025-05-06 RX ORDER — GABAPENTIN 300 MG/1
300 CAPSULE ORAL 3 TIMES DAILY
Status: DISCONTINUED | OUTPATIENT
Start: 2025-05-06 | End: 2025-05-08 | Stop reason: HOSPADM

## 2025-05-06 RX ADMIN — LORAZEPAM 2 MG: 1 TABLET ORAL at 13:23

## 2025-05-06 RX ADMIN — LITHIUM CARBONATE 300 MG: 300 TABLET, EXTENDED RELEASE ORAL at 14:40

## 2025-05-06 NOTE — ED NOTES
201 voluntary paperwork prepared and signed by patient. [facility] explained patient rights and 72 hour notice to pt. Patient appears to be in understanding at this time and does not have any further questions.

## 2025-05-06 NOTE — ED PROVIDER NOTES
"Time reflects when diagnosis was documented in both MDM as applicable and the Disposition within this note       Time User Action Codes Description Comment    5/6/2025 12:10 PM Nilo Norris Add [Z00.8] Encounter for psychological evaluation     5/6/2025 12:10 PM Nilo Norris Add [F31.9] Bipolar disorder (HCC)     5/6/2025 12:10 PM Nilo Norris Add [F25.9] Schizoaffective disorder (HCC)     5/6/2025 12:10 PM Nilo Norris Add [Z91.148] History of medication noncompliance           ED Disposition       None          Assessment & Plan       Medical Decision Making  Amount and/or Complexity of Data Reviewed  Labs: ordered.    Risk  Prescription drug management.      Medication noncompliance and a well-known schizoaffective patient with bipolar sugars, presents emergency department with boisterous loud behavior at home after a argument with his brother over finances related to their elderly mother.  Patient threatened to harm his brother without any specifics.  Patient denies any SI HI, is taking his medications as directed but his lithium level was undetectable.  Patient restarted on his lithium as per the recommendations from telemedicine psychiatry, 201 signed.      Portions of the record may have been created with voice recognition software. Occasional wrong word or \"sound a like\" substitutions may have occurred due to the inherent limitations of voice recognition software. Read the chart carefully and recognize, using context, where substitutions have occurred.   ED Course as of 05/06/25 1631   Tue May 06, 2025   1032 Patient seen, evaluated, examined, chart reviewed, patient been seen multiple times previously for violent outburst, threw a cup of coffee had a family member, arrived via EMS, cooperative this time, no 302 warrant.    Focused differential diagnosis in this patient is as follows: Exacerbation of schizoaffective disorder with bipolar component secondary to medication noncompliance versus " underlying conflict with brother leading to boisterous behavior: yelling, etc...    Plan: Psychiatric clearance labs.   1209 Rounded with crisis, with Anne crisis worker on-call, could not get a coherent history from the patient, patient claims that he has been taking his lithium despite having undetectable lithium level.  Advised psychiatric consultation.   1412 As per Dr. Hensley: psychiatry consult for this patient. Recommendations:    Restart lithium 300 mg PO BID; monitor levels, renal function.  Start olanzapine 5 mg PO qHS; olanzapine 5 mg IM PRN if agitated.  Continuous observation for safety due to impaired insight/judgment.     Recommend inpatient psych admission (voluntary or involuntary). No suicide precautions needed; monitor for HI risk.      1623 Rounded with crisis, 201 sign, please note that there are grounds for 302 due to the patient's violent behavior previously, history of medication noncompliance and threatening to harm his brother.         Medications   lithium carbonate (LITHOBID) CR tablet 300 mg (300 mg Oral Given 5/6/25 1440)   OLANZapine (ZyPREXA) tablet 5 mg (has no administration in time range)   LORazepam (ATIVAN) tablet 2 mg (2 mg Oral Given 5/6/25 1323)       ED Risk Strat Scores                    No data recorded        SBIRT 20yo+      Flowsheet Row Most Recent Value   Initial Alcohol Screen: US AUDIT-C     1. How often do you have a drink containing alcohol? 0 Filed at: 05/06/2025 1016   2. How many drinks containing alcohol do you have on a typical day you are drinking?  0 Filed at: 05/06/2025 1016   3a. Male UNDER 65: How often do you have five or more drinks on one occasion? 0 Filed at: 05/06/2025 1016   3b. FEMALE Any Age, or MALE 65+: How often do you have 4 or more drinks on one occassion? 0 Filed at: 05/06/2025 1016   Audit-C Score 0 Filed at: 05/06/2025 1016   JANNETH: How many times in the past year have you...    Used an illegal drug or used a prescription medication  "for non-medical reasons? Never Filed at: 05/06/2025 1016                            History of Present Illness       Chief Complaint   Patient presents with    Psychiatric Evaluation       Past Medical History:   Diagnosis Date    Alcohol abuse     Anxiety     Astigmatism     Depression     DJD (degenerative joint disease)     Gait abnormality     Head injury     History of colonic polyps     Hydrocele     Hyperlipidemia     Hypertension     Macular drusen     Presbyopia     PTSD (post-traumatic stress disorder)     Schizoaffective disorder (HCC)     Sepsis (HCC)     Substance abuse (HCC)     Tobacco abuse     Umbilical hernia     Vitreous syneresis       Past Surgical History:   Procedure Laterality Date    FRACTURE SURGERY      INGUINAL HERNIA REPAIR        History reviewed. No pertinent family history.   Social History     Tobacco Use    Smoking status: Every Day     Current packs/day: 1.00     Average packs/day: 1 pack/day for 40.0 years (40.0 ttl pk-yrs)     Types: Cigars, Cigarettes    Smokeless tobacco: Never   Vaping Use    Vaping status: Never Used   Substance Use Topics    Alcohol use: Yes     Comment: whenever i want    Drug use: Yes     Types: Marijuana     Comment: Patient denies currently using marijuana.       E-Cigarette/Vaping    E-Cigarette Use Never User       E-Cigarette/Vaping Substances    Nicotine No     THC No     CBD No     Flavoring No     Other No     Unknown No       I have reviewed and agree with the history as documented.     HPI      Is a 65-year-old gentleman presents the emergency department via EMS, history of schizoaffective disorder with bipolar component, hyperlipidemia, struct of sleep apnea, mood insomnia, THC abuse,, recently admitted to the hospital at [facility] for [facility], secondary to an argument with his brother, name to harm him without any specific.      Per the patient his brother is in a \"fucking power junky\", patient had argument with his brother regarding " legal matters in terms of the residence in which the mother primarily owns.  Patient started to raise his voice became verbally agitated but did not become violent towards the brother.  Denies being homicidal or suicidal at this time.    Review of Systems   Constitutional:  Negative for chills and fever.   HENT: Negative.     Eyes: Negative.    Respiratory: Negative.  Negative for chest tightness, shortness of breath and wheezing.    Cardiovascular: Negative.  Negative for chest pain, palpitations and leg swelling.   Gastrointestinal: Negative.    Endocrine: Negative.    Genitourinary: Negative.    Musculoskeletal: Negative.    Skin: Negative.    Allergic/Immunologic: Negative.    Neurological: Negative.    Hematological: Negative.    Psychiatric/Behavioral:  Positive for decreased concentration and dysphoric mood. The patient is hyperactive. The patient is not nervous/anxious.            Objective       ED Triage Vitals [05/06/25 1015]   Temperature Pulse Blood Pressure Respirations SpO2 Patient Position - Orthostatic VS   (!) 97 °F (36.1 °C) (!) 113 (!) 186/133 18 98 % Sitting      Temp Source Heart Rate Source BP Location FiO2 (%) Pain Score    Tympanic Monitor Left arm -- --      Vitals      Date and Time Temp Pulse SpO2 Resp BP Pain Score FACES Pain Rating User   05/06/25 1500 -- 99 95 % 18 125/75 -- -- AS   05/06/25 1015 97 °F (36.1 °C) 113 98 % 18 186/133 -- -- DK            Physical Exam  Vitals and nursing note reviewed.   Constitutional:       General: He is not in acute distress.     Appearance: Normal appearance. He is normal weight. He is not ill-appearing, toxic-appearing or diaphoretic.   HENT:      Head: Normocephalic and atraumatic.      Right Ear: External ear normal.      Left Ear: External ear normal.      Nose: Nose normal.      Mouth/Throat:      Mouth: Mucous membranes are moist.      Pharynx: Oropharynx is clear.   Eyes:      Extraocular Movements: Extraocular movements intact.       Conjunctiva/sclera: Conjunctivae normal.      Pupils: Pupils are equal, round, and reactive to light.   Cardiovascular:      Rate and Rhythm: Normal rate and regular rhythm.      Pulses: Normal pulses.      Heart sounds: Normal heart sounds.   Pulmonary:      Effort: Pulmonary effort is normal.      Breath sounds: Normal breath sounds.   Abdominal:      General: Abdomen is flat. Bowel sounds are normal.   Musculoskeletal:         General: Normal range of motion.      Cervical back: Normal range of motion.   Skin:     General: Skin is warm.      Capillary Refill: Capillary refill takes less than 2 seconds.   Neurological:      General: No focal deficit present.      Mental Status: He is alert and oriented to person, place, and time. Mental status is at baseline.   Psychiatric:         Mood and Affect: Mood normal.         Behavior: Behavior normal.         Thought Content: Thought content normal.         Judgment: Judgment normal.         Results Reviewed       Procedure Component Value Units Date/Time    Rapid drug screen, urine [[id number]]  (Normal) Collected: 05/06/25 1149    Lab Status: Final result Specimen: Urine, Clean Catch Updated: 05/06/25 1204     Amph/Meth UR Negative     Barbiturate Ur Negative     Benzodiazepine Urine Negative     Cocaine Urine Negative     Methadone Urine Negative     Opiate Urine Negative     PCP Ur Negative     THC Urine Negative     Oxycodone Urine Negative     Fentanyl Urine Negative     HYDROCODONE URINE Negative    Narrative:      FOR MEDICAL PURPOSES ONLY.   IF CONFIRMATION NEEDED PLEASE CONTACT THE LAB WITHIN 5 DAYS.    Drug Screen Cutoff Levels:  AMPHETAMINE/METHAMPHETAMINES  1000 ng/mL  BARBITURATES     200 ng/mL  BENZODIAZEPINES     200 ng/mL  COCAINE      300 ng/mL  METHADONE      300 ng/mL  OPIATES      300 ng/mL  PHENCYCLIDINE     25 ng/mL  THC       50 ng/mL  OXYCODONE      100 ng/mL  FENTANYL      5 ng/mL  HYDROCODONE     300 ng/mL    Lithium level [[id number]]   (Abnormal) Collected: 05/06/25 1130    Lab Status: Final result Specimen: Blood from Arm, Left Updated: 05/06/25 1142     Lithium Lvl <0.10 mmol/L     TSH [[id number]]  (Normal) Collected: 05/06/25 1038    Lab Status: Final result Specimen: Blood from Arm, Left Updated: 05/06/25 1117     TSH 3RD GENERATON 1.939 uIU/mL     Comprehensive metabolic panel [[id number]] Collected: 05/06/25 1038    Lab Status: Final result Specimen: Blood from Arm, Left Updated: 05/06/25 1102     Sodium 137 mmol/L      Potassium 3.5 mmol/L      Chloride 105 mmol/L      CO2 23 mmol/L      ANION GAP 9 mmol/L      BUN 15 mg/dL      Creatinine 0.83 mg/dL      Glucose 111 mg/dL      Calcium 9.3 mg/dL      AST 30 U/L      ALT 31 U/L      Alkaline Phosphatase 44 U/L      Total Protein 6.7 g/dL      Albumin 4.3 g/dL      Total Bilirubin 0.81 mg/dL      eGFR 91 ml/min/1.73sq m     Narrative:      National Kid[employer] Disease Foundation guidelines for Chronic Kidney Disease (CKD):     Stage 1 with normal or high GFR (GFR > 90 mL/min/1.73 square meters)    Stage 2 Mild CKD (GFR = 60-89 mL/min/1.73 square meters)    Stage 3A Moderate CKD (GFR = 45-59 mL/min/1.73 square meters)    Stage 3B Moderate CKD (GFR = 30-44 mL/min/1.73 square meters)    Stage 4 Severe CKD (GFR = 15-29 mL/min/1.73 square meters)    Stage 5 End Stage CKD (GFR <15 mL/min/1.73 square meters)  Note: GFR calculation is accurate only with a steady state creatinine    Ethanol [[id number]]  (Normal) Collected: 05/06/25 1038    Lab Status: Final result Specimen: Blood from Arm, Left Updated: 05/06/25 1102     Ethanol Lvl <10 mg/dL     CBC and differential [[id number]] Collected: 05/06/25 1038    Lab Status: Final result Specimen: Blood from Arm, Left Updated: 05/06/25 1044     WBC 5.97 Thousand/uL      RBC 4.72 Million/uL      Hemoglobin 13.6 g/dL      Hematocrit 41.3 %      MCV 88 fL      MCH 28.8 pg      MCHC 32.9 g/dL      RDW 13.1 %      MPV 9.7 fL      Platelets 199 Thousands/uL      nRBC 0  /100 WBCs      Segmented % 72 %      Immature Grans % 0 %      Lymphocytes % 19 %      Monocytes % 7 %      Eosinophils Relative 1 %      Basophils Relative 1 %      Absolute Neutrophils 4.33 Thousands/µL      Absolute Immature Grans 0.01 Thousand/uL      Absolute Lymphocytes 1.12 Thousands/µL      Absolute Monocytes 0.42 Thousand/µL      Eosinophils Absolute 0.05 Thousand/µL      Basophils Absolute 0.04 Thousands/µL             No orders to display       ECG 12 Lead Documentation Only    Date/Time: 5/6/2025 10:53 AM    Performed by: Nilo Norris III, DO  Authorized by: Nilo Norris III, DO    Indications / Diagnosis:  Psych clearance  ECG reviewed by me, the ED Provider: no    Patient location:  ED  Comments:      I personally reviewed this EKG that was performed and the patient may 6/2025, EKG was completed at 10:49 AM, inter by me at 10:53 AM, no acute ST abnormalities noted.  Reticular rate of 84 bpm.  Occasional with sinus arrhythmia noted.    No diffuse elevations to indicate pericarditis.  No coved ST elevations greater than 2mm with negative T waves in V1-3 to indicate concern for brugada.  No biphasic T waves in V2, V3 to indicate Wellens (critical stenosis of LAD).   No elevation in aVR or deviation when compared to V1 (can be associated with ST depression in I,II, V4-6 when left main occlusion is present).       ED Medication and Procedure Management   Prior to Admission Medications   Prescriptions Last Dose Informant Patient Reported? Taking?   atorvastatin (LIPITOR) 10 mg tablet   No No   Sig: Take 1 tablet (10 mg total) by mouth daily with dinner   benztropine (COGENTIN) 0.5 mg tablet   No No   Sig: Take 1 tablet (0.5 mg total) by mouth 2 (two) times a day   cholecalciferol (VITAMIN D3) 1,000 units tablet   No No   Sig: Take 2 tablets (2,000 Units total) by mouth daily   gabapentin (NEURONTIN) 300 mg capsule   No No   Sig: Take 1 capsule (300 mg total) by mouth 3 (three) times a day    glycopyrrolate (ROBINUL) 1 mg tablet   No No   Sig: Take 1 tablet (1 mg total) by mouth 3 (three) times a day   lithium carbonate (LITHOBID) 300 mg CR tablet   No No   Sig: Take 2 tablets (600 mg total) by mouth daily at bedtime   lithium carbonate (LITHOBID) 450 mg CR tablet   No No   Sig: Take 1 tablet (450 mg total) by mouth in the morning   loxapine (LOXITANE) 25 mg capsule   No No   Sig: Take 3 capsules (75 mg total) by mouth 2 (two) times a day   melatonin 3 mg  Self Yes No   Sig: Take 3 mg by mouth   nicotine polacrilex (NICORETTE) 2 mg gum  Self No No   Sig: Chew 1 each (2 mg total) every 2 (two) hours as needed for smoking cessation   phenazopyridine (PYRIDIUM) 200 mg tablet  Self Yes No   Sig: Take 200 mg by mouth Three times daily as needed   tamsulosin (FLOMAX) 0.4 mg   No No   Sig: Take 1 capsule (0.4 mg total) by mouth daily with dinner   traZODone (DESYREL) 150 mg tablet   No No   Sig: Take 1 tablet (150 mg total) by mouth daily at bedtime      Facility-Administered Medications: None     Patient's Medications   Discharge Prescriptions    No medications on file     No discharge procedures on file.  ED SEPSIS DOCUMENTATION   Time reflects when diagnosis was documented in both MDM as applicable and the Disposition within this note       Time User Action Codes Description Comment    5/6/2025 12:10 PM Nilo Norris [Z00.8] Encounter for psychological evaluation     5/6/2025 12:10 PM Nilo Norris [F31.9] Bipolar disorder (HCC)     5/6/2025 12:10 PM Nilo Norris [F25.9] Schizoaffective disorder (HCC)     5/6/2025 12:10 PM Nilo Norris [Z91.148] History of medication noncompliance                  Nilo Norris III, DO  05/06/25 8320

## 2025-05-06 NOTE — PROGRESS NOTES
06/20/23 1000   Activity/Group Checklist   Group   ([employer])   Attendance Attended   Attendance Duration (min) 46-60   Interactions Interacted appropriately   Affect/Mood Appropriate   Goals Achieved Identified feelings; Discussed coping strategies; Able to listen to others; Able to engage in interactions; Able to reflect/comment on own behavior;Able to manage/cope with feelings You can access the FollowMyHealth Patient Portal offered by [facility] by registering at the following website: [email or web address]. By joining [facility]’s FollowMyHealth portal, you will also be able to view your health information using other applications (apps) compatible with our system.
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
## 2025-05-06 NOTE — ED NOTES
Behavioral Health Assessment deferred as patient is sleeping and would benefit from additional rest.  Vital signs deferred until patient awake, no signs or symptoms of respiratory distress at this time.    Once patient is awake and able to participate, will complete assessments.  Will continue to monitor patient until Crisis and the Care team can make appropriate disposition and/or transfer/admission accommodations.      Yareli Arroyo RN  05/06/25 [id number]

## 2025-05-06 NOTE — ED NOTES
Patient resting in bed without any distress.  1:1 remains at bedside.    Patient will benefit from rest and not being waken. RN to administer medication and assess patient when patient awakes.    Will continue to monitor.     Allison A Schoener, RN  05/06/25 [id number]
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
## 2025-05-07 ENCOUNTER — APPOINTMENT (EMERGENCY)
Dept: CT IMAGING | Facility: HOSPITAL | Age: 65
End: 2025-05-07
Payer: MEDICARE

## 2025-05-07 PROCEDURE — 72131 CT LUMBAR SPINE W/O DYE: CPT

## 2025-05-07 RX ORDER — ACETAMINOPHEN 325 MG/1
975 TABLET ORAL ONCE
Status: COMPLETED | OUTPATIENT
Start: 2025-05-07 | End: 2025-05-07

## 2025-05-07 RX ORDER — LORAZEPAM 1 MG/1
2 TABLET ORAL ONCE
Status: COMPLETED | OUTPATIENT
Start: 2025-05-07 | End: 2025-05-07

## 2025-05-07 RX ORDER — FOLIC ACID 1 MG/1
1 TABLET ORAL DAILY
Status: ON HOLD | COMMUNITY
Start: 2025-04-25

## 2025-05-07 RX ADMIN — LORAZEPAM 2 MG: 1 TABLET ORAL at 07:15

## 2025-05-07 RX ADMIN — ACETAMINOPHEN 975 MG: 325 TABLET ORAL at 02:20

## 2025-05-07 NOTE — ED NOTES
Bed search:    [facility] faxed to Vivian for review  [facility]-no answer  [facility] faxed to Mike for review  [facility]Clinicals faxed to Adelina for review  [facility]-Per Liam R-no OA beds
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
## 2025-05-08 ENCOUNTER — HOSPITAL ENCOUNTER (INPATIENT)
Facility: HOSPITAL | Age: 65
LOS: 27 days | Discharge: HOME | DRG: 885 | End: 2025-06-04
Attending: HOSPITALIST | Admitting: HOSPITALIST
Payer: MEDICARE

## 2025-05-08 VITALS
HEART RATE: 72 BPM | SYSTOLIC BLOOD PRESSURE: 148 MMHG | TEMPERATURE: 97.3 F | OXYGEN SATURATION: 99 % | RESPIRATION RATE: 18 BRPM | DIASTOLIC BLOOD PRESSURE: 99 MMHG

## 2025-05-08 DIAGNOSIS — E53.8 B12 DEFICIENCY: ICD-10-CM

## 2025-05-08 DIAGNOSIS — F25.9: Primary | Chronic | ICD-10-CM

## 2025-05-08 DIAGNOSIS — I10 PRIMARY HYPERTENSION: ICD-10-CM

## 2025-05-08 DIAGNOSIS — N40.0 BENIGN PROSTATIC HYPERPLASIA WITHOUT LOWER URINARY TRACT SYMPTOMS: ICD-10-CM

## 2025-05-08 DIAGNOSIS — Z00.8 MEDICAL CLEARANCE FOR PSYCHIATRIC ADMISSION: ICD-10-CM

## 2025-05-08 RX ORDER — BENZTROPINE MESYLATE 0.5 MG/1
0.5 TABLET ORAL
Status: CANCELLED | OUTPATIENT
Start: 2025-05-08

## 2025-05-08 RX ORDER — OLANZAPINE 5 MG/1
5 TABLET, FILM COATED ORAL
Status: DISCONTINUED | OUTPATIENT
Start: 2025-05-08 | End: 2025-06-04 | Stop reason: HOSPADM

## 2025-05-08 RX ORDER — MAGNESIUM HYDROXIDE/ALUMINUM HYDROXICE/SIMETHICONE 120; 1200; 1200 MG/30ML; MG/30ML; MG/30ML
30 SUSPENSION ORAL EVERY 4 HOURS PRN
Status: DISCONTINUED | OUTPATIENT
Start: 2025-05-08 | End: 2025-06-04 | Stop reason: HOSPADM

## 2025-05-08 RX ORDER — LORAZEPAM 0.5 MG/1
0.5 TABLET ORAL
Status: DISCONTINUED | OUTPATIENT
Start: 2025-05-08 | End: 2025-06-04 | Stop reason: HOSPADM

## 2025-05-08 RX ORDER — IBUPROFEN 400 MG/1
400 TABLET, FILM COATED ORAL EVERY 6 HOURS PRN
Status: CANCELLED | OUTPATIENT
Start: 2025-05-08

## 2025-05-08 RX ORDER — OLANZAPINE 2.5 MG/1
2.5 TABLET, FILM COATED ORAL
Status: CANCELLED | OUTPATIENT
Start: 2025-05-08

## 2025-05-08 RX ORDER — BENZTROPINE MESYLATE 1 MG/ML
1 INJECTION, SOLUTION INTRAMUSCULAR; INTRAVENOUS
Status: CANCELLED | OUTPATIENT
Start: 2025-05-08

## 2025-05-08 RX ORDER — OLANZAPINE 2.5 MG/1
2.5 TABLET, FILM COATED ORAL
Status: DISCONTINUED | OUTPATIENT
Start: 2025-05-08 | End: 2025-06-04 | Stop reason: HOSPADM

## 2025-05-08 RX ORDER — LITHIUM CARBONATE 300 MG/1
300 TABLET, FILM COATED, EXTENDED RELEASE ORAL EVERY 12 HOURS SCHEDULED
Status: CANCELLED | OUTPATIENT
Start: 2025-05-08

## 2025-05-08 RX ORDER — PROPRANOLOL HYDROCHLORIDE 10 MG/1
5 TABLET ORAL EVERY 8 HOURS PRN
Status: CANCELLED | OUTPATIENT
Start: 2025-05-08

## 2025-05-08 RX ORDER — LITHIUM CARBONATE 300 MG/1
300 TABLET, FILM COATED, EXTENDED RELEASE ORAL EVERY 12 HOURS SCHEDULED
Status: DISCONTINUED | OUTPATIENT
Start: 2025-05-08 | End: 2025-05-13

## 2025-05-08 RX ORDER — LORAZEPAM 2 MG/ML
1 INJECTION INTRAMUSCULAR
Status: DISCONTINUED | OUTPATIENT
Start: 2025-05-08 | End: 2025-06-04 | Stop reason: HOSPADM

## 2025-05-08 RX ORDER — ACETAMINOPHEN 325 MG/1
650 TABLET ORAL EVERY 6 HOURS PRN
Status: DISCONTINUED | OUTPATIENT
Start: 2025-05-08 | End: 2025-06-04 | Stop reason: HOSPADM

## 2025-05-08 RX ORDER — LORAZEPAM 1 MG/1
1 TABLET ORAL
Status: DISCONTINUED | OUTPATIENT
Start: 2025-05-08 | End: 2025-06-04 | Stop reason: HOSPADM

## 2025-05-08 RX ORDER — PROPRANOLOL HYDROCHLORIDE 10 MG/1
5 TABLET ORAL EVERY 8 HOURS PRN
Status: DISCONTINUED | OUTPATIENT
Start: 2025-05-08 | End: 2025-06-04 | Stop reason: HOSPADM

## 2025-05-08 RX ORDER — AMOXICILLIN 250 MG
1 CAPSULE ORAL DAILY PRN
Status: DISCONTINUED | OUTPATIENT
Start: 2025-05-08 | End: 2025-06-04 | Stop reason: HOSPADM

## 2025-05-08 RX ORDER — LORAZEPAM 2 MG/ML
1 INJECTION INTRAMUSCULAR
Status: CANCELLED | OUTPATIENT
Start: 2025-05-08

## 2025-05-08 RX ORDER — POLYETHYLENE GLYCOL 3350 17 G/17G
17 POWDER, FOR SOLUTION ORAL DAILY PRN
Status: DISCONTINUED | OUTPATIENT
Start: 2025-05-08 | End: 2025-06-04 | Stop reason: HOSPADM

## 2025-05-08 RX ORDER — OLANZAPINE 10 MG/2ML
5 INJECTION, POWDER, FOR SOLUTION INTRAMUSCULAR
Status: DISCONTINUED | OUTPATIENT
Start: 2025-05-08 | End: 2025-06-04 | Stop reason: HOSPADM

## 2025-05-08 RX ORDER — LORAZEPAM 1 MG/1
0.5 TABLET ORAL
Status: CANCELLED | OUTPATIENT
Start: 2025-05-08

## 2025-05-08 RX ORDER — OLANZAPINE 10 MG/2ML
5 INJECTION, POWDER, FOR SOLUTION INTRAMUSCULAR
Status: CANCELLED | OUTPATIENT
Start: 2025-05-08

## 2025-05-08 RX ORDER — BENZTROPINE MESYLATE 0.5 MG/1
0.5 TABLET ORAL
Status: DISCONTINUED | OUTPATIENT
Start: 2025-05-08 | End: 2025-06-04 | Stop reason: HOSPADM

## 2025-05-08 RX ORDER — OLANZAPINE 2.5 MG/1
5 TABLET, FILM COATED ORAL
Status: CANCELLED | OUTPATIENT
Start: 2025-05-08

## 2025-05-08 RX ORDER — HYDROXYZINE HYDROCHLORIDE 25 MG/1
25 TABLET, FILM COATED ORAL
Status: DISCONTINUED | OUTPATIENT
Start: 2025-05-08 | End: 2025-06-04 | Stop reason: HOSPADM

## 2025-05-08 RX ORDER — MAGNESIUM HYDROXIDE/ALUMINUM HYDROXICE/SIMETHICONE 120; 1200; 1200 MG/30ML; MG/30ML; MG/30ML
30 SUSPENSION ORAL EVERY 4 HOURS PRN
Status: CANCELLED | OUTPATIENT
Start: 2025-05-08

## 2025-05-08 RX ORDER — HYDROXYZINE HYDROCHLORIDE 50 MG/1
25 TABLET, FILM COATED ORAL
Status: CANCELLED | OUTPATIENT
Start: 2025-05-08

## 2025-05-08 RX ORDER — POLYETHYLENE GLYCOL 3350 17 G/17G
17 POWDER, FOR SOLUTION ORAL DAILY PRN
Status: CANCELLED | OUTPATIENT
Start: 2025-05-08

## 2025-05-08 RX ORDER — BENZTROPINE MESYLATE 1 MG/ML
1 INJECTION, SOLUTION INTRAMUSCULAR; INTRAVENOUS
Status: DISCONTINUED | OUTPATIENT
Start: 2025-05-08 | End: 2025-06-04 | Stop reason: HOSPADM

## 2025-05-08 RX ORDER — TRAZODONE HYDROCHLORIDE 50 MG/1
50 TABLET ORAL
Status: CANCELLED | OUTPATIENT
Start: 2025-05-08

## 2025-05-08 RX ORDER — AMOXICILLIN 250 MG
1 CAPSULE ORAL DAILY PRN
Status: CANCELLED | OUTPATIENT
Start: 2025-05-08

## 2025-05-08 RX ORDER — TRAZODONE HYDROCHLORIDE 50 MG/1
50 TABLET ORAL
Status: DISCONTINUED | OUTPATIENT
Start: 2025-05-08 | End: 2025-06-04 | Stop reason: HOSPADM

## 2025-05-08 RX ORDER — ACETAMINOPHEN 325 MG/1
650 TABLET ORAL EVERY 6 HOURS PRN
Status: CANCELLED | OUTPATIENT
Start: 2025-05-08

## 2025-05-08 RX ORDER — IBUPROFEN 600 MG/1
600 TABLET, FILM COATED ORAL EVERY 6 HOURS PRN
Status: DISCONTINUED | OUTPATIENT
Start: 2025-05-08 | End: 2025-06-04 | Stop reason: HOSPADM

## 2025-05-08 RX ORDER — LORAZEPAM 1 MG/1
1 TABLET ORAL
Status: CANCELLED | OUTPATIENT
Start: 2025-05-08

## 2025-05-08 RX ORDER — IBUPROFEN 400 MG/1
400 TABLET, FILM COATED ORAL EVERY 6 HOURS PRN
Status: DISCONTINUED | OUTPATIENT
Start: 2025-05-08 | End: 2025-06-04 | Stop reason: HOSPADM

## 2025-05-08 NOTE — LETTER
[facility]  [address] [place] [id number]  [phone]  Dept: [phone]    May 30, 2025     Patient: Freddie Graham   YOB: 1960   Date of Visit: 5/8/2025       To Whom it May Concern:    Freddie Graham is under [facility] professional care. He was seen in the hospital from 5/8/2025 to present. At this time there is no set discharge date.    If you have any questions or concerns, please don't hesitate to call.         Sincerely,          Maranda Abdul MS    [facility]   [address], [address] [place] [address]  Direct Line: [phone]  Unit: [phone]

## 2025-05-08 NOTE — ED NOTES
Insurance Authorization for admission:   Completed precert through [facility] with [facility]   Level of care: 201/IP  Authorization # pending auth [id number]

## 2025-05-08 NOTE — ED NOTES
Patient is accepted at [facility]  Patient is accepted by Dr. Asher Nicole    Transportation is arranged with [employer].         Nurse report is to be called to [phone] prior to patient transfer.

## 2025-05-08 NOTE — LETTER
Formerly [facility], later [facility]  [address] [place] [id number]  [phone]  Dept: [phone]    June 4, 2025     Patient: Freddie Graham   YOB: 1960   Date of Visit: 5/8/2025       To Whom it May Concern:    Freddie Graham is under the care [facility]. He was seen in the hospital from 5/8/2025 to 06/04/25.   If you have any questions or concerns, please don't hesitate to call.         Sincerely,       Maranda Abdul, MS    [facility] [address], [address] [place] [address]  Direct Line: [phone]  Unit: [phone]

## 2025-05-08 NOTE — ED NOTES
[facility]. Pt denies due to no available beds. Can represent in the morning.
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
## 2025-05-08 NOTE — ED NOTES
Patient screened upon arrival using [place] Suicide Risk Assessment with result of low   Re-screening not required unless change in behavior or suicidal ideation.  Patient's environment appears to be free of unnecessary equipment/cords, and other objects commonly identified for self harm or harm to others.  Behavioral Health Assessment deferred as patient is sleeping and would benefit from additional rest.  Vital signs deferred until patient awake, no signs or symptoms of respiratory distress at this time.    Once patient is awake and able to participate, will complete assessments.  Will continue to monitor patient until Crisis and the Care team can make appropriate disposition and/or transfer/admission accommodations.        Get Hernandez RN  05/08/25 1148

## 2025-05-08 NOTE — ED NOTES
"Meal tray delivered to bedside. Patient offered morning medications. Patient states, \"I'm not taking anything from you people.\" Patient educated on medications and still refused.      Geno Bernal RN  05/08/25 0751       Geno Bernal RN  05/08/25 0752    "

## 2025-05-08 NOTE — ED NOTES
Patient screened upon arrival using [facility] Suicide Risk Assessment with result of low   Re-screening not required unless change in behavior or suicidal ideation.  Patient's environment appears to be free of unnecessary equipment/cords, and other objects commonly identified for self harm or harm to others.  Behavioral Health Assessment deferred as patient is sleeping and would benefit from additional rest.  Vital signs deferred until patient awake, no signs or symptoms of respiratory distress at this time.    Once patient is awake and able to participate, will complete assessments.  Will continue to monitor patient until Crisis and the Care team can make appropriate disposition and/or transfer/admission accommodations.        Get Hernandez RN  05/08/25 0137

## 2025-05-09 PROCEDURE — 99223 1ST HOSP IP/OBS HIGH 75: CPT | Performed by: HOSPITALIST

## 2025-05-09 PROCEDURE — NC001 PR NO CHARGE: Performed by: PSYCHIATRY & NEUROLOGY

## 2025-05-09 RX ORDER — TAMSULOSIN HYDROCHLORIDE 0.4 MG/1
0.4 CAPSULE ORAL
Status: DISCONTINUED | OUTPATIENT
Start: 2025-05-09 | End: 2025-06-04 | Stop reason: HOSPADM

## 2025-05-09 RX ORDER — PHENAZOPYRIDINE HYDROCHLORIDE 100 MG/1
200 TABLET, FILM COATED ORAL 3 TIMES DAILY PRN
Status: DISCONTINUED | OUTPATIENT
Start: 2025-05-09 | End: 2025-06-04 | Stop reason: HOSPADM

## 2025-05-09 RX ORDER — ATORVASTATIN CALCIUM 10 MG/1
10 TABLET, FILM COATED ORAL
Status: DISCONTINUED | OUTPATIENT
Start: 2025-05-09 | End: 2025-06-04 | Stop reason: HOSPADM

## 2025-05-09 RX ORDER — PALIPERIDONE 3 MG/1
3 TABLET, EXTENDED RELEASE ORAL DAILY
Status: DISCONTINUED | OUTPATIENT
Start: 2025-05-09 | End: 2025-05-12

## 2025-05-09 RX ORDER — LISINOPRIL 10 MG/1
10 TABLET ORAL DAILY
Status: DISCONTINUED | OUTPATIENT
Start: 2025-05-09 | End: 2025-06-04 | Stop reason: HOSPADM

## 2025-05-09 RX ADMIN — LORAZEPAM 1 MG: 1 TABLET ORAL at 10:57

## 2025-05-09 RX ADMIN — LISINOPRIL 10 MG: 10 TABLET ORAL at 15:37

## 2025-05-09 RX ADMIN — LITHIUM CARBONATE 300 MG: 300 TABLET, EXTENDED RELEASE ORAL at 21:04
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
## 2025-05-09 NOTE — CASE MANAGEMENT
CM placed call to [facility] Common [facility]: [phone] for admission notification. Routed to several divisions with final directive to leave a detailed message re: issue for the  at  [facility]. Reportedly, the patient missed an Arraignment Hearing on 5/6/25 at 9 AM [address]/ [address], after which a Bench Warrant was issued and the case transferred to the Court  [facility].   [facility] with identifying information, including the Case Number: [id number] and CM number for return call with further instructions.    CM placed call to patient's PCP office: Dr. Everett: [phone] for admission notification. Spoke with Central Scheduling for the [facility] who stated that the patient's next appt with Dr. Everett is scheduled for June 18, 2025 at 12:30.  The appt will be kept unless rescheduling required due to early d/c or continued stay     CM placed call to patient's Psychiatric Provider, Dr. Monzon: [phone] for admission notification. Spoke with Central Scheduling who indicated that the patient's next appt is scheduled for 5/27/25 at 8 AM. Appt. Will be kept unless rescheduling required due to continued stay.

## 2025-05-09 NOTE — PROGRESS NOTES
Met with Freddie completed his admission self assessment. He states his brother called crisis but did not know the reason. He states he did not sign the 201 and believes they signed it. He does have things that he enjoys and wants to learn about: communications, anger management, coping skills, have his family understand his struggles, relationships, relaxation, knowledge of medications, community supports and resources, managing conflict.

## 2025-05-09 NOTE — NURSING NOTE
Patient noted to have broken sleep throughout the night. Non labored breathing noted while asleep. Q 15 min safety checks maintained.
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
## 2025-05-09 NOTE — PLAN OF CARE
Problem: Ineffective Coping  Goal: Participates in unit activities  Description: Interventions:- Provide therapeutic environment - Provide required programming - Redirect inappropriate behaviors   Outcome: Progressing     Problem: Risk for Violence/Aggression Toward Others  Goal: Attend and participate in unit activities, including therapeutic, recreational, and educational groups  Description: Interventions:- Provide therapeutic and educational activities daily, encourage attendance and participation, and document same in the medical record   Outcome: Progressing   Pt is new and will be encouraged to attend groups

## 2025-05-09 NOTE — PROGRESS NOTES
05/09/25   Team Meeting   Meeting Type Tx Team Meeting   Initial Conference Date 05/09/25   Team Members Present   Team Members Present Physician;Nurse;   Physician Team Member MD Asher   Nursing Team Member ADRIEN Ramos   Care Management Team Member MS Franko   Patient/Family Present   Patient Present Yes   Patient's Family Present No   CM attending patient's treatment team meeting with patient, doctor and nurse. Reviewed over his treatment goals and discussed any concerns and questions that he had. Client presented with paranoia, agitation at times regarding his rights and medications.

## 2025-05-09 NOTE — PROGRESS NOTES
SECOND OPINION FOR INJECTABLE PSYCHIATRIC MEDICATIONS    Freddie Graham 65 y.o. male MRN: [id number]  Unit/Bed#: [facility] 245-02 Encounter: [id number]      Reason for Admission/Current Symptoms:    Freddie Graham is a 65 y.o. male with a history of Schizoaffective Disorder who was admitted to the inpatient psychiatric unit on a involuntary 302 commitment basis due to manic symptoms, unstable mood, psychotic symptoms, increased agitation, and noncompliance with medication . Symptoms prior to admission included manic symptoms, increased irritability, racing thoughts, anger outbursts, delusional thoughts, and disorganized thinking process. Freddie was resistant to taking the medication that were advised. Second opinion for injectable psychotropic medications was requested by Dr. Sterling due to Freddie's refusal to take oral medications.    On evaluation today Freddie continues to state that is going to take only oral medication or certain medications.  He believes that his brother is a devil his thoughts are racing at times rambling..     Mental Status Evaluation:    Appearance:  disheveled   Behavior:  cooperative, calm   Speech:  pressured   Mood:  euphoric   Affect:  labile   Thought Process:  flight of ideas   Thought Content:  some paranoia   Perceptual Disturbances: does not appear responding to internal stimuli   Risk Potential: Suicidal ideation - None at present  Homicidal ideation - None at present  Potential for aggression - Yes, due to acute psychosis   Sensorium:  oriented to person, place, and time/date   Memory:  recent and remote memory grossly intact   Consciousness:  alert and awake    Attention: decreased concentration and decreased attention span   Insight:  impaired   Judgment: impaired   Gait/Station: normal gait/station   Motor Activity: no abnormal movements       Vital signs in last 24 hours:    Temp:  [97.1 °F (36.2 °C)-97.5 °F (36.4 °C)] 97.1 °F (36.2 °C)  HR:  [70-79] 79  BP: (148-175)/() 175/91  Resp:   [18] 18  SpO2:  [98 %-99 %] 98 %  O2 Device: None (Room air)    Labs: Results from the past 24 hours: No results found for this or any previous visit (from the past 24 hours).    Medications:  All current active medications have been reviewed.    Current medications:    Current Facility-Administered Medications:     atorvastatin (LIPITOR) tablet 10 mg, Oral, Daily With Dinner    lisinopril (ZESTRIL) tablet 10 mg, Oral, Daily    lithium carbonate (LITHOBID) CR tablet 300 mg, Oral, Q12H MANNY    paliperidone (INVEGA) 24 hr tablet 3 mg, Oral, Daily    tamsulosin (FLOMAX) capsule 0.4 mg, Oral, Daily With Dinner     Assessment & Plan     Principal Problem:    Acute exacerbation of chronic schizoaffective schizophrenia (HCC)      Recommended Treatment:     I agree with the need for injectable antipsychotic medications if he refuses oral medications.    Juaquin Marquez MD 05/09/25

## 2025-05-09 NOTE — PLAN OF CARE
Problem: Risk for Violence/Aggression Toward Others  Goal: Control angry outbursts  Description: Interventions:- Monitor patient closely, per order- Ensure early verbal de-escalation- Monitor prn medication needs- Set reasonable/therapeutic limits, outline behavioral expectations, and consequences - Provide a non-threatening milieu, utilizing the least restrictive interventions   Outcome: Progressing     Problem: Alteration in Orientation  Goal: Interact with staff daily  Description: Interventions:- Assess and re-assess patient's level of orientation- Engage patient in 1 on 1 interactions, daily, for a minimum of 15 minutes - Establish rapport/trust with patient   Outcome: Progressing

## 2025-05-09 NOTE — H&P
"Psychiatric Evaluation - Behavioral Health   Identification Data:Freddie Graham 65 y.o. male MRN: [id number]  Unit/Bed#: [facility] 245-02 Encounter: [id number]            Assessment & Plan  Acute exacerbation of chronic schizoaffective schizophrenia (HCC)  65-year-old male known to this service with history of chronic mental illness and diagnosis of schizoaffective disorder.  Returns to the hospital in the context of being noncompliant and having exacerbated symptoms of mood lability, disorganized behaviors and illogical thinking.  We will restart patient on medications he was stabilized on in the past-  Invega 3 mg daily  Lithium 300 mg twice daily    At time of admission patient refusing medications and treatment plan.  Due to need of hospitalization and stabilization patient will be converted to 302 and a medication over objection second opinion will be obtained.       Risks / Benefits of Treatment:  Risks, benefits, and possible side effects of medications explained to patient and patient verbalizes understanding. At this time patient does not want to start medications.        Chief Complaint:  \"My brother called crisis on me\"    History of Present Illness     Freddie Graham is a 65 y.o. male with a history of Schizoaffective Disorder who was admitted to the inpatient adult psychiatric unit on a voluntary 201 commitment basis due to manic symptoms, unstable mood, psychotic symptoms, and increased agitation.    On evaluation in the inpatient psychiatric unit Freddie is a poor historian and not cooperative due to his significant of schizoaffective disorder.  Patient does not engage in conversation to answer any questions of the provider.  Does communicate with long grants regarding respiratory thoughts, stating he does not need medication and at times multiple illogical content.  Patient adamantly stating he will refuse all medications.  Patient seen to be manic with psychotic thought processes.  Subjectively denies all " symptoms.  Denies thoughts of self-harm or others however is seen to be angry and agitated with potential for aggressive outbursts..        Psychiatric Review Of Systems:  Patient refusing to answer review of systems, secondary to decompensated psychiatric symptoms    Historical Information     Past Psychiatric History:     Past Inpatient Psychiatric Treatment:   Multiple past inpatient psychiatric admissions  Past Outpatient Psychiatric Treatment:    Noncompliant with outpatient psychiatric treatment prior to admission  Past Suicide Attempts: Denied no  Past Violent Behavior: Yes  Past Psychiatric Medication Trials: multiple psychiatric medication trials     Substance Abuse History:    Social History       Tobacco History       Smoking Status  Every Day Current Packs/Day  1 pack/day Average Packs/Day  1 pack/day for 40.0 years (40.0 ttl pk-yrs) Smoking Tobacco Type  Cigarettes, Cigars   Pack Year History     Packs/Day From To Years    1   40.0      Smokeless Tobacco Use  Never              Alcohol History       Alcohol Use Status  Yes Drinks/Week  2 Cans of beer per week Amount  2.0 standard drinks of alcohol/wk Comment  whenever i want              Drug Use       Drug Use Status  Never Comment  Patient denies currently using marijuana.               Sexual Activity       Sexually Active  Not Currently Partners  Female              Other Factors    Not Asked                 Additional Substance Use Detail       Questions Responses    Problems Due to Past Use of Alcohol? No    Problems Due to Past Use of Substances? No    Substance Use Assessment Denies substance use within the past 12 months    Alcohol Use Frequency 1 or 2 times/week    Cannabis frequency Daily    Comment: Daily on 9/7/2020     Heroin Frequency Denies use in past 12 months    Cannabis method Other    Comment: Puts in food     Cocaine frequency Never used    Comment: Never used on 9/7/2020     Crack Cocaine Frequency Denies use in past 12 months     Methamphetamine Frequency Denies use in past 12 months    Narcotic Frequency Denies use in past 12 months    Benzodiazepine Frequency Denies use in past 12 months    Amphetamine frequency Denies use in past 12 months    Barbituate Frequency Denies use use in past 12 months    Inhalant frequency Never used    Comment: Never used on 9/7/2020     Hallucinogen frequency Never used    Comment: Never used on 9/7/2020     Ecstasy frequency Never used    Comment: Never used on 9/7/2020     Other drug frequency Never used    Comment: Never used on 9/7/2020     Opiate frequency Denies use in past 12 months    Last reviewed by Saul Son RN on 5/8/2025          I am unable to assess the patient for substance use within the past 12 months as they are unable or unwilling to answer due to exacerbated psychiatric symptoms      Family Psychiatric History:   Denied    Social History:    Education: GED  Marital History: single  Children: none  Living Arrangement: lives in home with mother and brother  Occupational History: unemployed  Functioning Relationships: limited support system  Legal History: unable to obtain   History: None    Traumatic History:   Denied    Past Medical History:      Past Medical History:   Diagnosis Date    Alcohol abuse     Anxiety     Astigmatism     Depression     DJD (degenerative joint disease)     Gait abnormality     Head injury     History of colonic polyps     Hydrocele     Hyperlipidemia     Hypertension     Macular drusen     Presbyopia     PTSD (post-traumatic stress disorder)     Schizoaffective disorder (HCC)     Sepsis (HCC)     Substance abuse (HCC)     Tobacco abuse     Umbilical hernia     Vitreous syneresis      Past Surgical History:   Procedure Laterality Date    FRACTURE SURGERY      INGUINAL HERNIA REPAIR         Medical Review Of Systems:    Pertinent items are noted in HPI.  Otherwise negative    Allergies:    Allergies   Allergen Reactions    Haldol [Haloperidol]      Navane [Thiothixene (Tiotixene)]     Other      Adhesive tape         Medications:     All current active medications have been reviewed.    OBJECTIVE:    Vital signs in last 24 hours:    Temp:  [97.1 °F (36.2 °C)-97.5 °F (36.4 °C)] 97.1 °F (36.2 °C)  HR:  [70-79] 79  BP: (148-175)/() 175/91  Resp:  [18] 18  SpO2:  [98 %-99 %] 98 %  O2 Device: None (Room air)    No intake or output data in the 24 hours ending 05/09/25 1214     Mental Status Evaluation:    Appearance:  casually dressed, marginal hygiene, looks stated age, bearded   Behavior:  agitated, demanding   Speech:  hypertalkative   Mood:  irritable, angry   Affect:  labile, expansive   Language: naming objects   Thought Process:  illogical, circumstantial   Associations: circumstantial associations   Thought Content:  paranoid ideation, ideas of reference   Perceptual Disturbances: denies auditory hallucinations when asked   Risk Potential: Suicidal ideation - None  Homicidal ideation - None  Potential for aggression - Yes   Sensorium:  oriented to person, place, and time/date   Memory:  recent and remote memory grossly intact   Consciousness:  alert and awake   Attention: decreased concentration and decreased attention span   Intellect: average   Fund of Knowledge: awareness of current events: yes   Insight:  impaired   Judgment: impaired   Muscle Strength Muscle Tone: normal  normal   Gait/Station: normal gait/station   Motor Activity: no abnormal movements       Laboratory Results:   I have personally reviewed all pertinent laboratory/tests results.    Imaging Studies:   CT spine lumbar without contrast  Result Date: 5/7/2025  Narrative: CT LUMBAR SPINE WITHOUT CONTRAST INDICATION:   low back pain. COMPARISON: None. TECHNIQUE:  Contiguous axial images through the lumbar spine were obtained. Sagittal and coronal reconstructions were performed. IV Contrast: Radiation dose length product (DLP) for this visit:  720 mGy-cm. .  This examination, like all  CT scans performed in the [facility], was performed utilizing techniques to minimize radiation dose exposure, including the use of iterative reconstruction and automated exposure control. IMAGE QUALITY:  Diagnostic. FINDINGS: ALIGNMENT:  There are 5 lumbar type vertebral bodies.  Normal alignment of the lumbar spine.  No spondylolysis or spondylolisthesis. VERTEBRAE: Prominent L5 superior endplate Schmorl's node. No acute displaced fracture. Mild compression deformities T12 and L1. No lytic or blastic lesion. DEGENERATIVE CHANGES: Lower Thoracic spine: Normal lower thoracic disc spaces. Lumbar Spine: L1-2: No canal stenosis. Bilateral facet arthropathy causing mild neuroforaminal stenosis. L2-3: Circumferential disc bulge without canal stenosis. Bilateral facet arthropathy without significant neuroforaminal narrowing. L3-4: Circumferential disc bulge with mild ligamentum flavum hypertrophy and facet arthropathy causing mild canal stenosis. No significant neuroforaminal stenosis. L4-5: Circumferential disc bulge, ligamentum flavum hypertrophy and facet arthropathy causing mild to moderate canal stenosis. Severe left and moderate right neuroforaminal narrowing. L5-S1: Circumferential disc bulge and facet arthropathy without spinal stenosis. Severe left and moderate right neuroforaminal narrowing. PARASPINAL SOFT TISSUES:  Normal. OTHER: None.     Impression: No acute displaced fracture or traumatic malalignment. Multilevel degenerative disc and facet changes causing varying degrees of spinal and neuroforaminal narrowing. Up to moderate spinal stenosis at L4-5. Up to severe left L4-5 and L5-S1 neuroforaminal stenosis. Resident: Denis Garcia I, the attending radiologist, have reviewed the images and agree with the final report above. Workstation performed: [id number]       Code Status: Prior  Advance Directive and Living Will: <no information>      Counseling / Coordination of Care:    Patient's  presentation on admission and proposed treatment plan discussed with treatment team.  Diagnosis, medication changes and treatment plan reviewed with patient.  Stressors preceding admission discussed with patient including family problems.  At this time patient has limited understanding of diagnosis, medication changes and treatment plan and will require further explanation.  [facility]    Inpatient Psychiatric Certification:    Estimated length of stay: 7 midnights      Josefa Sterling MD 05/09/25

## 2025-05-09 NOTE — PLAN OF CARE
Problem: DISCHARGE PLANNING - CARE MANAGEMENT  Goal: Discharge to post-acute care or home with appropriate resources  Description: INTERVENTIONS:- Conduct assessment to determine patient/family and health care team treatment goals, and need for post-acute services based on payer coverage, community resources, and patient preferences, and barriers to discharge- Address psychosocial, clinical, and financial barriers to discharge as identified in assessment in conjunction with the patient/family and health care team- Arrange appropriate level of post-acute services according to patient's   needs and preference and payer coverage in collaboration with the physician and health care team- Communicate with and update the patient/family, physician, and health care team regarding progress on the discharge plan- Arrange appropriate transportation to post-acute venues  Outcome: Progressing     201. New goal initiated

## 2025-05-09 NOTE — ASSESSMENT & PLAN NOTE
65-year-old male known to this service with history of chronic mental illness and diagnosis of schizoaffective disorder.  Returns to the hospital in the context of being noncompliant and having exacerbated symptoms of mood lability, disorganized behaviors and illogical thinking.  We will restart patient on medications he was stabilized on in the past-  Invega 3 mg daily  Lithium 300 mg twice daily    At time of admission patient refusing medications and treatment plan.  Due to need of hospitalization and stabilization patient will be converted to 302 and a medication over objection second opinion will be obtained.

## 2025-05-09 NOTE — TREATMENT PLAN
TREATMENT PLAN REVIEW - Behavioral Health Freddie K Day 65 y.o. 1960 male MRN: [id number]    [facility] BEHAVIORAL HEALTH Room / Bed: [facility] 245/[facility] 245-02 Encounter: [id number]        Admit Date/Time:  5/8/2025  5:05 PM    Treatment Team:   MD Chai Stovall MD Loree Smith Pope, ADRIEN Ross LPN Lani Parker Steven Salivonchik    Diagnosis: Principal Problem:    Acute exacerbation of chronic schizoaffective schizophrenia (HCC)    Patient Strengths/Assets: capable of independent living, communication skills, family ties      Patient Barriers/Limitations: chronic mental illness, noncompliant with medication, noncompliant with treatment, poor insight    Short Term Goals: decrease in psychotic symptoms, decrease in level of agitation, decrease in frequency of aggressive outbursts, ability to stay safe on the unit, improvement in insight, improvement in reality testing    Long Term Goals: stabilization of mood, resolution of psychotic symptoms, improved reality testing, improved reasoning ability, improved impulse control, improved insight, acceptance of need for psychiatric medications, acceptance of need for psychiatric treatment    Progress Towards Goals: starting psychiatric medications as prescribed    Recommended Treatment: medication management, patient medication education, group therapy, milieu therapy, continued Behavioral Health psychiatric evaluation/assessment process     Treatment Frequency: daily medication monitoring, group and milieu therapy daily, monitoring through interdisciplinary rounds, monitoring through weekly patient care conferences    Expected Discharge Date: 7 days - 5/16/2025    Discharge Plan: referrals as indicated    Treatment Plan Created/Updated By: Josefa Sterling MD

## 2025-05-09 NOTE — NURSING NOTE
Patient visible on unit. Pleasant and cooperative. Social with staff and select peers.  Patient internally pre occupied, observed talking to self in hallways. Denies SI,HI,AVH, Safety checks continue Q 15 minutes.

## 2025-05-09 NOTE — PLAN OF CARE
Problem: Alteration in Thoughts and Perception  Goal: Attend and participate in unit activities, including therapeutic, recreational, and educational groups  Description: Interventions:-Encourage Visitation and family involvement in care  Outcome: Completed     Problem: Depression  Goal: Attend and participate in unit activities, including therapeutic, recreational, and educational groups  Description: Interventions:- Provide therapeutic and educational activities daily, encourage attendance and participation, and document same in the medical record   Outcome: Completed     Problem: Risk for Violence/Aggression Toward Others  Goal: Attend and participate in unit activities, including therapeutic, recreational, and educational groups  Description: Interventions:- Provide therapeutic and educational activities daily, encourage attendance and participation, and document same in the medical record   Outcome: Completed     Problem: Alteration in Orientation  Goal: Attend and participate in unit activities, including therapeutic, recreational, and educational groups  Description: Interventions:- Provide therapeutic and educational activities daily, encourage attendance and participation, and document same in the medical record - Provide appropriate opportunities for reminiscence - Provide a consistent daily routine - Encourage family contact/visitation   Outcome: Completed

## 2025-05-09 NOTE — H&P
Freddie Graham  :1960 M  MRN:[id number]    [facility]:[id number]  Adm Date: 2025  5:05 PM   ATT PHY: Josefa Sterling Md  [facility]         Chief Complaint: aggressive behavior      History of Presenting Illness: Freddie Graham is a(n) 65 y.o. year old male who is admitted to [facility] on 201 commitment basis.  Patient originally presented to [facility] ED on  per EMS due to aggressive behaviors and threats toward family member.  Patient has history of schizoaffective disorder and noncompliance.      Patient examined at bedside.  Patient poor historian due to fixation on filing a complaint against police.  Denies any acute symptoms other than marks on skin due to police.  Patient states he is not on any medication.     PTA labs WNL.  Admission labs pending.     Allergies   Allergen Reactions    Haldol [Haloperidol]     Navane [Thiothixene (Tiotixene)]     Other      Adhesive tape         Current Facility-Administered Medications on File Prior to Encounter   Medication Dose Route Frequency Provider Last Rate Last Admin    [DISCONTINUED] atorvastatin (LIPITOR) tablet 10 mg  10 mg Oral Daily With Dinner Nilo Norris III, DO        [DISCONTINUED] benztropine (COGENTIN) tablet 0.5 mg  0.5 mg Oral BID Nilo Norris III, DO        [DISCONTINUED] gabapentin (NEURONTIN) capsule 300 mg  300 mg Oral TID Nilo Norris III, DO        [DISCONTINUED] glycopyrrolate (ROBINUL) tablet 1 mg  1 mg Oral TID Nilo Norris III, DO        [DISCONTINUED] lithium carbonate (LITHOBID) CR tablet 300 mg  300 mg Oral Q12H [facility] Nilo Norris III, DO   300 mg at 25 1440    [DISCONTINUED] loxapine (LOXITANE) capsule 75 mg  75 mg Oral BID Nilo Norris III, DO        [DISCONTINUED] melatonin tablet 3 mg  3 mg Oral HS Nilo Norris III, DO        [DISCONTINUED] nicotine polacrilex (NICORETTE) gum 2 mg  2  mg Oral Q2H PRN Nilo Norris III, DO        [DISCONTINUED] OLANZapine (ZyPREXA) tablet 5 mg  5 mg Oral HS Nilo Norris III, DO        [DISCONTINUED] tamsulosin (FLOMAX) capsule 0.4 mg  0.4 mg Oral Daily With Dinner Nilo Norris III, DO        [DISCONTINUED] traZODone (DESYREL) tablet 150 mg  150 mg Oral HS Nilo Norris III, DO         Current Outpatient Medications on File Prior to Encounter   Medication Sig Dispense Refill    atorvastatin (LIPITOR) 10 mg tablet Take 1 tablet (10 mg total) by mouth daily with dinner 30 tablet 0    benztropine (COGENTIN) 0.5 mg tablet Take 1 tablet (0.5 mg total) by mouth 2 (two) times a day 60 tablet 0    cholecalciferol (VITAMIN D3) 1,000 units tablet Take 2 tablets (2,000 Units total) by mouth daily 60 tablet 0    gabapentin (NEURONTIN) 300 mg capsule Take 1 capsule (300 mg total) by mouth 3 (three) times a day 90 capsule 0    lithium carbonate (LITHOBID) 300 mg CR tablet Take 2 tablets (600 mg total) by mouth daily at bedtime 60 tablet 0    lithium carbonate (LITHOBID) 450 mg CR tablet Take 1 tablet (450 mg total) by mouth in the morning 30 tablet 1    folic acid (FOLVITE) 1 mg tablet Take 1 mg by mouth daily (Patient not taking: Reported on 5/8/2025)      glycopyrrolate (ROBINUL) 1 mg tablet Take 1 tablet (1 mg total) by mouth 3 (three) times a day (Patient not taking: Reported on 5/8/2025) 90 tablet 0    loxapine (LOXITANE) 25 mg capsule Take 3 capsules (75 mg total) by mouth 2 (two) times a day (Patient not taking: Reported on 5/8/2025) 180 capsule 0    melatonin 3 mg Take 3 mg by mouth (Patient not taking: Reported on 5/8/2025)      nicotine polacrilex (NICORETTE) 2 mg gum Chew 1 each (2 mg total) every 2 (two) hours as needed for smoking cessation (Patient not taking: Reported on 5/8/2025) 100 each 0    phenazopyridine (PYRIDIUM) 200 mg tablet Take 200 mg by mouth Three times daily as needed (Patient not taking: Reported on 5/8/2025)       tamsulosin (FLOMAX) 0.4 mg Take 1 capsule (0.4 mg total) by mouth daily with dinner (Patient not taking: Reported on 5/8/2025) 30 capsule 0    traZODone (DESYREL) 150 mg tablet Take 1 tablet (150 mg total) by mouth daily at bedtime (Patient not taking: Reported on 5/8/2025) 30 tablet 0       Active Ambulatory Problems     Diagnosis Date Noted    Schizoaffective disorder, bipolar type (HCC) 11/02/2019    Mild tetrahydrocannabinol (THC) abuse 11/02/2019    Alcohol abuse, continuous 11/02/2019    Hypertension 12/19/2019    Tobacco abuse 10/14/2020    BPH (benign prostatic hyperplasia) 10/15/2020    Elevated CK 02/12/2021    Vitamin D insufficiency 02/17/2021    History of rhabdomyolysis 02/17/2021    Elevated TSH 02/17/2021    Wrist pain, chronic, right 02/26/2021    Astigmatism 02/26/2021    Atrial fibrillation (HCC) 02/26/2021    Benign neoplasm of colon 02/26/2021    Hemorrhoids 02/26/2021    Hydrocele 02/26/2021    Hyperlipidemia 02/26/2021    Large physiologic cupping of optic disc 02/26/2021    Macular drusen 02/26/2021    Nuclear senile cataract 02/26/2021    Presbyopia 02/26/2021    Umbilical hernia 02/26/2021    Vitreous syneresis 02/26/2021    Open angle with borderline findings, low risk, bilateral 06/02/2023    Vitreous degeneration, right eye 06/02/2023    PTSD (post-traumatic stress disorder) 06/21/2023    Difficulty sleeping 06/21/2023    Cardiac arrhythmia 04/06/2025    Cannabis dependence ([facility]) 04/06/2025    Combative behavior 07/01/2023    Malignant neoplasm of urinary bladder neck ([facility]) 01/03/2025    Tobacco dependence due to cigarettes, in remission 04/06/2025    Anemia 04/06/2025    Alcohol dependence ([facility]) 04/06/2025    Acute exacerbation of chronic schizoaffective schizophrenia ([facility]) 03/18/2019    Noncompliance 04/07/2025    Urothelial carcinoma ([facility]) 04/07/2025    LISET (obstructive sleep apnea) 04/07/2025    Elevated hemoglobin A1c 04/07/2025    Mood insomnia ([facility]) 04/07/2025     Resolved  Ambulatory Problems     Diagnosis Date Noted    Left foot pain 09/26/2020    Medical clearance for psychiatric admission 10/14/2020     Past Medical History:   Diagnosis Date    Alcohol abuse     Anxiety     Depression     DJD (degenerative joint disease)     Gait abnormality     Head injury     History of colonic polyps     Schizoaffective disorder (HCC)     Sepsis (HCC)     Substance abuse (HCC)        Past Surgical History:   Procedure Laterality Date    FRACTURE SURGERY      INGUINAL HERNIA REPAIR         Social History:   Social History     Socioeconomic History    Marital status: Single     Spouse name: None    Number of children: None    Years of education: None    Highest education level: None   Occupational History    None   Tobacco Use    Smoking status: Every Day     Current packs/day: 1.00     Average packs/day: 1 pack/day for 40.0 years (40.0 ttl pk-yrs)     Types: Cigars, Cigarettes    Smokeless tobacco: Never   Vaping Use    Vaping status: Never Used   Substance and Sexual Activity    Alcohol use: Yes     Alcohol/week: 2.0 standard drinks of alcohol     Types: 2 Cans of beer per week     Comment: whenever i want    Drug use: Never     Types: Marijuana     Comment: Patient denies currently using marijuana.     Sexual activity: Not Currently     Partners: Female   Other Topics Concern    None   Social History Narrative    None     Social Drivers of Health     Financial Resource Strain: Low Risk  (2/19/2024)    Overall Financial Resource Strain (CARDIA)     Difficulty of Paying Living Expenses: Not hard at all   Food Insecurity: Patient Declined (5/8/2025)    Nursing - Inadequate Food Risk Classification     Worried About Running Out of Food in the Last Year: Never true     Ran Out of Food in the Last Year: Never true     Ran Out of Food in the Last Year: Patient declined   Transportation Needs: Patient Declined (5/8/2025)    Nursing - Transportation Risk Classification     Lack of Transportation: Not on  "file     Lack of Transportation: Patient declined   Physical Activity: Not on file   Stress: Not on file   Social Connections: Not on file   Intimate Partner Violence: Patient Declined (2025)    Nursing IPS     Feels Physically and Emotionally Safe: Not on file     Physically Hurt by Someone: Not on file     Humiliated or Emotionally Abused by Someone: Not on file     Physically Hurt by Someone: Patient declined     Hurt or Threatened by Someone: Patient declined   Housing Stability: Patient Declined (2025)    Nursing: Inadequate Housing Risk Classification     Has Housing: Not on file     Worried About Losing Housing: Not on file     Unable to Get Utilities: Not on file     Unable to Pay for Housing in the Last Year: Patient declined     Has Housin       Family History: History reviewed. No pertinent family history.    Review of Systems   Constitutional:  Negative for chills, fatigue and fever.   HENT: Negative.     Respiratory:  Negative for cough and shortness of breath.    Cardiovascular:  Negative for chest pain.   Gastrointestinal:  Negative for abdominal pain, constipation, diarrhea and nausea.   Genitourinary: Negative.    Musculoskeletal:  Positive for arthralgias.   Skin: Negative.    Neurological:  Negative for dizziness and headaches.   Psychiatric/Behavioral:  Positive for agitation.        Physical Exam   Vitals: Blood pressure (!) 175/91, pulse 79, temperature (!) 97.1 °F (36.2 °C), temperature source Temporal, resp. rate 18, height 5' 9\" (1.753 m), weight 87.6 kg (193 lb 3.2 oz), SpO2 98%.,Body mass index is 28.53 kg/m².  Constitutional: Awake, alert, in no acute distress.  Head: Normocephalic and atraumatic.   Mouth/Throat: Oropharynx is clear and moist.    Eyes: Conjunctivae and EOM are normal.   Neck: Neck supple. No thyromegaly present.   Cardiovascular: Normal rate, regular rhythm and normal heart sounds.    Pulmonary/Chest: Effort normal and breath sounds normal.   Abdominal: Soft. " "Bowel sounds are normal. There is no tenderness. There is no rebound and no guarding.   Neurological: No focal deficits.   Musculoskeletal:  Nontender spine.  Skin: Skin is warm and dry. No edema.     Assessment     Freddie Graham is a(n) 65 y.o. male with Schizoaffective disorder.     Cardiac w/ HTN, HLD.  No on BP medication.  Restart lisinopril 10 mg daily.  Continue atorvastatin 10 mg daily.   LISET.  Patient had recent Zio patch with cardiology.  Pauses found, no afib, thought to be related to LISET.  Recommended sleep study outpt.   Urothelial carcinoma/BPH.  Patient follows with urology outpt -due for follow up.  Continue Flomax 0.4 mg daily and pyridium 200 mg TID as needed for bladder spasms.   Chronic back pain/DJD.  CT spine 5/7 showing \"No acute displaced fracture or traumatic malalignment.  Multilevel degenerative disc and facet changes causing varying degrees of spinal and neuroforaminal narrowing. Up to moderate spinal stenosis at L4-5. Up to severe left L4-5 and L5-S1 neuroforaminal stenosis.\"  Tylenol/motrin as needed.  Voltaren gel 4x daily as needed.  Tobacco abuse.  Declines NRT.   Hx vitamin deficiencies.  Recheck levels.   Schizoaffective disorder.  Managed by psych team.      Prognosis: Fair.    Discharge Plan: In progress.    Advanced Directives:  I have discussed in detail with the patient the advanced directives. The patient states he does not have an appointed POA and does not have a living will.  Patient's emergency contact is his brother, Benny Graham, whose number is [phone].  When discussing cardiac and pulmonary resuscitation efforts with the patient, the patient wishes to be FULL CODE.     I have spent more than 50 minutes gathering data, doing physical examination, and discussing the advanced directives, which was witnessed by caring staff.    The patient was discussed with Dr. Gallagher and he is in agreement with the above note.  "

## 2025-05-10 PROCEDURE — 99232 SBSQ HOSP IP/OBS MODERATE 35: CPT | Performed by: PHYSICIAN ASSISTANT

## 2025-05-10 RX ADMIN — TRAZODONE HYDROCHLORIDE 50 MG: 50 TABLET ORAL at 01:56

## 2025-05-10 RX ADMIN — LITHIUM CARBONATE 300 MG: 300 TABLET, EXTENDED RELEASE ORAL at 08:00

## 2025-05-10 RX ADMIN — LORAZEPAM 1 MG: 1 TABLET ORAL at 22:11

## 2025-05-10 RX ADMIN — LITHIUM CARBONATE 300 MG: 300 TABLET, EXTENDED RELEASE ORAL at 20:29

## 2025-05-10 RX ADMIN — LISINOPRIL 10 MG: 10 TABLET ORAL at 08:00

## 2025-05-10 RX ADMIN — ACETAMINOPHEN 650 MG: 325 TABLET ORAL at 00:51

## 2025-05-10 RX ADMIN — PALIPERIDONE 3 MG: 3 TABLET, EXTENDED RELEASE ORAL at 08:00

## 2025-05-10 RX ADMIN — LORAZEPAM 1 MG: 1 TABLET ORAL at 10:46

## 2025-05-10 NOTE — NURSING NOTE
Pt approached nursing station after being observed pacing in the hallways. Pt reported feeling anxious with difficulty to focus. Pt's conversation is disorganized and rambling. Fajardo scale completed with a score of 26/ severe. PRN Ativan 1 mg PO given at 1046.     1146, Pt is calm and walking with peers. Pt no longer is observed rambling and appears less tense.

## 2025-05-10 NOTE — PROGRESS NOTES
"Progress Note - Freddie Graham 65 y.o. male MRN: [id number]    Unit/Bed#: [facility] 245-02 Encounter: [id number]        Subjective:   Patient seen and examined at bedside after reviewing the chart and discussing the case with the caring staff.      Patient examined at bedside.  Patient has no acute issues.    Patient's recent labs from 4/7/2025 showed patient's vitamin D level was low 32.6 but B12 level was good at 623.    Physical Exam   Vitals: Blood pressure (!) 119/105, pulse 94, temperature 98.4 °F (36.9 °C), temperature source Temporal, resp. rate 18, height 5' 9\" (1.753 m), weight 87.6 kg (193 lb 3.2 oz), SpO2 96%.,Body mass index is 28.53 kg/m².  Constitutional: He appears well-developed.   HEENT: PERR, EOMI, MMM  Cardiovascular: Normal rate and regular rhythm.    Pulmonary/Chest: Effort normal and breath sounds normal.   Abdomen: Soft, + BS, NT    Assessment/Plan:  Freddie Graham is a(n) 65 y.o. male with Schizoaffective disorder.      Cardiac w/ HTN, HLD.  No on BP medication.  Restart lisinopril 10 mg daily.  Continue atorvastatin 10 mg daily.   LISET.  Patient had recent Zio patch with cardiology.  Pauses found, no afib, thought to be related to LISET.  Recommended sleep study outpt.   Urothelial carcinoma/BPH.  Patient follows with urology outpt -due for follow up.  Continue Flomax 0.4 mg daily and pyridium 200 mg TID as needed for bladder spasms.   Chronic back pain/DJD.  CT spine 5/7 showing \"No acute displaced fracture or traumatic malalignment.  Multilevel degenerative disc and facet changes causing varying degrees of spinal and neuroforaminal narrowing. Up to moderate spinal stenosis at L4-5. Up to severe left L4-5 and L5-S1 neuroforaminal stenosis.\"  Tylenol/motrin as needed.  Voltaren gel 4x daily as needed.  Tobacco abuse.  Declines NRT.   Vitamin D deficiency.  I will put the patient on vitamin B12 supplements.  "

## 2025-05-10 NOTE — NURSING NOTE
Pt visible on unit in milieu and dayroom. He is social with peers. At times makes inappropriate comments, but is able to be verbally redirected, and eventually apologizes. Pt willingly took all of his scheduled medications with staff encouragement. Pt appears suspicious and requests to look at each sealed packaging of medications. Medication compliant. Pt currently denies SI/HI/AH/VH, but can be observed responding to unseen stimuli frequently. Pt requested PRN for severe anxiety and received PRN Ativan 1 mg PO which appeared effective. Q 15 checks maintained. No unmet needs.

## 2025-05-10 NOTE — NURSING NOTE
Tylenol 650 mg given at 2451 for 3/10 tooth pain with positive results. Trazodone 50 mg given at 0156 for sleep.

## 2025-05-10 NOTE — PROGRESS NOTES
Progress Note - Behavioral Health   Name: Freddie Graham 65 y.o. male I MRN: [id number]  Unit/Bed#: -02 I Date of Admission: 5/8/2025   Date of Service: 5/10/2025 I Hospital Day: 2     Assessment & Plan  Acute exacerbation of chronic schizoaffective schizophrenia (HCC)  65-year-old male known to this service with history of chronic mental illness and diagnosis of schizoaffective disorder.  Returns to the hospital in the context of being noncompliant and having exacerbated symptoms of mood lability, disorganized behaviors and illogical thinking.  We will restart patient on medications he was stabilized on in the past-  Invega 3 mg daily  Lithium 300 mg twice daily    At time of admission patient refusing medications and treatment plan.  Due to need of hospitalization and stabilization patient will be converted to 302 and a medication over objection second opinion will be obtained.    Current Medications:    Current Facility-Administered Medications:     atorvastatin (LIPITOR) tablet 10 mg, Oral, Daily With Dinner    lisinopril (ZESTRIL) tablet 10 mg, Oral, Daily    lithium carbonate (LITHOBID) CR tablet 300 mg, Oral, Q12H MANNY    paliperidone (INVEGA) 24 hr tablet 3 mg, Oral, Daily    tamsulosin (FLOMAX) capsule 0.4 mg, Oral, Daily With Dinner      Risks/Benefits of Treatment:     Risks, benefits, and possible side effects of medications explained to patient. Patient has limited understanding of risks and benefits of treatment at this time, but agrees to take medications as prescribed.    Progress Toward Goals:  remains psychotic and disorganized    Treatment Planning:   No med change.  Cont present tx.   All current active medications have been reviewed.  Continue to monitor response to treatment and assess for potential side effects of medications.  Encourage group therapy, milieu therapy and occupational therapy.  Collaboration with medical service for medical comorbidities as indicated.  Behavioral Health  checks for safety monitoring.  Estimated Discharge Day:          Subjective     Behavior over the last 24 hours: unchanged    Chart reviewed.  Freddie seen in office.  Was in community room participating in [employer] activity. C/o what meds he is on but has been taking. Upset about events prior to admission. Any other pertinent hx unobtainable secondary to Freddie's disorganized state. Required prn med for sleep early this am effective.    Sleep: insomnia  Appetite: fair  Medication side effects:  none reported  ROS: not able to obtain due to lack of cooperation    Objective :  Temp:  [97.8 °F (36.6 °C)-98.4 °F (36.9 °C)] 98.4 °F (36.9 °C)  HR:  [86-94] 94  BP: (119-153)/(105) 119/105  Resp:  [17-18] 18  SpO2:  [96 %-98 %] 96 %  O2 Device: None (Room air)    Mental Status Evaluation:    Appearance:  adequate grooming   Behavior:  Complaining but cooperative   Speech:  hypertalkative   Mood:  irritable, angry   Affect:  constricted   Thought Process:  circumstantial   Thought Content:  paranoid ideation, ideas of reference   Perceptual Disturbances: appears distracted   Risk Potential: Suicidal Ideation -  None at present  Homicidal Ideation -  None at present  Potential for Aggression - Not at present   Sensorium:  unable to assess   Memory:  recent and remote memory: unable to assess due to lack of cooperation. Remembers events leading up to admission   Consciousness:  alert and awake   Attention/Concentration: decreased attention span and decreased concentration   Insight:  impaired   Judgment: impaired   Gait/Station: normal gait/station   Motor Activity: no abnormal movements       Lab Results: I have reviewed the following results:  CBC:   Lab Results   Component Value Date    WBC 5.97 05/06/2025    RBC 4.72 05/06/2025    HGB 13.6 05/06/2025    HCT 41.3 05/06/2025    MCV 88 05/06/2025     05/06/2025    MCH 28.8 05/06/2025    MCHC 32.9 05/06/2025    RDW 13.1 05/06/2025    MPV 9.7 05/06/2025    NEUTROABS 4.33  05/06/2025     BMP:   Lab Results   Component Value Date    SODIUM 137 05/06/2025    K 3.5 05/06/2025     05/06/2025    CO2 23 05/06/2025    AGAP 9 05/06/2025    BUN 15 05/06/2025    CREATININE 0.83 05/06/2025    GLUC 111 05/06/2025    GLUF 91 04/13/2025    CALCIUM 9.3 05/06/2025    EGFR 91 05/06/2025     CMP:   Lab Results   Component Value Date    SODIUM 137 05/06/2025    K 3.5 05/06/2025     05/06/2025    CO2 23 05/06/2025    AGAP 9 05/06/2025    BUN 15 05/06/2025    CREATININE 0.83 05/06/2025    GLUC 111 05/06/2025    GLUF 91 04/13/2025    CALCIUM 9.3 05/06/2025    AST 30 05/06/2025    ALT 31 05/06/2025    ALKPHOS 44 05/06/2025    TP 6.7 05/06/2025    ALB 4.3 05/06/2025    TBILI 0.81 05/06/2025    EGFR 91 05/06/2025     Lipid Profile:   Lab Results   Component Value Date    CHOLESTEROL 112 04/07/2025    HDL 50 04/07/2025    TRIG 64 04/07/2025    LDLCALC 49 04/07/2025    NONHDLC 62 04/07/2025     Thyroid Studies:   Lab Results   Component Value Date    SAU0PGDTNIIC 1.939 05/06/2025    FREET4 0.97 02/17/2024     Hemoglobin A1C/EST AVG Glucose   Lab Results   Component Value Date    HGBA1C 5.7 (H) 04/07/2025     04/07/2025     Lithium:   Lab Results   Component Value Date    LITHIUM <0.10 (L) 05/06/2025     Vitamin D Level   Lab Results   Component Value Date    VKNX75MOIHJQ 32.6 04/07/2025     Vitamin B12   Lab Results   Component Value Date    CDYVGOBZ56 623 04/07/2025     Folate   Lab Results   Component Value Date    FOLATE 10.8 04/07/2025       Administrative Statements     Counseling / Coordination of Care:   I have spent a total time of 15 minutes in caring for this patient on the day of the visit/encounter including:  Events leading to admission reviewed with patient.  Outpatient follow up discussed with patient.        Dimple Lorenz PA-C 05/10/25

## 2025-05-10 NOTE — NURSING NOTE
Patient visible in dayroom. Social with select peers. Calm and cooperative, rapid rambling speech. Denies SI,HI,AVH. Patient was in dayroom talking with this nurse when a female peer started to accuse him and making fun of her. Patient was apologetic to her even though he was not talking about her. Peer asked to move to another table.

## 2025-05-10 NOTE — PLAN OF CARE
Problem: Alteration in Thoughts and Perception  Goal: Agree to be compliant with medication regime, as prescribed and report medication side effects  Description: Interventions:- Offer appropriate PRN medication and supervise ingestion; conduct AIMS, as needed   5/10/2025 0504 by Helene Cerda RN  Outcome: Progressing     Problem: Ineffective Coping  Goal: Cooperates with admission process  Description: Interventions: - Complete admission process  Outcome: Completed

## 2025-05-10 NOTE — PLAN OF CARE
Problem: Alteration in Thoughts and Perception  Goal: Agree to be compliant with medication regime, as prescribed and report medication side effects  Description: Interventions:- Offer appropriate PRN medication and supervise ingestion; conduct AIMS, as needed   Outcome: Progressing     Problem: Ineffective Coping  Goal: Cooperates with admission process  Description: Interventions: - Complete admission process  Outcome: Completed

## 2025-05-11 PROCEDURE — 99232 SBSQ HOSP IP/OBS MODERATE 35: CPT | Performed by: PHYSICIAN ASSISTANT

## 2025-05-11 RX ADMIN — LISINOPRIL 10 MG: 10 TABLET ORAL at 09:47

## 2025-05-11 RX ADMIN — LORAZEPAM 1 MG: 1 TABLET ORAL at 10:32

## 2025-05-11 RX ADMIN — ACETAMINOPHEN 650 MG: 325 TABLET ORAL at 00:26

## 2025-05-11 RX ADMIN — LORAZEPAM 1 MG: 1 TABLET ORAL at 22:53

## 2025-05-11 RX ADMIN — CHOLECALCIFEROL TAB 25 MCG (1000 UNIT) 1000 UNITS: 25 TAB at 09:47

## 2025-05-11 RX ADMIN — TAMSULOSIN HYDROCHLORIDE 0.4 MG: 0.4 CAPSULE ORAL at 16:33

## 2025-05-11 RX ADMIN — LITHIUM CARBONATE 300 MG: 300 TABLET, EXTENDED RELEASE ORAL at 09:47

## 2025-05-11 RX ADMIN — PALIPERIDONE 3 MG: 3 TABLET, EXTENDED RELEASE ORAL at 09:47

## 2025-05-11 RX ADMIN — LITHIUM CARBONATE 300 MG: 300 TABLET, EXTENDED RELEASE ORAL at 20:38

## 2025-05-11 RX ADMIN — ATORVASTATIN CALCIUM 10 MG: 10 TABLET, FILM COATED ORAL at 16:33

## 2025-05-11 NOTE — NURSING NOTE
Pt visible on unit in milieu and dayroom. Pt has been more appropriate in his interactions with peers and staff. Pt is still disorganized in thought and conversations. Medication and meal compliant. Pt is less suspicious in wanting to look at medication packaging. He denies SI/HI/AH/VH but has trouble on focusing when asked by this writer, appears less internally preoccupied. PRN given for severe anxiety and reported effective. Q 15 checks maintained. No unmet needs.

## 2025-05-11 NOTE — PROGRESS NOTES
Progress Note - Behavioral Health   Name: Freddie Graham 65 y.o. male I MRN: [id number]  Unit/Bed#: -02 I Date of Admission: 5/8/2025   Date of Service: 5/11/2025 I Hospital Day: 3     Assessment & Plan  Acute exacerbation of chronic schizoaffective schizophrenia (HCC)  65-year-old male known to this service with history of chronic mental illness and diagnosis of schizoaffective disorder.  Returns to the hospital in the context of being noncompliant and having exacerbated symptoms of mood lability, disorganized behaviors and illogical thinking.  We will restart patient on medications he was stabilized on in the past-  Invega 3 mg daily  Lithium 300 mg twice daily    At time of admission patient refusing medications and treatment plan.  Due to need of hospitalization and stabilization patient will be converted to 302 and a medication over objection second opinion will be obtained.    Current Medications:    Current Facility-Administered Medications:     atorvastatin (LIPITOR) tablet 10 mg, Oral, Daily With Dinner    Cholecalciferol (VITAMIN D3) tablet 1,000 Units, Oral, Daily    lisinopril (ZESTRIL) tablet 10 mg, Oral, Daily    lithium carbonate (LITHOBID) CR tablet 300 mg, Oral, Q12H MANNY    paliperidone (INVEGA) 24 hr tablet 3 mg, Oral, Daily    tamsulosin (FLOMAX) capsule 0.4 mg, Oral, Daily With Dinner      Risks/Benefits of Treatment:     Risks, benefits, and possible side effects of medications explained to patient. Patient has limited understanding of risks and benefits of treatment at this time, but agrees to take medications as prescribed.    Progress Toward Goals: improving slowly    Treatment Planning:   No med change. Cont present tx. [place] level 5/16/25  All current active medications have been reviewed.  Continue to monitor response to treatment and assess for potential side effects of medications.  Encourage group therapy, milieu therapy and occupational therapy.  Collaboration with medical  "service for medical comorbidities as indicated.  Behavioral Health checks for safety monitoring.  Estimated Discharge Day:          Subjective     Behavior over the last 24 hours: some improvement    Chart reviewed and Freddie seen in office.  Needed prn ativan last night and this morning.  Last night c/o severe anxiety with HAS of 26.  This morning states he became upset in grp because he felt like he was in a \"[place]\".  Received prn ativan and came back to grp without problems.  Is quite talkative,some intrusiveness and disorganized thoughts.  However, mood is improved and he has access to appropriate humor at times.     Sleep: difficulty falling asleep  Appetite: fair  Medication side effects: No  ROS: review of systems as noted above in HPI/Subjective report, no additional complaints    Objective :  Temp:  [98.1 °F (36.7 °C)-98.5 °F (36.9 °C)] 98.5 °F (36.9 °C)  HR:  [] 100  BP: (102-138)/(59-91) 138/91  Resp:  [18] 18  SpO2:  [97 %-100 %] 100 %  O2 Device: None (Room air)    Mental Status Evaluation:    Appearance:  age appropriate, adequate grooming   Behavior:  cooperative   Speech:  hypertalkative   Mood:  less irritable   Affect:  mood-congruent   Thought Process:  disorganized   Thought Content:  overvalued ideas   Perceptual Disturbances: not observed, does not appear responding to internal stimuli   Risk Potential: Suicidal Ideation -  None at present  Homicidal Ideation -  None at present  Potential for Aggression - Not at present   Sensorium:  oriented to person, place, and time/date   Memory:  recent and remote memory grossly intact   Consciousness:  alert and awake   Attention/Concentration: attention span and concentration are improving   Insight:  impaired   Judgment: impaired   Gait/Station: normal gait/station   Motor Activity: no abnormal movements       Lab Results: I have reviewed the following results:  CBC:   Lab Results   Component Value Date    WBC 5.97 05/06/2025    RBC 4.72 " 05/06/2025    HGB 13.6 05/06/2025    HCT 41.3 05/06/2025    MCV 88 05/06/2025     05/06/2025    MCH 28.8 05/06/2025    MCHC 32.9 05/06/2025    RDW 13.1 05/06/2025    MPV 9.7 05/06/2025    NEUTROABS 4.33 05/06/2025     BMP:   Lab Results   Component Value Date    SODIUM 137 05/06/2025    K 3.5 05/06/2025     05/06/2025    CO2 23 05/06/2025    AGAP 9 05/06/2025    BUN 15 05/06/2025    CREATININE 0.83 05/06/2025    GLUC 111 05/06/2025    GLUF 91 04/13/2025    CALCIUM 9.3 05/06/2025    EGFR 91 05/06/2025     CMP:   Lab Results   Component Value Date    SODIUM 137 05/06/2025    K 3.5 05/06/2025     05/06/2025    CO2 23 05/06/2025    AGAP 9 05/06/2025    BUN 15 05/06/2025    CREATININE 0.83 05/06/2025    GLUC 111 05/06/2025    GLUF 91 04/13/2025    CALCIUM 9.3 05/06/2025    AST 30 05/06/2025    ALT 31 05/06/2025    ALKPHOS 44 05/06/2025    TP 6.7 05/06/2025    ALB 4.3 05/06/2025    TBILI 0.81 05/06/2025    EGFR 91 05/06/2025     Lipid Profile:   Lab Results   Component Value Date    CHOLESTEROL 112 04/07/2025    HDL 50 04/07/2025    TRIG 64 04/07/2025    LDLCALC 49 04/07/2025    NONHDLC 62 04/07/2025     Thyroid Studies:   Lab Results   Component Value Date    GXO4NCAGMZNB 1.939 05/06/2025    FREET4 0.97 02/17/2024     Lithium:   Lab Results   Component Value Date    LITHIUM <0.10 (L) 05/06/2025     Vitamin D Level   Lab Results   Component Value Date    DYPE58CSLHVW 32.6 04/07/2025     Vitamin B12   Lab Results   Component Value Date    PKWGZUOD37 623 04/07/2025     Folate   Lab Results   Component Value Date    FOLATE 10.8 04/07/2025       Administrative Statements     Counseling / Coordination of Care:   I have spent a total time of 15 minutes in caring for this patient on the day of the visit/encounter including:  Medications, treatment progress and treatment plan reviewed with patient.        Dimple Lorenz PA-C 05/11/25

## 2025-05-11 NOTE — PROGRESS NOTES
"Progress Note - Freddie Graham 65 y.o. male MRN: [id number]    Unit/Bed#: [facility] 245-02 Encounter: [id number]        Subjective:   Patient seen and examined at bedside after reviewing the chart and discussing the case with the caring staff.      Patient examined at bedside.  Patient has no acute issues.    Physical Exam   Vitals: Blood pressure 138/91, pulse 100, temperature 98.5 °F (36.9 °C), temperature source Temporal, resp. rate 18, height 5' 9\" (1.753 m), weight 87.6 kg (193 lb 3.2 oz), SpO2 100%.,Body mass index is 28.53 kg/m².  Constitutional: He appears well-developed.   HEENT: PERR, EOMI, MMM  Cardiovascular: Normal rate and regular rhythm.    Pulmonary/Chest: Effort normal and breath sounds normal.   Abdomen: Soft, + BS, NT    Assessment/Plan:  Freddie Graham is a(n) 65 y.o. male with Schizoaffective disorder.      Cardiac w/ HTN, HLD.  No on BP medication.  Restart lisinopril 10 mg daily.  Continue atorvastatin 10 mg daily.   LISET.  Patient had recent Zio patch with cardiology.  Pauses found, no afib, thought to be related to LISET.  Recommended sleep study outpt.   Urothelial carcinoma/BPH.  Patient follows with urology outpt -due for follow up.  Continue Flomax 0.4 mg daily and pyridium 200 mg TID as needed for bladder spasms.   Chronic back pain/DJD.  CT spine 5/7 showing \"No acute displaced fracture or traumatic malalignment.  Multilevel degenerative disc and facet changes causing varying degrees of spinal and neuroforaminal narrowing. Up to moderate spinal stenosis at L4-5. Up to severe left L4-5 and L5-S1 neuroforaminal stenosis.\"  Tylenol/motrin as needed.  Voltaren gel 4x daily as needed.  Tobacco abuse.  Declines NRT.   Vitamin D deficiency.  I will put the patient on vitamin B12 supplements.  "

## 2025-05-11 NOTE — TREATMENT TEAM
"   05/10/25 2211   Fajardo Anxiety Scale   Anxious Mood 3   Tension 3   Fears 3   Insomnia 2   Intellectual 2   Depressed Mood 2   Somatic Complaints: Muscular 0   Somatic Complaints: Sensory 3   Cardiovascular Symptoms 1   Respiratory Symptoms 1   Gastrointestinal Symptoms 0   Genitourinary Symptoms 0   Autonomic Symptoms 3   Behavior at Interview 3   Fajardo Anxiety Score 26     Patient was administered 1 mg ativan for severe anxiety related to being admitted since he states \"I'm not a danger to myself or others\". Medication was effective. Patient appears to be asleep calmly at this time. Q15 minute checks ongoing.   "

## 2025-05-11 NOTE — PLAN OF CARE
Problem: Risk for Violence/Aggression Toward Others  Goal: Refrain from harming others  Outcome: Progressing

## 2025-05-11 NOTE — NURSING NOTE
"Patient observed within the milieu, socializing with select peers. He can be quite boisterous and illogical at times. His mood and affect are labile. Quite disheveled and eccentric in appearance. Interactions are often disorganized or tangential. Overall he is cooperative and redirectable but expresses his overall disagreement with admission. He states \"I'm not a danger to myself or other people so why am I here\". Compliant with scheduled lithium as ordered. Q15 minute checks ongoing.   "

## 2025-05-12 LAB
25(OH)D3 SERPL-MCNC: 30 NG/ML (ref 30–100)
CHOLEST SERPL-MCNC: 155 MG/DL (ref ?–200)
FOLATE SERPL-MCNC: 12.5 NG/ML
HDLC SERPL-MCNC: 47 MG/DL
LDLC SERPL CALC-MCNC: 94 MG/DL (ref 0–100)
NONHDLC SERPL-MCNC: 108 MG/DL
TRIGL SERPL-MCNC: 69 MG/DL (ref ?–150)
VIT B12 SERPL-MCNC: 290 PG/ML (ref 180–914)

## 2025-05-12 PROCEDURE — 82607 VITAMIN B-12: CPT

## 2025-05-12 PROCEDURE — 99232 SBSQ HOSP IP/OBS MODERATE 35: CPT | Performed by: PSYCHIATRY & NEUROLOGY

## 2025-05-12 PROCEDURE — 82746 ASSAY OF FOLIC ACID SERUM: CPT

## 2025-05-12 PROCEDURE — 82306 VITAMIN D 25 HYDROXY: CPT

## 2025-05-12 PROCEDURE — 80061 LIPID PANEL: CPT

## 2025-05-12 RX ORDER — PALIPERIDONE 6 MG/1
6 TABLET, EXTENDED RELEASE ORAL DAILY
Status: DISCONTINUED | OUTPATIENT
Start: 2025-05-13 | End: 2025-05-15

## 2025-05-12 RX ADMIN — LORAZEPAM 1 MG: 1 TABLET ORAL at 22:13

## 2025-05-12 RX ADMIN — ATORVASTATIN CALCIUM 10 MG: 10 TABLET, FILM COATED ORAL at 18:00

## 2025-05-12 RX ADMIN — PALIPERIDONE 3 MG: 3 TABLET, EXTENDED RELEASE ORAL at 08:24

## 2025-05-12 RX ADMIN — TAMSULOSIN HYDROCHLORIDE 0.4 MG: 0.4 CAPSULE ORAL at 18:00

## 2025-05-12 RX ADMIN — CHOLECALCIFEROL TAB 25 MCG (1000 UNIT) 1000 UNITS: 25 TAB at 08:24

## 2025-05-12 RX ADMIN — LITHIUM CARBONATE 300 MG: 300 TABLET, EXTENDED RELEASE ORAL at 08:24

## 2025-05-12 RX ADMIN — LITHIUM CARBONATE 300 MG: 300 TABLET, EXTENDED RELEASE ORAL at 20:59

## 2025-05-12 RX ADMIN — LISINOPRIL 10 MG: 10 TABLET ORAL at 08:24

## 2025-05-12 RX ADMIN — DICLOFENAC SODIUM 2 G: 10 GEL TOPICAL at 04:27

## 2025-05-12 NOTE — PLAN OF CARE
Problem: Depression  Goal: Refrain from isolation  Description: Interventions:- Develop a trusting relationship - Encourage socialization   Outcome: Progressing   Patient attends groups.

## 2025-05-12 NOTE — NURSING NOTE
Patient visible and social with staff and peers. Intrusive to peers, a peer decided to shut him out of the day room due to him per peer agitating his peers.  This writer does not agree due to being very aware of this patient's behaviors from previous inpatient admissions. Patient denies any SI/HI, AV/H, anxiety or depression.  Attended PM snack. HS medication compliant. Q 15 minute monitoring maintained.

## 2025-05-12 NOTE — NURSING NOTE
Patient was becoming more elevated the closer in was to bed time. Increasing anxiety and agitation, restless and unable to fall asleep and stay asleep. Administered Ativan 1 mg PO @ 2253.  Fajardo score: 26. Broset score: 3. Follow up:  Medication effective. Patient sleeping.

## 2025-05-12 NOTE — PROGRESS NOTES
"Progress Note - Freddie Graham 65 y.o. male MRN: [id number]    Unit/Bed#: [facility] 245-02 Encounter: [id number]        Subjective:   Patient seen and examined at bedside after reviewing the chart and discussing the case with the caring staff.      Patient examined at bedside.  Patient has no acute issues.    Vitamins pending.     Physical Exam   Vitals: Blood pressure 120/86, pulse 100, temperature 97.8 °F (36.6 °C), temperature source Temporal, resp. rate 18, height 5' 9\" (1.753 m), weight 88.7 kg (195 lb 9.6 oz), SpO2 99%.,Body mass index is 28.89 kg/m².  Constitutional: He appears well-developed.   HEENT: PERR, EOMI, MMM  Cardiovascular: Normal rate and regular rhythm.    Pulmonary/Chest: Effort normal and breath sounds normal.   Abdomen: Soft, + BS, NT    Assessment/Plan:  Freddie Graham is a(n) 65 y.o. male with Schizoaffective disorder.      Cardiac w/ HTN, HLD.  No on BP medication.  Restart lisinopril 10 mg daily.  Continue atorvastatin 10 mg daily.   LISET.  Patient had recent Zio patch with cardiology.  Pauses found, no afib, thought to be related to LISET.  Recommended sleep study outpt.   Urothelial carcinoma/BPH.  Patient follows with urology outpt -due for follow up.  Continue Flomax 0.4 mg daily and pyridium 200 mg TID as needed for bladder spasms.   Chronic back pain/DJD.  CT spine 5/7 showing \"No acute displaced fracture or traumatic malalignment.  Multilevel degenerative disc and facet changes causing varying degrees of spinal and neuroforaminal narrowing. Up to moderate spinal stenosis at L4-5. Up to severe left L4-5 and L5-S1 neuroforaminal stenosis.\"  Tylenol/motrin as needed.  Voltaren gel 4x daily as needed.  Tobacco abuse.  Declines NRT.   Vitamin D deficiency.   Patient started on daily Vit D per Dr. Gallagher.  Labs pending.     The patient was discussed with Dr. Gallagher and he is in agreement with the above note.    "

## 2025-05-12 NOTE — PROGRESS NOTES
05/12/25    Team Meeting   Meeting Type Daily Rounds   Team Members Present   Team Members Present Physician;Nurse;;;Occupational Therapist   Physician Team Member MD Oz Jolley MD Payne, PAToddC   Nursing Team Member ADRIEN Zurita   Care Management Team Member MS Franko   Social Work Team Member MANA Ricks   OT Team Member NAYANA Ruff   Patient/Family Present   Patient Present No   Patient's Family Present No   302. Need to peruse 303. Med compliant. Loud at times. Eating and sleeping well. D/C not date yet.

## 2025-05-12 NOTE — PROGRESS NOTES
Progress Note - Behavioral Health   Name: Freddie Graham 65 y.o. male I MRN: [id number]  Unit/Bed#: -02 I Date of Admission: 5/8/2025   Date of Service: 5/12/2025 I Hospital Day: 4    Assessment & Plan  Acute exacerbation of chronic schizoaffective schizophrenia (HCC)  65-year-old male known to this service with history of chronic mental illness and diagnosis of schizoaffective disorder.  Returns to the hospital in the context of being noncompliant and having exacerbated symptoms of mood lability, disorganized behaviors and illogical thinking.  We will restart patient on medications he was stabilized on in the past-  Increase Invega to 6 mg daily.  Lithium 300 mg twice daily  Lithium level pending 5/16/2025    At time of admission patient refusing medications and treatment plan.  Due to need of hospitalization and stabilization patient will be converted to 302 and a medication over objection second opinion will be obtained.    Current Medications:    Current Facility-Administered Medications:     atorvastatin (LIPITOR) tablet 10 mg, Oral, Daily With Dinner    Cholecalciferol (VITAMIN D3) tablet 1,000 Units, Oral, Daily    lisinopril (ZESTRIL) tablet 10 mg, Oral, Daily    lithium carbonate (LITHOBID) CR tablet 300 mg, Oral, Q12H MANNY    paliperidone (INVEGA) 24 hr tablet 3 mg, Oral, Daily    tamsulosin (FLOMAX) capsule 0.4 mg, Oral, Daily With Dinner      Risks/Benefits of Treatment:     Risks, benefits, and possible side effects of medications explained to patient. Patient has limited understanding of risks and benefits of treatment at this time, but agrees to take medications as prescribed.        Treatment Planning:      - Encourage early mobility and having a structured day  - Provide frequent re-orientation, and cognitive stimulation  - Ensure assistive devices are in proper working order (eye-glasses, hearing aids)  - Encourage adequate hydration, nutrition and monitor bowel movements  - Maintain sleep-wake  "cycle: Uninterrupted sleep time; low-level lighting at night  - Fall precaution  - Follow up with [facility] regarding the medical problems   - Continue medication titration and treatment plan; adjust medication to optimize treatment response and as clinically indicated.   - Observation: Routine  - VS: as per unit protocol  - Encourage group attendance and milieu therapy  - Dispo: To be determined  - Estimated Discharge Day:  Ongoing medication changes with no discharge date.  - Legal Status : 302 with pending 303 hearing  -     Subjective     Freddie K Day seen today for psychiatric follow-up.  Chart reviewed and patient presented at morning team meeting.  Patient witnessed participating in groups this morning.  Patient remains bizarre and disorganized in conversation.  Intrusive at times.  Patient kept ruminating about legal issues.  Patient states prior to admission was pulled over by the police due to alcohol and THC and reporting that the police \"took some of my blood.\"  Patient admits to discontinued his psychotropic medications prior to admission.  Denies SI or HI.  Denies AVH.  Contracts for safety on unit.      Sleep: normal  Appetite: normal  Medication side effects: No  ROS: review of systems as noted above in HPI/Subjective report, all other systems are negative    Objective :  Temp:  [97.8 °F (36.6 °C)-98.7 °F (37.1 °C)] 97.8 °F (36.6 °C)  HR:  [] 100  BP: (109-126)/(72-86) 120/86  Resp:  [18] 18  SpO2:  [97 %-99 %] 99 %  O2 Device: None (Room air)    Mental Status Evaluation:    Appearance:  age appropriate, adequate grooming, bearded   Behavior:  bizarre, restless   Speech:  hypertalkative, disorganized   Mood:  elevated, mildly irritable   Affect:  mood-congruent   Thought Process:  disorganized, increased rate of thoughts   Thought Content:  grandiose ideas, overvalued ideas   Perceptual Disturbances: denies auditory or visual hallucinations when asked   Risk Potential: Suicidal Ideation -  None at " "present  Homicidal Ideation -  None  Potential for Aggression - No   Sensorium:  oriented to person, place, and time/date   Memory:  recent and remote memory grossly intact   Consciousness:  alert and awake   Attention/Concentration: attention span and concentration appear shorter than expected for age   Insight:  impaired   Judgment: impaired   Gait/Station: normal gait/station   Motor Activity: no abnormal movements               Lab Results: I have reviewed the following results:  Results from the past 24 hours:   Recent Results (from the past 24 hours)   Lipid panel    Collection Time: 05/12/25  4:40 AM   Result Value Ref Range    Cholesterol 155 See Comment mg/dL    Triglycerides 69 See Comment mg/dL    HDL, Direct 47 >=40 mg/dL    LDL Calculated 94 0 - 100 mg/dL    Non-HDL-Chol (CHOL-HDL) 108 mg/dl       Administrative Statements     Counseling / Coordination of Care:   Patient's progress discussed with staff in treatment team meeting.  Medication changes reviewed with staff in treatment team meeting.  Medications, treatment progress and treatment plan reviewed with patient.    Portions of the record may have been created with voice recognition software. Occasional wrong word or \"sound a like\" substitutions may have occurred due to the inherent limitations of voice recognition software. Read the chart carefully and recognize, using context, where substitutions have occurred.    Price Paniagua PA-C 05/12/25  "

## 2025-05-12 NOTE — NURSING NOTE
Patient was pleasant and cooperative. Patient loud and paranoid. Staff support provided. Groups encouraged. Q 15 minute safety checks maintained. Patient remained pleasant and cooperative. Patient disorganized and tangential at times. Patient compliant with medications and groups. Satff will continue to monitor and support.

## 2025-05-12 NOTE — PLAN OF CARE
Problem: Risk for Violence/Aggression Toward Others  Goal: Refrain from harming others  Outcome: Progressing     Problem: Alteration in Orientation  Goal: Interact with staff daily  Description: Interventions:- Assess and re-assess patient's level of orientation- Engage patient in 1 on 1 interactions, daily, for a minimum of 15 minutes - Establish rapport/trust with patient   Outcome: Progressing

## 2025-05-13 PROCEDURE — 99232 SBSQ HOSP IP/OBS MODERATE 35: CPT | Performed by: PSYCHIATRY & NEUROLOGY

## 2025-05-13 RX ORDER — LITHIUM CARBONATE 300 MG/1
300 CAPSULE ORAL 2 TIMES DAILY
Status: DISCONTINUED | OUTPATIENT
Start: 2025-05-13 | End: 2025-05-19

## 2025-05-13 RX ADMIN — LITHIUM CARBONATE 300 MG: 300 CAPSULE, GELATIN COATED ORAL at 08:30

## 2025-05-13 RX ADMIN — CYANOCOBALAMIN TAB 500 MCG 500 MCG: 500 TAB at 12:04

## 2025-05-13 RX ADMIN — TAMSULOSIN HYDROCHLORIDE 0.4 MG: 0.4 CAPSULE ORAL at 17:47

## 2025-05-13 RX ADMIN — LITHIUM CARBONATE 300 MG: 300 CAPSULE, GELATIN COATED ORAL at 17:47

## 2025-05-13 RX ADMIN — DICLOFENAC SODIUM 2 G: 10 GEL TOPICAL at 17:47

## 2025-05-13 RX ADMIN — CHOLECALCIFEROL TAB 25 MCG (1000 UNIT) 1000 UNITS: 25 TAB at 08:30

## 2025-05-13 RX ADMIN — ATORVASTATIN CALCIUM 10 MG: 10 TABLET, FILM COATED ORAL at 17:47

## 2025-05-13 RX ADMIN — PALIPERIDONE 6 MG: 6 TABLET, EXTENDED RELEASE ORAL at 08:30

## 2025-05-13 RX ADMIN — DICLOFENAC SODIUM 2 G: 10 GEL TOPICAL at 14:06

## 2025-05-13 RX ADMIN — LORAZEPAM 0.5 MG: 0.5 TABLET ORAL at 12:04

## 2025-05-13 RX ADMIN — LISINOPRIL 10 MG: 10 TABLET ORAL at 08:30

## 2025-05-13 NOTE — PROGRESS NOTES
"Progress Note - Freddie Graham 65 y.o. male MRN: [id number]    Unit/Bed#: [facility] 245-02 Encounter: [id number]        Subjective:   Patient seen and examined at bedside after reviewing the chart and discussing the case with the caring staff.      Patient examined at bedside.  Patient is having calf muscle cramps for the past 2 days.    On review of patient's labs patient's vitamin D level was low 30.0 and B12 is also low 290.    Physical Exam   Vitals: Blood pressure 112/67, pulse 97, temperature 97.5 °F (36.4 °C), temperature source Temporal, resp. rate 18, height 5' 9\" (1.753 m), weight 88.7 kg (195 lb 9.6 oz), SpO2 99%.,Body mass index is 28.89 kg/m².  Constitutional: He appears well-developed.   HEENT: PERR, EOMI, MMM  Cardiovascular: Normal rate and regular rhythm.    Pulmonary/Chest: Effort normal and breath sounds normal.   Abdomen: Soft, + BS, NT    Assessment/Plan:  Freddie Graham is a(n) 65 y.o. male with Schizoaffective disorder.      Cardiac w/ HTN, HLD.  No on BP medication.  Restart lisinopril 10 mg daily.  Continue atorvastatin 10 mg daily.   LISET.  Patient had recent Zio patch with cardiology.  Pauses found, no afib, thought to be related to LISET.  Recommended sleep study outpt.   Urothelial carcinoma/BPH.  Patient follows with urology outpt -due for follow up.  Continue Flomax 0.4 mg daily and pyridium 200 mg TID as needed for bladder spasms.   Chronic back pain/DJD.  CT spine 5/7 showing \"No acute displaced fracture or traumatic malalignment.  Multilevel degenerative disc and facet changes causing varying degrees of spinal and neuroforaminal narrowing. Up to moderate spinal stenosis at L4-5. Up to severe left L4-5 and L5-S1 neuroforaminal stenosis.\"  Tylenol/motrin as needed.  Voltaren gel 4x daily as needed.  Tobacco abuse.  Declines NRT.   Bilateral calf muscle cramps/tenderness.  I will put the patient on Voltaren gel 4 times daily.  Vitamin D deficiency.  Patient has been put on vitamin D " supplements.  Vitamin B12 deficiency.  Patient will be put on vitamin B12 supplements.

## 2025-05-13 NOTE — NURSING NOTE
Patient was becoming more elevated the closer in was to bed time. Increasing anxiety and agitation, restless and unable to fall asleep and stay asleep. Administered Ativan 1 mg PO @ 2213. Broset score: 3. Follow up:  Medication effective. Patient sleeping.

## 2025-05-13 NOTE — NURSING NOTE
Patient was pleasant and cooperative. Patient loud and boisterous. Staff support provided. Q 15 minute safety checks maintained. Groups encouraged. Patient compliant with medications and groups. Patient had an episode of agitation earlier today but was able to redirect and calm down. Patient illogical and delusional but jovial. Staff will continue to monitor and support.

## 2025-05-13 NOTE — PROGRESS NOTES
05/13/25    Team Meeting   Meeting Type Daily Rounds   Team Members Present   Team Members Present Physician;Nurse;;;Occupational Therapist   Physician Team Member MD Oz Miller, MD Brown, PAToddC   Nursing Team Member ADRIEN Zurita   Care Management Team Member MS Franko   Social Work Team Member MANA Ricks   OT Team Member NAYANA Ruff   Patient/Family Present   Patient Present No   Patient's Family Present No   302. Now a 303. Redirectable. Received PRN Ativan. Complaining of muscle pain. Disheveled. Irritable and loud at times. Eating and sleeping well. Not attending groups. Denies all. Med compliant. [facility]. D/C date not known due to med adjustment.

## 2025-05-13 NOTE — TREATMENT TEAM
05/13/25 1205   Broset Violence Checklist   Irritability 1   Confusion 1   Boisterousness 1   Threatening physical violence 0   Verbal threats 0   Violence 0   Broset score 3   Agitated Behavior Scale   Short Attention Span, Easy Distractibility, Inability to Concentrate 3   Impulsive, Impatient, Low Tolerance for Pain or Frustration 3   Uncooperative, Resistant to Care, Demanding 1   Violent and/or Threatening Violence Toward People or Property 1   Explosive and/or Unpredictable Anger 1   Rocking, Rubbing, Moaning, Other Self-Stimulating Behavior 1   Pulling at Tubes, Restraints, etc. 1   Wandering from Treatment Area 1   Restlessness, Pacing, Excessive Movement 4   Repetitive Behaviors, Motor and/or Verbal 3   Rapid, Loud or Excessive Talking 3   Sudden Changes of Mood 4   Easily Initiated - Excessive Crying and/or Laughter 2   Self-Abusiveness, Physical and/or Verbal 1   Agitated Behavior Scale Total Score  29     Patient became agitated after speaking with staff. Patient given ativan 0.5mg PO.

## 2025-05-13 NOTE — PLAN OF CARE
Problem: Alteration in Thoughts and Perception  Goal: Agree to be compliant with medication regime, as prescribed and report medication side effects  Description: Interventions:- Offer appropriate PRN medication and supervise ingestion; conduct AIMS, as needed   Outcome: Progressing

## 2025-05-13 NOTE — NURSING NOTE
Patient visible and social with staff and peers. Intrusive to peers, loud. Pressured, and mumbles at times. Patient denies any SI/HI, AV/H, anxiety or depression. Attended PM snack. HS medication compliant. Q 15 minute monitoring maintained.

## 2025-05-14 PROCEDURE — 99232 SBSQ HOSP IP/OBS MODERATE 35: CPT

## 2025-05-14 RX ADMIN — PALIPERIDONE 6 MG: 6 TABLET, EXTENDED RELEASE ORAL at 08:16

## 2025-05-14 RX ADMIN — ATORVASTATIN CALCIUM 10 MG: 10 TABLET, FILM COATED ORAL at 17:47

## 2025-05-14 RX ADMIN — LITHIUM CARBONATE 300 MG: 300 CAPSULE, GELATIN COATED ORAL at 08:16

## 2025-05-14 RX ADMIN — TAMSULOSIN HYDROCHLORIDE 0.4 MG: 0.4 CAPSULE ORAL at 17:47

## 2025-05-14 RX ADMIN — LISINOPRIL 10 MG: 10 TABLET ORAL at 08:16

## 2025-05-14 RX ADMIN — LITHIUM CARBONATE 300 MG: 300 CAPSULE, GELATIN COATED ORAL at 17:47

## 2025-05-14 RX ADMIN — LORAZEPAM 1 MG: 1 TABLET ORAL at 23:59

## 2025-05-14 RX ADMIN — LORAZEPAM 1 MG: 1 TABLET ORAL at 15:17

## 2025-05-14 RX ADMIN — OLANZAPINE 5 MG: 5 TABLET, FILM COATED ORAL at 10:23

## 2025-05-14 RX ADMIN — CHOLECALCIFEROL TAB 25 MCG (1000 UNIT) 1000 UNITS: 25 TAB at 08:16

## 2025-05-14 RX ADMIN — LORAZEPAM 1 MG: 1 TABLET ORAL at 02:22

## 2025-05-14 RX ADMIN — CYANOCOBALAMIN TAB 500 MCG 500 MCG: 500 TAB at 08:16

## 2025-05-14 NOTE — PROGRESS NOTES
05/14/25    Team Meeting   Meeting Type Daily Rounds   Team Members Present   Team Members Present Physician;Occupational Therapist;Nurse;;   Physician Team Member MD Maribell, Oz PAYTON; CARLOS Flanagan;   Stephanie ERICKSON   Nursing Team Member ADRIEN Zurita   Care Management Team Member MS Franko   Social Work Team Member MANA Ricks   OT Team Member NAYANA Ruff   Patient/Family Present   Patient Present No   Patient's Family Present No   303. Disorganized, unwell and bizarre. Did not sleep well. Slept only 1 hour. Not paranoid. Denies all.  Lithium level 5-16. Med compliant. D/C no date due to med adjustment.

## 2025-05-14 NOTE — PROGRESS NOTES
Progress Note - Behavioral Health   Name: Freddie Graham 65 y.o. male I MRN: [id number]  Unit/Bed#: -02 I Date of Admission: 5/8/2025   Date of Service: 5/14/2025 I Hospital Day: 6     Assessment & Plan  Acute exacerbation of chronic schizoaffective schizophrenia (HCC)  65-year-old male known to this service with history of chronic mental illness and diagnosis of schizoaffective disorder.  Returns to the hospital in the context of being noncompliant and having exacerbated symptoms of mood lability, disorganized behaviors and illogical thinking.  We will restart patient on medications he was stabilized on in the past-  Invega 6 mg daily   Lithium 300 mg twice daily. Li level 5/16        Current Medications:    Current Facility-Administered Medications:     atorvastatin (LIPITOR) tablet 10 mg, Oral, Daily With Dinner    Cholecalciferol (VITAMIN D3) tablet 1,000 Units, Oral, Daily    cyanocobalamin (VITAMIN B-12) tablet 500 mcg, Oral, Daily    Diclofenac Sodium (VOLTAREN) 1 % topical gel 2 g, Topical, 4x Daily    lisinopril (ZESTRIL) tablet 10 mg, Oral, Daily    lithium carbonate capsule 300 mg, Oral, BID    paliperidone (INVEGA) 24 hr tablet 6 mg, Oral, Daily    tamsulosin (FLOMAX) capsule 0.4 mg, Oral, Daily With Dinner    Current Facility-Administered Medications:     acetaminophen (TYLENOL) tablet 650 mg, Oral, Q6H PRN    aluminum-magnesium hydroxide-simethicone (MAALOX) oral suspension 30 mL, Oral, Q4H PRN    benztropine (COGENTIN) injection 1 mg, Intramuscular, Q4H PRN Max 6/day    benztropine (COGENTIN) tablet 0.5 mg, Oral, Q4H PRN Max 6/day    Diclofenac Sodium (VOLTAREN) 1 % topical gel 2 g, Topical, 4x Daily PRN    hydrOXYzine HCL (ATARAX) tablet 25 mg, Oral, Q6H PRN Max 4/day    ibuprofen (MOTRIN) tablet 400 mg, Oral, Q6H PRN    ibuprofen (MOTRIN) tablet 600 mg, Oral, Q6H PRN    LORazepam (ATIVAN) injection 1 mg, Intramuscular, Q6H PRN Max 3/day    LORazepam (ATIVAN) tablet 0.5 mg, Oral, Q6H PRN Max  4/day    LORazepam (ATIVAN) tablet 1 mg, Oral, Q6H PRN Max 3/day    nicotine polacrilex (NICORETTE) gum 4 mg, Oral, Q4H PRN    OLANZapine (ZyPREXA) IM injection 5 mg, Intramuscular, Q3H PRN Max 3/day    OLANZapine (ZyPREXA) tablet 2.5 mg, Oral, Q4H PRN Max 6/day    OLANZapine (ZyPREXA) tablet 5 mg, Oral, Q4H PRN Max 3/day    OLANZapine (ZyPREXA) tablet 5 mg, Oral, Q3H PRN Max 3/day    phenazopyridine (PYRIDIUM) tablet 200 mg, Oral, TID PRN    polyethylene glycol (MIRALAX) packet 17 g, Oral, Daily PRN    propranolol (INDERAL) tablet 5 mg, Oral, Q8H PRN    senna-docusate sodium (SENOKOT S) 8.6-50 mg per tablet 1 tablet, Oral, Daily PRN    traZODone (DESYREL) tablet 50 mg, Oral, HS PRN    Risks/Benefits of Treatment:     Risks, benefits, and possible side effects of medications explained to patient. Patient has limited understanding of risks and benefits of treatment at this time, but agrees to take medications as prescribed.        Treatment Planning:     All current active medications have been reviewed.  Continue to monitor response to treatment and assess for potential side effects of medications.  Encourage group therapy, milieu therapy and occupational therapy.  Collaboration with medical service for medical comorbidities as indicated.  Behavioral Health checks for safety monitoring.  Estimated Discharge Day:      Subjective     Behavior over the last 24 hours: ermias Frazier was seen for psychiatric follow-up and reviewed with treatment team.  Seen while sitting on the periphery of milieu during group therapy, irritable edge.  Asking who sent this writer, accusatory of previous psychiatric providers regarding his length of stay and recent commitment.  Remains bizarre, delusional, disorganized conversation.  Unable to state why he is here.  Has been compliant with prescribed Invega and lithium and no adverse effects.  He denies SI or HI.      Sleep: slept off and on  Appetite: fair  Medication side effects:  No  ROS: review of systems as noted above in HPI/Subjective report, all other systems are negative    Objective :  Temp:  [97.9 °F (36.6 °C)-98.2 °F (36.8 °C)] 97.9 °F (36.6 °C)  HR:  [] 131  BP: (134-142)/() 142/109  Resp:  [18] 18  SpO2:  [97 %] 97 %  O2 Device: None (Room air)    Mental Status Evaluation:    Appearance:  disheveled   Behavior:  angry   Speech:  increased rate   Mood:  depressed   Affect:  constricted   Thought Process:  concrete   Thought Content:  no overt delusions   Perceptual Disturbances: denies auditory hallucinations when asked, does not appear responding to internal stimuli   Risk Potential: Suicidal Ideation -  None at present  Homicidal Ideation -  None at present  Potential for Aggression - Not at present   Sensorium:  oriented to person, place, and time/date   Memory:  recent and remote memory grossly intact   Consciousness:  alert and awake   Attention/Concentration: attention span and concentration appear shorter than expected for age   Insight:  impaired   Judgment: impaired   Gait/Station: inchair   Motor Activity: no abnormal movements       Lab Results: I have reviewed the following results:  Most Recent Labs:   Lab Results   Component Value Date    WBC 5.97 05/06/2025    RBC 4.72 05/06/2025    HGB 13.6 05/06/2025    HCT 41.3 05/06/2025     05/06/2025    RDW 13.1 05/06/2025    NEUTROABS 4.33 05/06/2025    SODIUM 137 05/06/2025    K 3.5 05/06/2025     05/06/2025    CO2 23 05/06/2025    BUN 15 05/06/2025    CREATININE 0.83 05/06/2025    GLUC 111 05/06/2025    CALCIUM 9.3 05/06/2025    AST 30 05/06/2025    ALT 31 05/06/2025    ALKPHOS 44 05/06/2025    TP 6.7 05/06/2025    ALB 4.3 05/06/2025    TBILI 0.81 05/06/2025    CHOLESTEROL 155 05/12/2025    HDL 47 05/12/2025    TRIG 69 05/12/2025    LDLCALC 94 05/12/2025    NONHDLC 108 05/12/2025    VALPROICTOT <10 (L) 02/17/2024    LITHIUM <0.10 (L) 05/06/2025    AMMONIA 51 02/06/2021    KEB4LISFRELU 1.939  "05/06/2025    FREET4 0.97 02/17/2024    TREPONEMAPA Non-reactive 04/07/2025    HGBA1C 5.7 (H) 04/07/2025     04/07/2025     Lithium:   Lab Results   Component Value Date    LITHIUM <0.10 (L) 05/06/2025       Administrative Statements     Counseling / Coordination of Care:   Patient's progress discussed with staff in treatment team meeting.  Medication changes reviewed with staff in treatment team meeting.    Portions of the record may have been created with voice recognition software. Occasional wrong word or \"sound a like\" substitutions may have occurred due to the inherent limitations of voice recognition software. Read the chart carefully and recognize, using context, where substitutions have occurred.    CARLOS Cheney 05/14/25  "

## 2025-05-14 NOTE — PROGRESS NOTES
"Progress Note - Freddie Graham 65 y.o. male MRN: [id number]    Unit/Bed#: [facility] 245-02 Encounter: [id number]        Subjective:   Patient seen and examined at bedside after reviewing the chart and discussing the case with the caring staff.      Patient examined at bedside.  Patient denies any acute symptoms.    Patient's BP and HR elevated this AM. Has been well controlled on lisinopril otherwise.  Patient asymptomatic.  Recheck levels.     Physical Exam   Vitals: Blood pressure (!) 142/109, pulse (!) 131, temperature 97.9 °F (36.6 °C), temperature source Temporal, resp. rate 18, height 5' 9\" (1.753 m), weight 88.7 kg (195 lb 9.6 oz), SpO2 97%.,Body mass index is 28.89 kg/m².  Constitutional: He appears well-developed.   HEENT: PERR, EOMI, MMM  Cardiovascular: Normal rate and regular rhythm.    Pulmonary/Chest: Effort normal and breath sounds normal.   Abdomen: Soft, + BS, NT    Assessment/Plan:  Freddie Graham is a(n) 65 y.o. male with Schizoaffective disorder.      Cardiac w/ HTN, HLD.  Not on BP medication.  Restart lisinopril 10 mg daily.  Continue atorvastatin 10 mg daily.   LISET.  Patient had recent Zio patch with cardiology.  Pauses found, no afib, thought to be related to LISET.  Recommended sleep study outpt.   Urothelial carcinoma/BPH.  Patient follows with urology outpt -due for follow up.  Continue Flomax 0.4 mg daily and pyridium 200 mg TID as needed for bladder spasms.   Chronic back pain/DJD.  CT spine 5/7 showing \"No acute displaced fracture or traumatic malalignment.  Multilevel degenerative disc and facet changes causing varying degrees of spinal and neuroforaminal narrowing. Up to moderate spinal stenosis at L4-5. Up to severe left L4-5 and L5-S1 neuroforaminal stenosis.\"  Tylenol/motrin as needed.  Voltaren gel 4x daily as needed.  Tobacco abuse.  Declines NRT.   Bilateral calf muscle cramps/tenderness.  I will put the patient on Voltaren gel 4 times daily.  Vitamin D deficiency.  Continue daily Vit D " supplement.   Vitamin B12 deficiency.  Continue daily vitamin B12 supplement.    The patient was discussed with Dr. Gallagher and he is in agreement with the above note.

## 2025-05-14 NOTE — NURSING NOTE
Patient had argument with another Pt. in the common room. Staff support and redirection provided. Staff expressed to ignore other Pt. and focus on self. Staff will continue to monitor and support.

## 2025-05-14 NOTE — PROGRESS NOTES
Progress Note - Behavioral Health   Freddie CASTANEDA Day 65 y.o. male MRN: @MRN   Unit/Bed#: [facility] 245-02 Encounter: [id number]      Report from staff regarding this patient received and discussed, and records reviewed prior to seeing this patient  Behavior over the last 24 hours: slowly improving.   The patient had episodes of agitation in the morning but can be redirected by staff, but was found to be more pleasant state still delusional with symptoms of psychomotor agitation with rapid speech, but more positive mind.  Continued to be tangential, and paranoid but some improvement he accepting the reality        Patient remains agitated at times, anxious, and better today.    Sleep: slept better  Appetite: fair  Medication side effects: No     Mental Status Evaluation:    Appearance:  improved grooming   Mood:  improved, dysphoric, anxious   Affect: brighter, constricted    Speech:  increased rate, pressured   Thought Content:  grandiose ideas   Perceptual Disturbances: no auditory hallucinations, no visual hallucinations, denies auditory hallucinations when asked, does not appear responding to internal stimuli   Risk Potential: Suicidal ideation - None, contracts for safety on the unit  Homicidal ideation - None  Potential for aggression - No   Insight:  impaired due to psychosis   Judgment: significantly impaired   Motor Activity: no abnormal movements         Laboratory results:  I have personally reviewed all pertinent laboratory results.    Progress Toward Goals: limited improvement    Assessment & Plan   Principal Problem:    Acute exacerbation of chronic schizoaffective schizophrenia (HCC)    Recommended Treatment: We will not make medication adjustment today.  Patient condition slowly started to improve.  The court supported continuation of inpatient treatment because of the recent agitation, led to noncompliance with police, and angry reactions toward his brother, associated with this patient with psychotic  "disorder    Planned medication and treatment changes:    All current active medications have been reviewed.  Continue treatment with group therapy, milieu therapy, occupational therapy and medication management.      ** Please Note: This note has been constructed using a voice recognition system. **      BMP: No results for input(s): \"NA\", \"K\", \"CL\", \"CO2\", \"BUN\" in the last 72 hours.    Invalid input(s): \"CREA\", \"GLU\"  Vitals:    05/13/25 1518   BP: 134/78   Pulse: 93   Resp: 18   Temp: 98.2 °F (36.8 °C)   SpO2: 97%        Medication Administration - last 24 hours from 05/12/2025 2035 to 05/13/2025 2035         Date/Time Order Dose Route Action Action by     05/12/2025 2059 EDT lithium carbonate (LITHOBID) CR tablet 300 mg 300 mg Oral Given Megha Plasencia LPN     05/12/2025 2053 EDT lithium carbonate (LITHOBID) CR tablet 300 mg -- Oral Canceled Entry Megha Plasencia LPN     05/13/2025 1204 EDT LORazepam (ATIVAN) tablet 0.5 mg 0.5 mg Oral Given Leelee Fonseca RN     05/12/2025 2213 EDT LORazepam (ATIVAN) tablet 1 mg 1 mg Oral Given Megha Plasencia LPN     05/13/2025 1747 EDT atorvastatin (LIPITOR) tablet 10 mg 10 mg Oral Given Leelee Fonseca RN     05/13/2025 0830 EDT lisinopril (ZESTRIL) tablet 10 mg 10 mg Oral Given Leelee Fonseca RN     05/13/2025 1747 EDT tamsulosin (FLOMAX) capsule 0.4 mg 0.4 mg Oral Given Leelee Fonseca RN     05/13/2025 0830 EDT Cholecalciferol (VITAMIN D3) tablet 1,000 Units 1,000 Units Oral Given Leelee Fonseca RN     05/13/2025 0830 EDT paliperidone (INVEGA) 24 hr tablet 6 mg 6 mg Oral Given Leelee Fonseca RN     05/13/2025 1747 EDT lithium carbonate capsule 300 mg 300 mg Oral Given Leelee Fonseca RN     05/13/2025 0830 EDT lithium carbonate capsule 300 mg 300 mg Oral Given Leelee Fonseca RN     05/13/2025 1747 EDT Diclofenac Sodium (VOLTAREN) 1 % topical gel 2 g 2 g Topical Given Leelee Fonseca RN     05/13/2025 1406 EDT Diclofenac Sodium (VOLTAREN) 1 % topical gel 2 g 2 g Topical Given Leelee Fonseca RN     " 05/13/2025 1204 EDT cyanocobalamin (VITAMIN B-12) tablet 500 mcg 500 mcg Oral Given Leelee Fonseca RN

## 2025-05-14 NOTE — SOCIAL WORK
Hearing Documentation    303 hearing held today;  in attendance:  Laurel Doll - ; Laurel Reynolds [facility]  - [facility] office; [facility]; staff psych; ; petitioner;  pt declined attendance;  303 recommendation upheld for up to an additional 20 days of inpt treatment at [facility]  303 expires: 6/2

## 2025-05-14 NOTE — PLAN OF CARE
Problem: Depression  Goal: Refrain from harming self  Description: Interventions:- Monitor patient closely, per order - Supervise medication ingestion, monitor effects and side effects   Outcome: Progressing     Problem: Anxiety  Goal: Anxiety is at manageable level  Description: Interventions:- Assess and monitor patient's anxiety level. - Monitor for signs and symptoms (heart palpitations, chest pain, shortness of breath, headaches, nausea, feeling jumpy, restlessness, irritable, apprehensive). - Collaborate with interdisciplinary team and initiate plan and interventions as ordered.- [place] patient to unit/surroundings- Explain treatment plan- Encourage participation in care- Encourage verbalization of concerns/fears- Identify coping mechanisms- Assist in developing anxiety-reducing skills- Administer/offer alternative therapies- Limit or eliminate stimulants  Outcome: Progressing

## 2025-05-14 NOTE — NURSING NOTE
Patient visible on unit. Pleasant and cooperative. Speech loud and rambling. Denies SI,HI,AVH. Safety checks continue Q 15 minutes.

## 2025-05-14 NOTE — NURSING NOTE
Patient visible on the unit, social with peers. Pressured & rapid speech, tangential in conversation. Patient denies SI/HI/AVH. Taking medications as ordered. Patient's clothes layered & appears disheveled. Grandiose at times in conversation. Q 15 maintained.

## 2025-05-14 NOTE — ASSESSMENT & PLAN NOTE
65-year-old male known to this service with history of chronic mental illness and diagnosis of schizoaffective disorder.  Returns to the hospital in the context of being noncompliant and having exacerbated symptoms of mood lability, disorganized behaviors and illogical thinking.  We will restart patient on medications he was stabilized on in the past-  Invega 6 mg daily   Lithium 300 mg twice daily. Li level 5/16

## 2025-05-14 NOTE — TREATMENT TEAM
05/14/25 1020   Broset Violence Checklist   Assessment type Shift   Irritability 1   Confusion 0   Boisterousness 0   Threatening physical violence 0   Verbal threats 0   Violence 0   Broset score 1       Patient responding to stimuli, getting increasingly agitated. Zyprexa 5mg PO administered. Medication effective, patient resting in his room. Q 15 maintained.

## 2025-05-14 NOTE — PLAN OF CARE
Problem: Depression  Goal: Refrain from isolation  Description: Interventions:- Develop a trusting relationship - Encourage socialization   Outcome: Progressing   Pt does attend groups, participates but there are many times when he goes on tangents.

## 2025-05-14 NOTE — NURSING NOTE
"Patient up at nurses station requesting \"something for anxiety.\" Patient noted to be restless, unable to concentrate, and hyper verbal w/ rambling speech. Prn Ativan 1 mg given for a Fajardo anxiety score of 25. Will monitor and follow up for effectiveness.   "

## 2025-05-15 PROCEDURE — 99232 SBSQ HOSP IP/OBS MODERATE 35: CPT | Performed by: PSYCHIATRY & NEUROLOGY

## 2025-05-15 RX ADMIN — LITHIUM CARBONATE 300 MG: 300 CAPSULE, GELATIN COATED ORAL at 10:38

## 2025-05-15 RX ADMIN — PALIPERIDONE 6 MG: 6 TABLET, EXTENDED RELEASE ORAL at 10:38

## 2025-05-15 RX ADMIN — ATORVASTATIN CALCIUM 10 MG: 10 TABLET, FILM COATED ORAL at 17:48

## 2025-05-15 RX ADMIN — LITHIUM CARBONATE 300 MG: 300 CAPSULE, GELATIN COATED ORAL at 17:48

## 2025-05-15 RX ADMIN — TAMSULOSIN HYDROCHLORIDE 0.4 MG: 0.4 CAPSULE ORAL at 17:48

## 2025-05-15 RX ADMIN — CHOLECALCIFEROL TAB 25 MCG (1000 UNIT) 1000 UNITS: 25 TAB at 10:38

## 2025-05-15 RX ADMIN — CYANOCOBALAMIN TAB 500 MCG 500 MCG: 500 TAB at 10:38

## 2025-05-15 NOTE — PROGRESS NOTES
"Progress Note - Freddie Graham 65 y.o. male MRN: [id number]    Unit/Bed#: [facility] 245-02 Encounter: [id number]        Subjective:   Patient seen and examined at bedside after reviewing the chart and discussing the case with the caring staff.      Patient examined at bedside.  Patient denies any acute symptoms.    Physical Exam   Vitals: Blood pressure 113/78, pulse 85, temperature 97.5 °F (36.4 °C), temperature source Temporal, resp. rate 18, height 5' 9\" (1.753 m), weight 88.7 kg (195 lb 9.6 oz), SpO2 100%.,Body mass index is 28.89 kg/m².  Constitutional: He appears well-developed.   HEENT: PERR, EOMI, MMM  Cardiovascular: Normal rate and regular rhythm.    Pulmonary/Chest: Effort normal and breath sounds normal.   Abdomen: Soft, + BS, NT    Assessment/Plan:  Freddie Graham is a(n) 65 y.o. male with Schizoaffective disorder.      Cardiac w/ HTN, HLD.  Not on BP medication.  Restart lisinopril 10 mg daily.  Continue atorvastatin 10 mg daily.   LISET.  Patient had recent Zio patch with cardiology.  Pauses found, no afib, thought to be related to LISET.  Recommended sleep study outpt.   Urothelial carcinoma/BPH.  Patient follows with urology outpt -due for follow up.  Continue Flomax 0.4 mg daily and pyridium 200 mg TID as needed for bladder spasms.   Chronic back pain/DJD.  CT spine 5/7 showing \"No acute displaced fracture or traumatic malalignment.  Multilevel degenerative disc and facet changes causing varying degrees of spinal and neuroforaminal narrowing. Up to moderate spinal stenosis at L4-5. Up to severe left L4-5 and L5-S1 neuroforaminal stenosis.\"  Tylenol/motrin as needed.  Voltaren gel 4x daily as needed.  Tobacco abuse.  Declines NRT.   Bilateral calf muscle cramps/tenderness.  I will put the patient on Voltaren gel 4 times daily.  Vitamin D deficiency.  Continue daily Vit D supplement.   Vitamin B12 deficiency.  Continue daily vitamin B12 supplement.    The patient was discussed with Dr. Gallagher and he is in agreement " with the above note.

## 2025-05-15 NOTE — NUTRITION
05/15/25 1352   Biochemical Data,Medical Tests, and Procedures   Biochemical Data/Medical Tests/Procedures Lab values reviewed;Meds reviewed   Labs (Comment) 5/6 CMP & CBC WNL. 5/12 lipids, Vit D, Vit B12 all WNL   Meds (Comment) lipitor, cogentin, Vit D3, Vit B12, atarax, zestril, lithium carbonate, ativan   Nutrition-Focused Physical Exam   Nutrition-Focused Physical Exam Findings RN skin assessment reviewed;No skin issues documented   Nutrition-Focused Physical Exam Findings Smoker. Poor dentition per patient.   Medical-Related Concerns Alcohol abuse     Anxiety     Astigmatism     Depression     DJD (degenerative joint disease)     Gait abnormality     Head injury     History of colonic polyps     Hydrocele     Hyperlipidemia     Hypertension     Macular drusen     Presbyopia     PTSD (post-traumatic stress disorder)     Schizoaffective disorder (HCC)     Sepsis (HCC)     Substance abuse (HCC)     Tobacco abuse     Umbilical hernia     Vitreous syneresis   Adequacy of Intake   Nutrition Modality PO   Feeding Route   PO Independent   Current PO Intake   Current Diet Order Regular diet thin liquids   Current Meal Intake %   Estimated calorie intake compared to estimated need Nutrient needs met.   PES Statement   Problem No nutrition diagnosis   Recommendations/Interventions   Malnutrition/BMI Present No  (does not meet criteria)   Summary Length of stay. Regular diet thin liquids. Meal completions %. Patient reports he is eating good. He states when he sees food he eats it. Reports using SNAP benefits at home. Reports he lives with his mother. Per notes patient is a poor historian, disorganized, bizarre. 5/11/#; 5/8/#; 3/13/#, 10#(4.8%) loss; 2/18/#. No significant weight changes. Reports his weight ranges 190-210#. Smoker. Poor dentition per patient. Skin intact. Current diet explained.   Interventions/Recommendations Continue current diet order   Education Assessment    Education Education not indicated at this time   Nutrition Complexity Risk   Nutrition complexity level Low risk   Follow up date 05/29/25
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
## 2025-05-15 NOTE — PROGRESS NOTES
05/15/25   Team Meeting   Meeting Type Daily Rounds   Initial Conference Date 05/15/25   Team Members Present   Team Members Present Physician;;;Occupational Therapist;Nurse   Physician Team Member MD Oz Jolley MD Medei, CRNP   Nursing Team Member ADRIEN Son   Care Management Team Member MS Franko   Social Work Team Member MANA Ricks   OT Team Member NAYANA Ruff   Patient/Family Present   Patient Present No   Patient's Family Present No   303. Extremely irritable, rambling. Lewd at times. Redirectable. Bizarre in the hallways. No aggression. Med compliant. Upset about his legal status. Broken sleep. Eating well. Denies all. Attending groups. D/C not yet known.

## 2025-05-15 NOTE — NURSING NOTE
Patient compliant with meds and meals. Patient is social and visible, labile mood, pressured and rambling speech. Patient denies all. Patient appears paranoid at times. Q 15 min behavioral and safety checks in place.

## 2025-05-15 NOTE — PLAN OF CARE
Problem: Nutrition/Hydration-ADULT  Goal: Nutrient/Hydration intake appropriate for improving, restoring or maintaining nutritional needs  Description: Monitor and assess patient's nutrition/hydration status for malnutrition. Collaborate with interdisciplinary team and initiate plan and interventions as ordered.  Monitor patient's weight and dietary intake as ordered or per policy. Utilize nutrition screening tool and intervene as necessary. Determine patient's food preferences and provide high-protein, high-caloric foods as appropriate.     INTERVENTIONS:  - Monitor oral intake, urinary output, labs, and treatment plans  - Assess nutrition and hydration status and recommend course of action  - Evaluate amount of meals eaten  - Assist patient with eating if necessary   - Allow adequate time for meals  - Recommend/ encourage appropriate diets, oral nutritional supplements, and vitamin/mineral supplements  - Order, calculate, and assess calorie counts as needed  - Recommend, monitor, and adjust tube feedings and TPN/PPN based on assessed needs  - Assess need for intravenous fluids  - Provide specific nutrition/hydration education as appropriate  - Include patient/family/caregiver in decisions related to nutrition  Outcome: Progressing      Patient Refused

## 2025-05-15 NOTE — PLAN OF CARE
Problem: Alteration in Thoughts and Perception  Goal: Verbalize thoughts and feelings  Description: Interventions:- Promote a nonjudgmental and trusting relationship with the patient through active listening and therapeutic communication- Assess patient's level of functioning, behavior and potential for risk- Engage patient in 1 on 1 interactions- Encourage patient to express fears, feelings, frustrations, and discuss symptoms  - [place] patient to reality, help patient recognize reality-based thinking - Administer medications as ordered and assess for potential side effects- Provide the patient education related to the signs and symptoms of the illness and desired effects of prescribed medications  Outcome: Progressing     Problem: Depression  Goal: Refrain from harming self  Description: Interventions:- Monitor patient closely, per order - Supervise medication ingestion, monitor effects and side effects   Outcome: Progressing     Problem: Risk for Violence/Aggression Toward Others  Goal: Refrain from harming others  Outcome: Progressing

## 2025-05-15 NOTE — NURSING NOTE
Patient in hallway, speech loud, repetitive and mumbled. Patient talking about the  and then would talk about the Drs. And how he never signed a 201 the drs are lying. Patient disorganized in thoughts, frustrated at times. Patient noted to have increasing anxiety. Fajardo score 26. Ativan 1 mg given at 2359.

## 2025-05-16 LAB — LITHIUM SERPL-SCNC: 0.46 MMOL/L (ref 0.6–1.2)

## 2025-05-16 PROCEDURE — 80178 ASSAY OF LITHIUM: CPT | Performed by: PHYSICIAN ASSISTANT

## 2025-05-16 PROCEDURE — 99232 SBSQ HOSP IP/OBS MODERATE 35: CPT | Performed by: STUDENT IN AN ORGANIZED HEALTH CARE EDUCATION/TRAINING PROGRAM

## 2025-05-16 RX ADMIN — PALIPERIDONE 9 MG: 6 TABLET, EXTENDED RELEASE ORAL at 08:31

## 2025-05-16 RX ADMIN — LITHIUM CARBONATE 300 MG: 300 CAPSULE, GELATIN COATED ORAL at 17:03

## 2025-05-16 RX ADMIN — LISINOPRIL 10 MG: 10 TABLET ORAL at 08:31

## 2025-05-16 RX ADMIN — LORAZEPAM 1 MG: 1 TABLET ORAL at 09:07

## 2025-05-16 RX ADMIN — CYANOCOBALAMIN TAB 500 MCG 500 MCG: 500 TAB at 08:31

## 2025-05-16 RX ADMIN — ATORVASTATIN CALCIUM 10 MG: 10 TABLET, FILM COATED ORAL at 15:31

## 2025-05-16 RX ADMIN — LORAZEPAM 0.5 MG: 0.5 TABLET ORAL at 17:03

## 2025-05-16 RX ADMIN — LITHIUM CARBONATE 300 MG: 300 CAPSULE, GELATIN COATED ORAL at 08:31

## 2025-05-16 RX ADMIN — TAMSULOSIN HYDROCHLORIDE 0.4 MG: 0.4 CAPSULE ORAL at 15:31

## 2025-05-16 RX ADMIN — CHOLECALCIFEROL TAB 25 MCG (1000 UNIT) 1000 UNITS: 25 TAB at 08:31

## 2025-05-16 NOTE — NURSING NOTE
Pt visible on unit. Social with peers. Personal hygiene is good. Compliant with medications. Mood is less labile. Less disorganized. Speech is clearer and easier to understand. Actively engaged in unit activities. Utilizing ativan prn for anxiety which is effective at decreasing anxiety. Pt verbalized understanding of education provided. Safety precautions maintained. Safety precautions maintained. Will continue to monitor and assess.

## 2025-05-16 NOTE — NURSING NOTE
Patient visible in milieu; bright in conversation. Appropriate with verbal redirection. No longer appears anxious. Ativan 1mg PO effective.
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
## 2025-05-16 NOTE — NURSING NOTE
Patient noted to be restless all throughout the night. Patient paced the hallway and mumbled to self majority of the night. Safety checks continuous and maintained.

## 2025-05-16 NOTE — PLAN OF CARE
Problem: Alteration in Thoughts and Perception  Goal: Verbalize thoughts and feelings  Description: Interventions:- Promote a nonjudgmental and trusting relationship with the patient through active listening and therapeutic communication- Assess patient's level of functioning, behavior and potential for risk- Engage patient in 1 on 1 interactions- Encourage patient to express fears, feelings, frustrations, and discuss symptoms  - [place] patient to reality, help patient recognize reality-based thinking - Administer medications as ordered and assess for potential side effects- Provide the patient education related to the signs and symptoms of the illness and desired effects of prescribed medications  Outcome: Progressing  Goal: Agree to be compliant with medication regime, as prescribed and report medication side effects  Description: Interventions:- Offer appropriate PRN medication and supervise ingestion; conduct AIMS, as needed   Outcome: Progressing     Problem: Depression  Goal: Refrain from harming self  Description: Interventions:- Monitor patient closely, per order - Supervise medication ingestion, monitor effects and side effects   Outcome: Progressing

## 2025-05-16 NOTE — PROGRESS NOTES
05/16/25    Team Meeting   Meeting Type Daily Rounds   Team Members Present   Team Members Present Physician;Occupational Therapist;Nurse;;   Physician Team Member MD Alejandro, Medkarin CNLIYAH, GEORGINA Brown   Nursing Team Member ADRIEN Ramos   Care Management Team Member MS Franko   Social Work Team Member MANA Ricks   OT Team Member NAYANA Ruff   Patient/Family Present   Patient Present No   Patient's Family Present No   303. Upset about his legal status. Redirectable. Eating and sleeping well. Selective with meds.   Mouth checks. No D/C date to med adjustment.

## 2025-05-16 NOTE — TREATMENT TEAM
"   05/16/25 0905   Fajardo Anxiety Scale   Anxious Mood 4   Tension 4   Fears 4   Insomnia 4   Intellectual 0   Depressed Mood 3   Somatic Complaints: Muscular 3   Somatic Complaints: Sensory 3   Cardiovascular Symptoms 0   Respiratory Symptoms 0   Gastrointestinal Symptoms 0   Genitourinary Symptoms 0   Autonomic Symptoms 3   Behavior at Interview 3   Fajardo Anxiety Score 31     Patient loud in hallway, disorganized and rambling speech. Tearful at nurses station. Requesting PRN to \"calm down.\" Ham 31 Ativan 1 mg PO given. Will reassess.  "

## 2025-05-16 NOTE — NURSING NOTE
Patient noted to be visible and intermittently social w/ select peers on the milieu. Affect bright on approach. Pleasant and cooperative throughout interaction. Rambling speech. Denied SI/HI/AVH, depression, and anxiety. No acute behaviors noted.Q 15 min safety checks continuous and maintained.

## 2025-05-16 NOTE — SOCIAL WORK
MARLA spoke to pt brother Howard [phone]. Provided pt update. Howard in agreement. He stated that upon d/c pt will have a lot of support at home where he lives with mom.

## 2025-05-17 PROCEDURE — 99232 SBSQ HOSP IP/OBS MODERATE 35: CPT

## 2025-05-17 RX ORDER — HYDROCORTISONE 25 MG/G
CREAM TOPICAL 2 TIMES DAILY
Status: DISCONTINUED | OUTPATIENT
Start: 2025-05-17 | End: 2025-05-21

## 2025-05-17 RX ORDER — CLOTRIMAZOLE 1 %
CREAM (GRAM) TOPICAL 2 TIMES DAILY
Status: DISCONTINUED | OUTPATIENT
Start: 2025-05-17 | End: 2025-05-21

## 2025-05-17 RX ORDER — BENZTROPINE MESYLATE 1 MG/1
1 TABLET ORAL 2 TIMES DAILY
Status: DISCONTINUED | OUTPATIENT
Start: 2025-05-17 | End: 2025-05-18

## 2025-05-17 RX ADMIN — BENZTROPINE MESYLATE 1 MG: 1 TABLET ORAL at 09:16

## 2025-05-17 RX ADMIN — LITHIUM CARBONATE 300 MG: 300 CAPSULE, GELATIN COATED ORAL at 09:16

## 2025-05-17 RX ADMIN — TAMSULOSIN HYDROCHLORIDE 0.4 MG: 0.4 CAPSULE ORAL at 17:57

## 2025-05-17 RX ADMIN — LITHIUM CARBONATE 300 MG: 300 CAPSULE, GELATIN COATED ORAL at 17:56

## 2025-05-17 RX ADMIN — LORAZEPAM 1 MG: 1 TABLET ORAL at 12:00

## 2025-05-17 RX ADMIN — LISINOPRIL 10 MG: 10 TABLET ORAL at 09:16

## 2025-05-17 RX ADMIN — CHOLECALCIFEROL TAB 25 MCG (1000 UNIT) 1000 UNITS: 25 TAB at 09:16

## 2025-05-17 RX ADMIN — CYANOCOBALAMIN TAB 500 MCG 500 MCG: 500 TAB at 09:16

## 2025-05-17 RX ADMIN — BENZTROPINE MESYLATE 1 MG: 1 TABLET ORAL at 17:56

## 2025-05-17 RX ADMIN — BENZTROPINE MESYLATE 0.5 MG: 0.5 TABLET ORAL at 10:07

## 2025-05-17 RX ADMIN — PALIPERIDONE 9 MG: 6 TABLET, EXTENDED RELEASE ORAL at 09:16

## 2025-05-17 RX ADMIN — ATORVASTATIN CALCIUM 10 MG: 10 TABLET, FILM COATED ORAL at 17:56

## 2025-05-17 RX ADMIN — LORAZEPAM 1 MG: 1 TABLET ORAL at 19:52

## 2025-05-17 NOTE — NURSING NOTE
Patient selectively social and out in the community.  Denied depression and/or anxiety.  Denied any suicidal thoughts or hallucinations.  Pleasant during assessment. Compliant with snack. Controlled and cooperative with care. Care Plan reviewed and amended. Maintained on q 15 minute checks.  Will continue to monitor.     [Doing Well] : is doing well [Excellent Pain Control] : has excellent pain control [FreeTextEntry3] : improving redness

## 2025-05-17 NOTE — ASSESSMENT & PLAN NOTE
65-year-old male known to this service with history of chronic mental illness and diagnosis of schizoaffective disorder.  Returns to the hospital in the context of being noncompliant and having exacerbated symptoms of mood lability, disorganized behaviors and illogical thinking.  We will restart patient on medications he was stabilized on in the past-  Invega 9 mg daily   Lithium 300 mg twice daily. Li level 5/16

## 2025-05-17 NOTE — PLAN OF CARE
Problem: Alteration in Thoughts and Perception  Goal: Treatment Goal: Gain control of psychotic behaviors/thinking, reduce/eliminate presenting symptoms and demonstrate improved reality functioning upon discharge  Outcome: Progressing  Goal: Verbalize thoughts and feelings  Description: Interventions:- Promote a nonjudgmental and trusting relationship with the patient through active listening and therapeutic communication- Assess patient's level of functioning, behavior and potential for risk- Engage patient in 1 on 1 interactions- Encourage patient to express fears, feelings, frustrations, and discuss symptoms  - [place] patient to reality, help patient recognize reality-based thinking - Administer medications as ordered and assess for potential side effects- Provide the patient education related to the signs and symptoms of the illness and desired effects of prescribed medications  Outcome: Progressing  Goal: Refrain from acting on delusional thinking/internal stimuli  Description: Interventions:- Monitor patient closely, per order - Utilize least restrictive measures - Set reasonable limits, give positive feedback for acceptable - Administer medications as ordered and monitor of potential side effects  Outcome: Progressing  Goal: Agree to be compliant with medication regime, as prescribed and report medication side effects  Description: Interventions:- Offer appropriate PRN medication and supervise ingestion; conduct AIMS, as needed   Outcome: Progressing  Goal: Recognize dysfunctional thoughts, communicate reality-based thoughts at the time of discharge  Description: Interventions:- Provide medication and psycho-education to assist patient in compliance and developing insight into his/her illness   Outcome: Progressing  Goal: Complete daily ADLs, including personal hygiene independently, as able  Description: Interventions:- Observe, teach, and assist patient with ADLS- Monitor and promote a balance of  rest/activity, with adequate nutrition and elimination   Outcome: Progressing     Problem: Depression  Goal: Treatment Goal: Demonstrate behavioral control of depressive symptoms, verbalize feelings of improved mood/affect, and adopt new coping skills prior to discharge  Outcome: Progressing  Goal: Verbalize thoughts and feelings  Description: Interventions:- Assess and re-assess patient's level of risk - Engage patient in 1:1 interactions, daily, for a minimum of 15 minutes - Encourage patient to express feelings, fears, frustrations, hopes   Outcome: Progressing  Goal: Refrain from harming self  Description: Interventions:- Monitor patient closely, per order - Supervise medication ingestion, monitor effects and side effects   Outcome: Progressing  Goal: Refrain from isolation  Description: Interventions:- Develop a trusting relationship - Encourage socialization   Outcome: Progressing  Goal: Refrain from self-neglect  Outcome: Progressing  Goal: Complete daily ADLs, including personal hygiene independently, as able  Description: Interventions:- Observe, teach, and assist patient with ADLS-  Monitor and promote a balance of rest/activity, with adequate nutrition and elimination   Outcome: Progressing     Problem: Anxiety  Goal: Anxiety is at manageable level  Description: Interventions:- Assess and monitor patient's anxiety level. - Monitor for signs and symptoms (heart palpitations, chest pain, shortness of breath, headaches, nausea, feeling jumpy, restlessness, irritable, apprehensive). - Collaborate with interdisciplinary team and initiate plan and interventions as ordered.- [place] patient to unit/surroundings- Explain treatment plan- Encourage participation in care- Encourage verbalization of concerns/fears- Identify coping mechanisms- Assist in developing anxiety-reducing skills- Administer/offer alternative therapies- Limit or eliminate stimulants  Outcome: Progressing     Problem: Risk for  Violence/Aggression Toward Others  Goal: Treatment Goal: Refrain from acts of violence/aggression during length of stay, and demonstrate improved impulse control at the time of discharge  Outcome: Progressing  Goal: Verbalize thoughts and feelings  Description: Interventions:- Assess and re-assess patient's level of risk, every waking shift- Engage patient in 1:1 interactions, daily, for a minimum of 15 minutes - Allow patient to express feelings and frustrations in a safe and non-threatening manner - Establish rapport/trust with patient   Outcome: Progressing  Goal: Refrain from harming others  Outcome: Progressing  Goal: Refrain from destructive acts on the environment or property  Outcome: Progressing  Goal: Control angry outbursts  Description: Interventions:- Monitor patient closely, per order- Ensure early verbal de-escalation- Monitor prn medication needs- Set reasonable/therapeutic limits, outline behavioral expectations, and consequences - Provide a non-threatening milieu, utilizing the least restrictive interventions   Outcome: Progressing  Goal: Identify appropriate positive anger management techniques  Description: Interventions:- Offer anger management and coping skills groups - Staff will provide positive feedback for appropriate anger control  Outcome: Progressing     Problem: Alteration in Orientation  Goal: Treatment Goal: Demonstrate a reduction of confusion and improved orientation to person, place, time and/or situation upon discharge, according to optimum baseline/ability  Outcome: Progressing  Goal: Interact with staff daily  Description: Interventions:- Assess and re-assess patient's level of orientation- Engage patient in 1 on 1 interactions, daily, for a minimum of 15 minutes - Establish rapport/trust with patient   Outcome: Progressing  Goal: Express concerns related to confused thinking related to:  Description: Interventions:- Encourage patient to express feelings, fears, frustrations,  hopes- Assign consistent caregivers - [place]/re-orient patient as needed- Allow comfort items, as appropriate- Provide visual cues, signs, etc.   Outcome: Progressing  Goal: Allow medical examinations, as recommended  Description: Interventions:- Provide physical/neurological exams and/or referrals, per provider   Outcome: Progressing  Goal: Cooperate with recommended testing/procedures  Description: Interventions:- Determine need for ancillary testing- Observe for mental status changes- Implement falls/precaution protocol   Outcome: Progressing  Goal: Complete daily ADLs, including personal hygiene independently, as able  Description: Interventions:- Observe, teach, and assist patient with ADLS  Outcome: Progressing     Problem: DISCHARGE PLANNING - CARE MANAGEMENT  Goal: Discharge to post-acute care or home with appropriate resources  Description: INTERVENTIONS:- Conduct assessment to determine patient/family and health care team treatment goals, and need for post-acute services based on payer coverage, community resources, and patient preferences, and barriers to discharge- Address psychosocial, clinical, and financial barriers to discharge as identified in assessment in conjunction with the patient/family and health care team- Arrange appropriate level of post-acute services according to patient's   needs and preference and payer coverage in collaboration with the physician and health care team- Communicate with and update the patient/family, physician, and health care team regarding progress on the discharge plan- Arrange appropriate transportation to post-acute venues  Outcome: Progressing     Problem: Nutrition/Hydration-ADULT  Goal: Nutrient/Hydration intake appropriate for improving, restoring or maintaining nutritional needs  Description: Monitor and assess patient's nutrition/hydration status for malnutrition. Collaborate with interdisciplinary team and initiate plan and interventions as ordered.  Monitor  patient's weight and dietary intake as ordered or per policy. Utilize nutrition screening tool and intervene as necessary. Determine patient's food preferences and provide high-protein, high-caloric foods as appropriate.     INTERVENTIONS:  - Monitor oral intake, urinary output, labs, and treatment plans  - Assess nutrition and hydration status and recommend course of action  - Evaluate amount of meals eaten  - Assist patient with eating if necessary   - Allow adequate time for meals  - Recommend/ encourage appropriate diets, oral nutritional supplements, and vitamin/mineral supplements  - Order, calculate, and assess calorie counts as needed  - Recommend, monitor, and adjust tube feedings and TPN/PPN based on assessed needs  - Assess need for intravenous fluids  - Provide specific nutrition/hydration education as appropriate  - Include patient/family/caregiver in decisions related to nutrition  Outcome: Progressing

## 2025-05-17 NOTE — NURSING NOTE
Pt visible on unit. Social with peers. Personal hygiene is good. Compliant with medications. Mood is less labile. Less disorganized. Speech is clearer and easier to understand. Actively engaged in unit activities. Utilizing ativan prn for anxiety which is effective at decreasing anxiety. Safety precautions maintained. Will continue to monitor and assess.

## 2025-05-17 NOTE — PROGRESS NOTES
Progress Note - Behavioral Health   Name: Freddie Graham 65 y.o. male I MRN: [id number]  Unit/Bed#: -02 I Date of Admission: 5/8/2025   Date of Service: 5/17/2025 I Hospital Day: 9    Assessment & Plan  Acute exacerbation of chronic schizoaffective schizophrenia (HCC)  65-year-old male known to this service with history of chronic mental illness and diagnosis of schizoaffective disorder.  Returns to the hospital in the context of being noncompliant and having exacerbated symptoms of mood lability, disorganized behaviors and illogical thinking.  We will restart patient on medications he was stabilized on in the past-  Invega 9 mg daily   Lithium 300 mg twice daily. Li level 5/16  Cogentin 1 mg bid started on 5/17/2025        Current Medications:    Current Facility-Administered Medications:     atorvastatin (LIPITOR) tablet 10 mg, Oral, Daily With Dinner    Cholecalciferol (VITAMIN D3) tablet 1,000 Units, Oral, Daily    cyanocobalamin (VITAMIN B-12) tablet 500 mcg, Oral, Daily    Diclofenac Sodium (VOLTAREN) 1 % topical gel 2 g, Topical, 4x Daily    lisinopril (ZESTRIL) tablet 10 mg, Oral, Daily    lithium carbonate capsule 300 mg, Oral, BID    paliperidone (INVEGA) 24 hr tablet 9 mg, Oral, Daily    tamsulosin (FLOMAX) capsule 0.4 mg, Oral, Daily With Dinner    Current Facility-Administered Medications:     acetaminophen (TYLENOL) tablet 650 mg, Oral, Q6H PRN    aluminum-magnesium hydroxide-simethicone (MAALOX) oral suspension 30 mL, Oral, Q4H PRN    benztropine (COGENTIN) injection 1 mg, Intramuscular, Q4H PRN Max 6/day    benztropine (COGENTIN) tablet 0.5 mg, Oral, Q4H PRN Max 6/day    Diclofenac Sodium (VOLTAREN) 1 % topical gel 2 g, Topical, 4x Daily PRN    hydrOXYzine HCL (ATARAX) tablet 25 mg, Oral, Q6H PRN Max 4/day    ibuprofen (MOTRIN) tablet 400 mg, Oral, Q6H PRN    ibuprofen (MOTRIN) tablet 600 mg, Oral, Q6H PRN    LORazepam (ATIVAN) injection 1 mg, Intramuscular, Q6H PRN Max 3/day    LORazepam  (ATIVAN) tablet 0.5 mg, Oral, Q6H PRN Max 4/day    LORazepam (ATIVAN) tablet 1 mg, Oral, Q6H PRN Max 3/day    nicotine polacrilex (NICORETTE) gum 4 mg, Oral, Q4H PRN    OLANZapine (ZyPREXA) IM injection 5 mg, Intramuscular, Q3H PRN Max 3/day    OLANZapine (ZyPREXA) tablet 2.5 mg, Oral, Q4H PRN Max 6/day    OLANZapine (ZyPREXA) tablet 5 mg, Oral, Q4H PRN Max 3/day    OLANZapine (ZyPREXA) tablet 5 mg, Oral, Q3H PRN Max 3/day    phenazopyridine (PYRIDIUM) tablet 200 mg, Oral, TID PRN    polyethylene glycol (MIRALAX) packet 17 g, Oral, Daily PRN    propranolol (INDERAL) tablet 5 mg, Oral, Q8H PRN    senna-docusate sodium (SENOKOT S) 8.6-50 mg per tablet 1 tablet, Oral, Daily PRN    traZODone (DESYREL) tablet 50 mg, Oral, HS PRN    Risks/Benefits of Treatment:     Risks, benefits, and possible side effects of medications explained to patient and patient verbalizes understanding and agreement for treatment.    Progress Toward Goals: unchanged    Treatment Planning:      - Encourage early mobility and having a structured day  - Provide frequent re-orientation, and cognitive stimulation  - Ensure assistive devices are in proper working order (eye-glasses, hearing aids)  - Encourage adequate hydration, nutrition and monitor bowel movements  - Maintain sleep-wake cycle: Uninterrupted sleep time; low-level lighting at night  - Fall precaution  - Follow up with [facility] regarding the medical problems   - Continue medication titration and treatment plan; adjust medication to optimize treatment response and as clinically indicated.   - Observation: N/A  - VS: as per unit protocol  - Encourage group attendance and milieu therapy  - Dispo: To be determined  - Estimated Discharge Day: 5/29/2025 9 days (5/26/2025)  - Legal Status : On 303 commitment.  - Long Stay Certification : Not Applicable    Subjective     Freddie was seen today for psychiatric follow-up. Patient is visible on the unit, social with select peers. Patient is  disorganized, hyper verbal and bizarre during interview today. Patient is perseverative on Invega. He believes its not working for him. His personal hygiene is marginal. He can be agitated at times. He denied any SI/HI, appears internally preoccupied.    Sleep: slept off and on  Appetite: normal  Medication side effects: No  ROS: review of systems as noted above in HPI/Subjective report, all other systems are negative    Objective :  Temp:  [97.5 °F (36.4 °C)-98.4 °F (36.9 °C)] 97.5 °F (36.4 °C)  HR:  [] 80  BP: (115-147)/(80-92) 147/92  Resp:  [18] 18  SpO2:  [95 %] 95 %  O2 Device: None (Room air)    Mental Status Evaluation:    Appearance:  Marginal hygiene, age appropriate   Behavior:  bizarre   Speech:  pressured, hypertalkative   Mood:  dysphoric, anxious   Affect:  labile   Thought Process:  tangential   Thought Content:  paranoid ideation   Perceptual Disturbances: Appears internally preoccupied   Risk Potential: Suicidal Ideation -  None at present  Homicidal Ideation -  None at present  Potential for Aggression - No   Sensorium:  oriented to person and place   Memory:  recent and remote memory: unable to assess due to lack of cooperation   Consciousness:  alert and awake   Attention/Concentration: attention span and concentration appear shorter than expected for age   Insight:  poor   Judgment: poor   Gait/Station: normal gait/station   Motor Activity: no abnormal movements     Cognitive Assessment : N/A  Pain : denied  Pain Scale : 0      Lab Results: I have reviewed the following results:  Most Recent Labs:   Lab Results   Component Value Date    WBC 5.97 05/06/2025    RBC 4.72 05/06/2025    HGB 13.6 05/06/2025    HCT 41.3 05/06/2025     05/06/2025    RDW 13.1 05/06/2025    NEUTROABS 4.33 05/06/2025    SODIUM 137 05/06/2025    K 3.5 05/06/2025     05/06/2025    CO2 23 05/06/2025    BUN 15 05/06/2025    CREATININE 0.83 05/06/2025    GLUC 111 05/06/2025    CALCIUM 9.3 05/06/2025    AST  "30 05/06/2025    ALT 31 05/06/2025    ALKPHOS 44 05/06/2025    TP 6.7 05/06/2025    ALB 4.3 05/06/2025    TBILI 0.81 05/06/2025    CHOLESTEROL 155 05/12/2025    HDL 47 05/12/2025    TRIG 69 05/12/2025    LDLCALC 94 05/12/2025    NONHDLC 108 05/12/2025    VALPROICTOT <10 (L) 02/17/2024    LITHIUM 0.46 (L) 05/16/2025    AMMONIA 51 02/06/2021    [id number] 1.939 05/06/2025    FREET4 0.97 02/17/2024    TREPONEMAPA Non-reactive 04/07/2025    HGBA1C 5.7 (H) 04/07/2025     04/07/2025       Administrative Statements     Counseling / Coordination of Care:   Patient's progress discussed with staff in treatment team meeting.  Medication changes reviewed with staff in treatment team meeting.    Portions of the record may have been created with voice recognition software. Occasional wrong word or \"sound a like\" substitutions may have occurred due to the inherent limitations of voice recognition software. Read the chart carefully and recognize, using context, where substitutions have occurred.    CARLOS Farnsworth 05/17/25  "

## 2025-05-17 NOTE — PROGRESS NOTES
Progress Note - Behavioral Health   Name: Freddie Graham 65 y.o. male I MRN: [id number]  Unit/Bed#: -02 I Date of Admission: 5/8/2025   Date of Service: 5/17/2025 I Hospital Day: 9    Assessment & Plan  Acute exacerbation of chronic schizoaffective schizophrenia (HCC)  65-year-old male known to this service with history of chronic mental illness and diagnosis of schizoaffective disorder.  Returns to the hospital in the context of being noncompliant and having exacerbated symptoms of mood lability, disorganized behaviors and illogical thinking.  We will restart patient on medications he was stabilized on in the past-  Invega 9 mg daily   Lithium 300 mg twice daily. Li level 5/16        Current Medications:    Current Facility-Administered Medications:     atorvastatin (LIPITOR) tablet 10 mg, Oral, Daily With Dinner    Cholecalciferol (VITAMIN D3) tablet 1,000 Units, Oral, Daily    cyanocobalamin (VITAMIN B-12) tablet 500 mcg, Oral, Daily    Diclofenac Sodium (VOLTAREN) 1 % topical gel 2 g, Topical, 4x Daily    lisinopril (ZESTRIL) tablet 10 mg, Oral, Daily    lithium carbonate capsule 300 mg, Oral, BID    paliperidone (INVEGA) 24 hr tablet 9 mg, Oral, Daily    tamsulosin (FLOMAX) capsule 0.4 mg, Oral, Daily With Dinner    Current Facility-Administered Medications:     acetaminophen (TYLENOL) tablet 650 mg, Oral, Q6H PRN    aluminum-magnesium hydroxide-simethicone (MAALOX) oral suspension 30 mL, Oral, Q4H PRN    benztropine (COGENTIN) injection 1 mg, Intramuscular, Q4H PRN Max 6/day    benztropine (COGENTIN) tablet 0.5 mg, Oral, Q4H PRN Max 6/day    Diclofenac Sodium (VOLTAREN) 1 % topical gel 2 g, Topical, 4x Daily PRN    hydrOXYzine HCL (ATARAX) tablet 25 mg, Oral, Q6H PRN Max 4/day    ibuprofen (MOTRIN) tablet 400 mg, Oral, Q6H PRN    ibuprofen (MOTRIN) tablet 600 mg, Oral, Q6H PRN    LORazepam (ATIVAN) injection 1 mg, Intramuscular, Q6H PRN Max 3/day    LORazepam (ATIVAN) tablet 0.5 mg, Oral, Q6H PRN Max  4/day    LORazepam (ATIVAN) tablet 1 mg, Oral, Q6H PRN Max 3/day    nicotine polacrilex (NICORETTE) gum 4 mg, Oral, Q4H PRN    OLANZapine (ZyPREXA) IM injection 5 mg, Intramuscular, Q3H PRN Max 3/day    OLANZapine (ZyPREXA) tablet 2.5 mg, Oral, Q4H PRN Max 6/day    OLANZapine (ZyPREXA) tablet 5 mg, Oral, Q4H PRN Max 3/day    OLANZapine (ZyPREXA) tablet 5 mg, Oral, Q3H PRN Max 3/day    phenazopyridine (PYRIDIUM) tablet 200 mg, Oral, TID PRN    polyethylene glycol (MIRALAX) packet 17 g, Oral, Daily PRN    propranolol (INDERAL) tablet 5 mg, Oral, Q8H PRN    senna-docusate sodium (SENOKOT S) 8.6-50 mg per tablet 1 tablet, Oral, Daily PRN    traZODone (DESYREL) tablet 50 mg, Oral, HS PRN    Risks/Benefits of Treatment:     Risks, benefits, and possible side effects of medications explained to patient. Patient has limited understanding of risks and benefits of treatment at this time, but agrees to take medications as prescribed.    Progress Toward Goals: poor insight, poor reality testing, poor motivation    Treatment Planning:     All current active medications have been reviewed.  Continue to monitor response to treatment and assess for potential side effects of medications.  Encourage group therapy, milieu therapy and occupational therapy.  Collaboration with medical service for medical comorbidities as indicated.  Behavioral Health checks for safety monitoring.  Estimated Discharge Day: 5/29/2025 30 days (6/16/2025)  Legal Status: Status of Admission  Status of Admission: 201  Release of Information Signed: No.  Long Stay Certification : Not Applicable    Subjective     Behavior over the last 24 hours: unchanged    Freddie appears bizarre today, continues to feel disorganized, Pt visible on unit. Social with peers. Personal hygiene is good. Compliant with medications. Mood is less labile. Less disorganized. Speech is clearer and easier to understand. Actively engaged in unit activities. Utilizing ativan prn for anxiety  which is effective at decreasing anxiety.     Sleep: normal  Appetite: normal  Medication side effects: No  ROS: review of systems as noted above in HPI/Subjective report, all other systems are negative    Objective :  Temp:  [98.1 °F (36.7 °C)-98.4 °F (36.9 °C)] 98.4 °F (36.9 °C)  HR:  [107-112] 112  BP: (115-141)/(80-86) 115/80  Resp:  [18-19] 18  SpO2:  [93 %-95 %] 95 %  O2 Device: None (Room air)    Mental Status Evaluation:    Appearance:  disheveled   Behavior:  bizarre, demanding, restless and fidgety   Speech:  pressured, tangential   Mood:  dysphoric   Affect:  constricted   Thought Process:  illogical, circumstantial, tangential   Thought Content:  paranoid ideation   Perceptual Disturbances: appears responding to internal stimuli, appears preoccupied, talks to self at times   Risk Potential: Suicidal Ideation -  None at present  Homicidal Ideation -  angry, hostile feelings with no homicidal plan  Potential for Aggression - Yes, due to acute psychosis   Sensorium:  grossly oriented   Memory:  recent and remote memory: unable to assess due to lack of cooperation   Consciousness:  awake   Attention/Concentration: attention span and concentration appear shorter than expected for age   Insight:  impaired due to psychosis   Judgment: impaired due to psychosis   Gait/Station: slow gait   Motor Activity: no abnormal movements       Lab Results: I have reviewed the following results:  Most Recent Labs:   Lab Results   Component Value Date    WBC 5.97 05/06/2025    RBC 4.72 05/06/2025    HGB 13.6 05/06/2025    HCT 41.3 05/06/2025     05/06/2025    RDW 13.1 05/06/2025    NEUTROABS 4.33 05/06/2025    SODIUM 137 05/06/2025    K 3.5 05/06/2025     05/06/2025    CO2 23 05/06/2025    BUN 15 05/06/2025    CREATININE 0.83 05/06/2025    GLUC 111 05/06/2025    CALCIUM 9.3 05/06/2025    AST 30 05/06/2025    ALT 31 05/06/2025    ALKPHOS 44 05/06/2025    TP 6.7 05/06/2025    ALB 4.3 05/06/2025    TBILI 0.81  05/06/2025    CHOLESTEROL 155 05/12/2025    HDL 47 05/12/2025    TRIG 69 05/12/2025    LDLCALC 94 05/12/2025    NONHDLC 108 05/12/2025    VALPROICTOT <10 (L) 02/17/2024    LITHIUM 0.46 (L) 05/16/2025    AMMONIA 51 02/06/2021    [id number] 1.939 05/06/2025    FREET4 0.97 02/17/2024    TREPONEMAPA Non-reactive 04/07/2025    HGBA1C 5.7 (H) 04/07/2025     04/07/2025     Admission Labs:   Admission on 05/08/2025   Component Date Value    Vitamin B-12 05/12/2025 290     Folate 05/12/2025 12.5     Vit D, 25-Hydroxy 05/12/2025 30.0     Cholesterol 05/12/2025 155     Triglycerides 05/12/2025 69     HDL, Direct 05/12/2025 47     LDL Calculated 05/12/2025 94     Non-HDL-Chol (CHOL-HDL) 05/12/2025 108     Lithium Lvl 05/16/2025 0.46 (L)      Lipid Profile:   Lab Results   Component Value Date    CHOLESTEROL 155 05/12/2025    HDL 47 05/12/2025    TRIG 69 05/12/2025    LDLCALC 94 05/12/2025    NONHDLC 108 05/12/2025     Liver Enzymes:   Lab Results   Component Value Date    AST 30 05/06/2025    ALT 31 05/06/2025    ALKPHOS 44 05/06/2025    TP 6.7 05/06/2025    ALB 4.3 05/06/2025    TBILI 0.81 05/06/2025     Thyroid Studies:   Lab Results   Component Value Date    [id number] 1.939 05/06/2025    FREET4 0.97 02/17/2024     Hemoglobin A1C/EST AVG Glucose   Lab Results   Component Value Date    HGBA1C 5.7 (H) 04/07/2025     04/07/2025     Lithium:   Lab Results   Component Value Date    LITHIUM 0.46 (L) 05/16/2025     ECG:   Lab Results   Component Value Date    VENTRATE 84 05/06/2025    ATRIALRATE 84 05/06/2025    PRINT 148 05/06/2025    QRSDINT 92 05/06/2025    QTINT 406 05/06/2025    PAXIS 67 05/06/2025    QRSAXIS -46 05/06/2025    TWAVEAXIS 70 05/06/2025     Imaging Studies: CT spine lumbar without contrast  Result Date: 5/7/2025  Narrative: CT LUMBAR SPINE WITHOUT CONTRAST INDICATION:   low back pain. COMPARISON: None. TECHNIQUE:  Contiguous axial images through the lumbar spine were obtained. Sagittal and  coronal reconstructions were performed. IV Contrast: Radiation dose length product (DLP) for this visit:  720 mGy-cm. .  This examination, like all CT scans performed in the [facility], was performed utilizing techniques to minimize radiation dose exposure, including the use of iterative reconstruction and automated exposure control. IMAGE QUALITY:  Diagnostic. FINDINGS: ALIGNMENT:  There are 5 lumbar type vertebral bodies.  Normal alignment of the lumbar spine.  No spondylolysis or spondylolisthesis. VERTEBRAE: Prominent L5 superior endplate Schmorl's node. No acute displaced fracture. Mild compression deformities T12 and L1. No lytic or blastic lesion. DEGENERATIVE CHANGES: Lower Thoracic spine: Normal lower thoracic disc spaces. Lumbar Spine: L1-2: No canal stenosis. Bilateral facet arthropathy causing mild neuroforaminal stenosis. L2-3: Circumferential disc bulge without canal stenosis. Bilateral facet arthropathy without significant neuroforaminal narrowing. L3-4: Circumferential disc bulge with mild ligamentum flavum hypertrophy and facet arthropathy causing mild canal stenosis. No significant neuroforaminal stenosis. L4-5: Circumferential disc bulge, ligamentum flavum hypertrophy and facet arthropathy causing mild to moderate canal stenosis. Severe left and moderate right neuroforaminal narrowing. L5-S1: Circumferential disc bulge and facet arthropathy without spinal stenosis. Severe left and moderate right neuroforaminal narrowing. PARASPINAL SOFT TISSUES:  Normal. OTHER: None.     Impression: No acute displaced fracture or traumatic malalignment. Multilevel degenerative disc and facet changes causing varying degrees of spinal and neuroforaminal narrowing. Up to moderate spinal stenosis at L4-5. Up to severe left L4-5 and L5-S1 neuroforaminal stenosis. Resident: Denis Garcia I, the attending radiologist, have reviewed the images and agree with the final report above. Workstation performed:  "[id number]     Administrative Statements     Counseling / Coordination of Care:   Patient's progress discussed with staff in treatment team meeting.  Medication changes reviewed with staff in treatment team meeting.    Portions of the record may have been created with voice recognition software. Occasional wrong word or \"sound a like\" substitutions may have occurred due to the inherent limitations of voice recognition software. Read the chart carefully and recognize, using context, where substitutions have occurred.    Ute Rothman Said, MD 05/16/25  "

## 2025-05-17 NOTE — ASSESSMENT & PLAN NOTE
65-year-old male known to this service with history of chronic mental illness and diagnosis of schizoaffective disorder.  Returns to the hospital in the context of being noncompliant and having exacerbated symptoms of mood lability, disorganized behaviors and illogical thinking.  We will restart patient on medications he was stabilized on in the past-  Invega 9 mg daily   Lithium 300 mg twice daily. Li level 5/16  Cogentin 1 mg bid started on 5/17/2025

## 2025-05-17 NOTE — PROGRESS NOTES
"Progress Note - Freddie Graham 65 y.o. male MRN: [id number]    Unit/Bed#: [facility] 245-02 Encounter: [id number]        Subjective:   Patient seen and examined at bedside after reviewing the chart and discussing the case with the caring staff.      Patient examined at bedside.  Patient complaining of a red, flaky facial rash which he noticed this morning.  Denies any other acute symptoms.     Physical Exam   Vitals: Blood pressure 147/92, pulse 80, temperature 97.5 °F (36.4 °C), temperature source Temporal, resp. rate 18, height 5' 9\" (1.753 m), weight 88.7 kg (195 lb 9.6 oz), SpO2 95%.,Body mass index is 28.89 kg/m².  Constitutional: Patient in no acute distress.  HEENT: PERR, EOMI, MMM.  Cardiovascular: Normal rate and regular rhythm.    Pulmonary/Chest: Effort normal and breath sounds normal.   Abdomen: Soft, + BS, NT.    Assessment/Plan:  Freddie Graham is a(n) 65 y.o. male with schizoaffective disorder.      Cardiac w/ HTN, HLD.  Not on BP medication.  Restart lisinopril 10 mg daily 5/9.  Continue atorvastatin 10 mg daily.   LISET.  Patient had recent Zio patch with cardiology.  Pauses found, no afib, thought to be related to LISET.  Recommended sleep study outpt.   Urothelial carcinoma/BPH.  Patient follows with urology outpt -due for follow up.  Continue Flomax 0.4 mg daily and pyridium 200 mg TID as needed for bladder spasms.   Chronic back pain/DJD.  CT spine 5/7 showing \"No acute displaced fracture or traumatic malalignment.  Multilevel degenerative disc and facet changes causing varying degrees of spinal and neuroforaminal narrowing. Up to moderate spinal stenosis at L4-5. Up to severe left L4-5 and L5-S1 neuroforaminal stenosis.\"  Tylenol/motrin as needed.  Voltaren gel 4x daily as needed.  Tobacco abuse.  Declines NRT.   Vitamin D deficiency.  Continue daily Vit D supplement.   Vitamin B12 deficiency.  Continue daily vitamin B12 supplement.  Seborrheic dermatitis.  Involving face.  Apply clotrimazole and hydrocortisone " twice daily 5/17.     The patient was discussed with Dr. Gallagher and he is in agreement with the above note.

## 2025-05-17 NOTE — NURSING NOTE
Patient had disturbed sleep overnight. Awake several times and had early morning awakening . Maintained on q 15 minute safety checks. Complained of rash on right beard line/cheek. No acute behaviors this shift, however, was heard talking to self. Currently, appears sleeping. Fluids at bedside to promote hydration.

## 2025-05-18 PROCEDURE — 99232 SBSQ HOSP IP/OBS MODERATE 35: CPT

## 2025-05-18 RX ORDER — TRIHEXYPHENIDYL HYDROCHLORIDE 2 MG/1
2 TABLET ORAL 2 TIMES DAILY
Status: DISCONTINUED | OUTPATIENT
Start: 2025-05-18 | End: 2025-06-04 | Stop reason: HOSPADM

## 2025-05-18 RX ORDER — TRIHEXYPHENIDYL HYDROCHLORIDE 2 MG/1
2 TABLET ORAL
Status: DISCONTINUED | OUTPATIENT
Start: 2025-05-18 | End: 2025-05-18

## 2025-05-18 RX ADMIN — LISINOPRIL 10 MG: 10 TABLET ORAL at 08:50

## 2025-05-18 RX ADMIN — TAMSULOSIN HYDROCHLORIDE 0.4 MG: 0.4 CAPSULE ORAL at 18:00

## 2025-05-18 RX ADMIN — LITHIUM CARBONATE 300 MG: 300 CAPSULE, GELATIN COATED ORAL at 08:50

## 2025-05-18 RX ADMIN — CHOLECALCIFEROL TAB 25 MCG (1000 UNIT) 1000 UNITS: 25 TAB at 08:50

## 2025-05-18 RX ADMIN — CYANOCOBALAMIN TAB 500 MCG 500 MCG: 500 TAB at 08:50

## 2025-05-18 RX ADMIN — TRIHEXYPHENIDYL HYDROCHLORIDE 2 MG: 2 TABLET ORAL at 18:37

## 2025-05-18 RX ADMIN — PALIPERIDONE 9 MG: 6 TABLET, EXTENDED RELEASE ORAL at 08:49

## 2025-05-18 RX ADMIN — ATORVASTATIN CALCIUM 10 MG: 10 TABLET, FILM COATED ORAL at 18:00

## 2025-05-18 RX ADMIN — TRIHEXYPHENIDYL HYDROCHLORIDE 2 MG: 2 TABLET ORAL at 08:50

## 2025-05-18 RX ADMIN — LITHIUM CARBONATE 300 MG: 300 CAPSULE, GELATIN COATED ORAL at 18:36

## 2025-05-18 NOTE — PLAN OF CARE
Problem: Alteration in Thoughts and Perception  Goal: Verbalize thoughts and feelings  Description: Interventions:- Promote a nonjudgmental and trusting relationship with the patient through active listening and therapeutic communication- Assess patient's level of functioning, behavior and potential for risk- Engage patient in 1 on 1 interactions- Encourage patient to express fears, feelings, frustrations, and discuss symptoms  - Mascot patient to reality, help patient recognize reality-based thinking - Administer medications as ordered and assess for potential side effects- Provide the patient education related to the signs and symptoms of the illness and desired effects of prescribed medications  Outcome: Progressing  Goal: Agree to be compliant with medication regime, as prescribed and report medication side effects  Description: Interventions:- Offer appropriate PRN medication and supervise ingestion; conduct AIMS, as needed   Outcome: Progressing

## 2025-05-18 NOTE — PROGRESS NOTES
Progress Note - Behavioral Health   Name: Freddie Graham 65 y.o. male I MRN: [id number]  Unit/Bed#: -02 I Date of Admission: 5/8/2025   Date of Service: 5/18/2025 I Hospital Day: 10    Assessment & Plan  Acute exacerbation of chronic schizoaffective schizophrenia (HCC)  65-year-old male known to this service with history of chronic mental illness and diagnosis of schizoaffective disorder.  Returns to the hospital in the context of being noncompliant and having exacerbated symptoms of mood lability, disorganized behaviors and illogical thinking.  We will restart patient on medications he was stabilized on in the past-  Invega 9 mg daily   Lithium 300 mg twice daily. Li level 5/16  Cogentin switched to Artane 2 mg twice daily on 5/18/2025 due to patient's complaints of having some chest tightness        Current Medications:    Current Facility-Administered Medications:     atorvastatin (LIPITOR) tablet 10 mg, Oral, Daily With Dinner    Cholecalciferol (VITAMIN D3) tablet 1,000 Units, Oral, Daily    hydrocortisone 2.5 % cream, Topical, BID **AND** clotrimazole (LOTRIMIN) 1 % cream, Topical, BID    cyanocobalamin (VITAMIN B-12) tablet 500 mcg, Oral, Daily    lisinopril (ZESTRIL) tablet 10 mg, Oral, Daily    lithium carbonate capsule 300 mg, Oral, BID    paliperidone (INVEGA) 24 hr tablet 9 mg, Oral, Daily    tamsulosin (FLOMAX) capsule 0.4 mg, Oral, Daily With Dinner    trihexyphenidyl (ARTANE) tablet 2 mg, Oral, BID    Current Facility-Administered Medications:     acetaminophen (TYLENOL) tablet 650 mg, Oral, Q6H PRN    aluminum-magnesium hydroxide-simethicone (MAALOX) oral suspension 30 mL, Oral, Q4H PRN    benztropine (COGENTIN) injection 1 mg, Intramuscular, Q4H PRN Max 6/day    benztropine (COGENTIN) tablet 0.5 mg, Oral, Q4H PRN Max 6/day    Diclofenac Sodium (VOLTAREN) 1 % topical gel 2 g, Topical, 4x Daily PRN    hydrOXYzine HCL (ATARAX) tablet 25 mg, Oral, Q6H PRN Max 4/day    ibuprofen (MOTRIN) tablet 400  mg, Oral, Q6H PRN    ibuprofen (MOTRIN) tablet 600 mg, Oral, Q6H PRN    LORazepam (ATIVAN) injection 1 mg, Intramuscular, Q6H PRN Max 3/day    LORazepam (ATIVAN) tablet 0.5 mg, Oral, Q6H PRN Max 4/day    LORazepam (ATIVAN) tablet 1 mg, Oral, Q6H PRN Max 3/day    nicotine polacrilex (NICORETTE) gum 4 mg, Oral, Q4H PRN    OLANZapine (ZyPREXA) IM injection 5 mg, Intramuscular, Q3H PRN Max 3/day    OLANZapine (ZyPREXA) tablet 2.5 mg, Oral, Q4H PRN Max 6/day    OLANZapine (ZyPREXA) tablet 5 mg, Oral, Q4H PRN Max 3/day    OLANZapine (ZyPREXA) tablet 5 mg, Oral, Q3H PRN Max 3/day    phenazopyridine (PYRIDIUM) tablet 200 mg, Oral, TID PRN    polyethylene glycol (MIRALAX) packet 17 g, Oral, Daily PRN    propranolol (INDERAL) tablet 5 mg, Oral, Q8H PRN    senna-docusate sodium (SENOKOT S) 8.6-50 mg per tablet 1 tablet, Oral, Daily PRN    traZODone (DESYREL) tablet 50 mg, Oral, HS PRN    Risks/Benefits of Treatment:     Risks, benefits, and possible side effects of medications explained to patient and patient verbalizes understanding and agreement for treatment.    Treatment Planning:     All current active medications have been reviewed.  Continue to monitor response to treatment and assess for potential side effects of medications.  Encourage group therapy, milieu therapy and occupational therapy.  Collaboration with medical service for medical comorbidities as indicated.  Behavioral Health checks for safety monitoring.  Estimated Discharge Day: 5/29/2025 5 days (5/23/2025)  Legal Status : On 303 commitment.  Long Stay Certification : Not Applicable    Subjective     Behavior over the last 24 hours: ermias Frazier was seen today for psychiatric follow-up.  Patient is calm, cooperative.  He is visible on the unit social with peers.  Patient is less bizarre, labile and less disorganized and thought during interview today.  He is perseverative on his medication regimen. According to nursing staff report he is compliant with  his current medication regimen.  He denied any SI/HI/AVH when asked, however patient appears internally preoccupied.    Sleep: normal  Appetite: normal  Medication side effects: No  ROS: review of systems as noted above in HPI/Subjective report, all other systems are negative    Objective :  Temp:  [97.5 °F (36.4 °C)-98.2 °F (36.8 °C)] 97.5 °F (36.4 °C)  HR:  [] 98  BP: (108-144)/(66-92) 144/92  Resp:  [18] 18  SpO2:  [96 %] 96 %  O2 Device: None (Room air)    Mental Status Evaluation:    Appearance:  Age appropriate, disheveled   Behavior:  Less bizarre, cooperative   Speech:  normal rate and volume   Mood:  Less labile   Affect:  mood-congruent   Thought Process:  illogical, circumstantial   Thought Content:  paranoid ideation   Perceptual Disturbances: Denied AVH, however appears internally preoccupied   Risk Potential: Suicidal Ideation -  None at present  Homicidal Ideation -  None at present  Potential for Aggression - No   Sensorium:  oriented to person, place, and time/date   Memory:  recent and remote memory grossly intact   Consciousness:  alert and awake   Attention/Concentration: attention span and concentration appear shorter than expected for age   Insight:  limited   Judgment: limited   Gait/Station: normal gait/station   Motor Activity: no abnormal movements       Lab Results: I have reviewed the following results:  Most Recent Labs:   Lab Results   Component Value Date    WBC 5.97 05/06/2025    RBC 4.72 05/06/2025    HGB 13.6 05/06/2025    HCT 41.3 05/06/2025     05/06/2025    RDW 13.1 05/06/2025    NEUTROABS 4.33 05/06/2025    SODIUM 137 05/06/2025    K 3.5 05/06/2025     05/06/2025    CO2 23 05/06/2025    BUN 15 05/06/2025    CREATININE 0.83 05/06/2025    GLUC 111 05/06/2025    CALCIUM 9.3 05/06/2025    AST 30 05/06/2025    ALT 31 05/06/2025    ALKPHOS 44 05/06/2025    TP 6.7 05/06/2025    ALB 4.3 05/06/2025    TBILI 0.81 05/06/2025    CHOLESTEROL 155 05/12/2025    HDL 47  "05/12/2025    TRIG 69 05/12/2025    LDLCALC 94 05/12/2025    NONHDLC 108 05/12/2025    VALPROICTOT <10 (L) 02/17/2024    LITHIUM 0.46 (L) 05/16/2025    AMMONIA 51 02/06/2021    LUQ5AAOYLNQB 1.939 05/06/2025    FREET4 0.97 02/17/2024    TREPONEMAPA Non-reactive 04/07/2025    HGBA1C 5.7 (H) 04/07/2025     04/07/2025       Administrative Statements     Counseling / Coordination of Care:   Patient's progress discussed with staff in treatment team meeting.  Medication changes reviewed with staff in treatment team meeting.    Portions of the record may have been created with voice recognition software. Occasional wrong word or \"sound a like\" substitutions may have occurred due to the inherent limitations of voice recognition software. Read the chart carefully and recognize, using context, where substitutions have occurred.    CARLOS Farnsworth 05/18/25  "

## 2025-05-18 NOTE — ASSESSMENT & PLAN NOTE
65-year-old male known to this service with history of chronic mental illness and diagnosis of schizoaffective disorder.  Returns to the hospital in the context of being noncompliant and having exacerbated symptoms of mood lability, disorganized behaviors and illogical thinking.  We will restart patient on medications he was stabilized on in the past-  Invega 9 mg daily   Lithium 300 mg twice daily. Li level 5/16  Cogentin switched to Artane 2 mg twice daily on 5/18/2025 due to patient's complaints of having some chest tightness

## 2025-05-18 NOTE — PROGRESS NOTES
"Progress Note - Freddie Graham 65 y.o. male MRN: [id number]    Unit/Bed#: [facility] 245-02 Encounter: [id number]        Subjective:   Patient seen and examined at bedside after reviewing the chart and discussing the case with the caring staff.      Patient examined at bedside.  Patient denies any acute symptoms.     Physical Exam   Vitals: Blood pressure 144/92, pulse 98, temperature 97.5 °F (36.4 °C), temperature source Temporal, resp. rate 18, height 5' 9\" (1.753 m), weight 89.3 kg (196 lb 12.8 oz), SpO2 96%.,Body mass index is 29.06 kg/m².  Constitutional: Patient in no acute distress.  HEENT: PERR, EOMI, MMM.  Cardiovascular: Normal rate and regular rhythm.    Pulmonary/Chest: Effort normal and breath sounds normal.   Abdomen: Soft, + BS, NT.    Assessment/Plan:  Freddie Graham is a(n) 65 y.o. male with schizoaffective disorder.      Cardiac w/ HTN, HLD.  Not on BP medication.  Restart lisinopril 10 mg daily 5/9.  Continue atorvastatin 10 mg daily.   LISET.  Patient had recent Zio patch with cardiology.  Pauses found, no afib, thought to be related to LISET.  Recommended sleep study outpt.   Urothelial carcinoma/BPH.  Patient follows with urology outpt -due for follow up.  Continue Flomax 0.4 mg daily and pyridium 200 mg TID as needed for bladder spasms.   Chronic back pain/DJD.  CT spine 5/7 showing \"No acute displaced fracture or traumatic malalignment.  Multilevel degenerative disc and facet changes causing varying degrees of spinal and neuroforaminal narrowing. Up to moderate spinal stenosis at L4-5. Up to severe left L4-5 and L5-S1 neuroforaminal stenosis.\"  Tylenol/motrin as needed.  Voltaren gel 4x daily as needed.  Tobacco abuse.  Declines NRT.   Vitamin D deficiency.  Continue daily Vit D supplement.   Vitamin B12 deficiency.  Continue daily vitamin B12 supplement.  Seborrheic dermatitis.  Involving face.  Apply clotrimazole and hydrocortisone twice daily 5/17.     The patient was discussed with Dr. Gallagher and he is in " agreement with the above note.

## 2025-05-18 NOTE — NURSING NOTE
Pt visible on unit. Social with peers. Personal hygiene is good. Compliant with medications. Mood is less labile. Less disorganized. Speech is clearer and easier to understand. Actively engaged in unit activities. Artane is effective in decreasing drooling. Pt has no side effect complaints and verbalizes improvement with symptoms than when taking cogentin. Safety precautions maintained. Will continue to monitor and assess.

## 2025-05-18 NOTE — NURSING NOTE
"Patient visible and social with staff and peers. Less loud. Pressured, and mumbles at times. Patient denies any SI/HI, AV/H, anxiety or depression. Attended PM snack. C/O chest discomfort that wrapped around toward his back. Stated \" I did some stretches and am feeling better\". Administered Ativan 1 mg PO @ 1952 to help counteract the cogentin r/t to chest tightness. HS medication compliant. Q 15 minute monitoring maintained.   "

## 2025-05-19 PROCEDURE — 99232 SBSQ HOSP IP/OBS MODERATE 35: CPT | Performed by: PHYSICIAN ASSISTANT

## 2025-05-19 RX ADMIN — LITHIUM CARBONATE 300 MG: 300 CAPSULE, GELATIN COATED ORAL at 08:18

## 2025-05-19 RX ADMIN — CYANOCOBALAMIN TAB 500 MCG 500 MCG: 500 TAB at 08:18

## 2025-05-19 RX ADMIN — PALIPERIDONE 9 MG: 6 TABLET, EXTENDED RELEASE ORAL at 08:18

## 2025-05-19 RX ADMIN — CHOLECALCIFEROL TAB 25 MCG (1000 UNIT) 1000 UNITS: 25 TAB at 08:18

## 2025-05-19 RX ADMIN — LITHIUM CARBONATE 450 MG: 300 CAPSULE, GELATIN COATED ORAL at 17:20

## 2025-05-19 RX ADMIN — LORAZEPAM 1 MG: 1 TABLET ORAL at 21:44

## 2025-05-19 RX ADMIN — TRIHEXYPHENIDYL HYDROCHLORIDE 2 MG: 2 TABLET ORAL at 17:20

## 2025-05-19 RX ADMIN — LISINOPRIL 10 MG: 10 TABLET ORAL at 08:18

## 2025-05-19 RX ADMIN — TAMSULOSIN HYDROCHLORIDE 0.4 MG: 0.4 CAPSULE ORAL at 17:20

## 2025-05-19 RX ADMIN — ATORVASTATIN CALCIUM 10 MG: 10 TABLET, FILM COATED ORAL at 17:20

## 2025-05-19 RX ADMIN — TRIHEXYPHENIDYL HYDROCHLORIDE 2 MG: 2 TABLET ORAL at 08:18

## 2025-05-19 NOTE — PROGRESS NOTES
05/19/25    Team Meeting   Meeting Type Daily Rounds   Team Members Present   Team Members Present Physician;Nurse;;;Occupational Therapist   Physician Team Member , MD Asher; MD Oz; Panchito GONZALEZ   Nursing Team Member ADRIEN Zurita   Care Management Team Member MS Franko   Social Work Team Member MANA Ricks   OT Team Member NAYANA Ruff   Patient/Family Present   Patient Present No   Patient's Family Present No   303. Sleeping was an issue last night. Eating well. Med compliant. Pressured speech and rambling.   Poured water on his head this morning. Denies all. Attending groups. Med adjustment. D/C 5-29.

## 2025-05-19 NOTE — NURSING NOTE
Patient was pleasant and cooperative. Patient hyperverbal and pressured. Staff support provided. Q 15 minute safety checks maintained. Groups encouraged. Patient compliant with medications and groups. Patient remains loud but pleasant. 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 Staff will continue to monitor and support.

## 2025-05-19 NOTE — PROGRESS NOTES
Progress Note - Behavioral Health   Name: Freddie Graham 65 y.o. male I MRN: [id number]  Unit/Bed#: -02 I Date of Admission: 5/8/2025   Date of Service: 5/19/2025 I Hospital Day: 11    Assessment & Plan  Acute exacerbation of chronic schizoaffective schizophrenia (HCC)  65-year-old male known to this service with history of chronic mental illness and diagnosis of schizoaffective disorder.  Returns to the hospital in the context of being noncompliant and having exacerbated symptoms of mood lability, disorganized behaviors and illogical thinking.  Continue Invega 9 mg daily.  Lithium increased to 450 mg twice daily on 5/19/2025.      Cogentin switched to Artane 2 mg twice daily on 5/18/2025 due to patient's complaints of having some chest tightness    4/25/2025 psychiatric admission discharge medications consisted of lithium 450 mg a.m. and 600 mg p.m., loxapine 75 mg twice daily, gabapentin 300 mg 3 times daily, trazodone 150 mg daily and Cogentin.    Current Medications:    Current Facility-Administered Medications:     atorvastatin (LIPITOR) tablet 10 mg, Oral, Daily With Dinner    Cholecalciferol (VITAMIN D3) tablet 1,000 Units, Oral, Daily    hydrocortisone 2.5 % cream, Topical, BID **AND** clotrimazole (LOTRIMIN) 1 % cream, Topical, BID    cyanocobalamin (VITAMIN B-12) tablet 500 mcg, Oral, Daily    lisinopril (ZESTRIL) tablet 10 mg, Oral, Daily    lithium carbonate capsule 450 mg, Oral, BID    paliperidone (INVEGA) 24 hr tablet 9 mg, Oral, Daily    tamsulosin (FLOMAX) capsule 0.4 mg, Oral, Daily With Dinner    trihexyphenidyl (ARTANE) tablet 2 mg, Oral, BID      Risks/Benefits of Treatment:     Risks, benefits, and possible side effects of medications explained to patient. Patient has limited understanding of risks and benefits of treatment at this time, but agrees to take medications as prescribed.      Treatment Planning:      - Encourage early mobility and having a structured day  - Provide frequent  re-orientation, and cognitive stimulation  - Ensure assistive devices are in proper working order (eye-glasses, hearing aids)  - Encourage adequate hydration, nutrition and monitor bowel movements  - Maintain sleep-wake cycle: Uninterrupted sleep time; low-level lighting at night  - Fall precaution  - Follow up with [facility] regarding the medical problems   - Continue medication titration and treatment plan; adjust medication to optimize treatment response and as clinically indicated.   - Observation: Routine  - VS: as per unit protocol  - Encourage group attendance and milieu therapy  - Dispo: To be determined  - Estimated Discharge Day: 5/29/2025   - Legal Status : On 303 commitment.  -     Subjective     Freddie K Day seen today for psychiatric follow-up.  Chart reviewed and patient presented at team meeting this morning.  Patient seems less disorganized, labile and bizarre, however remains hypertalkative with pressured speech.  No acute behaviors reported.  Remains social with peers.  Compliant with medications.  Denies SI and contracts for safety on unit.  Sleep: normal  Appetite: normal  Medication side effects: No  ROS: review of systems as noted above in HPI/Subjective report, all other systems are negative    Objective :  Temp:  [97.8 °F (36.6 °C)-98.2 °F (36.8 °C)] 97.8 °F (36.6 °C)  HR:  [] 90  BP: ()/(64-80) 112/80  Resp:  [18] 18  SpO2:  [97 %-98 %] 97 %  O2 Device: None (Room air)    Mental Status Evaluation:    Appearance:  Improved hygiene, age appropriate, casually dressed   Behavior:  bizarre, demanding, restless   Speech:  pressured, hypertalkative   Mood:  dysphoric   Affect:  mood-congruent   Thought Process:  disorganized, illogical, tangential   Thought Content:  paranoid ideation   Perceptual Disturbances: appears responding to internal stimuli, appears preoccupied   Risk Potential: Suicidal Ideation -  None at present  Homicidal Ideation -  None  Potential for Aggression - No  "  Sensorium:  oriented to person and place   Memory:  recent and remote memory: unable to assess due to lack of cooperation   Consciousness:  alert and awake   Attention/Concentration: attention span and concentration appear shorter than expected for age   Insight:  impaired due to psychosis   Judgment: impaired due to psychosis   Gait/Station: slow gait   Motor Activity: no abnormal movements               Lab Results: I have reviewed the following results:  Results from the past 24 hours: No results found for this or any previous visit (from the past 24 hours).    Administrative Statements     Counseling / Coordination of Care:   Patient's progress discussed with staff in treatment team meeting.  Medication changes reviewed with staff in treatment team meeting.    Portions of the record may have been created with voice recognition software. Occasional wrong word or \"sound a like\" substitutions may have occurred due to the inherent limitations of voice recognition software. Read the chart carefully and recognize, using context, where substitutions have occurred.    Price Paniagua PA-C 05/19/25  "

## 2025-05-19 NOTE — PLAN OF CARE
Problem: Depression  Goal: Refrain from isolation  Description: Interventions:- Develop a trusting relationship - Encourage socialization   Outcome: Progressing   Pt attends groups.

## 2025-05-19 NOTE — NURSING NOTE
Patient visible and social with staff and peers. Less loud. Pressured, and mumbles at times. Patient denies any SI/HI, AV/H, anxiety or depression. Attended PM snack.  No HS medication schedules. No PRN's. Q 15 minute monitoring maintained.

## 2025-05-19 NOTE — PROGRESS NOTES
"Progress Note - Freddie Graham 65 y.o. male MRN: [id number]    Unit/Bed#: [facility] 245-02 Encounter: [id number]        Subjective:   Patient seen and examined at bedside after reviewing the chart and discussing the case with the caring staff.      Patient examined at bedside.  Patient denies any acute symptoms.     Physical Exam   Vitals: Blood pressure 112/80, pulse 90, temperature 97.8 °F (36.6 °C), temperature source Temporal, resp. rate 18, height 5' 9\" (1.753 m), weight 89.3 kg (196 lb 12.8 oz), SpO2 97%.,Body mass index is 29.06 kg/m².  Constitutional: Patient in no acute distress.  HEENT: PERR, EOMI, MMM.  Cardiovascular: Normal rate and regular rhythm.    Pulmonary/Chest: Effort normal and breath sounds normal.   Abdomen: Soft, + BS, NT.    Assessment/Plan:  Freddie Graham is a(n) 65 y.o. male with schizoaffective disorder.      Cardiac w/ HTN, HLD.  Not on BP medication.  Restart lisinopril 10 mg daily 5/9.  Continue atorvastatin 10 mg daily.   LISET.  Patient had recent Zio patch with cardiology.  Pauses found, no afib, thought to be related to LISET.  Recommended sleep study outpt.   Urothelial carcinoma/BPH.  Patient follows with urology outpt -due for follow up.  Continue Flomax 0.4 mg daily and pyridium 200 mg TID as needed for bladder spasms.   Chronic back pain/DJD.  CT spine 5/7 showing \"No acute displaced fracture or traumatic malalignment.  Multilevel degenerative disc and facet changes causing varying degrees of spinal and neuroforaminal narrowing. Up to moderate spinal stenosis at L4-5. Up to severe left L4-5 and L5-S1 neuroforaminal stenosis.\"  Tylenol/motrin as needed.  Voltaren gel 4x daily as needed.  Tobacco abuse.  Declines NRT.   Vitamin D deficiency.  Continue daily Vit D supplement.   Vitamin B12 deficiency.  Continue daily vitamin B12 supplement.  Seborrheic dermatitis.  Involving face.  Apply clotrimazole and hydrocortisone twice daily 5/17.     The patient was discussed with Dr. Gallagher and he is in " agreement with the above note.

## 2025-05-19 NOTE — NURSING NOTE
Patient had difficulty falling asleep, restless and got up early. Slept approximately 3-4 hours straight. Q 7 continuous monitoring maintained.

## 2025-05-19 NOTE — PLAN OF CARE
Problem: Alteration in Thoughts and Perception  Goal: Verbalize thoughts and feelings  Description: Interventions:- Promote a nonjudgmental and trusting relationship with the patient through active listening and therapeutic communication- Assess patient's level of functioning, behavior and potential for risk- Engage patient in 1 on 1 interactions- Encourage patient to express fears, feelings, frustrations, and discuss symptoms  - [place] patient to reality, help patient recognize reality-based thinking - Administer medications as ordered and assess for potential side effects- Provide the patient education related to the signs and symptoms of the illness and desired effects of prescribed medications  Outcome: Progressing  Goal: Agree to be compliant with medication regime, as prescribed and report medication side effects  Description: Interventions:- Offer appropriate PRN medication and supervise ingestion; conduct AIMS, as needed   Outcome: Progressing  Goal: Recognize dysfunctional thoughts, communicate reality-based thoughts at the time of discharge  Description: Interventions:- Provide medication and psycho-education to assist patient in compliance and developing insight into his/her illness   Outcome: Progressing

## 2025-05-20 PROCEDURE — 99232 SBSQ HOSP IP/OBS MODERATE 35: CPT | Performed by: HOSPITALIST

## 2025-05-20 RX ADMIN — LITHIUM CARBONATE 450 MG: 300 CAPSULE, GELATIN COATED ORAL at 08:25

## 2025-05-20 RX ADMIN — LISINOPRIL 10 MG: 10 TABLET ORAL at 08:25

## 2025-05-20 RX ADMIN — CHOLECALCIFEROL TAB 25 MCG (1000 UNIT) 1000 UNITS: 25 TAB at 08:25

## 2025-05-20 RX ADMIN — ATORVASTATIN CALCIUM 10 MG: 10 TABLET, FILM COATED ORAL at 18:05

## 2025-05-20 RX ADMIN — PALIPERIDONE 9 MG: 6 TABLET, EXTENDED RELEASE ORAL at 08:25

## 2025-05-20 RX ADMIN — TRIHEXYPHENIDYL HYDROCHLORIDE 2 MG: 2 TABLET ORAL at 08:25

## 2025-05-20 RX ADMIN — TAMSULOSIN HYDROCHLORIDE 0.4 MG: 0.4 CAPSULE ORAL at 18:05

## 2025-05-20 RX ADMIN — LITHIUM CARBONATE 450 MG: 300 CAPSULE, GELATIN COATED ORAL at 18:06

## 2025-05-20 RX ADMIN — TRIHEXYPHENIDYL HYDROCHLORIDE 2 MG: 2 TABLET ORAL at 18:06

## 2025-05-20 RX ADMIN — CYANOCOBALAMIN TAB 500 MCG 500 MCG: 500 TAB at 08:25

## 2025-05-20 NOTE — PLAN OF CARE
Problem: Alteration in Thoughts and Perception  Goal: Verbalize thoughts and feelings  Description: Interventions:- Promote a nonjudgmental and trusting relationship with the patient through active listening and therapeutic communication- Assess patient's level of functioning, behavior and potential for risk- Engage patient in 1 on 1 interactions- Encourage patient to express fears, feelings, frustrations, and discuss symptoms  - [place] patient to reality, help patient recognize reality-based thinking - Administer medications as ordered and assess for potential side effects- Provide the patient education related to the signs and symptoms of the illness and desired effects of prescribed medications  Outcome: Progressing     Problem: Alteration in Orientation  Goal: Interact with staff daily  Description: Interventions:- Assess and re-assess patient's level of orientation- Engage patient in 1 on 1 interactions, daily, for a minimum of 15 minutes - Establish rapport/trust with patient   Outcome: Progressing

## 2025-05-20 NOTE — PROGRESS NOTES
Progress Note - Behavioral Health   Name: Freddie Graham 65 y.o. male I MRN: [id number]  Unit/Bed#: -02 I Date of Admission: 5/8/2025   Date of Service: 5/20/2025 I Hospital Day: 12        Assessment & Plan  Acute exacerbation of chronic schizoaffective schizophrenia (HCC)  65-year-old male known to this service with history of chronic mental illness and diagnosis of schizoaffective disorder.  Returns to the hospital in the context of being noncompliant and having exacerbated symptoms of mood lability, disorganized behaviors and illogical thinking.  We will restart patient on medications he was stabilized on in the past-  Continue Invega 9 mg PO QD; increased 5/16/25  Continue lithium 450 mg PO BID; increased 5/19/25, Lithium level due 5/23/25 @ 6 AM  Continue Artane 2 mg PO BID for EPS  Risks / Benefits of Treatment:  Risks, benefits, and possible side effects of medications explained to patient. Patient has limited understanding of risks and benefits of treatment at this time, but agrees to take medications as prescribed.      Progress Toward Goals: Maintaining safety and mood control with no agitated or aggressive outbursts.  Thoughts remain illogical at times with intermittent disorganization.  Tolerating titration of lithium 450 mg BID well with no adverse effects; level due 5/23/2025 at 6 AM.  No discharge date at this time.      Subjective:    Freddie is selectively social and attends milieu activities.  Hygiene appears to be improving.  Thoughts remain illogical and disorganized at times, but responds well to redirection.  Mood controlled with no agitated or aggressive outbursts.  Has periods of inappropriate laughter.  States he wrote a story and shared with this provider, illogical with loose associations.  Attempts to participate in group to the best of his ability.  Denies any sleep disturbance.    Behavior over the last 24 hours: slowly improving.   Sleep: improved  Appetite: normal  Medication side  "effects: No   ROS: no complaints, all other systems are negative    Objective :  Temp:  [97.3 °F (36.3 °C)-98.4 °F (36.9 °C)] 97.3 °F (36.3 °C)  HR:  [84-89] 84  BP: (111-142)/(69-72) 142/72  Resp:  [18-20] 20  SpO2:  [96 %-97 %] 97 %  O2 Device: None (Room air)    Mental Status Evaluation:  Appearance:  Casually dressed, long hair, bearded, carrying notebook and papers   Behavior:  Cooperative, calm, redirectable, bizarre at times   Speech:  Deep, slow   Mood:  \"I am okay\"   Affect:  Less labile   Thought Process:  disorganized, illogical   Associations: loose associations   Thought Content:  Less paranoid   Perceptual Disturbances: Denied AVH, did not appear internally preoccupied   Risk Potential: Suicidal ideation - None  Homicidal ideation - None  Potential for aggression - Not at present   Sensorium:  oriented to person, place, and time/date   Memory:  recent and remote memory grossly intact   Consciousness:  alert and awake   Attention/Concentration: decreased concentration and decreased attention span   Insight:  impaired   Judgment: impaired   Gait/Station: normal gait/station, normal balance   Motor Activity: no abnormal movements       Lab Results: I have reviewed the following results:  CMP:   Lab Results   Component Value Date    SODIUM 137 05/06/2025    K 3.5 05/06/2025     05/06/2025    CO2 23 05/06/2025    AGAP 9 05/06/2025    BUN 15 05/06/2025    CREATININE 0.83 05/06/2025    GLUC 111 05/06/2025    GLUF 91 04/13/2025    CALCIUM 9.3 05/06/2025    AST 30 05/06/2025    ALT 31 05/06/2025    ALKPHOS 44 05/06/2025    TP 6.7 05/06/2025    ALB 4.3 05/06/2025    TBILI 0.81 05/06/2025    EGFR 91 05/06/2025     Lithium:   Lab Results   Component Value Date    LITHIUM 0.46 (L) 05/16/2025     Counseling / Coordination of Care:    Patient's progress discussed with staff in treatment team meeting.  Medication changes reviewed with nursing staff.  Medication changes reviewed with staff in treatment team " meeting.  Patient's progress reviewed with nursing staff.  Medication education provided to patient.  Patient's progress reviewed with case management staff.  Patient's diagnosis and treatment indicated reviewed with patient.  Importance of medication and treatment compliance reviewed with patient.  Educated on importance of medication and treatment compliance.  Discussed with patient acceptance of mental illness diagnosis and need for ongoing treatment after discharge.  Reassurance and supportive therapy provided.  Reoriented to reality and reassured.  Group attendance encouraged.  Encouraged participation in milieu and group therapy on the unit.  Crisis/safety plan discussed with patient.      CARLOS Mo 05/20/25

## 2025-05-20 NOTE — NURSING NOTE
Patient visible and social with staff and peers. Less loud. Pressured, and mumbles at times. Patient denies any SI/HI, AV/H, anxiety or depression. Attended PM snack.  No HS medication schedules. Administered Ativan 1 mg PO @ 2144 r/t patient becoming more elevated while talking on the phone, anxious, agitated and started to hit the  of the phone multiple times like trying to call someone in the old days. Reassessed: Ativan + effect noted. Patient sleeping. Q 15 minute monitoring maintained.

## 2025-05-20 NOTE — PROGRESS NOTES
"Progress Note - Freddie Graham 65 y.o. male MRN: [id number]    Unit/Bed#: [facility] 245-02 Encounter: [id number]        Subjective:   Patient seen and examined at bedside after reviewing the chart and discussing the case with the caring staff.      Patient examined at bedside.  Patient denies any acute symptoms.     Physical Exam   Vitals: Blood pressure 142/72, pulse 84, temperature (!) 97.3 °F (36.3 °C), temperature source Temporal, resp. rate 20, height 5' 9\" (1.753 m), weight 89.3 kg (196 lb 12.8 oz), SpO2 97%.,Body mass index is 29.06 kg/m².  Constitutional: Patient in no acute distress.  HEENT: PERR, EOMI, MMM.  Cardiovascular: Normal rate and regular rhythm.    Pulmonary/Chest: Effort normal and breath sounds normal.   Abdomen: Soft, + BS, NT.    Assessment/Plan:  Freddie Graham is a(n) 65 y.o. male with schizoaffective disorder.      Cardiac w/ HTN, HLD.  Not on BP medication.  Restart lisinopril 10 mg daily 5/9.  Continue atorvastatin 10 mg daily.   LISET.  Patient had recent Zio patch with cardiology.  Pauses found, no afib, thought to be related to LISET.  Recommended sleep study outpt.   Urothelial carcinoma/BPH.  Patient follows with urology outpt -due for follow up.  Continue Flomax 0.4 mg daily and pyridium 200 mg TID as needed for bladder spasms.   Chronic back pain/DJD.  CT spine 5/7 showing \"No acute displaced fracture or traumatic malalignment.  Multilevel degenerative disc and facet changes causing varying degrees of spinal and neuroforaminal narrowing. Up to moderate spinal stenosis at L4-5. Up to severe left L4-5 and L5-S1 neuroforaminal stenosis.\"  Tylenol/motrin as needed.  Voltaren gel 4x daily as needed.  Tobacco abuse.  Declines NRT.   Vitamin D deficiency.  Continue daily Vit D supplement.   Vitamin B12 deficiency.  Continue daily vitamin B12 supplement.  Seborrheic dermatitis.  Involving face.  Apply clotrimazole and hydrocortisone twice daily 5/17.   "

## 2025-05-20 NOTE — PROGRESS NOTES
05/20/25    Team Members Present   Team Members Present Physician;Occupational Therapist;Nurse;;   Physician Team Member MD Asher; MD Oz; CARLOS Flanagan   Nursing Team Member ADRIEN Zurita   Care Management Team Member MS Franko   OT Team Member NAYANA Ruff, NAYANA Pandya   Patient/Family Present   Patient Present No   Patient's Family Present No   303. Pressured speech, manic, grandiose. Eating and sleeping well. Med compliant. Denies all.   D/C date unknown due to med adjustment,

## 2025-05-20 NOTE — ASSESSMENT & PLAN NOTE
65-year-old male known to this service with history of chronic mental illness and diagnosis of schizoaffective disorder.  Returns to the hospital in the context of being noncompliant and having exacerbated symptoms of mood lability, disorganized behaviors and illogical thinking.  We will restart patient on medications he was stabilized on in the past-  Continue Invega 9 mg PO QD; increased 5/16/25  Continue lithium 450 mg PO BID; increased 5/19/25, Lithium level due 5/23/25 @ 6 AM  Continue Artane 2 mg PO BID for EPS

## 2025-05-20 NOTE — NURSING NOTE
Patient med and meal compliant visible on unit social with select peers pleasant cooperative disorganized conversation Denies SI HI AVH Safety checks maintained.

## 2025-05-21 PROCEDURE — 99232 SBSQ HOSP IP/OBS MODERATE 35: CPT | Performed by: PHYSICIAN ASSISTANT

## 2025-05-21 RX ORDER — CLOTRIMAZOLE 1 %
CREAM (GRAM) TOPICAL 2 TIMES DAILY
Status: DISCONTINUED | OUTPATIENT
Start: 2025-05-21 | End: 2025-06-04 | Stop reason: HOSPADM

## 2025-05-21 RX ORDER — HYDROCORTISONE 25 MG/G
CREAM TOPICAL 2 TIMES DAILY PRN
Status: DISCONTINUED | OUTPATIENT
Start: 2025-05-21 | End: 2025-06-04 | Stop reason: HOSPADM

## 2025-05-21 RX ADMIN — ATORVASTATIN CALCIUM 10 MG: 10 TABLET, FILM COATED ORAL at 18:00

## 2025-05-21 RX ADMIN — TRIHEXYPHENIDYL HYDROCHLORIDE 2 MG: 2 TABLET ORAL at 08:31

## 2025-05-21 RX ADMIN — TRIHEXYPHENIDYL HYDROCHLORIDE 2 MG: 2 TABLET ORAL at 18:00

## 2025-05-21 RX ADMIN — TAMSULOSIN HYDROCHLORIDE 0.4 MG: 0.4 CAPSULE ORAL at 18:00

## 2025-05-21 RX ADMIN — CHOLECALCIFEROL TAB 25 MCG (1000 UNIT) 1000 UNITS: 25 TAB at 08:31

## 2025-05-21 RX ADMIN — LORAZEPAM 1 MG: 1 TABLET ORAL at 04:04

## 2025-05-21 RX ADMIN — LISINOPRIL 10 MG: 10 TABLET ORAL at 08:31

## 2025-05-21 RX ADMIN — LITHIUM CARBONATE 450 MG: 300 CAPSULE, GELATIN COATED ORAL at 18:00

## 2025-05-21 RX ADMIN — CYANOCOBALAMIN TAB 500 MCG 500 MCG: 500 TAB at 08:31

## 2025-05-21 RX ADMIN — PALIPERIDONE 9 MG: 6 TABLET, EXTENDED RELEASE ORAL at 08:31

## 2025-05-21 RX ADMIN — LITHIUM CARBONATE 450 MG: 300 CAPSULE, GELATIN COATED ORAL at 08:31

## 2025-05-21 NOTE — PROGRESS NOTES
Progress Note - Behavioral Health   Name: Freddie Graham 65 y.o. male I MRN: [id number]  Unit/Bed#: -02 I Date of Admission: 5/8/2025   Date of Service: 5/21/2025 I Hospital Day: 13    Assessment & Plan  Acute exacerbation of chronic schizoaffective schizophrenia (HCC)  65-year-old male known to this service with history of chronic mental illness and diagnosis of schizoaffective disorder.  Returns to the hospital in the context of being noncompliant and having exacerbated symptoms of mood lability, disorganized behaviors and illogical thinking.  We will restart patient on medications he was stabilized on in the past-  Continue Invega 9 mg PO QD; increased 5/16/25  Continue lithium 450 mg PO BID; increased 5/19/25, Lithium level due 5/23/25 @ 6 AM  Continue Artane 2 mg PO BID for EPS    Current Medications:    Current Facility-Administered Medications:     atorvastatin (LIPITOR) tablet 10 mg, Oral, Daily With Dinner    Cholecalciferol (VITAMIN D3) tablet 1,000 Units, Oral, Daily    hydrocortisone 2.5 % cream, Topical, BID PRN **AND** clotrimazole (LOTRIMIN) 1 % cream, Topical, BID    cyanocobalamin (VITAMIN B-12) tablet 500 mcg, Oral, Daily    lisinopril (ZESTRIL) tablet 10 mg, Oral, Daily    lithium carbonate capsule 450 mg, Oral, BID    paliperidone (INVEGA) 24 hr tablet 9 mg, Oral, Daily    tamsulosin (FLOMAX) capsule 0.4 mg, Oral, Daily With Dinner    trihexyphenidyl (ARTANE) tablet 2 mg, Oral, BID      Risks/Benefits of Treatment:     Risks, benefits, and possible side effects of medications explained to patient and patient verbalizes understanding and agreement for treatment.    Progress Toward Goals: improving gradually    Treatment Planning:      - Encourage early mobility and having a structured day  - Provide frequent re-orientation, and cognitive stimulation  - Ensure assistive devices are in proper working order (eye-glasses, hearing aids)  - Encourage adequate hydration, nutrition and monitor bowel  "movements  - Maintain sleep-wake cycle: Uninterrupted sleep time; low-level lighting at night  - Fall precaution  - Follow up with [facility] regarding the medical problems   - Continue medication titration and treatment plan; adjust medication to optimize treatment response and as clinically indicated.   - Observation: Routine  - VS: as per unit protocol  - Encourage group attendance and milieu therapy  - Dispo: To be determined  - Estimated Discharge Day: 5/29/2025   -   -     Subjective     Freddie K Day today for psychiatric follow-up.  Chart reviewed and patient presented at team meeting this morning.  Patient was seen in day room.  Patient sitting in chair looking out window watching it raining.  Patient remained cooperative and pleasant throughout our conversation.  Patient seems less manic today compared with this writer's evaluation on 5/19/2025.  Patient still exhibits disorganized behavior with flight of ideas.  Easily distracted and illogical.  Freddie contracts for safety and denies SI.  Patient denies AVH when asked.  Grandiose at times discussing his superior intelligence and stating he planned on becoming \"a \" while he was in the [employer] in 1977.        Sleep: normal  Appetite: normal  Medication side effects: No  ROS: review of systems as noted above in HPI/Subjective report, all other systems are negative    Objective :  Temp:  [98.6 °F (37 °C)] 98.6 °F (37 °C)  HR:  [74-86] 74  BP: (109-127)/(70-82) 127/70  Resp:  [18] 18  SpO2:  [96 %-100 %] 100 %  O2 Device: None (Room air)    Mental Status Evaluation:    Appearance:  age appropriate, casually dressed, marginal hygiene   Behavior:  pleasant, cooperative   Speech:  normal volume, slow   Mood:  elevated   Affect:  overbright, expansive   Thought Process:  disorganized, illogical, increased rate of thoughts   Thought Content:  mild paranoia, grandiose ideas   Perceptual Disturbances: denies auditory or visual hallucinations when asked   Risk Potential: " "Suicidal Ideation -  None at present  Homicidal Ideation -  None  Potential for Aggression - No   Sensorium:  oriented to person, place, and time/date   Memory:  recent and remote memory grossly intact   Consciousness:  alert and awake   Attention/Concentration: decreased attention span and decreased concentration   Insight:  impaired   Judgment: impaired   Gait/Station: normal gait/station, normal balance   Motor Activity: no abnormal movements               Lab Results: I have reviewed the following results:  Results from the past 24 hours: No results found for this or any previous visit (from the past 24 hours).    Administrative Statements     Counseling / Coordination of Care:   Patient's progress discussed with staff in treatment team meeting.  Medication changes reviewed with staff in treatment team meeting.  Medications, treatment progress and treatment plan reviewed with patient.    Portions of the record may have been created with voice recognition software. Occasional wrong word or \"sound a like\" substitutions may have occurred due to the inherent limitations of voice recognition software. Read the chart carefully and recognize, using context, where substitutions have occurred.    Price Paniagua PA-C 05/21/25  "

## 2025-05-21 NOTE — PROGRESS NOTES
"Progress Note - Freddie Graham 65 y.o. male MRN: [id number]    Unit/Bed#: [facility] 245-02 Encounter: [id number]        Subjective:   Patient seen and examined at bedside after reviewing the chart and discussing the case with the caring staff.      Patient examined at bedside.  Patient denies any acute symptoms.  Does not want to use cream on face.     Physical Exam   Vitals: Blood pressure 127/70, pulse 74, temperature 98.6 °F (37 °C), temperature source Temporal, resp. rate 18, height 5' 9\" (1.753 m), weight 89.3 kg (196 lb 12.8 oz), SpO2 100%.,Body mass index is 29.06 kg/m².  Constitutional: Patient in no acute distress.  HEENT: PERR, EOMI, MMM.  Cardiovascular: Normal rate and regular rhythm.    Pulmonary/Chest: Effort normal and breath sounds normal.   Abdomen: Soft, + BS, NT.    Assessment/Plan:  Freddie Graham is a(n) 65 y.o. male with schizoaffective disorder.      Cardiac w/ HTN, HLD.  Not on BP medication.  Restart lisinopril 10 mg daily 5/9.  Continue atorvastatin 10 mg daily.   LISET.  Patient had recent Zio patch with cardiology.  Pauses found, no afib, thought to be related to LISET.  Recommended sleep study outpt.   Urothelial carcinoma/BPH.  Patient follows with urology outpt -due for follow up.  Continue Flomax 0.4 mg daily and pyridium 200 mg TID as needed for bladder spasms.   Chronic back pain/DJD.  CT spine 5/7 showing \"No acute displaced fracture or traumatic malalignment.  Multilevel degenerative disc and facet changes causing varying degrees of spinal and neuroforaminal narrowing. Up to moderate spinal stenosis at L4-5. Up to severe left L4-5 and L5-S1 neuroforaminal stenosis.\"  Tylenol/motrin as needed.  Voltaren gel 4x daily as needed.  Tobacco abuse.  Declines NRT.   Vitamin D deficiency.  Continue daily Vit D supplement.   Vitamin B12 deficiency.  Continue daily vitamin B12 supplement.  Seborrheic dermatitis.  Involving face.  Apply clotrimazole twice daily 5/17.  Hydrocortisone cream twice daily as " needed.     The patient was discussed with Dr. Gallagher and he is in agreement with the above note.

## 2025-05-21 NOTE — NURSING NOTE
Patient compliant with meds and meals. Patient social and visible, disorganized at times. Patient pleasant and cooperative. Q 15 min behavioral and safety checks in place.

## 2025-05-21 NOTE — PROGRESS NOTES
05/21/25    Team Meeting   Meeting Type Daily Rounds   Team Members Present   Team Members Present Physician;Occupational Therapist;Nurse;;   Physician Team Member MD Asher; MD Oz; CARLOS Flanagan; Arcelia ERICKSON   Nursing Team Member ADRIEN Reyna   Care Management Team Member MS Franko   Social Work Team Member MANA Ricks   OT Team Member NAYANA Ruff, NAYANA Pandya   Patient/Family Present   Patient Present No   Patient's Family Present No   303. Visible, rambles at times. Denies all. Sleep is terrible. Pleasantly organized, manic. Lithium level 5-23. Med compliant. D/C date not known due to med adjustment.

## 2025-05-21 NOTE — NURSING NOTE
Patient noted to have difficulty falling asleep and restless majority of the night. Non labored breathing noted while asleep. Q 15 min safety checks maintained.

## 2025-05-21 NOTE — PLAN OF CARE
Problem: Alteration in Thoughts and Perception  Goal: Treatment Goal: Gain control of psychotic behaviors/thinking, reduce/eliminate presenting symptoms and demonstrate improved reality functioning upon discharge  Outcome: Progressing  Goal: Verbalize thoughts and feelings  Description: Interventions:- Promote a nonjudgmental and trusting relationship with the patient through active listening and therapeutic communication- Assess patient's level of functioning, behavior and potential for risk- Engage patient in 1 on 1 interactions- Encourage patient to express fears, feelings, frustrations, and discuss symptoms  - Rampart patient to reality, help patient recognize reality-based thinking - Administer medications as ordered and assess for potential side effects- Provide the patient education related to the signs and symptoms of the illness and desired effects of prescribed medications  Outcome: Progressing     Problem: Depression  Goal: Refrain from harming self  Description: Interventions:- Monitor patient closely, per order - Supervise medication ingestion, monitor effects and side effects   Outcome: Progressing

## 2025-05-21 NOTE — PLAN OF CARE
Problem: Depression  Goal: Refrain from isolation  Description: Interventions:- Develop a trusting relationship - Encourage socialization   Outcome: Progressing   Pt attends groups requiring redirection due to randomly speaking on subjects separate from the topic

## 2025-05-21 NOTE — NURSING NOTE
Prn Ativan given prior noted have positive effect evidenced by patient appears to be asleep at this time. No signs of distress/discomfort noted.

## 2025-05-22 PROCEDURE — 99232 SBSQ HOSP IP/OBS MODERATE 35: CPT

## 2025-05-22 RX ADMIN — CYANOCOBALAMIN TAB 500 MCG 500 MCG: 500 TAB at 08:00

## 2025-05-22 RX ADMIN — TRIHEXYPHENIDYL HYDROCHLORIDE 2 MG: 2 TABLET ORAL at 18:07

## 2025-05-22 RX ADMIN — CHOLECALCIFEROL TAB 25 MCG (1000 UNIT) 1000 UNITS: 25 TAB at 08:00

## 2025-05-22 RX ADMIN — ATORVASTATIN CALCIUM 10 MG: 10 TABLET, FILM COATED ORAL at 18:07

## 2025-05-22 RX ADMIN — PALIPERIDONE 9 MG: 6 TABLET, EXTENDED RELEASE ORAL at 08:00

## 2025-05-22 RX ADMIN — TAMSULOSIN HYDROCHLORIDE 0.4 MG: 0.4 CAPSULE ORAL at 17:00

## 2025-05-22 RX ADMIN — LITHIUM CARBONATE 450 MG: 300 CAPSULE, GELATIN COATED ORAL at 18:07

## 2025-05-22 RX ADMIN — LITHIUM CARBONATE 450 MG: 300 CAPSULE, GELATIN COATED ORAL at 08:00

## 2025-05-22 RX ADMIN — TRIHEXYPHENIDYL HYDROCHLORIDE 2 MG: 2 TABLET ORAL at 08:00

## 2025-05-22 RX ADMIN — LISINOPRIL 10 MG: 10 TABLET ORAL at 08:00

## 2025-05-22 NOTE — PROGRESS NOTES
"Progress Note - Freddie Graham 65 y.o. male MRN: [id number]    Unit/Bed#: [facility] 245-02 Encounter: [id number]        Subjective:   Patient seen and examined at bedside after reviewing the chart and discussing the case with the caring staff.      Patient examined at bedside.  Patient denies any acute symptoms.      Physical Exam   Vitals: Blood pressure 129/67, pulse 86, temperature (!) 97.1 °F (36.2 °C), temperature source Temporal, resp. rate 18, height 5' 9\" (1.753 m), weight 89.3 kg (196 lb 12.8 oz), SpO2 98%.,Body mass index is 29.06 kg/m².  Constitutional: Patient in no acute distress.  HEENT: PERR, EOMI, MMM.  Cardiovascular: Normal rate and regular rhythm.    Pulmonary/Chest: Effort normal and breath sounds normal.   Abdomen: Soft, + BS, NT.    Assessment/Plan:  Freddie Graham is a(n) 65 y.o. male with schizoaffective disorder.      Cardiac w/ HTN, HLD.  Not on BP medication.  Restart lisinopril 10 mg daily 5/9.  Continue atorvastatin 10 mg daily.   LISET.  Patient had recent Zio patch with cardiology.  Pauses found, no afib, thought to be related to LISET.  Recommended sleep study outpt.   Urothelial carcinoma/BPH.  Patient follows with urology outpt -due for follow up.  Continue Flomax 0.4 mg daily and pyridium 200 mg TID as needed for bladder spasms.   Chronic back pain/DJD.  CT spine 5/7 showing \"No acute displaced fracture or traumatic malalignment.  Multilevel degenerative disc and facet changes causing varying degrees of spinal and neuroforaminal narrowing. Up to moderate spinal stenosis at L4-5. Up to severe left L4-5 and L5-S1 neuroforaminal stenosis.\"  Tylenol/motrin as needed.  Voltaren gel 4x daily as needed.  Tobacco abuse.  Declines NRT.   Vitamin D deficiency.  Continue daily Vit D supplement.   Vitamin B12 deficiency.  Continue daily vitamin B12 supplement.  Seborrheic dermatitis.  Involving face.  Apply clotrimazole twice daily 5/17.  Hydrocortisone cream twice daily as needed.     The patient was " discussed with Dr. Gallagher and he is in agreement with the above note.

## 2025-05-22 NOTE — PROGRESS NOTES
Progress Note - Behavioral Health   Name: Freddie Graham 65 y.o. male I MRN: [id number]  Unit/Bed#: -02 I Date of Admission: 5/8/2025   Date of Service: 5/22/2025 I Hospital Day: 14    Assessment & Plan  Acute exacerbation of chronic schizoaffective schizophrenia (HCC)  65-year-old male known to this service with history of chronic mental illness and diagnosis of schizoaffective disorder.  Returns to the hospital in the context of being noncompliant and having exacerbated symptoms of mood lability, disorganized behaviors and illogical thinking.  We will restart patient on medications he was stabilized on in the past-  Continue Invega 9 mg PO QD; increased 5/16/25  Continue lithium 450 mg PO BID; increased 5/19/25, Lithium level due 5/23/25 @ 6 AM  Continue Artane 2 mg PO BID for EPS    Current Medications:    Current Facility-Administered Medications:     atorvastatin (LIPITOR) tablet 10 mg, Oral, Daily With Dinner    Cholecalciferol (VITAMIN D3) tablet 1,000 Units, Oral, Daily    hydrocortisone 2.5 % cream, Topical, BID PRN **AND** clotrimazole (LOTRIMIN) 1 % cream, Topical, BID    cyanocobalamin (VITAMIN B-12) tablet 500 mcg, Oral, Daily    lisinopril (ZESTRIL) tablet 10 mg, Oral, Daily    lithium carbonate capsule 450 mg, Oral, BID    paliperidone (INVEGA) 24 hr tablet 9 mg, Oral, Daily    tamsulosin (FLOMAX) capsule 0.4 mg, Oral, Daily With Dinner    trihexyphenidyl (ARTANE) tablet 2 mg, Oral, BID    Current Facility-Administered Medications:     acetaminophen (TYLENOL) tablet 650 mg, Oral, Q6H PRN    aluminum-magnesium hydroxide-simethicone (MAALOX) oral suspension 30 mL, Oral, Q4H PRN    benztropine (COGENTIN) injection 1 mg, Intramuscular, Q4H PRN Max 6/day    benztropine (COGENTIN) tablet 0.5 mg, Oral, Q4H PRN Max 6/day    Diclofenac Sodium (VOLTAREN) 1 % topical gel 2 g, Topical, 4x Daily PRN    hydrocortisone 2.5 % cream, Topical, BID PRN **AND** clotrimazole (LOTRIMIN) 1 % cream, Topical, BID     hydrOXYzine HCL (ATARAX) tablet 25 mg, Oral, Q6H PRN Max 4/day    ibuprofen (MOTRIN) tablet 400 mg, Oral, Q6H PRN    ibuprofen (MOTRIN) tablet 600 mg, Oral, Q6H PRN    LORazepam (ATIVAN) injection 1 mg, Intramuscular, Q6H PRN Max 3/day    LORazepam (ATIVAN) tablet 0.5 mg, Oral, Q6H PRN Max 4/day    LORazepam (ATIVAN) tablet 1 mg, Oral, Q6H PRN Max 3/day    nicotine polacrilex (NICORETTE) gum 4 mg, Oral, Q4H PRN    OLANZapine (ZyPREXA) IM injection 5 mg, Intramuscular, Q3H PRN Max 3/day    OLANZapine (ZyPREXA) tablet 2.5 mg, Oral, Q4H PRN Max 6/day    OLANZapine (ZyPREXA) tablet 5 mg, Oral, Q4H PRN Max 3/day    OLANZapine (ZyPREXA) tablet 5 mg, Oral, Q3H PRN Max 3/day    phenazopyridine (PYRIDIUM) tablet 200 mg, Oral, TID PRN    polyethylene glycol (MIRALAX) packet 17 g, Oral, Daily PRN    propranolol (INDERAL) tablet 5 mg, Oral, Q8H PRN    senna-docusate sodium (SENOKOT S) 8.6-50 mg per tablet 1 tablet, Oral, Daily PRN    traZODone (DESYREL) tablet 50 mg, Oral, HS PRN    Risks/Benefits of Treatment:     Risks, benefits, and possible side effects of medications explained to patient and patient verbalizes understanding and agreement for treatment.    Progress Toward Goals: unchanged    Treatment Planning:      - Encourage early mobility and having a structured day  - Provide frequent re-orientation, and cognitive stimulation  - Ensure assistive devices are in proper working order (eye-glasses, hearing aids)  - Encourage adequate hydration, nutrition and monitor bowel movements  - Maintain sleep-wake cycle: Uninterrupted sleep time; low-level lighting at night  - Fall precaution  - Follow up with [facility] regarding the medical problems   - Continue medication titration and treatment plan; adjust medication to optimize treatment response and as clinically indicated.   - Observation: N/A  - VS: as per unit protocol  - Encourage group attendance and milieu therapy  - Dispo: To be determined  - Estimated Discharge Day:  "5/29/2025 7 days (5/29/2025)  - Legal Status : On 303 commitment.  - Long Stay Certification : Not Applicable    Subjective     Freddie was seen today for psychiatric follow-up. Patient is calm, cooperative. He is visible on the unit, social with peers. Patient remains disorganized in thought with flight of ideas. He is tangential and illogical. His personal hygiene is marginal. According to nursing staff report. patient is medication compliant. He denied any SI/HI/AVH when asked, however patient appears internally preoccupied.     Sleep: normal  Appetite: normal  Medication side effects: No  ROS: review of systems as noted above in HPI/Subjective report, all other systems are negative    Objective :  Temp:  [97.1 °F (36.2 °C)-98.1 °F (36.7 °C)] 97.1 °F (36.2 °C)  HR:  [83-86] 86  BP: (120-129)/(67-74) 129/67  Resp:  [18] 18  SpO2:  [97 %-98 %] 98 %  O2 Device: None (Room air)    Mental Status Evaluation:    Appearance:  age appropriate, marginal hygiene, bearded   Behavior:  cooperative, calm   Speech:  normal rate and volume   Mood:  \"level\"   Affect:  Less labile   Thought Process:  disorganized, illogical   Thought Content:  some paranoia   Perceptual Disturbances: Denied AVH, however appears internally preoccupied   Risk Potential: Suicidal Ideation -  None at present  Homicidal Ideation -  None at present  Potential for Aggression - No   Sensorium:  oriented to person, place, and time/date   Memory:  recent and remote memory grossly intact   Consciousness:  alert and awake   Attention/Concentration: decreased attention span and decreased concentration   Insight:  impaired   Judgment: impaired   Gait/Station: normal gait/station   Motor Activity: no abnormal movements     Cognitive Assessment : N/A  Pain : denied  Pain Scale : 0      Lab Results: I have reviewed the following results:  Most Recent Labs:   Lab Results   Component Value Date    WBC 5.97 05/06/2025    RBC 4.72 05/06/2025    HGB 13.6 05/06/2025    " "HCT 41.3 05/06/2025     05/06/2025    RDW 13.1 05/06/2025    NEUTROABS 4.33 05/06/2025    SODIUM 137 05/06/2025    K 3.5 05/06/2025     05/06/2025    CO2 23 05/06/2025    BUN 15 05/06/2025    CREATININE 0.83 05/06/2025    GLUC 111 05/06/2025    CALCIUM 9.3 05/06/2025    AST 30 05/06/2025    ALT 31 05/06/2025    ALKPHOS 44 05/06/2025    TP 6.7 05/06/2025    ALB 4.3 05/06/2025    TBILI 0.81 05/06/2025    CHOLESTEROL 155 05/12/2025    HDL 47 05/12/2025    TRIG 69 05/12/2025    LDLCALC 94 05/12/2025    NONHDLC 108 05/12/2025    VALPROICTOT <10 (L) 02/17/2024    LITHIUM 0.46 (L) 05/16/2025    AMMONIA 51 02/06/2021    NSL8UATVDUVM 1.939 05/06/2025    FREET4 0.97 02/17/2024    TREPONEMAPA Non-reactive 04/07/2025    HGBA1C 5.7 (H) 04/07/2025     04/07/2025       Administrative Statements     Counseling / Coordination of Care:   Patient's progress discussed with staff in treatment team meeting.  Medication changes reviewed with staff in treatment team meeting.    Portions of the record may have been created with voice recognition software. Occasional wrong word or \"sound a like\" substitutions may have occurred due to the inherent limitations of voice recognition software. Read the chart carefully and recognize, using context, where substitutions have occurred.    CARLOS Farnsworth 05/22/25  "

## 2025-05-22 NOTE — NURSING NOTE
Patient appeared to have slept throughout the night. Pt woke early but had gone to sleep early evening. Respirations even and unlabored. No acute behaviors. Q15 minute checks continue.

## 2025-05-22 NOTE — PROGRESS NOTES
Met with Freddie attempted his relapse prevention. We were unable to complete it due to his it due to the fact he does not believe he has a mental health issue since he takes his medications when he has them. He is still frustrated with his brother. He decided that we should complete the form when it gets closer to discharge for him.

## 2025-05-22 NOTE — PROGRESS NOTES
05/22/25     Team Meeting   Meeting Type Daily Rounds   Initial Conference Date 05/22/25   Team Members Present   Team Members Present Physician;Nurse;;;Occupational Therapist   Physician Team Member MD Asher, MD Oz, Panchito GONZALEZ   Nursing Team Member ADRIEN Son   Care Management Team Member MS Franko   Social Work Team Member MANA Ricks   OT Team Member NAYANA Ruff   Patient/Family Present   Patient Present No   Patient's Family Present No   303. Denies all.Sleeping and eating well. Looks better. No bizarre behaviors. Less intrusive.   Asking appropriately about his medications. D/C 5-27

## 2025-05-22 NOTE — NURSING NOTE
Pt calm and cooperative with assessment. Denies SI/HI/AH/VH and pain. Compliant with meds. Withdrawn to room for most of the evening, out for snack and to let needs be known. Questioning when his Lithium level would be drawn again and what his current dosage of Lithium is. Q 15 minute checks ongoing.

## 2025-05-22 NOTE — NURSING NOTE
Patient was pleasant and cooperative. Patient social with staff and peers. Staff support provided. Q 15 minute safety checks maintained. Groups encouraged. Patient compliant with medications and groups. Patient remains disorganized and hyper verbal. Patient denied SI/HI. Staff will continue to monitor and support.

## 2025-05-22 NOTE — PLAN OF CARE
Problem: Ineffective Coping  Goal: Identifies healthy coping skills  Outcome: Progressing  Goal: Understands least restrictive measures  Description: Interventions:- Utilize least restrictive behavior  Outcome: Progressing  Goal: Free from restraint events  Description: - Utilize least restrictive measures - Provide behavioral interventions - Redirect inappropriate behaviors   Outcome: Progressing

## 2025-05-23 LAB — LITHIUM SERPL-SCNC: 0.56 MMOL/L (ref 0.6–1.2)

## 2025-05-23 PROCEDURE — 80178 ASSAY OF LITHIUM: CPT | Performed by: NURSE PRACTITIONER

## 2025-05-23 PROCEDURE — 99232 SBSQ HOSP IP/OBS MODERATE 35: CPT

## 2025-05-23 RX ORDER — PALIPERIDONE 6 MG/1
12 TABLET, EXTENDED RELEASE ORAL DAILY
Status: DISCONTINUED | OUTPATIENT
Start: 2025-05-24 | End: 2025-05-31

## 2025-05-23 RX ADMIN — CHOLECALCIFEROL TAB 25 MCG (1000 UNIT) 1000 UNITS: 25 TAB at 08:10

## 2025-05-23 RX ADMIN — LITHIUM CARBONATE 450 MG: 300 CAPSULE, GELATIN COATED ORAL at 08:10

## 2025-05-23 RX ADMIN — LORAZEPAM 0.5 MG: 0.5 TABLET ORAL at 07:38

## 2025-05-23 RX ADMIN — TAMSULOSIN HYDROCHLORIDE 0.4 MG: 0.4 CAPSULE ORAL at 18:14

## 2025-05-23 RX ADMIN — CYANOCOBALAMIN TAB 500 MCG 500 MCG: 500 TAB at 08:10

## 2025-05-23 RX ADMIN — ATORVASTATIN CALCIUM 10 MG: 10 TABLET, FILM COATED ORAL at 18:14

## 2025-05-23 RX ADMIN — LITHIUM CARBONATE 450 MG: 300 CAPSULE, GELATIN COATED ORAL at 18:14

## 2025-05-23 RX ADMIN — TRIHEXYPHENIDYL HYDROCHLORIDE 2 MG: 2 TABLET ORAL at 18:14

## 2025-05-23 RX ADMIN — TRIHEXYPHENIDYL HYDROCHLORIDE 2 MG: 2 TABLET ORAL at 08:10

## 2025-05-23 RX ADMIN — PALIPERIDONE 9 MG: 6 TABLET, EXTENDED RELEASE ORAL at 08:10

## 2025-05-23 NOTE — ASSESSMENT & PLAN NOTE
65-year-old male known to this service with history of chronic mental illness and diagnosis of schizoaffective disorder.  Returns to the hospital in the context of being noncompliant and having exacerbated symptoms of mood lability, disorganized behaviors and illogical thinking.  We will restart patient on medications he was stabilized on in the past-  Invega increased to 12 mg PO QD; on 5/23/2025, first dose on 5/24/2025  Continue lithium 450 mg PO BID; increased 5/19/25,   Lithium level on 5/23/25 0.56, may increase Lithium over the weekend   Continue Artane 2 mg PO BID for EPS

## 2025-05-23 NOTE — PLAN OF CARE
Problem: Depression  Goal: Refrain from isolation  Description: Interventions:- Develop a trusting relationship - Encourage socialization   Outcome: Progressing   Pt is social and attends groups

## 2025-05-23 NOTE — NURSING NOTE
Administered 0.5 mg lorazepam for moderate anxiety. Medication effective patient verbalized improvement in anxiety and was able to attend am group.

## 2025-05-23 NOTE — PROGRESS NOTES
05/23/25 1251   Activity/Group Checklist   Group Admission/Discharge   Attendance Attended   Attendance Duration (min) 0-15   Interactions Interacted appropriately   Affect/Mood Appropriate   Goals Achieved Identified feelings;Identified relapse prevention strategies;Identified triggers;Discussed safety plan;Discussed coping strategies;Discussed discharge plans;Identified resources and support systems;Able to listen to others;Able to engage in interactions;Able to reflect/comment on own behavior;Able to manage/cope with feelings;Able to self-disclose;Able to recieve feedback;Able to experience relief/decrease in symptoms     Patient agreeable to meet and complete safety/relapse prevention plan with [facility].  Patient information from forms can be found in media.

## 2025-05-23 NOTE — PROGRESS NOTES
"Progress Note - Freddie Graham 65 y.o. male MRN: [id number]    Unit/Bed#: [facility] 245-02 Encounter: [id number]        Subjective:   Patient seen and examined at bedside after reviewing the chart and discussing the case with the caring staff.      Patient examined at bedside.  Patient denies any acute symptoms.      Physical Exam   Vitals: Blood pressure 94/73, pulse 84, temperature 98.3 °F (36.8 °C), temperature source Temporal, resp. rate 18, height 5' 9\" (1.753 m), weight 89.3 kg (196 lb 12.8 oz), SpO2 97%.,Body mass index is 29.06 kg/m².  Constitutional: Patient in no acute distress.  HEENT: PERR, EOMI, MMM.  Cardiovascular: Normal rate and regular rhythm.    Pulmonary/Chest: Effort normal and breath sounds normal.   Abdomen: Soft, + BS, NT.    Assessment/Plan:  Freddie Graham is a(n) 65 y.o. male with schizoaffective disorder.      Cardiac w/ HTN, HLD.  Not on BP medication.  Restart lisinopril 10 mg daily 5/9.  Continue atorvastatin 10 mg daily.   LISET.  Patient had recent Zio patch with cardiology.  Pauses found, no afib, thought to be related to LISET.  Recommended sleep study outpt.   Urothelial carcinoma/BPH.  Patient follows with urology outpt -due for follow up.  Continue Flomax 0.4 mg daily and pyridium 200 mg TID as needed for bladder spasms.   Chronic back pain/DJD.  CT spine 5/7 showing \"No acute displaced fracture or traumatic malalignment.  Multilevel degenerative disc and facet changes causing varying degrees of spinal and neuroforaminal narrowing. Up to moderate spinal stenosis at L4-5. Up to severe left L4-5 and L5-S1 neuroforaminal stenosis.\"  Tylenol/motrin as needed.  Voltaren gel 4x daily as needed.  Tobacco abuse.  Declines NRT.   Vitamin D deficiency.  Continue daily Vit D supplement.   Vitamin B12 deficiency.  Continue daily vitamin B12 supplement.  Seborrheic dermatitis.  Involving face.  Apply clotrimazole twice daily 5/17.  Hydrocortisone cream twice daily as needed.     The patient was discussed " with Dr. Gallagher and he is in agreement with the above note.

## 2025-05-23 NOTE — PLAN OF CARE
Problem: Ineffective Coping  Goal: Demonstrates healthy coping skills  Outcome: Progressing     Problem: Anxiety  Goal: Anxiety is at manageable level  Description: Interventions:- Assess and monitor patient's anxiety level. - Monitor for signs and symptoms (heart palpitations, chest pain, shortness of breath, headaches, nausea, feeling jumpy, restlessness, irritable, apprehensive). - Collaborate with interdisciplinary team and initiate plan and interventions as ordered.- [place] patient to unit/surroundings- Explain treatment plan- Encourage participation in care- Encourage verbalization of concerns/fears- Identify coping mechanisms- Assist in developing anxiety-reducing skills- Administer/offer alternative therapies- Limit or eliminate stimulants  Outcome: Progressing

## 2025-05-23 NOTE — NURSING NOTE
Patient appeared to have slept throughout the night, with early awakening. Respirations even and unlabored. No acute behaviors. Q15 minute checks continue.

## 2025-05-23 NOTE — NURSING NOTE
Pt calm and cooperative with assessment. Denies SI/HI/AH/VH and pain. Compliant with meds. Withdrawn to room for most of the evening, out for snack and to let needs be known. Q 15 minute checks ongoing.

## 2025-05-23 NOTE — SOCIAL WORK
CM spoke with pt at his request. Pt asked to get copies of everything he signed. Pt then presented with a flight of ideas and paranoia about the staff. CM attempted to redirect pt and discussed how he was sleeping and eating. He reported that he was eating well and sleeping well.

## 2025-05-23 NOTE — PROGRESS NOTES
Progress Note - Behavioral Health   Name: Freddie Graham 65 y.o. male I MRN: [id number]  Unit/Bed#: [facility] 245-02 I Date of Admission: 5/8/2025   Date of Service: 5/23/2025 I Hospital Day: 15    Assessment & Plan  Acute exacerbation of chronic schizoaffective schizophrenia (HCC)  65-year-old male known to this service with history of chronic mental illness and diagnosis of schizoaffective disorder.  Returns to the hospital in the context of being noncompliant and having exacerbated symptoms of mood lability, disorganized behaviors and illogical thinking.  We will restart patient on medications he was stabilized on in the past-  Invega increased to 12 mg PO QD; on 5/23/2025, first dose on 5/24/2025  Continue lithium 450 mg PO BID; increased 5/19/25,   Lithium level on 5/23/25 0.56, may increase Lithium over the weekend   Continue Artane 2 mg PO BID for EPS    Current Medications:    Current Facility-Administered Medications:     atorvastatin (LIPITOR) tablet 10 mg, Oral, Daily With Dinner    Cholecalciferol (VITAMIN D3) tablet 1,000 Units, Oral, Daily    hydrocortisone 2.5 % cream, Topical, BID PRN **AND** clotrimazole (LOTRIMIN) 1 % cream, Topical, BID    cyanocobalamin (VITAMIN B-12) tablet 500 mcg, Oral, Daily    lisinopril (ZESTRIL) tablet 10 mg, Oral, Daily    lithium carbonate capsule 450 mg, Oral, BID    [START ON 5/24/2025] paliperidone (INVEGA) 24 hr tablet 12 mg, Oral, Daily    tamsulosin (FLOMAX) capsule 0.4 mg, Oral, Daily With Dinner    trihexyphenidyl (ARTANE) tablet 2 mg, Oral, BID    Current Facility-Administered Medications:     acetaminophen (TYLENOL) tablet 650 mg, Oral, Q6H PRN    aluminum-magnesium hydroxide-simethicone (MAALOX) oral suspension 30 mL, Oral, Q4H PRN    benztropine (COGENTIN) injection 1 mg, Intramuscular, Q4H PRN Max 6/day    benztropine (COGENTIN) tablet 0.5 mg, Oral, Q4H PRN Max 6/day    Diclofenac Sodium (VOLTAREN) 1 % topical gel 2 g, Topical, 4x Daily PRN    hydrocortisone 2.5 %  cream, Topical, BID PRN **AND** clotrimazole (LOTRIMIN) 1 % cream, Topical, BID    hydrOXYzine HCL (ATARAX) tablet 25 mg, Oral, Q6H PRN Max 4/day    ibuprofen (MOTRIN) tablet 400 mg, Oral, Q6H PRN    ibuprofen (MOTRIN) tablet 600 mg, Oral, Q6H PRN    LORazepam (ATIVAN) injection 1 mg, Intramuscular, Q6H PRN Max 3/day    LORazepam (ATIVAN) tablet 0.5 mg, Oral, Q6H PRN Max 4/day    LORazepam (ATIVAN) tablet 1 mg, Oral, Q6H PRN Max 3/day    nicotine polacrilex (NICORETTE) gum 4 mg, Oral, Q4H PRN    OLANZapine (ZyPREXA) IM injection 5 mg, Intramuscular, Q3H PRN Max 3/day    OLANZapine (ZyPREXA) tablet 2.5 mg, Oral, Q4H PRN Max 6/day    OLANZapine (ZyPREXA) tablet 5 mg, Oral, Q4H PRN Max 3/day    OLANZapine (ZyPREXA) tablet 5 mg, Oral, Q3H PRN Max 3/day    phenazopyridine (PYRIDIUM) tablet 200 mg, Oral, TID PRN    polyethylene glycol (MIRALAX) packet 17 g, Oral, Daily PRN    propranolol (INDERAL) tablet 5 mg, Oral, Q8H PRN    senna-docusate sodium (SENOKOT S) 8.6-50 mg per tablet 1 tablet, Oral, Daily PRN    traZODone (DESYREL) tablet 50 mg, Oral, HS PRN    Risks/Benefits of Treatment:     Risks, benefits, and possible side effects of medications explained to patient and patient verbalizes understanding and agreement for treatment.    Progress Toward Goals: unchanged    Treatment Planning:      - Encourage early mobility and having a structured day  - Provide frequent re-orientation, and cognitive stimulation  - Ensure assistive devices are in proper working order (eye-glasses, hearing aids)  - Encourage adequate hydration, nutrition and monitor bowel movements  - Maintain sleep-wake cycle: Uninterrupted sleep time; low-level lighting at night  - Fall precaution  - Follow up with [facility] regarding the medical problems   - Continue medication titration and treatment plan; adjust medication to optimize treatment response and as clinically indicated.   - Observation: N/A  - VS: as per unit protocol  - Encourage group  attendance and milieu therapy  - Dispo: To be determined  - Estimated Discharge Day: 5/30/2025 7 days (5/30/2025)  - Legal Status : On 303 commitment.  - Long Stay Certification : Not Applicable    Watson Frazier was seen today for psychiatric follow-up. Patient is visible on the unit, social with peers. His personal hygiene is marginal. He remains disorganized, tangential and hyper verbal. Patient is redirectable  and his mood is controlled on the unit, with no recent behavioral outbursts. He denied any SI/HI/AVH when asked, although appears preoccupied.    Sleep: normal  Appetite: normal  Medication side effects: No  ROS: review of systems as noted above in HPI/Subjective report, all other systems are negative    Objective :  Temp:  [98.3 °F (36.8 °C)-98.4 °F (36.9 °C)] 98.3 °F (36.8 °C)  HR:  [84-90] 84  BP: ()/(67-73) 94/73  Resp:  [18] 18  SpO2:  [95 %-97 %] 97 %  O2 Device: None (Room air)    Mental Status Evaluation:    Appearance:  age appropriate, marginal hygiene   Behavior:  cooperative, calm   Speech:  normal rate and volume, hypertalkative   Mood:  Less labile   Affect:  mood-congruent   Thought Process:  disorganized, tangential   Thought Content:  some paranoia   Perceptual Disturbances: Denied AVH, although appears preoccupied   Risk Potential: Suicidal Ideation -  None at present  Homicidal Ideation -  None at present  Potential for Aggression - No   Sensorium:  oriented to person, place, and time/date   Memory:  recent and remote memory grossly intact   Consciousness:  alert and awake   Attention/Concentration: decreased attention span and decreased concentration   Insight:  impaired   Judgment: impaired   Gait/Station: normal gait/station   Motor Activity: no abnormal movements     Cognitive Assessment : N/A  Pain : denied  Pain Scale : 0      Lab Results: I have reviewed the following results:      Administrative Statements     Counseling / Coordination of Care:   Patient's progress  discussed with staff in treatment team meeting.  Medication changes reviewed with staff in treatment team meeting.        CARLOS Farnsworth 05/23/25

## 2025-05-23 NOTE — PROGRESS NOTES
05/23/25    Team Meeting   Meeting Type Daily Rounds   Team Members Present   Team Members Present Physician;Nurse;Occupational Therapist;   Physician Team Member MD Asher, CARLOS Flanagan   Nursing Team Member ADRIEN Ramos   Care Management Team Member MS Franko   Social Work Team Member MANA Ricks   OT Team Member NAYANA Ruff, NAYANA Pandya   Patient/Family Present   Patient Present No   Patient's Family Present No   303. Groomed nicely. Upset about phones being turned off this morning due to group. Berated staff.  Denies all. Eating/sleeping well. Med compliant. Focused on Lithium level. D/C date 5-28.

## 2025-05-24 PROCEDURE — 99232 SBSQ HOSP IP/OBS MODERATE 35: CPT

## 2025-05-24 RX ORDER — LITHIUM CARBONATE 300 MG/1
600 CAPSULE ORAL 2 TIMES DAILY
Status: DISCONTINUED | OUTPATIENT
Start: 2025-05-24 | End: 2025-06-01

## 2025-05-24 RX ADMIN — LISINOPRIL 10 MG: 10 TABLET ORAL at 08:25

## 2025-05-24 RX ADMIN — LORAZEPAM 1 MG: 1 TABLET ORAL at 14:17

## 2025-05-24 RX ADMIN — CYANOCOBALAMIN TAB 500 MCG 500 MCG: 500 TAB at 08:25

## 2025-05-24 RX ADMIN — CHOLECALCIFEROL TAB 25 MCG (1000 UNIT) 1000 UNITS: 25 TAB at 08:26

## 2025-05-24 RX ADMIN — ATORVASTATIN CALCIUM 10 MG: 10 TABLET, FILM COATED ORAL at 18:36

## 2025-05-24 RX ADMIN — LITHIUM CARBONATE 600 MG: 300 CAPSULE, GELATIN COATED ORAL at 18:35

## 2025-05-24 RX ADMIN — LITHIUM CARBONATE 450 MG: 300 CAPSULE, GELATIN COATED ORAL at 08:25

## 2025-05-24 RX ADMIN — TAMSULOSIN HYDROCHLORIDE 0.4 MG: 0.4 CAPSULE ORAL at 18:37

## 2025-05-24 RX ADMIN — PALIPERIDONE 12 MG: 6 TABLET, EXTENDED RELEASE ORAL at 08:25

## 2025-05-24 RX ADMIN — TRIHEXYPHENIDYL HYDROCHLORIDE 2 MG: 2 TABLET ORAL at 08:25

## 2025-05-24 RX ADMIN — TRIHEXYPHENIDYL HYDROCHLORIDE 2 MG: 2 TABLET ORAL at 18:36

## 2025-05-24 RX ADMIN — LORAZEPAM 1 MG: 1 TABLET ORAL at 05:18

## 2025-05-24 NOTE — PROGRESS NOTES
"Progress Note - Freddie Graham 65 y.o. male MRN: [id number]    Unit/Bed#: [facility] 245-02 Encounter: [id number]        Subjective:   Patient seen and examined at bedside after reviewing the chart and discussing the case with the caring staff.      Patient examined at bedside.  Patient denies any acute symptoms.      Physical Exam   Vitals: Blood pressure 133/93, pulse 86, temperature 97.5 °F (36.4 °C), temperature source Temporal, resp. rate 18, height 5' 9\" (1.753 m), weight 89.3 kg (196 lb 12.8 oz), SpO2 97%.,Body mass index is 29.06 kg/m².  Constitutional: Patient in no acute distress.  HEENT: PERR, EOMI, MMM.  Cardiovascular: Normal rate and regular rhythm.    Pulmonary/Chest: Effort normal and breath sounds normal.   Abdomen: Soft, + BS, NT.    Assessment/Plan:  Freddie Graham is a(n) 65 y.o. male with schizoaffective disorder.      Cardiac w/ HTN, HLD.  Not on BP medication.  Restart lisinopril 10 mg daily 5/9.  Continue atorvastatin 10 mg daily.   LISET.  Patient had recent Zio patch with cardiology.  Pauses found, no afib, thought to be related to LISET.  Recommended sleep study outpt.   Urothelial carcinoma/BPH.  Patient follows with urology outpt -due for follow up.  Continue Flomax 0.4 mg daily and pyridium 200 mg TID as needed for bladder spasms.   Chronic back pain/DJD.  CT spine 5/7 showing \"No acute displaced fracture or traumatic malalignment.  Multilevel degenerative disc and facet changes causing varying degrees of spinal and neuroforaminal narrowing. Up to moderate spinal stenosis at L4-5. Up to severe left L4-5 and L5-S1 neuroforaminal stenosis.\"  Tylenol/motrin as needed.  Voltaren gel 4x daily as needed.  Tobacco abuse.  Declines NRT.   Vitamin D deficiency.  Continue daily Vit D supplement.   Vitamin B12 deficiency.  Continue daily vitamin B12 supplement.  Seborrheic dermatitis.  Involving face.  Apply clotrimazole twice daily 5/17.  Hydrocortisone cream twice daily as needed.     The patient was discussed " with Dr. Gallagher and he is in agreement with the above note.

## 2025-05-24 NOTE — PLAN OF CARE
Problem: Anxiety  Goal: Anxiety is at manageable level  Description: Interventions:- Assess and monitor patient's anxiety level. - Monitor for signs and symptoms (heart palpitations, chest pain, shortness of breath, headaches, nausea, feeling jumpy, restlessness, irritable, apprehensive). - Collaborate with interdisciplinary team and initiate plan and interventions as ordered.- [place] patient to unit/surroundings- Explain treatment plan- Encourage participation in care- Encourage verbalization of concerns/fears- Identify coping mechanisms- Assist in developing anxiety-reducing skills- Administer/offer alternative therapies- Limit or eliminate stimulants  Outcome: Not Progressing     Problem: Risk for Violence/Aggression Toward Others  Goal: Control angry outbursts  Description: Interventions:- Monitor patient closely, per order- Ensure early verbal de-escalation- Monitor prn medication needs- Set reasonable/therapeutic limits, outline behavioral expectations, and consequences - Provide a non-threatening milieu, utilizing the least restrictive interventions   Outcome: Not Progressing

## 2025-05-24 NOTE — NURSING NOTE
Administered 1 mg lorazepam for moderate anxiety. Medication effective patient verbalized improvement in anxiety and was able to attend group.

## 2025-05-24 NOTE — PROGRESS NOTES
"Progress Note - Behavioral Health   Freddie CASTANEDA Day 65 y.o. male MRN: [id number]  Unit/Bed#: [facility] 245-02 Encounter: [id number]    Assessment & Plan   Principal Problem:    Acute exacerbation of chronic schizoaffective schizophrenia (HCC)    Assessment & Plan  Acute exacerbation of chronic schizoaffective schizophrenia (HCC)  65-year-old male known to this service with history of chronic mental illness and diagnosis of schizoaffective disorder.  Returns to the hospital in the context of being noncompliant and having exacerbated symptoms of mood lability, disorganized behaviors and illogical thinking.  We will restart patient on medications he was stabilized on in the past-  Invega increased to 12 mg PO QD; on 5/23/2025, first dose on 5/24/2025  Increase lithium 600 mg PO BID; last increased to 450 mg BID on 5/19/25, Li level draw 5/30 AM draw  Lithium level on 5/23/25 0.56, may increase Lithium over the weekend   Continue Artane 2 mg PO BID for EPS     Discharge planning: TBD    /73 (BP Location: Left arm)   Pulse 63   Temp 97.9 °F (36.6 °C) (Temporal)   Resp 18   Ht 5' 9\" (1.753 m)   Wt 89.3 kg (196 lb 12.8 oz)   SpO2 98%   BMI 29.06 kg/m²        Subjective: Today on my assessment the patient reports his mood as \"all right\".  He does admit that is difficult to fall asleep in the room as it is colder than expected.  He had no issues eating breakfast this morning.  He is currently denying any serious adverse effects with medications.  He does have this writer that he plans to call his family and friends this morning.  The patient does display paranoid ideation and talks at this writer about conspiracies related to the government, suicide assisted \"killing\" of people by those in power.  The patient was also preoccupied with his lithium dosing, but was asking for increase and was agreeable to the medication changes listed above.  He is currently denying suicidal and homicidal ideation.  He also denies auditory " "or visual hallucination.    Mental Status Evaluation:  Appearance:  bearded, disheveled, and unkempt   Behavior:  Cooperative, demanding    Speech:  normal volume and normal rate   Mood:  \"Alright\"   Affect:  constricted   Thought Process:  tangential   Associations: tangential associations, perseveration   Thought Content:  No overt delusions, paranoid thoughts (conspiracy theories)   Perceptual Disturbances: None   Risk Potential: Suicidal Ideations none  Homicidal Ideations none  Potential for Aggression No   Sensorium:  person and place   Memory:  recent and remote memory grossly intact   Consciousness:  alert and awake    Attention: attention span appeared shorter than expected for age   Insight:  poor   Judgment: poor   Gait/Station: normal gait/station   Motor Activity: no abnormal movements     Progress Toward Goals: Progressing slowly    Recommended Treatment: Continue with group therapy, milieu therapy and occupational therapy.      Risks, benefits, and possible side effects of medications explained to patient and patient verbalizes understanding.      Medications: all current active meds have been reviewed, continue current psychiatric medications, and current meds:   Current Facility-Administered Medications:     acetaminophen (TYLENOL) tablet 650 mg, Q6H PRN    aluminum-magnesium hydroxide-simethicone (MAALOX) oral suspension 30 mL, Q4H PRN    atorvastatin (LIPITOR) tablet 10 mg, Daily With Dinner    benztropine (COGENTIN) injection 1 mg, Q4H PRN Max 6/day    benztropine (COGENTIN) tablet 0.5 mg, Q4H PRN Max 6/day    Cholecalciferol (VITAMIN D3) tablet 1,000 Units, Daily    hydrocortisone 2.5 % cream, BID PRN **AND** clotrimazole (LOTRIMIN) 1 % cream, BID    cyanocobalamin (VITAMIN B-12) tablet 500 mcg, Daily    Diclofenac Sodium (VOLTAREN) 1 % topical gel 2 g, 4x Daily PRN    hydrOXYzine HCL (ATARAX) tablet 25 mg, Q6H PRN Max 4/day    ibuprofen (MOTRIN) tablet 400 mg, Q6H PRN    ibuprofen (MOTRIN) " tablet 600 mg, Q6H PRN    lisinopril (ZESTRIL) tablet 10 mg, Daily    lithium carbonate capsule 450 mg, BID    LORazepam (ATIVAN) injection 1 mg, Q6H PRN Max 3/day    LORazepam (ATIVAN) tablet 0.5 mg, Q6H PRN Max 4/day    LORazepam (ATIVAN) tablet 1 mg, Q6H PRN Max 3/day    nicotine polacrilex (NICORETTE) gum 4 mg, Q4H PRN    OLANZapine (ZyPREXA) IM injection 5 mg, Q3H PRN Max 3/day    OLANZapine (ZyPREXA) tablet 2.5 mg, Q4H PRN Max 6/day    OLANZapine (ZyPREXA) tablet 5 mg, Q4H PRN Max 3/day    OLANZapine (ZyPREXA) tablet 5 mg, Q3H PRN Max 3/day    paliperidone (INVEGA) 24 hr tablet 12 mg, Daily    phenazopyridine (PYRIDIUM) tablet 200 mg, TID PRN    polyethylene glycol (MIRALAX) packet 17 g, Daily PRN    propranolol (INDERAL) tablet 5 mg, Q8H PRN    senna-docusate sodium (SENOKOT S) 8.6-50 mg per tablet 1 tablet, Daily PRN    tamsulosin (FLOMAX) capsule 0.4 mg, Daily With Dinner    traZODone (DESYREL) tablet 50 mg, HS PRN    trihexyphenidyl (ARTANE) tablet 2 mg, BID.    Labs: I have personally reviewed all pertinent laboratory/tests results. Most Recent Labs:   Lab Results   Component Value Date    WBC 5.97 05/06/2025    RBC 4.72 05/06/2025    HGB 13.6 05/06/2025    HCT 41.3 05/06/2025     05/06/2025    RDW 13.1 05/06/2025    NEUTROABS 4.33 05/06/2025    SODIUM 137 05/06/2025    K 3.5 05/06/2025     05/06/2025    CO2 23 05/06/2025    BUN 15 05/06/2025    CREATININE 0.83 05/06/2025    GLUC 111 05/06/2025    GLUF 91 04/13/2025    CALCIUM 9.3 05/06/2025    AST 30 05/06/2025    ALT 31 05/06/2025    ALKPHOS 44 05/06/2025    TP 6.7 05/06/2025    ALB 4.3 05/06/2025    TBILI 0.81 05/06/2025    CHOLESTEROL 155 05/12/2025    HDL 47 05/12/2025    TRIG 69 05/12/2025    LDLCALC 94 05/12/2025    NONHDLC 108 05/12/2025    VALPROICTOT <10 (L) 02/17/2024    LITHIUM 0.56 (L) 05/23/2025    AMMONIA 51 02/06/2021    [id number] 1.939 05/06/2025    FREET4 0.97 02/17/2024    RPR Non-Reactive 11/03/2019    HGBA1C 5.7 (H)  04/07/2025     04/07/2025     Last Laboratory Results with Lithium level:   Lab Results   Component Value Date    SODIUM 137 05/06/2025    K 3.5 05/06/2025     05/06/2025    CO2 23 05/06/2025    BUN 15 05/06/2025    CREATININE 0.83 05/06/2025    GLUC 111 05/06/2025    GLUF 91 04/13/2025    CALCIUM 9.3 05/06/2025    LITHIUM 0.56 (L) 05/23/2025       Counseling / Coordination of Care  Total floor / unit time spent today 30 minutes. Greater than 50% of total time was spent with the patient and / or family counseling and / or coordination of care. A description of the counseling / coordination of care: Medication education, medication management, treatment plan, supportive therapy    This note has been constructed using a voice recognition system. There may be translation, syntax, or grammatical errors. If you have any questions, please contact the dictating author.     Ras Couch MD   Department of Psychiatry and Behavioral Health

## 2025-05-24 NOTE — NURSING NOTE
Patient appeared to have slept throughout the night. Respirations even and unlabored. No acute behaviors. Pt did wake from sleeping very anxious and agitated about being here. Ativan 1 mg PO administered per pt request. Q15 minute checks continue.

## 2025-05-24 NOTE — ASSESSMENT & PLAN NOTE
65-year-old male known to this service with history of chronic mental illness and diagnosis of schizoaffective disorder.  Returns to the hospital in the context of being noncompliant and having exacerbated symptoms of mood lability, disorganized behaviors and illogical thinking.  We will restart patient on medications he was stabilized on in the past-  Invega increased to 12 mg PO QD; on 5/23/2025, first dose on 5/24/2025  Increase lithium 600 mg PO BID; last increased to 450 mg BID on 5/19/25, Li level draw 5/30 AM draw  Lithium level on 5/23/25 0.56, may increase Lithium over the weekend   Continue Artane 2 mg PO BID for EPS

## 2025-05-25 PROCEDURE — 99232 SBSQ HOSP IP/OBS MODERATE 35: CPT

## 2025-05-25 RX ADMIN — TRIHEXYPHENIDYL HYDROCHLORIDE 2 MG: 2 TABLET ORAL at 08:38

## 2025-05-25 RX ADMIN — LISINOPRIL 10 MG: 10 TABLET ORAL at 08:35

## 2025-05-25 RX ADMIN — PALIPERIDONE 12 MG: 6 TABLET, EXTENDED RELEASE ORAL at 08:37

## 2025-05-25 RX ADMIN — TRIHEXYPHENIDYL HYDROCHLORIDE 2 MG: 2 TABLET ORAL at 17:25

## 2025-05-25 RX ADMIN — CHOLECALCIFEROL TAB 25 MCG (1000 UNIT) 1000 UNITS: 25 TAB at 08:38

## 2025-05-25 RX ADMIN — LITHIUM CARBONATE 600 MG: 300 CAPSULE, GELATIN COATED ORAL at 08:36

## 2025-05-25 RX ADMIN — TAMSULOSIN HYDROCHLORIDE 0.4 MG: 0.4 CAPSULE ORAL at 17:27

## 2025-05-25 RX ADMIN — CYANOCOBALAMIN TAB 500 MCG 500 MCG: 500 TAB at 08:37

## 2025-05-25 RX ADMIN — ATORVASTATIN CALCIUM 10 MG: 10 TABLET, FILM COATED ORAL at 17:26

## 2025-05-25 RX ADMIN — LITHIUM CARBONATE 600 MG: 300 CAPSULE, GELATIN COATED ORAL at 17:26

## 2025-05-25 NOTE — NURSING NOTE
"Mood and behaviors controled. Less labile. Less anxious. No irritable outbursts. Pleasant and controlled. Pt states, \"I'm glad that lithium was increased I need my levels above 0.6\" No PRN medications administered. Denies SI, HI, or hallucinations. Safety precautions maintained. Will continue to monitor.   "

## 2025-05-25 NOTE — PLAN OF CARE
Problem: Alteration in Thoughts and Perception  Goal: Verbalize thoughts and feelings  Description: Interventions:- Promote a nonjudgmental and trusting relationship with the patient through active listening and therapeutic communication- Assess patient's level of functioning, behavior and potential for risk- Engage patient in 1 on 1 interactions- Encourage patient to express fears, feelings, frustrations, and discuss symptoms  - [place] patient to reality, help patient recognize reality-based thinking - Administer medications as ordered and assess for potential side effects- Provide the patient education related to the signs and symptoms of the illness and desired effects of prescribed medications  Outcome: Progressing  Goal: Refrain from acting on delusional thinking/internal stimuli  Description: Interventions:- Monitor patient closely, per order - Utilize least restrictive measures - Set reasonable limits, give positive feedback for acceptable - Administer medications as ordered and monitor of potential side effects  Outcome: Progressing  Goal: Agree to be compliant with medication regime, as prescribed and report medication side effects  Description: Interventions:- Offer appropriate PRN medication and supervise ingestion; conduct AIMS, as needed   Outcome: Progressing

## 2025-05-25 NOTE — NURSING NOTE
"Patient observed within the milieu intermittently. Relatively subdued this evening. Reports having a difficult day but states he was glad to hear his lithium was increased as he states \"that means I'll be able to leave sooner\". No agitation or anxiety demonstrated this shift. Denies SI/HI or hallucinations. No PRN medications administered. Q15 minute checks ongoing.   "

## 2025-05-25 NOTE — PROGRESS NOTES
"Progress Note - Behavioral Health   Freddie CASTANEDA Day 65 y.o. male MRN: [id number]  Unit/Bed#: [facility] 245-02 Encounter: [id number]    Assessment & Plan   Principal Problem:    Acute exacerbation of chronic schizoaffective schizophrenia (HCC)      Assessment & Plan  Acute exacerbation of chronic schizoaffective schizophrenia (HCC)  65-year-old male known to this service with history of chronic mental illness and diagnosis of schizoaffective disorder.  Returns to the hospital in the context of being noncompliant and having exacerbated symptoms of mood lability, disorganized behaviors and illogical thinking.  We will restart patient on medications he was stabilized on in the past-  Invega increased to 12 mg PO QD; on 5/23/2025, first dose on 5/24/2025  Continue lithium 600 mg PO BID; last increased to 450 mg BID on 5/19/25, Li level draw 5/30 AM draw  Lithium level on 5/23/25 0.56, may increase Lithium over the weekend   Continue Artane 2 mg PO BID for EPS       Discharge planning: TBD    /93 (BP Location: Left arm)   Pulse 77   Temp 97.5 °F (36.4 °C) (Temporal)   Resp 18   Ht 5' 9\" (1.753 m)   Wt 90.6 kg (199 lb 12.8 oz)   SpO2 98%   BMI 29.51 kg/m²        Subjective: Today on my assessment the patient tells this writer that he is feeling \"better\" after the increase of the lithium.  He currently denies any complaints at this time.  He reports eating breakfast and denies any issues with his appetite.  He is currently denying suicidal and homicidal ideation.  He also denies auditory or visual hallucinations.  He is less tangential today and was more easily interrupted during the interview.  The patient was seen interacting with peers in the day room.      Mental Status Evaluation:  Appearance:  bearded, disheveled, and unkempt   Behavior:  Cooperative, demanding    Speech:  normal volume and normal rate   Mood:  \"Alright\"   Affect:  constricted   Thought Process:  tangential   Associations: tangential associations, " perseveration   Thought Content:  No overt delusions, paranoid thoughts (conspiracy theories)   Perceptual Disturbances: None   Risk Potential: Suicidal Ideations none  Homicidal Ideations none  Potential for Aggression No   Sensorium:  person and place   Memory:  recent and remote memory grossly intact   Consciousness:  alert and awake    Attention: attention span appeared shorter than expected for age   Insight:  poor   Judgment: poor   Gait/Station: normal gait/station   Motor Activity: no abnormal movements     Progress Toward Goals: Progressing slowly    Recommended Treatment: Continue with group therapy, milieu therapy and occupational therapy.      Risks, benefits, and possible side effects of medications explained to patient and patient verbalizes understanding.      Medications: all current active meds have been reviewed, continue current psychiatric medications, and current meds:   Current Facility-Administered Medications:     acetaminophen (TYLENOL) tablet 650 mg, Q6H PRN    aluminum-magnesium hydroxide-simethicone (MAALOX) oral suspension 30 mL, Q4H PRN    atorvastatin (LIPITOR) tablet 10 mg, Daily With Dinner    benztropine (COGENTIN) injection 1 mg, Q4H PRN Max 6/day    benztropine (COGENTIN) tablet 0.5 mg, Q4H PRN Max 6/day    Cholecalciferol (VITAMIN D3) tablet 1,000 Units, Daily    hydrocortisone 2.5 % cream, BID PRN **AND** clotrimazole (LOTRIMIN) 1 % cream, BID    cyanocobalamin (VITAMIN B-12) tablet 500 mcg, Daily    Diclofenac Sodium (VOLTAREN) 1 % topical gel 2 g, 4x Daily PRN    hydrOXYzine HCL (ATARAX) tablet 25 mg, Q6H PRN Max 4/day    ibuprofen (MOTRIN) tablet 400 mg, Q6H PRN    ibuprofen (MOTRIN) tablet 600 mg, Q6H PRN    lisinopril (ZESTRIL) tablet 10 mg, Daily    lithium carbonate capsule 600 mg, BID    LORazepam (ATIVAN) injection 1 mg, Q6H PRN Max 3/day    LORazepam (ATIVAN) tablet 0.5 mg, Q6H PRN Max 4/day    LORazepam (ATIVAN) tablet 1 mg, Q6H PRN Max 3/day    nicotine polacrilex  (NICORETTE) gum 4 mg, Q4H PRN    OLANZapine (ZyPREXA) IM injection 5 mg, Q3H PRN Max 3/day    OLANZapine (ZyPREXA) tablet 2.5 mg, Q4H PRN Max 6/day    OLANZapine (ZyPREXA) tablet 5 mg, Q4H PRN Max 3/day    OLANZapine (ZyPREXA) tablet 5 mg, Q3H PRN Max 3/day    paliperidone (INVEGA) 24 hr tablet 12 mg, Daily    phenazopyridine (PYRIDIUM) tablet 200 mg, TID PRN    polyethylene glycol (MIRALAX) packet 17 g, Daily PRN    propranolol (INDERAL) tablet 5 mg, Q8H PRN    senna-docusate sodium (SENOKOT S) 8.6-50 mg per tablet 1 tablet, Daily PRN    tamsulosin (FLOMAX) capsule 0.4 mg, Daily With Dinner    traZODone (DESYREL) tablet 50 mg, HS PRN    trihexyphenidyl (ARTANE) tablet 2 mg, BID.    Labs: I have personally reviewed all pertinent laboratory/tests results. Most Recent Labs:   Lab Results   Component Value Date    WBC 5.97 05/06/2025    RBC 4.72 05/06/2025    HGB 13.6 05/06/2025    HCT 41.3 05/06/2025     05/06/2025    RDW 13.1 05/06/2025    NEUTROABS 4.33 05/06/2025    SODIUM 137 05/06/2025    K 3.5 05/06/2025     05/06/2025    CO2 23 05/06/2025    BUN 15 05/06/2025    CREATININE 0.83 05/06/2025    GLUC 111 05/06/2025    GLUF 91 04/13/2025    CALCIUM 9.3 05/06/2025    AST 30 05/06/2025    ALT 31 05/06/2025    ALKPHOS 44 05/06/2025    TP 6.7 05/06/2025    ALB 4.3 05/06/2025    TBILI 0.81 05/06/2025    CHOLESTEROL 155 05/12/2025    HDL 47 05/12/2025    TRIG 69 05/12/2025    LDLCALC 94 05/12/2025    NONHDLC 108 05/12/2025    VALPROICTOT <10 (L) 02/17/2024    LITHIUM 0.56 (L) 05/23/2025    AMMONIA 51 02/06/2021    [id number] 1.939 05/06/2025    FREET4 0.97 02/17/2024    RPR Non-Reactive 11/03/2019    HGBA1C 5.7 (H) 04/07/2025     04/07/2025     Last Laboratory Results with Lithium level:   Lab Results   Component Value Date    SODIUM 137 05/06/2025    K 3.5 05/06/2025     05/06/2025    CO2 23 05/06/2025    BUN 15 05/06/2025    CREATININE 0.83 05/06/2025    GLUC 111 05/06/2025    GLUF 91  04/13/2025    CALCIUM 9.3 05/06/2025    LITHIUM 0.56 (L) 05/23/2025       Counseling / Coordination of Care  Total floor / unit time spent today 30 minutes. Greater than 50% of total time was spent with the patient and / or family counseling and / or coordination of care. A description of the counseling / coordination of care: Medication education, medication management, treatment plan, supportive therapy    This note has been constructed using a voice recognition system. There may be translation, syntax, or grammatical errors. If you have any questions, please contact the dictating author.     Ras Couch MD   Department of Psychiatry and Behavioral Health

## 2025-05-25 NOTE — PROGRESS NOTES
"Progress Note - Freddie Graham 65 y.o. male MRN: [id number]    Unit/Bed#: [facility] 245-02 Encounter: [id number]        Subjective:   Patient seen and examined at bedside after reviewing the chart and discussing the case with the caring staff.      Patient examined at bedside.  Patient denies any acute symptoms.  Reports 1 episode of diarrhea yesterday but had formed BM today.  Denies abdominal pain, nausea, vomiting.     Physical Exam   Vitals: Blood pressure 141/93, pulse 77, temperature 97.5 °F (36.4 °C), temperature source Temporal, resp. rate 18, height 5' 9\" (1.753 m), weight 90.6 kg (199 lb 12.8 oz), SpO2 98%.,Body mass index is 29.51 kg/m².  Constitutional: Patient in no acute distress.  HEENT: PERR, EOMI, MMM.  Cardiovascular: Normal rate and regular rhythm.    Pulmonary/Chest: Effort normal and breath sounds normal.   Abdomen: Soft, + BS, NT.    Assessment/Plan:  Freddie Graham is a(n) 65 y.o. male with schizoaffective disorder.      Cardiac w/ HTN, HLD.  Not on BP medication.  Restart lisinopril 10 mg daily 5/9.  Continue atorvastatin 10 mg daily.   LISET.  Patient had recent Zio patch with cardiology.  Pauses found, no afib, thought to be related to LISET.  Recommended sleep study outpt.   Urothelial carcinoma/BPH.  Patient follows with urology outpt -due for follow up.  Continue Flomax 0.4 mg daily and pyridium 200 mg TID as needed for bladder spasms.   Chronic back pain/DJD.  CT spine 5/7 showing \"No acute displaced fracture or traumatic malalignment.  Multilevel degenerative disc and facet changes causing varying degrees of spinal and neuroforaminal narrowing. Up to moderate spinal stenosis at L4-5. Up to severe left L4-5 and L5-S1 neuroforaminal stenosis.\"  Tylenol/motrin as needed.  Voltaren gel 4x daily as needed.  Tobacco abuse.  Declines NRT.   Vitamin D deficiency.  Continue daily Vit D supplement.   Vitamin B12 deficiency.  Continue daily vitamin B12 supplement.  Seborrheic dermatitis.  Involving face.  Apply " clotrimazole twice daily 5/17.  Hydrocortisone cream twice daily as needed.     The patient was discussed with Dr. Gallagher and he is in agreement with the above note.

## 2025-05-25 NOTE — ASSESSMENT & PLAN NOTE
65-year-old male known to this service with history of chronic mental illness and diagnosis of schizoaffective disorder.  Returns to the hospital in the context of being noncompliant and having exacerbated symptoms of mood lability, disorganized behaviors and illogical thinking.  We will restart patient on medications he was stabilized on in the past-  Invega increased to 12 mg PO QD; on 5/23/2025, first dose on 5/24/2025  Continue lithium 600 mg PO BID; last increased to 450 mg BID on 5/19/25, Li level draw 5/30 AM draw  Lithium level on 5/23/25 0.56, may increase Lithium over the weekend   Continue Artane 2 mg PO BID for EPS

## 2025-05-26 PROCEDURE — 99232 SBSQ HOSP IP/OBS MODERATE 35: CPT | Performed by: STUDENT IN AN ORGANIZED HEALTH CARE EDUCATION/TRAINING PROGRAM

## 2025-05-26 RX ORDER — LOPERAMIDE HYDROCHLORIDE 2 MG/1
2 CAPSULE ORAL EVERY 4 HOURS PRN
Status: DISCONTINUED | OUTPATIENT
Start: 2025-05-26 | End: 2025-06-04 | Stop reason: HOSPADM

## 2025-05-26 RX ADMIN — TRIHEXYPHENIDYL HYDROCHLORIDE 2 MG: 2 TABLET ORAL at 08:13

## 2025-05-26 RX ADMIN — LITHIUM CARBONATE 600 MG: 300 CAPSULE, GELATIN COATED ORAL at 17:24

## 2025-05-26 RX ADMIN — TRIHEXYPHENIDYL HYDROCHLORIDE 2 MG: 2 TABLET ORAL at 17:24

## 2025-05-26 RX ADMIN — CHOLECALCIFEROL TAB 25 MCG (1000 UNIT) 1000 UNITS: 25 TAB at 08:13

## 2025-05-26 RX ADMIN — LISINOPRIL 10 MG: 10 TABLET ORAL at 08:13

## 2025-05-26 RX ADMIN — LITHIUM CARBONATE 600 MG: 300 CAPSULE, GELATIN COATED ORAL at 08:13

## 2025-05-26 RX ADMIN — TAMSULOSIN HYDROCHLORIDE 0.4 MG: 0.4 CAPSULE ORAL at 17:24

## 2025-05-26 RX ADMIN — ACETAMINOPHEN 650 MG: 325 TABLET ORAL at 23:47

## 2025-05-26 RX ADMIN — CYANOCOBALAMIN TAB 500 MCG 500 MCG: 500 TAB at 08:13

## 2025-05-26 RX ADMIN — ATORVASTATIN CALCIUM 10 MG: 10 TABLET, FILM COATED ORAL at 17:24

## 2025-05-26 RX ADMIN — PALIPERIDONE 12 MG: 6 TABLET, EXTENDED RELEASE ORAL at 08:13

## 2025-05-26 NOTE — PLAN OF CARE
Problem: Alteration in Thoughts and Perception  Goal: Verbalize thoughts and feelings  Description: Interventions:- Promote a nonjudgmental and trusting relationship with the patient through active listening and therapeutic communication- Assess patient's level of functioning, behavior and potential for risk- Engage patient in 1 on 1 interactions- Encourage patient to express fears, feelings, frustrations, and discuss symptoms  - [place] patient to reality, help patient recognize reality-based thinking - Administer medications as ordered and assess for potential side effects- Provide the patient education related to the signs and symptoms of the illness and desired effects of prescribed medications  Outcome: Progressing  Goal: Refrain from acting on delusional thinking/internal stimuli  Description: Interventions:- Monitor patient closely, per order - Utilize least restrictive measures - Set reasonable limits, give positive feedback for acceptable - Administer medications as ordered and monitor of potential side effects  Outcome: Progressing  Goal: Agree to be compliant with medication regime, as prescribed and report medication side effects  Description: Interventions:- Offer appropriate PRN medication and supervise ingestion; conduct AIMS, as needed   Outcome: Progressing

## 2025-05-26 NOTE — PROGRESS NOTES
Progress Note - Behavioral Health   Name: Freddie Graham 65 y.o. male I MRN: [id number]  Unit/Bed#: -02 I Date of Admission: 5/8/2025   Date of Service: 5/26/2025 I Hospital Day: 18    Assessment & Plan  Acute exacerbation of chronic schizoaffective schizophrenia (HCC)  65-year-old male known to this service with history of chronic mental illness and diagnosis of schizoaffective disorder.  Returns to the hospital in the context of being noncompliant and having exacerbated symptoms of mood lability, disorganized behaviors and illogical thinking.  We will restart patient on medications he was stabilized on in the past-  Invega increased to 12 mg PO QD; on 5/23/2025, first dose on 5/24/2025  Continue lithium 600 mg PO BID;, Li level draw 5/30 AM draw  Lithium level on 5/23/25 0.56,  Lithium was increased over the weekend   Continue Artane 2 mg PO BID for EPS    Current Medications:    Current Facility-Administered Medications:     atorvastatin (LIPITOR) tablet 10 mg, Oral, Daily With Dinner    Cholecalciferol (VITAMIN D3) tablet 1,000 Units, Oral, Daily    hydrocortisone 2.5 % cream, Topical, BID PRN **AND** clotrimazole (LOTRIMIN) 1 % cream, Topical, BID    cyanocobalamin (VITAMIN B-12) tablet 500 mcg, Oral, Daily    lisinopril (ZESTRIL) tablet 10 mg, Oral, Daily    lithium carbonate capsule 600 mg, Oral, BID    paliperidone (INVEGA) 24 hr tablet 12 mg, Oral, Daily    tamsulosin (FLOMAX) capsule 0.4 mg, Oral, Daily With Dinner    trihexyphenidyl (ARTANE) tablet 2 mg, Oral, BID    Current Facility-Administered Medications:     acetaminophen (TYLENOL) tablet 650 mg, Oral, Q6H PRN    aluminum-magnesium hydroxide-simethicone (MAALOX) oral suspension 30 mL, Oral, Q4H PRN    benztropine (COGENTIN) injection 1 mg, Intramuscular, Q4H PRN Max 6/day    benztropine (COGENTIN) tablet 0.5 mg, Oral, Q4H PRN Max 6/day    Diclofenac Sodium (VOLTAREN) 1 % topical gel 2 g, Topical, 4x Daily PRN    hydrocortisone 2.5 % cream,  Topical, BID PRN **AND** clotrimazole (LOTRIMIN) 1 % cream, Topical, BID    hydrOXYzine HCL (ATARAX) tablet 25 mg, Oral, Q6H PRN Max 4/day    ibuprofen (MOTRIN) tablet 400 mg, Oral, Q6H PRN    ibuprofen (MOTRIN) tablet 600 mg, Oral, Q6H PRN    LORazepam (ATIVAN) injection 1 mg, Intramuscular, Q6H PRN Max 3/day    LORazepam (ATIVAN) tablet 0.5 mg, Oral, Q6H PRN Max 4/day    LORazepam (ATIVAN) tablet 1 mg, Oral, Q6H PRN Max 3/day    nicotine polacrilex (NICORETTE) gum 4 mg, Oral, Q4H PRN    OLANZapine (ZyPREXA) IM injection 5 mg, Intramuscular, Q3H PRN Max 3/day    OLANZapine (ZyPREXA) tablet 2.5 mg, Oral, Q4H PRN Max 6/day    OLANZapine (ZyPREXA) tablet 5 mg, Oral, Q4H PRN Max 3/day    OLANZapine (ZyPREXA) tablet 5 mg, Oral, Q3H PRN Max 3/day    phenazopyridine (PYRIDIUM) tablet 200 mg, Oral, TID PRN    polyethylene glycol (MIRALAX) packet 17 g, Oral, Daily PRN    propranolol (INDERAL) tablet 5 mg, Oral, Q8H PRN    senna-docusate sodium (SENOKOT S) 8.6-50 mg per tablet 1 tablet, Oral, Daily PRN    traZODone (DESYREL) tablet 50 mg, Oral, HS PRN    Risks/Benefits of Treatment:     Risks, benefits, and possible side effects of medications explained to patient. Patient has limited understanding of risks and benefits of treatment at this time, but agrees to take medications as prescribed.    Progress Toward Goals: poor insight, poor reality testing, poor motivation    Treatment Planning:     All current active medications have been reviewed.  Continue to monitor response to treatment and assess for potential side effects of medications.  Encourage group therapy, milieu therapy and occupational therapy.  Collaboration with medical service for medical comorbidities as indicated.  Behavioral Health checks for safety monitoring.  Estimated Discharge Day: 5/30/2025 30 days (6/25/2025)  Legal Status: Status of Admission  Status of Admission: 201  Release of Information Signed: No.  Long Stay Certification : Not  Applicable    Subjective     Behavior over the last 24 hours: unchanged    Freddie appears bizarre today, continues to feel disorganized, Pt visible on unit. Social with peers. Personal hygiene is good. Compliant with medications.     Sleep: normal  Appetite: normal  Medication side effects: No  ROS: review of systems as noted above in HPI/Subjective report, all other systems are negative    Objective :  Temp:  [97.4 °F (36.3 °C)-97.6 °F (36.4 °C)] 97.6 °F (36.4 °C)  HR:  [75-77] 76  BP: (106-135)/(67-92) 135/92  Resp:  [18] 18  SpO2:  [97 %-98 %] 98 %  O2 Device: None (Room air)    Mental Status Evaluation:    Appearance:  disheveled   Behavior:  bizarre, demanding, restless and fidgety   Speech:  pressured, tangential   Mood:  dysphoric   Affect:  constricted   Thought Process:  illogical, circumstantial, tangential   Thought Content:  paranoid ideation   Perceptual Disturbances: appears responding to internal stimuli, appears preoccupied, talks to self at times   Risk Potential: Suicidal Ideation -  None at present  Homicidal Ideation -  angry, hostile feelings with no homicidal plan  Potential for Aggression - Yes, due to acute psychosis   Sensorium:  grossly oriented   Memory:  recent and remote memory: unable to assess due to lack of cooperation   Consciousness:  awake   Attention/Concentration: attention span and concentration appear shorter than expected for age   Insight:  impaired due to psychosis   Judgment: impaired due to psychosis   Gait/Station: slow gait   Motor Activity: no abnormal movements       Lab Results: I have reviewed the following results:  Most Recent Labs:   Lab Results   Component Value Date    WBC 5.97 05/06/2025    RBC 4.72 05/06/2025    HGB 13.6 05/06/2025    HCT 41.3 05/06/2025     05/06/2025    RDW 13.1 05/06/2025    NEUTROABS 4.33 05/06/2025    SODIUM 137 05/06/2025    K 3.5 05/06/2025     05/06/2025    CO2 23 05/06/2025    BUN 15 05/06/2025    CREATININE 0.83  05/06/2025    GLUC 111 05/06/2025    CALCIUM 9.3 05/06/2025    AST 30 05/06/2025    ALT 31 05/06/2025    ALKPHOS 44 05/06/2025    TP 6.7 05/06/2025    ALB 4.3 05/06/2025    TBILI 0.81 05/06/2025    CHOLESTEROL 155 05/12/2025    HDL 47 05/12/2025    TRIG 69 05/12/2025    LDLCALC 94 05/12/2025    NONHDLC 108 05/12/2025    VALPROICTOT <10 (L) 02/17/2024    LITHIUM 0.56 (L) 05/23/2025    AMMONIA 51 02/06/2021    [id number] 1.939 05/06/2025    FREET4 0.97 02/17/2024    TREPONEMAPA Non-reactive 04/07/2025    HGBA1C 5.7 (H) 04/07/2025     04/07/2025     Admission Labs:   Admission on 05/08/2025   Component Date Value    Vitamin B-12 05/12/2025 290     Folate 05/12/2025 12.5     Vit D, 25-Hydroxy 05/12/2025 30.0     Cholesterol 05/12/2025 155     Triglycerides 05/12/2025 69     HDL, Direct 05/12/2025 47     LDL Calculated 05/12/2025 94     Non-HDL-Chol (CHOL-HDL) 05/12/2025 108     Lithium Lvl 05/16/2025 0.46 (L)     Lithium Lvl 05/23/2025 0.56 (L)      Lipid Profile:   Lab Results   Component Value Date    CHOLESTEROL 155 05/12/2025    HDL 47 05/12/2025    TRIG 69 05/12/2025    LDLCALC 94 05/12/2025    NONHDLC 108 05/12/2025     Liver Enzymes:   Lab Results   Component Value Date    AST 30 05/06/2025    ALT 31 05/06/2025    ALKPHOS 44 05/06/2025    TP 6.7 05/06/2025    ALB 4.3 05/06/2025    TBILI 0.81 05/06/2025     Thyroid Studies:   Lab Results   Component Value Date    [id number] 1.939 05/06/2025    FREET4 0.97 02/17/2024     Hemoglobin A1C/EST AVG Glucose   Lab Results   Component Value Date    HGBA1C 5.7 (H) 04/07/2025     04/07/2025     Lithium:   Lab Results   Component Value Date    LITHIUM 0.56 (L) 05/23/2025     ECG:   Lab Results   Component Value Date    VENTRATE 84 05/06/2025    ATRIALRATE 84 05/06/2025    PRINT 148 05/06/2025    QRSDINT 92 05/06/2025    QTINT 406 05/06/2025    PAXIS 67 05/06/2025    QRSAXIS -46 05/06/2025    TWAVEAXIS 70 05/06/2025     Imaging Studies: CT spine lumbar  without contrast  Result Date: 5/7/2025  Narrative: CT LUMBAR SPINE WITHOUT CONTRAST INDICATION:   low back pain. COMPARISON: None. TECHNIQUE:  Contiguous axial images through the lumbar spine were obtained. Sagittal and coronal reconstructions were performed. IV Contrast: Radiation dose length product (DLP) for this visit:  720 mGy-cm. .  This examination, like all CT scans performed in the [facility], was performed utilizing techniques to minimize radiation dose exposure, including the use of iterative reconstruction and automated exposure control. IMAGE QUALITY:  Diagnostic. FINDINGS: ALIGNMENT:  There are 5 lumbar type vertebral bodies.  Normal alignment of the lumbar spine.  No spondylolysis or spondylolisthesis. VERTEBRAE: Prominent L5 superior endplate Schmorl's node. No acute displaced fracture. Mild compression deformities T12 and L1. No lytic or blastic lesion. DEGENERATIVE CHANGES: Lower Thoracic spine: Normal lower thoracic disc spaces. Lumbar Spine: L1-2: No canal stenosis. Bilateral facet arthropathy causing mild neuroforaminal stenosis. L2-3: Circumferential disc bulge without canal stenosis. Bilateral facet arthropathy without significant neuroforaminal narrowing. L3-4: Circumferential disc bulge with mild ligamentum flavum hypertrophy and facet arthropathy causing mild canal stenosis. No significant neuroforaminal stenosis. L4-5: Circumferential disc bulge, ligamentum flavum hypertrophy and facet arthropathy causing mild to moderate canal stenosis. Severe left and moderate right neuroforaminal narrowing. L5-S1: Circumferential disc bulge and facet arthropathy without spinal stenosis. Severe left and moderate right neuroforaminal narrowing. PARASPINAL SOFT TISSUES:  Normal. OTHER: None.     Impression: No acute displaced fracture or traumatic malalignment. Multilevel degenerative disc and facet changes causing varying degrees of spinal and neuroforaminal narrowing. Up to moderate  "spinal stenosis at L4-5. Up to severe left L4-5 and L5-S1 neuroforaminal stenosis. Resident: Denis Garcia I, the attending radiologist, have reviewed the images and agree with the final report above. Workstation performed: [id number]     Administrative Statements     Counseling / Coordination of Care:   Patient's progress discussed with staff in treatment team meeting.  Medication changes reviewed with staff in treatment team meeting.    Portions of the record may have been created with voice recognition software. Occasional wrong word or \"sound a like\" substitutions may have occurred due to the inherent limitations of voice recognition software. Read the chart carefully and recognize, using context, where substitutions have occurred.    Ute Allen MD 05/16/25  "

## 2025-05-26 NOTE — NURSING NOTE
Patient visible and social with staff and peers.  Less labile. No outbursts/behaviors controlled. Patient denies any SI/HI, AV/H, anxiety or depression. Attended PM snack.  No HS medication schedules. No PRN's. Q 15 minute monitoring maintained.

## 2025-05-26 NOTE — NURSING NOTE
Patient was pleasant and cooperative. Patient social with staff and peers. Patient hyper verbal but appropriate. Staff support provided. Q 15 minute safety checks maintained. Groups encourgaed. Patient compliant with medications and groups. Patient remains loud but cooperative. Staff will continue to monitor and support.

## 2025-05-26 NOTE — ASSESSMENT & PLAN NOTE
65-year-old male known to this service with history of chronic mental illness and diagnosis of schizoaffective disorder.  Returns to the hospital in the context of being noncompliant and having exacerbated symptoms of mood lability, disorganized behaviors and illogical thinking.  We will restart patient on medications he was stabilized on in the past-  Invega increased to 12 mg PO QD; on 5/23/2025, first dose on 5/24/2025  Continue lithium 600 mg PO BID;, Li level draw 5/30 AM draw  Lithium level on 5/23/25 0.56,  Lithium was increased over the weekend   Continue Artane 2 mg PO BID for EPS

## 2025-05-26 NOTE — CMS CERTIFICATION NOTE
Recertification: Based upon physical, mental and social evaluations, I certify that inpatient psychiatric services continue to be medically necessary for this patient for a duration of 12 midnights for the treatment of  Acute exacerbation of chronic schizoaffective schizophrenia (HCC) Available alternative community resources still do not meet the patient's mental health care needs. I further attest that an established written individualized plan of care has been updated and is outlined in the patient's medical records.

## 2025-05-26 NOTE — PROGRESS NOTES
"Progress Note - Freddie Graham 65 y.o. male MRN: [id number]    Unit/Bed#: [facility] 245-02 Encounter: [id number]        Subjective:   Patient seen and examined at bedside after reviewing the chart and discussing the case with the caring staff.      Patient examined at bedside.  Patient reports having episode of diarrhea this morning.  Also had 1 episode of diarrhea on 5/24 but then had formed BM 5/25.  Denies abdominal pain, nausea, vomiting, fevers, chills.     Physical Exam   Vitals: Blood pressure 135/92, pulse 76, temperature 97.6 °F (36.4 °C), temperature source Temporal, resp. rate 18, height 5' 9\" (1.753 m), weight 90.6 kg (199 lb 12.8 oz), SpO2 98%.,Body mass index is 29.51 kg/m².  Constitutional: Patient in no acute distress.  HEENT: PERR, EOMI, MMM.  Cardiovascular: Normal rate and regular rhythm.    Pulmonary/Chest: Effort normal and breath sounds normal.   Abdomen: Soft, + BS, NT.    Assessment/Plan:  Freddie Graham is a(n) 65 y.o. male with schizoaffective disorder.      Cardiac w/ HTN, HLD.  Not on BP medication.  Restart lisinopril 10 mg daily 5/9.  Continue atorvastatin 10 mg daily.   LISET.  Patient had recent Zio patch with cardiology.  Pauses found, no afib, thought to be related to LISET.  Recommended sleep study outpt.   Urothelial carcinoma/BPH.  Patient follows with urology outpt -due for follow up.  Continue Flomax 0.4 mg daily and pyridium 200 mg TID as needed for bladder spasms.   Chronic back pain/DJD.  CT spine 5/7 showing \"No acute displaced fracture or traumatic malalignment.  Multilevel degenerative disc and facet changes causing varying degrees of spinal and neuroforaminal narrowing. Up to moderate spinal stenosis at L4-5. Up to severe left L4-5 and L5-S1 neuroforaminal stenosis.\"  Tylenol/motrin as needed.  Voltaren gel 4x daily as needed.  Tobacco abuse.  Declines NRT.   Vitamin D deficiency.  Continue daily Vit D supplement.   Vitamin B12 deficiency.  Continue daily vitamin B12 " supplement.  Seborrheic dermatitis.  Involving face.  Apply clotrimazole twice daily 5/17.  Hydrocortisone cream twice daily as needed.   Diarrhea.  Imodium as needed 5/26.  Consider probiotic/metamucil if continues.     The patient was discussed with Dr. Gallagher and he is in agreement with the above note.

## 2025-05-27 PROCEDURE — 99232 SBSQ HOSP IP/OBS MODERATE 35: CPT | Performed by: PSYCHIATRY & NEUROLOGY

## 2025-05-27 RX ADMIN — TRIHEXYPHENIDYL HYDROCHLORIDE 2 MG: 2 TABLET ORAL at 08:12

## 2025-05-27 RX ADMIN — LITHIUM CARBONATE 600 MG: 300 CAPSULE, GELATIN COATED ORAL at 17:01

## 2025-05-27 RX ADMIN — LITHIUM CARBONATE 600 MG: 300 CAPSULE, GELATIN COATED ORAL at 08:12

## 2025-05-27 RX ADMIN — CYANOCOBALAMIN TAB 500 MCG 500 MCG: 500 TAB at 08:12

## 2025-05-27 RX ADMIN — TRIHEXYPHENIDYL HYDROCHLORIDE 2 MG: 2 TABLET ORAL at 17:01

## 2025-05-27 RX ADMIN — CHOLECALCIFEROL TAB 25 MCG (1000 UNIT) 1000 UNITS: 25 TAB at 08:12

## 2025-05-27 RX ADMIN — PALIPERIDONE 12 MG: 6 TABLET, EXTENDED RELEASE ORAL at 08:12

## 2025-05-27 RX ADMIN — LORAZEPAM 1 MG: 1 TABLET ORAL at 12:46

## 2025-05-27 RX ADMIN — ATORVASTATIN CALCIUM 10 MG: 10 TABLET, FILM COATED ORAL at 17:00

## 2025-05-27 RX ADMIN — LISINOPRIL 10 MG: 10 TABLET ORAL at 08:12

## 2025-05-27 RX ADMIN — TAMSULOSIN HYDROCHLORIDE 0.4 MG: 0.4 CAPSULE ORAL at 17:00

## 2025-05-27 NOTE — PROGRESS NOTES
Progress Note - Behavioral Health     Freddie CASTANEDA Day 65 y.o. male MRN: [id number]   Unit/Bed#: [facility] 245-02 Encounter: [id number]    Behavior over the last 24 hours: minimal improvement.     Freddie seen today, per staff report has been calm and cooperative on the unit.  He however continues to be disorganized with his thoughts rambling at times.  The patient has also been anxious today requiring as needed medication.  He remains bizarre at times.  The patient is also described as hyperverbal.  The patient was examined by me today.  He continues to be ruminating about his behavior with his brother where he had thrown coffee but believes that he did nothing wrong.  He stated that he his brother wanted him to be in the hospital.  He stated that he had called the police himself.  The patient still remains disorganized with his thoughts at times rambling and speech remains pressured.  He also gets loud at times.  The patient has been sleeping better.  His medications are being titrated to stabilize his mood and decreased agitation as well as grandiose thinking.         Sleep: slept better  Appetite: normal  Medication side effects: No side effects are reported at this time.      Mental Status Evaluation:    Appearance:  marginal hygiene   Behavior:  cooperative, bizarre, restless   Speech:  pressured, hypertalkative   Mood:  euphoric   Affect:  labile   Thought Process:  disorganized, increased rate of thoughts, racing of thoughts, flight of ideas   Associations: circumstantial associations   Thought Content:  grandiose   Perceptual Disturbances: no auditory hallucinations   Risk Potential: Suicidal ideation - None at present  Homicidal ideation - None at present   Sensorium:  oriented to person, place, and time/date   Memory:  recent and remote memory grossly intact   Consciousness:  alert and awake   Attention: decreased concentration and decreased attention span   Insight:  impaired   Judgment: impaired   Gait/Station:  normal gait/station   Motor Activity: no abnormal movements     Vital signs in last 24 hours:    Temp:  [97.3 °F (36.3 °C)-97.5 °F (36.4 °C)] 97.5 °F (36.4 °C)  HR:  [] 102  BP: (105-148)/(71-76) 121/73  Resp:  [18] 18  SpO2:  [96 %-97 %] 96 %  O2 Device: None (Room air)    Laboratory results: I have personally reviewed all pertinent laboratory/tests results.  Most Recent Labs:   Lab Results   Component Value Date    WBC 5.97 05/06/2025    RBC 4.72 05/06/2025    HGB 13.6 05/06/2025    HCT 41.3 05/06/2025     05/06/2025    RDW 13.1 05/06/2025    NEUTROABS 4.33 05/06/2025    SODIUM 137 05/06/2025    K 3.5 05/06/2025     05/06/2025    CO2 23 05/06/2025    BUN 15 05/06/2025    CREATININE 0.83 05/06/2025    GLUC 111 05/06/2025    GLUF 91 04/13/2025    CALCIUM 9.3 05/06/2025    AST 30 05/06/2025    ALT 31 05/06/2025    ALKPHOS 44 05/06/2025    TP 6.7 05/06/2025    ALB 4.3 05/06/2025    TBILI 0.81 05/06/2025    CHOLESTEROL 155 05/12/2025    HDL 47 05/12/2025    TRIG 69 05/12/2025    LDLCALC 94 05/12/2025    NONHDLC 108 05/12/2025    VALPROICTOT <10 (L) 02/17/2024    LITHIUM 0.56 (L) 05/23/2025    AMMONIA 51 02/06/2021    [id number] 1.939 05/06/2025    FREET4 0.97 02/17/2024    RPR Non-Reactive 11/03/2019    HGBA1C 5.7 (H) 04/07/2025     04/07/2025           Assessment & Plan   Principal Problem:    Acute exacerbation of chronic schizoaffective schizophrenia (HCC)    Recommended Treatment:     Planned medication and treatment changes:      All current active medications have been reviewed  Encourage group therapy, milieu therapy and occupational therapy  Behavioral Health checks for safety monitoring  The patient is continued on current medication his lithium levels will be monitored.  Current Facility-Administered Medications   Medication Dose Route Frequency Provider Last Rate    acetaminophen  650 mg Oral Q6H PRN Barbara Clinton MD      aluminum-magnesium hydroxide-simethicone  30 mL Oral Q4H  PRN Barbara Clinton MD      atorvastatin  10 mg Oral Daily With Dinner Joanne Cronin PA-C      benztropine  1 mg Intramuscular Q4H PRN Max 6/day Barbara Clinton MD      benztropine  0.5 mg Oral Q4H PRN Max 6/day Barbara Clinton MD      cholecalciferol  1,000 Units Oral Daily Chai Gallagher MD      hydrocortisone   Topical BID PRN Joanne Cronin PA-C      And    clotrimazole   Topical BID Joanne Cronin PA-C      cyanocobalamin  500 mcg Oral Daily Chai Gallagher MD      Diclofenac Sodium  2 g Topical 4x Daily PRN Joanne Cronin PA-C      hydrOXYzine HCL  25 mg Oral Q6H PRN Max 4/day Barbara Clinton MD      ibuprofen  400 mg Oral Q6H PRN Barbara Clinton MD      ibuprofen  600 mg Oral Q6H PRN Barbara Clinton MD      lisinopril  10 mg Oral Daily Joanne Cronin PA-C      lithium carbonate  600 mg Oral BID Ras Couch MD      loperamide  2 mg Oral Q4H PRN Funmi Milian PA-C      LORazepam  1 mg Intramuscular Q6H PRN Max 3/day Barbara Clinton MD      LORazepam  0.5 mg Oral Q6H PRN Max 4/day Barbara Clinton MD      LORazepam  1 mg Oral Q6H PRN Max 3/day Barbara Clinton MD      nicotine polacrilex  4 mg Oral Q4H PRN Barbara Clinton MD      OLANZapine  5 mg Intramuscular Q3H PRN Max 3/day Barbara Clinton MD      OLANZapine  2.5 mg Oral Q4H PRN Max 6/day Barbara Clinton MD      OLANZapine  5 mg Oral Q4H PRN Max 3/day Barbara Clinton MD      OLANZapine  5 mg Oral Q3H PRN Max 3/day Barbara Clinton MD      paliperidone  12 mg Oral Daily CARLOS Farnsworth      phenazopyridine  200 mg Oral TID PRN Joanne Cronin PA-C      polyethylene glycol  17 g Oral Daily PRN Barbara Clinton MD      propranolol  5 mg Oral Q8H PRN Barbara Clinton MD      senna-docusate sodium  1 tablet Oral Daily PRN Barbara Clinton MD      tamsulosin  0.4 mg Oral Daily With Dinner Joanne Cronin PA-C      traZODone  50 mg Oral HS PRN Barbara Clinton MD       trihexyphenidyl  2 mg Oral BID CARLOS Farnsworth         Risks / Benefits of Treatment:    Risks, benefits, and possible side effects of medications explained to patient and patient verbalizes understanding and agreement for treatment.    Counseling / Coordination of Care:    Total floor / unit time spent today 20 minutes. Greater than 50% of total time was spent with the patient and / or family counseling and / or coordination of care. A description of counseling / coordination of care:  Patient's progress discussed with staff in treatment team meeting.  304 commitment was also completed for further treatment.  Considering his mental status I believe that he will need further stabilization of his mood as well as other symptoms of opal.    Juaquin Marquez MD 05/27/25

## 2025-05-27 NOTE — PLAN OF CARE
Problem: Depression  Goal: Verbalize thoughts and feelings  Description: Interventions:- Assess and re-assess patient's level of risk - Engage patient in 1:1 interactions, daily, for a minimum of 15 minutes - Encourage patient to express feelings, fears, frustrations, hopes   Outcome: Progressing  Goal: Refrain from harming self  Description: Interventions:- Monitor patient closely, per order - Supervise medication ingestion, monitor effects and side effects   Outcome: Progressing  Goal: Refrain from isolation  Description: Interventions:- Develop a trusting relationship - Encourage socialization   Outcome: Progressing

## 2025-05-27 NOTE — NURSING NOTE
Pt came up to the nurses station stating he is having bad anxiety. PT does not know why. He appeared anxious and was pacing. Pt was getting louder and was rambling more. Pt had a estrada score of 28 and received ativan 1 mg. Will re-assess for effectiveness. Continuous safety monitoring in place.       Pt medication was re-assessed one hour later and he stated that it helped a lot and he found relief taking it. Pt was in the dining room waiting for group.

## 2025-05-27 NOTE — NURSING NOTE
Pt calm and cooperative today. Medication compliant. Disorganized in thought process/rambles but overall pleasant. PT visible on the unit and social with peers. No acute behaviors noted. Continuous safety monitoring in place.

## 2025-05-27 NOTE — PROGRESS NOTES
"Progress Note - Freddie Graham 65 y.o. male MRN: [id number]    Unit/Bed#: [facility] 245-02 Encounter: [id number]        Subjective:   Patient seen and examined at bedside after reviewing the chart and discussing the case with the caring staff.      Patient examined at bedside.  Patient reports no acute symptoms.     Physical Exam   Vitals: Blood pressure 121/73, pulse 102, temperature 97.5 °F (36.4 °C), temperature source Temporal, resp. rate 18, height 5' 9\" (1.753 m), weight 90.6 kg (199 lb 12.8 oz), SpO2 96%.,Body mass index is 29.51 kg/m².  Constitutional: Patient in no acute distress.  HEENT: PERR, EOMI, MMM.  Cardiovascular: Normal rate and regular rhythm.    Pulmonary/Chest: Effort normal and breath sounds normal.   Abdomen: Soft, + BS, NT.    Assessment/Plan:  Freddie Graham is a(n) 65 y.o. male with schizoaffective disorder.      Cardiac w/ HTN, HLD.  Not on BP medication.  Restart lisinopril 10 mg daily 5/9.  Continue atorvastatin 10 mg daily.   LISET.  Patient had recent Zio patch with cardiology.  Pauses found, no afib, thought to be related to LISET.  Recommended sleep study outpt.   Urothelial carcinoma/BPH.  Patient follows with urology outpt -due for follow up.  Continue Flomax 0.4 mg daily and pyridium 200 mg TID as needed for bladder spasms.   Chronic back pain/DJD.  CT spine 5/7 showing \"No acute displaced fracture or traumatic malalignment.  Multilevel degenerative disc and facet changes causing varying degrees of spinal and neuroforaminal narrowing. Up to moderate spinal stenosis at L4-5. Up to severe left L4-5 and L5-S1 neuroforaminal stenosis.\"  Tylenol/motrin as needed.  Voltaren gel 4x daily as needed.  Tobacco abuse.  Declines NRT.   Vitamin D deficiency.  Continue daily Vit D supplement.   Vitamin B12 deficiency.  Continue daily vitamin B12 supplement.  Seborrheic dermatitis.  Involving face.  Apply clotrimazole twice daily 5/17.  Hydrocortisone cream twice daily as needed.   Diarrhea.  Imodium as needed " 5/26.  Consider probiotic/metamucil if continues.

## 2025-05-28 PROCEDURE — 99232 SBSQ HOSP IP/OBS MODERATE 35: CPT | Performed by: PSYCHIATRY & NEUROLOGY

## 2025-05-28 RX ADMIN — CYANOCOBALAMIN TAB 500 MCG 500 MCG: 500 TAB at 08:06

## 2025-05-28 RX ADMIN — CHOLECALCIFEROL TAB 25 MCG (1000 UNIT) 1000 UNITS: 25 TAB at 08:06

## 2025-05-28 RX ADMIN — LITHIUM CARBONATE 600 MG: 300 CAPSULE, GELATIN COATED ORAL at 08:06

## 2025-05-28 RX ADMIN — ATORVASTATIN CALCIUM 10 MG: 10 TABLET, FILM COATED ORAL at 16:54

## 2025-05-28 RX ADMIN — TAMSULOSIN HYDROCHLORIDE 0.4 MG: 0.4 CAPSULE ORAL at 16:53

## 2025-05-28 RX ADMIN — TRIHEXYPHENIDYL HYDROCHLORIDE 2 MG: 2 TABLET ORAL at 08:06

## 2025-05-28 RX ADMIN — LITHIUM CARBONATE 600 MG: 300 CAPSULE, GELATIN COATED ORAL at 17:03

## 2025-05-28 RX ADMIN — TRIHEXYPHENIDYL HYDROCHLORIDE 2 MG: 2 TABLET ORAL at 17:03

## 2025-05-28 RX ADMIN — LISINOPRIL 10 MG: 10 TABLET ORAL at 08:06

## 2025-05-28 RX ADMIN — PALIPERIDONE 12 MG: 6 TABLET, EXTENDED RELEASE ORAL at 08:06

## 2025-05-28 NOTE — PROGRESS NOTES
Progress Note - Behavioral Health     Freddie CASTANEDA Day 65 y.o. male MRN: [id number]   Unit/Bed#: [facility] 245-02 Encounter: [id number]    Behavior over the last 24 hours: some improvement.     Freddie seen today, per staff report has been pleasant and cooperative on the unit.  He has been social with staff and peers.  Remains compliant with medication and groups.  He is not reporting any SI/HI to the staff.    The patient reported that he is feeling little anxious.  He says that he is worried about his legal charges.  The patient states that he had a DUI.  He believes that he was supposed to be at the hearing but he is here and it is causing some anxiety.  He thinks that he may have a warrant out for him.  The patient however believes that he was admitted because of his brother and does not think that he was experiencing any symptoms before coming here.  He was noted to be somewhat less disorganized today and was able to have more rational discussion.  He was able to talk about past treatment and his behaviors.  He is also aware of the side effects and when he is supposed to get his blood work.  Speech was also not as pressured as yesterday.  The patient reports that he has been sleeping well and appetite has been good he denies that he is having any depressed moods.  Does not report any euphoric moods does report some restlessness because of the anxiety.         Sleep: slept better  Appetite: fair  Medication side effects: None reported at present      Mental Status Evaluation:    Appearance:  disheveled   Behavior:  pleasant, cooperative, calm   Speech:  normal rate, normal volume, normal pitch   Mood:  anxious   Affect:  less labile   Thought Process:  logical, coherent, more organized   Associations: less circumstantial   Thought Content:  grandiose ideas   Perceptual Disturbances: no auditory hallucinations, no visual hallucinations   Risk Potential: Suicidal ideation - None at present  Homicidal ideation - None at present    Sensorium:  oriented to person, place, and time/date   Memory:  recent and remote memory grossly intact   Consciousness:  alert and awake   Attention: attention span and concentration are age appropriate   Insight:  fair   Judgment: fair   Gait/Station: normal gait/station   Motor Activity: no abnormal movements     Vital signs in last 24 hours:    Temp:  [98.4 °F (36.9 °C)-99.1 °F (37.3 °C)] 98.4 °F (36.9 °C)  HR:  [75-80] 80  BP: ()/(61-80) 123/80  Resp:  [18] 18  SpO2:  [98 %] 98 %  O2 Device: None (Room air)    Laboratory results: I have personally reviewed all pertinent laboratory/tests results.        Assessment & Plan   Principal Problem:    Acute exacerbation of chronic schizoaffective schizophrenia (HCC)    Recommended Treatment:     Planned medication and treatment changes:      All current active medications have been reviewed  Encourage group therapy, milieu therapy and occupational therapy  Behavioral Health checks for safety monitoring  Continue with current medication.  No changes recommended on this visit  Current Facility-Administered Medications   Medication Dose Route Frequency Provider Last Rate    acetaminophen  650 mg Oral Q6H PRN Barbara Clinton MD      aluminum-magnesium hydroxide-simethicone  30 mL Oral Q4H PRN Barbara Clinton MD      atorvastatin  10 mg Oral Daily With Dinner Joanne Cronin PA-C      benztropine  1 mg Intramuscular Q4H PRN Max 6/day Barbara Clinton MD      benztropine  0.5 mg Oral Q4H PRN Max 6/day Barbara Clinton MD      cholecalciferol  1,000 Units Oral Daily Chai Gallagher MD      hydrocortisone   Topical BID PRN Joanne Cronin PA-C      And    clotrimazole   Topical BID Joanne Cronin PA-C      cyanocobalamin  500 mcg Oral Daily Chai Gallagher MD      Diclofenac Sodium  2 g Topical 4x Daily PRN Joanne Cronin PA-C      hydrOXYzine HCL  25 mg Oral Q6H PRN Max 4/day Barbara Clinton MD      ibuprofen  400 mg Oral Q6H PRN  Barbara Clinton MD      ibuprofen  600 mg Oral Q6H PRN Barbara Clinton MD      lisinopril  10 mg Oral Daily Joanne Cronin PA-C      lithium carbonate  600 mg Oral BID Ras Couch MD      loperamide  2 mg Oral Q4H PRN Funmi Milian PA-C      LORazepam  1 mg Intramuscular Q6H PRN Max 3/day Barbara Clinton MD      LORazepam  0.5 mg Oral Q6H PRN Max 4/day Barbara Clinton MD      LORazepam  1 mg Oral Q6H PRN Max 3/day Barbara Clinton MD      nicotine polacrilex  4 mg Oral Q4H PRN Barbara Clinton MD      OLANZapine  5 mg Intramuscular Q3H PRN Max 3/day Barbraa Clinton MD      OLANZapine  2.5 mg Oral Q4H PRN Max 6/day Barbara Clinton MD      OLANZapine  5 mg Oral Q4H PRN Max 3/day Barbara Clinton MD      OLANZapine  5 mg Oral Q3H PRN Max 3/day Barbara Clinton MD      paliperidone  12 mg Oral Daily CARLOS Farnsworth      phenazopyridine  200 mg Oral TID PRN Joanne Cronin PA-C      polyethylene glycol  17 g Oral Daily PRN Barbara Clinton MD      propranolol  5 mg Oral Q8H PRN Barbara Clinton MD      senna-docusate sodium  1 tablet Oral Daily PRN Barbara Clinton MD      tamsulosin  0.4 mg Oral Daily With Dinner Joanne Cronin PA-C      traZODone  50 mg Oral HS PRN Barbara Clinton MD      trihexyphenidyl  2 mg Oral BID CARLOS Farnsworth         Risks / Benefits of Treatment:    Risks, benefits, and possible side effects of medications explained to patient and patient verbalizes understanding and agreement for treatment.    Counseling / Coordination of Care:    Total floor / unit time spent today 15 minutes. Greater than 50% of total time was spent with the patient and / or family counseling and / or coordination of care. A description of counseling / coordination of care:  Patient's progress discussed with staff in treatment team meeting.    Juaquin Marquez MD 05/28/25

## 2025-05-28 NOTE — PROGRESS NOTES
"Progress Note - Freddie Graham 65 y.o. male MRN: [id number]    Unit/Bed#: [facility] 245-02 Encounter: [id number]        Subjective:   Patient seen and examined at bedside after reviewing the chart and discussing the case with the caring staff.      Patient examined at bedside.  Patient reports no acute symptoms.     Physical Exam   Vitals: Blood pressure 123/80, pulse 80, temperature 98.4 °F (36.9 °C), temperature source Temporal, resp. rate 18, height 5' 9\" (1.753 m), weight 90.6 kg (199 lb 12.8 oz), SpO2 98%.,Body mass index is 29.51 kg/m².  Constitutional: Patient in no acute distress.  HEENT: PERR, EOMI, MMM.  Cardiovascular: Normal rate and regular rhythm.    Pulmonary/Chest: Effort normal and breath sounds normal.   Abdomen: Soft, + BS, NT.    Assessment/Plan:  Freddie Graham is a(n) 65 y.o. male with schizoaffective disorder.      Cardiac w/ HTN, HLD.  Not on BP medication.  Restart lisinopril 10 mg daily 5/9.  Continue atorvastatin 10 mg daily.   LISET.  Patient had recent Zio patch with cardiology.  Pauses found, no afib, thought to be related to LISET.  Recommended sleep study outpt.   Urothelial carcinoma/BPH.  Patient follows with urology outpt -due for follow up.  Continue Flomax 0.4 mg daily and pyridium 200 mg TID as needed for bladder spasms.   Chronic back pain/DJD.  CT spine 5/7 showing \"No acute displaced fracture or traumatic malalignment.  Multilevel degenerative disc and facet changes causing varying degrees of spinal and neuroforaminal narrowing. Up to moderate spinal stenosis at L4-5. Up to severe left L4-5 and L5-S1 neuroforaminal stenosis.\"  Tylenol/motrin as needed.  Voltaren gel 4x daily as needed.  Tobacco abuse.  Declines NRT.   Vitamin D deficiency.  Continue daily Vit D supplement.   Vitamin B12 deficiency.  Continue daily vitamin B12 supplement.  Seborrheic dermatitis.  Involving face.  Apply clotrimazole twice daily 5/17.  Hydrocortisone cream twice daily as needed.   Diarrhea.  Imodium as needed " 5/26.  Consider probiotic/metamucil if continues.     The patient was discussed with Dr. Gallagher and he is in agreement with the above note.

## 2025-05-28 NOTE — PLAN OF CARE
Problem: Depression  Goal: Refrain from isolation  Description: Interventions:- Develop a trusting relationship - Encourage socialization   Outcome: Progressing   Pt is active in groups and on the unit.

## 2025-05-28 NOTE — PLAN OF CARE
Problem: Ineffective Coping  Goal: Patient/Family participate in treatment and DC plans  Description: Interventions:- Provide therapeutic environment  Outcome: Progressing  Goal: Patient/Family verbalizes awareness of resources  Outcome: Progressing

## 2025-05-28 NOTE — NURSING NOTE
Pt calm and cooperative with assessment. Denies SI/HI/AH/VH and pain. Compliant with meds. Withdrawn to room for most of the evening, out for snack and to let needs be known. Questioning when his Lithium level would be drawn again and who had the final decision about his discharge. He's concerned about how he missed his court date for a DUI and wants to know if case management has contacted his . Q 15 minute checks ongoing.

## 2025-05-28 NOTE — NURSING NOTE
Patient was pleasant and cooperative. Patient social with staff and peers. Staff support provided. Q 15 minute safety checks maintained. Groups encouraged. Patient compliant with medications and groups. Patient denied SI/HI. Patient told staff that he is ready for discharge. Staff will continue to monitor and support.

## 2025-05-28 NOTE — PROGRESS NOTES
05/28/25    Team Meeting   Meeting Type Daily Rounds   Team Members Present   Team Members Present Physician;Nurse;;;Occupational Therapist   Physician Team Member MD Jimmy; MD Oz; CARLOS Flanagan   Nursing Team Member ADRIEN Zurita   Care Management Team Member MS Franko   Social Work Team Member MANA Ricks   OT Team Member NAYANA Ruff; NAYANA Pandya   Patient/Family Present   Patient Present No   Patient's Family Present No   303. Med compliant.  Eating/sleeping well. Disorganized at times. Still has delusions and paranoia. Denies all. D/C date not known due to med adjustment.

## 2025-05-28 NOTE — PROGRESS NOTES
05/27/25      Team Meeting   Meeting Type Daily Rounds   Team Members Present   Team Members Present Physician;Nurse;;;Occupational Therapist   Physician Team Member MD Jimmy; Oz PAYTON, CARLOS Flanagan   Nursing Team Member ADRIEN Zurita   Care Management Team Member MS Franko   Social Work Team Member MANA Ricks   OT Team Member NAYANA Ruff   Patient/Family Present   Patient Present No   Patient's Family Present No   303. Incontinence. Disorganized. Broken Sleep. Calm, visible on the unit. Med compliant. Denies all. D/C date not known due to med adjustment. Eating well.

## 2025-05-29 PROCEDURE — 99232 SBSQ HOSP IP/OBS MODERATE 35: CPT | Performed by: PSYCHIATRY & NEUROLOGY

## 2025-05-29 RX ADMIN — PALIPERIDONE 12 MG: 6 TABLET, EXTENDED RELEASE ORAL at 08:20

## 2025-05-29 RX ADMIN — CHOLECALCIFEROL TAB 25 MCG (1000 UNIT) 1000 UNITS: 25 TAB at 08:21

## 2025-05-29 RX ADMIN — TRIHEXYPHENIDYL HYDROCHLORIDE 2 MG: 2 TABLET ORAL at 08:20

## 2025-05-29 RX ADMIN — LORAZEPAM 1 MG: 1 TABLET ORAL at 06:04

## 2025-05-29 RX ADMIN — LISINOPRIL 10 MG: 10 TABLET ORAL at 08:21

## 2025-05-29 RX ADMIN — LITHIUM CARBONATE 600 MG: 300 CAPSULE, GELATIN COATED ORAL at 17:52

## 2025-05-29 RX ADMIN — TAMSULOSIN HYDROCHLORIDE 0.4 MG: 0.4 CAPSULE ORAL at 17:00

## 2025-05-29 RX ADMIN — CYANOCOBALAMIN TAB 500 MCG 500 MCG: 500 TAB at 08:20

## 2025-05-29 RX ADMIN — ATORVASTATIN CALCIUM 10 MG: 10 TABLET, FILM COATED ORAL at 17:00

## 2025-05-29 RX ADMIN — LORAZEPAM 0.5 MG: 0.5 TABLET ORAL at 23:41

## 2025-05-29 RX ADMIN — LITHIUM CARBONATE 600 MG: 300 CAPSULE, GELATIN COATED ORAL at 08:20

## 2025-05-29 RX ADMIN — TRIHEXYPHENIDYL HYDROCHLORIDE 2 MG: 2 TABLET ORAL at 17:52

## 2025-05-29 NOTE — PLAN OF CARE
Problem: Alteration in Thoughts and Perception  Goal: Agree to be compliant with medication regime, as prescribed and report medication side effects  Description: Interventions:- Offer appropriate PRN medication and supervise ingestion; conduct AIMS, as needed   Outcome: Progressing     Problem: Risk for Violence/Aggression Toward Others  Goal: Control angry outbursts  Description: Interventions:- Monitor patient closely, per order- Ensure early verbal de-escalation- Monitor prn medication needs- Set reasonable/therapeutic limits, outline behavioral expectations, and consequences - Provide a non-threatening milieu, utilizing the least restrictive interventions   Outcome: Progressing     Problem: Alteration in Orientation  Goal: Interact with staff daily  Description: Interventions:- Assess and re-assess patient's level of orientation- Engage patient in 1 on 1 interactions, daily, for a minimum of 15 minutes - Establish rapport/trust with patient   Outcome: Progressing

## 2025-05-29 NOTE — PROGRESS NOTES
"Progress Note - Freddie Graham 65 y.o. male MRN: [id number]    Unit/Bed#: [facility] 245-02 Encounter: [id number]        Subjective:   Patient seen and examined at bedside after reviewing the chart and discussing the case with the caring staff.      Patient examined at bedside.  Patient reports no acute symptoms.     Physical Exam   Vitals: Blood pressure 150/81, pulse 71, temperature 98.5 °F (36.9 °C), temperature source Temporal, resp. rate 18, height 5' 9\" (1.753 m), weight 90.6 kg (199 lb 12.8 oz), SpO2 99%.,Body mass index is 29.51 kg/m².  Constitutional: Patient in no acute distress.  HEENT: PERR, EOMI, MMM.  Cardiovascular: Normal rate and regular rhythm.    Pulmonary/Chest: Effort normal and breath sounds normal.   Abdomen: Soft, + BS, NT.    Assessment/Plan:  Freddie Graham is a(n) 65 y.o. male with schizoaffective disorder.      Cardiac w/ HTN, HLD.  Not on BP medication.  Restart lisinopril 10 mg daily 5/9.  Continue atorvastatin 10 mg daily.   LISET.  Patient had recent Zio patch with cardiology.  Pauses found, no afib, thought to be related to LISET.  Recommended sleep study outpt.   Urothelial carcinoma/BPH.  Patient follows with urology outpt -due for follow up.  Continue Flomax 0.4 mg daily and pyridium 200 mg TID as needed for bladder spasms.   Chronic back pain/DJD.  CT spine 5/7 showing \"No acute displaced fracture or traumatic malalignment.  Multilevel degenerative disc and facet changes causing varying degrees of spinal and neuroforaminal narrowing. Up to moderate spinal stenosis at L4-5. Up to severe left L4-5 and L5-S1 neuroforaminal stenosis.\"  Tylenol/motrin as needed.  Voltaren gel 4x daily as needed.  Tobacco abuse.  Declines NRT.   Vitamin D deficiency.  Continue daily Vit D supplement.   Vitamin B12 deficiency.  Continue daily vitamin B12 supplement.  Seborrheic dermatitis.  Involving face.  Apply clotrimazole twice daily 5/17.  Hydrocortisone cream twice daily as needed.   Diarrhea.  Imodium as needed " 5/26.  Consider probiotic/metamucil if continues.     The patient was discussed with Dr. Gallagher and he is in agreement with the above note.

## 2025-05-29 NOTE — TREATMENT TEAM
05/29/25 0604   Estrada Anxiety Scale   Anxious Mood 3   Tension 3   Fears 3   Insomnia 1   Intellectual 3   Depressed Mood 3   Somatic Complaints: Muscular 2   Somatic Complaints: Sensory 1   Cardiovascular Symptoms 0   Respiratory Symptoms 2   Gastrointestinal Symptoms 0   Genitourinary Symptoms 0   Autonomic Symptoms 3   Behavior at Interview 3   Estrada Anxiety Score 27     Pt endorsing anxiety related to admission and not being able to sleep although pt appeared to sleep throughout majority of the night. Ativan 1mg PO given per estrada score. Will continue to monitor and reassess.

## 2025-05-29 NOTE — NURSING NOTE
Patient was pleasant and cooperative. Patient social with staff and peers. Staff support provided. Q 15 minute safety checks maintained. Groups encouraged. Patient compliant with medications and groups. Patient focused on upcoming court hearing. Patient reports feeling good. Patient denied SI/HI. Patient more organized in thought. Staff will continue to monitor and support.

## 2025-05-29 NOTE — PROGRESS NOTES
05/29/25    Team Meeting   Meeting Type Daily Rounds   Team Members Present   Team Members Present Physician;Nurse;;;Occupational Therapist   Physician Team Member MD Jimmy; MD Oz, CARLOS Flanagan   Nursing Team Member ADRIEN Son   Care Management Team Member MS Franko   Social Work Team Member MANA Ricks   OT Team Member NAYANA Ruff   Patient/Family Present   Patient Present No   Patient's Family Present No   303. Broken sleep. Anxious about his upcoming 304 hearing. Denies all. Visible more social. Med compliant. Eating well. D/C date not known due to med adjustment.

## 2025-05-29 NOTE — NUTRITION
05/29/25 1422   Biochemical Data,Medical Tests, and Procedures   Biochemical Data/Medical Tests/Procedures Lab values reviewed;Meds reviewed   Labs (Comment) No new labs   Meds (Comment) lipitor, cogentin, Vit D3, Vit B12, atarax, zestril, lithium carbonate, imodium, ativan, zyprexa, inderal, desyrel   Nutrition-Focused Physical Exam   Nutrition-Focused Physical Exam Findings RN skin assessment reviewed;No skin issues documented   Medical-Related Concerns PMH reviewed   Adequacy of Intake   Nutrition Modality PO   Feeding Route   PO Independent   Current PO Intake   Current Diet Order Regular diet thin liquids   Current Meal Intake %   Estimated calorie intake compared to estimated need Nutrient needs met.   PES Statement   Problem No nutrition diagnosis   Recommendations/Interventions   Malnutrition/BMI Present No  (does not meet criteria)   Summary Regular diet thin liquids. No nutrition supplements. Meal completions 100%. Medications reviewed. 5/25 199#, BMI=29.51; 6# weight gain from admission 5/8 193#. No new labs. Skin intact. No issues reported.   Interventions/Recommendations Continue current diet order   Education Assessment   Education Education not indicated at this time   Nutrition Complexity Risk   Nutrition complexity level Sign off   Follow up date 06/19/25

## 2025-05-29 NOTE — PROGRESS NOTES
Progress Note - Behavioral Health     Freddie CASTANEDA Day 65 y.o. male MRN: [id number]   Unit/Bed#: [facility] 245-02 Encounter: [id number]    Behavior over the last 24 hours: some improvement.     Freddie seen today, per staff report has been pleasant, cooperative and social with staff and peers on the unit.  Patient has been attending groups remains compliant with medication.  Focused on upcoming hearing.  He does report that he is feeling better thoughts are somewhat more organized according to the staff.    The patient continues to show some improvement.  His thoughts are much more organized and he is able to focus and concentrate on some topics for longer.  The patient is however concerned about his 304 hearing and says that he does not even remember signing a 201 or having a 303 hearing.  Remains focused on what he did at home.  He also stated that he was quite agitated.  The patient did talk about increased amount of caffeine use at home which might have led to some sleep issues as reported by him.  He says that he was quite agitated before he came.  The patient however has been doing better and has not been agitated or argumentative.  Moods are described as okay but some anxiety about the hearing.  Denies any depressed mood or elated moods.  Much more receptive to changes in the medication at this time.         Sleep: slept off and on  Appetite: normal  Medication side effects: Some drooling is reported and noted.  He reports that tremors are much less with Inderal but would like to take Inderal later at night.      Mental Status Evaluation:    Appearance:  adequate grooming   Behavior:  pleasant, cooperative   Speech:  normal volume, normal pitch, pressured   Mood:  anxious   Affect:  less labile   Thought Process:  less circumstantial   Associations: flight of ideas   Thought Content:  grandiose ideas   Perceptual Disturbances: no auditory hallucinations, no visual hallucinations   Risk Potential: Suicidal ideation - None  at present  Homicidal ideation - None at present   Sensorium:  oriented to person, place, and time/date   Memory:  recent and remote memory grossly intact   Consciousness:  alert and awake   Attention: attention span and concentration are age appropriate   Insight:  fair   Judgment: fair   Gait/Station: slow gait   Motor Activity: no abnormal movements     Vital signs in last 24 hours:    Temp:  [98.1 °F (36.7 °C)-98.5 °F (36.9 °C)] 98.5 °F (36.9 °C)  HR:  [71-80] 71  BP: ()/(73-81) 150/81  Resp:  [18] 18  SpO2:  [96 %-99 %] 99 %  O2 Device: None (Room air)    Laboratory results: I have personally reviewed all pertinent laboratory/tests results.        Assessment & Plan   Principal Problem:    Acute exacerbation of chronic schizoaffective schizophrenia (HCC)    Recommended Treatment:     Planned medication and treatment changes:      All current active medications have been reviewed  Encourage group therapy, milieu therapy and occupational therapy  Behavioral Health checks for safety monitoring  Continue with Invega 12 mg daily lithium carbonate 600 mg twice daily  Current Facility-Administered Medications   Medication Dose Route Frequency Provider Last Rate    acetaminophen  650 mg Oral Q6H PRN Barbara Clinton MD      aluminum-magnesium hydroxide-simethicone  30 mL Oral Q4H PRN Barbara Clinton MD      atorvastatin  10 mg Oral Daily With Dinner Joanne Cronin PA-C      benztropine  1 mg Intramuscular Q4H PRN Max 6/day Barbara Clinton MD      benztropine  0.5 mg Oral Q4H PRN Max 6/day Barbara Clinton MD      cholecalciferol  1,000 Units Oral Daily Chai Gallagher MD      hydrocortisone   Topical BID PRN Joanne Cronin PA-C      And    clotrimazole   Topical BID Joanne Cronin PA-C      cyanocobalamin  500 mcg Oral Daily Chai Gallagher MD      Diclofenac Sodium  2 g Topical 4x Daily PRN Joanne Cronin PA-C      hydrOXYzine HCL  25 mg Oral Q6H PRN Max 4/day Barbara Clinton MD       ibuprofen  400 mg Oral Q6H PRN Barbara Clinton MD      ibuprofen  600 mg Oral Q6H PRN Barbara Clinton MD      lisinopril  10 mg Oral Daily Joanne Cronin PA-C      lithium carbonate  600 mg Oral BID Ras Couch MD      loperamide  2 mg Oral Q4H PRN Funmi Milian PA-C      LORazepam  1 mg Intramuscular Q6H PRN Max 3/day Barbara Clinton MD      LORazepam  0.5 mg Oral Q6H PRN Max 4/day Barbara Clinton MD      LORazepam  1 mg Oral Q6H PRN Max 3/day Barbara Clinton MD      nicotine polacrilex  4 mg Oral Q4H PRN Barbara Clinton MD      OLANZapine  5 mg Intramuscular Q3H PRN Max 3/day Barbara Clinton MD      OLANZapine  2.5 mg Oral Q4H PRN Max 6/day Barbara Clinton MD      OLANZapine  5 mg Oral Q4H PRN Max 3/day Barbara Clinton MD      OLANZapine  5 mg Oral Q3H PRN Max 3/day Barbara Clinton MD      paliperidone  12 mg Oral Daily CARLOS Farnsworth      phenazopyridine  200 mg Oral TID PRN Joanne Cronin PA-C      polyethylene glycol  17 g Oral Daily PRN Barbara Clinton MD      propranolol  5 mg Oral Q8H PRN Barbara Clinton MD      senna-docusate sodium  1 tablet Oral Daily PRN Barbara Clinton MD      tamsulosin  0.4 mg Oral Daily With Dinner Joanne Cronin PA-C      traZODone  50 mg Oral HS PRN Barbara Clinton MD      trihexyphenidyl  2 mg Oral BID CARLOS Farnsworth         Risks / Benefits of Treatment:    Risks, benefits, and possible side effects of medications explained to patient and patient verbalizes understanding and agreement for treatment.    Counseling / Coordination of Care:    Total floor / unit time spent today 20 minutes. Greater than 50% of total time was spent with the patient and / or family counseling and / or coordination of care. A description of counseling / coordination of care:  Patient's progress discussed with staff in treatment team meeting.    Juaquin Marquez MD 05/29/25

## 2025-05-29 NOTE — NURSING NOTE
Patient slept uninterrupted throughout majority of the night without signs or symptoms of distress. Non labored breathing observed. Continuous q15 minute rounding and safety checks ongoing.

## 2025-05-29 NOTE — SOCIAL WORK
CM spoke with pt about his upcoming 304 hearing on 5-30 at 8:00am. CM asked pt if he was wanted to be present and he reported yes. CM provided him with the  number so that he could reach out before his hearing.

## 2025-05-29 NOTE — NURSING NOTE
Pt visible and social within the milieu. Pleasant with staff and cooperative with care. Noted to be rambling less to himself and others. Denies SI/HI and hallucinations. No outbursts or behaviors. No PRNs. Pt able to make his needs known. Continuous q15 minute rounding and safety checks ongoing.

## 2025-05-30 LAB — LITHIUM SERPL-SCNC: 0.73 MMOL/L (ref 0.6–1.2)

## 2025-05-30 PROCEDURE — 99232 SBSQ HOSP IP/OBS MODERATE 35: CPT | Performed by: PSYCHIATRY & NEUROLOGY

## 2025-05-30 PROCEDURE — 80178 ASSAY OF LITHIUM: CPT

## 2025-05-30 RX ADMIN — PALIPERIDONE 12 MG: 6 TABLET, EXTENDED RELEASE ORAL at 08:32

## 2025-05-30 RX ADMIN — TAMSULOSIN HYDROCHLORIDE 0.4 MG: 0.4 CAPSULE ORAL at 18:04

## 2025-05-30 RX ADMIN — CHOLECALCIFEROL TAB 25 MCG (1000 UNIT) 1000 UNITS: 25 TAB at 08:32

## 2025-05-30 RX ADMIN — LITHIUM CARBONATE 600 MG: 300 CAPSULE, GELATIN COATED ORAL at 08:32

## 2025-05-30 RX ADMIN — TRIHEXYPHENIDYL HYDROCHLORIDE 2 MG: 2 TABLET ORAL at 18:04

## 2025-05-30 RX ADMIN — ATORVASTATIN CALCIUM 10 MG: 10 TABLET, FILM COATED ORAL at 18:03

## 2025-05-30 RX ADMIN — CYANOCOBALAMIN TAB 500 MCG 500 MCG: 500 TAB at 08:34

## 2025-05-30 RX ADMIN — TRIHEXYPHENIDYL HYDROCHLORIDE 2 MG: 2 TABLET ORAL at 08:32

## 2025-05-30 RX ADMIN — LITHIUM CARBONATE 600 MG: 300 CAPSULE, GELATIN COATED ORAL at 18:04

## 2025-05-30 NOTE — NURSING NOTE
Mood and behaviors controled. Less anxious. No irritable outbursts. Pleasant and controlled. Attending groups and unit activities. Compliant with medications as prescribed. No PRN medications administered. Denies SI, HI, or hallucinations. Safety precautions maintained. Will continue to monitor.

## 2025-05-30 NOTE — PLAN OF CARE
Problem: Ineffective Coping  Goal: Demonstrates healthy coping skills  Outcome: Progressing     Problem: Depression  Goal: Refrain from isolation  Description: Interventions:- Develop a trusting relationship - Encourage socialization   Outcome: Progressing   Pt is active in groups, his mood is less labile and he is able to identify his coping skills.

## 2025-05-30 NOTE — PLAN OF CARE
Problem: Alteration in Thoughts and Perception  Goal: Agree to be compliant with medication regime, as prescribed and report medication side effects  Description: Interventions:- Offer appropriate PRN medication and supervise ingestion; conduct AIMS, as needed   Outcome: Progressing     Problem: Risk for Violence/Aggression Toward Others  Goal: Treatment Goal: Refrain from acts of violence/aggression during length of stay, and demonstrate improved impulse control at the time of discharge  Outcome: Progressing     Problem: Alteration in Orientation  Goal: Interact with staff daily  Description: Interventions:- Assess and re-assess patient's level of orientation- Engage patient in 1 on 1 interactions, daily, for a minimum of 15 minutes - Establish rapport/trust with patient   Outcome: Progressing

## 2025-05-30 NOTE — PROGRESS NOTES
Progress Note - Behavioral Health   Name: Freddie Graham 65 y.o. male I MRN: [id number]  Unit/Bed#: [facility] 245-02 I Date of Admission: 5/8/2025   Date of Service: 5/30/2025 I Hospital Day: 22    Assessment & Plan  Acute exacerbation of chronic schizoaffective schizophrenia (HCC)  65-year-old male known to this service with history of chronic mental illness and diagnosis of schizoaffective disorder.  Returns to the hospital in the context of being noncompliant and having exacerbated symptoms of mood lability, disorganized behaviors and illogical thinking.  We will restart patient on medications he was stabilized on in the past-  Invega increased to 12 mg PO QD; on 5/23/2025, first dose on 5/24/2025  Continue lithium 600 mg PO BID;  Li level draw 5/30 0.73  Lithium level on 5/23/25 0.56,  Lithium was increased over the weekend   Continue Artane 2 mg PO BID for EPS    Current Medications:    Current Facility-Administered Medications:     atorvastatin (LIPITOR) tablet 10 mg, Oral, Daily With Dinner    Cholecalciferol (VITAMIN D3) tablet 1,000 Units, Oral, Daily    hydrocortisone 2.5 % cream, Topical, BID PRN **AND** clotrimazole (LOTRIMIN) 1 % cream, Topical, BID    cyanocobalamin (VITAMIN B-12) tablet 500 mcg, Oral, Daily    lisinopril (ZESTRIL) tablet 10 mg, Oral, Daily    lithium carbonate capsule 600 mg, Oral, BID    paliperidone (INVEGA) 24 hr tablet 12 mg, Oral, Daily    tamsulosin (FLOMAX) capsule 0.4 mg, Oral, Daily With Dinner    trihexyphenidyl (ARTANE) tablet 2 mg, Oral, BID    Current Facility-Administered Medications:     acetaminophen (TYLENOL) tablet 650 mg, Oral, Q6H PRN    aluminum-magnesium hydroxide-simethicone (MAALOX) oral suspension 30 mL, Oral, Q4H PRN    benztropine (COGENTIN) injection 1 mg, Intramuscular, Q4H PRN Max 6/day    benztropine (COGENTIN) tablet 0.5 mg, Oral, Q4H PRN Max 6/day    Diclofenac Sodium (VOLTAREN) 1 % topical gel 2 g, Topical, 4x Daily PRN    hydrocortisone 2.5 % cream, Topical,  BID PRN **AND** clotrimazole (LOTRIMIN) 1 % cream, Topical, BID    hydrOXYzine HCL (ATARAX) tablet 25 mg, Oral, Q6H PRN Max 4/day    ibuprofen (MOTRIN) tablet 400 mg, Oral, Q6H PRN    ibuprofen (MOTRIN) tablet 600 mg, Oral, Q6H PRN    loperamide (IMODIUM) capsule 2 mg, Oral, Q4H PRN    LORazepam (ATIVAN) injection 1 mg, Intramuscular, Q6H PRN Max 3/day    LORazepam (ATIVAN) tablet 0.5 mg, Oral, Q6H PRN Max 4/day    LORazepam (ATIVAN) tablet 1 mg, Oral, Q6H PRN Max 3/day    nicotine polacrilex (NICORETTE) gum 4 mg, Oral, Q4H PRN    OLANZapine (ZyPREXA) IM injection 5 mg, Intramuscular, Q3H PRN Max 3/day    OLANZapine (ZyPREXA) tablet 2.5 mg, Oral, Q4H PRN Max 6/day    OLANZapine (ZyPREXA) tablet 5 mg, Oral, Q4H PRN Max 3/day    OLANZapine (ZyPREXA) tablet 5 mg, Oral, Q3H PRN Max 3/day    phenazopyridine (PYRIDIUM) tablet 200 mg, Oral, TID PRN    polyethylene glycol (MIRALAX) packet 17 g, Oral, Daily PRN    propranolol (INDERAL) tablet 5 mg, Oral, Q8H PRN    senna-docusate sodium (SENOKOT S) 8.6-50 mg per tablet 1 tablet, Oral, Daily PRN    traZODone (DESYREL) tablet 50 mg, Oral, HS PRN    Risks/Benefits of Treatment:     Risks, benefits, and possible side effects of medications explained to patient and patient verbalizes understanding and agreement for treatment.    Progress Toward Goals: slowly improving    Treatment Planning:      - Encourage early mobility and having a structured day  - Provide frequent re-orientation, and cognitive stimulation  - Ensure assistive devices are in proper working order (eye-glasses, hearing aids)  - Encourage adequate hydration, nutrition and monitor bowel movements  - Maintain sleep-wake cycle: Uninterrupted sleep time; low-level lighting at night  - Fall precaution  - Follow up with [facility] regarding the medical problems   - Continue medication titration and treatment plan; adjust medication to optimize treatment response and as clinically indicated.   - Observation: N/A  - VS: as per  unit protocol  - Encourage group attendance and milieu therapy  - Dispo: To be determined  - Estimated Discharge Day: 6/4/2025 7 days (6/6/2025)  - Legal Status : On 303 commitment.  - Long Stay Certification : Not Applicable    Watson Frazier was seen today for psychiatric follow-up. Patient is calm, cooperative. He is visible on the unit, social with peers. He remains less labile and circumstantial. He is perseverative on his medication regimen. His personal hygiene is improved. He denied any SI/HI/AVH when asked.     Sleep: normal  Appetite: normal  Medication side effects: No  ROS: review of systems as noted above in HPI/Subjective report, all other systems are negative    Objective :  Temp:  [97.8 °F (36.6 °C)-98.6 °F (37 °C)] 98.6 °F (37 °C)  HR:  [] 87  BP: (106-134)/(76-84) 106/84  Resp:  [18] 18  SpO2:  [98 %] 98 %  O2 Device: None (Room air)    Mental Status Evaluation:    Appearance:  age appropriate, casually dressed   Behavior:  cooperative, calm   Speech:  normal rate and volume   Mood:  Less labile   Affect:  mood-congruent   Thought Process:  Less circumstantial   Thought Content:  grandiose ideas   Perceptual Disturbances: Denied AVH when asked   Risk Potential: Suicidal Ideation -  None at present  Homicidal Ideation -  None at present  Potential for Aggression - No   Sensorium:  oriented to person, place, and time/date   Memory:  recent and remote memory grossly intact   Consciousness:  alert and awake   Attention/Concentration: attention span and concentration are age appropriate   Insight:  fair   Judgment: fair   Gait/Station: normal gait/station   Motor Activity: no abnormal movements     Cognitive Assessment : N/A  Pain : denied  Pain Scale : 0      Lab Results: I have reviewed the following results:  Most Recent Labs:   Lab Results   Component Value Date    WBC 5.97 05/06/2025    RBC 4.72 05/06/2025    HGB 13.6 05/06/2025    HCT 41.3 05/06/2025     05/06/2025    RDW 13.1  "05/06/2025    NEUTROABS 4.33 05/06/2025    SODIUM 137 05/06/2025    K 3.5 05/06/2025     05/06/2025    CO2 23 05/06/2025    BUN 15 05/06/2025    CREATININE 0.83 05/06/2025    GLUC 111 05/06/2025    CALCIUM 9.3 05/06/2025    AST 30 05/06/2025    ALT 31 05/06/2025    ALKPHOS 44 05/06/2025    TP 6.7 05/06/2025    ALB 4.3 05/06/2025    TBILI 0.81 05/06/2025    CHOLESTEROL 155 05/12/2025    HDL 47 05/12/2025    TRIG 69 05/12/2025    LDLCALC 94 05/12/2025    NONHDLC 108 05/12/2025    VALPROICTOT <10 (L) 02/17/2024    LITHIUM 0.73 05/30/2025    AMMONIA 51 02/06/2021    [id number] 1.939 05/06/2025    FREET4 0.97 02/17/2024    TREPONEMAPA Non-reactive 04/07/2025    HGBA1C 5.7 (H) 04/07/2025     04/07/2025       Administrative Statements     Counseling / Coordination of Care:   Patient's progress discussed with staff in treatment team meeting.  Medication changes reviewed with staff in treatment team meeting.    Portions of the record may have been created with voice recognition software. Occasional wrong word or \"sound a like\" substitutions may have occurred due to the inherent limitations of voice recognition software. Read the chart carefully and recognize, using context, where substitutions have occurred.    CARLOS Farnsworth 05/30/25  "

## 2025-05-30 NOTE — PROGRESS NOTES
"Progress Note - Freddie Graham 65 y.o. male MRN: [id number]    Unit/Bed#: [facility] 245-02 Encounter: [id number]        Subjective:   Patient seen and examined at bedside after reviewing the chart and discussing the case with the caring staff.      Patient examined at bedside.  Patient reports no acute symptoms.     Physical Exam   Vitals: Blood pressure 106/84, pulse 87, temperature 98.6 °F (37 °C), temperature source Temporal, resp. rate 18, height 5' 9\" (1.753 m), weight 90.6 kg (199 lb 12.8 oz), SpO2 98%.,Body mass index is 29.51 kg/m².  Constitutional: Patient in no acute distress.  HEENT: PERR, EOMI, MMM.  Cardiovascular: Normal rate and regular rhythm.    Pulmonary/Chest: Effort normal and breath sounds normal.   Abdomen: Soft, + BS, NT.    Assessment/Plan:  Freddie Graham is a(n) 65 y.o. male with schizoaffective disorder.      Cardiac w/ HTN, HLD.  Not on BP medication.  Restart lisinopril 10 mg daily 5/9.  Continue atorvastatin 10 mg daily.   LISET.  Patient had recent Zio patch with cardiology.  Pauses found, no afib, thought to be related to LISET.  Recommended sleep study outpt.   Urothelial carcinoma/BPH.  Patient follows with urology outpt -due for follow up.  Continue Flomax 0.4 mg daily and pyridium 200 mg TID as needed for bladder spasms.   Chronic back pain/DJD.  CT spine 5/7 showing \"No acute displaced fracture or traumatic malalignment.  Multilevel degenerative disc and facet changes causing varying degrees of spinal and neuroforaminal narrowing. Up to moderate spinal stenosis at L4-5. Up to severe left L4-5 and L5-S1 neuroforaminal stenosis.\"  Tylenol/motrin as needed.  Voltaren gel 4x daily as needed.  Tobacco abuse.  Declines NRT.   Vitamin D deficiency.  Continue daily Vit D supplement.   Vitamin B12 deficiency.  Continue daily vitamin B12 supplement.  Seborrheic dermatitis.  Involving face.  Apply clotrimazole twice daily 5/17.  Hydrocortisone cream twice daily as needed.   Diarrhea.  Imodium as needed " 5/26.  Consider probiotic/metamucil if continues.     The patient was discussed with Dr. Gallagher and he is in agreement with the above note.

## 2025-05-30 NOTE — ASSESSMENT & PLAN NOTE
65-year-old male known to this service with history of chronic mental illness and diagnosis of schizoaffective disorder.  Returns to the hospital in the context of being noncompliant and having exacerbated symptoms of mood lability, disorganized behaviors and illogical thinking.  We will restart patient on medications he was stabilized on in the past-  Invega increased to 12 mg PO QD; on 5/23/2025, first dose on 5/24/2025  Continue lithium 600 mg PO BID;  Li level draw 5/30 0.73  Lithium level on 5/23/25 0.56,  Lithium was increased over the weekend   Continue Artane 2 mg PO BID for EPS

## 2025-05-30 NOTE — TREATMENT TEAM
05/29/25 2341   Estrada Anxiety Scale   Anxious Mood 2   Tension 2   Fears 2   Insomnia 2   Intellectual 2   Depressed Mood 2   Somatic Complaints: Muscular 1   Somatic Complaints: Sensory 0   Cardiovascular Symptoms 1   Respiratory Symptoms 0   Gastrointestinal Symptoms 0   Genitourinary Symptoms 0   Autonomic Symptoms 2   Behavior at Interview 2   Estrada Anxiety Score 18     Pt endorsing anxiety related to 304 hearing tomorrow. Pt reports feeling uneasy and his mind won't shut off. Attempting to use reading as a coping skill. Ativan 0.5 mg PO given per estrada score. Will continue to monitor and reassess.

## 2025-05-30 NOTE — NURSING NOTE
Pt appeared to be asleep majority of the evening; later on woke up to complete ADLs and received evening snack. Pt is pleasant and cooperative with staff. Denies SI/HI and hallucinations. Reports ongoing anxiety. No PRNs given. Pt is able to make his needs known. Continuous q15 minute rounding and safety checks ongoing.

## 2025-05-30 NOTE — SOCIAL WORK
MARLA spoke with pt after he left several messages stating that he spoke with [facility]  and that there is bench warrant for his arrest. MARLA reached out to [facility] and spoke with the  office who informed CM that she would need to fax over a letter stating that in fact he was in the hospital so they could dismiss his bench warrant. MARLA sent to letter via fax to [facility]  office.

## 2025-05-30 NOTE — SOCIAL WORK
CM, pt and Dr. Marquez attended pt 304 hearing with [facility]. Pt was active in participating, however still paranoid and grandiose. Pending update from [facility].

## 2025-05-31 PROCEDURE — 99232 SBSQ HOSP IP/OBS MODERATE 35: CPT

## 2025-05-31 RX ORDER — PALIPERIDONE 6 MG/1
12 TABLET, EXTENDED RELEASE ORAL
Status: DISCONTINUED | OUTPATIENT
Start: 2025-05-31 | End: 2025-06-04 | Stop reason: HOSPADM

## 2025-05-31 RX ADMIN — TAMSULOSIN HYDROCHLORIDE 0.4 MG: 0.4 CAPSULE ORAL at 18:37

## 2025-05-31 RX ADMIN — LITHIUM CARBONATE 600 MG: 300 CAPSULE, GELATIN COATED ORAL at 09:27

## 2025-05-31 RX ADMIN — ATORVASTATIN CALCIUM 10 MG: 10 TABLET, FILM COATED ORAL at 18:37

## 2025-05-31 RX ADMIN — TRIHEXYPHENIDYL HYDROCHLORIDE 2 MG: 2 TABLET ORAL at 18:37

## 2025-05-31 RX ADMIN — LISINOPRIL 10 MG: 10 TABLET ORAL at 09:27

## 2025-05-31 RX ADMIN — TRIHEXYPHENIDYL HYDROCHLORIDE 2 MG: 2 TABLET ORAL at 09:27

## 2025-05-31 RX ADMIN — PALIPERIDONE 12 MG: 6 TABLET, EXTENDED RELEASE ORAL at 21:06

## 2025-05-31 RX ADMIN — LORAZEPAM 0.5 MG: 0.5 TABLET ORAL at 00:17

## 2025-05-31 RX ADMIN — CHOLECALCIFEROL TAB 25 MCG (1000 UNIT) 1000 UNITS: 25 TAB at 09:28

## 2025-05-31 RX ADMIN — LITHIUM CARBONATE 600 MG: 300 CAPSULE, GELATIN COATED ORAL at 21:06

## 2025-05-31 RX ADMIN — CYANOCOBALAMIN TAB 500 MCG 500 MCG: 500 TAB at 09:27

## 2025-05-31 NOTE — NURSING NOTE
Patient had difficulty falling asleep but has been sleeping without interruption the rest of the night.  No s/s of distress.  Monitoring continues

## 2025-05-31 NOTE — PLAN OF CARE
Problem: Depression  Goal: Refrain from self-neglect  Outcome: Progressing  Goal: Complete daily ADLs, including personal hygiene independently, as able  Description: Interventions:- Observe, teach, and assist patient with ADLS-  Monitor and promote a balance of rest/activity, with adequate nutrition and elimination   Outcome: Progressing

## 2025-05-31 NOTE — NURSING NOTE
Patient visible and social with staff and peers.  Less anxious. No outbursts/behaviors controlled. Patient denies any SI/HI, AV/H, anxiety or depression. Attended PM snack.  No HS medication schedules. No PRN's. Q 15 minute monitoring maintained.

## 2025-05-31 NOTE — PROGRESS NOTES
Progress Note - Behavioral Health   Freddie CASTANEDA Day 65 y.o. male MRN: [id number]  Unit/Bed#: U 245-02 Encounter: [id number]    Assessment & Plan   Principal Problem:    Acute exacerbation of chronic schizoaffective schizophrenia (HCC)      Recommended Treatment:     Assessment & Plan  Acute exacerbation of chronic schizoaffective schizophrenia (HCC)  Invega 12 mg at bedtime for psychosis and mood symptoms.  AIMS of 1 on today's assessment.  Artane 2 mg twice daily for EPS/TD.  Hemoglobin A1c noted to be 5.7 as of 4/7/2025, lipid panel unremarkable as of 5/12/2025.  EKG on 5/6/2025 reveals normal sinus rhythm with PVCs and PACs, QTc of 480.  Lithium 600 mg twice daily for mood stabilization.  Lithium level noted to be therapeutic at 0.73 as of 5/30/2025.  EGFR noted to be 91 as of 5/6/2025.  TSH noted to be 1.939 as of 5/6/2025.    No associated orders from this encounter found during lookback period of 72 hours.    Continue with group therapy, milieu therapy and occupational therapy.    Continue frequent safety checks and vitals per unit protocol.  Continue with SLIM medical management as indicated  Continue coordinating with case management regarding disposition    Legal Status: 303  Disposition: To be determined, coordinating with case management    Case discussed with treatment team.  Risks, benefits and possible side effects of Medications: Risks, benefits, and possible side effects of medications have been explained to the patient, who verbalizes understanding    ------------------------------------------------------------    Subjective: Patient's chart was reviewed, and patient's progress and plan was discussed with treatment team. Per nursing report, Freddie has been visible, social, less anxious and ultimately cooperative on the unit and compliant with medications. Patient has remained in behavioral control for the last 24 hours. Last night patient was documented to have slept throughout the night.    Freddie was  "evaluated this morning for continuity of care. On examination, Freddie is pleasantly psychotic and easily redirected.  He denies adverse effects from medications. He denies suicidal ideation, homicidal ideation, auditory hallucinations, and visual hallucinations. His goals for today are are to continue improving his physical activity.    VS: Reviewed, BMI noted to be 29.51, otherwise within normal limits    Progress Toward Goals: Slow improvement    Psychiatric Review of Systems:  Behavior over the last 24 hours: improved  Sleep: insomnia  Appetite: adequate  Medication side effects: none verbalized  Medical ROS: Complete review of systems is negative except as noted above.    Vital signs in last 24 hours:  Temp:  [97.2 °F (36.2 °C)-98.3 °F (36.8 °C)] 97.2 °F (36.2 °C)  HR:  [77-86] 77  BP: (126)/(71-72) 126/72  Resp:  [18] 18  SpO2:  [96 %-98 %] 98 %  O2 Device: None (Room air)    Mental Status Exam:    Appearance:  alert, good eye contact, appears stated age, casually dressed, marginal grooming/hygiene, poor dentition, and bearded   Behavior:  calm, cooperative, and sitting comfortably   Speech:  spontaneous, clear, normal rate, normal volume, hyperverbal, and coherent   Mood:  \"Good\"   Affect:  Less labile   Thought Process:  tangential   Associations: intact associations   Thought Content:  no verbalized delusions or overt paranoia, somatic preoccupation   Perceptual Disturbances: no reported hallucinations and appears to be responding to internal stimuli   Risk Potential: Suicidal ideation - None at present  Homicidal ideation - None at present  Potential for aggression - Yes, due to acute psychosis   Sensorium:  oriented to person, place, and time/date   Memory:  recent and remote memory grossly intact   Consciousness:  alert and awake   Attention/Concentration: attention span and concentration appear shorter than expected for age   Insight:  poor   Judgment: poor   Gait/Station: normal gait/station   Motor " Activity: no abnormal movements     Current Medications:  Current Facility-Administered Medications   Medication Dose Route Frequency Provider Last Rate    acetaminophen  650 mg Oral Q6H PRN Barbara Clinton MD      aluminum-magnesium hydroxide-simethicone  30 mL Oral Q4H PRN Barbara Clinton MD      atorvastatin  10 mg Oral Daily With Dinner Joanne Cronin PA-C      benztropine  1 mg Intramuscular Q4H PRN Max 6/day Barbara Clinton MD      benztropine  0.5 mg Oral Q4H PRN Max 6/day Barbara Clinton MD      cholecalciferol  1,000 Units Oral Daily Chai Gallagher MD      hydrocortisone   Topical BID PRN Joanne Cronin PA-C      And    clotrimazole   Topical BID Joanne Cronin PA-C      cyanocobalamin  500 mcg Oral Daily Chai Gallagher MD      Diclofenac Sodium  2 g Topical 4x Daily PRN Joanne Cronin PA-C      hydrOXYzine HCL  25 mg Oral Q6H PRN Max 4/day Barbara Clinton MD      ibuprofen  400 mg Oral Q6H PRN Barbara Clinton MD      ibuprofen  600 mg Oral Q6H PRN Barbara Clinton MD      lisinopril  10 mg Oral Daily Joanne Cronin PA-C      lithium carbonate  600 mg Oral BID Ras Couch MD      loperamide  2 mg Oral Q4H PRN Funmi Milian PA-C      LORazepam  1 mg Intramuscular Q6H PRN Max 3/day Barbara Clinton MD      LORazepam  0.5 mg Oral Q6H PRN Max 4/day Barbara Clinton MD      LORazepam  1 mg Oral Q6H PRN Max 3/day Barbara Clinton MD      nicotine polacrilex  4 mg Oral Q4H PRN Barbara Clinton MD      OLANZapine  5 mg Intramuscular Q3H PRN Max 3/day Barbara Clinotn MD      OLANZapine  2.5 mg Oral Q4H PRN Max 6/day Barbara Clinton MD      OLANZapine  5 mg Oral Q4H PRN Max 3/day Barbara Clinton MD      OLANZapine  5 mg Oral Q3H PRN Max 3/day Barbara Clinton MD      paliperidone  12 mg Oral Daily CARLOS Farnsworth      phenazopyridine  200 mg Oral TID PRN Joanne Cronin PA-C      polyethylene glycol  17 g Oral Daily PRN Barbara Clinton MD       propranolol  5 mg Oral Q8H PRN Barbara Clinton MD      senna-docusate sodium  1 tablet Oral Daily PRN Barbara Clinton MD      tamsulosin  0.4 mg Oral Daily With Dinner Joanne Cronin PA-C      traZODone  50 mg Oral HS PRN Barbara Clinton MD      trihexyphenidyl  2 mg Oral BID CARLOS Farnsworth         Behavioral Health Medications: all current active meds have been reviewed. Changes as in plan section above.    Laboratory results:  I have personally reviewed all pertinent laboratory/tests results.   Recent Results (from the past 48 hours)   Lithium level    Collection Time: 05/30/25  5:40 AM   Result Value Ref Range    Lithium Lvl 0.73 0.60 - 1.20 mmol/L        This note has been constructed using a voice recognition system. There may be translation, syntax, or grammatical errors. If you have any questions, please contact the dictating author.    Marcello Moseley, DO  Psychiatry Residency, PGY-III

## 2025-05-31 NOTE — NURSING NOTE
Patient came to desk and requested a PRN medication for increased anxiety and unable to sleep making his anxiety worse. Administered 0.5 mg PO @ 0017. Fajardo score: 20. Reassessed @1315. Patient sleeping.

## 2025-05-31 NOTE — PROGRESS NOTES
"Progress Note - Freddie Graham 65 y.o. male MRN: [id number]    Unit/Bed#: [facility] 245-02 Encounter: [id number]        Subjective:   Patient seen and examined at bedside after reviewing the chart and discussing the case with the caring staff.      Patient examined at bedside.  Patient reports no acute symptoms.     Physical Exam   Vitals: Blood pressure 93/61, pulse 81, temperature 98.3 °F (36.8 °C), temperature source Temporal, resp. rate 17, height 5' 9\" (1.753 m), weight 90.6 kg (199 lb 12.8 oz), SpO2 98%.,Body mass index is 29.51 kg/m².  Constitutional: Patient in no acute distress.  HEENT: PERR, EOMI, MMM.  Cardiovascular: Normal rate and regular rhythm.    Pulmonary/Chest: Effort normal and breath sounds normal.   Abdomen: Soft, + BS, NT.    Assessment/Plan:  Freddie Graham is a(n) 65 y.o. male with schizoaffective disorder.      Cardiac w/ HTN, HLD.  Not on BP medication.  Restart lisinopril 10 mg daily 5/9.  Continue atorvastatin 10 mg daily.   LISET.  Patient had recent Zio patch with cardiology.  Pauses found, no afib, thought to be related to LISET.  Recommended sleep study outpt.   Urothelial carcinoma/BPH.  Patient follows with urology outpt -due for follow up.  Continue Flomax 0.4 mg daily and pyridium 200 mg TID as needed for bladder spasms.   Chronic back pain/DJD.  CT spine 5/7 showing \"No acute displaced fracture or traumatic malalignment.  Multilevel degenerative disc and facet changes causing varying degrees of spinal and neuroforaminal narrowing. Up to moderate spinal stenosis at L4-5. Up to severe left L4-5 and L5-S1 neuroforaminal stenosis.\"  Tylenol/motrin as needed.  Voltaren gel 4x daily as needed.  Tobacco abuse.  Declines NRT.   Vitamin D deficiency.  Continue daily Vit D supplement.   Vitamin B12 deficiency.  Continue daily vitamin B12 supplement.  Seborrheic dermatitis.  Involving face.  Apply clotrimazole twice daily 5/17.  Hydrocortisone cream twice daily as needed.   Diarrhea.  Imodium as needed " 5/26.  Consider probiotic/metamucil if continues.

## 2025-05-31 NOTE — ASSESSMENT & PLAN NOTE
Invega 12 mg at bedtime for psychosis and mood symptoms.  AIMS of 1 on today's assessment.  Artane 2 mg twice daily for EPS/TD.  Hemoglobin A1c noted to be 5.7 as of 4/7/2025, lipid panel unremarkable as of 5/12/2025.  EKG on 5/6/2025 reveals normal sinus rhythm with PVCs and PACs, QTc of 480.  Lithium 600 mg twice daily for mood stabilization.  Lithium level noted to be therapeutic at 0.73 as of 5/30/2025.  EGFR noted to be 91 as of 5/6/2025.  TSH noted to be 1.939 as of 5/6/2025.    No associated orders from this encounter found during lookback period of 72 hours.

## 2025-06-01 PROCEDURE — 99232 SBSQ HOSP IP/OBS MODERATE 35: CPT

## 2025-06-01 RX ORDER — LITHIUM CARBONATE 300 MG/1
600 CAPSULE ORAL 2 TIMES DAILY
Status: DISCONTINUED | OUTPATIENT
Start: 2025-06-01 | End: 2025-06-04 | Stop reason: HOSPADM

## 2025-06-01 RX ADMIN — TRIHEXYPHENIDYL HYDROCHLORIDE 2 MG: 2 TABLET ORAL at 18:35

## 2025-06-01 RX ADMIN — TAMSULOSIN HYDROCHLORIDE 0.4 MG: 0.4 CAPSULE ORAL at 18:34

## 2025-06-01 RX ADMIN — LISINOPRIL 10 MG: 10 TABLET ORAL at 09:38

## 2025-06-01 RX ADMIN — TRIHEXYPHENIDYL HYDROCHLORIDE 2 MG: 2 TABLET ORAL at 09:39

## 2025-06-01 RX ADMIN — CYANOCOBALAMIN TAB 500 MCG 500 MCG: 500 TAB at 09:39

## 2025-06-01 RX ADMIN — PALIPERIDONE 12 MG: 6 TABLET, EXTENDED RELEASE ORAL at 21:07

## 2025-06-01 RX ADMIN — LITHIUM CARBONATE 600 MG: 300 CAPSULE, GELATIN COATED ORAL at 09:39

## 2025-06-01 RX ADMIN — LITHIUM CARBONATE 600 MG: 300 CAPSULE, GELATIN COATED ORAL at 21:07

## 2025-06-01 RX ADMIN — ATORVASTATIN CALCIUM 10 MG: 10 TABLET, FILM COATED ORAL at 18:34

## 2025-06-01 RX ADMIN — CHOLECALCIFEROL TAB 25 MCG (1000 UNIT) 1000 UNITS: 25 TAB at 09:39

## 2025-06-01 NOTE — PROGRESS NOTES
Progress Note - Behavioral Health   Freddie CASTANEDA Day 65 y.o. male MRN: [id number]  Unit/Bed#: U 245-02 Encounter: [id number]    Assessment & Plan   Principal Problem:    Acute exacerbation of chronic schizoaffective schizophrenia (HCC)      Recommended Treatment:     Assessment & Plan  Acute exacerbation of chronic schizoaffective schizophrenia (HCC)  Invega 12 mg at bedtime for psychosis and mood symptoms.  AIMS of 1 on today's assessment.  Artane 2 mg twice daily for EPS/TD.  Hemoglobin A1c noted to be 5.7 as of 4/7/2025, lipid panel unremarkable as of 5/12/2025.  EKG on 5/6/2025 reveals normal sinus rhythm with PVCs and PACs, QTc of 480.  Lithium 600 mg twice daily for mood stabilization.  Lithium level noted to be therapeutic at 0.73 as of 5/30/2025.  EGFR noted to be 91 as of 5/6/2025.  TSH noted to be 1.939 as of 5/6/2025.    No associated orders from this encounter found during lookback period of 72 hours.    Continue with group therapy, milieu therapy and occupational therapy.    Continue frequent safety checks and vitals per unit protocol.  Continue with SLIM medical management as indicated  Continue coordinating with case management regarding disposition    Legal Status: 303  Disposition: To be determined, coordinating with case management    Case discussed with treatment team.  Risks, benefits and possible side effects of Medications: Risks, benefits, and possible side effects of medications have been explained to the patient, who verbalizes understanding    ------------------------------------------------------------    Subjective: Patient's chart was reviewed, and patient's progress and plan was discussed with treatment team. Per nursing report, Freddie has been intrusive but redirectable and cooperative on the unit and compliant with medications. Patient has remained in behavioral control for the last 24 hours. Last night patient was documented to have slept throughout the night.    Freddie was evaluated this  "morning for continuity of care. On examination, Freddie is expansive, in good spirits, easily redirected and pharmacologically preoccupied. He denies adverse effects from medications. He denies suicidal ideation, homicidal ideation, auditory hallucinations, and visual hallucinations. His goals for today are to remain in behavioral control.    VS: Reviewed, BMI of 29.51, otherwise within normal limits    Progress Toward Goals: Slow improvement    Psychiatric Review of Systems:  Behavior over the last 24 hours: improved  Sleep: normal  Appetite: adequate  Medication side effects: none verbalized  Medical ROS: Complete review of systems is negative except as noted above.    Vital signs in last 24 hours:  Temp:  [97.8 °F (36.6 °C)-98.3 °F (36.8 °C)] 97.8 °F (36.6 °C)  HR:  [66-88] 66  BP: ()/(61-87) 135/77  Resp:  [17-18] 18  SpO2:  [98 %-99 %] 99 %  O2 Device: None (Room air)    Mental Status Exam:    Appearance:  alert, good eye contact, appears stated age, casually dressed, marginal grooming/hygiene, poor dentition, and bearded   Behavior:  cooperative, sitting comfortably, and disinhibited   Speech:  spontaneous, clear, normal rate, normal volume, hyperverbal, and coherent   Mood:  \"Good\"   Affect:  expansive, mood-congruent   Thought Process:  tangential   Associations: intact associations   Thought Content:  no verbalized delusions or overt paranoia, somatic preoccupation   Perceptual Disturbances: no reported hallucinations and appears internally preoccupied   Risk Potential: Suicidal ideation - None at present  Homicidal ideation - None at present  Potential for aggression - Yes, due to poor impulse control   Sensorium:  oriented to person, place, and time/date   Memory:  recent and remote memory grossly intact   Consciousness:  alert and awake   Attention/Concentration: attention span and concentration appear shorter than expected for age   Insight:  improving   Judgment: improving   Gait/Station: normal " gait/station   Motor Activity: no abnormal movements     Current Medications:  Current Facility-Administered Medications   Medication Dose Route Frequency Provider Last Rate    acetaminophen  650 mg Oral Q6H PRN Barbara Clinton MD      aluminum-magnesium hydroxide-simethicone  30 mL Oral Q4H PRN Barbara Clinton MD      atorvastatin  10 mg Oral Daily With Dinner Joanne Cronin PA-C      benztropine  1 mg Intramuscular Q4H PRN Max 6/day Barbara Clinton MD      benztropine  0.5 mg Oral Q4H PRN Max 6/day Barbara Clinton MD      cholecalciferol  1,000 Units Oral Daily Chai Gallagher MD      hydrocortisone   Topical BID PRN Joanne Cronin PA-C      And    clotrimazole   Topical BID Joanne Cronin PA-C      cyanocobalamin  500 mcg Oral Daily Chai Gallagher MD      Diclofenac Sodium  2 g Topical 4x Daily PRN Joanne Cronin PA-C      hydrOXYzine HCL  25 mg Oral Q6H PRN Max 4/day Barbara Clinton MD      ibuprofen  400 mg Oral Q6H PRN Barbara Clinton MD      ibuprofen  600 mg Oral Q6H PRN Barbara Clinton MD      lisinopril  10 mg Oral Daily Joanne Cronin PA-C      lithium carbonate  600 mg Oral BID Ras Couch MD      loperamide  2 mg Oral Q4H PRN Funmi Milian PA-C      LORazepam  1 mg Intramuscular Q6H PRN Max 3/day Barbara Clinton MD      LORazepam  0.5 mg Oral Q6H PRN Max 4/day Barbara Clinton MD      LORazepam  1 mg Oral Q6H PRN Max 3/day Barbara Clinton MD      nicotine polacrilex  4 mg Oral Q4H PRN Barbara Clinton MD      OLANZapine  5 mg Intramuscular Q3H PRN Max 3/day Barbara Clinton MD      OLANZapine  2.5 mg Oral Q4H PRN Max 6/day Barbara Clinton MD      OLANZapine  5 mg Oral Q4H PRN Max 3/day Barbara Clinton MD      OLANZapine  5 mg Oral Q3H PRN Max 3/day Barbara Clinton MD      paliperidone  12 mg Oral HS Marcello Moseley DO      phenazopyridine  200 mg Oral TID PRN Joanne Cronin PA-C      polyethylene glycol  17 g Oral Daily PRN Barbara  MD Oz      propranolol  5 mg Oral Q8H PRN Barbara Clinton MD      senna-docusate sodium  1 tablet Oral Daily PRN Barbara Clinton MD      tamsulosin  0.4 mg Oral Daily With Dinner Joanne Cronin PA-C      traZODone  50 mg Oral HS PRN Barbara Clinton MD      trihexyphenidyl  2 mg Oral BID CARLOS Farnsworth         Behavioral Health Medications: all current active meds have been reviewed. Changes as in plan section above.    Laboratory results:  I have personally reviewed all pertinent laboratory/tests results.   No results found for this or any previous visit (from the past 48 hours).     This note has been constructed using a voice recognition system. There may be translation, syntax, or grammatical errors. If you have any questions, please contact the dictating author.    Marcello Moseley, DO  Psychiatry Residency, PGY-III

## 2025-06-01 NOTE — NURSING NOTE
"Mood and behaviors controled. No irritability or agitation observed. Pleasant on approach. Able to communicate needs clearly forward thinking about discharge. Actively engaged in treatment. Medications times adjusted to 9 am and 9 pm per patient request stating \"its easier for me to take them that way at home\" Attending groups and unit activities. Compliant with medications as prescribed. No PRN medications administered. Denies SI, HI, or hallucinations. Safety precautions maintained. Will continue to monitor.   "

## 2025-06-01 NOTE — NURSING NOTE
Pt was visible on the unit, social with peers. Compliant with medication. Pleasant and cooperative with staff during care. Denied SI, HI, AVH, depression, or anxiety. Affect was appropriate to circumstance. Eye contact was good. Speech pattern was normal and relaxed. Appearance and hygiene was unremarkable. Made no c/o pain or discomfort. Safety checks continued.

## 2025-06-01 NOTE — PLAN OF CARE
Problem: Alteration in Thoughts and Perception  Goal: Verbalize thoughts and feelings  Description: Interventions:- Promote a nonjudgmental and trusting relationship with the patient through active listening and therapeutic communication- Assess patient's level of functioning, behavior and potential for risk- Engage patient in 1 on 1 interactions- Encourage patient to express fears, feelings, frustrations, and discuss symptoms  - [place] patient to reality, help patient recognize reality-based thinking - Administer medications as ordered and assess for potential side effects- Provide the patient education related to the signs and symptoms of the illness and desired effects of prescribed medications  Outcome: Progressing  Goal: Refrain from acting on delusional thinking/internal stimuli  Description: Interventions:- Monitor patient closely, per order - Utilize least restrictive measures - Set reasonable limits, give positive feedback for acceptable - Administer medications as ordered and monitor of potential side effects  Outcome: Progressing  Goal: Recognize dysfunctional thoughts, communicate reality-based thoughts at the time of discharge  Description: Interventions:- Provide medication and psycho-education to assist patient in compliance and developing insight into his/her illness   Outcome: Progressing

## 2025-06-01 NOTE — PROGRESS NOTES
"Progress Note - Freddie Graham 65 y.o. male MRN: [id number]    Unit/Bed#: [facility] 245-02 Encounter: [id number]        Subjective:   Patient seen and examined at bedside after reviewing the chart and discussing the case with the caring staff.      Patient examined at bedside.  Patient reports no acute symptoms.     Physical Exam   Vitals: Blood pressure 135/77, pulse 66, temperature 97.8 °F (36.6 °C), temperature source Temporal, resp. rate 18, height 5' 9\" (1.753 m), weight 90.6 kg (199 lb 12.8 oz), SpO2 99%.,Body mass index is 29.51 kg/m².  Constitutional: Patient in no acute distress.  HEENT: PERR, EOMI, MMM.  Cardiovascular: Normal rate and regular rhythm.    Pulmonary/Chest: Effort normal and breath sounds normal.   Abdomen: Soft, + BS, NT.    Assessment/Plan:  Freddie Graham is a(n) 65 y.o. male with schizoaffective disorder.      Cardiac w/ HTN, HLD.  Not on BP medication.  Restart lisinopril 10 mg daily 5/9.  Continue atorvastatin 10 mg daily.   LISET.  Patient had recent Zio patch with cardiology.  Pauses found, no afib, thought to be related to LISET.  Recommended sleep study outpt.   Urothelial carcinoma/BPH.  Patient follows with urology outpt -due for follow up.  Continue Flomax 0.4 mg daily and pyridium 200 mg TID as needed for bladder spasms.   Chronic back pain/DJD.  CT spine 5/7 showing \"No acute displaced fracture or traumatic malalignment.  Multilevel degenerative disc and facet changes causing varying degrees of spinal and neuroforaminal narrowing. Up to moderate spinal stenosis at L4-5. Up to severe left L4-5 and L5-S1 neuroforaminal stenosis.\"  Tylenol/motrin as needed.  Voltaren gel 4x daily as needed.  Tobacco abuse.  Declines NRT.   Vitamin D deficiency.  Continue daily Vit D supplement.   Vitamin B12 deficiency.  Continue daily vitamin B12 supplement.  Seborrheic dermatitis.  Involving face.  Apply clotrimazole twice daily 5/17.  Hydrocortisone cream twice daily as needed.   Diarrhea.  Imodium as needed " 5/26.  Consider probiotic/metamucil if continues.

## 2025-06-01 NOTE — NURSING NOTE
Patient observed sleeping comfortably for majority of q15 minute monitoring throughout the night, without demonstrating any signs or symptoms of distress. No acute behaviors overnight. Unlabored even breaths noted. Will remain on safety precautions and continual q15 minute monitoring.   Mustarde Flap Text: The defect edges were debeveled with a #15 scalpel blade.  Given the size, depth and location of the defect and the proximity to free margins a Mustarde flap was deemed most appropriate.  Using a sterile surgical marker, an appropriate flap was drawn incorporating the defect. The area thus outlined was incised with a #15 scalpel blade.  The skin margins were undermined to an appropriate distance in all directions utilizing iris scissors  and carried over to close the primary defect.

## 2025-06-02 PROCEDURE — 99232 SBSQ HOSP IP/OBS MODERATE 35: CPT | Performed by: PSYCHIATRY & NEUROLOGY

## 2025-06-02 RX ADMIN — TRIHEXYPHENIDYL HYDROCHLORIDE 2 MG: 2 TABLET ORAL at 09:06

## 2025-06-02 RX ADMIN — LITHIUM CARBONATE 600 MG: 300 CAPSULE, GELATIN COATED ORAL at 09:06

## 2025-06-02 RX ADMIN — LISINOPRIL 10 MG: 10 TABLET ORAL at 09:06

## 2025-06-02 RX ADMIN — CYANOCOBALAMIN TAB 500 MCG 500 MCG: 500 TAB at 09:06

## 2025-06-02 RX ADMIN — PALIPERIDONE 12 MG: 6 TABLET, EXTENDED RELEASE ORAL at 21:56

## 2025-06-02 RX ADMIN — TRIHEXYPHENIDYL HYDROCHLORIDE 2 MG: 2 TABLET ORAL at 17:48

## 2025-06-02 RX ADMIN — CHOLECALCIFEROL TAB 25 MCG (1000 UNIT) 1000 UNITS: 25 TAB at 09:06

## 2025-06-02 RX ADMIN — LITHIUM CARBONATE 600 MG: 300 CAPSULE, GELATIN COATED ORAL at 21:56

## 2025-06-02 RX ADMIN — ATORVASTATIN CALCIUM 10 MG: 10 TABLET, FILM COATED ORAL at 17:48

## 2025-06-02 RX ADMIN — TAMSULOSIN HYDROCHLORIDE 0.4 MG: 0.4 CAPSULE ORAL at 17:48

## 2025-06-02 NOTE — PLAN OF CARE
Problem: Alteration in Thoughts and Perception  Goal: Treatment Goal: Gain control of psychotic behaviors/thinking, reduce/eliminate presenting symptoms and demonstrate improved reality functioning upon discharge  Outcome: Progressing  Goal: Refrain from acting on delusional thinking/internal stimuli  Description: Interventions:- Monitor patient closely, per order - Utilize least restrictive measures - Set reasonable limits, give positive feedback for acceptable - Administer medications as ordered and monitor of potential side effects  Outcome: Progressing  Goal: Agree to be compliant with medication regime, as prescribed and report medication side effects  Description: Interventions:- Offer appropriate PRN medication and supervise ingestion; conduct AIMS, as needed   Outcome: Progressing     Problem: Depression  Goal: Treatment Goal: Demonstrate behavioral control of depressive symptoms, verbalize feelings of improved mood/affect, and adopt new coping skills prior to discharge  Outcome: Progressing  Goal: Complete daily ADLs, including personal hygiene independently, as able  Description: Interventions:- Observe, teach, and assist patient with ADLS-  Monitor and promote a balance of rest/activity, with adequate nutrition and elimination   Outcome: Progressing   See chart note

## 2025-06-02 NOTE — PROGRESS NOTES
05/30/25 0900   Team Meeting   Meeting Type Daily Rounds   Team Members Present   Team Members Present Physician;;Nurse;Occupational Therapist   Physician Team Member MD Panchito Marquez CRNP,   Nursing Team Member ADRIEN Reyna   Care Management Team Member MS Franko   OT Team Member Ya Ruff LPC   Patient/Family Present   Patient Present No   Patient's Family Present No   303. Much better, put together. Sleeping improving. Complains about anxiety. Attending group at times. Denies all. Med compliant. D/C date not known due to med adjustment.

## 2025-06-02 NOTE — PROGRESS NOTES
Progress Note - Behavioral Health   Name: Freddie Graham 65 y.o. male I MRN: [id number]  Unit/Bed#: U 245-02 I Date of Admission: 5/8/2025   Date of Service: 6/2/2025 I Hospital Day: 25    Assessment & Plan  Acute exacerbation of chronic schizoaffective schizophrenia (HCC)  Invega 12 mg at bedtime for psychosis and mood symptoms.  AIMS of 1 on today's assessment.  Artane 2 mg twice daily for EPS/TD.  Hemoglobin A1c noted to be 5.7 as of 4/7/2025, lipid panel unremarkable as of 5/12/2025.  EKG on 5/6/2025 reveals normal sinus rhythm with PVCs and PACs, QTc of 480.  Lithium 600 mg twice daily for mood stabilization.  Lithium level noted to be therapeutic at 0.73 as of 5/30/2025.  EGFR noted to be 91 as of 5/6/2025.  TSH noted to be 1.939 as of 5/6/2025.    No associated orders from this encounter found during lookback period of 72 hours.      Current Medications:    Current Facility-Administered Medications:     atorvastatin (LIPITOR) tablet 10 mg, Oral, Daily With Dinner    Cholecalciferol (VITAMIN D3) tablet 1,000 Units, Oral, Daily    hydrocortisone 2.5 % cream, Topical, BID PRN **AND** clotrimazole (LOTRIMIN) 1 % cream, Topical, BID    cyanocobalamin (VITAMIN B-12) tablet 500 mcg, Oral, Daily    lisinopril (ZESTRIL) tablet 10 mg, Oral, Daily    lithium carbonate capsule 600 mg, Oral, BID    paliperidone (INVEGA) 24 hr tablet 12 mg, Oral, HS    tamsulosin (FLOMAX) capsule 0.4 mg, Oral, Daily With Dinner    trihexyphenidyl (ARTANE) tablet 2 mg, Oral, BID    Current Facility-Administered Medications:     acetaminophen (TYLENOL) tablet 650 mg, Oral, Q6H PRN    aluminum-magnesium hydroxide-simethicone (MAALOX) oral suspension 30 mL, Oral, Q4H PRN    benztropine (COGENTIN) injection 1 mg, Intramuscular, Q4H PRN Max 6/day    benztropine (COGENTIN) tablet 0.5 mg, Oral, Q4H PRN Max 6/day    Diclofenac Sodium (VOLTAREN) 1 % topical gel 2 g, Topical, 4x Daily PRN    hydrocortisone 2.5 % cream, Topical, BID PRN **AND**  clotrimazole (LOTRIMIN) 1 % cream, Topical, BID    hydrOXYzine HCL (ATARAX) tablet 25 mg, Oral, Q6H PRN Max 4/day    ibuprofen (MOTRIN) tablet 400 mg, Oral, Q6H PRN    ibuprofen (MOTRIN) tablet 600 mg, Oral, Q6H PRN    loperamide (IMODIUM) capsule 2 mg, Oral, Q4H PRN    LORazepam (ATIVAN) injection 1 mg, Intramuscular, Q6H PRN Max 3/day    LORazepam (ATIVAN) tablet 0.5 mg, Oral, Q6H PRN Max 4/day    LORazepam (ATIVAN) tablet 1 mg, Oral, Q6H PRN Max 3/day    nicotine polacrilex (NICORETTE) gum 4 mg, Oral, Q4H PRN    OLANZapine (ZyPREXA) IM injection 5 mg, Intramuscular, Q3H PRN Max 3/day    OLANZapine (ZyPREXA) tablet 2.5 mg, Oral, Q4H PRN Max 6/day    OLANZapine (ZyPREXA) tablet 5 mg, Oral, Q4H PRN Max 3/day    OLANZapine (ZyPREXA) tablet 5 mg, Oral, Q3H PRN Max 3/day    phenazopyridine (PYRIDIUM) tablet 200 mg, Oral, TID PRN    polyethylene glycol (MIRALAX) packet 17 g, Oral, Daily PRN    propranolol (INDERAL) tablet 5 mg, Oral, Q8H PRN    senna-docusate sodium (SENOKOT S) 8.6-50 mg per tablet 1 tablet, Oral, Daily PRN    traZODone (DESYREL) tablet 50 mg, Oral, HS PRN    Risks/Benefits of Treatment:     Risks, benefits, and possible side effects of medications explained to patient and patient verbalizes understanding and agreement for treatment.    Progress Toward Goals: improving    Treatment Planning:      - Encourage early mobility and having a structured day  - Provide frequent re-orientation, and cognitive stimulation  - Ensure assistive devices are in proper working order (eye-glasses, hearing aids)  - Encourage adequate hydration, nutrition and monitor bowel movements  - Maintain sleep-wake cycle: Uninterrupted sleep time; low-level lighting at night  - Fall precaution  - Follow up with [facility] regarding the medical problems   - Continue medication titration and treatment plan; adjust medication to optimize treatment response and as clinically indicated.   - Observation: N/A  - VS: as per unit protocol  -  "Encourage group attendance and milieu therapy  - Dispo: To be determined  - Estimated Discharge Day: 6/4/2025 2 days (6/4/2025)  - Legal Status : Voluntary 201 commitment.  - Long Stay Certification : Not Applicable    Subjective     Freddie was seen today for psychiatric follow-up. Patient remains less psychotic appearing, with improved personal hygiene. He is visible on the unit, social with peers. He is more redirectable, hyper verbal and tangential at times. He is less ruminative on his medication regimen.Patient reports his mood is \"happy because he might be going home.\" He denied any SI/HI/AVH, although he appears preoccupied.     Sleep: normal  Appetite: normal  Medication side effects: No  ROS: review of systems as noted above in HPI/Subjective report, all other systems are negative    Objective :  Temp:  [97.6 °F (36.4 °C)-98.7 °F (37.1 °C)] 97.6 °F (36.4 °C)  HR:  [74-88] 74  BP: ()/(63-80) 137/80  Resp:  [18] 18  SpO2:  [95 %-97 %] 97 %  O2 Device: None (Room air)    Mental Status Evaluation:    Appearance:  age appropriate, adequate grooming   Behavior:  cooperative, calm   Speech:  Hyper-verbal, normal rate and volume   Mood:  \"happy\"   Affect:  mood-congruent   Thought Process:  tangential   Thought Content:  no overt delusions   Perceptual Disturbances: Denied AVH, although appears preoccupied   Risk Potential: Suicidal Ideation -  None at present  Homicidal Ideation -  None at present  Potential for Aggression - No   Sensorium:  oriented to person, place, and time/date   Memory:  recent and remote memory grossly intact   Consciousness:  alert and awake   Attention/Concentration: attention span and concentration appear shorter than expected for age   Insight:  improving   Judgment: improving   Gait/Station: normal gait/station   Motor Activity: no abnormal movements     Cognitive Assessment : N/A  Pain : denied  Pain Scale : 0      Lab Results: I have reviewed the following results:  Most Recent " "Labs:   Lab Results   Component Value Date    WBC 5.97 05/06/2025    RBC 4.72 05/06/2025    HGB 13.6 05/06/2025    HCT 41.3 05/06/2025     05/06/2025    RDW 13.1 05/06/2025    NEUTROABS 4.33 05/06/2025    SODIUM 137 05/06/2025    K 3.5 05/06/2025     05/06/2025    CO2 23 05/06/2025    BUN 15 05/06/2025    CREATININE 0.83 05/06/2025    GLUC 111 05/06/2025    CALCIUM 9.3 05/06/2025    AST 30 05/06/2025    ALT 31 05/06/2025    ALKPHOS 44 05/06/2025    TP 6.7 05/06/2025    ALB 4.3 05/06/2025    TBILI 0.81 05/06/2025    CHOLESTEROL 155 05/12/2025    HDL 47 05/12/2025    TRIG 69 05/12/2025    LDLCALC 94 05/12/2025    NONHDLC 108 05/12/2025    VALPROICTOT <10 (L) 02/17/2024    LITHIUM 0.73 05/30/2025    AMMONIA 51 02/06/2021    WHX6DBTSBFWR 1.939 05/06/2025    FREET4 0.97 02/17/2024    TREPONEMAPA Non-reactive 04/07/2025    HGBA1C 5.7 (H) 04/07/2025     04/07/2025       Administrative Statements     Counseling / Coordination of Care:   Patient's progress discussed with staff in treatment team meeting.  Medication changes reviewed with staff in treatment team meeting.    Portions of the record may have been created with voice recognition software. Occasional wrong word or \"sound a like\" substitutions may have occurred due to the inherent limitations of voice recognition software. Read the chart carefully and recognize, using context, where substitutions have occurred.    CARLOS Farnsworth 06/02/25  "

## 2025-06-02 NOTE — PROGRESS NOTES
"Progress Note - Freddie Graham 65 y.o. male MRN: [id number]    Unit/Bed#: [facility] 245-02 Encounter: [id number]        Subjective:   Patient seen and examined at bedside after reviewing the chart and discussing the case with the caring staff.      Patient examined at bedside.  Patient reports no acute symptoms.     Physical Exam   Vitals: Blood pressure 137/80, pulse 74, temperature 97.6 °F (36.4 °C), temperature source Temporal, resp. rate 18, height 5' 9\" (1.753 m), weight 90.6 kg (199 lb 12.8 oz), SpO2 97%.,Body mass index is 29.51 kg/m².  Constitutional: Patient in no acute distress.  HEENT: PERR, EOMI, MMM.  Cardiovascular: Normal rate and regular rhythm.    Pulmonary/Chest: Effort normal and breath sounds normal.   Abdomen: Soft, + BS, NT.    Assessment/Plan:  Freddie Graham is a(n) 65 y.o. male with schizoaffective disorder.      Cardiac w/ HTN, HLD.  Not on BP medication.  Restart lisinopril 10 mg daily 5/9.  Continue atorvastatin 10 mg daily.   LISET.  Patient had recent Zio patch with cardiology.  Pauses found, no afib, thought to be related to LISET.  Recommended sleep study outpt.   Urothelial carcinoma/BPH.  Patient follows with urology outpt -due for follow up.  Continue Flomax 0.4 mg daily and pyridium 200 mg TID as needed for bladder spasms.   Chronic back pain/DJD.  CT spine 5/7 showing \"No acute displaced fracture or traumatic malalignment.  Multilevel degenerative disc and facet changes causing varying degrees of spinal and neuroforaminal narrowing. Up to moderate spinal stenosis at L4-5. Up to severe left L4-5 and L5-S1 neuroforaminal stenosis.\"  Tylenol/motrin as needed.  Voltaren gel 4x daily as needed.  Tobacco abuse.  Declines NRT.   Vitamin D deficiency.  Continue daily Vit D supplement.   Vitamin B12 deficiency.  Continue daily vitamin B12 supplement.  Seborrheic dermatitis.  Involving face.  Apply clotrimazole twice daily 5/17.  Hydrocortisone cream twice daily as needed.   Diarrhea.  Imodium as needed " 5/26.  Consider probiotic/metamucil if continues.     The patient was discussed with Dr. Gallagher and he is in agreement with the above note.

## 2025-06-02 NOTE — SOCIAL WORK
CM spoke with [facility]  office in regards to pt. CM was informed that their faxes were not working and to send the letter to their email so that they can take care of pt's bench warrant. CM emailed the letter to the provided email address for this pt.

## 2025-06-02 NOTE — PLAN OF CARE
Problem: Depression  Goal: Refrain from isolation  Description: Interventions:- Develop a trusting relationship - Encourage socialization   Outcome: Progressing   Pt attends groups and participates

## 2025-06-02 NOTE — NURSING NOTE
"Patient has been visible on the unit.  Attended evening snack and has been interacting with peers.  Behaviors controlled.  Mood is slightly elevated.  Boisterous.  Says he feels a little \"manic-y\"  Cooperative with medications  "

## 2025-06-02 NOTE — NURSING NOTE
Patient was pleasant and cooperative. Patient social with staff and peers.  Staff support provided. Q 15 minute safety checks maintained. Groups encouraged. Patient compliant with medications and groups. Patient remains hyper verbal but cooperative. Staff will continue to monitor and support.

## 2025-06-03 PROCEDURE — 99232 SBSQ HOSP IP/OBS MODERATE 35: CPT | Performed by: PSYCHIATRY & NEUROLOGY

## 2025-06-03 RX ORDER — TRIHEXYPHENIDYL HYDROCHLORIDE 2 MG/1
2 TABLET ORAL 2 TIMES DAILY
Qty: 60 TABLET | Refills: 0 | Status: SHIPPED | OUTPATIENT
Start: 2025-06-03 | End: 2025-06-04

## 2025-06-03 RX ORDER — LITHIUM CARBONATE 600 MG/1
600 CAPSULE ORAL 2 TIMES DAILY
Qty: 60 CAPSULE | Refills: 0 | Status: SHIPPED | OUTPATIENT
Start: 2025-06-03 | End: 2025-07-03

## 2025-06-03 RX ORDER — PALIPERIDONE 6 MG/1
12 TABLET, EXTENDED RELEASE ORAL
Qty: 60 TABLET | Refills: 0 | Status: SHIPPED | OUTPATIENT
Start: 2025-06-03 | End: 2025-07-03

## 2025-06-03 RX ADMIN — LITHIUM CARBONATE 600 MG: 300 CAPSULE, GELATIN COATED ORAL at 21:18

## 2025-06-03 RX ADMIN — ATORVASTATIN CALCIUM 10 MG: 10 TABLET, FILM COATED ORAL at 17:00

## 2025-06-03 RX ADMIN — TAMSULOSIN HYDROCHLORIDE 0.4 MG: 0.4 CAPSULE ORAL at 17:00

## 2025-06-03 RX ADMIN — TRIHEXYPHENIDYL HYDROCHLORIDE 2 MG: 2 TABLET ORAL at 18:17

## 2025-06-03 RX ADMIN — CYANOCOBALAMIN TAB 500 MCG 500 MCG: 500 TAB at 08:47

## 2025-06-03 RX ADMIN — PALIPERIDONE 12 MG: 6 TABLET, EXTENDED RELEASE ORAL at 21:18

## 2025-06-03 RX ADMIN — CHOLECALCIFEROL TAB 25 MCG (1000 UNIT) 1000 UNITS: 25 TAB at 08:46

## 2025-06-03 RX ADMIN — LITHIUM CARBONATE 600 MG: 300 CAPSULE, GELATIN COATED ORAL at 08:47

## 2025-06-03 RX ADMIN — LISINOPRIL 10 MG: 10 TABLET ORAL at 08:46

## 2025-06-03 RX ADMIN — TRIHEXYPHENIDYL HYDROCHLORIDE 2 MG: 2 TABLET ORAL at 08:46

## 2025-06-03 NOTE — NURSING NOTE
Pt calm and cooperative with assessment. Denies SI/HI/AH/VH and pain. Compliant with meds. Socializes with peers and staff. Pt speech loud and rambling. Safety checks ongoing.

## 2025-06-03 NOTE — SOCIAL WORK
CM met with pt regarding an ICM referral. Pt declined to sign consent for an ICM and stated that he had one before and no longer does and is not interested in trying again.

## 2025-06-03 NOTE — DISCHARGE INSTR - OTHER ORDERS
You are being discharged to your home [address] [place] [address] TELEPHONE [phone]    Triggers you have identified during your hospitalization that led to your admission of a regressed mood due to family conflict. Coping skills you have identified reading the Bible during your hospitalization include listening to music. If you are unable to deal with your distressed mood alone please contact [facility] at [phone] if that is not effective and you continue to have (ex: suicidal ideation, homicidal ideation, distressed mood, overwhelmed, in crisis) please contact Crisis by dialing [phone], [facility] Hotline: [phone] dial [phone] or go to the nearest emergency center.  You can also reach out to your Behavioral  Jessica from [facility].    *[facility] Hotline: 773.957.4706  * Alcohol Anonymous: 794.801.4425  *[facility] and Alcohol [facility] [phone]  *National [place] [employer] Mental Illness (SHANNAN) HELPLINE: 826.781.3774/Website: www.shannan.org  *Substance Abuse and Mental Health Services Administration([facility]) National Helpline, which is a confidential, free, 24-hour-a-day, 365-day-a-year, information service for individuals and family members facing mental health and/or substance use disorders. This service provides referrals to local treatment facilities, support groups, and community-based organizations. Callers can also order free publications and other information.  Call 1-792.453.3190/Website: www.[facility].gov  *[facility] 2-1-1: This is a toll free, confidential, 24-hour-a-day service which connects you to a community  in your area who can help you find services and resources that are available to you locally and provide critical services that can improve and save lives.  Call: 211  /Website: http://www.[employer]/       Mary, or Leelee, our Behavioral Health Nurse Navigators, will be calling you after your discharge, on the phone  number that you provided.  They will be available as an additional support, if needed.   If you wish to speak with one of them, you may contact Mary at [phone] or Leelee at [phone].

## 2025-06-03 NOTE — ASSESSMENT & PLAN NOTE
Invega 12 mg at bedtime for psychosis and mood symptoms.  AIMS of 1 on today's assessment.  Artane 2 mg twice daily for EPS/TD.  Hemoglobin A1c noted to be 5.7 as of 4/7/2025, lipid panel unremarkable as of 5/12/2025.  EKG on 5/6/2025 reveals normal sinus rhythm with PVCs and PACs, QTc of 480.  Lithium 600 mg twice daily for mood stabilization.  Lithium level noted to be therapeutic at 0.73 as of 5/30/2025.  EGFR noted to be 91 as of 5/6/2025.  TSH noted to be 1.939 as of 5/6/2025.

## 2025-06-03 NOTE — NURSING NOTE
Patient was pleasant and cooperative. Patient social with staff and peers. Staff support provided. Groups encouraged. 15 minute safety checks maintained. Patient compliant with medications and groups. Patient denied SI/HI. Patient remained pleasant and cooperative. Staff will continue to monitor and support.

## 2025-06-03 NOTE — PROGRESS NOTES
"Progress Note - Freddie Graham 65 y.o. male MRN: [id number]    Unit/Bed#: [facility] 245-02 Encounter: [id number]        Subjective:   Patient seen and examined at bedside after reviewing the chart and discussing the case with the caring staff.      Patient examined at bedside.  Patient reports no acute symptoms.     Physical Exam   Vitals: Blood pressure 113/67, pulse 80, temperature 97.5 °F (36.4 °C), temperature source Temporal, resp. rate 18, height 5' 9\" (1.753 m), weight 90.6 kg (199 lb 12.8 oz), SpO2 99%.,Body mass index is 29.51 kg/m².  Constitutional: Patient in no acute distress.  HEENT: PERR, EOMI, MMM.  Cardiovascular: Normal rate and regular rhythm.    Pulmonary/Chest: Effort normal and breath sounds normal.   Abdomen: Soft, + BS, NT.    Assessment/Plan:  Freddie Graham is a(n) 65 y.o. male with schizoaffective disorder.      Cardiac w/ HTN, HLD.  Not on BP medication.  Restart lisinopril 10 mg daily 5/9.  Continue atorvastatin 10 mg daily.   LISET.  Patient had recent Zio patch with cardiology.  Pauses found, no afib, thought to be related to LISET.  Recommended sleep study outpt.   Urothelial carcinoma/BPH.  Patient follows with urology outpt -due for follow up.  Continue Flomax 0.4 mg daily and pyridium 200 mg TID as needed for bladder spasms.   Chronic back pain/DJD.  CT spine 5/7 showing \"No acute displaced fracture or traumatic malalignment.  Multilevel degenerative disc and facet changes causing varying degrees of spinal and neuroforaminal narrowing. Up to moderate spinal stenosis at L4-5. Up to severe left L4-5 and L5-S1 neuroforaminal stenosis.\"  Tylenol/motrin as needed.  Voltaren gel 4x daily as needed.  Tobacco abuse.  Declines NRT.   Vitamin D deficiency.  Continue daily Vit D supplement.   Vitamin B12 deficiency.  Continue daily vitamin B12 supplement.  Seborrheic dermatitis.  Involving face.  Apply clotrimazole twice daily 5/17.  Hydrocortisone cream twice daily as needed.   Diarrhea.  Imodium as needed " 5/26.  Consider probiotic/metamucil if continues.     The patient was discussed with Dr. Gallagher and he is in agreement with the above note.

## 2025-06-03 NOTE — PLAN OF CARE
Problem: Risk for Violence/Aggression Toward Others  Goal: Verbalize thoughts and feelings  Description: Interventions:- Assess and re-assess patient's level of risk, every waking shift- Engage patient in 1:1 interactions, daily, for a minimum of 15 minutes - Allow patient to express feelings and frustrations in a safe and non-threatening manner - Establish rapport/trust with patient   Outcome: Progressing  Goal: Control angry outbursts  Description: Interventions:- Monitor patient closely, per order- Ensure early verbal de-escalation- Monitor prn medication needs- Set reasonable/therapeutic limits, outline behavioral expectations, and consequences - Provide a non-threatening milieu, utilizing the least restrictive interventions   Outcome: Progressing

## 2025-06-03 NOTE — PROGRESS NOTES
06/02/25 0900   Team Meeting   Meeting Type Daily Rounds   Team Members Present   Team Members Present Physician;Nurse;Occupational Therapist;;   Physician Team Member Jimmy PAYTON, CARLOS Alexandre MD   Nursing Team Member ADRIEN Fuller   Care Management Team Member MS Franko   Social Work Team Member MANA Ricks   OT Team Member NAYANA Ruff   Patient/Family Present   Patient Present No   Patient's Family Present No   304. Cooperative, well groomed. Denies all. Med compliant. Eating/sleeping well. Attending groups. D/C 6-4

## 2025-06-03 NOTE — PROGRESS NOTES
Progress Note - Behavioral Health   Name: Freddie Graham 65 y.o. male I MRN: [id number]  Unit/Bed#: U 245-02 I Date of Admission: 5/8/2025   Date of Service: 6/3/2025 I Hospital Day: 26    Assessment & Plan  Acute exacerbation of chronic schizoaffective schizophrenia (HCC)  Invega 12 mg at bedtime for psychosis and mood symptoms.  AIMS of 1 on today's assessment.  Artane 2 mg twice daily for EPS/TD.  Hemoglobin A1c noted to be 5.7 as of 4/7/2025, lipid panel unremarkable as of 5/12/2025.  EKG on 5/6/2025 reveals normal sinus rhythm with PVCs and PACs, QTc of 480.  Lithium 600 mg twice daily for mood stabilization.  Lithium level noted to be therapeutic at 0.73 as of 5/30/2025.  EGFR noted to be 91 as of 5/6/2025.  TSH noted to be 1.939 as of 5/6/2025.          Current Medications:    Current Facility-Administered Medications:     atorvastatin (LIPITOR) tablet 10 mg, Oral, Daily With Dinner    Cholecalciferol (VITAMIN D3) tablet 1,000 Units, Oral, Daily    hydrocortisone 2.5 % cream, Topical, BID PRN **AND** clotrimazole (LOTRIMIN) 1 % cream, Topical, BID    cyanocobalamin (VITAMIN B-12) tablet 500 mcg, Oral, Daily    lisinopril (ZESTRIL) tablet 10 mg, Oral, Daily    lithium carbonate capsule 600 mg, Oral, BID    paliperidone (INVEGA) 24 hr tablet 12 mg, Oral, HS    tamsulosin (FLOMAX) capsule 0.4 mg, Oral, Daily With Dinner    trihexyphenidyl (ARTANE) tablet 2 mg, Oral, BID    Current Facility-Administered Medications:     acetaminophen (TYLENOL) tablet 650 mg, Oral, Q6H PRN    aluminum-magnesium hydroxide-simethicone (MAALOX) oral suspension 30 mL, Oral, Q4H PRN    benztropine (COGENTIN) injection 1 mg, Intramuscular, Q4H PRN Max 6/day    benztropine (COGENTIN) tablet 0.5 mg, Oral, Q4H PRN Max 6/day    Diclofenac Sodium (VOLTAREN) 1 % topical gel 2 g, Topical, 4x Daily PRN    hydrocortisone 2.5 % cream, Topical, BID PRN **AND** clotrimazole (LOTRIMIN) 1 % cream, Topical, BID    hydrOXYzine HCL (ATARAX) tablet 25 mg,  Oral, Q6H PRN Max 4/day    ibuprofen (MOTRIN) tablet 400 mg, Oral, Q6H PRN    ibuprofen (MOTRIN) tablet 600 mg, Oral, Q6H PRN    loperamide (IMODIUM) capsule 2 mg, Oral, Q4H PRN    LORazepam (ATIVAN) injection 1 mg, Intramuscular, Q6H PRN Max 3/day    LORazepam (ATIVAN) tablet 0.5 mg, Oral, Q6H PRN Max 4/day    LORazepam (ATIVAN) tablet 1 mg, Oral, Q6H PRN Max 3/day    nicotine polacrilex (NICORETTE) gum 4 mg, Oral, Q4H PRN    OLANZapine (ZyPREXA) IM injection 5 mg, Intramuscular, Q3H PRN Max 3/day    OLANZapine (ZyPREXA) tablet 2.5 mg, Oral, Q4H PRN Max 6/day    OLANZapine (ZyPREXA) tablet 5 mg, Oral, Q4H PRN Max 3/day    OLANZapine (ZyPREXA) tablet 5 mg, Oral, Q3H PRN Max 3/day    phenazopyridine (PYRIDIUM) tablet 200 mg, Oral, TID PRN    polyethylene glycol (MIRALAX) packet 17 g, Oral, Daily PRN    propranolol (INDERAL) tablet 5 mg, Oral, Q8H PRN    senna-docusate sodium (SENOKOT S) 8.6-50 mg per tablet 1 tablet, Oral, Daily PRN    traZODone (DESYREL) tablet 50 mg, Oral, HS PRN    Risks/Benefits of Treatment:     Risks, benefits, and possible side effects of medications explained to patient and patient verbalizes understanding and agreement for treatment.    Progress Toward Goals: improved    Treatment Planning:      - Encourage early mobility and having a structured day  - Provide frequent re-orientation, and cognitive stimulation  - Ensure assistive devices are in proper working order (eye-glasses, hearing aids)  - Encourage adequate hydration, nutrition and monitor bowel movements  - Maintain sleep-wake cycle: Uninterrupted sleep time; low-level lighting at night  - Fall precaution  - Follow up with [facility] regarding the medical problems   - Continue medication titration and treatment plan; adjust medication to optimize treatment response and as clinically indicated.   - Observation: N/A  - VS: as per unit protocol  - Encourage group attendance and milieu therapy  - Dispo: To be determined  - Estimated Discharge  Day: 6/4/2025 2 days (6/5/2025)  - Legal Status : On 303 commitment.  - Long Stay Certification : Not Applicable    Subjective     Freddie was seen today for psychiatric follow-up. Patient is calm, cooperative. He is visible on the unit, social with peers. Patient  remains less psychotic appearing with improved personal hygiene. Patient is more organized, goal oriented and able to reality test. He I ruminative on his pending legal issues. His mood is controlled with no recent behavioral outbursts. He denied any SI/HI/AVH when asked.    Sleep: normal  Appetite: normal  Medication side effects: No  ROS: review of systems as noted above in HPI/Subjective report, all other systems are negative    Objective :  Temp:  [97.5 °F (36.4 °C)-97.7 °F (36.5 °C)] 97.5 °F (36.4 °C)  HR:  [80-95] 80  BP: (101-113)/(67-72) 113/67  Resp:  [18] 18  SpO2:  [97 %-99 %] 99 %  O2 Device: None (Room air)    Mental Status Evaluation:    Appearance:  age appropriate, adequate grooming   Behavior:  cooperative, calm   Speech:  normal rate and volume   Mood:  improved   Affect:  mood-congruent   Thought Process:  tangential   Thought Content:  ruminations   Perceptual Disturbances: Denied AVH when asked   Risk Potential: Suicidal Ideation -  None at present  Homicidal Ideation -  None at present  Potential for Aggression - No   Sensorium:  oriented to person, place, and time/date   Memory:  recent and remote memory grossly intact   Consciousness:  alert and awake   Attention/Concentration: attention span and concentration appear shorter than expected for age   Insight:  improving   Judgment: improving   Gait/Station: normal gait/station   Motor Activity: no abnormal movements     Cognitive Assessment : N/A  Pain : denied  Pain Scale : 0      Lab Results: I have reviewed the following results:  Most Recent Labs:   Lab Results   Component Value Date    WBC 5.97 05/06/2025    RBC 4.72 05/06/2025    HGB 13.6 05/06/2025    HCT 41.3 05/06/2025    PLT  "199 05/06/2025    RDW 13.1 05/06/2025    NEUTROABS 4.33 05/06/2025    SODIUM 137 05/06/2025    K 3.5 05/06/2025     05/06/2025    CO2 23 05/06/2025    BUN 15 05/06/2025    CREATININE 0.83 05/06/2025    GLUC 111 05/06/2025    CALCIUM 9.3 05/06/2025    AST 30 05/06/2025    ALT 31 05/06/2025    ALKPHOS 44 05/06/2025    TP 6.7 05/06/2025    ALB 4.3 05/06/2025    TBILI 0.81 05/06/2025    CHOLESTEROL 155 05/12/2025    HDL 47 05/12/2025    TRIG 69 05/12/2025    LDLCALC 94 05/12/2025    NONHDLC 108 05/12/2025    VALPROICTOT <10 (L) 02/17/2024    LITHIUM 0.73 05/30/2025    AMMONIA 51 02/06/2021    HJK4MTGGIUQI 1.939 05/06/2025    FREET4 0.97 02/17/2024    TREPONEMAPA Non-reactive 04/07/2025    HGBA1C 5.7 (H) 04/07/2025     04/07/2025       Administrative Statements     Counseling / Coordination of Care:   Patient's progress discussed with staff in treatment team meeting.  Medication changes reviewed with staff in treatment team meeting.    Portions of the record may have been created with voice recognition software. Occasional wrong word or \"sound a like\" substitutions may have occurred due to the inherent limitations of voice recognition software. Read the chart carefully and recognize, using context, where substitutions have occurred.    CARLOS Farnsworth 06/03/25  "

## 2025-06-04 VITALS
WEIGHT: 199.8 LBS | OXYGEN SATURATION: 96 % | TEMPERATURE: 98.9 F | RESPIRATION RATE: 18 BRPM | SYSTOLIC BLOOD PRESSURE: 119 MMHG | BODY MASS INDEX: 29.59 KG/M2 | DIASTOLIC BLOOD PRESSURE: 66 MMHG | HEIGHT: 69 IN | HEART RATE: 93 BPM

## 2025-06-04 PROCEDURE — 99238 HOSP IP/OBS DSCHRG MGMT 30/<: CPT | Performed by: PSYCHIATRY & NEUROLOGY

## 2025-06-04 RX ORDER — BENZTROPINE MESYLATE 1 MG/1
1 TABLET ORAL 2 TIMES DAILY
Qty: 60 TABLET | Refills: 0 | Status: SHIPPED | OUTPATIENT
Start: 2025-06-04 | End: 2025-07-04

## 2025-06-04 RX ORDER — TAMSULOSIN HYDROCHLORIDE 0.4 MG/1
0.4 CAPSULE ORAL
Qty: 30 CAPSULE | Refills: 0 | Status: SHIPPED | OUTPATIENT
Start: 2025-06-04 | End: 2025-07-04

## 2025-06-04 RX ORDER — LISINOPRIL 10 MG/1
10 TABLET ORAL DAILY
Qty: 30 TABLET | Refills: 0 | Status: SHIPPED | OUTPATIENT
Start: 2025-06-05

## 2025-06-04 RX ADMIN — CHOLECALCIFEROL TAB 25 MCG (1000 UNIT) 1000 UNITS: 25 TAB at 08:35

## 2025-06-04 RX ADMIN — TRIHEXYPHENIDYL HYDROCHLORIDE 2 MG: 2 TABLET ORAL at 08:35

## 2025-06-04 RX ADMIN — LISINOPRIL 10 MG: 10 TABLET ORAL at 08:35

## 2025-06-04 RX ADMIN — CYANOCOBALAMIN TAB 500 MCG 500 MCG: 500 TAB at 08:35

## 2025-06-04 RX ADMIN — LITHIUM CARBONATE 600 MG: 300 CAPSULE, GELATIN COATED ORAL at 08:35

## 2025-06-04 NOTE — DISCHARGE SUMMARY
Discharge Summary - Behavioral Health   Name: Freddie Graham 65 y.o. male I MRN: [id number]  Unit/Bed#: -02 I Date of Admission: 5/8/2025   Date of Service: 6/4/2025 I Hospital Day: 27    Assessment & Plan  Acute exacerbation of chronic schizoaffective schizophrenia (HCC)  Continue Invega 12 mg PO QD upon discharge  Continue lithium  mg PO BID upon discharge  Continue cogentin 1 mg PO BID upon discharge (Artane not covered by patient's insurance).     Admission Date: 5/8/2025         Discharge Date: 6/4/2025 10:00 AM    Attending Psychiatrist: Dr. Juaquin Marquez     Reason for Admission/HPI: Major depressive disorder, single episode, unspecified [F32.9]      According to H&P by Dr. Sterling 5/9/25:    History of Present Illness  Freddie Graham is a 65 y.o. male with a history of Schizoaffective Disorder who was admitted to the inpatient adult psychiatric unit on a voluntary 201 commitment basis due to manic symptoms, unstable mood, psychotic symptoms, and increased agitation.     On evaluation in the inpatient psychiatric unit Freddie is a poor historian and not cooperative due to his significant of schizoaffective disorder.  Patient does not engage in conversation to answer any questions of the provider.  Does communicate with long grants regarding respiratory thoughts, stating he does not need medication and at times multiple illogical content.  Patient adamantly stating he will refuse all medications.  Patient seen to be manic with psychotic thought processes.  Subjectively denies all symptoms.  Denies thoughts of self-harm or others however is seen to be angry and agitated with potential for aggressive outbursts.    Hospital Course: The patient was admitted to the inpatient psychiatric unit and started on every 15 minutes precautions. During the hospitalization the patient was attending individual therapy, group therapy, milieu therapy and occupational therapy.    Psychiatric medications were titrated over  the hospital stay to address mood instability, mood swings, impulsivity, agitation, and psychotic symptoms the patient was started on mood stabilizer Lithium CR, antipsychotic medication Invega, and antiparkinsonian medication Cogentin. Medication doses were titrated during the hospital course. Prior to beginning of treatment medications risks and benefits and possible side effects including risk of kidney impairment related to treatment with Lithium, risk of parkinsonian symptoms, Tardive Dyskinesia and metabolic syndrome related to treatment with antipsychotic medications, and risk of cardiovascular events in elderly related to treatment with antipsychotic medications were reviewed with the patient.  Freddie had limited understanding of risk versus benefits of medication and stated he would refuse.  During time of admission assessment, patient was converted to 302 with a second opinion for medication over objection for treatment of mood instability and psychosis.    Since Freddie was continuing to exhibit symptoms of opal, mood instability, psychosis, and agitation, and medication over objection order was obtained and patient was initiated on Invega.  Invega was maximized to 12 mg daily for symptoms of psychosis and mood control.  Patient also required mood stabilizer lithium CR for ongoing agitation and mood control.  Lithium was titrated to therapeutic level of 0.73.  With titration of medications, Freddie was slowly exhibiting improvement.  He reported side effects of drooling and was initiated on Artane 2 mg twice daily per his request.  With maximization of medications, Freddie appeared to be approaching his psychiatric baseline he was no longer exhibiting overt psychosis, agitation, or mood instability.  He was visible, social, and engaged in milieu activities.  Exhibited improved insight and judgment.  Self-care and sleep were also improved.  At this time, decision was made to begin discharge preparations.  Prior  "to discharge, Freddie was able to identify an adequate safety plan to utilize should he become a danger to himself, others, or experience of mental health crisis.  This plan includes talking with his family, [facility], or returning to the hospital.  He described his gem of strong protective factor against suicide.  Forward thinking with plan to continue medications and follow up with outpatient psychiatry/PCP.    On day of discharge, it was found that patient's Artane was not covered by patient's insurance and required prior authorization.  Patient made aware of this and requested Artane be discontinued and he be put back on Cogentin 1 mg PO BID for c/o \"drooling.\"     We felt that Freddie achieved the maximum benefit of inpatient stay at that point, was at baseline at the end of the hospitalization and could now follow up with outpatient treatment. Freddie also felt stable and ready for discharge at the end of the hospital stay.    The outpatient follow up with Dr. Everett PCP 6/18/25 @ 12:30 and Psychiatrist Dr. Monzon 6/10/25 @ 2:30 PM (Virtual) was arranged by the unit  upon discharge.  A 30-day supply of psychotropic medication was submitted to [facility]'s pharmacy on day of discharge.    Freddie was stabilized and discharged on the following psychotropic regimen: Invega 12 mg PO QD, lithium  mg PO BID, and Cogentin 1 mg PO BID. He tolerating medications well and denied any side effects on day of discharge.    Last lithium level obtained 5/30/2025 and therapeutic at 0.73.    Mental Status at time of Discharge:   Appearance:  age appropriate, bearded, casually dressed, and long gray hair, improved hygiene   Behavior:  Cooperative, calm   Speech:  Deep, normal pitch/volume   Mood:  \"Good\"   Affect:  Mood congruent   Thought Process:  Goal directed, linear, circumstantial associations   Thought Content:  No overt delusions or paranoia verbalized; able to reality test   Perceptual Disturbances: " Denied AVH, did not appear internally preoccupied   Risk Potential: Suicidal Ideations none, Homicidal Ideations none, and Potential for Aggression No   Sensorium:  person, place, time/date, and situation   Cognition:  recent and remote memory grossly intact   Consciousness:  alert and awake    Attention: attention span and concentration were age appropriate   Insight:  Improved   Judgment: Improved   Gait/Station: normal gait/station and normal balance   Motor Activity: no abnormal movements     Admission Diagnosis:Major depressive disorder, single episode, unspecified [F32.9]    Discharge Diagnosis:   Principal Problem:    Acute exacerbation of chronic schizoaffective schizophrenia (HCC)  Resolved Problems:    * No resolved hospital problems. *    Lab results:  Admission on 05/08/2025, Discharged on 06/04/2025   Component Date Value    Vitamin B-12 05/12/2025 290     Folate 05/12/2025 12.5     Vit D, 25-Hydroxy 05/12/2025 30.0     Cholesterol 05/12/2025 155     Triglycerides 05/12/2025 69     HDL, Direct 05/12/2025 47     LDL Calculated 05/12/2025 94     Non-HDL-Chol (CHOL-HDL) 05/12/2025 108     Lithium Lvl 05/16/2025 0.46 (L)     Lithium Lvl 05/23/2025 0.56 (L)     Lithium Lvl 05/30/2025 0.73      Discharge Medications:  Discharge Medication List as of 6/4/2025  9:21 AM        START taking these medications    Details   cyanocobalamin (VITAMIN B-12) 500 MCG tablet Take 1 tablet (500 mcg total) by mouth daily, Starting Thu 6/5/2025, Normal      lisinopril (ZESTRIL) 10 mg tablet Take 1 tablet (10 mg total) by mouth daily, Starting Thu 6/5/2025, Normal      lithium carbonate 600 MG capsule Take 1 capsule (600 mg total) by mouth 2 (two) times a day, Starting Tue 6/3/2025, Until Thu 7/3/2025, Normal      paliperidone (INVEGA) 6 MG 24 hr tablet Take 2 tablets (12 mg total) by mouth daily at bedtime, Starting Tue 6/3/2025, Until Thu 7/3/2025, Normal           CONTINUE these medications which have CHANGED    Details    benztropine (COGENTIN) 1 mg tablet Take 1 tablet (1 mg total) by mouth 2 (two) times a day, Starting Wed 6/4/2025, Until Fri 7/4/2025, Normal      tamsulosin (FLOMAX) 0.4 mg Take 1 capsule (0.4 mg total) by mouth daily with dinner, Starting Wed 6/4/2025, Until Fri 7/4/2025, Normal              CONTINUE these medications which have NOT CHANGED    Details   atorvastatin (LIPITOR) 10 mg tablet Take 1 tablet (10 mg total) by mouth daily with dinner, Starting Fri 4/25/2025, Until Sun 5/25/2025, Normal      cholecalciferol (VITAMIN D3) 1,000 units tablet Take 2 tablets (2,000 Units total) by mouth daily, Starting Fri 4/25/2025, Until Sun 5/25/2025, Normal      phenazopyridine (PYRIDIUM) 200 mg tablet Take 200 mg by mouth Three times daily as needed, Starting Fri 12/27/2024, Historical Med           Discharge instructions/Information to patient and family:   See after visit summary for information provided to patient and family.      Provisions for Follow-Up Care:  See after visit summary for information related to follow-up care and any pertinent home health orders.      Discharge Statement:    I spent 20 minutes discharging the patient. This time was spent on the day of discharge. I had direct contact with the patient on the day of discharge.     I reviewed with Freddie importance of compliance with medications and outpatient treatment after discharge.  I discussed the medication regimen and possible side effects of the medications with Freddie prior to discharge. At the time of discharge he was tolerating psychiatric medications.  I discussed outpatient follow up with Freddie.  I reviewed with Freddie crisis plan and safety plan upon discharge.  Freddie agreed to abstain from drug and alcohol use after discharge.    CARLOS Mo 06/04/25

## 2025-06-04 NOTE — BH TRANSITION RECORD
Contact Information: If you have any questions, concerns, pended studies, tests and/or procedures, or emergencies regarding your inpatient behavioral health visit. Please contact [facility] # [phone] and ask to speak to a , nurse or physician. A contact is available 24 hours/ 7 days a week at this number.     Summary of Procedures Performed During your Stay:  Below is a list of major procedures performed during your hospital stay and a summary of results:  - No major procedures performed.    Pending Studies (From admission, onward)      None          Please follow up on the above pending studies with your PCP and/or referring provider.

## 2025-06-04 NOTE — NURSING NOTE
Pt calm and cooperative with assessment. Denies SI/HI/AH/VH and pain. Compliant with meds. Socializes with peers and staff. Safety checks ongoing.

## 2025-06-04 NOTE — SOCIAL WORK
CM met with pt to discuss his discharge plans and confirmed that his brother Benny was picking him up. CM also explained to pt the IMM document regarding his rights with discharge and Medicare. Pt signed the form as he wishes to be discharged.

## 2025-06-04 NOTE — PLAN OF CARE
Problem: Ineffective Coping  Goal: Identifies ineffective coping skills  Outcome: Progressing     Problem: Risk for Violence/Aggression Toward Others  Goal: Control angry outbursts  Description: Interventions:- Monitor patient closely, per order- Ensure early verbal de-escalation- Monitor prn medication needs- Set reasonable/therapeutic limits, outline behavioral expectations, and consequences - Provide a non-threatening milieu, utilizing the least restrictive interventions   Outcome: Progressing

## 2025-06-04 NOTE — ASSESSMENT & PLAN NOTE
Continue Invega 12 mg PO QD upon discharge  Continue lithium  mg PO BID upon discharge  Continue cogentin 1 mg PO BID upon discharge (Artane not covered by patient's insurance).

## 2025-06-04 NOTE — PROGRESS NOTES
06/04/25    Team Meeting   Meeting Type Daily Rounds   Team Members Present   Team Members Present Physician;Nurse;;;Occupational Therapist   Physician Team Member Jimmy PAYTON; Vince PAYTON; CARLOS Flanagan   Nursing Team Member ADRIEN Zurita   Care Management Team Member MS Franko   Social Work Team Member MANA Ricks   OT Team Member NAYANA Ruff; NAYANA Pandya   Patient/Family Present   Patient Present No   Patient's Family Present No   303. Doing well. Excited about leaving. Attending groups. Eating/sleeping well. Med compliant.  D/C 6-4

## 2025-06-04 NOTE — CASE MANAGEMENT
Med Prior Auth request submitted for Artane. Awaiting response. Reportedly, Pt was on Artane prior to admission and has some at home. Per [facility], is $4.00 at [facility], [facility].

## 2025-06-04 NOTE — PLAN OF CARE
Problem: Depression  Goal: Refrain from isolation  Description: Interventions:- Develop a trusting relationship - Encourage socialization   Outcome: Progressing   Pt attends and participates in groups

## 2025-06-04 NOTE — PROGRESS NOTES
06/03/25 1205    Discharge Notification   Notification of Discharge Provided to: Family;Psychiatrist;PCP   Family Notified via: Phone call  (Cm spoke with pts brother marce regarding discharge)   PCP Notified via: Phone call  ([facility] [place] to [facility]'s [facility] appointment.)   Psychiatrist Notified via: Phone call  ([facility] spoke with [facility] in [place] to schedule pts psychiatrist appointment)

## 2025-06-04 NOTE — PROGRESS NOTES
"Progress Note - Freddie Graham 65 y.o. male MRN: [id number]    Unit/Bed#: [facility] 245-02 Encounter: [id number]        Subjective:   Patient seen and examined at bedside after reviewing the chart and discussing the case with the caring staff.      Patient examined at bedside.  Patient reports no acute symptoms.     Patient is being discharged today, 6/4/25.     Physical Exam   Vitals: Blood pressure 119/66, pulse 93, temperature 98.9 °F (37.2 °C), temperature source Temporal, resp. rate 18, height 5' 9\" (1.753 m), weight 90.6 kg (199 lb 12.8 oz), SpO2 96%.,Body mass index is 29.51 kg/m².  Constitutional: Patient in no acute distress.  HEENT: PERR, EOMI, MMM.  Cardiovascular: Normal rate and regular rhythm.    Pulmonary/Chest: Effort normal and breath sounds normal.   Abdomen: Soft, + BS, NT.    Assessment/Plan:  Freddie Graham is a(n) 65 y.o. male with schizoaffective disorder.      Medical Clearance: Patient is medically cleared for discharge. All prescriptions have been sent to pharmacy.     Cardiac w/ HTN, HLD.  Not on BP medication.  Restart lisinopril 10 mg daily 5/9.  Continue atorvastatin 10 mg daily.   LISET.  Patient had recent Zio patch with cardiology.  Pauses found, no afib, thought to be related to LISET.  Recommended sleep study outpt.   Urothelial carcinoma/BPH.  Patient follows with urology outpt -due for follow up.  Continue Flomax 0.4 mg daily and pyridium 200 mg TID as needed for bladder spasms.   Chronic back pain/DJD.  CT spine 5/7 showing \"No acute displaced fracture or traumatic malalignment.  Multilevel degenerative disc and facet changes causing varying degrees of spinal and neuroforaminal narrowing. Up to moderate spinal stenosis at L4-5. Up to severe left L4-5 and L5-S1 neuroforaminal stenosis.\"  Tylenol/motrin as needed.  Voltaren gel 4x daily as needed.  Tobacco abuse.  Declines NRT.   Vitamin D deficiency.  Continue daily Vit D supplement.   Vitamin B12 deficiency.  Continue daily vitamin B12 " supplement.  Seborrheic dermatitis.  Involving face.  Apply clotrimazole twice daily 5/17.  Hydrocortisone cream twice daily as needed.   Diarrhea.  Imodium as needed 5/26.  Consider probiotic/metamucil if continues.     The patient was discussed with Dr. Gallagher and he is in agreement with the above note.

## 2025-06-04 NOTE — PROGRESS NOTES
06/03/25    Team Meeting   Meeting Type Daily Rounds   Team Members Present   Team Members Present Physician;Nurse;;;Occupational Therapist   Physician Team Member Jimmy PAYTON; Vince PAYTON; CARLOS Flanagan   Nursing Team Member ADRIEN Fuller   Care Management Team Member MS Franko   Social Work Team Member MANA Ricks   OT Team Member NAYANA Ruff   Patient/Family Present   Patient Present No   Patient's Family Present No   303. Pleasant, erratic at times. Religiously preoccupied. Linear in group. Eating/sleeping well.   Med compliant. D/C 6-4.

## 2025-06-05 NOTE — SOCIAL WORK
CM called pt's brother Howard to confirm his discharge with him. CM asked Howard if Freddie would be able to go back home and Howard stated that he can and that he would let their mother know. [facility] health appointment for medication management [facility]. Pt's PCP appointment is on 6-18-25 at 12:30 pm in person with Dr. Everett and his medication management appointment is on 6-10-25 at 2:30pm with Dr. White

## 2025-06-05 NOTE — PROGRESS NOTES
06/05/25 1133   Discharge Planning   Living Arrangements Lives w/ Family members  (Pt lives with his mother)   Support Systems Self;[employer];Parent;Psychiatrist;Other (Comment)   Assistance Needed OP, PCP   Type of Current Residence Private residence   Current Home Care Services No   Other Referral/Resources/Interventions Provided:   Referrals Provided: Crisis Hotline   Discharge Communications   Discharge planning discussed with: tx team, pt, family(brother )   Freedom of Choice Yes   CM contacted family/caregiver? Yes  (CM spoke with pt's brother Howard)   Were Treatment Team discharge recommendations reviewed with patient/caregiver? Yes   Did patient/caregiver verbalize understanding of patient care needs? Yes   Were patient/caregiver advised of the risks associated with not following Treatment Team discharge recommendations? Yes   Contacts   [facility] and [facility]   Relationship to Patient: Family   Contact Method Phone   Phone Number : [phone]   Reason/Outcome Continuity of Care;Referral;Discharge Planning   [facility]   Would you like to participate in our [facility] service program?   No - Declined

## 2025-06-05 NOTE — PROGRESS NOTES
06/05/25 1138    Discharge Notification   Notification of Discharge Provided to: Family;Psychiatrist;PCP   Family Notified via: Phone call  (CM spoke with pts brother Howard)   PCP Notified via: Phone call  ([facility] [phone])   Psychiatrist Notified via: Phone call  ([facility] [phone])

## 2025-06-06 ENCOUNTER — TELEPHONE (OUTPATIENT)
Dept: NEUROLOGY | Facility: CLINIC | Age: 65
End: 2025-06-06

## 2025-06-06 NOTE — TELEPHONE ENCOUNTER
Received via [employer] prior auth request for Benztropine Mesylate.  Request scanned into [employer].    Nursing please forward to team that completes prior authorizations for inpatients.  Thank you.

## 2025-06-12 NOTE — TELEPHONE ENCOUNTER
Patient is not a patient of neurology or our of our Behavioral Health team.     Contacted the pharmacy and made them aware. Pharmacist verbalized understanding. No further questions at this time